# Patient Record
Sex: FEMALE | Race: WHITE | NOT HISPANIC OR LATINO
[De-identification: names, ages, dates, MRNs, and addresses within clinical notes are randomized per-mention and may not be internally consistent; named-entity substitution may affect disease eponyms.]

---

## 2018-01-30 ENCOUNTER — APPOINTMENT (OUTPATIENT)
Dept: SURGERY | Facility: CLINIC | Age: 43
End: 2018-01-30

## 2018-02-20 ENCOUNTER — APPOINTMENT (OUTPATIENT)
Dept: SURGERY | Facility: CLINIC | Age: 43
End: 2018-02-20
Payer: MEDICARE

## 2018-02-20 VITALS
SYSTOLIC BLOOD PRESSURE: 111 MMHG | HEIGHT: 64 IN | TEMPERATURE: 98.4 F | BODY MASS INDEX: 17.75 KG/M2 | WEIGHT: 104 LBS | HEART RATE: 87 BPM | DIASTOLIC BLOOD PRESSURE: 72 MMHG

## 2018-02-20 PROCEDURE — 99205 OFFICE O/P NEW HI 60 MIN: CPT

## 2018-02-27 ENCOUNTER — APPOINTMENT (OUTPATIENT)
Dept: SURGERY | Facility: CLINIC | Age: 43
End: 2018-02-27
Payer: MEDICARE

## 2018-02-27 VITALS
HEIGHT: 64 IN | HEART RATE: 93 BPM | SYSTOLIC BLOOD PRESSURE: 132 MMHG | DIASTOLIC BLOOD PRESSURE: 84 MMHG | WEIGHT: 102 LBS | OXYGEN SATURATION: 97 % | BODY MASS INDEX: 17.42 KG/M2

## 2018-02-27 DIAGNOSIS — K85.90 ACUTE PANCREATITIS WITHOUT NECROSIS OR INFECTION, UNSPECIFIED: ICD-10-CM

## 2018-02-27 PROCEDURE — 99214 OFFICE O/P EST MOD 30 MIN: CPT

## 2018-03-05 PROBLEM — K85.90 RECURRENT ACUTE PANCREATITIS: Status: ACTIVE | Noted: 2018-03-05

## 2018-03-21 ENCOUNTER — APPOINTMENT (OUTPATIENT)
Dept: SURGERY | Facility: HOSPITAL | Age: 43
End: 2018-03-21

## 2018-03-23 ENCOUNTER — APPOINTMENT (OUTPATIENT)
Dept: TRANSPLANT | Facility: CLINIC | Age: 43
End: 2018-03-23

## 2018-03-23 ENCOUNTER — OUTPATIENT (OUTPATIENT)
Dept: OUTPATIENT SERVICES | Facility: HOSPITAL | Age: 43
LOS: 1 days | End: 2018-03-23
Payer: MEDICARE

## 2018-03-23 VITALS
TEMPERATURE: 98 F | DIASTOLIC BLOOD PRESSURE: 81 MMHG | RESPIRATION RATE: 20 BRPM | WEIGHT: 104.06 LBS | HEART RATE: 92 BPM | SYSTOLIC BLOOD PRESSURE: 115 MMHG | HEIGHT: 64 IN | OXYGEN SATURATION: 100 %

## 2018-03-23 DIAGNOSIS — Z90.49 ACQUIRED ABSENCE OF OTHER SPECIFIED PARTS OF DIGESTIVE TRACT: Chronic | ICD-10-CM

## 2018-03-23 DIAGNOSIS — K86.1 OTHER CHRONIC PANCREATITIS: ICD-10-CM

## 2018-03-23 DIAGNOSIS — K85.90 ACUTE PANCREATITIS WITHOUT NECROSIS OR INFECTION, UNSPECIFIED: Chronic | ICD-10-CM

## 2018-03-23 DIAGNOSIS — Z01.818 ENCOUNTER FOR OTHER PREPROCEDURAL EXAMINATION: ICD-10-CM

## 2018-03-23 LAB
ALBUMIN SERPL ELPH-MCNC: 4.5 G/DL — SIGNIFICANT CHANGE UP (ref 3.3–5)
ALP SERPL-CCNC: 84 U/L — SIGNIFICANT CHANGE UP (ref 40–120)
ALT FLD-CCNC: 16 U/L RC — SIGNIFICANT CHANGE UP (ref 10–45)
ANION GAP SERPL CALC-SCNC: 11 MMOL/L — SIGNIFICANT CHANGE UP (ref 5–17)
AST SERPL-CCNC: 22 U/L — SIGNIFICANT CHANGE UP (ref 10–40)
BILIRUB SERPL-MCNC: 0.4 MG/DL — SIGNIFICANT CHANGE UP (ref 0.2–1.2)
BLD GP AB SCN SERPL QL: NEGATIVE — SIGNIFICANT CHANGE UP
BUN SERPL-MCNC: 12 MG/DL — SIGNIFICANT CHANGE UP (ref 7–23)
CALCIUM SERPL-MCNC: 10.1 MG/DL — SIGNIFICANT CHANGE UP (ref 8.4–10.5)
CHLORIDE SERPL-SCNC: 101 MMOL/L — SIGNIFICANT CHANGE UP (ref 96–108)
CO2 SERPL-SCNC: 31 MMOL/L — SIGNIFICANT CHANGE UP (ref 22–31)
CREAT SERPL-MCNC: 0.68 MG/DL — SIGNIFICANT CHANGE UP (ref 0.5–1.3)
GLUCOSE SERPL-MCNC: 131 MG/DL — HIGH (ref 70–99)
HCT VFR BLD CALC: 39.8 % — SIGNIFICANT CHANGE UP (ref 34.5–45)
HGB BLD-MCNC: 12.9 G/DL — SIGNIFICANT CHANGE UP (ref 11.5–15.5)
MCHC RBC-ENTMCNC: 32.4 GM/DL — SIGNIFICANT CHANGE UP (ref 32–36)
MCHC RBC-ENTMCNC: 33.2 PG — SIGNIFICANT CHANGE UP (ref 27–34)
MCV RBC AUTO: 102.6 FL — HIGH (ref 80–100)
NRBC # BLD: 0 /100 WBCS — SIGNIFICANT CHANGE UP (ref 0–0)
PLATELET # BLD AUTO: 215 K/UL — SIGNIFICANT CHANGE UP (ref 150–400)
POTASSIUM SERPL-MCNC: 4.9 MMOL/L — SIGNIFICANT CHANGE UP (ref 3.5–5.3)
POTASSIUM SERPL-SCNC: 4.9 MMOL/L — SIGNIFICANT CHANGE UP (ref 3.5–5.3)
PROT SERPL-MCNC: 7.3 G/DL — SIGNIFICANT CHANGE UP (ref 6–8.3)
RBC # BLD: 3.88 M/UL — SIGNIFICANT CHANGE UP (ref 3.8–5.2)
RBC # FLD: 12.3 % — SIGNIFICANT CHANGE UP (ref 10.3–14.5)
RH IG SCN BLD-IMP: POSITIVE — SIGNIFICANT CHANGE UP
SODIUM SERPL-SCNC: 143 MMOL/L — SIGNIFICANT CHANGE UP (ref 135–145)
WBC # BLD: 7.35 K/UL — SIGNIFICANT CHANGE UP (ref 3.8–10.5)
WBC # FLD AUTO: 7.35 K/UL — SIGNIFICANT CHANGE UP (ref 3.8–10.5)

## 2018-03-23 PROCEDURE — G0463: CPT

## 2018-03-23 PROCEDURE — 80053 COMPREHEN METABOLIC PANEL: CPT

## 2018-03-23 PROCEDURE — 86850 RBC ANTIBODY SCREEN: CPT

## 2018-03-23 PROCEDURE — 82150 ASSAY OF AMYLASE: CPT

## 2018-03-23 PROCEDURE — 86900 BLOOD TYPING SEROLOGIC ABO: CPT

## 2018-03-23 PROCEDURE — 83690 ASSAY OF LIPASE: CPT

## 2018-03-23 PROCEDURE — 85027 COMPLETE CBC AUTOMATED: CPT

## 2018-03-23 PROCEDURE — 86901 BLOOD TYPING SEROLOGIC RH(D): CPT

## 2018-03-23 RX ORDER — GABAPENTIN 400 MG/1
600 CAPSULE ORAL ONCE
Qty: 0 | Refills: 0 | Status: COMPLETED | OUTPATIENT
Start: 2018-04-05 | End: 2018-04-05

## 2018-03-23 RX ORDER — CEFAZOLIN SODIUM 1 G
2000 VIAL (EA) INJECTION ONCE
Qty: 0 | Refills: 0 | Status: DISCONTINUED | OUTPATIENT
Start: 2018-04-05 | End: 2018-04-05

## 2018-03-23 RX ORDER — SODIUM CHLORIDE 9 MG/ML
3 INJECTION INTRAMUSCULAR; INTRAVENOUS; SUBCUTANEOUS EVERY 8 HOURS
Qty: 0 | Refills: 0 | Status: DISCONTINUED | OUTPATIENT
Start: 2018-04-05 | End: 2018-04-05

## 2018-03-23 RX ORDER — FAMOTIDINE 10 MG/ML
20 INJECTION INTRAVENOUS ONCE
Qty: 0 | Refills: 0 | Status: COMPLETED | OUTPATIENT
Start: 2018-04-05 | End: 2018-04-05

## 2018-03-23 NOTE — H&P PST ADULT - OTHER CARE PROVIDERS
Pain management: Dr. Kieran Aly # [phone]          Endocrinologist: Dr. Nesbitt # [phone]

## 2018-03-23 NOTE — H&P PST ADULT - PROBLEM SELECTOR PLAN 1
Total pancreatectomy with islet autotransplantation   urine pregnancy DOS  patient is scheduled to be evaluated by her endocrinologist and pcp prior to surgery.  fs DOS

## 2018-03-23 NOTE — H&P PST ADULT - ACTIVITY
scrubbing the tub, sweeping, able to walk 1-2 flights, walks from parking lot to preadmission testing

## 2018-03-23 NOTE — H&P PST ADULT - HISTORY OF PRESENT ILLNESS
42 year old female with h/o on and off abdominal pain since childhood worsen in past 6 years, have had numerous admission with a diagnosis of pancreatitis, is being seen in preadmission testing in preparation for total pancreatectomy with islet autotransplantation on April 5, 2018.

## 2018-03-27 ENCOUNTER — APPOINTMENT (OUTPATIENT)
Dept: ENDOCRINOLOGY | Facility: CLINIC | Age: 43
End: 2018-03-27
Payer: MEDICARE

## 2018-03-27 DIAGNOSIS — K86.1 OTHER CHRONIC PANCREATITIS: ICD-10-CM

## 2018-03-27 PROCEDURE — 99211 OFF/OP EST MAY X REQ PHY/QHP: CPT

## 2018-04-02 ENCOUNTER — OTHER (OUTPATIENT)
Age: 43
End: 2018-04-02

## 2018-04-02 RX ORDER — ONDANSETRON HYDROCHLORIDE 8 MG/1
8 TABLET, FILM COATED ORAL
Refills: 0 | Status: ACTIVE | COMMUNITY
Start: 2018-04-02

## 2018-04-02 RX ORDER — LORATADINE 10 MG
17 TABLET,DISINTEGRATING ORAL DAILY
Refills: 0 | Status: ACTIVE | COMMUNITY
Start: 2018-04-02

## 2018-04-02 RX ORDER — SENNOSIDES 25 MG
25 TABLET ORAL
Refills: 0 | Status: ACTIVE | COMMUNITY
Start: 2018-04-02

## 2018-04-02 RX ORDER — OMEPRAZOLE 20 MG/1
20 CAPSULE, DELAYED RELEASE ORAL DAILY
Refills: 0 | Status: ACTIVE | COMMUNITY
Start: 2018-04-02

## 2018-04-02 RX ORDER — PANCRELIPASE 36000; 180000; 114000 [USP'U]/1; [USP'U]/1; [USP'U]/1
36000-114000 CAPSULE, DELAYED RELEASE PELLETS ORAL
Refills: 0 | Status: ACTIVE | COMMUNITY
Start: 2018-04-02

## 2018-04-02 RX ORDER — FLUTICASONE PROPIONATE 50 UG/1
50 SPRAY, METERED NASAL
Refills: 0 | Status: ACTIVE | COMMUNITY
Start: 2018-04-02

## 2018-04-02 RX ORDER — ALPRAZOLAM 0.25 MG/1
0.25 TABLET ORAL
Refills: 0 | Status: ACTIVE | COMMUNITY
Start: 2018-04-02

## 2018-04-04 ENCOUNTER — TRANSCRIPTION ENCOUNTER (OUTPATIENT)
Age: 43
End: 2018-04-04

## 2018-04-05 ENCOUNTER — INPATIENT (INPATIENT)
Facility: HOSPITAL | Age: 43
LOS: 12 days | Discharge: HOME | DRG: 405 | End: 2018-04-18
Attending: SURGERY | Admitting: SURGERY
Payer: MEDICARE

## 2018-04-05 ENCOUNTER — APPOINTMENT (OUTPATIENT)
Dept: SURGERY | Facility: HOSPITAL | Age: 43
End: 2018-04-05

## 2018-04-05 ENCOUNTER — RESULT REVIEW (OUTPATIENT)
Age: 43
End: 2018-04-05

## 2018-04-05 VITALS
HEART RATE: 99 BPM | TEMPERATURE: 98 F | DIASTOLIC BLOOD PRESSURE: 88 MMHG | RESPIRATION RATE: 18 BRPM | SYSTOLIC BLOOD PRESSURE: 127 MMHG | OXYGEN SATURATION: 87 % | WEIGHT: 104.06 LBS | HEIGHT: 63.78 IN

## 2018-04-05 DIAGNOSIS — K86.1 OTHER CHRONIC PANCREATITIS: ICD-10-CM

## 2018-04-05 DIAGNOSIS — K85.90 ACUTE PANCREATITIS WITHOUT NECROSIS OR INFECTION, UNSPECIFIED: Chronic | ICD-10-CM

## 2018-04-05 DIAGNOSIS — Z90.49 ACQUIRED ABSENCE OF OTHER SPECIFIED PARTS OF DIGESTIVE TRACT: Chronic | ICD-10-CM

## 2018-04-05 LAB
APTT BLD: 99.8 SEC — HIGH (ref 27.5–37.4)
GAS PNL BLDA: SIGNIFICANT CHANGE UP
HCG UR QL: NEGATIVE — SIGNIFICANT CHANGE UP
HCT VFR BLD CALC: 28.6 % — LOW (ref 34.5–45)
HGB BLD-MCNC: 10.2 G/DL — LOW (ref 11.5–15.5)
INR BLD: 1.44 RATIO — HIGH (ref 0.88–1.16)
MAGNESIUM SERPL-MCNC: 1.7 MG/DL — SIGNIFICANT CHANGE UP (ref 1.6–2.6)
MCHC RBC-ENTMCNC: 33.6 PG — SIGNIFICANT CHANGE UP (ref 27–34)
MCHC RBC-ENTMCNC: 35.7 GM/DL — SIGNIFICANT CHANGE UP (ref 32–36)
MCV RBC AUTO: 93.9 FL — SIGNIFICANT CHANGE UP (ref 80–100)
PHOSPHATE SERPL-MCNC: 3.8 MG/DL — SIGNIFICANT CHANGE UP (ref 2.5–4.5)
PLATELET # BLD AUTO: 115 K/UL — LOW (ref 150–400)
PROTHROM AB SERPL-ACNC: 15.7 SEC — HIGH (ref 9.8–12.7)
RBC # BLD: 3.05 M/UL — LOW (ref 3.8–5.2)
RBC # FLD: 15.4 % — HIGH (ref 10.3–14.5)
RH IG SCN BLD-IMP: POSITIVE — SIGNIFICANT CHANGE UP
WBC # BLD: 9 K/UL — SIGNIFICANT CHANGE UP (ref 3.8–10.5)
WBC # FLD AUTO: 9 K/UL — SIGNIFICANT CHANGE UP (ref 3.8–10.5)

## 2018-04-05 PROCEDURE — G0343: CPT

## 2018-04-05 PROCEDURE — 71045 X-RAY EXAM CHEST 1 VIEW: CPT | Mod: 26

## 2018-04-05 PROCEDURE — 88304 TISSUE EXAM BY PATHOLOGIST: CPT | Mod: 26

## 2018-04-05 PROCEDURE — 71045 X-RAY EXAM CHEST 1 VIEW: CPT | Mod: 26,77

## 2018-04-05 PROCEDURE — 99291 CRITICAL CARE FIRST HOUR: CPT

## 2018-04-05 PROCEDURE — 48155 REMOVAL OF PANCREAS: CPT

## 2018-04-05 PROCEDURE — 88312 SPECIAL STAINS GROUP 1: CPT | Mod: 26

## 2018-04-05 PROCEDURE — 88307 TISSUE EXAM BY PATHOLOGIST: CPT | Mod: 26

## 2018-04-05 PROCEDURE — 88305 TISSUE EXAM BY PATHOLOGIST: CPT | Mod: 26

## 2018-04-05 RX ORDER — HEPARIN SODIUM 5000 [USP'U]/ML
5000 INJECTION INTRAVENOUS; SUBCUTANEOUS EVERY 8 HOURS
Qty: 0 | Refills: 0 | Status: DISCONTINUED | OUTPATIENT
Start: 2018-04-05 | End: 2018-04-08

## 2018-04-05 RX ORDER — INSULIN HUMAN 100 [IU]/ML
0.5 INJECTION, SOLUTION SUBCUTANEOUS
Qty: 100 | Refills: 0 | Status: DISCONTINUED | OUTPATIENT
Start: 2018-04-05 | End: 2018-04-07

## 2018-04-05 RX ORDER — KETOROLAC TROMETHAMINE 30 MG/ML
30 SYRINGE (ML) INJECTION EVERY 6 HOURS
Qty: 0 | Refills: 0 | Status: DISCONTINUED | OUTPATIENT
Start: 2018-04-05 | End: 2018-04-06

## 2018-04-05 RX ORDER — LIDOCAINE HCL 20 MG/ML
0.2 VIAL (ML) INJECTION ONCE
Qty: 0 | Refills: 0 | Status: COMPLETED | OUTPATIENT
Start: 2018-04-05 | End: 2018-04-05

## 2018-04-05 RX ORDER — FENTANYL CITRATE 50 UG/ML
250 INJECTION INTRAVENOUS
Qty: 0 | Refills: 0 | Status: DISCONTINUED | OUTPATIENT
Start: 2018-04-05 | End: 2018-04-06

## 2018-04-05 RX ORDER — METHOCARBAMOL 500 MG/1
0 TABLET, FILM COATED ORAL
Qty: 0 | Refills: 0 | COMMUNITY

## 2018-04-05 RX ORDER — SODIUM CHLORIDE 9 MG/ML
1000 INJECTION, SOLUTION INTRAVENOUS
Qty: 0 | Refills: 0 | Status: DISCONTINUED | OUTPATIENT
Start: 2018-04-05 | End: 2018-04-07

## 2018-04-05 RX ORDER — FENTANYL CITRATE 50 UG/ML
5 INJECTION INTRAVENOUS
Qty: 0 | Refills: 0 | Status: DISCONTINUED | OUTPATIENT
Start: 2018-04-05 | End: 2018-04-06

## 2018-04-05 RX ORDER — ERYTHROMYCIN ETHYLSUCCINATE 400 MG
250 TABLET ORAL EVERY 6 HOURS
Qty: 0 | Refills: 0 | Status: DISCONTINUED | OUTPATIENT
Start: 2018-04-05 | End: 2018-04-08

## 2018-04-05 RX ORDER — ACETAMINOPHEN 500 MG
750 TABLET ORAL ONCE
Qty: 0 | Refills: 0 | Status: COMPLETED | OUTPATIENT
Start: 2018-04-05 | End: 2018-04-05

## 2018-04-05 RX ORDER — MAGNESIUM SULFATE 500 MG/ML
2 VIAL (ML) INJECTION ONCE
Qty: 0 | Refills: 0 | Status: COMPLETED | OUTPATIENT
Start: 2018-04-05 | End: 2018-04-05

## 2018-04-05 RX ORDER — CELECOXIB 200 MG/1
400 CAPSULE ORAL ONCE
Qty: 0 | Refills: 0 | Status: COMPLETED | OUTPATIENT
Start: 2018-04-05 | End: 2018-04-05

## 2018-04-05 RX ORDER — HEPARIN SODIUM 5000 [USP'U]/ML
5000 INJECTION INTRAVENOUS; SUBCUTANEOUS ONCE
Qty: 0 | Refills: 0 | Status: COMPLETED | OUTPATIENT
Start: 2018-04-05 | End: 2018-04-05

## 2018-04-05 RX ORDER — ACETAMINOPHEN 500 MG
1000 TABLET ORAL ONCE
Qty: 0 | Refills: 0 | Status: DISCONTINUED | OUTPATIENT
Start: 2018-04-05 | End: 2018-04-05

## 2018-04-05 RX ORDER — PIPERACILLIN AND TAZOBACTAM 4; .5 G/20ML; G/20ML
3.38 INJECTION, POWDER, LYOPHILIZED, FOR SOLUTION INTRAVENOUS EVERY 6 HOURS
Qty: 0 | Refills: 0 | Status: COMPLETED | OUTPATIENT
Start: 2018-04-05 | End: 2018-04-07

## 2018-04-05 RX ADMIN — SODIUM CHLORIDE 75 MILLILITER(S): 9 INJECTION, SOLUTION INTRAVENOUS at 23:41

## 2018-04-05 RX ADMIN — Medication 250 MILLIGRAM(S): at 23:18

## 2018-04-05 RX ADMIN — HEPARIN SODIUM 5000 UNIT(S): 5000 INJECTION INTRAVENOUS; SUBCUTANEOUS at 06:48

## 2018-04-05 RX ADMIN — FAMOTIDINE 20 MILLIGRAM(S): 10 INJECTION INTRAVENOUS at 06:49

## 2018-04-05 RX ADMIN — CELECOXIB 400 MILLIGRAM(S): 200 CAPSULE ORAL at 06:49

## 2018-04-05 RX ADMIN — INSULIN HUMAN 0.5 UNIT(S)/HR: 100 INJECTION, SOLUTION SUBCUTANEOUS at 23:00

## 2018-04-05 RX ADMIN — FENTANYL CITRATE 250 MILLILITER(S): 50 INJECTION INTRAVENOUS at 23:10

## 2018-04-05 RX ADMIN — GABAPENTIN 600 MILLIGRAM(S): 400 CAPSULE ORAL at 06:49

## 2018-04-05 RX ADMIN — SODIUM CHLORIDE 100 MILLILITER(S): 9 INJECTION, SOLUTION INTRAVENOUS at 22:44

## 2018-04-05 RX ADMIN — Medication 750 MILLIGRAM(S): at 23:47

## 2018-04-05 RX ADMIN — Medication 50 GRAM(S): at 23:16

## 2018-04-05 RX ADMIN — HEPARIN SODIUM 5000 UNIT(S): 5000 INJECTION INTRAVENOUS; SUBCUTANEOUS at 23:16

## 2018-04-05 RX ADMIN — Medication 300 MILLIGRAM(S): at 23:17

## 2018-04-05 RX ADMIN — Medication 30 MILLIGRAM(S): at 22:58

## 2018-04-05 RX ADMIN — Medication 30 MILLIGRAM(S): at 22:43

## 2018-04-05 NOTE — CONSULT NOTE ADULT - ASSESSMENT
ASSESSMENT:  42y Female s/p total pancreatectomy    PLAN:   Neurologic:   Respiratory:   Cardiovascular:   Gastrointestinal/Nutrition:   Renal/Genitourinary:   Hematologic:   Infectious Disease:   Lines/Tubes:  Endocrine:   Disposition: ASSESSMENT:  42y Female s/p total pancreatectomy and islet cell autotransplantation.     PLAN:   Neurologic: Postoperative pain control in the setting of chronic pain  - PCEA  - PRN toradol and Motrin q6h  - IV Tylenol PRN    Respiratory: No acute issues  - Maintain SpO2 > 92%    Cardiovascular: No acute issues. Lactate cleared to 1.4.   - LR @ 75    Gastrointestinal/Nutrition: s/p gastrojejunostomy with feeding jejunostomy  - Erythromycin for gastric motility  - Start J-tube feeds on POD#1    Renal/Genitourinary: No acute issues  - Maintenance fluids   - Replete electrolytes PRN    Hematologic: No acute signs of bleeding. Receiving 3 units pRBCs intraop.   - SQH for VTE ppx    Infectious Disease: No acute issues  - Zosyn for perioperative antibiotics for 48 hours per protocol    Lines/Tubes: RIJ Cordis, arterial line, Ferrari, NGT     Endocrine: s/p islet cell autotransplant  - insulin gtt and titrate per protocol    Disposition: SICU

## 2018-04-05 NOTE — PROGRESS NOTE ADULT - SUBJECTIVE AND OBJECTIVE BOX
The patient is here for a total pancreatectomy and islet auto-transplantation. She reports no changes i n her medical status since our last visit.  Will proceed to surgery

## 2018-04-05 NOTE — CONSULT NOTE ADULT - ATTENDING COMMENTS
Dr. Rosado (Attending Physician)  Pt. with ho chronic panc s/p Moody (pancreaticojejunostomy) now s/p takedown of pancreaticojejunostomy, creation of gastrojejunostomy, pancreatectomy with autoislet cell transplant.  N - Chronic pain - PCEA in place for pain control. Dilaudid for breakthrough as needed.  P - Extubated with postoperative resp insufficiency, IS, nasal cannula as needed. Mild resp acidosis will observe as she wakes up more.  C - Low grade tachycardia, lactate cleared from intraop, no vasopressors.  Appears well resuscitated.   - Ferrari placed monitor UO, Lactic acidosis improved. LR @75 will give albumin if resuscitation needed.  GI - ppi, start j-tube feeds POD1, erthromycin to promote gastric emptying.  H - DVT ppx with heparin bc of PCEA  ID - Zosyn perioperatively  Endo - on Islet cell tx protocol for glucose management, no IV dextrose.    This patient was critically ill with one or more vital organ systems at a high probability of imminent or life threatening deterioration. Complex decision making was required to assess and treat single or multiple vital organ system failure and/or prevent further life threatening deterioration of the patient’s condition.  All recent labwork and imaging was reviewed.  Total Critical Care Time 40 Dr. Rosado (Attending Physician)  Pt. with ho chronic panc s/p Moody (pancreaticojejunostomy) now s/p takedown of pancreaticojejunostomy, creation of gastrojejunostomy, pancreatectomy with autoislet cell transplant.  N - Chronic pain - PCEA in place for pain control. Dilaudid for breakthrough as needed.  P - Extubated with postoperative resp insufficiency, IS, nasal cannula as needed. Mild resp acidosis will observe as she wakes up more.  C - Low grade tachycardia, lactate cleared from intraop, no vasopressors.  Appears well resuscitated.   - Ferrari placed monitor UO, Lactic acidosis improved. LR @75 will give albumin if resuscitation needed.  GI - ppi, start j-tube feeds POD1, erthromycin to promote gastric emptying.  H - Anemia secondary to 800 mL blood loss intra-op.  Will monitor CBC q4 overnight. DVT ppx with heparin bc of PCEA  ID - Zosyn perioperatively  Endo - on Islet cell tx protocol for glucose management, no IV dextrose.    This patient was critically ill with one or more vital organ systems at a high probability of imminent or life threatening deterioration. Complex decision making was required to assess and treat single or multiple vital organ system failure and/or prevent further life threatening deterioration of the patient’s condition.  All recent labwork and imaging was reviewed.  Total Critical Care Time 40 min

## 2018-04-05 NOTE — CONSULT NOTE ADULT - SUBJECTIVE AND OBJECTIVE BOX
SICU Consultation Note  =====================================================  HPI: 42y female with history of chronic pancreatitis since childhood now s/p total pancreatectomy with islet cell autotransplant.     Patient was received in the SICU extubated, on no pressors.     Surgery Information  OR time: 14.5 hours     EBL: 800      UOP:              IV Fluids:       Blood Products: 3 units pRBCs            PAST MEDICAL & SURGICAL HISTORY:  Premenstrual migraine  Other chronic pancreatitis  S/P cholecystectomy: in early 20&#x27;s  Pancreatitis: h/o [place] procedure 2014    Home Medications:  Creon 36,000 units oral delayed release capsule: 1 cap(s) orally 3 times a day (05 Apr 2018 07:11)  Flonase 50 mcg/inh nasal spray: 1 spray(s) nasal once a day, PRN (05 Apr 2018 07:11)  MiraLax oral powder for reconstitution:  (05 Apr 2018 07:11)  omeprazole 40 mg oral delayed release capsule: 1 cap(s) orally once a day (05 Apr 2018 07:11)  oxyCODONE 10 mg oral tablet: 1 tab(s) orally every 6 hours (05 Apr 2018 07:11)  OxyCONTIN 30 mg oral tablet, extended release: 1 tab(s) orally every 12 hours (05 Apr 2018 07:11)  Robaxin-750 oral tablet: three times a day  (05 Apr 2018 07:11)  Xanax 0.25 mg oral tablet: 1 tab(s) orally 3 times a day (05 Apr 2018 07:11)  Zofran 8 mg oral tablet: PRN (05 Apr 2018 07:11)    Allergies: NKA  Soc: . Former smoker.  Advanced Directives: Presumed Full Code     ROS:    General: Non-Contributory  Skin/Breast: Non-Contributory  Ophthalmologic: Non-Contributory  ENMT: Non-Contributory  Respiratory and Thorax: Non-Contributory  Cardiovascular: Non-Contributory  Gastrointestinal: Non-Contributory  Genitourinary: Non-Contributory  Musculoskeletal: Non-Contributory  Neurological: Non-Contributory  Psychiatric: Non-Contributory  Hematology/Lymphatics: Non-Contributory  Endocrine: Non-Contributory  Allergic/Immunologic: Non-Contributory    CURRENT MEDICATIONS:   --------------------------------------------------------------------------------------  Neurologic Medications  fentaNYL (3 MICROgram(s)/mL) + BUpivacaine 0.01% in 0.9% Sodium Chloride PCEA 250 milliLiter(s) Epidural PCA Continuous  fentaNYL (3 MICROgram(s)/mL) + BUpivacaine 0.01% in 0.9% Sodium Chloride PCEA Rescue Clinician Bolus 5 milliLiter(s) Epidural every 15 minutes PRN moderate/severe pain  ketorolac   Injectable 30 milliGRAM(s) IV Push every 6 hours PRN pain    Respiratory Medications    Cardiovascular Medications    Gastrointestinal Medications  lactated ringers. 1000 milliLiter(s) IV Continuous <Continuous>    Genitourinary Medications    Hematologic/Oncologic Medications  heparin  Injectable 5000 Unit(s) SubCutaneous every 8 hours    Antimicrobial/Immunologic Medications  erythromycin    ethylsuccinate Suspension 40 mG/mL 250 milliGRAM(s) Oral every 6 hours  piperacillin/tazobactam IVPB. 3.375 Gram(s) IV Intermittent every 6 hours    Endocrine/Metabolic Medications  insulin Infusion 0.5 Unit(s)/Hr IV Continuous <Continuous>    Topical/Other Medications    --------------------------------------------------------------------------------------    VITAL SIGNS, INS/OUTS (last 24 hours):  --------------------------------------------------------------------------------------  Vital Signs Last 24 Hrs  T(C): 36.8 (05 Apr 2018 22:00), Max: 36.8 (05 Apr 2018 22:00)  T(F): 98.2 (05 Apr 2018 22:00), Max: 98.2 (05 Apr 2018 22:00)  HR: 108 (05 Apr 2018 22:30) (99 - 108)  BP: 105/54 (05 Apr 2018 22:00) (105/54 - 127/88)  BP(mean): 76 (05 Apr 2018 22:00) (76 - 76)  RR: 19 (05 Apr 2018 22:30) (18 - 20)  SpO2: 98% (05 Apr 2018 22:30) (87% - 100%)  --------------------------------------------------------------------------------------    EXAM:  General/Neuro  Exam: Normal, NAD, alert, oriented x 3, no focal deficits.    Respiratory  Exam: Lungs clear to auscultation, Normal expansion/effort.      Cardiovascular  Exam: S1, S2.  tachycardic  Cardiac Rhythm: Normal Sinus Rhythm    GI  Exam: Abdomen soft, Non-tender, Non-distended.  Jejunostomy tube in place.  Nasogastric tube in place.        Tubes/Lines/Drains    [x] Peripheral IV  [x] Central Venous Line     	[] R	[] L	[] IJ	[] Fem	[] SC        Type:	    Date Placed:   [x] Arterial Line		[] R	[] L	[] Fem	[] Rad	[] Ax	Date Placed:   [] PICC:         	[] Midline		[] Mediport           [x] Urinary Catheter		Date Placed:     Extremities  Exam: Extremities warm, pink, well-perfused.        Derm:  Exam: Good skin turgor, no skin breakdown.      :   Exam: Ferrari catheter in place.     LABS  --------------------------------------------------------------------------------------  CBC Full  -  ( 05 Apr 2018 22:22 )  WBC Count : 9.0 K/uL  Hemoglobin : 10.2 g/dL  Hematocrit : 28.6 %  Platelet Count - Automated : 115 K/uL  Mean Cell Volume : 93.9 fl  Mean Cell Hemoglobin : 33.6 pg  Mean Cell Hemoglobin Concentration : 35.7 gm/dL      Phos  3.8     04-05  Mg     1.7     04-05        PT/INR - ( 05 Apr 2018 22:22 )   PT: 15.7 sec;   INR: 1.44 ratio             --------------------------------------------------------------------------------------    OTHER LABS    IMAGING RESULTS SICU Consultation Note  =====================================================  HPI: 42y female with history of chronic pancreatitis since childhood s/p [place] procedure in 2014, now s/p total pancreatectomy with islet cell autotransplant.     Patient was received in the SICU extubated, on no pressors.     Surgery Information  OR time: 14.5 hours     EBL: 800      UOP:              IV Fluids:       Blood Products: 3 units pRBCs            PAST MEDICAL & SURGICAL HISTORY:  Premenstrual migraine  Other chronic pancreatitis  S/P cholecystectomy: in early 20&#x27;s  Pancreatitis: h/o [place] procedure 2014    Home Medications:  Creon 36,000 units oral delayed release capsule: 1 cap(s) orally 3 times a day (05 Apr 2018 07:11)  Flonase 50 mcg/inh nasal spray: 1 spray(s) nasal once a day, PRN (05 Apr 2018 07:11)  MiraLax oral powder for reconstitution:  (05 Apr 2018 07:11)  omeprazole 40 mg oral delayed release capsule: 1 cap(s) orally once a day (05 Apr 2018 07:11)  oxyCODONE 10 mg oral tablet: 1 tab(s) orally every 6 hours (05 Apr 2018 07:11)  OxyCONTIN 30 mg oral tablet, extended release: 1 tab(s) orally every 12 hours (05 Apr 2018 07:11)  Robaxin-750 oral tablet: three times a day  (05 Apr 2018 07:11)  Xanax 0.25 mg oral tablet: 1 tab(s) orally 3 times a day (05 Apr 2018 07:11)  Zofran 8 mg oral tablet: PRN (05 Apr 2018 07:11)    Allergies: NKA  Soc: . Former smoker.  Advanced Directives: Presumed Full Code     ROS:    General: Non-Contributory  Skin/Breast: Non-Contributory  Ophthalmologic: Non-Contributory  ENMT: Non-Contributory  Respiratory and Thorax: Non-Contributory  Cardiovascular: Non-Contributory  Gastrointestinal: Non-Contributory  Genitourinary: Non-Contributory  Musculoskeletal: Non-Contributory  Neurological: Non-Contributory  Psychiatric: Non-Contributory  Hematology/Lymphatics: Non-Contributory  Endocrine: Non-Contributory  Allergic/Immunologic: Non-Contributory    CURRENT MEDICATIONS:   --------------------------------------------------------------------------------------  Neurologic Medications  fentaNYL (3 MICROgram(s)/mL) + BUpivacaine 0.01% in 0.9% Sodium Chloride PCEA 250 milliLiter(s) Epidural PCA Continuous  fentaNYL (3 MICROgram(s)/mL) + BUpivacaine 0.01% in 0.9% Sodium Chloride PCEA Rescue Clinician Bolus 5 milliLiter(s) Epidural every 15 minutes PRN moderate/severe pain  ketorolac   Injectable 30 milliGRAM(s) IV Push every 6 hours PRN pain    Respiratory Medications    Cardiovascular Medications    Gastrointestinal Medications  lactated ringers. 1000 milliLiter(s) IV Continuous <Continuous>    Genitourinary Medications    Hematologic/Oncologic Medications  heparin  Injectable 5000 Unit(s) SubCutaneous every 8 hours    Antimicrobial/Immunologic Medications  erythromycin    ethylsuccinate Suspension 40 mG/mL 250 milliGRAM(s) Oral every 6 hours  piperacillin/tazobactam IVPB. 3.375 Gram(s) IV Intermittent every 6 hours    Endocrine/Metabolic Medications  insulin Infusion 0.5 Unit(s)/Hr IV Continuous <Continuous>    Topical/Other Medications    --------------------------------------------------------------------------------------    VITAL SIGNS, INS/OUTS (last 24 hours):  --------------------------------------------------------------------------------------  Vital Signs Last 24 Hrs  T(C): 36.8 (05 Apr 2018 22:00), Max: 36.8 (05 Apr 2018 22:00)  T(F): 98.2 (05 Apr 2018 22:00), Max: 98.2 (05 Apr 2018 22:00)  HR: 108 (05 Apr 2018 22:30) (99 - 108)  BP: 105/54 (05 Apr 2018 22:00) (105/54 - 127/88)  BP(mean): 76 (05 Apr 2018 22:00) (76 - 76)  RR: 19 (05 Apr 2018 22:30) (18 - 20)  SpO2: 98% (05 Apr 2018 22:30) (87% - 100%)  --------------------------------------------------------------------------------------    EXAM:  General/Neuro  Exam: Normal, NAD, alert, oriented x 3, no focal deficits.    Respiratory  Exam: Lungs clear to auscultation, Normal expansion/effort.      Cardiovascular  Exam: S1, S2.  tachycardic  Cardiac Rhythm: Normal Sinus Rhythm    GI  Exam: Abdomen soft, Non-tender, Non-distended.  Jejunostomy tube in place.  Nasogastric tube in place.        Tubes/Lines/Drains    [x] Peripheral IV  [x] Central Venous Line     	[] R	[] L	[] IJ	[] Fem	[] SC        Type:	    Date Placed:   [x] Arterial Line		[] R	[] L	[] Fem	[] Rad	[] Ax	Date Placed:   [] PICC:         	[] Midline		[] Mediport           [x] Urinary Catheter		Date Placed:     Extremities  Exam: Extremities warm, pink, well-perfused.        Derm:  Exam: Good skin turgor, no skin breakdown.      :   Exam: Ferrari catheter in place.     LABS  --------------------------------------------------------------------------------------  CBC Full  -  ( 05 Apr 2018 22:22 )  WBC Count : 9.0 K/uL  Hemoglobin : 10.2 g/dL  Hematocrit : 28.6 %  Platelet Count - Automated : 115 K/uL  Mean Cell Volume : 93.9 fl  Mean Cell Hemoglobin : 33.6 pg  Mean Cell Hemoglobin Concentration : 35.7 gm/dL      Phos  3.8     04-05  Mg     1.7     04-05        PT/INR - ( 05 Apr 2018 22:22 )   PT: 15.7 sec;   INR: 1.44 ratio             --------------------------------------------------------------------------------------    OTHER LABS    IMAGING RESULTS SICU Consultation Note  =====================================================  HPI: 42y female with history of chronic pancreatitis since childhood s/p [place] procedure in 2014, now s/p total pancreatectomy with islet cell autotransplant.     Patient was received in the SICU extubated, on no pressors.     Surgery Information  OR time: 14.5 hours     EBL: 800      UOP:              IV Fluids:       Blood Products: 3 units pRBCs            PAST MEDICAL & SURGICAL HISTORY:  Premenstrual migraine  Other chronic pancreatitis  S/P cholecystectomy in early 20's  Pancreatitis: h/o [place] procedure 2014    Home Medications:  Creon 36,000 units oral delayed release capsule: 1 cap(s) orally 3 times a day (05 Apr 2018 07:11)  Flonase 50 mcg/inh nasal spray: 1 spray(s) nasal once a day, PRN (05 Apr 2018 07:11)  MiraLax oral powder for reconstitution:  (05 Apr 2018 07:11)  omeprazole 40 mg oral delayed release capsule: 1 cap(s) orally once a day (05 Apr 2018 07:11)  oxyCODONE 10 mg oral tablet: 1 tab(s) orally every 6 hours (05 Apr 2018 07:11)  OxyCONTIN 30 mg oral tablet, extended release: 1 tab(s) orally every 12 hours (05 Apr 2018 07:11)  Robaxin-750 oral tablet: three times a day  (05 Apr 2018 07:11)  Xanax 0.25 mg oral tablet: 1 tab(s) orally 3 times a day (05 Apr 2018 07:11)  Zofran 8 mg oral tablet: PRN (05 Apr 2018 07:11)    Allergies: NKA  Soc: . Former smoker - 15 pack years  Advanced Directives: Presumed Full Code     ROS:    General: Non-Contributory  Skin/Breast: Non-Contributory  Ophthalmologic: Non-Contributory  ENMT: Non-Contributory  Respiratory and Thorax: Non-Contributory  Cardiovascular: Non-Contributory  Gastrointestinal: Non-Contributory  Genitourinary: Non-Contributory  Musculoskeletal: Non-Contributory  Neurological: Non-Contributory  Psychiatric: Non-Contributory  Hematology/Lymphatics: Non-Contributory  Endocrine: Non-Contributory  Allergic/Immunologic: Non-Contributory    CURRENT MEDICATIONS:   --------------------------------------------------------------------------------------  Neurologic Medications  fentaNYL (3 MICROgram(s)/mL) + BUpivacaine 0.01% in 0.9% Sodium Chloride PCEA 250 milliLiter(s) Epidural PCA Continuous  fentaNYL (3 MICROgram(s)/mL) + BUpivacaine 0.01% in 0.9% Sodium Chloride PCEA Rescue Clinician Bolus 5 milliLiter(s) Epidural every 15 minutes PRN moderate/severe pain  ketorolac   Injectable 30 milliGRAM(s) IV Push every 6 hours PRN pain    Respiratory Medications    Cardiovascular Medications    Gastrointestinal Medications  lactated ringers. 1000 milliLiter(s) IV Continuous <Continuous>    Genitourinary Medications    Hematologic/Oncologic Medications  heparin  Injectable 5000 Unit(s) SubCutaneous every 8 hours    Antimicrobial/Immunologic Medications  erythromycin    ethylsuccinate Suspension 40 mG/mL 250 milliGRAM(s) Oral every 6 hours  piperacillin/tazobactam IVPB. 3.375 Gram(s) IV Intermittent every 6 hours    Endocrine/Metabolic Medications  insulin Infusion 0.5 Unit(s)/Hr IV Continuous <Continuous>    Topical/Other Medications    --------------------------------------------------------------------------------------    VITAL SIGNS, INS/OUTS (last 24 hours):  --------------------------------------------------------------------------------------  Vital Signs Last 24 Hrs  T(C): 36.8 (05 Apr 2018 22:00), Max: 36.8 (05 Apr 2018 22:00)  T(F): 98.2 (05 Apr 2018 22:00), Max: 98.2 (05 Apr 2018 22:00)  HR: 108 (05 Apr 2018 22:30) (99 - 108)  BP: 105/54 (05 Apr 2018 22:00) (105/54 - 127/88)  BP(mean): 76 (05 Apr 2018 22:00) (76 - 76)  RR: 19 (05 Apr 2018 22:30) (18 - 20)  SpO2: 98% (05 Apr 2018 22:30) (87% - 100%)  --------------------------------------------------------------------------------------    EXAM:  General/Neuro  Exam: Normal, NAD, alert, oriented x 3, no focal deficits.    Respiratory  Exam: Lungs clear to auscultation, Normal expansion/effort.      Cardiovascular  Exam: S1, S2.  tachycardic  Cardiac Rhythm: Normal Sinus Rhythm    GI  Exam: Abdomen soft, Non-tender, Non-distended.  Jejunostomy tube in place.  Nasogastric tube in place.        Tubes/Lines/Drains    [x] Peripheral IV  [x] Central Venous Line     	[x] R	[] L	[x] IJ	[] Fem	[] SC        Type:	    Date Placed: 4/5  [x] Arterial Line		[] R	[] L	[] Fem	[] Rad	[] Ax	Date Placed: 4/5  [] PICC:         	[] Midline		[] Mediport           [x] Urinary Catheter		Date Placed: 4/5    Extremities  Exam: Extremities warm, pink, well-perfused.        Derm:  Exam: Good skin turgor, no skin breakdown.      :   Exam: Ferrari catheter in place.     LABS  --------------------------------------------------------------------------------------  CBC Full  -  ( 05 Apr 2018 22:22 )  WBC Count : 9.0 K/uL  Hemoglobin : 10.2 g/dL  Hematocrit : 28.6 %  Platelet Count - Automated : 115 K/uL  Mean Cell Volume : 93.9 fl  Mean Cell Hemoglobin : 33.6 pg  Mean Cell Hemoglobin Concentration : 35.7 gm/dL      Phos  3.8     04-05  Mg     1.7     04-05        PT/INR - ( 05 Apr 2018 22:22 )   PT: 15.7 sec;   INR: 1.44 ratio             --------------------------------------------------------------------------------------    OTHER LABS    IMAGING RESULTS SICU Consultation Note  =====================================================  HPI: 42y female with history of chronic pancreatitis since childhood s/p [place] procedure in 2014, now s/p total pancreatectomy with islet cell autotransplant.   The previous pancreaticojejunostomy from her [place] procedure was taken down, and a gastrojejunostomy was created. A feeding jejunostomy was placed. Two DANIEL drains left in place, one anterior and one posterior to the choledochojejunostomy.   Patient was received in the SICU extubated, on no pressors.     Surgery Information  OR time: 12 hours     EBL: 800      UOP: 650 mL           IV Fluids: 400 mL 25% albumin, 1000 mL 5% albumin      Blood Products: 3 units pRBCs            PAST MEDICAL & SURGICAL HISTORY:  Premenstrual migraine  Other chronic pancreatitis  S/P cholecystectomy in early 20's  Pancreatitis: h/o [place] procedure 2014    Home Medications:  Creon 36,000 units oral delayed release capsule: 1 cap(s) orally 3 times a day (05 Apr 2018 07:11)  Flonase 50 mcg/inh nasal spray: 1 spray(s) nasal once a day, PRN (05 Apr 2018 07:11)  MiraLax oral powder for reconstitution:  (05 Apr 2018 07:11)  omeprazole 40 mg oral delayed release capsule: 1 cap(s) orally once a day (05 Apr 2018 07:11)  oxyCODONE 10 mg oral tablet: 1 tab(s) orally every 6 hours (05 Apr 2018 07:11)  OxyCONTIN 30 mg oral tablet, extended release: 1 tab(s) orally every 12 hours (05 Apr 2018 07:11)  Robaxin-750 oral tablet: three times a day  (05 Apr 2018 07:11)  Xanax 0.25 mg oral tablet: 1 tab(s) orally 3 times a day (05 Apr 2018 07:11)  Zofran 8 mg oral tablet: PRN (05 Apr 2018 07:11)    Allergies: NKA  Soc: . Former smoker - 15 pack years  Advanced Directives: Presumed Full Code     ROS:    General: Non-Contributory  Skin/Breast: Non-Contributory  Ophthalmologic: Non-Contributory  ENMT: Non-Contributory  Respiratory and Thorax: Non-Contributory  Cardiovascular: Non-Contributory  Gastrointestinal: Non-Contributory  Genitourinary: Non-Contributory  Musculoskeletal: Non-Contributory  Neurological: Non-Contributory  Psychiatric: Non-Contributory  Hematology/Lymphatics: Non-Contributory  Endocrine: Non-Contributory  Allergic/Immunologic: Non-Contributory    CURRENT MEDICATIONS:   --------------------------------------------------------------------------------------  Neurologic Medications  fentaNYL (3 MICROgram(s)/mL) + BUpivacaine 0.01% in 0.9% Sodium Chloride PCEA 250 milliLiter(s) Epidural PCA Continuous  fentaNYL (3 MICROgram(s)/mL) + BUpivacaine 0.01% in 0.9% Sodium Chloride PCEA Rescue Clinician Bolus 5 milliLiter(s) Epidural every 15 minutes PRN moderate/severe pain  ketorolac   Injectable 30 milliGRAM(s) IV Push every 6 hours PRN pain    Respiratory Medications    Cardiovascular Medications    Gastrointestinal Medications  lactated ringers. 1000 milliLiter(s) IV Continuous <Continuous>    Genitourinary Medications    Hematologic/Oncologic Medications  heparin  Injectable 5000 Unit(s) SubCutaneous every 8 hours    Antimicrobial/Immunologic Medications  erythromycin    ethylsuccinate Suspension 40 mG/mL 250 milliGRAM(s) Oral every 6 hours  piperacillin/tazobactam IVPB. 3.375 Gram(s) IV Intermittent every 6 hours    Endocrine/Metabolic Medications  insulin Infusion 0.5 Unit(s)/Hr IV Continuous <Continuous>    Topical/Other Medications    --------------------------------------------------------------------------------------    VITAL SIGNS, INS/OUTS (last 24 hours):  --------------------------------------------------------------------------------------  Vital Signs Last 24 Hrs  T(C): 36.8 (05 Apr 2018 22:00), Max: 36.8 (05 Apr 2018 22:00)  T(F): 98.2 (05 Apr 2018 22:00), Max: 98.2 (05 Apr 2018 22:00)  HR: 108 (05 Apr 2018 22:30) (99 - 108)  BP: 105/54 (05 Apr 2018 22:00) (105/54 - 127/88)  BP(mean): 76 (05 Apr 2018 22:00) (76 - 76)  RR: 19 (05 Apr 2018 22:30) (18 - 20)  SpO2: 98% (05 Apr 2018 22:30) (87% - 100%)  --------------------------------------------------------------------------------------    EXAM:  General/Neuro  Exam: Normal, NAD, alert, oriented x 3, no focal deficits.    Respiratory  Exam: Lungs clear to auscultation, Normal expansion/effort.      Cardiovascular  Exam: S1, S2.  tachycardic  Cardiac Rhythm: Normal Sinus Rhythm    GI  Exam: Abdomen soft, appropriately tender, Non-distended.  Jejunostomy tube in place.  Nasogastric tube in place.  DANIEL x 2      Tubes/Lines/Drains    [x] Peripheral IV  [x] Central Venous Line     	[x] R	[] L	[x] IJ	[] Fem	[] SC        Type:	    Date Placed: 4/5  [x] Arterial Line		[] R	[] L	[] Fem	[] Rad	[] Ax	Date Placed: 4/5  [] PICC:         	[] Midline		[] Mediport           [x] Urinary Catheter		Date Placed: 4/5    Extremities  Exam: Extremities warm, pink, well-perfused.        Derm:  Exam: Good skin turgor, no skin breakdown.      :   Exam: Ferrari catheter in place.     LABS  --------------------------------------------------------------------------------------  CBC Full  -  ( 05 Apr 2018 22:22 )  WBC Count : 9.0 K/uL  Hemoglobin : 10.2 g/dL  Hematocrit : 28.6 %  Platelet Count - Automated : 115 K/uL  Mean Cell Volume : 93.9 fl  Mean Cell Hemoglobin : 33.6 pg  Mean Cell Hemoglobin Concentration : 35.7 gm/dL    04-05    139  |  104  |  10  ----------------------------<  226<H>  4.9   |  18<L>  |  0.75    Ca    8.1<L>      05 Apr 2018 22:22  Phos  3.8     04-05  Mg     1.7     04-05    TPro  4.9<L>  /  Alb  4.0  /  TBili  2.0<H>  /  DBili  x   /  AST  295<H>  /  ALT  99<H>  /  AlkPhos  23<L>  04-05    LIVER FUNCTIONS - ( 05 Apr 2018 22:22 )  Alb: 4.0 g/dL / Pro: 4.9 g/dL / ALK PHOS: 23 U/L / ALT: 99 U/L RC / AST: 295 U/L / GGT: x           PT/INR - ( 05 Apr 2018 22:22 )   PT: 15.7 sec;   INR: 1.44 ratio         PTT - ( 05 Apr 2018 22:22 )  PTT:99.8 sec       --------------------------------------------------------------------------------------

## 2018-04-05 NOTE — BRIEF OPERATIVE NOTE - OPERATION/FINDINGS
Exploratory laparotomy. Take down of [place] procedure pancreatic limb.  Total pancreatectomy.  Auto-transplant of pancreatic islet cells. excision of abdominal wall lipoma. Insertion of J tube.

## 2018-04-05 NOTE — BRIEF OPERATIVE NOTE - PROCEDURE
<<-----Click on this checkbox to enter Procedure Total pancreatectomy with autologous transplantation of pancreatic islet cells  04/05/2018    Active  [id number]

## 2018-04-05 NOTE — BRIEF OPERATIVE NOTE - BRIEF OP NOTE DRAINS
2 x 19Fr flat DANIEL drains  Superior - anterior to choledochoduodenostomy   Inferior - posterior to choledochoduodenostomy    16 Fr Red rubber J tube 2 x 19Fr flat DANIEL drains  Superior - anterior to choledochojejunostomy  Inferior - posterior to choledochojejunostomy    16 Fr Red rubber J tube

## 2018-04-06 LAB
ALBUMIN SERPL ELPH-MCNC: 3.5 G/DL — SIGNIFICANT CHANGE UP (ref 3.3–5)
ALP SERPL-CCNC: 23 U/L — LOW (ref 40–120)
ALT FLD-CCNC: 110 U/L RC — HIGH (ref 10–45)
ANION GAP SERPL CALC-SCNC: 13 MMOL/L — SIGNIFICANT CHANGE UP (ref 5–17)
APTT BLD: 71.9 SEC — HIGH (ref 27.5–37.4)
AST SERPL-CCNC: 362 U/L — HIGH (ref 10–40)
BILIRUB DIRECT SERPL-MCNC: 0.3 MG/DL — HIGH (ref 0–0.2)
BILIRUB INDIRECT FLD-MCNC: 0.6 MG/DL — SIGNIFICANT CHANGE UP (ref 0.2–1)
BILIRUB SERPL-MCNC: 0.9 MG/DL — SIGNIFICANT CHANGE UP (ref 0.2–1.2)
BUN SERPL-MCNC: 10 MG/DL — SIGNIFICANT CHANGE UP (ref 7–23)
CA-I BLD-SCNC: 1.12 MMOL/L — SIGNIFICANT CHANGE UP (ref 1.12–1.3)
CALCIUM SERPL-MCNC: 8.1 MG/DL — LOW (ref 8.4–10.5)
CHLORIDE SERPL-SCNC: 106 MMOL/L — SIGNIFICANT CHANGE UP (ref 96–108)
CO2 SERPL-SCNC: 22 MMOL/L — SIGNIFICANT CHANGE UP (ref 22–31)
CREAT SERPL-MCNC: 0.73 MG/DL — SIGNIFICANT CHANGE UP (ref 0.5–1.3)
FIBRINOGEN PPP-MCNC: 281 MG/DL — LOW (ref 310–510)
GGT SERPL-CCNC: 61 U/L — HIGH (ref 8–40)
GLUCOSE SERPL-MCNC: 144 MG/DL — HIGH (ref 70–99)
HCT VFR BLD CALC: 27.3 % — LOW (ref 34.5–45)
HCT VFR BLD CALC: 28.9 % — LOW (ref 34.5–45)
HCT VFR BLD CALC: 29 % — LOW (ref 34.5–45)
HCT VFR BLD CALC: 29.5 % — LOW (ref 34.5–45)
HCT VFR BLD CALC: 29.9 % — LOW (ref 34.5–45)
HCT VFR BLD CALC: 30.3 % — LOW (ref 34.5–45)
HGB BLD-MCNC: 10.1 G/DL — LOW (ref 11.5–15.5)
HGB BLD-MCNC: 10.2 G/DL — LOW (ref 11.5–15.5)
HGB BLD-MCNC: 10.5 G/DL — LOW (ref 11.5–15.5)
HGB BLD-MCNC: 9.4 G/DL — LOW (ref 11.5–15.5)
INR BLD: 1.27 RATIO — HIGH (ref 0.88–1.16)
MAGNESIUM SERPL-MCNC: 2.6 MG/DL — SIGNIFICANT CHANGE UP (ref 1.6–2.6)
MCHC RBC-ENTMCNC: 32.4 PG — SIGNIFICANT CHANGE UP (ref 27–34)
MCHC RBC-ENTMCNC: 32.6 PG — SIGNIFICANT CHANGE UP (ref 27–34)
MCHC RBC-ENTMCNC: 32.8 PG — SIGNIFICANT CHANGE UP (ref 27–34)
MCHC RBC-ENTMCNC: 33 PG — SIGNIFICANT CHANGE UP (ref 27–34)
MCHC RBC-ENTMCNC: 33.1 PG — SIGNIFICANT CHANGE UP (ref 27–34)
MCHC RBC-ENTMCNC: 33.6 PG — SIGNIFICANT CHANGE UP (ref 27–34)
MCHC RBC-ENTMCNC: 34.1 GM/DL — SIGNIFICANT CHANGE UP (ref 32–36)
MCHC RBC-ENTMCNC: 34.3 GM/DL — SIGNIFICANT CHANGE UP (ref 32–36)
MCHC RBC-ENTMCNC: 34.3 GM/DL — SIGNIFICANT CHANGE UP (ref 32–36)
MCHC RBC-ENTMCNC: 34.7 GM/DL — SIGNIFICANT CHANGE UP (ref 32–36)
MCHC RBC-ENTMCNC: 35.1 GM/DL — SIGNIFICANT CHANGE UP (ref 32–36)
MCHC RBC-ENTMCNC: 35.1 GM/DL — SIGNIFICANT CHANGE UP (ref 32–36)
MCV RBC AUTO: 93.9 FL — SIGNIFICANT CHANGE UP (ref 80–100)
MCV RBC AUTO: 94.2 FL — SIGNIFICANT CHANGE UP (ref 80–100)
MCV RBC AUTO: 95.3 FL — SIGNIFICANT CHANGE UP (ref 80–100)
MCV RBC AUTO: 95.8 FL — SIGNIFICANT CHANGE UP (ref 80–100)
MCV RBC AUTO: 95.8 FL — SIGNIFICANT CHANGE UP (ref 80–100)
MCV RBC AUTO: 96.1 FL — SIGNIFICANT CHANGE UP (ref 80–100)
PHOSPHATE SERPL-MCNC: 2.5 MG/DL — SIGNIFICANT CHANGE UP (ref 2.5–4.5)
PLATELET # BLD AUTO: 124 K/UL — LOW (ref 150–400)
PLATELET # BLD AUTO: 134 K/UL — LOW (ref 150–400)
PLATELET # BLD AUTO: 138 K/UL — LOW (ref 150–400)
PLATELET # BLD AUTO: 149 K/UL — LOW (ref 150–400)
PLATELET # BLD AUTO: 153 K/UL — SIGNIFICANT CHANGE UP (ref 150–400)
PLATELET # BLD AUTO: 157 K/UL — SIGNIFICANT CHANGE UP (ref 150–400)
POTASSIUM SERPL-MCNC: 4 MMOL/L — SIGNIFICANT CHANGE UP (ref 3.5–5.3)
POTASSIUM SERPL-SCNC: 4 MMOL/L — SIGNIFICANT CHANGE UP (ref 3.5–5.3)
PROT SERPL-MCNC: 4.6 G/DL — LOW (ref 6–8.3)
PROTHROM AB SERPL-ACNC: 13.8 SEC — HIGH (ref 9.8–12.7)
RBC # BLD: 2.85 M/UL — LOW (ref 3.8–5.2)
RBC # BLD: 3.02 M/UL — LOW (ref 3.8–5.2)
RBC # BLD: 3.07 M/UL — LOW (ref 3.8–5.2)
RBC # BLD: 3.08 M/UL — LOW (ref 3.8–5.2)
RBC # BLD: 3.14 M/UL — LOW (ref 3.8–5.2)
RBC # BLD: 3.23 M/UL — LOW (ref 3.8–5.2)
RBC # FLD: 15.6 % — HIGH (ref 10.3–14.5)
RBC # FLD: 15.9 % — HIGH (ref 10.3–14.5)
RBC # FLD: 16 % — HIGH (ref 10.3–14.5)
SODIUM SERPL-SCNC: 141 MMOL/L — SIGNIFICANT CHANGE UP (ref 135–145)
WBC # BLD: 11.1 K/UL — HIGH (ref 3.8–10.5)
WBC # BLD: 12.5 K/UL — HIGH (ref 3.8–10.5)
WBC # BLD: 13 K/UL — HIGH (ref 3.8–10.5)
WBC # BLD: 14.1 K/UL — HIGH (ref 3.8–10.5)
WBC # BLD: 15.7 K/UL — HIGH (ref 3.8–10.5)
WBC # BLD: 15.9 K/UL — HIGH (ref 3.8–10.5)
WBC # FLD AUTO: 11.1 K/UL — HIGH (ref 3.8–10.5)
WBC # FLD AUTO: 12.5 K/UL — HIGH (ref 3.8–10.5)
WBC # FLD AUTO: 13 K/UL — HIGH (ref 3.8–10.5)
WBC # FLD AUTO: 14.1 K/UL — HIGH (ref 3.8–10.5)
WBC # FLD AUTO: 15.7 K/UL — HIGH (ref 3.8–10.5)
WBC # FLD AUTO: 15.9 K/UL — HIGH (ref 3.8–10.5)

## 2018-04-06 PROCEDURE — 99223 1ST HOSP IP/OBS HIGH 75: CPT | Mod: GC

## 2018-04-06 PROCEDURE — 71045 X-RAY EXAM CHEST 1 VIEW: CPT | Mod: 26

## 2018-04-06 PROCEDURE — 93975 VASCULAR STUDY: CPT | Mod: 26

## 2018-04-06 PROCEDURE — 99291 CRITICAL CARE FIRST HOUR: CPT

## 2018-04-06 RX ORDER — ONDANSETRON 8 MG/1
4 TABLET, FILM COATED ORAL EVERY 6 HOURS
Qty: 0 | Refills: 0 | Status: DISCONTINUED | OUTPATIENT
Start: 2018-04-06 | End: 2018-04-06

## 2018-04-06 RX ORDER — HYDROMORPHONE HYDROCHLORIDE 2 MG/ML
1 INJECTION INTRAMUSCULAR; INTRAVENOUS; SUBCUTANEOUS
Qty: 0 | Refills: 0 | Status: DISCONTINUED | OUTPATIENT
Start: 2018-04-06 | End: 2018-04-06

## 2018-04-06 RX ORDER — NALOXONE HYDROCHLORIDE 4 MG/.1ML
0.1 SPRAY NASAL
Qty: 0 | Refills: 0 | Status: DISCONTINUED | OUTPATIENT
Start: 2018-04-06 | End: 2018-04-06

## 2018-04-06 RX ORDER — HYDROMORPHONE HYDROCHLORIDE 2 MG/ML
1 INJECTION INTRAMUSCULAR; INTRAVENOUS; SUBCUTANEOUS ONCE
Qty: 0 | Refills: 0 | Status: DISCONTINUED | OUTPATIENT
Start: 2018-04-06 | End: 2018-04-06

## 2018-04-06 RX ORDER — ROPIVACAINE HCL/PF 5 MG/ML
200 AMPUL (ML) INJECTION
Qty: 0 | Refills: 0 | Status: DISCONTINUED | OUTPATIENT
Start: 2018-04-06 | End: 2018-04-06

## 2018-04-06 RX ORDER — ROPIVACAINE HCL/PF 5 MG/ML
200 AMPUL (ML) INJECTION
Qty: 0 | Refills: 0 | Status: DISCONTINUED | OUTPATIENT
Start: 2018-04-06 | End: 2018-04-08

## 2018-04-06 RX ORDER — POTASSIUM PHOSPHATE, MONOBASIC POTASSIUM PHOSPHATE, DIBASIC 236; 224 MG/ML; MG/ML
15 INJECTION, SOLUTION INTRAVENOUS ONCE
Qty: 0 | Refills: 0 | Status: COMPLETED | OUTPATIENT
Start: 2018-04-06 | End: 2018-04-06

## 2018-04-06 RX ORDER — DEXMEDETOMIDINE HYDROCHLORIDE IN 0.9% SODIUM CHLORIDE 4 UG/ML
0.2 INJECTION INTRAVENOUS
Qty: 200 | Refills: 0 | Status: DISCONTINUED | OUTPATIENT
Start: 2018-04-06 | End: 2018-04-06

## 2018-04-06 RX ORDER — HYDROMORPHONE HYDROCHLORIDE 2 MG/ML
30 INJECTION INTRAMUSCULAR; INTRAVENOUS; SUBCUTANEOUS
Qty: 0 | Refills: 0 | Status: DISCONTINUED | OUTPATIENT
Start: 2018-04-06 | End: 2018-04-08

## 2018-04-06 RX ORDER — HYDROMORPHONE HYDROCHLORIDE 2 MG/ML
1 INJECTION INTRAMUSCULAR; INTRAVENOUS; SUBCUTANEOUS
Qty: 0 | Refills: 0 | Status: DISCONTINUED | OUTPATIENT
Start: 2018-04-06 | End: 2018-04-12

## 2018-04-06 RX ORDER — DEXMEDETOMIDINE HYDROCHLORIDE IN 0.9% SODIUM CHLORIDE 4 UG/ML
0.2 INJECTION INTRAVENOUS
Qty: 200 | Refills: 0 | Status: DISCONTINUED | OUTPATIENT
Start: 2018-04-06 | End: 2018-04-08

## 2018-04-06 RX ORDER — DEXTROSE 50 % IN WATER 50 %
25 SYRINGE (ML) INTRAVENOUS ONCE
Qty: 0 | Refills: 0 | Status: COMPLETED | OUTPATIENT
Start: 2018-04-06 | End: 2018-04-06

## 2018-04-06 RX ORDER — ROPIVACAINE HCL/PF 5 MG/ML
5 AMPUL (ML) INJECTION
Qty: 0 | Refills: 0 | Status: DISCONTINUED | OUTPATIENT
Start: 2018-04-06 | End: 2018-04-08

## 2018-04-06 RX ORDER — LIPASE/PROTEASE/AMYLASE 16-48-48K
1 CAPSULE,DELAYED RELEASE (ENTERIC COATED) ORAL EVERY 8 HOURS
Qty: 0 | Refills: 0 | Status: DISCONTINUED | OUTPATIENT
Start: 2018-04-06 | End: 2018-04-10

## 2018-04-06 RX ORDER — HYDROMORPHONE HYDROCHLORIDE 2 MG/ML
0.5 INJECTION INTRAMUSCULAR; INTRAVENOUS; SUBCUTANEOUS EVERY 4 HOURS
Qty: 0 | Refills: 0 | Status: DISCONTINUED | OUTPATIENT
Start: 2018-04-06 | End: 2018-04-06

## 2018-04-06 RX ORDER — HYDROMORPHONE HYDROCHLORIDE 2 MG/ML
0.5 INJECTION INTRAMUSCULAR; INTRAVENOUS; SUBCUTANEOUS ONCE
Qty: 0 | Refills: 0 | Status: DISCONTINUED | OUTPATIENT
Start: 2018-04-06 | End: 2018-04-06

## 2018-04-06 RX ORDER — KETOROLAC TROMETHAMINE 30 MG/ML
15 SYRINGE (ML) INJECTION EVERY 6 HOURS
Qty: 0 | Refills: 0 | Status: DISCONTINUED | OUTPATIENT
Start: 2018-04-06 | End: 2018-04-07

## 2018-04-06 RX ORDER — ACETAMINOPHEN 500 MG
750 TABLET ORAL ONCE
Qty: 0 | Refills: 0 | Status: COMPLETED | OUTPATIENT
Start: 2018-04-06 | End: 2018-04-06

## 2018-04-06 RX ORDER — HYDROMORPHONE HYDROCHLORIDE 2 MG/ML
1 INJECTION INTRAMUSCULAR; INTRAVENOUS; SUBCUTANEOUS EVERY 4 HOURS
Qty: 0 | Refills: 0 | Status: DISCONTINUED | OUTPATIENT
Start: 2018-04-06 | End: 2018-04-06

## 2018-04-06 RX ADMIN — HYDROMORPHONE HYDROCHLORIDE 1 MILLIGRAM(S): 2 INJECTION INTRAMUSCULAR; INTRAVENOUS; SUBCUTANEOUS at 09:08

## 2018-04-06 RX ADMIN — HYDROMORPHONE HYDROCHLORIDE 1 MILLIGRAM(S): 2 INJECTION INTRAMUSCULAR; INTRAVENOUS; SUBCUTANEOUS at 21:25

## 2018-04-06 RX ADMIN — HYDROMORPHONE HYDROCHLORIDE 0.5 MILLIGRAM(S): 2 INJECTION INTRAMUSCULAR; INTRAVENOUS; SUBCUTANEOUS at 03:38

## 2018-04-06 RX ADMIN — Medication 250 MILLIGRAM(S): at 23:05

## 2018-04-06 RX ADMIN — Medication 15 MILLIGRAM(S): at 11:49

## 2018-04-06 RX ADMIN — Medication 1 CAPSULE(S): at 21:32

## 2018-04-06 RX ADMIN — Medication 250 MILLIGRAM(S): at 13:48

## 2018-04-06 RX ADMIN — Medication 0.5 MILLIGRAM(S): at 14:31

## 2018-04-06 RX ADMIN — INSULIN HUMAN 0.5 UNIT(S)/HR: 100 INJECTION, SOLUTION SUBCUTANEOUS at 20:00

## 2018-04-06 RX ADMIN — FENTANYL CITRATE 5 MILLILITER(S): 50 INJECTION INTRAVENOUS at 04:41

## 2018-04-06 RX ADMIN — Medication 250 MILLIGRAM(S): at 18:07

## 2018-04-06 RX ADMIN — HYDROMORPHONE HYDROCHLORIDE 1 MILLIGRAM(S): 2 INJECTION INTRAMUSCULAR; INTRAVENOUS; SUBCUTANEOUS at 06:44

## 2018-04-06 RX ADMIN — HYDROMORPHONE HYDROCHLORIDE 1 MILLIGRAM(S): 2 INJECTION INTRAMUSCULAR; INTRAVENOUS; SUBCUTANEOUS at 05:35

## 2018-04-06 RX ADMIN — SODIUM CHLORIDE 75 MILLILITER(S): 9 INJECTION, SOLUTION INTRAVENOUS at 21:23

## 2018-04-06 RX ADMIN — DEXMEDETOMIDINE HYDROCHLORIDE IN 0.9% SODIUM CHLORIDE 2.36 MICROGRAM(S)/KG/HR: 4 INJECTION INTRAVENOUS at 23:13

## 2018-04-06 RX ADMIN — PIPERACILLIN AND TAZOBACTAM 25 GRAM(S): 4; .5 INJECTION, POWDER, LYOPHILIZED, FOR SOLUTION INTRAVENOUS at 05:13

## 2018-04-06 RX ADMIN — HYDROMORPHONE HYDROCHLORIDE 1 MILLIGRAM(S): 2 INJECTION INTRAMUSCULAR; INTRAVENOUS; SUBCUTANEOUS at 09:31

## 2018-04-06 RX ADMIN — FENTANYL CITRATE 5 MILLILITER(S): 50 INJECTION INTRAVENOUS at 02:08

## 2018-04-06 RX ADMIN — HYDROMORPHONE HYDROCHLORIDE 1 MILLIGRAM(S): 2 INJECTION INTRAMUSCULAR; INTRAVENOUS; SUBCUTANEOUS at 05:20

## 2018-04-06 RX ADMIN — HYDROMORPHONE HYDROCHLORIDE 30 MILLILITER(S): 2 INJECTION INTRAMUSCULAR; INTRAVENOUS; SUBCUTANEOUS at 12:26

## 2018-04-06 RX ADMIN — FENTANYL CITRATE 5 MILLILITER(S): 50 INJECTION INTRAVENOUS at 07:45

## 2018-04-06 RX ADMIN — PIPERACILLIN AND TAZOBACTAM 25 GRAM(S): 4; .5 INJECTION, POWDER, LYOPHILIZED, FOR SOLUTION INTRAVENOUS at 00:07

## 2018-04-06 RX ADMIN — HEPARIN SODIUM 5000 UNIT(S): 5000 INJECTION INTRAVENOUS; SUBCUTANEOUS at 05:16

## 2018-04-06 RX ADMIN — Medication 200 MILLILITER(S): at 19:23

## 2018-04-06 RX ADMIN — PIPERACILLIN AND TAZOBACTAM 25 GRAM(S): 4; .5 INJECTION, POWDER, LYOPHILIZED, FOR SOLUTION INTRAVENOUS at 18:07

## 2018-04-06 RX ADMIN — Medication 15 MILLIGRAM(S): at 12:34

## 2018-04-06 RX ADMIN — Medication 15 MILLIGRAM(S): at 23:25

## 2018-04-06 RX ADMIN — Medication 300 MILLIGRAM(S): at 06:23

## 2018-04-06 RX ADMIN — PIPERACILLIN AND TAZOBACTAM 25 GRAM(S): 4; .5 INJECTION, POWDER, LYOPHILIZED, FOR SOLUTION INTRAVENOUS at 23:05

## 2018-04-06 RX ADMIN — HYDROMORPHONE HYDROCHLORIDE 30 MILLILITER(S): 2 INJECTION INTRAMUSCULAR; INTRAVENOUS; SUBCUTANEOUS at 19:21

## 2018-04-06 RX ADMIN — Medication 200 MILLILITER(S): at 12:15

## 2018-04-06 RX ADMIN — Medication 200 MILLILITER(S): at 17:45

## 2018-04-06 RX ADMIN — Medication 250 MILLIGRAM(S): at 05:16

## 2018-04-06 RX ADMIN — Medication 25 MILLILITER(S): at 04:21

## 2018-04-06 RX ADMIN — FENTANYL CITRATE 250 MILLILITER(S): 50 INJECTION INTRAVENOUS at 02:59

## 2018-04-06 RX ADMIN — HYDROMORPHONE HYDROCHLORIDE 0.5 MILLIGRAM(S): 2 INJECTION INTRAMUSCULAR; INTRAVENOUS; SUBCUTANEOUS at 10:34

## 2018-04-06 RX ADMIN — HEPARIN SODIUM 5000 UNIT(S): 5000 INJECTION INTRAVENOUS; SUBCUTANEOUS at 21:33

## 2018-04-06 RX ADMIN — FENTANYL CITRATE 5 MILLILITER(S): 50 INJECTION INTRAVENOUS at 00:33

## 2018-04-06 RX ADMIN — HYDROMORPHONE HYDROCHLORIDE 1 MILLIGRAM(S): 2 INJECTION INTRAMUSCULAR; INTRAVENOUS; SUBCUTANEOUS at 06:59

## 2018-04-06 RX ADMIN — DEXMEDETOMIDINE HYDROCHLORIDE IN 0.9% SODIUM CHLORIDE 2.36 MICROGRAM(S)/KG/HR: 4 INJECTION INTRAVENOUS at 17:47

## 2018-04-06 RX ADMIN — FENTANYL CITRATE 250 MILLILITER(S): 50 INJECTION INTRAVENOUS at 01:34

## 2018-04-06 RX ADMIN — HYDROMORPHONE HYDROCHLORIDE 0.5 MILLIGRAM(S): 2 INJECTION INTRAMUSCULAR; INTRAVENOUS; SUBCUTANEOUS at 03:23

## 2018-04-06 RX ADMIN — FENTANYL CITRATE 250 MILLILITER(S): 50 INJECTION INTRAVENOUS at 07:18

## 2018-04-06 RX ADMIN — HEPARIN SODIUM 5000 UNIT(S): 5000 INJECTION INTRAVENOUS; SUBCUTANEOUS at 13:48

## 2018-04-06 RX ADMIN — POTASSIUM PHOSPHATE, MONOBASIC POTASSIUM PHOSPHATE, DIBASIC 62.5 MILLIMOLE(S): 236; 224 INJECTION, SOLUTION INTRAVENOUS at 03:44

## 2018-04-06 RX ADMIN — SODIUM CHLORIDE 75 MILLILITER(S): 9 INJECTION, SOLUTION INTRAVENOUS at 12:29

## 2018-04-06 RX ADMIN — Medication 15 MILLIGRAM(S): at 23:12

## 2018-04-06 RX ADMIN — HYDROMORPHONE HYDROCHLORIDE 1 MILLIGRAM(S): 2 INJECTION INTRAMUSCULAR; INTRAVENOUS; SUBCUTANEOUS at 23:00

## 2018-04-06 RX ADMIN — HYDROMORPHONE HYDROCHLORIDE 1 MILLIGRAM(S): 2 INJECTION INTRAMUSCULAR; INTRAVENOUS; SUBCUTANEOUS at 05:12

## 2018-04-06 RX ADMIN — FENTANYL CITRATE 5 MILLILITER(S): 50 INJECTION INTRAVENOUS at 06:26

## 2018-04-06 RX ADMIN — DEXMEDETOMIDINE HYDROCHLORIDE IN 0.9% SODIUM CHLORIDE 2.36 MICROGRAM(S)/KG/HR: 4 INJECTION INTRAVENOUS at 21:23

## 2018-04-06 RX ADMIN — PIPERACILLIN AND TAZOBACTAM 25 GRAM(S): 4; .5 INJECTION, POWDER, LYOPHILIZED, FOR SOLUTION INTRAVENOUS at 13:06

## 2018-04-06 RX ADMIN — Medication 750 MILLIGRAM(S): at 06:38

## 2018-04-06 RX ADMIN — FENTANYL CITRATE 250 MILLILITER(S): 50 INJECTION INTRAVENOUS at 05:00

## 2018-04-06 RX ADMIN — HYDROMORPHONE HYDROCHLORIDE 0.5 MILLIGRAM(S): 2 INJECTION INTRAMUSCULAR; INTRAVENOUS; SUBCUTANEOUS at 10:51

## 2018-04-06 NOTE — PROGRESS NOTE ADULT - ASSESSMENT
42 year old female with PMHx of chronic pancreatitis s/p tia in 2014, now POD 1 s/p total pancreatectomy with islet cell autotransplantation.  Transferred to the SICU for post-operative monitoring, and insulin gtt per protocol    - Continue PCEA, IV tylenol and dilaudid PRN  - OOB/Ambulate as tolerated  - Monitor lactate  - IVF.    - To start trickle TF today as well as erythromycin for gastric motility  - DVT ppx  - Insulin gtt and management per protocol  - Zosyn for 48 hours abx prophylaxis    Blue  [phone]

## 2018-04-06 NOTE — DIETITIAN INITIAL EVALUATION ADULT. - NS AS NUTRI INTERV MEALS SNACK
Recommend advance to NPO with ice chips/sips. Pending tolerance, advance to Consistent Carbohydrate clear liquid diet.

## 2018-04-06 NOTE — PROGRESS NOTE ADULT - SUBJECTIVE AND OBJECTIVE BOX
Patient seen earlier and evaluated.  Patient reported pain better controlled this morning with the addition of IVP Dilaudid PRN.    Contacted by team that pain not controlled. Patient reports IVP Dilaudid not lasting long enough.   Will change epidural solution to Bupivacaine 0.0625% & Hydromorphone 10mcg/mL and continue with IVP Dilaudid PRN.

## 2018-04-06 NOTE — DIETITIAN INITIAL EVALUATION ADULT. - NS AS NUTRI INTERV ENTERAL NUTRITION
Recommend initiate trophic feeds via J-tube of Vital1.2 @ 10ml/hr; advance as tolerated to goal rate 45ml/hr x 24 hours to provide 1080ml formula, 1296 rosario/day, 81 Gm protein/day, 876ml free water; meets 27cal/Kg and 1.7Gm protein/Kg per dosing wt 47.2Kg.

## 2018-04-06 NOTE — DIETITIAN INITIAL EVALUATION ADULT. - OTHER INFO
Nutrition Assessment warranted for status post pancreatic islet cell transplant. Per chart, pt is a "42y female with history of chronic pancreatitis since childhood s/p Moody procedure in 2014, now s/p total pancreatectomy with islet cell autotransplant.". Per team, plan to initiate trophic EN today; pt on insulin infusion with improving BG. Erythromycin started for gastric motility. Pt reports pain; pain management consulted.

## 2018-04-06 NOTE — DIETITIAN INITIAL EVALUATION ADULT. - NS AS NUTRI INTERV ED CONTENT
RD will follow up with nutrition education when diet is advanced and pt is receptive./Other (specify)

## 2018-04-06 NOTE — DIETITIAN INITIAL EVALUATION ADULT. - FACTORS AFF FOOD INTAKE
Pt NPO with NGT to low continuous suction, 10ml output thus far. Feeding J-tube placed in OR, plan to initiate trophic EN today. Pt complains of severe thirst, requests ice chips.

## 2018-04-06 NOTE — PROGRESS NOTE ADULT - SUBJECTIVE AND OBJECTIVE BOX
Pain Management Attending Addendum    SUBJECTIVE: Patient doing well with PCEA but with persistent pain. Change from marcaine and fentanyl to marcaine and dilaudid    Therapy:    [X] PCEA    OBJECTIVE:   [] Pain appropriately controlled    [X ] Other: Pain not adequately controlled    Side Effects:  [X] None	             [ ] Nausea              [ ] Pruritis        [ ] Weakness          [ ] Numbness        	[ ] Other:    ASSESSMENT/PLAN:    [X] Continue current therapy    [ ] Therapy changed to:    [ ] PRN Analgesics   [ ] IV PCA    Comments:

## 2018-04-06 NOTE — PROGRESS NOTE ADULT - SUBJECTIVE AND OBJECTIVE BOX
SICU PROGRESS NOTE    24 HOUR EVENTS:  Admitted to SICU overnight. Started insulin gtt per islet cell autotransplant protocol.     HISTORY  42y female with history of chronic pancreatitis since childhood s/p Moody procedure in 2014, now s/p total pancreatectomy with islet cell autotransplant.   The previous pancreaticojejunostomy from her [place] procedure was taken down, and a gastrojejunostomy was created. A feeding jejunostomy was placed. Two DANIEL drains left in place, one anterior and one posterior to the choledochojejunostomy.   Patient was received in the SICU extubated, on no pressors.     Surgery Information  OR time: 12 hours     EBL: 800      UOP: 650 mL           IV Fluids: 400 mL 25% albumin, 1000 mL 5% albumin      Blood Products: 3 units pRBCs            SUBJECTIVE/ROS:  [x] A ten-point review of systems was otherwise negative except as noted.  [ ] Due to altered mental status/intubation, subjective information were not able to be obtained from the patient. History was obtained, to the extent possible, from review of the chart and collateral sources of information.      NEURO  Exam: awake, alert, oriented  Meds: acetaminophen  IVPB. 750 milliGRAM(s) IV Intermittent once PRN pain  fentaNYL (3 MICROgram(s)/mL) + BUpivacaine 0.01% in 0.9% Sodium Chloride PCEA 250 milliLiter(s) Epidural PCA Continuous  fentaNYL (3 MICROgram(s)/mL) + BUpivacaine 0.01% in 0.9% Sodium Chloride PCEA Rescue Clinician Bolus 5 milliLiter(s) Epidural every 15 minutes PRN moderate/severe pain  HYDROmorphone  Injectable 0.5 milliGRAM(s) IV Push every 4 hours PRN Moderate Pain (4 - 6)    [x] Adequacy of sedation and pain control has been assessed and adjusted        RESPIRATORY  RR: 20 (04-06-18 @ 03:00) (17 - 25)  SpO2: 99% (04-06-18 @ 03:00) (87% - 100%)  Exam: unlabored, clear to auscultation bilaterally  Mechanical Ventilation:   ABG - ( 05 Apr 2018 22:19 )  pH: 7.28  /  pCO2: 48    /  pO2: 179   / HCO3: 22    / Base Excess: -4.6  /  SaO2: 99            CARDIOVASCULAR  HR: 114 (04-06-18 @ 03:00) (99 - 114)  BP: 105/54 (04-05-18 @ 22:00) (105/54 - 127/88)  BP(mean): 76 (04-05-18 @ 22:00) (76 - 76)  ABP: 125/57 (04-06-18 @ 03:00) (105/48 - 129/64)  ABP(mean): 79 (04-06-18 @ 03:00) (68 - 88)    Exam: regular rate and rhythm  Cardiac Rhythm: sinus  Perfusion     [x]Adequate   [ ]Inadequate  Mentation   [x]Normal       [ ]Reduced  Extremities  [x]Warm         [ ]Cool  Volume Status [ ]Hypervolemic [x]Euvolemic [ ]Hypovolemic        GI/NUTRITION  Exam: soft, nontender, nondistended, incision C/D/I  Diet: NPO        GENITOURINARY  I&O's Detail    04-05 @ 07:01  -  04-06 @ 03:20  --------------------------------------------------------  IN:    insulin Infusion: 13.5 mL    lactated ringers.: 375 mL    Solution: 50 mL    Solution: 100 mL    Solution: 100 mL  Total IN: 638.5 mL    OUT:    Bulb: 250 mL    Bulb: 260 mL    Indwelling Catheter - Urethral: 250 mL  Total OUT: 760 mL    Total NET: -121.5 mL      Weight (kg): 47.2 (04-05 @ 07:03)  04-06    141  |  106  |  10  ----------------------------<  144<H>  4.0   |  22  |  0.73    Ca    8.1<L>      06 Apr 2018 02:26  Phos  2.5     04-06  Mg     2.6     04-06    TPro  4.6<L>  /  Alb  3.5  /  TBili  0.9  /  DBili  0.3<H>  /  AST  362<H>  /  ALT  110<H>  /  AlkPhos  23<L>  04-06    Meds: lactated ringers. 1000 milliLiter(s) IV Continuous <Continuous>  potassium phosphate IVPB 15 milliMole(s) IV Intermittent once          HEMATOLOGIC  Meds: heparin  Injectable 5000 Unit(s) SubCutaneous every 8 hours    [x] VTE Prophylaxis                        10.5   11.1  )-----------( 124      ( 06 Apr 2018 02:26 )             30.3     PT/INR - ( 06 Apr 2018 02:26 )   PT: 13.8 sec;   INR: 1.27 ratio         PTT - ( 06 Apr 2018 02:26 )  PTT:71.9 sec  Transfusion     [ ] PRBC   [ ] Platelets   [ ] FFP   [ ] Cryoprecipitate        INFECTIOUS DISEASES  WBC Count: 11.1 K/uL (04-06 @ 02:26)  WBC Count: 9.0 K/uL (04-05 @ 22:22)    RECENT CULTURES:  Meds: erythromycin    ethylsuccinate Suspension 40 mG/mL 250 milliGRAM(s) Oral every 6 hours  piperacillin/tazobactam IVPB. 3.375 Gram(s) IV Intermittent every 6 hours        ENDOCRINE  CAPILLARY BLOOD GLUCOSE  POCT Blood Glucose.: 91 mg/dL (06 Apr 2018 03:03)  POCT Blood Glucose.: 132 mg/dL (06 Apr 2018 01:58)  POCT Blood Glucose.: 171 mg/dL (06 Apr 2018 01:01)  POCT Blood Glucose.: 246 mg/dL (05 Apr 2018 23:58)  POCT Blood Glucose.: 213 mg/dL (05 Apr 2018 23:27)  POCT Blood Glucose.: 209 mg/dL (05 Apr 2018 22:11)  POCT Blood Glucose.: 109 mg/dL (05 Apr 2018 06:33)    Meds: insulin Infusion 0.5 Unit(s)/Hr IV Continuous <Continuous>          MUSCULOSKELETAL  Exam: All extremities moving spontaneously          ACCESS DEVICES:  [x] Peripheral IV  [x] Central Venous Line     	[x] R	[] L	[x] IJ	[] Fem	[] SC        Type:	    Date Placed: 4/5  [x] Arterial Line		[] R	[] L	[] Fem	[] Rad	[] Ax	Date Placed: 4/5  [] PICC:         	[] Midline		[] Mediport           [x] Urinary Catheter		Date Placed: 4/5

## 2018-04-06 NOTE — PROVIDER CONTACT NOTE (OTHER) - ACTION/TREATMENT ORDERED:
Insulin gtt held, will recheck blood glucose level in 15 minutes as per protocol and will give dextrose if less than 70 at that time.

## 2018-04-06 NOTE — DIETITIAN INITIAL EVALUATION ADULT. - ENERGY NEEDS
ht: 5 feet 4 inches, wt: 104 pounds, BMI: 18Kg/m2, IBW: 120 pounds (+/- 10%), 87% IBW. Edema: 2+ generalized; Skin: no pressure injuries.

## 2018-04-06 NOTE — PROGRESS NOTE ADULT - SUBJECTIVE AND OBJECTIVE BOX
Called by SICU for increasing incisional pain. Epidural medication orders adjusted earlier.  Patient evaluated at bedside with Dr. Khan.  Patient continues to report incisional pain and cramping in addition to back pain similar to her "pancreatitis pain"   Toradol IV ordered by surgery and given.  Will change epidural to plain Ropivacaine at 10mL/hr and add PCA Dilaudid 0.5mg/6minute/ 0.2mg basal/ 6mg four hour limit.  Discussed and explained plan with patient. Questions answered. Called by SICU for increasing incisional pain. Epidural medication orders adjusted earlier.  Patient evaluated at bedside with Dr. Khan.  Patient continues to report incisional pain and cramping in addition to back pain similar to her "pancreatitis pain"   Toradol IV ordered by surgery and given.  Will change epidural to plain Ropivacaine at 10mL/hr and add PCA Dilaudid 0.5mg/6minute/ 0.2mg basal/ 6mg four hour limit.  Discussed and explained plan with patient. Questions answered.  Recommend Ativan IVP prn.

## 2018-04-06 NOTE — PROGRESS NOTE ADULT - SUBJECTIVE AND OBJECTIVE BOX
patient changed to PCEA Ropivicaine 0.2% at 10 ml/h, and PCA dilaudid. reevaluation shows slight improvement in analgesia, but continues to complain of muscle soreness and cramping.   decision to increase the PCEA rate to 14 ml/h and add 1 dose Ativan ivp for anxiety and muscle relaxation. patient agreeable with the plan, normal motor and sensory both legs, epidural site intact . will reassess.

## 2018-04-06 NOTE — CONSULT NOTE ADULT - SUBJECTIVE AND OBJECTIVE BOX
HPI:  43 yo female with PMH significant for chronic pancreatitis presents for total pancreatectomy with islet autotransplantation on April 5, 2018. Endocrine consulted for glycemic control.    Endocrine- patient has never been told of diabetes. Pt knows how to check fingersticks, was practicing before procedure. Patient is aware that she has to take insulin post-op, she understands that she will be taking a long-acting and short-acting insulin. Last saw optho this past year, no retinopathy, + Neuropathy sx in her hands, no known nephropathy. Diet- AM- doesn't eat, sometimes eggs or toast, lunch-sandwich, salad, dinner-stir juárez with lean meats, pork chops. Low-fat, states her diet is healthy. Sometimes snacks on cookies, cake or ice cream. She has a "sweet tooth". Drinks juice and gingerale.    She has a dietician at [facility], and also follows with Dr. Noe Nesbitt (endocrinology) at [facility]. Pt never required insulin in her previous hospitalizations for pancreatitis. No diabetes in the family.    Post-operatively, patient was started on the insulin drip around 10pm on 4/5. Currently she was started on tube feeds at [facility] at 10cc/hr.    PAST MEDICAL & SURGICAL HISTORY:  Premenstrual migraine  Other chronic pancreatitis  S/P cholecystectomy: in early 20&#x27;s  Pancreatitis: h/o Moody procedure 2014      FAMILY HISTORY:  No diabetes history in the family    Social History: Former smoker 15 pack years    Outpatient Medications:  omeprazole 40 mg oral delayed release capsule: Last Dose Taken:  , 1 cap(s) orally once a day  · 	Creon 36,000 units oral delayed release capsule: Last Dose Taken:  , 1 cap(s) orally 3 times a day  · 	OxyCONTIN 30 mg oral tablet, extended release: Last Dose Taken:  , 1 tab(s) orally every 12 hours  · 	oxyCODONE 10 mg oral tablet: Last Dose Taken:  , 1 tab(s) orally every 6 hours  · 	Xanax 0.25 mg oral tablet: Last Dose Taken:  , 1 tab(s) orally 3 times a day  · 	Robaxin-750 oral tablet: Last Dose Taken:  , three times a day   · 	MiraLax oral powder for reconstitution: Last Dose Taken:    · 	Zofran 8 mg oral tablet: PRN  · 	Flonase 50 mcg/inh nasal spray: Last Dose Taken:  , 1 spray(s) nasal once a day, PRN    MEDICATIONS  (STANDING):  dexmedetomidine Infusion 0.2 MICROgram(s)/kG/Hr (2.36 mL/Hr) IV Continuous <Continuous>  erythromycin    ethylsuccinate Suspension 40 mG/mL 250 milliGRAM(s) Oral every 6 hours  heparin  Injectable 5000 Unit(s) SubCutaneous every 8 hours  HYDROmorphone PCA (1 mG/mL) 30 milliLiter(s) PCA Continuous PCA Continuous  insulin Infusion 0.5 Unit(s)/Hr (0.5 mL/Hr) IV Continuous <Continuous>  lactated ringers. 1000 milliLiter(s) (75 mL/Hr) IV Continuous <Continuous>  piperacillin/tazobactam IVPB. 3.375 Gram(s) IV Intermittent every 6 hours  ropivacaine 0.2% in 0.9% Sodium Chloride PCEA 200 milliLiter(s) Epidural PCA Continuous    MEDICATIONS  (PRN):  HYDROmorphone PCA (1 mG/mL) Rescue Clinician Bolus 1 milliGRAM(s) IV Push every 15 minutes PRN for Pain Scale GREATER THAN 6  ketorolac   Injectable 15 milliGRAM(s) IV Push every 6 hours PRN breakthrough pain  ropivacaine 0.2% in 0.9% Sodium Chloride PCEA Rescue Clinician Bolus 5 milliLiter(s) Epidural every 15 minutes PRN for Pain Score greater than 6      Allergies    No Known Allergies    Review of Systems:  Constitutional: No fever  Eyes: No blurry vision  Neuro: No tremors  HEENT: No pain  Cardiovascular: No chest pain, palpitations  Respiratory: No SOB, no cough  GI: No nausea, vomiting, + abdominal pain  : No dysuria  Skin: no rash      ALL OTHER SYSTEMS REVIEWED AND NEGATIVE    PHYSICAL EXAM:  VITALS: T(C): 37.2 (04-06-18 @ 11:00)  T(F): 99 (04-06-18 @ 11:00), Max: 99 (04-06-18 @ 11:00)  HR: 104 (04-06-18 @ 15:00) (98 - 122)  BP: 101/59 (04-06-18 @ 15:00) (101/59 - 114/63)  RR:  (16 - 41)  SpO2:  (89% - 100%)  Wt(kg): --  GENERAL: NAD  HEENT: + NGT  RESPIRATORY: Clear to auscultation bilaterally; No rales, rhonchi, wheezing, or rubs  CARDIOVASCULAR: Regular rate and rhythm; No murmurs; no peripheral edema  GI: Soft, nontender, non distended, normal bowel sounds  SKIN: Dry, intact, No rashes or lesions  PSYCH: Alert and oriented x 3    POCT Blood Glucose.: 180 mg/dL (04-06-18 @ 15:27)  POCT Blood Glucose.: 207 mg/dL (04-06-18 @ 13:56)  POCT Blood Glucose.: 196 mg/dL (04-06-18 @ 13:09)  POCT Blood Glucose.: 115 mg/dL (04-06-18 @ 12:07)  POCT Blood Glucose.: 156 mg/dL (04-06-18 @ 11:10)  POCT Blood Glucose.: 109 mg/dL (04-06-18 @ 10:14)  POCT Blood Glucose.: 93 mg/dL (04-06-18 @ 09:01)  POCT Blood Glucose.: 115 mg/dL (04-06-18 @ 08:03)  POCT Blood Glucose.: 131 mg/dL (04-06-18 @ 06:59)  POCT Blood Glucose.: 100 mg/dL (04-06-18 @ 06:56)  POCT Blood Glucose.: 133 mg/dL (04-06-18 @ 06:55)  POCT Blood Glucose.: 161 mg/dL (04-06-18 @ 05:58)  POCT Blood Glucose.: 145 mg/dL (04-06-18 @ 04:53)  POCT Blood Glucose.: 91 mg/dL (04-06-18 @ 04:35)  POCT Blood Glucose.: 55 mg/dL (04-06-18 @ 04:13)  POCT Blood Glucose.: 50 mg/dL (04-06-18 @ 03:57)  POCT Blood Glucose.: 91 mg/dL (04-06-18 @ 03:03)  POCT Blood Glucose.: 132 mg/dL (04-06-18 @ 01:58)  POCT Blood Glucose.: 171 mg/dL (04-06-18 @ 01:01)  POCT Blood Glucose.: 246 mg/dL (04-05-18 @ 23:58)  POCT Blood Glucose.: 213 mg/dL (04-05-18 @ 23:27)  POCT Blood Glucose.: 209 mg/dL (04-05-18 @ 22:11)  POCT Blood Glucose.: 109 mg/dL (04-05-18 @ 06:33)                            10.2   15.9  )-----------( 153      ( 06 Apr 2018 14:04 )             28.9       04-06    141  |  106  |  10  ----------------------------<  144<H>  4.0   |  22  |  0.73    EGFR if : 118  EGFR if non : 102    Ca    8.1<L>      04-06  Mg     2.6     04-06  Phos  2.5     04-06    TPro  4.6<L>  /  Alb  3.5  /  TBili  0.9  /  DBili  0.3<H>  /  AST  362<H>  /  ALT  110<H>  /  AlkPhos  23<L>  04-06

## 2018-04-06 NOTE — CONSULT NOTE ADULT - ASSESSMENT
41 yo female with PMH significant for chronic pancreatitis presents for total l pancreatectomy with islet autotransplantation on April 5, 2018. Endocrine consulted for glycemic control. (high risk patient with high level decision making)

## 2018-04-06 NOTE — DIETITIAN INITIAL EVALUATION ADULT. - PHYSICAL APPEARANCE
underweight/BMI 18Kg/m2. Pt appears this, reports this is her baseline. underweight/BMI 18Kg/m2. Pt appears thin, reports this is her baseline.

## 2018-04-06 NOTE — CONSULT NOTE ADULT - ATTENDING COMMENTS
Agree with Dr. Bach assessment and plan above. Continue with the pancreas transplant protocol for now.  The patient requires insulin drip for the first 36 hours post-op and depending on tube feed can transition to NPH and ultimately to basal/bolus insulin. She will follow up with [facility] in the outpatient setting

## 2018-04-06 NOTE — PROGRESS NOTE ADULT - SUBJECTIVE AND OBJECTIVE BOX
Day 1 of Anesthesia Pain Management Service    SUBJECTIVE: I'm better now.  Pain Scale Score:    [X] Refer to charted pain scores    THERAPY: Epidural Bupivacaine 0.01 % and Fentanyl 3 micrograms/mL     Demand Dose: 3 mL  Lockout: 15 minutes   Continuous Rate:  16 mL    MEDICATIONS  (STANDING):  erythromycin    ethylsuccinate Suspension 40 mG/mL 250 milliGRAM(s) Oral every 6 hours  heparin  Injectable 5000 Unit(s) SubCutaneous every 8 hours  HYDROmorphone (10 MICROgram(s)/mL) + BUpivacaine 0.0625% in 0.9% Sodium Chloride PCEA 250 milliLiter(s) Epidural PCA Continuous  insulin Infusion 0.5 Unit(s)/Hr (0.5 mL/Hr) IV Continuous <Continuous>  lactated ringers. 1000 milliLiter(s) (75 mL/Hr) IV Continuous <Continuous>  piperacillin/tazobactam IVPB. 3.375 Gram(s) IV Intermittent every 6 hours    MEDICATIONS  (PRN):  HYDROmorphone  Injectable 1 milliGRAM(s) IV Push every 3 hours PRN Moderate Pain (4 - 6)  HYDROmorphone (10 MICROgram(s)/mL) + BUpivacaine 0.0625% in 0.9% Sodium Chloride PCEA Rescue Clinician  Bolus 5 milliLiter(s) Epidural every 15 minutes PRN for Pain Score greater than 6  naloxone Injectable 0.1 milliGRAM(s) IV Push every 3 minutes PRN For ANY of the following changes in patient status:  A. RR LESS THAN 10 breaths per minute, B. Oxygen saturation LESS THAN 90%, C. Sedation score of 6  ondansetron Injectable 4 milliGRAM(s) IV Push every 6 hours PRN Nausea      OBJECTIVE:    Assessment of Epidural Catheter Site: 	    [X] Dressing intact	[X] Site non-tender	[X] Site without erythema, discharge, edema  [X] Epidural tubing and connection checked	[X] Gross neurological exam within normal limits  [ ] Catheter removed – tip intact		    PT/INR - ( 06 Apr 2018 02:26 )   PT: 13.8 sec;   INR: 1.27 ratio         PTT - ( 06 Apr 2018 02:26 )  PTT:71.9 sec                      10.2   12.5  )-----------( 134      ( 06 Apr 2018 06:06 )             29.0     [facility] Signs Last 24 Hrs  T(C): 37.1 (04-06-18 @ 08:00), Max: 37.1 (04-06-18 @ 08:00)  T(F): 98.8 (04-06-18 @ 08:00), Max: 98.8 (04-06-18 @ 08:00)  HR: 111 (04-06-18 @ 09:00) (98 - 119)  BP: 105/54 (04-05-18 @ 22:00) (105/54 - 105/54)  BP(mean): 76 (04-05-18 @ 22:00) (76 - 76)  RR: 32 (04-06-18 @ 09:00) (16 - 32)  SpO2: 97% (04-06-18 @ 09:00) (97% - 100%)      Sedation Score:	[X] Alert	 [ ] Drowsy	[ ] Arousable  [ ] Asleep  [ ] Unresponsive    Side Effects:	[X] None	[ ] Nausea	[ ] Vomiting  [ ] Pruritus  		[ ] Weakness  [ ] Numbness  [ ] Other:    ASSESSMENT/ PLAN:    Therapy:                         [X] Continue   [ ] Discontinue   [ ] Change to PRN Analgesics    Documentation and Verification of current medications:  [X] Done	[ ] Not done, not eligible, reason:    Comments: Patient reported rough night with pain control but now controlled. Epidural infusion increased throughout the night to 16mL/Hr. As per patient, Dilaudid (0.5mg and 1mg doses) IVP given with effectiveness. Will continue to monitor. Recommend adding antianxiety meds PRN.  Patient opiate tolerant. Preoperative medications include [id number] po BID, Oxycodone 10mg po 6hr, Robaxin and Xanax.  Patient currently NPO with NGT. Will continue with

## 2018-04-06 NOTE — DIETITIAN INITIAL EVALUATION ADULT. - ADHERENCE
Pt manages chronic pancreatitis with Creon 36,000 units tid. Pt was provided with verbal/written Consistent Carbohydrate diet education and Pt manages chronic pancreatitis with Creon 36,000 units tid. Pt was provided with verbal/written Consistent Carbohydrate diet education and label reading education during pre-op visit.

## 2018-04-06 NOTE — CONSULT NOTE ADULT - PROBLEM SELECTOR RECOMMENDATION 9
- chronic pancreatitis s/p total pancreatectomy with islet cell transplant  - continue insulin drip for 36 hours as per protocol, will attempt to transition off insulin drip around 10am tomorrow (4/7)  - patient started on tube feeds - [facility] - at 10 cc/hr, for goal up to 45 cc/hr  - will calculate basal/bolus doses from insulin drip rates once patient is ready for transition  - goal FS is between 120-150!  - once insulin drip is tapered off, will need to monitor FS every 2 hours for the next 6 hours after insulin drip is discontinued, then every 4 hours until transferred to the floors

## 2018-04-06 NOTE — PROGRESS NOTE ADULT - ASSESSMENT
ASSESSMENT:  42y Female s/p total pancreatectomy and islet cell autotransplantation.     PLAN:   Neurologic: Postoperative pain control in the setting of chronic pain  - PCEA  - IV Tylenol and Dilaudid PRN    Respiratory: No acute issues  - Maintain SpO2 > 92%  - Ambulate as tolerated    Cardiovascular: No acute issues. Lactate cleared to 1.4.   - LR @ 75    Gastrointestinal/Nutrition: s/p gastrojejunostomy with feeding jejunostomy  - Erythromycin for gastric motility  - Start J-tube feeds today    Renal/Genitourinary: No acute issues  - Maintenance fluids   - Replete electrolytes PRN    Hematologic: No acute signs of bleeding. Receiving 3 units pRBCs intraop.   - SQH for VTE ppx    Infectious Disease: No acute issues  - Zosyn for perioperative antibiotics for 48 hours per protocol    Lines/Tubes: RIJ Cordis, arterial line, Ferrari, NGT     Endocrine: s/p islet cell autotransplant  - insulin gtt and titrate per protocol    Disposition: SICU

## 2018-04-06 NOTE — PROGRESS NOTE ADULT - SUBJECTIVE AND OBJECTIVE BOX
BLUE TEAM GENERAL SURGERY DAILY PROGRESS NOTE:     Subjective:  Patient seen & examined in AM  Admitted to SICU overnight for post-operative monitoring  Started on insulin gtt per islet cell auto transplant protocol  OR time 12 hours, , made 650 urine, received 3u PRBC, got 400 of 25% alb, and 1L of 5% alb    Objective:    PE:  Gen: NAD, AAOx3  Resp: airway patent, respirations unlabored, no increased WoB  CVS: Sinus tachycardia  Abd: soft, mild distention, appropriately tender, incision c/d/i, DANIEL x 2 SS          J-tube site intact  Ext: no edema, WWP    Vital Signs Last 24 Hrs  T(C): 36.4 (2018 02:00), Max: 36.8 (2018 22:00)  T(F): 97.5 (2018 02:00), Max: 98.2 (2018 22:00)  HR: 116 (2018 05:00) (99 - 116)  BP: 105/54 (2018 22:00) (105/54 - 127/88)  BP(mean): 76 (2018 22:00) (76 - 76)  RR: 28 (2018 05:00) (16 - 28)  SpO2: 98% (2018 05:00) (87% - 100%)    I&O's Detail    2018 07:01  -  2018 05:43  --------------------------------------------------------  IN:    insulin Infusion: 17 mL    lactated ringers.: 525 mL    Solution: 50 mL    Solution: 100 mL    Solution: 100 mL  Total IN: 792 mL    OUT:    Bulb: 290 mL    Bulb: 320 mL    Indwelling Catheter - Urethral: 330 mL  Total OUT: 940 mL    Total NET: -148 mL    Daily Height in cm: 162 (2018 07:03)    Daily Weight in k.3 (2018 22:00)    MEDICATIONS  (STANDING):  erythromycin    ethylsuccinate Suspension 40 mG/mL 250 milliGRAM(s) Oral every 6 hours  fentaNYL (3 MICROgram(s)/mL) + BUpivacaine 0.01% in 0.9% Sodium Chloride PCEA 250 milliLiter(s) Epidural PCA Continuous  heparin  Injectable 5000 Unit(s) SubCutaneous every 8 hours  insulin Infusion 0.5 Unit(s)/Hr (0.5 mL/Hr) IV Continuous <Continuous>  lactated ringers. 1000 milliLiter(s) (75 mL/Hr) IV Continuous <Continuous>  piperacillin/tazobactam IVPB. 3.375 Gram(s) IV Intermittent every 6 hours    MEDICATIONS  (PRN):  acetaminophen  IVPB. 750 milliGRAM(s) IV Intermittent once PRN pain  fentaNYL (3 MICROgram(s)/mL) + BUpivacaine 0.01% in 0.9% Sodium Chloride PCEA Rescue Clinician Bolus 5 milliLiter(s) Epidural every 15 minutes PRN moderate/severe pain  HYDROmorphone  Injectable 1 milliGRAM(s) IV Push every 4 hours PRN Moderate Pain (4 - 6)    LABS:                        10.5   11.1  )-----------( 124      ( 2018 02:26 )             30.3     04-06    141  |  106  |  10  ----------------------------<  144<H>  4.0   |  22  |  0.73    Ca    8.1<L>      2018 02:26  Phos  2.5     04-06  Mg     2.6     -06    TPro  4.6<L>  /  Alb  3.5  /  TBili  0.9  /  DBili  0.3<H>  /  AST  362<H>  /  ALT  110<H>  /  AlkPhos  23<L>  04-06    PT/INR - ( 2018 02:26 )   PT: 13.8 sec;   INR: 1.27 ratio         PTT - ( 2018 02:26 )  PTT:71.9 sec      RADIOLOGY & ADDITIONAL STUDIES: BLUE TEAM GENERAL SURGERY DAILY PROGRESS NOTE:     Subjective:  Patient seen & examined in AM  Admitted to SICU overnight for post-operative monitoring  Started on insulin gtt per islet cell auto transplant protocol  OR time 12 hours, , made 650 urine, received 3u PRBC, got 400 of 25% alb, and 1L of 5% alb  Pain controlled.  No n/v.    Objective:    PE:  Gen: NAD, AAOx3  HEENT NGT with bilious output.  R IJ CVC  Resp: airway patent, respirations unlabored, no increased WoB  CVS: Sinus tachycardia  Abd: soft, mild distention, appropriately tender, incision c/d/i, DANIEL x 2 SS          J-tube site intact  Ext: no edema, WWP    Vital Signs Last 24 Hrs  T(C): 36.4 (2018 02:00), Max: 36.8 (2018 22:00)  T(F): 97.5 (2018 02:00), Max: 98.2 (2018 22:00)  HR: 116 (2018 05:00) (99 - 116)  BP: 105/54 (2018 22:00) (105/54 - 127/88)  BP(mean): 76 (2018 22:00) (76 - 76)  RR: 28 (2018 05:00) (16 - 28)  SpO2: 98% (2018 05:00) (87% - 100%)    I&O's Detail    2018 07:01  -  2018 05:43  --------------------------------------------------------  IN:    insulin Infusion: 17 mL    lactated ringers.: 525 mL    Solution: 50 mL    Solution: 100 mL    Solution: 100 mL  Total IN: 792 mL    OUT:    Bulb: 290 mL    Bulb: 320 mL    Indwelling Catheter - Urethral: 330 mL  Total OUT: 940 mL    Total NET: -148 mL    Daily Height in cm: 162 (2018 07:03)    Daily Weight in k.3 (2018 22:00)    MEDICATIONS  (STANDING):  erythromycin    ethylsuccinate Suspension 40 mG/mL 250 milliGRAM(s) Oral every 6 hours  fentaNYL (3 MICROgram(s)/mL) + BUpivacaine 0.01% in 0.9% Sodium Chloride PCEA 250 milliLiter(s) Epidural PCA Continuous  heparin  Injectable 5000 Unit(s) SubCutaneous every 8 hours  insulin Infusion 0.5 Unit(s)/Hr (0.5 mL/Hr) IV Continuous <Continuous>  lactated ringers. 1000 milliLiter(s) (75 mL/Hr) IV Continuous <Continuous>  piperacillin/tazobactam IVPB. 3.375 Gram(s) IV Intermittent every 6 hours    MEDICATIONS  (PRN):  acetaminophen  IVPB. 750 milliGRAM(s) IV Intermittent once PRN pain  fentaNYL (3 MICROgram(s)/mL) + BUpivacaine 0.01% in 0.9% Sodium Chloride PCEA Rescue Clinician Bolus 5 milliLiter(s) Epidural every 15 minutes PRN moderate/severe pain  HYDROmorphone  Injectable 1 milliGRAM(s) IV Push every 4 hours PRN Moderate Pain (4 - 6)    LABS:                        10.5   11.1  )-----------( 124      ( 2018 02:26 )             30.3     04-06    141  |  106  |  10  ----------------------------<  144<H>  4.0   |  22  |  0.73    Ca    8.1<L>      2018 02:26  Phos  2.5     04-06  Mg     2.6     04-06    TPro  4.6<L>  /  Alb  3.5  /  TBili  0.9  /  DBili  0.3<H>  /  AST  362<H>  /  ALT  110<H>  /  AlkPhos  23<L>  04-06    PT/INR - ( 2018 02:26 )   PT: 13.8 sec;   INR: 1.27 ratio         PTT - ( 2018 02:26 )  PTT:71.9 sec      RADIOLOGY & ADDITIONAL STUDIES:

## 2018-04-07 LAB
ALBUMIN SERPL ELPH-MCNC: 2.6 G/DL — LOW (ref 3.3–5)
ALP SERPL-CCNC: 29 U/L — LOW (ref 40–120)
ALT FLD-CCNC: 92 U/L RC — HIGH (ref 10–45)
ANION GAP SERPL CALC-SCNC: 8 MMOL/L — SIGNIFICANT CHANGE UP (ref 5–17)
APTT BLD: 96.9 SEC — HIGH (ref 27.5–37.4)
AST SERPL-CCNC: 246 U/L — HIGH (ref 10–40)
BILIRUB SERPL-MCNC: 0.5 MG/DL — SIGNIFICANT CHANGE UP (ref 0.2–1.2)
BUN SERPL-MCNC: 14 MG/DL — SIGNIFICANT CHANGE UP (ref 7–23)
CA-I BLD-SCNC: 1.18 MMOL/L — SIGNIFICANT CHANGE UP (ref 1.12–1.3)
CALCIUM SERPL-MCNC: 8 MG/DL — LOW (ref 8.4–10.5)
CHLORIDE SERPL-SCNC: 107 MMOL/L — SIGNIFICANT CHANGE UP (ref 96–108)
CO2 SERPL-SCNC: 23 MMOL/L — SIGNIFICANT CHANGE UP (ref 22–31)
CREAT SERPL-MCNC: 0.74 MG/DL — SIGNIFICANT CHANGE UP (ref 0.5–1.3)
GLUCOSE SERPL-MCNC: 157 MG/DL — HIGH (ref 70–99)
HCT VFR BLD CALC: 26.4 % — LOW (ref 34.5–45)
HGB BLD-MCNC: 9.1 G/DL — LOW (ref 11.5–15.5)
INR BLD: 1.2 RATIO — HIGH (ref 0.88–1.16)
MAGNESIUM SERPL-MCNC: 1.8 MG/DL — SIGNIFICANT CHANGE UP (ref 1.6–2.6)
MCHC RBC-ENTMCNC: 33.3 PG — SIGNIFICANT CHANGE UP (ref 27–34)
MCHC RBC-ENTMCNC: 34.5 GM/DL — SIGNIFICANT CHANGE UP (ref 32–36)
MCV RBC AUTO: 96.5 FL — SIGNIFICANT CHANGE UP (ref 80–100)
PHOSPHATE SERPL-MCNC: 2.4 MG/DL — LOW (ref 2.5–4.5)
PLATELET # BLD AUTO: 153 K/UL — SIGNIFICANT CHANGE UP (ref 150–400)
POTASSIUM SERPL-MCNC: 4.4 MMOL/L — SIGNIFICANT CHANGE UP (ref 3.5–5.3)
POTASSIUM SERPL-SCNC: 4.4 MMOL/L — SIGNIFICANT CHANGE UP (ref 3.5–5.3)
PROT SERPL-MCNC: 4.3 G/DL — LOW (ref 6–8.3)
PROTHROM AB SERPL-ACNC: 13.1 SEC — HIGH (ref 9.8–12.7)
RBC # BLD: 2.73 M/UL — LOW (ref 3.8–5.2)
RBC # FLD: 15.9 % — HIGH (ref 10.3–14.5)
SODIUM SERPL-SCNC: 138 MMOL/L — SIGNIFICANT CHANGE UP (ref 135–145)
WBC # BLD: 14.1 K/UL — HIGH (ref 3.8–10.5)
WBC # FLD AUTO: 14.1 K/UL — HIGH (ref 3.8–10.5)

## 2018-04-07 PROCEDURE — 71045 X-RAY EXAM CHEST 1 VIEW: CPT | Mod: 26

## 2018-04-07 PROCEDURE — 99232 SBSQ HOSP IP/OBS MODERATE 35: CPT

## 2018-04-07 PROCEDURE — 99291 CRITICAL CARE FIRST HOUR: CPT

## 2018-04-07 RX ORDER — INSULIN GLARGINE 100 [IU]/ML
10 INJECTION, SOLUTION SUBCUTANEOUS ONCE
Qty: 0 | Refills: 0 | Status: COMPLETED | OUTPATIENT
Start: 2018-04-07 | End: 2018-04-07

## 2018-04-07 RX ORDER — ACETAMINOPHEN 500 MG
1000 TABLET ORAL ONCE
Qty: 0 | Refills: 0 | Status: COMPLETED | OUTPATIENT
Start: 2018-04-07 | End: 2018-04-07

## 2018-04-07 RX ORDER — MAGNESIUM SULFATE 500 MG/ML
2 VIAL (ML) INJECTION ONCE
Qty: 0 | Refills: 0 | Status: COMPLETED | OUTPATIENT
Start: 2018-04-07 | End: 2018-04-07

## 2018-04-07 RX ORDER — INSULIN LISPRO 100/ML
VIAL (ML) SUBCUTANEOUS EVERY 4 HOURS
Qty: 0 | Refills: 0 | Status: DISCONTINUED | OUTPATIENT
Start: 2018-04-07 | End: 2018-04-08

## 2018-04-07 RX ORDER — SODIUM CHLORIDE 9 MG/ML
1000 INJECTION, SOLUTION INTRAVENOUS
Qty: 0 | Refills: 0 | Status: DISCONTINUED | OUTPATIENT
Start: 2018-04-07 | End: 2018-04-10

## 2018-04-07 RX ORDER — IBUPROFEN 200 MG
400 TABLET ORAL EVERY 6 HOURS
Qty: 0 | Refills: 0 | Status: COMPLETED | OUTPATIENT
Start: 2018-04-07 | End: 2018-04-09

## 2018-04-07 RX ORDER — ACETAMINOPHEN 500 MG
975 TABLET ORAL EVERY 6 HOURS
Qty: 0 | Refills: 0 | Status: COMPLETED | OUTPATIENT
Start: 2018-04-07 | End: 2018-04-09

## 2018-04-07 RX ORDER — ONDANSETRON 8 MG/1
4 TABLET, FILM COATED ORAL EVERY 6 HOURS
Qty: 0 | Refills: 0 | Status: DISCONTINUED | OUTPATIENT
Start: 2018-04-07 | End: 2018-04-08

## 2018-04-07 RX ADMIN — HYDROMORPHONE HYDROCHLORIDE 30 MILLILITER(S): 2 INJECTION INTRAMUSCULAR; INTRAVENOUS; SUBCUTANEOUS at 20:00

## 2018-04-07 RX ADMIN — Medication 62.5 MILLIMOLE(S): at 06:56

## 2018-04-07 RX ADMIN — PIPERACILLIN AND TAZOBACTAM 25 GRAM(S): 4; .5 INJECTION, POWDER, LYOPHILIZED, FOR SOLUTION INTRAVENOUS at 05:30

## 2018-04-07 RX ADMIN — Medication 1000 MILLIGRAM(S): at 11:45

## 2018-04-07 RX ADMIN — HYDROMORPHONE HYDROCHLORIDE 1 MILLIGRAM(S): 2 INJECTION INTRAMUSCULAR; INTRAVENOUS; SUBCUTANEOUS at 05:39

## 2018-04-07 RX ADMIN — INSULIN GLARGINE 10 UNIT(S): 100 INJECTION, SOLUTION SUBCUTANEOUS at 11:41

## 2018-04-07 RX ADMIN — Medication 1 CAPSULE(S): at 14:58

## 2018-04-07 RX ADMIN — HYDROMORPHONE HYDROCHLORIDE 30 MILLILITER(S): 2 INJECTION INTRAMUSCULAR; INTRAVENOUS; SUBCUTANEOUS at 07:34

## 2018-04-07 RX ADMIN — Medication 975 MILLIGRAM(S): at 23:36

## 2018-04-07 RX ADMIN — HYDROMORPHONE HYDROCHLORIDE 30 MILLILITER(S): 2 INJECTION INTRAMUSCULAR; INTRAVENOUS; SUBCUTANEOUS at 01:40

## 2018-04-07 RX ADMIN — DEXMEDETOMIDINE HYDROCHLORIDE IN 0.9% SODIUM CHLORIDE 2.36 MICROGRAM(S)/KG/HR: 4 INJECTION INTRAVENOUS at 23:40

## 2018-04-07 RX ADMIN — Medication 200 MILLILITER(S): at 07:37

## 2018-04-07 RX ADMIN — HYDROMORPHONE HYDROCHLORIDE 1 MILLIGRAM(S): 2 INJECTION INTRAMUSCULAR; INTRAVENOUS; SUBCUTANEOUS at 18:40

## 2018-04-07 RX ADMIN — Medication 975 MILLIGRAM(S): at 18:55

## 2018-04-07 RX ADMIN — Medication 15 MILLIGRAM(S): at 11:30

## 2018-04-07 RX ADMIN — SODIUM CHLORIDE 10 MILLILITER(S): 9 INJECTION, SOLUTION INTRAVENOUS at 19:52

## 2018-04-07 RX ADMIN — HEPARIN SODIUM 5000 UNIT(S): 5000 INJECTION INTRAVENOUS; SUBCUTANEOUS at 14:58

## 2018-04-07 RX ADMIN — Medication 200 MILLILITER(S): at 08:36

## 2018-04-07 RX ADMIN — Medication 400 MILLIGRAM(S): at 15:50

## 2018-04-07 RX ADMIN — Medication 250 MILLIGRAM(S): at 11:30

## 2018-04-07 RX ADMIN — Medication 250 MILLIGRAM(S): at 05:30

## 2018-04-07 RX ADMIN — HYDROMORPHONE HYDROCHLORIDE 1 MILLIGRAM(S): 2 INJECTION INTRAMUSCULAR; INTRAVENOUS; SUBCUTANEOUS at 08:33

## 2018-04-07 RX ADMIN — HYDROMORPHONE HYDROCHLORIDE 30 MILLILITER(S): 2 INJECTION INTRAMUSCULAR; INTRAVENOUS; SUBCUTANEOUS at 19:08

## 2018-04-07 RX ADMIN — ONDANSETRON 4 MILLIGRAM(S): 8 TABLET, FILM COATED ORAL at 14:58

## 2018-04-07 RX ADMIN — Medication 50 GRAM(S): at 05:30

## 2018-04-07 RX ADMIN — Medication 400 MILLIGRAM(S): at 20:40

## 2018-04-07 RX ADMIN — SODIUM CHLORIDE 10 MILLILITER(S): 9 INJECTION, SOLUTION INTRAVENOUS at 11:29

## 2018-04-07 RX ADMIN — HEPARIN SODIUM 5000 UNIT(S): 5000 INJECTION INTRAVENOUS; SUBCUTANEOUS at 21:50

## 2018-04-07 RX ADMIN — Medication 1 CAPSULE(S): at 21:51

## 2018-04-07 RX ADMIN — PIPERACILLIN AND TAZOBACTAM 25 GRAM(S): 4; .5 INJECTION, POWDER, LYOPHILIZED, FOR SOLUTION INTRAVENOUS at 18:39

## 2018-04-07 RX ADMIN — Medication 400 MILLIGRAM(S): at 20:10

## 2018-04-07 RX ADMIN — Medication 400 MILLIGRAM(S): at 11:30

## 2018-04-07 RX ADMIN — HYDROMORPHONE HYDROCHLORIDE 1 MILLIGRAM(S): 2 INJECTION INTRAMUSCULAR; INTRAVENOUS; SUBCUTANEOUS at 11:52

## 2018-04-07 RX ADMIN — HYDROMORPHONE HYDROCHLORIDE 1 MILLIGRAM(S): 2 INJECTION INTRAMUSCULAR; INTRAVENOUS; SUBCUTANEOUS at 07:44

## 2018-04-07 RX ADMIN — ONDANSETRON 4 MILLIGRAM(S): 8 TABLET, FILM COATED ORAL at 19:52

## 2018-04-07 RX ADMIN — Medication 1 CAPSULE(S): at 05:30

## 2018-04-07 RX ADMIN — Medication 975 MILLIGRAM(S): at 18:40

## 2018-04-07 RX ADMIN — Medication 400 MILLIGRAM(S): at 15:20

## 2018-04-07 RX ADMIN — ONDANSETRON 4 MILLIGRAM(S): 8 TABLET, FILM COATED ORAL at 23:36

## 2018-04-07 RX ADMIN — DEXMEDETOMIDINE HYDROCHLORIDE IN 0.9% SODIUM CHLORIDE 2.36 MICROGRAM(S)/KG/HR: 4 INJECTION INTRAVENOUS at 14:59

## 2018-04-07 RX ADMIN — Medication 2: at 21:42

## 2018-04-07 RX ADMIN — HEPARIN SODIUM 5000 UNIT(S): 5000 INJECTION INTRAVENOUS; SUBCUTANEOUS at 05:31

## 2018-04-07 RX ADMIN — HYDROMORPHONE HYDROCHLORIDE 1 MILLIGRAM(S): 2 INJECTION INTRAMUSCULAR; INTRAVENOUS; SUBCUTANEOUS at 00:25

## 2018-04-07 RX ADMIN — Medication 250 MILLIGRAM(S): at 23:46

## 2018-04-07 RX ADMIN — Medication 250 MILLIGRAM(S): at 18:38

## 2018-04-07 RX ADMIN — DEXMEDETOMIDINE HYDROCHLORIDE IN 0.9% SODIUM CHLORIDE 2.36 MICROGRAM(S)/KG/HR: 4 INJECTION INTRAVENOUS at 19:52

## 2018-04-07 RX ADMIN — PIPERACILLIN AND TAZOBACTAM 25 GRAM(S): 4; .5 INJECTION, POWDER, LYOPHILIZED, FOR SOLUTION INTRAVENOUS at 11:30

## 2018-04-07 RX ADMIN — Medication 15 MILLIGRAM(S): at 11:45

## 2018-04-07 NOTE — PROGRESS NOTE ADULT - SUBJECTIVE AND OBJECTIVE BOX
Day _2__ of Anesthesia Pain Management Service    SUBJECTIVE:    Pain Scale Score	At rest: _5__ 	With Activity: ___ 	[ ] Refer to charted pain scores    THERAPY:    [ ] IV PCA Morphine		[ ] 5 mg/mL	[ ] 1 mg/mL  [ x] IV PCA Hydromorphone	[ ] 5 mg/mL	[ ] 1 mg/mL  [ ] IV PCA Fentanyl		[ ] 50 micrograms/mL    Demand dose 0.5   lockout  6  (minutes) Continuous Rate0.2    Total:     Daily      MEDICATIONS  (STANDING):  amylase/lipase/protease  (CREON  6,000 Units) 1 Capsule(s) Oral every 8 hours  dexmedetomidine Infusion 0.2 MICROgram(s)/kG/Hr (2.36 mL/Hr) IV Continuous <Continuous>  erythromycin    ethylsuccinate Suspension 40 mG/mL 250 milliGRAM(s) Oral every 6 hours  heparin  Injectable 5000 Unit(s) SubCutaneous every 8 hours  HYDROmorphone PCA (1 mG/mL) 30 milliLiter(s) PCA Continuous PCA Continuous  insulin Infusion 0.5 Unit(s)/Hr (0.5 mL/Hr) IV Continuous <Continuous>  insulin lispro (HumaLOG) corrective regimen sliding scale   SubCutaneous every 4 hours  lactated ringers. 1000 milliLiter(s) (10 mL/Hr) IV Continuous <Continuous>  ondansetron Injectable 4 milliGRAM(s) IV Push every 6 hours  piperacillin/tazobactam IVPB. 3.375 Gram(s) IV Intermittent every 6 hours  ropivacaine 0.2% in 0.9% Sodium Chloride PCEA 200 milliLiter(s) Epidural PCA Continuous    MEDICATIONS  (PRN):  HYDROmorphone PCA (1 mG/mL) Rescue Clinician Bolus 1 milliGRAM(s) IV Push every 15 minutes PRN for Pain Scale GREATER THAN 6  ketorolac   Injectable 15 milliGRAM(s) IV Push every 6 hours PRN breakthrough pain  ropivacaine 0.2% in 0.9% Sodium Chloride PCEA Rescue Clinician Bolus 5 milliLiter(s) Epidural every 15 minutes PRN for Pain Score greater than 6      OBJECTIVE:    Sedation Score:	[x ] Alert	[ ] Drowsy 	[ ] Arousable	[ ] Asleep	[ ] Unresponsive    Side Effects:	[x ] None	[ ] Nausea	[ ] Vomiting	[ ] Pruritus  		[ ] Other:    Vital Signs Last 24 Hrs  T(C): 37.1 (07 Apr 2018 07:00), Max: 37.2 (06 Apr 2018 23:00)  T(F): 98.8 (07 Apr 2018 07:00), Max: 99 (06 Apr 2018 23:00)  HR: 93 (07 Apr 2018 10:00) (89 - 119)  BP: 86/57 (07 Apr 2018 10:00) (84/52 - 114/63)  BP(mean): 64 (07 Apr 2018 10:00) (64 - 86)  RR: 41 (07 Apr 2018 10:00) (10 - 41)  SpO2: 93% (07 Apr 2018 09:00) (92% - 100%)    ASSESSMENT/ PLAN    Therapy to  be:	[ x] Continue   [ ] Discontinued   [ ] Change to prn Analgesics    Documentation and Verification of current medications:   [X] Done	[ ] Not done, not elligible    Comments:pt also has an epidural with ropivacaine. pt c/o pain. chronic pain consult requested from the service, as the pt was on a regimen of chronic pain meds priot to the surgery.

## 2018-04-07 NOTE — PROGRESS NOTE ADULT - ASSESSMENT
43 yo female with PMH significant for chronic pancreatitis presents for total l pancreatectomy with islet autotransplantation on April 5, 2018. Endocrine consulted for glycemic control. (high risk patient with high level decision making). BG values at goal on low dose insulin gtt. Given low insulin requirements-will weight base Lantus and start correctional scale q4 as tube feeds titrated up to goal in next 24 hours. BG goal (100-150mg/dl).

## 2018-04-07 NOTE — PROGRESS NOTE ADULT - SUBJECTIVE AND OBJECTIVE BOX
BLUE TEAM GENERAL SURGERY DAILY PROGRESS NOTE:     Subjective:  Patient seen & examined in AM  No acute events overnight  Pain control improved yesterday with PCA, PCEA and precedex  Endocrine c/s -> insulin gtt continued, hope to wean off around 10 am tomorrow.    TF with creon started at 10 cc/hr, tolerating with no issues    Objective:    PE:  Gen: NAD  HEENT NGT with bilious output.  R IJ CVC  Resp: airway patent, respirations unlabored, no increased WoB  CVS: RRR  Abd: soft, mild distention, appropriately tender, incision c/d/i, DANIEL x 2 SS          J-tube site intact  Ext: no edema, WWP    Vital Signs Last 24 Hrs  T(C): 37 (2018 03:00), Max: 37.2 (2018 11:00)  T(F): 98.6 (2018 03:00), Max: 99 (2018 11:00)  HR: 89 (2018 04:00) (89 - 122)  BP: 85/52 (2018 04:00) (84/52 - 114/63)  BP(mean): 64 (2018 04:00) (64 - 86)  RR: 17 (2018 04:00) (10 - 41)  SpO2: 97% (2018 04:00) (92% - 100%)    I&O's Detail    2018 07:01  -  2018 07:00  --------------------------------------------------------  IN:    insulin Infusion: 19 mL    lactated ringers.: 675 mL    Solution: 50 mL    Solution: 187.5 mL    Solution: 150 mL    Solution: 150 mL  Total IN: 1231.5 mL    OUT:    Bulb: 305 mL    Bulb: 470 mL    Indwelling Catheter - Urethral: 415 mL    Nasoenteral Tube: 10 mL  Total OUT: 1200 mL    Total NET: 31.5 mL    2018 07:01  -  2018 04:23  --------------------------------------------------------  IN:    dexmedetomidine Infusion: 7.2 mL    dexmedetomidine Infusion: 37.7 mL    insulin Infusion: 11 mL    lactated ringers.: 1575 mL    Solution: 62.5 mL    Solution: 250 mL    Vital HN: 140 mL  Total IN: 2083.4 mL    OUT:    Bulb: 60 mL    Bulb: 550 mL    Indwelling Catheter - Urethral: 595 mL    Nasoenteral Tube: 50 mL  Total OUT: 1255 mL    Total NET: 828.4 mL    Daily     Daily Weight in k.2 (2018 07:55)    MEDICATIONS  (STANDING):  amylase/lipase/protease  (CREON  6,000 Units) 1 Capsule(s) Oral every 8 hours  dexmedetomidine Infusion 0.2 MICROgram(s)/kG/Hr (2.36 mL/Hr) IV Continuous <Continuous>  erythromycin    ethylsuccinate Suspension 40 mG/mL 250 milliGRAM(s) Oral every 6 hours  heparin  Injectable 5000 Unit(s) SubCutaneous every 8 hours  HYDROmorphone PCA (1 mG/mL) 30 milliLiter(s) PCA Continuous PCA Continuous  insulin Infusion 0.5 Unit(s)/Hr (0.5 mL/Hr) IV Continuous <Continuous>  lactated ringers. 1000 milliLiter(s) (75 mL/Hr) IV Continuous <Continuous>  piperacillin/tazobactam IVPB. 3.375 Gram(s) IV Intermittent every 6 hours  ropivacaine 0.2% in 0.9% Sodium Chloride PCEA 200 milliLiter(s) Epidural PCA Continuous    MEDICATIONS  (PRN):  HYDROmorphone PCA (1 mG/mL) Rescue Clinician Bolus 1 milliGRAM(s) IV Push every 15 minutes PRN for Pain Scale GREATER THAN 6  ketorolac   Injectable 15 milliGRAM(s) IV Push every 6 hours PRN breakthrough pain  ropivacaine 0.2% in 0.9% Sodium Chloride PCEA Rescue Clinician Bolus 5 milliLiter(s) Epidural every 15 minutes PRN for Pain Score greater than 6    LABS:             9.1    14.1  )-----------( 153      ( 2018 02:25 )             26.4     04-07    138  |  107  |  14  ----------------------------<  157<H>  4.4   |  23  |  0.74    Ca    8.0<L>      2018 02:25  Phos  2.4     04-  Mg     1.8     04-07    TPro  4.3<L>  /  Alb  2.6<L>  /  TBili  0.5  /  DBili  x   /  AST  246<H>  /  ALT  92<H>  /  AlkPhos  29<L>  -    PT/INR - ( 2018 02:25 )   PT: 13.1 sec;   INR: 1.20 ratio         PTT - ( 2018 02:25 )  PTT:96.9 sec    RADIOLOGY & ADDITIONAL STUDIES:    < from: Xray Chest 1 View- PORTABLE-Routine (18 @ 07:23) >  IMPRESSION:    Clear lungs.    < end of copied text >

## 2018-04-07 NOTE — PROGRESS NOTE ADULT - ASSESSMENT
42 year old female with PMHx of chronic pancreatitis s/p tia in 2014, now POD 2 s/p total pancreatectomy with islet cell autotransplantation.  Transferred to the SICU for post-operative monitoring, and insulin gtt per protocol    - Pain control - Continue PCEA, PCA, IV tylenol and dilaudid PRN  - f/u Endocrine.  Will attempt to wean off insulin gtt by 10 am today  - OOB/Ambulate as tolerated  - Monitor lactate  - IVF  - TF + Creon, increase to goal up to 45 cc/hr  - Erythromycin for promotility  - DVT ppx  - Zosyn for 48 hours abx prophylaxis    Blue  [phone]

## 2018-04-07 NOTE — PROGRESS NOTE ADULT - SUBJECTIVE AND OBJECTIVE BOX
Diabetes Follow up note:  Interval Hx:  42 year old female w/chronic pancreatitis, no hx of DM here s/p total pancreatectomy w/islet cell autotransplantation POD#2. Pt remains on insulin gtt consistently @ 0.5 cc/hr w/Tube feeds just increased to 20 cc/hr. Plan for tube feeds to be titrated up to goal today per team. Pt seen at bedside. Reports severe pain w/little relief from IV PCA/PCEA.       Review of Systems:  General: + incisional pain/discomfort.   GI: Tolerating TFs without any N/V/D.   CV: No CP/SOB  ENDO: No S&Sx of hypoglycemia  MEDS:  insulin glargine Injectable (LANTUS) 10 Unit(s) SubCutaneous once  insulin Infusion 0.5 Unit(s)/Hr IV Continuous <Continuous>    erythromycin    ethylsuccinate Suspension 40 mG/mL 250 milliGRAM(s) Oral every 6 hours  piperacillin/tazobactam IVPB. 3.375 Gram(s) IV Intermittent every 6 hours    Allergies    No Known Allergies        PE:  General: Female sitting in chair. + guarding  Vital Signs Last 24 Hrs  T(C): 37.1 (07 Apr 2018 07:00), Max: 37.2 (06 Apr 2018 11:00)  T(F): 98.8 (07 Apr 2018 07:00), Max: 99 (06 Apr 2018 11:00)  HR: 93 (07 Apr 2018 10:00) (89 - 122)  BP: 86/57 (07 Apr 2018 10:00) (84/52 - 114/63)  BP(mean): 64 (07 Apr 2018 10:00) (64 - 86)  RR: 41 (07 Apr 2018 10:00) (10 - 41)  SpO2: 93% (07 Apr 2018 09:00) (92% - 100%)  HEENT: NGT in place.   CV: S1, S2. NSR on monitor  Abd: Soft, T,ND, J-tube in place  Extremities: Warm  Neuro: A&O X3    LABS:    POCT Blood Glucose.: 131 mg/dL (04-07-18 @ 10:17)  POCT Blood Glucose.: 139 mg/dL (04-07-18 @ 08:15)  POCT Blood Glucose.: 140 mg/dL (04-07-18 @ 06:53)  POCT Blood Glucose.: 141 mg/dL (04-07-18 @ 06:05)  POCT Blood Glucose.: 130 mg/dL (04-07-18 @ 05:25)  POCT Blood Glucose.: 127 mg/dL (04-07-18 @ 04:07)  POCT Blood Glucose.: 134 mg/dL (04-07-18 @ 03:07)  POCT Blood Glucose.: 143 mg/dL (04-07-18 @ 02:00)  POCT Blood Glucose.: 161 mg/dL (04-07-18 @ 01:09)  POCT Blood Glucose.: 154 mg/dL (04-06-18 @ 23:58)  POCT Blood Glucose.: 151 mg/dL (04-06-18 @ 23:04)  POCT Blood Glucose.: 162 mg/dL (04-06-18 @ 21:57)  POCT Blood Glucose.: 161 mg/dL (04-06-18 @ 21:16)  POCT Blood Glucose.: 145 mg/dL (04-06-18 @ 20:06)  POCT Blood Glucose.: 35 mg/dL (04-06-18 @ 20:02)  POCT Blood Glucose.: 81 mg/dL (04-06-18 @ 20:00)  POCT Blood Glucose.: 101 mg/dL (04-06-18 @ 19:03)  POCT Blood Glucose.: 78 mg/dL (04-06-18 @ 18:05)  POCT Blood Glucose.: 82 mg/dL (04-06-18 @ 17:01)  POCT Blood Glucose.: 116 mg/dL (04-06-18 @ 16:14)  POCT Blood Glucose.: 180 mg/dL (04-06-18 @ 15:27)  POCT Blood Glucose.: 207 mg/dL (04-06-18 @ 13:56)  POCT Blood Glucose.: 196 mg/dL (04-06-18 @ 13:09)  POCT Blood Glucose.: 115 mg/dL (04-06-18 @ 12:07)  POCT Blood Glucose.: 156 mg/dL (04-06-18 @ 11:10)  POCT Blood Glucose.: 109 mg/dL (04-06-18 @ 10:14)  POCT Blood Glucose.: 93 mg/dL (04-06-18 @ 09:01)  POCT Blood Glucose.: 115 mg/dL (04-06-18 @ 08:03)  POCT Blood Glucose.: 131 mg/dL (04-06-18 @ 06:59)  POCT Blood Glucose.: 100 mg/dL (04-06-18 @ 06:56)  POCT Blood Glucose.: 133 mg/dL (04-06-18 @ 06:55)  POCT Blood Glucose.: 161 mg/dL (04-06-18 @ 05:58)  POCT Blood Glucose.: 145 mg/dL (04-06-18 @ 04:53)  POCT Blood Glucose.: 91 mg/dL (04-06-18 @ 04:35)  POCT Blood Glucose.: 55 mg/dL (04-06-18 @ 04:13)  POCT Blood Glucose.: 50 mg/dL (04-06-18 @ 03:57)  POCT Blood Glucose.: 91 mg/dL (04-06-18 @ 03:03)  POCT Blood Glucose.: 132 mg/dL (04-06-18 @ 01:58)  POCT Blood Glucose.: 171 mg/dL (04-06-18 @ 01:01)  POCT Blood Glucose.: 246 mg/dL (04-05-18 @ 23:58)  POCT Blood Glucose.: 213 mg/dL (04-05-18 @ 23:27)  POCT Blood Glucose.: 209 mg/dL (04-05-18 @ 22:11)  POCT Blood Glucose.: 109 mg/dL (04-05-18 @ 06:33)                            9.1    14.1  )-----------( 153      ( 07 Apr 2018 02:25 )             26.4       04-07    138  |  107  |  14  ----------------------------<  157<H>  4.4   |  23  |  0.74    Ca    8.0<L>      07 Apr 2018 02:25  Phos  2.4     04-07  Mg     1.8     04-07    TPro  4.3<L>  /  Alb  2.6<L>  /  TBili  0.5  /  DBili  x   /  AST  246<H>  /  ALT  92<H>  /  AlkPhos  29<L>  04-07                  Contact number: [facility]min [phone] or [phone]

## 2018-04-07 NOTE — PROGRESS NOTE ADULT - SUBJECTIVE AND OBJECTIVE BOX
SICU Daily Progress Note  =====================================================  Interval/Overnight Events:    Pain control was an issue during the day. Given PCA, PCEA, and precedex, and seemed comfortable overnight. Endocrine consulted, insulin drip continued, and tube feeds at 10cc with creon started as per protocol.     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   The previous pancreaticojejunostomy from her [place] procedure was taken down, and a gastrojejunostomy was created. A feeding jejunostomy was placed. Two DANIEL drains left in place, one anterior and one posterior to the choledochojejunostomy.   Patient was received in the SICU extubated, on no pressors.     Surgery Information  OR time: 12 hours     EBL: 800      UOP: 650 mL           IV Fluids: 400 mL 25% albumin, 1000 mL 5% albumin      Blood Products: 3 units pRBCs     Allergies: No Known Allergies      MEDICATIONS:   --------------------------------------------------------------------------------------  Neurologic Medications  dexmedetomidine Infusion 0.2 MICROgram(s)/kG/Hr IV Continuous <Continuous>  HYDROmorphone PCA (1 mG/mL) 30 milliLiter(s) PCA Continuous PCA Continuous  HYDROmorphone PCA (1 mG/mL) Rescue Clinician Bolus 1 milliGRAM(s) IV Push every 15 minutes PRN for Pain Scale GREATER THAN 6  ketorolac   Injectable 15 milliGRAM(s) IV Push every 6 hours PRN breakthrough pain    Respiratory Medications    Cardiovascular Medications    Gastrointestinal Medications  amylase/lipase/protease  (CREON  6,000 Units) 1 Capsule(s) Oral every 8 hours  lactated ringers. 1000 milliLiter(s) IV Continuous <Continuous>    Genitourinary Medications    Hematologic/Oncologic Medications  heparin  Injectable 5000 Unit(s) SubCutaneous every 8 hours    Antimicrobial/Immunologic Medications  erythromycin    ethylsuccinate Suspension 40 mG/mL 250 milliGRAM(s) Oral every 6 hours  piperacillin/tazobactam IVPB. 3.375 Gram(s) IV Intermittent every 6 hours    Endocrine/Metabolic Medications  insulin Infusion 0.5 Unit(s)/Hr IV Continuous <Continuous>    Topical/Other Medications  ropivacaine 0.2% in 0.9% Sodium Chloride PCEA 200 milliLiter(s) Epidural PCA Continuous  ropivacaine 0.2% in 0.9% Sodium Chloride PCEA Rescue Clinician Bolus 5 milliLiter(s) Epidural every 15 minutes PRN for Pain Score greater than 6    --------------------------------------------------------------------------------------    VITAL SIGNS, INS/OUTS (last 24 hours):  --------------------------------------------------------------------------------------  ((Insert SICU Vitals/Is+Os here))***  --------------------------------------------------------------------------------------    EXAM  NEUROLOGY  RASS:   	GCS:    Exam: Normal, NAD, alert, oriented x3, no focal deficits.   HEENT  Exam: Normocephalic, atraumatic, EOMI.    RESPIRATORY  Exam: Lungs clear to auscultation, Normal expansion/effort.   CARDIOVASCULAR  Exam: S1, S2.  Regular rate and rhythm.     GI/NUTRITION  Exam: Abdomen soft, Non-tender, Non-distended.  J tube in place, dressing c/d/i, complaining of some pain  VASCULAR  Exam: Extremities warm, pink, well-perfused.   MUSCULOSKELETAL  Exam: All extremities moving spontaneously without limitations.   SKIN  Exam: Good skin turgor, no skin breakdown.     METABOLIC/FLUIDS/ELECTROLYTES  lactated ringers. 1000 milliLiter(s) IV Continuous <Continuous>      HEMATOLOGIC  [x] VTE Prophylaxis: heparin  Injectable 5000 Unit(s) SubCutaneous every 8 hours    Transfusions:	[] PRBC	[] Platelets		[] FFP	[] Cryoprecipitate    INFECTIOUS DISEASE  Antimicrobials/Immunologic Medications:  erythromycin    ethylsuccinate Suspension 40 mG/mL 250 milliGRAM(s) Oral every 6 hours  piperacillin/tazobactam IVPB. 3.375 Gram(s) IV Intermittent every 6 hours    Day #      of     ***    Tubes/Lines/Drains  ***  [x] Peripheral IV  [] Central Venous Line     	[] R	[] L	[] IJ	[] Fem	[] SC	Date Placed:   [] Arterial Line		[] R	[] L	[] Fem	[] Rad	[] Ax	Date Placed:   [] PICC		[] Midline		[] Mediport  [] Urinary Catheter		Date Placed:   [x] Necessity of urinary, arterial, and venous catheters discussed    LABS  --------------------------------------------------------------------------------------  ((Insert SICU Labs here))***  --------------------------------------------------------------------------------------    OTHER LABORATORY:     IMAGING STUDIES:   CXR:     ASSESSMENT:    42y Female s/p total pancreatectomy and islet cell autotransplantation.     PLAN:   Neurologic: Postoperative pain control in the setting of chronic pain  - PCEA, PCA  -precedex   - IV Tylenol and Dilaudid PRN    Respiratory: No acute issues  - Maintain SpO2 > 92%  - Ambulate as tolerated    Cardiovascular: No acute issues. Lactate cleared to 1.4.   - LR @ 75    Gastrointestinal/Nutrition: s/p gastrojejunostomy with feeding jejunostomy  - Erythromycin for gastric motility  - J tube up to goal today     Renal/Genitourinary: No acute issues  - Maintenance fluids   - Replete electrolytes PRN    Hematologic: No acute signs of bleeding. Receiving 3 units pRBCs intraop.   - SQH for VTE ppx    Infectious Disease: No acute issues  - Zosyn for perioperative antibiotics for 48 hours per protocol    Lines/Tubes: RIJ Cordis, arterial line, Ferrari, NGT     Endocrine: s/p islet cell autotransplant  - insulin gtt and titrate per protocol, titrate off with endocrine recommendations once at goal tube feeds     Disposition: SICU  --------------------------------------------------------------------------------------    Critical Care Diagnoses: SICU Daily Progress Note  =====================================================  Interval/Overnight Events:    Pain control was an issue during the day. Given PCA, PCEA, and precedex, and seemed comfortable overnight. Endocrine consulted, insulin drip continued, and tube feeds at 10cc with creon started as per protocol.     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   The previous pancreaticojejunostomy from her [place] procedure was taken down, and a gastrojejunostomy was created. A feeding jejunostomy was placed. Two DANIEL drains left in place, one anterior and one posterior to the choledochojejunostomy.   Patient was received in the SICU extubated, on no pressors.     Surgery Information  OR time: 12 hours     EBL: 800      UOP: 650 mL           IV Fluids: 400 mL 25% albumin, 1000 mL 5% albumin      Blood Products: 3 units pRBCs     Allergies: No Known Allergies      MEDICATIONS:   --------------------------------------------------------------------------------------  Neurologic Medications  dexmedetomidine Infusion 0.2 MICROgram(s)/kG/Hr IV Continuous <Continuous>  HYDROmorphone PCA (1 mG/mL) 30 milliLiter(s) PCA Continuous PCA Continuous  HYDROmorphone PCA (1 mG/mL) Rescue Clinician Bolus 1 milliGRAM(s) IV Push every 15 minutes PRN for Pain Scale GREATER THAN 6  ketorolac   Injectable 15 milliGRAM(s) IV Push every 6 hours PRN breakthrough pain    Respiratory Medications    Cardiovascular Medications    Gastrointestinal Medications  amylase/lipase/protease  (CREON  6,000 Units) 1 Capsule(s) Oral every 8 hours  lactated ringers. 1000 milliLiter(s) IV Continuous <Continuous>    Genitourinary Medications    Hematologic/Oncologic Medications  heparin  Injectable 5000 Unit(s) SubCutaneous every 8 hours    Antimicrobial/Immunologic Medications  erythromycin    ethylsuccinate Suspension 40 mG/mL 250 milliGRAM(s) Oral every 6 hours  piperacillin/tazobactam IVPB. 3.375 Gram(s) IV Intermittent every 6 hours    Endocrine/Metabolic Medications  insulin Infusion 0.5 Unit(s)/Hr IV Continuous <Continuous>    Topical/Other Medications  ropivacaine 0.2% in 0.9% Sodium Chloride PCEA 200 milliLiter(s) Epidural PCA Continuous  ropivacaine 0.2% in 0.9% Sodium Chloride PCEA Rescue Clinician Bolus 5 milliLiter(s) Epidural every 15 minutes PRN for Pain Score greater than 6    --------------------------------------------------------------------------------------    VITAL SIGNS, INS/OUTS (last 24 hours):  --------------------------------------------------------------------------------------  T(C): 37 (04-07-18 @ 03:00), Max: 37.2 (04-06-18 @ 11:00)  HR: 89 (04-07-18 @ 04:00) (89 - 122)  BP: 85/52 (04-07-18 @ 04:00) (84/52 - 114/63)  BP(mean): 64 (04-07-18 @ 04:00) (64 - 86)  ABP: 72/58 (04-06-18 @ 13:00) (72/58 - 115/82)  ABP(mean): 65 (04-06-18 @ 13:00) (65 - 97)  RR: 17 (04-07-18 @ 04:00) (10 - 41)  SpO2: 97% (04-07-18 @ 04:00) (92% - 100%)  Wt(kg): --  CVP(mm Hg): 9 (04-07-18 @ 04:00) (-61 - 13)  CI: --  CAPILLARY BLOOD GLUCOSE      POCT Blood Glucose.: 130 mg/dL (07 Apr 2018 05:25)  POCT Blood Glucose.: 127 mg/dL (07 Apr 2018 04:07)  POCT Blood Glucose.: 134 mg/dL (07 Apr 2018 03:07)  POCT Blood Glucose.: 143 mg/dL (07 Apr 2018 02:00)  POCT Blood Glucose.: 161 mg/dL (07 Apr 2018 01:09)  POCT Blood Glucose.: 154 mg/dL (06 Apr 2018 23:58)  POCT Blood Glucose.: 151 mg/dL (06 Apr 2018 23:04)  POCT Blood Glucose.: 162 mg/dL (06 Apr 2018 21:57)  POCT Blood Glucose.: 161 mg/dL (06 Apr 2018 21:16)  POCT Blood Glucose.: 145 mg/dL (06 Apr 2018 20:06)  POCT Blood Glucose.: 35 mg/dL (06 Apr 2018 20:02)  POCT Blood Glucose.: 81 mg/dL (06 Apr 2018 20:00)  POCT Blood Glucose.: 101 mg/dL (06 Apr 2018 19:03)  POCT Blood Glucose.: 78 mg/dL (06 Apr 2018 18:05)  POCT Blood Glucose.: 82 mg/dL (06 Apr 2018 17:01)  POCT Blood Glucose.: 116 mg/dL (06 Apr 2018 16:14)  POCT Blood Glucose.: 180 mg/dL (06 Apr 2018 15:27)  POCT Blood Glucose.: 207 mg/dL (06 Apr 2018 13:56)  POCT Blood Glucose.: 196 mg/dL (06 Apr 2018 13:09)  POCT Blood Glucose.: 115 mg/dL (06 Apr 2018 12:07)  POCT Blood Glucose.: 156 mg/dL (06 Apr 2018 11:10)  POCT Blood Glucose.: 109 mg/dL (06 Apr 2018 10:14)  POCT Blood Glucose.: 93 mg/dL (06 Apr 2018 09:01)  POCT Blood Glucose.: 115 mg/dL (06 Apr 2018 08:03)  POCT Blood Glucose.: 131 mg/dL (06 Apr 2018 06:59)  POCT Blood Glucose.: 100 mg/dL (06 Apr 2018 06:56)  POCT Blood Glucose.: 133 mg/dL (06 Apr 2018 06:55)  POCT Blood Glucose.: 161 mg/dL (06 Apr 2018 05:58)   N/A      04-05 @ 07:01  -  04-06 @ 07:00  --------------------------------------------------------  IN:    insulin Infusion: 19 mL    lactated ringers.: 675 mL    Solution: 50 mL    Solution: 187.5 mL    Solution: 150 mL    Solution: 150 mL  Total IN: 1231.5 mL    OUT:    Bulb: 305 mL    Bulb: 470 mL    Indwelling Catheter - Urethral: 415 mL    Nasoenteral Tube: 10 mL  Total OUT: 1200 mL    Total NET: 31.5 mL      04-06 @ 07:01 - 04-07 @ 05:41  --------------------------------------------------------  IN:    dexmedetomidine Infusion: 7.2 mL    dexmedetomidine Infusion: 37.7 mL    insulin Infusion: 11 mL    lactated ringers.: 1575 mL    Solution: 62.5 mL    Solution: 250 mL    Vital HN: 140 mL  Total IN: 2083.4 mL    OUT:    Bulb: 60 mL    Bulb: 550 mL    Indwelling Catheter - Urethral: 595 mL    Nasoenteral Tube: 50 mL  Total OUT: 1255 mL    Total NET: 828.4 mL        --------------------------------------------------------------------------------------    EXAM  NEUROLOGY  RASS:   	GCS:    Exam: Normal, NAD, alert, oriented x3, no focal deficits.   HEENT  Exam: Normocephalic, atraumatic, EOMI.    RESPIRATORY  Exam: Lungs clear to auscultation, Normal expansion/effort.   CARDIOVASCULAR  Exam: S1, S2.  Regular rate and rhythm.     GI/NUTRITION  Exam: Abdomen soft, Non-tender, Non-distended.  J tube in place, dressing c/d/i, complaining of some pain  VASCULAR  Exam: Extremities warm, pink, well-perfused.   MUSCULOSKELETAL  Exam: All extremities moving spontaneously without limitations.   SKIN  Exam: Good skin turgor, no skin breakdown.     METABOLIC/FLUIDS/ELECTROLYTES  lactated ringers. 1000 milliLiter(s) IV Continuous <Continuous>      HEMATOLOGIC  [x] VTE Prophylaxis: heparin  Injectable 5000 Unit(s) SubCutaneous every 8 hours    Transfusions:	[] PRBC	[] Platelets		[] FFP	[] Cryoprecipitate    INFECTIOUS DISEASE  Antimicrobials/Immunologic Medications:  erythromycin    ethylsuccinate Suspension 40 mG/mL 250 milliGRAM(s) Oral every 6 hours  piperacillin/tazobactam IVPB. 3.375 Gram(s) IV Intermittent every 6 hours    Day #      of     ***    Tubes/Lines/Drains  ***  [x] Peripheral IV  [] Central Venous Line     	[] R	[] L	[] IJ	[] Fem	[] SC	Date Placed:   [] Arterial Line		[] R	[] L	[] Fem	[] Rad	[] Ax	Date Placed:   [] PICC		[] Midline		[] Mediport  [] Urinary Catheter		Date Placed:   [x] Necessity of urinary, arterial, and venous catheters discussed    LABS  --------------------------------------------------------------------------------------  CBC Full  -  ( 07 Apr 2018 02:25 )  WBC Count : 14.1 K/uL  Hemoglobin : 9.1 g/dL  Hematocrit : 26.4 %  Platelet Count - Automated : 153 K/uL  Mean Cell Volume : 96.5 fl  Mean Cell Hemoglobin : 33.3 pg  Mean Cell Hemoglobin Concentration : 34.5 gm/dL      04-07    138  |  107  |  14  ----------------------------<  157<H>  4.4   |  23  |  0.74    Ca    8.0<L>      07 Apr 2018 02:25  Phos  2.4     04-07  Mg     1.8     04-07    TPro  4.3<L>  /  Alb  2.6<L>  /  TBili  0.5  /  DBili  x   /  AST  246<H>  /  ALT  92<H>  /  AlkPhos  29<L>  04-07    LIVER FUNCTIONS - ( 07 Apr 2018 02:25 )  Alb: 2.6 g/dL / Pro: 4.3 g/dL / ALK PHOS: 29 U/L / ALT: 92 U/L RC / AST: 246 U/L / GGT: x           CAPILLARY BLOOD GLUCOSE      POCT Blood Glucose.: 130 mg/dL (07 Apr 2018 05:25)  POCT Blood Glucose.: 127 mg/dL (07 Apr 2018 04:07)  POCT Blood Glucose.: 134 mg/dL (07 Apr 2018 03:07)  POCT Blood Glucose.: 143 mg/dL (07 Apr 2018 02:00)  POCT Blood Glucose.: 161 mg/dL (07 Apr 2018 01:09)  POCT Blood Glucose.: 154 mg/dL (06 Apr 2018 23:58)  POCT Blood Glucose.: 151 mg/dL (06 Apr 2018 23:04)  POCT Blood Glucose.: 162 mg/dL (06 Apr 2018 21:57)  POCT Blood Glucose.: 161 mg/dL (06 Apr 2018 21:16)  POCT Blood Glucose.: 145 mg/dL (06 Apr 2018 20:06)  POCT Blood Glucose.: 35 mg/dL (06 Apr 2018 20:02)  POCT Blood Glucose.: 81 mg/dL (06 Apr 2018 20:00)  POCT Blood Glucose.: 101 mg/dL (06 Apr 2018 19:03)  POCT Blood Glucose.: 78 mg/dL (06 Apr 2018 18:05)  POCT Blood Glucose.: 82 mg/dL (06 Apr 2018 17:01)  POCT Blood Glucose.: 116 mg/dL (06 Apr 2018 16:14)  POCT Blood Glucose.: 180 mg/dL (06 Apr 2018 15:27)  POCT Blood Glucose.: 207 mg/dL (06 Apr 2018 13:56)  POCT Blood Glucose.: 196 mg/dL (06 Apr 2018 13:09)  POCT Blood Glucose.: 115 mg/dL (06 Apr 2018 12:07)  POCT Blood Glucose.: 156 mg/dL (06 Apr 2018 11:10)  POCT Blood Glucose.: 109 mg/dL (06 Apr 2018 10:14)  POCT Blood Glucose.: 93 mg/dL (06 Apr 2018 09:01)  POCT Blood Glucose.: 115 mg/dL (06 Apr 2018 08:03)  POCT Blood Glucose.: 131 mg/dL (06 Apr 2018 06:59)  POCT Blood Glucose.: 100 mg/dL (06 Apr 2018 06:56)  POCT Blood Glucose.: 133 mg/dL (06 Apr 2018 06:55)  POCT Blood Glucose.: 161 mg/dL (06 Apr 2018 05:58)      PT/INR - ( 07 Apr 2018 02:25 )   PT: 13.1 sec;   INR: 1.20 ratio         PTT - ( 07 Apr 2018 02:25 )  PTT:96.9 sec  --------------------------------------------------------------------------------------    OTHER LABORATORY:     IMAGING STUDIES:   CXR:     ASSESSMENT:    42y Female s/p total pancreatectomy and islet cell autotransplantation.     PLAN:   Neurologic: Postoperative pain control in the setting of chronic pain  - PCEA, PCA  -precedex   - IV Tylenol and Dilaudid PRN    Respiratory: No acute issues  - Maintain SpO2 > 92%  - Ambulate as tolerated    Cardiovascular: No acute issues. Lactate cleared to 1.4.   - LR @ 75    Gastrointestinal/Nutrition: s/p gastrojejunostomy with feeding jejunostomy  - Erythromycin for gastric motility  - J tube up to goal today     Renal/Genitourinary: No acute issues  - Maintenance fluids   - Replete electrolytes PRN    Hematologic: No acute signs of bleeding. Receiving 3 units pRBCs intraop.   - SQH for VTE ppx    Infectious Disease: No acute issues  - Zosyn for perioperative antibiotics for 48 hours per protocol    Lines/Tubes: RIJ Cordis, arterial line, Ferrari, NGT     Endocrine: s/p islet cell autotransplant  - insulin gtt and titrate per protocol, titrate off with endocrine recommendations once at goal tube feeds     Disposition: SICU  --------------------------------------------------------------------------------------    Critical Care Diagnoses:

## 2018-04-07 NOTE — PROGRESS NOTE ADULT - PROBLEM SELECTOR PLAN 1
-test BG hourly while on gtt. Can transition to q4h once off gtt  -Give Lantus 10 units stat now-can stop insulin gtt 2 hours after Lantus given  -Start Humalog correction scale q4h (correction starting >120mg/dl w/ISF of 1:40)  -will monitor if patient will require NPH while on tube feeds (based on correction and BG values in next 24 hours)  -discussed plan w/pt and team  -will monitor. When diet advanced-will require basal/bolus  pager: [phone]

## 2018-04-07 NOTE — PROGRESS NOTE ADULT - SUBJECTIVE AND OBJECTIVE BOX
Day ___ of Anesthesia Pain Management Service    SUBJECTIVE:  Pain Scale Score	At rest: 5___ 	With Activity: ___ 	[  ] Refer to charted pain scores    THERAPY:  [ ] Epidural Bupivacaine 0.0625% and Hydromorphone 10 micrograms/mL  [x ] Epidural Ropivacaine 0.2% plain   [ ] Epidural Bupivacaine 0.01 % and Fentanyl 3 micrograms/mL  (OB)    Demand dose ___ lockout ___ (minutes) Continuous Rate __10_       MEDICATIONS  (STANDING):  amylase/lipase/protease  (CREON  6,000 Units) 1 Capsule(s) Oral every 8 hours  dexmedetomidine Infusion 0.2 MICROgram(s)/kG/Hr (2.36 mL/Hr) IV Continuous <Continuous>  erythromycin    ethylsuccinate Suspension 40 mG/mL 250 milliGRAM(s) Oral every 6 hours  heparin  Injectable 5000 Unit(s) SubCutaneous every 8 hours  HYDROmorphone PCA (1 mG/mL) 30 milliLiter(s) PCA Continuous PCA Continuous  insulin Infusion 0.5 Unit(s)/Hr (0.5 mL/Hr) IV Continuous <Continuous>  insulin lispro (HumaLOG) corrective regimen sliding scale   SubCutaneous every 4 hours  lactated ringers. 1000 milliLiter(s) (10 mL/Hr) IV Continuous <Continuous>  ondansetron Injectable 4 milliGRAM(s) IV Push every 6 hours  piperacillin/tazobactam IVPB. 3.375 Gram(s) IV Intermittent every 6 hours  ropivacaine 0.2% in 0.9% Sodium Chloride PCEA 200 milliLiter(s) Epidural PCA Continuous    MEDICATIONS  (PRN):  HYDROmorphone PCA (1 mG/mL) Rescue Clinician Bolus 1 milliGRAM(s) IV Push every 15 minutes PRN for Pain Scale GREATER THAN 6  ketorolac   Injectable 15 milliGRAM(s) IV Push every 6 hours PRN breakthrough pain  ropivacaine 0.2% in 0.9% Sodium Chloride PCEA Rescue Clinician Bolus 5 milliLiter(s) Epidural every 15 minutes PRN for Pain Score greater than 6      OBJECTIVE:    Assessment of Epidural Catheter Site: 	    [x ] Dressing intact	[x ] Site non-tender	[x ] Site without erythema, discharge, edema  [x ] Epidural tubing and connection checked	[x [ Gross neurological exam within normal limits  [ ] Catheter removed – tip intact		    PT/INR - ( 07 Apr 2018 02:25 )   PT: 13.1 sec;   INR: 1.20 ratio         PTT - ( 07 Apr 2018 02:25 )  PTT:96.9 sec                      9.1    14.1  )-----------( 153      ( 07 Apr 2018 02:25 )             26.4     Vital Signs Last 24 Hrs  T(C): 37.1 (04-07-18 @ 07:00), Max: 37.2 (04-06-18 @ 23:00)  T(F): 98.8 (04-07-18 @ 07:00), Max: 99 (04-06-18 @ 23:00)  HR: 93 (04-07-18 @ 10:00) (89 - 119)  BP: 86/57 (04-07-18 @ 10:00) (84/52 - 114/63)  BP(mean): 64 (04-07-18 @ 10:00) (64 - 86)  RR: 41 (04-07-18 @ 10:00) (10 - 41)  SpO2: 93% (04-07-18 @ 09:00) (92% - 100%)      Sedation Score:	[x ] Alert	[ ] Drowsy	[ ] Arousable  [ ] Asleep  [ ] Unresponsive    Side Effects:	[ ] None	[ ] Nausea	[ ] Vomiting  [ ] Pruritus  		[ ] Weakness  [ x] Numbness  [ ] Other:    ASSESSMENT/ PLAN:    Therapy to  be:	[ x] Continue   [ ] Discontinued   [ ] Change to prn Analgesics    Documentation and Verification of current medications:  [ X ] Done	[ ] Not done, not eligible, reason:    Comments:dose reduced in response to numbness in the leg

## 2018-04-08 LAB
ALBUMIN SERPL ELPH-MCNC: 2.4 G/DL — LOW (ref 3.3–5)
ALBUMIN SERPL ELPH-MCNC: 2.7 G/DL — LOW (ref 3.3–5)
ALP SERPL-CCNC: 38 U/L — LOW (ref 40–120)
ALP SERPL-CCNC: 50 U/L — SIGNIFICANT CHANGE UP (ref 40–120)
ALT FLD-CCNC: 65 U/L RC — HIGH (ref 10–45)
ALT FLD-CCNC: 81 U/L RC — HIGH (ref 10–45)
ANION GAP SERPL CALC-SCNC: 11 MMOL/L — SIGNIFICANT CHANGE UP (ref 5–17)
ANION GAP SERPL CALC-SCNC: 7 MMOL/L — SIGNIFICANT CHANGE UP (ref 5–17)
APTT BLD: 32.2 SEC — SIGNIFICANT CHANGE UP (ref 27.5–37.4)
APTT BLD: 32.3 SEC — SIGNIFICANT CHANGE UP (ref 27.5–37.4)
AST SERPL-CCNC: 32 U/L — SIGNIFICANT CHANGE UP (ref 10–40)
AST SERPL-CCNC: 98 U/L — HIGH (ref 10–40)
BILIRUB DIRECT SERPL-MCNC: 0.1 MG/DL — SIGNIFICANT CHANGE UP (ref 0–0.2)
BILIRUB DIRECT SERPL-MCNC: 0.2 MG/DL — SIGNIFICANT CHANGE UP (ref 0–0.2)
BILIRUB INDIRECT FLD-MCNC: 0.2 MG/DL — SIGNIFICANT CHANGE UP (ref 0.2–1)
BILIRUB INDIRECT FLD-MCNC: 0.3 MG/DL — SIGNIFICANT CHANGE UP (ref 0.2–1)
BILIRUB SERPL-MCNC: 0.4 MG/DL — SIGNIFICANT CHANGE UP (ref 0.2–1.2)
BILIRUB SERPL-MCNC: 0.4 MG/DL — SIGNIFICANT CHANGE UP (ref 0.2–1.2)
BUN SERPL-MCNC: 12 MG/DL — SIGNIFICANT CHANGE UP (ref 7–23)
BUN SERPL-MCNC: 19 MG/DL — SIGNIFICANT CHANGE UP (ref 7–23)
CA-I BLD-SCNC: 1.18 MMOL/L — SIGNIFICANT CHANGE UP (ref 1.12–1.3)
CALCIUM SERPL-MCNC: 8.1 MG/DL — LOW (ref 8.4–10.5)
CALCIUM SERPL-MCNC: 8.5 MG/DL — SIGNIFICANT CHANGE UP (ref 8.4–10.5)
CHLORIDE SERPL-SCNC: 102 MMOL/L — SIGNIFICANT CHANGE UP (ref 96–108)
CHLORIDE SERPL-SCNC: 106 MMOL/L — SIGNIFICANT CHANGE UP (ref 96–108)
CO2 SERPL-SCNC: 22 MMOL/L — SIGNIFICANT CHANGE UP (ref 22–31)
CO2 SERPL-SCNC: 28 MMOL/L — SIGNIFICANT CHANGE UP (ref 22–31)
CREAT SERPL-MCNC: 0.69 MG/DL — SIGNIFICANT CHANGE UP (ref 0.5–1.3)
CREAT SERPL-MCNC: 0.8 MG/DL — SIGNIFICANT CHANGE UP (ref 0.5–1.3)
GLUCOSE SERPL-MCNC: 145 MG/DL — HIGH (ref 70–99)
GLUCOSE SERPL-MCNC: 96 MG/DL — SIGNIFICANT CHANGE UP (ref 70–99)
HCT VFR BLD CALC: 26.4 % — LOW (ref 34.5–45)
HCT VFR BLD CALC: 27.4 % — LOW (ref 34.5–45)
HGB BLD-MCNC: 9 G/DL — LOW (ref 11.5–15.5)
HGB BLD-MCNC: 9.2 G/DL — LOW (ref 11.5–15.5)
INR BLD: 1 RATIO — SIGNIFICANT CHANGE UP (ref 0.88–1.16)
INR BLD: 1.04 RATIO — SIGNIFICANT CHANGE UP (ref 0.88–1.16)
MAGNESIUM SERPL-MCNC: 1.6 MG/DL — SIGNIFICANT CHANGE UP (ref 1.6–2.6)
MAGNESIUM SERPL-MCNC: 1.8 MG/DL — SIGNIFICANT CHANGE UP (ref 1.6–2.6)
MCHC RBC-ENTMCNC: 33.1 PG — SIGNIFICANT CHANGE UP (ref 27–34)
MCHC RBC-ENTMCNC: 33.1 PG — SIGNIFICANT CHANGE UP (ref 27–34)
MCHC RBC-ENTMCNC: 33.4 GM/DL — SIGNIFICANT CHANGE UP (ref 32–36)
MCHC RBC-ENTMCNC: 34 GM/DL — SIGNIFICANT CHANGE UP (ref 32–36)
MCV RBC AUTO: 97.3 FL — SIGNIFICANT CHANGE UP (ref 80–100)
MCV RBC AUTO: 99.1 FL — SIGNIFICANT CHANGE UP (ref 80–100)
PHOSPHATE SERPL-MCNC: 2.3 MG/DL — LOW (ref 2.5–4.5)
PHOSPHATE SERPL-MCNC: 2.5 MG/DL — SIGNIFICANT CHANGE UP (ref 2.5–4.5)
PLATELET # BLD AUTO: 170 K/UL — SIGNIFICANT CHANGE UP (ref 150–400)
PLATELET # BLD AUTO: 198 K/UL — SIGNIFICANT CHANGE UP (ref 150–400)
POTASSIUM SERPL-MCNC: 4.1 MMOL/L — SIGNIFICANT CHANGE UP (ref 3.5–5.3)
POTASSIUM SERPL-MCNC: 4.3 MMOL/L — SIGNIFICANT CHANGE UP (ref 3.5–5.3)
POTASSIUM SERPL-SCNC: 4.1 MMOL/L — SIGNIFICANT CHANGE UP (ref 3.5–5.3)
POTASSIUM SERPL-SCNC: 4.3 MMOL/L — SIGNIFICANT CHANGE UP (ref 3.5–5.3)
PROT SERPL-MCNC: 4.7 G/DL — LOW (ref 6–8.3)
PROT SERPL-MCNC: 5.2 G/DL — LOW (ref 6–8.3)
PROTHROM AB SERPL-ACNC: 10.8 SEC — SIGNIFICANT CHANGE UP (ref 9.8–12.7)
PROTHROM AB SERPL-ACNC: 11.3 SEC — SIGNIFICANT CHANGE UP (ref 9.8–12.7)
RBC # BLD: 2.71 M/UL — LOW (ref 3.8–5.2)
RBC # BLD: 2.77 M/UL — LOW (ref 3.8–5.2)
RBC # FLD: 15.8 % — HIGH (ref 10.3–14.5)
RBC # FLD: 15.8 % — HIGH (ref 10.3–14.5)
SODIUM SERPL-SCNC: 137 MMOL/L — SIGNIFICANT CHANGE UP (ref 135–145)
SODIUM SERPL-SCNC: 139 MMOL/L — SIGNIFICANT CHANGE UP (ref 135–145)
WBC # BLD: 14.1 K/UL — HIGH (ref 3.8–10.5)
WBC # BLD: 14.8 K/UL — HIGH (ref 3.8–10.5)
WBC # FLD AUTO: 14.1 K/UL — HIGH (ref 3.8–10.5)
WBC # FLD AUTO: 14.8 K/UL — HIGH (ref 3.8–10.5)

## 2018-04-08 PROCEDURE — 71045 X-RAY EXAM CHEST 1 VIEW: CPT | Mod: 26

## 2018-04-08 PROCEDURE — 99232 SBSQ HOSP IP/OBS MODERATE 35: CPT

## 2018-04-08 RX ORDER — INSULIN GLARGINE 100 [IU]/ML
10 INJECTION, SOLUTION SUBCUTANEOUS EVERY MORNING
Qty: 0 | Refills: 0 | Status: DISCONTINUED | OUTPATIENT
Start: 2018-04-09 | End: 2018-04-10

## 2018-04-08 RX ORDER — NALOXONE HYDROCHLORIDE 4 MG/.1ML
0.1 SPRAY NASAL
Qty: 0 | Refills: 0 | Status: DISCONTINUED | OUTPATIENT
Start: 2018-04-08 | End: 2018-04-11

## 2018-04-08 RX ORDER — INSULIN GLARGINE 100 [IU]/ML
10 INJECTION, SOLUTION SUBCUTANEOUS ONCE
Qty: 0 | Refills: 0 | Status: COMPLETED | OUTPATIENT
Start: 2018-04-08 | End: 2018-04-08

## 2018-04-08 RX ORDER — INSULIN LISPRO 100/ML
VIAL (ML) SUBCUTANEOUS EVERY 4 HOURS
Qty: 0 | Refills: 0 | Status: DISCONTINUED | OUTPATIENT
Start: 2018-04-08 | End: 2018-04-10

## 2018-04-08 RX ORDER — ONDANSETRON 8 MG/1
8 TABLET, FILM COATED ORAL EVERY 8 HOURS
Qty: 0 | Refills: 0 | Status: DISCONTINUED | OUTPATIENT
Start: 2018-04-08 | End: 2018-04-09

## 2018-04-08 RX ORDER — HEPARIN SODIUM 5000 [USP'U]/ML
5000 INJECTION INTRAVENOUS; SUBCUTANEOUS EVERY 12 HOURS
Qty: 0 | Refills: 0 | Status: DISCONTINUED | OUTPATIENT
Start: 2018-04-08 | End: 2018-04-14

## 2018-04-08 RX ORDER — POTASSIUM PHOSPHATE, MONOBASIC POTASSIUM PHOSPHATE, DIBASIC 236; 224 MG/ML; MG/ML
15 INJECTION, SOLUTION INTRAVENOUS ONCE
Qty: 0 | Refills: 0 | Status: COMPLETED | OUTPATIENT
Start: 2018-04-08 | End: 2018-04-08

## 2018-04-08 RX ORDER — ONDANSETRON 8 MG/1
8 TABLET, FILM COATED ORAL EVERY 6 HOURS
Qty: 0 | Refills: 0 | Status: DISCONTINUED | OUTPATIENT
Start: 2018-04-08 | End: 2018-04-08

## 2018-04-08 RX ORDER — HYDROMORPHONE HYDROCHLORIDE 2 MG/ML
30 INJECTION INTRAMUSCULAR; INTRAVENOUS; SUBCUTANEOUS
Qty: 0 | Refills: 0 | Status: DISCONTINUED | OUTPATIENT
Start: 2018-04-08 | End: 2018-04-11

## 2018-04-08 RX ORDER — ALPRAZOLAM 0.25 MG
0.25 TABLET ORAL EVERY 8 HOURS
Qty: 0 | Refills: 0 | Status: DISCONTINUED | OUTPATIENT
Start: 2018-04-08 | End: 2018-04-08

## 2018-04-08 RX ORDER — INSULIN LISPRO 100/ML
1 VIAL (ML) SUBCUTANEOUS
Qty: 0 | Refills: 0 | Status: DISCONTINUED | OUTPATIENT
Start: 2018-04-08 | End: 2018-04-10

## 2018-04-08 RX ORDER — ERYTHROMYCIN ETHYLSUCCINATE 400 MG
500 TABLET ORAL EVERY 6 HOURS
Qty: 0 | Refills: 0 | Status: DISCONTINUED | OUTPATIENT
Start: 2018-04-08 | End: 2018-04-14

## 2018-04-08 RX ORDER — MAGNESIUM SULFATE 500 MG/ML
2 VIAL (ML) INJECTION ONCE
Qty: 0 | Refills: 0 | Status: COMPLETED | OUTPATIENT
Start: 2018-04-08 | End: 2018-04-08

## 2018-04-08 RX ORDER — OXYCODONE HYDROCHLORIDE 5 MG/1
30 TABLET ORAL EVERY 8 HOURS
Qty: 0 | Refills: 0 | Status: DISCONTINUED | OUTPATIENT
Start: 2018-04-08 | End: 2018-04-15

## 2018-04-08 RX ADMIN — INSULIN GLARGINE 10 UNIT(S): 100 INJECTION, SOLUTION SUBCUTANEOUS at 10:45

## 2018-04-08 RX ADMIN — Medication 1: at 02:32

## 2018-04-08 RX ADMIN — Medication 400 MILLIGRAM(S): at 03:08

## 2018-04-08 RX ADMIN — ONDANSETRON 4 MILLIGRAM(S): 8 TABLET, FILM COATED ORAL at 11:45

## 2018-04-08 RX ADMIN — OXYCODONE HYDROCHLORIDE 30 MILLIGRAM(S): 5 TABLET ORAL at 14:05

## 2018-04-08 RX ADMIN — Medication 975 MILLIGRAM(S): at 05:32

## 2018-04-08 RX ADMIN — Medication 1 UNIT(S): at 19:19

## 2018-04-08 RX ADMIN — Medication 250 MILLIGRAM(S): at 13:38

## 2018-04-08 RX ADMIN — ONDANSETRON 8 MILLIGRAM(S): 8 TABLET, FILM COATED ORAL at 23:10

## 2018-04-08 RX ADMIN — HYDROMORPHONE HYDROCHLORIDE 30 MILLILITER(S): 2 INJECTION INTRAMUSCULAR; INTRAVENOUS; SUBCUTANEOUS at 15:36

## 2018-04-08 RX ADMIN — Medication 975 MILLIGRAM(S): at 17:20

## 2018-04-08 RX ADMIN — SODIUM CHLORIDE 10 MILLILITER(S): 9 INJECTION, SOLUTION INTRAVENOUS at 05:01

## 2018-04-08 RX ADMIN — Medication 400 MILLIGRAM(S): at 08:37

## 2018-04-08 RX ADMIN — HYDROMORPHONE HYDROCHLORIDE 1 MILLIGRAM(S): 2 INJECTION INTRAMUSCULAR; INTRAVENOUS; SUBCUTANEOUS at 05:27

## 2018-04-08 RX ADMIN — Medication 400 MILLIGRAM(S): at 08:07

## 2018-04-08 RX ADMIN — Medication 500 MILLIGRAM(S): at 17:23

## 2018-04-08 RX ADMIN — Medication 500 MILLIGRAM(S): at 23:16

## 2018-04-08 RX ADMIN — OXYCODONE HYDROCHLORIDE 30 MILLIGRAM(S): 5 TABLET ORAL at 13:35

## 2018-04-08 RX ADMIN — Medication 975 MILLIGRAM(S): at 11:45

## 2018-04-08 RX ADMIN — HYDROMORPHONE HYDROCHLORIDE 1 MILLIGRAM(S): 2 INJECTION INTRAMUSCULAR; INTRAVENOUS; SUBCUTANEOUS at 13:35

## 2018-04-08 RX ADMIN — Medication 400 MILLIGRAM(S): at 02:31

## 2018-04-08 RX ADMIN — POTASSIUM PHOSPHATE, MONOBASIC POTASSIUM PHOSPHATE, DIBASIC 62.5 MILLIMOLE(S): 236; 224 INJECTION, SOLUTION INTRAVENOUS at 05:33

## 2018-04-08 RX ADMIN — Medication 975 MILLIGRAM(S): at 00:06

## 2018-04-08 RX ADMIN — Medication 4: at 19:18

## 2018-04-08 RX ADMIN — Medication 50 GRAM(S): at 05:33

## 2018-04-08 RX ADMIN — Medication 400 MILLIGRAM(S): at 21:04

## 2018-04-08 RX ADMIN — HEPARIN SODIUM 5000 UNIT(S): 5000 INJECTION INTRAVENOUS; SUBCUTANEOUS at 17:22

## 2018-04-08 RX ADMIN — HYDROMORPHONE HYDROCHLORIDE 1 MILLIGRAM(S): 2 INJECTION INTRAMUSCULAR; INTRAVENOUS; SUBCUTANEOUS at 08:07

## 2018-04-08 RX ADMIN — HYDROMORPHONE HYDROCHLORIDE 30 MILLILITER(S): 2 INJECTION INTRAMUSCULAR; INTRAVENOUS; SUBCUTANEOUS at 07:14

## 2018-04-08 RX ADMIN — Medication 975 MILLIGRAM(S): at 23:10

## 2018-04-08 RX ADMIN — HEPARIN SODIUM 5000 UNIT(S): 5000 INJECTION INTRAVENOUS; SUBCUTANEOUS at 05:02

## 2018-04-08 RX ADMIN — Medication 975 MILLIGRAM(S): at 23:40

## 2018-04-08 RX ADMIN — Medication 1 CAPSULE(S): at 05:03

## 2018-04-08 RX ADMIN — ONDANSETRON 4 MILLIGRAM(S): 8 TABLET, FILM COATED ORAL at 05:02

## 2018-04-08 RX ADMIN — Medication 975 MILLIGRAM(S): at 12:15

## 2018-04-08 RX ADMIN — Medication 0.25 MILLIGRAM(S): at 09:00

## 2018-04-08 RX ADMIN — Medication 3: at 15:39

## 2018-04-08 RX ADMIN — Medication 400 MILLIGRAM(S): at 21:35

## 2018-04-08 RX ADMIN — OXYCODONE HYDROCHLORIDE 30 MILLIGRAM(S): 5 TABLET ORAL at 21:16

## 2018-04-08 RX ADMIN — Medication 1: at 05:01

## 2018-04-08 RX ADMIN — Medication 1 CAPSULE(S): at 13:37

## 2018-04-08 RX ADMIN — Medication 250 MILLIGRAM(S): at 05:17

## 2018-04-08 RX ADMIN — HYDROMORPHONE HYDROCHLORIDE 1 MILLIGRAM(S): 2 INJECTION INTRAMUSCULAR; INTRAVENOUS; SUBCUTANEOUS at 01:10

## 2018-04-08 RX ADMIN — Medication 1: at 09:03

## 2018-04-08 RX ADMIN — HYDROMORPHONE HYDROCHLORIDE 1 MILLIGRAM(S): 2 INJECTION INTRAMUSCULAR; INTRAVENOUS; SUBCUTANEOUS at 02:53

## 2018-04-08 RX ADMIN — Medication 975 MILLIGRAM(S): at 05:02

## 2018-04-08 RX ADMIN — Medication 1 CAPSULE(S): at 21:04

## 2018-04-08 RX ADMIN — OXYCODONE HYDROCHLORIDE 30 MILLIGRAM(S): 5 TABLET ORAL at 21:35

## 2018-04-08 RX ADMIN — HYDROMORPHONE HYDROCHLORIDE 30 MILLILITER(S): 2 INJECTION INTRAMUSCULAR; INTRAVENOUS; SUBCUTANEOUS at 18:55

## 2018-04-08 NOTE — PHYSICAL THERAPY INITIAL EVALUATION ADULT - DID THE PATIENT HAVE SURGERY?
total pancreatectomy with autologous transplantation of pancreatic islet cells, previous pancreaticojejunostomy from [place] procedure was taken down, a gastrojejunostomy created, feeding jejunostomy was placed/yes

## 2018-04-08 NOTE — PROGRESS NOTE ADULT - ASSESSMENT
43 yo female with PMH significant for chronic pancreatitis presents for total l pancreatectomy with islet autotransplantation on April 5, 2018. Endocrine consulted for glycemic control. (high risk patient with high level decision making). Pt transitioned off insulin gtt yesterday w/Lantus + q4h correctional scale. BG mostly at goal on present insulin regimen w/tube feeds at goal. Plan is to advance diet until at goal w/subsequent tube feed titration off when pt meeting caloric needs. Will adjust correction scale to improve glycemic control. BG goal (100-150mg/dl).

## 2018-04-08 NOTE — PROGRESS NOTE ADULT - SUBJECTIVE AND OBJECTIVE BOX
BLUE TEAM GENERAL SURGERY DAILY PROGRESS NOTE:     Subjective:  Patient seen & examined in AM  No acute events overnight  Given lantus and taken off insulin gtt yesterday  TF advanced to 45cc/hr, advanced to clears by mouth as well  NGT removed.  Passed trial of void after murrell removed.  PCEA continues to be adjusted by anesthesia.  A-line d/c'd.  SQH decreased to q12    Objective:    PE:  Gen: NAD  HEENT R IJ CVC  Resp: airway patent, respirations unlabored, no increased WoB  CVS: RRR  Abd: soft, mild distention, appropriately tender, incision c/d/i, DANIEL x 2 SS          J-tube site intact  Ext: no edema, WWP    Vital Signs Last 24 Hrs  T(C): 36.9 (2018 03:00), Max: 37.2 (2018 19:00)  T(F): 98.5 (2018 03:00), Max: 99 (2018 19:00)  HR: 95 (2018 04:00) (88 - 112)  BP: 96/57 (2018 04:00) (86/51 - 139/67)  BP(mean): 71 (2018 04:00) (64 - 97)  RR: 17 (2018 04:00) (14 - 41)  SpO2: 99% (2018 04:00) (87% - 100%)    I&O's Detail    2018 07:01  -  2018 07:00  --------------------------------------------------------  IN:    dexmedetomidine Infusion: 7.2 mL    dexmedetomidine Infusion: 51.8 mL    insulin Infusion: 12.5 mL    lactated ringers.: 1800 mL    Solution: 300 mL    Solution: 62.5 mL    Solution: 50 mL    Vital HN: 170 mL  Total IN: 2454 mL    OUT:    Bulb: 565 mL    Bulb: 65 mL    Indwelling Catheter - Urethral: 660 mL    Nasoenteral Tube: 100 mL  Total OUT: 1390 mL    Total NET: 1064 mL    2018 07:01  -  2018 04:23  --------------------------------------------------------  IN:    dexmedetomidine Infusion: 98.7 mL    insulin Infusion: 3 mL    lactated ringers.: 225 mL    lactated ringers.: 290 mL    Oral Fluid: 400 mL    Solution: 1000 mL    Solution: 250 mL    Vital HN: 785 mL  Total IN: 3051.7 mL    OUT:    Bulb: 35 mL    Bulb: 100 mL    Indwelling Catheter - Urethral: 305 mL    Nasoenteral Tube: 50 mL    Voided: 110 mL  Total OUT: 600 mL    Total NET: 2451.7 mL    Daily     Daily Weight in k.1 (2018 05:07)    MEDICATIONS  (STANDING):  acetaminophen   Tablet. 975 milliGRAM(s) Oral every 6 hours  amylase/lipase/protease  (CREON  6,000 Units) 1 Capsule(s) Oral every 8 hours  dexmedetomidine Infusion 0.2 MICROgram(s)/kG/Hr (2.36 mL/Hr) IV Continuous <Continuous>  erythromycin    ethylsuccinate Suspension 40 mG/mL 250 milliGRAM(s) Oral every 6 hours  heparin  Injectable 5000 Unit(s) SubCutaneous every 12 hours  HYDROmorphone PCA (1 mG/mL) 30 milliLiter(s) PCA Continuous PCA Continuous  ibuprofen  Tablet 400 milliGRAM(s) Oral every 6 hours  insulin lispro (HumaLOG) corrective regimen sliding scale   SubCutaneous every 4 hours  lactated ringers. 1000 milliLiter(s) (10 mL/Hr) IV Continuous <Continuous>  ondansetron Injectable 4 milliGRAM(s) IV Push every 6 hours  ropivacaine 0.2% in 0.9% Sodium Chloride PCEA 200 milliLiter(s) Epidural PCA Continuous    MEDICATIONS  (PRN):  HYDROmorphone PCA (1 mG/mL) Rescue Clinician Bolus 1 milliGRAM(s) IV Push every 15 minutes PRN for Pain Scale GREATER THAN 6  ropivacaine 0.2% in 0.9% Sodium Chloride PCEA Rescue Clinician Bolus 5 milliLiter(s) Epidural every 15 minutes PRN for Pain Score greater than 6    LABS:                        9.0    14.8  )-----------( 170      ( 2018 02:42 )             26.4     04-08    139  |  106  |  19  ----------------------------<  145<H>  4.1   |  22  |  0.80    Ca    8.1<L>      2018 02:42  Phos  2.5     -  Mg     1.8     -    TPro  4.7<L>  /  Alb  2.4<L>  /  TBili  0.4  /  DBili  0.1  /  AST  98<H>  /  ALT  81<H>  /  AlkPhos  38<L>  -    PT/INR - ( 2018 02:42 )   PT: 10.8 sec;   INR: 1.00 ratio         PTT - ( 2018 02:42 )  PTT:32.2 sec      RADIOLOGY & ADDITIONAL STUDIES:    < from: Xray Chest 1 View- PORTABLE-Routine (18 @ 07:55) >  Findings: The cardiac silhouette is normal in size. There are no focal   consolidations or pleural effusions. There is a right IJ [place]-Padma   catheter sheath with its tip overlying the superior vena cava. There is a   nasogastric tube with its tip in the stomach.    Impression: Clear lungs. Lines and tubes as described above.    < end of copied text > BLUE TEAM GENERAL SURGERY DAILY PROGRESS NOTE:     Subjective:  No acute events overnight  Given lantus and taken off insulin gtt yesterday  TF advanced to 45cc/hr, advanced to clears by mouth as well  NGT removed.  Passed trial of void after murrell removed.  PCEA continues to be adjusted by anesthesia.  A-line d/c'd.  SQH decreased to q12    Objective:    PE:  Gen: NAD  HEENT R IJ CVC  Resp: airway patent, respirations unlabored, no increased WoB  CVS: RRR  Abd: soft, mild distention, appropriately tender, incision c/d/i, DANIEL x 2 SS          J-tube site intact  Ext: no edema, WWP    Vital Signs Last 24 Hrs  T(C): 36.9 (2018 03:00), Max: 37.2 (2018 19:00)  T(F): 98.5 (2018 03:00), Max: 99 (2018 19:00)  HR: 95 (2018 04:00) (88 - 112)  BP: 96/57 (2018 04:00) (86/51 - 139/67)  BP(mean): 71 (2018 04:00) (64 - 97)  RR: 17 (2018 04:00) (14 - 41)  SpO2: 99% (2018 04:00) (87% - 100%)    I&O's Detail    2018 07:01  -  2018 07:00  --------------------------------------------------------  IN:    dexmedetomidine Infusion: 7.2 mL    dexmedetomidine Infusion: 51.8 mL    insulin Infusion: 12.5 mL    lactated ringers.: 1800 mL    Solution: 300 mL    Solution: 62.5 mL    Solution: 50 mL    Vital HN: 170 mL  Total IN: 2454 mL    OUT:    Bulb: 565 mL    Bulb: 65 mL    Indwelling Catheter - Urethral: 660 mL    Nasoenteral Tube: 100 mL  Total OUT: 1390 mL    Total NET: 1064 mL    2018 07:01  -  2018 04:23  --------------------------------------------------------  IN:    dexmedetomidine Infusion: 98.7 mL    insulin Infusion: 3 mL    lactated ringers.: 225 mL    lactated ringers.: 290 mL    Oral Fluid: 400 mL    Solution: 1000 mL    Solution: 250 mL    Vital HN: 785 mL  Total IN: 3051.7 mL    OUT:    Bulb: 35 mL    Bulb: 100 mL    Indwelling Catheter - Urethral: 305 mL    Nasoenteral Tube: 50 mL    Voided: 110 mL  Total OUT: 600 mL    Total NET: 2451.7 mL    Daily     Daily Weight in k.1 (2018 05:07)    MEDICATIONS  (STANDING):  acetaminophen   Tablet. 975 milliGRAM(s) Oral every 6 hours  amylase/lipase/protease  (CREON  6,000 Units) 1 Capsule(s) Oral every 8 hours  dexmedetomidine Infusion 0.2 MICROgram(s)/kG/Hr (2.36 mL/Hr) IV Continuous <Continuous>  erythromycin    ethylsuccinate Suspension 40 mG/mL 250 milliGRAM(s) Oral every 6 hours  heparin  Injectable 5000 Unit(s) SubCutaneous every 12 hours  HYDROmorphone PCA (1 mG/mL) 30 milliLiter(s) PCA Continuous PCA Continuous  ibuprofen  Tablet 400 milliGRAM(s) Oral every 6 hours  insulin lispro (HumaLOG) corrective regimen sliding scale   SubCutaneous every 4 hours  lactated ringers. 1000 milliLiter(s) (10 mL/Hr) IV Continuous <Continuous>  ondansetron Injectable 4 milliGRAM(s) IV Push every 6 hours  ropivacaine 0.2% in 0.9% Sodium Chloride PCEA 200 milliLiter(s) Epidural PCA Continuous    MEDICATIONS  (PRN):  HYDROmorphone PCA (1 mG/mL) Rescue Clinician Bolus 1 milliGRAM(s) IV Push every 15 minutes PRN for Pain Scale GREATER THAN 6  ropivacaine 0.2% in 0.9% Sodium Chloride PCEA Rescue Clinician Bolus 5 milliLiter(s) Epidural every 15 minutes PRN for Pain Score greater than 6    LABS:                        9.0    14.8  )-----------( 170      ( 2018 02:42 )             26.4     04-08    139  |  106  |  19  ----------------------------<  145<H>  4.1   |  22  |  0.80    Ca    8.1<L>      2018 02:42  Phos  2.5     -  Mg     1.8         TPro  4.7<L>  /  Alb  2.4<L>  /  TBili  0.4  /  DBili  0.1  /  AST  98<H>  /  ALT  81<H>  /  AlkPhos  38<L>      PT/INR - ( 2018 02:42 )   PT: 10.8 sec;   INR: 1.00 ratio         PTT - ( 2018 02:42 )  PTT:32.2 sec      RADIOLOGY & ADDITIONAL STUDIES:    < from: Xray Chest 1 View- PORTABLE-Routine (18 @ 07:55) >  Findings: The cardiac silhouette is normal in size. There are no focal   consolidations or pleural effusions. There is a right IJ [place]-Padma   catheter sheath with its tip overlying the superior vena cava. There is a   nasogastric tube with its tip in the stomach.    Impression: Clear lungs. Lines and tubes as described above.    < end of copied text > BLUE TEAM GENERAL SURGERY DAILY PROGRESS NOTE:     Subjective:  No acute events overnight  Given lantus and taken off insulin gtt yesterday  TF advanced to 45cc/hr, advanced to clears by mouth as well  NGT removed.  Passed trial of void after murrell removed.  PCEA d/c'd.  A-line d/c'd.  SQH decreased to q12    Objective:    PE:  Gen: NAD  HEENT R IJ CVC  Resp: airway patent, respirations unlabored, no increased WoB  CVS: RRR  Abd: soft, mild distention, appropriately tender, incision c/d/i, DANIEL x 2 SS          J-tube site intact   murrell removed  Ext: no edema, WWP    Vital Signs Last 24 Hrs  T(C): 36.9 (2018 03:00), Max: 37.2 (2018 19:00)  T(F): 98.5 (2018 03:00), Max: 99 (2018 19:00)  HR: 95 (2018 04:00) (88 - 112)  BP: 96/57 (2018 04:00) (86/51 - 139/67)  BP(mean): 71 (2018 04:00) (64 - 97)  RR: 17 (2018 04:00) (14 - 41)  SpO2: 99% (2018 04:00) (87% - 100%)    I&O's Detail    2018 07:01  -  2018 07:00  --------------------------------------------------------  IN:    dexmedetomidine Infusion: 7.2 mL    dexmedetomidine Infusion: 51.8 mL    insulin Infusion: 12.5 mL    lactated ringers.: 1800 mL    Solution: 300 mL    Solution: 62.5 mL    Solution: 50 mL    Vital HN: 170 mL  Total IN: 2454 mL    OUT:    Bulb: 565 mL    Bulb: 65 mL    Indwelling Catheter - Urethral: 660 mL    Nasoenteral Tube: 100 mL  Total OUT: 1390 mL    Total NET: 1064 mL    2018 07:01  -  2018 04:23  --------------------------------------------------------  IN:    dexmedetomidine Infusion: 98.7 mL    insulin Infusion: 3 mL    lactated ringers.: 225 mL    lactated ringers.: 290 mL    Oral Fluid: 400 mL    Solution: 1000 mL    Solution: 250 mL    Vital HN: 785 mL  Total IN: 3051.7 mL    OUT:    Bulb: 35 mL    Bulb: 100 mL    Indwelling Catheter - Urethral: 305 mL    Nasoenteral Tube: 50 mL    Voided: 110 mL  Total OUT: 600 mL    Total NET: 2451.7 mL    Daily     Daily Weight in k.1 (2018 05:07)    MEDICATIONS  (STANDING):  acetaminophen   Tablet. 975 milliGRAM(s) Oral every 6 hours  amylase/lipase/protease  (CREON  6,000 Units) 1 Capsule(s) Oral every 8 hours  dexmedetomidine Infusion 0.2 MICROgram(s)/kG/Hr (2.36 mL/Hr) IV Continuous <Continuous>  erythromycin    ethylsuccinate Suspension 40 mG/mL 250 milliGRAM(s) Oral every 6 hours  heparin  Injectable 5000 Unit(s) SubCutaneous every 12 hours  HYDROmorphone PCA (1 mG/mL) 30 milliLiter(s) PCA Continuous PCA Continuous  ibuprofen  Tablet 400 milliGRAM(s) Oral every 6 hours  insulin lispro (HumaLOG) corrective regimen sliding scale   SubCutaneous every 4 hours  lactated ringers. 1000 milliLiter(s) (10 mL/Hr) IV Continuous <Continuous>  ondansetron Injectable 4 milliGRAM(s) IV Push every 6 hours  ropivacaine 0.2% in 0.9% Sodium Chloride PCEA 200 milliLiter(s) Epidural PCA Continuous    MEDICATIONS  (PRN):  HYDROmorphone PCA (1 mG/mL) Rescue Clinician Bolus 1 milliGRAM(s) IV Push every 15 minutes PRN for Pain Scale GREATER THAN 6  ropivacaine 0.2% in 0.9% Sodium Chloride PCEA Rescue Clinician Bolus 5 milliLiter(s) Epidural every 15 minutes PRN for Pain Score greater than 6    LABS:                        9.0    14.8  )-----------( 170      ( 2018 02:42 )             26.4     04-08    139  |  106  |  19  ----------------------------<  145<H>  4.1   |  22  |  0.80    Ca    8.1<L>      2018 02:42  Phos  2.5     -  Mg     1.8     -    TPro  4.7<L>  /  Alb  2.4<L>  /  TBili  0.4  /  DBili  0.1  /  AST  98<H>  /  ALT  81<H>  /  AlkPhos  38<L>      PT/INR - ( 2018 02:42 )   PT: 10.8 sec;   INR: 1.00 ratio         PTT - ( 2018 02:42 )  PTT:32.2 sec      RADIOLOGY & ADDITIONAL STUDIES:    < from: Xray Chest 1 View- PORTABLE-Routine (18 @ 07:55) >  Findings: The cardiac silhouette is normal in size. There are no focal   consolidations or pleural effusions. There is a right IJ [place]-Padma   catheter sheath with its tip overlying the superior vena cava. There is a   nasogastric tube with its tip in the stomach.    Impression: Clear lungs. Lines and tubes as described above.    < end of copied text >

## 2018-04-08 NOTE — PROGRESS NOTE ADULT - SUBJECTIVE AND OBJECTIVE BOX
Diabetes Follow up note:  Interval Hx:   42 year old female w/chronic pancreatitis, no hx of DM here s/p total pancreatectomy w/islet cell autotransplantation POD#3. Last 24 hours: tube feeds increased to goal and pt advanced last night to clear liquid diet in addition. BG values mostly at goal ranging from 81 to 171 on present insulin regimen. Per team, plan is to advance diet and decrease tube feeds as PO intake improves. Pt seen at bedside. Reports pain better but has had trouble getting any sleep. Fair appetite but slowly improving-did drink some of nutritional supplement today.     Review of Systems:  General: "I may be going to floor"  GI: + incisional/chronic abdominal pain. Denies n/v  CV: No CP/SOB  ENDO: No S&Sx of hypoglycemia  MEDS:  insulin glargine Injectable (LANTUS) 10 Unit(s) SubCutaneous once  insulin lispro (HumaLOG) corrective regimen sliding scale   SubCutaneous every 4 hours    erythromycin    ethylsuccinate Suspension 40 mG/mL 250 milliGRAM(s) Oral every 6 hours    Allergies    No Known Allergies        PE:  General: Female sitting in chair. NAD.   Vital Signs Last 24 Hrs  T(C): 36.8 (08 Apr 2018 07:00), Max: 37.2 (07 Apr 2018 19:00)  T(F): 98.3 (08 Apr 2018 07:00), Max: 99 (07 Apr 2018 19:00)  HR: 95 (08 Apr 2018 10:00) (88 - 112)  BP: 106/61 (08 Apr 2018 10:00) (84/56 - 139/67)  BP(mean): 78 (08 Apr 2018 10:00) (64 - 97)  RR: 18 (08 Apr 2018 10:00) (14 - 35)  SpO2: 96% (08 Apr 2018 10:00) (87% - 100%)  Abd: Soft, NT,ND,   Extremities: Warm  Neuro: A&O X3    LABS:    POCT Blood Glucose.: 157 mg/dL (04-08-18 @ 09:02)  POCT Blood Glucose.: 129 mg/dL (04-08-18 @ 05:00)  POCT Blood Glucose.: 145 mg/dL (04-08-18 @ 02:26)  POCT Blood Glucose.: 171 mg/dL (04-07-18 @ 21:38)  POCT Blood Glucose.: 81 mg/dL (04-07-18 @ 18:58)  POCT Blood Glucose.: 129 mg/dL (04-07-18 @ 15:15)  POCT Blood Glucose.: 160 mg/dL (04-07-18 @ 11:46)  POCT Blood Glucose.: 131 mg/dL (04-07-18 @ 10:17)  POCT Blood Glucose.: 139 mg/dL (04-07-18 @ 08:15)  POCT Blood Glucose.: 140 mg/dL (04-07-18 @ 06:53)  POCT Blood Glucose.: 141 mg/dL (04-07-18 @ 06:05)  POCT Blood Glucose.: 130 mg/dL (04-07-18 @ 05:25)  POCT Blood Glucose.: 127 mg/dL (04-07-18 @ 04:07)  POCT Blood Glucose.: 134 mg/dL (04-07-18 @ 03:07)  POCT Blood Glucose.: 143 mg/dL (04-07-18 @ 02:00)  POCT Blood Glucose.: 161 mg/dL (04-07-18 @ 01:09)  POCT Blood Glucose.: 154 mg/dL (04-06-18 @ 23:58)  POCT Blood Glucose.: 151 mg/dL (04-06-18 @ 23:04)  POCT Blood Glucose.: 162 mg/dL (04-06-18 @ 21:57)  POCT Blood Glucose.: 161 mg/dL (04-06-18 @ 21:16)  POCT Blood Glucose.: 145 mg/dL (04-06-18 @ 20:06)  POCT Blood Glucose.: 35 mg/dL (04-06-18 @ 20:02)  POCT Blood Glucose.: 81 mg/dL (04-06-18 @ 20:00)  POCT Blood Glucose.: 101 mg/dL (04-06-18 @ 19:03)  POCT Blood Glucose.: 78 mg/dL (04-06-18 @ 18:05)  POCT Blood Glucose.: 82 mg/dL (04-06-18 @ 17:01)  POCT Blood Glucose.: 116 mg/dL (04-06-18 @ 16:14)  POCT Blood Glucose.: 180 mg/dL (04-06-18 @ 15:27)  POCT Blood Glucose.: 207 mg/dL (04-06-18 @ 13:56)  POCT Blood Glucose.: 196 mg/dL (04-06-18 @ 13:09)  POCT Blood Glucose.: 115 mg/dL (04-06-18 @ 12:07)  POCT Blood Glucose.: 156 mg/dL (04-06-18 @ 11:10)  POCT Blood Glucose.: 109 mg/dL (04-06-18 @ 10:14)  POCT Blood Glucose.: 93 mg/dL (04-06-18 @ 09:01)  POCT Blood Glucose.: 115 mg/dL (04-06-18 @ 08:03)  POCT Blood Glucose.: 131 mg/dL (04-06-18 @ 06:59)  POCT Blood Glucose.: 100 mg/dL (04-06-18 @ 06:56)  POCT Blood Glucose.: 133 mg/dL (04-06-18 @ 06:55)  POCT Blood Glucose.: 161 mg/dL (04-06-18 @ 05:58)  POCT Blood Glucose.: 145 mg/dL (04-06-18 @ 04:53)  POCT Blood Glucose.: 91 mg/dL (04-06-18 @ 04:35)  POCT Blood Glucose.: 55 mg/dL (04-06-18 @ 04:13)  POCT Blood Glucose.: 50 mg/dL (04-06-18 @ 03:57)  POCT Blood Glucose.: 91 mg/dL (04-06-18 @ 03:03)  POCT Blood Glucose.: 132 mg/dL (04-06-18 @ 01:58)  POCT Blood Glucose.: 171 mg/dL (04-06-18 @ 01:01)  POCT Blood Glucose.: 246 mg/dL (04-05-18 @ 23:58)  POCT Blood Glucose.: 213 mg/dL (04-05-18 @ 23:27)  POCT Blood Glucose.: 209 mg/dL (04-05-18 @ 22:11)                            9.0    14.8  )-----------( 170      ( 08 Apr 2018 02:42 )             26.4       04-08    139  |  106  |  19  ----------------------------<  145<H>  4.1   |  22  |  0.80    Ca    8.1<L>      08 Apr 2018 02:42  Phos  2.5     04-08  Mg     1.8     04-08    TPro  4.7<L>  /  Alb  2.4<L>  /  TBili  0.4  /  DBili  0.1  /  AST  98<H>  /  ALT  81<H>  /  AlkPhos  38<L>  04-08      Thyroid Function Tests:                Contact number: osmin [phone] or [phone] Diabetes Follow up note:  Interval Hx:   42 year old female w/chronic pancreatitis, no hx of DM here s/p total pancreatectomy w/islet cell autotransplantation POD#3. Last 24 hours: tube feeds increased to goal and pt advanced last night to clear liquid diet in addition. BG values mostly at goal ranging from 81 to 171 on present insulin regimen. Per team, plan is to advance diet and decrease tube feeds as PO intake improves. Pt seen at bedside. Reports pain better but has had trouble getting any sleep. Fair appetite but slowly improving-did drink some of nutritional supplement today.     Review of Systems:  General: "I may be going to floor"  GI: + incisional/chronic abdominal pain. Denies n/v  CV: No CP/SOB  ENDO: No S&Sx of hypoglycemia  MEDS:  insulin glargine Injectable (LANTUS) 10 Unit(s) SubCutaneous once  insulin lispro (HumaLOG) corrective regimen sliding scale   SubCutaneous every 4 hours    erythromycin    ethylsuccinate Suspension 40 mG/mL 250 milliGRAM(s) Oral every 6 hours    Allergies    No Known Allergies        PE:  General: Female sitting in chair. NAD.   Vital Signs Last 24 Hrs  T(C): 36.8 (08 Apr 2018 07:00), Max: 37.2 (07 Apr 2018 19:00)  T(F): 98.3 (08 Apr 2018 07:00), Max: 99 (07 Apr 2018 19:00)  HR: 95 (08 Apr 2018 10:00) (88 - 112)  BP: 106/61 (08 Apr 2018 10:00) (84/56 - 139/67)  BP(mean): 78 (08 Apr 2018 10:00) (64 - 97)  RR: 18 (08 Apr 2018 10:00) (14 - 35)  SpO2: 96% (08 Apr 2018 10:00) (87% - 100%)  CV: S1, S2. NSR on monitor  Abd: Soft, mildly tender ,ND, J-tube in place  Extremities: Warm. no edema  Neuro: A&O X3    LABS:    POCT Blood Glucose.: 157 mg/dL (04-08-18 @ 09:02)  POCT Blood Glucose.: 129 mg/dL (04-08-18 @ 05:00)  POCT Blood Glucose.: 145 mg/dL (04-08-18 @ 02:26)  POCT Blood Glucose.: 171 mg/dL (04-07-18 @ 21:38)  POCT Blood Glucose.: 81 mg/dL (04-07-18 @ 18:58)  POCT Blood Glucose.: 129 mg/dL (04-07-18 @ 15:15)  POCT Blood Glucose.: 160 mg/dL (04-07-18 @ 11:46)  POCT Blood Glucose.: 131 mg/dL (04-07-18 @ 10:17)  POCT Blood Glucose.: 139 mg/dL (04-07-18 @ 08:15)  POCT Blood Glucose.: 140 mg/dL (04-07-18 @ 06:53)  POCT Blood Glucose.: 141 mg/dL (04-07-18 @ 06:05)  POCT Blood Glucose.: 130 mg/dL (04-07-18 @ 05:25)  POCT Blood Glucose.: 127 mg/dL (04-07-18 @ 04:07)  POCT Blood Glucose.: 134 mg/dL (04-07-18 @ 03:07)  POCT Blood Glucose.: 143 mg/dL (04-07-18 @ 02:00)  POCT Blood Glucose.: 161 mg/dL (04-07-18 @ 01:09)  POCT Blood Glucose.: 154 mg/dL (04-06-18 @ 23:58)  POCT Blood Glucose.: 151 mg/dL (04-06-18 @ 23:04)  POCT Blood Glucose.: 162 mg/dL (04-06-18 @ 21:57)  POCT Blood Glucose.: 161 mg/dL (04-06-18 @ 21:16)  POCT Blood Glucose.: 145 mg/dL (04-06-18 @ 20:06)  POCT Blood Glucose.: 35 mg/dL (04-06-18 @ 20:02)  POCT Blood Glucose.: 81 mg/dL (04-06-18 @ 20:00)  POCT Blood Glucose.: 101 mg/dL (04-06-18 @ 19:03)  POCT Blood Glucose.: 78 mg/dL (04-06-18 @ 18:05)  POCT Blood Glucose.: 82 mg/dL (04-06-18 @ 17:01)  POCT Blood Glucose.: 116 mg/dL (04-06-18 @ 16:14)  POCT Blood Glucose.: 180 mg/dL (04-06-18 @ 15:27)  POCT Blood Glucose.: 207 mg/dL (04-06-18 @ 13:56)  POCT Blood Glucose.: 196 mg/dL (04-06-18 @ 13:09)  POCT Blood Glucose.: 115 mg/dL (04-06-18 @ 12:07)  POCT Blood Glucose.: 156 mg/dL (04-06-18 @ 11:10)  POCT Blood Glucose.: 109 mg/dL (04-06-18 @ 10:14)  POCT Blood Glucose.: 93 mg/dL (04-06-18 @ 09:01)  POCT Blood Glucose.: 115 mg/dL (04-06-18 @ 08:03)  POCT Blood Glucose.: 131 mg/dL (04-06-18 @ 06:59)  POCT Blood Glucose.: 100 mg/dL (04-06-18 @ 06:56)  POCT Blood Glucose.: 133 mg/dL (04-06-18 @ 06:55)  POCT Blood Glucose.: 161 mg/dL (04-06-18 @ 05:58)  POCT Blood Glucose.: 145 mg/dL (04-06-18 @ 04:53)  POCT Blood Glucose.: 91 mg/dL (04-06-18 @ 04:35)  POCT Blood Glucose.: 55 mg/dL (04-06-18 @ 04:13)  POCT Blood Glucose.: 50 mg/dL (04-06-18 @ 03:57)  POCT Blood Glucose.: 91 mg/dL (04-06-18 @ 03:03)  POCT Blood Glucose.: 132 mg/dL (04-06-18 @ 01:58)  POCT Blood Glucose.: 171 mg/dL (04-06-18 @ 01:01)  POCT Blood Glucose.: 246 mg/dL (04-05-18 @ 23:58)  POCT Blood Glucose.: 213 mg/dL (04-05-18 @ 23:27)  POCT Blood Glucose.: 209 mg/dL (04-05-18 @ 22:11)                            9.0    14.8  )-----------( 170      ( 08 Apr 2018 02:42 )             26.4       04-08    139  |  106  |  19  ----------------------------<  145<H>  4.1   |  22  |  0.80    Ca    8.1<L>      08 Apr 2018 02:42  Phos  2.5     04-08  Mg     1.8     04-08    TPro  4.7<L>  /  Alb  2.4<L>  /  TBili  0.4  /  DBili  0.1  /  AST  98<H>  /  ALT  81<H>  /  AlkPhos  38<L>  04-08                Contact number: osmin [phone] or [phone]

## 2018-04-08 NOTE — PROGRESS NOTE ADULT - ASSESSMENT
42y Female s/p total pancreatectomy and islet cell autotransplantation.     PLAN:   Neurologic: Postoperative pain control in the setting of chronic pain  - PCEA, PCA  -precedex   - IV Tylenol and Dilaudid PRN    Respiratory: No acute issues  - Maintain SpO2 > 92%  - Ambulate as tolerated    Cardiovascular: No acute issues. Lactate cleared to 1.4.   - LR @ 75    Gastrointestinal/Nutrition: s/p gastrojejunostomy with feeding jejunostomy  - Erythromycin for gastric motility  - J tube up to goal today     Renal/Genitourinary: No acute issues  - Maintenance fluids   - Replete electrolytes PRN    Hematologic: No acute signs of bleeding. Receiving 3 units pRBCs intraop.   - SQH for VTE ppx    Infectious Disease: No acute issues  - Zosyn for perioperative antibiotics for 48 hours per protocol    Lines/Tubes: RIJ Cordis, arterial line, Ferrari, NGT     Endocrine: s/p islet cell autotransplant  - insulin gtt and titrate per protocol, titrate off with endocrine recommendations once at goal tube feeds     Disposition: SICU 42y Female s/p total pancreatectomy and islet cell autotransplantation.     PLAN:   Neurologic: Postoperative pain control in the setting of chronic pain  - PCEA, PCA  -precedex   - tylenol and ibuprofen    Respiratory: No acute issues  - Maintain SpO2 > 92%  - Ambulate as tolerated    Cardiovascular: No acute issues. Lactate cleared   - continue to monitor    Gastrointestinal/Nutrition: s/p gastrojejunostomy with feeding jejunostomy  - Erythromycin for gastric motility  - J tube feeds to goal  - zofran RTC per primary team    Renal/Genitourinary: No acute issues  - Maintenance fluids   - Replete electrolytes PRN    Hematologic: No acute signs of bleeding. Receiving 3 units pRBCs intraop.   - SQH for VTE ppx q 12 h    Infectious Disease: No acute issues  - off perioperative abx    Lines/Tubes: RIJ Cordis    Endocrine: s/p islet cell autotransplant  - f/u endocrine; currently on lantus and sliding scale    Disposition: SICU

## 2018-04-08 NOTE — PROVIDER CONTACT NOTE (OTHER) - ASSESSMENT
was told to call provider if out of range 120-150 no complaints at this time  was told to call provider if out of range 120-180 no complaints at this time

## 2018-04-08 NOTE — PROGRESS NOTE ADULT - ASSESSMENT
42 year old female with PMHx of chronic pancreatitis s/p tia in 2014, now POD 3 s/p total pancreatectomy with islet cell autotransplantation.      Currently recovering in SICU.  Off insulin gtt.  TF @ goal.    - Pain control - Continue PCEA, PCA, tylenol, ibuprofen, wean precedex,   - f/u Endocrine.  Continue lantus and sliding scale  - OOB/Ambulate as tolerated  - Zofran around the clock  - Monitor lactate  - mIVF  - TF + Creon, increase to goal up to 45 cc/hr  - Erythromycin for promotility  - DVT ppx    Blue  [id number] 42 year old female with PMHx of chronic pancreatitis s/p tia in 2014, now POD 3 s/p total pancreatectomy with islet cell autotransplantation.      Currently recovering in SICU.  Off insulin gtt.  TF @ goal.    - Pain control - Continue PCA, tylenol, ibuprofen, wean precedex.  - f/u Endocrine.  Continue lantus and sliding scale  - OOB/Ambulate as tolerated  - Zofran around the clock  - Monitor lactate  - mIVF  - TF + Creon, increase to goal up to 45 cc/hr  - Erythromycin for promotility  - DVT ppx    Blue  [id number]

## 2018-04-08 NOTE — PHYSICAL THERAPY INITIAL EVALUATION ADULT - PERTINENT HX OF CURRENT PROBLEM, REHAB EVAL
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 post-op started on insulin drip. +post-op pain in setting of chronic pain

## 2018-04-08 NOTE — PROGRESS NOTE ADULT - SUBJECTIVE AND OBJECTIVE BOX
Day _3__ of Anesthesia Pain Management Service    SUBJECTIVE: Patient seen and examined at the bedside. Appears to be comfortable but states that the dilaudid helps some but her pain is not completely controlled. She prefers to be back on her pain medicines that she was taking prior to the operation. Epidural has been removed and precedex has been discontinued. She denies any adverse effects.    [X ] Refer to charted pain scores    THERAPY:    [ ] IV PCA Morphine		[ ] 5 mg/mL	[ ] 1 mg/mL  [X ] IV PCA Hydromorphone	[ ] 5 mg/mL	[ ] 1 mg/mL  [ ] IV PCA Fentanyl		[ ] 50 micrograms/mL    Demand dose 0.5 ockout  6 (minutes) Continuous Rate 0.2      MEDICATIONS  (STANDING):  acetaminophen   Tablet. 975 milliGRAM(s) Oral every 6 hours  amylase/lipase/protease  (CREON  6,000 Units) 1 Capsule(s) Oral every 8 hours  erythromycin    ethylsuccinate Suspension 40 mG/mL 250 milliGRAM(s) Oral every 6 hours  heparin  Injectable 5000 Unit(s) SubCutaneous every 12 hours  HYDROmorphone PCA (1 mG/mL) 30 milliLiter(s) PCA Continuous PCA Continuous  ibuprofen  Tablet 400 milliGRAM(s) Oral every 6 hours  insulin glargine Injectable (LANTUS) 10 Unit(s) SubCutaneous once  insulin lispro (HumaLOG) corrective regimen sliding scale   SubCutaneous every 4 hours  lactated ringers. 1000 milliLiter(s) (10 mL/Hr) IV Continuous <Continuous>  ondansetron Injectable 4 milliGRAM(s) IV Push every 6 hours    MEDICATIONS  (PRN):  ALPRAZolam 0.25 milliGRAM(s) Oral every 8 hours PRN Anxiety  HYDROmorphone PCA (1 mG/mL) Rescue Clinician Bolus 1 milliGRAM(s) IV Push every 15 minutes PRN for Pain Scale GREATER THAN 6      OBJECTIVE:    Sedation Score:	[ X Alert	[ ] Drowsy 	[ ] Arousable	[ ] Asleep	[ ] Unresponsive    Side Effects:	[X] None	[ ] Nausea	[ ] Vomiting	[ ] Pruritus  		[ ] Other:    Vital Signs Last 24 Hrs  T(C): 36.8 (08 Apr 2018 07:00), Max: 37.2 (07 Apr 2018 19:00)  T(F): 98.3 (08 Apr 2018 07:00), Max: 99 (07 Apr 2018 19:00)  HR: 95 (08 Apr 2018 08:00) (88 - 112)  BP: 103/54 (08 Apr 2018 08:00) (84/56 - 139/67)  BP(mean): 76 (08 Apr 2018 08:00) (64 - 97)  RR: 33 (08 Apr 2018 08:00) (14 - 35)  SpO2: 90% (08 Apr 2018 08:00) (87% - 100%)    ASSESSMENT/ PLAN    Therapy to  be:	[X] Continue   [ ] Discontinued   [ ] Change to prn Analgesics    Documentation and Verification of current medications:   [X] Done	[ ] Not done, not elligible    Comments:  Patient to be transferred to regular floor with pulse oximetry. D/W patient change from continuous diluadid infusion to her oxycontin 30mg TID. Will evaluate later this evening with the pain service to see how she is doing. Please contact pain service with any questions.   Thank you

## 2018-04-08 NOTE — PROGRESS NOTE ADULT - SUBJECTIVE AND OBJECTIVE BOX
HISTORY  42y female with history of chronic pancreatitis since childhood s/p [place] procedure in 2014, now s/p total pancreatectomy with islet cell autotransplant.   The previous pancreaticojejunostomy from her [place] procedure was taken down, and a gastrojejunostomy was created. A feeding jejunostomy was placed. Two DANIEL drains left in place, one anterior and one posterior to the choledochojejunostomy.   Patient was received in the SICU extubated, on no pressors.       24 HOUR EVENTS: Patient was given 10 units of lantus and came off the insulin drip two hours later. Her tube feeds were advanced to a goal rate of 45 cc/hour. Her NGT was removed and she tolerated ice chips then clears. She was started on Zofran, erythromycin, tylenol and ibuprofen standing. Her Ferrari catheter was removed and she voided.     SUBJECTIVE/ROS:  [ ] A ten-point review of systems was otherwise negative except as noted.  [ ] Due to altered mental status/intubation, subjective information were not able to be obtained from the patient. History was obtained, to the extent possible, from review of the chart and collateral sources of information.      NEURO  RASS:     GCS:     [facility] ICU:  Exam:   Meds: acetaminophen   Tablet. 975 milliGRAM(s) Oral every 6 hours  dexmedetomidine Infusion 0.2 MICROgram(s)/kG/Hr IV Continuous <Continuous>  HYDROmorphone PCA (1 mG/mL) 30 milliLiter(s) PCA Continuous PCA Continuous  HYDROmorphone PCA (1 mG/mL) Rescue Clinician Bolus 1 milliGRAM(s) IV Push every 15 minutes PRN for Pain Scale GREATER THAN 6  ibuprofen  Tablet 400 milliGRAM(s) Oral every 6 hours  ondansetron Injectable 4 milliGRAM(s) IV Push every 6 hours    [x] Adequacy of sedation and pain control has been assessed and adjusted      RESPIRATORY  RR: 17 (04-08-18 @ 01:00) (12 - 41)  SpO2: 97% (04-08-18 @ 01:00) (87% - 100%)  Wt(kg): --  Exam: unlabored, clear to auscultation bilaterally  Mechanical Ventilation:     [ ] Extubation Readiness Assessed  Meds:       CARDIOVASCULAR  HR: 94 (04-08-18 @ 01:00) (88 - 112)  BP: 98/57 (04-08-18 @ 01:00) (84/52 - 139/67)  BP(mean): 72 (04-08-18 @ 01:00) (64 - 97)  ABP: --  ABP(mean): --  Wt(kg): --  CVP(cm H2O): --      Exam:  Cardiac Rhythm:  Perfusion     [ ]Adequate   [ ]Inadequate  Mentation   [ ]Normal       [ ]Reduced  Extremities  [ ]Warm         [ ]Cool  Volume Status [ ]Hypervolemic [ ]Euvolemic [ ]Hypovolemic  Meds:       GI/NUTRITION  Exam:  Diet:  Meds: amylase/lipase/protease  (CREON  6,000 Units) 1 Capsule(s) Oral every 8 hours      GENITOURINARY  I&O's Detail    04-06 @ 07:01  -  04-07 @ 07:00  --------------------------------------------------------  IN:    dexmedetomidine Infusion: 7.2 mL    dexmedetomidine Infusion: 51.8 mL    insulin Infusion: 12.5 mL    lactated ringers.: 1800 mL    Solution: 62.5 mL    Solution: 50 mL    Solution: 300 mL    Vital HN: 170 mL  Total IN: 2454 mL    OUT:    Bulb: 565 mL    Bulb: 65 mL    Indwelling Catheter - Urethral: 660 mL    Nasoenteral Tube: 100 mL  Total OUT: 1390 mL    Total NET: 1064 mL      04-07 @ 07:01  -  04-08 @ 02:25  --------------------------------------------------------  IN:    dexmedetomidine Infusion: 84.6 mL    insulin Infusion: 3 mL    lactated ringers.: 225 mL    lactated ringers.: 230 mL    Oral Fluid: 400 mL    Solution: 1000 mL    Solution: 250 mL    Vital HN: 650 mL  Total IN: 2842.6 mL    OUT:    Bulb: 30 mL    Bulb: 70 mL    Indwelling Catheter - Urethral: 305 mL    Nasoenteral Tube: 50 mL    Voided: 30 mL  Total OUT: 485 mL    Total NET: 2357.6 mL          04-07    138  |  107  |  14  ----------------------------<  157<H>  4.4   |  23  |  0.74    Ca    8.0<L>      07 Apr 2018 02:25  Phos  2.4     04-07  Mg     1.8     04-07    TPro  4.3<L>  /  Alb  2.6<L>  /  TBili  0.5  /  DBili  x   /  AST  246<H>  /  ALT  92<H>  /  AlkPhos  29<L>  04-07    [ ] Ferrari catheter, indication: N/A  Meds: lactated ringers. 1000 milliLiter(s) IV Continuous <Continuous>        HEMATOLOGIC  Meds: heparin  Injectable 5000 Unit(s) SubCutaneous every 12 hours    [x] VTE Prophylaxis                        9.1    14.1  )-----------( 153      ( 07 Apr 2018 02:25 )             26.4     PT/INR - ( 07 Apr 2018 02:25 )   PT: 13.1 sec;   INR: 1.20 ratio         PTT - ( 07 Apr 2018 02:25 )  PTT:96.9 sec  Transfusion     [ ] PRBC   [ ] Platelets   [ ] FFP   [ ] Cryoprecipitate      INFECTIOUS DISEASES  T(C): 37 (04-07-18 @ 23:00), Max: 37.2 (04-07-18 @ 19:00)  Wt(kg): --    Recent Cultures:    Meds: erythromycin    ethylsuccinate Suspension 40 mG/mL 250 milliGRAM(s) Oral every 6 hours        ENDOCRINE  Capillary Blood Glucose    Meds: insulin lispro (HumaLOG) corrective regimen sliding scale   SubCutaneous every 4 hours        ACCESS DEVICES:  [ ] Peripheral IV  [ ] Central Venous Line	[ ] R	[ ] L	[ ] IJ	[ ] Fem	[ ] SC	Placed:   [ ] Arterial Line		[ ] R	[ ] L	[ ] Fem	[ ] Rad	[ ] Ax	Placed:   [ ] PICC:					[ ] Mediport  [ ] Urinary Catheter, Date Placed:   [ ] Necessity of urinary, arterial, and venous catheters discussed    OTHER MEDICATIONS:  ropivacaine 0.2% in 0.9% Sodium Chloride PCEA 200 milliLiter(s) Epidural PCA Continuous  ropivacaine 0.2% in 0.9% Sodium Chloride PCEA Rescue Clinician Bolus 5 milliLiter(s) Epidural every 15 minutes PRN      CODE STATUS:     IMAGING: HISTORY  42y female with history of chronic pancreatitis since childhood s/p [place] procedure in 2014, now s/p total pancreatectomy with islet cell autotransplant.   The previous pancreaticojejunostomy from her [facility] procedure was taken down, and a gastrojejunostomy was created. A feeding jejunostomy was placed. Two DANIEL drains left in place, one anterior and one posterior to the choledochojejunostomy.   Patient was received in the SICU extubated, on no pressors.       24 HOUR EVENTS: Patient was given 10 units of lantus and came off the insulin drip two hours later. Her tube feeds were advanced to a goal rate of 45 cc/hour. Her NGT was removed and she tolerated ice chips then clears. She was started on Zofran, erythromycin, tylenol and ibuprofen standing. Her Ferrari catheter was removed and she voided.  Her PCEA was adjusted by anesthesia. Her Precedex dose was decreased to 0.4. Her arterial line was discontinued. Her SQH was decreased from 5000 q 8h to 5000 q 12 h.    SUBJECTIVE/ROS:  [x] A ten-point review of systems was otherwise negative except as noted.  [ ] Due to altered mental status/intubation, subjective information were not able to be obtained from the patient. History was obtained, to the extent possible, from review of the chart and collateral sources of information.      NEURO  Exam: No acute distress, alert and oriented   Meds: acetaminophen   Tablet. 975 milliGRAM(s) Oral every 6 hours  dexmedetomidine Infusion 0.2 MICROgram(s)/kG/Hr IV Continuous <Continuous>  HYDROmorphone PCA (1 mG/mL) 30 milliLiter(s) PCA Continuous PCA Continuous  HYDROmorphone PCA (1 mG/mL) Rescue Clinician Bolus 1 milliGRAM(s) IV Push every 15 minutes PRN for Pain Scale GREATER THAN 6  ibuprofen  Tablet 400 milliGRAM(s) Oral every 6 hours  ondansetron Injectable 4 milliGRAM(s) IV Push every 6 hours    [x] Adequacy of sedation and pain control has been assessed and adjusted      RESPIRATORY  RR: 17 (04-08-18 @ 01:00) (12 - 41)  SpO2: 97% (04-08-18 @ 01:00) (87% - 100%)  Wt(kg): --  Exam: unlabored, clear to auscultation bilaterally  Mechanical Ventilation:       CARDIOVASCULAR  HR: 94 (04-08-18 @ 01:00) (88 - 112)  BP: 98/57 (04-08-18 @ 01:00) (84/52 - 139/67)  BP(mean): 72 (04-08-18 @ 01:00) (64 - 97)  ABP: --  ABP(mean): --  Wt(kg): --  CVP(cm H2O): --      Exam: regular rate and rhythm  Cardiac Rhythm: sinus  Perfusion     [x]Adequate   [ ]Inadequate  Mentation   [x]Normal       [ ]Reduced  Extremities  [x]Warm         [ ]Cool  Volume Status [ ]Hypervolemic [x]Euvolemic [ ]Hypovolemic    GI/NUTRITION  Exam: soft, appropriately tender, nondistended  Diet: CLD, J tube feeds  Meds: amylase/lipase/protease  (CREON  6,000 Units) 1 Capsule(s) Oral every 8 hours      GENITOURINARY  I&O's Detail    04-06 @ 07:01  -  04-07 @ 07:00  --------------------------------------------------------  IN:    dexmedetomidine Infusion: 7.2 mL    dexmedetomidine Infusion: 51.8 mL    insulin Infusion: 12.5 mL    lactated ringers.: 1800 mL    Solution: 62.5 mL    Solution: 50 mL    Solution: 300 mL    Vital HN: 170 mL  Total IN: 2454 mL    OUT:    Bulb: 565 mL    Bulb: 65 mL    Indwelling Catheter - Urethral: 660 mL    Nasoenteral Tube: 100 mL  Total OUT: 1390 mL    Total NET: 1064 mL      04-07 @ 07:01  -  04-08 @ 02:25  --------------------------------------------------------  IN:    dexmedetomidine Infusion: 84.6 mL    insulin Infusion: 3 mL    lactated ringers.: 225 mL    lactated ringers.: 230 mL    Oral Fluid: 400 mL    Solution: 1000 mL    Solution: 250 mL    Vital HN: 650 mL  Total IN: 2842.6 mL    OUT:    Bulb: 30 mL    Bulb: 70 mL    Indwelling Catheter - Urethral: 305 mL    Nasoenteral Tube: 50 mL    Voided: 30 mL  Total OUT: 485 mL    Total NET: 2357.6 mL          04-07    138  |  107  |  14  ----------------------------<  157<H>  4.4   |  23  |  0.74    Ca    8.0<L>      07 Apr 2018 02:25  Phos  2.4     04-07  Mg     1.8     04-07    TPro  4.3<L>  /  Alb  2.6<L>  /  TBili  0.5  /  DBili  x   /  AST  246<H>  /  ALT  92<H>  /  AlkPhos  29<L>  04-07    [ ] Ferrari catheter, indication: N/A  Meds: lactated ringers. 1000 milliLiter(s) IV Continuous <Continuous>        HEMATOLOGIC  Meds: heparin  Injectable 5000 Unit(s) SubCutaneous every 12 hours    [x] VTE Prophylaxis                        9.1    14.1  )-----------( 153      ( 07 Apr 2018 02:25 )             26.4     PT/INR - ( 07 Apr 2018 02:25 )   PT: 13.1 sec;   INR: 1.20 ratio         PTT - ( 07 Apr 2018 02:25 )  PTT:96.9 sec  Transfusion     [ ] PRBC   [ ] Platelets   [ ] FFP   [ ] Cryoprecipitate- N/A      INFECTIOUS DISEASES  T(C): 37 (04-07-18 @ 23:00), Max: 37.2 (04-07-18 @ 19:00)  Wt(kg): --    Recent Cultures:    Meds: erythromycin    ethylsuccinate Suspension 40 mG/mL 250 milliGRAM(s) Oral every 6 hours        ENDOCRINE  Capillary Blood Glucose    Meds: insulin lispro (HumaLOG) corrective regimen sliding scale   SubCutaneous every 4 hours        ACCESS DEVICES:  [x] Peripheral IV  [x] Central Venous Line	[ ] R	[ ] L	[ ] IJ	[ ] Fem	[ ] SC	Placed:   [ ] Arterial Line		[ ] R	[ ] L	[ ] Fem	[ ] Rad	[ ] Ax	Placed:   [ ] PICC:					[ ] Mediport  [ ] Urinary Catheter, Date Placed:   [ ] Necessity of urinary, arterial, and venous catheters discussed    OTHER MEDICATIONS:  ropivacaine 0.2% in 0.9% Sodium Chloride PCEA 200 milliLiter(s) Epidural PCA Continuous  ropivacaine 0.2% in 0.9% Sodium Chloride PCEA Rescue Clinician Bolus 5 milliLiter(s) Epidural every 15 minutes PRN      CODE STATUS: Full Code

## 2018-04-08 NOTE — PROVIDER CONTACT NOTE (OTHER) - ASSESSMENT
, was informed to call MD when sugars are >150. Humalog sliding scale and before meals given as per protocol.

## 2018-04-08 NOTE — PROGRESS NOTE ADULT - PROBLEM SELECTOR PLAN 1
-test BG q4h  -c/w Lantus 10 units QAM  -Adjust Humalog correction scale q4h as follows  121-150-1 units  151-180-2 units  181-210-3 units  211-240-4 units  241-270-5 units  >271-6 units + call provider  -will ultimately transition to standing premeal insulin once tolerating POs consistently.  -discussed plan w/pt, RN, Surgery PA and [facility] team  Call with any questions  pager: [phone]

## 2018-04-09 LAB
ALBUMIN SERPL ELPH-MCNC: 2.9 G/DL — LOW (ref 3.3–5)
ALP SERPL-CCNC: 67 U/L — SIGNIFICANT CHANGE UP (ref 40–120)
ALT FLD-CCNC: 47 U/L RC — HIGH (ref 10–45)
ANION GAP SERPL CALC-SCNC: 9 MMOL/L — SIGNIFICANT CHANGE UP (ref 5–17)
AST SERPL-CCNC: 18 U/L — SIGNIFICANT CHANGE UP (ref 10–40)
BILIRUB DIRECT SERPL-MCNC: 0.1 MG/DL — SIGNIFICANT CHANGE UP (ref 0–0.2)
BILIRUB INDIRECT FLD-MCNC: 0.3 MG/DL — SIGNIFICANT CHANGE UP (ref 0.2–1)
BILIRUB SERPL-MCNC: 0.4 MG/DL — SIGNIFICANT CHANGE UP (ref 0.2–1.2)
BUN SERPL-MCNC: 9 MG/DL — SIGNIFICANT CHANGE UP (ref 7–23)
CALCIUM SERPL-MCNC: 8.7 MG/DL — SIGNIFICANT CHANGE UP (ref 8.4–10.5)
CHLORIDE SERPL-SCNC: 99 MMOL/L — SIGNIFICANT CHANGE UP (ref 96–108)
CO2 SERPL-SCNC: 29 MMOL/L — SIGNIFICANT CHANGE UP (ref 22–31)
CREAT SERPL-MCNC: 0.67 MG/DL — SIGNIFICANT CHANGE UP (ref 0.5–1.3)
GLUCOSE SERPL-MCNC: 135 MG/DL — HIGH (ref 70–99)
HCT VFR BLD CALC: 26.5 % — LOW (ref 34.5–45)
HGB BLD-MCNC: 9 G/DL — LOW (ref 11.5–15.5)
MAGNESIUM SERPL-MCNC: 1.3 MG/DL — LOW (ref 1.6–2.6)
MCHC RBC-ENTMCNC: 33.5 PG — SIGNIFICANT CHANGE UP (ref 27–34)
MCHC RBC-ENTMCNC: 34.1 GM/DL — SIGNIFICANT CHANGE UP (ref 32–36)
MCV RBC AUTO: 98.2 FL — SIGNIFICANT CHANGE UP (ref 80–100)
PHOSPHATE SERPL-MCNC: 2.4 MG/DL — LOW (ref 2.5–4.5)
PLATELET # BLD AUTO: 278 K/UL — SIGNIFICANT CHANGE UP (ref 150–400)
POTASSIUM SERPL-MCNC: 4.3 MMOL/L — SIGNIFICANT CHANGE UP (ref 3.5–5.3)
POTASSIUM SERPL-SCNC: 4.3 MMOL/L — SIGNIFICANT CHANGE UP (ref 3.5–5.3)
PROT SERPL-MCNC: 5.7 G/DL — LOW (ref 6–8.3)
RBC # BLD: 2.69 M/UL — LOW (ref 3.8–5.2)
RBC # FLD: 15.5 % — HIGH (ref 10.3–14.5)
SODIUM SERPL-SCNC: 137 MMOL/L — SIGNIFICANT CHANGE UP (ref 135–145)
WBC # BLD: 13.6 K/UL — HIGH (ref 3.8–10.5)
WBC # FLD AUTO: 13.6 K/UL — HIGH (ref 3.8–10.5)

## 2018-04-09 PROCEDURE — 99233 SBSQ HOSP IP/OBS HIGH 50: CPT

## 2018-04-09 RX ORDER — ONDANSETRON 8 MG/1
8 TABLET, FILM COATED ORAL EVERY 8 HOURS
Qty: 0 | Refills: 0 | Status: DISCONTINUED | OUTPATIENT
Start: 2018-04-09 | End: 2018-04-18

## 2018-04-09 RX ORDER — DOCUSATE SODIUM 100 MG
100 CAPSULE ORAL
Qty: 0 | Refills: 0 | Status: DISCONTINUED | OUTPATIENT
Start: 2018-04-09 | End: 2018-04-18

## 2018-04-09 RX ORDER — MEGESTROL ACETATE 40 MG/ML
800 SUSPENSION ORAL DAILY
Qty: 0 | Refills: 0 | Status: DISCONTINUED | OUTPATIENT
Start: 2018-04-09 | End: 2018-04-18

## 2018-04-09 RX ADMIN — ONDANSETRON 8 MILLIGRAM(S): 8 TABLET, FILM COATED ORAL at 05:01

## 2018-04-09 RX ADMIN — SODIUM CHLORIDE 20 MILLILITER(S): 9 INJECTION, SOLUTION INTRAVENOUS at 09:09

## 2018-04-09 RX ADMIN — Medication 400 MILLIGRAM(S): at 09:11

## 2018-04-09 RX ADMIN — OXYCODONE HYDROCHLORIDE 30 MILLIGRAM(S): 5 TABLET ORAL at 15:45

## 2018-04-09 RX ADMIN — Medication 1 CAPSULE(S): at 05:01

## 2018-04-09 RX ADMIN — HYDROMORPHONE HYDROCHLORIDE 30 MILLILITER(S): 2 INJECTION INTRAMUSCULAR; INTRAVENOUS; SUBCUTANEOUS at 14:54

## 2018-04-09 RX ADMIN — Medication 1 UNIT(S): at 10:32

## 2018-04-09 RX ADMIN — OXYCODONE HYDROCHLORIDE 30 MILLIGRAM(S): 5 TABLET ORAL at 21:53

## 2018-04-09 RX ADMIN — Medication 975 MILLIGRAM(S): at 05:32

## 2018-04-09 RX ADMIN — Medication 500 MILLIGRAM(S): at 19:10

## 2018-04-09 RX ADMIN — Medication 400 MILLIGRAM(S): at 02:04

## 2018-04-09 RX ADMIN — MEGESTROL ACETATE 800 MILLIGRAM(S): 40 SUSPENSION ORAL at 19:11

## 2018-04-09 RX ADMIN — Medication 975 MILLIGRAM(S): at 05:02

## 2018-04-09 RX ADMIN — Medication 975 MILLIGRAM(S): at 11:39

## 2018-04-09 RX ADMIN — ONDANSETRON 8 MILLIGRAM(S): 8 TABLET, FILM COATED ORAL at 15:11

## 2018-04-09 RX ADMIN — HYDROMORPHONE HYDROCHLORIDE 30 MILLILITER(S): 2 INJECTION INTRAMUSCULAR; INTRAVENOUS; SUBCUTANEOUS at 07:39

## 2018-04-09 RX ADMIN — OXYCODONE HYDROCHLORIDE 30 MILLIGRAM(S): 5 TABLET ORAL at 21:59

## 2018-04-09 RX ADMIN — Medication 975 MILLIGRAM(S): at 11:09

## 2018-04-09 RX ADMIN — ONDANSETRON 8 MILLIGRAM(S): 8 TABLET, FILM COATED ORAL at 21:52

## 2018-04-09 RX ADMIN — Medication 500 MILLIGRAM(S): at 23:50

## 2018-04-09 RX ADMIN — HYDROMORPHONE HYDROCHLORIDE 30 MILLILITER(S): 2 INJECTION INTRAMUSCULAR; INTRAVENOUS; SUBCUTANEOUS at 19:07

## 2018-04-09 RX ADMIN — Medication 1 CAPSULE(S): at 15:00

## 2018-04-09 RX ADMIN — Medication 500 MILLIGRAM(S): at 05:01

## 2018-04-09 RX ADMIN — INSULIN GLARGINE 10 UNIT(S): 100 INJECTION, SOLUTION SUBCUTANEOUS at 09:06

## 2018-04-09 RX ADMIN — Medication 1 CAPSULE(S): at 21:53

## 2018-04-09 RX ADMIN — Medication 500 MILLIGRAM(S): at 15:00

## 2018-04-09 RX ADMIN — Medication 1: at 15:03

## 2018-04-09 RX ADMIN — OXYCODONE HYDROCHLORIDE 30 MILLIGRAM(S): 5 TABLET ORAL at 15:15

## 2018-04-09 RX ADMIN — Medication 1: at 06:00

## 2018-04-09 RX ADMIN — OXYCODONE HYDROCHLORIDE 30 MILLIGRAM(S): 5 TABLET ORAL at 05:32

## 2018-04-09 RX ADMIN — HEPARIN SODIUM 5000 UNIT(S): 5000 INJECTION INTRAVENOUS; SUBCUTANEOUS at 05:01

## 2018-04-09 RX ADMIN — Medication 400 MILLIGRAM(S): at 02:35

## 2018-04-09 RX ADMIN — OXYCODONE HYDROCHLORIDE 30 MILLIGRAM(S): 5 TABLET ORAL at 05:02

## 2018-04-09 RX ADMIN — Medication 400 MILLIGRAM(S): at 09:41

## 2018-04-09 NOTE — CHART NOTE - NSCHARTNOTEFT_GEN_A_CORE
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     Source: Patient [X ]    Family [ ]     other [ X]; medical record    Hospital Course: Pt with h/o chronic pancreatitis, S/P tia in 2014; now POD 4 S/P total pancreatectomy with islet cell autotransplantation.     Pt is off insulin gtt, tolerating EN via J-tube at goal. Diet advanced to Consistent Carbohydrate diet with snack today. Pt is receiving Creon 6,000 via J-tube every 8 hours. Pt reports she would be able to tolerate Creon capsules by mouth.    Diet : Regular, Consistent Carbohydrate diet with snack + EN via J-tube: Vital @ 45ml/hr x 24 hours    Patient reports ongoing nausea, improved with Zofran. Pt reports no BM yet.      PO intake:  Pt tolerated clear liquids yesterday, consuming >75% of meal trays. Pt plans to eat very small, frequent meals today to assess tolerance to solid foods.     Source for PO intake [X ] Patient [ ] family [X ] chart [ ] staff [ ] other     Enteral /Parenteral Nutrition: Vital1.2 via J-tube @ 45ml/hr x 24 hours provides 1080ml formula, 1296 rosario/day, 81 Gm protein/day, 876ml free water; meets 27cal/Kg and 1.7Gm per dosing wt 47.2Kg.    Current Weight: Weight (kg): 61.9Kg (4/9), 47.2Kg (04/05 dosing); question accuracy of weight gain  Edema: none noted    Pertinent Medications: MEDICATIONS  (STANDING):  amylase/lipase/protease  (CREON  6,000 Units) 1 Capsule(s) Oral every 8 hours  docusate sodium Liquid 100 milliGRAM(s) Oral two times a day  erythromycin    ethylsuccinate Suspension 40 mG/mL 500 milliGRAM(s) Oral every 6 hours  heparin  Injectable 5000 Unit(s) SubCutaneous every 12 hours  HYDROmorphone PCA (1 mG/mL) 30 milliLiter(s) PCA Continuous PCA Continuous  insulin glargine Injectable (LANTUS) 10 Unit(s) SubCutaneous every morning  insulin lispro (HumaLOG) corrective regimen sliding scale   SubCutaneous every 4 hours  insulin lispro Injectable (HumaLOG) 1 Unit(s) SubCutaneous three times a day with meals  lactated ringers. 1000 milliLiter(s) (20 mL/Hr) IV Continuous <Continuous>  ondansetron Injectable 8 milliGRAM(s) IV Push every 8 hours  oxyCODONE  ER Tablet 30 milliGRAM(s) Oral every 8 hours    MEDICATIONS  (PRN):  ALPRAZolam 0.25 milliGRAM(s) Oral every 8 hours PRN Anxiety  bisacodyl 10 milliGRAM(s) Oral at bedtime PRN Constipation  HYDROmorphone PCA (1 mG/mL) Rescue Clinician Bolus 1 milliGRAM(s) IV Push every 15 minutes PRN for Pain Scale GREATER THAN 6  naloxone Injectable 0.1 milliGRAM(s) IV Push every 3 minutes PRN For ANY of the following changes in patient status:  A. RR LESS THAN 10 breaths per minute, B. Oxygen saturation LESS THAN 90%, C. Sedation score of 6    Pertinent Labs:  04-08 Na137 mmol/L Glu 96 mg/dL K+ 4.3 mmol/L Cr  0.69 mg/dL BUN 12 mg/dL 04-08 Phos 2.3 mg/dL<L> 04-08 Alb 2.7 g/dL<L>    CAPILLARY BLOOD GLUCOSE (Goal per protocol = 80-120mg/dL)    POCT Blood Glucose.: 160 mg/dL (09 Apr 2018 11:04)  POCT Blood Glucose.: 136 mg/dL (09 Apr 2018 05:59)  POCT Blood Glucose.: 91 mg/dL (09 Apr 2018 02:01)  POCT Blood Glucose.: 107 mg/dL (08 Apr 2018 22:15)  POCT Blood Glucose.: 147 mg/dL (08 Apr 2018 20:52)  POCT Blood Glucose.: 212 mg/dL (08 Apr 2018 19:06)  POCT Blood Glucose.: 205 mg/dL (08 Apr 2018 15:19)  POCT Blood Glucose.: 104 mg/dL (08 Apr 2018 11:49)    Skin: no pressure injuries    Estimated Needs:   [X ] no change since previous assessment  [ ] recalculated:     Previous Nutrition Diagnosis:   [X ] Increased Nutrient Needs     Nutrition Diagnosis is [ X] ongoing; being addressed with po diet advancement and EN via J-tube, infusing at goal    New Nutrition Diagnosis: [X ] not applicable    Interventions:     Recommend:  1) Continue Consistent Carbohydrate diet with snack.  2) Continue EN as above. Monitor ability to reduce/discontinue EN provision as po intake approaches 75% of nutrition needs  3) Monitor weight, lab values, skin, nutrition provision and GI tolerance  4) Reinforce therapeutic diet education as able    Monitoring and Evaluation:   Follow up per protocol  RD to remain available for further nutritional interventions as indicated.   Tammy Tomlinson, MS RD [facility], #[phone].

## 2018-04-09 NOTE — PROGRESS NOTE ADULT - SUBJECTIVE AND OBJECTIVE BOX
Pain Management Attending Addendum    SUBJECTIVE:    Therapy:	  PCA	x   Epidural           s/p Spinal Opoid              Postpartum infusion	  Patient controlled regional anesthesia (PCRA)    prn Analgesics    OBJECTIVE: No new signs  x    Other:    Side Effects:    None	x		 Other:    Assessment of Catheter Site:		 Intact		 Other:    ASSESSMENT/PLAN  Continue current therapyx    Therapy changed to:     IV PCA        Epidural      prn Analgesics     post partum infusion    Comments:

## 2018-04-09 NOTE — PROGRESS NOTE ADULT - SUBJECTIVE AND OBJECTIVE BOX
Diabetes Follow up note: Saw pt earlier today  Interval Hx: 42 year old female w/chronic pancreatitis, no hx of DM here s/p total pancreatectomy w/islet cell autotransplantation POD#4. Pt states taking very little food due to nausea and constipation. Had 1/2 a pudding and some milk with  tea last night. This am she had no breakfast until around 10:30am. Tolerating TFs at goal of 45cc/hr.  Glycemic control variable between 90s to 200s.     Review of Systems:  General: "I still have pain from the surgery"  GI: + nausea/constipation and incisional pain. No vomit reported. No BMs since surgery.  CV: No CP/SOB  ENDO: No S&Sx of hypoglycemia      MEDS:  insulin glargine Injectable (LANTUS) 10 Unit(s) SubCutaneous once  insulin lispro (HumaLOG) corrective regimen sliding scale   SubCutaneous every 4 hours  insulin lispro Injectable (HumaLOG) 1 Unit(s) SubCutaneous three times a day with meals  erythromycin    ethylsuccinate Suspension 40 mG/mL 250 milliGRAM(s) Oral every 6 hours  amylase/lipase/protease  (CREON  6,000 Units) 1 Capsule(s) Oral every 8 hours      Allergies    No Known Allergies      PE:  General: Female sitting in chair. NAD.   Vital Signs Last 24 Hrs  T(C): 36.8 (04-09-18 @ 06:26), Max: 37 (04-08-18 @ 16:10)  T(F): 98.2 (04-09-18 @ 06:26), Max: 98.6 (04-08-18 @ 16:10)  HR: 88 (04-09-18 @ 06:26) (88 - 116)  BP: 111/71 (04-09-18 @ 06:26) (103/67 - 142/87)  BP(mean): 110 (04-08-18 @ 15:00) (110 - 110)  RR: 18 (04-09-18 @ 06:26) (17 - 18)  SpO2: 94% (04-09-18 @ 06:26) (94% - 100%)  Abd: Soft, mildly tender ,ND, J-tube in place with TFs going at 45 cc/hr. JPs x2 with ss out put  Extremities: Warm. no edema  Neuro: A&O X3    LABS:  POCT Blood Glucose.: 160 mg/dL (04-09-18 @ 11:04)  POCT Blood Glucose.: 136 mg/dL (04-09-18 @ 05:59)  POCT Blood Glucose.: 91 mg/dL (04-09-18 @ 02:01)  POCT Blood Glucose.: 107 mg/dL (04-08-18 @ 22:15)  POCT Blood Glucose.: 147 mg/dL (04-08-18 @ 20:52)  POCT Blood Glucose.: 212 mg/dL (04-08-18 @ 19:06)  POCT Blood Glucose.: 205 mg/dL (04-08-18 @ 15:19)  POCT Blood Glucose.: 104 mg/dL (04-08-18 @ 11:49)  POCT Blood Glucose.: 157 mg/dL (04-08-18 @ 09:02)  POCT Blood Glucose.: 129 mg/dL (04-08-18 @ 05:00)  POCT Blood Glucose.: 145 mg/dL (04-08-18 @ 02:26)  POCT Blood Glucose.: 171 mg/dL (04-07-18 @ 21:38)  POCT Blood Glucose.: 81 mg/dL (04-07-18 @ 18:58)  POCT Blood Glucose.: 129 mg/dL (04-07-18 @ 15:15)                          9.2    14.1  )-----------( 198      ( 08 Apr 2018 23:25 )             27.4     04-08    137  |  102  |  12  ----------------------------<  96  4.3   |  28  |  0.69    Ca    8.5      08 Apr 2018 23:25  Phos  2.3     04-08  Mg     1.6     04-08    TPro  5.2<L>  /  Alb  2.7<L>  /  TBili  0.4  /  DBili  0.2  /  AST  32  /  ALT  65<H>  /  AlkPhos  50  04-08  TPro  4.7<L>  /  Alb  2.4<L>  /  TBili  0.4  /  DBili  0.1  /  AST  98<H>  /  ALT  81<H>  /  AlkPhos  38<L>  04-08

## 2018-04-09 NOTE — PROGRESS NOTE ADULT - ASSESSMENT
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 Pt tolerating TFs but very little PO intake with fluctuating BG levels ((90s to 200s). Also getting Humalog q4h until pt eating more consistently. BG goal (100-150mg/dl).     Met with patient and reviewed the following:    -Blood glucose goals (100-150)  -Glucose monitoring frequency (ac and hs)  -Basic carb counting. Pt to estimate carb value of her meals and write it down. Pt given calorie juan alberto book and she was encouraged to download free appt to her I phone.    -Basic insulin to carb ratio definition.  Pt verbalized understanding and able to give feedback.

## 2018-04-09 NOTE — PROGRESS NOTE ADULT - ASSESSMENT
42 year old female with PMHx of chronic pancreatitis s/p tia in 2014, now POD 4 s/p total pancreatectomy with islet cell autotransplantation.    Off insulin gtt.  TF @ goal.    - Pain control - Continue PCA, tylenol, ibuprofen, wean precedex.  - f/u Endocrine.  Continue lantus and sliding scale  - OOB/Ambulate as tolerated  - Zofran around the clock  - mIVF  - TF + Creon, increase to goal up to 45 cc/hr  - Erythromycin for promotility  - DVT ppx    Blue  [id number] 42 year old female with PMHx of chronic pancreatitis s/p tia in 2014, now POD 4 s/p total pancreatectomy with islet cell autotransplantation.    Off insulin gtt.  TF @ goal.      - Diet as tolerated  - Pain control - Continue PCA, tylenol, ibuprofen  - f/u Endocrine.  Continue lantus and sliding scale  - OOB/Ambulate as tolerated  - Zofran around the clock  - TF + Creon, increase to goal up to 45 cc/hr  - Erythromycin for promotility  - DVT ppx    Blue  [id number]

## 2018-04-09 NOTE — PROGRESS NOTE ADULT - SUBJECTIVE AND OBJECTIVE BOX
BLUE SURGERY DAILY PROGRESS NOTE:       SUBJECTIVE/ROS: Patient reports some intermittent nausea- no vomiting, taking in some clears not much regular food       MEDICATIONS  (STANDING):  acetaminophen   Tablet. 975 milliGRAM(s) Oral every 6 hours  amylase/lipase/protease  (CREON  6,000 Units) 1 Capsule(s) Oral every 8 hours  erythromycin    ethylsuccinate Suspension 40 mG/mL 500 milliGRAM(s) Oral every 6 hours  heparin  Injectable 5000 Unit(s) SubCutaneous every 12 hours  HYDROmorphone PCA (1 mG/mL) 30 milliLiter(s) PCA Continuous PCA Continuous  insulin glargine Injectable (LANTUS) 10 Unit(s) SubCutaneous every morning  insulin lispro (HumaLOG) corrective regimen sliding scale   SubCutaneous every 4 hours  insulin lispro Injectable (HumaLOG) 1 Unit(s) SubCutaneous three times a day with meals  lactated ringers. 1000 milliLiter(s) (20 mL/Hr) IV Continuous <Continuous>  ondansetron Injectable 8 milliGRAM(s) IV Push every 8 hours  oxyCODONE  ER Tablet 30 milliGRAM(s) Oral every 8 hours    MEDICATIONS  (PRN):  ALPRAZolam 0.25 milliGRAM(s) Oral every 8 hours PRN Anxiety  HYDROmorphone PCA (1 mG/mL) Rescue Clinician Bolus 1 milliGRAM(s) IV Push every 15 minutes PRN for Pain Scale GREATER THAN 6  naloxone Injectable 0.1 milliGRAM(s) IV Push every 3 minutes PRN For ANY of the following changes in patient status:  A. RR LESS THAN 10 breaths per minute, B. Oxygen saturation LESS THAN 90%, C. Sedation score of 6      OBJECTIVE:    Vital Signs Last 24 Hrs  T(C): 36.8 (2018 06:26), Max: 37 (2018 16:10)  T(F): 98.2 (2018 06:26), Max: 98.6 (2018 16:10)  HR: 88 (2018 06:26) (88 - 116)  BP: 111/71 (2018 06:26) (103/67 - 142/87)  BP(mean): 110 (2018 15:00) (85 - 110)  RR: 18 (2018 06:26) (15 - 22)  SpO2: 94% (2018 06:26) (94% - 100%)        I&O's Detail    2018 07:01  -  2018 07:00  --------------------------------------------------------  IN:    lactated ringers.: 160 mL    Oral Fluid: 490 mL    Solution: 125 mL    Vital HN: 900 mL  Total IN: 1675 mL    OUT:    Bulb: 10 mL    Bulb: 10 mL    Voided: 275 mL  Total OUT: 295 mL    Total NET: 1380 mL          Daily     Daily Weight in k.9 (2018 06:26)    LABS:                        9.2    14.1  )-----------( 198      ( 2018 23:25 )             27.4     04-08    137  |  102  |  12  ----------------------------<  96  4.3   |  28  |  0.69    Ca    8.5      2018 23:25  Phos  2.3     04-08  Mg     1.6     04-08    TPro  5.2<L>  /  Alb  2.7<L>  /  TBili  0.4  /  DBili  0.2  /  AST  32  /  ALT  65<H>  /  AlkPhos  50  04-08    PT/INR - ( 2018 23:25 )   PT: 11.3 sec;   INR: 1.04 ratio         PTT - ( 2018 23:25 )  PTT:32.3 sec        PE:  Gen: NAD  Resp: airway patent, respirations unlabored, no increased WoB  CVS: RRR  Abd: soft, mild distention, appropriately tender, incision c/d/i, DANIEL x 2 SS          J-tube site intact  Ext: no edema, WWP

## 2018-04-09 NOTE — PROGRESS NOTE ADULT - SUBJECTIVE AND OBJECTIVE BOX
Day 4 of Anesthesia Pain Management Service    SUBJECTIVE: I'm doing better    Pain Scale Score:	[X] Refer to charted pain scores    THERAPY:    [ ] IV PCA Morphine		[ ] 5 mg/mL	[ ] 1 mg/mL  [X] IV PCA Hydromorphone	[ ] 5 mg/mL	[X] 1 mg/mL  [ ] IV PCA Fentanyl		[ ] 50 micrograms/mL    Demand dose: 0.5 mg     Lockout: 6 minutes   Continuous Rate: 0 mg/hr  4 Hour Limit: 4 mg    MEDICATIONS  (STANDING):  acetaminophen   Tablet. 975 milliGRAM(s) Oral every 6 hours  amylase/lipase/protease  (CREON  6,000 Units) 1 Capsule(s) Oral every 8 hours  erythromycin    ethylsuccinate Suspension 40 mG/mL 500 milliGRAM(s) Oral every 6 hours  heparin  Injectable 5000 Unit(s) SubCutaneous every 12 hours  HYDROmorphone PCA (1 mG/mL) 30 milliLiter(s) PCA Continuous PCA Continuous  insulin glargine Injectable (LANTUS) 10 Unit(s) SubCutaneous every morning  insulin lispro (HumaLOG) corrective regimen sliding scale   SubCutaneous every 4 hours  insulin lispro Injectable (HumaLOG) 1 Unit(s) SubCutaneous three times a day with meals  lactated ringers. 1000 milliLiter(s) (20 mL/Hr) IV Continuous <Continuous>  ondansetron Injectable 8 milliGRAM(s) IV Push every 8 hours  oxyCODONE  ER Tablet 30 milliGRAM(s) Oral every 8 hours    MEDICATIONS  (PRN):  ALPRAZolam 0.25 milliGRAM(s) Oral every 8 hours PRN Anxiety  HYDROmorphone PCA (1 mG/mL) Rescue Clinician Bolus 1 milliGRAM(s) IV Push every 15 minutes PRN for Pain Scale GREATER THAN 6  naloxone Injectable 0.1 milliGRAM(s) IV Push every 3 minutes PRN For ANY of the following changes in patient status:  A. RR LESS THAN 10 breaths per minute, B. Oxygen saturation LESS THAN 90%, C. Sedation score of 6      OBJECTIVE:    Sedation Score:	[ X] Alert	[ ] Drowsy 	[ ] Arousable	[ ] Asleep	[ ] Unresponsive    Side Effects:	[X ] None	[ ] Nausea	[ ] Vomiting	[ ] Pruritus  		[ ] Other:    Vital Signs Last 24 Hrs  T(C): 36.8 (09 Apr 2018 06:26), Max: 37 (08 Apr 2018 16:10)  T(F): 98.2 (09 Apr 2018 06:26), Max: 98.6 (08 Apr 2018 16:10)  HR: 88 (09 Apr 2018 06:26) (88 - 116)  BP: 111/71 (09 Apr 2018 06:26) (103/67 - 142/87)  BP(mean): 110 (08 Apr 2018 15:00) (78 - 110)  RR: 18 (09 Apr 2018 06:26) (15 - 22)  SpO2: 94% (09 Apr 2018 06:26) (94% - 100%)    ASSESSMENT/ PLAN    Therapy to  be:               [X] Continued   [ ] Discontinued   [ ] Changed to PRN Analgesics    Documentation and Verification of current medications:   [X] Done    [ ] Not done, not eligible    Comments: Using 1-4x/hr. Analgesia improved. Continue PCA until tolerating po then transition to Oxycodone IR breakthrough pain

## 2018-04-09 NOTE — PROGRESS NOTE ADULT - PROBLEM SELECTOR PLAN 1
-test BG q4h for now. Will change to ac and hs once eating better.  -c/w Lantus 10 units QAM  -c/w Humalog correction scale q4h as follows  121-150-1 units  151-180-2 units  181-210-3 units  211-240-4 units  241-270-5 units  >271-6 units + call provider  -Will start premeal insulin once pt eating better.   -Plan discussed with pt/team. Please f/u c/o nausea and constipation. No BMs yet.  Contact info: [phone] (24/7). pager [phone]

## 2018-04-10 LAB
ALBUMIN SERPL ELPH-MCNC: 2.9 G/DL — LOW (ref 3.3–5)
ALP SERPL-CCNC: 79 U/L — SIGNIFICANT CHANGE UP (ref 40–120)
ALT FLD-CCNC: 30 U/L RC — SIGNIFICANT CHANGE UP (ref 10–45)
ANION GAP SERPL CALC-SCNC: 11 MMOL/L — SIGNIFICANT CHANGE UP (ref 5–17)
APPEARANCE UR: CLEAR — SIGNIFICANT CHANGE UP
APTT BLD: 30 SEC — SIGNIFICANT CHANGE UP (ref 27.5–37.4)
AST SERPL-CCNC: 12 U/L — SIGNIFICANT CHANGE UP (ref 10–40)
BILIRUB DIRECT SERPL-MCNC: 0.1 MG/DL — SIGNIFICANT CHANGE UP (ref 0–0.2)
BILIRUB INDIRECT FLD-MCNC: 0.2 MG/DL — SIGNIFICANT CHANGE UP (ref 0.2–1)
BILIRUB SERPL-MCNC: 0.3 MG/DL — SIGNIFICANT CHANGE UP (ref 0.2–1.2)
BILIRUB UR-MCNC: NEGATIVE — SIGNIFICANT CHANGE UP
BUN SERPL-MCNC: 6 MG/DL — LOW (ref 7–23)
CALCIUM SERPL-MCNC: 8.7 MG/DL — SIGNIFICANT CHANGE UP (ref 8.4–10.5)
CHLORIDE SERPL-SCNC: 93 MMOL/L — LOW (ref 96–108)
CO2 SERPL-SCNC: 29 MMOL/L — SIGNIFICANT CHANGE UP (ref 22–31)
COLOR SPEC: YELLOW — SIGNIFICANT CHANGE UP
CREAT SERPL-MCNC: 0.63 MG/DL — SIGNIFICANT CHANGE UP (ref 0.5–1.3)
DIFF PNL FLD: NEGATIVE — SIGNIFICANT CHANGE UP
EPI CELLS # UR: SIGNIFICANT CHANGE UP /HPF
GLUCOSE SERPL-MCNC: 231 MG/DL — HIGH (ref 70–99)
GLUCOSE UR QL: NEGATIVE — SIGNIFICANT CHANGE UP
HCT VFR BLD CALC: 25.7 % — LOW (ref 34.5–45)
HGB BLD-MCNC: 8.6 G/DL — LOW (ref 11.5–15.5)
INR BLD: 1.26 RATIO — HIGH (ref 0.88–1.16)
KETONES UR-MCNC: NEGATIVE — SIGNIFICANT CHANGE UP
LEUKOCYTE ESTERASE UR-ACNC: NEGATIVE — SIGNIFICANT CHANGE UP
MAGNESIUM SERPL-MCNC: 1.4 MG/DL — LOW (ref 1.6–2.6)
MCHC RBC-ENTMCNC: 32.9 PG — SIGNIFICANT CHANGE UP (ref 27–34)
MCHC RBC-ENTMCNC: 33.5 GM/DL — SIGNIFICANT CHANGE UP (ref 32–36)
MCV RBC AUTO: 98.1 FL — SIGNIFICANT CHANGE UP (ref 80–100)
NITRITE UR-MCNC: NEGATIVE — SIGNIFICANT CHANGE UP
PH UR: 7.5 — SIGNIFICANT CHANGE UP (ref 5–8)
PHOSPHATE SERPL-MCNC: 2.8 MG/DL — SIGNIFICANT CHANGE UP (ref 2.5–4.5)
PLATELET # BLD AUTO: 380 K/UL — SIGNIFICANT CHANGE UP (ref 150–400)
POTASSIUM SERPL-MCNC: 4.2 MMOL/L — SIGNIFICANT CHANGE UP (ref 3.5–5.3)
POTASSIUM SERPL-SCNC: 4.2 MMOL/L — SIGNIFICANT CHANGE UP (ref 3.5–5.3)
PROT SERPL-MCNC: 5.8 G/DL — LOW (ref 6–8.3)
PROT UR-MCNC: NEGATIVE — SIGNIFICANT CHANGE UP
PROTHROM AB SERPL-ACNC: 13.8 SEC — HIGH (ref 9.8–12.7)
RBC # BLD: 2.62 M/UL — LOW (ref 3.8–5.2)
RBC # FLD: 14.9 % — HIGH (ref 10.3–14.5)
RBC CASTS # UR COMP ASSIST: SIGNIFICANT CHANGE UP /HPF (ref 0–2)
SODIUM SERPL-SCNC: 133 MMOL/L — LOW (ref 135–145)
SP GR SPEC: 1.01 — SIGNIFICANT CHANGE UP (ref 1.01–1.02)
UROBILINOGEN FLD QL: 2 MG/DL
WBC # BLD: 16.8 K/UL — HIGH (ref 3.8–10.5)
WBC # FLD AUTO: 16.8 K/UL — HIGH (ref 3.8–10.5)
WBC UR QL: SIGNIFICANT CHANGE UP /HPF (ref 0–5)

## 2018-04-10 PROCEDURE — 74177 CT ABD & PELVIS W/CONTRAST: CPT | Mod: 26

## 2018-04-10 PROCEDURE — 99232 SBSQ HOSP IP/OBS MODERATE 35: CPT

## 2018-04-10 PROCEDURE — 71045 X-RAY EXAM CHEST 1 VIEW: CPT | Mod: 26

## 2018-04-10 RX ORDER — LIPASE/PROTEASE/AMYLASE 16-48-48K
1 CAPSULE,DELAYED RELEASE (ENTERIC COATED) ORAL
Qty: 0 | Refills: 0 | Status: DISCONTINUED | OUTPATIENT
Start: 2018-04-10 | End: 2018-04-14

## 2018-04-10 RX ORDER — DEXTROSE 50 % IN WATER 50 %
25 SYRINGE (ML) INTRAVENOUS ONCE
Qty: 0 | Refills: 0 | Status: COMPLETED | OUTPATIENT
Start: 2018-04-10 | End: 2018-04-10

## 2018-04-10 RX ORDER — DEXTROSE MONOHYDRATE, SODIUM CHLORIDE, AND POTASSIUM CHLORIDE 50; .745; 4.5 G/1000ML; G/1000ML; G/1000ML
1000 INJECTION, SOLUTION INTRAVENOUS
Qty: 0 | Refills: 0 | Status: DISCONTINUED | OUTPATIENT
Start: 2018-04-10 | End: 2018-04-11

## 2018-04-10 RX ORDER — SODIUM CHLORIDE 9 MG/ML
1000 INJECTION, SOLUTION INTRAVENOUS
Qty: 0 | Refills: 0 | Status: DISCONTINUED | OUTPATIENT
Start: 2018-04-10 | End: 2018-04-11

## 2018-04-10 RX ORDER — INSULIN GLARGINE 100 [IU]/ML
2 INJECTION, SOLUTION SUBCUTANEOUS EVERY MORNING
Qty: 0 | Refills: 0 | Status: DISCONTINUED | OUTPATIENT
Start: 2018-04-10 | End: 2018-04-10

## 2018-04-10 RX ORDER — INSULIN GLARGINE 100 [IU]/ML
8 INJECTION, SOLUTION SUBCUTANEOUS EVERY MORNING
Qty: 0 | Refills: 0 | Status: DISCONTINUED | OUTPATIENT
Start: 2018-04-10 | End: 2018-04-12

## 2018-04-10 RX ORDER — LIPASE/PROTEASE/AMYLASE 16-48-48K
1 CAPSULE,DELAYED RELEASE (ENTERIC COATED) ORAL AT BEDTIME
Qty: 0 | Refills: 0 | Status: DISCONTINUED | OUTPATIENT
Start: 2018-04-10 | End: 2018-04-18

## 2018-04-10 RX ORDER — DEXTROSE 50 % IN WATER 50 %
12.5 SYRINGE (ML) INTRAVENOUS ONCE
Qty: 0 | Refills: 0 | Status: DISCONTINUED | OUTPATIENT
Start: 2018-04-10 | End: 2018-04-12

## 2018-04-10 RX ORDER — DEXTROSE 50 % IN WATER 50 %
25 SYRINGE (ML) INTRAVENOUS ONCE
Qty: 0 | Refills: 0 | Status: DISCONTINUED | OUTPATIENT
Start: 2018-04-10 | End: 2018-04-12

## 2018-04-10 RX ORDER — ACETAMINOPHEN 500 MG
650 TABLET ORAL ONCE
Qty: 0 | Refills: 0 | Status: COMPLETED | OUTPATIENT
Start: 2018-04-10 | End: 2018-04-10

## 2018-04-10 RX ORDER — INSULIN GLARGINE 100 [IU]/ML
2 INJECTION, SOLUTION SUBCUTANEOUS ONCE
Qty: 0 | Refills: 0 | Status: COMPLETED | OUTPATIENT
Start: 2018-04-10 | End: 2018-04-10

## 2018-04-10 RX ORDER — INSULIN LISPRO 100/ML
VIAL (ML) SUBCUTANEOUS
Qty: 0 | Refills: 0 | Status: DISCONTINUED | OUTPATIENT
Start: 2018-04-10 | End: 2018-04-10

## 2018-04-10 RX ORDER — GLUCAGON INJECTION, SOLUTION 0.5 MG/.1ML
1 INJECTION, SOLUTION SUBCUTANEOUS ONCE
Qty: 0 | Refills: 0 | Status: DISCONTINUED | OUTPATIENT
Start: 2018-04-10 | End: 2018-04-12

## 2018-04-10 RX ORDER — INSULIN LISPRO 100/ML
VIAL (ML) SUBCUTANEOUS
Qty: 0 | Refills: 0 | Status: DISCONTINUED | OUTPATIENT
Start: 2018-04-10 | End: 2018-04-11

## 2018-04-10 RX ORDER — DEXTROSE 50 % IN WATER 50 %
1 SYRINGE (ML) INTRAVENOUS ONCE
Qty: 0 | Refills: 0 | Status: DISCONTINUED | OUTPATIENT
Start: 2018-04-10 | End: 2018-04-12

## 2018-04-10 RX ORDER — SODIUM CHLORIDE 9 MG/ML
1000 INJECTION, SOLUTION INTRAVENOUS
Qty: 0 | Refills: 0 | Status: DISCONTINUED | OUTPATIENT
Start: 2018-04-10 | End: 2018-04-18

## 2018-04-10 RX ADMIN — Medication 2: at 02:28

## 2018-04-10 RX ADMIN — Medication 1 CAPSULE(S): at 13:12

## 2018-04-10 RX ADMIN — HYDROMORPHONE HYDROCHLORIDE 30 MILLILITER(S): 2 INJECTION INTRAMUSCULAR; INTRAVENOUS; SUBCUTANEOUS at 19:25

## 2018-04-10 RX ADMIN — HYDROMORPHONE HYDROCHLORIDE 30 MILLILITER(S): 2 INJECTION INTRAMUSCULAR; INTRAVENOUS; SUBCUTANEOUS at 07:27

## 2018-04-10 RX ADMIN — HYDROMORPHONE HYDROCHLORIDE 1 MILLIGRAM(S): 2 INJECTION INTRAMUSCULAR; INTRAVENOUS; SUBCUTANEOUS at 09:55

## 2018-04-10 RX ADMIN — Medication 1 CAPSULE(S): at 22:15

## 2018-04-10 RX ADMIN — Medication 500 MILLIGRAM(S): at 13:11

## 2018-04-10 RX ADMIN — OXYCODONE HYDROCHLORIDE 30 MILLIGRAM(S): 5 TABLET ORAL at 14:17

## 2018-04-10 RX ADMIN — ONDANSETRON 8 MILLIGRAM(S): 8 TABLET, FILM COATED ORAL at 22:14

## 2018-04-10 RX ADMIN — Medication 2: at 06:10

## 2018-04-10 RX ADMIN — OXYCODONE HYDROCHLORIDE 30 MILLIGRAM(S): 5 TABLET ORAL at 22:14

## 2018-04-10 RX ADMIN — HEPARIN SODIUM 5000 UNIT(S): 5000 INJECTION INTRAVENOUS; SUBCUTANEOUS at 18:07

## 2018-04-10 RX ADMIN — Medication 650 MILLIGRAM(S): at 15:17

## 2018-04-10 RX ADMIN — Medication 1 CAPSULE(S): at 05:48

## 2018-04-10 RX ADMIN — OXYCODONE HYDROCHLORIDE 30 MILLIGRAM(S): 5 TABLET ORAL at 14:16

## 2018-04-10 RX ADMIN — OXYCODONE HYDROCHLORIDE 30 MILLIGRAM(S): 5 TABLET ORAL at 06:18

## 2018-04-10 RX ADMIN — ONDANSETRON 8 MILLIGRAM(S): 8 TABLET, FILM COATED ORAL at 14:16

## 2018-04-10 RX ADMIN — INSULIN GLARGINE 2 UNIT(S): 100 INJECTION, SOLUTION SUBCUTANEOUS at 11:24

## 2018-04-10 RX ADMIN — ONDANSETRON 8 MILLIGRAM(S): 8 TABLET, FILM COATED ORAL at 05:48

## 2018-04-10 RX ADMIN — Medication 650 MILLIGRAM(S): at 14:16

## 2018-04-10 RX ADMIN — Medication 500 MILLIGRAM(S): at 05:48

## 2018-04-10 RX ADMIN — HYDROMORPHONE HYDROCHLORIDE 1 MILLIGRAM(S): 2 INJECTION INTRAMUSCULAR; INTRAVENOUS; SUBCUTANEOUS at 05:50

## 2018-04-10 RX ADMIN — Medication 3: at 10:03

## 2018-04-10 RX ADMIN — Medication 25 GRAM(S): at 19:47

## 2018-04-10 RX ADMIN — OXYCODONE HYDROCHLORIDE 30 MILLIGRAM(S): 5 TABLET ORAL at 22:44

## 2018-04-10 RX ADMIN — Medication 2: at 15:19

## 2018-04-10 RX ADMIN — INSULIN GLARGINE 10 UNIT(S): 100 INJECTION, SOLUTION SUBCUTANEOUS at 08:46

## 2018-04-10 RX ADMIN — Medication 1 UNIT(S): at 10:05

## 2018-04-10 RX ADMIN — OXYCODONE HYDROCHLORIDE 30 MILLIGRAM(S): 5 TABLET ORAL at 05:48

## 2018-04-10 NOTE — PROVIDER CONTACT NOTE (OTHER) - ACTION/TREATMENT ORDERED:
dextrose 50 25 Gm ivp given as per hypoglycemic protocol. Repeat fs 264. Hold insulin for now  May send patient to CT scan. When back on floor, repeat fingerstick and cover as per sliding scale.

## 2018-04-10 NOTE — PROGRESS NOTE ADULT - PROBLEM SELECTOR PLAN 1
-After next testing time at 2pm switch to BG testing ac and hs, 12mn and 6am while on POs and TFs.  -Change humalog correction scale to ac/hs/12 mn and 6am  -Increase Lantus dose to 12 units QAM. 2 units extra given today  -Continue Humalog 1 unit ac meals. HOLD IF NOT EATING. Will adjust as needed  -Plan discussed with pt/team.   Contact info: [phone] (24/7). pager [phone] -After next testing time at 2pm switch to BG testing ac, 12mn and 6am while on POs and TFs.  -Change humalog correction scale to ac, 12 mn and 6am  -Increase Lantus dose to 12 units QAM. 2 units extra given today  -Continue Humalog 1 unit ac meals. HOLD IF NOT EATING. Will adjust as needed  -Plan discussed with pt/team.   Contact info: [phone] (24/7). pager [phone]

## 2018-04-10 NOTE — PROGRESS NOTE ADULT - ASSESSMENT
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 Pt tolerating TFs but remains with poor PO intake. However, BGs levels going up during the night to the morning while on present basal insulin. No hypoglycemia. BG goal (100-150mg/dl).     Pt trying to count carbs but not all the time secondary to pain.

## 2018-04-10 NOTE — PROGRESS NOTE ADULT - SUBJECTIVE AND OBJECTIVE BOX
Day _5/2_ of Anesthesia Pain Management Service    SUBJECTIVE: Patient is doing well with IV PCA    Pain Scale Score:	[X] Refer to charted pain scores    THERAPY:    [ ] IV PCA Morphine		[ ] 5 mg/mL	[ ] 1 mg/mL  [X] IV PCA Hydromorphone	[ ] 5 mg/mL	[X] 1 mg/mL  [ ] IV PCA Fentanyl		[ ] 50 micrograms/mL    Demand dose: 0.5 mg     Lockout: 6 minutes   Continuous Rate: 0 mg/hr  4 Hour Limit: 4 mg    MEDICATIONS  (STANDING):  amylase/lipase/protease  (CREON  6,000 Units) 1 Capsule(s) Oral every 8 hours  docusate sodium Liquid 100 milliGRAM(s) Oral two times a day  erythromycin    ethylsuccinate Suspension 40 mG/mL 500 milliGRAM(s) Oral every 6 hours  heparin  Injectable 5000 Unit(s) SubCutaneous every 12 hours  HYDROmorphone PCA (1 mG/mL) 30 milliLiter(s) PCA Continuous PCA Continuous  insulin glargine Injectable (LANTUS) 2 Unit(s) SubCutaneous once  insulin lispro (HumaLOG) corrective regimen sliding scale   SubCutaneous every 4 hours  insulin lispro Injectable (HumaLOG) 1 Unit(s) SubCutaneous three times a day with meals  lactated ringers. 1000 milliLiter(s) (20 mL/Hr) IV Continuous <Continuous>  megestrol Suspension 800 milliGRAM(s) Oral daily  ondansetron   Disintegrating Tablet 8 milliGRAM(s) Oral every 8 hours  oxyCODONE  ER Tablet 30 milliGRAM(s) Oral every 8 hours    MEDICATIONS  (PRN):  ALPRAZolam 0.25 milliGRAM(s) Oral every 8 hours PRN Anxiety  bisacodyl 10 milliGRAM(s) Oral at bedtime PRN Constipation  HYDROmorphone PCA (1 mG/mL) Rescue Clinician Bolus 1 milliGRAM(s) IV Push every 15 minutes PRN for Pain Scale GREATER THAN 6  naloxone Injectable 0.1 milliGRAM(s) IV Push every 3 minutes PRN For ANY of the following changes in patient status:  A. RR LESS THAN 10 breaths per minute, B. Oxygen saturation LESS THAN 90%, C. Sedation score of 6      OBJECTIVE:    Sedation Score:	[ X] Alert	[ ] Drowsy 	[ ] Arousable	[ ] Asleep	[ ] Unresponsive    Side Effects:	[X ] None	[ ] Nausea	[ ] Vomiting	[ ] Pruritus  		[ ] Other:    Vital Signs Last 24 Hrs  T(C): 36.9 (10 Apr 2018 10:10), Max: 38.3 (10 Apr 2018 01:13)  T(F): 98.4 (10 Apr 2018 10:10), Max: 100.9 (10 Apr 2018 01:13)  HR: 90 (10 Apr 2018 10:10) (89 - 114)  BP: 118/77 (10 Apr 2018 10:10) (101/63 - 151/87)  BP(mean): --  RR: 18 (10 Apr 2018 10:10) (18 - 18)  SpO2: 97% (10 Apr 2018 10:10) (96% - 97%)    ASSESSMENT/ PLAN    Therapy to  be:               [X] Continued   [ ] Discontinued   [ ] Changed to PRN Analgesics    Documentation and Verification of current medications:   [X] Done	[ ] Not done, not eligible    Comments:

## 2018-04-10 NOTE — PROGRESS NOTE ADULT - ASSESSMENT
42 year old female with PMHx of chronic pancreatitis s/p tia in 2014, now POD 5 s/p total pancreatectomy with islet cell autotransplantation.        - Diet as tolerated with TF  - Follow up endo recs  - Pain control  - OOB/Ambulate  - Incentive spirometry  - DVT ppx  - Megace, Zofran, erythromycin    Anahi Jarquin PA-C [id number] 42 year old female with PMHx of chronic pancreatitis s/p tia in 2014, now POD 5 s/p total pancreatectomy with islet cell autotransplantation.        - Diet as tolerated with TF  - Follow up endo recs  - Pain control  - OOB/Ambulate  - Incentive spirometry  - DVT ppx  - Megace, Zofran, erythromycin  - Follow up cultures    Anahi Jarquin PA-C [id number]

## 2018-04-10 NOTE — PROGRESS NOTE ADULT - SUBJECTIVE AND OBJECTIVE BOX
BLUE SURGERY DAILY PROGRESS NOTE:       SUBJECTIVE/ROS: Tmax over night to 38.3- blood culture and urine culture sent  Denies nausea, vomiting, chest pain, shortness of breath         MEDICATIONS  (STANDING):  amylase/lipase/protease  (CREON  6,000 Units) 1 Capsule(s) Oral every 8 hours  docusate sodium Liquid 100 milliGRAM(s) Oral two times a day  erythromycin    ethylsuccinate Suspension 40 mG/mL 500 milliGRAM(s) Oral every 6 hours  heparin  Injectable 5000 Unit(s) SubCutaneous every 12 hours  HYDROmorphone PCA (1 mG/mL) 30 milliLiter(s) PCA Continuous PCA Continuous  insulin glargine Injectable (LANTUS) 10 Unit(s) SubCutaneous every morning  insulin lispro (HumaLOG) corrective regimen sliding scale   SubCutaneous every 4 hours  insulin lispro Injectable (HumaLOG) 1 Unit(s) SubCutaneous three times a day with meals  lactated ringers. 1000 milliLiter(s) (20 mL/Hr) IV Continuous <Continuous>  megestrol Suspension 800 milliGRAM(s) Oral daily  ondansetron   Disintegrating Tablet 8 milliGRAM(s) Oral every 8 hours  oxyCODONE  ER Tablet 30 milliGRAM(s) Oral every 8 hours    MEDICATIONS  (PRN):  ALPRAZolam 0.25 milliGRAM(s) Oral every 8 hours PRN Anxiety  bisacodyl 10 milliGRAM(s) Oral at bedtime PRN Constipation  HYDROmorphone PCA (1 mG/mL) Rescue Clinician Bolus 1 milliGRAM(s) IV Push every 15 minutes PRN for Pain Scale GREATER THAN 6  naloxone Injectable 0.1 milliGRAM(s) IV Push every 3 minutes PRN For ANY of the following changes in patient status:  A. RR LESS THAN 10 breaths per minute, B. Oxygen saturation LESS THAN 90%, C. Sedation score of 6      OBJECTIVE:    Vital Signs Last 24 Hrs  T(C): 37.3 (10 Apr 2018 04:32), Max: 38.3 (10 Apr 2018 01:13)  T(F): 99.1 (10 Apr 2018 04:32), Max: 100.9 (10 Apr 2018 01:13)  HR: 89 (10 Apr 2018 04:32) (89 - 114)  BP: 116/70 (10 Apr 2018 04:32) (101/63 - 151/87)  BP(mean): --  RR: 18 (10 Apr 2018 04:32) (18 - 18)  SpO2: 97% (10 Apr 2018 04:32) (96% - 97%)        I&O's Detail    09 Apr 2018 07:01  -  10 Apr 2018 07:00  --------------------------------------------------------  IN:    lactated ringers.: 480 mL    Oral Fluid: 485 mL    Vital HN: 1080 mL  Total IN: 2045 mL    OUT:    Bulb: 15 mL    Bulb: 30 mL  Total OUT: 45 mL    Total NET: 2000 mL          Daily     Daily     LABS:                        9.0    13.6  )-----------( 278      ( 09 Apr 2018 22:29 )             26.5     04-09    137  |  99  |  9   ----------------------------<  135<H>  4.3   |  29  |  0.67    Ca    8.7      09 Apr 2018 22:29  Phos  2.4     04-09  Mg     1.3     04-09    TPro  5.7<L>  /  Alb  2.9<L>  /  TBili  0.4  /  DBili  0.1  /  AST  18  /  ALT  47<H>  /  AlkPhos  67  04-09    PT/INR - ( 08 Apr 2018 23:25 )   PT: 11.3 sec;   INR: 1.04 ratio         PTT - ( 08 Apr 2018 23:25 )  PTT:32.3 sec      PHYSICAL EXAM:  Constitutional: well developed, well nourished, NAD  Eyes: anicteric  ENMT: normal facies, symmetric  Neck: supple  Respiratory: CTA bilaterally  Cardiovascular: RRR  Gastrointestinal: abdomen soft, DANIEL #1 removed, incision c/d/i. mild distention, appropriately tender  Extremities: FROM, warm  Neurological: intact, non-focal  Skin: no gross lesions  Lymph Nodes: no gross adenopathy  Musculoskeletal: equal strength bilateral upper and lower extremities  Psychiatric: oriented x 3; appropriate BLUE SURGERY DAILY PROGRESS NOTE:       SUBJECTIVE/ROS: Tmax over night to 38.3- blood cultures and urine culture sent  Denies nausea, vomiting, chest pain, shortness of breath         MEDICATIONS  (STANDING):  amylase/lipase/protease  (CREON  6,000 Units) 1 Capsule(s) Oral every 8 hours  docusate sodium Liquid 100 milliGRAM(s) Oral two times a day  erythromycin    ethylsuccinate Suspension 40 mG/mL 500 milliGRAM(s) Oral every 6 hours  heparin  Injectable 5000 Unit(s) SubCutaneous every 12 hours  HYDROmorphone PCA (1 mG/mL) 30 milliLiter(s) PCA Continuous PCA Continuous  insulin glargine Injectable (LANTUS) 10 Unit(s) SubCutaneous every morning  insulin lispro (HumaLOG) corrective regimen sliding scale   SubCutaneous every 4 hours  insulin lispro Injectable (HumaLOG) 1 Unit(s) SubCutaneous three times a day with meals  lactated ringers. 1000 milliLiter(s) (20 mL/Hr) IV Continuous <Continuous>  megestrol Suspension 800 milliGRAM(s) Oral daily  ondansetron   Disintegrating Tablet 8 milliGRAM(s) Oral every 8 hours  oxyCODONE  ER Tablet 30 milliGRAM(s) Oral every 8 hours    MEDICATIONS  (PRN):  ALPRAZolam 0.25 milliGRAM(s) Oral every 8 hours PRN Anxiety  bisacodyl 10 milliGRAM(s) Oral at bedtime PRN Constipation  HYDROmorphone PCA (1 mG/mL) Rescue Clinician Bolus 1 milliGRAM(s) IV Push every 15 minutes PRN for Pain Scale GREATER THAN 6  naloxone Injectable 0.1 milliGRAM(s) IV Push every 3 minutes PRN For ANY of the following changes in patient status:  A. RR LESS THAN 10 breaths per minute, B. Oxygen saturation LESS THAN 90%, C. Sedation score of 6      OBJECTIVE:    Vital Signs Last 24 Hrs  T(C): 37.3 (10 Apr 2018 04:32), Max: 38.3 (10 Apr 2018 01:13)  T(F): 99.1 (10 Apr 2018 04:32), Max: 100.9 (10 Apr 2018 01:13)  HR: 89 (10 Apr 2018 04:32) (89 - 114)  BP: 116/70 (10 Apr 2018 04:32) (101/63 - 151/87)  BP(mean): --  RR: 18 (10 Apr 2018 04:32) (18 - 18)  SpO2: 97% (10 Apr 2018 04:32) (96% - 97%)        I&O's Detail    09 Apr 2018 07:01  -  10 Apr 2018 07:00  --------------------------------------------------------  IN:    lactated ringers.: 480 mL    Oral Fluid: 485 mL    Vital HN: 1080 mL  Total IN: 2045 mL    OUT:    Bulb: 15 mL    Bulb: 30 mL  Total OUT: 45 mL    Total NET: 2000 mL          Daily     Daily     LABS:                        9.0    13.6  )-----------( 278      ( 09 Apr 2018 22:29 )             26.5     04-09    137  |  99  |  9   ----------------------------<  135<H>  4.3   |  29  |  0.67    Ca    8.7      09 Apr 2018 22:29  Phos  2.4     04-09  Mg     1.3     04-09    TPro  5.7<L>  /  Alb  2.9<L>  /  TBili  0.4  /  DBili  0.1  /  AST  18  /  ALT  47<H>  /  AlkPhos  67  04-09    PT/INR - ( 08 Apr 2018 23:25 )   PT: 11.3 sec;   INR: 1.04 ratio         PTT - ( 08 Apr 2018 23:25 )  PTT:32.3 sec      PHYSICAL EXAM:  Constitutional: well developed, well nourished, NAD  Eyes: anicteric  ENMT: normal facies, symmetric  Neck: supple  Respiratory: CTA bilaterally  Cardiovascular: RRR  Gastrointestinal: abdomen soft, DANIEL #1 removed, incision c/d/i. mild distention, appropriately tender  Extremities: FROM, warm  Neurological: intact, non-focal  Skin: no gross lesions  Lymph Nodes: no gross adenopathy  Musculoskeletal: equal strength bilateral upper and lower extremities  Psychiatric: oriented x 3; appropriate

## 2018-04-10 NOTE — PROGRESS NOTE ADULT - SUBJECTIVE AND OBJECTIVE BOX
Diabetes Follow up note: Saw pt earlier today  Interval Hx: 42 year old female w/chronic pancreatitis, no hx of DM here s/p total pancreatectomy w/islet cell autotransplantation POD#5. Pt states having lots of pain overnight. Still eating very little carbs. About 15g with meals due to pain. Moved bowels. Tolerating TFs at goal of 45cc/hr.  Glycemic control going up to high 100s this am and also 160s overnight (goal 100 to 150s). No hypoglycemia    Review of Systems:  General: "I had a bad night"  GI: +  incisional/abdominal pain. No vomit/nausea reported. Positive BMs. On PCA  CV: No CP/SOB  ENDO: No S&Sx of hypoglycemia      MEDS:  insulin glargine Injectable (LANTUS) 10 Unit(s) SubCutaneous once  insulin lispro (HumaLOG) corrective regimen sliding scale   SubCutaneous every 4 hours  insulin lispro Injectable (HumaLOG) 1 Unit(s) SubCutaneous three times a day with meals  erythromycin    ethylsuccinate Suspension 40 mG/mL 250 milliGRAM(s) Oral every 6 hours  amylase/lipase/protease  (CREON  6,000 Units) 1 Capsule(s) Oral three times a day with meals  amylase/lipase/protease  (CREON  6,000 Units) 1 Capsule(s) Oral at bedtime  insulin lispro (HumaLOG) corrective regimen sliding scale   SubCutaneous every 4 hours  insulin lispro Injectable (HumaLOG) 1 Unit(s) SubCutaneous three times a day with meals      Allergies    No Known Allergies      PE:  General: Female laying in bed in NAD.   Vital Signs Last 24 Hrs  T(C): 36.9 (04-10-18 @ 10:10), Max: 38.3 (04-10-18 @ 01:13)  T(F): 98.4 (04-10-18 @ 10:10), Max: 100.9 (04-10-18 @ 01:13)  HR: 90 (04-10-18 @ 10:10) (89 - 114)  BP: 118/77 (04-10-18 @ 10:10) (101/63 - 151/87)  BP(mean): --  RR: 18 (04-10-18 @ 10:10) (18 - 18)  SpO2: 97% (04-10-18 @ 10:10) (96% - 97%)  Abd: Soft, tender ,ND, J-tube in place with TFs going at 45 cc/hr. JPs x2 with ss out put  Extremities: Warm. no edema in all 4 exts  Neuro: A&O X3    LABS:  POCT Blood Glucose.: 197 mg/dL (04-10-18 @ 09:59)  POCT Blood Glucose.: 166 mg/dL (04-10-18 @ 06:03)  POCT Blood Glucose.: 160 mg/dL (04-10-18 @ 02:15)  POCT Blood Glucose.: 103 mg/dL (04-09-18 @ 22:02)  POCT Blood Glucose.: 108 mg/dL (04-09-18 @ 19:41)  POCT Blood Glucose.: 140 mg/dL (04-09-18 @ 14:35)  POCT Blood Glucose.: 160 mg/dL (04-09-18 @ 11:04)  POCT Blood Glucose.: 136 mg/dL (04-09-18 @ 05:59)  POCT Blood Glucose.: 91 mg/dL (04-09-18 @ 02:01)  POCT Blood Glucose.: 107 mg/dL (04-08-18 @ 22:15)  POCT Blood Glucose.: 147 mg/dL (04-08-18 @ 20:52)  POCT Blood Glucose.: 212 mg/dL (04-08-18 @ 19:06)  POCT Blood Glucose.: 205 mg/dL (04-08-18 @ 15:19)                          9.0    13.6  )-----------( 278      ( 09 Apr 2018 22:29 )             26.5                           9.2    14.1  )-----------( 198      ( 08 Apr 2018 23:25 )             27.4     04-09    137  |  99  |  9   ----------------------------<  135<H>  4.3   |  29  |  0.67    Ca    8.7      09 Apr 2018 22:29  Phos  2.4     04-09  Mg     1.3     04-09    TPro  5.7<L>  /  Alb  2.9<L>  /  TBili  0.4  /  DBili  0.1  /  AST  18  /  ALT  47<H>  /  AlkPhos  67  04-09  TPro  5.2<L>  /  Alb  2.7<L>  /  TBili  0.4  /  DBili  0.2  /  AST  32  /  ALT  65<H>  /  AlkPhos  50  04-08  TPro  4.7<L>  /  Alb  2.4<L>  /  TBili  0.4  /  DBili  0.1  /  AST  98<H>  /  ALT  81<H>  /  AlkPhos  38<L>  04-08

## 2018-04-11 LAB
CULTURE RESULTS: NO GROWTH — SIGNIFICANT CHANGE UP
SPECIMEN SOURCE: SIGNIFICANT CHANGE UP

## 2018-04-11 PROCEDURE — 10160 PNXR ASPIR ABSC HMTMA BULLA: CPT

## 2018-04-11 PROCEDURE — 77012 CT SCAN FOR NEEDLE BIOPSY: CPT | Mod: 26

## 2018-04-11 PROCEDURE — 99233 SBSQ HOSP IP/OBS HIGH 50: CPT

## 2018-04-11 RX ORDER — ACETAMINOPHEN 500 MG
650 TABLET ORAL ONCE
Qty: 0 | Refills: 0 | Status: COMPLETED | OUTPATIENT
Start: 2018-04-11 | End: 2018-04-11

## 2018-04-11 RX ORDER — DEXTROSE MONOHYDRATE, SODIUM CHLORIDE, AND POTASSIUM CHLORIDE 50; .745; 4.5 G/1000ML; G/1000ML; G/1000ML
1000 INJECTION, SOLUTION INTRAVENOUS
Qty: 0 | Refills: 0 | Status: DISCONTINUED | OUTPATIENT
Start: 2018-04-11 | End: 2018-04-13

## 2018-04-11 RX ORDER — ACETAMINOPHEN 500 MG
1000 TABLET ORAL ONCE
Qty: 0 | Refills: 0 | Status: DISCONTINUED | OUTPATIENT
Start: 2018-04-11 | End: 2018-04-11

## 2018-04-11 RX ORDER — NALOXONE HYDROCHLORIDE 4 MG/.1ML
0.1 SPRAY NASAL
Qty: 0 | Refills: 0 | Status: DISCONTINUED | OUTPATIENT
Start: 2018-04-11 | End: 2018-04-12

## 2018-04-11 RX ORDER — ACETAMINOPHEN 500 MG
750 TABLET ORAL ONCE
Qty: 0 | Refills: 0 | Status: COMPLETED | OUTPATIENT
Start: 2018-04-11 | End: 2018-04-11

## 2018-04-11 RX ORDER — OXYCODONE HYDROCHLORIDE 5 MG/1
30 TABLET ORAL ONCE
Qty: 0 | Refills: 0 | Status: DISCONTINUED | OUTPATIENT
Start: 2018-04-11 | End: 2018-04-11

## 2018-04-11 RX ORDER — DEXTROSE MONOHYDRATE, SODIUM CHLORIDE, AND POTASSIUM CHLORIDE 50; .745; 4.5 G/1000ML; G/1000ML; G/1000ML
1000 INJECTION, SOLUTION INTRAVENOUS
Qty: 0 | Refills: 0 | Status: DISCONTINUED | OUTPATIENT
Start: 2018-04-11 | End: 2018-04-11

## 2018-04-11 RX ORDER — PIPERACILLIN AND TAZOBACTAM 4; .5 G/20ML; G/20ML
3.38 INJECTION, POWDER, LYOPHILIZED, FOR SOLUTION INTRAVENOUS EVERY 8 HOURS
Qty: 0 | Refills: 0 | Status: DISCONTINUED | OUTPATIENT
Start: 2018-04-11 | End: 2018-04-18

## 2018-04-11 RX ORDER — INSULIN LISPRO 100/ML
VIAL (ML) SUBCUTANEOUS EVERY 4 HOURS
Qty: 0 | Refills: 0 | Status: DISCONTINUED | OUTPATIENT
Start: 2018-04-11 | End: 2018-04-13

## 2018-04-11 RX ORDER — PIPERACILLIN AND TAZOBACTAM 4; .5 G/20ML; G/20ML
3.38 INJECTION, POWDER, LYOPHILIZED, FOR SOLUTION INTRAVENOUS ONCE
Qty: 0 | Refills: 0 | Status: COMPLETED | OUTPATIENT
Start: 2018-04-11 | End: 2018-04-11

## 2018-04-11 RX ORDER — PIPERACILLIN AND TAZOBACTAM 4; .5 G/20ML; G/20ML
3.38 INJECTION, POWDER, LYOPHILIZED, FOR SOLUTION INTRAVENOUS ONCE
Qty: 0 | Refills: 0 | Status: DISCONTINUED | OUTPATIENT
Start: 2018-04-11 | End: 2018-04-11

## 2018-04-11 RX ORDER — HYDROMORPHONE HYDROCHLORIDE 2 MG/ML
30 INJECTION INTRAMUSCULAR; INTRAVENOUS; SUBCUTANEOUS
Qty: 0 | Refills: 0 | Status: DISCONTINUED | OUTPATIENT
Start: 2018-04-11 | End: 2018-04-12

## 2018-04-11 RX ORDER — ONDANSETRON 8 MG/1
4 TABLET, FILM COATED ORAL EVERY 6 HOURS
Qty: 0 | Refills: 0 | Status: DISCONTINUED | OUTPATIENT
Start: 2018-04-11 | End: 2018-04-12

## 2018-04-11 RX ORDER — PIPERACILLIN AND TAZOBACTAM 4; .5 G/20ML; G/20ML
3.38 INJECTION, POWDER, LYOPHILIZED, FOR SOLUTION INTRAVENOUS EVERY 8 HOURS
Qty: 0 | Refills: 0 | Status: DISCONTINUED | OUTPATIENT
Start: 2018-04-11 | End: 2018-04-11

## 2018-04-11 RX ORDER — MAGNESIUM SULFATE 500 MG/ML
2 VIAL (ML) INJECTION ONCE
Qty: 0 | Refills: 0 | Status: COMPLETED | OUTPATIENT
Start: 2018-04-11 | End: 2018-04-11

## 2018-04-11 RX ADMIN — OXYCODONE HYDROCHLORIDE 30 MILLIGRAM(S): 5 TABLET ORAL at 19:09

## 2018-04-11 RX ADMIN — Medication 300 MILLIGRAM(S): at 05:56

## 2018-04-11 RX ADMIN — ONDANSETRON 8 MILLIGRAM(S): 8 TABLET, FILM COATED ORAL at 06:00

## 2018-04-11 RX ADMIN — OXYCODONE HYDROCHLORIDE 30 MILLIGRAM(S): 5 TABLET ORAL at 18:55

## 2018-04-11 RX ADMIN — OXYCODONE HYDROCHLORIDE 30 MILLIGRAM(S): 5 TABLET ORAL at 18:25

## 2018-04-11 RX ADMIN — PIPERACILLIN AND TAZOBACTAM 25 GRAM(S): 4; .5 INJECTION, POWDER, LYOPHILIZED, FOR SOLUTION INTRAVENOUS at 22:14

## 2018-04-11 RX ADMIN — OXYCODONE HYDROCHLORIDE 30 MILLIGRAM(S): 5 TABLET ORAL at 06:17

## 2018-04-11 RX ADMIN — DEXTROSE MONOHYDRATE, SODIUM CHLORIDE, AND POTASSIUM CHLORIDE 100 MILLILITER(S): 50; .745; 4.5 INJECTION, SOLUTION INTRAVENOUS at 18:39

## 2018-04-11 RX ADMIN — HYDROMORPHONE HYDROCHLORIDE 30 MILLILITER(S): 2 INJECTION INTRAMUSCULAR; INTRAVENOUS; SUBCUTANEOUS at 19:23

## 2018-04-11 RX ADMIN — OXYCODONE HYDROCHLORIDE 30 MILLIGRAM(S): 5 TABLET ORAL at 22:15

## 2018-04-11 RX ADMIN — OXYCODONE HYDROCHLORIDE 30 MILLIGRAM(S): 5 TABLET ORAL at 18:39

## 2018-04-11 RX ADMIN — DEXTROSE MONOHYDRATE, SODIUM CHLORIDE, AND POTASSIUM CHLORIDE 100 MILLILITER(S): 50; .745; 4.5 INJECTION, SOLUTION INTRAVENOUS at 08:47

## 2018-04-11 RX ADMIN — HYDROMORPHONE HYDROCHLORIDE 30 MILLILITER(S): 2 INJECTION INTRAMUSCULAR; INTRAVENOUS; SUBCUTANEOUS at 18:26

## 2018-04-11 RX ADMIN — PIPERACILLIN AND TAZOBACTAM 200 GRAM(S): 4; .5 INJECTION, POWDER, LYOPHILIZED, FOR SOLUTION INTRAVENOUS at 17:42

## 2018-04-11 RX ADMIN — ONDANSETRON 8 MILLIGRAM(S): 8 TABLET, FILM COATED ORAL at 22:14

## 2018-04-11 RX ADMIN — Medication 500 MILLIGRAM(S): at 06:00

## 2018-04-11 RX ADMIN — Medication 1 CAPSULE(S): at 22:14

## 2018-04-11 RX ADMIN — Medication 100 MILLIGRAM(S): at 18:37

## 2018-04-11 RX ADMIN — DEXTROSE MONOHYDRATE, SODIUM CHLORIDE, AND POTASSIUM CHLORIDE 50 MILLILITER(S): 50; .745; 4.5 INJECTION, SOLUTION INTRAVENOUS at 00:40

## 2018-04-11 RX ADMIN — HYDROMORPHONE HYDROCHLORIDE 30 MILLILITER(S): 2 INJECTION INTRAMUSCULAR; INTRAVENOUS; SUBCUTANEOUS at 07:20

## 2018-04-11 RX ADMIN — DEXTROSE MONOHYDRATE, SODIUM CHLORIDE, AND POTASSIUM CHLORIDE 100 MILLILITER(S): 50; .745; 4.5 INJECTION, SOLUTION INTRAVENOUS at 06:54

## 2018-04-11 RX ADMIN — INSULIN GLARGINE 8 UNIT(S): 100 INJECTION, SOLUTION SUBCUTANEOUS at 08:46

## 2018-04-11 RX ADMIN — Medication 50 GRAM(S): at 06:14

## 2018-04-11 RX ADMIN — Medication 3: at 06:00

## 2018-04-11 RX ADMIN — Medication 1: at 18:49

## 2018-04-11 RX ADMIN — Medication 650 MILLIGRAM(S): at 11:08

## 2018-04-11 RX ADMIN — Medication 4: at 22:18

## 2018-04-11 RX ADMIN — Medication 5: at 00:38

## 2018-04-11 RX ADMIN — Medication 500 MILLIGRAM(S): at 18:35

## 2018-04-11 RX ADMIN — Medication 1 CAPSULE(S): at 18:36

## 2018-04-11 RX ADMIN — Medication 500 MILLIGRAM(S): at 00:38

## 2018-04-11 NOTE — CHART NOTE - NSCHARTNOTEFT_GEN_A_CORE
BLUE SURGERY PROGRESS NOTE:    Interval events: Patient doing well after procedure. Tolerating regular diet. Denies pain at drain insertion site. Denies fevers/chills. Feels very tired and wishes to be able to sleep comfortably tonight.        Indications for procedure: Pelvic fluid collection    Procedure performed: CT guided aspiration of pelvic fluid    Specimen aspirated: 3 cc red cloudy fluid    Complications: none    Disposition: PACU, returned to [facility]    Condition: Stable    Findings: Pelvic fluid was non-loculated and displaced when patient was placed into prone and left lateral decubitus positions. Via transgluteal approach, access was able to be obtained with a 21 gauge needle. A small amount of clear red fluid was aspirated.          A/P: 42F  POD #6 s/p total pancreatectomy with islet cell autotransplantation with persistent fevers, now s/p IR drainage of pelvic fluid collection.    - Resume regular diet with TF @ 45  - Monitor temps, F/u Blood cultures  - Pain control  - Cont erythromycin, Creon  - Encourage OOB/ambulation  - Appreciate Endocrine recs BLUE SURGERY PROGRESS NOTE:    Interval events: Patient doing well after procedure. Tolerating regular diet. Denies pain at drain insertion site. Denies fevers/chills. Feels very tired and wishes to be able to sleep comfortably tonight.        Indications for procedure: Pelvic fluid collection    Procedure performed: CT guided aspiration of pelvic fluid    Specimen aspirated: 3 cc red cloudy fluid    Complications: none    Disposition: PACU, returned to [facility]    Condition: Stable    Findings: Pelvic fluid was non-loculated and displaced when patient was placed into prone and left lateral decubitus positions. Via transgluteal approach, access was able to be obtained with a 21 gauge needle. A small amount of clear red fluid was aspirated.          A/P: 42F  POD #6 s/p total pancreatectomy with islet cell autotransplantation with persistent fevers, now s/p IR drainage of pelvic fluid collection.    - Resume regular diet with TF @ 45  - Monitor temps, F/u Blood cultures  - PCA for pain control  - Cont Zosyn, Erythromycin, Creon  - Encourage OOB/ambulation  - Appreciate Endocrine recs  - F/u 10:00pm labs

## 2018-04-11 NOTE — PROGRESS NOTE ADULT - PROBLEM SELECTOR PLAN 1
-Test BG q6h and prior to IR procedure to make sure pt is not hypoglycemic again  -Lantus 8 units qam for now. Will adjust if remains NPO. Will likely need less insulin  -Change humalog correction scale back to q4h  -Discontinued Humalog premeal since pt is now NPO  -Contact surgeon regarding IVF and BG >200s. Pt might need lower D5 infusion rate or NS/1.2NS fluids instead.   -Plan discussed with pt/RN and team.  Contact info: [phone] (24/7). pager [phone]  -Will follow later today and make further insulin adjustments as needed. Glycemic control difficult to achieve in setting of possible acute infection and post complications. Call endo team as needed.

## 2018-04-11 NOTE — PROGRESS NOTE ADULT - ASSESSMENT
41 yo female with PMH significant for chronic pancreatitis now s/p total pancreatectomy with islet autotransplantation. Post op now c/b fever/surgical leak and hypo/hyperglycemia. Pt NPO going for IR leak evacuation this pm. She is off TFs as well with BG >200s while on high D5 infusion rate.  BG goal (100-150mg/dl). 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 Post op now c/b fever/surgical leak and hypo/hyperglycemia. Pt NPO going for IR leak evacuation this pm. She is off TFs as well with BG >200s while on high D5 infusion rate.  BG goal (100-150mg/dl). WBC and  LFT up.

## 2018-04-11 NOTE — CHART NOTE - NSCHARTNOTEFT_GEN_A_CORE
Brief Note. See full Nutrition Follow up on 4/10/18.    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     Pt currently NPO, with EN on hold for IR procedure.    Meds/Labs Reviewed: Creon (6,000 units) Rx increased to 1 capsule tid via J-tube, and 1 capsule, oral, at bedtime.    Pt noted with episodes of hypoglycemia yesterday evening, requiring D50 IVP and addition of D5 IVF infusion at 100cc/hr.    CAPILLARY BLOOD GLUCOSE  POCT Blood Glucose.: 165 mg/dL (11 Apr 2018 14:02)  POCT Blood Glucose.: 256 mg/dL (11 Apr 2018 11:18)  POCT Blood Glucose.: 178 mg/dL (11 Apr 2018 08:33)  POCT Blood Glucose.: 202 mg/dL (11 Apr 2018 05:36)  POCT Blood Glucose.: 263 mg/dL (11 Apr 2018 00:29)  POCT Blood Glucose.: 193 mg/dL (10 Apr 2018 22:15)  POCT Blood Glucose.: 264 mg/dL (10 Apr 2018 18:58)  POCT Blood Glucose.: 37 mg/dL (10 Apr 2018 18:25)  POCT Blood Glucose.: 38 mg/dL (10 Apr 2018 18:23)  POCT Blood Glucose.: 58 mg/dL (10 Apr 2018 17:55)  POCT Blood Glucose.: 160 mg/dL (10 Apr 2018 14:44)    Hospital Course: "43 yo female with PMH significant for chronic pancreatitis now s/p total pancreatectomy with islet autotransplantation. Post op now c/b fever/surgical leak and hypo/hyperglycemia. Pt NPO going for IR leak evacuation this pm. She is off TFs as well with BG >200s while on high D5 infusion rate.  BG goal (100-150mg/dl). WBC and  LFT up."    Recommend:  1) Continue Consistent Carbohydrate diet with snack; encourage intake of high protein, nutrient dense foods   2) Resume EN: Vital1.2 via J-tube @ 45ml/hr x 24 hours provides 1080ml formula, 1296 rosario/day, 81 Gm protein/day, 876ml free water; meets 27cal/Kg and 1.7Gm per dosing wt 47.2Kg.  3) Monitor ability to reduce/discontinue EN provision as po intake approaches 75% of nutrition needs  4) Monitor weight, lab values, skin, nutrition provision and GI tolerance  5) Reinforce therapeutic diet education as able    Monitoring and Evaluation:   Follow up per protocol  RD to remain available for further nutritional interventions as indicated.   Tammy Tomlinson, MS RD [facility], #[phone].

## 2018-04-11 NOTE — PROGRESS NOTE ADULT - ASSESSMENT
A/P 42F  POD #6 s/p total pancreatectomy with islet cell autotransplantation  -febrile overnight, CT showed collections, possible IR drainage  -hold TF  -pain control  -DVT ppx  -cont erythromycin, creon, megace  -OOB/amb  -f/u endocrine recs  -f/u labs/blood cx's    STEPHANIA Bowen PA-C , pager # [phone]

## 2018-04-11 NOTE — PROGRESS NOTE ADULT - SUBJECTIVE AND OBJECTIVE BOX
Interventional Radiology  Pre-Procedure Note    42 year old female POD6 s/p total pancreatectomy with islet cell autotransplantation. Fevers. Pelvic fluid with mild peritoneal enhancement.  Referred for aspiration of fluid possible drainage with image guidance.      PAST MEDICAL & SURGICAL HISTORY:  Premenstrual migraine  Other chronic pancreatitis  S/P cholecystectomy: in early 20&#x27;s  Pancreatitis: h/o Lucas procedure 2014      Social History:     FAMILY HISTORY:      Allergies: No Known Allergies      Current Medications: ALPRAZolam 0.25 milliGRAM(s) Oral every 8 hours PRN  amylase/lipase/protease  (CREON  6,000 Units) 1 Capsule(s) Oral three times a day with meals  amylase/lipase/protease  (CREON  6,000 Units) 1 Capsule(s) Oral at bedtime  bisacodyl 10 milliGRAM(s) Oral at bedtime PRN  dextrose 5%. 1000 milliLiter(s) IV Continuous <Continuous>  dextrose 5%. 1000 milliLiter(s) IV Continuous <Continuous>  dextrose 50% Injectable 12.5 Gram(s) IV Push once  dextrose 50% Injectable 25 Gram(s) IV Push once  dextrose 50% Injectable 25 Gram(s) IV Push once  dextrose Gel 1 Dose(s) Oral once PRN  docusate sodium Liquid 100 milliGRAM(s) Oral two times a day  erythromycin    ethylsuccinate Suspension 40 mG/mL 500 milliGRAM(s) Oral every 6 hours  glucagon  Injectable 1 milliGRAM(s) IntraMuscular once PRN  heparin  Injectable 5000 Unit(s) SubCutaneous every 12 hours  HYDROmorphone PCA (1 mG/mL) Rescue Clinician Bolus 1 milliGRAM(s) IV Push every 15 minutes PRN  insulin glargine Injectable (LANTUS) 8 Unit(s) SubCutaneous every morning  insulin lispro (HumaLOG) corrective regimen sliding scale   SubCutaneous every 4 hours  megestrol Suspension 800 milliGRAM(s) Oral daily  ondansetron   Disintegrating Tablet 8 milliGRAM(s) Oral every 8 hours  oxyCODONE  ER Tablet 30 milliGRAM(s) Oral every 8 hours  sodium chloride 0.9% with potassium chloride 20 mEq/L 1000 milliLiter(s) IV Continuous <Continuous>      Labs:                         8.6    16.8  )-----------( 380      ( 10 Apr 2018 22:41 )             25.7       04-10    133<L>  |  93<L>  |  6<L>  ----------------------------<  231<H>  4.2   |  29  |  0.63    Ca    8.7      10 Apr 2018 22:41  Phos  2.8     04-10  Mg     1.4     04-10    TPro  5.8<L>  /  Alb  2.9<L>  /  TBili  0.3  /  DBili  0.1  /  AST  12  /  ALT  30  /  AlkPhos  79  04-10      Assessment/Plan:   42 year old female POD6 s/p total pancreatectomy with islet cell autotransplantation. Fevers. Pelvic fluid with mild peritoneal enhancement.  Referred for aspiration of fluid, possible drainage if frankly infected, with image guidance.    Procedure/ risks/ benefits/ goals/ alternatives were explained. All questions answered. Informed content obtained from patient. Consent placed in chart.

## 2018-04-11 NOTE — PROGRESS NOTE ADULT - SUBJECTIVE AND OBJECTIVE BOX
Diabetes Follow up note: Saw pt earlier today  Interval Hx: 42 year old female w/chronic pancreatitis, no hx of DM here s/p total pancreatectomy w/islet cell autotransplantation. Pt states having fever/pain and feeling sick. She is presently NPO. Had CT scan yesterday showing collection so pt going to IR for drainage today. Also, had BG of  58 yesterday ac dinner prior to CT scan. Spoke to RN to treat to BG >100 but pt remained hypoglycemic coming down to BG in 30s without any symptoms and requiring D50 IVP and addition of D5 IVF infusion at 100cc/hr per primary team. However, pt's BGs while NPO and D5 infusion remained in 200s throughout the night and this am.  Received Lantus 8 units this am instead of 12 units. Goal BG is 100 to 150s.    Review of Systems:  General: "I don't feel well" c/o chills and shakiness when febrile  GI: +  incisional/abdominal pain. No vomit/nausea reported. Positive BMs.   CV: No CP/SOB  ENDO: No S&Sx of hypoglycemia      MEDS:  insulin glargine Injectable (LANTUS) 8 Unit(s) SubCutaneous every morning  insulin lispro (HumaLOG) corrective regimen sliding scale   SubCutaneous every 4 hours  erythromycin    ethylsuccinate Suspension 40 mG/mL 250 milliGRAM(s) Oral every 6 hours  amylase/lipase/protease  (CREON  6,000 Units) 1 Capsule(s) Oral three times a day with meals  amylase/lipase/protease  (CREON  6,000 Units) 1 Capsule(s) Oral at bedtime  Allergies  No Known Allergies    PE:  General: Female sitting in chair in NAD.   Vital Signs Last 24 Hrs  T(C): 38.6 (04-11-18 @ 11:00), Max: 39.5 (04-11-18 @ 05:36)  T(F): 101.5 (04-11-18 @ 11:00), Max: 103.1 (04-11-18 @ 05:36)  HR: 94 (04-11-18 @ 11:00) (93 - 127)  BP: 112/76 (04-11-18 @ 11:00) (101/67 - 132/89)  BP(mean): --  RR: 18 (04-11-18 @ 11:00) (18 - 18)  SpO2: 97% (04-11-18 @ 11:00) (95% - 98%)  Abd: Soft, tender ,ND, J-tube in place with TFs off. JPs out with small dressing D&I covering sites.    Extremities: Warm. no edema in all 4 exts  Neuro: A&O X3    LABS:  POCT Blood Glucose.: 256 mg/dL (04-11-18 @ 11:18)  POCT Blood Glucose.: 178 mg/dL (04-11-18 @ 08:33)  POCT Blood Glucose.: 202 mg/dL (04-11-18 @ 05:36)  POCT Blood Glucose.: 263 mg/dL (04-11-18 @ 00:29)  POCT Blood Glucose.: 193 mg/dL (04-10-18 @ 22:15)  POCT Blood Glucose.: 264 mg/dL (04-10-18 @ 18:58)  POCT Blood Glucose.: 37 mg/dL (04-10-18 @ 18:25)  POCT Blood Glucose.: 38 mg/dL (04-10-18 @ 18:23)  POCT Blood Glucose.: 58 mg/dL (04-10-18 @ 17:55)  POCT Blood Glucose.: 160 mg/dL (04-10-18 @ 14:44)  POCT Blood Glucose.: 197 mg/dL (04-10-18 @ 09:59)  POCT Blood Glucose.: 166 mg/dL (04-10-18 @ 06:03)  POCT Blood Glucose.: 160 mg/dL (04-10-18 @ 02:15)  POCT Blood Glucose.: 103 mg/dL (04-09-18 @ 22:02)  POCT Blood Glucose.: 108 mg/dL (04-09-18 @ 19:41)  POCT Blood Glucose.: 140 mg/dL (04-09-18 @ 14:35)                          8.6    16.8  )-----------( 380      ( 10 Apr 2018 22:41 )             25.7                           9.0    13.6  )-----------( 278      ( 09 Apr 2018 22:29 )             26.5                04-10    133<L>  |  93<L>  |  6<L>  ----------------------------<  231<H>  4.2   |  29  |  0.63    Ca    8.7      10 Apr 2018 22:41  Phos  2.8     04-10  Mg     1.4     04-10      04-09    137  |  99  |  9   ----------------------------<  135<H>  4.3   |  29  |  0.67    Ca    8.7      09 Apr 2018 22:29  Phos  2.4     04-09  Mg     1.3     04-09    TPro  5.8<L>  /  Alb  2.9<L>  /  TBili  0.3  /  DBili  0.1  /  AST  12  /  ALT  30  /  AlkPhos  79  04-10  TPro  5.7<L>  /  Alb  2.9<L>  /  TBili  0.4  /  DBili  0.1  /  AST  18  /  ALT  47<H>  /  AlkPhos  67  04-09  TPro  5.2<L>  /  Alb  2.7<L>  /  TBili  0.4  /  DBili  0.2  /  AST  32  /  ALT  65<H>  /  AlkPhos  50  04-08  TPro  4.7<L>  /  Alb  2.4<L>  /  TBili  0.4  /  DBili  0.1  /  AST  98<H>  /  ALT  81<H>  /  AlkPhos  38<L>  04-08

## 2018-04-11 NOTE — PROGRESS NOTE ADULT - SUBJECTIVE AND OBJECTIVE BOX
GENERAL SURGERY DAILY PROGRESS NOTE:       Subjective:  feels much better, no N/V  pain controlled  TF held, awaiting possible IR today    Objective:  NAD  abd soft NTND  incision C/D/I      MEDICATIONS  (STANDING):  amylase/lipase/protease  (CREON  6,000 Units) 1 Capsule(s) Oral three times a day with meals  amylase/lipase/protease  (CREON  6,000 Units) 1 Capsule(s) Oral at bedtime  dextrose 5% + sodium chloride 0.9% with potassium chloride 20 mEq/L 1000 milliLiter(s) (100 mL/Hr) IV Continuous <Continuous>  dextrose 5%. 1000 milliLiter(s) (50 mL/Hr) IV Continuous <Continuous>  dextrose 5%. 1000 milliLiter(s) (50 mL/Hr) IV Continuous <Continuous>  dextrose 50% Injectable 12.5 Gram(s) IV Push once  dextrose 50% Injectable 25 Gram(s) IV Push once  dextrose 50% Injectable 25 Gram(s) IV Push once  docusate sodium Liquid 100 milliGRAM(s) Oral two times a day  erythromycin    ethylsuccinate Suspension 40 mG/mL 500 milliGRAM(s) Oral every 6 hours  heparin  Injectable 5000 Unit(s) SubCutaneous every 12 hours  HYDROmorphone PCA (1 mG/mL) 30 milliLiter(s) PCA Continuous PCA Continuous  insulin glargine Injectable (LANTUS) 8 Unit(s) SubCutaneous every morning  insulin lispro (HumaLOG) corrective regimen sliding scale   SubCutaneous <User Schedule>  megestrol Suspension 800 milliGRAM(s) Oral daily  ondansetron   Disintegrating Tablet 8 milliGRAM(s) Oral every 8 hours  oxyCODONE  ER Tablet 30 milliGRAM(s) Oral every 8 hours    MEDICATIONS  (PRN):  ALPRAZolam 0.25 milliGRAM(s) Oral every 8 hours PRN Anxiety  bisacodyl 10 milliGRAM(s) Oral at bedtime PRN Constipation  dextrose Gel 1 Dose(s) Oral once PRN Blood Glucose LESS THAN 70 milliGRAM(s)/deciliter  glucagon  Injectable 1 milliGRAM(s) IntraMuscular once PRN Glucose LESS THAN 70 milligrams/deciliter  HYDROmorphone PCA (1 mG/mL) Rescue Clinician Bolus 1 milliGRAM(s) IV Push every 15 minutes PRN for Pain Scale GREATER THAN 6  naloxone Injectable 0.1 milliGRAM(s) IV Push every 3 minutes PRN For ANY of the following changes in patient status:  A. RR LESS THAN 10 breaths per minute, B. Oxygen saturation LESS THAN 90%, C. Sedation score of 6      Vital Signs Last 24 Hrs  T(C): 38.7 (2018 06:44), Max: 39.5 (2018 05:36)  T(F): 101.6 (2018 06:44), Max: 103.1 (2018 05:36)  HR: 127 (2018 05:36) (90 - 127)  BP: 120/70 (2018 05:36) (101/67 - 132/89)  BP(mean): --  RR: 18 (2018 05:36) (18 - 18)  SpO2: 96% (2018 05:36) (95% - 98%)    I&O's Detail    10 Apr 2018 07:01  -  2018 07:00  --------------------------------------------------------  IN:    lactated ringers.: 100 mL    Oral Fluid: 600 mL    Vital HN: 225 mL  Total IN: 925 mL    OUT:  Total OUT: 0 mL    Total NET: 925 mL          Daily     Daily     LABS:                        8.6    16.8  )-----------( 380      ( 10 Apr 2018 22:41 )             25.7     04-10    133<L>  |  93<L>  |  6<L>  ----------------------------<  231<H>  4.2   |  29  |  0.63    Ca    8.7      10 Apr 2018 22:41  Phos  2.8     04-10  Mg     1.4     04-10    TPro  5.8<L>  /  Alb  2.9<L>  /  TBili  0.3  /  DBili  0.1  /  AST  12  /  ALT  30  /  AlkPhos  79  04-10    PT/INR - ( 10 Apr 2018 22:41 )   PT: 13.8 sec;   INR: 1.26 ratio         PTT - ( 10 Apr 2018 22:41 )  PTT:30.0 sec  Urinalysis Basic - ( 10 Apr 2018 14:50 )    Color: Yellow / Appearance: Clear / S.011 / pH: x  Gluc: x / Ketone: Negative  / Bili: Negative / Urobili: 2 mg/dL   Blood: x / Protein: Negative / Nitrite: Negative   Leuk Esterase: Negative / RBC: 0-2 /HPF / WBC 0-2 /HPF   Sq Epi: x / Non Sq Epi: OCC /HPF / Bacteria: x

## 2018-04-11 NOTE — CHART NOTE - NSCHARTNOTEFT_GEN_A_CORE
Spoke with IR resident, Ignacio.  He reviewed images from CT with Dr. Nash and there is no drainable collection.    STEPHANIA Bowen PA-C , pager # [phone] Spoke with IR resident, Ignacio.  He reviewed images from CT with Dr. Nash and there is no drainable collection.      Update 11:15am: Spoke with Dr. Nash and Attending Surgeon Dr. Walker -- Will take patient for IR drainage today. NPO, TF currently held.       STEPHANIA Bowen PA-C , pager # [phone]

## 2018-04-11 NOTE — PROGRESS NOTE ADULT - SUBJECTIVE AND OBJECTIVE BOX
Interventional Radiology Brief- Operative Note    Procedure: S/P CT guided aspiration of pelvic fluid    Operators: Yobani Nash; Jose Maria Steve    Anesthesia (type): MAC    Contrast:  N/A    EBL: N/A    Findings/Follow up Plan of Care:    Follow up cultures.  Patient to recover for 1 hour.    Specimens Removed: 3 cc red cloudy fluid    Implants: N/A    Complications: none    Condition/Disposition: IR Recovery then Floor    Please call Interventional Radiology x [phone] with any questions, concerns, or issues. Interventional Radiology Brief- Operative Note    Procedure: S/P CT guided aspiration of pelvic fluid    Operators: Yobani Nash; Jose Maria Steve    Anesthesia (type): MAC    Contrast:  N/A    EBL: N/A    Findings/Follow up Plan of Care: Pelvic fluid was non-loculated and displaced when patient was placed into prone and left lateral decubitus positions. Via transgluteal approach, access was able to be obtained with a 21 gauge needle. A small amount of clear red fluid was aspirated. Discussed findings with Dr. Walker after procedure    Follow up cultures.  Patient to recover for 1 hour.    Specimens Removed: 3 cc red cloudy fluid    Implants: N/A    Complications: none    Condition/Disposition: IR Recovery then Floor    Please call Interventional Radiology x [phone] with any questions, concerns, or issues.

## 2018-04-12 LAB
ALBUMIN SERPL ELPH-MCNC: 2.6 G/DL — LOW (ref 3.3–5)
ALP SERPL-CCNC: 80 U/L — SIGNIFICANT CHANGE UP (ref 40–120)
ALT FLD-CCNC: 29 U/L RC — SIGNIFICANT CHANGE UP (ref 10–45)
ANION GAP SERPL CALC-SCNC: 13 MMOL/L — SIGNIFICANT CHANGE UP (ref 5–17)
AST SERPL-CCNC: 36 U/L — SIGNIFICANT CHANGE UP (ref 10–40)
BILIRUB DIRECT SERPL-MCNC: 0.3 MG/DL — HIGH (ref 0–0.2)
BILIRUB INDIRECT FLD-MCNC: 0.3 MG/DL — SIGNIFICANT CHANGE UP (ref 0.2–1)
BILIRUB SERPL-MCNC: 0.6 MG/DL — SIGNIFICANT CHANGE UP (ref 0.2–1.2)
BUN SERPL-MCNC: 9 MG/DL — SIGNIFICANT CHANGE UP (ref 7–23)
CALCIUM SERPL-MCNC: 8.3 MG/DL — LOW (ref 8.4–10.5)
CHLORIDE SERPL-SCNC: 95 MMOL/L — LOW (ref 96–108)
CO2 SERPL-SCNC: 27 MMOL/L — SIGNIFICANT CHANGE UP (ref 22–31)
CREAT SERPL-MCNC: 0.71 MG/DL — SIGNIFICANT CHANGE UP (ref 0.5–1.3)
GLUCOSE SERPL-MCNC: 278 MG/DL — HIGH (ref 70–99)
GRAM STN FLD: SIGNIFICANT CHANGE UP
HBA1C BLD-MCNC: 5.4 % — SIGNIFICANT CHANGE UP (ref 4–5.6)
HCT VFR BLD CALC: 24.4 % — LOW (ref 34.5–45)
HGB BLD-MCNC: 7.7 G/DL — LOW (ref 11.5–15.5)
MAGNESIUM SERPL-MCNC: 1.2 MG/DL — LOW (ref 1.6–2.6)
MCHC RBC-ENTMCNC: 31.6 PG — SIGNIFICANT CHANGE UP (ref 27–34)
MCHC RBC-ENTMCNC: 31.7 GM/DL — LOW (ref 32–36)
MCV RBC AUTO: 99.5 FL — SIGNIFICANT CHANGE UP (ref 80–100)
PHOSPHATE SERPL-MCNC: 3.9 MG/DL — SIGNIFICANT CHANGE UP (ref 2.5–4.5)
PLATELET # BLD AUTO: 505 K/UL — HIGH (ref 150–400)
POTASSIUM SERPL-MCNC: 4.6 MMOL/L — SIGNIFICANT CHANGE UP (ref 3.5–5.3)
POTASSIUM SERPL-SCNC: 4.6 MMOL/L — SIGNIFICANT CHANGE UP (ref 3.5–5.3)
PROT SERPL-MCNC: 5.4 G/DL — LOW (ref 6–8.3)
RBC # BLD: 2.45 M/UL — LOW (ref 3.8–5.2)
RBC # FLD: 15.1 % — HIGH (ref 10.3–14.5)
SODIUM SERPL-SCNC: 135 MMOL/L — SIGNIFICANT CHANGE UP (ref 135–145)
SPECIMEN SOURCE: SIGNIFICANT CHANGE UP
WBC # BLD: 27.8 K/UL — HIGH (ref 3.8–10.5)
WBC # FLD AUTO: 27.8 K/UL — HIGH (ref 3.8–10.5)

## 2018-04-12 PROCEDURE — 49465 FLUORO EXAM OF G/COLON TUBE: CPT

## 2018-04-12 PROCEDURE — 99231 SBSQ HOSP IP/OBS SF/LOW 25: CPT

## 2018-04-12 PROCEDURE — 99233 SBSQ HOSP IP/OBS HIGH 50: CPT

## 2018-04-12 RX ORDER — MAGNESIUM SULFATE 500 MG/ML
2 VIAL (ML) INJECTION ONCE
Qty: 0 | Refills: 0 | Status: COMPLETED | OUTPATIENT
Start: 2018-04-12 | End: 2018-04-12

## 2018-04-12 RX ORDER — METHOCARBAMOL 500 MG/1
750 TABLET, FILM COATED ORAL EVERY 8 HOURS
Qty: 0 | Refills: 0 | Status: DISCONTINUED | OUTPATIENT
Start: 2018-04-12 | End: 2018-04-18

## 2018-04-12 RX ORDER — MAGNESIUM SULFATE 500 MG/ML
1 VIAL (ML) INJECTION EVERY 4 HOURS
Qty: 0 | Refills: 0 | Status: DISCONTINUED | OUTPATIENT
Start: 2018-04-12 | End: 2018-04-12

## 2018-04-12 RX ORDER — HUMAN INSULIN 100 [IU]/ML
1 INJECTION, SUSPENSION SUBCUTANEOUS
Qty: 0 | Refills: 0 | Status: DISCONTINUED | OUTPATIENT
Start: 2018-04-12 | End: 2018-04-13

## 2018-04-12 RX ORDER — INSULIN LISPRO 100/ML
1 VIAL (ML) SUBCUTANEOUS
Qty: 0 | Refills: 0 | Status: DISCONTINUED | OUTPATIENT
Start: 2018-04-12 | End: 2018-04-14

## 2018-04-12 RX ORDER — INSULIN GLARGINE 100 [IU]/ML
8 INJECTION, SOLUTION SUBCUTANEOUS EVERY MORNING
Qty: 0 | Refills: 0 | Status: DISCONTINUED | OUTPATIENT
Start: 2018-04-12 | End: 2018-04-13

## 2018-04-12 RX ORDER — INSULIN LISPRO 100/ML
1 VIAL (ML) SUBCUTANEOUS
Qty: 0 | Refills: 0 | Status: DISCONTINUED | OUTPATIENT
Start: 2018-04-12 | End: 2018-04-12

## 2018-04-12 RX ORDER — OXYCODONE HYDROCHLORIDE 5 MG/1
10 TABLET ORAL
Qty: 0 | Refills: 0 | Status: DISCONTINUED | OUTPATIENT
Start: 2018-04-12 | End: 2018-04-18

## 2018-04-12 RX ORDER — INSULIN GLARGINE 100 [IU]/ML
10 INJECTION, SOLUTION SUBCUTANEOUS EVERY MORNING
Qty: 0 | Refills: 0 | Status: DISCONTINUED | OUTPATIENT
Start: 2018-04-12 | End: 2018-04-12

## 2018-04-12 RX ORDER — VANCOMYCIN HCL 1 G
1000 VIAL (EA) INTRAVENOUS EVERY 12 HOURS
Qty: 0 | Refills: 0 | Status: DISCONTINUED | OUTPATIENT
Start: 2018-04-12 | End: 2018-04-13

## 2018-04-12 RX ADMIN — Medication 500 MILLIGRAM(S): at 19:04

## 2018-04-12 RX ADMIN — PIPERACILLIN AND TAZOBACTAM 25 GRAM(S): 4; .5 INJECTION, POWDER, LYOPHILIZED, FOR SOLUTION INTRAVENOUS at 23:18

## 2018-04-12 RX ADMIN — PIPERACILLIN AND TAZOBACTAM 25 GRAM(S): 4; .5 INJECTION, POWDER, LYOPHILIZED, FOR SOLUTION INTRAVENOUS at 06:36

## 2018-04-12 RX ADMIN — Medication 1 UNIT(S): at 19:12

## 2018-04-12 RX ADMIN — Medication 1 CAPSULE(S): at 10:52

## 2018-04-12 RX ADMIN — Medication 500 MILLIGRAM(S): at 00:40

## 2018-04-12 RX ADMIN — OXYCODONE HYDROCHLORIDE 30 MILLIGRAM(S): 5 TABLET ORAL at 22:30

## 2018-04-12 RX ADMIN — OXYCODONE HYDROCHLORIDE 30 MILLIGRAM(S): 5 TABLET ORAL at 23:00

## 2018-04-12 RX ADMIN — DEXTROSE MONOHYDRATE, SODIUM CHLORIDE, AND POTASSIUM CHLORIDE 100 MILLILITER(S): 50; .745; 4.5 INJECTION, SOLUTION INTRAVENOUS at 06:35

## 2018-04-12 RX ADMIN — Medication 1: at 19:13

## 2018-04-12 RX ADMIN — Medication 500 MILLIGRAM(S): at 23:14

## 2018-04-12 RX ADMIN — MEGESTROL ACETATE 800 MILLIGRAM(S): 40 SUSPENSION ORAL at 11:28

## 2018-04-12 RX ADMIN — Medication 500 MILLIGRAM(S): at 06:36

## 2018-04-12 RX ADMIN — HEPARIN SODIUM 5000 UNIT(S): 5000 INJECTION INTRAVENOUS; SUBCUTANEOUS at 06:36

## 2018-04-12 RX ADMIN — Medication 1 UNIT(S): at 10:52

## 2018-04-12 RX ADMIN — Medication 1 CAPSULE(S): at 21:11

## 2018-04-12 RX ADMIN — Medication 50 GRAM(S): at 03:12

## 2018-04-12 RX ADMIN — Medication 2: at 10:53

## 2018-04-12 RX ADMIN — OXYCODONE HYDROCHLORIDE 10 MILLIGRAM(S): 5 TABLET ORAL at 19:07

## 2018-04-12 RX ADMIN — METHOCARBAMOL 750 MILLIGRAM(S): 500 TABLET, FILM COATED ORAL at 21:10

## 2018-04-12 RX ADMIN — Medication 1 CAPSULE(S): at 15:40

## 2018-04-12 RX ADMIN — Medication 100 MILLIGRAM(S): at 06:36

## 2018-04-12 RX ADMIN — DEXTROSE MONOHYDRATE, SODIUM CHLORIDE, AND POTASSIUM CHLORIDE 100 MILLILITER(S): 50; .745; 4.5 INJECTION, SOLUTION INTRAVENOUS at 10:55

## 2018-04-12 RX ADMIN — OXYCODONE HYDROCHLORIDE 30 MILLIGRAM(S): 5 TABLET ORAL at 16:09

## 2018-04-12 RX ADMIN — OXYCODONE HYDROCHLORIDE 10 MILLIGRAM(S): 5 TABLET ORAL at 19:37

## 2018-04-12 RX ADMIN — OXYCODONE HYDROCHLORIDE 30 MILLIGRAM(S): 5 TABLET ORAL at 15:39

## 2018-04-12 RX ADMIN — METHOCARBAMOL 750 MILLIGRAM(S): 500 TABLET, FILM COATED ORAL at 10:54

## 2018-04-12 RX ADMIN — HYDROMORPHONE HYDROCHLORIDE 30 MILLILITER(S): 2 INJECTION INTRAMUSCULAR; INTRAVENOUS; SUBCUTANEOUS at 07:23

## 2018-04-12 RX ADMIN — OXYCODONE HYDROCHLORIDE 30 MILLIGRAM(S): 5 TABLET ORAL at 07:37

## 2018-04-12 RX ADMIN — OXYCODONE HYDROCHLORIDE 10 MILLIGRAM(S): 5 TABLET ORAL at 11:57

## 2018-04-12 RX ADMIN — Medication 100 MILLIGRAM(S): at 19:04

## 2018-04-12 RX ADMIN — Medication 1 CAPSULE(S): at 19:05

## 2018-04-12 RX ADMIN — Medication 1 UNIT(S): at 15:39

## 2018-04-12 RX ADMIN — OXYCODONE HYDROCHLORIDE 10 MILLIGRAM(S): 5 TABLET ORAL at 23:00

## 2018-04-12 RX ADMIN — OXYCODONE HYDROCHLORIDE 10 MILLIGRAM(S): 5 TABLET ORAL at 11:27

## 2018-04-12 RX ADMIN — Medication 1: at 07:39

## 2018-04-12 RX ADMIN — OXYCODONE HYDROCHLORIDE 10 MILLIGRAM(S): 5 TABLET ORAL at 22:30

## 2018-04-12 RX ADMIN — INSULIN GLARGINE 8 UNIT(S): 100 INJECTION, SOLUTION SUBCUTANEOUS at 11:31

## 2018-04-12 RX ADMIN — Medication 250 MILLIGRAM(S): at 21:11

## 2018-04-12 RX ADMIN — ONDANSETRON 8 MILLIGRAM(S): 8 TABLET, FILM COATED ORAL at 15:41

## 2018-04-12 RX ADMIN — Medication 500 MILLIGRAM(S): at 15:38

## 2018-04-12 RX ADMIN — Medication 6: at 03:47

## 2018-04-12 RX ADMIN — ONDANSETRON 8 MILLIGRAM(S): 8 TABLET, FILM COATED ORAL at 21:11

## 2018-04-12 RX ADMIN — ONDANSETRON 8 MILLIGRAM(S): 8 TABLET, FILM COATED ORAL at 06:36

## 2018-04-12 RX ADMIN — Medication 1: at 22:29

## 2018-04-12 RX ADMIN — PIPERACILLIN AND TAZOBACTAM 25 GRAM(S): 4; .5 INJECTION, POWDER, LYOPHILIZED, FOR SOLUTION INTRAVENOUS at 15:39

## 2018-04-12 NOTE — PROGRESS NOTE ADULT - SUBJECTIVE AND OBJECTIVE BOX
Day 7/4_ of Anesthesia Pain Management Service    SUBJECTIVE: Patient is doing well off IV PCA    Pain Scale Score:	[X] Refer to charted pain scores    THERAPY:    [ ] IV PCA Morphine		[ ] 5 mg/mL	[ ] 1 mg/mL  [X] IV PCA Hydromorphone	[ ] 5 mg/mL	[X] 1 mg/mL  [ ] IV PCA Fentanyl		[ ] 50 micrograms/mL    Demand dose: 0.2 mg     Lockout: 6 minutes   Continuous Rate: 0 mg/hr  4 Hour Limit: 4 mg    MEDICATIONS  (STANDING):  amylase/lipase/protease  (CREON  6,000 Units) 1 Capsule(s) Oral three times a day with meals  amylase/lipase/protease  (CREON  6,000 Units) 1 Capsule(s) Oral at bedtime  dextrose 5%. 1000 milliLiter(s) (50 mL/Hr) IV Continuous <Continuous>  docusate sodium Liquid 100 milliGRAM(s) Oral two times a day  erythromycin    ethylsuccinate Suspension 40 mG/mL 500 milliGRAM(s) Oral every 6 hours  heparin  Injectable 5000 Unit(s) SubCutaneous every 12 hours  insulin glargine Injectable (LANTUS) 10 Unit(s) SubCutaneous every morning  insulin lispro (HumaLOG) corrective regimen sliding scale   SubCutaneous every 4 hours  insulin lispro Injectable (HumaLOG) 1 Unit(s) SubCutaneous three times a day before meals  megestrol Suspension 800 milliGRAM(s) Oral daily  ondansetron   Disintegrating Tablet 8 milliGRAM(s) Oral every 8 hours  oxyCODONE  ER Tablet 30 milliGRAM(s) Oral every 8 hours  piperacillin/tazobactam IVPB. 3.375 Gram(s) IV Intermittent every 8 hours  sodium chloride 0.9% with potassium chloride 20 mEq/L 1000 milliLiter(s) (100 mL/Hr) IV Continuous <Continuous>    MEDICATIONS  (PRN):  ALPRAZolam 0.25 milliGRAM(s) Oral every 8 hours PRN Anxiety  bisacodyl 10 milliGRAM(s) Oral at bedtime PRN Constipation  methocarbamol 750 milliGRAM(s) Oral every 8 hours PRN spasm  oxyCODONE    IR 10 milliGRAM(s) Oral every 3 hours PRN Moderate Pain (4 - 6)      OBJECTIVE:    Sedation Score:	[ X] Alert	[ ] Drowsy 	[ ] Arousable	[ ] Asleep	[ ] Unresponsive    Side Effects:	[X ] None	[ ] Nausea	[ ] Vomiting	[ ] Pruritus  		[ ] Other:    Vital Signs Last 24 Hrs  T(C): 38.3 (12 Apr 2018 08:55), Max: 38.6 (11 Apr 2018 11:00)  T(F): 100.9 (12 Apr 2018 08:55), Max: 101.5 (11 Apr 2018 11:00)  HR: 120 (12 Apr 2018 08:55) (76 - 133)  BP: 105/65 (12 Apr 2018 08:55) (103/62 - 122/70)  BP(mean): --  RR: 18 (12 Apr 2018 08:55) (18 - 18)  SpO2: 95% (12 Apr 2018 08:55) (91% - 98%)    ASSESSMENT/ PLAN    Therapy to  be:               [] Continued   [x ] Discontinued   [x ] Changed to PRN Analgesics    Documentation and Verification of current medications:   [X] Done	[ ] Not done, not eligible    Comments:

## 2018-04-12 NOTE — PROGRESS NOTE ADULT - SUBJECTIVE AND OBJECTIVE BOX
Chief Complaint: s/p islet cell transplant     Interval history: Appears to have been complicated last 24 hours.  In discussion with pt, she was allowed to eat regular diet yesterday, and did eat but felt her po intake wasn't as robust as it would have been for 2 reasons, first that she doesn't eat much at baseline but also that she felt that feeds that were on (in previous days) were keeping her full so that she wasn't as hungry when meals would be brought.  She states that feeds were off yesterday in day, but then restarted for a portion of the night overnight and in that setting, her sugar daysi to the high 200s.  Fellow was paged overnight who suggested increasing the lantus from 8 to 10 units given rise in sugars overnight and the fact that feeds were back on, but then since that call, feeds were stopped again.  Per nursing, discussion held with pt this AM about restarting them again but she declined stating she'd like to try eating by mouth instead.  She had just ordered breakfast when I saw her and had yet to receive her AM lantus (was around 9:30 that I saw her).      MEDICATIONS  (STANDING):  amylase/lipase/protease  (CREON  6,000 Units) 1 Capsule(s) Oral three times a day with meals  amylase/lipase/protease  (CREON  6,000 Units) 1 Capsule(s) Oral at bedtime  dextrose 5%. 1000 milliLiter(s) (50 mL/Hr) IV Continuous <Continuous>  docusate sodium Liquid 100 milliGRAM(s) Oral two times a day  erythromycin    ethylsuccinate Suspension 40 mG/mL 500 milliGRAM(s) Oral every 6 hours  heparin  Injectable 5000 Unit(s) SubCutaneous every 12 hours  insulin glargine Injectable (LANTUS) 8 Unit(s) SubCutaneous every morning  insulin lispro (HumaLOG) corrective regimen sliding scale   SubCutaneous every 4 hours  insulin lispro Injectable (HumaLOG) 1 Unit(s) SubCutaneous three times a day before meals  megestrol Suspension 800 milliGRAM(s) Oral daily  ondansetron   Disintegrating Tablet 8 milliGRAM(s) Oral every 8 hours  oxyCODONE  ER Tablet 30 milliGRAM(s) Oral every 8 hours  piperacillin/tazobactam IVPB. 3.375 Gram(s) IV Intermittent every 8 hours  sodium chloride 0.9% with potassium chloride 20 mEq/L 1000 milliLiter(s) (100 mL/Hr) IV Continuous <Continuous>    MEDICATIONS  (PRN):  ALPRAZolam 0.25 milliGRAM(s) Oral every 8 hours PRN Anxiety  bisacodyl 10 milliGRAM(s) Oral at bedtime PRN Constipation  methocarbamol 750 milliGRAM(s) Oral every 8 hours PRN spasm  oxyCODONE    IR 10 milliGRAM(s) Oral every 3 hours PRN Moderate Pain (4 - 6)      Allergies    No Known Allergies    Intolerances      Review of Systems:  Skin: no rash  Endocrine: no polyuria, polydipsia    ALL OTHER SYSTEMS REVIEWED AND NEGATIVE    PHYSICAL EXAM:  VITALS: T(C): 38.3 (04-12-18 @ 08:55)  T(F): 100.9 (04-12-18 @ 08:55), Max: 100.9 (04-12-18 @ 08:55)  HR: 120 (04-12-18 @ 08:55) (76 - 133)  BP: 105/65 (04-12-18 @ 08:55) (103/62 - 122/70)  RR:  (18 - 18)  SpO2:  (91% - 98%)  Wt(kg): --  GENERAL: NAD, well-developed  EYES: No proptosis, sclera anicteric  HEENT:  Atraumatic, Normocephalic, moist mucous membranes  SKIN: Dry, intact, No diffuse rashes   PSYCH: Alert and oriented x 3, normal affect, normal mood    POCT Blood Glucose.: 180 mg/dL (04-12-18 @ 10:48)  POCT Blood Glucose.: 122 mg/dL (04-12-18 @ 07:35)  POCT Blood Glucose.: 145 mg/dL (04-12-18 @ 06:22)  POCT Blood Glucose.: 295 mg/dL (04-12-18 @ 03:46)  POCT Blood Glucose.: 290 mg/dL (04-12-18 @ 02:29)  POCT Blood Glucose.: 229 mg/dL (04-11-18 @ 22:12)  POCT Blood Glucose.: 149 mg/dL (04-11-18 @ 18:43)  POCT Blood Glucose.: 111 mg/dL (04-11-18 @ 16:10)  POCT Blood Glucose.: 165 mg/dL (04-11-18 @ 14:02)  POCT Blood Glucose.: 256 mg/dL (04-11-18 @ 11:18)  POCT Blood Glucose.: 178 mg/dL (04-11-18 @ 08:33)  POCT Blood Glucose.: 202 mg/dL (04-11-18 @ 05:36)  POCT Blood Glucose.: 263 mg/dL (04-11-18 @ 00:29)  POCT Blood Glucose.: 193 mg/dL (04-10-18 @ 22:15)  POCT Blood Glucose.: 264 mg/dL (04-10-18 @ 18:58)  POCT Blood Glucose.: 37 mg/dL (04-10-18 @ 18:25)  POCT Blood Glucose.: 38 mg/dL (04-10-18 @ 18:23)  POCT Blood Glucose.: 58 mg/dL (04-10-18 @ 17:55)  POCT Blood Glucose.: 160 mg/dL (04-10-18 @ 14:44)  POCT Blood Glucose.: 197 mg/dL (04-10-18 @ 09:59)  POCT Blood Glucose.: 166 mg/dL (04-10-18 @ 06:03)  POCT Blood Glucose.: 160 mg/dL (04-10-18 @ 02:15)  POCT Blood Glucose.: 103 mg/dL (04-09-18 @ 22:02)  POCT Blood Glucose.: 108 mg/dL (04-09-18 @ 19:41)  POCT Blood Glucose.: 140 mg/dL (04-09-18 @ 14:35)      04-12    135  |  95<L>  |  9   ----------------------------<  278<H>  4.6   |  27  |  0.71    EGFR if : 122  EGFR if non : 105    Ca    8.3<L>      04-12  Mg     1.2     04-12  Phos  3.9     04-12    TPro  5.4<L>  /  Alb  2.6<L>  /  TBili  0.6  /  DBili  0.3<H>  /  AST  36  /  ALT  29  /  AlkPhos  80  04-12          Thyroid Function Tests: Chief Complaint: s/p islet cell transplant     Interval history: Appears to have been complicated last 24 hours.  In discussion with pt, she was allowed to eat regular diet yesterday, and did eat but felt her po intake wasn't as robust as it would have been for 2 reasons, first that she doesn't eat much at baseline but also that she felt that feeds that were on (in previous days) were keeping her full so that she wasn't as hungry when meals would be brought.  She states that feeds were off yesterday in day, but then restarted for a portion of the night overnight and in that setting, her sugar daysi to the high 200s.  Fellow was paged overnight who suggested increasing the lantus from 8 to 10 units given rise in sugars overnight and the fact that feeds were back on, but then since that call, feeds were stopped again.  Per nursing, discussion held with pt this AM about restarting them again but she declined stating she'd like to try eating by mouth instead.  She had just ordered breakfast when I saw her and had yet to receive her AM lantus (was around 9:30 that I saw her).  In discussion with team, pt's WBC also up and they are watching to see how pt fares over next day or so.    MEDICATIONS  (STANDING):  amylase/lipase/protease  (CREON  6,000 Units) 1 Capsule(s) Oral three times a day with meals  amylase/lipase/protease  (CREON  6,000 Units) 1 Capsule(s) Oral at bedtime  dextrose 5%. 1000 milliLiter(s) (50 mL/Hr) IV Continuous <Continuous>  docusate sodium Liquid 100 milliGRAM(s) Oral two times a day  erythromycin    ethylsuccinate Suspension 40 mG/mL 500 milliGRAM(s) Oral every 6 hours  heparin  Injectable 5000 Unit(s) SubCutaneous every 12 hours  insulin glargine Injectable (LANTUS) 8 Unit(s) SubCutaneous every morning  insulin lispro (HumaLOG) corrective regimen sliding scale   SubCutaneous every 4 hours  insulin lispro Injectable (HumaLOG) 1 Unit(s) SubCutaneous three times a day before meals  megestrol Suspension 800 milliGRAM(s) Oral daily  ondansetron   Disintegrating Tablet 8 milliGRAM(s) Oral every 8 hours  oxyCODONE  ER Tablet 30 milliGRAM(s) Oral every 8 hours  piperacillin/tazobactam IVPB. 3.375 Gram(s) IV Intermittent every 8 hours  sodium chloride 0.9% with potassium chloride 20 mEq/L 1000 milliLiter(s) (100 mL/Hr) IV Continuous <Continuous>    MEDICATIONS  (PRN):  ALPRAZolam 0.25 milliGRAM(s) Oral every 8 hours PRN Anxiety  bisacodyl 10 milliGRAM(s) Oral at bedtime PRN Constipation  methocarbamol 750 milliGRAM(s) Oral every 8 hours PRN spasm  oxyCODONE    IR 10 milliGRAM(s) Oral every 3 hours PRN Moderate Pain (4 - 6)      Allergies    No Known Allergies    Intolerances      Review of Systems:  Skin: no rash  Endocrine: no polyuria, polydipsia    ALL OTHER SYSTEMS REVIEWED AND NEGATIVE    PHYSICAL EXAM:  VITALS: T(C): 38.3 (04-12-18 @ 08:55)  T(F): 100.9 (04-12-18 @ 08:55), Max: 100.9 (04-12-18 @ 08:55)  HR: 120 (04-12-18 @ 08:55) (76 - 133)  BP: 105/65 (04-12-18 @ 08:55) (103/62 - 122/70)  RR:  (18 - 18)  SpO2:  (91% - 98%)  Wt(kg): --  GENERAL: NAD, well-developed  EYES: No proptosis, sclera anicteric  HEENT:  Atraumatic, Normocephalic, moist mucous membranes  SKIN: Dry, intact, No diffuse rashes   PSYCH: Alert and oriented x 3, normal affect, normal mood    POCT Blood Glucose.: 180 mg/dL (04-12-18 @ 10:48)  POCT Blood Glucose.: 122 mg/dL (04-12-18 @ 07:35)  POCT Blood Glucose.: 145 mg/dL (04-12-18 @ 06:22)  POCT Blood Glucose.: 295 mg/dL (04-12-18 @ 03:46)  POCT Blood Glucose.: 290 mg/dL (04-12-18 @ 02:29)  POCT Blood Glucose.: 229 mg/dL (04-11-18 @ 22:12)  POCT Blood Glucose.: 149 mg/dL (04-11-18 @ 18:43)  POCT Blood Glucose.: 111 mg/dL (04-11-18 @ 16:10)  POCT Blood Glucose.: 165 mg/dL (04-11-18 @ 14:02)  POCT Blood Glucose.: 256 mg/dL (04-11-18 @ 11:18)  POCT Blood Glucose.: 178 mg/dL (04-11-18 @ 08:33)  POCT Blood Glucose.: 202 mg/dL (04-11-18 @ 05:36)  POCT Blood Glucose.: 263 mg/dL (04-11-18 @ 00:29)  POCT Blood Glucose.: 193 mg/dL (04-10-18 @ 22:15)  POCT Blood Glucose.: 264 mg/dL (04-10-18 @ 18:58)  POCT Blood Glucose.: 37 mg/dL (04-10-18 @ 18:25)  POCT Blood Glucose.: 38 mg/dL (04-10-18 @ 18:23)  POCT Blood Glucose.: 58 mg/dL (04-10-18 @ 17:55)  POCT Blood Glucose.: 160 mg/dL (04-10-18 @ 14:44)  POCT Blood Glucose.: 197 mg/dL (04-10-18 @ 09:59)  POCT Blood Glucose.: 166 mg/dL (04-10-18 @ 06:03)  POCT Blood Glucose.: 160 mg/dL (04-10-18 @ 02:15)  POCT Blood Glucose.: 103 mg/dL (04-09-18 @ 22:02)  POCT Blood Glucose.: 108 mg/dL (04-09-18 @ 19:41)  POCT Blood Glucose.: 140 mg/dL (04-09-18 @ 14:35)      04-12    135  |  95<L>  |  9   ----------------------------<  278<H>  4.6   |  27  |  0.71    EGFR if : 122  EGFR if non : 105    Ca    8.3<L>      04-12  Mg     1.2     04-12  Phos  3.9     04-12    TPro  5.4<L>  /  Alb  2.6<L>  /  TBili  0.6  /  DBili  0.3<H>  /  AST  36  /  ALT  29  /  AlkPhos  80  04-12          Thyroid Function Tests:

## 2018-04-12 NOTE — PROGRESS NOTE ADULT - ASSESSMENT
A/P 42F  POD #7 s/p total pancreatectomy with islet cell autotransplantation, now with fevers of unknown origin and an increasing leukocytosis s/p IR drainage of pelvic fluid collection 4/11    -monitor fevers, WBC  -Diet: reg + TF  -pain control  -DVT ppx  -cont erythromycin, Zosyn, creon, megace  -OOB/amb  -f/u endocrine recs  -f/u labs/blood cx's    Blue Team pager # [phone]

## 2018-04-12 NOTE — PROGRESS NOTE ADULT - SUBJECTIVE AND OBJECTIVE BOX
BLUE TEAM GENERAL SURGERY DAILY PROGRESS NOTE:       Subjective: Patient seen and examined.  Went to IR yesterday, 3cc fluid drained from pelvis.  Patient continues to be febrile.    Objective:  T(C): 38.1 (18 @ 05:38), Max: 38.7 (18 @ 06:44)  HR: 132 (18 @ 05:38) (76 - 133)  BP: 118/68 (18 @ 05:38) (103/62 - 122/70)  RR: 18 (18 @ 05:38) (18 - 18)  SpO2: 91% (18 @ 05:38) (91% - 98%)  Wt(kg): --    04-10 @ 07:01  -   @ 07:00  --------------------------------------------------------  IN:    dextrose 5% + sodium chloride 0.45% with potassium chloride 20 mEq/L: 350 mL    lactated ringers.: 100 mL    Oral Fluid: 600 mL    Solution: 75 mL    Vital HN: 225 mL  Total IN: 1350 mL    OUT:  Total OUT: 0 mL    Total NET: 1350 mL       @ 07:01  -   @ 05:40  --------------------------------------------------------  IN:    Oral Fluid: 340 mL    sodium chloride 0.9% with potassium chloride 20 mEq/L: 900 mL    Solution: 200 mL    Vital HN: 45 mL  Total IN: 1485 mL    OUT:  Total OUT: 0 mL    Total NET: 1485 mL          PE:  NAD  abd soft NTND  incision C/D/I                            7.7    27.8  )-----------( 505      ( 2018 00:28 )             24.4         135  |  95<L>  |  9   ----------------------------<  278<H>  4.6   |  27  |  0.71    Ca    8.3<L>      2018 00:27  Phos  3.9     04-12  Mg     1.2     04-12    TPro  5.4<L>  /  Alb  2.6<L>  /  TBili  0.6  /  DBili  0.3<H>  /  AST  36  /  ALT  29  /  AlkPhos  80  04-12    LIVER FUNCTIONS - ( 2018 00:27 )  Alb: 2.6 g/dL / Pro: 5.4 g/dL / ALK PHOS: 80 U/L / ALT: 29 U/L RC / AST: 36 U/L / GGT: x           PT/INR - ( 10 Apr 2018 22:41 )   PT: 13.8 sec;   INR: 1.26 ratio         PTT - ( 10 Apr 2018 22:41 )  PTT:30.0 sec  Urinalysis Basic - ( 10 Apr 2018 14:50 )    Color: Yellow / Appearance: Clear / S.011 / pH: x  Gluc: x / Ketone: Negative  / Bili: Negative / Urobili: 2 mg/dL   Blood: x / Protein: Negative / Nitrite: Negative   Leuk Esterase: Negative / RBC: 0-2 /HPF / WBC 0-2 /HPF   Sq Epi: x / Non Sq Epi: OCC /HPF / Bacteria: x

## 2018-04-12 NOTE — PROGRESS NOTE ADULT - SUBJECTIVE AND OBJECTIVE BOX
Follow-up following PCA removal earlier today.  Patient resting comfortably in bed. Received Oxycodone IR 10mg po @ 1130 with good analgesia and Robaxin 750mg @ 1030.  Continue current treatment of Oxycontin 30mg po q8, Oxycodone IR 10mg po q 3hr PRN and Robaxin PRN.

## 2018-04-12 NOTE — PROGRESS NOTE ADULT - PROBLEM SELECTOR PLAN 1
-Pt currently is NOT NPO, is being brought meals (albeit at strange times as just ordering braekfast at 9:30 when I was there)  -Given that there is no plan for parenteral feeds right now, would continue Lantus 8 units qam for now. Was adjusted up in the middle of night to address rise in sugars when pt was on feeds, but feeds are now off   -If feeds are spontaneously restarted again, then what we could do to better titrate BG while on TF is to add q6h NPH at very low dose 1-2 units q6h to prevent high BG while on feeds, but then could stop the NPH once feeds were then held again   -Continue humalog correction scale at q4h  -Will add back humalog 1 unit premeal as pt is now to be eating by mouth again; if her sugars continue to run above goal and she is eating, would recommend to increase further to 2 units tidac (HOLD if not eating).  -D5 is OFF   -Contact surgeon regarding IVF and BG >200s.  -Would be ideal if we could be alerted to planned changes in Tube feeds, po intake changes, or plans to be NPO as otherwise we will continue to have a reactive approach and sugars may trend higher than we would like  -please page with any questions [phone] -Pt currently is NOT NPO, is being brought meals (albeit at strange times as just ordering braekfast at 9:30 when I was there)  -Given that there is no plan for parenteral feeds right now, would continue Lantus 8 units qam for now. Was adjusted up in the middle of night to address rise in sugars when pt was on feeds, but feeds are now off and will be off in the daytimes going forward  -Plan now is to run TF nocturnally from 8 pm through 6 am (for 10 hours) - Therefore, will add NPH 1 unit at 7 pm and at 1 am to cover feeds (HOLD if feeds not to start or not to continue to prevent low BG); can't really increase lantus to address this issue as if it controlled her while feeds were on, then she would be low the 14 hours of day when feeds are off    -Continue humalog correction scale at q4h  -Will add back humalog 1 unit premeal as pt is now to be eating by mouth again; if her sugars continue to run above goal and she is eating, would recommend to increase further to 2 units tidac (HOLD if not eating).  -D5 is OFF   -Contact surgeon regarding IVF and BG >200s.  -Would be ideal if we could be alerted to planned changes in Tube feeds, po intake changes, or plans to be NPO as otherwise we will continue to have a reactive approach and sugars may trend higher than we would like  -please page with any questions [phone]

## 2018-04-12 NOTE — PROGRESS NOTE ADULT - SUBJECTIVE AND OBJECTIVE BOX
Interventional Radiology Follow- Up Note      This is a 42y Female with PMH of chronic biliary pancreatitis s/p moody procedure (2014) who was admitted for total pancreatectomy with autologous transplantation of pancreatic islet cells for which she underwent on 4/5/18 with Dr. Davalos. During hospital course pt found to have small pelvic fluid collection and underwent CT guided aspiration in IR on 4/11/18 with Dr. Nash.    Pt seen and examined at bedside.     PAST MEDICAL & SURGICAL HISTORY:  Premenstrual migraine  Other chronic pancreatitis  S/P cholecystectomy: in early 20&#x27;s  Pancreatitis: h/o Moody procedure 2014      Allergies: No Known Allergies      LABS:                        7.7    27.8  )-----------( 505      ( 12 Apr 2018 00:28 )             24.4     04-12    135  |  95<L>  |  9   ----------------------------<  278<H>  4.6   |  27  |  0.71    Ca    8.3<L>      12 Apr 2018 00:27  Phos  3.9     04-12  Mg     1.2     04-12    TPro  5.4<L>  /  Alb  2.6<L>  /  TBili  0.6  /  DBili  0.3<H>  /  AST  36  /  ALT  29  /  AlkPhos  80  04-12    PT/INR - ( 10 Apr 2018 22:41 )   PT: 13.8 sec;   INR: 1.26 ratio         PTT - ( 10 Apr 2018 22:41 )  PTT:30.0 sec    Pelvic fluid collection Cultures: pending, no organisms seen on gram stain    Vitals: T(F): 100.9 (04-12-18 @ 08:55), Max: 101.5 (04-11-18 @ 11:00)  HR: 120 (04-12-18 @ 08:55) (76 - 133)  BP: 105/65 (04-12-18 @ 08:55) (103/62 - 122/70)  RR: 18 (04-12-18 @ 08:55) (18 - 18)  SpO2: 95% (04-12-18 @ 08:55) (91% - 98%)    PHYSICAL EXAM:  General: Nontoxic, in NAD, A&O x3  Back: puncture site with dressing c/d/i. No evidence of hematoma or active bleeding.     A/P: 42y Female with above PMH s/p Ct guided aspiration of  pelvic fluid collection in IR on 4/11 with Dr. Nash.   -F/U Pelvic collection cx  -Continue global care per primary team  -will discuss with IR attending     Please call IR at extension [phone] with any questions, concerns, or issues regarding above.

## 2018-04-12 NOTE — PROGRESS NOTE ADULT - ASSESSMENT
41 yo female with PMH significant for chronic pancreatitis now s/p total pancreatectomy with islet autotransplantation. Post op now c/b fever/surgical leak and hypo/hyperglycemia. Recent BG control has been made very challenging by the fact that pt initially on feeds only, with feeds intermittently held for procedures, then on feeds + oral PO intake, then feeds stopped, then restarted abruptly in middle of night on 4/11, then stopped again.  BG goal ideally (120-150mg/dl). Pt also notably on megace which can raise BG.

## 2018-04-13 LAB
ALBUMIN SERPL ELPH-MCNC: 2.2 G/DL — LOW (ref 3.3–5)
ALP SERPL-CCNC: 93 U/L — SIGNIFICANT CHANGE UP (ref 40–120)
ALT FLD-CCNC: 71 U/L RC — HIGH (ref 10–45)
ANION GAP SERPL CALC-SCNC: 10 MMOL/L — SIGNIFICANT CHANGE UP (ref 5–17)
AST SERPL-CCNC: 182 U/L — HIGH (ref 10–40)
BILIRUB DIRECT SERPL-MCNC: 0.2 MG/DL — SIGNIFICANT CHANGE UP (ref 0–0.2)
BILIRUB INDIRECT FLD-MCNC: 0.3 MG/DL — SIGNIFICANT CHANGE UP (ref 0.2–1)
BILIRUB SERPL-MCNC: 0.5 MG/DL — SIGNIFICANT CHANGE UP (ref 0.2–1.2)
BUN SERPL-MCNC: 6 MG/DL — LOW (ref 7–23)
CALCIUM SERPL-MCNC: 8.1 MG/DL — LOW (ref 8.4–10.5)
CHLORIDE SERPL-SCNC: 95 MMOL/L — LOW (ref 96–108)
CO2 SERPL-SCNC: 26 MMOL/L — SIGNIFICANT CHANGE UP (ref 22–31)
CREAT SERPL-MCNC: 0.69 MG/DL — SIGNIFICANT CHANGE UP (ref 0.5–1.3)
GLUCOSE SERPL-MCNC: 194 MG/DL — HIGH (ref 70–99)
HCT VFR BLD CALC: 23.4 % — LOW (ref 34.5–45)
HGB BLD-MCNC: 7.7 G/DL — LOW (ref 11.5–15.5)
MAGNESIUM SERPL-MCNC: 1.8 MG/DL — SIGNIFICANT CHANGE UP (ref 1.6–2.6)
MCHC RBC-ENTMCNC: 32.8 PG — SIGNIFICANT CHANGE UP (ref 27–34)
MCHC RBC-ENTMCNC: 33.1 GM/DL — SIGNIFICANT CHANGE UP (ref 32–36)
MCV RBC AUTO: 99.1 FL — SIGNIFICANT CHANGE UP (ref 80–100)
PHOSPHATE SERPL-MCNC: 2.3 MG/DL — LOW (ref 2.5–4.5)
PLATELET # BLD AUTO: 568 K/UL — HIGH (ref 150–400)
POTASSIUM SERPL-MCNC: 4.3 MMOL/L — SIGNIFICANT CHANGE UP (ref 3.5–5.3)
POTASSIUM SERPL-SCNC: 4.3 MMOL/L — SIGNIFICANT CHANGE UP (ref 3.5–5.3)
PROT SERPL-MCNC: 5.4 G/DL — LOW (ref 6–8.3)
RBC # BLD: 2.36 M/UL — LOW (ref 3.8–5.2)
RBC # FLD: 15.1 % — HIGH (ref 10.3–14.5)
SODIUM SERPL-SCNC: 131 MMOL/L — LOW (ref 135–145)
WBC # BLD: 31.2 K/UL — HIGH (ref 3.8–10.5)
WBC # FLD AUTO: 31.2 K/UL — HIGH (ref 3.8–10.5)

## 2018-04-13 PROCEDURE — 49406 IMAGE CATH FLUID PERI/RETRO: CPT | Mod: 78

## 2018-04-13 PROCEDURE — 99222 1ST HOSP IP/OBS MODERATE 55: CPT

## 2018-04-13 PROCEDURE — 74177 CT ABD & PELVIS W/CONTRAST: CPT | Mod: 26

## 2018-04-13 PROCEDURE — 99233 SBSQ HOSP IP/OBS HIGH 50: CPT

## 2018-04-13 RX ORDER — INSULIN LISPRO 100/ML
VIAL (ML) SUBCUTANEOUS EVERY 6 HOURS
Qty: 0 | Refills: 0 | Status: DISCONTINUED | OUTPATIENT
Start: 2018-04-13 | End: 2018-04-13

## 2018-04-13 RX ORDER — INSULIN GLARGINE 100 [IU]/ML
8 INJECTION, SOLUTION SUBCUTANEOUS EVERY MORNING
Qty: 0 | Refills: 0 | Status: DISCONTINUED | OUTPATIENT
Start: 2018-04-13 | End: 2018-04-14

## 2018-04-13 RX ORDER — FLUCONAZOLE 150 MG/1
TABLET ORAL
Qty: 0 | Refills: 0 | Status: DISCONTINUED | OUTPATIENT
Start: 2018-04-13 | End: 2018-04-13

## 2018-04-13 RX ORDER — SODIUM CHLORIDE 9 MG/ML
1000 INJECTION, SOLUTION INTRAVENOUS
Qty: 0 | Refills: 0 | Status: DISCONTINUED | OUTPATIENT
Start: 2018-04-13 | End: 2018-04-13

## 2018-04-13 RX ORDER — ACETAMINOPHEN 500 MG
650 TABLET ORAL EVERY 6 HOURS
Qty: 0 | Refills: 0 | Status: DISCONTINUED | OUTPATIENT
Start: 2018-04-13 | End: 2018-04-18

## 2018-04-13 RX ORDER — INSULIN LISPRO 100/ML
VIAL (ML) SUBCUTANEOUS
Qty: 0 | Refills: 0 | Status: DISCONTINUED | OUTPATIENT
Start: 2018-04-13 | End: 2018-04-13

## 2018-04-13 RX ORDER — HYDROMORPHONE HYDROCHLORIDE 2 MG/ML
1 INJECTION INTRAMUSCULAR; INTRAVENOUS; SUBCUTANEOUS ONCE
Qty: 0 | Refills: 0 | Status: DISCONTINUED | OUTPATIENT
Start: 2018-04-13 | End: 2018-04-18

## 2018-04-13 RX ORDER — FUROSEMIDE 40 MG
20 TABLET ORAL ONCE
Qty: 0 | Refills: 0 | Status: COMPLETED | OUTPATIENT
Start: 2018-04-13 | End: 2018-04-13

## 2018-04-13 RX ORDER — SODIUM CHLORIDE 9 MG/ML
1000 INJECTION, SOLUTION INTRAVENOUS
Qty: 0 | Refills: 0 | Status: DISCONTINUED | OUTPATIENT
Start: 2018-04-13 | End: 2018-04-14

## 2018-04-13 RX ORDER — INSULIN LISPRO 100/ML
VIAL (ML) SUBCUTANEOUS EVERY 4 HOURS
Qty: 0 | Refills: 0 | Status: DISCONTINUED | OUTPATIENT
Start: 2018-04-13 | End: 2018-04-14

## 2018-04-13 RX ORDER — FLUCONAZOLE 150 MG/1
400 TABLET ORAL ONCE
Qty: 0 | Refills: 0 | Status: COMPLETED | OUTPATIENT
Start: 2018-04-13 | End: 2018-04-13

## 2018-04-13 RX ORDER — FLUCONAZOLE 150 MG/1
400 TABLET ORAL EVERY 24 HOURS
Qty: 0 | Refills: 0 | Status: DISCONTINUED | OUTPATIENT
Start: 2018-04-13 | End: 2018-04-15

## 2018-04-13 RX ADMIN — FLUCONAZOLE 100 MILLIGRAM(S): 150 TABLET ORAL at 09:15

## 2018-04-13 RX ADMIN — INSULIN GLARGINE 8 UNIT(S): 100 INJECTION, SOLUTION SUBCUTANEOUS at 08:12

## 2018-04-13 RX ADMIN — Medication 1: at 12:12

## 2018-04-13 RX ADMIN — OXYCODONE HYDROCHLORIDE 30 MILLIGRAM(S): 5 TABLET ORAL at 16:00

## 2018-04-13 RX ADMIN — OXYCODONE HYDROCHLORIDE 10 MILLIGRAM(S): 5 TABLET ORAL at 02:57

## 2018-04-13 RX ADMIN — Medication 1 UNIT(S): at 08:15

## 2018-04-13 RX ADMIN — OXYCODONE HYDROCHLORIDE 30 MILLIGRAM(S): 5 TABLET ORAL at 23:38

## 2018-04-13 RX ADMIN — OXYCODONE HYDROCHLORIDE 10 MILLIGRAM(S): 5 TABLET ORAL at 05:53

## 2018-04-13 RX ADMIN — Medication 1 CAPSULE(S): at 23:08

## 2018-04-13 RX ADMIN — OXYCODONE HYDROCHLORIDE 10 MILLIGRAM(S): 5 TABLET ORAL at 23:38

## 2018-04-13 RX ADMIN — OXYCODONE HYDROCHLORIDE 30 MILLIGRAM(S): 5 TABLET ORAL at 23:08

## 2018-04-13 RX ADMIN — METHOCARBAMOL 750 MILLIGRAM(S): 500 TABLET, FILM COATED ORAL at 05:51

## 2018-04-13 RX ADMIN — OXYCODONE HYDROCHLORIDE 10 MILLIGRAM(S): 5 TABLET ORAL at 08:48

## 2018-04-13 RX ADMIN — OXYCODONE HYDROCHLORIDE 10 MILLIGRAM(S): 5 TABLET ORAL at 12:56

## 2018-04-13 RX ADMIN — OXYCODONE HYDROCHLORIDE 30 MILLIGRAM(S): 5 TABLET ORAL at 15:29

## 2018-04-13 RX ADMIN — OXYCODONE HYDROCHLORIDE 10 MILLIGRAM(S): 5 TABLET ORAL at 02:27

## 2018-04-13 RX ADMIN — PIPERACILLIN AND TAZOBACTAM 25 GRAM(S): 4; .5 INJECTION, POWDER, LYOPHILIZED, FOR SOLUTION INTRAVENOUS at 21:16

## 2018-04-13 RX ADMIN — HEPARIN SODIUM 5000 UNIT(S): 5000 INJECTION INTRAVENOUS; SUBCUTANEOUS at 05:50

## 2018-04-13 RX ADMIN — OXYCODONE HYDROCHLORIDE 30 MILLIGRAM(S): 5 TABLET ORAL at 05:54

## 2018-04-13 RX ADMIN — Medication 1: at 06:01

## 2018-04-13 RX ADMIN — MEGESTROL ACETATE 800 MILLIGRAM(S): 40 SUSPENSION ORAL at 12:14

## 2018-04-13 RX ADMIN — OXYCODONE HYDROCHLORIDE 10 MILLIGRAM(S): 5 TABLET ORAL at 23:09

## 2018-04-13 RX ADMIN — Medication 100 MILLIGRAM(S): at 05:50

## 2018-04-13 RX ADMIN — PIPERACILLIN AND TAZOBACTAM 25 GRAM(S): 4; .5 INJECTION, POWDER, LYOPHILIZED, FOR SOLUTION INTRAVENOUS at 12:11

## 2018-04-13 RX ADMIN — OXYCODONE HYDROCHLORIDE 30 MILLIGRAM(S): 5 TABLET ORAL at 06:24

## 2018-04-13 RX ADMIN — Medication 1 CAPSULE(S): at 08:14

## 2018-04-13 RX ADMIN — SODIUM CHLORIDE 50 MILLILITER(S): 9 INJECTION, SOLUTION INTRAVENOUS at 21:00

## 2018-04-13 RX ADMIN — Medication 62.5 MILLIMOLE(S): at 05:51

## 2018-04-13 RX ADMIN — OXYCODONE HYDROCHLORIDE 10 MILLIGRAM(S): 5 TABLET ORAL at 13:30

## 2018-04-13 RX ADMIN — OXYCODONE HYDROCHLORIDE 10 MILLIGRAM(S): 5 TABLET ORAL at 06:24

## 2018-04-13 RX ADMIN — Medication 250 MILLIGRAM(S): at 08:50

## 2018-04-13 RX ADMIN — OXYCODONE HYDROCHLORIDE 10 MILLIGRAM(S): 5 TABLET ORAL at 09:20

## 2018-04-13 RX ADMIN — Medication 20 MILLIGRAM(S): at 10:11

## 2018-04-13 RX ADMIN — Medication 3: at 02:28

## 2018-04-13 RX ADMIN — METHOCARBAMOL 750 MILLIGRAM(S): 500 TABLET, FILM COATED ORAL at 23:08

## 2018-04-13 RX ADMIN — Medication 500 MILLIGRAM(S): at 12:56

## 2018-04-13 RX ADMIN — Medication 500 MILLIGRAM(S): at 05:50

## 2018-04-13 RX ADMIN — ONDANSETRON 8 MILLIGRAM(S): 8 TABLET, FILM COATED ORAL at 05:50

## 2018-04-13 RX ADMIN — Medication 500 MILLIGRAM(S): at 23:07

## 2018-04-13 NOTE — PROGRESS NOTE ADULT - ASSESSMENT
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 Post op now c/b fever/surgical leak s/p collection drainage but now getting larger. Had hypoglycemia this pm so now on D5 infusion while NPO and until she goes to IR this pm. Off TFs.   WBC and  LFT up. On antibiotics

## 2018-04-13 NOTE — CONSULT NOTE ADULT - CONSULT REASON
Fever and elevated WBC count
monitoring s/p islet cell autotransplant
total pancreatectomy and islet cell transplant
no concerns

## 2018-04-13 NOTE — PROVIDER CONTACT NOTE (OTHER) - ACTION/TREATMENT ORDERED:
recheck in 15 min, continue to monitor. recheck in 15 min as per hypoglycemia protocol - continue to monitor.

## 2018-04-13 NOTE — PROGRESS NOTE ADULT - SUBJECTIVE AND OBJECTIVE BOX
BLUE TEAM GENERAL SURGERY DAILY PROGRESS NOTE:       Subjective:  Patient seen this AM on rounds. No acute overnight events. Patient continues to have elevated BS, Endocrine following and made additional recommendations to medication regimen yesterday. Remained afebrile overnight but HR continues to be up to 120 bpm. Pain is relatively well controlled.         Objective:    PE:  Gen: NAD  Resp: airway patent, respirations unlabored, no increased WoB  Abd: soft, non-distended, non-tender, incision c/d/i, no guarding  Ext: no edema, WWP  Neuro: AAOx3, no focal deficits      Vital Signs Last 24 Hrs  T(C): 37.7 (13 Apr 2018 02:22), Max: 38.3 (12 Apr 2018 08:55)  T(F): 99.8 (13 Apr 2018 02:22), Max: 100.9 (12 Apr 2018 08:55)  HR: 120 (13 Apr 2018 02:22) (119 - 132)  BP: 99/65 (13 Apr 2018 02:22) (98/64 - 118/68)  BP(mean): --  RR: 18 (13 Apr 2018 02:22) (18 - 18)  SpO2: 99% (13 Apr 2018 02:22) (90% - 99%)      I&O's Detail    11 Apr 2018 07:01  -  12 Apr 2018 07:00  --------------------------------------------------------  IN:    Oral Fluid: 580 mL    sodium chloride 0.9% with potassium chloride 20 mEq/L: 2100 mL    Solution: 300 mL    Vital HN: 585 mL  Total IN: 3565 mL    OUT:    Voided: 950 mL  Total OUT: 950 mL    Total NET: 2615 mL      12 Apr 2018 07:01  -  13 Apr 2018 05:27  --------------------------------------------------------  IN:    Oral Fluid: 580 mL    sodium chloride 0.9% with potassium chloride 20 mEq/L: 1200 mL    Solution: 100 mL  Total IN: 1880 mL    OUT:    Voided: 1550 mL  Total OUT: 1550 mL    Total NET: 330 mL          Daily     Daily     MEDICATIONS  (STANDING):  amylase/lipase/protease  (CREON  6,000 Units) 1 Capsule(s) Oral three times a day with meals  amylase/lipase/protease  (CREON  6,000 Units) 1 Capsule(s) Oral at bedtime  dextrose 5%. 1000 milliLiter(s) (50 mL/Hr) IV Continuous <Continuous>  docusate sodium Liquid 100 milliGRAM(s) Oral two times a day  erythromycin    ethylsuccinate Suspension 40 mG/mL 500 milliGRAM(s) Oral every 6 hours  heparin  Injectable 5000 Unit(s) SubCutaneous every 12 hours  insulin glargine Injectable (LANTUS) 8 Unit(s) SubCutaneous every morning  insulin lispro (HumaLOG) corrective regimen sliding scale   SubCutaneous every 4 hours  insulin lispro Injectable (HumaLOG) 1 Unit(s) SubCutaneous three times a day before meals  insulin NPH human recombinant 1 Unit(s) SubCutaneous <User Schedule>  megestrol Suspension 800 milliGRAM(s) Oral daily  ondansetron   Disintegrating Tablet 8 milliGRAM(s) Oral every 8 hours  oxyCODONE  ER Tablet 30 milliGRAM(s) Oral every 8 hours  piperacillin/tazobactam IVPB. 3.375 Gram(s) IV Intermittent every 8 hours  sodium chloride 0.9% with potassium chloride 20 mEq/L 1000 milliLiter(s) (100 mL/Hr) IV Continuous <Continuous>  vancomycin  IVPB 1000 milliGRAM(s) IV Intermittent every 12 hours    MEDICATIONS  (PRN):  ALPRAZolam 0.25 milliGRAM(s) Oral every 8 hours PRN Anxiety  bisacodyl 10 milliGRAM(s) Oral at bedtime PRN Constipation  methocarbamol 750 milliGRAM(s) Oral every 8 hours PRN spasm  oxyCODONE    IR 10 milliGRAM(s) Oral every 3 hours PRN Moderate Pain (4 - 6)      LABS:                        7.7    31.2  )-----------( 568      ( 13 Apr 2018 02:35 )             23.4     04-13    131<L>  |  95<L>  |  6<L>  ----------------------------<  194<H>  4.3   |  26  |  0.69    Ca    8.1<L>      13 Apr 2018 02:35  Phos  2.3     04-13  Mg     1.8     04-13    TPro  5.4<L>  /  Alb  2.2<L>  /  TBili  0.5  /  DBili  0.2  /  AST  182<H>  /  ALT  71<H>  /  AlkPhos  93  04-13

## 2018-04-13 NOTE — PROGRESS NOTE ADULT - SUBJECTIVE AND OBJECTIVE BOX
Interventional Radiology Brief- Operative Note    Procedure: CT guided drainage of abdominal fluid collections    Operators: Gisela Nash    Anesthesia (type): MAC per anesthesiology department    Contrast: None    EBL: Minimal    Findings/Follow up Plan of Care: Two air and fluid containing collections in the anterior abdomen accessed under CT guidance. 12 FR pigtail catheters placed in each collection. 150 cc from upper drain and 45 cc from lower drain aspirated (cloudy brown fluid).     Specimens Removed: Cloudy brown fluid sent for culture    Implants: Two 12 FR pigtail catheters    Complications: None    Condition/Disposition: Stable/PACU    Please call Interventional Radiology x [phone] with any questions, concerns, or issues.

## 2018-04-13 NOTE — PROGRESS NOTE ADULT - ASSESSMENT
42F s/p total pancreatectomy with islet cell autotransplantation, now with fevers of unknown origin and an increasing leukocytosis s/p IR drainage of pelvic fluid collection 4/11 with persistently elevated WBC and tachycardia.     Plan:  - Monitor fevers, WBC  - Diet: Reg + TF  - Pain control  - Cont erythromycin, Zosyn, creon, megace  - Encourage OOB/amb  - Appreciate endocrine recs for elevated BS

## 2018-04-13 NOTE — CHART NOTE - NSCHARTNOTEFT_GEN_A_CORE
CT noted---large collection.  Will continue the zosyn and in view of the above, let's restart the diflucan.  I think we can hold off on vanco.    ?drainage.    I have d/w surgical team.

## 2018-04-13 NOTE — PROGRESS NOTE ADULT - SUBJECTIVE AND OBJECTIVE BOX
Interventional Radiology Pre-Procedure Note    This is a 42y Female s/p pancreatectomy with abdominal fluid collection presenting for drainage.    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." (05 Apr 2018 07:01)      PAST MEDICAL & SURGICAL HISTORY:  Premenstrual migraine  Other chronic pancreatitis  S/P cholecystectomy: in early 20&#x27;s  Pancreatitis: h/o [place] procedure 2014       Female    Allergies: No Known Allergies      LABS:  CBC Full  -  ( 13 Apr 2018 02:35 )  WBC Count : 31.2 K/uL  Hemoglobin : 7.7 g/dL  Hematocrit : 23.4 %  Platelet Count - Automated : 568 K/uL  Mean Cell Volume : 99.1 fl  Mean Cell Hemoglobin : 32.8 pg  Mean Cell Hemoglobin Concentration : 33.1 gm/dL  Auto Neutrophil # : x  Auto Lymphocyte # : x  Auto Monocyte # : x  Auto Eosinophil # : x  Auto Basophil # : x  Auto Neutrophil % : x  Auto Lymphocyte % : x  Auto Monocyte % : x  Auto Eosinophil % : x  Auto Basophil % : x    04-13    131<L>  |  95<L>  |  6<L>  ----------------------------<  194<H>  4.3   |  26  |  0.69    Ca    8.1<L>      13 Apr 2018 02:35  Phos  2.3     04-13  Mg     1.8     04-13    TPro  5.4<L>  /  Alb  2.2<L>  /  TBili  0.5  /  DBili  0.2  /  AST  182<H>  /  ALT  71<H>  /  AlkPhos  93  04-13      A/P Case discussed with Dr. Walker. Pt presents for drainage of abdominal fluid collections. Procedure/ risks/ benefits were explained, informed consent obtained from patient, verbalizes understanding.

## 2018-04-13 NOTE — CONSULT NOTE ADULT - SUBJECTIVE AND OBJECTIVE BOX
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  The patient underwent a total pancreatectomy, splenectomy and islet cell auto-transplantation 8 days ago.  The patient had fevers and elevated WBC count over the past several days.  The patient has been started on empiric abx with vanco, zosyn and diflucan.  ID called for further work up.    CT with collection that was aspirated---cultures negative.    Pt with abd pain, difficulty with deep breathing.     PAST MEDICAL & SURGICAL HISTORY:  Premenstrual migraine  Other chronic pancreatitis  S/P cholecystectomy: in early 20&#x27;s  Pancreatitis: h/o [place] procedure 2014    SOCIAL: former tobacco    FAMILY HISTORY: NC to the current case    REVIEW OF SYSTEMS  General: fevers  Skin: No rash	  Ophthalmologic:Denies any visual complaints,discharge redness or photophobia	  ENMT:No nasal discharge,headache,sinus congestion or throat pain.No dental complaints  Respiratory and Thorax: difficult with deep respiration	  Cardiovascular:	No chest pain,palpitaions or dizziness  Genitourinary:	No dysuria,frequency. No flank pain    Allergies  No Known Allergies    ANTIMICROBIALS:    erythromycin    ethylsuccinate Suspension 40 mG/mL 500 milliGRAM(s) Oral every 6 hours  fluconAZOLE IVPB      piperacillin/tazobactam IVPB. 3.375 Gram(s) IV Intermittent every 8 hours  vancomycin  IVPB 1000 milliGRAM(s) IV Intermittent every 12 hours    Vital Signs Last 24 Hrs  T(C): 37.3 (13 Apr 2018 09:30), Max: 37.7 (13 Apr 2018 02:22)  T(F): 99.2 (13 Apr 2018 09:30), Max: 99.8 (13 Apr 2018 02:22)  HR: 119 (13 Apr 2018 09:30) (116 - 124)  BP: 108/68 (13 Apr 2018 09:30) (98/64 - 115/72)  BP(mean): --  RR: 18 (13 Apr 2018 09:30) (18 - 18)  SpO2: 94% (13 Apr 2018 09:30) (90% - 99%)    PHYSICAL EXAM:Pleasant patient uncomfortable in kristina.  non-toxic, + anasarca  Constitutional: Awake and alert  chronically ill  Eyes:PERRL EOMI.NO discharge or conjunctival injection  ENMT:No sinus tenderness.No thrush.No pharyngeal exudate or erythema.Fair dental hygiene  Neck:Supple,No LN,no JVD  Respiratory:Good air entry bilaterally,CTA  Cardiovascular:S1 S2 wnl, No murmurs,rub or gallops  Gastrointestinal:Soft BS(+) distended----J tube R, midline wound clear and not inflamed  Genitourinary:No CVA tendereness   Extremities: + edema  Neurological:AAO X 3,No grossly focal deficits  Skin:No rash   Lymph Nodes:No palpable LNs  Musculoskeletal:No joint swelling or LOM  Psychiatric:Affect normal.                  7.7    31.2  )-----------( 568      ( 13 Apr 2018 02:35 )             23.4       04-13    131<L>  |  95<L>  |  6<L>  ----------------------------<  194<H>  4.3   |  26  |  0.69    Ca    8.1<L>      13 Apr 2018 02:35  Phos  2.3     04-13  Mg     1.8     04-13    TPro  5.4<L>  /  Alb  2.2<L>  /  TBili  0.5  /  DBili  0.2  /  AST  182<H>  /  ALT  71<H>  /  AlkPhos  93  04-13      RECENT CULTURES:  04-12 @ 00:51  .Body Fluid Ascites Fluid  --No growth  --  04-11 @ 10:33  .Blood Blood  --  No growth to date.  --  04-10 @ 05:29  .Urine Clean Catch (Midstream)  --  No growth  --  04-10 @ 05:22  .Blood Blood-Peripheral  --  No growth to date.  --  RADIOLOGY: < from: CT Abdomen and Pelvis w/ Oral Cont and w/ IV Cont (04.10.18 @ 20:29) >  Status post pancreatectomy and splenectomy.    Small ascites particularly in the pelvis with loculated air and fluid   signal in the left upper quadrant as above. No well-defined collection.    < end of copied text >    IMPRESSION:  Fevers and elevated WBC count after the patient underwent total pancreatectomy and splenectomy.  Non toxic but uncomfortable.  CT with a small collection and aspiration of the fluid did not reveal pathogen growth--?abx effect.  The wound looks good and no iv site phlebitis.  She is not breathing deeply because of pain, and certainly has some atelectasis which may contribute to fever.  The splenectomy will often significantly compound the WBC count elevation (and thrombocytosis) beyond what is typical.  While she will be at risk for encapsulated pathogens post splenectomy, it is not at this point.    DDX for the fevers/elevated WBC likely includes atelectasis, inflammation from the auto-islet cell transplant, occult/emerging abd infection/pna.  The recent blood cultures were negative.    Rx:  She'll need vaccination vs. S. pneumoniae and Meningococcus but at this point, it's best to wait 14 days post-operatively.  Role of antimicrobials at present is equivocal.  In absence of + cultures can keep the zosyn empirically, but let's dc vanco and diflucan.  plans noted for CT repeat.    ID svc to cover this weekend.   [id number]

## 2018-04-13 NOTE — PROGRESS NOTE ADULT - PROBLEM SELECTOR PLAN 1
-Test BG q6h while NPO, now and prior to IR procedure to make sure pt is not hypoglycemic again  -Continue Lantus 8 units qam for now. Will adjust as needed  -Continue humalog correction scale q6h while NPO. Once able to eat change to ac meals.   -Hold Humalog premeal since pt is now NPO  -PLEASE, contact DM team with any chages on PO/TFS.  -Might continue D5 infusion for now but discontinue once pt back taking POs or TFs.   -Plan discussed with pt/RN and team.  Contact info: [phone] (24/7). pager [phone]

## 2018-04-13 NOTE — PROGRESS NOTE ADULT - SUBJECTIVE AND OBJECTIVE BOX
Diabetes Follow up note: Saw pt earlier today  Interval Hx: 42 year old female w/chronic pancreatitis, no hx of DM here s/p total pancreatectomy w/islet cell autotransplantation. Pt states having pain and "bloated"> on prophylactic antibiotics. She is presently NPO for CT scan today. Glycemic control improved with BG in 100s most of the time. TFs were not restarted last night so pt didn't get any NPH insulin. Received  Lantus 8 this am plus meal time and correction Humalog prior to NPO status this am. If CT is ok pt will restart PO intake. Noted BG 68 this pm> Spoke to RN pt remains NPO>s/p ct scan with large collection so pt will remain NPO and going to IR from drainage. Started D5 infusion at 30cc/hr     Review of Systems:  General: "I'm so bloated"  GI: +  incisional/abdominal pain. No vomit/nausea reported. No BMs yesterday and today.   CV: No CP/SOB  ENDO: No S&Sx of hypoglycemia      MEDS:  insulin glargine Injectable (LANTUS) 8 Unit(s) SubCutaneous every morning  insulin lispro (HumaLOG) corrective regimen sliding scale   SubCutaneous every 4 hours  insulin lispro Injectable (HumaLOG) 1 Unit(s) SubCutaneous three times a day before meals  erythromycin    ethylsuccinate Suspension 40 mG/mL 250 milliGRAM(s) Oral every 6 hours  amylase/lipase/protease  (CREON  6,000 Units) 1 Capsule(s) Oral three times a day with meals  amylase/lipase/protease  (CREON  6,000 Units) 1 Capsule(s) Oral at bedtime  fluconAZOLE IVPB 400 milliGRAM(s) IV Intermittent every 24 hours  piperacillin/tazobactam IVPB. 3.375 Gram(s) IV Intermittent every 8 hours    Allergies  No Known Allergies    PE:  General: Female sitting at edge of bed in NAD.   Vital Signs Last 24 Hrs  T(C): 36.6 (04-13-18 @ 13:23), Max: 37.7 (04-13-18 @ 02:22)  T(F): 97.8 (04-13-18 @ 13:23), Max: 99.8 (04-13-18 @ 02:22)  HR: 105 (04-13-18 @ 13:23) (105 - 124)  BP: 107/68 (04-13-18 @ 13:23) (98/64 - 112/73)  BP(mean): --  RR: 18 (04-13-18 @ 13:23) (18 - 18)  SpO2: 94% (04-13-18 @ 13:23) (90% - 99%)  Abd: Soft, tender ,distended, J-tube in place with TFs off. R side of abdomen with small dressing D&I covering former DANIEL sites.    Extremities: Warm. 1+ edema in LEs, Swollen hands  Neuro: A&O X3    LABS:  POCT Blood Glucose.: 68 mg/dL (04-13-18 @ 13:47)  POCT Blood Glucose.: 145 mg/dL (04-13-18 @ 12:07)  POCT Blood Glucose.: 163 mg/dL (04-13-18 @ 11:05)  POCT Blood Glucose.: 134 mg/dL (04-13-18 @ 05:59)  POCT Blood Glucose.: 197 mg/dL (04-13-18 @ 02:19)  POCT Blood Glucose.: 138 mg/dL (04-12-18 @ 22:10)  POCT Blood Glucose.: 136 mg/dL (04-12-18 @ 19:10)  POCT Blood Glucose.: 105 mg/dL (04-12-18 @ 14:15)  POCT Blood Glucose.: 180 mg/dL (04-12-18 @ 10:48)  POCT Blood Glucose.: 122 mg/dL (04-12-18 @ 07:35)  POCT Blood Glucose.: 145 mg/dL (04-12-18 @ 06:22)  POCT Blood Glucose.: 295 mg/dL (04-12-18 @ 03:46)  POCT Blood Glucose.: 290 mg/dL (04-12-18 @ 02:29)  POCT Blood Glucose.: 229 mg/dL (04-11-18 @ 22:12)  POCT Blood Glucose.: 149 mg/dL (04-11-18 @ 18:43)  POCT Blood Glucose.: 111 mg/dL (04-11-18 @ 16:10)                          7.7    31.2  )-----------( 568      ( 13 Apr 2018 02:35 )             23.4                               8.6    16.8  )-----------( 380      ( 10 Apr 2018 22:41 )             25.7                           9.0    13.6  )-----------( 278      ( 09 Apr 2018 22:29 )             26.5     04-13    131<L>  |  95<L>  |  6<L>  ----------------------------<  194<H>  4.3   |  26  |  0.69    Ca    8.1<L>      13 Apr 2018 02:35  Phos  2.3     04-13  Mg     1.8     04-13    TPro  5.4<L>  /  Alb  2.2<L>  /  TBili  0.5  /  DBili  0.2  /  AST  182<H>  /  ALT  71<H>  /  AlkPhos  93  04-13  TPro  5.8<L>  /  Alb  2.9<L>  /  TBili  0.3  /  DBili  0.1  /  AST  12  /  ALT  30  /  AlkPhos  79  04-10  TPro  5.7<L>  /  Alb  2.9<L>  /  TBili  0.4  /  DBili  0.1  /  AST  18  /  ALT  47<H>  /  AlkPhos  67  04-09  TPro  5.2<L>  /  Alb  2.7<L>  /  TBili  0.4  /  DBili  0.2  /  AST  32  /  ALT  65<H>  /  AlkPhos  50  04-08  TPro  4.7<L>  /  Alb  2.4<L>  /  TBili  0.4  /  DBili  0.1  /  AST  98<H>  /  ALT  81<H>  /  AlkPhos  38<L>  04-08

## 2018-04-13 NOTE — CHART NOTE - NSCHARTNOTEFT_GEN_A_CORE
Surgery Post-Op Note      Procedure: drainage of intraabdominal abscess    Surgeon: IR     Subjective:   Pt seen and examined at the bedside. Pt w/ no complaints. Denies fever, CP, SOB and NV. Pain controlled with medication. Tolerating clears. 150 cc of cloudy brown fluid from upper collection and 45 cc cloudy brown fluid from lower collection drained. Patient with suprapubic pressure and no urine output. Bladder scan showed 950cc. Ferrari inserted and collected about 900cc of urine and pressure improved     Vital Signs Last 24 Hrs  T(C): 36.6 (13 Apr 2018 13:23), Max: 37.7 (13 Apr 2018 02:22)  T(F): 97.8 (13 Apr 2018 13:23), Max: 99.8 (13 Apr 2018 02:22)  HR: 105 (13 Apr 2018 13:23) (105 - 124)  BP: 107/68 (13 Apr 2018 13:23) (99/65 - 112/73)  BP(mean): --  RR: 18 (13 Apr 2018 13:23) (18 - 18)  SpO2: 94% (13 Apr 2018 13:23) (94% - 99%)    Physical Exam:  General: NAD, resting comfortably in bed  Neuro: A/O x 3, no focal deficits  Pulmonary: Nonlabored breathing, no respiratory distress  Cardiovascular: NSR  Abdominal: soft, ATTP, ND  Incision: C/D/I   Drains: DANIEL x 2   Extremities: WWP        LABS:                        7.7    31.2  )-----------( 568      ( 13 Apr 2018 02:35 )             23.4     04-13    131<L>  |  95<L>  |  6<L>  ----------------------------<  194<H>  4.3   |  26  |  0.69    Ca    8.1<L>      13 Apr 2018 02:35  Phos  2.3     04-13  Mg     1.8     04-13    TPro  5.4<L>  /  Alb  2.2<L>  /  TBili  0.5  /  DBili  0.2  /  AST  182<H>  /  ALT  71<H>  /  AlkPhos  93  04-13      CAPILLARY BLOOD GLUCOSE      POCT Blood Glucose.: 84 mg/dL (13 Apr 2018 18:47)  POCT Blood Glucose.: 72 mg/dL (13 Apr 2018 16:48)  POCT Blood Glucose.: 62 mg/dL (13 Apr 2018 15:57)  POCT Blood Glucose.: 68 mg/dL (13 Apr 2018 13:47)  POCT Blood Glucose.: 145 mg/dL (13 Apr 2018 12:07)  POCT Blood Glucose.: 163 mg/dL (13 Apr 2018 11:05)  POCT Blood Glucose.: 134 mg/dL (13 Apr 2018 05:59)  POCT Blood Glucose.: 197 mg/dL (13 Apr 2018 02:19)  POCT Blood Glucose.: 138 mg/dL (12 Apr 2018 22:10)      LIVER FUNCTIONS - ( 13 Apr 2018 02:35 )  Alb: 2.2 g/dL / Pro: 5.4 g/dL / ALK PHOS: 93 U/L / ALT: 71 U/L RC / AST: 182 U/L / GGT: x                 Radiology and Additional Studies:    Assessment:42y Female s/p above procedure    Plan:  IVF, Diet: CCD  Pain/nausea control PRN  Incentive spirometer/OOB/Ambulate

## 2018-04-14 LAB
ALBUMIN SERPL ELPH-MCNC: 2.3 G/DL — LOW (ref 3.3–5)
ALBUMIN SERPL ELPH-MCNC: 2.4 G/DL — LOW (ref 3.3–5)
ALP SERPL-CCNC: 112 U/L — SIGNIFICANT CHANGE UP (ref 40–120)
ALP SERPL-CCNC: 114 U/L — SIGNIFICANT CHANGE UP (ref 40–120)
ALT FLD-CCNC: 116 U/L RC — HIGH (ref 10–45)
ALT FLD-CCNC: 84 U/L RC — HIGH (ref 10–45)
ANION GAP SERPL CALC-SCNC: 12 MMOL/L — SIGNIFICANT CHANGE UP (ref 5–17)
ANION GAP SERPL CALC-SCNC: 13 MMOL/L — SIGNIFICANT CHANGE UP (ref 5–17)
AST SERPL-CCNC: 162 U/L — HIGH (ref 10–40)
AST SERPL-CCNC: 52 U/L — HIGH (ref 10–40)
BILIRUB SERPL-MCNC: 0.6 MG/DL — SIGNIFICANT CHANGE UP (ref 0.2–1.2)
BILIRUB SERPL-MCNC: 0.6 MG/DL — SIGNIFICANT CHANGE UP (ref 0.2–1.2)
BUN SERPL-MCNC: 5 MG/DL — LOW (ref 7–23)
BUN SERPL-MCNC: 6 MG/DL — LOW (ref 7–23)
CALCIUM SERPL-MCNC: 8.3 MG/DL — LOW (ref 8.4–10.5)
CALCIUM SERPL-MCNC: 8.3 MG/DL — LOW (ref 8.4–10.5)
CHLORIDE SERPL-SCNC: 94 MMOL/L — LOW (ref 96–108)
CHLORIDE SERPL-SCNC: 95 MMOL/L — LOW (ref 96–108)
CO2 SERPL-SCNC: 29 MMOL/L — SIGNIFICANT CHANGE UP (ref 22–31)
CO2 SERPL-SCNC: 29 MMOL/L — SIGNIFICANT CHANGE UP (ref 22–31)
CREAT SERPL-MCNC: 0.68 MG/DL — SIGNIFICANT CHANGE UP (ref 0.5–1.3)
CREAT SERPL-MCNC: 0.78 MG/DL — SIGNIFICANT CHANGE UP (ref 0.5–1.3)
GLUCOSE SERPL-MCNC: 189 MG/DL — HIGH (ref 70–99)
GLUCOSE SERPL-MCNC: 72 MG/DL — SIGNIFICANT CHANGE UP (ref 70–99)
GRAM STN FLD: SIGNIFICANT CHANGE UP
HCT VFR BLD CALC: 22.9 % — LOW (ref 34.5–45)
HCT VFR BLD CALC: 24.6 % — LOW (ref 34.5–45)
HGB BLD-MCNC: 7.4 G/DL — LOW (ref 11.5–15.5)
HGB BLD-MCNC: 8 G/DL — LOW (ref 11.5–15.5)
MAGNESIUM SERPL-MCNC: 1.8 MG/DL — SIGNIFICANT CHANGE UP (ref 1.6–2.6)
MAGNESIUM SERPL-MCNC: 2 MG/DL — SIGNIFICANT CHANGE UP (ref 1.6–2.6)
MCHC RBC-ENTMCNC: 31.7 PG — SIGNIFICANT CHANGE UP (ref 27–34)
MCHC RBC-ENTMCNC: 32.1 PG — SIGNIFICANT CHANGE UP (ref 27–34)
MCHC RBC-ENTMCNC: 32.4 GM/DL — SIGNIFICANT CHANGE UP (ref 32–36)
MCHC RBC-ENTMCNC: 32.6 GM/DL — SIGNIFICANT CHANGE UP (ref 32–36)
MCV RBC AUTO: 97.8 FL — SIGNIFICANT CHANGE UP (ref 80–100)
MCV RBC AUTO: 98.5 FL — SIGNIFICANT CHANGE UP (ref 80–100)
PHOSPHATE SERPL-MCNC: 4.1 MG/DL — SIGNIFICANT CHANGE UP (ref 2.5–4.5)
PHOSPHATE SERPL-MCNC: 4.1 MG/DL — SIGNIFICANT CHANGE UP (ref 2.5–4.5)
PLATELET # BLD AUTO: 598 K/UL — HIGH (ref 150–400)
PLATELET # BLD AUTO: 679 K/UL — HIGH (ref 150–400)
POTASSIUM SERPL-MCNC: 3.5 MMOL/L — SIGNIFICANT CHANGE UP (ref 3.5–5.3)
POTASSIUM SERPL-MCNC: 4.1 MMOL/L — SIGNIFICANT CHANGE UP (ref 3.5–5.3)
POTASSIUM SERPL-SCNC: 3.5 MMOL/L — SIGNIFICANT CHANGE UP (ref 3.5–5.3)
POTASSIUM SERPL-SCNC: 4.1 MMOL/L — SIGNIFICANT CHANGE UP (ref 3.5–5.3)
PROT SERPL-MCNC: 5.3 G/DL — LOW (ref 6–8.3)
PROT SERPL-MCNC: 5.6 G/DL — LOW (ref 6–8.3)
RBC # BLD: 2.32 M/UL — LOW (ref 3.8–5.2)
RBC # BLD: 2.51 M/UL — LOW (ref 3.8–5.2)
RBC # FLD: 15.3 % — HIGH (ref 10.3–14.5)
RBC # FLD: 15.3 % — HIGH (ref 10.3–14.5)
SODIUM SERPL-SCNC: 136 MMOL/L — SIGNIFICANT CHANGE UP (ref 135–145)
SODIUM SERPL-SCNC: 136 MMOL/L — SIGNIFICANT CHANGE UP (ref 135–145)
SPECIMEN SOURCE: SIGNIFICANT CHANGE UP
WBC # BLD: 21.2 K/UL — HIGH (ref 3.8–10.5)
WBC # BLD: 26.8 K/UL — HIGH (ref 3.8–10.5)
WBC # FLD AUTO: 21.2 K/UL — HIGH (ref 3.8–10.5)
WBC # FLD AUTO: 26.8 K/UL — HIGH (ref 3.8–10.5)

## 2018-04-14 RX ORDER — SODIUM CHLORIDE 9 MG/ML
1000 INJECTION, SOLUTION INTRAVENOUS
Qty: 0 | Refills: 0 | Status: DISCONTINUED | OUTPATIENT
Start: 2018-04-14 | End: 2018-04-14

## 2018-04-14 RX ORDER — POTASSIUM CHLORIDE 20 MEQ
40 PACKET (EA) ORAL EVERY 4 HOURS
Qty: 0 | Refills: 0 | Status: COMPLETED | OUTPATIENT
Start: 2018-04-14 | End: 2018-04-15

## 2018-04-14 RX ORDER — ERYTHROMYCIN ETHYLSUCCINATE 400 MG
500 TABLET ORAL EVERY 6 HOURS
Qty: 0 | Refills: 0 | Status: DISCONTINUED | OUTPATIENT
Start: 2018-04-14 | End: 2018-04-18

## 2018-04-14 RX ORDER — MAGNESIUM SULFATE 500 MG/ML
1 VIAL (ML) INJECTION ONCE
Qty: 0 | Refills: 0 | Status: COMPLETED | OUTPATIENT
Start: 2018-04-14 | End: 2018-04-14

## 2018-04-14 RX ORDER — HEPARIN SODIUM 5000 [USP'U]/ML
5000 INJECTION INTRAVENOUS; SUBCUTANEOUS EVERY 12 HOURS
Qty: 0 | Refills: 0 | Status: DISCONTINUED | OUTPATIENT
Start: 2018-04-14 | End: 2018-04-18

## 2018-04-14 RX ORDER — INSULIN GLARGINE 100 [IU]/ML
7 INJECTION, SOLUTION SUBCUTANEOUS EVERY MORNING
Qty: 0 | Refills: 0 | Status: DISCONTINUED | OUTPATIENT
Start: 2018-04-15 | End: 2018-04-18

## 2018-04-14 RX ORDER — INSULIN LISPRO 100/ML
VIAL (ML) SUBCUTANEOUS
Qty: 0 | Refills: 0 | Status: DISCONTINUED | OUTPATIENT
Start: 2018-04-14 | End: 2018-04-18

## 2018-04-14 RX ORDER — FUROSEMIDE 40 MG
20 TABLET ORAL ONCE
Qty: 0 | Refills: 0 | Status: COMPLETED | OUTPATIENT
Start: 2018-04-14 | End: 2018-04-14

## 2018-04-14 RX ORDER — LIPASE/PROTEASE/AMYLASE 16-48-48K
1 CAPSULE,DELAYED RELEASE (ENTERIC COATED) ORAL
Qty: 0 | Refills: 0 | Status: DISCONTINUED | OUTPATIENT
Start: 2018-04-14 | End: 2018-04-18

## 2018-04-14 RX ADMIN — OXYCODONE HYDROCHLORIDE 10 MILLIGRAM(S): 5 TABLET ORAL at 21:40

## 2018-04-14 RX ADMIN — Medication 1 UNIT(S): at 15:05

## 2018-04-14 RX ADMIN — OXYCODONE HYDROCHLORIDE 10 MILLIGRAM(S): 5 TABLET ORAL at 13:00

## 2018-04-14 RX ADMIN — OXYCODONE HYDROCHLORIDE 10 MILLIGRAM(S): 5 TABLET ORAL at 08:15

## 2018-04-14 RX ADMIN — HEPARIN SODIUM 5000 UNIT(S): 5000 INJECTION INTRAVENOUS; SUBCUTANEOUS at 18:19

## 2018-04-14 RX ADMIN — Medication 500 MILLIGRAM(S): at 14:02

## 2018-04-14 RX ADMIN — OXYCODONE HYDROCHLORIDE 10 MILLIGRAM(S): 5 TABLET ORAL at 17:31

## 2018-04-14 RX ADMIN — MEGESTROL ACETATE 800 MILLIGRAM(S): 40 SUSPENSION ORAL at 13:01

## 2018-04-14 RX ADMIN — OXYCODONE HYDROCHLORIDE 30 MILLIGRAM(S): 5 TABLET ORAL at 22:59

## 2018-04-14 RX ADMIN — Medication 500 MILLIGRAM(S): at 17:35

## 2018-04-14 RX ADMIN — Medication 1 UNIT(S): at 08:11

## 2018-04-14 RX ADMIN — INSULIN GLARGINE 8 UNIT(S): 100 INJECTION, SOLUTION SUBCUTANEOUS at 08:16

## 2018-04-14 RX ADMIN — OXYCODONE HYDROCHLORIDE 30 MILLIGRAM(S): 5 TABLET ORAL at 14:01

## 2018-04-14 RX ADMIN — OXYCODONE HYDROCHLORIDE 10 MILLIGRAM(S): 5 TABLET ORAL at 12:30

## 2018-04-14 RX ADMIN — Medication 20 MILLIGRAM(S): at 12:58

## 2018-04-14 RX ADMIN — Medication 3: at 01:35

## 2018-04-14 RX ADMIN — ONDANSETRON 8 MILLIGRAM(S): 8 TABLET, FILM COATED ORAL at 22:29

## 2018-04-14 RX ADMIN — Medication 1 CAPSULE(S): at 13:02

## 2018-04-14 RX ADMIN — OXYCODONE HYDROCHLORIDE 10 MILLIGRAM(S): 5 TABLET ORAL at 05:16

## 2018-04-14 RX ADMIN — Medication 100 GRAM(S): at 07:58

## 2018-04-14 RX ADMIN — FLUCONAZOLE 100 MILLIGRAM(S): 150 TABLET ORAL at 09:30

## 2018-04-14 RX ADMIN — PIPERACILLIN AND TAZOBACTAM 25 GRAM(S): 4; .5 INJECTION, POWDER, LYOPHILIZED, FOR SOLUTION INTRAVENOUS at 05:16

## 2018-04-14 RX ADMIN — PIPERACILLIN AND TAZOBACTAM 25 GRAM(S): 4; .5 INJECTION, POWDER, LYOPHILIZED, FOR SOLUTION INTRAVENOUS at 14:03

## 2018-04-14 RX ADMIN — OXYCODONE HYDROCHLORIDE 30 MILLIGRAM(S): 5 TABLET ORAL at 05:16

## 2018-04-14 RX ADMIN — Medication 62.5 MILLIMOLE(S): at 00:17

## 2018-04-14 RX ADMIN — OXYCODONE HYDROCHLORIDE 30 MILLIGRAM(S): 5 TABLET ORAL at 14:30

## 2018-04-14 RX ADMIN — Medication 100 MILLIGRAM(S): at 17:33

## 2018-04-14 RX ADMIN — HEPARIN SODIUM 5000 UNIT(S): 5000 INJECTION INTRAVENOUS; SUBCUTANEOUS at 05:15

## 2018-04-14 RX ADMIN — OXYCODONE HYDROCHLORIDE 10 MILLIGRAM(S): 5 TABLET ORAL at 21:10

## 2018-04-14 RX ADMIN — SODIUM CHLORIDE 50 MILLILITER(S): 9 INJECTION, SOLUTION INTRAVENOUS at 00:16

## 2018-04-14 RX ADMIN — PIPERACILLIN AND TAZOBACTAM 25 GRAM(S): 4; .5 INJECTION, POWDER, LYOPHILIZED, FOR SOLUTION INTRAVENOUS at 20:09

## 2018-04-14 RX ADMIN — Medication 1 CAPSULE(S): at 20:09

## 2018-04-14 RX ADMIN — Medication 1 CAPSULE(S): at 08:12

## 2018-04-14 RX ADMIN — OXYCODONE HYDROCHLORIDE 10 MILLIGRAM(S): 5 TABLET ORAL at 08:45

## 2018-04-14 RX ADMIN — Medication 500 MILLIGRAM(S): at 05:16

## 2018-04-14 RX ADMIN — OXYCODONE HYDROCHLORIDE 30 MILLIGRAM(S): 5 TABLET ORAL at 22:29

## 2018-04-14 RX ADMIN — Medication 100 MILLIGRAM(S): at 05:16

## 2018-04-14 NOTE — PROGRESS NOTE ADULT - SUBJECTIVE AND OBJECTIVE BOX
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 No symptoms of hypoglycemia when FS 68 this morning. She continues to be on 30cc/hr IVF with dextrose and received Lantus 8 units at 8am this morning with Humalog 1 unit for breakfast. Overnight, FS was 198 at 2am and received Humalog 3 units, with increased insulin sensitivity and FS 68 this morning, it is unclear if Dextrose was ongoing at the time. Patient has no nausea, vomiting. C/o leg edema and requests Lasix    MEDICATIONS  (STANDING):  amylase/lipase/protease  (CREON  6,000 Units) 1 Capsule(s) Oral three times a day with meals  amylase/lipase/protease  (CREON  6,000 Units) 1 Capsule(s) Oral at bedtime  dextrose 5%. 1000 milliLiter(s) (50 mL/Hr) IV Continuous <Continuous>  dextrose 5%. 1000 milliLiter(s) (30 mL/Hr) IV Continuous <Continuous>  docusate sodium Liquid 100 milliGRAM(s) Oral two times a day  erythromycin    ethylsuccinate Suspension 40 mG/mL 500 milliGRAM(s) Oral every 6 hours  fluconAZOLE IVPB 400 milliGRAM(s) IV Intermittent every 24 hours  heparin  Injectable 5000 Unit(s) SubCutaneous every 12 hours  HYDROmorphone  Injectable 1 milliGRAM(s) IV Push once  insulin glargine Injectable (LANTUS) 8 Unit(s) SubCutaneous every morning  insulin lispro (HumaLOG) corrective regimen sliding scale LOW  SubCutaneous every 4 hours  insulin lispro Injectable (HumaLOG) 1 Unit(s) SubCutaneous three times a day before meals  megestrol Suspension 800 milliGRAM(s) Oral daily  ondansetron   Disintegrating Tablet 8 milliGRAM(s) Oral every 8 hours  oxyCODONE  ER Tablet 30 milliGRAM(s) Oral every 8 hours  piperacillin/tazobactam IVPB. 3.375 Gram(s) IV Intermittent every 8 hours    MEDICATIONS  (PRN):  acetaminophen   Tablet 650 milliGRAM(s) Oral every 6 hours PRN mild pain  ALPRAZolam 0.25 milliGRAM(s) Oral every 8 hours PRN Anxiety  bisacodyl 10 milliGRAM(s) Oral at bedtime PRN Constipation  methocarbamol 750 milliGRAM(s) Oral every 8 hours PRN spasm  oxyCODONE    IR 10 milliGRAM(s) Oral every 3 hours PRN Moderate Pain (4 - 6)      PHYSICAL EXAM:  VITALS: T(C): 37.2 (04-14-18 @ 10:16)  T(F): 98.9 (04-14-18 @ 10:16), Max: 98.9 (04-14-18 @ 06:49)  HR: 108 (04-14-18 @ 10:16) (104 - 114)  BP: 98/62 (04-14-18 @ 10:16) (98/62 - 117/75)  RR:  (18 - 93)  SpO2:  (94% - 100%)    GENERAL: NAD, well-groomed, well-developed, thin female, appears tired  EYES: No proptosis, no injection  HEENT:  Atraumatic, Normocephalic, moist mucous membranes  THYROID: Normal size, no palpable nodules  RESPIRATORY: Clear to auscultation bilaterally; No rales, rhonchi, wheezing, or rubs  CARDIOVASCULAR: Regular rate and rhythm; No murmurs; +1 peripheral edema in b/l LE  GI: Soft, nontender, non distended, normal bowel sounds. Noted to have 2 IR drains in RUQ with serosanginous drainage. Noted to have murrell catheter  CUSHING'S SIGNS: no striae    POCT Blood Glucose.: 72 mg/dL (04-14-18 @ 09:56)  POCT Blood Glucose.: 100 mg/dL (04-14-18 @ 07:05) Lantus 8 units at 8am, Humalog 1 unit with meal  POCT Blood Glucose.: 84 mg/dL (04-14-18 @ 06:36)  POCT Blood Glucose.: 72 mg/dL (04-14-18 @ 06:19)  POCT Blood Glucose.: 68 mg/dL (04-14-18 @ 05:54) no symptoms  POCT Blood Glucose.: 198 mg/dL (04-14-18 @ 01:31) Humalog 3 units as per SSI  POCT Blood Glucose.: 89 mg/dL (04-13-18 @ 23:12) Did not eat dinner, 2 IR drain placed  POCT Blood Glucose.: 64 mg/dL (04-13-18 @ 22:44)  POCT Blood Glucose.: 60 mg/dL (04-13-18 @ 22:25)  POCT Blood Glucose.: 65 mg/dL (04-13-18 @ 22:21)  POCT Blood Glucose.: 84 mg/dL (04-13-18 @ 18:47)  POCT Blood Glucose.: 72 mg/dL (04-13-18 @ 16:48)  POCT Blood Glucose.: 62 mg/dL (04-13-18 @ 15:57)  POCT Blood Glucose.: 68 mg/dL (04-13-18 @ 13:47)  POCT Blood Glucose.: 145 mg/dL (04-13-18 @ 12:07)  POCT Blood Glucose.: 163 mg/dL (04-13-18 @ 11:05)  POCT Blood Glucose.: 134 mg/dL (04-13-18 @ 05:59)  POCT Blood Glucose.: 197 mg/dL (04-13-18 @ 02:19)   POCT Blood Glucose.: 138 mg/dL (04-12-18 @ 22:10)  POCT Blood Glucose.: 136 mg/dL (04-12-18 @ 19:10)  POCT Blood Glucose.: 105 mg/dL (04-12-18 @ 14:15)  POCT Blood Glucose.: 180 mg/dL (04-12-18 @ 10:48)  POCT Blood Glucose.: 122 mg/dL (04-12-18 @ 07:35)  POCT Blood Glucose.: 145 mg/dL (04-12-18 @ 06:22)  POCT Blood Glucose.: 295 mg/dL (04-12-18 @ 03:46)  POCT Blood Glucose.: 290 mg/dL (04-12-18 @ 02:29)  POCT Blood Glucose.: 229 mg/dL (04-11-18 @ 22:12)  POCT Blood Glucose.: 149 mg/dL (04-11-18 @ 18:43)  POCT Blood Glucose.: 111 mg/dL (04-11-18 @ 16:10)  POCT Blood Glucose.: 165 mg/dL (04-11-18 @ 14:02)  POCT Blood Glucose.: 256 mg/dL (04-11-18 @ 11:18)    04-14    136  |  95<L>  |  6<L>  ----------------------------<  189<H>  4.1   |  29  |  0.68    EGFR if : 125  EGFR if non : 108    Ca    8.3<L>      04-14  Mg     1.8     04-14  Phos  4.1     04-14    TPro  5.3<L>  /  Alb  2.3<L>  /  TBili  0.6  /  DBili  x   /  AST  162<H>  /  ALT  116<H>  /  AlkPhos  114  04-14          Thyroid Function Tests:      Hemoglobin A1C, Whole Blood: 5.4 % [4.0 - 5.6] (04-12-18 @ 16:57)

## 2018-04-14 NOTE — PROGRESS NOTE ADULT - ASSESSMENT
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 Post op now c/b fever/surgical leak s/p collection s/p 2 IR drains placed on 04/13/18.

## 2018-04-14 NOTE — PROGRESS NOTE ADULT - ASSESSMENT
42F s/p total pancreatectomy with islet cell autotransplantation with fevers of unknown origin and an increasing leukocytosis s/p IR drainage of pelvic fluid collection 4/11 and 4/12.     Plan:  - Monitor fevers, WBC  - Diet: Regular     - Strict monitoring of FS, follow Endocrine recs  - Pain control  - Cont erythromycin, Zosyn, Creon, Megace  - Encourage OOB/ambulation

## 2018-04-14 NOTE — PROGRESS NOTE ADULT - PROBLEM SELECTOR PLAN 1
-Recommend discontinue Dextrose with IVF at this time and monitor FS TID AC, HS and at 2am daily. If FS >150 through today will monitor more frequently  -Patient eating 100% meal at this time and does not require Dextrose in IVF  -Continue Lantus 8 units qam for now. Will adjust as needed  -Continue humalog 1 units TID premeal and low SSI AC and HS  -PLEASE, contact DM team with any chages on PO  -Plan discussed with pt and team    Natacha Gray DO  Endocrine Fellow   Pager: [phone]

## 2018-04-14 NOTE — PROGRESS NOTE ADULT - SUBJECTIVE AND OBJECTIVE BOX
BLUE TEAM GENERAL SURGERY DAILY PROGRESS NOTE:       Subjective:  Patient seen this AM on rounds. Patient went to IR for drainage of intra-abdominal collections, placement of 2 IR drains. Remains afebrile, HRs improving. Complains of pain and soreness this morning but continues to state that she wants to do everything she's told to have the smoothest hospital course. Ferrari placed in IR recovery after finding of 1L urine on bladder scan/urinary retention.       Objective:    PE:  General: NAD, resting comfortably in bed  Neuro: A/O x 3, no focal deficits  Pulmonary: Nonlabored breathing, no respiratory distress  Abdominal: soft, ND, moderately ttp around drain sites   Incision: C/D/I   Drains: IR drain x 2   Extremities: WWP  : Ferrari      Vital Signs Last 24 Hrs  T(C): 37.2 (14 Apr 2018 06:49), Max: 37.3 (13 Apr 2018 09:30)  T(F): 98.9 (14 Apr 2018 06:49), Max: 99.2 (13 Apr 2018 09:30)  HR: 112 (14 Apr 2018 06:49) (104 - 119)  BP: 104/65 (14 Apr 2018 06:49) (104/65 - 117/75)  BP(mean): --  RR: 18 (14 Apr 2018 06:49) (18 - 93)  SpO2: 96% (14 Apr 2018 06:49) (94% - 100%)    I&O's Detail    13 Apr 2018 07:01  -  14 Apr 2018 07:00  --------------------------------------------------------  IN:    Oral Fluid: 240 mL    Solution: 450 mL  Total IN: 690 mL    OUT:    Bulb: 80 mL    Bulb: 60 mL    Indwelling Catheter - Urethral: 620 mL    Voided: 600 mL  Total OUT: 1360 mL    Total NET: -670 mL          Daily     Daily     MEDICATIONS  (STANDING):  amylase/lipase/protease  (CREON  6,000 Units) 1 Capsule(s) Oral three times a day with meals  amylase/lipase/protease  (CREON  6,000 Units) 1 Capsule(s) Oral at bedtime  dextrose 5%. 1000 milliLiter(s) (50 mL/Hr) IV Continuous <Continuous>  dextrose 5%. 1000 milliLiter(s) (30 mL/Hr) IV Continuous <Continuous>  docusate sodium Liquid 100 milliGRAM(s) Oral two times a day  erythromycin    ethylsuccinate Suspension 40 mG/mL 500 milliGRAM(s) Oral every 6 hours  fluconAZOLE IVPB 400 milliGRAM(s) IV Intermittent every 24 hours  heparin  Injectable 5000 Unit(s) SubCutaneous every 12 hours  HYDROmorphone  Injectable 1 milliGRAM(s) IV Push once  insulin glargine Injectable (LANTUS) 8 Unit(s) SubCutaneous every morning  insulin lispro (HumaLOG) corrective regimen sliding scale   SubCutaneous every 4 hours  insulin lispro Injectable (HumaLOG) 1 Unit(s) SubCutaneous three times a day before meals  magnesium sulfate  IVPB 1 Gram(s) IV Intermittent once  megestrol Suspension 800 milliGRAM(s) Oral daily  ondansetron   Disintegrating Tablet 8 milliGRAM(s) Oral every 8 hours  oxyCODONE  ER Tablet 30 milliGRAM(s) Oral every 8 hours  piperacillin/tazobactam IVPB. 3.375 Gram(s) IV Intermittent every 8 hours    MEDICATIONS  (PRN):  acetaminophen   Tablet 650 milliGRAM(s) Oral every 6 hours PRN mild pain  ALPRAZolam 0.25 milliGRAM(s) Oral every 8 hours PRN Anxiety  bisacodyl 10 milliGRAM(s) Oral at bedtime PRN Constipation  methocarbamol 750 milliGRAM(s) Oral every 8 hours PRN spasm  oxyCODONE    IR 10 milliGRAM(s) Oral every 3 hours PRN Moderate Pain (4 - 6)      LABS:                        7.4    26.8  )-----------( 598      ( 14 Apr 2018 01:57 )             22.9     04-14    136  |  95<L>  |  6<L>  ----------------------------<  189<H>  4.1   |  29  |  0.68    Ca    8.3<L>      14 Apr 2018 01:57  Phos  4.1     04-14  Mg     1.8     04-14    TPro  5.3<L>  /  Alb  2.3<L>  /  TBili  0.6  /  DBili  x   /  AST  162<H>  /  ALT  116<H>  /  AlkPhos  114  04-14

## 2018-04-14 NOTE — PROVIDER CONTACT NOTE (CRITICAL VALUE NOTIFICATION) - TEST AND RESULT REPORTED:
Body Fluid Cx 4/13/18 + numerous poly monophrnocluar lymphocyte per low power field, few yeast like cells per oil field

## 2018-04-15 LAB
CULTURE RESULTS: SIGNIFICANT CHANGE UP
CULTURE RESULTS: SIGNIFICANT CHANGE UP
SPECIMEN SOURCE: SIGNIFICANT CHANGE UP
SPECIMEN SOURCE: SIGNIFICANT CHANGE UP

## 2018-04-15 PROCEDURE — 99232 SBSQ HOSP IP/OBS MODERATE 35: CPT

## 2018-04-15 PROCEDURE — 99232 SBSQ HOSP IP/OBS MODERATE 35: CPT | Mod: GC

## 2018-04-15 RX ORDER — MICAFUNGIN SODIUM 100 MG/1
INJECTION, POWDER, LYOPHILIZED, FOR SOLUTION INTRAVENOUS
Qty: 0 | Refills: 0 | Status: DISCONTINUED | OUTPATIENT
Start: 2018-04-15 | End: 2018-04-18

## 2018-04-15 RX ORDER — MICAFUNGIN SODIUM 100 MG/1
100 INJECTION, POWDER, LYOPHILIZED, FOR SOLUTION INTRAVENOUS ONCE
Qty: 0 | Refills: 0 | Status: COMPLETED | OUTPATIENT
Start: 2018-04-15 | End: 2018-04-15

## 2018-04-15 RX ORDER — MICAFUNGIN SODIUM 100 MG/1
100 INJECTION, POWDER, LYOPHILIZED, FOR SOLUTION INTRAVENOUS EVERY 24 HOURS
Qty: 0 | Refills: 0 | Status: DISCONTINUED | OUTPATIENT
Start: 2018-04-16 | End: 2018-04-18

## 2018-04-15 RX ORDER — FUROSEMIDE 40 MG
20 TABLET ORAL ONCE
Qty: 0 | Refills: 0 | Status: COMPLETED | OUTPATIENT
Start: 2018-04-15 | End: 2018-04-15

## 2018-04-15 RX ADMIN — OXYCODONE HYDROCHLORIDE 10 MILLIGRAM(S): 5 TABLET ORAL at 02:21

## 2018-04-15 RX ADMIN — METHOCARBAMOL 750 MILLIGRAM(S): 500 TABLET, FILM COATED ORAL at 02:25

## 2018-04-15 RX ADMIN — ONDANSETRON 8 MILLIGRAM(S): 8 TABLET, FILM COATED ORAL at 14:53

## 2018-04-15 RX ADMIN — MEGESTROL ACETATE 800 MILLIGRAM(S): 40 SUSPENSION ORAL at 05:55

## 2018-04-15 RX ADMIN — OXYCODONE HYDROCHLORIDE 10 MILLIGRAM(S): 5 TABLET ORAL at 14:54

## 2018-04-15 RX ADMIN — OXYCODONE HYDROCHLORIDE 30 MILLIGRAM(S): 5 TABLET ORAL at 15:30

## 2018-04-15 RX ADMIN — INSULIN GLARGINE 7 UNIT(S): 100 INJECTION, SOLUTION SUBCUTANEOUS at 08:04

## 2018-04-15 RX ADMIN — Medication 1 CAPSULE(S): at 21:06

## 2018-04-15 RX ADMIN — OXYCODONE HYDROCHLORIDE 10 MILLIGRAM(S): 5 TABLET ORAL at 21:22

## 2018-04-15 RX ADMIN — OXYCODONE HYDROCHLORIDE 10 MILLIGRAM(S): 5 TABLET ORAL at 15:30

## 2018-04-15 RX ADMIN — Medication 500 MILLIGRAM(S): at 05:57

## 2018-04-15 RX ADMIN — OXYCODONE HYDROCHLORIDE 10 MILLIGRAM(S): 5 TABLET ORAL at 10:00

## 2018-04-15 RX ADMIN — FLUCONAZOLE 100 MILLIGRAM(S): 150 TABLET ORAL at 09:15

## 2018-04-15 RX ADMIN — Medication 1: at 20:53

## 2018-04-15 RX ADMIN — Medication 100 MILLIGRAM(S): at 05:58

## 2018-04-15 RX ADMIN — MEGESTROL ACETATE 800 MILLIGRAM(S): 40 SUSPENSION ORAL at 14:54

## 2018-04-15 RX ADMIN — OXYCODONE HYDROCHLORIDE 30 MILLIGRAM(S): 5 TABLET ORAL at 06:00

## 2018-04-15 RX ADMIN — OXYCODONE HYDROCHLORIDE 10 MILLIGRAM(S): 5 TABLET ORAL at 20:52

## 2018-04-15 RX ADMIN — PIPERACILLIN AND TAZOBACTAM 25 GRAM(S): 4; .5 INJECTION, POWDER, LYOPHILIZED, FOR SOLUTION INTRAVENOUS at 14:45

## 2018-04-15 RX ADMIN — OXYCODONE HYDROCHLORIDE 10 MILLIGRAM(S): 5 TABLET ORAL at 02:51

## 2018-04-15 RX ADMIN — OXYCODONE HYDROCHLORIDE 10 MILLIGRAM(S): 5 TABLET ORAL at 06:01

## 2018-04-15 RX ADMIN — Medication 100 MILLIGRAM(S): at 17:19

## 2018-04-15 RX ADMIN — Medication 500 MILLIGRAM(S): at 23:46

## 2018-04-15 RX ADMIN — Medication 40 MILLIEQUIVALENT(S): at 02:21

## 2018-04-15 RX ADMIN — PIPERACILLIN AND TAZOBACTAM 25 GRAM(S): 4; .5 INJECTION, POWDER, LYOPHILIZED, FOR SOLUTION INTRAVENOUS at 05:56

## 2018-04-15 RX ADMIN — Medication 1: at 08:03

## 2018-04-15 RX ADMIN — Medication 500 MILLIGRAM(S): at 17:21

## 2018-04-15 RX ADMIN — Medication 500 MILLIGRAM(S): at 14:52

## 2018-04-15 RX ADMIN — Medication 1 CAPSULE(S): at 17:19

## 2018-04-15 RX ADMIN — Medication 40 MILLIEQUIVALENT(S): at 05:57

## 2018-04-15 RX ADMIN — MICAFUNGIN SODIUM 105 MILLIGRAM(S): 100 INJECTION, POWDER, LYOPHILIZED, FOR SOLUTION INTRAVENOUS at 16:24

## 2018-04-15 RX ADMIN — Medication 500 MILLIGRAM(S): at 00:31

## 2018-04-15 RX ADMIN — OXYCODONE HYDROCHLORIDE 30 MILLIGRAM(S): 5 TABLET ORAL at 14:48

## 2018-04-15 RX ADMIN — OXYCODONE HYDROCHLORIDE 10 MILLIGRAM(S): 5 TABLET ORAL at 18:00

## 2018-04-15 RX ADMIN — OXYCODONE HYDROCHLORIDE 10 MILLIGRAM(S): 5 TABLET ORAL at 06:41

## 2018-04-15 RX ADMIN — Medication 10 MILLIGRAM(S): at 10:50

## 2018-04-15 RX ADMIN — OXYCODONE HYDROCHLORIDE 30 MILLIGRAM(S): 5 TABLET ORAL at 23:14

## 2018-04-15 RX ADMIN — OXYCODONE HYDROCHLORIDE 10 MILLIGRAM(S): 5 TABLET ORAL at 09:22

## 2018-04-15 RX ADMIN — PIPERACILLIN AND TAZOBACTAM 25 GRAM(S): 4; .5 INJECTION, POWDER, LYOPHILIZED, FOR SOLUTION INTRAVENOUS at 20:56

## 2018-04-15 RX ADMIN — OXYCODONE HYDROCHLORIDE 30 MILLIGRAM(S): 5 TABLET ORAL at 22:44

## 2018-04-15 RX ADMIN — Medication 1 CAPSULE(S): at 14:52

## 2018-04-15 RX ADMIN — OXYCODONE HYDROCHLORIDE 30 MILLIGRAM(S): 5 TABLET ORAL at 06:30

## 2018-04-15 RX ADMIN — Medication 1 CAPSULE(S): at 08:16

## 2018-04-15 RX ADMIN — Medication 20 MILLIGRAM(S): at 10:49

## 2018-04-15 NOTE — PROGRESS NOTE ADULT - ASSESSMENT
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 Had post op fevers from 4/9-4/12, max 103.1F, found pelvic fluid on CT scan, drained by IR 4/11, then a new large gas-containing fluid collection in the LUQ, drained by IR 4/13, cultures with Candida glabrata (fluid media only). Blood and urine cultures negative. Nontoxic, white count trending down. Has been on Zosyn and Diflucan.   Significance of few yeast? No other localizing symptoms. Inflammation itself could cause fevers.   Will be at risk for encapsulated pathogens post splenectomy, it is not at this point.    Recommend  -Zosyn for now?  -Echinocandin for C. glabrata?   -vaccination for S. pneumoniae and Meningococcus, wait 14 days post-operatively 42F with chronic pancreatitis due to a congenital malformation underwent total pancreatectomy with islet cell transplantation and splenectomy 4/5/18. Had post op fevers from 4/9-4/12, max 103.1F, found pelvic fluid on CT scan, drained by IR 4/11, then a new large gas-containing fluid collection in the LUQ, drained by IR 4/13, cultures with Candida glabrata (fluid media only). Blood and urine cultures negative. Nontoxic, white count trending down. Has been on Zosyn and Diflucan.   Significance of few yeast? No other localizing symptoms. Inflammation itself could cause fevers.   Will be at risk for encapsulated pathogens post splenectomy, but not at this point.    Recommend  -Zosyn for now?  -Echinocandin for C. glabrata?   -vaccination for S. pneumoniae and Meningococcus, wait 14 days post-operatively

## 2018-04-15 NOTE — PROGRESS NOTE ADULT - ASSESSMENT
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 Post op now c/b fever/surgical leak s/p collection s/p 2 IR drains placed on 04/13/18, now removed.

## 2018-04-15 NOTE — CHART NOTE - NSCHARTNOTEFT_GEN_A_CORE
abd fluid with candida glabrata---often resistant to fluconazole.  I will change fluconazole to mycamine 100 mg daily.

## 2018-04-15 NOTE — PROGRESS NOTE ADULT - ASSESSMENT
42F s/p total pancreatectomy with islet cell autotransplantation with fevers of unknown origin and an increasing leukocytosis s/p IR drainage of pelvic fluid collection 4/11 and 4/12.     Plan:  - Monitor fevers, WBC  - Diet: Regular  - Strict monitoring of FS, follow Endocrine recs  - d/c murrell, f/u TOV  - Pain control  - Cont erythromycin, Zosyn, Creon, Megace  - Encourage OOB/ambulation

## 2018-04-15 NOTE — PROGRESS NOTE ADULT - SUBJECTIVE AND OBJECTIVE BOX
Follow Up:  post-op fever    Interval History/ROS: Afebrile since 4/12. Continued pain but feeling much better overall and eating.     Allergies  No Known Allergies    ANTIMICROBIALS:  erythromycin    ethylsuccinate Suspension 40 mG/mL 500 every 6 hours  fluconAZOLE IVPB 400 every 24 hours  piperacillin/tazobactam IVPB. 3.375 every 8 hours      OTHER MEDS:  MEDICATIONS  (STANDING):  acetaminophen   Tablet 650 every 6 hours PRN  ALPRAZolam 0.25 every 8 hours PRN  amylase/lipase/protease  (CREON  6,000 Units) 1 at bedtime  amylase/lipase/protease  (CREON  6,000 Units) 1 three times a day with meals  bisacodyl 10 at bedtime PRN  docusate sodium Liquid 100 two times a day  heparin  Injectable 5000 every 12 hours  HYDROmorphone  Injectable 1 once  insulin glargine Injectable (LANTUS) 7 every morning  insulin lispro (HumaLOG) corrective regimen sliding scale  Before meals and at bedtime  megestrol Suspension 800 daily  methocarbamol 750 every 8 hours PRN  ondansetron   Disintegrating Tablet 8 every 8 hours  oxyCODONE    IR 10 every 3 hours PRN  oxyCODONE  ER Tablet 30 every 8 hours      Vital Signs Last 24 Hrs  T(C): 37.2 (15 Apr 2018 04:50), Max: 37.2 (14 Apr 2018 10:16)  T(F): 98.9 (15 Apr 2018 04:50), Max: 98.9 (14 Apr 2018 10:16)  HR: 100 (15 Apr 2018 04:50) (90 - 108)  BP: 93/51 (15 Apr 2018 04:50) (90/54 - 98/62)  BP(mean): --  RR: 18 (15 Apr 2018 04:50) (18 - 18)  SpO2: 96% (15 Apr 2018 04:50) (94% - 97%)    PHYSICAL EXAM:  General: WN/WD NAD, Non-toxic  Neurology: A&Ox3, nonfocal  Respiratory: Clear to auscultation bilaterally  CV: tachycardic, regular rhythm normal S1S2  Abdominal: midline surgical site intact, clean. two DANIEL drains, one thin yellow fluid, the other more cloudy. feeding tube. bowel sounds present. tender to light touch diffusely.   Extremities: +3 pitting edema  Line Sites: Clear  Skin: No rash                          8.0    21.2  )-----------( 679      ( 14 Apr 2018 22:34 )             24.6       04-14    136  |  94<L>  |  5<L>  ----------------------------<  72  3.5   |  29  |  0.78    Ca    8.3<L>      14 Apr 2018 22:34  Phos  4.1     04-14  Mg     2.0     04-14    TPro  5.6<L>  /  Alb  2.4<L>  /  TBili  0.6  /  DBili  x   /  AST  52<H>  /  ALT  84<H>  /  AlkPhos  112  04-14      MICROBIOLOGY:  Culture - Body Fluid with Gram Stain (04.13.18 @ 21:35)  Specimen Source: .Body Fluid Abdominal Fluid  Gram Stain: Numerous polymorphonuclear leukocytes per low power field    Few Yeast like cells per oil power field  Culture Results: Few Yeast like cells    Culture - Body Fluid with Gram Stain (04.12.18 @ 00:51)  Specimen Source: .Body Fluid Ascites Fluid  Gram Stain: polymorphonuclear leukocytes seen per low power field    No organisms seen per oil power field by cytocentrifuge  Culture Results: Growth in fluid media only Candida glabrata    Culture - Blood (04.11.18 @ 10:33)    No growth to date.  Culture - Blood (04.11.18 @ 10:33)    No growth to date.    Culture - Urine (04.10.18 @ 05:29)  No growth    Culture - Blood (04.10.18 @ 05:22)    No growth at 5 days.  Culture - Blood (04.10.18 @ 05:22)    No growth at 5 days.    RADIOLOGY:  Interventional radiology 4/13/18  Brief- Operative Note  Procedure: CT guided drainage of abdominal fluid collections  Two air and fluid containing collections in the anterior abdomen accessed under CT guidance. 12 FR pigtail catheters placed in each collection. 150 cc from upper drain and 45 cc from lower drain aspirated (cloudy brown fluid).   Specimens Removed: Cloudy brown fluid sent for culture  Implants: Two 12 FR pigtail catheters    CT Abdomen and Pelvis w/ Oral Cont and w/ IV Cont (04.13.18 @ 14:24)   New large gas containing fluid collection in the left upper quadrant   which tracks into the central abdomen.    IR Procedure (04.11.18 @ 15:50)   Imaging in the prone and left lateral decubitus positions demonstrates   the pelvic fluid to be nonloculated and freely mobile. CT guided   percutaneous aspiration of pelvic fluid yielded a small amount of clear   reddish fluid. Sample of fluid sent for microbiological analysis.    CT Abdomen and Pelvis w/ Oral Cont and w/ IV Cont (04.10.18 @ 20:29)   Status post pancreatectomy and splenectomy.  Small ascites particularly in the pelvis with loculated air and fluid   signal in the left upper quadrant as above. No well-defined collection.    Mahendra Hsieh MD; Division of Infectious Disease; Pager: [phone]; nights and weekends: [phone]

## 2018-04-15 NOTE — PROGRESS NOTE ADULT - PROBLEM SELECTOR PLAN 1
-Cont. to  monitor FS TID AC, HS and at 2am daily. If FS >150, would monitor more frequently  -Patient eating 100% meal at this time.  -Continue Lantus 7 units qam for now. Agree w/ dose adjustment.  -Agree w/ stopping standing  humalog 1 units TID given FS 70 this afternoon.  -Cont.  low SSI AC and HS  -PLEASE, contact DM team with any changes on PO

## 2018-04-15 NOTE — PROGRESS NOTE ADULT - SUBJECTIVE AND OBJECTIVE BOX
BLUE TEAM GENERAL SURGERY DAILY PROGRESS NOTE:       Subjective:  Patient seen this AM on rounds. Afebrile overnight, HRs improving. Pain well controlled.  Complaining of discomfort with murrell and wants it removed.    Objective:  T(C): 36.9 (04-15-18 @ 09:42), Max: 37.2 (04-15-18 @ 04:50)  HR: 102 (04-15-18 @ 09:42) (90 - 102)  BP: 99/62 (04-15-18 @ 09:42) (90/54 - 99/62)  RR: 18 (04-15-18 @ 09:42) (18 - 18)  SpO2: 96% (04-15-18 @ 09:42) (96% - 97%)  Wt(kg): --    04-14 @ 07:01  -  04-15 @ 07:00  --------------------------------------------------------  IN:    Oral Fluid: 900 mL    Solution: 200 mL  Total IN: 1100 mL    OUT:    Bulb: 45 mL    Bulb: 105 mL    Indwelling Catheter - Urethral: 2200 mL  Total OUT: 2350 mL    Total NET: -1250 mL      04-15 @ 07:01  -  04-15 @ 10:24  --------------------------------------------------------  IN:    Oral Fluid: 360 mL    Solution: 200 mL  Total IN: 560 mL    OUT:    Bulb: 20 mL    Bulb: 20 mL    Indwelling Catheter - Urethral: 400 mL  Total OUT: 440 mL    Total NET: 120 mL          PE:  General: NAD, resting comfortably in bed  Neuro: A/O x 3, no focal deficits  Pulmonary: Nonlabored breathing, no respiratory distress  Abdominal: soft, ND, moderately ttp around drain sites   Incision: C/D/I   Drains: IR drain x 2   Extremities: WWP  : Murrell                          8.0    21.2  )-----------( 679      ( 14 Apr 2018 22:34 )             24.6     04-14    136  |  94<L>  |  5<L>  ----------------------------<  72  3.5   |  29  |  0.78    Ca    8.3<L>      14 Apr 2018 22:34  Phos  4.1     04-14  Mg     2.0     04-14    TPro  5.6<L>  /  Alb  2.4<L>  /  TBili  0.6  /  DBili  x   /  AST  52<H>  /  ALT  84<H>  /  AlkPhos  112  04-14    LIVER FUNCTIONS - ( 14 Apr 2018 22:34 )  Alb: 2.4 g/dL / Pro: 5.6 g/dL / ALK PHOS: 112 U/L / ALT: 84 U/L RC / AST: 52 U/L / GGT: x

## 2018-04-15 NOTE — PROGRESS NOTE ADULT - SUBJECTIVE AND OBJECTIVE BOX
Chief Complaint/Follow-up on: Total Pancreatectomy s/p islet cell transplant    S: Pt. still w/o c/o abdominal pain, but improving daily. Reports good appetite and she is eating most of her meals.    MEDICATIONS  (STANDING):  amylase/lipase/protease  (CREON  6,000 Units) 1 Capsule(s) Oral at bedtime  amylase/lipase/protease  (CREON  6,000 Units) 1 Capsule(s) Oral three times a day with meals  dextrose 5%. 1000 milliLiter(s) (50 mL/Hr) IV Continuous <Continuous>  docusate sodium Liquid 100 milliGRAM(s) Oral two times a day  erythromycin    ethylsuccinate Suspension 40 mG/mL 500 milliGRAM(s) Oral every 6 hours  heparin  Injectable 5000 Unit(s) SubCutaneous every 12 hours  HYDROmorphone  Injectable 1 milliGRAM(s) IV Push once  insulin glargine Injectable (LANTUS) 7 Unit(s) SubCutaneous every morning  insulin lispro (HumaLOG) corrective regimen sliding scale   SubCutaneous Before meals and at bedtime  megestrol Suspension 800 milliGRAM(s) Oral daily  micafungin IVPB      micafungin IVPB 100 milliGRAM(s) IV Intermittent once  ondansetron   Disintegrating Tablet 8 milliGRAM(s) Oral every 8 hours  oxyCODONE  ER Tablet 30 milliGRAM(s) Oral every 8 hours  piperacillin/tazobactam IVPB. 3.375 Gram(s) IV Intermittent every 8 hours    MEDICATIONS  (PRN):  acetaminophen   Tablet 650 milliGRAM(s) Oral every 6 hours PRN mild pain  ALPRAZolam 0.25 milliGRAM(s) Oral every 8 hours PRN Anxiety  bisacodyl 10 milliGRAM(s) Oral at bedtime PRN Constipation  methocarbamol 750 milliGRAM(s) Oral every 8 hours PRN spasm  oxyCODONE    IR 10 milliGRAM(s) Oral every 3 hours PRN Moderate Pain (4 - 6)      Allergies    No Known Allergies      Review of Systems:  Constitutional: No fever  Eyes: No blurry vision  Neuro: No tremors  HEENT: No pain  Cardiovascular: No chest pain, palpitations  Respiratory: No SOB, no cough  GI: No nausea, vomiting, + abdominal pain  : No dysuria  Skin: no rash  Psych: no depression  Endocrine: no polyuria, polydipsia  Hem/lymph: no swelling  Osteoporosis: no fractures    ALL OTHER SYSTEMS REVIEWED AND NEGATIVE    UNABLE TO OBTAIN    PHYSICAL EXAM:  VITALS: T(C): 37 (04-15-18 @ 14:05)  T(F): 98.6 (04-15-18 @ 14:05), Max: 98.9 (04-15-18 @ 04:50)  HR: 102 (04-15-18 @ 14:05) (90 - 102)  BP: 98/66 (04-15-18 @ 14:05) (90/56 - 99/62)  RR:  (18 - 18)  SpO2:  (96% - 97%)  Wt(kg): --  GENERAL: NAD, well-groomed, well-developed, thin female, appears tired  EYES: No proptosis, no injection  HEENT:  Atraumatic, Normocephalic, moist mucous membranes  THYROID: Normal size, no palpable nodules  RESPIRATORY: Clear to auscultation bilaterally; No rales, rhonchi, wheezing, or rubs  CARDIOVASCULAR: Regular rate and rhythm; No murmurs; +1 peripheral edema in b/l LE  GI: Soft, nontender, non distended, normal bowel sounds.  CUSHING'S SIGNS: no striae    POCT Blood Glucose.: 79 mg/dL (04-15-18 @ 13:59)  POCT Blood Glucose.: 138 mg/dL (04-15-18 @ 07:48)  POCT Blood Glucose.: 102 mg/dL (04-15-18 @ 02:17)  POCT Blood Glucose.: 77 mg/dL (04-14-18 @ 22:24)  POCT Blood Glucose.: 89 mg/dL (04-14-18 @ 20:02)  POCT Blood Glucose.: 70 mg/dL (04-14-18 @ 14:20)  POCT Blood Glucose.: 69 mg/dL (04-14-18 @ 14:19)  POCT Blood Glucose.: 72 mg/dL (04-14-18 @ 09:56)  POCT Blood Glucose.: 100 mg/dL (04-14-18 @ 07:05)  POCT Blood Glucose.: 84 mg/dL (04-14-18 @ 06:36)  POCT Blood Glucose.: 72 mg/dL (04-14-18 @ 06:19)  POCT Blood Glucose.: 68 mg/dL (04-14-18 @ 05:54)  POCT Blood Glucose.: 198 mg/dL (04-14-18 @ 01:31)  POCT Blood Glucose.: 89 mg/dL (04-13-18 @ 23:12)  POCT Blood Glucose.: 64 mg/dL (04-13-18 @ 22:44)  POCT Blood Glucose.: 60 mg/dL (04-13-18 @ 22:25)  POCT Blood Glucose.: 65 mg/dL (04-13-18 @ 22:21)  POCT Blood Glucose.: 84 mg/dL (04-13-18 @ 18:47)  POCT Blood Glucose.: 72 mg/dL (04-13-18 @ 16:48)  POCT Blood Glucose.: 62 mg/dL (04-13-18 @ 15:57)  POCT Blood Glucose.: 68 mg/dL (04-13-18 @ 13:47)  POCT Blood Glucose.: 145 mg/dL (04-13-18 @ 12:07)  POCT Blood Glucose.: 163 mg/dL (04-13-18 @ 11:05)  POCT Blood Glucose.: 134 mg/dL (04-13-18 @ 05:59)  POCT Blood Glucose.: 197 mg/dL (04-13-18 @ 02:19)  POCT Blood Glucose.: 138 mg/dL (04-12-18 @ 22:10)  POCT Blood Glucose.: 136 mg/dL (04-12-18 @ 19:10)      04-14    136  |  94<L>  |  5<L>  ----------------------------<  72  3.5   |  29  |  0.78    EGFR if : 109  EGFR if non : 94    Ca    8.3<L>      04-14  Mg     2.0     04-14  Phos  4.1     04-14    TPro  5.6<L>  /  Alb  2.4<L>  /  TBili  0.6  /  DBili  x   /  AST  52<H>  /  ALT  84<H>  /  AlkPhos  112  04-14          Thyroid Function Tests:      Hemoglobin A1C, Whole Blood: 5.4 % [4.0 - 5.6] (04-12-18 @ 16:57)

## 2018-04-16 ENCOUNTER — TRANSCRIPTION ENCOUNTER (OUTPATIENT)
Age: 43
End: 2018-04-16

## 2018-04-16 LAB
ALBUMIN SERPL ELPH-MCNC: 2.2 G/DL — LOW (ref 3.3–5)
ALP SERPL-CCNC: 98 U/L — SIGNIFICANT CHANGE UP (ref 40–120)
ALT FLD-CCNC: 43 U/L RC — SIGNIFICANT CHANGE UP (ref 10–45)
ANION GAP SERPL CALC-SCNC: 11 MMOL/L — SIGNIFICANT CHANGE UP (ref 5–17)
AST SERPL-CCNC: 15 U/L — SIGNIFICANT CHANGE UP (ref 10–40)
BILIRUB SERPL-MCNC: 0.4 MG/DL — SIGNIFICANT CHANGE UP (ref 0.2–1.2)
BUN SERPL-MCNC: 6 MG/DL — LOW (ref 7–23)
CALCIUM SERPL-MCNC: 8.4 MG/DL — SIGNIFICANT CHANGE UP (ref 8.4–10.5)
CHLORIDE SERPL-SCNC: 97 MMOL/L — SIGNIFICANT CHANGE UP (ref 96–108)
CO2 SERPL-SCNC: 29 MMOL/L — SIGNIFICANT CHANGE UP (ref 22–31)
CREAT SERPL-MCNC: 0.85 MG/DL — SIGNIFICANT CHANGE UP (ref 0.5–1.3)
CULTURE RESULTS: SIGNIFICANT CHANGE UP
CULTURE RESULTS: SIGNIFICANT CHANGE UP
GLUCOSE SERPL-MCNC: 108 MG/DL — HIGH (ref 70–99)
HCT VFR BLD CALC: 21.9 % — LOW (ref 34.5–45)
HGB BLD-MCNC: 7.3 G/DL — LOW (ref 11.5–15.5)
MAGNESIUM SERPL-MCNC: 1.8 MG/DL — SIGNIFICANT CHANGE UP (ref 1.6–2.6)
MCHC RBC-ENTMCNC: 32.5 PG — SIGNIFICANT CHANGE UP (ref 27–34)
MCHC RBC-ENTMCNC: 33.3 GM/DL — SIGNIFICANT CHANGE UP (ref 32–36)
MCV RBC AUTO: 97.6 FL — SIGNIFICANT CHANGE UP (ref 80–100)
PHOSPHATE SERPL-MCNC: 3.5 MG/DL — SIGNIFICANT CHANGE UP (ref 2.5–4.5)
PLATELET # BLD AUTO: 778 K/UL — HIGH (ref 150–400)
POTASSIUM SERPL-MCNC: 4.4 MMOL/L — SIGNIFICANT CHANGE UP (ref 3.5–5.3)
POTASSIUM SERPL-SCNC: 4.4 MMOL/L — SIGNIFICANT CHANGE UP (ref 3.5–5.3)
PROT SERPL-MCNC: 5.1 G/DL — LOW (ref 6–8.3)
RBC # BLD: 2.24 M/UL — LOW (ref 3.8–5.2)
RBC # FLD: 15.5 % — HIGH (ref 10.3–14.5)
SODIUM SERPL-SCNC: 137 MMOL/L — SIGNIFICANT CHANGE UP (ref 135–145)
SPECIMEN SOURCE: SIGNIFICANT CHANGE UP
SPECIMEN SOURCE: SIGNIFICANT CHANGE UP
WBC # BLD: 17.8 K/UL — HIGH (ref 3.8–10.5)
WBC # FLD AUTO: 17.8 K/UL — HIGH (ref 3.8–10.5)

## 2018-04-16 PROCEDURE — 71045 X-RAY EXAM CHEST 1 VIEW: CPT | Mod: 26

## 2018-04-16 PROCEDURE — 99232 SBSQ HOSP IP/OBS MODERATE 35: CPT

## 2018-04-16 RX ORDER — INSULIN LISPRO 100/ML
1 VIAL (ML) SUBCUTANEOUS AT BEDTIME
Qty: 0 | Refills: 0 | Status: DISCONTINUED | OUTPATIENT
Start: 2018-04-16 | End: 2018-04-17

## 2018-04-16 RX ORDER — OXYCODONE HYDROCHLORIDE 5 MG/1
30 TABLET ORAL EVERY 8 HOURS
Qty: 0 | Refills: 0 | Status: DISCONTINUED | OUTPATIENT
Start: 2018-04-16 | End: 2018-04-18

## 2018-04-16 RX ORDER — PANTOPRAZOLE SODIUM 20 MG/1
40 TABLET, DELAYED RELEASE ORAL
Qty: 0 | Refills: 0 | Status: DISCONTINUED | OUTPATIENT
Start: 2018-04-16 | End: 2018-04-18

## 2018-04-16 RX ORDER — INSULIN LISPRO 100/ML
1 VIAL (ML) SUBCUTANEOUS
Qty: 0 | Refills: 0 | Status: DISCONTINUED | OUTPATIENT
Start: 2018-04-16 | End: 2018-04-17

## 2018-04-16 RX ORDER — FUROSEMIDE 40 MG
20 TABLET ORAL EVERY 12 HOURS
Qty: 0 | Refills: 0 | Status: DISCONTINUED | OUTPATIENT
Start: 2018-04-16 | End: 2018-04-18

## 2018-04-16 RX ORDER — FUROSEMIDE 40 MG
20 TABLET ORAL ONCE
Qty: 0 | Refills: 0 | Status: COMPLETED | OUTPATIENT
Start: 2018-04-16 | End: 2018-04-16

## 2018-04-16 RX ORDER — INSULIN LISPRO 100/ML
1 VIAL (ML) SUBCUTANEOUS ONCE
Qty: 0 | Refills: 0 | Status: COMPLETED | OUTPATIENT
Start: 2018-04-16 | End: 2018-04-16

## 2018-04-16 RX ADMIN — MICAFUNGIN SODIUM 105 MILLIGRAM(S): 100 INJECTION, POWDER, LYOPHILIZED, FOR SOLUTION INTRAVENOUS at 13:40

## 2018-04-16 RX ADMIN — ONDANSETRON 8 MILLIGRAM(S): 8 TABLET, FILM COATED ORAL at 13:33

## 2018-04-16 RX ADMIN — Medication 500 MILLIGRAM(S): at 13:31

## 2018-04-16 RX ADMIN — OXYCODONE HYDROCHLORIDE 30 MILLIGRAM(S): 5 TABLET ORAL at 14:45

## 2018-04-16 RX ADMIN — ONDANSETRON 8 MILLIGRAM(S): 8 TABLET, FILM COATED ORAL at 21:44

## 2018-04-16 RX ADMIN — Medication 100 MILLIGRAM(S): at 18:44

## 2018-04-16 RX ADMIN — PIPERACILLIN AND TAZOBACTAM 25 GRAM(S): 4; .5 INJECTION, POWDER, LYOPHILIZED, FOR SOLUTION INTRAVENOUS at 21:43

## 2018-04-16 RX ADMIN — OXYCODONE HYDROCHLORIDE 10 MILLIGRAM(S): 5 TABLET ORAL at 08:50

## 2018-04-16 RX ADMIN — PIPERACILLIN AND TAZOBACTAM 25 GRAM(S): 4; .5 INJECTION, POWDER, LYOPHILIZED, FOR SOLUTION INTRAVENOUS at 13:31

## 2018-04-16 RX ADMIN — Medication 100 MILLIGRAM(S): at 05:33

## 2018-04-16 RX ADMIN — OXYCODONE HYDROCHLORIDE 30 MILLIGRAM(S): 5 TABLET ORAL at 14:13

## 2018-04-16 RX ADMIN — OXYCODONE HYDROCHLORIDE 30 MILLIGRAM(S): 5 TABLET ORAL at 21:44

## 2018-04-16 RX ADMIN — OXYCODONE HYDROCHLORIDE 10 MILLIGRAM(S): 5 TABLET ORAL at 05:31

## 2018-04-16 RX ADMIN — OXYCODONE HYDROCHLORIDE 30 MILLIGRAM(S): 5 TABLET ORAL at 05:52

## 2018-04-16 RX ADMIN — OXYCODONE HYDROCHLORIDE 10 MILLIGRAM(S): 5 TABLET ORAL at 01:16

## 2018-04-16 RX ADMIN — INSULIN GLARGINE 7 UNIT(S): 100 INJECTION, SOLUTION SUBCUTANEOUS at 08:20

## 2018-04-16 RX ADMIN — Medication 1 UNIT(S): at 02:26

## 2018-04-16 RX ADMIN — Medication 1 CAPSULE(S): at 08:22

## 2018-04-16 RX ADMIN — Medication 2: at 08:21

## 2018-04-16 RX ADMIN — OXYCODONE HYDROCHLORIDE 10 MILLIGRAM(S): 5 TABLET ORAL at 18:48

## 2018-04-16 RX ADMIN — Medication 1 CAPSULE(S): at 18:44

## 2018-04-16 RX ADMIN — MEGESTROL ACETATE 800 MILLIGRAM(S): 40 SUSPENSION ORAL at 12:02

## 2018-04-16 RX ADMIN — Medication 20 MILLIGRAM(S): at 10:33

## 2018-04-16 RX ADMIN — PANTOPRAZOLE SODIUM 40 MILLIGRAM(S): 20 TABLET, DELAYED RELEASE ORAL at 09:19

## 2018-04-16 RX ADMIN — OXYCODONE HYDROCHLORIDE 10 MILLIGRAM(S): 5 TABLET ORAL at 08:20

## 2018-04-16 RX ADMIN — Medication 500 MILLIGRAM(S): at 18:44

## 2018-04-16 RX ADMIN — OXYCODONE HYDROCHLORIDE 10 MILLIGRAM(S): 5 TABLET ORAL at 00:46

## 2018-04-16 RX ADMIN — OXYCODONE HYDROCHLORIDE 30 MILLIGRAM(S): 5 TABLET ORAL at 22:14

## 2018-04-16 RX ADMIN — Medication 1 CAPSULE(S): at 21:44

## 2018-04-16 RX ADMIN — Medication 20 MILLIGRAM(S): at 18:50

## 2018-04-16 RX ADMIN — OXYCODONE HYDROCHLORIDE 10 MILLIGRAM(S): 5 TABLET ORAL at 12:32

## 2018-04-16 RX ADMIN — Medication 2: at 15:35

## 2018-04-16 RX ADMIN — OXYCODONE HYDROCHLORIDE 30 MILLIGRAM(S): 5 TABLET ORAL at 06:22

## 2018-04-16 RX ADMIN — Medication 1 CAPSULE(S): at 13:32

## 2018-04-16 RX ADMIN — Medication 500 MILLIGRAM(S): at 05:31

## 2018-04-16 RX ADMIN — PIPERACILLIN AND TAZOBACTAM 25 GRAM(S): 4; .5 INJECTION, POWDER, LYOPHILIZED, FOR SOLUTION INTRAVENOUS at 05:31

## 2018-04-16 RX ADMIN — OXYCODONE HYDROCHLORIDE 10 MILLIGRAM(S): 5 TABLET ORAL at 11:59

## 2018-04-16 RX ADMIN — Medication 1 UNIT(S): at 20:13

## 2018-04-16 RX ADMIN — Medication 4: at 20:17

## 2018-04-16 RX ADMIN — OXYCODONE HYDROCHLORIDE 10 MILLIGRAM(S): 5 TABLET ORAL at 06:01

## 2018-04-16 NOTE — PROGRESS NOTE ADULT - SUBJECTIVE AND OBJECTIVE BOX
INFECTIOUS DISEASES FOLLOW UP--Madan Lane MD  Pager [phone]    This is a follow up note for this  42y Female with  distal pancreatectomy, splenectomy and postoperative fevers, elevated WBC count in the setting of an infected collection.  Collection drained---only c. glabrata and c. albicans--no bacteria have been isolated.    patient feeling much better.  less pain, excellent appetite.    Further ROS:  CONSTITUTIONAL:  No fever, good appetite  CARDIOVASCULAR:  No chest pain or palpitations  RESPIRATORY:  No dyspnea  GASTROINTESTINAL:  No nausea, vomiting, diarrhea, or abdominal pain  GENITOURINARY:  No dysuria  NEUROLOGIC:  No headache,     Allergies  No Known Allergies    ANTIBIOTICS/RELEVANT:  antimicrobials  erythromycin    ethylsuccinate Suspension 40 mG/mL 500 milliGRAM(s) Oral every 6 hours  micafungin IVPB      micafungin IVPB 100 milliGRAM(s) IV Intermittent every 24 hours  piperacillin/tazobactam IVPB. 3.375 Gram(s) IV Intermittent every 8 hours    OTHER:  acetaminophen   Tablet 650 milliGRAM(s) Oral every 6 hours PRN  amylase/lipase/protease  (CREON  6,000 Units) 1 Capsule(s) Oral at bedtime  amylase/lipase/protease  (CREON  6,000 Units) 1 Capsule(s) Oral three times a day with meals  bisacodyl 10 milliGRAM(s) Oral at bedtime PRN  dextrose 5%. 1000 milliLiter(s) IV Continuous <Continuous>  docusate sodium Liquid 100 milliGRAM(s) Oral two times a day  furosemide   Injectable 20 milliGRAM(s) IV Push every 12 hours  heparin  Injectable 5000 Unit(s) SubCutaneous every 12 hours  HYDROmorphone  Injectable 1 milliGRAM(s) IV Push once  insulin glargine Injectable (LANTUS) 7 Unit(s) SubCutaneous every morning  insulin lispro (HumaLOG) corrective regimen sliding scale   SubCutaneous Before meals and at bedtime  megestrol Suspension 800 milliGRAM(s) Oral daily  methocarbamol 750 milliGRAM(s) Oral every 8 hours PRN  ondansetron   Disintegrating Tablet 8 milliGRAM(s) Oral every 8 hours  oxyCODONE    IR 10 milliGRAM(s) Oral every 3 hours PRN  oxyCODONE  ER Tablet 30 milliGRAM(s) Oral every 8 hours  pantoprazole    Tablet 40 milliGRAM(s) Oral before breakfast      Objective:  Vital Signs Last 24 Hrs  T(C): 36.8 (16 Apr 2018 06:47), Max: 37 (15 Apr 2018 14:05)  T(F): 98.2 (16 Apr 2018 06:47), Max: 98.6 (15 Apr 2018 14:05)  HR: 98 (16 Apr 2018 06:47) (96 - 102)  BP: 97/62 (16 Apr 2018 06:47) (94/56 - 102/66)  BP(mean): --  RR: 18 (16 Apr 2018 06:47) (18 - 18)  SpO2: 95% (16 Apr 2018 06:47) (92% - 96%)    PHYSICAL EXAM:  Constitutional:no acute distress  Eyes:MALOU, EOMI  Respiratory: clear BL  Cardiovascular: S1S2  Gastrointestinal:soft, (+) BS, 2 drains, purulent in one  Extremities:no e/e/c  No Lymphadenopathy  IV sites not inflammed.    LABS:                        7.3    17.8  )-----------( 778      ( 16 Apr 2018 04:15 )             21.9     04-16    137  |  97  |  6<L>  ----------------------------<  108<H>  4.4   |  29  |  0.85    Ca    8.4      16 Apr 2018 04:15  Phos  3.5     04-16  Mg     1.8     04-16    TPro  5.1<L>  /  Alb  2.2<L>  /  TBili  0.4  /  DBili  x   /  AST  15  /  ALT  43  /  AlkPhos  98  04-16      MICROBIOLOGY:  abd fluid c. glabrata and c. albicans

## 2018-04-16 NOTE — PROVIDER CONTACT NOTE (OTHER) - ACTION/TREATMENT ORDERED:
MD Merchant notified and ordered 1 unit of humalog to be administered. Notified MD Jones(covering blue team) as ordered. Will continue to monitor.

## 2018-04-16 NOTE — PROGRESS NOTE ADULT - ASSESSMENT
41 yo female with PMH significant for chronic pancreatitis now s/p total pancreatectomy with islet autotransplantation. Post op  c/b fever/surgical leak s/p collection drainage > s/p IR drainage x2. Positive diarrhea/ surgical pain. Poor PO intake. Improved wbc> afebrile and improved LFT. No further hypoglycemia. On antibiotics/antifungal for fluid collection. Will wait for pt to improve PO intake and evaluate BG levels tomorrow to assess for the need of further insulin adjustments.

## 2018-04-16 NOTE — DISCHARGE NOTE ADULT - HOME CARE AGENCY
[facility] infusion services [phone]. Infusion company will provide IV medication, PICC line supplies and Visiting nurse will provide medication teaching visits, will change PICC dressing per protocol. Start of care day after discharge for IV medication dose.   [facility] [place] [phone]. Home care arranged for VN to reinforce DANIEL care and J tube care. VN will call day after discharge to schedule visit. [facility] infusion services [phone]. Infusion company will provide IV medication, PICC line supplies and Visiting nurse will provide medication teaching visits, will change PICC dressing per protocol. Start of care day after discharge for IV medication dose.   [facility] [place] [phone]. Home care arranged for VN to reinforce DANIEL care and J tube care. VN will call day after discharge to schedule visit. Start of care date for both agencies is 4/19/18.

## 2018-04-16 NOTE — PROGRESS NOTE ADULT - SUBJECTIVE AND OBJECTIVE BOX
Interventional Radiology Follow- Up Note    S: Patient complains of lower extremity swelling.    O:  Vitals: T(F): 98.2 (04-16-18 @ 06:47), Max: 98.6 (04-15-18 @ 14:05)  HR: 98 (04-16-18 @ 06:47) (96 - 102)  BP: 97/62 (04-16-18 @ 06:47) (94/56 - 102/66)  RR: 18 (04-16-18 @ 06:47) (18 - 18)  SpO2: 95% (04-16-18 @ 06:47) (92% - 96%)    LABS:                        7.3    17.8  )-----------( 778      ( 16 Apr 2018 04:15 )             21.9     04-16    137  |  97  |  6<L>  ----------------------------<  108<H>  4.4   |  29  |  0.85    Ca    8.4      16 Apr 2018 04:15  Phos  3.5     04-16  Mg     1.8     04-16    TPro  5.1<L>  /  Alb  2.2<L>  /  TBili  0.4  /  DBili  x   /  AST  15  /  ALT  43  /  AlkPhos  98  04-16      Culture - Body Fluid with Gram Stain (collected 13 Apr 2018 21:35)  Source: .Body Fluid Abdominal Fluid  Gram Stain (14 Apr 2018 00:07):    Numerous polymorphonuclear leukocytes per low power field    Few Yeast like cells per oil power field  Preliminary Report (15 Apr 2018 20:56):    Few Candida albicans    OUT:    Bulb: 80 mL    Bulb: 55 mL    PHYSICAL EXAM:    General: Awake, alert, in NAD  Abdomen: Right sided DANIEL x 2 with cloudy drainage. Left J tube. Diffuse TTP.    Impression: 42 year old lady s/p pancreatectomy and islet cell transplantation s/p drainage of 2 abdominal collections, placement of 12 FR drains. Patient improving.    Plan:  -Continue to monitor drain output  -Flush drains 10 cc NS daily  -Antibiotics per ID     Please call IR at extension [phone] with any questions, concerns, or issues regarding above.

## 2018-04-16 NOTE — CHART NOTE - NSCHARTNOTEFT_GEN_A_CORE
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     Source: Patient [X ]    Family [ ]     other [ X]; medical record    Hospital Course: Pt S/P total pancreatectomy with islet cell autotransplantation with fevers of unknown origin and an increasing leukocytosis; S/P IR drainage of pelvic fluid collection 4/11 and 4/12.     Pt reports excellent appetite and po intake, with daily BMs; enhanced bowel regimen noted. Pt is taking Creon tid with meals, reports formed stool.    Pt is able to identify carbohydrate portions on meal tray, reports good grasp of carbohydrate counting. Pt is advised to eat a small snack at bedtime, composed of protein + carbohydrate, while on Lantus Rx.    Diet : Regular, Consistent Carbohydrate diet with snack      Enteral /Parenteral Nutrition: d/c    Current Weight: Weight (kg): 59.4Kg (4/16), 61.9Kg (4/9), 47.2Kg (04/05 dosing); weight gain related to LE edema noted  Edema: LE edema, Lasix Rx noted    Pertinent Medications: MEDICATIONS  (STANDING):  amylase/lipase/protease  (CREON  6,000 Units) 1 Capsule(s) Oral at bedtime  amylase/lipase/protease  (CREON  6,000 Units) 1 Capsule(s) Oral three times a day with meals  dextrose 5%. 1000 milliLiter(s) (50 mL/Hr) IV Continuous <Continuous>  docusate sodium Liquid 100 milliGRAM(s) Oral two times a day  erythromycin    ethylsuccinate Suspension 40 mG/mL 500 milliGRAM(s) Oral every 6 hours  furosemide   Injectable 20 milliGRAM(s) IV Push every 12 hours  heparin  Injectable 5000 Unit(s) SubCutaneous every 12 hours  HYDROmorphone  Injectable 1 milliGRAM(s) IV Push once  insulin glargine Injectable (LANTUS) 7 Unit(s) SubCutaneous every morning  insulin lispro (HumaLOG) corrective regimen sliding scale   SubCutaneous Before meals and at bedtime  megestrol Suspension 800 milliGRAM(s) Oral daily  micafungin IVPB      micafungin IVPB 100 milliGRAM(s) IV Intermittent every 24 hours  ondansetron   Disintegrating Tablet 8 milliGRAM(s) Oral every 8 hours  oxyCODONE  ER Tablet 30 milliGRAM(s) Oral every 8 hours  pantoprazole    Tablet 40 milliGRAM(s) Oral before breakfast  piperacillin/tazobactam IVPB. 3.375 Gram(s) IV Intermittent every 8 hours    MEDICATIONS  (PRN):  acetaminophen   Tablet 650 milliGRAM(s) Oral every 6 hours PRN mild pain  bisacodyl 10 milliGRAM(s) Oral at bedtime PRN Constipation  methocarbamol 750 milliGRAM(s) Oral every 8 hours PRN spasm  oxyCODONE    IR 10 milliGRAM(s) Oral every 3 hours PRN Moderate Pain (4 - 6)    Pertinent Labs:  04-16 Na137 mmol/L Glu 108 mg/dL<H> K+ 4.4 mmol/L Cr  0.85 mg/dL BUN 6 mg/dL<L> 04-16 Phos 3.5 mg/dL 04-16 Alb 2.2 g/dL<L> 04-12 [id number] 5.4 %    CAPILLARY BLOOD GLUCOSE  POCT Blood Glucose.: 167 mg/dL (16 Apr 2018 08:10)  POCT Blood Glucose.: 211 mg/dL (16 Apr 2018 01:53)  POCT Blood Glucose.: 94 mg/dL (15 Apr 2018 22:31)  POCT Blood Glucose.: 140 mg/dL (15 Apr 2018 20:47)  POCT Blood Glucose.: 170 mg/dL (15 Apr 2018 19:30)  POCT Blood Glucose.: 79 mg/dL (15 Apr 2018 13:59)    Skin: no pressure injuries    Estimated Needs:   [X ] no change since previous assessment  [ ] recalculated:     Previous Nutrition Diagnosis:   [X ] Increased Nutrient Needs     Nutrition Diagnosis is [ X] ongoing; being addressed with po diet    New Nutrition Diagnosis: [X ] not applicable    Interventions:     Recommend:  1) Continue Consistent Carbohydrate diet with snack; encourage intake of high protein, nutrient dense foods   2) Monitor weight, lab values, skin, nutrition provision and GI tolerance  3) Reinforce therapeutic diet education as able    Monitoring and Evaluation:   Follow up per protocol  RD to remain available for further nutritional interventions as indicated.   Tammy Tomlinson, MS RD CDN [facility], #[phone].

## 2018-04-16 NOTE — DISCHARGE NOTE ADULT - ADDITIONAL INSTRUCTIONS
Follow-up with Dr. Walker within 1-2 weeks.  Please call office for appointment. Flush J tube with 10 cc NS three times daily  flush drains with 10CC NS once daily, do not aspirate Flush J tube with 10 cc NS three times daily  Flush drains with 10CC NS once daily, do not aspirate Flush J tube with 10 cc NS three times daily  Flush drains with 10CC NS once daily, do not aspirate.  Follow-up with Dr. Walker to arrange CT scan and tube check to assess IR drains.  You will need to follow-up with IR for tube check, which may require a CT scan prior to appointment.  Please call IR for appointment [phone].  You will receive vaccines in Dr. Walker's office at follow-up.  Please be sure to follow-up as scheduled.

## 2018-04-16 NOTE — PROGRESS NOTE ADULT - SUBJECTIVE AND OBJECTIVE BOX
BLUE TEAM GENERAL SURGERY DAILY PROGRESS NOTE:       Subjective:  Patient seen this AM on rounds. No events overnight.  Edema still bad but improving.     Objective:  T(C): 36.8 (04-16-18 @ 06:47), Max: 37 (04-15-18 @ 14:05)  HR: 98 (04-16-18 @ 06:47) (96 - 102)  BP: 97/62 (04-16-18 @ 06:47) (94/56 - 102/66)  RR: 18 (04-16-18 @ 06:47) (18 - 18)  SpO2: 95% (04-16-18 @ 06:47) (92% - 96%)  Wt(kg): --    04-14 @ 07:01  -  04-15 @ 07:00  --------------------------------------------------------  IN:    Oral Fluid: 900 mL    Solution: 200 mL  Total IN: 1100 mL    OUT:    Bulb: 45 mL    Bulb: 105 mL    Indwelling Catheter - Urethral: 2200 mL  Total OUT: 2350 mL    Total NET: -1250 mL      04-15 @ 07:01  -  04-16 @ 06:49  --------------------------------------------------------  IN:    Oral Fluid: 1300 mL    Solution: 200 mL    Solution: 100 mL    Solution: 300 mL  Total IN: 1900 mL    OUT:    Bulb: 80 mL    Bulb: 55 mL    Indwelling Catheter - Urethral: 400 mL    Voided: 1650 mL  Total OUT: 2185 mL    Total NET: -285 mL          PE:  General: NAD, resting comfortably in bed  Neuro: A/O x 3, no focal deficits  Pulmonary: Nonlabored breathing, no respiratory distress  Abdominal: soft, ND, moderately ttp around drain sites   Incision: C/D/I   Drains: IR drain x 2   Extremities: WWP                            7.3    17.8  )-----------( 778      ( 16 Apr 2018 04:15 )             21.9     04-16    137  |  97  |  6<L>  ----------------------------<  108<H>  4.4   |  29  |  0.85    Ca    8.4      16 Apr 2018 04:15  Phos  3.5     04-16  Mg     1.8     04-16    TPro  5.1<L>  /  Alb  2.2<L>  /  TBili  0.4  /  DBili  x   /  AST  15  /  ALT  43  /  AlkPhos  98  04-16    LIVER FUNCTIONS - ( 16 Apr 2018 04:15 )  Alb: 2.2 g/dL / Pro: 5.1 g/dL / ALK PHOS: 98 U/L / ALT: 43 U/L RC / AST: 15 U/L / GGT: x

## 2018-04-16 NOTE — DISCHARGE NOTE ADULT - MEDICATION SUMMARY - MEDICATIONS TO STOP TAKING
I will STOP taking the medications listed below when I get home from the hospital:    Xanax 0.25 mg oral tablet  -- 1 tab(s) by mouth 3 times a day    MiraLax oral powder for reconstitution

## 2018-04-16 NOTE — PROGRESS NOTE ADULT - PROBLEM SELECTOR PLAN 1
-Test BG ac/hs and 2am every day. Change to q6h if NPO  -Continue Lantus 7 units qam. Will adjust as needed  -Continue humalog correction scale ac and hs low dose  -Will adjust dose once pt eating more consistently.  t info: [phone] (24/7). pager [phone] -Test BG ac/hs and 2am every day. Change to q6h if NPO  -Continue Lantus 7 units qam. Will adjust as needed  -Add Humalog 1 units ac meals. HOLD IF NOT EATING  -Add Humalog 1 unit at hs if eating. HOLD IF NOT EATING!  -Continue humalog correction scale ac and hs low dose  -Plan discussed with pt/team.  Contact info: [phone] (24/7). pager [phone]

## 2018-04-16 NOTE — PROGRESS NOTE ADULT - ASSESSMENT
Imp: Distal pancreatectomy and splenectomy  Postoperative infected collection.  c. glabrata and c. albicans.    Rx:  would place picc and plan on mycamine 100 mg daily thru April 28th.  would consider follow up interval imaging at some point.  will need vaccinations at least 14 days post-op for S. pneumoniae and Meningococcus    I have d/w surgical attending this am

## 2018-04-16 NOTE — DISCHARGE NOTE ADULT - CARE PLAN
Principal Discharge DX:	Pancreatitis  Goal:	Recovery from surgery  Assessment and plan of treatment:	WOUND CARE: Keep incisions clean and dry.  Flush J tube with 10cc NS every 8 hours.  Flush drains with 10cc NS once daily, do not aspirate.  BATHING: Please do not submerge wound underwater. You may shower and/or sponge bathe.  ACTIVITY: No heavy lifting or straining. Otherwise, you may return to your usual level of physical activity. If you are taking narcotic pain medication (such as Percocet), do NOT drive a car, operate machinery or make important decisions.  DIET: Diabetic diet  NOTIFY YOUR SURGEON IF: You have any bleeding that does not stop, any pus draining from your wound, any fever (over 100.4 F) or chills, persistent nausea/vomiting, persistent diarrhea, or if your pain is not controlled on your discharge pain medications.  FOLLOW-UP:  1. Follow-up with Dr. Walker and Dr. Nesbitt (endocrinology) within 1-2 weeks of discharge.  Please call office for appointment  2. Please follow up with your primary care physician in one week regarding your hospitalization.

## 2018-04-16 NOTE — DISCHARGE NOTE ADULT - CARE PROVIDER_API CALL
Juan Walker (MD), Surgery  [address] [place] [address]  Phone: [phone]  Fax: [phone]

## 2018-04-16 NOTE — DISCHARGE NOTE ADULT - PATIENT PORTAL LINK FT
You can access the [facility], offered by [facility], by registering with the following website: [email or web address]

## 2018-04-16 NOTE — DISCHARGE NOTE ADULT - MEDICATION SUMMARY - MEDICATIONS TO CHANGE
I will SWITCH the dose or number of times a day I take the medications listed below when I get home from the hospital:    Lantus Solostar Pen  -- 7 unit(s) subcutaneous once a day in AM

## 2018-04-16 NOTE — DISCHARGE NOTE ADULT - PLAN OF CARE
Recovery from surgery WOUND CARE: Keep incisions clean and dry.  Flush J tube with 10cc NS every 8 hours.  Flush drains with 10cc NS once daily, do not aspirate.  BATHING: Please do not submerge wound underwater. You may shower and/or sponge bathe.  ACTIVITY: No heavy lifting or straining. Otherwise, you may return to your usual level of physical activity. If you are taking narcotic pain medication (such as Percocet), do NOT drive a car, operate machinery or make important decisions.  DIET: Diabetic diet  NOTIFY YOUR SURGEON IF: You have any bleeding that does not stop, any pus draining from your wound, any fever (over 100.4 F) or chills, persistent nausea/vomiting, persistent diarrhea, or if your pain is not controlled on your discharge pain medications.  FOLLOW-UP:  1. Follow-up with Dr. Walker and Dr. Nesbitt (endocrinology) within 1-2 weeks of discharge.  Please call office for appointment  2. Please follow up with your primary care physician in one week regarding your hospitalization.

## 2018-04-16 NOTE — PROGRESS NOTE ADULT - ASSESSMENT
42F s/p total pancreatectomy with islet cell autotransplantation with fevers of unknown origin and an increasing leukocytosis s/p IR drainage of pelvic fluid collection 4/11 and 4/12.     Plan:  - Monitor fevers, WBC  - Diet: Regular  - Strict monitoring of FS, follow Endocrine recs  - possible d/c JPs   - Pain control  - Cont erythromycin, Zosyn, Creon, Megace  - Encourage OOB/ambulation  - dispo: planning for midweek to home

## 2018-04-16 NOTE — DISCHARGE NOTE ADULT - HOSPITAL COURSE
42 year old female with h/o on and off abdominal pain since childhood worsen in past 6 years, have had numerous admission with a diagnosis of pancreatitis, is being seen in preadmission testing in preparation for total pancreatectomy with islet autotransplantation on April 5, 2018. 42 year old female with h/o on and off abdominal pain since childhood worsen in past 6 years, have had numerous admission with a diagnosis of pancreatitis, is being seen in preadmission testing in preparation for total pancreatectomy with islet autotransplantation on April 5, 2018.    On 4/5/18 pt underwent total pancreatectomy, splenectomy, islet cell transplant. She tolerated the procedure well, was extubated and sent to PACU in stable condition. She remained hemodynamically stable and was transferred to a surgical floor.  Her pain was controlled by IV pain medications and then by PO pain medications. She was monitored in the SICU postoperatively.  She was placed on islet cell protocol and glucose was closely monitored.  She was given IV pain meds for pain control.  Endocrine was consulted for management with blood glucose control.  She was evaluated by PT, who determined she had no PT needs.  J tube feeds were started and PO diet was advanced from clears to regular diabetic diet as tolerated.  When she was tolerating a diet, J tube feeds were stopped.  On 4/11 she had fevers with elevated WBC, fever workup performed.  CT scan performed and showed Small ascites particularly in the pelvis with loculated air and fluid signal   in the left upper quadrant as above. No well-defined collection. IR was consulted and on 4/11 underwent IR aspiration of collection.  WBC continued to rise, CT scan performed 4/13 showed New large gas containing fluid collection in the left upper quadrant which   tracks into the central abdomen. ID was consulted.  IR was re-consulted and on 4/13 underwent IR drainage of 2 collections, drains were left in place.  IV antibiotics were changed and antifungals were started.  On 4/16 PICC was placed for IV antifungals upon discharge.  WBC trended down and pt remained afebrile. 42 year old female with h/o on and off abdominal pain since childhood worsen in past 6 years, have had numerous admission with a diagnosis of pancreatitis, is being seen in preadmission testing in preparation for total pancreatectomy with islet autotransplantation on April 5, 2018.    On 4/5/18 pt underwent total pancreatectomy, splenectomy, islet cell transplant. She tolerated the procedure well, was extubated and sent to PACU in stable condition. She remained hemodynamically stable and was transferred to a surgical floor.  Her pain was controlled by IV pain medications and then by PO pain medications. She was monitored in the SICU postoperatively.  She was placed on islet cell protocol and glucose was closely monitored.  She was given IV pain meds for pain control.  Endocrine was consulted for management with blood glucose control.  She was evaluated by PT, who determined she had no PT needs.  J tube feeds were started and PO diet was advanced from clears to regular diabetic diet as tolerated.  When she was tolerating a diet, J tube feeds were stopped.  On 4/11 she had fevers with elevated WBC, fever workup performed.  CT scan performed and showed Small ascites particularly in the pelvis with loculated air and fluid signal   in the left upper quadrant as above. No well-defined collection. IR was consulted and on 4/11 underwent IR aspiration of collection.  WBC continued to rise, CT scan performed 4/13 showed New large gas containing fluid collection in the left upper quadrant which   tracks into the central abdomen. ID was consulted.  IR was re-consulted and on 4/13 underwent IR drainage of 2 collections, drains were left in place.  IV antibiotics were changed and antifungals were started.  On 4/16 PICC was placed for IV antifungals upon discharge.  WBC trended down and pt remained afebrile.  She is tolerating a diet, is ambulating, and pain is controlled with oral pain medications.  She is stable for discharge home and will follow-up with Dr. Walker within 1 week and Dr. Nesbitt (endocrinologist) within 1-2 weeks.

## 2018-04-16 NOTE — DISCHARGE NOTE ADULT - MEDICATION SUMMARY - MEDICATIONS TO TAKE
I will START or STAY ON the medications listed below when I get home from the hospital:    Lantus Solostar Pen  -- 7 unit(s) subcutaneous once a day in AM  -- Indication: For blood sugar control    lenard insulin pen needles  -- 1 unit(s) subcutaneous 4 times a day   -- Indication: For blood sugar    glucose gel  -- 5  between cheek and gums prn use as directed for low blood sugar  -- Indication: For low blood sugar    acetaminophen 325 mg oral tablet  -- 2 tab(s) by mouth every 6 hours, As needed, mild pain  -- Indication: For mild pain    OxyCONTIN 30 mg oral tablet, extended release  -- 1 tab(s) by mouth every 8 hours  -- Indication: For Pain    oxyCODONE 10 mg oral tablet  -- 1 tab(s) by mouth every 3 hours, As needed, Moderate Pain (4 - 6)  -- Indication: For breakthrough pain    insulin glargine  -- 8 unit(s) subcutaneous once a day in the AM  -- Indication: For blood sugar control    HumaLOG KwikPen 100 units/mL injectable solution  -- 3 milliliter(s) subcutaneous 3 times a day   Patient can modify dose as instructed  -- Indication: For blood sugar control    ondansetron 8 mg oral tablet, disintegrating  -- 1 tab(s) by mouth every 8 hours  -- Indication: For nausea    micafungin  -- 100 milligram(s) intravenous once a day via PICC through 4/28  -- Indication: For abscess    megestrol 40 mg/mL oral suspension  -- 20 milliliter(s) by mouth once a day  -- Indication: For appetite    pancrelipase 6000 units-19,000 units-30,000 units oral delayed release capsule  -- 1 cap(s) by mouth 3 times a day (with meals)  -- Indication: For Pancreatitis    docusate sodium 10 mg/mL oral liquid  -- 10 milliliter(s) by mouth 2 times a day  -- Indication: For Prevent constipation    bisacodyl 5 mg oral delayed release tablet  -- 2 tab(s) by mouth once a day (at bedtime), As needed, Constipation  -- Indication: For Prevent constipation    erythromycin 500 mg oral tablet  -- 1 tab(s) by mouth every 6 hours  -- Indication: For gastric motility    Robaxin-750 oral tablet  -- three times a day   -- Indication: For muscle spasm I will START or STAY ON the medications listed below when I get home from the hospital:    lenard insulin pen needles  -- 1 unit(s) subcutaneous 4 times a day   -- Indication: For blood sugar    glucose gel  -- 5  between cheek and gums prn use as directed for low blood sugar  -- Indication: For low blood sugar    acetaminophen 325 mg oral tablet  -- 2 tab(s) by mouth every 6 hours, As needed, mild pain  -- Indication: For mild pain    OxyCONTIN 30 mg oral tablet, extended release  -- 1 tab(s) by mouth every 8 hours  -- Indication: For Pain    oxyCODONE 10 mg oral tablet  -- 1 tab(s) by mouth every 3 hours, As needed, Moderate Pain (4 - 6)  -- Indication: For breakthrough pain    insulin glargine  -- 8 unit(s) subcutaneous once a day in the AM  -- Indication: For blood sugar control    HumaLOG KwikPen 100 units/mL injectable solution  -- 3 milliliter(s) subcutaneous 3 times a day   Patient can modify dose as instructed  -- Indication: For blood sugar control    ondansetron 8 mg oral tablet, disintegrating  -- 1 tab(s) by mouth every 8 hours  -- Indication: For nausea    micafungin  -- 100 milligram(s) intravenous once a day via PICC through 4/28  -- Indication: For abscess    megestrol 40 mg/mL oral suspension  -- 20 milliliter(s) by mouth once a day  -- Indication: For appetite    pancrelipase 6000 units-19,000 units-30,000 units oral delayed release capsule  -- 1 cap(s) by mouth 3 times a day (with meals)  -- Indication: For Pancreatitis    docusate sodium 10 mg/mL oral liquid  -- 10 milliliter(s) by mouth 2 times a day  -- Indication: For Prevent constipation    bisacodyl 5 mg oral delayed release tablet  -- 2 tab(s) by mouth once a day (at bedtime), As needed, Constipation  -- Indication: For Prevent constipation    erythromycin 500 mg oral tablet  -- 1 tab(s) by mouth every 6 hours  -- Indication: For gastric motility    Robaxin-750 oral tablet  -- three times a day   -- Indication: For muscle spasm I will START or STAY ON the medications listed below when I get home from the hospital:    lenard insulin pen needles  -- 1 unit(s) subcutaneous 4 times a day   -- Indication: For blood sugar    glucose gel  -- 5  between cheek and gums prn use as directed for low blood sugar  -- Indication: For low blood sugar    acetaminophen 325 mg oral tablet  -- 2 tab(s) by mouth every 6 hours, As needed, mild pain  -- Indication: For mild pain    OxyCONTIN 30 mg oral tablet, extended release  -- 1 tab(s) by mouth every 8 hours  -- Indication: For Pain    oxyCODONE 10 mg oral tablet  -- 1 tab(s) by mouth every 3 hours, As needed, Moderate Pain (4 - 6)  -- Indication: For breakthrough pain    insulin glargine  -- 8 unit(s) subcutaneous once a day in the AM  -- Indication: For blood sugar control    NovoLOG 100 units/mL subcutaneous solution  -- 3 unit(s) subcutaneous 3 times a day before meals, adjust dose as instructed based on carbohydrate counting and what you eat  -- Indication: For blood sugar control    ondansetron 8 mg oral tablet, disintegrating  -- 1 tab(s) by mouth every 8 hours  -- Indication: For nausea    micafungin  -- 100 milligram(s) intravenous once a day via PICC through 4/28  -- Indication: For abscess    megestrol 40 mg/mL oral suspension  -- 20 milliliter(s) by mouth once a day  -- Indication: For appetite    pancrelipase 6000 units-19,000 units-30,000 units oral delayed release capsule  -- 1 cap(s) by mouth 3 times a day (with meals)  -- Indication: For Pancreatitis    docusate sodium 10 mg/mL oral liquid  -- 10 milliliter(s) by mouth 2 times a day  -- Indication: For Prevent constipation    bisacodyl 5 mg oral delayed release tablet  -- 2 tab(s) by mouth once a day (at bedtime), As needed, Constipation  -- Indication: For Prevent constipation    erythromycin 500 mg oral tablet  -- 1 tab(s) by mouth every 6 hours  -- Indication: For gastric motility    Robaxin-750 oral tablet  -- three times a day   -- Indication: For muscle spasm I will START or STAY ON the medications listed below when I get home from the hospital:    glucose gel  -- 5  between cheek and gums prn use as directed for low blood sugar  -- Indication: For low blood sugar    lenard insulin pen needles  -- 1 unit(s) subcutaneous 4 times a day   -- Indication: For blood glucose monitoring    acetaminophen 325 mg oral tablet  -- 2 tab(s) by mouth every 6 hours, As needed, mild pain  -- Indication: For mild pain    OxyCONTIN 30 mg oral tablet, extended release  -- 1 tab(s) by mouth every 8 hours  -- Indication: For Pain    oxyCODONE 10 mg oral tablet  -- 1 tab(s) by mouth every 3 hours, As needed, Moderate Pain (4 - 6)  -- Indication: For breakthrough pain    insulin glargine  -- 8 unit(s) subcutaneous once a day in the AM  -- Indication: For blood sugar control    NovoLOG 100 units/mL subcutaneous solution  -- 3 unit(s) subcutaneous 3 times a day before meals, adjust dose as instructed based on carbohydrate counting and what you eat  -- Indication: For blood sugar control    ondansetron 8 mg oral tablet, disintegrating  -- 1 tab(s) by mouth every 8 hours  -- Indication: For nausea    micafungin  -- 100 milligram(s) intravenous once a day via PICC through 4/28  -- Indication: For abscess    megestrol 40 mg/mL oral suspension  -- 20 milliliter(s) by mouth once a day  -- Indication: For appetite    pancrelipase 6000 units-19,000 units-30,000 units oral delayed release capsule  -- 1 cap(s) by mouth 3 times a day (with meals)  -- Indication: For Pancreatitis    docusate sodium 10 mg/mL oral liquid  -- 10 milliliter(s) by mouth 2 times a day  -- Indication: For Prevent constipation    bisacodyl 5 mg oral delayed release tablet  -- 2 tab(s) by mouth once a day (at bedtime), As needed, Constipation  -- Indication: For Prevent constipation    erythromycin 500 mg oral tablet  -- 1 tab(s) by mouth every 6 hours  -- Indication: For gastric motility    Robaxin-750 oral tablet  -- three times a day   -- Indication: For muscle spasm

## 2018-04-16 NOTE — PROVIDER CONTACT NOTE (OTHER) - ACTION/TREATMENT ORDERED:
1 unit given and 4 units given on top as per sliding scale MD aware will recheck finger stick at bedtime and at 2am as well pt comfortable will cont to monitor

## 2018-04-16 NOTE — PROGRESS NOTE ADULT - SUBJECTIVE AND OBJECTIVE BOX
Diabetes Follow up note: Saw pt earlier today  Interval Hx: 42 year old female w/chronic pancreatitis, no hx of DM here s/p total pancreatectomy w/islet cell autotransplantation c/b fluid collection requiring IR drainage x2. 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 Pt states having loose BMs> denies oily stool. Glycemic control variable depending on PO intake which remains poor and unpredictable. Pt only had a muffin with eggs and howell this am> ate 30 to 40 % of these foods.     Review of Systems:  General:  "I have diarrhea"   GI: +  incisional/abdominal pain. No vomit/nausea reported. Loose BMs yesterday and today.   CV: No CP/SOB  ENDO: No S&Sx of hypoglycemia      MEDS:  insulin glargine Injectable (LANTUS) 7 Unit(s) SubCutaneous every morning  insulin lispro (HumaLOG) corrective regimen sliding scale   SubCutaneous every 4 hours  erythromycin    ethylsuccinate Suspension 40 mG/mL 250 milliGRAM(s) Oral every 6 hours  amylase/lipase/protease  (CREON  6,000 Units) 1 Capsule(s) Oral three times a day with meals  amylase/lipase/protease  (CREON  6,000 Units) 1 Capsule(s) Oral at bedtime  fluconAZOLE IVPB 400 milliGRAM(s) IV Intermittent every 24 hours  piperacillin/tazobactam IVPB. 3.375 Gram(s) IV Intermittent every 8 hours  micafungin IVPB 100 milliGRAM(s) IV Intermittent every 24 hours    Allergies  No Known Allergies    PE:  General: Female sitting at edge of bed in NAD.   Vital Signs Last 24 Hrs  T(C): 36.9 (04-16-18 @ 14:27), Max: 37 (04-16-18 @ 01:53)  T(F): 98.5 (04-16-18 @ 14:27), Max: 98.6 (04-16-18 @ 01:53)  HR: 90 (04-16-18 @ 14:27) (90 - 100)  BP: 95/56 (04-16-18 @ 14:27) (94/56 - 102/67)  BP(mean): --  RR: 18 (04-16-18 @ 14:27) (18 - 18)  SpO2: 94% (04-16-18 @ 14:27) (92% - 96%)  Abd: Soft, tender ,distended, J-tube in place with TFs off. R side of abdomen with JPs x2 draining ss  out put.  Extremities: Warm. 1+ edema in LEs, Swollen hands> Improved  Neuro: A&O X3    LABS:  POCT Blood Glucose.: 167 mg/dL (04-16-18 @ 08:10)  POCT Blood Glucose.: 211 mg/dL (04-16-18 @ 01:53)  POCT Blood Glucose.: 94 mg/dL (04-15-18 @ 22:31)  POCT Blood Glucose.: 140 mg/dL (04-15-18 @ 20:47)  POCT Blood Glucose.: 170 mg/dL (04-15-18 @ 19:30)  POCT Blood Glucose.: 79 mg/dL (04-15-18 @ 13:59)  POCT Blood Glucose.: 138 mg/dL (04-15-18 @ 07:48)  POCT Blood Glucose.: 102 mg/dL (04-15-18 @ 02:17)  POCT Blood Glucose.: 77 mg/dL (04-14-18 @ 22:24)  POCT Blood Glucose.: 89 mg/dL (04-14-18 @ 20:02)                               7.3    17.8  )-----------( 778      ( 16 Apr 2018 04:15 )             21.9                      7.7    31.2  )-----------( 568      ( 13 Apr 2018 02:35 )             23.4         04-16    137  |  97  |  6<L>  ----------------------------<  108<H>  4.4   |  29  |  0.85    Ca    8.4      16 Apr 2018 04:15  Phos  3.5     04-16  Mg     1.8     04-16    TPro  5.1<L>  /  Alb  2.2<L>  /  TBili  0.4  /  DBili  x   /  AST  15  /  ALT  43  /  AlkPhos  98  04-16  TPro  5.4<L>  /  Alb  2.2<L>  /  TBili  0.5  /  DBili  0.2  /  AST  182<H>  /  ALT  71<H>  /  AlkPhos  93  04-13  TPro  5.8<L>  /  Alb  2.9<L>  /  TBili  0.3  /  DBili  0.1  /  AST  12  /  ALT  30  /  AlkPhos  79  04-10  TPro  5.7<L>  /  Alb  2.9<L>  /  TBili  0.4  /  DBili  0.1  /  AST  18  /  ALT  47<H>  /  AlkPhos  67  04-09  TPro  5.2<L>  /  Alb  2.7<L>  /  TBili  0.4  /  DBili  0.2  /  AST  32  /  ALT  65<H>  /  AlkPhos  50  04-08  TPro  4.7<L>  /  Alb  2.4<L>  /  TBili  0.4  /  DBili  0.1  /  AST  98<H>  /  ALT  81<H>  /  AlkPhos  38<L>  04-08 Diabetes Follow up note: Saw pt earlier today  Interval Hx: 42 year old female w/chronic pancreatitis, no hx of DM here s/p total pancreatectomy w/islet cell autotransplantation c/b fluid collection requiring IR drainage x2. 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 Pt states having loose BMs> denies oily stool. Glycemic control variable depending on PO intake which remains unpredictable. Pt only had a muffin with eggs and howell this am> ate 30 to 40 % of these foods but this pm she stated eating better for lunch. BG high 100s to 200s today. Pt also reports eating at night. Has meals at different times of the day.     Review of Systems:  General:  "I have diarrhea"   GI: +  incisional/abdominal pain. No vomit/nausea reported. Loose BMs yesterday and today.   CV: No CP/SOB  ENDO: No S&Sx of hypoglycemia      MEDS:  insulin glargine Injectable (LANTUS) 7 Unit(s) SubCutaneous every morning  insulin lispro (HumaLOG) corrective regimen sliding scale   SubCutaneous every 4 hours  erythromycin    ethylsuccinate Suspension 40 mG/mL 250 milliGRAM(s) Oral every 6 hours  amylase/lipase/protease  (CREON  6,000 Units) 1 Capsule(s) Oral three times a day with meals  amylase/lipase/protease  (CREON  6,000 Units) 1 Capsule(s) Oral at bedtime  fluconAZOLE IVPB 400 milliGRAM(s) IV Intermittent every 24 hours  piperacillin/tazobactam IVPB. 3.375 Gram(s) IV Intermittent every 8 hours  micafungin IVPB 100 milliGRAM(s) IV Intermittent every 24 hours    Allergies  No Known Allergies    PE:  General: Female sitting at edge of bed in NAD.   Vital Signs Last 24 Hrs  T(C): 36.9 (04-16-18 @ 14:27), Max: 37 (04-16-18 @ 01:53)  T(F): 98.5 (04-16-18 @ 14:27), Max: 98.6 (04-16-18 @ 01:53)  HR: 90 (04-16-18 @ 14:27) (90 - 100)  BP: 95/56 (04-16-18 @ 14:27) (94/56 - 102/67)  BP(mean): --  RR: 18 (04-16-18 @ 14:27) (18 - 18)  SpO2: 94% (04-16-18 @ 14:27) (92% - 96%)  Abd: Soft, tender ,distended, J-tube in place with TFs off. R side of abdomen with JPs x2 draining ss  out put.  Extremities: Warm. 1+ edema in LEs,.  Neuro: A&O X3    LABS:  POCT Blood Glucose.: 176 mg/dL (04-16-18 @ 15:26)  POCT Blood Glucose.: 167 mg/dL (04-16-18 @ 08:10)  POCT Blood Glucose.: 211 mg/dL (04-16-18 @ 01:53)  POCT Blood Glucose.: 94 mg/dL (04-15-18 @ 22:31)  POCT Blood Glucose.: 140 mg/dL (04-15-18 @ 20:47)  POCT Blood Glucose.: 170 mg/dL (04-15-18 @ 19:30)  POCT Blood Glucose.: 79 mg/dL (04-15-18 @ 13:59)  POCT Blood Glucose.: 138 mg/dL (04-15-18 @ 07:48)  POCT Blood Glucose.: 102 mg/dL (04-15-18 @ 02:17)  POCT Blood Glucose.: 77 mg/dL (04-14-18 @ 22:24)  POCT Blood Glucose.: 89 mg/dL (04-14-18 @ 20:02)                               7.3    17.8  )-----------( 778      ( 16 Apr 2018 04:15 )             21.9                      7.7    31.2  )-----------( 568      ( 13 Apr 2018 02:35 )             23.4         04-16    137  |  97  |  6<L>  ----------------------------<  108<H>  4.4   |  29  |  0.85    Ca    8.4      16 Apr 2018 04:15  Phos  3.5     04-16  Mg     1.8     04-16    TPro  5.1<L>  /  Alb  2.2<L>  /  TBili  0.4  /  DBili  x   /  AST  15  /  ALT  43  /  AlkPhos  98  04-16  TPro  5.4<L>  /  Alb  2.2<L>  /  TBili  0.5  /  DBili  0.2  /  AST  182<H>  /  ALT  71<H>  /  AlkPhos  93  04-13  TPro  5.8<L>  /  Alb  2.9<L>  /  TBili  0.3  /  DBili  0.1  /  AST  12  /  ALT  30  /  AlkPhos  79  04-10  TPro  5.7<L>  /  Alb  2.9<L>  /  TBili  0.4  /  DBili  0.1  /  AST  18  /  ALT  47<H>  /  AlkPhos  67  04-09  TPro  5.2<L>  /  Alb  2.7<L>  /  TBili  0.4  /  DBili  0.2  /  AST  32  /  ALT  65<H>  /  AlkPhos  50  04-08  TPro  4.7<L>  /  Alb  2.4<L>  /  TBili  0.4  /  DBili  0.1  /  AST  98<H>  /  ALT  81<H>  /  AlkPhos  38<L>  04-08

## 2018-04-16 NOTE — ANESTHESIA FOLLOW-UP NOTE - NSEVALATION_GEN_ALL_CORE
All questions were answered
All questions were answered/No apparent complications or complaints reguarding anesthesia care at this time

## 2018-04-17 LAB
ALBUMIN SERPL ELPH-MCNC: 2.4 G/DL — LOW (ref 3.3–5)
ALBUMIN SERPL ELPH-MCNC: 2.5 G/DL — LOW (ref 3.3–5)
ALP SERPL-CCNC: 85 U/L — SIGNIFICANT CHANGE UP (ref 40–120)
ALP SERPL-CCNC: 90 U/L — SIGNIFICANT CHANGE UP (ref 40–120)
ALT FLD-CCNC: 26 U/L — SIGNIFICANT CHANGE UP (ref 10–45)
ALT FLD-CCNC: 33 U/L RC — SIGNIFICANT CHANGE UP (ref 10–45)
ANION GAP SERPL CALC-SCNC: 11 MMOL/L — SIGNIFICANT CHANGE UP (ref 5–17)
ANION GAP SERPL CALC-SCNC: 11 MMOL/L — SIGNIFICANT CHANGE UP (ref 5–17)
AST SERPL-CCNC: 12 U/L — SIGNIFICANT CHANGE UP (ref 10–40)
AST SERPL-CCNC: 13 U/L — SIGNIFICANT CHANGE UP (ref 10–40)
BILIRUB SERPL-MCNC: 0.4 MG/DL — SIGNIFICANT CHANGE UP (ref 0.2–1.2)
BILIRUB SERPL-MCNC: 0.4 MG/DL — SIGNIFICANT CHANGE UP (ref 0.2–1.2)
BUN SERPL-MCNC: 6 MG/DL — LOW (ref 7–23)
BUN SERPL-MCNC: 7 MG/DL — SIGNIFICANT CHANGE UP (ref 7–23)
CALCIUM SERPL-MCNC: 8.7 MG/DL — SIGNIFICANT CHANGE UP (ref 8.4–10.5)
CALCIUM SERPL-MCNC: 8.7 MG/DL — SIGNIFICANT CHANGE UP (ref 8.4–10.5)
CHLORIDE SERPL-SCNC: 91 MMOL/L — LOW (ref 96–108)
CHLORIDE SERPL-SCNC: 93 MMOL/L — LOW (ref 96–108)
CO2 SERPL-SCNC: 31 MMOL/L — SIGNIFICANT CHANGE UP (ref 22–31)
CO2 SERPL-SCNC: 32 MMOL/L — HIGH (ref 22–31)
CREAT SERPL-MCNC: 0.95 MG/DL — SIGNIFICANT CHANGE UP (ref 0.5–1.3)
CREAT SERPL-MCNC: 1.01 MG/DL — SIGNIFICANT CHANGE UP (ref 0.5–1.3)
GLUCOSE SERPL-MCNC: 116 MG/DL — HIGH (ref 70–99)
GLUCOSE SERPL-MCNC: 192 MG/DL — HIGH (ref 70–99)
HCT VFR BLD CALC: 22.6 % — LOW (ref 34.5–45)
HCT VFR BLD CALC: 23.1 % — LOW (ref 34.5–45)
HGB BLD-MCNC: 7.4 G/DL — LOW (ref 11.5–15.5)
HGB BLD-MCNC: 7.5 G/DL — LOW (ref 11.5–15.5)
MAGNESIUM SERPL-MCNC: 1.6 MG/DL — SIGNIFICANT CHANGE UP (ref 1.6–2.6)
MAGNESIUM SERPL-MCNC: 1.6 MG/DL — SIGNIFICANT CHANGE UP (ref 1.6–2.6)
MCHC RBC-ENTMCNC: 31.8 PG — SIGNIFICANT CHANGE UP (ref 27–34)
MCHC RBC-ENTMCNC: 32.2 PG — SIGNIFICANT CHANGE UP (ref 27–34)
MCHC RBC-ENTMCNC: 32.7 GM/DL — SIGNIFICANT CHANGE UP (ref 32–36)
MCHC RBC-ENTMCNC: 32.9 GM/DL — SIGNIFICANT CHANGE UP (ref 32–36)
MCV RBC AUTO: 97.3 FL — SIGNIFICANT CHANGE UP (ref 80–100)
MCV RBC AUTO: 97.8 FL — SIGNIFICANT CHANGE UP (ref 80–100)
PHOSPHATE SERPL-MCNC: 3.7 MG/DL — SIGNIFICANT CHANGE UP (ref 2.5–4.5)
PHOSPHATE SERPL-MCNC: 4.4 MG/DL — SIGNIFICANT CHANGE UP (ref 2.5–4.5)
PLATELET # BLD AUTO: 813 K/UL — HIGH (ref 150–400)
PLATELET # BLD AUTO: 826 K/UL — HIGH (ref 150–400)
POTASSIUM SERPL-MCNC: 3.6 MMOL/L — SIGNIFICANT CHANGE UP (ref 3.5–5.3)
POTASSIUM SERPL-MCNC: 3.9 MMOL/L — SIGNIFICANT CHANGE UP (ref 3.5–5.3)
POTASSIUM SERPL-SCNC: 3.6 MMOL/L — SIGNIFICANT CHANGE UP (ref 3.5–5.3)
POTASSIUM SERPL-SCNC: 3.9 MMOL/L — SIGNIFICANT CHANGE UP (ref 3.5–5.3)
PROT SERPL-MCNC: 5.5 G/DL — LOW (ref 6–8.3)
PROT SERPL-MCNC: 5.7 G/DL — LOW (ref 6–8.3)
RBC # BLD: 2.31 M/UL — LOW (ref 3.8–5.2)
RBC # BLD: 2.37 M/UL — LOW (ref 3.8–5.2)
RBC # FLD: 16 % — HIGH (ref 10.3–14.5)
RBC # FLD: 16.3 % — HIGH (ref 10.3–14.5)
SODIUM SERPL-SCNC: 134 MMOL/L — LOW (ref 135–145)
SODIUM SERPL-SCNC: 135 MMOL/L — SIGNIFICANT CHANGE UP (ref 135–145)
WBC # BLD: 16.3 K/UL — HIGH (ref 3.8–10.5)
WBC # BLD: 17 K/UL — HIGH (ref 3.8–10.5)
WBC # FLD AUTO: 16.3 K/UL — HIGH (ref 3.8–10.5)
WBC # FLD AUTO: 17 K/UL — HIGH (ref 3.8–10.5)

## 2018-04-17 PROCEDURE — 99232 SBSQ HOSP IP/OBS MODERATE 35: CPT

## 2018-04-17 PROCEDURE — 99233 SBSQ HOSP IP/OBS HIGH 50: CPT

## 2018-04-17 RX ORDER — INSULIN LISPRO 100/ML
3 VIAL (ML) SUBCUTANEOUS
Qty: 1 | Refills: 0 | OUTPATIENT
Start: 2018-04-17 | End: 2018-05-16

## 2018-04-17 RX ORDER — INSULIN GLARGINE 100 [IU]/ML
7 INJECTION, SOLUTION SUBCUTANEOUS
Qty: 1 | Refills: 0 | OUTPATIENT
Start: 2018-04-17

## 2018-04-17 RX ORDER — INSULIN LISPRO 100/ML
2 VIAL (ML) SUBCUTANEOUS
Qty: 0 | Refills: 0 | Status: DISCONTINUED | OUTPATIENT
Start: 2018-04-17 | End: 2018-04-18

## 2018-04-17 RX ORDER — INSULIN LISPRO 100/ML
2 VIAL (ML) SUBCUTANEOUS AT BEDTIME
Qty: 0 | Refills: 0 | Status: DISCONTINUED | OUTPATIENT
Start: 2018-04-17 | End: 2018-04-18

## 2018-04-17 RX ADMIN — Medication 500 MILLIGRAM(S): at 05:47

## 2018-04-17 RX ADMIN — OXYCODONE HYDROCHLORIDE 10 MILLIGRAM(S): 5 TABLET ORAL at 00:07

## 2018-04-17 RX ADMIN — INSULIN GLARGINE 7 UNIT(S): 100 INJECTION, SOLUTION SUBCUTANEOUS at 07:38

## 2018-04-17 RX ADMIN — Medication 1 UNIT(S): at 07:39

## 2018-04-17 RX ADMIN — PIPERACILLIN AND TAZOBACTAM 25 GRAM(S): 4; .5 INJECTION, POWDER, LYOPHILIZED, FOR SOLUTION INTRAVENOUS at 12:17

## 2018-04-17 RX ADMIN — OXYCODONE HYDROCHLORIDE 30 MILLIGRAM(S): 5 TABLET ORAL at 22:02

## 2018-04-17 RX ADMIN — Medication 2 UNIT(S): at 22:03

## 2018-04-17 RX ADMIN — OXYCODONE HYDROCHLORIDE 30 MILLIGRAM(S): 5 TABLET ORAL at 05:44

## 2018-04-17 RX ADMIN — Medication 500 MILLIGRAM(S): at 23:46

## 2018-04-17 RX ADMIN — Medication 4: at 07:46

## 2018-04-17 RX ADMIN — ONDANSETRON 8 MILLIGRAM(S): 8 TABLET, FILM COATED ORAL at 05:45

## 2018-04-17 RX ADMIN — OXYCODONE HYDROCHLORIDE 30 MILLIGRAM(S): 5 TABLET ORAL at 22:32

## 2018-04-17 RX ADMIN — Medication 2: at 20:03

## 2018-04-17 RX ADMIN — Medication 100 MILLIGRAM(S): at 17:58

## 2018-04-17 RX ADMIN — Medication 1 CAPSULE(S): at 07:38

## 2018-04-17 RX ADMIN — Medication 1 CAPSULE(S): at 15:41

## 2018-04-17 RX ADMIN — Medication 500 MILLIGRAM(S): at 15:41

## 2018-04-17 RX ADMIN — MEGESTROL ACETATE 800 MILLIGRAM(S): 40 SUSPENSION ORAL at 20:02

## 2018-04-17 RX ADMIN — OXYCODONE HYDROCHLORIDE 10 MILLIGRAM(S): 5 TABLET ORAL at 00:37

## 2018-04-17 RX ADMIN — PANTOPRAZOLE SODIUM 40 MILLIGRAM(S): 20 TABLET, DELAYED RELEASE ORAL at 07:38

## 2018-04-17 RX ADMIN — PIPERACILLIN AND TAZOBACTAM 25 GRAM(S): 4; .5 INJECTION, POWDER, LYOPHILIZED, FOR SOLUTION INTRAVENOUS at 22:02

## 2018-04-17 RX ADMIN — Medication 1: at 15:47

## 2018-04-17 RX ADMIN — Medication 2: at 22:03

## 2018-04-17 RX ADMIN — Medication 1 UNIT(S): at 00:02

## 2018-04-17 RX ADMIN — OXYCODONE HYDROCHLORIDE 10 MILLIGRAM(S): 5 TABLET ORAL at 03:27

## 2018-04-17 RX ADMIN — OXYCODONE HYDROCHLORIDE 30 MILLIGRAM(S): 5 TABLET ORAL at 15:48

## 2018-04-17 RX ADMIN — Medication 2 UNIT(S): at 15:47

## 2018-04-17 RX ADMIN — Medication 20 MILLIGRAM(S): at 05:48

## 2018-04-17 RX ADMIN — Medication 1 CAPSULE(S): at 22:02

## 2018-04-17 RX ADMIN — Medication 20 MILLIGRAM(S): at 17:58

## 2018-04-17 RX ADMIN — Medication 500 MILLIGRAM(S): at 00:06

## 2018-04-17 RX ADMIN — OXYCODONE HYDROCHLORIDE 10 MILLIGRAM(S): 5 TABLET ORAL at 02:57

## 2018-04-17 RX ADMIN — Medication 1 CAPSULE(S): at 20:03

## 2018-04-17 RX ADMIN — Medication 100 MILLIGRAM(S): at 05:45

## 2018-04-17 RX ADMIN — MICAFUNGIN SODIUM 105 MILLIGRAM(S): 100 INJECTION, POWDER, LYOPHILIZED, FOR SOLUTION INTRAVENOUS at 15:43

## 2018-04-17 RX ADMIN — PIPERACILLIN AND TAZOBACTAM 25 GRAM(S): 4; .5 INJECTION, POWDER, LYOPHILIZED, FOR SOLUTION INTRAVENOUS at 05:42

## 2018-04-17 RX ADMIN — OXYCODONE HYDROCHLORIDE 30 MILLIGRAM(S): 5 TABLET ORAL at 16:20

## 2018-04-17 RX ADMIN — Medication 2 UNIT(S): at 20:03

## 2018-04-17 NOTE — PROGRESS NOTE ADULT - ASSESSMENT
A/P  42F POD #12 s/p pancreatectomy with islet cell transplant, splenectomy, s/p IR drainage of collections  -cont diabetic diet  -glu control  -PICC, micafungin through 4/28 per ID  -re-culture drains prior to d/c  -d/c planning for 4/18    STEPHANIA Bowen PA-C , pager # [phone]

## 2018-04-17 NOTE — PROGRESS NOTE ADULT - SUBJECTIVE AND OBJECTIVE BOX
GENERAL SURGERY DAILY PROGRESS NOTE:       Subjective:  no complaints.  feels well  tolerating diet  PICC placed yesterday for IV antifungals through       Objective:  NAD  abd soft NTND  J tube in place, mild drainage around site  IR drain x 2 with serous drainage      MEDICATIONS  (STANDING):  amylase/lipase/protease  (CREON  6,000 Units) 1 Capsule(s) Oral at bedtime  amylase/lipase/protease  (CREON  6,000 Units) 1 Capsule(s) Oral three times a day with meals  dextrose 5%. 1000 milliLiter(s) (50 mL/Hr) IV Continuous <Continuous>  docusate sodium Liquid 100 milliGRAM(s) Oral two times a day  erythromycin    ethylsuccinate Suspension 40 mG/mL 500 milliGRAM(s) Oral every 6 hours  furosemide   Injectable 20 milliGRAM(s) IV Push every 12 hours  heparin  Injectable 5000 Unit(s) SubCutaneous every 12 hours  HYDROmorphone  Injectable 1 milliGRAM(s) IV Push once  insulin glargine Injectable (LANTUS) 7 Unit(s) SubCutaneous every morning  insulin lispro (HumaLOG) corrective regimen sliding scale   SubCutaneous Before meals and at bedtime  insulin lispro Injectable (HumaLOG) 1 Unit(s) SubCutaneous three times a day with meals  insulin lispro Injectable (HumaLOG) 1 Unit(s) SubCutaneous at bedtime  megestrol Suspension 800 milliGRAM(s) Oral daily  micafungin IVPB      micafungin IVPB 100 milliGRAM(s) IV Intermittent every 24 hours  ondansetron   Disintegrating Tablet 8 milliGRAM(s) Oral every 8 hours  oxyCODONE  ER Tablet 30 milliGRAM(s) Oral every 8 hours  pantoprazole    Tablet 40 milliGRAM(s) Oral before breakfast  piperacillin/tazobactam IVPB. 3.375 Gram(s) IV Intermittent every 8 hours    MEDICATIONS  (PRN):  acetaminophen   Tablet 650 milliGRAM(s) Oral every 6 hours PRN mild pain  bisacodyl 10 milliGRAM(s) Oral at bedtime PRN Constipation  methocarbamol 750 milliGRAM(s) Oral every 8 hours PRN spasm  oxyCODONE    IR 10 milliGRAM(s) Oral every 3 hours PRN Moderate Pain (4 - 6)      Vital Signs Last 24 Hrs  T(C): 36.8 (2018 01:44), Max: 36.9 (2018 09:34)  T(F): 98.2 (2018 01:44), Max: 98.5 (2018 09:34)  HR: 102 (2018 01:44) (90 - 102)  BP: 100/65 (2018 01:44) (95/56 - 102/67)  BP(mean): --  RR: 18 (2018 01:44) (18 - 18)  SpO2: 96% (2018 01:44) (93% - 98%)    I&O's Detail    15 Apr 2018 07:01  -  2018 07:00  --------------------------------------------------------  IN:    Oral Fluid: 1300 mL    Solution: 300 mL    Solution: 200 mL    Solution: 100 mL  Total IN: 1900 mL    OUT:    Bulb: 80 mL    Bulb: 55 mL    Indwelling Catheter - Urethral: 400 mL    Voided: 1650 mL  Total OUT: 2185 mL    Total NET: -285 mL      2018 07:01  -  2018 06:29  --------------------------------------------------------  IN:    Oral Fluid: 460 mL    Solution: 100 mL    Solution: 100 mL  Total IN: 660 mL    OUT:    Bulb: 15 mL    Bulb: 5 mL    Voided: 2050 mL  Total OUT: 2070 mL    Total NET: -1410 mL          Daily     Daily Weight in k.4 (2018 06:47)    LABS:                        7.4    17.0  )-----------( 813      ( 2018 01:04 )             22.6     04-17    135  |  93<L>  |  6<L>  ----------------------------<  116<H>  3.6   |  31  |  0.95    Ca    8.7      2018 01:04  Phos  4.4     04-17  Mg     1.6     04-17    TPro  5.5<L>  /  Alb  2.4<L>  /  TBili  0.4  /  DBili  x   /  AST  13  /  ALT  33  /  AlkPhos  90

## 2018-04-17 NOTE — PROGRESS NOTE ADULT - SUBJECTIVE AND OBJECTIVE BOX
INFECTIOUS DISEASES FOLLOW UP--Madan Lane MD  Pager [phone]    This is a follow up note for this  42y Female with  distal pancreatectomy, splenectomy, abd collection isolating yeast.  on zosyn and mycamine.    Further ROS:  CONSTITUTIONAL:  No fever, good appetite  CARDIOVASCULAR:  No chest pain or palpitations  RESPIRATORY:  No dyspnea  GASTROINTESTINAL:  No nausea, vomiting, diarrhea, or abdominal pain  GENITOURINARY:  No dysuria  NEUROLOGIC:  No headache,     Allergies  No Known Allergies    ANTIBIOTICS/RELEVANT:  antimicrobials  erythromycin    ethylsuccinate Suspension 40 mG/mL 500 milliGRAM(s) Oral every 6 hours  micafungin IVPB      micafungin IVPB 100 milliGRAM(s) IV Intermittent every 24 hours  piperacillin/tazobactam IVPB. 3.375 Gram(s) IV Intermittent every 8 hours    immunologic:    OTHER:  acetaminophen   Tablet 650 milliGRAM(s) Oral every 6 hours PRN  amylase/lipase/protease  (CREON  6,000 Units) 1 Capsule(s) Oral at bedtime  amylase/lipase/protease  (CREON  6,000 Units) 1 Capsule(s) Oral three times a day with meals  bisacodyl 10 milliGRAM(s) Oral at bedtime PRN  dextrose 5%. 1000 milliLiter(s) IV Continuous <Continuous>  docusate sodium Liquid 100 milliGRAM(s) Oral two times a day  furosemide   Injectable 20 milliGRAM(s) IV Push every 12 hours  heparin  Injectable 5000 Unit(s) SubCutaneous every 12 hours  HYDROmorphone  Injectable 1 milliGRAM(s) IV Push once  insulin glargine Injectable (LANTUS) 7 Unit(s) SubCutaneous every morning  insulin lispro (HumaLOG) corrective regimen sliding scale   SubCutaneous Before meals and at bedtime  insulin lispro Injectable (HumaLOG) 1 Unit(s) SubCutaneous three times a day with meals  insulin lispro Injectable (HumaLOG) 1 Unit(s) SubCutaneous at bedtime  megestrol Suspension 800 milliGRAM(s) Oral daily  methocarbamol 750 milliGRAM(s) Oral every 8 hours PRN  ondansetron   Disintegrating Tablet 8 milliGRAM(s) Oral every 8 hours  oxyCODONE    IR 10 milliGRAM(s) Oral every 3 hours PRN  oxyCODONE  ER Tablet 30 milliGRAM(s) Oral every 8 hours  pantoprazole    Tablet 40 milliGRAM(s) Oral before breakfast      Objective:  Vital Signs Last 24 Hrs  T(C): 37.3 (17 Apr 2018 09:15), Max: 37.3 (17 Apr 2018 09:15)  T(F): 99.1 (17 Apr 2018 09:15), Max: 99.1 (17 Apr 2018 09:15)  HR: 108 (17 Apr 2018 09:15) (90 - 108)  BP: 95/59 (17 Apr 2018 09:15) (95/56 - 105/66)  BP(mean): --  RR: 18 (17 Apr 2018 09:15) (18 - 18)  SpO2: 97% (17 Apr 2018 09:15) (93% - 98%)    PHYSICAL EXAM:  Constitutional:no acute distress  Eyes:MALOU, EOMI  Ear/Nose/Throat: no oral lesions, 	  Respiratory: clear BL  Cardiovascular: S1S2  Gastrointestinal:soft, (+) BS, no tenderness  Extremities:no e/e/c  No Lymphadenopathy  IV sites not inflammed.  PICC L Arm    LABS:                        7.4    17.0  )-----------( 813      ( 17 Apr 2018 01:04 )             22.6     04-17    135  |  93<L>  |  6<L>  ----------------------------<  116<H>  3.6   |  31  |  0.95    Ca    8.7      17 Apr 2018 01:04  Phos  4.4     04-17  Mg     1.6     04-17    TPro  5.5<L>  /  Alb  2.4<L>  /  TBili  0.4  /  DBili  x   /  AST  13  /  ALT  33  /  AlkPhos  90  04-17    Imp:  stabilized with drainage and antimicrobials.  still draining a lot.    Rx:  drain/collection management per surgery/IR  upon dc can be on mycamine alone and dc the zosyn.

## 2018-04-17 NOTE — PROGRESS NOTE ADULT - PROBLEM SELECTOR PLAN 1
-Test BG ac/hs and 2am every day. Change to q6h if NPO  -Continue Lantus 7 units qam. Will adjust as needed  -Increase Humalog to 2 units ac meals. HOLD IF NOT EATING  -Increase Humalog to 2 unit at hs if eating (pt states feeling hungry at night. HOLD IF NOT EATING!  -Continue humalog correction scale ac and hs low dose  -Plan discussed with pt/team/RD.  Contact info: [phone] (24/7). pager [phone]

## 2018-04-17 NOTE — PROGRESS NOTE ADULT - SUBJECTIVE AND OBJECTIVE BOX
Diabetes Follow up note: Saw pt earlier today  Interval Hx: 42 year old female w/chronic pancreatitis, no hx of DM here s/p total pancreatectomy w/islet cell autotransplantation c/b fluid collection requiring IR drainage x2. 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 Pt states having loose BMs but less frequent> denies oily stool. Tolerating POs and eating >50% of meals with glycemic control above goal of 100 to 150s. BG levels are 100s to 200s. No hypoglycemia. Per primary team tentative discharge home tomorrow    Review of Systems:  General: "eating better!"  GI: +  incisional/abdominal pain. No vomit/nausea reported. Loose BMs yesterday and improving today.   CV: No CP/SOB  ENDO: No S&Sx of hypoglycemia      MEDS:  insulin glargine Injectable (LANTUS) 7 Unit(s) SubCutaneous every morning  insulin lispro Injectable (HumaLOG) 1 Unit(s) SubCutaneous at bedtime  insulin lispro Injectable (HumaLOG) 2 Unit(s) SubCutaneous three times a day with meals  insulin lispro (HumaLOG) corrective regimen sliding scale   SubCutaneous Before meals and at bedtime  erythromycin    ethylsuccinate Suspension 40 mG/mL 250 milliGRAM(s) Oral every 6 hours  amylase/lipase/protease  (CREON  6,000 Units) 1 Capsule(s) Oral three times a day with meals  amylase/lipase/protease  (CREON  6,000 Units) 1 Capsule(s) Oral at bedtime  piperacillin/tazobactam IVPB. 3.375 Gram(s) IV Intermittent every 8 hours  micafungin IVPB 100 milliGRAM(s) IV Intermittent every 24 hours      Allergies  No Known Allergies    PE:  General: Female sitting in chair in NAD.   Vital Signs Last 24 Hrs  T(C): 36.7 (04-17-18 @ 13:38), Max: 37.3 (04-17-18 @ 09:15)  T(F): 98.1 (04-17-18 @ 13:38), Max: 99.1 (04-17-18 @ 09:15)  HR: 95 (04-17-18 @ 13:38) (93 - 108)  BP: 99/64 (04-17-18 @ 13:38) (95/59 - 105/66)  BP(mean): --  RR: 18 (04-17-18 @ 13:38) (18 - 18)  SpO2: 97% (04-17-18 @ 13:38) (93% - 98%)  Abd: Soft, tender ,distended, J-tube in place. R side of abdomen with JPs x2 draining ss  out put.  Extremities: Warm. 1+ edema in LEs> same as yesterday  Neuro: A&O X3    LABS:  POCT Blood Glucose.: 232 mg/dL (04-17-18 @ 07:37)  POCT Blood Glucose.: 115 mg/dL (04-17-18 @ 01:48)  POCT Blood Glucose.: 109 mg/dL (04-16-18 @ 23:58)  POCT Blood Glucose.: 217 mg/dL (04-16-18 @ 20:12)  POCT Blood Glucose.: 176 mg/dL (04-16-18 @ 15:26)  POCT Blood Glucose.: 167 mg/dL (04-16-18 @ 08:10)  POCT Blood Glucose.: 211 mg/dL (04-16-18 @ 01:53)  POCT Blood Glucose.: 94 mg/dL (04-15-18 @ 22:31)  POCT Blood Glucose.: 140 mg/dL (04-15-18 @ 20:47)  POCT Blood Glucose.: 170 mg/dL (04-15-18 @ 19:30)                                   7.4    17.0  )-----------( 813      ( 17 Apr 2018 01:04 )             22.6                       7.3    17.8  )-----------( 778      ( 16 Apr 2018 04:15 )             21.9      04-17    135  |  93<L>  |  6<L>  ----------------------------<  116<H>  3.6   |  31  |  0.95    Ca    8.7      17 Apr 2018 01:04  Phos  4.4     04-17  Mg     1.6     04-17    TPro  5.5<L>  /  Alb  2.4<L>  /  TBili  0.4  /  DBili  x   /  AST  13  /  ALT  33  /  AlkPhos  90  04-17  TPro  5.1<L>  /  Alb  2.2<L>  /  TBili  0.4  /  DBili  x   /  AST  15  /  ALT  43  /  AlkPhos  98  04-16  TPro  5.4<L>  /  Alb  2.2<L>  /  TBili  0.5  /  DBili  0.2  /  AST  182<H>  /  ALT  71<H>  /  AlkPhos  93  04-13  TPro  5.8<L>  /  Alb  2.9<L>  /  TBili  0.3  /  DBili  0.1  /  AST  12  /  ALT  30  /  AlkPhos  79  04-10  TPro  5.7<L>  /  Alb  2.9<L>  /  TBili  0.4  /  DBili  0.1  /  AST  18  /  ALT  47<H>  /  AlkPhos  67  04-09  TPro  5.2<L>  /  Alb  2.7<L>  /  TBili  0.4  /  DBili  0.2  /  AST  32  /  ALT  65<H>  /  AlkPhos  50  04-08  TPro  4.7<L>  /  Alb  2.4<L>  /  TBili  0.4  /  DBili  0.1  /  AST  98<H>  /  ALT  81<H>  /  AlkPhos  38<L>  04-08

## 2018-04-17 NOTE — PROGRESS NOTE ADULT - ASSESSMENT
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 Post op c/b fever/surgical leak s/p collection drainage > s/p IR drainage x2 on antifungal for infection. Positive diarrhea/ surgical pain improving slowly. Increased PO intake with worsening glycemic control. Improved wbc and LFT. No further hypoglycemia. Possible discharge home tomorrow.  Spoke to pt about carb counting. Pt able to identify carbs but has trouble calculating carbs that don't have nutritional labels. Also spoke to pt about starting to calculate insulin doses by doing insulin to carb ratio of 1:15 and sensitivity of 1:50 for BG goal of 150. Pt verbalized understanding but had some trouble giving teach back. Needs reinforcement. Spoke to RD who is going to see pt and reinforce education.

## 2018-04-18 VITALS
HEART RATE: 97 BPM | OXYGEN SATURATION: 98 % | TEMPERATURE: 98 F | SYSTOLIC BLOOD PRESSURE: 99 MMHG | DIASTOLIC BLOOD PRESSURE: 65 MMHG | RESPIRATION RATE: 18 BRPM

## 2018-04-18 LAB
-  5-FLUCYTOSINE: SIGNIFICANT CHANGE UP
-  AMPHOTERICIN B: SIGNIFICANT CHANGE UP
-  ANIDULAFUNGIN: SIGNIFICANT CHANGE UP
-  CASPOFUNGIN: SIGNIFICANT CHANGE UP
-  FLUCONAZOLE: SIGNIFICANT CHANGE UP
-  FLUCONAZOLE: SIGNIFICANT CHANGE UP
-  ITRACONAZOLE: SIGNIFICANT CHANGE UP
-  MICAFUNGIN: SIGNIFICANT CHANGE UP
-  POSACONAZOLE: SIGNIFICANT CHANGE UP
-  VORICONAZOLE: SIGNIFICANT CHANGE UP
CULTURE RESULTS: SIGNIFICANT CHANGE UP
METHOD TYPE: SIGNIFICANT CHANGE UP
METHOD TYPE: SIGNIFICANT CHANGE UP
SPECIMEN SOURCE: SIGNIFICANT CHANGE UP

## 2018-04-18 PROCEDURE — 99233 SBSQ HOSP IP/OBS HIGH 50: CPT

## 2018-04-18 RX ORDER — OXYCODONE HYDROCHLORIDE 5 MG/1
1 TABLET ORAL
Qty: 0 | Refills: 0 | COMMUNITY

## 2018-04-18 RX ORDER — POLYETHYLENE GLYCOL 3350 17 G/17G
0 POWDER, FOR SOLUTION ORAL
Qty: 0 | Refills: 0 | COMMUNITY

## 2018-04-18 RX ORDER — INSULIN LISPRO 100/ML
4 VIAL (ML) SUBCUTANEOUS
Qty: 0 | Refills: 0 | Status: DISCONTINUED | OUTPATIENT
Start: 2018-04-18 | End: 2018-04-18

## 2018-04-18 RX ORDER — MEGESTROL ACETATE 40 MG/ML
20 SUSPENSION ORAL
Qty: 600 | Refills: 1 | OUTPATIENT
Start: 2018-04-18 | End: 2018-06-16

## 2018-04-18 RX ORDER — MICAFUNGIN SODIUM 100 MG/1
100 INJECTION, POWDER, LYOPHILIZED, FOR SOLUTION INTRAVENOUS
Qty: 0 | Refills: 0 | COMMUNITY
Start: 2018-04-18

## 2018-04-18 RX ORDER — OMEPRAZOLE 10 MG/1
1 CAPSULE, DELAYED RELEASE ORAL
Qty: 0 | Refills: 0 | COMMUNITY

## 2018-04-18 RX ORDER — ALPRAZOLAM 0.25 MG
1 TABLET ORAL
Qty: 0 | Refills: 0 | COMMUNITY

## 2018-04-18 RX ORDER — DOCUSATE SODIUM 100 MG
10 CAPSULE ORAL
Qty: 300 | Refills: 0 | OUTPATIENT
Start: 2018-04-18 | End: 2018-05-17

## 2018-04-18 RX ORDER — FLUTICASONE PROPIONATE 50 MCG
1 SPRAY, SUSPENSION NASAL
Qty: 0 | Refills: 0 | COMMUNITY

## 2018-04-18 RX ORDER — OXYCODONE HYDROCHLORIDE 5 MG/1
1 TABLET ORAL
Qty: 240 | Refills: 0 | OUTPATIENT
Start: 2018-04-18 | End: 2018-05-17

## 2018-04-18 RX ORDER — OXYCODONE HYDROCHLORIDE 5 MG/1
1 TABLET ORAL
Qty: 0 | Refills: 0 | COMMUNITY
Start: 2018-04-18

## 2018-04-18 RX ORDER — INSULIN LISPRO 100/ML
3 VIAL (ML) SUBCUTANEOUS
Qty: 1 | Refills: 0 | OUTPATIENT
Start: 2018-04-18 | End: 2018-05-17

## 2018-04-18 RX ORDER — ONDANSETRON 8 MG/1
1 TABLET, FILM COATED ORAL
Qty: 90 | Refills: 2 | OUTPATIENT
Start: 2018-04-18 | End: 2018-07-16

## 2018-04-18 RX ORDER — LIPASE/PROTEASE/AMYLASE 16-48-48K
1 CAPSULE,DELAYED RELEASE (ENTERIC COATED) ORAL
Qty: 0 | Refills: 0 | COMMUNITY

## 2018-04-18 RX ORDER — LIPASE/PROTEASE/AMYLASE 16-48-48K
1 CAPSULE,DELAYED RELEASE (ENTERIC COATED) ORAL
Qty: 90 | Refills: 2 | OUTPATIENT
Start: 2018-04-18 | End: 2018-07-16

## 2018-04-18 RX ORDER — INSULIN GLARGINE 100 [IU]/ML
8 INJECTION, SOLUTION SUBCUTANEOUS
Qty: 0 | Refills: 0 | COMMUNITY
Start: 2018-04-18

## 2018-04-18 RX ORDER — OXYCODONE HYDROCHLORIDE 5 MG/1
1 TABLET ORAL
Qty: 90 | Refills: 0 | OUTPATIENT
Start: 2018-04-18 | End: 2018-05-17

## 2018-04-18 RX ORDER — ACETAMINOPHEN 500 MG
2 TABLET ORAL
Qty: 0 | Refills: 0 | COMMUNITY
Start: 2018-04-18

## 2018-04-18 RX ORDER — ONDANSETRON 8 MG/1
0 TABLET, FILM COATED ORAL
Qty: 0 | Refills: 0 | COMMUNITY

## 2018-04-18 RX ORDER — ERYTHROMYCIN ETHYLSUCCINATE 400 MG
1 TABLET ORAL
Qty: 120 | Refills: 2 | OUTPATIENT
Start: 2018-04-18 | End: 2018-07-16

## 2018-04-18 RX ADMIN — Medication 6: at 09:02

## 2018-04-18 RX ADMIN — PIPERACILLIN AND TAZOBACTAM 25 GRAM(S): 4; .5 INJECTION, POWDER, LYOPHILIZED, FOR SOLUTION INTRAVENOUS at 06:33

## 2018-04-18 RX ADMIN — Medication 100 MILLIGRAM(S): at 06:33

## 2018-04-18 RX ADMIN — Medication 2 UNIT(S): at 09:02

## 2018-04-18 RX ADMIN — MEGESTROL ACETATE 800 MILLIGRAM(S): 40 SUSPENSION ORAL at 14:05

## 2018-04-18 RX ADMIN — INSULIN GLARGINE 7 UNIT(S): 100 INJECTION, SOLUTION SUBCUTANEOUS at 09:01

## 2018-04-18 RX ADMIN — Medication 1 CAPSULE(S): at 08:53

## 2018-04-18 RX ADMIN — OXYCODONE HYDROCHLORIDE 30 MILLIGRAM(S): 5 TABLET ORAL at 06:53

## 2018-04-18 RX ADMIN — MICAFUNGIN SODIUM 105 MILLIGRAM(S): 100 INJECTION, POWDER, LYOPHILIZED, FOR SOLUTION INTRAVENOUS at 12:56

## 2018-04-18 RX ADMIN — Medication 500 MILLIGRAM(S): at 06:33

## 2018-04-18 RX ADMIN — PANTOPRAZOLE SODIUM 40 MILLIGRAM(S): 20 TABLET, DELAYED RELEASE ORAL at 06:33

## 2018-04-18 RX ADMIN — OXYCODONE HYDROCHLORIDE 30 MILLIGRAM(S): 5 TABLET ORAL at 15:00

## 2018-04-18 RX ADMIN — Medication 500 MILLIGRAM(S): at 12:57

## 2018-04-18 RX ADMIN — OXYCODONE HYDROCHLORIDE 30 MILLIGRAM(S): 5 TABLET ORAL at 06:33

## 2018-04-18 RX ADMIN — OXYCODONE HYDROCHLORIDE 10 MILLIGRAM(S): 5 TABLET ORAL at 05:10

## 2018-04-18 RX ADMIN — Medication 6: at 12:52

## 2018-04-18 RX ADMIN — Medication 1 CAPSULE(S): at 15:00

## 2018-04-18 RX ADMIN — OXYCODONE HYDROCHLORIDE 10 MILLIGRAM(S): 5 TABLET ORAL at 05:40

## 2018-04-18 RX ADMIN — Medication 4 UNIT(S): at 14:04

## 2018-04-18 NOTE — PROGRESS NOTE ADULT - SUBJECTIVE AND OBJECTIVE BOX
GENERAL SURGERY DAILY PROGRESS NOTE:       Subjective:  pain controlled  tolerating diet  denies N/V  +flatus      Objective:  NAD  abd soft, NTND  IR drains purulent  J tube in place  incision C/D/I      MEDICATIONS  (STANDING):  amylase/lipase/protease  (CREON  6,000 Units) 1 Capsule(s) Oral at bedtime  amylase/lipase/protease  (CREON  6,000 Units) 1 Capsule(s) Oral three times a day with meals  dextrose 5%. 1000 milliLiter(s) (50 mL/Hr) IV Continuous <Continuous>  docusate sodium Liquid 100 milliGRAM(s) Oral two times a day  erythromycin    ethylsuccinate Suspension 40 mG/mL 500 milliGRAM(s) Oral every 6 hours  furosemide   Injectable 20 milliGRAM(s) IV Push every 12 hours  heparin  Injectable 5000 Unit(s) SubCutaneous every 12 hours  HYDROmorphone  Injectable 1 milliGRAM(s) IV Push once  insulin glargine Injectable (LANTUS) 7 Unit(s) SubCutaneous every morning  insulin lispro (HumaLOG) corrective regimen sliding scale   SubCutaneous Before meals and at bedtime  insulin lispro Injectable (HumaLOG) 2 Unit(s) SubCutaneous three times a day with meals  insulin lispro Injectable (HumaLOG) 2 Unit(s) SubCutaneous at bedtime  megestrol Suspension 800 milliGRAM(s) Oral daily  micafungin IVPB      micafungin IVPB 100 milliGRAM(s) IV Intermittent every 24 hours  ondansetron   Disintegrating Tablet 8 milliGRAM(s) Oral every 8 hours  oxyCODONE  ER Tablet 30 milliGRAM(s) Oral every 8 hours  pantoprazole    Tablet 40 milliGRAM(s) Oral before breakfast  piperacillin/tazobactam IVPB. 3.375 Gram(s) IV Intermittent every 8 hours    MEDICATIONS  (PRN):  acetaminophen   Tablet 650 milliGRAM(s) Oral every 6 hours PRN mild pain  bisacodyl 10 milliGRAM(s) Oral at bedtime PRN Constipation  methocarbamol 750 milliGRAM(s) Oral every 8 hours PRN spasm  oxyCODONE    IR 10 milliGRAM(s) Oral every 3 hours PRN Moderate Pain (4 - 6)      Vital Signs Last 24 Hrs  T(C): 37.2 (18 Apr 2018 06:09), Max: 37.6 (17 Apr 2018 17:05)  T(F): 99 (18 Apr 2018 06:09), Max: 99.7 (17 Apr 2018 17:05)  HR: 98 (18 Apr 2018 06:09) (70 - 108)  BP: 95/61 (18 Apr 2018 06:09) (95/59 - 105/73)  BP(mean): --  RR: 18 (18 Apr 2018 06:09) (18 - 18)  SpO2: 97% (18 Apr 2018 06:09) (95% - 100%)    I&O's Detail    17 Apr 2018 07:01  -  18 Apr 2018 07:00  --------------------------------------------------------  IN:    Oral Fluid: 1320 mL    Solution: 100 mL    Solution: 300 mL  Total IN: 1720 mL    OUT:    Bulb: 55 mL    Bulb: 20 mL    Voided: 650 mL  Total OUT: 725 mL    Total NET: 995 mL          Daily     Daily     LABS:                        7.5    16.3  )-----------( 826      ( 17 Apr 2018 22:25 )             23.1     04-17    134<L>  |  91<L>  |  7   ----------------------------<  192<H>  3.9   |  32<H>  |  1.01    Ca    8.7      17 Apr 2018 22:25  Phos  3.7     04-17  Mg     1.6     04-17    TPro  5.7<L>  /  Alb  2.5<L>  /  TBili  0.4  /  DBili  x   /  AST  12  /  ALT  26  /  AlkPhos  85  04-17

## 2018-04-18 NOTE — PROGRESS NOTE ADULT - ASSESSMENT
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 Post op c/b fever/surgical leak s/p collection drainage > s/p IR drainage x2 on antifungal for infection. Positive diarrhea/ surgical pain improving slowly. Increased PO intake with nutritional indiscretions mainly at night causing severe hyperglycemia >pt had high carb load last night. BG improving this am but remains above goal. Pt will need to start counting carbs and giving insulin whenever she eats carbs. Spoke to pt in depth about this. Reviewed the effect of carbs on glycemic control and their effect on recent transplant. Pt able to verbalize understanding and calculate insulin she should had given for last night's food.

## 2018-04-18 NOTE — PROGRESS NOTE ADULT - SUBJECTIVE AND OBJECTIVE BOX
Diabetes Follow up note: Saw pt earlier today  Interval Hx: 42 year old female w/chronic pancreatitis, no hx of DM, here s/p total pancreatectomy w/islet cell autotransplantation c/b fluid collection requiring IR drainage x2. 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 Pt states still having loose BMs but "better". Wants to go home. Tolerating POs and eating throughout the night> pt states feeling hungry at night.  Had fasting BG >400s this am because she ate mabel food cake with tapioca pudding at 3 am last night. No hypoglycemia.     Review of Systems:  General: "I'm going home today"  GI: +  incisional/abdominal pain. No vomit/nausea reported. Loose BMs improving.   CV: No CP/SOB  ENDO: No S&Sx of hypoglycemia      MEDS:  insulin glargine Injectable (LANTUS) 7 Unit(s) SubCutaneous every morning  insulin lispro Injectable (HumaLOG) 2 Unit(s) SubCutaneous at bedtime  insulin lispro Injectable (HumaLOG) 2 Unit(s) SubCutaneous three times a day with meals  insulin lispro (HumaLOG) corrective regimen sliding scale   SubCutaneous Before meals and at bedtime  erythromycin    ethylsuccinate Suspension 40 mG/mL 250 milliGRAM(s) Oral every 6 hours  amylase/lipase/protease  (CREON  6,000 Units) 1 Capsule(s) Oral three times a day with meals  amylase/lipase/protease  (CREON  6,000 Units) 1 Capsule(s) Oral at bedtime  piperacillin/tazobactam IVPB. 3.375 Gram(s) IV Intermittent every 8 hours  micafungin IVPB 100 milliGRAM(s) IV Intermittent every 24 hours      Allergies  No Known Allergies    PE:  General: Female sitting in chair in NAD.   Vital Signs Last 24 Hrs  T(C): 36.7 (04-18-18 @ 13:51), Max: 37.6 (04-17-18 @ 17:05)  T(F): 98.1 (04-18-18 @ 13:51), Max: 99.7 (04-17-18 @ 17:05)  HR: 97 (04-18-18 @ 13:51) (70 - 101)  BP: 99/65 (04-18-18 @ 13:51) (95/61 - 105/73)  BP(mean): --  RR: 18 (04-18-18 @ 13:51) (18 - 18)  SpO2: 98% (04-18-18 @ 13:51) (95% - 100%)  Abd: Soft, tender ,distended, J-tube in place. R side of abdomen with JPs x2 draining ss  out put.  Extremities: Warm. 1+ edema in LEs> slightly improved from yesterday  Neuro: A&O X3    LABS:  POCT Blood Glucose.: 248 mg/dL (04-18-18 @ 13:49)  POCT Blood Glucose.: 298 mg/dL (04-18-18 @ 12:11)  POCT Blood Glucose.: 427 mg/dL (04-18-18 @ 08:55)  POCT Blood Glucose.: 174 mg/dL (04-17-18 @ 21:24)  POCT Blood Glucose.: 172 mg/dL (04-17-18 @ 19:56)  POCT Blood Glucose.: 136 mg/dL (04-17-18 @ 15:30)  POCT Blood Glucose.: 232 mg/dL (04-17-18 @ 07:37)  POCT Blood Glucose.: 115 mg/dL (04-17-18 @ 01:48)  POCT Blood Glucose.: 109 mg/dL (04-16-18 @ 23:58)  POCT Blood Glucose.: 217 mg/dL (04-16-18 @ 20:12)                           7.5    16.3  )-----------( 826      ( 17 Apr 2018 22:25 )             23.1                             7.4    17.0  )-----------( 813      ( 17 Apr 2018 01:04 )             22.6      04-17    134<L>  |  91<L>  |  7   ----------------------------<  192<H>  3.9   |  32<H>  |  1.01    Ca    8.7      17 Apr 2018 22:25  Phos  3.7     04-17  Mg     1.6     04-17    TPro  5.7<L>  /  Alb  2.5<L>  /  TBili  0.4  /  DBili  x   /  AST  12  /  ALT  26  /  AlkPhos  85  04-17  TPro  5.5<L>  /  Alb  2.4<L>  /  TBili  0.4  /  DBili  x   /  AST  13  /  ALT  33  /  AlkPhos  90  04-17  TPro  5.1<L>  /  Alb  2.2<L>  /  TBili  0.4  /  DBili  x   /  AST  15  /  ALT  43  /  AlkPhos  98  04-16  TPro  5.4<L>  /  Alb  2.2<L>  /  TBili  0.5  /  DBili  0.2  /  AST  182<H>  /  ALT  71<H>  /  AlkPhos  93  04-13  TPro  5.8<L>  /  Alb  2.9<L>  /  TBili  0.3  /  DBili  0.1  /  AST  12  /  ALT  30  /  AlkPhos  79  04-10  TPro  5.7<L>  /  Alb  2.9<L>  /  TBili  0.4  /  DBili  0.1  /  AST  18  /  ALT  47<H>  /  AlkPhos  67  04-09  TPro  5.2<L>  /  Alb  2.7<L>  /  TBili  0.4  /  DBili  0.2  /  AST  32  /  ALT  65<H>  /  AlkPhos  50  04-08  TPro  4.7<L>  /  Alb  2.4<L>  /  TBili  0.4  /  DBili  0.1  /  AST  98<H>  /  ALT  81<H>  /  AlkPhos  38<L>  04-08

## 2018-04-18 NOTE — PROGRESS NOTE ADULT - PROBLEM SELECTOR PLAN 1
DISCHARGE:  -Test BG ac/hs and ac snacks.   -BG goal 80 to 150 as out pt  -Increase Lantus to 8  units qam.   -Pt to inject Humalog ac meals and snacks based on a carb ratio of 1:15 and sensitivity of 1:50 for BG goal of 150.   -pt to contact this provider on 4/20/18 with BG levels and insulin doses for further adjustments.  -Pt states she already has an endocrinologist for f/u care. Dr Noe Nesbitt at [address] at [facility]. Phone: [phone]. Pt wishes to make own appt.  -Pt has home care visit to reinforce DM education as well.  -Reviewed with team all RXs sent to pts pharmacist. Insurance doesn't cover Humalog so pt will receive Novolog insulin instead>same doses per carb count and sensitivity.  -Plan discussed with pt/team. Spent >45 minutes reviewing carb intake and insulin dosing with pt. Pt encouraged to use calorie juan alberto book and chyna to count carbs.   Contact info: [phone] (24/7). pager [phone]

## 2018-04-18 NOTE — PROGRESS NOTE ADULT - PROVIDER SPECIALTY LIST ADULT
Anesthesia
Anticoag Management
Endocrinology
Infectious Disease
Infectious Disease
Intervent Radiology
SICU
Surgery
Anesthesia
Endocrinology
SICU
Infectious Disease
SICU
Surgery
Surgery
Endocrinology

## 2018-04-18 NOTE — PROGRESS NOTE ADULT - SUBJECTIVE AND OBJECTIVE BOX
Interventional Radiology Follow- Up Note    S: Patient feels well.    O:  Vitals: T(F): 99 (04-18-18 @ 06:09), Max: 99.7 (04-17-18 @ 17:05)  HR: 98 (04-18-18 @ 06:09) (70 - 108)  BP: 95/61 (04-18-18 @ 06:09) (95/59 - 105/73)  RR: 18 (04-18-18 @ 06:09) (18 - 18)  SpO2: 97% (04-18-18 @ 06:09) (95% - 100%)    LABS:                        7.5    16.3  )-----------( 826      ( 17 Apr 2018 22:25 )             23.1     04-17    134<L>  |  91<L>  |  7   ----------------------------<  192<H>  3.9   |  32<H>  |  1.01    Ca    8.7      17 Apr 2018 22:25  Phos  3.7     04-17  Mg     1.6     04-17    TPro  5.7<L>  /  Alb  2.5<L>  /  TBili  0.4  /  DBili  x   /  AST  12  /  ALT  26  /  AlkPhos  85  04-17    OUT:    Bulb: 55 mL    Bulb: 20 mL    PHYSICAL EXAM:    General: Awake, alert, in NAD  Abdomen: Soft. Two drainage catheters to DANIEL bulb suction containing turbid fluid. J tube.    Impression: 42 year old lady s/p pancreatectomy and islet cell transplantation s/p drainage of 2 abdominal collections, placement of 12 FR drains. Patient doing well.    Plan:  -Antifungals per ID  -May follow up with IR as outpatient     Please call IR at extension [phone] with any questions, concerns, or issues regarding above.

## 2018-04-18 NOTE — PROGRESS NOTE ADULT - PROBLEM SELECTOR PROBLEM 1
Other chronic pancreatitis

## 2018-04-18 NOTE — PROGRESS NOTE ADULT - ASSESSMENT
A/P  42F POD #13 s/p pancreatectomy, splenectomy, islet cell transplant  -glucose control  -PICC/micafungin through 4/28  -diabetic diet  -d/c home today   -f/u endocrine for home insulin regimen  -vaccines as outpatient    STEPHANIA Bowen PA-C , pager #

## 2018-04-18 NOTE — PROGRESS NOTE ADULT - ATTENDING COMMENTS
Patient seen and examined on AM rounds with SICU staff  Awake, alert, c/o severe incisional pain  Hemodynamically stable  Oxygenating well  On low-dose insulin infusion, titrating according to protocol    - Care discussed with anesthesia service and primary service attendings at length  - PCA / PCEA adjusted by pain service, still c/o significant pain  - After discussion with anesthesia, will attempt low-dose Precedex infusion to provide anxiolytic and help with pain control.  - 35 minutes direct critical care
Patient seen and examined on AM rounds with SICU staff  Still c/o pain, but seems to be more comfortable than yesterday.  Very difficult pain control issues, given chronic pain syndrome preoperatively  On PCA / Precedex infusion -> pain control discussed at length with pain service.  Also on NSAIDS / Tylenol PRN  hemodynamically stable (off pressors), but SBP intermittently mildly low (90's) - epidural capped  Oxygenation 90%+ on room air  Minimizing fluids at this point  Started on J-tube feeds, will also allow to take clears    - 35 minutes direct critical care time today
Pt seen and examined.  Agree with above.  D/c planning.
feels much better!  significance of C glabrata unclear -    discontinue Fluconazole  Micafungin 100 daily pending susceptibilties - short course may suffice
overall patient is doing well  goal is to closely monitor blood glucose  gets daily lantis and is on sliding scale  clear liquid diet with enteral nutrition via feeding tube

## 2018-04-18 NOTE — CHART NOTE - NSCHARTNOTEFT_GEN_A_CORE
Brief Note.    Pt provided with extensive Consistent Carbohydrate diet education yesterday to patient and parents (unable to write Chart Note due to [place] down). Reviewed carbohydrate content of foods, portion size and carb counting, dosing insulin (1 unit per 15-gmams of carb), balanced meals, small, frequent meals with a bedtime snack on Lantus, and pairing protein with carbohydrate. Reviewed the benefits of keeping a diary of food intake, fingersticks, and insulin doses, to learn daily patterns and predict when BG is likely to go low or high. Reviewed BG goal of 150mg/dL, with instructions to dose 1 unit of insulin for every 50mg/dL over 150mg/dl. Reviewed instructions for managing hypoglycemia.    Revisited patient this morning to answer any questions, and confirm understanding of blood glucose and dietary management. Pt was receptive and reports good understanding. Pt confirms her scheduled follow-up appointments.    Hospital Course: Pt S/P total pancreatectomy with islet cell autotransplantation with fevers of unknown origin and an increasing leukocytosis; S/P IR drainage of pelvic fluid collection 4/11 and 4/12.     Discharge diet: Consistent Carbohydrate diet with snack    Labs/Meds reviewed.     CAPILLARY BLOOD GLUCOSE  POCT Blood Glucose.: 427 mg/dL (18 Apr 2018 08:55)  POCT Blood Glucose.: 174 mg/dL (17 Apr 2018 21:24)  POCT Blood Glucose.: 172 mg/dL (17 Apr 2018 19:56)  POCT Blood Glucose.: 136 mg/dL (17 Apr 2018 15:30)    Recommend:  1) Continue Consistent Carbohydrate diet with snack; encourage intake of high protein, nutrient dense foods   2) Monitor weight, lab values, skin, nutrition provision and GI tolerance  3) Reinforce therapeutic diet education as able    Monitoring and Evaluation:   Follow up per protocol  RD to remain available for further nutritional interventions as indicated.   Tammy Tomlinson, MS RD [facility], #[phone].

## 2018-04-19 LAB
CULTURE RESULTS: SIGNIFICANT CHANGE UP
SPECIMEN SOURCE: SIGNIFICANT CHANGE UP

## 2018-04-24 ENCOUNTER — APPOINTMENT (OUTPATIENT)
Dept: SURGERY | Facility: CLINIC | Age: 43
End: 2018-04-24
Payer: MEDICARE

## 2018-04-24 VITALS
HEIGHT: 64 IN | HEART RATE: 110 BPM | BODY MASS INDEX: 17.07 KG/M2 | WEIGHT: 100 LBS | DIASTOLIC BLOOD PRESSURE: 82 MMHG | RESPIRATION RATE: 15 BRPM | OXYGEN SATURATION: 98 % | SYSTOLIC BLOOD PRESSURE: 144 MMHG

## 2018-04-24 DIAGNOSIS — R60.0 LOCALIZED EDEMA: ICD-10-CM

## 2018-04-24 PROCEDURE — 99024 POSTOP FOLLOW-UP VISIT: CPT

## 2018-05-08 ENCOUNTER — APPOINTMENT (OUTPATIENT)
Dept: SURGERY | Facility: CLINIC | Age: 43
End: 2018-05-08
Payer: MEDICARE

## 2018-05-08 ENCOUNTER — INPATIENT (INPATIENT)
Facility: HOSPITAL | Age: 43
LOS: 7 days | Discharge: HOME | DRG: 949 | End: 2018-05-16
Attending: STUDENT IN AN ORGANIZED HEALTH CARE EDUCATION/TRAINING PROGRAM | Admitting: SURGERY
Payer: MEDICARE

## 2018-05-08 VITALS
HEART RATE: 95 BPM | WEIGHT: 102 LBS | BODY MASS INDEX: 17.42 KG/M2 | SYSTOLIC BLOOD PRESSURE: 108 MMHG | DIASTOLIC BLOOD PRESSURE: 65 MMHG | OXYGEN SATURATION: 98 % | HEIGHT: 64 IN

## 2018-05-08 VITALS
TEMPERATURE: 99 F | DIASTOLIC BLOOD PRESSURE: 84 MMHG | HEART RATE: 94 BPM | SYSTOLIC BLOOD PRESSURE: 127 MMHG | OXYGEN SATURATION: 100 % | RESPIRATION RATE: 18 BRPM | HEIGHT: 64 IN | WEIGHT: 102.07 LBS

## 2018-05-08 DIAGNOSIS — Z90.49 ACQUIRED ABSENCE OF OTHER SPECIFIED PARTS OF DIGESTIVE TRACT: Chronic | ICD-10-CM

## 2018-05-08 DIAGNOSIS — Z90.410 ACQUIRED TOTAL ABSENCE OF PANCREAS: Chronic | ICD-10-CM

## 2018-05-08 DIAGNOSIS — K85.90 ACUTE PANCREATITIS WITHOUT NECROSIS OR INFECTION, UNSPECIFIED: Chronic | ICD-10-CM

## 2018-05-08 DIAGNOSIS — Z90.81 ACQUIRED ABSENCE OF SPLEEN: Chronic | ICD-10-CM

## 2018-05-08 DIAGNOSIS — Z94.83 PANCREAS TRANSPLANT STATUS: Chronic | ICD-10-CM

## 2018-05-08 DIAGNOSIS — T81.4XXD INFECTION FOLLOWING A PROCEDURE, SUBSEQUENT ENCOUNTER: ICD-10-CM

## 2018-05-08 LAB
ALBUMIN SERPL ELPH-MCNC: 3.2 G/DL — LOW (ref 3.3–5)
ALP SERPL-CCNC: 117 U/L — SIGNIFICANT CHANGE UP (ref 40–120)
ALT FLD-CCNC: 12 U/L — SIGNIFICANT CHANGE UP (ref 10–45)
ANION GAP SERPL CALC-SCNC: 12 MMOL/L — SIGNIFICANT CHANGE UP (ref 5–17)
ANION GAP SERPL CALC-SCNC: 15 MMOL/L — SIGNIFICANT CHANGE UP (ref 5–17)
APPEARANCE UR: CLEAR — SIGNIFICANT CHANGE UP
APTT BLD: 31.1 SEC — SIGNIFICANT CHANGE UP (ref 27.5–37.4)
APTT BLD: 33.3 SEC — SIGNIFICANT CHANGE UP (ref 27.5–37.4)
AST SERPL-CCNC: 22 U/L — SIGNIFICANT CHANGE UP (ref 10–40)
BASE EXCESS BLDV CALC-SCNC: -0.9 MMOL/L — SIGNIFICANT CHANGE UP (ref -2–2)
BASOPHILS # BLD AUTO: 0.1 K/UL — SIGNIFICANT CHANGE UP (ref 0–0.2)
BASOPHILS NFR BLD AUTO: 0.4 % — SIGNIFICANT CHANGE UP (ref 0–2)
BILIRUB SERPL-MCNC: 0.2 MG/DL — SIGNIFICANT CHANGE UP (ref 0.2–1.2)
BILIRUB UR-MCNC: NEGATIVE — SIGNIFICANT CHANGE UP
BLD GP AB SCN SERPL QL: NEGATIVE — SIGNIFICANT CHANGE UP
BUN SERPL-MCNC: 14 MG/DL — SIGNIFICANT CHANGE UP (ref 7–23)
BUN SERPL-MCNC: 8 MG/DL — SIGNIFICANT CHANGE UP (ref 7–23)
CA-I SERPL-SCNC: 1.25 MMOL/L — SIGNIFICANT CHANGE UP (ref 1.12–1.3)
CALCIUM SERPL-MCNC: 8.6 MG/DL — SIGNIFICANT CHANGE UP (ref 8.4–10.5)
CALCIUM SERPL-MCNC: 9.5 MG/DL — SIGNIFICANT CHANGE UP (ref 8.4–10.5)
CHLORIDE BLDV-SCNC: 103 MMOL/L — SIGNIFICANT CHANGE UP (ref 96–108)
CHLORIDE SERPL-SCNC: 101 MMOL/L — SIGNIFICANT CHANGE UP (ref 96–108)
CHLORIDE SERPL-SCNC: 99 MMOL/L — SIGNIFICANT CHANGE UP (ref 96–108)
CO2 BLDV-SCNC: 28 MMOL/L — SIGNIFICANT CHANGE UP (ref 22–30)
CO2 SERPL-SCNC: 22 MMOL/L — SIGNIFICANT CHANGE UP (ref 22–31)
CO2 SERPL-SCNC: 23 MMOL/L — SIGNIFICANT CHANGE UP (ref 22–31)
COLOR SPEC: SIGNIFICANT CHANGE UP
CREAT SERPL-MCNC: 0.76 MG/DL — SIGNIFICANT CHANGE UP (ref 0.5–1.3)
CREAT SERPL-MCNC: 0.91 MG/DL — SIGNIFICANT CHANGE UP (ref 0.5–1.3)
DIFF PNL FLD: NEGATIVE — SIGNIFICANT CHANGE UP
EOSINOPHIL # BLD AUTO: 0.1 K/UL — SIGNIFICANT CHANGE UP (ref 0–0.5)
EOSINOPHIL NFR BLD AUTO: 1.1 % — SIGNIFICANT CHANGE UP (ref 0–6)
GAS PNL BLDV: 134 MMOL/L — LOW (ref 136–145)
GAS PNL BLDV: SIGNIFICANT CHANGE UP
GLUCOSE BLDV-MCNC: 193 MG/DL — HIGH (ref 70–99)
GLUCOSE SERPL-MCNC: 204 MG/DL — HIGH (ref 70–99)
GLUCOSE SERPL-MCNC: 211 MG/DL — HIGH (ref 70–99)
GLUCOSE UR QL: NEGATIVE — SIGNIFICANT CHANGE UP
HCO3 BLDV-SCNC: 26 MMOL/L — SIGNIFICANT CHANGE UP (ref 21–29)
HCT VFR BLD CALC: 30.4 % — LOW (ref 34.5–45)
HCT VFR BLDA CALC: 34 % — LOW (ref 39–50)
HGB BLD CALC-MCNC: 10.9 G/DL — LOW (ref 11.5–15.5)
HGB BLD-MCNC: 9.7 G/DL — LOW (ref 11.5–15.5)
INR BLD: 1.26 RATIO — HIGH (ref 0.88–1.16)
INR BLD: 1.27 RATIO — HIGH (ref 0.88–1.16)
KETONES UR-MCNC: NEGATIVE — SIGNIFICANT CHANGE UP
LACTATE BLDV-MCNC: 2.5 MMOL/L — HIGH (ref 0.7–2)
LEUKOCYTE ESTERASE UR-ACNC: NEGATIVE — SIGNIFICANT CHANGE UP
LYMPHOCYTES # BLD AUTO: 1.9 K/UL — SIGNIFICANT CHANGE UP (ref 1–3.3)
LYMPHOCYTES # BLD AUTO: 13.7 % — SIGNIFICANT CHANGE UP (ref 13–44)
MAGNESIUM SERPL-MCNC: 1.4 MG/DL — LOW (ref 1.6–2.6)
MCHC RBC-ENTMCNC: 30.6 PG — SIGNIFICANT CHANGE UP (ref 27–34)
MCHC RBC-ENTMCNC: 32 GM/DL — SIGNIFICANT CHANGE UP (ref 32–36)
MCV RBC AUTO: 95.6 FL — SIGNIFICANT CHANGE UP (ref 80–100)
MONOCYTES # BLD AUTO: 0.9 K/UL — SIGNIFICANT CHANGE UP (ref 0–0.9)
MONOCYTES NFR BLD AUTO: 6.5 % — SIGNIFICANT CHANGE UP (ref 2–14)
NEUTROPHILS # BLD AUTO: 10.8 K/UL — HIGH (ref 1.8–7.4)
NEUTROPHILS NFR BLD AUTO: 78.4 % — HIGH (ref 43–77)
NITRITE UR-MCNC: NEGATIVE — SIGNIFICANT CHANGE UP
PCO2 BLDV: 57 MMHG — HIGH (ref 35–50)
PH BLDV: 7.28 — LOW (ref 7.35–7.45)
PH UR: 6 — SIGNIFICANT CHANGE UP (ref 5–8)
PHOSPHATE SERPL-MCNC: 3 MG/DL — SIGNIFICANT CHANGE UP (ref 2.5–4.5)
PLATELET # BLD AUTO: 955 K/UL — HIGH (ref 150–400)
PO2 BLDV: 21 MMHG — LOW (ref 25–45)
POTASSIUM BLDV-SCNC: 3.6 MMOL/L — SIGNIFICANT CHANGE UP (ref 3.5–5)
POTASSIUM SERPL-MCNC: 4.1 MMOL/L — SIGNIFICANT CHANGE UP (ref 3.5–5.3)
POTASSIUM SERPL-MCNC: 4.4 MMOL/L — SIGNIFICANT CHANGE UP (ref 3.5–5.3)
POTASSIUM SERPL-SCNC: 4.1 MMOL/L — SIGNIFICANT CHANGE UP (ref 3.5–5.3)
POTASSIUM SERPL-SCNC: 4.4 MMOL/L — SIGNIFICANT CHANGE UP (ref 3.5–5.3)
PROT SERPL-MCNC: 7.1 G/DL — SIGNIFICANT CHANGE UP (ref 6–8.3)
PROT UR-MCNC: NEGATIVE — SIGNIFICANT CHANGE UP
PROTHROM AB SERPL-ACNC: 13.8 SEC — HIGH (ref 9.8–12.7)
PROTHROM AB SERPL-ACNC: 13.9 SEC — HIGH (ref 9.8–12.7)
RBC # BLD: 3.18 M/UL — LOW (ref 3.8–5.2)
RBC # FLD: 15.6 % — HIGH (ref 10.3–14.5)
RH IG SCN BLD-IMP: POSITIVE — SIGNIFICANT CHANGE UP
SAO2 % BLDV: 17 % — LOW (ref 67–88)
SODIUM SERPL-SCNC: 136 MMOL/L — SIGNIFICANT CHANGE UP (ref 135–145)
SODIUM SERPL-SCNC: 136 MMOL/L — SIGNIFICANT CHANGE UP (ref 135–145)
SP GR SPEC: 1.01 — SIGNIFICANT CHANGE UP (ref 1.01–1.02)
UROBILINOGEN FLD QL: NEGATIVE — SIGNIFICANT CHANGE UP
WBC # BLD: 13.8 K/UL — HIGH (ref 3.8–10.5)
WBC # FLD AUTO: 13.8 K/UL — HIGH (ref 3.8–10.5)

## 2018-05-08 PROCEDURE — 99284 EMERGENCY DEPT VISIT MOD MDM: CPT

## 2018-05-08 PROCEDURE — 99223 1ST HOSP IP/OBS HIGH 75: CPT | Mod: 25

## 2018-05-08 PROCEDURE — 93970 EXTREMITY STUDY: CPT | Mod: 26

## 2018-05-08 PROCEDURE — 99024 POSTOP FOLLOW-UP VISIT: CPT | Mod: PD

## 2018-05-08 PROCEDURE — 74177 CT ABD & PELVIS W/CONTRAST: CPT | Mod: 26

## 2018-05-08 RX ORDER — SODIUM CHLORIDE 9 MG/ML
3 INJECTION INTRAMUSCULAR; INTRAVENOUS; SUBCUTANEOUS ONCE
Qty: 0 | Refills: 0 | Status: COMPLETED | OUTPATIENT
Start: 2018-05-08 | End: 2018-05-08

## 2018-05-08 RX ORDER — ONDANSETRON 8 MG/1
8 TABLET, FILM COATED ORAL EVERY 8 HOURS
Qty: 0 | Refills: 0 | Status: DISCONTINUED | OUTPATIENT
Start: 2018-05-08 | End: 2018-05-10

## 2018-05-08 RX ORDER — ACETAMINOPHEN 500 MG
650 TABLET ORAL EVERY 6 HOURS
Qty: 0 | Refills: 0 | Status: DISCONTINUED | OUTPATIENT
Start: 2018-05-08 | End: 2018-05-10

## 2018-05-08 RX ORDER — VANCOMYCIN HCL 1 G
1000 VIAL (EA) INTRAVENOUS EVERY 12 HOURS
Qty: 0 | Refills: 0 | Status: DISCONTINUED | OUTPATIENT
Start: 2018-05-09 | End: 2018-05-09

## 2018-05-08 RX ORDER — DOCUSATE SODIUM 100 MG
200 CAPSULE ORAL AT BEDTIME
Qty: 0 | Refills: 0 | Status: DISCONTINUED | OUTPATIENT
Start: 2018-05-08 | End: 2018-05-10

## 2018-05-08 RX ORDER — LIPASE/PROTEASE/AMYLASE 16-48-48K
1 CAPSULE,DELAYED RELEASE (ENTERIC COATED) ORAL
Qty: 0 | Refills: 0 | Status: DISCONTINUED | OUTPATIENT
Start: 2018-05-08 | End: 2018-05-10

## 2018-05-08 RX ORDER — HEPARIN SODIUM 5000 [USP'U]/ML
900 INJECTION INTRAVENOUS; SUBCUTANEOUS
Qty: 25000 | Refills: 0 | Status: DISCONTINUED | OUTPATIENT
Start: 2018-05-08 | End: 2018-05-09

## 2018-05-08 RX ORDER — MORPHINE SULFATE 50 MG/1
4 CAPSULE, EXTENDED RELEASE ORAL ONCE
Qty: 0 | Refills: 0 | Status: DISCONTINUED | OUTPATIENT
Start: 2018-05-08 | End: 2018-05-08

## 2018-05-08 RX ORDER — OXYCODONE HYDROCHLORIDE 5 MG/1
30 TABLET ORAL EVERY 8 HOURS
Qty: 0 | Refills: 0 | Status: DISCONTINUED | OUTPATIENT
Start: 2018-05-08 | End: 2018-05-10

## 2018-05-08 RX ORDER — ERYTHROMYCIN ETHYLSUCCINATE 400 MG
500 TABLET ORAL EVERY 6 HOURS
Qty: 0 | Refills: 0 | Status: DISCONTINUED | OUTPATIENT
Start: 2018-05-08 | End: 2018-05-10

## 2018-05-08 RX ORDER — SODIUM CHLORIDE 9 MG/ML
1000 INJECTION, SOLUTION INTRAVENOUS
Qty: 0 | Refills: 0 | Status: DISCONTINUED | OUTPATIENT
Start: 2018-05-08 | End: 2018-05-09

## 2018-05-08 RX ORDER — INSULIN LISPRO 100/ML
VIAL (ML) SUBCUTANEOUS
Qty: 0 | Refills: 0 | Status: DISCONTINUED | OUTPATIENT
Start: 2018-05-08 | End: 2018-05-09

## 2018-05-08 RX ORDER — MEGESTROL ACETATE 40 MG/ML
800 SUSPENSION ORAL DAILY
Qty: 0 | Refills: 0 | Status: DISCONTINUED | OUTPATIENT
Start: 2018-05-08 | End: 2018-05-10

## 2018-05-08 RX ORDER — VANCOMYCIN HCL 1 G
1000 VIAL (EA) INTRAVENOUS ONCE
Qty: 0 | Refills: 0 | Status: COMPLETED | OUTPATIENT
Start: 2018-05-08 | End: 2018-05-08

## 2018-05-08 RX ORDER — PIPERACILLIN AND TAZOBACTAM 4; .5 G/20ML; G/20ML
3.38 INJECTION, POWDER, LYOPHILIZED, FOR SOLUTION INTRAVENOUS ONCE
Qty: 0 | Refills: 0 | Status: COMPLETED | OUTPATIENT
Start: 2018-05-08 | End: 2018-05-08

## 2018-05-08 RX ORDER — OXYCODONE HYDROCHLORIDE 5 MG/1
10 TABLET ORAL ONCE
Qty: 0 | Refills: 0 | Status: DISCONTINUED | OUTPATIENT
Start: 2018-05-08 | End: 2018-05-08

## 2018-05-08 RX ORDER — PIPERACILLIN AND TAZOBACTAM 4; .5 G/20ML; G/20ML
3.38 INJECTION, POWDER, LYOPHILIZED, FOR SOLUTION INTRAVENOUS EVERY 8 HOURS
Qty: 0 | Refills: 0 | Status: DISCONTINUED | OUTPATIENT
Start: 2018-05-08 | End: 2018-05-10

## 2018-05-08 RX ORDER — OXYCODONE HYDROCHLORIDE 5 MG/1
10 TABLET ORAL
Qty: 0 | Refills: 0 | Status: DISCONTINUED | OUTPATIENT
Start: 2018-05-08 | End: 2018-05-10

## 2018-05-08 RX ADMIN — PIPERACILLIN AND TAZOBACTAM 200 GRAM(S): 4; .5 INJECTION, POWDER, LYOPHILIZED, FOR SOLUTION INTRAVENOUS at 16:29

## 2018-05-08 RX ADMIN — SODIUM CHLORIDE 3 MILLILITER(S): 9 INJECTION INTRAMUSCULAR; INTRAVENOUS; SUBCUTANEOUS at 15:03

## 2018-05-08 RX ADMIN — MORPHINE SULFATE 4 MILLIGRAM(S): 50 CAPSULE, EXTENDED RELEASE ORAL at 18:05

## 2018-05-08 RX ADMIN — OXYCODONE HYDROCHLORIDE 10 MILLIGRAM(S): 5 TABLET ORAL at 22:09

## 2018-05-08 RX ADMIN — OXYCODONE HYDROCHLORIDE 30 MILLIGRAM(S): 5 TABLET ORAL at 20:50

## 2018-05-08 RX ADMIN — Medication 250 MILLIGRAM(S): at 17:51

## 2018-05-08 RX ADMIN — HEPARIN SODIUM 9 UNIT(S)/HR: 5000 INJECTION INTRAVENOUS; SUBCUTANEOUS at 19:04

## 2018-05-08 RX ADMIN — MORPHINE SULFATE 4 MILLIGRAM(S): 50 CAPSULE, EXTENDED RELEASE ORAL at 15:43

## 2018-05-08 RX ADMIN — PIPERACILLIN AND TAZOBACTAM 25 GRAM(S): 4; .5 INJECTION, POWDER, LYOPHILIZED, FOR SOLUTION INTRAVENOUS at 21:40

## 2018-05-08 RX ADMIN — OXYCODONE HYDROCHLORIDE 10 MILLIGRAM(S): 5 TABLET ORAL at 21:39

## 2018-05-08 RX ADMIN — Medication 1 CAPSULE(S): at 23:01

## 2018-05-08 RX ADMIN — OXYCODONE HYDROCHLORIDE 10 MILLIGRAM(S): 5 TABLET ORAL at 18:20

## 2018-05-08 RX ADMIN — Medication 4: at 23:00

## 2018-05-08 RX ADMIN — OXYCODONE HYDROCHLORIDE 10 MILLIGRAM(S): 5 TABLET ORAL at 17:50

## 2018-05-08 NOTE — H&P ADULT - HISTORY OF PRESENT ILLNESS
42-year-old female with history of chronic pancreatitis since childhood s/p [place] procedure in 2014, 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 During her last admission (4/5-4/16/18) after her islet cell transplant, patient developed LUQ abdominal collections found to be tracking to her midline. She underwent IR drainage and was discharged with drains that were removed two weeks ago. Drain cultures positive for Candida and VRE. She was discharged with a PICC and remained on antifungals.   Was seen at Dr. Walker’s on day of admission and found to have copious drainage from her midline wound that has been present and clear in color since her discharge, but has recently turned cloudy and foul-smelling two days ago. Denies fevers/chills, nausea/vomiting. Reports having diffuse abdominal pain.

## 2018-05-08 NOTE — ED PROVIDER NOTE - PSH
H/O splenectomy    History of pancreatectomy    Pancreatitis  h/o [place] procedure 2014  S/P cholecystectomy  in early 20's

## 2018-05-08 NOTE — H&P ADULT - PSH
H/O splenectomy    History of pancreatectomy    S/P cholecystectomy  in early 20's  Status post pancreatic islet cell transplantation

## 2018-05-08 NOTE — ED ADULT NURSE NOTE - PSH
H/O splenectomy    History of pancreatectomy    Pancreatitis  h/o [place] procedure 2014  S/P cholecystectomy  in early 20's H/O splenectomy    History of pancreatectomy    Pancreatitis  h/o [place] procedure 2014  S/P cholecystectomy  in early 20's

## 2018-05-08 NOTE — H&P ADULT - ASSESSMENT
Assessment/Plan: 42y Female s/p auto islet cell transplantation presenting with a draining midline abdominal wound. Previous intraabdominal collections within operative bed appear to be resolving. Has a small fluid/air collection within left anterior abdominal wall. Also has a right atrial filling defect new from previous scan.    - Admit to [facility]  - IR consult for drainage of abdominal wall collection  - ECHO to evaluate right atrial filling defect  - Broad spectrum antibiotics    Discussed with Dr. Walker

## 2018-05-08 NOTE — ED PROVIDER NOTE - MEDICAL DECISION MAKING DETAILS
42 female s/p pancreactomy by Dr. Walker, for chronic pancreatitis on 4/5, complicated with abscess, IR drainage on 4/11. came back today with increased abdominal pain, foul smelling drainage. Surgery at bedside already, ? another abscess, request Ct abd/pel, vanco/zosyn, re-evaluation. ZR

## 2018-05-08 NOTE — ED ADULT NURSE NOTE - OBJECTIVE STATEMENT
Pt is a 41 yo F who came to the ED amb c/o abd wound infection. States she is s/p surgery 0n 4/5/18 for pancreatectomy, splenectomy and islet cell trransfer, drains removed 2 weeks ago, now has foul smelling green drainage x 2 days. Denies fever/chills. A/O x3, NAD, c/o umbilical tenderness.

## 2018-05-08 NOTE — H&P ADULT - NSHPLABSRESULTS_GEN_ALL_CORE
CBC Full  -  ( 08 May 2018 14:45 )  WBC Count : 13.8 K/uL  Hemoglobin : 9.7 g/dL  Hematocrit : 30.4 %  Platelet Count - Automated : 955 K/uL  Mean Cell Volume : 95.6 fl  Mean Cell Hemoglobin : 30.6 pg  Mean Cell Hemoglobin Concentration : 32.0 gm/dL  Auto Neutrophil # : 10.8 K/uL  Auto Lymphocyte # : 1.9 K/uL  Auto Monocyte # : 0.9 K/uL  Auto Eosinophil # : 0.1 K/uL  Auto Basophil # : 0.1 K/uL  Auto Neutrophil % : 78.4 %  Auto Lymphocyte % : 13.7 %  Auto Monocyte % : 6.5 %  Auto Eosinophil % : 1.1 %  Auto Basophil % : 0.4 %        136  |  99  |  14  ----------------------------<  204<H>  4.4   |  22  |  0.91    Ca    9.5      08 May 2018 14:45    TPro  7.1  /  Alb  3.2<L>  /  TBili  0.2  /  DBili  x   /  AST  22  /  ALT  12  /  AlkPhos  117  08    LIVER FUNCTIONS - ( 08 May 2018 14:45 )  Alb: 3.2 g/dL / Pro: 7.1 g/dL / ALK PHOS: 117 U/L / ALT: 12 U/L / AST: 22 U/L / GGT: x           PT/INR - ( 08 May 2018 14:45 )   PT: 13.9 sec;   INR: 1.27 ratio         PTT - ( 08 May 2018 14:45 )  PTT:31.1 sec  Urinalysis Basic - ( 08 May 2018 14:45 )    Color: PL Yellow / Appearance: Clear / S.010 / pH: x  Gluc: x / Ketone: Negative  / Bili: Negative / Urobili: Negative   Blood: x / Protein: Negative / Nitrite: Negative   Leuk Esterase: Negative / RBC: x / WBC x   Sq Epi: x / Non Sq Epi: x / Bacteria: x      RADIOLOGY:  CT Abdomen and Pelvis w/ Oral Cont and w/ IV Cont (18 @ 16:02)     A 3.8 x 2.7 cm collection of fluid and air within the left anterior   abdominal wall. Small adjacent hernia containing nonobstructed small   bowel.    Previously noted collections within the pancreatic operative bed have   near completely resolved with only a small collection of air remaining.    Filling defect within the right atrium concerning for clot.   Echocardiogram is recommended for further evaluation.    Nonspecific peripheral opacities in the right lower lobe.    Small focus of infection in the right kidney versus infarct.

## 2018-05-08 NOTE — ED ADULT NURSE REASSESSMENT NOTE - NS ED NURSE REASSESS COMMENT FT1
Pt report received from STACIE Mendoza. Pt a&o x3 resting on stretcher, vital signs stable, c/o 9/10 abd pain at surgical incision site. Medicated as per order. Pt completed PO CT contrast. Pt to and from CT via transport stretcher. Pt returned in no distress, resting on stretcher. IV ABX admin as per order. Awaiting CT results.

## 2018-05-08 NOTE — ED PROVIDER NOTE - OBJECTIVE STATEMENT
42 female presents with c/o abdominal pain, surgical site infection. pt underwent a pancreactomy with islet cell transfer on 4/5, then IR abscess drainage 4/11. pt states for the past 2 days with increased foul-smelling drainage. pt denies fever, chills, N/V/D, urinary symptoms.

## 2018-05-08 NOTE — H&P ADULT - NSHPREVIEWOFSYSTEMS_GEN_ALL_CORE
Constitutional: No fevers, chills, no recent weight loss  ENMT: No changes in hearing, no changes in vision, no sore throat, no cough  Respiratory: No shortness of breath  Cardiovascular: No chest pain, palpitations  Gastrointestinal: Diffuse abdominal pain  Genitourinary: No dysuria, frequency, or urgency    Extremities: No joint swelling, no limited range of movement  Neurological: No paresthesia  Skin: No rashes

## 2018-05-09 LAB
APTT BLD: 103.4 SEC — HIGH (ref 27.5–37.4)
APTT BLD: 33.6 SEC — SIGNIFICANT CHANGE UP (ref 27.5–37.4)
APTT BLD: 40.5 SEC — HIGH (ref 27.5–37.4)
CULTURE RESULTS: SIGNIFICANT CHANGE UP
GRAM STN FLD: SIGNIFICANT CHANGE UP
HCG UR QL: NEGATIVE — SIGNIFICANT CHANGE UP
HCT VFR BLD CALC: 26.7 % — LOW (ref 34.5–45)
HCT VFR BLD CALC: 27.4 % — LOW (ref 34.5–45)
HGB BLD-MCNC: 8.6 G/DL — LOW (ref 11.5–15.5)
HGB BLD-MCNC: 8.6 G/DL — LOW (ref 11.5–15.5)
MCHC RBC-ENTMCNC: 29.9 PG — SIGNIFICANT CHANGE UP (ref 27–34)
MCHC RBC-ENTMCNC: 30.3 PG — SIGNIFICANT CHANGE UP (ref 27–34)
MCHC RBC-ENTMCNC: 31.5 GM/DL — LOW (ref 32–36)
MCHC RBC-ENTMCNC: 32 GM/DL — SIGNIFICANT CHANGE UP (ref 32–36)
MCV RBC AUTO: 94.8 FL — SIGNIFICANT CHANGE UP (ref 80–100)
MCV RBC AUTO: 94.9 FL — SIGNIFICANT CHANGE UP (ref 80–100)
ORGANISM # SPEC MICROSCOPIC CNT: SIGNIFICANT CHANGE UP
PLATELET # BLD AUTO: 864 K/UL — HIGH (ref 150–400)
PLATELET # BLD AUTO: 871 K/UL — HIGH (ref 150–400)
RBC # BLD: 2.82 M/UL — LOW (ref 3.8–5.2)
RBC # BLD: 2.88 M/UL — LOW (ref 3.8–5.2)
RBC # FLD: 15.3 % — HIGH (ref 10.3–14.5)
RBC # FLD: 15.5 % — HIGH (ref 10.3–14.5)
SPECIMEN SOURCE: SIGNIFICANT CHANGE UP
SPECIMEN SOURCE: SIGNIFICANT CHANGE UP
WBC # BLD: 10.2 K/UL — SIGNIFICANT CHANGE UP (ref 3.8–10.5)
WBC # BLD: 11.5 K/UL — HIGH (ref 3.8–10.5)
WBC # FLD AUTO: 10.2 K/UL — SIGNIFICANT CHANGE UP (ref 3.8–10.5)
WBC # FLD AUTO: 11.5 K/UL — HIGH (ref 3.8–10.5)

## 2018-05-09 PROCEDURE — 93306 TTE W/DOPPLER COMPLETE: CPT | Mod: 26

## 2018-05-09 PROCEDURE — 10030 IMG GID FLU COLL DRG SFT TIS: CPT

## 2018-05-09 RX ORDER — DEXTROSE 50 % IN WATER 50 %
15 SYRINGE (ML) INTRAVENOUS ONCE
Qty: 0 | Refills: 0 | Status: DISCONTINUED | OUTPATIENT
Start: 2018-05-09 | End: 2018-05-10

## 2018-05-09 RX ORDER — INSULIN GLARGINE 100 [IU]/ML
4 INJECTION, SOLUTION SUBCUTANEOUS AT BEDTIME
Qty: 0 | Refills: 0 | Status: DISCONTINUED | OUTPATIENT
Start: 2018-05-09 | End: 2018-05-09

## 2018-05-09 RX ORDER — DEXTROSE 50 % IN WATER 50 %
25 SYRINGE (ML) INTRAVENOUS ONCE
Qty: 0 | Refills: 0 | Status: DISCONTINUED | OUTPATIENT
Start: 2018-05-09 | End: 2018-05-10

## 2018-05-09 RX ORDER — SODIUM CHLORIDE 9 MG/ML
1000 INJECTION, SOLUTION INTRAVENOUS
Qty: 0 | Refills: 0 | Status: DISCONTINUED | OUTPATIENT
Start: 2018-05-09 | End: 2018-05-10

## 2018-05-09 RX ORDER — MAGNESIUM SULFATE 500 MG/ML
2 VIAL (ML) INJECTION ONCE
Qty: 0 | Refills: 0 | Status: COMPLETED | OUTPATIENT
Start: 2018-05-09 | End: 2018-05-09

## 2018-05-09 RX ORDER — INSULIN LISPRO 100/ML
VIAL (ML) SUBCUTANEOUS EVERY 6 HOURS
Qty: 0 | Refills: 0 | Status: DISCONTINUED | OUTPATIENT
Start: 2018-05-09 | End: 2018-05-09

## 2018-05-09 RX ORDER — INSULIN LISPRO 100/ML
VIAL (ML) SUBCUTANEOUS
Qty: 0 | Refills: 0 | Status: DISCONTINUED | OUTPATIENT
Start: 2018-05-09 | End: 2018-05-09

## 2018-05-09 RX ORDER — HEPARIN SODIUM 5000 [USP'U]/ML
1200 INJECTION INTRAVENOUS; SUBCUTANEOUS
Qty: 25000 | Refills: 0 | Status: DISCONTINUED | OUTPATIENT
Start: 2018-05-09 | End: 2018-05-11

## 2018-05-09 RX ORDER — INSULIN LISPRO 100/ML
VIAL (ML) SUBCUTANEOUS
Qty: 0 | Refills: 0 | Status: DISCONTINUED | OUTPATIENT
Start: 2018-05-09 | End: 2018-05-10

## 2018-05-09 RX ORDER — GLUCAGON INJECTION, SOLUTION 0.5 MG/.1ML
1 INJECTION, SOLUTION SUBCUTANEOUS ONCE
Qty: 0 | Refills: 0 | Status: DISCONTINUED | OUTPATIENT
Start: 2018-05-09 | End: 2018-05-10

## 2018-05-09 RX ORDER — DEXTROSE 50 % IN WATER 50 %
15 SYRINGE (ML) INTRAVENOUS ONCE
Qty: 0 | Refills: 0 | Status: DISCONTINUED | OUTPATIENT
Start: 2018-05-09 | End: 2018-05-09

## 2018-05-09 RX ORDER — SODIUM CHLORIDE 9 MG/ML
1000 INJECTION, SOLUTION INTRAVENOUS
Qty: 0 | Refills: 0 | Status: DISCONTINUED | OUTPATIENT
Start: 2018-05-09 | End: 2018-05-16

## 2018-05-09 RX ORDER — VANCOMYCIN HCL 1 G
1000 VIAL (EA) INTRAVENOUS EVERY 12 HOURS
Qty: 0 | Refills: 0 | Status: DISCONTINUED | OUTPATIENT
Start: 2018-05-09 | End: 2018-05-10

## 2018-05-09 RX ORDER — DEXTROSE 50 % IN WATER 50 %
25 SYRINGE (ML) INTRAVENOUS ONCE
Qty: 0 | Refills: 0 | Status: DISCONTINUED | OUTPATIENT
Start: 2018-05-09 | End: 2018-05-09

## 2018-05-09 RX ORDER — DEXTROSE 50 % IN WATER 50 %
12.5 SYRINGE (ML) INTRAVENOUS ONCE
Qty: 0 | Refills: 0 | Status: DISCONTINUED | OUTPATIENT
Start: 2018-05-09 | End: 2018-05-10

## 2018-05-09 RX ORDER — HEPARIN SODIUM 5000 [USP'U]/ML
1300 INJECTION INTRAVENOUS; SUBCUTANEOUS
Qty: 25000 | Refills: 0 | Status: DISCONTINUED | OUTPATIENT
Start: 2018-05-09 | End: 2018-05-09

## 2018-05-09 RX ORDER — INSULIN LISPRO 100/ML
VIAL (ML) SUBCUTANEOUS AT BEDTIME
Qty: 0 | Refills: 0 | Status: DISCONTINUED | OUTPATIENT
Start: 2018-05-09 | End: 2018-05-09

## 2018-05-09 RX ORDER — DEXTROSE 50 % IN WATER 50 %
12.5 SYRINGE (ML) INTRAVENOUS ONCE
Qty: 0 | Refills: 0 | Status: DISCONTINUED | OUTPATIENT
Start: 2018-05-09 | End: 2018-05-09

## 2018-05-09 RX ORDER — INSULIN LISPRO 100/ML
VIAL (ML) SUBCUTANEOUS AT BEDTIME
Qty: 0 | Refills: 0 | Status: DISCONTINUED | OUTPATIENT
Start: 2018-05-09 | End: 2018-05-10

## 2018-05-09 RX ORDER — SODIUM CHLORIDE 9 MG/ML
1000 INJECTION, SOLUTION INTRAVENOUS
Qty: 0 | Refills: 0 | Status: DISCONTINUED | OUTPATIENT
Start: 2018-05-09 | End: 2018-05-09

## 2018-05-09 RX ORDER — GLUCAGON INJECTION, SOLUTION 0.5 MG/.1ML
1 INJECTION, SOLUTION SUBCUTANEOUS ONCE
Qty: 0 | Refills: 0 | Status: DISCONTINUED | OUTPATIENT
Start: 2018-05-09 | End: 2018-05-09

## 2018-05-09 RX ORDER — ACETAMINOPHEN 500 MG
1000 TABLET ORAL ONCE
Qty: 0 | Refills: 0 | Status: COMPLETED | OUTPATIENT
Start: 2018-05-09 | End: 2018-05-09

## 2018-05-09 RX ADMIN — OXYCODONE HYDROCHLORIDE 10 MILLIGRAM(S): 5 TABLET ORAL at 21:02

## 2018-05-09 RX ADMIN — PIPERACILLIN AND TAZOBACTAM 25 GRAM(S): 4; .5 INJECTION, POWDER, LYOPHILIZED, FOR SOLUTION INTRAVENOUS at 19:54

## 2018-05-09 RX ADMIN — Medication 500 MILLIGRAM(S): at 05:59

## 2018-05-09 RX ADMIN — Medication 2: at 06:04

## 2018-05-09 RX ADMIN — Medication 500 MILLIGRAM(S): at 00:07

## 2018-05-09 RX ADMIN — Medication 250 MILLIGRAM(S): at 05:59

## 2018-05-09 RX ADMIN — PIPERACILLIN AND TAZOBACTAM 25 GRAM(S): 4; .5 INJECTION, POWDER, LYOPHILIZED, FOR SOLUTION INTRAVENOUS at 11:19

## 2018-05-09 RX ADMIN — OXYCODONE HYDROCHLORIDE 10 MILLIGRAM(S): 5 TABLET ORAL at 18:30

## 2018-05-09 RX ADMIN — OXYCODONE HYDROCHLORIDE 30 MILLIGRAM(S): 5 TABLET ORAL at 05:59

## 2018-05-09 RX ADMIN — OXYCODONE HYDROCHLORIDE 10 MILLIGRAM(S): 5 TABLET ORAL at 05:27

## 2018-05-09 RX ADMIN — Medication 500 MILLIGRAM(S): at 18:18

## 2018-05-09 RX ADMIN — Medication 1000 MILLIGRAM(S): at 23:45

## 2018-05-09 RX ADMIN — HEPARIN SODIUM 12 UNIT(S)/HR: 5000 INJECTION INTRAVENOUS; SUBCUTANEOUS at 18:14

## 2018-05-09 RX ADMIN — OXYCODONE HYDROCHLORIDE 10 MILLIGRAM(S): 5 TABLET ORAL at 11:20

## 2018-05-09 RX ADMIN — ONDANSETRON 8 MILLIGRAM(S): 8 TABLET, FILM COATED ORAL at 22:30

## 2018-05-09 RX ADMIN — Medication 250 MILLIGRAM(S): at 18:08

## 2018-05-09 RX ADMIN — Medication 50 GRAM(S): at 15:35

## 2018-05-09 RX ADMIN — HEPARIN SODIUM 13 UNIT(S)/HR: 5000 INJECTION INTRAVENOUS; SUBCUTANEOUS at 10:34

## 2018-05-09 RX ADMIN — OXYCODONE HYDROCHLORIDE 10 MILLIGRAM(S): 5 TABLET ORAL at 04:57

## 2018-05-09 RX ADMIN — Medication 400 MILLIGRAM(S): at 23:12

## 2018-05-09 RX ADMIN — OXYCODONE HYDROCHLORIDE 10 MILLIGRAM(S): 5 TABLET ORAL at 01:12

## 2018-05-09 RX ADMIN — OXYCODONE HYDROCHLORIDE 30 MILLIGRAM(S): 5 TABLET ORAL at 15:30

## 2018-05-09 RX ADMIN — Medication 1: at 22:56

## 2018-05-09 RX ADMIN — HEPARIN SODIUM 11 UNIT(S)/HR: 5000 INJECTION INTRAVENOUS; SUBCUTANEOUS at 02:17

## 2018-05-09 RX ADMIN — ONDANSETRON 8 MILLIGRAM(S): 8 TABLET, FILM COATED ORAL at 15:23

## 2018-05-09 RX ADMIN — Medication 100 MILLIGRAM(S): at 22:31

## 2018-05-09 RX ADMIN — Medication 10: at 19:46

## 2018-05-09 RX ADMIN — OXYCODONE HYDROCHLORIDE 30 MILLIGRAM(S): 5 TABLET ORAL at 22:30

## 2018-05-09 RX ADMIN — OXYCODONE HYDROCHLORIDE 10 MILLIGRAM(S): 5 TABLET ORAL at 01:42

## 2018-05-09 RX ADMIN — OXYCODONE HYDROCHLORIDE 30 MILLIGRAM(S): 5 TABLET ORAL at 23:00

## 2018-05-09 RX ADMIN — OXYCODONE HYDROCHLORIDE 10 MILLIGRAM(S): 5 TABLET ORAL at 17:54

## 2018-05-09 RX ADMIN — Medication 2: at 12:54

## 2018-05-09 RX ADMIN — OXYCODONE HYDROCHLORIDE 10 MILLIGRAM(S): 5 TABLET ORAL at 21:32

## 2018-05-09 RX ADMIN — Medication 1 CAPSULE(S): at 19:44

## 2018-05-09 NOTE — PROGRESS NOTE ADULT - ASSESSMENT
Ms. Moreno is a 42-year-old patient who presented with abdominal wall abscess, HD2. She  is hemodynamically stable. Labs are significant for hypomagnesia.     - Scheduled for IR drainage of abdominal wall abscess  - Transthoracic cchocardiogram to r/o right atrium thrombus  - DVT ppx: on heparin gtt @ 13 units/hour  - Diet: on regular, has been NPO since midnight for IT procedure  - ABx regimen: vanc, erythromycin, Zosyn  - Pain control: Oxycodone, Acetaminophen   - Glycemic control  - Monitor GI function  - Replete electrolyte as necessary  - Activity: OOb as tolerated

## 2018-05-09 NOTE — PROGRESS NOTE ADULT - SUBJECTIVE AND OBJECTIVE BOX
42-year-old female with history of chronic pancreatitis s/p [place] procedure in 2014, now s/p total pancreatectomy with islet cell autotransplant on 4/5/18 c/b  LUQ abdominal collections s/p drainage on 4/13 s/p removal admitted with drainage from midline wound with CT abdomen showing a 3.8 x 2.7 cm collection of fluid and air in the left anterior abdomen presented to IR for drainage of fluid collection. Heprin drip on hold since 8:15AM.      Allergies: No Known Allergies      PAST MEDICAL & SURGICAL HISTORY:  Premenstrual migraine  Other chronic pancreatitis  Status post pancreatic islet cell transplantation  H/O splenectomy  History of pancreatectomy  S/P cholecystectomy: in early 20&#x27;s        Pertinent labs:                      8.6    10.2  )-----------( 871      ( 09 May 2018 08:25 )             27.4   05-08    136  |  101  |  8   ----------------------------<  211<H>  4.1   |  23  |  0.76    Ca    8.6      08 May 2018 23:03  Phos  3.0     05-08  Mg     1.4     05-08    TPro  7.1  /  Alb  3.2<L>  /  TBili  0.2  /  DBili  x   /  AST  22  /  ALT  12  /  AlkPhos  117  05-08  PT/INR - ( 08 May 2018 23:03 )   PT: 13.8 sec;   INR: 1.26 ratio         PTT - ( 09 May 2018 01:35 )  PTT:33.6 sec    Consent: Procedure/risks/ Benefits explained. Informed consent obtained. Pt verbalizes understanding.

## 2018-05-09 NOTE — PROGRESS NOTE ADULT - SUBJECTIVE AND OBJECTIVE BOX
Interventional Radiology Brief- Operative Note    Procedure: Drainage of left abdominal wall fluid collection    Operators: Charity    Anesthesia (type): lidocaine local    Contrast: none    EBL: none    Findings/Follow up Plan of Care: Drainage of left abdominal wall collection performed and 8 Fr drain placed. Appx 8cc green viscous cloudy fluid aspirated. Drain to DANIEL. Pt tolerated procedure without difficulty Full report to follow.    Specimens Removed: sample collected for microbiology    Implants: 8 Fr drain    Complications: none    Condition/Disposition: stable / room    Please call Interventional Radiology x [phone] with any questions, concerns, or issues.

## 2018-05-09 NOTE — PROGRESS NOTE ADULT - SUBJECTIVE AND OBJECTIVE BOX
GENERAL SURGERY DAILY PROGRESS NOTE:     Subjective: Patient seen and examined. Patient stable with no overnight events. Patient denies CP/ SOB/ Palpatations. Patient denies N/V. Reports No flatus and No BM.     MEDICATIONS  (STANDING):  amylase/lipase/protease  (CREON  6,000 Units) 1 Capsule(s) Oral three times a day with meals  dextrose 5%. 1000 milliLiter(s) (50 mL/Hr) IV Continuous <Continuous>  dextrose 50% Injectable 12.5 Gram(s) IV Push once  dextrose 50% Injectable 25 Gram(s) IV Push once  dextrose 50% Injectable 25 Gram(s) IV Push once  erythromycin     base Tablet 500 milliGRAM(s) Oral every 6 hours  heparin  Infusion 1300 Unit(s)/Hr (13 mL/Hr) IV Continuous <Continuous>  insulin lispro (HumaLOG) corrective regimen sliding scale   SubCutaneous three times a day before meals  megestrol Suspension 800 milliGRAM(s) Oral daily  ondansetron   Disintegrating Tablet 8 milliGRAM(s) Oral every 8 hours  oxyCODONE  ER Tablet 30 milliGRAM(s) Oral every 8 hours  piperacillin/tazobactam IVPB. 3.375 Gram(s) IV Intermittent every 8 hours  vancomycin  IVPB 1000 milliGRAM(s) IV Intermittent every 12 hours    MEDICATIONS  (PRN):  acetaminophen   Tablet 650 milliGRAM(s) Oral every 6 hours PRN mild pain  dextrose 40% Gel 15 Gram(s) Oral once PRN Blood Glucose LESS THAN 70 milliGRAM(s)/deciliter  docusate sodium Liquid 200 milliGRAM(s) Oral at bedtime PRN Constipation  glucagon  Injectable 1 milliGRAM(s) IntraMuscular once PRN Glucose LESS THAN 70 milligrams/deciliter  oxyCODONE    IR 10 milliGRAM(s) Oral every 3 hours PRN Moderate Pain (4 - 6)      Vital Signs Last 24 Hrs  T(C): 36.8 (09 May 2018 14:53), Max: 37 (09 May 2018 06:02)  T(F): 98.2 (09 May 2018 14:53), Max: 98.6 (09 May 2018 06:02)  HR: 96 (09 May 2018 14:53) (85 - 108)  BP: 117/72 (09 May 2018 14:53) (108/66 - 138/86)  BP(mean): --  RR: 18 (09 May 2018 14:53) (18 - 18)  SpO2: 98% (09 May 2018 14:53) (97% - 100%)    I&O's Detail    08 May 2018 07:01  -  09 May 2018 07:00  --------------------------------------------------------  IN:    heparin Infusion: 100 mL    IV PiggyBack: 350 mL    lactated ringers.: 750 mL    Oral Fluid: 240 mL  Total IN: 1440 mL    OUT:    Voided: 750 mL  Total OUT: 750 mL    Total NET: 690 mL      09 May 2018 07:01  -  09 May 2018 17:11  --------------------------------------------------------  IN:    Oral Fluid: 240 mL  Total IN: 240 mL    OUT:    Bulb: 15 mL    Voided: 800 mL  Total OUT: 815 mL    Total NET: -575 mL          Daily     Daily Weight in k.2 (09 May 2018 12:10)    LABS:                        8.6    10.2  )-----------( 871      ( 09 May 2018 08:25 )             27.4     05-08    136  |  101  |  8   ----------------------------<  211<H>  4.1   |  23  |  0.76    Ca    8.6      08 May 2018 23:03  Phos  3.0     05-08  Mg     1.4     05-08    TPro  7.1  /  Alb  3.2<L>  /  TBili  0.2  /  DBili  x   /  AST  22  /  ALT  12  /  AlkPhos  117  05-08    PT/INR - ( 08 May 2018 23:03 )   PT: 13.8 sec;   INR: 1.26 ratio         PTT - ( 09 May 2018 14:10 )  PTT:40.5 sec  Urinalysis Basic - ( 08 May 2018 14:45 )    Color: PL Yellow / Appearance: Clear / S.010 / pH: x  Gluc: x / Ketone: Negative  / Bili: Negative / Urobili: Negative   Blood: x / Protein: Negative / Nitrite: Negative   Leuk Esterase: Negative / RBC: x / WBC x   Sq Epi: x / Non Sq Epi: x / Bacteria: x    Physical Exam:   gen: NAD, AAOx3  Abd: soft, NT, ND  clean/dry/ intact   PTC drain: bilious     Assessment:    42y Female who presents with Infection following procedure, subsequent encounter    Plan:  -DVT PPX  -Pain control   -OOB as tolerated with assistance   -diet as tolerated  -switch TJosé Luis Martinesx to hep gtt   -Dispo Planning     Cristela Ambriz   #[id number] 18 @ 17:11

## 2018-05-09 NOTE — PROGRESS NOTE ADULT - SUBJECTIVE AND OBJECTIVE BOX
GENERAL SURGERY POST-OP NOTE:     Status post: IR abdominal abscess     Patient seen and examined. Patient stable. Patient reports pain s/p IR procedure. Patient started on hep gtt immediately s/p IR procedure As per Dr. Walker. restarted based on weight and PTT results which according to pull anticoagulation protocol calls for increasing by 2. Prior to IR dose was at 11 /hr. Patient was increased to 13/hr. Will follow up PTT/PT/INR at 530 pm.     MEDICATIONS  (STANDING):  amylase/lipase/protease  (CREON  6,000 Units) 1 Capsule(s) Oral three times a day with meals  dextrose 5%. 1000 milliLiter(s) (50 mL/Hr) IV Continuous <Continuous>  dextrose 50% Injectable 12.5 Gram(s) IV Push once  dextrose 50% Injectable 25 Gram(s) IV Push once  dextrose 50% Injectable 25 Gram(s) IV Push once  erythromycin     base Tablet 500 milliGRAM(s) Oral every 6 hours  heparin  Infusion 1300 Unit(s)/Hr (13 mL/Hr) IV Continuous <Continuous>  insulin lispro (HumaLOG) corrective regimen sliding scale   SubCutaneous three times a day before meals  megestrol Suspension 800 milliGRAM(s) Oral daily  ondansetron   Disintegrating Tablet 8 milliGRAM(s) Oral every 8 hours  oxyCODONE  ER Tablet 30 milliGRAM(s) Oral every 8 hours  piperacillin/tazobactam IVPB. 3.375 Gram(s) IV Intermittent every 8 hours  vancomycin  IVPB 1000 milliGRAM(s) IV Intermittent every 12 hours    MEDICATIONS  (PRN):  acetaminophen   Tablet 650 milliGRAM(s) Oral every 6 hours PRN mild pain  dextrose 40% Gel 15 Gram(s) Oral once PRN Blood Glucose LESS THAN 70 milliGRAM(s)/deciliter  docusate sodium Liquid 200 milliGRAM(s) Oral at bedtime PRN Constipation  glucagon  Injectable 1 milliGRAM(s) IntraMuscular once PRN Glucose LESS THAN 70 milligrams/deciliter  oxyCODONE    IR 10 milliGRAM(s) Oral every 3 hours PRN Moderate Pain (4 - 6)    Vital Signs Last 24 Hrs:   T(C): 36.8 (09 May 2018 14:53), Max: 37 (09 May 2018 06:02)  T(F): 98.2 (09 May 2018 14:53), Max: 98.6 (09 May 2018 06:02)  HR: 96 (09 May 2018 14:53) (85 - 108)  BP: 117/72 (09 May 2018 14:53) (108/66 - 138/86)  BP(mean): --  RR: 18 (09 May 2018 14:53) (18 - 18)  SpO2: 98% (09 May 2018 14:53) (97% - 100%)    I&O's Detail:   08 May 2018 07:01  -  09 May 2018 07:00  --------------------------------------------------------  IN:    heparin Infusion: 100 mL    IV PiggyBack: 350 mL    lactated ringers.: 750 mL    Oral Fluid: 240 mL  Total IN: 1440 mL  OUT:    Voided: 750 mL  Total OUT: 750 mL  Total NET: 690 mL    09 May 2018 07:01  -  09 May 2018 16:54  --------------------------------------------------------  IN:    Oral Fluid: 240 mL  Total IN: 240 mL    OUT:    Bulb: 15 mL    Voided: 800 mL  Total OUT: 815 mL  Total NET: -575 mL    Daily     Daily Weight in k.2 (09 May 2018 12:10)    LABS:                        8.6    10.2  )-----------( 871      ( 09 May 2018 08:25 )             27.4     05-08    136  |  101  |  8   ----------------------------<  211<H>  4.1   |  23  |  0.76    Ca    8.6      08 May 2018 23:03  Phos  3.0     05-08  Mg     1.4     -08    TPro  7.1  /  Alb  3.2<L>  /  TBili  0.2  /  DBili  x   /  AST  22  /  ALT  12  /  AlkPhos  117  05-08  PT/INR - ( 08 May 2018 23:03 )   PT: 13.8 sec;   INR: 1.26 ratio    PTT - ( 09 May 2018 14:10 )  PTT:40.5 sec  Urinalysis Basic - ( 08 May 2018 14:45 )    Color: PL Yellow / Appearance: Clear / S.010 / pH: x  Gluc: x / Ketone: Negative  / Bili: Negative / Urobili: Negative   Blood: x / Protein: Negative / Nitrite: Negative   Leuk Esterase: Negative / RBC: x / WBC x   Sq Epi: x / Non Sq Epi: x / Bacteria: x    PHYSICAL EXAM:  Gen: NAD, AAOx3  Abd: soft, NT, ND  clean dry and intact  DANIEL: serosanguinous

## 2018-05-09 NOTE — PROGRESS NOTE ADULT - SUBJECTIVE AND OBJECTIVE BOX
Surgery Blue Team Progress Note    Presented with abdominal wall abscess, HD2    SUBJECTIVE:   Patient was seen and examined this morning. There was no acute event overnight. She is resting comfortably in bed. Pain is well controlled. She had a CT which showed a 3.8 x 2.7 cm collection of fluid and air within the left anterior abdominal wall. There was a filling defect in right atrium as well. She had LE Duplex which did not show DVT. She does not report pain, fever, n/v, chest pain, or shortness of breath.     OBJECTIVE:   T(C): 37 (05-09-18 @ 10:01), Max: 37.4 (05-08-18 @ 15:30)  HR: 85 (05-09-18 @ 10:01) (85 - 108)  BP: 115/76 (05-09-18 @ 10:01) (108/66 - 138/86)  RR: 18 (05-09-18 @ 10:01) (16 - 18)  SpO2: 100% (05-09-18 @ 10:01) (97% - 100%)  Wt(kg): --  CAPILLARY BLOOD GLUCOSE      POCT Blood Glucose.: 228 mg/dL (09 May 2018 06:03)  POCT Blood Glucose.: 238 mg/dL (08 May 2018 22:51)  POCT Blood Glucose.: 89 mg/dL (08 May 2018 19:27)    I&O's Detail    08 May 2018 07:01  -  09 May 2018 07:00  --------------------------------------------------------  IN:    heparin Infusion: 100 mL    IV PiggyBack: 350 mL    lactated ringers.: 750 mL    Oral Fluid: 240 mL  Total IN: 1440 mL    OUT:    Voided: 750 mL  Total OUT: 750 mL    Total NET: 690 mL      09 May 2018 07:01  -  09 May 2018 10:17  --------------------------------------------------------  IN:  Total IN: 0 mL    OUT:    Voided: 800 mL  Total OUT: 800 mL    Total NET: -800 mL          PHYSICAL EXAM:  Gen: Well-nourished, well-developed, A&O x3, resting in bed in no acute distress  CV: RRR  Resp: Patent airways, unlabored   Abdomen: Soft, nontender, nondistended  Extremities: All 4 extremities warm and well perfused, no edema

## 2018-05-09 NOTE — PROGRESS NOTE ADULT - ASSESSMENT
42 year old female s/p IR drainage   -ECHO  -Heparin gtt started immediately after IR as per Dr. Kunz attending  -check fingersticks  -Check sliding scale   -check PPT/PT/INR     Cristela Ambriz PA-C  #[id number]

## 2018-05-10 DIAGNOSIS — Z94.83 PANCREAS TRANSPLANT STATUS: ICD-10-CM

## 2018-05-10 DIAGNOSIS — I51.3 INTRACARDIAC THROMBOSIS, NOT ELSEWHERE CLASSIFIED: ICD-10-CM

## 2018-05-10 DIAGNOSIS — E08.65 DIABETES MELLITUS DUE TO UNDERLYING CONDITION WITH HYPERGLYCEMIA: ICD-10-CM

## 2018-05-10 LAB
ALBUMIN SERPL ELPH-MCNC: 2.6 G/DL — LOW (ref 3.3–5)
ALP SERPL-CCNC: 88 U/L — SIGNIFICANT CHANGE UP (ref 40–120)
ALT FLD-CCNC: 8 U/L — LOW (ref 10–45)
ANION GAP SERPL CALC-SCNC: 14 MMOL/L — SIGNIFICANT CHANGE UP (ref 5–17)
APTT BLD: 61.5 SEC — HIGH (ref 27.5–37.4)
APTT BLD: 73.7 SEC — HIGH (ref 27.5–37.4)
AST SERPL-CCNC: 11 U/L — SIGNIFICANT CHANGE UP (ref 10–40)
BILIRUB SERPL-MCNC: 0.1 MG/DL — LOW (ref 0.2–1.2)
BUN SERPL-MCNC: 6 MG/DL — LOW (ref 7–23)
CALCIUM SERPL-MCNC: 8.1 MG/DL — LOW (ref 8.4–10.5)
CHLORIDE SERPL-SCNC: 98 MMOL/L — SIGNIFICANT CHANGE UP (ref 96–108)
CO2 SERPL-SCNC: 22 MMOL/L — SIGNIFICANT CHANGE UP (ref 22–31)
CREAT SERPL-MCNC: 0.68 MG/DL — SIGNIFICANT CHANGE UP (ref 0.5–1.3)
GLUCOSE BLDC GLUCOMTR-MCNC: 269 MG/DL — HIGH (ref 70–99)
GLUCOSE BLDC GLUCOMTR-MCNC: 280 MG/DL — HIGH (ref 70–99)
GLUCOSE SERPL-MCNC: 362 MG/DL — HIGH (ref 70–99)
HCT VFR BLD CALC: 28 % — LOW (ref 34.5–45)
HCT VFR BLD CALC: 31.4 % — LOW (ref 34.5–45)
HGB BLD-MCNC: 10 G/DL — LOW (ref 11.5–15.5)
HGB BLD-MCNC: 9.1 G/DL — LOW (ref 11.5–15.5)
MAGNESIUM SERPL-MCNC: 1.4 MG/DL — LOW (ref 1.6–2.6)
MCHC RBC-ENTMCNC: 30.2 PG — SIGNIFICANT CHANGE UP (ref 27–34)
MCHC RBC-ENTMCNC: 30.9 PG — SIGNIFICANT CHANGE UP (ref 27–34)
MCHC RBC-ENTMCNC: 31.7 GM/DL — LOW (ref 32–36)
MCHC RBC-ENTMCNC: 32.6 GM/DL — SIGNIFICANT CHANGE UP (ref 32–36)
MCV RBC AUTO: 94.8 FL — SIGNIFICANT CHANGE UP (ref 80–100)
MCV RBC AUTO: 95.3 FL — SIGNIFICANT CHANGE UP (ref 80–100)
PHOSPHATE SERPL-MCNC: 2.7 MG/DL — SIGNIFICANT CHANGE UP (ref 2.5–4.5)
PLATELET # BLD AUTO: 846 K/UL — HIGH (ref 150–400)
PLATELET # BLD AUTO: 922 K/UL — HIGH (ref 150–400)
POTASSIUM SERPL-MCNC: 4.3 MMOL/L — SIGNIFICANT CHANGE UP (ref 3.5–5.3)
POTASSIUM SERPL-SCNC: 4.3 MMOL/L — SIGNIFICANT CHANGE UP (ref 3.5–5.3)
PROT SERPL-MCNC: 6.1 G/DL — SIGNIFICANT CHANGE UP (ref 6–8.3)
RBC # BLD: 2.96 M/UL — LOW (ref 3.8–5.2)
RBC # BLD: 3.3 M/UL — LOW (ref 3.8–5.2)
RBC # FLD: 15.7 % — HIGH (ref 10.3–14.5)
RBC # FLD: 15.8 % — HIGH (ref 10.3–14.5)
SODIUM SERPL-SCNC: 134 MMOL/L — LOW (ref 135–145)
VANCOMYCIN TROUGH SERPL-MCNC: 7.4 UG/ML — LOW (ref 10–20)
WBC # BLD: 8.3 K/UL — SIGNIFICANT CHANGE UP (ref 3.8–10.5)
WBC # BLD: 8.8 K/UL — SIGNIFICANT CHANGE UP (ref 3.8–10.5)
WBC # FLD AUTO: 8.3 K/UL — SIGNIFICANT CHANGE UP (ref 3.8–10.5)
WBC # FLD AUTO: 8.8 K/UL — SIGNIFICANT CHANGE UP (ref 3.8–10.5)

## 2018-05-10 PROCEDURE — 99223 1ST HOSP IP/OBS HIGH 75: CPT | Mod: GC

## 2018-05-10 PROCEDURE — 99231 SBSQ HOSP IP/OBS SF/LOW 25: CPT

## 2018-05-10 PROCEDURE — 93010 ELECTROCARDIOGRAM REPORT: CPT

## 2018-05-10 RX ORDER — DEXTROSE MONOHYDRATE, SODIUM CHLORIDE, AND POTASSIUM CHLORIDE 50; .745; 4.5 G/1000ML; G/1000ML; G/1000ML
1000 INJECTION, SOLUTION INTRAVENOUS
Qty: 0 | Refills: 0 | Status: DISCONTINUED | OUTPATIENT
Start: 2018-05-10 | End: 2018-05-11

## 2018-05-10 RX ORDER — INSULIN LISPRO 100/ML
VIAL (ML) SUBCUTANEOUS
Qty: 0 | Refills: 0 | Status: DISCONTINUED | OUTPATIENT
Start: 2018-05-10 | End: 2018-05-16

## 2018-05-10 RX ORDER — LINEZOLID 600 MG/300ML
600 INJECTION, SOLUTION INTRAVENOUS EVERY 12 HOURS
Qty: 0 | Refills: 0 | Status: DISCONTINUED | OUTPATIENT
Start: 2018-05-11 | End: 2018-05-14

## 2018-05-10 RX ORDER — MAGNESIUM SULFATE 500 MG/ML
2 VIAL (ML) INJECTION ONCE
Qty: 0 | Refills: 0 | Status: COMPLETED | OUTPATIENT
Start: 2018-05-10 | End: 2018-05-10

## 2018-05-10 RX ORDER — DEXTROSE 50 % IN WATER 50 %
12.5 SYRINGE (ML) INTRAVENOUS ONCE
Qty: 0 | Refills: 0 | Status: DISCONTINUED | OUTPATIENT
Start: 2018-05-10 | End: 2018-05-16

## 2018-05-10 RX ORDER — ERYTHROMYCIN ETHYLSUCCINATE 400 MG
500 TABLET ORAL EVERY 6 HOURS
Qty: 0 | Refills: 0 | Status: DISCONTINUED | OUTPATIENT
Start: 2018-05-10 | End: 2018-05-14

## 2018-05-10 RX ORDER — MEGESTROL ACETATE 40 MG/ML
800 SUSPENSION ORAL DAILY
Qty: 0 | Refills: 0 | Status: DISCONTINUED | OUTPATIENT
Start: 2018-05-10 | End: 2018-05-16

## 2018-05-10 RX ORDER — GLUCAGON INJECTION, SOLUTION 0.5 MG/.1ML
1 INJECTION, SOLUTION SUBCUTANEOUS ONCE
Qty: 0 | Refills: 0 | Status: DISCONTINUED | OUTPATIENT
Start: 2018-05-10 | End: 2018-05-10

## 2018-05-10 RX ORDER — INSULIN GLARGINE 100 [IU]/ML
6 INJECTION, SOLUTION SUBCUTANEOUS EVERY MORNING
Qty: 0 | Refills: 0 | Status: DISCONTINUED | OUTPATIENT
Start: 2018-05-10 | End: 2018-05-10

## 2018-05-10 RX ORDER — DEXTROSE 50 % IN WATER 50 %
12.5 SYRINGE (ML) INTRAVENOUS ONCE
Qty: 0 | Refills: 0 | Status: DISCONTINUED | OUTPATIENT
Start: 2018-05-10 | End: 2018-05-10

## 2018-05-10 RX ORDER — INSULIN LISPRO 100/ML
VIAL (ML) SUBCUTANEOUS AT BEDTIME
Qty: 0 | Refills: 0 | Status: DISCONTINUED | OUTPATIENT
Start: 2018-05-10 | End: 2018-05-16

## 2018-05-10 RX ORDER — DEXTROSE 50 % IN WATER 50 %
25 SYRINGE (ML) INTRAVENOUS ONCE
Qty: 0 | Refills: 0 | Status: DISCONTINUED | OUTPATIENT
Start: 2018-05-10 | End: 2018-05-16

## 2018-05-10 RX ORDER — PANTOPRAZOLE SODIUM 20 MG/1
40 TABLET, DELAYED RELEASE ORAL DAILY
Qty: 0 | Refills: 0 | Status: DISCONTINUED | OUTPATIENT
Start: 2018-05-10 | End: 2018-05-16

## 2018-05-10 RX ORDER — ERTAPENEM SODIUM 1 G/1
1000 INJECTION, POWDER, LYOPHILIZED, FOR SOLUTION INTRAMUSCULAR; INTRAVENOUS ONCE
Qty: 0 | Refills: 0 | Status: COMPLETED | OUTPATIENT
Start: 2018-05-10 | End: 2018-05-10

## 2018-05-10 RX ORDER — ALTEPLASE 100 MG
100 KIT INTRAVENOUS ONCE
Qty: 0 | Refills: 0 | Status: DISCONTINUED | OUTPATIENT
Start: 2018-05-10 | End: 2018-05-16

## 2018-05-10 RX ORDER — INSULIN LISPRO 100/ML
VIAL (ML) SUBCUTANEOUS EVERY 6 HOURS
Qty: 0 | Refills: 0 | Status: DISCONTINUED | OUTPATIENT
Start: 2018-05-10 | End: 2018-05-10

## 2018-05-10 RX ORDER — INSULIN LISPRO 100/ML
2 VIAL (ML) SUBCUTANEOUS
Qty: 0 | Refills: 0 | Status: DISCONTINUED | OUTPATIENT
Start: 2018-05-10 | End: 2018-05-10

## 2018-05-10 RX ORDER — SODIUM CHLORIDE 9 MG/ML
1000 INJECTION, SOLUTION INTRAVENOUS
Qty: 0 | Refills: 0 | Status: DISCONTINUED | OUTPATIENT
Start: 2018-05-10 | End: 2018-05-10

## 2018-05-10 RX ORDER — PIPERACILLIN AND TAZOBACTAM 4; .5 G/20ML; G/20ML
3.38 INJECTION, POWDER, LYOPHILIZED, FOR SOLUTION INTRAVENOUS EVERY 8 HOURS
Qty: 0 | Refills: 0 | Status: DISCONTINUED | OUTPATIENT
Start: 2018-05-10 | End: 2018-05-10

## 2018-05-10 RX ORDER — LINEZOLID 600 MG/300ML
600 INJECTION, SOLUTION INTRAVENOUS ONCE
Qty: 0 | Refills: 0 | Status: COMPLETED | OUTPATIENT
Start: 2018-05-10 | End: 2018-05-10

## 2018-05-10 RX ORDER — ONDANSETRON 8 MG/1
8 TABLET, FILM COATED ORAL EVERY 8 HOURS
Qty: 0 | Refills: 0 | Status: DISCONTINUED | OUTPATIENT
Start: 2018-05-10 | End: 2018-05-11

## 2018-05-10 RX ORDER — OXYCODONE HYDROCHLORIDE 5 MG/1
30 TABLET ORAL EVERY 8 HOURS
Qty: 0 | Refills: 0 | Status: DISCONTINUED | OUTPATIENT
Start: 2018-05-10 | End: 2018-05-16

## 2018-05-10 RX ORDER — DOCUSATE SODIUM 100 MG
200 CAPSULE ORAL AT BEDTIME
Qty: 0 | Refills: 0 | Status: DISCONTINUED | OUTPATIENT
Start: 2018-05-10 | End: 2018-05-16

## 2018-05-10 RX ORDER — INSULIN LISPRO 100/ML
VIAL (ML) SUBCUTANEOUS
Qty: 0 | Refills: 0 | Status: DISCONTINUED | OUTPATIENT
Start: 2018-05-10 | End: 2018-05-10

## 2018-05-10 RX ORDER — ACETAMINOPHEN 500 MG
650 TABLET ORAL EVERY 6 HOURS
Qty: 0 | Refills: 0 | Status: DISCONTINUED | OUTPATIENT
Start: 2018-05-10 | End: 2018-05-16

## 2018-05-10 RX ORDER — ERTAPENEM SODIUM 1 G/1
INJECTION, POWDER, LYOPHILIZED, FOR SOLUTION INTRAMUSCULAR; INTRAVENOUS
Qty: 0 | Refills: 0 | Status: DISCONTINUED | OUTPATIENT
Start: 2018-05-10 | End: 2018-05-14

## 2018-05-10 RX ORDER — DEXTROSE 50 % IN WATER 50 %
25 SYRINGE (ML) INTRAVENOUS ONCE
Qty: 0 | Refills: 0 | Status: DISCONTINUED | OUTPATIENT
Start: 2018-05-10 | End: 2018-05-10

## 2018-05-10 RX ORDER — ERTAPENEM SODIUM 1 G/1
1000 INJECTION, POWDER, LYOPHILIZED, FOR SOLUTION INTRAMUSCULAR; INTRAVENOUS EVERY 24 HOURS
Qty: 0 | Refills: 0 | Status: DISCONTINUED | OUTPATIENT
Start: 2018-05-11 | End: 2018-05-14

## 2018-05-10 RX ORDER — LINEZOLID 600 MG/300ML
INJECTION, SOLUTION INTRAVENOUS
Qty: 0 | Refills: 0 | Status: DISCONTINUED | OUTPATIENT
Start: 2018-05-10 | End: 2018-05-14

## 2018-05-10 RX ORDER — VANCOMYCIN HCL 1 G
1000 VIAL (EA) INTRAVENOUS EVERY 8 HOURS
Qty: 0 | Refills: 0 | Status: DISCONTINUED | OUTPATIENT
Start: 2018-05-10 | End: 2018-05-10

## 2018-05-10 RX ORDER — GLUCAGON INJECTION, SOLUTION 0.5 MG/.1ML
1 INJECTION, SOLUTION SUBCUTANEOUS ONCE
Qty: 0 | Refills: 0 | Status: DISCONTINUED | OUTPATIENT
Start: 2018-05-10 | End: 2018-05-16

## 2018-05-10 RX ORDER — DEXTROSE 50 % IN WATER 50 %
15 SYRINGE (ML) INTRAVENOUS ONCE
Qty: 0 | Refills: 0 | Status: DISCONTINUED | OUTPATIENT
Start: 2018-05-10 | End: 2018-05-10

## 2018-05-10 RX ORDER — INSULIN LISPRO 100/ML
5 VIAL (ML) SUBCUTANEOUS ONCE
Qty: 0 | Refills: 0 | Status: COMPLETED | OUTPATIENT
Start: 2018-05-10 | End: 2018-05-10

## 2018-05-10 RX ORDER — INSULIN LISPRO 100/ML
2 VIAL (ML) SUBCUTANEOUS
Qty: 0 | Refills: 0 | Status: DISCONTINUED | OUTPATIENT
Start: 2018-05-10 | End: 2018-05-11

## 2018-05-10 RX ORDER — INSULIN GLARGINE 100 [IU]/ML
4 INJECTION, SOLUTION SUBCUTANEOUS ONCE
Qty: 0 | Refills: 0 | Status: COMPLETED | OUTPATIENT
Start: 2018-05-10 | End: 2018-05-10

## 2018-05-10 RX ORDER — PANTOPRAZOLE SODIUM 20 MG/1
40 TABLET, DELAYED RELEASE ORAL DAILY
Qty: 0 | Refills: 0 | Status: DISCONTINUED | OUTPATIENT
Start: 2018-05-10 | End: 2018-05-10

## 2018-05-10 RX ORDER — LIPASE/PROTEASE/AMYLASE 16-48-48K
1 CAPSULE,DELAYED RELEASE (ENTERIC COATED) ORAL
Qty: 0 | Refills: 0 | Status: DISCONTINUED | OUTPATIENT
Start: 2018-05-10 | End: 2018-05-16

## 2018-05-10 RX ORDER — INSULIN GLARGINE 100 [IU]/ML
6 INJECTION, SOLUTION SUBCUTANEOUS EVERY MORNING
Qty: 0 | Refills: 0 | Status: DISCONTINUED | OUTPATIENT
Start: 2018-05-10 | End: 2018-05-12

## 2018-05-10 RX ORDER — DEXTROSE 50 % IN WATER 50 %
15 SYRINGE (ML) INTRAVENOUS ONCE
Qty: 0 | Refills: 0 | Status: DISCONTINUED | OUTPATIENT
Start: 2018-05-10 | End: 2018-05-16

## 2018-05-10 RX ORDER — OXYCODONE HYDROCHLORIDE 5 MG/1
10 TABLET ORAL
Qty: 0 | Refills: 0 | Status: DISCONTINUED | OUTPATIENT
Start: 2018-05-10 | End: 2018-05-16

## 2018-05-10 RX ADMIN — LINEZOLID 300 MILLIGRAM(S): 600 INJECTION, SOLUTION INTRAVENOUS at 22:11

## 2018-05-10 RX ADMIN — OXYCODONE HYDROCHLORIDE 30 MILLIGRAM(S): 5 TABLET ORAL at 22:10

## 2018-05-10 RX ADMIN — Medication 2: at 22:30

## 2018-05-10 RX ADMIN — OXYCODONE HYDROCHLORIDE 30 MILLIGRAM(S): 5 TABLET ORAL at 14:24

## 2018-05-10 RX ADMIN — OXYCODONE HYDROCHLORIDE 10 MILLIGRAM(S): 5 TABLET ORAL at 03:43

## 2018-05-10 RX ADMIN — OXYCODONE HYDROCHLORIDE 10 MILLIGRAM(S): 5 TABLET ORAL at 17:04

## 2018-05-10 RX ADMIN — Medication 4: at 14:45

## 2018-05-10 RX ADMIN — Medication 50 GRAM(S): at 10:35

## 2018-05-10 RX ADMIN — OXYCODONE HYDROCHLORIDE 10 MILLIGRAM(S): 5 TABLET ORAL at 09:11

## 2018-05-10 RX ADMIN — PANTOPRAZOLE SODIUM 40 MILLIGRAM(S): 20 TABLET, DELAYED RELEASE ORAL at 12:31

## 2018-05-10 RX ADMIN — OXYCODONE HYDROCHLORIDE 10 MILLIGRAM(S): 5 TABLET ORAL at 07:47

## 2018-05-10 RX ADMIN — Medication 3: at 18:15

## 2018-05-10 RX ADMIN — OXYCODONE HYDROCHLORIDE 10 MILLIGRAM(S): 5 TABLET ORAL at 12:32

## 2018-05-10 RX ADMIN — Medication 2 UNIT(S): at 18:15

## 2018-05-10 RX ADMIN — Medication 50 GRAM(S): at 05:54

## 2018-05-10 RX ADMIN — HEPARIN SODIUM 12 UNIT(S)/HR: 5000 INJECTION INTRAVENOUS; SUBCUTANEOUS at 09:32

## 2018-05-10 RX ADMIN — OXYCODONE HYDROCHLORIDE 30 MILLIGRAM(S): 5 TABLET ORAL at 05:21

## 2018-05-10 RX ADMIN — OXYCODONE HYDROCHLORIDE 10 MILLIGRAM(S): 5 TABLET ORAL at 19:24

## 2018-05-10 RX ADMIN — Medication 62.5 MILLIMOLE(S): at 10:36

## 2018-05-10 RX ADMIN — Medication 1 CAPSULE(S): at 18:16

## 2018-05-10 RX ADMIN — OXYCODONE HYDROCHLORIDE 30 MILLIGRAM(S): 5 TABLET ORAL at 14:47

## 2018-05-10 RX ADMIN — ONDANSETRON 8 MILLIGRAM(S): 8 TABLET, FILM COATED ORAL at 14:24

## 2018-05-10 RX ADMIN — Medication 500 MILLIGRAM(S): at 12:33

## 2018-05-10 RX ADMIN — OXYCODONE HYDROCHLORIDE 10 MILLIGRAM(S): 5 TABLET ORAL at 14:18

## 2018-05-10 RX ADMIN — Medication 250 MILLIGRAM(S): at 15:00

## 2018-05-10 RX ADMIN — MEGESTROL ACETATE 800 MILLIGRAM(S): 40 SUSPENSION ORAL at 14:26

## 2018-05-10 RX ADMIN — Medication 500 MILLIGRAM(S): at 00:00

## 2018-05-10 RX ADMIN — ONDANSETRON 8 MILLIGRAM(S): 8 TABLET, FILM COATED ORAL at 05:21

## 2018-05-10 RX ADMIN — OXYCODONE HYDROCHLORIDE 30 MILLIGRAM(S): 5 TABLET ORAL at 23:00

## 2018-05-10 RX ADMIN — PANTOPRAZOLE SODIUM 40 MILLIGRAM(S): 20 TABLET, DELAYED RELEASE ORAL at 22:24

## 2018-05-10 RX ADMIN — SODIUM CHLORIDE 50 MILLILITER(S): 9 INJECTION, SOLUTION INTRAVENOUS at 00:00

## 2018-05-10 RX ADMIN — HEPARIN SODIUM 12 UNIT(S)/HR: 5000 INJECTION INTRAVENOUS; SUBCUTANEOUS at 01:20

## 2018-05-10 RX ADMIN — OXYCODONE HYDROCHLORIDE 10 MILLIGRAM(S): 5 TABLET ORAL at 02:01

## 2018-05-10 RX ADMIN — INSULIN GLARGINE 4 UNIT(S): 100 INJECTION, SOLUTION SUBCUTANEOUS at 05:54

## 2018-05-10 RX ADMIN — OXYCODONE HYDROCHLORIDE 30 MILLIGRAM(S): 5 TABLET ORAL at 05:50

## 2018-05-10 RX ADMIN — PIPERACILLIN AND TAZOBACTAM 25 GRAM(S): 4; .5 INJECTION, POWDER, LYOPHILIZED, FOR SOLUTION INTRAVENOUS at 10:42

## 2018-05-10 RX ADMIN — OXYCODONE HYDROCHLORIDE 10 MILLIGRAM(S): 5 TABLET ORAL at 04:15

## 2018-05-10 RX ADMIN — Medication 250 MILLIGRAM(S): at 23:47

## 2018-05-10 RX ADMIN — OXYCODONE HYDROCHLORIDE 10 MILLIGRAM(S): 5 TABLET ORAL at 16:34

## 2018-05-10 RX ADMIN — Medication 500 MILLIGRAM(S): at 05:54

## 2018-05-10 RX ADMIN — OXYCODONE HYDROCHLORIDE 10 MILLIGRAM(S): 5 TABLET ORAL at 23:47

## 2018-05-10 RX ADMIN — ERTAPENEM SODIUM 120 MILLIGRAM(S): 1 INJECTION, POWDER, LYOPHILIZED, FOR SOLUTION INTRAMUSCULAR; INTRAVENOUS at 22:10

## 2018-05-10 RX ADMIN — Medication 500 MILLIGRAM(S): at 18:17

## 2018-05-10 RX ADMIN — Medication 10: at 06:02

## 2018-05-10 RX ADMIN — DEXTROSE MONOHYDRATE, SODIUM CHLORIDE, AND POTASSIUM CHLORIDE 50 MILLILITER(S): 50; .745; 4.5 INJECTION, SOLUTION INTRAVENOUS at 10:36

## 2018-05-10 RX ADMIN — PIPERACILLIN AND TAZOBACTAM 25 GRAM(S): 4; .5 INJECTION, POWDER, LYOPHILIZED, FOR SOLUTION INTRAVENOUS at 03:50

## 2018-05-10 RX ADMIN — Medication 5 UNIT(S): at 04:23

## 2018-05-10 RX ADMIN — Medication 500 MILLIGRAM(S): at 14:31

## 2018-05-10 NOTE — PROGRESS NOTE ADULT - ASSESSMENT
Assessment:    42y Female who presents with Infection following procedure, subsequent encounter    Plan:  - Heparin gtt  - Continue vanc/zosyn  - Pain control   - OOB as tolerated with assistance   - NPO for IR drain today

## 2018-05-10 NOTE — CONSULT NOTE ADULT - SUBJECTIVE AND OBJECTIVE BOX
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 During her last admission (4/5-4/16/18) after her islet cell transplant, patient developed LUQ abdominal collections found to be tracking to her midline. She underwent IR drainage and was discharged with drains that were removed two weeks ago. Drain cultures positive for Candida and VRE. She was discharged with a PICC and remained on antifungals.   Was seen at Dr. Walker’s on day of admission and found to have copious drainage from her midline wound that has been present and clear in color since her discharge, but has recently turned cloudy and foul-smelling two days ago. Denies fevers/chills, nausea/vomiting. Reports having diffuse abdominal pain. (08 May 2018 16:38)    Noted on CT A/P with filling defect in RA, suggestive of RA thrombus.  TTE done confirms this.  LE Duplex without DVT.  Pt currently hemodynamically stable.  S/p IR drainage of abdominal collection 5/9, drain in place.       Allergies    No Known Allergies    Intolerances    	    MEDICATIONS:  heparin  Infusion 1200 Unit(s)/Hr IV Continuous <Continuous>    erythromycin     base Tablet 500 milliGRAM(s) Oral every 6 hours  piperacillin/tazobactam IVPB. 3.375 Gram(s) IV Intermittent every 8 hours  vancomycin  IVPB 1000 milliGRAM(s) IV Intermittent every 8 hours      acetaminophen   Tablet 650 milliGRAM(s) Oral every 6 hours PRN  ondansetron   Disintegrating Tablet 8 milliGRAM(s) Oral every 8 hours  oxyCODONE    IR 10 milliGRAM(s) Oral every 3 hours PRN  oxyCODONE  ER Tablet 30 milliGRAM(s) Oral every 8 hours    amylase/lipase/protease  (CREON  6,000 Units) 1 Capsule(s) Oral three times a day with meals  docusate sodium Liquid 200 milliGRAM(s) Oral at bedtime PRN  pantoprazole  Injectable 40 milliGRAM(s) IV Push daily    dextrose 40% Gel 15 Gram(s) Oral once PRN  dextrose 40% Gel 15 Gram(s) Oral once PRN  dextrose 40% Gel 15 Gram(s) Oral once PRN  dextrose 50% Injectable 12.5 Gram(s) IV Push once  dextrose 50% Injectable 25 Gram(s) IV Push once  dextrose 50% Injectable 25 Gram(s) IV Push once  dextrose 50% Injectable 12.5 Gram(s) IV Push once  dextrose 50% Injectable 25 Gram(s) IV Push once  dextrose 50% Injectable 25 Gram(s) IV Push once  dextrose 50% Injectable 12.5 Gram(s) IV Push once  dextrose 50% Injectable 25 Gram(s) IV Push once  dextrose 50% Injectable 25 Gram(s) IV Push once  glucagon  Injectable 1 milliGRAM(s) IntraMuscular once PRN  glucagon  Injectable 1 milliGRAM(s) IntraMuscular once PRN  glucagon  Injectable 1 milliGRAM(s) IntraMuscular once PRN  insulin lispro (HumaLOG) corrective regimen sliding scale   SubCutaneous every 6 hours  megestrol Suspension 800 milliGRAM(s) Oral daily    dextrose 5%. 1000 milliLiter(s) IV Continuous <Continuous>  dextrose 5%. 1000 milliLiter(s) IV Continuous <Continuous>  dextrose 5%. 1000 milliLiter(s) IV Continuous <Continuous>  magnesium sulfate  IVPB 2 Gram(s) IV Intermittent once  sodium chloride 0.9% with potassium chloride 20 mEq/L 1000 milliLiter(s) IV Continuous <Continuous>  sodium phosphate IVPB 15 milliMole(s) IV Intermittent once      PAST MEDICAL & SURGICAL HISTORY:  Premenstrual migraine  Other chronic pancreatitis  Status post pancreatic islet cell transplantation  H/O splenectomy  History of pancreatectomy  S/P cholecystectomy: in early 20&#x27;s      FAMILY HISTORY:  No pertinent family history in first degree relatives      SOCIAL HISTORY:  unchanged    REVIEW OF SYSTEMS:  CONSTITUTIONAL: No fever, weight loss, or fatigue  EYES: No eye pain, visual disturbances, or discharge  ENMT:  No difficulty hearing, tinnitus, vertigo; No sinus or throat pain  NECK: No pain or stiffness  RESPIRATORY: No cough, wheezing, chills or hemoptysis; No Shortness of Breath  CARDIOVASCULAR: No chest pain, palpitations, passing out, dizziness, or leg swelling  GASTROINTESTINAL: No abdominal or epigastric pain. No nausea, vomiting, or hematemesis; No diarrhea or constipation. No melena or hematochezia.  GENITOURINARY: No dysuria, frequency, hematuria, or incontinence  NEUROLOGICAL: No headaches, memory loss, loss of strength, numbness, or tremors  SKIN: No itching, burning, rashes, or lesions   LYMPH Nodes: No enlarged glands  ENDOCRINE: No heat or cold intolerance; No hair loss  MUSCULOSKELETAL: No joint pain or swelling; No muscle, back, or extremity pain  PSYCHIATRIC: No depression, anxiety, mood swings, or difficulty sleeping  HEME/LYMPH: No easy bruising, or bleeding gums  ALLERY AND IMMUNOLOGIC: No hives or eczema	    [ ] All others negative	  [ ] Unable to obtain    PHYSICAL EXAM:  T(C): 36.6 (05-10-18 @ 09:02), Max: 37 (05-10-18 @ 01:08)  HR: 87 (05-10-18 @ 09:02) (87 - 96)  BP: 103/67 (05-10-18 @ 09:02) (99/66 - 119/68)  RR: 18 (05-10-18 @ 09:02) (18 - 18)  SpO2: 99% (05-10-18 @ 09:02) (97% - 99%)  Wt(kg): --  I&O's Summary    09 May 2018 07:01  -  10 May 2018 07:00  --------------------------------------------------------  IN: 774 mL / OUT: 1175 mL / NET: -401 mL        Appearance: Normal	  HEENT:   Normal oral mucosa, PERRL, EOMI	  Lymphatic: No lymphadenopathy  Cardiovascular: Normal S1 S2, No JVD, No murmurs, No edema  Respiratory: Lungs clear to auscultation	  Psychiatry: A & O x 3, Mood & affect appropriate  Gastrointestinal:  Soft, Non-tender, + BS. Drain in place	  Skin: No rashes, No ecchymoses, No cyanosis	  Neurologic: Non-focal  Extremities: Normal range of motion, No clubbing, cyanosis or edema  Vascular: Peripheral pulses palpable 2+ bilaterally      LABS:	 	    CBC Full  -  ( 10 May 2018 08:32 )  WBC Count : 8.3 K/uL  Hemoglobin : 10.0 g/dL  Hematocrit : 31.4 %  Platelet Count - Automated : 922 K/uL  Mean Cell Volume : 95.3 fl  Mean Cell Hemoglobin : 30.2 pg  Mean Cell Hemoglobin Concentration : 31.7 gm/dL  Auto Neutrophil # : x  Auto Lymphocyte # : x  Auto Monocyte # : x  Auto Eosinophil # : x  Auto Basophil # : x  Auto Neutrophil % : x  Auto Lymphocyte % : x  Auto Monocyte % : x  Auto Eosinophil % : x  Auto Basophil % : x    05-10    134<L>  |  98  |  6<L>  ----------------------------<  362<H>  4.3   |  22  |  0.68  05-08    136  |  101  |  8   ----------------------------<  211<H>  4.1   |  23  |  0.76    Ca    8.1<L>      10 May 2018 00:46  Ca    8.6      08 May 2018 23:03  Phos  2.7     05-10  Phos  3.0     05-08  Mg     1.4     05-10  Mg     1.4     05-08    TPro  6.1  /  Alb  2.6<L>  /  TBili  0.1<L>  /  DBili  x   /  AST  11  /  ALT  8<L>  /  AlkPhos  88  05-10  TPro  7.1  /  Alb  3.2<L>  /  TBili  0.2  /  DBili  x   /  AST  22  /  ALT  12  /  AlkPhos  117  05-08

## 2018-05-10 NOTE — PROVIDER CONTACT NOTE (OTHER) - SITUATION
PA ordered via provider to RN: Please check Finger sticks and document with the patients scanner. As per Dr. Walker this is accetpatble
Results of Body culture. Few gram negative rods, gram positive cocci and numerous polymorphonuclear leukocytes present.
Pt on heparin drip at 12 units per hour pt therapeutic aPTT 61.5. Pt on Pt specific heparin drip
Pt's HR elevated to 108, all other VSS.
pt hypoglycemic at 55 at 1306, refusing our glucometer for blood sugar checks. Pt educated on policy &rechecking immediately after result. pt has implanted glucometer and her results 54.

## 2018-05-10 NOTE — CONSULT NOTE ADULT - PROBLEM SELECTOR PROBLEM 1
Thrombus of right atrial appendage without antecedent myocardial infarction
Diabetes mellitus due to underlying condition with hyperglycemia, with long-term current use of insulin

## 2018-05-10 NOTE — CHART NOTE - NSCHARTNOTEFT_GEN_A_CORE
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 During her last admission (4/5-4/16/18) after her islet cell transplant, patient developed LUQ abdominal collections found to be tracking to her midline. She underwent IR drainage and was discharged with drains that were removed two weeks ago. Drain cultures positive for Candida and VRE. She was discharged with a PICC and remained on antifungals.   Was seen at Dr. Walker’s on day of admission and found to have copious drainage from her midline wound that has been present and clear in color since her discharge, but has recently turned cloudy and foul-smelling two days ago. Denies fevers/chills, nausea/vomiting. Reports having diffuse abdominal pain. (08 May 2018 16:38)    ====================        ====================  NEW EVENTS: Pt transferred to PICU for close monitoring of RA thrombus with tpa at bedside. Currently comfortable and sating well on RA, c/o pain at drain sites controlled with pain meds. Pt denies CP, dyspnea, BPs stable.   ID contacted and will change antibiotics as listed below, they will f/u with pt in AM.  ====================        ====================  VITALS (Last 12 hrs):  ====================    T(C): 37.3 (05-10-18 @ 16:15), Max: 37.3 (05-10-18 @ 16:15)  HR: 100 (05-10-18 @ 20:00) (91 - 100)  BP: 127/85 (05-10-18 @ 20:00) (101/62 - 127/85)  BP(mean): 96 (05-10-18 @ 20:00) (73 - 96)  RR: 14 (05-10-18 @ 20:00) (14 - 18)  SpO2: 100% (05-10-18 @ 18:15) (97% - 100%)      TELEMETRY: sinus tach low 100s.          I&O's Summary    09 May 2018 07:01  -  10 May 2018 07:00  --------------------------------------------------------  IN: 774 mL / OUT: 1175 mL / NET: -401 mL    10 May 2018 07:01  -  10 May 2018 21:15  --------------------------------------------------------  IN: 1708 mL / OUT: 995 mL / NET: 713 mL        ====================  PLAN:  -RA thrombus: 3.1 by 2.8cm. Confirmed with TTE. TPA at bedside should patient clinically decompensate. Vascular cards aware of patient. Continue heparin gtt with PTT goal of 58-99. Repeat TTE monday to assess thrombus. Hemodynamically stable.   -L abd wall abscess: s/p IR drainage 5/9, drain still in place. Monitor drain outputs. Fluid cultures + for enterococcus faceium and klebsiella pneumoniae, discussed with ID overnight and will d/c vanc/zosyn and add ertapenem and linezolid. ID will f/u in AM. Pain control as needed. OOB as tolerated.   -Chronic pancreatitis: Continue pancreatic enzyme replacement. Continue Lantus 6U in AM and Humalog 2U before meals plus low dose correctional scale before meals and at bedtime for goal glucose 100-140. Pt will be discharged on basal/bolus insulin per endo.   -questionable hypercoaguable state: heme/onc consulted and less likely. Will r/o antiphospholipid syndrome (anticardiolipin ab, B2 glycoprotein screen, DRVVT, silica clotting time labs).   -wedge shaped abnormality on CT scan: will check renal U/S to evaluate for infarct vs other cause.  ====================    HEALTH ISSUES - PROBLEM Dx:  Thrombus of right atrial appendage without antecedent myocardial infarction: Thrombus of right atrial appendage without antecedent myocardial infarction  Pancreas transplant status: Pancreas transplant status  Diabetes mellitus due to underlying condition with hyperglycemia, with long-term current use of insulin: Diabetes mellitus due to underlying condition with hyperglycemia, with long-term current use of insulin        Argelia Ortiz, CCU PA #[id number]

## 2018-05-10 NOTE — PROGRESS NOTE ADULT - SUBJECTIVE AND OBJECTIVE BOX
Surgery Progress Note    S: Patient seen and examined. No acute events overnight.     O:  Vital Signs Last 24 Hrs  T(C): 37 (10 May 2018 06:02), Max: 37 (09 May 2018 10:01)  T(F): 98.6 (10 May 2018 06:02), Max: 98.6 (09 May 2018 10:01)  HR: 90 (10 May 2018 06:02) (85 - 96)  BP: 99/66 (10 May 2018 06:02) (99/66 - 119/68)  BP(mean): --  RR: 18 (10 May 2018 06:02) (18 - 18)  SpO2: 98% (10 May 2018 06:02) (97% - 100%)    I&O's Detail    08 May 2018 07:01  -  09 May 2018 07:00  --------------------------------------------------------  IN:    heparin Infusion: 100 mL    IV PiggyBack: 350 mL    lactated ringers.: 750 mL    Oral Fluid: 240 mL  Total IN: 1440 mL    OUT:    Voided: 750 mL  Total OUT: 750 mL    Total NET: 690 mL      09 May 2018 07:01  -  10 May 2018 06:50  --------------------------------------------------------  IN:    heparin Infusion: 144 mL    IV PiggyBack: 150 mL    Oral Fluid: 480 mL  Total IN: 774 mL    OUT:    Bulb: 25 mL    Drain: 50 mL    Voided: 1100 mL  Total OUT: 1175 mL    Total NET: -401 mL          MEDICATIONS  (STANDING):  amylase/lipase/protease  (CREON  6,000 Units) 1 Capsule(s) Oral three times a day with meals  dextrose 5%. 1000 milliLiter(s) (50 mL/Hr) IV Continuous <Continuous>  dextrose 5%. 1000 milliLiter(s) (50 mL/Hr) IV Continuous <Continuous>  dextrose 5%. 1000 milliLiter(s) (50 mL/Hr) IV Continuous <Continuous>  dextrose 50% Injectable 12.5 Gram(s) IV Push once  dextrose 50% Injectable 25 Gram(s) IV Push once  dextrose 50% Injectable 25 Gram(s) IV Push once  dextrose 50% Injectable 12.5 Gram(s) IV Push once  dextrose 50% Injectable 25 Gram(s) IV Push once  dextrose 50% Injectable 25 Gram(s) IV Push once  dextrose 50% Injectable 12.5 Gram(s) IV Push once  dextrose 50% Injectable 25 Gram(s) IV Push once  dextrose 50% Injectable 25 Gram(s) IV Push once  erythromycin     base Tablet 500 milliGRAM(s) Oral every 6 hours  heparin  Infusion 1200 Unit(s)/Hr (12 mL/Hr) IV Continuous <Continuous>  insulin lispro (HumaLOG) corrective regimen sliding scale   SubCutaneous every 6 hours  lactated ringers. 1000 milliLiter(s) (50 mL/Hr) IV Continuous <Continuous>  megestrol Suspension 800 milliGRAM(s) Oral daily  ondansetron   Disintegrating Tablet 8 milliGRAM(s) Oral every 8 hours  oxyCODONE  ER Tablet 30 milliGRAM(s) Oral every 8 hours  piperacillin/tazobactam IVPB. 3.375 Gram(s) IV Intermittent every 8 hours  vancomycin  IVPB 1000 milliGRAM(s) IV Intermittent every 12 hours    MEDICATIONS  (PRN):  acetaminophen   Tablet 650 milliGRAM(s) Oral every 6 hours PRN mild pain  dextrose 40% Gel 15 Gram(s) Oral once PRN Blood Glucose LESS THAN 70 milliGRAM(s)/deciliter  dextrose 40% Gel 15 Gram(s) Oral once PRN Blood Glucose LESS THAN 70 milliGRAM(s)/deciliter  dextrose 40% Gel 15 Gram(s) Oral once PRN Blood Glucose LESS THAN 70 milliGRAM(s)/deciliter  docusate sodium Liquid 200 milliGRAM(s) Oral at bedtime PRN Constipation  glucagon  Injectable 1 milliGRAM(s) IntraMuscular once PRN Glucose LESS THAN 70 milligrams/deciliter  glucagon  Injectable 1 milliGRAM(s) IntraMuscular once PRN Glucose LESS THAN 70 milligrams/deciliter  glucagon  Injectable 1 milliGRAM(s) IntraMuscular once PRN Glucose LESS THAN 70 milligrams/deciliter  oxyCODONE    IR 10 milliGRAM(s) Oral every 3 hours PRN Moderate Pain (4 - 6)                            9.1    8.8   )-----------( 846      ( 10 May 2018 00:46 )             28.0       05-10    134<L>  |  98  |  6<L>  ----------------------------<  362<H>  4.3   |  22  |  0.68    Ca    8.1<L>      10 May 2018 00:46  Phos  2.7     05-10  Mg     1.4     05-10    TPro  6.1  /  Alb  2.6<L>  /  TBili  0.1<L>  /  DBili  x   /  AST  11  /  ALT  8<L>  /  AlkPhos  88  05-10      Physical Exam:  Gen: Laying in bed, NAD  Resp: Unlabored breathing  Abd: soft, NTND. Ostomy pink/viable with stool contents in bag.  Ext: WWP  Skin: No rashes

## 2018-05-10 NOTE — CONSULT NOTE ADULT - ASSESSMENT
41 yo female with PMH significant for chronic pancreatitis s/p total pancreatectomy with islet autotransplantation (4/5) with complication of infected intraabdominal collections now readmitted with abdominal wall drainage.

## 2018-05-10 NOTE — CONSULT NOTE ADULT - SUBJECTIVE AND OBJECTIVE BOX
HPI:  42-year-old female with history of chronic pancreatitis since childhood s/p [place] procedure in 2014, 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 During her last admission (4/5-4/16/18) after her islet cell transplant, patient developed LUQ abdominal collections found to be tracking to her midline. She underwent IR drainage and was discharged with drains that were removed two weeks ago. Drain cultures positive for Candida and VRE. She was discharged with a PICC and remained on antifungals.   Was seen at Dr. Walker’s on day of admission and found to have copious drainage from her midline wound that has been present and clear in color since her discharge, but has recently turned cloudy and foul-smelling two days ago. Denies fevers/chills, nausea/vomiting. Reports having diffuse abdominal pain. (08 May 2018 16:38)      PAST MEDICAL & SURGICAL HISTORY:  Premenstrual migraine  Other chronic pancreatitis  Status post pancreatic islet cell transplantation  H/O splenectomy  History of pancreatectomy  S/P cholecystectomy: in early 20&#x27;s      FAMILY HISTORY:  No pertinent family history in first degree relatives      Social History:    Outpatient Medications:    MEDICATIONS  (STANDING):  amylase/lipase/protease  (CREON  6,000 Units) 1 Capsule(s) Oral three times a day with meals  dextrose 5%. 1000 milliLiter(s) (50 mL/Hr) IV Continuous <Continuous>  dextrose 5%. 1000 milliLiter(s) (50 mL/Hr) IV Continuous <Continuous>  dextrose 5%. 1000 milliLiter(s) (50 mL/Hr) IV Continuous <Continuous>  dextrose 50% Injectable 12.5 Gram(s) IV Push once  dextrose 50% Injectable 25 Gram(s) IV Push once  dextrose 50% Injectable 25 Gram(s) IV Push once  dextrose 50% Injectable 12.5 Gram(s) IV Push once  dextrose 50% Injectable 12.5 Gram(s) IV Push once  dextrose 50% Injectable 25 Gram(s) IV Push once  dextrose 50% Injectable 25 Gram(s) IV Push once  dextrose 50% Injectable 12.5 Gram(s) IV Push once  dextrose 50% Injectable 25 Gram(s) IV Push once  dextrose 50% Injectable 25 Gram(s) IV Push once  erythromycin     base Tablet 500 milliGRAM(s) Oral every 6 hours  heparin  Infusion 1200 Unit(s)/Hr (12 mL/Hr) IV Continuous <Continuous>  insulin lispro (HumaLOG) corrective regimen sliding scale   SubCutaneous Before meals and at bedtime  megestrol Suspension 800 milliGRAM(s) Oral daily  ondansetron   Disintegrating Tablet 8 milliGRAM(s) Oral every 8 hours  oxyCODONE  ER Tablet 30 milliGRAM(s) Oral every 8 hours  pantoprazole  Injectable 40 milliGRAM(s) IV Push daily  piperacillin/tazobactam IVPB. 3.375 Gram(s) IV Intermittent every 8 hours  sodium chloride 0.9% with potassium chloride 20 mEq/L 1000 milliLiter(s) (50 mL/Hr) IV Continuous <Continuous>  vancomycin  IVPB 1000 milliGRAM(s) IV Intermittent every 8 hours    MEDICATIONS  (PRN):  acetaminophen   Tablet 650 milliGRAM(s) Oral every 6 hours PRN mild pain  dextrose 40% Gel 15 Gram(s) Oral once PRN Blood Glucose LESS THAN 70 milliGRAM(s)/deciliter  dextrose 40% Gel 15 Gram(s) Oral once PRN Blood Glucose LESS THAN 70 milliGRAM(s)/deciliter  dextrose 40% Gel 15 Gram(s) Oral once PRN Blood Glucose LESS THAN 70 milliGRAM(s)/deciliter  docusate sodium Liquid 200 milliGRAM(s) Oral at bedtime PRN Constipation  glucagon  Injectable 1 milliGRAM(s) IntraMuscular once PRN Glucose LESS THAN 70 milligrams/deciliter  glucagon  Injectable 1 milliGRAM(s) IntraMuscular once PRN Glucose LESS THAN 70 milligrams/deciliter  glucagon  Injectable 1 milliGRAM(s) IntraMuscular once PRN Glucose LESS THAN 70 milligrams/deciliter  oxyCODONE    IR 10 milliGRAM(s) Oral every 3 hours PRN Moderate Pain (4 - 6)      Allergies    No Known Allergies    Intolerances      Review of Systems:  Constitutional: No fever  Eyes: No blurry vision  Neuro: No tremors  HEENT: No pain  Cardiovascular: No chest pain, palpitations  Respiratory: No SOB, no cough  GI: No nausea, vomiting, abdominal pain  : No dysuria  Skin: no rash  Psych: no depression  Endocrine: no polyuria, polydipsia  Hem/lymph: no swelling  Osteoporosis: no fractures    ALL OTHER SYSTEMS REVIEWED AND NEGATIVE    UNABLE TO OBTAIN    PHYSICAL EXAM:  VITALS: T(C): 36.6 (05-10-18 @ 09:02)  T(F): 97.9 (05-10-18 @ 09:02), Max: 98.6 (05-10-18 @ 01:08)  HR: 87 (05-10-18 @ 09:02) (87 - 96)  BP: 103/67 (05-10-18 @ 09:02) (99/66 - 119/68)  RR:  (18 - 18)  SpO2:  (97% - 99%)  Wt(kg): --  GENERAL: NAD, well-groomed, well-developed  EYES: No proptosis, no lid lag, anicteric  HEENT:  Atraumatic, Normocephalic, moist mucous membranes  THYROID: Normal size, no palpable nodules  RESPIRATORY: Clear to auscultation bilaterally; No rales, rhonchi, wheezing, or rubs  CARDIOVASCULAR: Regular rate and rhythm; No murmurs; no peripheral edema  GI: Soft, nontender, non distended, normal bowel sounds  SKIN: Dry, intact, No rashes or lesions  MUSCULOSKELETAL: Full range of motion, normal strength  NEURO: sensation intact, extraocular movements intact, no tremor, normal reflexes  PSYCH: Alert and oriented x 3, normal affect, normal mood  CUSHING'S SIGNS: no striae    POCT Blood Glucose.: 204 mg/dL (05-10-18 @ 14:37)  POCT Blood Glucose.: 109 mg/dL (05-10-18 @ 13:32)  POCT Blood Glucose.: 55 mg/dL (05-10-18 @ 13:06)  POCT Blood Glucose.: 109 mg/dL (05-10-18 @ 08:07)  POCT Blood Glucose.: 355 mg/dL (05-10-18 @ 06:00)  POCT Blood Glucose.: 362 mg/dL (05-10-18 @ 03:59)  POCT Blood Glucose.: 291 mg/dL (05-09-18 @ 22:33)  POCT Blood Glucose.: 358 mg/dL (05-09-18 @ 19:07)  POCT Blood Glucose.: 228 mg/dL (05-09-18 @ 06:03)  POCT Blood Glucose.: 238 mg/dL (05-08-18 @ 22:51)  POCT Blood Glucose.: 89 mg/dL (05-08-18 @ 19:27)                            10.0   8.3   )-----------( 922      ( 10 May 2018 08:32 )             31.4       05-10    134<L>  |  98  |  6<L>  ----------------------------<  362<H>  4.3   |  22  |  0.68    EGFR if : 125  EGFR if non : 108    Ca    8.1<L>      05-10  Mg     1.4     05-10  Phos  2.7     05-10    TPro  6.1  /  Alb  2.6<L>  /  TBili  0.1<L>  /  DBili  x   /  AST  11  /  ALT  8<L>  /  AlkPhos  88  05-10      Thyroid Function Tests:      Hemoglobin A1C, Whole Blood: 5.4 % [4.0 - 5.6] (04-12-18 @ 16:57)          Radiology: HPI:  42-year-old female with history of chronic pancreatitis since childhood s/p [place] procedure in , 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 During her last admission (-18) after her islet cell transplant, patient developed LUQ abdominal collections found to be tracking to her midline. She underwent IR drainage and was discharged with drains that were removed two weeks ago. Drain cultures positive for Candida and VRE. She was discharged with a PICC and remained on antifungals.   Was seen at Dr. Walker’s on day of admission and found to have copious drainage from her midline wound that has been present and clear in color since her discharge, but has recently turned cloudy and foul-smelling two days ago. Denies fevers/chills, nausea/vomiting. Reports having diffuse abdominal pain. (08 May 2018 16:38)    Endocrine history: 42 yr F with hx of chronic pancreatitis s/p total pancreatectomy with islet cell autotranspalnt on  c/b intrabdmonial collections now here with drainage from abdominal walll wound. Patient was discahrged on  adn recommended to follow a regimen fo Lantus 8U in Am adn carb counting with meals usign a ratio 0f 1:15. Her glcusoe levels orior to the infection -150 prelunch 150-250 predinner 100-150. she woudl also have frequent hypoglycemia 50s ezpcially in the eveing and at ngt. Over the past few dayas her gl;cuose has been in 200s-300s range due to the infection.   Prior to last admission, patient had never been told of diabetes. Last saw optho this past year, no retinopathy, + Neuropathy sx in her hands, no known nephropathy. Diet- AM- doesn't eat, sometimes eggs or toast, lunch-sandwich, salad, dinner-stir juárez with lean meats, pork chops. Low-fat, states her diet is healthy. Sometimes snacks on cookies, cake or ice cream. She has a "sweet tooth". Drinks juice and gingerale. patient has a dietician at [place], and also follows with Dr. Noe Nesbitt (endocrinology) at [place].    PAST MEDICAL & SURGICAL HISTORY:  Premenstrual migraine  Other chronic pancreatitis  Status post pancreatic islet cell transplantation  H/O splenectomy  History of pancreatectomy  S/P cholecystectomy: in early 20&#x27;s      FAMILY HISTORY:  No FH of DM      Social History: nonsmoker, no ETOH    Outpatient Medications:  · 	lenard insulin pen needles: 1 unit(s) subcutaneous 4 times a day   · 	docusate sodium 10 mg/mL oral liquid: 10 milliliter(s) orally 2 times a day  · 	bisacodyl 5 mg oral delayed release tablet: 2 tab(s) orally once a day (at bedtime), As needed, Constipation  · 	erythromycin 500 mg oral tablet: 1 tab(s) orally every 6 hours  · 	pancrelipase 6000 units-19,000 units-30,000 units oral delayed release capsule: 1 cap(s) orally 3 times a day (with meals)  · 	micafungin: 100 milligram(s) intravenous once a day via PICC through   · 	oxyCODONE 10 mg oral tablet: 1 tab(s) orally every 3 hours, As needed, Moderate Pain (4 - 6)  · 	OxyCONTIN 30 mg oral tablet, extended release: 1 tab(s) orally every 8 hours  · 	megestrol 40 mg/mL oral suspension: 20 milliliter(s) orally once a day  · 	ondansetron 8 mg oral tablet, disintegratin tab(s) orally every 8 hours  · 	insulin glargine: 8 unit(s) subcutaneous once a day in the AM  · 	acetaminophen 325 mg oral tablet: 2 tab(s) orally every 6 hours, As needed, mild pain  · 	glucose gel: 5  buccal prn use as directed for low blood sugar  · 	NovoLOG 100 units/mL subcutaneous solution: 3 unit(s) subcutaneous 3 times a day before meals, adjust dose as instructed based on carbohydrate counting and what you eat  · 	Robaxin-750 oral tablet: three times a day     MEDICATIONS  (STANDING):  amylase/lipase/protease  (CREON  6,000 Units) 1 Capsule(s) Oral three times a day with meals  dextrose 5%. 1000 milliLiter(s) (50 mL/Hr) IV Continuous <Continuous>  dextrose 5%. 1000 milliLiter(s) (50 mL/Hr) IV Continuous <Continuous>  dextrose 5%. 1000 milliLiter(s) (50 mL/Hr) IV Continuous <Continuous>  dextrose 50% Injectable 12.5 Gram(s) IV Push once  dextrose 50% Injectable 25 Gram(s) IV Push once  dextrose 50% Injectable 25 Gram(s) IV Push once  dextrose 50% Injectable 12.5 Gram(s) IV Push once  dextrose 50% Injectable 12.5 Gram(s) IV Push once  dextrose 50% Injectable 25 Gram(s) IV Push once  dextrose 50% Injectable 25 Gram(s) IV Push once  dextrose 50% Injectable 12.5 Gram(s) IV Push once  dextrose 50% Injectable 25 Gram(s) IV Push once  dextrose 50% Injectable 25 Gram(s) IV Push once  erythromycin     base Tablet 500 milliGRAM(s) Oral every 6 hours  heparin  Infusion 1200 Unit(s)/Hr (12 mL/Hr) IV Continuous <Continuous>  insulin lispro (HumaLOG) corrective regimen sliding scale   SubCutaneous Before meals and at bedtime  megestrol Suspension 800 milliGRAM(s) Oral daily  ondansetron   Disintegrating Tablet 8 milliGRAM(s) Oral every 8 hours  oxyCODONE  ER Tablet 30 milliGRAM(s) Oral every 8 hours  pantoprazole  Injectable 40 milliGRAM(s) IV Push daily  piperacillin/tazobactam IVPB. 3.375 Gram(s) IV Intermittent every 8 hours  sodium chloride 0.9% with potassium chloride 20 mEq/L 1000 milliLiter(s) (50 mL/Hr) IV Continuous <Continuous>  vancomycin  IVPB 1000 milliGRAM(s) IV Intermittent every 8 hours    MEDICATIONS  (PRN):  acetaminophen   Tablet 650 milliGRAM(s) Oral every 6 hours PRN mild pain  dextrose 40% Gel 15 Gram(s) Oral once PRN Blood Glucose LESS THAN 70 milliGRAM(s)/deciliter  dextrose 40% Gel 15 Gram(s) Oral once PRN Blood Glucose LESS THAN 70 milliGRAM(s)/deciliter  dextrose 40% Gel 15 Gram(s) Oral once PRN Blood Glucose LESS THAN 70 milliGRAM(s)/deciliter  docusate sodium Liquid 200 milliGRAM(s) Oral at bedtime PRN Constipation  glucagon  Injectable 1 milliGRAM(s) IntraMuscular once PRN Glucose LESS THAN 70 milligrams/deciliter  glucagon  Injectable 1 milliGRAM(s) IntraMuscular once PRN Glucose LESS THAN 70 milligrams/deciliter  glucagon  Injectable 1 milliGRAM(s) IntraMuscular once PRN Glucose LESS THAN 70 milligrams/deciliter  oxyCODONE    IR 10 milliGRAM(s) Oral every 3 hours PRN Moderate Pain (4 - 6)      Allergies    No Known Allergies    Intolerances      Review of Systems:  Constitutional: No fever  Eyes: No blurry vision  Neuro: No tremors  HEENT: No pain  Cardiovascular: No chest pain, palpitations  Respiratory: No SOB, no cough  GI: No nausea, vomiting, + abdominal pain  : No dysuria  Skin: no rash  Psych: no depression  Endocrine: no polyuria, polydipsia  Hem/lymph: no swelling      ALL OTHER SYSTEMS REVIEWED AND NEGATIVE      PHYSICAL EXAM:  VITALS: T(C): 36.6 (05-10-18 @ 09:02)  T(F): 97.9 (05-10-18 @ 09:02), Max: 98.6 (05-10-18 @ 01:08)  HR: 87 (05-10-18 @ 09:02) (87 - 96)  BP: 103/67 (05-10-18 @ 09:02) (99/66 - 119/68)  RR:  (18 - 18)  SpO2:  (97% - 99%)  Wt(kg): --  GENERAL: NAD, well-groomed, well-developed  EYES: No proptosis, no lid lag, anicteric  HEENT:  Atraumatic, Normocephalic, moist mucous membranes  THYROID: Normal size, no palpable nodules  RESPIRATORY: Clear to auscultation bilaterally; No rales, rhonchi, wheezing, or rubs  CARDIOVASCULAR: Regular rate and rhythm; No murmurs; no peripheral edema  GI: Soft, nontender, non distended, normal bowel sounds  SKIN: Dry, intact, No rashes or lesions  MUSCULOSKELETAL: Full range of motion, normal strength  NEURO: sensation intact, extraocular movements intact, no tremor, normal reflexes  PSYCH: Alert and oriented x 3, normal affect, normal mood      POCT Blood Glucose.: 204 mg/dL (05-10-18 @ 14:37)  POCT Blood Glucose.: 109 mg/dL (05-10-18 @ 13:32)  POCT Blood Glucose.: 55 mg/dL (05-10-18 @ 13:06)  POCT Blood Glucose.: 109 mg/dL (05-10-18 @ 08:07)  POCT Blood Glucose.: 355 mg/dL (05-10-18 @ 06:00)  POCT Blood Glucose.: 362 mg/dL (05-10-18 @ 03:59)  POCT Blood Glucose.: 291 mg/dL (18 @ 22:33)  POCT Blood Glucose.: 358 mg/dL (18 @ 19:07)  POCT Blood Glucose.: 228 mg/dL (18 @ 06:03)  POCT Blood Glucose.: 238 mg/dL (18 @ 22:51)  POCT Blood Glucose.: 89 mg/dL (18 @ 19:27)                            10.0   8.3   )-----------( 922      ( 10 May 2018 08:32 )             31.4       05-10    134<L>  |  98  |  6<L>  ----------------------------<  362<H>  4.3   |  22  |  0.68    EGFR if : 125  EGFR if non : 108    Ca    8.1<L>      05-10  Mg     1.4     05-10  Phos  2.7     05-10    TPro  6.1  /  Alb  2.6<L>  /  TBili  0.1<L>  /  DBili  x   /  AST  11  /  ALT  8<L>  /  AlkPhos  88  05-10      Thyroid Function Tests:      Hemoglobin A1C, Whole Blood: 5.4 % [4.0 - 5.6] (18 @ 16:57)          Radiology: HPI:  42-year-old female with history of chronic pancreatitis since childhood s/p [place] procedure in , 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 During her last admission (-18) after her islet cell transplant, patient developed LUQ abdominal collections found to be tracking to her midline. She underwent IR drainage and was discharged with drains that were removed two weeks ago. Drain cultures positive for Candida and VRE. She was discharged with a PICC and remained on antifungals.   Was seen at Dr. Walker’s on day of admission and found to have copious drainage from her midline wound that has been present and clear in color since her discharge, but has recently turned cloudy and foul-smelling two days ago. Denies fevers/chills, nausea/vomiting. Reports having diffuse abdominal pain. (08 May 2018 16:38)    Endocrine history: 42 yr F with hx of chronic pancreatitis s/p total pancreatectomy with islet cell autotranspalnt on  c/b intrabdmonial collections now here with drainage from abdominal wall wound. Patient was discharged on  adn recommended to follow a regimen fo Lantus 8U in Am adn carb counting with meals usign a ratio 0f 1:15. Her glcusoe levels orior to the infection -150 prelunch 150-250 predinner 100-150. she would also have frequent hypoglycemia 50s especially in the morning. Over the past few days her gl;cuose has been in 200s-300s range due to the infection.   Prior to last admission, patient had never been told of diabetes. Last saw optho this past year, no retinopathy, + Neuropathy sx in her hands, no known nephropathy. Diet- AM- doesn't eat, sometimes eggs or toast, lunch-sandwich, salad, dinner-stir juárez with lean meats, pork chops. Low-fat, states her diet is healthy. Sometimes snacks on cookies, cake or ice cream. She has a "sweet tooth". Drinks juice and gingerale. patient has a dietician at [place], and also follows with Dr. Noe Nesbitt (endocrinology) at [place].    PAST MEDICAL & SURGICAL HISTORY:  Premenstrual migraine  Other chronic pancreatitis  Status post pancreatic islet cell transplantation  H/O splenectomy  History of pancreatectomy  S/P cholecystectomy: in early 20&#x27;s      FAMILY HISTORY:  No FH of DM      Social History: nonsmoker, no ETOH    Outpatient Medications:  · 	lenard insulin pen needles: 1 unit(s) subcutaneous 4 times a day   · 	docusate sodium 10 mg/mL oral liquid: 10 milliliter(s) orally 2 times a day  · 	bisacodyl 5 mg oral delayed release tablet: 2 tab(s) orally once a day (at bedtime), As needed, Constipation  · 	erythromycin 500 mg oral tablet: 1 tab(s) orally every 6 hours  · 	pancrelipase 6000 units-19,000 units-30,000 units oral delayed release capsule: 1 cap(s) orally 3 times a day (with meals)  · 	micafungin: 100 milligram(s) intravenous once a day via PICC through   · 	oxyCODONE 10 mg oral tablet: 1 tab(s) orally every 3 hours, As needed, Moderate Pain (4 - 6)  · 	OxyCONTIN 30 mg oral tablet, extended release: 1 tab(s) orally every 8 hours  · 	megestrol 40 mg/mL oral suspension: 20 milliliter(s) orally once a day  · 	ondansetron 8 mg oral tablet, disintegratin tab(s) orally every 8 hours  · 	insulin glargine: 8 unit(s) subcutaneous once a day in the AM  · 	acetaminophen 325 mg oral tablet: 2 tab(s) orally every 6 hours, As needed, mild pain  · 	glucose gel: 5  buccal prn use as directed for low blood sugar  · 	NovoLOG 100 units/mL subcutaneous solution: 3 unit(s) subcutaneous 3 times a day before meals, adjust dose as instructed based on carbohydrate counting and what you eat  · 	Robaxin-750 oral tablet: three times a day     MEDICATIONS  (STANDING):  amylase/lipase/protease  (CREON  6,000 Units) 1 Capsule(s) Oral three times a day with meals  dextrose 5%. 1000 milliLiter(s) (50 mL/Hr) IV Continuous <Continuous>  dextrose 5%. 1000 milliLiter(s) (50 mL/Hr) IV Continuous <Continuous>  dextrose 5%. 1000 milliLiter(s) (50 mL/Hr) IV Continuous <Continuous>  dextrose 50% Injectable 12.5 Gram(s) IV Push once  dextrose 50% Injectable 25 Gram(s) IV Push once  dextrose 50% Injectable 25 Gram(s) IV Push once  dextrose 50% Injectable 12.5 Gram(s) IV Push once  dextrose 50% Injectable 12.5 Gram(s) IV Push once  dextrose 50% Injectable 25 Gram(s) IV Push once  dextrose 50% Injectable 25 Gram(s) IV Push once  dextrose 50% Injectable 12.5 Gram(s) IV Push once  dextrose 50% Injectable 25 Gram(s) IV Push once  dextrose 50% Injectable 25 Gram(s) IV Push once  erythromycin     base Tablet 500 milliGRAM(s) Oral every 6 hours  heparin  Infusion 1200 Unit(s)/Hr (12 mL/Hr) IV Continuous <Continuous>  insulin lispro (HumaLOG) corrective regimen sliding scale   SubCutaneous Before meals and at bedtime  megestrol Suspension 800 milliGRAM(s) Oral daily  ondansetron   Disintegrating Tablet 8 milliGRAM(s) Oral every 8 hours  oxyCODONE  ER Tablet 30 milliGRAM(s) Oral every 8 hours  pantoprazole  Injectable 40 milliGRAM(s) IV Push daily  piperacillin/tazobactam IVPB. 3.375 Gram(s) IV Intermittent every 8 hours  sodium chloride 0.9% with potassium chloride 20 mEq/L 1000 milliLiter(s) (50 mL/Hr) IV Continuous <Continuous>  vancomycin  IVPB 1000 milliGRAM(s) IV Intermittent every 8 hours    MEDICATIONS  (PRN):  acetaminophen   Tablet 650 milliGRAM(s) Oral every 6 hours PRN mild pain  dextrose 40% Gel 15 Gram(s) Oral once PRN Blood Glucose LESS THAN 70 milliGRAM(s)/deciliter  dextrose 40% Gel 15 Gram(s) Oral once PRN Blood Glucose LESS THAN 70 milliGRAM(s)/deciliter  dextrose 40% Gel 15 Gram(s) Oral once PRN Blood Glucose LESS THAN 70 milliGRAM(s)/deciliter  docusate sodium Liquid 200 milliGRAM(s) Oral at bedtime PRN Constipation  glucagon  Injectable 1 milliGRAM(s) IntraMuscular once PRN Glucose LESS THAN 70 milligrams/deciliter  glucagon  Injectable 1 milliGRAM(s) IntraMuscular once PRN Glucose LESS THAN 70 milligrams/deciliter  glucagon  Injectable 1 milliGRAM(s) IntraMuscular once PRN Glucose LESS THAN 70 milligrams/deciliter  oxyCODONE    IR 10 milliGRAM(s) Oral every 3 hours PRN Moderate Pain (4 - 6)      Allergies    No Known Allergies    Intolerances      Review of Systems:  Constitutional: No fever  Eyes: No blurry vision  Neuro: No tremors  HEENT: No pain  Cardiovascular: No chest pain, palpitations  Respiratory: No SOB, no cough  GI: No nausea, vomiting, + abdominal pain  : No dysuria  Skin: no rash  Psych: no depression  Endocrine: no polyuria, polydipsia  Hem/lymph: no swelling      ALL OTHER SYSTEMS REVIEWED AND NEGATIVE      PHYSICAL EXAM:  VITALS: T(C): 36.6 (05-10-18 @ 09:02)  T(F): 97.9 (05-10-18 @ 09:02), Max: 98.6 (05-10-18 @ 01:08)  HR: 87 (05-10-18 @ 09:02) (87 - 96)  BP: 103/67 (05-10-18 @ 09:02) (99/66 - 119/68)  RR:  (18 - 18)  SpO2:  (97% - 99%)  Wt(kg): --  GENERAL: NAD, well-groomed, well-developed  EYES: No proptosis, no lid lag, anicteric  HEENT:  Atraumatic, Normocephalic, moist mucous membranes  THYROID: Normal size, no palpable nodules  RESPIRATORY: Clear to auscultation bilaterally; No rales, rhonchi, wheezing, or rubs  CARDIOVASCULAR: Regular rate and rhythm; No murmurs; 1+ peripheral edema  GI: Soft, + abd wall drain, DANIEL drain  PSYCH: Alert and oriented x 3, normal affect, normal mood      POCT Blood Glucose.: 204 mg/dL (05-10-18 @ 14:37)  POCT Blood Glucose.: 109 mg/dL (05-10-18 @ 13:32)  POCT Blood Glucose.: 55 mg/dL (05-10-18 @ 13:06)  POCT Blood Glucose.: 109 mg/dL (05-10-18 @ 08:07)  POCT Blood Glucose.: 355 mg/dL (05-10-18 @ 06:00) L4 H10  POCT Blood Glucose.: 362 mg/dL (05-10-18 @ 03:59)  POCT Blood Glucose.: 291 mg/dL (18 @ 22:33)H1  POCT Blood Glucose.: 358 mg/dL (18 @ 19:07)  POCT Blood Glucose.: 228 mg/dL (18 @ 06:03)  POCT Blood Glucose.: 238 mg/dL (18 @ 22:51)  POCT Blood Glucose.: 89 mg/dL (18 @ 19:27)                            10.0   8.3   )-----------( 922      ( 10 May 2018 08:32 )             31.4       05-10    134<L>  |  98  |  6<L>  ----------------------------<  362<H>  4.3   |  22  |  0.68    EGFR if : 125  EGFR if non : 108    Ca    8.1<L>      05-10  Mg     1.4     05-10  Phos  2.7     05-10    TPro  6.1  /  Alb  2.6<L>  /  TBili  0.1<L>  /  DBili  x   /  AST  11  /  ALT  8<L>  /  AlkPhos  88  05-10        Hemoglobin A1C, Whole Blood: 5.4 % [4.0 - 5.6] (18 @ 16:57)

## 2018-05-10 NOTE — CONSULT NOTE ADULT - PROBLEM SELECTOR RECOMMENDATION 9
-While inpatient would start Lantus 6U in AM and Humalog 2U before meals plus low dose correctional scale before meals and at bedtime  -Goal glucose 100-140  -Rodney be discahrged on basal/bolus -While inpatient would start Lantus 6U in AM and Humalog 2U before meals plus low dose correctional scale before meals and at bedtime  -Goal glucose 100-140  -Will be discharged on basal/bolus

## 2018-05-10 NOTE — CONSULT NOTE ADULT - ATTENDING COMMENTS
Patient with chronic pancreatitis s/p pancreatectomy with islet cell transplant on 4/5, now admitted for surgical infections and has intraabdominal collections s/p drain.  An incidental finding on CT c/a/p showed a right atrial thrombus, confirmed by TTE.  Also seen was a wedge shaped area in kidney, infarct vs infection.  She is on a heparin gtt and may get systemic tpa v thrombectomy.  Regarding etiology, may just be post-surgical and less likely an underlying hypercoagulable state (given absence of prior VTE) but can check antiphospholipid antibodies.  Would also consider renal artery duplex to evaluate whether the wedge shaped abnormality is an infarct v. something else.  ANIBAL may also be useful to further elucidate the thrombus.
Agree with assessment and plan as above by Dr. Zuniga. Reviewed all pertinent labs, glucose values, and imaging studies. Modifications made as indicated above.     Nathaniel Ceja D.O  [phone]

## 2018-05-10 NOTE — CHART NOTE - NSCHARTNOTEFT_GEN_A_CORE
CCU Accept Note    Transfer from: (  ) Medicine    (  ) Telemetry     (   ) RCU        (    ) Palliative         (   ) Stroke Unit          (   ) __________________    Physican:    HPI: 42-year-old female, PMHX: chronic pancreatitis since childhood s/p [place] procedure in 2014, 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 During her last admission (4/5-4/16/18) after her islet cell transplant, patient developed LUQ abd. Collections found to be tracking to her midline. She underwent IR drainage and was discharged with drains that were removed two weeks ago. CX. Positive for Candida and VRE. Was discharged with a PICC and remained on antifungals. Seen at Dr. Walker’s found to have copious drainage from her midline wound that has been present and clear in color since her discharge, but has recently turned cloudy and foul-smelling two days ago.Noted on CT A/P with filling defect in RA, suggestive of RA thrombus.  TTE done confirms this.  LE Duplex without DVT.  S/p IR drainage of abdominal collection 5/9, drain in place.      Vital Signs Last 24 Hrs  T(C): 37.3 (10 May 2018 16:15), Max: 37.3 (10 May 2018 16:15)  T(F): 99.1 (10 May 2018 16:15), Max: 99.1 (10 May 2018 16:15)  HR: 98 (10 May 2018 17:00) (87 - 98)  BP: 109/72 (10 May 2018 17:00) (99/66 - 119/68)  BP(mean): 84 (10 May 2018 17:00) (73 - 95)  RR: 18 (10 May 2018 16:15) (18 - 18)  SpO2: 100% (10 May 2018 17:00) (97% - 100%)  I&O's Summary    09 May 2018 07:01  -  10 May 2018 07:00  --------------------------------------------------------  IN: 774 mL / OUT: 1175 mL / NET: -401 mL    10 May 2018 07:01  -  10 May 2018 17:17  --------------------------------------------------------  IN: 913 mL / OUT: 515 mL / NET: 398 mL        Allergies: NKA      MEDICATIONS  (STANDING):  alteplase    IVPB 100 milliGRAM(s) IV Intermittent once  amylase/lipase/protease  (CREON  6,000 Units) 1 Capsule(s) Oral three times a day with meals  dextrose 5%. 1000 milliLiter(s) (50 mL/Hr) IV Continuous <Continuous>  dextrose 50% Injectable 12.5 Gram(s) IV Push once  dextrose 50% Injectable 25 Gram(s) IV Push once  dextrose 50% Injectable 25 Gram(s) IV Push once  erythromycin     base Tablet 500 milliGRAM(s) Oral every 6 hours  heparin  Infusion 1200 Unit(s)/Hr (12 mL/Hr) IV Continuous <Continuous>  insulin glargine Injectable (LANTUS) 6 Unit(s) SubCutaneous every morning  insulin lispro (HumaLOG) corrective regimen sliding scale   SubCutaneous three times a day before meals  insulin lispro Injectable (HumaLOG) 2 Unit(s) SubCutaneous three times a day before meals  megestrol Suspension 800 milliGRAM(s) Oral daily  ondansetron   Disintegrating Tablet 8 milliGRAM(s) Oral every 8 hours  oxyCODONE  ER Tablet 30 milliGRAM(s) Oral every 8 hours  pantoprazole  Injectable 40 milliGRAM(s) IV Push daily  piperacillin/tazobactam IVPB. 3.375 Gram(s) IV Intermittent every 8 hours  sodium chloride 0.9% with potassium chloride 20 mEq/L 1000 milliLiter(s) (75 mL/Hr) IV Continuous <Continuous>  vancomycin  IVPB 1000 milliGRAM(s) IV Intermittent every 8 hours    MEDICATIONS  (PRN):  acetaminophen   Tablet 650 milliGRAM(s) Oral every 6 hours PRN mild pain  dextrose 40% Gel 15 Gram(s) Oral once PRN Blood Glucose LESS THAN 70 milliGRAM(s)/deciliter  docusate sodium Liquid 200 milliGRAM(s) Oral at bedtime PRN Constipation  glucagon  Injectable 1 milliGRAM(s) IntraMuscular once PRN Glucose LESS THAN 70 milligrams/deciliter  oxyCODONE    IR 10 milliGRAM(s) Oral every 3 hours PRN Moderate Pain (4 - 6)      LABS                                            10.0                  Neurophils% (auto):   x      (05-10 @ 08:32):    8.3  )-----------(922          Lymphocytes% (auto):  x                                             31.4                   Eosinphils% (auto):   x        Manual%: Neutrophils x    ; Lymphocytes x    ; Eosinophils x    ; Bands%: x    ; Blasts x                                    134    |  98     |  6                   Calcium: 8.1   / iCa: x      (05-10 @ 00:46)    ----------------------------<  362       Magnesium: 1.4                              4.3     |  22     |  0.68             Phosphorous: 2.7      TPro  6.1    /  Alb  2.6    /  TBili  0.1    /  DBili  x      /  AST  11     /  ALT  8      /  AlkPhos  88     10 May 2018 00:46    ( 05-10 @ 08:32 )   PT: x    ;   INR: x      aPTT: 61.5 sec      ASSESSMENT & PLAN:     Pedro Mchugh, -ACN  [id number] CCU Accept Note    Transfer from: 2 Benjie Oh: Dr. Kunz    HPI: 42-year-old female, PMHX: chronic pancreatitis since childhood s/p [place] procedure in 2014, 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 During her last admission (4/5-4/16/18) after her islet cell transplant, patient developed LUQ abd. Collections found to be tracking to her midline. She underwent IR drainage and was discharged with drains that were removed two weeks ago. CX. Positive for Candida and VRE. Was discharged with a PICC and remained on antifungals. Seen at Dr. Walker’s found to have copious drainage from her midline wound that has been present and clear in color since her discharge, but has recently turned cloudy and foul-smelling two days ago. Noted on CT A/P with filling defect in RA, suggestive of RA thrombus.  TTE confirms this.  LE Duplex without DVT.  S/p IR drainage of abdominal collection 5/9, drain in place.      Vital Signs Last 24 Hrs  T(C): 37.3 (10 May 2018 16:15), Max: 37.3 (10 May 2018 16:15)  T(F): 99.1 (10 May 2018 16:15), Max: 99.1 (10 May 2018 16:15)  HR: 98 (10 May 2018 17:00) (87 - 98)  BP: 109/72 (10 May 2018 17:00) (99/66 - 119/68)  BP(mean): 84 (10 May 2018 17:00) (73 - 95)  RR: 18 (10 May 2018 16:15) (18 - 18)  SpO2: 100% (10 May 2018 17:00) (97% - 100%)  I&O's Summary    09 May 2018 07:01  -  10 May 2018 07:00  --------------------------------------------------------  IN: 774 mL / OUT: 1175 mL / NET: -401 mL    10 May 2018 07:01  -  10 May 2018 17:17  --------------------------------------------------------  IN: 913 mL / OUT: 515 mL / NET: 398 mL      Allergies: NKA      MEDICATIONS  (STANDING):  alteplase    IVPB 100 milliGRAM(s) IV Intermittent once  amylase/lipase/protease  (CREON  6,000 Units) 1 Capsule(s) Oral three times a day with meals  dextrose 5%. 1000 milliLiter(s) (50 mL/Hr) IV Continuous <Continuous>  dextrose 50% Injectable 12.5 Gram(s) IV Push once  dextrose 50% Injectable 25 Gram(s) IV Push once  dextrose 50% Injectable 25 Gram(s) IV Push once  erythromycin     base Tablet 500 milliGRAM(s) Oral every 6 hours  heparin  Infusion 1200 Unit(s)/Hr (12 mL/Hr) IV Continuous <Continuous>  insulin glargine Injectable (LANTUS) 6 Unit(s) SubCutaneous every morning  insulin lispro (HumaLOG) corrective regimen sliding scale   SubCutaneous three times a day before meals  insulin lispro Injectable (HumaLOG) 2 Unit(s) SubCutaneous three times a day before meals  megestrol Suspension 800 milliGRAM(s) Oral daily  ondansetron   Disintegrating Tablet 8 milliGRAM(s) Oral every 8 hours  oxyCODONE  ER Tablet 30 milliGRAM(s) Oral every 8 hours  pantoprazole  Injectable 40 milliGRAM(s) IV Push daily  piperacillin/tazobactam IVPB. 3.375 Gram(s) IV Intermittent every 8 hours  sodium chloride 0.9% with potassium chloride 20 mEq/L 1000 milliLiter(s) (75 mL/Hr) IV Continuous <Continuous>  vancomycin  IVPB 1000 milliGRAM(s) IV Intermittent every 8 hours    MEDICATIONS  (PRN):  acetaminophen   Tablet 650 milliGRAM(s) Oral every 6 hours PRN mild pain  dextrose 40% Gel 15 Gram(s) Oral once PRN Blood Glucose LESS THAN 70 milliGRAM(s)/deciliter  docusate sodium Liquid 200 milliGRAM(s) Oral at bedtime PRN Constipation  glucagon  Injectable 1 milliGRAM(s) IntraMuscular once PRN Glucose LESS THAN 70 milligrams/deciliter  oxyCODONE    IR 10 milliGRAM(s) Oral every 3 hours PRN Moderate Pain (4 - 6)      LABS                                            10.0                  Neurophils% (auto):   x      (05-10 @ 08:32):    8.3  )-----------(922          Lymphocytes% (auto):  x                                             31.4                   Eosinphils% (auto):   x        Manual%: Neutrophils x    ; Lymphocytes x    ; Eosinophils x    ; Bands%: x    ; Blasts x                                    134    |  98     |  6                   Calcium: 8.1   / iCa: x      (05-10 @ 00:46)    ----------------------------<  362       Magnesium: 1.4                              4.3     |  22     |  0.68             Phosphorous: 2.7      TPro  6.1    /  Alb  2.6    /  TBili  0.1    /  DBili  x      /  AST  11     /  ALT  8      /  AlkPhos  88     10 May 2018 00:46    ( 05-10 @ 08:32 )   PT: x    ;   INR: x      aPTT: 61.5 sec      ASSESSMENT & PLAN: 42-year-old female, PMHX: chronic pancreatitis since childhood s/p [place] procedure in 2014, now s/p total pancreatectomy c/b intraabdominal collection requiring IR drainage and was discharged with drains that were removed two weeks ago. CX. Positive for Candida and VRE, discharged with a PICC and abx.  Found to have purulent drainage from abdominal wound and admitted for abdominal abscess.  S/P CT abd found to have RA thrombus and placed on heparin drip.  S/p IR drainage of abdominal collection 5/9, drain in place. Transferred to PICU for further management.    PLAN:  -continue with heparin gtt  -TPA at bedside or readily available  -Continue vanc/zosyn  - Pain control   - OOB as tolerated with assistance   -Continue pancreatic enzymes  -Lantus, pre-meal and ISS    Pedro Mchugh, AG-ACNP  [id number] CCU Accept Note    Transfer from: 2 Benjie Oh: Dr. Kunz    HPI: 42-year-old female, PMHX: chronic pancreatitis since childhood s/p [place] procedure in 2014, 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 During her last admission (4/5-4/16/18) after her islet cell transplant, patient developed LUQ abd. Collections found to be tracking to her midline. She underwent IR drainage and was discharged with drains that were removed two weeks ago. CX. Positive for Candida and VRE. Was discharged with a PICC and remained on antifungals. Seen at Dr. Walker’s found to have copious drainage from her midline wound that has been present and clear in color since her discharge, but has recently turned cloudy and foul-smelling two days ago. Noted on CT A/P with filling defect in RA, suggestive of RA thrombus.  TTE confirms this.  LE Duplex without DVT.  S/p IR drainage of abdominal collection 5/9, drain in place.      Vital Signs Last 24 Hrs  T(C): 37.3 (10 May 2018 16:15), Max: 37.3 (10 May 2018 16:15)  T(F): 99.1 (10 May 2018 16:15), Max: 99.1 (10 May 2018 16:15)  HR: 98 (10 May 2018 17:00) (87 - 98)  BP: 109/72 (10 May 2018 17:00) (99/66 - 119/68)  BP(mean): 84 (10 May 2018 17:00) (73 - 95)  RR: 18 (10 May 2018 16:15) (18 - 18)  SpO2: 100% (10 May 2018 17:00) (97% - 100%)  I&O's Summary    09 May 2018 07:01  -  10 May 2018 07:00  --------------------------------------------------------  IN: 774 mL / OUT: 1175 mL / NET: -401 mL    10 May 2018 07:01  -  10 May 2018 17:17  --------------------------------------------------------  IN: 913 mL / OUT: 515 mL / NET: 398 mL      Allergies: NKA      MEDICATIONS  (STANDING):  alteplase    IVPB 100 milliGRAM(s) IV Intermittent once  amylase/lipase/protease  (CREON  6,000 Units) 1 Capsule(s) Oral three times a day with meals  dextrose 5%. 1000 milliLiter(s) (50 mL/Hr) IV Continuous <Continuous>  dextrose 50% Injectable 12.5 Gram(s) IV Push once  dextrose 50% Injectable 25 Gram(s) IV Push once  dextrose 50% Injectable 25 Gram(s) IV Push once  erythromycin     base Tablet 500 milliGRAM(s) Oral every 6 hours  heparin  Infusion 1200 Unit(s)/Hr (12 mL/Hr) IV Continuous <Continuous>  insulin glargine Injectable (LANTUS) 6 Unit(s) SubCutaneous every morning  insulin lispro (HumaLOG) corrective regimen sliding scale   SubCutaneous three times a day before meals  insulin lispro Injectable (HumaLOG) 2 Unit(s) SubCutaneous three times a day before meals  megestrol Suspension 800 milliGRAM(s) Oral daily  ondansetron   Disintegrating Tablet 8 milliGRAM(s) Oral every 8 hours  oxyCODONE  ER Tablet 30 milliGRAM(s) Oral every 8 hours  pantoprazole  Injectable 40 milliGRAM(s) IV Push daily  piperacillin/tazobactam IVPB. 3.375 Gram(s) IV Intermittent every 8 hours  sodium chloride 0.9% with potassium chloride 20 mEq/L 1000 milliLiter(s) (75 mL/Hr) IV Continuous <Continuous>  vancomycin  IVPB 1000 milliGRAM(s) IV Intermittent every 8 hours    MEDICATIONS  (PRN):  acetaminophen   Tablet 650 milliGRAM(s) Oral every 6 hours PRN mild pain  dextrose 40% Gel 15 Gram(s) Oral once PRN Blood Glucose LESS THAN 70 milliGRAM(s)/deciliter  docusate sodium Liquid 200 milliGRAM(s) Oral at bedtime PRN Constipation  glucagon  Injectable 1 milliGRAM(s) IntraMuscular once PRN Glucose LESS THAN 70 milligrams/deciliter  oxyCODONE    IR 10 milliGRAM(s) Oral every 3 hours PRN Moderate Pain (4 - 6)      LABS                                            10.0                  Neurophils% (auto):   x      (05-10 @ 08:32):    8.3  )-----------(922          Lymphocytes% (auto):  x                                             31.4                   Eosinphils% (auto):   x        Manual%: Neutrophils x    ; Lymphocytes x    ; Eosinophils x    ; Bands%: x    ; Blasts x                                    134    |  98     |  6                   Calcium: 8.1   / iCa: x      (05-10 @ 00:46)    ----------------------------<  362       Magnesium: 1.4                              4.3     |  22     |  0.68             Phosphorous: 2.7      TPro  6.1    /  Alb  2.6    /  TBili  0.1    /  DBili  x      /  AST  11     /  ALT  8      /  AlkPhos  88     10 May 2018 00:46    ( 05-10 @ 08:32 )   PT: x    ;   INR: x      aPTT: 61.5 sec      ASSESSMENT & PLAN: 42-year-old female, PMHX: chronic pancreatitis since childhood s/p [place] procedure in 2014, now s/p total pancreatectomy c/b intraabdominal collection requiring IR drainage and was discharged with drains that were removed two weeks ago. CX. Positive for Candida and VRE, discharged with a PICC and abx.  Found to have purulent drainage from abdominal wound and admitted for abdominal abscess.  S/P CT abd found to have RA thrombus and placed on heparin drip.  S/p IR drainage of abdominal collection 5/9, drain in place. Transferred to PICU for further management.    PLAN:  -Continue heparin gtt  -TPA at bedside  -Continue vanc/zosyn  -Pain control   -OOB as tolerated with assistance   -Continue pancreatic enzymes  -Lantus, pre-meal and ISS    Pedro Mchugh, AG-ACNP  [id number]

## 2018-05-10 NOTE — CONSULT NOTE ADULT - SUBJECTIVE AND OBJECTIVE BOX
HPI:    42F with PMH chronic pancreatitis s/p recent total pancreatectomy with islet cell autotransplant on 4/5, now comlicated by intra-abdominal collections and a draining midline wound. Patient is currently on broad spectrum antibiotics and has multiple drains. Patient went for a CT abdomen/pelvis to assess for extent of infection and was incidentally noted to have a RA thrombus. Patient had a recent right IJ triple lumen catheter when admitted for her prior pregnancy. Patient denies any prior DVT, PE, stroke, other clotting issues. Denies personal or family history of blood problems or clotting issues. Patient denies prior miscarriages. She has never been told her platelet counts were elevated or any of her blood counts were abnormal. She denies any chest pain, SOB, palpitations, leg swelling, lightheadedness dizziness, history of abnormal heart rhythms. She had a recent prolonged hospitalization from 4/5-4/16. Patient had LE dopplers performed withotu presence of blood clot. RA thrombus confirmed on TTE.      Allergies    No Known Allergies    Intolerances        MEDICATIONS  (STANDING):  alteplase    IVPB 100 milliGRAM(s) IV Intermittent once  amylase/lipase/protease  (CREON  6,000 Units) 1 Capsule(s) Oral three times a day with meals  dextrose 5%. 1000 milliLiter(s) (50 mL/Hr) IV Continuous <Continuous>  dextrose 50% Injectable 12.5 Gram(s) IV Push once  dextrose 50% Injectable 25 Gram(s) IV Push once  dextrose 50% Injectable 25 Gram(s) IV Push once  erythromycin     base Tablet 500 milliGRAM(s) Oral every 6 hours  heparin  Infusion 1200 Unit(s)/Hr (12 mL/Hr) IV Continuous <Continuous>  insulin glargine Injectable (LANTUS) 6 Unit(s) SubCutaneous every morning  insulin lispro (HumaLOG) corrective regimen sliding scale   SubCutaneous three times a day before meals  insulin lispro Injectable (HumaLOG) 2 Unit(s) SubCutaneous three times a day before meals  megestrol Suspension 800 milliGRAM(s) Oral daily  ondansetron   Disintegrating Tablet 8 milliGRAM(s) Oral every 8 hours  oxyCODONE  ER Tablet 30 milliGRAM(s) Oral every 8 hours  pantoprazole  Injectable 40 milliGRAM(s) IV Push daily  piperacillin/tazobactam IVPB. 3.375 Gram(s) IV Intermittent every 8 hours  sodium chloride 0.9% with potassium chloride 20 mEq/L 1000 milliLiter(s) (75 mL/Hr) IV Continuous <Continuous>  vancomycin  IVPB 1000 milliGRAM(s) IV Intermittent every 8 hours    MEDICATIONS  (PRN):  acetaminophen   Tablet 650 milliGRAM(s) Oral every 6 hours PRN mild pain  dextrose 40% Gel 15 Gram(s) Oral once PRN Blood Glucose LESS THAN 70 milliGRAM(s)/deciliter  docusate sodium Liquid 200 milliGRAM(s) Oral at bedtime PRN Constipation  glucagon  Injectable 1 milliGRAM(s) IntraMuscular once PRN Glucose LESS THAN 70 milligrams/deciliter  oxyCODONE    IR 10 milliGRAM(s) Oral every 3 hours PRN Moderate Pain (4 - 6)      PAST MEDICAL & SURGICAL HISTORY:  Premenstrual migraine  Other chronic pancreatitis  Status post pancreatic islet cell transplantation  H/O splenectomy  History of pancreatectomy  S/P cholecystectomy: in early 20&#x27;s      FAMILY HISTORY:  No pertinent family history in first degree relatives      SOCIAL HISTORY: No EtOH, no tobacco    REVIEW OF SYSTEMS:    CONSTITUTIONAL: No weakness, fevers or chills  EYES/ENT: No visual changes;  No vertigo or throat pain   NECK: No pain or stiffness  RESPIRATORY: No cough, wheezing, hemoptysis; No shortness of breath  CARDIOVASCULAR: No chest pain or palpitations  GASTROINTESTINAL: No nausea, vomiting, or diarrhea. Has multiple abdominal drains  GENITOURINARY: No dysuria, frequency or hematuria  NEUROLOGICAL: No numbness or weakness  SKIN: No itching, burning, rashes, or lesions   All other review of systems is negative unless indicated above.        T(F): 99.1 (05-10-18 @ 16:15), Max: 99.1 (05-10-18 @ 16:15)  HR: 98 (05-10-18 @ 17:00)  BP: 109/72 (05-10-18 @ 17:00)  RR: 18 (05-10-18 @ 16:15)  SpO2: 100% (05-10-18 @ 17:00)  Wt(kg): --    GENERAL: NAD, well-developed  HEAD:  Atraumatic, Normocephalic  EYES: EOMI, PERRLA, conjunctiva and sclera clear  NECK: Supple, No JVD  CHEST/LUNG: Clear to auscultation bilaterally; No wheeze  HEART: Regular rate and rhythm; No murmurs, rubs, or gallops  ABDOMEN: multiple abdominal drains, +sutures  EXTREMITIES:  2+ Peripheral Pulses, No clubbing, cyanosis, or edema  NEUROLOGY: non-focal  SKIN: No rashes or lesions                          10.0   8.3   )-----------( 922      ( 10 May 2018 08:32 )             31.4       05-10    134<L>  |  98  |  6<L>  ----------------------------<  362<H>  4.3   |  22  |  0.68    Ca    8.1<L>      10 May 2018 00:46  Phos  2.7     05-10  Mg     1.4     05-10    TPro  6.1  /  Alb  2.6<L>  /  TBili  0.1<L>  /  DBili  x   /  AST  11  /  ALT  8<L>  /  AlkPhos  88  05-10      Magnesium, Serum: 1.4 mg/dL (05-10 @ 00:46)  Phosphorus Level, Serum: 2.7 mg/dL (05-10 @ 00:46)

## 2018-05-10 NOTE — CONSULT NOTE ADULT - PROBLEM SELECTOR RECOMMENDATION 9
- overall low suspicion for coagulopathy given negative history but can test for antiphospholipid syndrome (anticardiolipin ab, B2 glycoprotein screen, DRVVT, silica clotting time)  - check renal artery US given finding of wedge shaped abnormality on CT

## 2018-05-10 NOTE — CONSULT NOTE ADULT - ASSESSMENT
42F post op pancreatectomy, with wound infection and abdominal collection s/p IR drainage, with incidentally noted R atrial mass, likely thrombus.  Pt currently feels well and remains hemodynamically stable    -For now, continue with heparin gtt  -Would move to critical care unit setting for closer monitoring  -TPA at bedside or readily available  -Possible options for removal include Aspirex cathter versus open heart, but given clinical stability will monitor on heparin for now 42F post op pancreatectomy, with wound infection and abdominal collection s/p IR drainage, with incidentally noted R atrial mass, likely thrombus.  Pt currently feels well and remains hemodynamically stable    -For now, continue with heparin gtt  -Would move to critical care unit setting for closer monitoring  -TPA at bedside or readily available  -Possible options for removal include Angiovac cathter versus open heart, but given clinical stability will monitor on heparin for now

## 2018-05-10 NOTE — PROGRESS NOTE ADULT - SUBJECTIVE AND OBJECTIVE BOX
Interventional Radiology Follow- Up Note    42-year-old female with history of chronic pancreatitis s/p [place] procedure in 2014, now s/p total pancreatectomy with islet cell autotransplant on 4/5/18 c/b  LUQ abdominal collections s/p drainage on 4/13 s/p removal admitted with drainage from midline wound with CT abdomen showing a 3.8 x 2.7 cm collection of fluid and air in the left anterior abdomen s/p drainage on 5/9 with dr. Nash. Pt with leakage from midline. IR planning to  place a new drain in to midline wound.     Vitals: T(F): 97.9 (05-10-18 @ 09:02), Max: 98.6 (05-10-18 @ 01:08)  HR: 87 (05-10-18 @ 09:02) (87 - 96)  BP: 103/67 (05-10-18 @ 09:02) (99/66 - 119/68)  RR: 18 (05-10-18 @ 09:02) (18 - 18)  SpO2: 99% (05-10-18 @ 09:02) (97% - 99%)  Wt(kg): --    LABS:                        10.0   8.3   )-----------( 922      ( 10 May 2018 08:32 )             31.4     05-10    134<L>  |  98  |  6<L>  ----------------------------<  362<H>  4.3   |  22  |  0.68    Ca    8.1<L>      10 May 2018 00:46  Phos  2.7     05-10  Mg     1.4     05-10    TPro  6.1  /  Alb  2.6<L>  /  TBili  0.1<L>  /  DBili  x   /  AST  11  /  ALT  8<L>  /  AlkPhos  88  05-10    PT/INR - ( 08 May 2018 23:03 )   PT: 13.8 sec;   INR: 1.26 ratio         PTT - ( 10 May 2018 08:32 )  PTT:61.5 sec    Culture:   output :    PHYSICAL EXAM:  General: Nontoxic, in NAD  Neuro:  Alert & oriented x 3  abdomen: drain with serosanguinous out put. drain site c/d/i. Midline wound with ostomy collecting stool.       Impression: 42y Female with abdominal abscess s/p drainage planning for a second drain placement today.     -continue to monitor out put    -Flush drain per doctor orders  -trend vitals, labs  - D/w Dr. Nash   Please call IR at extension [phone] or [phone] with any questions, concerns, or issues regarding above.

## 2018-05-11 DIAGNOSIS — T81.4XXD INFECTION FOLLOWING A PROCEDURE, SUBSEQUENT ENCOUNTER: ICD-10-CM

## 2018-05-11 LAB
-  AMIKACIN: SIGNIFICANT CHANGE UP
-  AMIKACIN: SIGNIFICANT CHANGE UP
-  AMOXICILLIN/CLAVULANIC ACID: SIGNIFICANT CHANGE UP
-  AMOXICILLIN/CLAVULANIC ACID: SIGNIFICANT CHANGE UP
-  AMPICILLIN/SULBACTAM: SIGNIFICANT CHANGE UP
-  AMPICILLIN/SULBACTAM: SIGNIFICANT CHANGE UP
-  AMPICILLIN: SIGNIFICANT CHANGE UP
-  AZTREONAM: SIGNIFICANT CHANGE UP
-  AZTREONAM: SIGNIFICANT CHANGE UP
-  CEFAZOLIN: SIGNIFICANT CHANGE UP
-  CEFAZOLIN: SIGNIFICANT CHANGE UP
-  CEFEPIME: SIGNIFICANT CHANGE UP
-  CEFEPIME: SIGNIFICANT CHANGE UP
-  CEFOXITIN: SIGNIFICANT CHANGE UP
-  CEFOXITIN: SIGNIFICANT CHANGE UP
-  CEFTRIAXONE: SIGNIFICANT CHANGE UP
-  CEFTRIAXONE: SIGNIFICANT CHANGE UP
-  CIPROFLOXACIN: SIGNIFICANT CHANGE UP
-  ERTAPENEM: SIGNIFICANT CHANGE UP
-  ERTAPENEM: SIGNIFICANT CHANGE UP
-  ERYTHROMYCIN: SIGNIFICANT CHANGE UP
-  GENTAMICIN: SIGNIFICANT CHANGE UP
-  GENTAMICIN: SIGNIFICANT CHANGE UP
-  IMIPENEM: SIGNIFICANT CHANGE UP
-  IMIPENEM: SIGNIFICANT CHANGE UP
-  LEVOFLOXACIN: SIGNIFICANT CHANGE UP
-  LEVOFLOXACIN: SIGNIFICANT CHANGE UP
-  MEROPENEM: SIGNIFICANT CHANGE UP
-  MEROPENEM: SIGNIFICANT CHANGE UP
-  NITROFURANTOIN: SIGNIFICANT CHANGE UP
-  PIPERACILLIN/TAZOBACTAM: SIGNIFICANT CHANGE UP
-  PIPERACILLIN/TAZOBACTAM: SIGNIFICANT CHANGE UP
-  TETRACYCLINE: SIGNIFICANT CHANGE UP
-  TETRACYCLINE: SIGNIFICANT CHANGE UP
-  TOBRAMYCIN: SIGNIFICANT CHANGE UP
-  TOBRAMYCIN: SIGNIFICANT CHANGE UP
-  TRIMETHOPRIM/SULFAMETHOXAZOLE: SIGNIFICANT CHANGE UP
-  TRIMETHOPRIM/SULFAMETHOXAZOLE: SIGNIFICANT CHANGE UP
-  VANCOMYCIN: SIGNIFICANT CHANGE UP
-  VANCOMYCIN: SIGNIFICANT CHANGE UP
ALBUMIN SERPL ELPH-MCNC: 2.4 G/DL — LOW (ref 3.3–5)
ALP SERPL-CCNC: 81 U/L — SIGNIFICANT CHANGE UP (ref 40–120)
ALT FLD-CCNC: 8 U/L — LOW (ref 10–45)
ANION GAP SERPL CALC-SCNC: 17 MMOL/L — SIGNIFICANT CHANGE UP (ref 5–17)
APTT BLD: 112.8 SEC — HIGH (ref 27.5–37.4)
APTT BLD: 60 SEC — HIGH (ref 27.5–37.4)
AST SERPL-CCNC: 14 U/L — SIGNIFICANT CHANGE UP (ref 10–40)
BASOPHILS # BLD AUTO: 0.1 K/UL — SIGNIFICANT CHANGE UP (ref 0–0.2)
BASOPHILS NFR BLD AUTO: 0.9 % — SIGNIFICANT CHANGE UP (ref 0–2)
BILIRUB SERPL-MCNC: 0.1 MG/DL — LOW (ref 0.2–1.2)
BUN SERPL-MCNC: <4 MG/DL — LOW (ref 7–23)
CALCIUM SERPL-MCNC: 8.1 MG/DL — LOW (ref 8.4–10.5)
CHLORIDE SERPL-SCNC: 100 MMOL/L — SIGNIFICANT CHANGE UP (ref 96–108)
CO2 SERPL-SCNC: 17 MMOL/L — LOW (ref 22–31)
CREAT SERPL-MCNC: 0.67 MG/DL — SIGNIFICANT CHANGE UP (ref 0.5–1.3)
CULTURE RESULTS: SIGNIFICANT CHANGE UP
CULTURE RESULTS: SIGNIFICANT CHANGE UP
DRVVT SCREEN TO CONFIRM RATIO: SIGNIFICANT CHANGE UP
EOSINOPHIL # BLD AUTO: 0.1 K/UL — SIGNIFICANT CHANGE UP (ref 0–0.5)
EOSINOPHIL NFR BLD AUTO: 2 % — SIGNIFICANT CHANGE UP (ref 0–6)
GLUCOSE BLDC GLUCOMTR-MCNC: 320 MG/DL — HIGH (ref 70–99)
GLUCOSE BLDC GLUCOMTR-MCNC: 354 MG/DL — HIGH (ref 70–99)
GLUCOSE BLDC GLUCOMTR-MCNC: 422 MG/DL — HIGH (ref 70–99)
GLUCOSE SERPL-MCNC: 396 MG/DL — HIGH (ref 70–99)
HCT VFR BLD CALC: 32.2 % — LOW (ref 34.5–45)
HGB BLD-MCNC: 10.1 G/DL — LOW (ref 11.5–15.5)
LA NT DPL PPP QL: 31.2 SEC — SIGNIFICANT CHANGE UP
LYMPHOCYTES # BLD AUTO: 3.3 K/UL — SIGNIFICANT CHANGE UP (ref 1–3.3)
LYMPHOCYTES # BLD AUTO: 45.7 % — HIGH (ref 13–44)
MAGNESIUM SERPL-MCNC: 1.4 MG/DL — LOW (ref 1.6–2.6)
MCHC RBC-ENTMCNC: 29.6 PG — SIGNIFICANT CHANGE UP (ref 27–34)
MCHC RBC-ENTMCNC: 31.3 GM/DL — LOW (ref 32–36)
MCV RBC AUTO: 94.7 FL — SIGNIFICANT CHANGE UP (ref 80–100)
METHOD TYPE: SIGNIFICANT CHANGE UP
MONOCYTES # BLD AUTO: 0.6 K/UL — SIGNIFICANT CHANGE UP (ref 0–0.9)
MONOCYTES NFR BLD AUTO: 8.7 % — SIGNIFICANT CHANGE UP (ref 2–14)
NEUTROPHILS # BLD AUTO: 3.1 K/UL — SIGNIFICANT CHANGE UP (ref 1.8–7.4)
NEUTROPHILS NFR BLD AUTO: 42.7 % — LOW (ref 43–77)
NORMALIZED SCT PPP-RTO: 0.94 RATIO — SIGNIFICANT CHANGE UP (ref 0–1.16)
NORMALIZED SCT PPP-RTO: SIGNIFICANT CHANGE UP
ORGANISM # SPEC MICROSCOPIC CNT: SIGNIFICANT CHANGE UP
PHOSPHATE SERPL-MCNC: 2.6 MG/DL — SIGNIFICANT CHANGE UP (ref 2.5–4.5)
PLATELET # BLD AUTO: 871 K/UL — HIGH (ref 150–400)
POTASSIUM SERPL-MCNC: 4.9 MMOL/L — SIGNIFICANT CHANGE UP (ref 3.5–5.3)
POTASSIUM SERPL-SCNC: 4.9 MMOL/L — SIGNIFICANT CHANGE UP (ref 3.5–5.3)
PROT SERPL-MCNC: 5.9 G/DL — LOW (ref 6–8.3)
RBC # BLD: 3.41 M/UL — LOW (ref 3.8–5.2)
RBC # FLD: 15.6 % — HIGH (ref 10.3–14.5)
SODIUM SERPL-SCNC: 134 MMOL/L — LOW (ref 135–145)
SPECIMEN SOURCE: SIGNIFICANT CHANGE UP
SPECIMEN SOURCE: SIGNIFICANT CHANGE UP
WBC # BLD: 7.2 K/UL — SIGNIFICANT CHANGE UP (ref 3.8–10.5)
WBC # FLD AUTO: 7.2 K/UL — SIGNIFICANT CHANGE UP (ref 3.8–10.5)

## 2018-05-11 PROCEDURE — 99233 SBSQ HOSP IP/OBS HIGH 50: CPT

## 2018-05-11 PROCEDURE — 93975 VASCULAR STUDY: CPT | Mod: 26

## 2018-05-11 PROCEDURE — 99232 SBSQ HOSP IP/OBS MODERATE 35: CPT

## 2018-05-11 PROCEDURE — 99291 CRITICAL CARE FIRST HOUR: CPT

## 2018-05-11 PROCEDURE — 93010 ELECTROCARDIOGRAM REPORT: CPT

## 2018-05-11 RX ORDER — ONDANSETRON 8 MG/1
8 TABLET, FILM COATED ORAL EVERY 8 HOURS
Qty: 0 | Refills: 0 | Status: DISCONTINUED | OUTPATIENT
Start: 2018-05-11 | End: 2018-05-16

## 2018-05-11 RX ORDER — HEPARIN SODIUM 5000 [USP'U]/ML
1100 INJECTION INTRAVENOUS; SUBCUTANEOUS
Qty: 25000 | Refills: 0 | Status: DISCONTINUED | OUTPATIENT
Start: 2018-05-11 | End: 2018-05-11

## 2018-05-11 RX ORDER — SENNA PLUS 8.6 MG/1
2 TABLET ORAL AT BEDTIME
Qty: 0 | Refills: 0 | Status: DISCONTINUED | OUTPATIENT
Start: 2018-05-11 | End: 2018-05-16

## 2018-05-11 RX ORDER — MAGNESIUM SULFATE 500 MG/ML
2 VIAL (ML) INJECTION ONCE
Qty: 0 | Refills: 0 | Status: COMPLETED | OUTPATIENT
Start: 2018-05-11 | End: 2018-05-11

## 2018-05-11 RX ORDER — HEPARIN SODIUM 5000 [USP'U]/ML
2000 INJECTION INTRAVENOUS; SUBCUTANEOUS EVERY 6 HOURS
Qty: 0 | Refills: 0 | Status: DISCONTINUED | OUTPATIENT
Start: 2018-05-11 | End: 2018-05-12

## 2018-05-11 RX ORDER — HEPARIN SODIUM 5000 [USP'U]/ML
4000 INJECTION INTRAVENOUS; SUBCUTANEOUS EVERY 6 HOURS
Qty: 0 | Refills: 0 | Status: DISCONTINUED | OUTPATIENT
Start: 2018-05-11 | End: 2018-05-11

## 2018-05-11 RX ORDER — HEPARIN SODIUM 5000 [USP'U]/ML
2000 INJECTION INTRAVENOUS; SUBCUTANEOUS EVERY 6 HOURS
Qty: 0 | Refills: 0 | Status: DISCONTINUED | OUTPATIENT
Start: 2018-05-11 | End: 2018-05-11

## 2018-05-11 RX ORDER — HEPARIN SODIUM 5000 [USP'U]/ML
1250 INJECTION INTRAVENOUS; SUBCUTANEOUS
Qty: 25000 | Refills: 0 | Status: DISCONTINUED | OUTPATIENT
Start: 2018-05-11 | End: 2018-05-12

## 2018-05-11 RX ORDER — HEPARIN SODIUM 5000 [USP'U]/ML
4000 INJECTION INTRAVENOUS; SUBCUTANEOUS EVERY 6 HOURS
Qty: 0 | Refills: 0 | Status: DISCONTINUED | OUTPATIENT
Start: 2018-05-11 | End: 2018-05-12

## 2018-05-11 RX ORDER — INSULIN LISPRO 100/ML
3 VIAL (ML) SUBCUTANEOUS
Qty: 0 | Refills: 0 | Status: DISCONTINUED | OUTPATIENT
Start: 2018-05-11 | End: 2018-05-13

## 2018-05-11 RX ORDER — SODIUM CHLORIDE 9 MG/ML
1000 INJECTION INTRAMUSCULAR; INTRAVENOUS; SUBCUTANEOUS
Qty: 0 | Refills: 0 | Status: DISCONTINUED | OUTPATIENT
Start: 2018-05-11 | End: 2018-05-12

## 2018-05-11 RX ADMIN — OXYCODONE HYDROCHLORIDE 10 MILLIGRAM(S): 5 TABLET ORAL at 09:18

## 2018-05-11 RX ADMIN — Medication 1: at 19:54

## 2018-05-11 RX ADMIN — HEPARIN SODIUM 1250 UNIT(S)/HR: 5000 INJECTION INTRAVENOUS; SUBCUTANEOUS at 18:25

## 2018-05-11 RX ADMIN — Medication 1 CAPSULE(S): at 11:30

## 2018-05-11 RX ADMIN — OXYCODONE HYDROCHLORIDE 10 MILLIGRAM(S): 5 TABLET ORAL at 19:15

## 2018-05-11 RX ADMIN — OXYCODONE HYDROCHLORIDE 30 MILLIGRAM(S): 5 TABLET ORAL at 22:30

## 2018-05-11 RX ADMIN — PANTOPRAZOLE SODIUM 40 MILLIGRAM(S): 20 TABLET, DELAYED RELEASE ORAL at 11:28

## 2018-05-11 RX ADMIN — LINEZOLID 300 MILLIGRAM(S): 600 INJECTION, SOLUTION INTRAVENOUS at 09:18

## 2018-05-11 RX ADMIN — Medication 500 MILLIGRAM(S): at 11:28

## 2018-05-11 RX ADMIN — HEPARIN SODIUM 12 UNIT(S)/HR: 5000 INJECTION INTRAVENOUS; SUBCUTANEOUS at 08:25

## 2018-05-11 RX ADMIN — Medication 3 UNIT(S): at 19:54

## 2018-05-11 RX ADMIN — SODIUM CHLORIDE 75 MILLILITER(S): 9 INJECTION INTRAMUSCULAR; INTRAVENOUS; SUBCUTANEOUS at 22:00

## 2018-05-11 RX ADMIN — OXYCODONE HYDROCHLORIDE 10 MILLIGRAM(S): 5 TABLET ORAL at 11:28

## 2018-05-11 RX ADMIN — LINEZOLID 300 MILLIGRAM(S): 600 INJECTION, SOLUTION INTRAVENOUS at 22:07

## 2018-05-11 RX ADMIN — OXYCODONE HYDROCHLORIDE 30 MILLIGRAM(S): 5 TABLET ORAL at 06:56

## 2018-05-11 RX ADMIN — Medication 50 GRAM(S): at 11:27

## 2018-05-11 RX ADMIN — OXYCODONE HYDROCHLORIDE 10 MILLIGRAM(S): 5 TABLET ORAL at 12:28

## 2018-05-11 RX ADMIN — Medication 2 UNIT(S): at 07:43

## 2018-05-11 RX ADMIN — INSULIN GLARGINE 6 UNIT(S): 100 INJECTION, SOLUTION SUBCUTANEOUS at 09:16

## 2018-05-11 RX ADMIN — Medication 500 MILLIGRAM(S): at 18:19

## 2018-05-11 RX ADMIN — Medication 200 MILLIGRAM(S): at 22:08

## 2018-05-11 RX ADMIN — OXYCODONE HYDROCHLORIDE 30 MILLIGRAM(S): 5 TABLET ORAL at 15:00

## 2018-05-11 RX ADMIN — SODIUM CHLORIDE 75 MILLILITER(S): 9 INJECTION INTRAMUSCULAR; INTRAVENOUS; SUBCUTANEOUS at 08:30

## 2018-05-11 RX ADMIN — Medication 1 CAPSULE(S): at 19:53

## 2018-05-11 RX ADMIN — Medication 2 UNIT(S): at 11:29

## 2018-05-11 RX ADMIN — Medication 500 MILLIGRAM(S): at 06:19

## 2018-05-11 RX ADMIN — OXYCODONE HYDROCHLORIDE 30 MILLIGRAM(S): 5 TABLET ORAL at 06:19

## 2018-05-11 RX ADMIN — Medication 4: at 11:29

## 2018-05-11 RX ADMIN — OXYCODONE HYDROCHLORIDE 10 MILLIGRAM(S): 5 TABLET ORAL at 07:44

## 2018-05-11 RX ADMIN — OXYCODONE HYDROCHLORIDE 10 MILLIGRAM(S): 5 TABLET ORAL at 18:19

## 2018-05-11 RX ADMIN — OXYCODONE HYDROCHLORIDE 10 MILLIGRAM(S): 5 TABLET ORAL at 00:30

## 2018-05-11 RX ADMIN — OXYCODONE HYDROCHLORIDE 30 MILLIGRAM(S): 5 TABLET ORAL at 14:31

## 2018-05-11 RX ADMIN — Medication 50 GRAM(S): at 08:25

## 2018-05-11 RX ADMIN — Medication 6: at 07:43

## 2018-05-11 RX ADMIN — OXYCODONE HYDROCHLORIDE 30 MILLIGRAM(S): 5 TABLET ORAL at 22:09

## 2018-05-11 RX ADMIN — ERTAPENEM SODIUM 120 MILLIGRAM(S): 1 INJECTION, POWDER, LYOPHILIZED, FOR SOLUTION INTRAMUSCULAR; INTRAVENOUS at 20:00

## 2018-05-11 RX ADMIN — Medication 500 MILLIGRAM(S): at 22:08

## 2018-05-11 NOTE — PROGRESS NOTE ADULT - ASSESSMENT
41 y/o F well known to DM team from recent admission for total pancreatectomy and auto islet cell transplant secondary to chronic pancreatitis since childhood. Transplant was c/b intraabdominal collections. She now presents with large drainage of midline wound> s/p IR drainage 5/9 > also found to have RA thrombus> on heparin gtt.On antibiotics as well. Pt reports eating better today. Hypoglycemic yesterday after getting high insulin coverage and not eating much. Hyperglycemic today and eating better. Lantus 6 units started this am. Increased Humalog ac due to improved PO intake.

## 2018-05-11 NOTE — PROGRESS NOTE ADULT - SUBJECTIVE AND OBJECTIVE BOX
Surgery Blue Team Progress Note    Presented with abdominal wall abscess, HD4    SUBJECTIVE:   Patient was seen and examined this morning. There was no acute event overnight. She is resting comfortably in bed. Pain is well controlled. She was scheduled for a second drain placement by IR yesterday, but ot was canceled She was transferred to PICU for further management of her right atrium thrombus. She does not report pain, fever, n/v, chest pain, or shortness of breath.       OBJECTIVE:   T(C): 36.7 (05-11-18 @ 00:00), Max: 37.3 (05-10-18 @ 16:15)  HR: 95 (05-11-18 @ 05:00) (86 - 109)  BP: 119/68 (05-11-18 @ 05:00) (101/62 - 131/75)  RR: 14 (05-11-18 @ 05:00) (14 - 18)  SpO2: 97% (05-11-18 @ 05:00) (96% - 100%)  Wt(kg): --  CAPILLARY BLOOD GLUCOSE  54 (10 May 2018 13:08)      POCT Blood Glucose.: 422 mg/dL (11 May 2018 07:36)  POCT Blood Glucose.: 280 mg/dL (10 May 2018 22:23)  POCT Blood Glucose.: 269 mg/dL (10 May 2018 18:08)  POCT Blood Glucose.: 204 mg/dL (10 May 2018 14:37)  POCT Blood Glucose.: 109 mg/dL (10 May 2018 13:32)  POCT Blood Glucose.: 55 mg/dL (10 May 2018 13:06)  POCT Blood Glucose.: 109 mg/dL (10 May 2018 08:07)    I&O's Detail    10 May 2018 07:01  -  11 May 2018 07:00  --------------------------------------------------------  IN:    heparin Infusion: 276 mL    IV PiggyBack: 250 mL    Oral Fluid: 840 mL    sodium chloride 0.9% with potassium chloride 20 mEq/L: 1125 mL  Total IN: 2491 mL    OUT:    Bulb: 30 mL    Drain: 85 mL    Voided: 1300 mL  Total OUT: 1415 mL    Total NET: 1076 mL    PHYSICAL EXAM:  Gen: Well-nourished, well-developed, A&O x3, resting in bed in no acute distress  CV: RRR  Resp: Patent airways, unlabored   Abdomen: Soft, nontender, nondistended  Extremities: All 4 extremities warm and well perfused, no edema

## 2018-05-11 NOTE — PROGRESS NOTE ADULT - SUBJECTIVE AND OBJECTIVE BOX
DIABETES FOLLOW UP NOTE: Saw pt earlier today  INTERVAL HX: 41 y/o F well known to DM team from recent admission for total pancreatectomy and auto islet cell transplant secondary to chronic pancreatitis since childhood. Transplant was c/b intraabdominal collections> discharge with DANIEL drains and antifungals. She now presents with large drainage of midline wound> s/p IR drainage 5/9 with ostomy bag for drainage. During this hospitalization pt was also found to have RA thrombus> on heparin gtt. Abdominal fluid cultures are + for enterococcus faceium and klebsiella pneumoniae on antibiotics as well. Pt reports eating better today. Pt was NPO this am and received Lantus dose late with BGs levels in 400s and 300s thereafter. Hypoglycemic yesterday after getting high insulin coverage and not eating much.    Review of Systems: "I'm better today but still feel pain"  Cardiovascular: No chest pain, palpitations  Respiratory: No SOB, no cough  GI: No nausea, vomiting, abdominal pain  Endocrine: no polyuria, polydipsia or S&Sx of hypoglycemia    Allergies    No Known Allergies    Intolerances      MEDICATIONS:  amylase/lipase/protease  (CREON  6,000 Units) 1 Capsule(s) Oral three times a day with meals  ertapenem  IVPB 1000 milliGRAM(s) IV Intermittent every 24 hours     erythromycin     base Tablet 500 milliGRAM(s) Oral every 6 hours  heparin  Infusion 1200 Unit(s)/Hr (12 mL/Hr) IV Continuous <Continuous>  insulin glargine Injectable (LANTUS) 6 Unit(s) SubCutaneous every morning  insulin lispro (HumaLOG) corrective regimen sliding scale   SubCutaneous three times a day before meals  insulin lispro (HumaLOG) corrective regimen sliding scale   SubCutaneous at bedtime  insulin lispro Injectable (HumaLOG) 2 Unit(s) SubCutaneous three times a day before meals    linezolid  IVPB 600 milliGRAM(s) IV Intermittent every 12 hours      PHYSICAL EXAM:  VITALS: T(C): 37.1 (05-11-18 @ 14:00)  T(F): 98.8 (05-11-18 @ 14:00), Max: 98.8 (05-11-18 @ 14:00)  HR: 93 (05-11-18 @ 16:00) (86 - 110)  BP: 113/60 (05-11-18 @ 16:00) (106/73 - 131/75)  RR:  (14 - 18)  SpO2:  (96% - 100%)  Wt(kg): --  GENERAL: NAD  Abdomen: Soft, tender, non distended, Noted midline wound opening with ostomy bag draining thick yellowish out put.   Extremities: Warm, no edema in all 4 exts.  NEURO: A&O X3      LABS:  POCT Blood Glucose.: 320 mg/dL (05-11-18 @ 11:27)  POCT Blood Glucose.: 354 mg/dL (05-11-18 @ 09:16)  POCT Blood Glucose.: 422 mg/dL (05-11-18 @ 07:36)  POCT Blood Glucose.: 280 mg/dL (05-10-18 @ 22:23)  POCT Blood Glucose.: 269 mg/dL (05-10-18 @ 18:08)  POCT Blood Glucose.: 204 mg/dL (05-10-18 @ 14:37)  POCT Blood Glucose.: 109 mg/dL (05-10-18 @ 13:32)  POCT Blood Glucose.: 55 mg/dL (05-10-18 @ 13:06)  POCT Blood Glucose.: 109 mg/dL (05-10-18 @ 08:07)  POCT Blood Glucose.: 355 mg/dL (05-10-18 @ 06:00)  POCT Blood Glucose.: 362 mg/dL (05-10-18 @ 03:59)  POCT Blood Glucose.: 291 mg/dL (05-09-18 @ 22:33)  POCT Blood Glucose.: 358 mg/dL (05-09-18 @ 19:07)  POCT Blood Glucose.: 228 mg/dL (05-09-18 @ 06:03)  POCT Blood Glucose.: 238 mg/dL (05-08-18 @ 22:51)  POCT Blood Glucose.: 89 mg/dL (05-08-18 @ 19:27)                            10.1   7.2   )-----------( 871      ( 11 May 2018 06:31 )             32.2       05-11    134<L>  |  100  |  <4<L>  ----------------------------<  396<H>  4.9   |  17<L>  |  0.67    EGFR if : 126  EGFR if non : 108    Ca    8.1<L>      05-11  Mg     1.4     05-11  Phos  2.6     05-11    TPro  5.9<L>  /  Alb  2.4<L>  /  TBili  0.1<L>  /  DBili  x   /  AST  14  /  ALT  8<L>  /  AlkPhos  81  05-11      Hemoglobin A1C, Whole Blood: 5.4 % [4.0 - 5.6] (04-12-18 @ 16:57)

## 2018-05-11 NOTE — PROGRESS NOTE ADULT - ASSESSMENT
42F s/p pancreatectomy with R atrial thrombus    -Cont with Hep gtt  -Reecho on Monday  -Monitor in PICU  -If mass unchanged in size or larger, will consider percutaneous removal versus systemic TPA

## 2018-05-11 NOTE — PROGRESS NOTE ADULT - ASSESSMENT
Ms. Moreno is a 42-year-old patient who presented with abdominal wall abscess s/p IR drainage, with new incidental finding of RA thrombus on CT. HD4. She  is hemodynamically stable. Labs are significant for hypomagnesia.       - RA thrombus, c/w with anticoagulation on heparin gtt @ 12 units/hour. TPA at bedside  - Diet: on regular, has been NPO since midnight for IT procedure  - ABx regimen: ertapenem, Linezolid  - Pain control: Oxycodone, Acetaminophen   - Glycemic control, Lantus, pre-meal and ISS  - Monitor GI function  - Replete electrolyte as necessary  - pancreatic enzyme replacement   - hem/onc recommendation appreciated: check for antiphospholipid antibodies, consider renal artery duplex  - Activity: OOb as tolerated  - PICU care appreciated

## 2018-05-11 NOTE — PROGRESS NOTE ADULT - ASSESSMENT
41 y/o PMHX: chronic pancreatitis since childhood s/p Moody procedure in 2014, now s/p total pancreatectomy with islet cell autotransplant on 4/5/18 c/b intraabdominal collections + Candida and VRE, s/p home infused abx. Presents again with purulent abdominal drainage from wound site.  Noted on CT A/P 3.8 x 2.7 cm abscess s/p IR drainage of abdominal collection on 5/9, and in addition a Filling defect within the right atrium concerning for clot, TTE confirms this heparin initiated. Transferred to PICU for closer monitoring.

## 2018-05-11 NOTE — PROGRESS NOTE ADULT - SUBJECTIVE AND OBJECTIVE BOX
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 During her last admission (4/5-4/16/18) after her islet cell transplant, patient developed LUQ abdominal collections found to be tracking to her midline. She underwent IR drainage and was discharged with drains that were removed two weeks ago. Drain cultures positive for Candida and VRE. She was discharged with a PICC and remained on antifungals.   Was seen at Dr. Walker’s on day of admission and found to have copious drainage from her midline wound that has been present and clear in color since her discharge, but has recently turned cloudy and foul-smelling two days ago. Denies fevers/chills, nausea/vomiting. Reports having diffuse abdominal pain. (08 May 2018 16:38)    Overnight Event: No issues overnight    REVIEW OF SYSTEMS:  	    MEDICATIONS  (STANDING):  alteplase    IVPB 100 milliGRAM(s) IV Intermittent once  amylase/lipase/protease  (CREON  6,000 Units) 1 Capsule(s) Oral three times a day with meals  dextrose 5%. 1000 milliLiter(s) (50 mL/Hr) IV Continuous <Continuous>  dextrose 50% Injectable 12.5 Gram(s) IV Push once  dextrose 50% Injectable 25 Gram(s) IV Push once  dextrose 50% Injectable 25 Gram(s) IV Push once  ertapenem  IVPB 1000 milliGRAM(s) IV Intermittent every 24 hours  ertapenem  IVPB      erythromycin     base Tablet 500 milliGRAM(s) Oral every 6 hours  heparin  Infusion 1200 Unit(s)/Hr (12 mL/Hr) IV Continuous <Continuous>  insulin glargine Injectable (LANTUS) 6 Unit(s) SubCutaneous every morning  insulin lispro (HumaLOG) corrective regimen sliding scale   SubCutaneous three times a day before meals  insulin lispro (HumaLOG) corrective regimen sliding scale   SubCutaneous at bedtime  insulin lispro Injectable (HumaLOG) 2 Unit(s) SubCutaneous three times a day before meals  linezolid  IVPB      linezolid  IVPB 600 milliGRAM(s) IV Intermittent every 12 hours  megestrol Suspension 800 milliGRAM(s) Oral daily  oxyCODONE  ER Tablet 30 milliGRAM(s) Oral every 8 hours  pantoprazole  Injectable 40 milliGRAM(s) IV Push daily  sodium chloride 0.9% with potassium chloride 20 mEq/L 1000 milliLiter(s) (75 mL/Hr) IV Continuous <Continuous>    MEDICATIONS  (PRN):  acetaminophen   Tablet 650 milliGRAM(s) Oral every 6 hours PRN mild pain  dextrose 40% Gel 15 Gram(s) Oral once PRN Blood Glucose LESS THAN 70 milliGRAM(s)/deciliter  docusate sodium Liquid 200 milliGRAM(s) Oral at bedtime PRN Constipation  glucagon  Injectable 1 milliGRAM(s) IntraMuscular once PRN Glucose LESS THAN 70 milligrams/deciliter  ondansetron   Disintegrating Tablet 8 milliGRAM(s) Oral every 8 hours PRN Nausea and/or Vomiting  oxyCODONE    IR 10 milliGRAM(s) Oral every 3 hours PRN Moderate Pain (4 - 6)        PHYSICAL EXAM:  Vital Signs Last 24 Hrs  T(C): 36.7 (11 May 2018 00:00), Max: 37.3 (10 May 2018 16:15)  T(F): 98.1 (11 May 2018 00:00), Max: 99.1 (10 May 2018 16:15)  HR: 95 (11 May 2018 05:00) (86 - 109)  BP: 119/68 (11 May 2018 05:00) (101/62 - 131/75)  BP(mean): 83 (11 May 2018 05:00) (73 - 96)  RR: 14 (11 May 2018 05:00) (14 - 18)  SpO2: 97% (11 May 2018 05:00) (96% - 100%)  I&O's Summary    10 May 2018 07:01  -  11 May 2018 07:00  --------------------------------------------------------  IN: 2491 mL / OUT: 1415 mL / NET: 1076 mL        Appearance: Normal	  HEENT:   Normal oral mucosa, PERRL, EOMI	  Lymphatic: No lymphadenopathy  Cardiovascular: Normal S1 S2, No JVD, No murmurs, No edema  Respiratory: Lungs clear to auscultation	  Psychiatry: A & O x 3, Mood & affect appropriate  Gastrointestinal:  Soft, Non-tender, + BS	  Skin: No rashes, No ecchymoses, No cyanosis	  Neurologic: Non-focal  Extremities: Normal range of motion, No clubbing, cyanosis or edema  Vascular: Peripheral pulses palpable 2+ bilaterally    LABS:	 	                        10.0   8.3   )-----------( 922      ( 10 May 2018 08:32 )             31.4     Auto Eosinophil # x     / Auto Eosinophil % x     / Auto Neutrophil # x     / Auto Neutrophil % x     / BANDS % x                            9.1    8.8   )-----------( 846      ( 10 May 2018 00:46 )             28.0     Auto Eosinophil # x     / Auto Eosinophil % x     / Auto Neutrophil # x     / Auto Neutrophil % x     / BANDS % x                            8.6    10.2  )-----------( 871      ( 09 May 2018 08:25 )             27.4     Auto Eosinophil # x     / Auto Eosinophil % x     / Auto Neutrophil # x     / Auto Neutrophil % x     / BANDS % x        INR: 1.26 ratio (05-08 @ 23:03)  INR: 1.27 ratio (05-08 @ 14:45)    05-10    134<L>  |  98  |  6<L>  ----------------------------<  362<H>  4.3   |  22  |  0.68    Ca    8.1<L>      10 May 2018 00:46  Mg     1.4     05-10  Phos  2.7     05-10  TPro  6.1  /  Alb  2.6<L>  /  TBili  0.1<L>  /  DBili  x   /  AST  11  /  ALT  8<L>  /  AlkPhos  88  05-10      HgA1c: 5.4 % (04-12 @ 16:57)      TELEMETRY: 	    ECG:  	  RADIOLOGY:  OTHER: 	    PREVIOUS DIAGNOSTIC TESTING:    [ ] Echocardiogram:    Pedro Mchugh -[facility]  Contact #[id number] Patient is a 42y old  Female who presents with a chief complaint of draining midline wound (08 May 2018 16:38)    HPI:  42-year-old female with history of chronic pancreatitis since childhood s/p Moody procedure in 2014, 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 During her last admission (4/5-4/16/18) after her islet cell transplant, patient developed LUQ abdominal collections found to be tracking to her midline. She underwent IR drainage and was discharged with drains that were removed two weeks ago. Drain cultures positive for Candida and VRE. She was discharged with a PICC and remained on antifungals.   Was seen at Dr. Walker’s on day of admission and found to have copious drainage from her midline wound that has been present and clear in color since her discharge, but has recently turned cloudy and foul-smelling two days ago. Denies fevers/chills, nausea/vomiting. Reports having diffuse abdominal pain. (08 May 2018 16:38)    Overnight Event: No issues overnight    REVIEW OF SYSTEMS:  	    MEDICATIONS  (STANDING):  alteplase    IVPB 100 milliGRAM(s) IV Intermittent once  amylase/lipase/protease  (CREON  6,000 Units) 1 Capsule(s) Oral three times a day with meals  dextrose 5%. 1000 milliLiter(s) (50 mL/Hr) IV Continuous <Continuous>  dextrose 50% Injectable 12.5 Gram(s) IV Push once  dextrose 50% Injectable 25 Gram(s) IV Push once  dextrose 50% Injectable 25 Gram(s) IV Push once  ertapenem  IVPB 1000 milliGRAM(s) IV Intermittent every 24 hours  ertapenem  IVPB      erythromycin     base Tablet 500 milliGRAM(s) Oral every 6 hours  heparin  Infusion 1200 Unit(s)/Hr (12 mL/Hr) IV Continuous <Continuous>  insulin glargine Injectable (LANTUS) 6 Unit(s) SubCutaneous every morning  insulin lispro (HumaLOG) corrective regimen sliding scale   SubCutaneous three times a day before meals  insulin lispro (HumaLOG) corrective regimen sliding scale   SubCutaneous at bedtime  insulin lispro Injectable (HumaLOG) 2 Unit(s) SubCutaneous three times a day before meals  linezolid  IVPB      linezolid  IVPB 600 milliGRAM(s) IV Intermittent every 12 hours  megestrol Suspension 800 milliGRAM(s) Oral daily  oxyCODONE  ER Tablet 30 milliGRAM(s) Oral every 8 hours  pantoprazole  Injectable 40 milliGRAM(s) IV Push daily  sodium chloride 0.9% with potassium chloride 20 mEq/L 1000 milliLiter(s) (75 mL/Hr) IV Continuous <Continuous>    MEDICATIONS  (PRN):  acetaminophen   Tablet 650 milliGRAM(s) Oral every 6 hours PRN mild pain  dextrose 40% Gel 15 Gram(s) Oral once PRN Blood Glucose LESS THAN 70 milliGRAM(s)/deciliter  docusate sodium Liquid 200 milliGRAM(s) Oral at bedtime PRN Constipation  glucagon  Injectable 1 milliGRAM(s) IntraMuscular once PRN Glucose LESS THAN 70 milligrams/deciliter  ondansetron   Disintegrating Tablet 8 milliGRAM(s) Oral every 8 hours PRN Nausea and/or Vomiting  oxyCODONE    IR 10 milliGRAM(s) Oral every 3 hours PRN Moderate Pain (4 - 6)        PHYSICAL EXAM:  Vital Signs Last 24 Hrs  T(C): 36.7 (11 May 2018 00:00), Max: 37.3 (10 May 2018 16:15)  T(F): 98.1 (11 May 2018 00:00), Max: 99.1 (10 May 2018 16:15)  HR: 95 (11 May 2018 05:00) (86 - 109)  BP: 119/68 (11 May 2018 05:00) (101/62 - 131/75)  BP(mean): 83 (11 May 2018 05:00) (73 - 96)  RR: 14 (11 May 2018 05:00) (14 - 18)  SpO2: 97% (11 May 2018 05:00) (96% - 100%)  I&O's Summary    10 May 2018 07:01  -  11 May 2018 07:00  --------------------------------------------------------  IN: 2491 mL / OUT: 1415 mL / NET: 1076 mL        Appearance: Normal	  HEENT:   Normal oral mucosa, PERRL, EOMI	  Lymphatic: No lymphadenopathy  Cardiovascular: Normal S1 S2, No JVD, No murmurs, No edema  Respiratory: Lungs clear to auscultation	  Psychiatry: A & O x 3, Mood & affect appropriate  Gastrointestinal:  Soft, Non-tender, + BS	  Skin: No rashes, No ecchymoses, No cyanosis	  Neurologic: Non-focal  Extremities: Normal range of motion, No clubbing, cyanosis or edema  Vascular: Peripheral pulses palpable 2+ bilaterally    LABS:	 	                        10.0   8.3   )-----------( 922      ( 10 May 2018 08:32 )             31.4     Auto Eosinophil # x     / Auto Eosinophil % x     / Auto Neutrophil # x     / Auto Neutrophil % x     / BANDS % x                            9.1    8.8   )-----------( 846      ( 10 May 2018 00:46 )             28.0     Auto Eosinophil # x     / Auto Eosinophil % x     / Auto Neutrophil # x     / Auto Neutrophil % x     / BANDS % x                            8.6    10.2  )-----------( 871      ( 09 May 2018 08:25 )             27.4     Auto Eosinophil # x     / Auto Eosinophil % x     / Auto Neutrophil # x     / Auto Neutrophil % x     / BANDS % x        INR: 1.26 ratio (05-08 @ 23:03)  INR: 1.27 ratio (05-08 @ 14:45)    05-10    134<L>  |  98  |  6<L>  ----------------------------<  362<H>  4.3   |  22  |  0.68    Ca    8.1<L>      10 May 2018 00:46  Mg     1.4     05-10  Phos  2.7     05-10  TPro  6.1  /  Alb  2.6<L>  /  TBili  0.1<L>  /  DBili  x   /  AST  11  /  ALT  8<L>  /  AlkPhos  88  05-10      HgA1c: 5.4 % (04-12 @ 16:57)      TELEMETRY: 	No ectopy    ECG:  	Normal sinus rhythm  RADIOLOGY: < from: CT Abdomen and Pelvis w/ Oral Cont and w/ IV Cont (05.08.18 @ 16:02) >  IMPRESSION:     A 3.8 x 2.7 cm collection of fluid and air within the left anterior   abdominal wall. Small adjacent hernia containing nonobstructed small   bowel.    Previously noted collections within the pancreatic operative bed have   near completely resolved with only a small collection of air remaining.    Filling defect within the right atrium concerning for clot.   Echocardiogram is recommended for further evaluation.    Nonspecific peripheral opacities in the right lower lobe.    Small focus of infection in the right kidney versus infarct.    < end of copied text >    OTHER: 	    PREVIOUS DIAGNOSTIC TESTING:    [ ] Echocardiogram:< from: Transthoracic Echocardiogram (05.09.18 @ 15:29) >  Observations: EF 59%  Mitral Valve: Normal mitral valve.  Aortic Valve/Aorta: Aortic valve not well visualized;  probably normal.  Aortic Root: 2.4 cm.  Left Atrium: Normal left atrium.  LA volume index = 19  cc/m2.  Left Ventricle: Normal left ventricular systolic function.  No segmental wall motion abnormalities. Normal left  ventricular internal dimensions and wall thicknesses.  Right Heart: Normal right atrium. Large mobile echogenic  structure seen in RA measures 3.1 cm X 2.8 cm.  This most  likely represents thrombus. Less likely myxoma. Consider  ANIBAL if clinically indicated.  Normal right ventricular size  and function. Normal tricuspid valve. Mild tricuspid  regurgitation. Normal pulmonic valve.  Pericardium/Pleura: Normal pericardiumwith no pericardial  effusion.  Hemodynamic: Estimated right atrial pressure is 8 mm Hg.  Estimated right ventricular systolic pressure equals 35 mm  Hg, assuming right atrial pressure equals 8 mm Hg,  consistent with borderline pulmonary hypertension.    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 During her last admission (4/5-4/16/18) after her islet cell transplant, patient developed LUQ abdominal collections found to be tracking to her midline. She underwent IR drainage and was discharged with drains that were removed two weeks ago. Drain cultures positive for Candida and VRE. She was discharged with a PICC and remained on antifungals.   Was seen at Dr. Walker’s on day of admission and found to have copious drainage from her midline wound that has been present and clear in color since her discharge, but has recently turned cloudy and foul-smelling two days ago. Denies fevers/chills, nausea/vomiting. Reports having diffuse abdominal pain. (08 May 2018 16:38)    Overnight Event: No issues overnight    REVIEW OF SYSTEMS: Slept well, no palpitations, chest pain or SOB  	    MEDICATIONS  (STANDING):  alteplase    IVPB 100 milliGRAM(s) IV Intermittent once  amylase/lipase/protease  (CREON  6,000 Units) 1 Capsule(s) Oral three times a day with meals  dextrose 5%. 1000 milliLiter(s) (50 mL/Hr) IV Continuous <Continuous>  dextrose 50% Injectable 12.5 Gram(s) IV Push once  dextrose 50% Injectable 25 Gram(s) IV Push once  dextrose 50% Injectable 25 Gram(s) IV Push once  ertapenem  IVPB 1000 milliGRAM(s) IV Intermittent every 24 hours  ertapenem  IVPB      erythromycin     base Tablet 500 milliGRAM(s) Oral every 6 hours  heparin  Infusion 1200 Unit(s)/Hr (12 mL/Hr) IV Continuous <Continuous>  insulin glargine Injectable (LANTUS) 6 Unit(s) SubCutaneous every morning  insulin lispro (HumaLOG) corrective regimen sliding scale   SubCutaneous three times a day before meals  insulin lispro (HumaLOG) corrective regimen sliding scale   SubCutaneous at bedtime  insulin lispro Injectable (HumaLOG) 2 Unit(s) SubCutaneous three times a day before meals  linezolid  IVPB      linezolid  IVPB 600 milliGRAM(s) IV Intermittent every 12 hours  megestrol Suspension 800 milliGRAM(s) Oral daily  oxyCODONE  ER Tablet 30 milliGRAM(s) Oral every 8 hours  pantoprazole  Injectable 40 milliGRAM(s) IV Push daily  sodium chloride 0.9% with potassium chloride 20 mEq/L 1000 milliLiter(s) (75 mL/Hr) IV Continuous <Continuous>    MEDICATIONS  (PRN):  acetaminophen   Tablet 650 milliGRAM(s) Oral every 6 hours PRN mild pain  dextrose 40% Gel 15 Gram(s) Oral once PRN Blood Glucose LESS THAN 70 milliGRAM(s)/deciliter  docusate sodium Liquid 200 milliGRAM(s) Oral at bedtime PRN Constipation  glucagon  Injectable 1 milliGRAM(s) IntraMuscular once PRN Glucose LESS THAN 70 milligrams/deciliter  ondansetron   Disintegrating Tablet 8 milliGRAM(s) Oral every 8 hours PRN Nausea and/or Vomiting  oxyCODONE    IR 10 milliGRAM(s) Oral every 3 hours PRN Moderate Pain (4 - 6)        PHYSICAL EXAM:  Vital Signs Last 24 Hrs  T(C): 36.7 (11 May 2018 00:00), Max: 37.3 (10 May 2018 16:15)  T(F): 98.1 (11 May 2018 00:00), Max: 99.1 (10 May 2018 16:15)  HR: 95 (11 May 2018 05:00) (86 - 109)  BP: 119/68 (11 May 2018 05:00) (101/62 - 131/75)  BP(mean): 83 (11 May 2018 05:00) (73 - 96)  RR: 14 (11 May 2018 05:00) (14 - 18)  SpO2: 97% (11 May 2018 05:00) (96% - 100%)  I&O's Summary    10 May 2018 07:01  -  11 May 2018 07:00  --------------------------------------------------------  IN: 2491 mL / OUT: 1415 mL / NET: 1076 mL        Appearance: Normal	  HEENT:   Normal oral mucosa, PERRL, EOMI	  Cardiovascular: Normal S1 S2, No JVD, No murmurs, No edema  Respiratory: Lungs clear to auscultation	  Psychiatry: A & O x 3, Mood & affect appropriate  Gastrointestinal:  firm above drain site, tender, + BS, draining purulent drainage	  Skin: No rashes, No ecchymoses, No cyanosis	  Neurologic: Non-focal  Extremities: Normal range of motion, No clubbing, cyanosis or edema  Vascular: Peripheral pulses palpable 2+ bilaterally    LABS:	 	                        10.0   8.3   )-----------( 922      ( 10 May 2018 08:32 )             31.4     Auto Eosinophil # x     / Auto Eosinophil % x     / Auto Neutrophil # x     / Auto Neutrophil % x     / BANDS % x                            9.1    8.8   )-----------( 846      ( 10 May 2018 00:46 )             28.0     Auto Eosinophil # x     / Auto Eosinophil % x     / Auto Neutrophil # x     / Auto Neutrophil % x     / BANDS % x                            8.6    10.2  )-----------( 871      ( 09 May 2018 08:25 )             27.4     Auto Eosinophil # x     / Auto Eosinophil % x     / Auto Neutrophil # x     / Auto Neutrophil % x     / BANDS % x        INR: 1.26 ratio (05-08 @ 23:03)  INR: 1.27 ratio (05-08 @ 14:45)    05-10    134<L>  |  98  |  6<L>  ----------------------------<  362<H>  4.3   |  22  |  0.68    Ca    8.1<L>      10 May 2018 00:46  Mg     1.4     05-10  Phos  2.7     05-10  TPro  6.1  /  Alb  2.6<L>  /  TBili  0.1<L>  /  DBili  x   /  AST  11  /  ALT  8<L>  /  AlkPhos  88  05-10  HgA1c: 5.4 % (04-12 @ 16:57)    Culture Results:   Numerous Enterococcus faecium  Few Klebsiella pneumoniae (05.09.18 @ 17:13)      TELEMETRY: 	No ectopy    ECG:  	Normal sinus rhythm  RADIOLOGY: < from: CT Abdomen and Pelvis w/ Oral Cont and w/ IV Cont (05.08.18 @ 16:02) >  IMPRESSION:     A 3.8 x 2.7 cm collection of fluid and air within the left anterior   abdominal wall. Small adjacent hernia containing nonobstructed small   bowel.    Previously noted collections within the pancreatic operative bed have   near completely resolved with only a small collection of air remaining.    Filling defect within the right atrium concerning for clot.   Echocardiogram is recommended for further evaluation.    Nonspecific peripheral opacities in the right lower lobe.    Small focus of infection in the right kidney versus infarct.    < end of copied text >    OTHER: 	    PREVIOUS DIAGNOSTIC TESTING:    [ ] Echocardiogram:< from: Transthoracic Echocardiogram (05.09.18 @ 15:29) >  Observations: EF 59%  Mitral Valve: Normal mitral valve.  Aortic Valve/Aorta: Aortic valve not well visualized;  probably normal.  Aortic Root: 2.4 cm.  Left Atrium: Normal left atrium.  LA volume index = 19  cc/m2.  Left Ventricle: Normal left ventricular systolic function.  No segmental wall motion abnormalities. Normal left  ventricular internal dimensions and wall thicknesses.  Right Heart: Normal right atrium. Large mobile echogenic  structure seen in RA measures 3.1 cm X 2.8 cm.  This most  likely represents thrombus. Less likely myxoma. Consider  ANIBAL if clinically indicated.  Normal right ventricular size  and function. Normal tricuspid valve. Mild tricuspid  regurgitation. Normal pulmonic valve.  Pericardium/Pleura: Normal pericardiumwith no pericardial  effusion.  Hemodynamic: Estimated right atrial pressure is 8 mm Hg.  Estimated right ventricular systolic pressure equals 35 mm  Hg, assuming right atrial pressure equals 8 mm Hg,  consistent with borderline pulmonary hypertension.    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 During her last admission (4/5-4/16/18) after her islet cell transplant, patient developed LUQ abdominal collections found to be tracking to her midline. She underwent IR drainage and was discharged with drains that were removed two weeks ago. Drain cultures positive for Candida and VRE. She was discharged with a PICC and remained on antifungals.   Was seen at Dr. Walker’s on day of admission and found to have copious drainage from her midline wound that has been present and clear in color since her discharge, but has recently turned cloudy and foul-smelling two days ago. Denies fevers/chills, nausea/vomiting. Reports having diffuse abdominal pain. (08 May 2018 16:38)    Overnight Event: No issues overnight    REVIEW OF SYSTEMS: Slept well, no palpitations, chest pain or SOB  	    MEDICATIONS  (STANDING):  alteplase    IVPB 100 milliGRAM(s) IV Intermittent once  amylase/lipase/protease  (CREON  6,000 Units) 1 Capsule(s) Oral three times a day with meals  dextrose 5%. 1000 milliLiter(s) (50 mL/Hr) IV Continuous <Continuous>  dextrose 50% Injectable 12.5 Gram(s) IV Push once  dextrose 50% Injectable 25 Gram(s) IV Push once  dextrose 50% Injectable 25 Gram(s) IV Push once  ertapenem  IVPB 1000 milliGRAM(s) IV Intermittent every 24 hours  ertapenem  IVPB      erythromycin     base Tablet 500 milliGRAM(s) Oral every 6 hours  heparin  Infusion 1200 Unit(s)/Hr (12 mL/Hr) IV Continuous <Continuous>  insulin glargine Injectable (LANTUS) 6 Unit(s) SubCutaneous every morning  insulin lispro (HumaLOG) corrective regimen sliding scale   SubCutaneous three times a day before meals  insulin lispro (HumaLOG) corrective regimen sliding scale   SubCutaneous at bedtime  insulin lispro Injectable (HumaLOG) 2 Unit(s) SubCutaneous three times a day before meals  linezolid  IVPB      linezolid  IVPB 600 milliGRAM(s) IV Intermittent every 12 hours  megestrol Suspension 800 milliGRAM(s) Oral daily  oxyCODONE  ER Tablet 30 milliGRAM(s) Oral every 8 hours  pantoprazole  Injectable 40 milliGRAM(s) IV Push daily  sodium chloride 0.9% with potassium chloride 20 mEq/L 1000 milliLiter(s) (75 mL/Hr) IV Continuous <Continuous>    MEDICATIONS  (PRN):  acetaminophen   Tablet 650 milliGRAM(s) Oral every 6 hours PRN mild pain  dextrose 40% Gel 15 Gram(s) Oral once PRN Blood Glucose LESS THAN 70 milliGRAM(s)/deciliter  docusate sodium Liquid 200 milliGRAM(s) Oral at bedtime PRN Constipation  glucagon  Injectable 1 milliGRAM(s) IntraMuscular once PRN Glucose LESS THAN 70 milligrams/deciliter  ondansetron   Disintegrating Tablet 8 milliGRAM(s) Oral every 8 hours PRN Nausea and/or Vomiting  oxyCODONE    IR 10 milliGRAM(s) Oral every 3 hours PRN Moderate Pain (4 - 6)        PHYSICAL EXAM:  Vital Signs Last 24 Hrs  T(C): 36.7 (11 May 2018 00:00), Max: 37.3 (10 May 2018 16:15)  T(F): 98.1 (11 May 2018 00:00), Max: 99.1 (10 May 2018 16:15)  HR: 95 (11 May 2018 05:00) (86 - 109)  BP: 119/68 (11 May 2018 05:00) (101/62 - 131/75)  BP(mean): 83 (11 May 2018 05:00) (73 - 96)  RR: 14 (11 May 2018 05:00) (14 - 18)  SpO2: 97% (11 May 2018 05:00) (96% - 100%)  I&O's Summary    10 May 2018 07:01  -  11 May 2018 07:00  --------------------------------------------------------  IN: 2491 mL / OUT: 1415 mL / NET: 1076 mL        Appearance: Normal	  HEENT:   Normal oral mucosa, PERRL, EOMI	  Cardiovascular: Normal S1 S2, No JVD, No murmurs, No edema  Respiratory: Lungs clear to auscultation	  Psychiatry: A & O x 3, Mood & affect appropriate  Gastrointestinal:  RUQ firm above drain site, tender, + BS, draining purulent drainage	  Skin: No rashes, No ecchymoses, No cyanosis	  Neurologic: Non-focal  Extremities: Normal range of motion, No clubbing, cyanosis or edema  Vascular: Peripheral pulses palpable 2+ bilaterally    LABS:	 	                        10.0   8.3   )-----------( 922      ( 10 May 2018 08:32 )             31.4     Auto Eosinophil # x     / Auto Eosinophil % x     / Auto Neutrophil # x     / Auto Neutrophil % x     / BANDS % x                            9.1    8.8   )-----------( 846      ( 10 May 2018 00:46 )             28.0     Auto Eosinophil # x     / Auto Eosinophil % x     / Auto Neutrophil # x     / Auto Neutrophil % x     / BANDS % x                            8.6    10.2  )-----------( 871      ( 09 May 2018 08:25 )             27.4     Auto Eosinophil # x     / Auto Eosinophil % x     / Auto Neutrophil # x     / Auto Neutrophil % x     / BANDS % x        INR: 1.26 ratio (05-08 @ 23:03)  INR: 1.27 ratio (05-08 @ 14:45)    05-10    134<L>  |  98  |  6<L>  ----------------------------<  362<H>  4.3   |  22  |  0.68    Ca    8.1<L>      10 May 2018 00:46  Mg     1.4     05-10  Phos  2.7     05-10  TPro  6.1  /  Alb  2.6<L>  /  TBili  0.1<L>  /  DBili  x   /  AST  11  /  ALT  8<L>  /  AlkPhos  88  05-10  HgA1c: 5.4 % (04-12 @ 16:57)    Culture Results:   Numerous Enterococcus faecium  Few Klebsiella pneumoniae (05.09.18 @ 17:13)      TELEMETRY: 	No ectopy    ECG:  	Normal sinus rhythm  RADIOLOGY: < from: CT Abdomen and Pelvis w/ Oral Cont and w/ IV Cont (05.08.18 @ 16:02) >  IMPRESSION:     A 3.8 x 2.7 cm collection of fluid and air within the left anterior   abdominal wall. Small adjacent hernia containing nonobstructed small   bowel.    Previously noted collections within the pancreatic operative bed have   near completely resolved with only a small collection of air remaining.    Filling defect within the right atrium concerning for clot.   Echocardiogram is recommended for further evaluation.    Nonspecific peripheral opacities in the right lower lobe.    Small focus of infection in the right kidney versus infarct.    < end of copied text >    OTHER: 	    PREVIOUS DIAGNOSTIC TESTING:    [ ] Echocardiogram:< from: Transthoracic Echocardiogram (05.09.18 @ 15:29) >  Observations: EF 59%  Mitral Valve: Normal mitral valve.  Aortic Valve/Aorta: Aortic valve not well visualized;  probably normal.  Aortic Root: 2.4 cm.  Left Atrium: Normal left atrium.  LA volume index = 19  cc/m2.  Left Ventricle: Normal left ventricular systolic function.  No segmental wall motion abnormalities. Normal left  ventricular internal dimensions and wall thicknesses.  Right Heart: Normal right atrium. Large mobile echogenic  structure seen in RA measures 3.1 cm X 2.8 cm.  This most  likely represents thrombus. Less likely myxoma. Consider  ANIBAL if clinically indicated.  Normal right ventricular size  and function. Normal tricuspid valve. Mild tricuspid  regurgitation. Normal pulmonic valve.  Pericardium/Pleura: Normal pericardiumwith no pericardial  effusion.  Hemodynamic: Estimated right atrial pressure is 8 mm Hg.  Estimated right ventricular systolic pressure equals 35 mm  Hg, assuming right atrial pressure equals 8 mm Hg,  consistent with borderline pulmonary hypertension.    < end of copied text >      EARLENE De LeónUnited [facility]  Contact #[phone]

## 2018-05-11 NOTE — CONSULT NOTE ADULT - ASSESSMENT
Imp/Rx:  Recent islet cell xplantation.  Splenectomy  Abd collection with VRE and kleb isolated.  Drain in place.    For now continue the zyvox and invanz.  follow id/sens    id covers this weekend.   [id number]

## 2018-05-11 NOTE — CONSULT NOTE ADULT - SUBJECTIVE AND OBJECTIVE BOX
ID CONSULTATION--Madan Lane MD  Pager [phone]    Patient is a 42y old  Female who presents with a chief complaint of draining midline wound (08 May 2018 16:38)    HPI:  42-year-old female with history of chronic pancreatitis since childhood s/p [place] procedure in 2014, 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 During her last admission (4/5-4/16/18) after her islet cell transplant, patient developed LUQ abdominal collections found to be tracking to her midline. She underwent IR drainage and was discharged with drains that were removed two weeks ago. Drain cultures positive for Candida and VRE. She was discharged with a PICC and remained on antifungals. We opted not to treat at home with antibacterials, though she did receive antibacterials in the hospital.    The patient was found to have drainage from her midline wound that has been present and clear in color since her discharge, but has recently turned cloudy and foul-smelling two days ago. Denies fevers/chills, nausea/vomiting. Reports having diffuse abdominal pain. (08 May 2018 16:38)    ID contacted last night and recommended change of abx for now to invanz and zyvox.    PAST MEDICAL & SURGICAL HISTORY:  Premenstrual migraine  Other chronic pancreatitis  Status post pancreatic islet cell transplantation  H/O splenectomy  History of pancreatectomy  S/P cholecystectomy: in early 20&#x27;s    FAMILY HISTORY:  No pertinent family history in first degree relatives    REVIEW OF SYSTEMS  General:	Denies any malaise fatigue or chills. Fevers absent  Skin:No rash  Respiratory and Thorax:No cough,sputum or chest pain.Denies shortness of breath	  Cardiovascular:	No chest pain,palpitaions or dizziness  Gastrointestinal: mild abd tenderness to palpation.  Genitourinary:	No dysuria,frequency. No flank pain    Allergies  No Known Allergies    ANTIMICROBIALS:    ertapenem  IVPB 1000 milliGRAM(s) IV Intermittent every 24 hours  ertapenem  IVPB      erythromycin     base Tablet 500 milliGRAM(s) Oral every 6 hours  linezolid  IVPB      linezolid  IVPB 600 milliGRAM(s) IV Intermittent every 12 hours    Vital Signs Last 24 Hrs  T(C): 36.5 (11 May 2018 07:05), Max: 37.3 (10 May 2018 16:15)  T(F): 97.7 (11 May 2018 07:05), Max: 99.1 (10 May 2018 16:15)  HR: 102 (11 May 2018 11:10) (86 - 109)  BP: 113/73 (11 May 2018 11:10) (101/62 - 131/75)  BP(mean): 87 (11 May 2018 11:10) (73 - 96)  RR: 15 (11 May 2018 11:10) (14 - 18)  SpO2: 100% (11 May 2018 11:10) (96% - 100%)    PHYSICAL EXAM:Pleasant patient in no acute distress.  Constitutional:Comfortable.Awake and alert  Neck:Supple,No LN,no JVD  Respiratory:Good air entry bilaterally,CTA  Cardiovascular:S1 S2 wnl, No murmurs,rub or gallops  Gastrointestinal:Soft BS(+) mild tenderness and drain over mid abd and L side  Genitourinary:No CVA tendereness   Lymph Nodes:No palpable LNs  Musculoskeletal:No joint swelling or LOM               10.1   7.2   )-----------( 871      ( 11 May 2018 06:31 )             32.2     05-11    134<L>  |  100  |  <4<L>  ----------------------------<  396<H>  4.9   |  17<L>  |  0.67    Ca    8.1<L>      11 May 2018 06:31  Phos  2.6     05-11  Mg     1.4     05-11    TPro  5.9<L>  /  Alb  2.4<L>  /  TBili  0.1<L>  /  DBili  x   /  AST  14  /  ALT  8<L>  /  AlkPhos  81  05-11    RECENT CULTURES:  05-09 @ 17:13  .Body Fluid Abdominal Fluid  --Numerous Enterococcus faecium  Few Klebsiella pneumoniae  --  05-09 @ 04:34  Skin wound  Klebsiella pneumoniae  Klebsiella pneumoniae  NORM    Few Klebsiella pneumoniae  Moderate Enterococcus faecium  --  05-09 @ 03:04  .Urine Clean Catch (Midstream)  -->100,000 CFU/ml Enterococcus faecium  --  05-08 @ 19:24  .Blood Blood-Peripheral  --No growth to date.  --  RADIOLOGY:    < from: CT Abdomen and Pelvis w/ Oral Cont and w/ IV Cont (05.08.18 @ 16:02) >    IMPRESSION:     A 3.8 x 2.7 cm collection of fluid and air within the left anterior   abdominal wall. Small adjacent hernia containing nonobstructed small   bowel.    Previously noted collections within the pancreatic operative bed have   near completely resolved with only a small collection of air remaining.    Filling defect within the right atrium concerning for clot.   Echocardiogram is recommended for further evaluation.    Nonspecific peripheral opacities in the right lower lobe.    Small focus of infection in the right kidney versus infarct.    < end of copied text >

## 2018-05-12 LAB
-  AMPICILLIN: SIGNIFICANT CHANGE UP
-  CIPROFLOXACIN: SIGNIFICANT CHANGE UP
-  ERYTHROMYCIN: SIGNIFICANT CHANGE UP
-  TETRACYCLINE: SIGNIFICANT CHANGE UP
-  VANCOMYCIN: SIGNIFICANT CHANGE UP
APTT BLD: 142.1 SEC — CRITICAL HIGH (ref 27.5–37.4)
APTT BLD: 41.1 SEC — HIGH (ref 27.5–37.4)
APTT BLD: 52.1 SEC — HIGH (ref 27.5–37.4)
BASOPHILS # BLD AUTO: 0.1 K/UL — SIGNIFICANT CHANGE UP (ref 0–0.2)
BASOPHILS # BLD AUTO: 0.1 K/UL — SIGNIFICANT CHANGE UP (ref 0–0.2)
BASOPHILS NFR BLD AUTO: 1 % — SIGNIFICANT CHANGE UP (ref 0–2)
BASOPHILS NFR BLD AUTO: 1 % — SIGNIFICANT CHANGE UP (ref 0–2)
BLD GP AB SCN SERPL QL: NEGATIVE — SIGNIFICANT CHANGE UP
EOSINOPHIL # BLD AUTO: 0.2 K/UL — SIGNIFICANT CHANGE UP (ref 0–0.5)
EOSINOPHIL # BLD AUTO: 0.2 K/UL — SIGNIFICANT CHANGE UP (ref 0–0.5)
EOSINOPHIL NFR BLD AUTO: 2.1 % — SIGNIFICANT CHANGE UP (ref 0–6)
EOSINOPHIL NFR BLD AUTO: 2.2 % — SIGNIFICANT CHANGE UP (ref 0–6)
HCT VFR BLD CALC: 29.7 % — LOW (ref 34.5–45)
HCT VFR BLD CALC: 30.1 % — LOW (ref 34.5–45)
HGB BLD-MCNC: 9.3 G/DL — LOW (ref 11.5–15.5)
HGB BLD-MCNC: 9.6 G/DL — LOW (ref 11.5–15.5)
LYMPHOCYTES # BLD AUTO: 3.5 K/UL — HIGH (ref 1–3.3)
LYMPHOCYTES # BLD AUTO: 3.8 K/UL — HIGH (ref 1–3.3)
LYMPHOCYTES # BLD AUTO: 37 % — SIGNIFICANT CHANGE UP (ref 13–44)
LYMPHOCYTES # BLD AUTO: 38.8 % — SIGNIFICANT CHANGE UP (ref 13–44)
MCHC RBC-ENTMCNC: 29.7 PG — SIGNIFICANT CHANGE UP (ref 27–34)
MCHC RBC-ENTMCNC: 30.1 PG — SIGNIFICANT CHANGE UP (ref 27–34)
MCHC RBC-ENTMCNC: 31.4 GM/DL — LOW (ref 32–36)
MCHC RBC-ENTMCNC: 31.7 GM/DL — LOW (ref 32–36)
MCV RBC AUTO: 94.5 FL — SIGNIFICANT CHANGE UP (ref 80–100)
MCV RBC AUTO: 95.1 FL — SIGNIFICANT CHANGE UP (ref 80–100)
METHOD TYPE: SIGNIFICANT CHANGE UP
MONOCYTES # BLD AUTO: 0.6 K/UL — SIGNIFICANT CHANGE UP (ref 0–0.9)
MONOCYTES # BLD AUTO: 0.7 K/UL — SIGNIFICANT CHANGE UP (ref 0–0.9)
MONOCYTES NFR BLD AUTO: 5.9 % — SIGNIFICANT CHANGE UP (ref 2–14)
MONOCYTES NFR BLD AUTO: 7.2 % — SIGNIFICANT CHANGE UP (ref 2–14)
NEUTROPHILS # BLD AUTO: 5 K/UL — SIGNIFICANT CHANGE UP (ref 1.8–7.4)
NEUTROPHILS # BLD AUTO: 5.2 K/UL — SIGNIFICANT CHANGE UP (ref 1.8–7.4)
NEUTROPHILS NFR BLD AUTO: 52.1 % — SIGNIFICANT CHANGE UP (ref 43–77)
NEUTROPHILS NFR BLD AUTO: 52.7 % — SIGNIFICANT CHANGE UP (ref 43–77)
PLATELET # BLD AUTO: 713 K/UL — HIGH (ref 150–400)
PLATELET # BLD AUTO: 831 K/UL — HIGH (ref 150–400)
RBC # BLD: 3.14 M/UL — LOW (ref 3.8–5.2)
RBC # BLD: 3.17 M/UL — LOW (ref 3.8–5.2)
RBC # FLD: 15.8 % — HIGH (ref 10.3–14.5)
RBC # FLD: 16.3 % — HIGH (ref 10.3–14.5)
RH IG SCN BLD-IMP: POSITIVE — SIGNIFICANT CHANGE UP
WBC # BLD: 9.5 K/UL — SIGNIFICANT CHANGE UP (ref 3.8–10.5)
WBC # BLD: 9.9 K/UL — SIGNIFICANT CHANGE UP (ref 3.8–10.5)
WBC # FLD AUTO: 9.5 K/UL — SIGNIFICANT CHANGE UP (ref 3.8–10.5)
WBC # FLD AUTO: 9.9 K/UL — SIGNIFICANT CHANGE UP (ref 3.8–10.5)

## 2018-05-12 PROCEDURE — 99232 SBSQ HOSP IP/OBS MODERATE 35: CPT

## 2018-05-12 PROCEDURE — 99233 SBSQ HOSP IP/OBS HIGH 50: CPT

## 2018-05-12 PROCEDURE — 93010 ELECTROCARDIOGRAM REPORT: CPT

## 2018-05-12 RX ORDER — INSULIN GLARGINE 100 [IU]/ML
3 INJECTION, SOLUTION SUBCUTANEOUS ONCE
Qty: 0 | Refills: 0 | Status: COMPLETED | OUTPATIENT
Start: 2018-05-12 | End: 2018-05-12

## 2018-05-12 RX ORDER — HEPARIN SODIUM 5000 [USP'U]/ML
1150 INJECTION INTRAVENOUS; SUBCUTANEOUS
Qty: 25000 | Refills: 0 | Status: DISCONTINUED | OUTPATIENT
Start: 2018-05-12 | End: 2018-05-12

## 2018-05-12 RX ORDER — INSULIN GLARGINE 100 [IU]/ML
9 INJECTION, SOLUTION SUBCUTANEOUS EVERY MORNING
Qty: 0 | Refills: 0 | Status: DISCONTINUED | OUTPATIENT
Start: 2018-05-12 | End: 2018-05-15

## 2018-05-12 RX ORDER — HEPARIN SODIUM 5000 [USP'U]/ML
13.5 INJECTION INTRAVENOUS; SUBCUTANEOUS
Qty: 25000 | Refills: 0 | Status: DISCONTINUED | OUTPATIENT
Start: 2018-05-12 | End: 2018-05-12

## 2018-05-12 RX ORDER — MAGNESIUM SULFATE 500 MG/ML
2 VIAL (ML) INJECTION
Qty: 0 | Refills: 0 | Status: COMPLETED | OUTPATIENT
Start: 2018-05-12 | End: 2018-05-12

## 2018-05-12 RX ORDER — INSULIN LISPRO 100/ML
2 VIAL (ML) SUBCUTANEOUS AT BEDTIME
Qty: 0 | Refills: 0 | Status: DISCONTINUED | OUTPATIENT
Start: 2018-05-12 | End: 2018-05-16

## 2018-05-12 RX ORDER — HEPARIN SODIUM 5000 [USP'U]/ML
1150 INJECTION INTRAVENOUS; SUBCUTANEOUS
Qty: 25000 | Refills: 0 | Status: DISCONTINUED | OUTPATIENT
Start: 2018-05-12 | End: 2018-05-13

## 2018-05-12 RX ADMIN — Medication 1 CAPSULE(S): at 08:38

## 2018-05-12 RX ADMIN — Medication 500 MILLIGRAM(S): at 15:15

## 2018-05-12 RX ADMIN — PANTOPRAZOLE SODIUM 40 MILLIGRAM(S): 20 TABLET, DELAYED RELEASE ORAL at 03:51

## 2018-05-12 RX ADMIN — HEPARIN SODIUM 12.5 UNIT(S)/HR: 5000 INJECTION INTRAVENOUS; SUBCUTANEOUS at 07:35

## 2018-05-12 RX ADMIN — OXYCODONE HYDROCHLORIDE 30 MILLIGRAM(S): 5 TABLET ORAL at 22:50

## 2018-05-12 RX ADMIN — HEPARIN SODIUM 12.5 UNIT(S)/HR: 5000 INJECTION INTRAVENOUS; SUBCUTANEOUS at 16:24

## 2018-05-12 RX ADMIN — MEGESTROL ACETATE 800 MILLIGRAM(S): 40 SUSPENSION ORAL at 13:18

## 2018-05-12 RX ADMIN — OXYCODONE HYDROCHLORIDE 10 MILLIGRAM(S): 5 TABLET ORAL at 00:50

## 2018-05-12 RX ADMIN — OXYCODONE HYDROCHLORIDE 30 MILLIGRAM(S): 5 TABLET ORAL at 14:19

## 2018-05-12 RX ADMIN — Medication 500 MILLIGRAM(S): at 05:40

## 2018-05-12 RX ADMIN — ERTAPENEM SODIUM 120 MILLIGRAM(S): 1 INJECTION, POWDER, LYOPHILIZED, FOR SOLUTION INTRAMUSCULAR; INTRAVENOUS at 20:30

## 2018-05-12 RX ADMIN — HEPARIN SODIUM 12.5 UNIT(S)/HR: 5000 INJECTION INTRAVENOUS; SUBCUTANEOUS at 15:21

## 2018-05-12 RX ADMIN — Medication 4: at 11:56

## 2018-05-12 RX ADMIN — HEPARIN SODIUM 2000 UNIT(S): 5000 INJECTION INTRAVENOUS; SUBCUTANEOUS at 01:58

## 2018-05-12 RX ADMIN — Medication 1 CAPSULE(S): at 17:23

## 2018-05-12 RX ADMIN — OXYCODONE HYDROCHLORIDE 30 MILLIGRAM(S): 5 TABLET ORAL at 15:00

## 2018-05-12 RX ADMIN — OXYCODONE HYDROCHLORIDE 10 MILLIGRAM(S): 5 TABLET ORAL at 19:45

## 2018-05-12 RX ADMIN — HEPARIN SODIUM 11.5 UNIT(S)/HR: 5000 INJECTION INTRAVENOUS; SUBCUTANEOUS at 22:02

## 2018-05-12 RX ADMIN — INSULIN GLARGINE 6 UNIT(S): 100 INJECTION, SOLUTION SUBCUTANEOUS at 08:48

## 2018-05-12 RX ADMIN — Medication 50 GRAM(S): at 08:37

## 2018-05-12 RX ADMIN — Medication 6: at 08:44

## 2018-05-12 RX ADMIN — OXYCODONE HYDROCHLORIDE 10 MILLIGRAM(S): 5 TABLET ORAL at 04:20

## 2018-05-12 RX ADMIN — HEPARIN SODIUM 11.5 UNIT(S)/HR: 5000 INJECTION INTRAVENOUS; SUBCUTANEOUS at 21:16

## 2018-05-12 RX ADMIN — Medication 1 CAPSULE(S): at 11:57

## 2018-05-12 RX ADMIN — OXYCODONE HYDROCHLORIDE 30 MILLIGRAM(S): 5 TABLET ORAL at 06:03

## 2018-05-12 RX ADMIN — Medication 3 UNIT(S): at 08:45

## 2018-05-12 RX ADMIN — OXYCODONE HYDROCHLORIDE 30 MILLIGRAM(S): 5 TABLET ORAL at 22:24

## 2018-05-12 RX ADMIN — OXYCODONE HYDROCHLORIDE 10 MILLIGRAM(S): 5 TABLET ORAL at 00:25

## 2018-05-12 RX ADMIN — OXYCODONE HYDROCHLORIDE 10 MILLIGRAM(S): 5 TABLET ORAL at 10:00

## 2018-05-12 RX ADMIN — Medication 3 UNIT(S): at 11:57

## 2018-05-12 RX ADMIN — OXYCODONE HYDROCHLORIDE 10 MILLIGRAM(S): 5 TABLET ORAL at 10:45

## 2018-05-12 RX ADMIN — OXYCODONE HYDROCHLORIDE 10 MILLIGRAM(S): 5 TABLET ORAL at 19:22

## 2018-05-12 RX ADMIN — LINEZOLID 300 MILLIGRAM(S): 600 INJECTION, SOLUTION INTRAVENOUS at 22:24

## 2018-05-12 RX ADMIN — Medication 50 GRAM(S): at 06:48

## 2018-05-12 RX ADMIN — LINEZOLID 300 MILLIGRAM(S): 600 INJECTION, SOLUTION INTRAVENOUS at 10:00

## 2018-05-12 RX ADMIN — OXYCODONE HYDROCHLORIDE 30 MILLIGRAM(S): 5 TABLET ORAL at 05:40

## 2018-05-12 RX ADMIN — HEPARIN SODIUM 13.5 UNIT(S)/HR: 5000 INJECTION INTRAVENOUS; SUBCUTANEOUS at 01:59

## 2018-05-12 RX ADMIN — Medication 3 UNIT(S): at 17:20

## 2018-05-12 RX ADMIN — OXYCODONE HYDROCHLORIDE 10 MILLIGRAM(S): 5 TABLET ORAL at 03:51

## 2018-05-12 RX ADMIN — Medication 5: at 17:20

## 2018-05-12 NOTE — CHART NOTE - NSCHARTNOTEFT_GEN_A_CORE
====================  CCU MIDNIGHT ROUNDS  ====================    MARGOT JETT  [id number]    ====================  SUMMARY:   42-year-old female with history of chronic pancreatitis since childhood s/p [place] procedure in 2014, 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 During her last admission (4/5-4/16/18) after her islet cell transplant, patient developed LUQ abdominal collections found to be tracking to her midline. She underwent IR drainage and was discharged with drains that were removed two weeks ago. Drain cultures positive for Candida and VRE. She was discharged with a PICC and remained on antifungals. Was seen at Dr. Walker’s on day of admission and found to have copious drainage from her midline wound that has been present and clear in color since her discharge, but has recently turned cloudy and foul-smelling two days ago. Pt was admitted to the hospital 5/8/2018. On admission pt found to have an RA thrombus and tx to CCU 2 for hemodynamic monitoring. In addition, pt had IR drainage of abdominal wall abscess with DANIEL drain and midline drainage.   ====================  ====================  NEW EVENTS: No acute events overnight.   ====================  ====================  VITALS (Last 12 hrs):  ====================    T(C): 37.1 (05-12-18 @ 19:00), Max: 37.1 (05-12-18 @ 19:00)  HR: 91 (05-12-18 @ 23:00) (86 - 112)  BP: 125/75 (05-12-18 @ 23:00) (105/78 - 130/74)  BP(mean): 90 (05-12-18 @ 23:00) (80 - 90)  RR: 16 (05-12-18 @ 23:00) (14 - 18)  SpO2: 100% (05-12-18 @ 22:00) (99% - 100%)    TELEMETRY: NSR 89    I&O's Summary    11 May 2018 07:01  -  12 May 2018 07:00  --------------------------------------------------------  IN: 3769.3 mL / OUT: 77 mL / NET: 3692.3 mL    12 May 2018 07:01  -  12 May 2018 23:48  --------------------------------------------------------  IN: 1158.5 mL / OUT: 10 mL / NET: 1148.5 mL    ====================  PLAN:  ====================  Pain, well controlled: Continue oxy 30 XR and oxy 10 mg IR prn  RA thrombus: On hep gtt. TPA at bedside. Plan for TTE on Monday to eval RA thrombus size  s/p Total pancreatectomy, islet cell autotransplant in April- Drain management per Surg. Tolerating regular diet w/ Pancreatic enzymes. L DANIEL w/ minimal output.   Abdominal wall abscess s/p IR draining C/w Ertapenem 1g QD, Linezolid 600BID.   R/O Hypercoag w/u sent- APA negative, low suspicion  Uncontrolled  DM likely 2/2 infection: On ISS. Lantus. F/U endo recs    Uma Rudd, CCU NP   #[id number]

## 2018-05-12 NOTE — PROGRESS NOTE ADULT - ASSESSMENT
43 y/o F well known to DM team from recent admission for total pancreatectomy and auto islet cell transplant secondary to chronic pancreatitis since childhood. Transplant was c/b intraabdominal collections. She now presents with large drainage of midline wound> s/p IR drainage 5/9 > also found to have RA thrombus> on heparin gtt.On antibiotics as well. Pt with elevated BG in AM 2/2 sub-optimal basal dose + eating prior to FS testing w/out insulin coverage. Will increase basal dose to improve glycemic control. Infection may also be playing a role in increased insulin requirements. Also, basal insulin may not be lasting for full 24 hours-may benefit from BID Lantus dosing. BG goal (100-150mg/dl).

## 2018-05-12 NOTE — PROGRESS NOTE ADULT - ASSESSMENT
41 y/o PMHX: chronic pancreatitis since childhood s/p [place] procedure in 2014, now s/p total pancreatectomy with islet cell autotransplant on 4/5/18 c/b intraabdominal collections + Candida and VRE, s/p home infused abx. Presents again with purulent abdominal drainage from wound site.  Noted on CT A/P 3.8 x 2.7 cm abscess s/p IR drainage of abdominal collection on 5/9, and in addition a Filling defect within the right atrium concerning for clot, TTE confirms this heparin initiated. Transferred to PICU for closer monitoring.    NUERO  -No acute complaints    PULM  -No acute issues    CARDS  -RA thrombus: On hep gtt. TPA at bedside. Plan for TTE on Monday to eval RA thrombus size    RENAL  - Pt voiding, no acute issues. Replated Mg    GI  - s/p Total pancreatectomy, islet cell autotransplant in April- Drain management per Surg. Tolerating regular diet w/ Pancreatic enzymes    ID  - Abdominal wall abscess s/p IR draining C/w Ertapenem 1g QD, Linezolid 600BID.     HEME/ONC  -Hypercoag w/u sent- Awaiting results    ENDO  - DM. On ISS. Last Glucose 361- patient ate prior to bloodwork

## 2018-05-12 NOTE — PROGRESS NOTE ADULT - SUBJECTIVE AND OBJECTIVE BOX
Diabetes Follow up note:  Interval Hx:  43 y/o F well known to DM team from recent admission for total pancreatectomy and auto islet cell transplant secondary to chronic pancreatitis since childhood. Transplant was c/b intraabdominal collections> discharge with DANIEL drains and antifungals. She now presents with large drainage of midline wound> s/p IR drainage 5/9 with ostomy bag for drainage. During this hospitalization pt was also found to have RA thrombus> on heparin gtt. Abdominal fluid cultures are + for enterococcus faceium and klebsiella pneumoniae on antibiotics as well. BG values improved yesterday-HS . When tested this AM-pt found to have glucose of 402. Pt seen at bedside. Reports eating snack prior to testing this AM. Ordered stat lantus dose of 3 units this AM-not given at this time.     Review of Systems:  General: "I can in with an infection and the found a clot too"  GI: Tolerating POs without any N/V/D + abd pain.   CV: No CP/SOB  ENDO: No S&Sx of hypoglycemia  MEDS:    insulin glargine Injectable (LANTUS) 3 Unit(s) SubCutaneous once  insulin glargine Injectable (LANTUS) 9 Unit(s) SubCutaneous every morning  insulin lispro (HumaLOG) corrective regimen sliding scale   SubCutaneous three times a day before meals  insulin lispro (HumaLOG) corrective regimen sliding scale   SubCutaneous at bedtime  insulin lispro Injectable (HumaLOG) 3 Unit(s) SubCutaneous three times a day before meals  insulin lispro Injectable (HumaLOG) 2 Unit(s) SubCutaneous at bedtime  megestrol Suspension 800 milliGRAM(s) Oral daily    ertapenem  IVPB 1000 milliGRAM(s) IV Intermittent every 24 hours  ertapenem  IVPB      erythromycin     base Tablet 500 milliGRAM(s) Oral every 6 hours  linezolid  IVPB      linezolid  IVPB 600 milliGRAM(s) IV Intermittent every 12 hours    Allergies    No Known Allergies        PE:  General: Female lying in bed. NAD.   Vital Signs Last 24 Hrs  T(C): 37.1 (12 May 2018 05:00), Max: 37.3 (11 May 2018 19:20)  T(F): 98.7 (12 May 2018 05:00), Max: 99.1 (11 May 2018 19:20)  HR: 95 (12 May 2018 10:00) (78 - 110)  BP: 114/72 (12 May 2018 10:00) (99/63 - 131/69)  BP(mean): 74 (12 May 2018 08:00) (73 - 103)  RR: 16 (12 May 2018 10:00) (14 - 18)  SpO2: 100% (12 May 2018 10:00) (90% - 100%)  CV: S1, S2. NSR on monitor  Abd: Soft, NT,ND, Ostomy bag  Extremities: Warm. No edema.   Neuro: A&O X3    LABS:    POCT Blood Glucose.: 402 mg/dL (05-12-18 @ 08:41)  POCT Blood Glucose.: 148 mg/dL (05-11-18 @ 22:11)  POCT Blood Glucose.: 176 mg/dL (05-11-18 @ 19:52)  POCT Blood Glucose.: 241 mg/dL (05-11-18 @ 17:00)  POCT Blood Glucose.: 320 mg/dL (05-11-18 @ 11:27)  POCT Blood Glucose.: 354 mg/dL (05-11-18 @ 09:16)  POCT Blood Glucose.: 422 mg/dL (05-11-18 @ 07:36)  POCT Blood Glucose.: 280 mg/dL (05-10-18 @ 22:23)  POCT Blood Glucose.: 269 mg/dL (05-10-18 @ 18:08)  POCT Blood Glucose.: 204 mg/dL (05-10-18 @ 14:37)  POCT Blood Glucose.: 109 mg/dL (05-10-18 @ 13:32)  POCT Blood Glucose.: 55 mg/dL (05-10-18 @ 13:06)  POCT Blood Glucose.: 109 mg/dL (05-10-18 @ 08:07)  POCT Blood Glucose.: 355 mg/dL (05-10-18 @ 06:00)  POCT Blood Glucose.: 362 mg/dL (05-10-18 @ 03:59)  POCT Blood Glucose.: 291 mg/dL (05-09-18 @ 22:33)  POCT Blood Glucose.: 358 mg/dL (05-09-18 @ 19:07)                            8.6    8.2   )-----------( 819      ( 12 May 2018 05:56 )             26.6       05-12    135  |  102  |  <4<L>  ----------------------------<  361<H>  4.5   |  21<L>  |  0.66    Ca    8.0<L>      12 May 2018 05:57  Phos  3.1     05-12  Mg     1.3     05-12    TPro  5.4<L>  /  Alb  2.6<L>  /  TBili  0.1<L>  /  DBili  x   /  AST  8<L>  /  ALT  7<L>  /  AlkPhos  75  05-12          Hemoglobin A1C, Whole Blood: 5.4 % [4.0 - 5.6] (04-12-18 @ 16:57)            Contact number: osmin [phone] or [phone]

## 2018-05-12 NOTE — PROGRESS NOTE ADULT - SUBJECTIVE AND OBJECTIVE BOX
Surgery Progress Note    S: Patient seen and examined. No acute events overnight.     O:  Vital Signs Last 24 Hrs  T(C): 37.1 (12 May 2018 05:00), Max: 37.3 (11 May 2018 19:20)  T(F): 98.7 (12 May 2018 05:00), Max: 99.1 (11 May 2018 19:20)  HR: 92 (12 May 2018 07:00) (79 - 110)  BP: 130/80 (12 May 2018 07:00) (99/63 - 131/69)  BP(mean): 95 (12 May 2018 07:00) (73 - 103)  RR: 15 (12 May 2018 07:00) (14 - 18)  SpO2: 100% (12 May 2018 04:00) (90% - 100%)    I&O's Detail    11 May 2018 07:01  -  12 May 2018 07:00  --------------------------------------------------------  IN:    heparin  Infusion.: 87.5 mL    heparin Infusion: 86.8 mL    heparin Infusion: 120 mL    IV PiggyBack: 750 mL    Oral Fluid: 1000 mL    sodium chloride 0.9% with potassium chloride 20 mEq/L: 75 mL    sodium chloride 0.9%.: 1650 mL  Total IN: 3769.3 mL    OUT:    Bulb: 34 mL    Drain: 43 mL  Total OUT: 77 mL    Total NET: 3692.3 mL      12 May 2018 07:01  -  12 May 2018 08:00  --------------------------------------------------------  IN:    heparin Infusion: 12.5 mL    sodium chloride 0.9%.: 75 mL  Total IN: 87.5 mL    OUT:  Total OUT: 0 mL    Total NET: 87.5 mL          MEDICATIONS  (STANDING):  alteplase    IVPB 100 milliGRAM(s) IV Intermittent once  amylase/lipase/protease  (CREON  6,000 Units) 1 Capsule(s) Oral three times a day with meals  dextrose 5%. 1000 milliLiter(s) (50 mL/Hr) IV Continuous <Continuous>  dextrose 50% Injectable 12.5 Gram(s) IV Push once  dextrose 50% Injectable 25 Gram(s) IV Push once  dextrose 50% Injectable 25 Gram(s) IV Push once  ertapenem  IVPB 1000 milliGRAM(s) IV Intermittent every 24 hours  ertapenem  IVPB      erythromycin     base Tablet 500 milliGRAM(s) Oral every 6 hours  heparin  Infusion 13.5 Unit(s)/Hr (12.5 mL/Hr) IV Continuous <Continuous>  insulin glargine Injectable (LANTUS) 6 Unit(s) SubCutaneous every morning  insulin lispro (HumaLOG) corrective regimen sliding scale   SubCutaneous three times a day before meals  insulin lispro (HumaLOG) corrective regimen sliding scale   SubCutaneous at bedtime  insulin lispro Injectable (HumaLOG) 3 Unit(s) SubCutaneous three times a day before meals  linezolid  IVPB      linezolid  IVPB 600 milliGRAM(s) IV Intermittent every 12 hours  magnesium sulfate  IVPB 2 Gram(s) IV Intermittent every 2 hours  megestrol Suspension 800 milliGRAM(s) Oral daily  oxyCODONE  ER Tablet 30 milliGRAM(s) Oral every 8 hours  pantoprazole  Injectable 40 milliGRAM(s) IV Push daily  senna 2 Tablet(s) Oral at bedtime  sodium chloride 0.9%. 1000 milliLiter(s) (75 mL/Hr) IV Continuous <Continuous>    MEDICATIONS  (PRN):  acetaminophen   Tablet 650 milliGRAM(s) Oral every 6 hours PRN mild pain  dextrose 40% Gel 15 Gram(s) Oral once PRN Blood Glucose LESS THAN 70 milliGRAM(s)/deciliter  docusate sodium Liquid 200 milliGRAM(s) Oral at bedtime PRN Constipation  glucagon  Injectable 1 milliGRAM(s) IntraMuscular once PRN Glucose LESS THAN 70 milligrams/deciliter  heparin  Injectable 4000 Unit(s) IV Push every 6 hours PRN For aPTT less than 40  heparin  Injectable 2000 Unit(s) IV Push every 6 hours PRN For aPTT between 40 - 57  ondansetron   Disintegrating Tablet 8 milliGRAM(s) Oral every 8 hours PRN Nausea and/or Vomiting  oxyCODONE    IR 10 milliGRAM(s) Oral every 3 hours PRN Moderate Pain (4 - 6)                            8.6    8.2   )-----------( 819      ( 12 May 2018 05:56 )             26.6       05-12    135  |  102  |  <4<L>  ----------------------------<  361<H>  4.5   |  21<L>  |  0.66    Ca    8.0<L>      12 May 2018 05:57  Phos  3.1     05-12  Mg     1.3     05-12    TPro  5.4<L>  /  Alb  2.6<L>  /  TBili  0.1<L>  /  DBili  x   /  AST  8<L>  /  ALT  7<L>  /  AlkPhos  75  05-12      Physical Exam:  Gen: Laying in bed, NAD  Resp: Unlabored breathing  Abd: soft, NTND  Ext: WWP  Skin: No rashes

## 2018-05-12 NOTE — PROGRESS NOTE ADULT - ASSESSMENT
Ms. Moreno is a 42-year-old patient who presented with abdominal wall abscess s/p IR drainage, with new incidental finding of RA thrombus on CT. HD5. She  is hemodynamically stable. Labs are significant for hypomagnesia.       - RA thrombus, c/w with anticoagulation on heparin gtt. TPA at bedside  - Diet: on regular, has been NPO since midnight for IT procedure  - ABx regimen: ertapenem, Linezolid  - Pain control: Oxycodone, Acetaminophen   - Glycemic control, Lantus, pre-meal and ISS  - Monitor GI function  - Replete electrolyte as necessary  - pancreatic enzyme replacement   - hem/onc recommendation appreciated  - Activity: OOb as tolerated  - PICU care appreciated

## 2018-05-12 NOTE — CHART NOTE - NSCHARTNOTEFT_GEN_A_CORE
====================  CCU MIDNIGHT ROUNDS  ====================    MARGOT JETT  [id number]    ====================  SUMMARY: HPI:  42-year-old female with history of chronic pancreatitis since childhood s/p [place] procedure in 2014, 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 During her last admission (4/5-4/16/18) after her islet cell transplant, patient developed LUQ abdominal collections found to be tracking to her midline. She underwent IR drainage and was discharged with drains that were removed two weeks ago. Drain cultures positive for Candida and VRE. She was discharged with a PICC and remained on antifungals.   Was seen at Dr. Walker’s on day of admission and found to have copious drainage from her midline wound that has been present and clear in color since her discharge, but has recently turned cloudy and foul-smelling two days ago. Denies fevers/chills, nausea/vomiting. Reports having diffuse abdominal pain. (08 May 2018 16:38)    ====================        ====================  NEW EVENTS: No acute complaints.  ====================        ====================  VITALS (Last 12 hrs):  ====================    T(C): 37.2 (05-11-18 @ 23:00), Max: 37.3 (05-11-18 @ 19:20)  HR: 90 (05-11-18 @ 23:00) (84 - 102)  BP: 127/81 (05-11-18 @ 23:00) (111/68 - 131/69)  BP(mean): 94 (05-11-18 @ 23:00) (75 - 94)  RR: 16 (05-11-18 @ 23:00) (14 - 18)  SpO2: 100% (05-11-18 @ 23:00) (90% - 100%)      TELEMETRY: sinus        I&O's Summary    10 May 2018 07:01  -  11 May 2018 07:00  --------------------------------------------------------  IN: 2578 mL / OUT: 1415 mL / NET: 1163 mL    11 May 2018 07:01  -  12 May 2018 00:10  --------------------------------------------------------  IN: 3065 mL / OUT: 68 mL / NET: 2997 mL        ====================  PLAN:  -RA thrombus: 3.1 by 2.8cm. Confirmed with TTE. TPA at bedside should patient clinically decompensate. Vascular cards aware of patient and we will reassess TTE monday to determine percutaneous removal vs TPA. Continue patient specific heparin gtt with PTT goal of 58-99. Hemodynamically stable.   -L abd wall abscess: s/p IR drainage 5/9, drain still in place. Monitor drain outputs. Fluid cultures + for enterococcus faceium and klebsiella pneumoniae, c/w abx per ID- ertapenem and linezolid. Pain control as needed. OOB as tolerated.   -Chronic pancreatitis: Continue pancreatic enzyme replacement. Continue Lantus 6U in AM and Humalog 3U before meals plus low dose correctional scale before meals and at bedtime for goal glucose 100-140. Pt will be discharged on basal/bolus insulin per endo.   -questionable hypercoaguable state: heme/onc consulted and less likely. Antiphospholipid syndrome labs were negative. F/u heme/onc.    ====================    HEALTH ISSUES - PROBLEM Dx:  Postoperative wound abscess, subsequent encounter: Postoperative wound abscess, subsequent encounter  Thrombus of right atrial appendage without antecedent myocardial infarction: Thrombus of right atrial appendage without antecedent myocardial infarction  Pancreas transplant status: Pancreas transplant status  Diabetes mellitus due to underlying condition with hyperglycemia, with long-term current use of insulin: Diabetes mellitus due to underlying condition with hyperglycemia, with long-term current use of insulin        JYOTHI Clement PA #[id number]

## 2018-05-12 NOTE — PROGRESS NOTE ADULT - SUBJECTIVE AND OBJECTIVE BOX
CHIEF COMPLAINT::  ====================  INTERVAL HISTORY:  ====================      ====================  REVIEW OF SYSTEMS: all others negative  ====================    ====================  MEDICATIONS:  ====================  MEDICATIONS  (STANDING):  alteplase    IVPB 100 milliGRAM(s) IV Intermittent once  amylase/lipase/protease  (CREON  6,000 Units) 1 Capsule(s) Oral three times a day with meals  dextrose 5%. 1000 milliLiter(s) (50 mL/Hr) IV Continuous <Continuous>  dextrose 50% Injectable 12.5 Gram(s) IV Push once  dextrose 50% Injectable 25 Gram(s) IV Push once  dextrose 50% Injectable 25 Gram(s) IV Push once  ertapenem  IVPB 1000 milliGRAM(s) IV Intermittent every 24 hours  ertapenem  IVPB      erythromycin     base Tablet 500 milliGRAM(s) Oral every 6 hours  heparin  Infusion 13.5 Unit(s)/Hr (12.5 mL/Hr) IV Continuous <Continuous>  insulin glargine Injectable (LANTUS) 6 Unit(s) SubCutaneous every morning  insulin lispro (HumaLOG) corrective regimen sliding scale   SubCutaneous three times a day before meals  insulin lispro (HumaLOG) corrective regimen sliding scale   SubCutaneous at bedtime  insulin lispro Injectable (HumaLOG) 3 Unit(s) SubCutaneous three times a day before meals  linezolid  IVPB      linezolid  IVPB 600 milliGRAM(s) IV Intermittent every 12 hours  magnesium sulfate  IVPB 2 Gram(s) IV Intermittent every 2 hours  megestrol Suspension 800 milliGRAM(s) Oral daily  oxyCODONE  ER Tablet 30 milliGRAM(s) Oral every 8 hours  pantoprazole  Injectable 40 milliGRAM(s) IV Push daily  senna 2 Tablet(s) Oral at bedtime  sodium chloride 0.9%. 1000 milliLiter(s) (75 mL/Hr) IV Continuous <Continuous>    MEDICATIONS  (PRN):  acetaminophen   Tablet 650 milliGRAM(s) Oral every 6 hours PRN mild pain  dextrose 40% Gel 15 Gram(s) Oral once PRN Blood Glucose LESS THAN 70 milliGRAM(s)/deciliter  docusate sodium Liquid 200 milliGRAM(s) Oral at bedtime PRN Constipation  glucagon  Injectable 1 milliGRAM(s) IntraMuscular once PRN Glucose LESS THAN 70 milligrams/deciliter  heparin  Injectable 4000 Unit(s) IV Push every 6 hours PRN For aPTT less than 40  heparin  Injectable 2000 Unit(s) IV Push every 6 hours PRN For aPTT between 40 - 57  ondansetron   Disintegrating Tablet 8 milliGRAM(s) Oral every 8 hours PRN Nausea and/or Vomiting  oxyCODONE    IR 10 milliGRAM(s) Oral every 3 hours PRN Moderate Pain (4 - 6)      ====================  OBJECTIVE:  ====================  Vital Signs Last 24 Hrs  T(C): 37.1 (12 May 2018 05:00), Max: 37.3 (11 May 2018 19:20)  T(F): 98.7 (12 May 2018 05:00), Max: 99.1 (11 May 2018 19:20)  HR: 88 (12 May 2018 06:00) (79 - 110)  BP: 120/84 (12 May 2018 06:00) (99/63 - 131/69)  BP(mean): 94 (12 May 2018 06:00) (73 - 103)  RR: 16 (12 May 2018 06:00) (14 - 18)  SpO2: 100% (12 May 2018 04:00) (90% - 100%)  ICU Vital Signs Last 24 Hrs  T(C): 37.1 (12 May 2018 05:00), Max: 37.3 (11 May 2018 19:20)  T(F): 98.7 (12 May 2018 05:00), Max: 99.1 (11 May 2018 19:20)  HR: 88 (12 May 2018 06:00) (79 - 110)  BP: 120/84 (12 May 2018 06:00) (99/63 - 131/69)  BP(mean): 94 (12 May 2018 06:00) (73 - 103)  ABP: --  ABP(mean): --  RR: 16 (12 May 2018 06:00) (14 - 18)  SpO2: 100% (12 May 2018 04:00) (90% - 100%)      Adult Advanced Hemodynamics Last 24 Hrs  CVP(mm Hg): --  CVP(cm H2O): --  CO: --  CI: --  PA: --  PA(mean): --  PCWP: --  SVR: --  SVRI: --  PVR: --  PVRI: --       @ 07:01  -   @ 07:00  --------------------------------------------------------  IN: 3756.8 mL / OUT: 77 mL / NET: 3679.8 mL      Daily     Daily Weight in k.4 (12 May 2018 05:00)    PHYSICAL EXAM:      Constitutional:    Eyes:    ENMT:    Neck:    Breasts:    Back:    Respiratory:    Cardiovascular:    Gastrointestinal:    Genitourinary:    Rectal:    Extremities:    Vascular:    Neurological:    Skin:    Lymph Nodes:    Musculoskeletal:    Psychiatric:        TELEMETRY:     EKG:     IMAGIN.6    8.2   )-----------( 819      ( 12 May 2018 05:56 )             26.6     12    135  |  102  |  <4<L>  ----------------------------<  361<H>  4.5   |  21<L>  |  0.66    Ca    8.0<L>      12 May 2018 05:57  Phos  3.1     -12  Mg     1.3     12    TPro  5.4<L>  /  Alb  2.6<L>  /  TBili  0.1<L>  /  DBili  x   /  AST  8<L>  /  ALT  7<L>  /  AlkPhos  75  05-12    LIVER FUNCTIONS - ( 12 May 2018 05:57 )  Alb: 2.6 g/dL / Pro: 5.4 g/dL / ALK PHOS: 75 U/L / ALT: 7 U/L / AST: 8 U/L / GGT: x           PTT - ( 12 May 2018 05:57 )  PTT:163.5 sec        HEALTH ISSUES - PROBLEM Dx:  Postoperative wound abscess, subsequent encounter: Postoperative wound abscess, subsequent encounter  Thrombus of right atrial appendage without antecedent myocardial infarction: Thrombus of right atrial appendage without antecedent myocardial infarction  Pancreas transplant status: Pancreas transplant status  Diabetes mellitus due to underlying condition with hyperglycemia, with long-term current use of insulin: Diabetes mellitus due to underlying condition with hyperglycemia, with long-term current use of insulin        HEALTH ISSUES - R/O PROBLEM Dx: CHIEF COMPLAINT::    ====================  HPI:  ====================  42-year-old female with history of chronic pancreatitis since childhood s/p Moody procedure in , 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 During her last admission (-18) after her islet cell transplant, patient developed LUQ abdominal collections found to be tracking to her midline. She underwent IR drainage and was discharged with drains that were removed two weeks ago. Drain cultures positive for Candida and VRE. She was discharged with a PICC and remained on antifungals.   Was seen at Dr. Walker’s on day of admission and found to have copious drainage from her midline wound that has been present and clear in color since her discharge, but has recently turned cloudy and foul-smelling two days ago. Denies fevers/chills, nausea/vomiting. Reports having diffuse abdominal pain. (08 May 2018 16:38)    ====================  INTERVAL HISTORY:  ====================  .5 Held hep gtt for 1 hour, restart at 1250    ====================  REVIEW OF SYSTEMS: all others negative  ====================    ====================  MEDICATIONS:  ====================  MEDICATIONS  (STANDING):  alteplase    IVPB 100 milliGRAM(s) IV Intermittent once  amylase/lipase/protease  (CREON  6,000 Units) 1 Capsule(s) Oral three times a day with meals  dextrose 5%. 1000 milliLiter(s) (50 mL/Hr) IV Continuous <Continuous>  dextrose 50% Injectable 12.5 Gram(s) IV Push once  dextrose 50% Injectable 25 Gram(s) IV Push once  dextrose 50% Injectable 25 Gram(s) IV Push once  ertapenem  IVPB 1000 milliGRAM(s) IV Intermittent every 24 hours  ertapenem  IVPB      erythromycin     base Tablet 500 milliGRAM(s) Oral every 6 hours  heparin  Infusion 13.5 Unit(s)/Hr (12.5 mL/Hr) IV Continuous <Continuous>  insulin glargine Injectable (LANTUS) 6 Unit(s) SubCutaneous every morning  insulin lispro (HumaLOG) corrective regimen sliding scale   SubCutaneous three times a day before meals  insulin lispro (HumaLOG) corrective regimen sliding scale   SubCutaneous at bedtime  insulin lispro Injectable (HumaLOG) 3 Unit(s) SubCutaneous three times a day before meals  linezolid  IVPB      linezolid  IVPB 600 milliGRAM(s) IV Intermittent every 12 hours  magnesium sulfate  IVPB 2 Gram(s) IV Intermittent every 2 hours  megestrol Suspension 800 milliGRAM(s) Oral daily  oxyCODONE  ER Tablet 30 milliGRAM(s) Oral every 8 hours  pantoprazole  Injectable 40 milliGRAM(s) IV Push daily  senna 2 Tablet(s) Oral at bedtime  sodium chloride 0.9%. 1000 milliLiter(s) (75 mL/Hr) IV Continuous <Continuous>    MEDICATIONS  (PRN):  acetaminophen   Tablet 650 milliGRAM(s) Oral every 6 hours PRN mild pain  dextrose 40% Gel 15 Gram(s) Oral once PRN Blood Glucose LESS THAN 70 milliGRAM(s)/deciliter  docusate sodium Liquid 200 milliGRAM(s) Oral at bedtime PRN Constipation  glucagon  Injectable 1 milliGRAM(s) IntraMuscular once PRN Glucose LESS THAN 70 milligrams/deciliter  heparin  Injectable 4000 Unit(s) IV Push every 6 hours PRN For aPTT less than 40  heparin  Injectable 2000 Unit(s) IV Push every 6 hours PRN For aPTT between 40 - 57  ondansetron   Disintegrating Tablet 8 milliGRAM(s) Oral every 8 hours PRN Nausea and/or Vomiting  oxyCODONE    IR 10 milliGRAM(s) Oral every 3 hours PRN Moderate Pain (4 - 6)      ====================  OBJECTIVE:  ====================  Vital Signs Last 24 Hrs  T(C): 37.1 (12 May 2018 05:00), Max: 37.3 (11 May 2018 19:20)  T(F): 98.7 (12 May 2018 05:00), Max: 99.1 (11 May 2018 19:20)  HR: 88 (12 May 2018 06:00) (79 - 110)  BP: 120/84 (12 May 2018 06:00) (99/63 - 131/69)  BP(mean): 94 (12 May 2018 06:00) (73 - 103)  RR: 16 (12 May 2018 06:00) (14 - 18)  SpO2: 100% (12 May 2018 04:00) (90% - 100%)  ICU Vital Signs Last 24 Hrs  T(C): 37.1 (12 May 2018 05:00), Max: 37.3 (11 May 2018 19:20)  T(F): 98.7 (12 May 2018 05:00), Max: 99.1 (11 May 2018 19:20)  HR: 88 (12 May 2018 06:00) (79 - 110)  BP: 120/84 (12 May 2018 06:00) (99/63 - 131/69)  BP(mean): 94 (12 May 2018 06:00) (73 - 103)  ABP: --  ABP(mean): --  RR: 16 (12 May 2018 06:00) (14 - 18)  SpO2: 100% (12 May 2018 04:00) (90% - 100%)      Adult Advanced Hemodynamics Last 24 Hrs  CVP(mm Hg): --  CVP(cm H2O): --  CO: --  CI: --  PA: --  PA(mean): --  PCWP: --  SVR: --  SVRI: --  PVR: --  PVRI: --       @ 07:01  -  -12 @ 07:00  --------------------------------------------------------  IN: 3756.8 mL / OUT: 77 mL / NET: 3679.8 mL      Daily     Daily Weight in k.4 (12 May 2018 05:00)    PHYSICAL EXAM:    Constitutional: No fevers/chills/night sweats  Breasts: Deffered  Respiratory: No SOB, ACEVEDO  Cardiovascular: No CP, Chest pressure, Palpitations   Gastrointestinal: Mild abd pain on palpations.   Genitourinary: No urinary symptoms  Extremities: Leg swelling/ tenderness b/l, improved from admission.         TELEMETRY: NSR  EKG: NSR  IMAGING:  < from: US Duplex Kidneys (18 @ 10:42) >  IMPRESSION:     No evidence of a significant renal artery stenosis.    < end of copied text >                            8.6    8.2   )-----------( 819      ( 12 May 2018 05:56 )             26.6     -12    135  |  102  |  <4<L>  ----------------------------<  361<H>  4.5   |  21<L>  |  0.66    Ca    8.0<L>      12 May 2018 05:57  Phos  3.1       Mg     1.3         TPro  5.4<L>  /  Alb  2.6<L>  /  TBili  0.1<L>  /  DBili  x   /  AST  8<L>  /  ALT  7<L>  /  AlkPhos  75  05-12    LIVER FUNCTIONS - ( 12 May 2018 05:57 )  Alb: 2.6 g/dL / Pro: 5.4 g/dL / ALK PHOS: 75 U/L / ALT: 7 U/L / AST: 8 U/L / GGT: x           PTT - ( 12 May 2018 05:57 )  PTT:163.5 sec        HEALTH ISSUES - PROBLEM Dx:  Postoperative wound abscess, subsequent encounter: Postoperative wound abscess, subsequent encounter  Thrombus of right atrial appendage without antecedent myocardial infarction: Thrombus of right atrial appendage without antecedent myocardial infarction  Pancreas transplant status: Pancreas transplant status  Diabetes mellitus due to underlying condition with hyperglycemia, with long-term current use of insulin: Diabetes mellitus due to underlying condition with hyperglycemia, with long-term current use of insulin        HEALTH ISSUES - R/O PROBLEM Dx: CHIEF COMPLAINT:: abdominal discomfort    ====================  HPI:  ====================  42-year-old female with history of chronic pancreatitis since childhood s/p Moody procedure in , 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 During her last admission (-18) after her islet cell transplant, patient developed LUQ abdominal collections found to be tracking to her midline. She underwent IR drainage and was discharged with drains that were removed two weeks ago. Drain cultures positive for Candida and VRE. She was discharged with a PICC and remained on antifungals.   Was seen at Dr. Walker’s on day of admission and found to have copious drainage from her midline wound that has been present and clear in color since her discharge, but has recently turned cloudy and foul-smelling two days ago. Denies fevers/chills, nausea/vomiting. Reports having diffuse abdominal pain. (08 May 2018 16:38)    ====================  INTERVAL HISTORY:  ====================  .5 Held hep gtt for 1 hour, restart at 1250    ====================  REVIEW OF SYSTEMS: all others negative  ====================    ====================  MEDICATIONS:  ====================  MEDICATIONS  (STANDING):  alteplase    IVPB 100 milliGRAM(s) IV Intermittent once  amylase/lipase/protease  (CREON  6,000 Units) 1 Capsule(s) Oral three times a day with meals  dextrose 5%. 1000 milliLiter(s) (50 mL/Hr) IV Continuous <Continuous>  dextrose 50% Injectable 12.5 Gram(s) IV Push once  dextrose 50% Injectable 25 Gram(s) IV Push once  dextrose 50% Injectable 25 Gram(s) IV Push once  ertapenem  IVPB 1000 milliGRAM(s) IV Intermittent every 24 hours  ertapenem  IVPB      erythromycin     base Tablet 500 milliGRAM(s) Oral every 6 hours  heparin  Infusion 13.5 Unit(s)/Hr (12.5 mL/Hr) IV Continuous <Continuous>  insulin glargine Injectable (LANTUS) 6 Unit(s) SubCutaneous every morning  insulin lispro (HumaLOG) corrective regimen sliding scale   SubCutaneous three times a day before meals  insulin lispro (HumaLOG) corrective regimen sliding scale   SubCutaneous at bedtime  insulin lispro Injectable (HumaLOG) 3 Unit(s) SubCutaneous three times a day before meals  linezolid  IVPB      linezolid  IVPB 600 milliGRAM(s) IV Intermittent every 12 hours  magnesium sulfate  IVPB 2 Gram(s) IV Intermittent every 2 hours  megestrol Suspension 800 milliGRAM(s) Oral daily  oxyCODONE  ER Tablet 30 milliGRAM(s) Oral every 8 hours  pantoprazole  Injectable 40 milliGRAM(s) IV Push daily  senna 2 Tablet(s) Oral at bedtime  sodium chloride 0.9%. 1000 milliLiter(s) (75 mL/Hr) IV Continuous <Continuous>    MEDICATIONS  (PRN):  acetaminophen   Tablet 650 milliGRAM(s) Oral every 6 hours PRN mild pain  dextrose 40% Gel 15 Gram(s) Oral once PRN Blood Glucose LESS THAN 70 milliGRAM(s)/deciliter  docusate sodium Liquid 200 milliGRAM(s) Oral at bedtime PRN Constipation  glucagon  Injectable 1 milliGRAM(s) IntraMuscular once PRN Glucose LESS THAN 70 milligrams/deciliter  heparin  Injectable 4000 Unit(s) IV Push every 6 hours PRN For aPTT less than 40  heparin  Injectable 2000 Unit(s) IV Push every 6 hours PRN For aPTT between 40 - 57  ondansetron   Disintegrating Tablet 8 milliGRAM(s) Oral every 8 hours PRN Nausea and/or Vomiting  oxyCODONE    IR 10 milliGRAM(s) Oral every 3 hours PRN Moderate Pain (4 - 6)      ====================  OBJECTIVE:  ====================  Vital Signs Last 24 Hrs  T(C): 37.1 (12 May 2018 05:00), Max: 37.3 (11 May 2018 19:20)  T(F): 98.7 (12 May 2018 05:00), Max: 99.1 (11 May 2018 19:20)  HR: 88 (12 May 2018 06:00) (79 - 110)  BP: 120/84 (12 May 2018 06:00) (99/63 - 131/69)  BP(mean): 94 (12 May 2018 06:00) (73 - 103)  RR: 16 (12 May 2018 06:00) (14 - 18)  SpO2: 100% (12 May 2018 04:00) (90% - 100%)  ICU Vital Signs Last 24 Hrs  T(C): 37.1 (12 May 2018 05:00), Max: 37.3 (11 May 2018 19:20)  T(F): 98.7 (12 May 2018 05:00), Max: 99.1 (11 May 2018 19:20)  HR: 88 (12 May 2018 06:00) (79 - 110)  BP: 120/84 (12 May 2018 06:00) (99/63 - 131/69)  BP(mean): 94 (12 May 2018 06:00) (73 - 103)  ABP: --  ABP(mean): --  RR: 16 (12 May 2018 06:00) (14 - 18)  SpO2: 100% (12 May 2018 04:00) (90% - 100%)      Adult Advanced Hemodynamics Last 24 Hrs  CVP(mm Hg): --  CVP(cm H2O): --  CO: --  CI: --  PA: --  PA(mean): --  PCWP: --  SVR: --  SVRI: --  PVR: --  PVRI: --       @ 07:01  -  -12 @ 07:00  --------------------------------------------------------  IN: 3756.8 mL / OUT: 77 mL / NET: 3679.8 mL      Daily     Daily Weight in k.4 (12 May 2018 05:00)    PHYSICAL EXAM:    Constitutional: No fevers/chills/night sweats  Breasts: Deffered  Respiratory: No SOB, ACEVEDO  Cardiovascular: No CP, Chest pressure, Palpitations   Gastrointestinal: Mild abd pain on palpations.   Genitourinary: No urinary symptoms  Extremities: Leg swelling/ tenderness b/l, improved from admission.         TELEMETRY: NSR  EKG: NSR  IMAGING:  < from: US Duplex Kidneys (18 @ 10:42) >  IMPRESSION:     No evidence of a significant renal artery stenosis.    < end of copied text >                            8.6    8.2   )-----------( 819      ( 12 May 2018 05:56 )             26.6     -    135  |  102  |  <4<L>  ----------------------------<  361<H>  4.5   |  21<L>  |  0.66    Ca    8.0<L>      12 May 2018 05:57  Phos  3.1       Mg     1.3         TPro  5.4<L>  /  Alb  2.6<L>  /  TBili  0.1<L>  /  DBili  x   /  AST  8<L>  /  ALT  7<L>  /  AlkPhos  75  05-12    LIVER FUNCTIONS - ( 12 May 2018 05:57 )  Alb: 2.6 g/dL / Pro: 5.4 g/dL / ALK PHOS: 75 U/L / ALT: 7 U/L / AST: 8 U/L / GGT: x           PTT - ( 12 May 2018 05:57 )  PTT:163.5 sec        HEALTH ISSUES - PROBLEM Dx:  Postoperative wound abscess, subsequent encounter: Postoperative wound abscess, subsequent encounter  Thrombus of right atrial appendage without antecedent myocardial infarction: Thrombus of right atrial appendage without antecedent myocardial infarction  Pancreas transplant status: Pancreas transplant status  Diabetes mellitus due to underlying condition with hyperglycemia, with long-term current use of insulin: Diabetes mellitus due to underlying condition with hyperglycemia, with long-term current use of insulin        HEALTH ISSUES - R/O PROBLEM Dx:

## 2018-05-13 LAB
ALBUMIN SERPL ELPH-MCNC: 2.4 G/DL — LOW (ref 3.3–5)
ALBUMIN SERPL ELPH-MCNC: 2.9 G/DL — LOW (ref 3.3–5)
ALP SERPL-CCNC: 73 U/L — SIGNIFICANT CHANGE UP (ref 40–120)
ALP SERPL-CCNC: 79 U/L — SIGNIFICANT CHANGE UP (ref 40–120)
ALT FLD-CCNC: 5 U/L — LOW (ref 10–45)
ALT FLD-CCNC: 6 U/L — LOW (ref 10–45)
ANION GAP SERPL CALC-SCNC: 11 MMOL/L — SIGNIFICANT CHANGE UP (ref 5–17)
ANION GAP SERPL CALC-SCNC: 14 MMOL/L — SIGNIFICANT CHANGE UP (ref 5–17)
APTT BLD: 126.1 SEC — CRITICAL HIGH (ref 27.5–37.4)
APTT BLD: 30.3 SEC — SIGNIFICANT CHANGE UP (ref 27.5–37.4)
APTT BLD: 49.2 SEC — HIGH (ref 27.5–37.4)
APTT BLD: 49.2 SEC — HIGH (ref 27.5–37.4)
APTT BLD: 98.4 SEC — HIGH (ref 27.5–37.4)
AST SERPL-CCNC: 12 U/L — SIGNIFICANT CHANGE UP (ref 10–40)
AST SERPL-CCNC: 7 U/L — LOW (ref 10–40)
BASOPHILS # BLD AUTO: 0.1 K/UL — SIGNIFICANT CHANGE UP (ref 0–0.2)
BASOPHILS NFR BLD AUTO: 0.7 % — SIGNIFICANT CHANGE UP (ref 0–2)
BILIRUB SERPL-MCNC: 0.1 MG/DL — LOW (ref 0.2–1.2)
BILIRUB SERPL-MCNC: 0.1 MG/DL — LOW (ref 0.2–1.2)
BUN SERPL-MCNC: <4 MG/DL — LOW (ref 7–23)
BUN SERPL-MCNC: <4 MG/DL — LOW (ref 7–23)
CALCIUM SERPL-MCNC: 8.3 MG/DL — LOW (ref 8.4–10.5)
CALCIUM SERPL-MCNC: 8.5 MG/DL — SIGNIFICANT CHANGE UP (ref 8.4–10.5)
CHLORIDE SERPL-SCNC: 98 MMOL/L — SIGNIFICANT CHANGE UP (ref 96–108)
CHLORIDE SERPL-SCNC: 99 MMOL/L — SIGNIFICANT CHANGE UP (ref 96–108)
CO2 SERPL-SCNC: 24 MMOL/L — SIGNIFICANT CHANGE UP (ref 22–31)
CO2 SERPL-SCNC: 25 MMOL/L — SIGNIFICANT CHANGE UP (ref 22–31)
CREAT SERPL-MCNC: 0.71 MG/DL — SIGNIFICANT CHANGE UP (ref 0.5–1.3)
CREAT SERPL-MCNC: 0.79 MG/DL — SIGNIFICANT CHANGE UP (ref 0.5–1.3)
CULTURE RESULTS: SIGNIFICANT CHANGE UP
CULTURE RESULTS: SIGNIFICANT CHANGE UP
EOSINOPHIL # BLD AUTO: 0.1 K/UL — SIGNIFICANT CHANGE UP (ref 0–0.5)
EOSINOPHIL NFR BLD AUTO: 1.4 % — SIGNIFICANT CHANGE UP (ref 0–6)
GLUCOSE SERPL-MCNC: 110 MG/DL — HIGH (ref 70–99)
GLUCOSE SERPL-MCNC: 318 MG/DL — HIGH (ref 70–99)
HCT VFR BLD CALC: 22.1 % — LOW (ref 34.5–45)
HCT VFR BLD CALC: 26.3 % — LOW (ref 34.5–45)
HCT VFR BLD CALC: 28.7 % — LOW (ref 34.5–45)
HGB BLD-MCNC: 6.1 G/DL — CRITICAL LOW (ref 11.5–15.5)
HGB BLD-MCNC: 8.5 G/DL — LOW (ref 11.5–15.5)
HGB BLD-MCNC: 9.2 G/DL — LOW (ref 11.5–15.5)
LYMPHOCYTES # BLD AUTO: 2.9 K/UL — SIGNIFICANT CHANGE UP (ref 1–3.3)
LYMPHOCYTES # BLD AUTO: 38.5 % — SIGNIFICANT CHANGE UP (ref 13–44)
MAGNESIUM SERPL-MCNC: 1.3 MG/DL — LOW (ref 1.6–2.6)
MAGNESIUM SERPL-MCNC: 1.5 MG/DL — LOW (ref 1.6–2.6)
MCHC RBC-ENTMCNC: 27.5 GM/DL — LOW (ref 32–36)
MCHC RBC-ENTMCNC: 29.9 PG — SIGNIFICANT CHANGE UP (ref 27–34)
MCHC RBC-ENTMCNC: 30.3 PG — SIGNIFICANT CHANGE UP (ref 27–34)
MCHC RBC-ENTMCNC: 30.3 PG — SIGNIFICANT CHANGE UP (ref 27–34)
MCHC RBC-ENTMCNC: 32 GM/DL — SIGNIFICANT CHANGE UP (ref 32–36)
MCHC RBC-ENTMCNC: 32.2 GM/DL — SIGNIFICANT CHANGE UP (ref 32–36)
MCV RBC AUTO: 110 FL — HIGH (ref 80–100)
MCV RBC AUTO: 93.7 FL — SIGNIFICANT CHANGE UP (ref 80–100)
MCV RBC AUTO: 94.1 FL — SIGNIFICANT CHANGE UP (ref 80–100)
MONOCYTES # BLD AUTO: 0.6 K/UL — SIGNIFICANT CHANGE UP (ref 0–0.9)
MONOCYTES NFR BLD AUTO: 8.5 % — SIGNIFICANT CHANGE UP (ref 2–14)
NEUTROPHILS # BLD AUTO: 3.8 K/UL — SIGNIFICANT CHANGE UP (ref 1.8–7.4)
NEUTROPHILS NFR BLD AUTO: 50.8 % — SIGNIFICANT CHANGE UP (ref 43–77)
PHOSPHATE SERPL-MCNC: 3.2 MG/DL — SIGNIFICANT CHANGE UP (ref 2.5–4.5)
PHOSPHATE SERPL-MCNC: 3.6 MG/DL — SIGNIFICANT CHANGE UP (ref 2.5–4.5)
PLATELET # BLD AUTO: 458 K/UL — HIGH (ref 150–400)
PLATELET # BLD AUTO: 742 K/UL — HIGH (ref 150–400)
PLATELET # BLD AUTO: 744 K/UL — HIGH (ref 150–400)
POTASSIUM SERPL-MCNC: 4.2 MMOL/L — SIGNIFICANT CHANGE UP (ref 3.5–5.3)
POTASSIUM SERPL-MCNC: 4.8 MMOL/L — SIGNIFICANT CHANGE UP (ref 3.5–5.3)
POTASSIUM SERPL-SCNC: 4.2 MMOL/L — SIGNIFICANT CHANGE UP (ref 3.5–5.3)
POTASSIUM SERPL-SCNC: 4.8 MMOL/L — SIGNIFICANT CHANGE UP (ref 3.5–5.3)
PROT SERPL-MCNC: 5.5 G/DL — LOW (ref 6–8.3)
PROT SERPL-MCNC: 6.5 G/DL — SIGNIFICANT CHANGE UP (ref 6–8.3)
RBC # BLD: 2 M/UL — LOW (ref 3.8–5.2)
RBC # BLD: 2.8 M/UL — LOW (ref 3.8–5.2)
RBC # BLD: 3.07 M/UL — LOW (ref 3.8–5.2)
RBC # FLD: 16.1 % — HIGH (ref 10.3–14.5)
RBC # FLD: 16.3 % — HIGH (ref 10.3–14.5)
RBC # FLD: 16.4 % — HIGH (ref 10.3–14.5)
SODIUM SERPL-SCNC: 134 MMOL/L — LOW (ref 135–145)
SODIUM SERPL-SCNC: 137 MMOL/L — SIGNIFICANT CHANGE UP (ref 135–145)
SPECIMEN SOURCE: SIGNIFICANT CHANGE UP
SPECIMEN SOURCE: SIGNIFICANT CHANGE UP
WBC # BLD: 4.9 K/UL — SIGNIFICANT CHANGE UP (ref 3.8–10.5)
WBC # BLD: 7.5 K/UL — SIGNIFICANT CHANGE UP (ref 3.8–10.5)
WBC # BLD: 9.4 K/UL — SIGNIFICANT CHANGE UP (ref 3.8–10.5)
WBC # FLD AUTO: 4.9 K/UL — SIGNIFICANT CHANGE UP (ref 3.8–10.5)
WBC # FLD AUTO: 7.5 K/UL — SIGNIFICANT CHANGE UP (ref 3.8–10.5)
WBC # FLD AUTO: 9.4 K/UL — SIGNIFICANT CHANGE UP (ref 3.8–10.5)

## 2018-05-13 PROCEDURE — 99232 SBSQ HOSP IP/OBS MODERATE 35: CPT

## 2018-05-13 PROCEDURE — 93306 TTE W/DOPPLER COMPLETE: CPT | Mod: 26

## 2018-05-13 PROCEDURE — 93010 ELECTROCARDIOGRAM REPORT: CPT

## 2018-05-13 PROCEDURE — 99233 SBSQ HOSP IP/OBS HIGH 50: CPT

## 2018-05-13 PROCEDURE — 74177 CT ABD & PELVIS W/CONTRAST: CPT | Mod: 26

## 2018-05-13 RX ORDER — INSULIN LISPRO 100/ML
5 VIAL (ML) SUBCUTANEOUS
Qty: 0 | Refills: 0 | Status: DISCONTINUED | OUTPATIENT
Start: 2018-05-14 | End: 2018-05-14

## 2018-05-13 RX ORDER — INSULIN LISPRO 100/ML
3 VIAL (ML) SUBCUTANEOUS
Qty: 0 | Refills: 0 | Status: DISCONTINUED | OUTPATIENT
Start: 2018-05-13 | End: 2018-05-16

## 2018-05-13 RX ORDER — HEPARIN SODIUM 5000 [USP'U]/ML
1250 INJECTION INTRAVENOUS; SUBCUTANEOUS
Qty: 25000 | Refills: 0 | Status: DISCONTINUED | OUTPATIENT
Start: 2018-05-13 | End: 2018-05-14

## 2018-05-13 RX ORDER — MAGNESIUM SULFATE 500 MG/ML
2 VIAL (ML) INJECTION
Qty: 0 | Refills: 0 | Status: COMPLETED | OUTPATIENT
Start: 2018-05-13 | End: 2018-05-13

## 2018-05-13 RX ORDER — HYDROMORPHONE HYDROCHLORIDE 2 MG/ML
0.5 INJECTION INTRAMUSCULAR; INTRAVENOUS; SUBCUTANEOUS EVERY 4 HOURS
Qty: 0 | Refills: 0 | Status: DISCONTINUED | OUTPATIENT
Start: 2018-05-13 | End: 2018-05-14

## 2018-05-13 RX ORDER — ACETAMINOPHEN 500 MG
700 TABLET ORAL ONCE
Qty: 0 | Refills: 0 | Status: COMPLETED | OUTPATIENT
Start: 2018-05-13 | End: 2018-05-13

## 2018-05-13 RX ORDER — HEPARIN SODIUM 5000 [USP'U]/ML
1200 INJECTION INTRAVENOUS; SUBCUTANEOUS
Qty: 25000 | Refills: 0 | Status: DISCONTINUED | OUTPATIENT
Start: 2018-05-13 | End: 2018-05-13

## 2018-05-13 RX ORDER — MORPHINE SULFATE 50 MG/1
4 CAPSULE, EXTENDED RELEASE ORAL ONCE
Qty: 0 | Refills: 0 | Status: DISCONTINUED | OUTPATIENT
Start: 2018-05-13 | End: 2018-05-13

## 2018-05-13 RX ORDER — MAGNESIUM SULFATE 500 MG/ML
2 VIAL (ML) INJECTION ONCE
Qty: 0 | Refills: 0 | Status: COMPLETED | OUTPATIENT
Start: 2018-05-13 | End: 2018-05-13

## 2018-05-13 RX ORDER — HYDROMORPHONE HYDROCHLORIDE 2 MG/ML
0.25 INJECTION INTRAMUSCULAR; INTRAVENOUS; SUBCUTANEOUS ONCE
Qty: 0 | Refills: 0 | Status: DISCONTINUED | OUTPATIENT
Start: 2018-05-13 | End: 2018-05-13

## 2018-05-13 RX ORDER — HEPARIN SODIUM 5000 [USP'U]/ML
1150 INJECTION INTRAVENOUS; SUBCUTANEOUS
Qty: 25000 | Refills: 0 | Status: DISCONTINUED | OUTPATIENT
Start: 2018-05-13 | End: 2018-05-13

## 2018-05-13 RX ORDER — INSULIN LISPRO 100/ML
VIAL (ML) SUBCUTANEOUS
Qty: 0 | Refills: 0 | Status: DISCONTINUED | OUTPATIENT
Start: 2018-05-14 | End: 2018-05-16

## 2018-05-13 RX ADMIN — LINEZOLID 300 MILLIGRAM(S): 600 INJECTION, SOLUTION INTRAVENOUS at 22:48

## 2018-05-13 RX ADMIN — OXYCODONE HYDROCHLORIDE 10 MILLIGRAM(S): 5 TABLET ORAL at 17:50

## 2018-05-13 RX ADMIN — Medication 50 GRAM(S): at 10:08

## 2018-05-13 RX ADMIN — MORPHINE SULFATE 4 MILLIGRAM(S): 50 CAPSULE, EXTENDED RELEASE ORAL at 00:31

## 2018-05-13 RX ADMIN — Medication 500 MILLIGRAM(S): at 12:45

## 2018-05-13 RX ADMIN — LINEZOLID 300 MILLIGRAM(S): 600 INJECTION, SOLUTION INTRAVENOUS at 10:09

## 2018-05-13 RX ADMIN — Medication 500 MILLIGRAM(S): at 17:17

## 2018-05-13 RX ADMIN — OXYCODONE HYDROCHLORIDE 10 MILLIGRAM(S): 5 TABLET ORAL at 17:24

## 2018-05-13 RX ADMIN — OXYCODONE HYDROCHLORIDE 30 MILLIGRAM(S): 5 TABLET ORAL at 14:39

## 2018-05-13 RX ADMIN — HEPARIN SODIUM 11.5 UNIT(S)/HR: 5000 INJECTION INTRAVENOUS; SUBCUTANEOUS at 23:00

## 2018-05-13 RX ADMIN — OXYCODONE HYDROCHLORIDE 10 MILLIGRAM(S): 5 TABLET ORAL at 20:25

## 2018-05-13 RX ADMIN — INSULIN GLARGINE 9 UNIT(S): 100 INJECTION, SOLUTION SUBCUTANEOUS at 08:51

## 2018-05-13 RX ADMIN — OXYCODONE HYDROCHLORIDE 30 MILLIGRAM(S): 5 TABLET ORAL at 21:45

## 2018-05-13 RX ADMIN — ERTAPENEM SODIUM 120 MILLIGRAM(S): 1 INJECTION, POWDER, LYOPHILIZED, FOR SOLUTION INTRAMUSCULAR; INTRAVENOUS at 21:45

## 2018-05-13 RX ADMIN — OXYCODONE HYDROCHLORIDE 30 MILLIGRAM(S): 5 TABLET ORAL at 13:45

## 2018-05-13 RX ADMIN — Medication 5: at 12:56

## 2018-05-13 RX ADMIN — Medication 1 CAPSULE(S): at 12:45

## 2018-05-13 RX ADMIN — Medication 30 MILLILITER(S): at 21:45

## 2018-05-13 RX ADMIN — OXYCODONE HYDROCHLORIDE 30 MILLIGRAM(S): 5 TABLET ORAL at 05:37

## 2018-05-13 RX ADMIN — Medication 3 UNIT(S): at 08:51

## 2018-05-13 RX ADMIN — HEPARIN SODIUM 12.5 UNIT(S)/HR: 5000 INJECTION INTRAVENOUS; SUBCUTANEOUS at 15:15

## 2018-05-13 RX ADMIN — PANTOPRAZOLE SODIUM 40 MILLIGRAM(S): 20 TABLET, DELAYED RELEASE ORAL at 02:09

## 2018-05-13 RX ADMIN — Medication 50 GRAM(S): at 23:50

## 2018-05-13 RX ADMIN — OXYCODONE HYDROCHLORIDE 30 MILLIGRAM(S): 5 TABLET ORAL at 06:00

## 2018-05-13 RX ADMIN — OXYCODONE HYDROCHLORIDE 30 MILLIGRAM(S): 5 TABLET ORAL at 22:15

## 2018-05-13 RX ADMIN — OXYCODONE HYDROCHLORIDE 10 MILLIGRAM(S): 5 TABLET ORAL at 10:09

## 2018-05-13 RX ADMIN — Medication 3 UNIT(S): at 12:57

## 2018-05-13 RX ADMIN — Medication 700 MILLIGRAM(S): at 02:20

## 2018-05-13 RX ADMIN — Medication 2 UNIT(S): at 23:49

## 2018-05-13 RX ADMIN — Medication 50 GRAM(S): at 07:53

## 2018-05-13 RX ADMIN — HEPARIN SODIUM 12 UNIT(S)/HR: 5000 INJECTION INTRAVENOUS; SUBCUTANEOUS at 04:45

## 2018-05-13 RX ADMIN — Medication 50 GRAM(S): at 05:34

## 2018-05-13 RX ADMIN — Medication 1 CAPSULE(S): at 08:49

## 2018-05-13 RX ADMIN — Medication 6: at 08:50

## 2018-05-13 RX ADMIN — OXYCODONE HYDROCHLORIDE 10 MILLIGRAM(S): 5 TABLET ORAL at 02:02

## 2018-05-13 RX ADMIN — HYDROMORPHONE HYDROCHLORIDE 0.25 MILLIGRAM(S): 2 INJECTION INTRAMUSCULAR; INTRAVENOUS; SUBCUTANEOUS at 00:53

## 2018-05-13 RX ADMIN — OXYCODONE HYDROCHLORIDE 10 MILLIGRAM(S): 5 TABLET ORAL at 02:20

## 2018-05-13 RX ADMIN — Medication 1 CAPSULE(S): at 17:17

## 2018-05-13 RX ADMIN — HYDROMORPHONE HYDROCHLORIDE 0.5 MILLIGRAM(S): 2 INJECTION INTRAMUSCULAR; INTRAVENOUS; SUBCUTANEOUS at 01:10

## 2018-05-13 RX ADMIN — HYDROMORPHONE HYDROCHLORIDE 0.25 MILLIGRAM(S): 2 INJECTION INTRAMUSCULAR; INTRAVENOUS; SUBCUTANEOUS at 01:06

## 2018-05-13 RX ADMIN — Medication 280 MILLIGRAM(S): at 02:02

## 2018-05-13 RX ADMIN — OXYCODONE HYDROCHLORIDE 10 MILLIGRAM(S): 5 TABLET ORAL at 11:35

## 2018-05-13 RX ADMIN — HYDROMORPHONE HYDROCHLORIDE 0.5 MILLIGRAM(S): 2 INJECTION INTRAMUSCULAR; INTRAVENOUS; SUBCUTANEOUS at 01:20

## 2018-05-13 RX ADMIN — MORPHINE SULFATE 4 MILLIGRAM(S): 50 CAPSULE, EXTENDED RELEASE ORAL at 00:50

## 2018-05-13 RX ADMIN — OXYCODONE HYDROCHLORIDE 10 MILLIGRAM(S): 5 TABLET ORAL at 20:55

## 2018-05-13 NOTE — PROGRESS NOTE ADULT - ASSESSMENT
43 y/o PMHX: chronic pancreatitis since childhood s/p Moody procedure in 2014, now s/p total pancreatectomy with islet cell autotransplant on 4/5/18 c/b intraabdominal collections + Candida and VRE, s/p home infused abx. Presents again with purulent abdominal drainage from wound site.  Noted on CT A/P 3.8 x 2.7 cm abscess s/p IR drainage of abdominal collection on 5/9, and in addition a Filling defect within the right atrium concerning for clot, TTE confirms this heparin initiated. Transferred to PICU for closer monitoring.    NUERO  -No acute complaints    PULM  -No acute issues    CARDS  -RA thrombus: On hep gtt. TPA at bedside. Plan for TTE on Monday to eval RA thrombus size    RENAL  - Pt voiding, no acute issues. Replated Mg    GI  - s/p Total pancreatectomy, islet cell autotransplant in April- Drain management per Surg. Tolerating regular diet w/ Pancreatic enzymes  - CT A/P with contrast     ID  - Abdominal wall abscess s/p IR draining C/w Ertapenem 1g QD, Linezolid 600BID.     HEME/ONC  -Hypercoag w/u sent- Awaiting results    ENDO  - DM. On ISS. Last Glucose 361- patient ate prior to bloodwork

## 2018-05-13 NOTE — PROGRESS NOTE ADULT - SUBJECTIVE AND OBJECTIVE BOX
Surgery Blue Team Progress Note    Presented with abdominal wall abscess, HD6    SUBJECTIVE:   Patient was seen and examined this morning. There was no acute event overnight. She is resting comfortably in bed. last night she had worsenig epigastric pain (which was radiating to her back). She also had an episode of diarrhea. She does not report pain, fever, n/v, chest pain, or shortness of breath.     OBJECTIVE:   T(C): 36.9 (05-13-18 @ 08:00), Max: 37.1 (05-12-18 @ 19:00)  HR: 105 (05-13-18 @ 10:00) (74 - 112)  BP: 120/84 (05-13-18 @ 09:00) (105/78 - 152/94)  RR: 15 (05-13-18 @ 10:00) (14 - 18)  SpO2: 98% (05-13-18 @ 09:00) (97% - 100%)  Wt(kg): --  CAPILLARY BLOOD GLUCOSE      POCT Blood Glucose.: 410 mg/dL (13 May 2018 08:47)  POCT Blood Glucose.: 104 mg/dL (12 May 2018 22:02)  POCT Blood Glucose.: 397 mg/dL (12 May 2018 17:03)  POCT Blood Glucose.: 324 mg/dL (12 May 2018 11:36)    I&O's Detail    12 May 2018 07:01  -  13 May 2018 07:00  --------------------------------------------------------  IN:    heparin Infusion: 122 mL    heparin Infusion: 22 mL    heparin Infusion: 92.5 mL    heparin Infusion: 36 mL    IV PiggyBack: 970 mL    Oral Fluid: 460 mL    sodium chloride 0.9%: 150 mL  Total IN: 1852.5 mL    OUT:    Bulb: 10 mL  Total OUT: 10 mL    Total NET: 1842.5 mL      13 May 2018 07:01  -  13 May 2018 10:31  --------------------------------------------------------  IN:    heparin Infusion: 36 mL    IV PiggyBack: 300 mL    Oral Fluid: 60 mL  Total IN: 396 mL    OUT:  Total OUT: 0 mL    Total NET: 396 mL      PHYSICAL EXAM:  Gen: Well-nourished, well-developed, A&O x3, resting in bed in no acute distress  CV: RRR  Resp: Patent airways, unlabored   Abdomen: Soft, epigastric tenderness, nondistended, pouch with copious purulent output, right DANIEL in place with minimal purulent output   Extremities: All 4 extremities warm and well perfused, no edema Surgery Blue Team Progress Note  Pager: [phone]    Presented with abdominal wall abscess, HD6    SUBJECTIVE:   Patient was seen and examined this morning. There was no acute event overnight. She is resting comfortably in bed. last night she had worsenig epigastric pain (which was radiating to her back). She also had an episode of diarrhea. She does not report pain, fever, n/v, chest pain, or shortness of breath.     OBJECTIVE:   T(C): 36.9 (05-13-18 @ 08:00), Max: 37.1 (05-12-18 @ 19:00)  HR: 105 (05-13-18 @ 10:00) (74 - 112)  BP: 120/84 (05-13-18 @ 09:00) (105/78 - 152/94)  RR: 15 (05-13-18 @ 10:00) (14 - 18)  SpO2: 98% (05-13-18 @ 09:00) (97% - 100%)  Wt(kg): --  CAPILLARY BLOOD GLUCOSE      POCT Blood Glucose.: 410 mg/dL (13 May 2018 08:47)  POCT Blood Glucose.: 104 mg/dL (12 May 2018 22:02)  POCT Blood Glucose.: 397 mg/dL (12 May 2018 17:03)  POCT Blood Glucose.: 324 mg/dL (12 May 2018 11:36)    I&O's Detail    12 May 2018 07:01  -  13 May 2018 07:00  --------------------------------------------------------  IN:    heparin Infusion: 122 mL    heparin Infusion: 22 mL    heparin Infusion: 92.5 mL    heparin Infusion: 36 mL    IV PiggyBack: 970 mL    Oral Fluid: 460 mL    sodium chloride 0.9%: 150 mL  Total IN: 1852.5 mL    OUT:    Bulb: 10 mL  Total OUT: 10 mL    Total NET: 1842.5 mL      13 May 2018 07:01  -  13 May 2018 10:31  --------------------------------------------------------  IN:    heparin Infusion: 36 mL    IV PiggyBack: 300 mL    Oral Fluid: 60 mL  Total IN: 396 mL    OUT:  Total OUT: 0 mL    Total NET: 396 mL      PHYSICAL EXAM:  Gen: Well-nourished, well-developed, A&O x3, resting in bed in no acute distress  CV: RRR  Resp: Patent airways, unlabored   Abdomen: Soft, epigastric tenderness, nondistended, pouch with copious purulent output, right DANIEL in place with minimal purulent output   Extremities: All 4 extremities warm and well perfused, no edema

## 2018-05-13 NOTE — PROVIDER CONTACT NOTE (CRITICAL VALUE NOTIFICATION) - RECOMMENDATIONS
pt on pt specific protocol.
notify provider;
Decreased heparin drip to 11.5 ml/hr
continue antibiotics per ID

## 2018-05-13 NOTE — PROGRESS NOTE ADULT - SUBJECTIVE AND OBJECTIVE BOX
Diabetes Follow up note:  Interval Hx:  43 y/o F well known to DM team from recent admission for total pancreatectomy and auto islet cell transplant secondary to chronic pancreatitis since childhood. Transplant was c/b intraabdominal collections> discharge with DANIEL drains and antifungals. She now presents with large drainage of midline wound> s/p IR drainage 5/9 with ostomy bag for drainage. During this hospitalization pt was also found to have RA thrombus> on heparin gtt. Abdominal fluid cultures are + for enterococcus faceium and klebsiella pneumoniae on antibiotics as well. Pt was ordered for extra Lantus dose in AM stat but for some reason never received it. BG values improved at bedtime. Pt reports eating snack of tapioca last night at bedtime but did not receive standing Humalog dose for snack. Pt w/rebound hyperglycemia this AM of 401. Received first dose of Lantus 9 units this AM. Pt trying to omit carbs to improve BG values w/breakfast today.     Review of Systems:  General: "I had bad abdominal pain all night"  GI: Tolerating POs without any N/V/D/ABD PAIN.  CV: No CP/SOB  ENDO: No S&Sx of hypoglycemia  MEDS:    insulin glargine Injectable (LANTUS) 9 Unit(s) SubCutaneous every morning  insulin lispro (HumaLOG) corrective regimen sliding scale   SubCutaneous three times a day before meals  insulin lispro (HumaLOG) corrective regimen sliding scale   SubCutaneous at bedtime  insulin lispro Injectable (HumaLOG) 3 Unit(s) SubCutaneous three times a day before meals  insulin lispro Injectable (HumaLOG) 2 Unit(s) SubCutaneous at bedtime  megestrol Suspension 800 milliGRAM(s) Oral daily    ertapenem  IVPB 1000 milliGRAM(s) IV Intermittent every 24 hours  ertapenem  IVPB      erythromycin     base Tablet 500 milliGRAM(s) Oral every 6 hours  linezolid  IVPB      linezolid  IVPB 600 milliGRAM(s) IV Intermittent every 12 hours    Allergies    No Known Allergies      PE:  General: Female sitting in chair. NAD.   Vital Signs Last 24 Hrs  T(C): 36.9 (13 May 2018 08:00), Max: 37.1 (12 May 2018 19:00)  T(F): 98.4 (13 May 2018 08:00), Max: 98.8 (12 May 2018 19:00)  HR: 95 (13 May 2018 09:00) (74 - 112)  BP: 120/84 (13 May 2018 09:00) (105/78 - 152/94)  BP(mean): 107 (13 May 2018 08:00) (80 - 112)  RR: 16 (13 May 2018 09:00) (14 - 18)  SpO2: 98% (13 May 2018 09:00) (97% - 100%)  CV: S1, S2. NSR on monitor.   Extremities: Warm  Neuro: A&O X3    LABS:    POCT Blood Glucose.: 410 mg/dL (05-13-18 @ 08:47)  POCT Blood Glucose.: 104 mg/dL (05-12-18 @ 22:02)  POCT Blood Glucose.: 397 mg/dL (05-12-18 @ 17:03)  POCT Blood Glucose.: 324 mg/dL (05-12-18 @ 11:36)  POCT Blood Glucose.: 402 mg/dL (05-12-18 @ 08:41)  POCT Blood Glucose.: 148 mg/dL (05-11-18 @ 22:11)  POCT Blood Glucose.: 176 mg/dL (05-11-18 @ 19:52)  POCT Blood Glucose.: 241 mg/dL (05-11-18 @ 17:00)  POCT Blood Glucose.: 320 mg/dL (05-11-18 @ 11:27)  POCT Blood Glucose.: 354 mg/dL (05-11-18 @ 09:16)  POCT Blood Glucose.: 422 mg/dL (05-11-18 @ 07:36)  POCT Blood Glucose.: 280 mg/dL (05-10-18 @ 22:23)  POCT Blood Glucose.: 269 mg/dL (05-10-18 @ 18:08)  POCT Blood Glucose.: 204 mg/dL (05-10-18 @ 14:37)  POCT Blood Glucose.: 109 mg/dL (05-10-18 @ 13:32)  POCT Blood Glucose.: 55 mg/dL (05-10-18 @ 13:06)                            8.5    7.5   )-----------( 742      ( 13 May 2018 03:52 )             26.3       05-13    134<L>  |  99  |  <4<L>  ----------------------------<  318<H>  4.8   |  24  |  0.71    Ca    8.3<L>      13 May 2018 03:52  Phos  3.2     05-13  Mg     1.3     05-13    TPro  5.5<L>  /  Alb  2.4<L>  /  TBili  0.1<L>  /  DBili  x   /  AST  7<L>  /  ALT  5<L>  /  AlkPhos  73  05-13        Hemoglobin A1C, Whole Blood: 5.4 % [4.0 - 5.6] (04-12-18 @ 16:57)            Contact number: osmin [phone] or [phone]

## 2018-05-13 NOTE — PROGRESS NOTE ADULT - ASSESSMENT
41 y/o F well known to DM team from recent admission for total pancreatectomy and auto islet cell transplant secondary to chronic pancreatitis since childhood. Transplant was c/b intraabdominal collections. She now presents with large drainage of midline wound> s/p IR drainage 5/9 > also found to have RA thrombus> on heparin gtt.On antibiotics as well. Pt remains w/severe AM hyperglycemia 2/2 missed Lantus dose yesterday and incorrect holding of bedtime insulin. Pt needs insulin at all times as she had her Islet cells removed and does not make her own. Glycemic control imperative to restore beta cell function of transplanted islet cells. Discussed w/pt that she needs to communicate with team when she eats so she can get adequate doses of insulin. BG goal (100-180mg/dl).

## 2018-05-13 NOTE — PROGRESS NOTE ADULT - SUBJECTIVE AND OBJECTIVE BOX
infectious diseases progress note:    Patient is a 42y old  Female who presents with a chief complaint of draining midline wound (08 May 2018 16:38)        Infection following procedure, subsequent encounter        ROS:  CONSTITUTIONAL:  Negative fever or chills, feels well, good appetite  EYES:  Negative  blurry vision or double vision  CARDIOVASCULAR:  Negative for chest pain or palpitations  RESPIRATORY:  Negative for cough, wheezing, or SOB   GASTROINTESTINAL:  Negative for nausea, vomiting, diarrhea, constipation, or abdominal pain  GENITOURINARY:  Negative frequency, urgency or dysuria  NEUROLOGIC:  No headache, confusion, dizziness, lightheadedness    Allergies    No Known Allergies    Intolerances        ANTIBIOTICS/RELEVANT:  antimicrobials  ertapenem  IVPB 1000 milliGRAM(s) IV Intermittent every 24 hours  ertapenem  IVPB      erythromycin     base Tablet 500 milliGRAM(s) Oral every 6 hours  linezolid  IVPB      linezolid  IVPB 600 milliGRAM(s) IV Intermittent every 12 hours    immunologic:    OTHER:  acetaminophen   Tablet 650 milliGRAM(s) Oral every 6 hours PRN  alteplase    IVPB 100 milliGRAM(s) IV Intermittent once  amylase/lipase/protease  (CREON  6,000 Units) 1 Capsule(s) Oral three times a day with meals  dextrose 40% Gel 15 Gram(s) Oral once PRN  dextrose 5%. 1000 milliLiter(s) IV Continuous <Continuous>  dextrose 50% Injectable 12.5 Gram(s) IV Push once  dextrose 50% Injectable 25 Gram(s) IV Push once  dextrose 50% Injectable 25 Gram(s) IV Push once  docusate sodium Liquid 200 milliGRAM(s) Oral at bedtime PRN  glucagon  Injectable 1 milliGRAM(s) IntraMuscular once PRN  heparin  Infusion 1200 Unit(s)/Hr IV Continuous <Continuous>  HYDROmorphone  Injectable 0.5 milliGRAM(s) IV Push every 4 hours PRN  insulin glargine Injectable (LANTUS) 9 Unit(s) SubCutaneous every morning  insulin lispro (HumaLOG) corrective regimen sliding scale   SubCutaneous three times a day before meals  insulin lispro (HumaLOG) corrective regimen sliding scale   SubCutaneous at bedtime  insulin lispro Injectable (HumaLOG) 3 Unit(s) SubCutaneous three times a day before meals  insulin lispro Injectable (HumaLOG) 2 Unit(s) SubCutaneous at bedtime  magnesium sulfate  IVPB 2 Gram(s) IV Intermittent every 1 hour  megestrol Suspension 800 milliGRAM(s) Oral daily  ondansetron   Disintegrating Tablet 8 milliGRAM(s) Oral every 8 hours PRN  oxyCODONE    IR 10 milliGRAM(s) Oral every 3 hours PRN  oxyCODONE  ER Tablet 30 milliGRAM(s) Oral every 8 hours  pantoprazole  Injectable 40 milliGRAM(s) IV Push daily  senna 2 Tablet(s) Oral at bedtime      Objective:  Vital Signs Last 24 Hrs  T(C): 36.9 (13 May 2018 08:00), Max: 37.1 (12 May 2018 19:00)  T(F): 98.4 (13 May 2018 08:00), Max: 98.8 (12 May 2018 19:00)  HR: 95 (13 May 2018 09:00) (74 - 112)  BP: 120/84 (13 May 2018 09:00) (105/78 - 152/94)  BP(mean): 107 (13 May 2018 08:00) (80 - 112)  RR: 16 (13 May 2018 09:00) (14 - 18)  SpO2: 98% (13 May 2018 09:00) (97% - 100%)    PHYSICAL EXAM:  Constitutional:Well-developed, well nourished--no acute distress  Eyes:MALOU, EOMI  Ear/Nose/Throat: no oral lesion, no sinus tenderness on percussion	  Neck:no JVD, no lymphadenopathy, supple   copious drainge into bag   Gastrointestinal:soft, (+) BS, no HSM  Extremities:no e/e/c        LABS:                        8.5    7.5   )-----------( 742      ( 13 May 2018 03:52 )             26.3     05-13    134<L>  |  99  |  <4<L>  ----------------------------<  318<H>  4.8   |  24  |  0.71    Ca    8.3<L>      13 May 2018 03:52  Phos  3.2     05-13  Mg     1.3     05-13    TPro  5.5<L>  /  Alb  2.4<L>  /  TBili  0.1<L>  /  DBili  x   /  AST  7<L>  /  ALT  5<L>  /  AlkPhos  73  05-13    PT/INR - ( 12 May 2018 13:54 )   PT: 12.1 sec;   INR: 1.11 ratio         PTT - ( 13 May 2018 03:52 )  PTT:49.2 sec        MICROBIOLOGY:    RECENT CULTURES:  05-09 @ 17:13 .Body Fluid Abdominal Fluid   NORM    Numerous polymorphonuclear leukocytes per low power field  Few Gram positive cocci in pairs per oil power field  Few Gram Negative Rods per oil power field    Enterococcus faecium  Klebsiella pneumoniae  Klebsiella pneumoniae     Numerous Enterococcus faecium  Few Klebsiella pneumoniae  Few Candida albicans    05-09 @ 04:34 Skin wound   NORM      Klebsiella pneumoniae  Enterococcus faecium  Klebsiella pneumoniae     Few Klebsiella pneumoniae  Moderate Enterococcus faecium  Few Coag Negative Staphylococcus    05-09 @ 03:04 .Urine Clean Catch (Midstream)   NORM      Enterococcus faecium (vancomycin resistant)  Enterococcus faecium (vancomycin resistant)     >100,000 CFU/ml Enterococcus faecium (vancomycin resistant)    05-08 @ 19:24 .Blood Blood-Peripheral                No growth to date.          RESPIRATORY CULTURES:              RADIOLOGY & ADDITIONAL STUDIES:        Pager [phone]  After 5 pm/weekends or if no response :[phone]

## 2018-05-13 NOTE — PROVIDER CONTACT NOTE (CRITICAL VALUE NOTIFICATION) - TEST AND RESULT REPORTED:
+urine culture, enterococcus saecium >100,000
.1
positive urine culture from 5/9/2018; Positive enterococcus saecium > 100,000
ptt 163.5

## 2018-05-13 NOTE — PROGRESS NOTE ADULT - SUBJECTIVE AND OBJECTIVE BOX
CHIEF COMPLAINT::  ====================  HPI:  ====================  42-year-old female with history of chronic pancreatitis since childhood s/p [place] procedure in , 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 During her last admission (-18) after her islet cell transplant, patient developed LUQ abdominal collections found to be tracking to her midline. She underwent IR drainage and was discharged with drains that were removed two weeks ago. Drain cultures positive for Candida and VRE. She was discharged with a PICC and remained on antifungals.   Was seen at Dr. Walker’s on day of admission and found to have copious drainage from her midline wound that has been present and clear in color since her discharge, but has recently turned cloudy and foul-smelling two days ago. Denies fevers/chills, nausea/vomiting. Reports having diffuse abdominal pain.     ====================  INTERVAL HISTORY:  ====================  Pt had one episode of diarrhea last night with epigastric pain. Pt stated she had similar pain in the past  aPTT 49.2, hep gtt increased to 12.    ====================  REVIEW OF SYSTEMS:   ====================  Constitutional: [ ] Fever, [ ] Chills, [ ] Fatigue, [ ]Weight change   HEENT: [ ]Blurred vision,  [ ]Headache, [ ] Vision Changes    Respiratory: [ ]Cough, [ ]Wheezing, [ ] Shortness of breath, [ ] Hemoptysis   Cardiovascular: [ ]Chest Pain, [ ]Palpitations, [ ]ACEVEDO, [ ]PND, [ ] Orthopnea  Gastrointestinal: [x]Abdominal Pain, [x]Diarrhea, [ ]Constipation, [ ] Nausea, [ ] Vomiting  Extremities: [ ]Swelling, [ ] Joint Pain  Neurologic: [ ]Focal deficit, [ ]Paresthesias, [ ]Syncope  Skin: [ ]Rash, [ ]Ecchymoses, [ ] Wounds, [ ]Lesions  [x] all other negatives  ====================  MEDICATIONS:  ====================  MEDICATIONS  (STANDING):  alteplase    IVPB 100 milliGRAM(s) IV Intermittent once  amylase/lipase/protease  (CREON  6,000 Units) 1 Capsule(s) Oral three times a day with meals  dextrose 5%. 1000 milliLiter(s) (50 mL/Hr) IV Continuous <Continuous>  dextrose 50% Injectable 12.5 Gram(s) IV Push once  dextrose 50% Injectable 25 Gram(s) IV Push once  dextrose 50% Injectable 25 Gram(s) IV Push once  ertapenem  IVPB 1000 milliGRAM(s) IV Intermittent every 24 hours  ertapenem  IVPB      erythromycin     base Tablet 500 milliGRAM(s) Oral every 6 hours  heparin  Infusion 1200 Unit(s)/Hr (12 mL/Hr) IV Continuous <Continuous>  insulin glargine Injectable (LANTUS) 9 Unit(s) SubCutaneous every morning  insulin lispro (HumaLOG) corrective regimen sliding scale   SubCutaneous three times a day before meals  insulin lispro (HumaLOG) corrective regimen sliding scale   SubCutaneous at bedtime  insulin lispro Injectable (HumaLOG) 3 Unit(s) SubCutaneous three times a day before meals  insulin lispro Injectable (HumaLOG) 2 Unit(s) SubCutaneous at bedtime  linezolid  IVPB      linezolid  IVPB 600 milliGRAM(s) IV Intermittent every 12 hours  magnesium sulfate  IVPB 2 Gram(s) IV Intermittent every 1 hour  megestrol Suspension 800 milliGRAM(s) Oral daily  oxyCODONE  ER Tablet 30 milliGRAM(s) Oral every 8 hours  pantoprazole  Injectable 40 milliGRAM(s) IV Push daily  senna 2 Tablet(s) Oral at bedtime    MEDICATIONS  (PRN):  acetaminophen   Tablet 650 milliGRAM(s) Oral every 6 hours PRN mild pain  dextrose 40% Gel 15 Gram(s) Oral once PRN Blood Glucose LESS THAN 70 milliGRAM(s)/deciliter  docusate sodium Liquid 200 milliGRAM(s) Oral at bedtime PRN Constipation  glucagon  Injectable 1 milliGRAM(s) IntraMuscular once PRN Glucose LESS THAN 70 milligrams/deciliter  HYDROmorphone  Injectable 0.5 milliGRAM(s) IV Push every 4 hours PRN Severe Pain (7 - 10)  ondansetron   Disintegrating Tablet 8 milliGRAM(s) Oral every 8 hours PRN Nausea and/or Vomiting  oxyCODONE    IR 10 milliGRAM(s) Oral every 3 hours PRN Moderate Pain (4 - 6)      ====================  OBJECTIVE:  ====================  Vital Signs Last 24 Hrs  T(C): 36.7 (13 May 2018 06:00), Max: 37.1 (12 May 2018 19:00)  T(F): 98 (13 May 2018 06:00), Max: 98.8 (12 May 2018 19:00)  HR: 78 (13 May 2018 08:00) (74 - 112)  BP: 150/88 (13 May 2018 08:00) (105/78 - 152/94)  BP(mean): 107 (13 May 2018 08:00) (80 - 112)  RR: 15 (13 May 2018 08:00) (14 - 18)  SpO2: 99% (13 May 2018 08:00) (97% - 100%)  ICU Vital Signs Last 24 Hrs  T(C): 36.7 (13 May 2018 06:00), Max: 37.1 (12 May 2018 19:00)  T(F): 98 (13 May 2018 06:00), Max: 98.8 (12 May 2018 19:00)  HR: 78 (13 May 2018 08:00) (74 - 112)  BP: 150/88 (13 May 2018 08:00) (105/78 - 152/94)  BP(mean): 107 (13 May 2018 08:00) (80 - 112)  ABP: --  ABP(mean): --  RR: 15 (13 May 2018 08:00) (14 - 18)  SpO2: 99% (13 May 2018 08:00) (97% - 100%)      Adult Advanced Hemodynamics Last 24 Hrs  CVP(mm Hg): --  CVP(cm H2O): --  CO: --  CI: --  PA: --  PA(mean): --  PCWP: --  SVR: --  SVRI: --  PVR: --  PVRI: --       @ 07:01  -   @ 07:00  --------------------------------------------------------  IN: 1790.5 mL / OUT: 10 mL / NET: 1780.5 mL      Daily     Daily Weight in k.5 (13 May 2018 06:00)    PHYSICAL EXAM:  Appearance: Normal, NAD  Eyes: PERRL, EOMI  HENT: Normal oral mus[place]a, NC/AT  Cardiovascular:  S1/S2, RRR, No edema, JVP, Murmur  Respiratory: Clear to auscultation bilaterally  Gastrointestinal: Mild abdominal pain, Non-tender, Non-distended, BS+  Musculoskeletal:  No clubbing [ ] No joint deformity   Neurologic: Non-focal  Lymphatic: No lymphadenopathy  Psychiatry: AAOx3, Mood & affect appropriate  Skin: No rashes, No ecchymoses, No cyanosis      TELEMETRY: No acute events  EKG: NSR  IMAGIN.5    7.5   )-----------( 742      ( 13 May 2018 03:52 )             26.3     05-    134<L>  |  99  |  <4<L>  ----------------------------<  318<H>  4.8   |  24  |  0.71    Ca    8.3<L>      13 May 2018 03:52  Phos  3.2     -  Mg     1.3         TPro  5.5<L>  /  Alb  2.4<L>  /  TBili  0.1<L>  /  DBili  x   /  AST  7<L>  /  ALT  5<L>  /  AlkPhos  73  05-13    LIVER FUNCTIONS - ( 13 May 2018 03:52 )  Alb: 2.4 g/dL / Pro: 5.5 g/dL / ALK PHOS: 73 U/L / ALT: 5 U/L / AST: 7 U/L / GGT: x           PT/INR - ( 12 May 2018 13:54 )   PT: 12.1 sec;   INR: 1.11 ratio         PTT - ( 13 May 2018 03:52 )  PTT:49.2 sec        HEALTH ISSUES - PROBLEM Dx:  Postoperative wound abscess, subsequent encounter: Postoperative wound abscess, subsequent encounter  Thrombus of right atrial appendage without antecedent myocardial infarction: Thrombus of right atrial appendage without antecedent myocardial infarction  Pancreas transplant status: Pancreas transplant status  Diabetes mellitus due to underlying condition with hyperglycemia, with long-term current use of insulin: Diabetes mellitus due to underlying condition with hyperglycemia, with long-term current use of insulin        HEALTH ISSUES - R/O PROBLEM Dx:

## 2018-05-13 NOTE — PROGRESS NOTE ADULT - ASSESSMENT
Recent islet cell xplantation.  Splenectomy  Abd collection with VRE and kleb isolated.  Drain in place.     pt looks well and is eating  she has copious drainage from wd and I wonder if this could be a pancreatic fistula if there is residual pancreatic tissue    send amylase from drainage

## 2018-05-13 NOTE — PROVIDER CONTACT NOTE (CRITICAL VALUE NOTIFICATION) - ACTION/TREATMENT ORDERED:
Reorder new heparin rate
JENA Cortez notified and aware; will continue to monitor
heparin drip decreased to 11.5 ml/hr
will continue to monitor closely

## 2018-05-13 NOTE — PROGRESS NOTE ADULT - ASSESSMENT
Ms. Moreno is a 42-year-old patient who presented with abdominal wall abscess s/p IR drainage, with new incidental finding of RA thrombus on CT. HD6. She  is hemodynamically stable. Labs are significant for hypomagnesia.     - CTA A/P to look for fluid collection and possible retroperitoneal hematoma today  - CBC q12 hrs  - RA thrombus, c/w with anticoagulation on heparin gtt @ 12 units/hr. TPA at bedside  - Diet: on regular, tolerating well  - ABx regimen: ertapenem, Linezolid  - Pain control: Oxycodone, Acetaminophen   - Glycemic control, on Lantus, pre-meal and ISS, not well controlled, will f/u with endocrinology recommendation  - check amylase in wound drain  - Monitor GI function  - Replete electrolyte as necessary  - pancreatic enzyme replacement   - hem/onc recommendation appreciated  - Activity: OOb as tolerated  - PICU care appreciated

## 2018-05-13 NOTE — PROVIDER CONTACT NOTE (CRITICAL VALUE NOTIFICATION) - ASSESSMENT
no s/s of bleeding
Patient A+O x4; V/S stable; denies chest pain/ SOB. Call bell within reach
No s/s bleeding
no s/s of dysuria

## 2018-05-13 NOTE — PROVIDER CONTACT NOTE (CRITICAL VALUE NOTIFICATION) - SITUATION
ptt 163.5
critical value - positive urine culture from 5/9/2018; Positive enterococcus; saecium > 100,000
Pt on heparin drip s/p PE
s/p pancreatectomy

## 2018-05-14 ENCOUNTER — TRANSCRIPTION ENCOUNTER (OUTPATIENT)
Age: 43
End: 2018-05-14

## 2018-05-14 DIAGNOSIS — T81.4XXD INFECTION FOLLOWING A PROCEDURE, SUBSEQUENT ENCOUNTER: ICD-10-CM

## 2018-05-14 LAB
ALBUMIN SERPL ELPH-MCNC: 2.8 G/DL — LOW (ref 3.3–5)
ALP SERPL-CCNC: 77 U/L — SIGNIFICANT CHANGE UP (ref 40–120)
ALT FLD-CCNC: 5 U/L — LOW (ref 10–45)
ANION GAP SERPL CALC-SCNC: 13 MMOL/L — SIGNIFICANT CHANGE UP (ref 5–17)
APTT BLD: 36.1 SEC — SIGNIFICANT CHANGE UP (ref 27.5–37.4)
APTT BLD: 43.7 SEC — HIGH (ref 27.5–37.4)
AST SERPL-CCNC: 11 U/L — SIGNIFICANT CHANGE UP (ref 10–40)
BASOPHILS # BLD AUTO: 0.1 K/UL — SIGNIFICANT CHANGE UP (ref 0–0.2)
BASOPHILS NFR BLD AUTO: 0.9 % — SIGNIFICANT CHANGE UP (ref 0–2)
BILIRUB SERPL-MCNC: 0.1 MG/DL — LOW (ref 0.2–1.2)
BUN SERPL-MCNC: <4 MG/DL — LOW (ref 7–23)
CALCIUM SERPL-MCNC: 8.4 MG/DL — SIGNIFICANT CHANGE UP (ref 8.4–10.5)
CHLORIDE SERPL-SCNC: 99 MMOL/L — SIGNIFICANT CHANGE UP (ref 96–108)
CO2 SERPL-SCNC: 28 MMOL/L — SIGNIFICANT CHANGE UP (ref 22–31)
CREAT SERPL-MCNC: 0.73 MG/DL — SIGNIFICANT CHANGE UP (ref 0.5–1.3)
CULTURE RESULTS: SIGNIFICANT CHANGE UP
EOSINOPHIL # BLD AUTO: 0.2 K/UL — SIGNIFICANT CHANGE UP (ref 0–0.5)
EOSINOPHIL NFR BLD AUTO: 2 % — SIGNIFICANT CHANGE UP (ref 0–6)
GLUCOSE BLDC GLUCOMTR-MCNC: 103 MG/DL — HIGH (ref 70–99)
GLUCOSE BLDC GLUCOMTR-MCNC: 137 MG/DL — HIGH (ref 70–99)
GLUCOSE BLDC GLUCOMTR-MCNC: 154 MG/DL — HIGH (ref 70–99)
GLUCOSE BLDC GLUCOMTR-MCNC: 163 MG/DL — HIGH (ref 70–99)
GLUCOSE BLDC GLUCOMTR-MCNC: 51 MG/DL — LOW (ref 70–99)
GLUCOSE BLDC GLUCOMTR-MCNC: 56 MG/DL — LOW (ref 70–99)
GLUCOSE BLDC GLUCOMTR-MCNC: 56 MG/DL — LOW (ref 70–99)
GLUCOSE BLDC GLUCOMTR-MCNC: 64 MG/DL — LOW (ref 70–99)
GLUCOSE BLDC GLUCOMTR-MCNC: 80 MG/DL — SIGNIFICANT CHANGE UP (ref 70–99)
GLUCOSE BLDC GLUCOMTR-MCNC: 97 MG/DL — SIGNIFICANT CHANGE UP (ref 70–99)
GLUCOSE SERPL-MCNC: 79 MG/DL — SIGNIFICANT CHANGE UP (ref 70–99)
HCT VFR BLD CALC: 28 % — LOW (ref 34.5–45)
HGB BLD-MCNC: 8.9 G/DL — LOW (ref 11.5–15.5)
LYMPHOCYTES # BLD AUTO: 4.3 K/UL — HIGH (ref 1–3.3)
LYMPHOCYTES # BLD AUTO: 41.6 % — SIGNIFICANT CHANGE UP (ref 13–44)
MAGNESIUM SERPL-MCNC: 1.9 MG/DL — SIGNIFICANT CHANGE UP (ref 1.6–2.6)
MCHC RBC-ENTMCNC: 30.3 PG — SIGNIFICANT CHANGE UP (ref 27–34)
MCHC RBC-ENTMCNC: 31.9 GM/DL — LOW (ref 32–36)
MCV RBC AUTO: 95 FL — SIGNIFICANT CHANGE UP (ref 80–100)
MONOCYTES # BLD AUTO: 0.9 K/UL — SIGNIFICANT CHANGE UP (ref 0–0.9)
MONOCYTES NFR BLD AUTO: 8.5 % — SIGNIFICANT CHANGE UP (ref 2–14)
NEUTROPHILS # BLD AUTO: 4.9 K/UL — SIGNIFICANT CHANGE UP (ref 1.8–7.4)
NEUTROPHILS NFR BLD AUTO: 47 % — SIGNIFICANT CHANGE UP (ref 43–77)
ORGANISM # SPEC MICROSCOPIC CNT: SIGNIFICANT CHANGE UP
PHOSPHATE SERPL-MCNC: 3.9 MG/DL — SIGNIFICANT CHANGE UP (ref 2.5–4.5)
PLATELET # BLD AUTO: 747 K/UL — HIGH (ref 150–400)
POTASSIUM SERPL-MCNC: 3.9 MMOL/L — SIGNIFICANT CHANGE UP (ref 3.5–5.3)
POTASSIUM SERPL-SCNC: 3.9 MMOL/L — SIGNIFICANT CHANGE UP (ref 3.5–5.3)
PROT SERPL-MCNC: 6 G/DL — SIGNIFICANT CHANGE UP (ref 6–8.3)
RBC # BLD: 2.95 M/UL — LOW (ref 3.8–5.2)
RBC # FLD: 16.4 % — HIGH (ref 10.3–14.5)
SODIUM SERPL-SCNC: 140 MMOL/L — SIGNIFICANT CHANGE UP (ref 135–145)
SPECIMEN SOURCE: SIGNIFICANT CHANGE UP
WBC # BLD: 10.4 K/UL — SIGNIFICANT CHANGE UP (ref 3.8–10.5)
WBC # FLD AUTO: 10.4 K/UL — SIGNIFICANT CHANGE UP (ref 3.8–10.5)

## 2018-05-14 PROCEDURE — 99233 SBSQ HOSP IP/OBS HIGH 50: CPT | Mod: GC

## 2018-05-14 PROCEDURE — 76080 X-RAY EXAM OF FISTULA: CPT | Mod: 26

## 2018-05-14 PROCEDURE — 99233 SBSQ HOSP IP/OBS HIGH 50: CPT

## 2018-05-14 PROCEDURE — 99232 SBSQ HOSP IP/OBS MODERATE 35: CPT

## 2018-05-14 PROCEDURE — 93010 ELECTROCARDIOGRAM REPORT: CPT

## 2018-05-14 PROCEDURE — 49424 ASSESS CYST CONTRAST INJECT: CPT

## 2018-05-14 RX ORDER — LINEZOLID 600 MG/300ML
600 INJECTION, SOLUTION INTRAVENOUS EVERY 12 HOURS
Qty: 0 | Refills: 0 | Status: DISCONTINUED | OUTPATIENT
Start: 2018-05-14 | End: 2018-05-16

## 2018-05-14 RX ORDER — POTASSIUM CHLORIDE 20 MEQ
20 PACKET (EA) ORAL ONCE
Qty: 0 | Refills: 0 | Status: COMPLETED | OUTPATIENT
Start: 2018-05-14 | End: 2018-05-14

## 2018-05-14 RX ORDER — FLUCONAZOLE 150 MG/1
200 TABLET ORAL DAILY
Qty: 0 | Refills: 0 | Status: DISCONTINUED | OUTPATIENT
Start: 2018-05-14 | End: 2018-05-16

## 2018-05-14 RX ORDER — ENOXAPARIN SODIUM 100 MG/ML
50 INJECTION SUBCUTANEOUS EVERY 12 HOURS
Qty: 0 | Refills: 0 | Status: DISCONTINUED | OUTPATIENT
Start: 2018-05-15 | End: 2018-05-16

## 2018-05-14 RX ORDER — INSULIN LISPRO 100/ML
3 VIAL (ML) SUBCUTANEOUS
Qty: 0 | Refills: 0 | Status: DISCONTINUED | OUTPATIENT
Start: 2018-05-14 | End: 2018-05-15

## 2018-05-14 RX ORDER — MAGNESIUM SULFATE 500 MG/ML
1 VIAL (ML) INJECTION ONCE
Qty: 0 | Refills: 0 | Status: COMPLETED | OUTPATIENT
Start: 2018-05-14 | End: 2018-05-14

## 2018-05-14 RX ORDER — HEPARIN SODIUM 5000 [USP'U]/ML
1200 INJECTION INTRAVENOUS; SUBCUTANEOUS
Qty: 25000 | Refills: 0 | Status: DISCONTINUED | OUTPATIENT
Start: 2018-05-14 | End: 2018-05-15

## 2018-05-14 RX ADMIN — Medication 1 CAPSULE(S): at 12:57

## 2018-05-14 RX ADMIN — HEPARIN SODIUM 12 UNIT(S)/HR: 5000 INJECTION INTRAVENOUS; SUBCUTANEOUS at 05:56

## 2018-05-14 RX ADMIN — OXYCODONE HYDROCHLORIDE 30 MILLIGRAM(S): 5 TABLET ORAL at 22:30

## 2018-05-14 RX ADMIN — Medication 1 CAPSULE(S): at 18:16

## 2018-05-14 RX ADMIN — OXYCODONE HYDROCHLORIDE 10 MILLIGRAM(S): 5 TABLET ORAL at 08:16

## 2018-05-14 RX ADMIN — OXYCODONE HYDROCHLORIDE 10 MILLIGRAM(S): 5 TABLET ORAL at 23:40

## 2018-05-14 RX ADMIN — Medication 1 CAPSULE(S): at 09:47

## 2018-05-14 RX ADMIN — OXYCODONE HYDROCHLORIDE 10 MILLIGRAM(S): 5 TABLET ORAL at 08:29

## 2018-05-14 RX ADMIN — INSULIN GLARGINE 9 UNIT(S): 100 INJECTION, SOLUTION SUBCUTANEOUS at 08:57

## 2018-05-14 RX ADMIN — Medication 20 MILLIEQUIVALENT(S): at 05:54

## 2018-05-14 RX ADMIN — OXYCODONE HYDROCHLORIDE 10 MILLIGRAM(S): 5 TABLET ORAL at 02:15

## 2018-05-14 RX ADMIN — OXYCODONE HYDROCHLORIDE 30 MILLIGRAM(S): 5 TABLET ORAL at 13:30

## 2018-05-14 RX ADMIN — Medication 5 UNIT(S): at 13:05

## 2018-05-14 RX ADMIN — Medication 2 UNIT(S): at 23:28

## 2018-05-14 RX ADMIN — LINEZOLID 600 MILLIGRAM(S): 600 INJECTION, SOLUTION INTRAVENOUS at 18:16

## 2018-05-14 RX ADMIN — OXYCODONE HYDROCHLORIDE 10 MILLIGRAM(S): 5 TABLET ORAL at 05:15

## 2018-05-14 RX ADMIN — OXYCODONE HYDROCHLORIDE 10 MILLIGRAM(S): 5 TABLET ORAL at 18:16

## 2018-05-14 RX ADMIN — OXYCODONE HYDROCHLORIDE 30 MILLIGRAM(S): 5 TABLET ORAL at 21:57

## 2018-05-14 RX ADMIN — PANTOPRAZOLE SODIUM 40 MILLIGRAM(S): 20 TABLET, DELAYED RELEASE ORAL at 01:44

## 2018-05-14 RX ADMIN — OXYCODONE HYDROCHLORIDE 30 MILLIGRAM(S): 5 TABLET ORAL at 06:15

## 2018-05-14 RX ADMIN — OXYCODONE HYDROCHLORIDE 30 MILLIGRAM(S): 5 TABLET ORAL at 05:45

## 2018-05-14 RX ADMIN — FLUCONAZOLE 200 MILLIGRAM(S): 150 TABLET ORAL at 18:17

## 2018-05-14 RX ADMIN — OXYCODONE HYDROCHLORIDE 10 MILLIGRAM(S): 5 TABLET ORAL at 19:17

## 2018-05-14 RX ADMIN — OXYCODONE HYDROCHLORIDE 10 MILLIGRAM(S): 5 TABLET ORAL at 04:45

## 2018-05-14 RX ADMIN — OXYCODONE HYDROCHLORIDE 10 MILLIGRAM(S): 5 TABLET ORAL at 11:30

## 2018-05-14 RX ADMIN — OXYCODONE HYDROCHLORIDE 30 MILLIGRAM(S): 5 TABLET ORAL at 13:08

## 2018-05-14 RX ADMIN — OXYCODONE HYDROCHLORIDE 10 MILLIGRAM(S): 5 TABLET ORAL at 01:45

## 2018-05-14 RX ADMIN — Medication 5 UNIT(S): at 08:57

## 2018-05-14 RX ADMIN — OXYCODONE HYDROCHLORIDE 10 MILLIGRAM(S): 5 TABLET ORAL at 11:25

## 2018-05-14 RX ADMIN — Medication 100 GRAM(S): at 05:57

## 2018-05-14 RX ADMIN — Medication 500 MILLIGRAM(S): at 05:54

## 2018-05-14 NOTE — PROGRESS NOTE ADULT - SUBJECTIVE AND OBJECTIVE BOX
42-year-old female with history of chronic pancreatitis s/p [place] procedure in 2014, now s/p total pancreatectomy with islet cell autotransplant on 4/5/18 c/b  LUQ abdominal collections s/p drainage on 4/13 s/p removal admitted with abdominal abscess s/p drainage on 5/9 with CT scan on 5/13 showing resolution of abscess. Per nurse Angella, no output from the drain for 24 hrs.     Allergies:No Known Allergies      PAST MEDICAL & SURGICAL HISTORY:  Premenstrual migraine  Other chronic pancreatitis  Status post pancreatic islet cell transplantation  H/O splenectomy  History of pancreatectomy  S/P cholecystectomy: in early 20&#x27;s        Pertinent labs:                      8.9    10.4  )-----------( 747      ( 14 May 2018 05:09 )             28.0   05-14    140  |  99  |  <4<L>  ----------------------------<  79  3.9   |  28  |  0.73    Ca    8.4      14 May 2018 05:09  Phos  3.9     05-14  Mg     1.9     05-14    TPro  6.0  /  Alb  2.8<L>  /  TBili  0.1<L>  /  DBili  x   /  AST  11  /  ALT  5<L>  /  AlkPhos  77  05-14  PTT - ( 14 May 2018 05:09 )  PTT:43.7 sec    Consent: Procedure/risks/ Benefits explained. Informed consent obtained. Pt verbalizes understanding. 42-year-old female with history of chronic pancreatitis s/p [place] procedure in 2014, now s/p total pancreatectomy with islet cell autotransplant on 4/5/18 c/b  LUQ abdominal collections s/p drainage on 4/13 s/p removal admitted with abdominal abscess s/p drainage on 5/9 with CT scan on 5/13 showing resolution of abscess. Per nurse Angella, no output from the drain for 24 hrs. Afebrile. On Heprin for atrial thrombus, on hold since 15:15.     Allergies: No Known Allergies      PAST MEDICAL & SURGICAL HISTORY:  Premenstrual migraine  Other chronic pancreatitis  Status post pancreatic islet cell transplantation  H/O splenectomy  History of pancreatectomy  S/P cholecystectomy: in early 20&#x27;s        Pertinent labs:                      8.9    10.4  )-----------( 747      ( 14 May 2018 05:09 )             28.0   05-14    140  |  99  |  <4<L>  ----------------------------<  79  3.9   |  28  |  0.73    Ca    8.4      14 May 2018 05:09  Phos  3.9     05-14  Mg     1.9     05-14    TPro  6.0  /  Alb  2.8<L>  /  TBili  0.1<L>  /  DBili  x   /  AST  11  /  ALT  5<L>  /  AlkPhos  77  05-14  PTT - ( 14 May 2018 05:09 )  PTT:43.7 sec    Consent: Procedure/risks/ Benefits explained. Informed consent obtained. Pt verbalizes understanding.

## 2018-05-14 NOTE — PROGRESS NOTE ADULT - ASSESSMENT
Imp/Rx:  No fevers  Less drainage by pt report and by imaging.    planning for po abx for short course----    levaquin 500 mg daily for 5 days  diflucan 200 mg daily for 5 days  zyvox 600 mg twice daily    if coumadin used, need to be very prudent with the dosing with abx and nutritional status as factors.  i d/w ccu team.

## 2018-05-14 NOTE — PROGRESS NOTE ADULT - ASSESSMENT
41 y/o F well known to DM team from recent admission for total pancreatectomy and auto islet cell transplant secondary to chronic pancreatitis since childhood. Transplant was c/b intraabdominal collections. 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 Also found to have RA thrombus> on heparin gtt. On antibiotics as well. Pt BG difficult to control due to erratic and unpredictable PO intake. Pt eating small amounts of food on and off and requesting doses to be divided within a short period of time. However, this might be causing stoking of insulin causing drop on BG this pm after lunch. Will decrease premeal insulin doses instead do pt can have full amount at once without fear of hypoglycemia. Pt is aware of the need to have better glycemic control after transplant but avoiding hypoglycemia.  Discussed w/pt/staff and team. BG goal (100-150mg/dl) for post transplant.

## 2018-05-14 NOTE — PROGRESS NOTE ADULT - ASSESSMENT
42F s/p pancreatectomy with R atrial thrombus, unchanged in sign    -monitor in PICU  -Cont hep gtt, will likely not pursue advanced procedure for removal.  Transition to oral agent.  Coumadin an option given availability of rapid reversal if necessary, however nutritional issues, antibiotic interactions and frequent blood draws will be an issue.  Consider lovenox vs DOAC. 42F s/p pancreatectomy with R atrial thrombus, unchanged in sign    -monitor in PICU  -Cont hep gtt, will likely not pursue advanced procedure for removal.  T

## 2018-05-14 NOTE — PROVIDER CONTACT NOTE (MEDICATION) - SITUATION
Pt. requesting specific insulin doses as she states that she eats 5 small meals/per day and covers herself per meal. her breakfast insulin coverage was as charted.
Pt. with glucose of 51. Pt. asymptomatic. pt. refusing d50 amp and glucose tab. pt. ate pinapple cup and iced tea. repeat Glucose was 97.

## 2018-05-14 NOTE — CHART NOTE - NSCHARTNOTEFT_GEN_A_CORE
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 On hep gtt      ====================  NEW EVENTS:  ====================    c/o abd cramping, typical of cramping/pain she gets after eating. OOb and ambulating, no CP/palpitations/SOB. No new complaints    ====================  VITALS (Last 12 hrs):  ====================    T(C): 36.8 (05-13-18 @ 21:00), Max: 36.9 (05-13-18 @ 14:00)  HR: 88 (05-14-18 @ 00:00) (78 - 119)  BP: 122/76 (05-14-18 @ 00:00) (120/60 - 135/90)  BP(mean): 92 (05-14-18 @ 00:00) (87 - 101)  RR: 17 (05-14-18 @ 00:00) (15 - 17)  SpO2: 98% (05-13-18 @ 22:00) (97% - 100%)    TELEMETRY: sinus     I&O's Summary    12 May 2018 07:01  -  13 May 2018 07:00  --------------------------------------------------------  IN: 1852.5 mL / OUT: 10 mL / NET: 1842.5 mL    13 May 2018 07:01  -  14 May 2018 01:16  --------------------------------------------------------  IN: 1374 mL / OUT: 15 mL / NET: 1359 mL    ====================  PLAN:  ====================  - RA thrombus - c/w pt spec hep gtt, f/u with vascular, tPA @ bedside    - abd wound infx - c/w ABX per ID     Yoko JOHNSON/CCU  #[id number]

## 2018-05-14 NOTE — PROGRESS NOTE ADULT - SUBJECTIVE AND OBJECTIVE BOX
INFECTIOUS DISEASES FOLLOW UP--Madan Lane MD  Pager [phone]    This is a follow up note for this  42y Female with  Infection following procedure, subsequent encounter  much less drainage  CT shows near resolution.    Further ROS:  CONSTITUTIONAL:  No fever, good appetite  CARDIOVASCULAR:  No chest pain or palpitations  RESPIRATORY:  No dyspnea  GASTROINTESTINAL:  No nausea, vomiting, diarrhea, or abdominal pain  GENITOURINARY:  No dysuria  NEUROLOGIC:  No headache,     Allergies  No Known Allergies    ANTIBIOTICS/RELEVANT:  antimicrobials  ertapenem  IVPB 1000 milliGRAM(s) IV Intermittent every 24 hours  ertapenem  IVPB      erythromycin     base Tablet 500 milliGRAM(s) Oral every 6 hours  linezolid  IVPB      linezolid  IVPB 600 milliGRAM(s) IV Intermittent every 12 hours    OTHER:  acetaminophen   Tablet 650 milliGRAM(s) Oral every 6 hours PRN  alteplase    IVPB 100 milliGRAM(s) IV Intermittent once  amylase/lipase/protease  (CREON  6,000 Units) 1 Capsule(s) Oral three times a day with meals  dextrose 40% Gel 15 Gram(s) Oral once PRN  dextrose 5%. 1000 milliLiter(s) IV Continuous <Continuous>  dextrose 50% Injectable 12.5 Gram(s) IV Push once  dextrose 50% Injectable 25 Gram(s) IV Push once  dextrose 50% Injectable 25 Gram(s) IV Push once  docusate sodium Liquid 200 milliGRAM(s) Oral at bedtime PRN  glucagon  Injectable 1 milliGRAM(s) IntraMuscular once PRN  heparin  Infusion 1250 Unit(s)/Hr IV Continuous <Continuous>  insulin glargine Injectable (LANTUS) 9 Unit(s) SubCutaneous every morning  insulin lispro (HumaLOG) corrective regimen sliding scale   SubCutaneous three times a day before meals  insulin lispro (HumaLOG) corrective regimen sliding scale   SubCutaneous at bedtime  insulin lispro (HumaLOG) corrective regimen sliding scale   SubCutaneous <User Schedule>  insulin lispro Injectable (HumaLOG) 5 Unit(s) SubCutaneous before breakfast  insulin lispro Injectable (HumaLOG) 5 Unit(s) SubCutaneous before lunch  insulin lispro Injectable (HumaLOG) 3 Unit(s) SubCutaneous before dinner  insulin lispro Injectable (HumaLOG) 2 Unit(s) SubCutaneous at bedtime  megestrol Suspension 800 milliGRAM(s) Oral daily  ondansetron   Disintegrating Tablet 8 milliGRAM(s) Oral every 8 hours PRN  oxyCODONE    IR 10 milliGRAM(s) Oral every 3 hours PRN  oxyCODONE  ER Tablet 30 milliGRAM(s) Oral every 8 hours  pantoprazole  Injectable 40 milliGRAM(s) IV Push daily  senna 2 Tablet(s) Oral at bedtime    Objective:  Vital Signs Last 24 Hrs  T(C): 37.2 (14 May 2018 08:00), Max: 37.2 (14 May 2018 08:00)  T(F): 99 (14 May 2018 08:00), Max: 99 (14 May 2018 08:00)  HR: 86 (14 May 2018 08:00) (78 - 119)  BP: 121/75 (14 May 2018 08:00) (116/77 - 135/90)  BP(mean): 86 (14 May 2018 08:00) (79 - 101)  RR: 20 (14 May 2018 08:00) (14 - 20)  SpO2: 98% (14 May 2018 08:00) (97% - 100%)    PHYSICAL EXAM:  Constitutional:no acute distress  Eyes:MALOU, EOMI  Ear/Nose/Throat: no oral lesions, 	  Respiratory: clear BL  Cardiovascular: S1S2  Gastrointestinal:soft, (+) BS, no tenderness  Extremities:no e/e/c  No Lymphadenopathy  IV sites not inflammed.    LABS:                        8.9    10.4  )-----------( 747      ( 14 May 2018 05:09 )             28.0     05-14    140  |  99  |  <4<L>  ----------------------------<  79  3.9   |  28  |  0.73    Ca    8.4      14 May 2018 05:09  Phos  3.9     05-14  Mg     1.9     05-14    TPro  6.0  /  Alb  2.8<L>  /  TBili  0.1<L>  /  DBili  x   /  AST  11  /  ALT  5<L>  /  AlkPhos  77  05-14    PT/INR - ( 12 May 2018 13:54 )   PT: 12.1 sec;   INR: 1.11 ratio         PTT - ( 14 May 2018 05:09 )  PTT:43.7 sec    MICROBIOLOGY: mix of candida, vre, kleb    RADIOLOGY & ADDITIONAL STUDIES:  < from: CT Abdomen and Pelvis w/ IV Cont (05.13.18 @ 16:37) >    INTERPRETATION:  interval resolution of  abdominal wall abscess   trace fluid and air in the umbilicus which may represent tracking   abdominal wall infection  inflammatory change around the celiac axis, which is patent   Patent portal vein, and SMV   patent SMA    < end of copied text >

## 2018-05-14 NOTE — PROGRESS NOTE ADULT - ASSESSMENT
Ms. Moreno is a 42-year-old patient who presented with abdominal wall abscess s/p IR drainage, with new incidental finding of RA thrombus on CT. HD7. She  is hemodynamically stable. Labs are significant for hypomagnesia.     - CBC q12 hrs  - RA thrombus, c/w with anticoagulation on heparin gtt. TPA at bedside  - Diet: on regular, tolerating well  - ABx regimen: levofloxacin, Linezolid  - Pain control: Oxycodone, Acetaminophen   - Glycemic control, on Lantus, pre-meal and ISS, not well controlled, will f/u with endocrinology recommendation  - Monitor GI function  - Replete electrolyte as necessary  - pancreatic enzyme replacement   - hem/onc recommendation appreciated  - Activity: OOb as tolerated  - PICU care appreciated

## 2018-05-14 NOTE — PROGRESS NOTE ADULT - SUBJECTIVE AND OBJECTIVE BOX
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 During her last admission (4/5-4/16/18) after her islet cell transplant, patient developed LUQ abdominal collections found to be tracking to her midline. She underwent IR drainage and was discharged with drains that were removed two weeks ago. Drain cultures positive for Candida and VRE. She was discharged with a PICC and remained on antifungals. Was seen at Dr. Walker’s on day of admission and found to have copious drainage from her midline wound that has been present and clear in color since her discharge, but has recently turned cloudy and foul-smelling two days ago. Denies fevers/chills, nausea/vomiting. Reports having diffuse abdominal pain. (08 May 2018 16:38)    Overnight Event:    REVIEW OF SYSTEMS:  	    MEDICATIONS  (STANDING):  alteplase    IVPB 100 milliGRAM(s) IV Intermittent once  amylase/lipase/protease  (CREON  6,000 Units) 1 Capsule(s) Oral three times a day with meals  dextrose 5%. 1000 milliLiter(s) (50 mL/Hr) IV Continuous <Continuous>  dextrose 50% Injectable 12.5 Gram(s) IV Push once  dextrose 50% Injectable 25 Gram(s) IV Push once  dextrose 50% Injectable 25 Gram(s) IV Push once  ertapenem  IVPB 1000 milliGRAM(s) IV Intermittent every 24 hours  ertapenem  IVPB      erythromycin     base Tablet 500 milliGRAM(s) Oral every 6 hours  heparin  Infusion 1250 Unit(s)/Hr (12 mL/Hr) IV Continuous <Continuous>  insulin glargine Injectable (LANTUS) 9 Unit(s) SubCutaneous every morning  insulin lispro (HumaLOG) corrective regimen sliding scale   SubCutaneous three times a day before meals  insulin lispro (HumaLOG) corrective regimen sliding scale   SubCutaneous at bedtime  insulin lispro (HumaLOG) corrective regimen sliding scale   SubCutaneous <User Schedule>  insulin lispro Injectable (HumaLOG) 5 Unit(s) SubCutaneous before breakfast  insulin lispro Injectable (HumaLOG) 5 Unit(s) SubCutaneous before lunch  insulin lispro Injectable (HumaLOG) 3 Unit(s) SubCutaneous before dinner  insulin lispro Injectable (HumaLOG) 2 Unit(s) SubCutaneous at bedtime  linezolid  IVPB      linezolid  IVPB 600 milliGRAM(s) IV Intermittent every 12 hours  megestrol Suspension 800 milliGRAM(s) Oral daily  oxyCODONE  ER Tablet 30 milliGRAM(s) Oral every 8 hours  pantoprazole  Injectable 40 milliGRAM(s) IV Push daily  senna 2 Tablet(s) Oral at bedtime    MEDICATIONS  (PRN):  acetaminophen   Tablet 650 milliGRAM(s) Oral every 6 hours PRN mild pain  dextrose 40% Gel 15 Gram(s) Oral once PRN Blood Glucose LESS THAN 70 milliGRAM(s)/deciliter  docusate sodium Liquid 200 milliGRAM(s) Oral at bedtime PRN Constipation  glucagon  Injectable 1 milliGRAM(s) IntraMuscular once PRN Glucose LESS THAN 70 milligrams/deciliter  ondansetron   Disintegrating Tablet 8 milliGRAM(s) Oral every 8 hours PRN Nausea and/or Vomiting  oxyCODONE    IR 10 milliGRAM(s) Oral every 3 hours PRN Moderate Pain (4 - 6)        PHYSICAL EXAM:  Vital Signs Last 24 Hrs  T(C): 36.7 (14 May 2018 05:00), Max: 36.9 (13 May 2018 08:00)  T(F): 98.1 (14 May 2018 05:00), Max: 98.4 (13 May 2018 08:00)  HR: 78 (14 May 2018 06:00) (78 - 119)  BP: 119/83 (14 May 2018 06:00) (116/77 - 150/88)  BP(mean): 92 (14 May 2018 06:00) (79 - 107)  RR: 18 (14 May 2018 06:00) (14 - 18)  SpO2: 98% (14 May 2018 06:00) (97% - 100%)  I&O's Summary    13 May 2018 07:01  -  14 May 2018 07:00  --------------------------------------------------------  IN: 2024 mL / OUT: 19 mL / NET: 2005 mL        Appearance: Normal	  HEENT:   Normal oral mucosa, PERRL, EOMI	  Lymphatic: No lymphadenopathy  Cardiovascular: Normal S1 S2, No JVD, No murmurs, No edema  Respiratory: Lungs clear to auscultation	  Psychiatry: A & O x 3, Mood & affect appropriate  Gastrointestinal:  Soft, Non-tender, + BS	  Skin: No rashes, No ecchymoses, No cyanosis	  Neurologic: Non-focal  Extremities: Normal range of motion, No clubbing, cyanosis or edema  Vascular: Peripheral pulses palpable 2+ bilaterally    LABS:	 	                        8.9    10.4  )-----------( 747      ( 14 May 2018 05:09 )             28.0     Auto Eosinophil # 0.2   / Auto Eosinophil % 2.0   / Auto Neutrophil # 4.9   / Auto Neutrophil % 47.0  / BANDS % x                            9.2    9.4   )-----------( 744      ( 13 May 2018 15:56 )             28.7     Auto Eosinophil # x     / Auto Eosinophil % x     / Auto Neutrophil # x     / Auto Neutrophil % x     / BANDS % x                            6.1    4.9   )-----------( 458      ( 13 May 2018 10:44 )             22.1     Auto Eosinophil # x     / Auto Eosinophil % x     / Auto Neutrophil # x     / Auto Neutrophil % x     / BANDS % x        INR: 1.11 ratio (05-12 @ 13:54)    05-14    140  |  99  |  <4<L>  ----------------------------<  79  3.9   |  28  |  0.73  05-13    137  |  98  |  <4<L>  ----------------------------<  110<H>  4.2   |  25  |  0.79  05-13    134<L>  |  99  |  <4<L>  ----------------------------<  318<H>  4.8   |  24  |  0.71    Ca    8.4      14 May 2018 05:09  Mg     1.9     05-14  Phos  3.9     05-14  TPro  6.0  /  Alb  2.8<L>  /  TBili  0.1<L>  /  DBili  x   /  AST  11  /  ALT  5<L>  /  AlkPhos  77  05-14  TPro  6.5  /  Alb  2.9<L>  /  TBili  0.1<L>  /  DBili  x   /  AST  12  /  ALT  6<L>  /  AlkPhos  79  05-13  TPro  5.5<L>  /  Alb  2.4<L>  /  TBili  0.1<L>  /  DBili  x   /  AST  7<L>  /  ALT  5<L>  /  AlkPhos  73  05-13          TELEMETRY: 	    ECG:  	  RADIOLOGY:  OTHER: 	    PREVIOUS DIAGNOSTIC TESTING:    [ ] Echocardiogram:  [ ]  Catheterization:  [ ] Stress Test:  	  	  VOLODYMYR De León  Contact #[phone] Patient is a 42y old  Female who presents with a chief complaint of draining midline wound (08 May 2018 16:38)    HPI:  42-year-old female with history of chronic pancreatitis since childhood s/p Moody procedure in 2014, 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 During her last admission (4/5-4/16/18) after her islet cell transplant, patient developed LUQ abdominal collections found to be tracking to her midline. She underwent IR drainage and was discharged with drains that were removed two weeks ago. Drain cultures positive for Candida and VRE. She was discharged with a PICC and remained on antifungals. Was seen at Dr. Walker’s on day of admission and found to have copious drainage from her midline wound that has been present and clear in color since her discharge, but has recently turned cloudy and foul-smelling two days ago. Denies fevers/chills, nausea/vomiting. Reports having diffuse abdominal pain. (08 May 2018 16:38)    Overnight Event: No issues overnight    REVIEW OF SYSTEMS: Complaining of some dependant edema that she states has been there since her surgery  	    MEDICATIONS  (STANDING):  alteplase    IVPB 100 milliGRAM(s) IV Intermittent once  amylase/lipase/protease  (CREON  6,000 Units) 1 Capsule(s) Oral three times a day with meals  dextrose 5%. 1000 milliLiter(s) (50 mL/Hr) IV Continuous <Continuous>  dextrose 50% Injectable 12.5 Gram(s) IV Push once  dextrose 50% Injectable 25 Gram(s) IV Push once  dextrose 50% Injectable 25 Gram(s) IV Push once  ertapenem  IVPB 1000 milliGRAM(s) IV Intermittent every 24 hours  ertapenem  IVPB      erythromycin     base Tablet 500 milliGRAM(s) Oral every 6 hours  heparin  Infusion 1250 Unit(s)/Hr (12 mL/Hr) IV Continuous <Continuous>  insulin glargine Injectable (LANTUS) 9 Unit(s) SubCutaneous every morning  insulin lispro (HumaLOG) corrective regimen sliding scale   SubCutaneous three times a day before meals  insulin lispro (HumaLOG) corrective regimen sliding scale   SubCutaneous at bedtime  insulin lispro (HumaLOG) corrective regimen sliding scale   SubCutaneous <User Schedule>  insulin lispro Injectable (HumaLOG) 5 Unit(s) SubCutaneous before breakfast  insulin lispro Injectable (HumaLOG) 5 Unit(s) SubCutaneous before lunch  insulin lispro Injectable (HumaLOG) 3 Unit(s) SubCutaneous before dinner  insulin lispro Injectable (HumaLOG) 2 Unit(s) SubCutaneous at bedtime  linezolid  IVPB      linezolid  IVPB 600 milliGRAM(s) IV Intermittent every 12 hours  megestrol Suspension 800 milliGRAM(s) Oral daily  oxyCODONE  ER Tablet 30 milliGRAM(s) Oral every 8 hours  pantoprazole  Injectable 40 milliGRAM(s) IV Push daily  senna 2 Tablet(s) Oral at bedtime    MEDICATIONS  (PRN):  acetaminophen   Tablet 650 milliGRAM(s) Oral every 6 hours PRN mild pain  dextrose 40% Gel 15 Gram(s) Oral once PRN Blood Glucose LESS THAN 70 milliGRAM(s)/deciliter  docusate sodium Liquid 200 milliGRAM(s) Oral at bedtime PRN Constipation  glucagon  Injectable 1 milliGRAM(s) IntraMuscular once PRN Glucose LESS THAN 70 milligrams/deciliter  ondansetron   Disintegrating Tablet 8 milliGRAM(s) Oral every 8 hours PRN Nausea and/or Vomiting  oxyCODONE    IR 10 milliGRAM(s) Oral every 3 hours PRN Moderate Pain (4 - 6)        PHYSICAL EXAM:  Vital Signs Last 24 Hrs  T(C): 36.7 (14 May 2018 05:00), Max: 36.9 (13 May 2018 08:00)  T(F): 98.1 (14 May 2018 05:00), Max: 98.4 (13 May 2018 08:00)  HR: 78 (14 May 2018 06:00) (78 - 119)  BP: 119/83 (14 May 2018 06:00) (116/77 - 150/88)  BP(mean): 92 (14 May 2018 06:00) (79 - 107)  RR: 18 (14 May 2018 06:00) (14 - 18)  SpO2: 98% (14 May 2018 06:00) (97% - 100%)  I&O's Summary    13 May 2018 07:01  -  14 May 2018 07:00  --------------------------------------------------------  IN: 2024 mL / OUT: 19 mL / NET: 2005 mL        Appearance: Normal	  HEENT:   Normal oral mucosa, PERRL, EOMI	  Lymphatic: No lymphadenopathy  Cardiovascular: Normal S1 S2, No JVD, No murmurs, No edema  Respiratory: Lungs clear to auscultation	  Psychiatry: A & O x 3, Mood & affect appropriate  Gastrointestinal:  Soft, Non-tender, + BS	  Skin: No rashes, No ecchymoses, No cyanosis	  Neurologic: Non-focal  Extremities: Normal range of motion, No clubbing, cyanosis or edema  Vascular: Peripheral pulses palpable 2+ bilaterally    LABS:	 	                        8.9    10.4  )-----------( 747      ( 14 May 2018 05:09 )             28.0     Auto Eosinophil # 0.2   / Auto Eosinophil % 2.0   / Auto Neutrophil # 4.9   / Auto Neutrophil % 47.0  / BANDS % x                            9.2    9.4   )-----------( 744      ( 13 May 2018 15:56 )             28.7     Auto Eosinophil # x     / Auto Eosinophil % x     / Auto Neutrophil # x     / Auto Neutrophil % x     / BANDS % x                            6.1    4.9   )-----------( 458      ( 13 May 2018 10:44 )             22.1     Auto Eosinophil # x     / Auto Eosinophil % x     / Auto Neutrophil # x     / Auto Neutrophil % x     / BANDS % x        INR: 1.11 ratio (05-12 @ 13:54)    05-14    140  |  99  |  <4<L>  ----------------------------<  79  3.9   |  28  |  0.73  05-13    137  |  98  |  <4<L>  ----------------------------<  110<H>  4.2   |  25  |  0.79  05-13    134<L>  |  99  |  <4<L>  ----------------------------<  318<H>  4.8   |  24  |  0.71    Ca    8.4      14 May 2018 05:09  Mg     1.9     05-14  Phos  3.9     05-14  TPro  6.0  /  Alb  2.8<L>  /  TBili  0.1<L>  /  DBili  x   /  AST  11  /  ALT  5<L>  /  AlkPhos  77  05-14  TPro  6.5  /  Alb  2.9<L>  /  TBili  0.1<L>  /  DBili  x   /  AST  12  /  ALT  6<L>  /  AlkPhos  79  05-13  TPro  5.5<L>  /  Alb  2.4<L>  /  TBili  0.1<L>  /  DBili  x   /  AST  7<L>  /  ALT  5<L>  /  AlkPhos  73  05-13          TELEMETRY: 	    ECG:  	  RADIOLOGY:  OTHER: 	    PREVIOUS DIAGNOSTIC TESTING:    [ ] Echocardiogram:  [ ]  Catheterization:  [ ] Stress Test:  	  	  VOLODYMYR De León  Contact #[phone]

## 2018-05-14 NOTE — PROGRESS NOTE ADULT - SUBJECTIVE AND OBJECTIVE BOX
pt had abdominal drainage catheter check today by IR that demonstrated "fistula to bowel and midline incision."  Per IR attending Dr. CLARA Omer discussion with the primary team the abdominal drainage catheter is planned for removal. pt had abdominal drainage catheter check today by IR that demonstrated "fistula to bowel and midline incision."  Per IR attending Dr. CLARA Omer discussion with the surgical team, Dr. Walker, the abdominal drainage catheter is planned for removal.      Catheter was removed at bedside without difficulty and dressing applied to the site is C/D/I.    Case d/w covering PICU NP.

## 2018-05-14 NOTE — PROGRESS NOTE ADULT - SUBJECTIVE AND OBJECTIVE BOX
PAGER:  [phone]               [facility] [id number]              [email or web address]   OFFICE [phone]                              ********VASCULAR MEDICINE & CARDIOLOGY PROGRESS NOTE********                            CC:      INTERVAL HISTORY:        HISTORY OF PRESENT ILLNESS:  HPI:  42-year-old female with history of chronic pancreatitis since childhood s/p [place] procedure in 2014, 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 During her last admission (4/5-4/16/18) after her islet cell transplant, patient developed LUQ abdominal collections found to be tracking to her midline. She underwent IR drainage and was discharged with drains that were removed two weeks ago. Drain cultures positive for Candida and VRE. She was discharged with a PICC and remained on antifungals.   Was seen at Dr. Walker’s on day of admission and found to have copious drainage from her midline wound that has been present and clear in color since her discharge, but has recently turned cloudy and foul-smelling two days ago. Denies fevers/chills, nausea/vomiting. Reports having diffuse abdominal pain. (08 May 2018 16:38)          Allergies    No Known Allergies    Intolerances    	    MEDICATIONS:  alteplase    IVPB 100 milliGRAM(s) IV Intermittent once  heparin  Infusion 1250 Unit(s)/Hr IV Continuous <Continuous>    fluconAZOLE   Tablet 200 milliGRAM(s) Oral daily  levoFLOXacin  Tablet 500 milliGRAM(s) Oral every 24 hours  linezolid    Tablet 600 milliGRAM(s) Oral every 12 hours      acetaminophen   Tablet 650 milliGRAM(s) Oral every 6 hours PRN  ondansetron   Disintegrating Tablet 8 milliGRAM(s) Oral every 8 hours PRN  oxyCODONE    IR 10 milliGRAM(s) Oral every 3 hours PRN  oxyCODONE  ER Tablet 30 milliGRAM(s) Oral every 8 hours    amylase/lipase/protease  (CREON  6,000 Units) 1 Capsule(s) Oral three times a day with meals  docusate sodium Liquid 200 milliGRAM(s) Oral at bedtime PRN  pantoprazole  Injectable 40 milliGRAM(s) IV Push daily  senna 2 Tablet(s) Oral at bedtime    dextrose 40% Gel 15 Gram(s) Oral once PRN  dextrose 50% Injectable 12.5 Gram(s) IV Push once  dextrose 50% Injectable 25 Gram(s) IV Push once  dextrose 50% Injectable 25 Gram(s) IV Push once  glucagon  Injectable 1 milliGRAM(s) IntraMuscular once PRN  insulin glargine Injectable (LANTUS) 9 Unit(s) SubCutaneous every morning  insulin lispro (HumaLOG) corrective regimen sliding scale   SubCutaneous three times a day before meals  insulin lispro (HumaLOG) corrective regimen sliding scale   SubCutaneous at bedtime  insulin lispro (HumaLOG) corrective regimen sliding scale   SubCutaneous <User Schedule>  insulin lispro Injectable (HumaLOG) 5 Unit(s) SubCutaneous before breakfast  insulin lispro Injectable (HumaLOG) 5 Unit(s) SubCutaneous before lunch  insulin lispro Injectable (HumaLOG) 3 Unit(s) SubCutaneous before dinner  insulin lispro Injectable (HumaLOG) 2 Unit(s) SubCutaneous at bedtime  megestrol Suspension 800 milliGRAM(s) Oral daily    dextrose 5%. 1000 milliLiter(s) IV Continuous <Continuous>      PAST MEDICAL & SURGICAL HISTORY:  Premenstrual migraine  Other chronic pancreatitis  Status post pancreatic islet cell transplantation  H/O splenectomy  History of pancreatectomy  S/P cholecystectomy: in early 20&#x27;s      FAMILY HISTORY:  No pertinent family history in first degree relatives      SOCIAL HISTORY:  unchanged    REVIEW OF SYSTEMS:  CONSTITUTIONAL: No fever, weight loss, or fatigue  EYES: No eye pain, visual disturbances, or discharge  ENMT:  No difficulty hearing, tinnitus, vertigo; No sinus or throat pain  NECK: No pain or stiffness  RESPIRATORY: No cough, wheezing, chills or hemoptysis; No Shortness of Breath  CARDIOVASCULAR: No chest pain, palpitations, passing out, dizziness, or leg swelling  GASTROINTESTINAL: No abdominal or epigastric pain. No nausea, vomiting, or hematemesis; No diarrhea or constipation. No melena or hematochezia.  GENITOURINARY: No dysuria, frequency, hematuria, or incontinence  NEUROLOGICAL: No headaches, memory loss, loss of strength, numbness, or tremors  SKIN: No itching, burning, rashes, or lesions   LYMPH Nodes: No enlarged glands  ENDOCRINE: No heat or cold intolerance; No hair loss  MUSCULOSKELETAL: No joint pain or swelling; No muscle, back, or extremity pain  PSYCHIATRIC: No depression, anxiety, mood swings, or difficulty sleeping  HEME/LYMPH: No easy bruising, or bleeding gums  ALLERY AND IMMUNOLOGIC: No hives or eczema	    [ ] All others negative	  [ ] Unable to obtain    PHYSICAL EXAM:  T(C): 37.2 (05-14-18 @ 08:00), Max: 37.2 (05-14-18 @ 08:00)  HR: 89 (05-14-18 @ 09:00) (78 - 119)  BP: 118/75 (05-14-18 @ 09:00) (116/77 - 135/90)  RR: 20 (05-14-18 @ 08:00) (14 - 20)  SpO2: 98% (05-14-18 @ 08:00) (97% - 100%)  Wt(kg): --  I&O's Summary    13 May 2018 07:01  -  14 May 2018 07:00  --------------------------------------------------------  IN: 2024 mL / OUT: 19 mL / NET: 2005 mL        Appearance: Normal	  HEENT:   Normal oral mucosa, PERRL, EOMI	  Lymphatic: No lymphadenopathy  Cardiovascular: Normal S1 S2, No JVD, No murmurs, No edema  Respiratory: Lungs clear to auscultation	  Psychiatry: A & O x 3, Mood & affect appropriate  Gastrointestinal:  Soft, Non-tender, + BS	  Skin: No rashes, No ecchymoses, No cyanosis	  Neurologic: Non-focal  Extremities: Normal range of motion, No clubbing, cyanosis or edema  Vascular: Peripheral pulses palpable 2+ bilaterally      LABS:	 	    CBC Full  -  ( 14 May 2018 05:09 )  WBC Count : 10.4 K/uL  Hemoglobin : 8.9 g/dL  Hematocrit : 28.0 %  Platelet Count - Automated : 747 K/uL  Mean Cell Volume : 95.0 fl  Mean Cell Hemoglobin : 30.3 pg  Mean Cell Hemoglobin Concentration : 31.9 gm/dL  Auto Neutrophil # : 4.9 K/uL  Auto Lymphocyte # : 4.3 K/uL  Auto Monocyte # : 0.9 K/uL  Auto Eosinophil # : 0.2 K/uL  Auto Basophil # : 0.1 K/uL  Auto Neutrophil % : 47.0 %  Auto Lymphocyte % : 41.6 %  Auto Monocyte % : 8.5 %  Auto Eosinophil % : 2.0 %  Auto Basophil % : 0.9 %    05-14    140  |  99  |  <4<L>  ----------------------------<  79  3.9   |  28  |  0.73  05-13    137  |  98  |  <4<L>  ----------------------------<  110<H>  4.2   |  25  |  0.79    Ca    8.4      14 May 2018 05:09  Ca    8.5      13 May 2018 22:17  Phos  3.9     05-14  Phos  3.6     05-13  Mg     1.9     05-14  Mg     1.5     05-13    TPro  6.0  /  Alb  2.8<L>  /  TBili  0.1<L>  /  DBili  x   /  AST  11  /  ALT  5<L>  /  AlkPhos  77  05-14  TPro  6.5  /  Alb  2.9<L>  /  TBili  0.1<L>  /  DBili  x   /  AST  12  /  ALT  6<L>  /  AlkPhos  79  05-13 PAGER:  [phone]               [facility] [id number]              EMAIL [email or web address]   OFFICE [phone]                              ********VASCULAR MEDICINE & CARDIOLOGY PROGRESS NOTE********                            CC:  PE    INTERVAL HISTORY:    No events.  No CP or SOB>  No syncope.  Wants to go home.  Parents at bedside    HISTORY OF PRESENT ILLNESS:  HPI:  42-year-old female with history of chronic pancreatitis since childhood s/p [place] procedure in 2014, 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 During her last admission (4/5-4/16/18) after her islet cell transplant, patient developed LUQ abdominal collections found to be tracking to her midline. She underwent IR drainage and was discharged with drains that were removed two weeks ago. Drain cultures positive for Candida and VRE. She was discharged with a PICC and remained on antifungals.   Was seen at Dr. Walker’s on day of admission and found to have copious drainage from her midline wound that has been present and clear in color since her discharge, but has recently turned cloudy and foul-smelling two days ago. Denies fevers/chills, nausea/vomiting. Reports having diffuse abdominal pain. (08 May 2018 16:38)          Allergies    No Known Allergies    Intolerances    	    MEDICATIONS:  alteplase    IVPB 100 milliGRAM(s) IV Intermittent once  heparin  Infusion 1250 Unit(s)/Hr IV Continuous <Continuous>    fluconAZOLE   Tablet 200 milliGRAM(s) Oral daily  levoFLOXacin  Tablet 500 milliGRAM(s) Oral every 24 hours  linezolid    Tablet 600 milliGRAM(s) Oral every 12 hours      acetaminophen   Tablet 650 milliGRAM(s) Oral every 6 hours PRN  ondansetron   Disintegrating Tablet 8 milliGRAM(s) Oral every 8 hours PRN  oxyCODONE    IR 10 milliGRAM(s) Oral every 3 hours PRN  oxyCODONE  ER Tablet 30 milliGRAM(s) Oral every 8 hours    amylase/lipase/protease  (CREON  6,000 Units) 1 Capsule(s) Oral three times a day with meals  docusate sodium Liquid 200 milliGRAM(s) Oral at bedtime PRN  pantoprazole  Injectable 40 milliGRAM(s) IV Push daily  senna 2 Tablet(s) Oral at bedtime    dextrose 40% Gel 15 Gram(s) Oral once PRN  dextrose 50% Injectable 12.5 Gram(s) IV Push once  dextrose 50% Injectable 25 Gram(s) IV Push once  dextrose 50% Injectable 25 Gram(s) IV Push once  glucagon  Injectable 1 milliGRAM(s) IntraMuscular once PRN  insulin glargine Injectable (LANTUS) 9 Unit(s) SubCutaneous every morning  insulin lispro (HumaLOG) corrective regimen sliding scale   SubCutaneous three times a day before meals  insulin lispro (HumaLOG) corrective regimen sliding scale   SubCutaneous at bedtime  insulin lispro (HumaLOG) corrective regimen sliding scale   SubCutaneous <User Schedule>  insulin lispro Injectable (HumaLOG) 5 Unit(s) SubCutaneous before breakfast  insulin lispro Injectable (HumaLOG) 5 Unit(s) SubCutaneous before lunch  insulin lispro Injectable (HumaLOG) 3 Unit(s) SubCutaneous before dinner  insulin lispro Injectable (HumaLOG) 2 Unit(s) SubCutaneous at bedtime  megestrol Suspension 800 milliGRAM(s) Oral daily    dextrose 5%. 1000 milliLiter(s) IV Continuous <Continuous>      PAST MEDICAL & SURGICAL HISTORY:  Premenstrual migraine  Other chronic pancreatitis  Status post pancreatic islet cell transplantation  H/O splenectomy  History of pancreatectomy  S/P cholecystectomy: in early 20&#x27;s      FAMILY HISTORY:  No pertinent family history in first degree relatives      SOCIAL HISTORY:  unchanged    REVIEW OF SYSTEMS:  CONSTITUTIONAL: No fever, weight loss, or fatigue  EYES: No eye pain, visual disturbances, or discharge  ENMT:  No difficulty hearing, tinnitus, vertigo; No sinus or throat pain  NECK: No pain or stiffness  RESPIRATORY: No cough, wheezing, chills or hemoptysis; No Shortness of Breath  CARDIOVASCULAR: No chest pain, palpitations, passing out, dizziness, or leg swelling  GASTROINTESTINAL: No abdominal or epigastric pain. No nausea, vomiting, or hematemesis; No diarrhea or constipation. No melena or hematochezia.  GENITOURINARY: No dysuria, frequency, hematuria, or incontinence  NEUROLOGICAL: No headaches, memory loss, loss of strength, numbness, or tremors  SKIN: No itching, burning, rashes, or lesions   LYMPH Nodes: No enlarged glands  ENDOCRINE: No heat or cold intolerance; No hair loss  MUSCULOSKELETAL: No joint pain or swelling; No muscle, back, or extremity pain  PSYCHIATRIC: No depression, anxiety, mood swings, or difficulty sleeping  HEME/LYMPH: No easy bruising, or bleeding gums  ALLERY AND IMMUNOLOGIC: No hives or eczema	    [x ] All others negative	  [ ] Unable to obtain    PHYSICAL EXAM:  T(C): 37.2 (05-14-18 @ 08:00), Max: 37.2 (05-14-18 @ 08:00)  HR: 89 (05-14-18 @ 09:00) (78 - 119)  BP: 118/75 (05-14-18 @ 09:00) (116/77 - 135/90)  RR: 20 (05-14-18 @ 08:00) (14 - 20)  SpO2: 98% (05-14-18 @ 08:00) (97% - 100%)  Wt(kg): --  I&O's Summary    13 May 2018 07:01  -  14 May 2018 07:00  --------------------------------------------------------  IN: 2024 mL / OUT: 19 mL / NET: 2005 mL        Appearance: Normal	  HEENT:   Normal oral mucosa, PERRL, EOMI	  Lymphatic: No lymphadenopathy  Cardiovascular: Normal S1 S2, No JVD, No murmurs, No edema  Respiratory: Lungs clear to auscultation	  Psychiatry: A & O x 3, Mood & affect appropriate  Gastrointestinal:  Soft, Non-tender, + BS	  Skin: No rashes, No ecchymoses, No cyanosis	  Neurologic: Non-focal  Extremities: Normal range of motion, No clubbing, cyanosis or edema  Vascular: Peripheral pulses palpable 2+ bilaterally      LABS:	 	    CBC Full  -  ( 14 May 2018 05:09 )  WBC Count : 10.4 K/uL  Hemoglobin : 8.9 g/dL  Hematocrit : 28.0 %  Platelet Count - Automated : 747 K/uL  Mean Cell Volume : 95.0 fl  Mean Cell Hemoglobin : 30.3 pg  Mean Cell Hemoglobin Concentration : 31.9 gm/dL  Auto Neutrophil # : 4.9 K/uL  Auto Lymphocyte # : 4.3 K/uL  Auto Monocyte # : 0.9 K/uL  Auto Eosinophil # : 0.2 K/uL  Auto Basophil # : 0.1 K/uL  Auto Neutrophil % : 47.0 %  Auto Lymphocyte % : 41.6 %  Auto Monocyte % : 8.5 %  Auto Eosinophil % : 2.0 %  Auto Basophil % : 0.9 %    05-14    140  |  99  |  <4<L>  ----------------------------<  79  3.9   |  28  |  0.73  05-13    137  |  98  |  <4<L>  ----------------------------<  110<H>  4.2   |  25  |  0.79    Ca    8.4      14 May 2018 05:09  Ca    8.5      13 May 2018 22:17  Phos  3.9     05-14  Phos  3.6     05-13  Mg     1.9     05-14  Mg     1.5     05-13    TPro  6.0  /  Alb  2.8<L>  /  TBili  0.1<L>  /  DBili  x   /  AST  11  /  ALT  5<L>  /  AlkPhos  77  05-14  TPro  6.5  /  Alb  2.9<L>  /  TBili  0.1<L>  /  DBili  x   /  AST  12  /  ALT  6<L>  /  AlkPhos  79  05-13

## 2018-05-14 NOTE — PROGRESS NOTE ADULT - SUBJECTIVE AND OBJECTIVE BOX
Diabetes Follow up note: Saw pt earlier today  Interval Hx: 41 y/o F well known to DM team from recent admission for total pancreatectomy and auto islet cell transplant secondary to chronic pancreatitis since childhood. Transplant was c/b intraabdominal collections> discharge with DANIEL drains and antifungals. 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 During this hospitalization pt was also found to have RA thrombus> on heparin gtt. Abdominal fluid cultures are + for enterococcus faceium and klebsiella pneumoniae on antibiotics as well. Pt reports tolerating POs but eating small amounts of food at the time. She states she is asking staff to give her less insulin with meals depending on her PO intake However, no documentation or orders to change doses noted in EMR. Per RN pt asking for premeal doses of insulin to be broken in 2 smaller doses within 2 to 3 hours apart.  Glycemic control variable but improved from weekend readings. Also noted BG 65 this pm> per RN pt ate after this reading and had to go to IR for procedure. Additionally, pt states she refused meal time insulin at dinner yesterday because her BG was in 70s> however, BG at hs was >200s because pt ate. Noted pt off Erythromycin.    Review of Systems:  General: "some times I need less insulin with my food if I'm eating less"  GI: Tolerating POs without any N/V/D/ABD PAIN.  CV: No CP/SOB  ENDO: No S&Sx of hypoglycemia      MEDS:  amylase/lipase/protease  (CREON  6,000 Units) 1 Capsule(s) Oral three times a day with meals  fluconAZOLE   Tablet 200 milliGRAM(s) Oral daily  heparin  Infusion 1250 Unit(s)/Hr (12 mL/Hr) IV Continuous <Continuous>  insulin glargine Injectable (LANTUS) 9 Unit(s) SubCutaneous every morning  insulin lispro (HumaLOG) corrective regimen sliding scale   SubCutaneous three times a day before meals  insulin lispro (HumaLOG) corrective regimen sliding scale   SubCutaneous at bedtime  insulin lispro (HumaLOG) corrective regimen sliding scale   SubCutaneous <User Schedule>  insulin lispro Injectable (HumaLOG) 5 Unit(s) SubCutaneous before breakfast  insulin lispro Injectable (HumaLOG) 5 Unit(s) SubCutaneous before lunch  insulin lispro Injectable (HumaLOG) 3 Unit(s) SubCutaneous before dinner  insulin lispro Injectable (HumaLOG) 2 Unit(s) SubCutaneous at bedtime  levoFLOXacin  Tablet 500 milliGRAM(s) Oral every 24 hours  linezolid    Tablet 600 milliGRAM(s) Oral every 12 hours      Allergies    No Known Allergies    PE:  General: Female laying in bed in NAD.  Vital Signs Last 24 Hrs  T(C): 37.2 (05-14-18 @ 08:00), Max: 37.2 (05-14-18 @ 08:00)  T(F): 99 (05-14-18 @ 08:00), Max: 99 (05-14-18 @ 08:00)  HR: 101 (05-14-18 @ 15:00) (78 - 102)  BP: 125/86 (05-14-18 @ 14:00) (111/69 - 133/77)  BP(mean): 95 (05-14-18 @ 14:00) (80 - 100)  RR: 20 (05-14-18 @ 08:00) (15 - 20)  SpO2: 98% (05-14-18 @ 08:00) (97% - 100%)  Abd: Abdominal wound with dressing wet at time of visit.   Extremities: Warm, no edema note din all 4 exts  Neuro: A&O X3    LABS:  POCT Blood Glucose.: 64 mg/dL (05-14-18 @ 15:13)  POCT Blood Glucose.: 137 mg/dL (05-14-18 @ 13:01)  POCT Blood Glucose.: 154 mg/dL (05-14-18 @ 08:08)  POCT Blood Glucose.: 149 mg/dL (05-14-18 @ 01:22)  POCT Blood Glucose.: 215 mg/dL (05-13-18 @ 23:46)  POCT Blood Glucose.: 72 mg/dL (05-13-18 @ 17:15)  POCT Blood Glucose.: 356 mg/dL (05-13-18 @ 12:45)  POCT Blood Glucose.: 410 mg/dL (05-13-18 @ 08:47)  POCT Blood Glucose.: 104 mg/dL (05-12-18 @ 22:02)                             8.9    10.4  )-----------( 747      ( 14 May 2018 05:09 )             28.0     05-14    140  |  99  |  <4<L>  ----------------------------<  79  3.9   |  28  |  0.73    Ca    8.4      14 May 2018 05:09  Phos  3.9     05-14  Mg     1.9     05-14    TPro  6.0  /  Alb  2.8<L>  /  TBili  0.1<L>  /  DBili  x   /  AST  11  /  ALT  5<L>  /  AlkPhos  77  05-14         Hemoglobin A1C, Whole Blood: 5.4 % [4.0 - 5.6] (04-12-18 @ 16:57)

## 2018-05-14 NOTE — PROGRESS NOTE ADULT - SUBJECTIVE AND OBJECTIVE BOX
Surgery Progress Note    S: Patient seen and examined. No acute events overnight.     O:  Vital Signs Last 24 Hrs  T(C): 37.2 (14 May 2018 08:00), Max: 37.2 (14 May 2018 08:00)  T(F): 99 (14 May 2018 08:00), Max: 99 (14 May 2018 08:00)  HR: 101 (14 May 2018 15:00) (78 - 119)  BP: 125/86 (14 May 2018 14:00) (111/69 - 135/90)  BP(mean): 95 (14 May 2018 14:00) (80 - 101)  RR: 20 (14 May 2018 08:00) (15 - 20)  SpO2: 98% (14 May 2018 08:00) (97% - 100%)    I&O's Detail    13 May 2018 07:01  -  14 May 2018 07:00  --------------------------------------------------------  IN:    heparin Infusion: 60 mL    heparin Infusion: 12 mL    heparin Infusion: 192 mL    IV PiggyBack: 800 mL    Oral Fluid: 960 mL  Total IN: 2024 mL    OUT:    Drain: 19 mL  Total OUT: 19 mL    Total NET: 2005 mL          MEDICATIONS  (STANDING):  alteplase    IVPB 100 milliGRAM(s) IV Intermittent once  amylase/lipase/protease  (CREON  6,000 Units) 1 Capsule(s) Oral three times a day with meals  dextrose 5%. 1000 milliLiter(s) (50 mL/Hr) IV Continuous <Continuous>  dextrose 50% Injectable 12.5 Gram(s) IV Push once  dextrose 50% Injectable 25 Gram(s) IV Push once  dextrose 50% Injectable 25 Gram(s) IV Push once  fluconAZOLE   Tablet 200 milliGRAM(s) Oral daily  heparin  Infusion 1250 Unit(s)/Hr (12 mL/Hr) IV Continuous <Continuous>  insulin glargine Injectable (LANTUS) 9 Unit(s) SubCutaneous every morning  insulin lispro (HumaLOG) corrective regimen sliding scale   SubCutaneous three times a day before meals  insulin lispro (HumaLOG) corrective regimen sliding scale   SubCutaneous at bedtime  insulin lispro (HumaLOG) corrective regimen sliding scale   SubCutaneous <User Schedule>  insulin lispro Injectable (HumaLOG) 5 Unit(s) SubCutaneous before breakfast  insulin lispro Injectable (HumaLOG) 5 Unit(s) SubCutaneous before lunch  insulin lispro Injectable (HumaLOG) 3 Unit(s) SubCutaneous before dinner  insulin lispro Injectable (HumaLOG) 2 Unit(s) SubCutaneous at bedtime  levoFLOXacin  Tablet 500 milliGRAM(s) Oral every 24 hours  linezolid    Tablet 600 milliGRAM(s) Oral every 12 hours  megestrol Suspension 800 milliGRAM(s) Oral daily  oxyCODONE  ER Tablet 30 milliGRAM(s) Oral every 8 hours  pantoprazole  Injectable 40 milliGRAM(s) IV Push daily  senna 2 Tablet(s) Oral at bedtime    MEDICATIONS  (PRN):  acetaminophen   Tablet 650 milliGRAM(s) Oral every 6 hours PRN mild pain  dextrose 40% Gel 15 Gram(s) Oral once PRN Blood Glucose LESS THAN 70 milliGRAM(s)/deciliter  docusate sodium Liquid 200 milliGRAM(s) Oral at bedtime PRN Constipation  glucagon  Injectable 1 milliGRAM(s) IntraMuscular once PRN Glucose LESS THAN 70 milligrams/deciliter  ondansetron   Disintegrating Tablet 8 milliGRAM(s) Oral every 8 hours PRN Nausea and/or Vomiting  oxyCODONE    IR 10 milliGRAM(s) Oral every 3 hours PRN Moderate Pain (4 - 6)                            8.9    10.4  )-----------( 747      ( 14 May 2018 05:09 )             28.0       05-14    140  |  99  |  <4<L>  ----------------------------<  79  3.9   |  28  |  0.73    Ca    8.4      14 May 2018 05:09  Phos  3.9     05-14  Mg     1.9     05-14    TPro  6.0  /  Alb  2.8<L>  /  TBili  0.1<L>  /  DBili  x   /  AST  11  /  ALT  5<L>  /  AlkPhos  77  05-14      Physical Exam:  Gen: Laying in bed, NAD  Resp: Unlabored breathing  Abdomen: Soft, epigastric tenderness, nondistended, pouch with copious purulent output, right DANIEL in place with minimal purulent output  Ext: WWP  Skin: No rashes

## 2018-05-14 NOTE — CHART NOTE - NSCHARTNOTEFT_GEN_A_CORE
====================  CCU MIDNIGHT ROUNDS  ====================    MARGOT JETT  [id number]    ====================  SUMMARY:  ====================    43 yo F chronic pancreatitis (s/p Moody 2014, s/p total pancreatectomy w/ islet cell autotransplant 4/5/18), last hospitalization c.b LUQ abd collection s/p drainage by IR, DCed w/ PICC for antifungals (cx + candida and VRE), re-admitted w/ abd abscess s/p IR drainage 5/9, noted to have RA thrombus on CT, confirmed by TTE and transferred to CCU2 for further management. On hep gtt. abd drainage cath removed by IR PA  5/14     ====================  NEW EVENTS:  ====================    BG 50s x 2 this PM, improved w/ eating and drinking x 2. asymptomatic @ the time. No new complaints.     ====================  VITALS (Last 12 hrs):  ====================    T(C): 37.1 (05-14-18 @ 19:00), Max: 37.1 (05-14-18 @ 19:00)  HR: 102 (05-14-18 @ 23:40) (83 - 113)  BP: 116/78 (05-14-18 @ 22:00) (109/80 - 127/92)  BP(mean): 85 (05-14-18 @ 22:00) (74 - 102)  RR: 20 (05-14-18 @ 22:00) (20 - 20)  SpO2: 97% (05-14-18 @ 22:00) (96% - 99%)    TELEMETRY: sinus     I&O's Summary    13 May 2018 07:01  -  14 May 2018 07:00  --------------------------------------------------------  IN: 2024 mL / OUT: 19 mL / NET: 2005 mL    14 May 2018 07:01  -  14 May 2018 23:56  --------------------------------------------------------  IN: 624 mL / OUT: 300 mL / NET: 324 mL    ====================  PLAN:  ====================  - RA thrombus - c/w pt spec hep gtt, tPA @ bedside, HD stable, plan for lovenox in the AM/lovenox teaching - 1 mg/kg BID ordered to t start @ 0800 5/15   - abd wound infx - c/w ABX per ID   - drain removed by IR per sgy attending, f/u with surgery       Yoko JOHNSON/CCU  #[id number]

## 2018-05-15 ENCOUNTER — APPOINTMENT (OUTPATIENT)
Dept: SURGERY | Facility: CLINIC | Age: 43
End: 2018-05-15

## 2018-05-15 LAB
ALBUMIN SERPL ELPH-MCNC: 3 G/DL — LOW (ref 3.3–5)
ALBUMIN SERPL ELPH-MCNC: 3.2 G/DL — LOW (ref 3.3–5)
ALP SERPL-CCNC: 85 U/L — SIGNIFICANT CHANGE UP (ref 40–120)
ALP SERPL-CCNC: 86 U/L — SIGNIFICANT CHANGE UP (ref 40–120)
ALT FLD-CCNC: 6 U/L — LOW (ref 10–45)
ALT FLD-CCNC: 7 U/L — LOW (ref 10–45)
ANION GAP SERPL CALC-SCNC: 11 MMOL/L — SIGNIFICANT CHANGE UP (ref 5–17)
ANION GAP SERPL CALC-SCNC: 12 MMOL/L — SIGNIFICANT CHANGE UP (ref 5–17)
APTT BLD: 76.5 SEC — HIGH (ref 27.5–37.4)
APTT BLD: 84.8 SEC — HIGH (ref 27.5–37.4)
AST SERPL-CCNC: 10 U/L — SIGNIFICANT CHANGE UP (ref 10–40)
AST SERPL-CCNC: 22 U/L — SIGNIFICANT CHANGE UP (ref 10–40)
BASOPHILS # BLD AUTO: 0.1 K/UL — SIGNIFICANT CHANGE UP (ref 0–0.2)
BASOPHILS NFR BLD AUTO: 0.8 % — SIGNIFICANT CHANGE UP (ref 0–2)
BILIRUB SERPL-MCNC: 0.1 MG/DL — LOW (ref 0.2–1.2)
BILIRUB SERPL-MCNC: 0.2 MG/DL — SIGNIFICANT CHANGE UP (ref 0.2–1.2)
BUN SERPL-MCNC: 6 MG/DL — LOW (ref 7–23)
BUN SERPL-MCNC: 6 MG/DL — LOW (ref 7–23)
CALCIUM SERPL-MCNC: 9 MG/DL — SIGNIFICANT CHANGE UP (ref 8.4–10.5)
CALCIUM SERPL-MCNC: 9.4 MG/DL — SIGNIFICANT CHANGE UP (ref 8.4–10.5)
CHLORIDE SERPL-SCNC: 96 MMOL/L — SIGNIFICANT CHANGE UP (ref 96–108)
CHLORIDE SERPL-SCNC: 97 MMOL/L — SIGNIFICANT CHANGE UP (ref 96–108)
CO2 SERPL-SCNC: 26 MMOL/L — SIGNIFICANT CHANGE UP (ref 22–31)
CO2 SERPL-SCNC: 27 MMOL/L — SIGNIFICANT CHANGE UP (ref 22–31)
CREAT SERPL-MCNC: 0.79 MG/DL — SIGNIFICANT CHANGE UP (ref 0.5–1.3)
CREAT SERPL-MCNC: 0.87 MG/DL — SIGNIFICANT CHANGE UP (ref 0.5–1.3)
EOSINOPHIL # BLD AUTO: 0.3 K/UL — SIGNIFICANT CHANGE UP (ref 0–0.5)
EOSINOPHIL NFR BLD AUTO: 2.6 % — SIGNIFICANT CHANGE UP (ref 0–6)
GLUCOSE BLDC GLUCOMTR-MCNC: 169 MG/DL — HIGH (ref 70–99)
GLUCOSE BLDC GLUCOMTR-MCNC: 169 MG/DL — HIGH (ref 70–99)
GLUCOSE BLDC GLUCOMTR-MCNC: 231 MG/DL — HIGH (ref 70–99)
GLUCOSE BLDC GLUCOMTR-MCNC: 335 MG/DL — HIGH (ref 70–99)
GLUCOSE BLDC GLUCOMTR-MCNC: 85 MG/DL — SIGNIFICANT CHANGE UP (ref 70–99)
GLUCOSE SERPL-MCNC: 315 MG/DL — HIGH (ref 70–99)
GLUCOSE SERPL-MCNC: 82 MG/DL — SIGNIFICANT CHANGE UP (ref 70–99)
HCT VFR BLD CALC: 29.1 % — LOW (ref 34.5–45)
HGB BLD-MCNC: 9.3 G/DL — LOW (ref 11.5–15.5)
INR BLD: 1.12 RATIO — SIGNIFICANT CHANGE UP (ref 0.88–1.16)
LYMPHOCYTES # BLD AUTO: 28.5 % — SIGNIFICANT CHANGE UP (ref 13–44)
LYMPHOCYTES # BLD AUTO: 3.5 K/UL — HIGH (ref 1–3.3)
MAGNESIUM SERPL-MCNC: 1.5 MG/DL — LOW (ref 1.6–2.6)
MAGNESIUM SERPL-MCNC: 1.7 MG/DL — SIGNIFICANT CHANGE UP (ref 1.6–2.6)
MCHC RBC-ENTMCNC: 30.1 PG — SIGNIFICANT CHANGE UP (ref 27–34)
MCHC RBC-ENTMCNC: 31.9 GM/DL — LOW (ref 32–36)
MCV RBC AUTO: 94.4 FL — SIGNIFICANT CHANGE UP (ref 80–100)
MONOCYTES # BLD AUTO: 1 K/UL — HIGH (ref 0–0.9)
MONOCYTES NFR BLD AUTO: 7.8 % — SIGNIFICANT CHANGE UP (ref 2–14)
NEUTROPHILS # BLD AUTO: 7.5 K/UL — HIGH (ref 1.8–7.4)
NEUTROPHILS NFR BLD AUTO: 60.3 % — SIGNIFICANT CHANGE UP (ref 43–77)
PHOSPHATE SERPL-MCNC: 4.8 MG/DL — HIGH (ref 2.5–4.5)
PHOSPHATE SERPL-MCNC: 5.1 MG/DL — HIGH (ref 2.5–4.5)
PLATELET # BLD AUTO: 678 K/UL — HIGH (ref 150–400)
POTASSIUM SERPL-MCNC: 5.5 MMOL/L — HIGH (ref 3.5–5.3)
POTASSIUM SERPL-MCNC: 5.5 MMOL/L — HIGH (ref 3.5–5.3)
POTASSIUM SERPL-SCNC: 5.5 MMOL/L — HIGH (ref 3.5–5.3)
POTASSIUM SERPL-SCNC: 5.5 MMOL/L — HIGH (ref 3.5–5.3)
PROT SERPL-MCNC: 6.6 G/DL — SIGNIFICANT CHANGE UP (ref 6–8.3)
PROT SERPL-MCNC: 6.6 G/DL — SIGNIFICANT CHANGE UP (ref 6–8.3)
PROTHROM AB SERPL-ACNC: 12.2 SEC — SIGNIFICANT CHANGE UP (ref 9.8–12.7)
RBC # BLD: 3.08 M/UL — LOW (ref 3.8–5.2)
RBC # FLD: 17.1 % — HIGH (ref 10.3–14.5)
SODIUM SERPL-SCNC: 133 MMOL/L — LOW (ref 135–145)
SODIUM SERPL-SCNC: 136 MMOL/L — SIGNIFICANT CHANGE UP (ref 135–145)
WBC # BLD: 12.4 K/UL — HIGH (ref 3.8–10.5)
WBC # FLD AUTO: 12.4 K/UL — HIGH (ref 3.8–10.5)

## 2018-05-15 PROCEDURE — 93010 ELECTROCARDIOGRAM REPORT: CPT

## 2018-05-15 PROCEDURE — 99232 SBSQ HOSP IP/OBS MODERATE 35: CPT

## 2018-05-15 PROCEDURE — 99233 SBSQ HOSP IP/OBS HIGH 50: CPT | Mod: GC

## 2018-05-15 RX ORDER — INSULIN LISPRO 100/ML
3 VIAL (ML) SUBCUTANEOUS
Qty: 0 | Refills: 0 | Status: DISCONTINUED | OUTPATIENT
Start: 2018-05-15 | End: 2018-05-16

## 2018-05-15 RX ORDER — IBUPROFEN 200 MG
400 TABLET ORAL ONCE
Qty: 0 | Refills: 0 | Status: COMPLETED | OUTPATIENT
Start: 2018-05-15 | End: 2018-05-15

## 2018-05-15 RX ORDER — MAGNESIUM SULFATE 500 MG/ML
2 VIAL (ML) INJECTION ONCE
Qty: 0 | Refills: 0 | Status: COMPLETED | OUTPATIENT
Start: 2018-05-15 | End: 2018-05-15

## 2018-05-15 RX ORDER — INSULIN GLARGINE 100 [IU]/ML
8 INJECTION, SOLUTION SUBCUTANEOUS EVERY MORNING
Qty: 0 | Refills: 0 | Status: DISCONTINUED | OUTPATIENT
Start: 2018-05-16 | End: 2018-05-16

## 2018-05-15 RX ADMIN — Medication 1: at 17:16

## 2018-05-15 RX ADMIN — OXYCODONE HYDROCHLORIDE 10 MILLIGRAM(S): 5 TABLET ORAL at 16:07

## 2018-05-15 RX ADMIN — HEPARIN SODIUM 12 UNIT(S)/HR: 5000 INJECTION INTRAVENOUS; SUBCUTANEOUS at 03:00

## 2018-05-15 RX ADMIN — OXYCODONE HYDROCHLORIDE 30 MILLIGRAM(S): 5 TABLET ORAL at 14:08

## 2018-05-15 RX ADMIN — ENOXAPARIN SODIUM 50 MILLIGRAM(S): 100 INJECTION SUBCUTANEOUS at 08:34

## 2018-05-15 RX ADMIN — LINEZOLID 600 MILLIGRAM(S): 600 INJECTION, SOLUTION INTRAVENOUS at 17:16

## 2018-05-15 RX ADMIN — Medication 3 UNIT(S): at 12:04

## 2018-05-15 RX ADMIN — HEPARIN SODIUM 12 UNIT(S)/HR: 5000 INJECTION INTRAVENOUS; SUBCUTANEOUS at 10:27

## 2018-05-15 RX ADMIN — LINEZOLID 600 MILLIGRAM(S): 600 INJECTION, SOLUTION INTRAVENOUS at 06:12

## 2018-05-15 RX ADMIN — OXYCODONE HYDROCHLORIDE 30 MILLIGRAM(S): 5 TABLET ORAL at 22:08

## 2018-05-15 RX ADMIN — OXYCODONE HYDROCHLORIDE 30 MILLIGRAM(S): 5 TABLET ORAL at 14:38

## 2018-05-15 RX ADMIN — OXYCODONE HYDROCHLORIDE 10 MILLIGRAM(S): 5 TABLET ORAL at 16:37

## 2018-05-15 RX ADMIN — Medication 400 MILLIGRAM(S): at 10:57

## 2018-05-15 RX ADMIN — Medication 3 UNIT(S): at 17:16

## 2018-05-15 RX ADMIN — Medication 400 MILLIGRAM(S): at 10:27

## 2018-05-15 RX ADMIN — Medication 2: at 12:04

## 2018-05-15 RX ADMIN — Medication 4: at 08:35

## 2018-05-15 RX ADMIN — OXYCODONE HYDROCHLORIDE 10 MILLIGRAM(S): 5 TABLET ORAL at 00:10

## 2018-05-15 RX ADMIN — Medication 50 GRAM(S): at 10:28

## 2018-05-15 RX ADMIN — OXYCODONE HYDROCHLORIDE 10 MILLIGRAM(S): 5 TABLET ORAL at 21:00

## 2018-05-15 RX ADMIN — OXYCODONE HYDROCHLORIDE 10 MILLIGRAM(S): 5 TABLET ORAL at 09:23

## 2018-05-15 RX ADMIN — OXYCODONE HYDROCHLORIDE 30 MILLIGRAM(S): 5 TABLET ORAL at 06:30

## 2018-05-15 RX ADMIN — Medication 1 CAPSULE(S): at 12:04

## 2018-05-15 RX ADMIN — Medication 2 UNIT(S): at 22:08

## 2018-05-15 RX ADMIN — ENOXAPARIN SODIUM 50 MILLIGRAM(S): 100 INJECTION SUBCUTANEOUS at 20:22

## 2018-05-15 RX ADMIN — OXYCODONE HYDROCHLORIDE 30 MILLIGRAM(S): 5 TABLET ORAL at 22:25

## 2018-05-15 RX ADMIN — OXYCODONE HYDROCHLORIDE 10 MILLIGRAM(S): 5 TABLET ORAL at 20:30

## 2018-05-15 RX ADMIN — Medication 1 CAPSULE(S): at 17:16

## 2018-05-15 RX ADMIN — PANTOPRAZOLE SODIUM 40 MILLIGRAM(S): 20 TABLET, DELAYED RELEASE ORAL at 06:16

## 2018-05-15 RX ADMIN — Medication 3 UNIT(S): at 08:34

## 2018-05-15 RX ADMIN — INSULIN GLARGINE 9 UNIT(S): 100 INJECTION, SOLUTION SUBCUTANEOUS at 08:34

## 2018-05-15 RX ADMIN — Medication 50 GRAM(S): at 14:08

## 2018-05-15 RX ADMIN — FLUCONAZOLE 200 MILLIGRAM(S): 150 TABLET ORAL at 12:04

## 2018-05-15 RX ADMIN — OXYCODONE HYDROCHLORIDE 30 MILLIGRAM(S): 5 TABLET ORAL at 06:05

## 2018-05-15 RX ADMIN — Medication 1 CAPSULE(S): at 08:33

## 2018-05-15 RX ADMIN — OXYCODONE HYDROCHLORIDE 10 MILLIGRAM(S): 5 TABLET ORAL at 08:53

## 2018-05-15 NOTE — DISCHARGE NOTE ADULT - PLAN OF CARE
anticoagulation take lovenox as directed  f/u w/ vascular cardiology  If you experience any signs of bleeding such as bleeding gums, bloody sputum, bleeding in your stool or black stool inform your doctor  If you develop shortness of breath call your Health Care Provider or go to the Emergency Department. check finger sticks and take insulin as directed   f/u with endocrinologist  It's important not to skip any meals.  Check your blood sugar if you feel signs/symptoms of hypoglycemia. complete your ABX as directed   f/u with infectious disease and surgery Your blood glucoses will be controlled to normal limits Your abdominal wound will have no purelent drainage

## 2018-05-15 NOTE — DIETITIAN INITIAL EVALUATION ADULT. - OTHER INFO
Nutrition Assessment warranted for returning Pancreatic Islet Txp patient. 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 S/P IR/DANIEL drainage course c/b RA thrombus. Pt noted with episodes of hypo/hyperglycemia in-house secondary to Lantus on hold for IR procedure and changes in po intake and Humalog dosing. Endocrine is following. Nutrition Assessment warranted for returning Pancreatic Islet Txp patient. 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 S/P IR/DANIEL drainage course c/b RA thrombus. Pt noted with episodes of hypo/hyperglycemia in-house secondary to Lantus being held for IR procedure and changes in po intake and Humalog dosing. Endocrine is following. Nutrition Assessment warranted for returning Pancreatic Islet Txp patient. 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 S/P IR/DANIEL drainage course c/b RA thrombus. Pt noted with episodes of hypo/hyperglycemia in-house secondary to Lantus being held for IR procedure and changes in po intake and Humalog dosing. Endocrine is following. Electrolyte abnormalities noted.

## 2018-05-15 NOTE — PROGRESS NOTE ADULT - SUBJECTIVE AND OBJECTIVE BOX
PAGER:  [phone]               [facility] [id number]              [email or web address]   OFFICE [phone]                              ********VASCULAR MEDICINE & CARDIOLOGY PROGRESS NOTE********                            CC:  R atrial thrombus	    INTERVAL HISTORY: Drain removed, site clean without bleeding.         HISTORY OF PRESENT ILLNESS:  HPI:  42-year-old female with history of chronic pancreatitis since childhood s/p Moody procedure in 2014, 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 During her last admission (4/5-4/16/18) after her islet cell transplant, patient developed LUQ abdominal collections found to be tracking to her midline. She underwent IR drainage and was discharged with drains that were removed two weeks ago. Drain cultures positive for Candida and VRE. She was discharged with a PICC and remained on antifungals.   Was seen at Dr. Walker’s on day of admission and found to have copious drainage from her midline wound that has been present and clear in color since her discharge, but has recently turned cloudy and foul-smelling two days ago. Denies fevers/chills, nausea/vomiting. Reports having diffuse abdominal pain. (08 May 2018 16:38)          Allergies    No Known Allergies    Intolerances    	    MEDICATIONS:  alteplase    IVPB 100 milliGRAM(s) IV Intermittent once  enoxaparin Injectable 50 milliGRAM(s) SubCutaneous every 12 hours    fluconAZOLE   Tablet 200 milliGRAM(s) Oral daily  levoFLOXacin  Tablet 500 milliGRAM(s) Oral every 24 hours  linezolid    Tablet 600 milliGRAM(s) Oral every 12 hours      acetaminophen   Tablet 650 milliGRAM(s) Oral every 6 hours PRN  ondansetron   Disintegrating Tablet 8 milliGRAM(s) Oral every 8 hours PRN  oxyCODONE    IR 10 milliGRAM(s) Oral every 3 hours PRN  oxyCODONE  ER Tablet 30 milliGRAM(s) Oral every 8 hours    amylase/lipase/protease  (CREON  6,000 Units) 1 Capsule(s) Oral three times a day with meals  docusate sodium Liquid 200 milliGRAM(s) Oral at bedtime PRN  pantoprazole  Injectable 40 milliGRAM(s) IV Push daily  senna 2 Tablet(s) Oral at bedtime    dextrose 40% Gel 15 Gram(s) Oral once PRN  dextrose 50% Injectable 12.5 Gram(s) IV Push once  dextrose 50% Injectable 25 Gram(s) IV Push once  dextrose 50% Injectable 25 Gram(s) IV Push once  glucagon  Injectable 1 milliGRAM(s) IntraMuscular once PRN  insulin lispro (HumaLOG) corrective regimen sliding scale   SubCutaneous three times a day before meals  insulin lispro (HumaLOG) corrective regimen sliding scale   SubCutaneous at bedtime  insulin lispro (HumaLOG) corrective regimen sliding scale   SubCutaneous <User Schedule>  insulin lispro Injectable (HumaLOG) 3 Unit(s) SubCutaneous before dinner  insulin lispro Injectable (HumaLOG) 3 Unit(s) SubCutaneous with breakfast  insulin lispro Injectable (HumaLOG) 3 Unit(s) SubCutaneous with lunch  insulin lispro Injectable (HumaLOG) 2 Unit(s) SubCutaneous at bedtime  megestrol Suspension 800 milliGRAM(s) Oral daily    dextrose 5%. 1000 milliLiter(s) IV Continuous <Continuous>      PAST MEDICAL & SURGICAL HISTORY:  Premenstrual migraine  Other chronic pancreatitis  Status post pancreatic islet cell transplantation  H/O splenectomy  History of pancreatectomy  S/P cholecystectomy: in early 20&#x27;s      FAMILY HISTORY:  No pertinent family history in first degree relatives      SOCIAL HISTORY:  unchanged    REVIEW OF SYSTEMS:  CONSTITUTIONAL: No fever, weight loss, or fatigue  EYES: No eye pain, visual disturbances, or discharge  ENMT:  No difficulty hearing, tinnitus, vertigo; No sinus or throat pain  NECK: No pain or stiffness  RESPIRATORY: No cough, wheezing, chills or hemoptysis; No Shortness of Breath  CARDIOVASCULAR: No chest pain, palpitations, passing out, dizziness, or leg swelling  GASTROINTESTINAL: No abdominal or epigastric pain. No nausea, vomiting, or hematemesis; No diarrhea or constipation. No melena or hematochezia.  GENITOURINARY: No dysuria, frequency, hematuria, or incontinence  NEUROLOGICAL: No headaches, memory loss, loss of strength, numbness, or tremors  SKIN: No itching, burning, rashes, or lesions   LYMPH Nodes: No enlarged glands  ENDOCRINE: No heat or cold intolerance; No hair loss  MUSCULOSKELETAL: No joint pain or swelling; No muscle, back, or extremity pain  PSYCHIATRIC: No depression, anxiety, mood swings, or difficulty sleeping  HEME/LYMPH: No easy bruising, or bleeding gums  ALLERY AND IMMUNOLOGIC: No hives or eczema	    [ ] All others negative	  [ ] Unable to obtain    PHYSICAL EXAM:  T(C): 36.8 (05-15-18 @ 08:00), Max: 37.4 (05-15-18 @ 03:00)  HR: 102 (05-15-18 @ 10:00) (74 - 113)  BP: 128/91 (05-15-18 @ 10:00) (109/80 - 144/86)  RR: 16 (05-15-18 @ 10:00) (15 - 20)  SpO2: 99% (05-15-18 @ 09:00) (96% - 100%)  Wt(kg): --  I&O's Summary    14 May 2018 07:01  -  15 May 2018 07:00  --------------------------------------------------------  IN: 1212 mL / OUT: 1300 mL / NET: -88 mL    15 May 2018 07:01  -  15 May 2018 11:05  --------------------------------------------------------  IN: 12 mL / OUT: 0 mL / NET: 12 mL        Appearance: Normal	  HEENT:   Normal oral mucosa, PERRL, EOMI	  Lymphatic: No lymphadenopathy  Cardiovascular: Normal S1 S2, No JVD, No murmurs, No edema  Respiratory: Lungs clear to auscultation	  Psychiatry: A & O x 3, Mood & affect appropriate  Gastrointestinal:  Soft, Non-tender, +bs.  Abdominal wounds with bandages, no drainage or bleeding.   Skin: No rashes, No ecchymoses, No cyanosis	  Neurologic: Non-focal  Extremities: Normal range of motion, No clubbing, cyanosis or edema  Vascular: Peripheral pulses palpable 2+ bilaterally      LABS:	 	    CBC Full  -  ( 15 May 2018 02:26 )  WBC Count : 12.4 K/uL  Hemoglobin : 9.3 g/dL  Hematocrit : 29.1 %  Platelet Count - Automated : 678 K/uL  Mean Cell Volume : 94.4 fl  Mean Cell Hemoglobin : 30.1 pg  Mean Cell Hemoglobin Concentration : 31.9 gm/dL  Auto Neutrophil # : 7.5 K/uL  Auto Lymphocyte # : 3.5 K/uL  Auto Monocyte # : 1.0 K/uL  Auto Eosinophil # : 0.3 K/uL  Auto Basophil # : 0.1 K/uL  Auto Neutrophil % : 60.3 %  Auto Lymphocyte % : 28.5 %  Auto Monocyte % : 7.8 %  Auto Eosinophil % : 2.6 %  Auto Basophil % : 0.8 %    05-15    133<L>  |  96  |  6<L>  ----------------------------<  315<H>  5.5<H>   |  26  |  0.79  05-15    136  |  97  |  6<L>  ----------------------------<  82  5.5<H>   |  27  |  0.87    Ca    9.4      15 May 2018 09:11  Ca    9.0      15 May 2018 02:26  Phos  4.8     05-15  Phos  5.1     05-15  Mg     1.5     05-15  Mg     1.7     05-15    TPro  6.6  /  Alb  3.2<L>  /  TBili  0.2  /  DBili  x   /  AST  10  /  ALT  6<L>  /  AlkPhos  86  05-15  TPro  6.6  /  Alb  3.0<L>  /  TBili  0.1<L>  /  DBili  x   /  AST  22  /  ALT  7<L>  /  AlkPhos  85  05-15 PAGER:  [phone]               [facility] [id number]              [email or web address]   OFFICE [phone]                              ********VASCULAR MEDICINE & CARDIOLOGY PROGRESS NOTE********                            CC:  R atrial thrombus	    INTERVAL HISTORY: Drain removed, site clean without bleeding.         HISTORY OF PRESENT ILLNESS:  HPI:  42-year-old female with history of chronic pancreatitis since childhood s/p Moody procedure in 2014, 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 During her last admission (4/5-4/16/18) after her islet cell transplant, patient developed LUQ abdominal collections found to be tracking to her midline. She underwent IR drainage and was discharged with drains that were removed two weeks ago. Drain cultures positive for Candida and VRE. She was discharged with a PICC and remained on antifungals.   Was seen at Dr. Walker’s on day of admission and found to have copious drainage from her midline wound that has been present and clear in color since her discharge, but has recently turned cloudy and foul-smelling two days ago. Denies fevers/chills, nausea/vomiting. Reports having diffuse abdominal pain. (08 May 2018 16:38)          Allergies    No Known Allergies    Intolerances    	    MEDICATIONS:  alteplase    IVPB 100 milliGRAM(s) IV Intermittent once  enoxaparin Injectable 50 milliGRAM(s) SubCutaneous every 12 hours    fluconAZOLE   Tablet 200 milliGRAM(s) Oral daily  levoFLOXacin  Tablet 500 milliGRAM(s) Oral every 24 hours  linezolid    Tablet 600 milliGRAM(s) Oral every 12 hours      acetaminophen   Tablet 650 milliGRAM(s) Oral every 6 hours PRN  ondansetron   Disintegrating Tablet 8 milliGRAM(s) Oral every 8 hours PRN  oxyCODONE    IR 10 milliGRAM(s) Oral every 3 hours PRN  oxyCODONE  ER Tablet 30 milliGRAM(s) Oral every 8 hours    amylase/lipase/protease  (CREON  6,000 Units) 1 Capsule(s) Oral three times a day with meals  docusate sodium Liquid 200 milliGRAM(s) Oral at bedtime PRN  pantoprazole  Injectable 40 milliGRAM(s) IV Push daily  senna 2 Tablet(s) Oral at bedtime    dextrose 40% Gel 15 Gram(s) Oral once PRN  dextrose 50% Injectable 12.5 Gram(s) IV Push once  dextrose 50% Injectable 25 Gram(s) IV Push once  dextrose 50% Injectable 25 Gram(s) IV Push once  glucagon  Injectable 1 milliGRAM(s) IntraMuscular once PRN  insulin lispro (HumaLOG) corrective regimen sliding scale   SubCutaneous three times a day before meals  insulin lispro (HumaLOG) corrective regimen sliding scale   SubCutaneous at bedtime  insulin lispro (HumaLOG) corrective regimen sliding scale   SubCutaneous <User Schedule>  insulin lispro Injectable (HumaLOG) 3 Unit(s) SubCutaneous before dinner  insulin lispro Injectable (HumaLOG) 3 Unit(s) SubCutaneous with breakfast  insulin lispro Injectable (HumaLOG) 3 Unit(s) SubCutaneous with lunch  insulin lispro Injectable (HumaLOG) 2 Unit(s) SubCutaneous at bedtime  megestrol Suspension 800 milliGRAM(s) Oral daily    dextrose 5%. 1000 milliLiter(s) IV Continuous <Continuous>      PAST MEDICAL & SURGICAL HISTORY:  Premenstrual migraine  Other chronic pancreatitis  Status post pancreatic islet cell transplantation  H/O splenectomy  History of pancreatectomy  S/P cholecystectomy: in early 20&#x27;s      FAMILY HISTORY:  No pertinent family history in first degree relatives      SOCIAL HISTORY:  unchanged    REVIEW OF SYSTEMS:  CONSTITUTIONAL: No fever, weight loss, or fatigue  EYES: No eye pain, visual disturbances, or discharge  ENMT:  No difficulty hearing, tinnitus, vertigo; No sinus or throat pain  NECK: No pain or stiffness  RESPIRATORY: No shortness of breath  CARDIOVASCULAR: No chest pain, palpitations, passing out, dizziness, or leg swelling  GASTROINTESTINAL: No drainage or bleeding from drain sites  GENITOURINARY: No dysuria, frequency, hematuria, or incontinence  NEUROLOGICAL: No headaches, memory loss, loss of strength, numbness, or tremors  SKIN: No itching, burning, rashes, or lesions   LYMPH Nodes: No enlarged glands  ENDOCRINE: No heat or cold intolerance; No hair loss  MUSCULOSKELETAL: No joint pain or swelling; No muscle, back, or extremity pain  PSYCHIATRIC: No depression, anxiety, mood swings, or difficulty sleeping  HEME/LYMPH: No easy bruising, or bleeding gums  ALLERY AND IMMUNOLOGIC: No hives or eczema	    [ ] All others negative	  [ ] Unable to obtain    PHYSICAL EXAM:  T(C): 36.8 (05-15-18 @ 08:00), Max: 37.4 (05-15-18 @ 03:00)  HR: 102 (05-15-18 @ 10:00) (74 - 113)  BP: 128/91 (05-15-18 @ 10:00) (109/80 - 144/86)  RR: 16 (05-15-18 @ 10:00) (15 - 20)  SpO2: 99% (05-15-18 @ 09:00) (96% - 100%)  Wt(kg): --  I&O's Summary    14 May 2018 07:01  -  15 May 2018 07:00  --------------------------------------------------------  IN: 1212 mL / OUT: 1300 mL / NET: -88 mL    15 May 2018 07:01  -  15 May 2018 11:05  --------------------------------------------------------  IN: 12 mL / OUT: 0 mL / NET: 12 mL        Appearance: Normal	  HEENT:   Normal oral mucosa, PERRL, EOMI	  Lymphatic: No lymphadenopathy  Cardiovascular: Normal S1 S2, No JVD, No murmurs, No edema  Respiratory: Lungs clear to auscultation	  Psychiatry: A & O x 3, Mood & affect appropriate  Gastrointestinal:  Soft, Non-tender, +bs.  Abdominal wounds with bandages, no drainage or bleeding.   Skin: No rashes, No ecchymoses, No cyanosis	  Neurologic: Non-focal  Extremities: Normal range of motion, No clubbing, cyanosis or edema  Vascular: Peripheral pulses palpable 2+ bilaterally      LABS:	 	    CBC Full  -  ( 15 May 2018 02:26 )  WBC Count : 12.4 K/uL  Hemoglobin : 9.3 g/dL  Hematocrit : 29.1 %  Platelet Count - Automated : 678 K/uL  Mean Cell Volume : 94.4 fl  Mean Cell Hemoglobin : 30.1 pg  Mean Cell Hemoglobin Concentration : 31.9 gm/dL  Auto Neutrophil # : 7.5 K/uL  Auto Lymphocyte # : 3.5 K/uL  Auto Monocyte # : 1.0 K/uL  Auto Eosinophil # : 0.3 K/uL  Auto Basophil # : 0.1 K/uL  Auto Neutrophil % : 60.3 %  Auto Lymphocyte % : 28.5 %  Auto Monocyte % : 7.8 %  Auto Eosinophil % : 2.6 %  Auto Basophil % : 0.8 %    05-15    133<L>  |  96  |  6<L>  ----------------------------<  315<H>  5.5<H>   |  26  |  0.79  05-15    136  |  97  |  6<L>  ----------------------------<  82  5.5<H>   |  27  |  0.87    Ca    9.4      15 May 2018 09:11  Ca    9.0      15 May 2018 02:26  Phos  4.8     05-15  Phos  5.1     05-15  Mg     1.5     05-15  Mg     1.7     05-15    TPro  6.6  /  Alb  3.2<L>  /  TBili  0.2  /  DBili  x   /  AST  10  /  ALT  6<L>  /  AlkPhos  86  05-15  TPro  6.6  /  Alb  3.0<L>  /  TBili  0.1<L>  /  DBili  x   /  AST  22  /  ALT  7<L>  /  AlkPhos  85  05-15 PAGER:  [phone]               [facility] [id number]              [email or web address]   OFFICE [phone]                              ********VASCULAR MEDICINE & CARDIOLOGY PROGRESS NOTE********                            CC:  R atrial thrombus	    INTERVAL HISTORY: Drain removed, site clean without bleeding.         HISTORY OF PRESENT ILLNESS:  HPI:  42-year-old female with history of chronic pancreatitis since childhood s/p Moody procedure in 2014, 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 During her last admission (4/5-4/16/18) after her islet cell transplant, patient developed LUQ abdominal collections found to be tracking to her midline. She underwent IR drainage and was discharged with drains that were removed two weeks ago. Drain cultures positive for Candida and VRE. She was discharged with a PICC and remained on antifungals.   Was seen at Dr. Walker’s on day of admission and found to have copious drainage from her midline wound that has been present and clear in color since her discharge, but has recently turned cloudy and foul-smelling two days ago. Denies fevers/chills, nausea/vomiting. Reports having diffuse abdominal pain. (08 May 2018 16:38)          Allergies    No Known Allergies    Intolerances    	    MEDICATIONS:  alteplase    IVPB 100 milliGRAM(s) IV Intermittent once  enoxaparin Injectable 50 milliGRAM(s) SubCutaneous every 12 hours    fluconAZOLE   Tablet 200 milliGRAM(s) Oral daily  levoFLOXacin  Tablet 500 milliGRAM(s) Oral every 24 hours  linezolid    Tablet 600 milliGRAM(s) Oral every 12 hours      acetaminophen   Tablet 650 milliGRAM(s) Oral every 6 hours PRN  ondansetron   Disintegrating Tablet 8 milliGRAM(s) Oral every 8 hours PRN  oxyCODONE    IR 10 milliGRAM(s) Oral every 3 hours PRN  oxyCODONE  ER Tablet 30 milliGRAM(s) Oral every 8 hours    amylase/lipase/protease  (CREON  6,000 Units) 1 Capsule(s) Oral three times a day with meals  docusate sodium Liquid 200 milliGRAM(s) Oral at bedtime PRN  pantoprazole  Injectable 40 milliGRAM(s) IV Push daily  senna 2 Tablet(s) Oral at bedtime    dextrose 40% Gel 15 Gram(s) Oral once PRN  dextrose 50% Injectable 12.5 Gram(s) IV Push once  dextrose 50% Injectable 25 Gram(s) IV Push once  dextrose 50% Injectable 25 Gram(s) IV Push once  glucagon  Injectable 1 milliGRAM(s) IntraMuscular once PRN  insulin lispro (HumaLOG) corrective regimen sliding scale   SubCutaneous three times a day before meals  insulin lispro (HumaLOG) corrective regimen sliding scale   SubCutaneous at bedtime  insulin lispro (HumaLOG) corrective regimen sliding scale   SubCutaneous <User Schedule>  insulin lispro Injectable (HumaLOG) 3 Unit(s) SubCutaneous before dinner  insulin lispro Injectable (HumaLOG) 3 Unit(s) SubCutaneous with breakfast  insulin lispro Injectable (HumaLOG) 3 Unit(s) SubCutaneous with lunch  insulin lispro Injectable (HumaLOG) 2 Unit(s) SubCutaneous at bedtime  megestrol Suspension 800 milliGRAM(s) Oral daily    dextrose 5%. 1000 milliLiter(s) IV Continuous <Continuous>      PAST MEDICAL & SURGICAL HISTORY:  Premenstrual migraine  Other chronic pancreatitis  Status post pancreatic islet cell transplantation  H/O splenectomy  History of pancreatectomy  S/P cholecystectomy: in early 20&#x27;s      FAMILY HISTORY:  No pertinent family history in first degree relatives      SOCIAL HISTORY:  unchanged    REVIEW OF SYSTEMS:  CONSTITUTIONAL: No fever, weight loss, or fatigue  EYES: No eye pain, visual disturbances, or discharge  ENMT:  No difficulty hearing, tinnitus, vertigo; No sinus or throat pain  NECK: No pain or stiffness  RESPIRATORY: No shortness of breath  CARDIOVASCULAR: No chest pain, palpitations, passing out, dizziness, or leg swelling  GASTROINTESTINAL: No drainage or bleeding from drain sites  GENITOURINARY: No dysuria, frequency, hematuria, or incontinence  NEUROLOGICAL: No headaches, memory loss, loss of strength, numbness, or tremors  SKIN: No itching, burning, rashes, or lesions   LYMPH Nodes: No enlarged glands  ENDOCRINE: No heat or cold intolerance; No hair loss  MUSCULOSKELETAL: No joint pain or swelling; No muscle, back, or extremity pain  PSYCHIATRIC: No depression, anxiety, mood swings, or difficulty sleeping  HEME/LYMPH: No easy bruising, or bleeding gums  ALLERY AND IMMUNOLOGIC: No hives or eczema	    [ x] All others negative	  [ ] Unable to obtain    PHYSICAL EXAM:  T(C): 36.8 (05-15-18 @ 08:00), Max: 37.4 (05-15-18 @ 03:00)  HR: 102 (05-15-18 @ 10:00) (74 - 113)  BP: 128/91 (05-15-18 @ 10:00) (109/80 - 144/86)  RR: 16 (05-15-18 @ 10:00) (15 - 20)  SpO2: 99% (05-15-18 @ 09:00) (96% - 100%)  Wt(kg): --  I&O's Summary    14 May 2018 07:01  -  15 May 2018 07:00  --------------------------------------------------------  IN: 1212 mL / OUT: 1300 mL / NET: -88 mL    15 May 2018 07:01  -  15 May 2018 11:05  --------------------------------------------------------  IN: 12 mL / OUT: 0 mL / NET: 12 mL        Appearance: Normal	  HEENT:   Normal oral mucosa, PERRL, EOMI	  Lymphatic: No lymphadenopathy  Cardiovascular: Normal S1 S2, No JVD, No murmurs, No edema  Respiratory: Lungs clear to auscultation	  Psychiatry: A & O x 3, Mood & affect appropriate  Gastrointestinal:  Soft, Non-tender, +bs.  Abdominal wounds with bandages, no drainage or bleeding.   Skin: No rashes, No ecchymoses, No cyanosis	  Neurologic: Non-focal  Extremities: Normal range of motion, No clubbing, cyanosis or edema  Vascular: Peripheral pulses palpable 2+ bilaterally      LABS:	 	    CBC Full  -  ( 15 May 2018 02:26 )  WBC Count : 12.4 K/uL  Hemoglobin : 9.3 g/dL  Hematocrit : 29.1 %  Platelet Count - Automated : 678 K/uL  Mean Cell Volume : 94.4 fl  Mean Cell Hemoglobin : 30.1 pg  Mean Cell Hemoglobin Concentration : 31.9 gm/dL  Auto Neutrophil # : 7.5 K/uL  Auto Lymphocyte # : 3.5 K/uL  Auto Monocyte # : 1.0 K/uL  Auto Eosinophil # : 0.3 K/uL  Auto Basophil # : 0.1 K/uL  Auto Neutrophil % : 60.3 %  Auto Lymphocyte % : 28.5 %  Auto Monocyte % : 7.8 %  Auto Eosinophil % : 2.6 %  Auto Basophil % : 0.8 %    05-15    133<L>  |  96  |  6<L>  ----------------------------<  315<H>  5.5<H>   |  26  |  0.79  05-15    136  |  97  |  6<L>  ----------------------------<  82  5.5<H>   |  27  |  0.87    Ca    9.4      15 May 2018 09:11  Ca    9.0      15 May 2018 02:26  Phos  4.8     05-15  Phos  5.1     05-15  Mg     1.5     05-15  Mg     1.7     05-15    TPro  6.6  /  Alb  3.2<L>  /  TBili  0.2  /  DBili  x   /  AST  10  /  ALT  6<L>  /  AlkPhos  86  05-15  TPro  6.6  /  Alb  3.0<L>  /  TBili  0.1<L>  /  DBili  x   /  AST  22  /  ALT  7<L>  /  AlkPhos  85  05-15

## 2018-05-15 NOTE — DIETITIAN INITIAL EVALUATION ADULT. - PERTINENT LABORATORY DATA
HbA1c 5.4%, Fingersticks(5/14): 51-163mg/dL, [employer]ingersticks(5/15): 85-335mg/dL, sodium 133 <L>, potassium 5.5 <H>, magnesium 1.5 <L>, phosphorus 4.8 <H>

## 2018-05-15 NOTE — PROGRESS NOTE ADULT - ASSESSMENT
42-year-old female with history of chronic pancreatitis since childhood s/p Moody procedure in 2014, 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 s/p IR/DANIEL drainage course c/b RA thrombus

## 2018-05-15 NOTE — DISCHARGE NOTE ADULT - MEDICATION SUMMARY - MEDICATIONS TO TAKE
I will START or STAY ON the medications listed below when I get home from the hospital:    lenard insulin pen needles  -- 1 unit(s) subcutaneous 4 times a day   -- Indication: For Diabetes mellitus due to underlying condition with hyperglycemia, with long-term current use of insulin    Lovenox 60 mg/0.6 mL injectable solution  -- 50 milligram(s) subcutaneously 2 times a day   -- It is very important that you take or use this exactly as directed.  Do not skip doses or discontinue unless directed by your doctor.    -- Indication: For RA Thrombus    NovoLOG FlexPen  -- 7 unit(s) subcutaneous 3 times a day patient will use 1:15 Carb Ratio  -- Indication: For Diabetes mellitus due to underlying condition with hyperglycemia, with long-term current use of insulin    insulin glargine  -- 8 unit(s) subcutaneous once a day in the AM  -- Indication: For Diabetes mellitus due to underlying condition with hyperglycemia, with long-term current use of insulin    ondansetron 8 mg oral tablet, disintegrating  -- 1 tab(s) by mouth every 8 hours  -- Indication: For Pancreas transplant status    fluconazole 200 mg oral tablet  -- 1 tab(s) by mouth once a day  -- Indication: For Infection following procedure, subsequent encounter    megestrol 40 mg/mL oral suspension  -- 20 milliliter(s) by mouth once a day  -- Indication: For Pancreas transplant status    pancrelipase 6000 units-19,000 units-30,000 units oral delayed release capsule  -- 1 cap(s) by mouth 3 times a day (with meals)  -- Indication: For Pancreas transplant status    docusate sodium 10 mg/mL oral liquid  -- 10 milliliter(s) by mouth 2 times a day  -- Indication: For Constipation    bisacodyl 5 mg oral delayed release tablet  -- 2 tab(s) by mouth once a day (at bedtime), As needed, Constipation  -- Indication: For Pancreas transplant status    Robaxin-750 oral tablet  -- three times a day   -- Indication: For Pancreas transplant status    linezolid 600 mg oral tablet  -- 1 tab(s) by mouth every 12 hours  -- Indication: For Infection following procedure, subsequent encounter

## 2018-05-15 NOTE — CHART NOTE - NSCHARTNOTEFT_GEN_A_CORE
====================  CCU MIDNIGHT ROUNDS  ====================    MARGOT JETT  [id number]  Patient is a 42y old  Female who presents with a chief complaint of draining midline wound (15 May 2018 00:03)      ====================  SUMMARY:  ====================      ====================  NEW EVENTS:  ====================    MEDICATIONS  (STANDING):  alteplase    IVPB 100 milliGRAM(s) IV Intermittent once  amylase/lipase/protease  (CREON  6,000 Units) 1 Capsule(s) Oral three times a day with meals  dextrose 5%. 1000 milliLiter(s) (50 mL/Hr) IV Continuous <Continuous>  dextrose 50% Injectable 12.5 Gram(s) IV Push once  dextrose 50% Injectable 25 Gram(s) IV Push once  dextrose 50% Injectable 25 Gram(s) IV Push once  enoxaparin Injectable 50 milliGRAM(s) SubCutaneous every 12 hours  fluconAZOLE   Tablet 200 milliGRAM(s) Oral daily  insulin lispro (HumaLOG) corrective regimen sliding scale   SubCutaneous three times a day before meals  insulin lispro (HumaLOG) corrective regimen sliding scale   SubCutaneous at bedtime  insulin lispro (HumaLOG) corrective regimen sliding scale   SubCutaneous <User Schedule>  insulin lispro Injectable (HumaLOG) 3 Unit(s) SubCutaneous before dinner  insulin lispro Injectable (HumaLOG) 3 Unit(s) SubCutaneous with breakfast  insulin lispro Injectable (HumaLOG) 3 Unit(s) SubCutaneous with lunch  insulin lispro Injectable (HumaLOG) 2 Unit(s) SubCutaneous at bedtime  levoFLOXacin  Tablet 500 milliGRAM(s) Oral every 24 hours  linezolid    Tablet 600 milliGRAM(s) Oral every 12 hours  megestrol Suspension 800 milliGRAM(s) Oral daily  oxyCODONE  ER Tablet 30 milliGRAM(s) Oral every 8 hours  pantoprazole  Injectable 40 milliGRAM(s) IV Push daily  senna 2 Tablet(s) Oral at bedtime    MEDICATIONS  (PRN):  acetaminophen   Tablet 650 milliGRAM(s) Oral every 6 hours PRN mild pain  dextrose 40% Gel 15 Gram(s) Oral once PRN Blood Glucose LESS THAN 70 milliGRAM(s)/deciliter  docusate sodium Liquid 200 milliGRAM(s) Oral at bedtime PRN Constipation  glucagon  Injectable 1 milliGRAM(s) IntraMuscular once PRN Glucose LESS THAN 70 milligrams/deciliter  ondansetron   Disintegrating Tablet 8 milliGRAM(s) Oral every 8 hours PRN Nausea and/or Vomiting  oxyCODONE    IR 10 milliGRAM(s) Oral every 3 hours PRN Moderate Pain (4 - 6)      ====================  VITALS (Last 12 hrs):  ====================    T(C): 37.1 (05-15-18 @ 19:35), Max: 37.1 (05-15-18 @ 19:35)  T(F): 98.7 (05-15-18 @ 19:35), Max: 98.7 (05-15-18 @ 19:35)  HR: 87 (05-15-18 @ 22:00) (86 - 103)  BP: 116/73 (05-15-18 @ 22:00) (104/60 - 132/94)  BP(mean): 85 (05-15-18 @ 22:00) (74 - 104)  ABP: --  ABP(mean): --  RR: 18 (05-15-18 @ 22:00) (15 - 19)  SpO2: 100% (05-15-18 @ 19:35) (99% - 100%)  Wt(kg): --  CVP(mm Hg): --  CVP(cm H2O): --  CO: --  CI: --  PA: --  PA(mean): --  PCWP: --  SVR: --  PVR: --    I&O's Summary    14 May 2018 07:01  -  15 May 2018 07:00  --------------------------------------------------------  IN: 1212 mL / OUT: 1300 mL / NET: -88 mL    15 May 2018 07:01  -  15 May 2018 22:30  --------------------------------------------------------  IN: 982 mL / OUT: 0 mL / NET: 982 mL    ====================  NEW LABS:  ====================    05-15    133<L>  |  96  |  6<L>  ----------------------------<  315<H>  5.5<H>   |  26  |  0.79    Ca    9.4      15 May 2018 09:11  Phos  4.8     05-15  Mg     1.5     05-15    TPro  6.6  /  Alb  3.2<L>  /  TBili  0.2  /  DBili  x   /  AST  10  /  ALT  6<L>  /  AlkPhos  86  05-15    PT/INR - ( 15 May 2018 02:26 )   PT: 12.2 sec;   INR: 1.12 ratio      PTT - ( 15 May 2018 09:11 )  PTT:84.8 sec  ====================  PLAN:  ====================  -       Cecilia Reaves [facility] NP  Beeper #[id number]  Spectra # [phone] ====================  CCU MIDNIGHT ROUNDS  ====================    MARGOT JETT  [id number]  Patient is a 42y old  Female who presents with a chief complaint of draining midline wound (15 May 2018 00:03)  ====================  SUMMARY:  ====================  41 yo F chronic pancreatitis (s/p Harris 2014, s/p total 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        MEDICATIONS  (STANDING):  alteplase    IVPB 100 milliGRAM(s) IV Intermittent once  amylase/lipase/protease  (CREON  6,000 Units) 1 Capsule(s) Oral three times a day with meals  dextrose 5%. 1000 milliLiter(s) (50 mL/Hr) IV Continuous <Continuous>  dextrose 50% Injectable 12.5 Gram(s) IV Push once  dextrose 50% Injectable 25 Gram(s) IV Push once  dextrose 50% Injectable 25 Gram(s) IV Push once  enoxaparin Injectable 50 milliGRAM(s) SubCutaneous every 12 hours  fluconAZOLE   Tablet 200 milliGRAM(s) Oral daily  insulin lispro (HumaLOG) corrective regimen sliding scale   SubCutaneous three times a day before meals  insulin lispro (HumaLOG) corrective regimen sliding scale   SubCutaneous at bedtime  insulin lispro (HumaLOG) corrective regimen sliding scale   SubCutaneous <User Schedule>  insulin lispro Injectable (HumaLOG) 3 Unit(s) SubCutaneous before dinner  insulin lispro Injectable (HumaLOG) 3 Unit(s) SubCutaneous with breakfast  insulin lispro Injectable (HumaLOG) 3 Unit(s) SubCutaneous with lunch  insulin lispro Injectable (HumaLOG) 2 Unit(s) SubCutaneous at bedtime  levoFLOXacin  Tablet 500 milliGRAM(s) Oral every 24 hours  linezolid    Tablet 600 milliGRAM(s) Oral every 12 hours  megestrol Suspension 800 milliGRAM(s) Oral daily  oxyCODONE  ER Tablet 30 milliGRAM(s) Oral every 8 hours  pantoprazole  Injectable 40 milliGRAM(s) IV Push daily  senna 2 Tablet(s) Oral at bedtime    MEDICATIONS  (PRN):  acetaminophen   Tablet 650 milliGRAM(s) Oral every 6 hours PRN mild pain  dextrose 40% Gel 15 Gram(s) Oral once PRN Blood Glucose LESS THAN 70 milliGRAM(s)/deciliter  docusate sodium Liquid 200 milliGRAM(s) Oral at bedtime PRN Constipation  glucagon  Injectable 1 milliGRAM(s) IntraMuscular once PRN Glucose LESS THAN 70 milligrams/deciliter  ondansetron   Disintegrating Tablet 8 milliGRAM(s) Oral every 8 hours PRN Nausea and/or Vomiting  oxyCODONE    IR 10 milliGRAM(s) Oral every 3 hours PRN Moderate Pain (4 - 6)      ====================  VITALS (Last 12 hrs):  ====================    T(C): 37.1 (05-15-18 @ 19:35), Max: 37.1 (05-15-18 @ 19:35)  T(F): 98.7 (05-15-18 @ 19:35), Max: 98.7 (05-15-18 @ 19:35)  HR: 87 (05-15-18 @ 22:00) (86 - 103)  BP: 116/73 (05-15-18 @ 22:00) (104/60 - 132/94)  BP(mean): 85 (05-15-18 @ 22:00) (74 - 104)  ABP: --  ABP(mean): --  RR: 18 (05-15-18 @ 22:00) (15 - 19)  SpO2: 100% (05-15-18 @ 19:35) (99% - 100%)  Wt(kg): --  CVP(mm Hg): --  CVP(cm H2O): --  CO: --  CI: --  PA: --  PA(mean): --  PCWP: --  SVR: --  PVR: --    I&O's Summary    14 May 2018 07:01  -  15 May 2018 07:00  --------------------------------------------------------  IN: 1212 mL / OUT: 1300 mL / NET: -88 mL    15 May 2018 07:01  -  15 May 2018 22:30  --------------------------------------------------------  IN: 982 mL / OUT: 0 mL / NET: 982 mL    ====================  NEW LABS:  ====================    05-15    133<L>  |  96  |  6<L>  ----------------------------<  315<H>  5.5<H>   |  26  |  0.79    Ca    9.4      15 May 2018 09:11  Phos  4.8     05-15  Mg     1.5     05-15    TPro  6.6  /  Alb  3.2<L>  /  TBili  0.2  /  DBili  x   /  AST  10  /  ALT  6<L>  /  AlkPhos  86  05-15    PT/INR - ( 15 May 2018 02:26 )   PT: 12.2 sec;   INR: 1.12 ratio      PTT - ( 15 May 2018 09:11 )  PTT:84.8 sec  ====================  PLAN:  ====================  - Continue therapeutic dose of Lovenox  - Repeat TTE in am per Vascular  - Continue Abx per ID  - Having small loose stools, discontinue Colace  - Continue all other meds  - Strict glycemic control  - Possible discharge in am    Cecilia Reaves [facility] NP  Beeper #[id number]  Spectra # [phone]

## 2018-05-15 NOTE — DISCHARGE NOTE ADULT - FINDINGS/TREATMENT
IR drainage abd abscess 5/9 (drain removed 5/14) < from: Transthoracic Echocardiogram (05.13.18 @ 12:15) >    1. Normal mitral valve. Minimal mitral regurgitation.  2. Normal left ventricular internal dimensions and wall  thicknesses.  3. Normal left ventricular systolic function. No segmental  wall motion abnormalities.  4. The rightventricle is not well visualized; grossly  normal right ventricular systolic function.  5. A large (about  3.4 cm X 1.7 cm), mobile mass is  visualized in the right atrium.  Unable to determine an  attachment point (if any) of the mass.  No obvious mass  seen in the inferior vena cava.  Although the nature of  this echodensity is uncertain, it likely represents tumour  and/or thrombus.    < end of copied text > < from: CT Abdomen and Pelvis w/ IV Cont (05.13.18 @ 16:37) >    IMPRESSION:  Interval placement of left-sided abdominal wall pigtail catheter with   interval resolution of previously seen abdominal wall collection.     Similar-appearing right atrial filling defect, concerning for clot.   Echocardiogram was previously recommended.    Unchanged nonspecific right lower lobe peripheral opacities.    < end of copied text >

## 2018-05-15 NOTE — DISCHARGE NOTE ADULT - HOSPITAL COURSE
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 Pt initially on vanco/zosyn. ID consulted. Cultures w/ VRE and klebsiella PNA. 5/10-5/14, pt on IV linezolid and IV ertapenem. Pt changed to PO ABX per ID recommendation 5/15. Pt on PO levaquin, PO linezolid, PO fluconazole. repeat CTAP showed interval resolution of abd wall collection   Pt w/ CT AP 5/8 showing a clot in the RA, confirmed by TTE 5/9 showing mobile echogenic thrombus in RA. Heme/onc and vascular cardiology consulted. Pt placed on hep gtt. Repeat TTE 5/13 grossly unchanged. 5/15 pt transitioned to ******* TTE 5/16 showed *******  Endocrine consulted for management of blood glucose, pt closely observed and regimen adjusted per endocrine recommendations. 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 Pt initially on vanco/zosyn. ID consulted. Cultures w/ VRE and klebsiella PNA. 5/10-5/14, pt on IV linezolid and IV ertapenem. Pt changed to PO ABX per ID recommendation 5/15. Pt on PO levaquin, PO linezolid, PO fluconazole. repeat CTAP showed interval resolution of abd wall collection   Pt w/ CT AP 5/8 showing a clot in the RA, confirmed by TTE 5/9 showing mobile echogenic thrombus in RA. Heme/onc and vascular cardiology consulted. Pt placed on hep gtt. Repeat TTE 5/13 grossly unchanged. 5/15 pt transitioned to Lovenox. TTE 5/16 showed no significant change.  Follow up with Dr. Monzon in 1 week.  Endocrine consulted for management of blood glucose, pt closely observed and regimen adjusted per endocrine recommendations. Pt to be discharged on Lantus and Novalog

## 2018-05-15 NOTE — DISCHARGE NOTE ADULT - SECONDARY DIAGNOSIS.
Diabetes mellitus due to underlying condition with hyperglycemia, with long-term current use of insulin Postoperative wound abscess, subsequent encounter

## 2018-05-15 NOTE — PROGRESS NOTE ADULT - ASSESSMENT
Ms. Moreno is a 42-year-old patient who presented with abdominal wall abscess s/p IR drainage, with new incidental finding of RA thrombus on CT. HD8. She  is hemodynamically stable.     - CBC q12 hrs  - RA thrombus, c/w with anticoagulation on heparin gtt. TPA at bedside. plan for theraputhic Lovenox / Lovenox teaching this morning - 1 mg/kg BID   - Diet: on regular, tolerating well  - ABx regimen: levofloxacin, Linezolid, Diflucan  - Pain control: Oxycodone, Motrin  - Glycemic control, on Lantus, pre-meal and ISS,  will f/u with endocrinology recommendation  - Monitor GI function  - Replete electrolyte as necessary  - pancreatic enzyme replacement   - hem/onc recommendation appreciated  - Activity: OOb as tolerated  - Dispo: Discharge today on T Lovenox   - PICU care appreciated

## 2018-05-15 NOTE — DIETITIAN INITIAL EVALUATION ADULT. - SOURCE
medical record. Pt is well known to this RD from Pancreatic Islet Cell Txp 4/5/18. Pt recently discharged on 4/16/18./patient

## 2018-05-15 NOTE — PROGRESS NOTE ADULT - SUBJECTIVE AND OBJECTIVE BOX
Diabetes Follow up note: Saw pt earlier today  Interval Hx: 43 y/o F well known to DM team from recent admission for total pancreatectomy and auto islet cell transplant secondary to chronic pancreatitis since childhood. Transplant was c/b intraabdominal collections> discharge with DANIEL drains and antifungals. 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 During this hospitalization pt was also found to have RA thrombus> on heparin gtt. Abdominal fluid cultures are + for enterococcus faceium and klebsiella pneumoniae on antibiotics as well. Pt reports tolerating POs but still eating small amounts of food at a time. Yesterday after IR procedure and at bedtime pt had hypoglycemia in the 50s> hypoglycemia protocol not followed (per staff pt refusing to test [id number] to ) >had  rebound  hyperglycemia this am with BG in 300s. Per staff pt with erratic PO intake and sometimes changing insulin doses or refusing insulin making BGs difficult to control.    Review of Systems:  General: "I'm fine"  GI: Tolerating POs without any N/V/D/ABD PAIN.  CV: No CP/SOB  ENDO: No S&Sx of hypoglycemia      MEDS:  amylase/lipase/protease  (CREON  6,000 Units) 1 Capsule(s) Oral three times a day with meals  fluconAZOLE   Tablet 200 milliGRAM(s) Oral daily  insulin glargine Injectable (LANTUS) 9 Unit(s) SubCutaneous every morning  insulin lispro (HumaLOG) corrective regimen sliding scale   SubCutaneous three times a day before meals  insulin lispro (HumaLOG) corrective regimen sliding scale   SubCutaneous at bedtime  insulin lispro (HumaLOG) corrective regimen sliding scale   SubCutaneous <User Schedule>  insulin lispro Injectable (HumaLOG) 3 Unit(s) SubCutaneous before breakfast  insulin lispro Injectable (HumaLOG) 3 Unit(s) SubCutaneous before lunch  insulin lispro Injectable (HumaLOG) 3 Unit(s) SubCutaneous before dinner  insulin lispro Injectable (HumaLOG) 2 Unit(s) SubCutaneous at bedtime  levoFLOXacin  Tablet 500 milliGRAM(s) Oral every 24 hours  linezolid    Tablet 600 milliGRAM(s) Oral every 12 hours      Allergies    No Known Allergies    PE:  General: Female laying in bed in NAD.  Vital Signs Last 24 Hrs  T(C): 36.8 (05-15-18 @ 16:00), Max: 37.4 (05-15-18 @ 03:00)  T(F): 98.2 (05-15-18 @ 16:00), Max: 99.3 (05-15-18 @ 03:00)  HR: 92 (05-15-18 @ 17:00) (74 - 113)  BP: 117/75 (05-15-18 @ 17:00) (107/69 - 144/86)  BP(mean): 86 (05-15-18 @ 17:00) (74 - 104)  RR: 18 (05-15-18 @ 17:00) (15 - 20)  SpO2: 99% (05-15-18 @ 16:00) (96% - 100%)  Abd: Abdominal wound with dressing D&I   Extremities: Warm, no edema noted in all 4 exts  Neuro: A&O X3    LABS:  POCT Blood Glucose.: 169 mg/dL (05-15-18 @ 17:04)  POCT Blood Glucose.: 231 mg/dL (05-15-18 @ 11:59)  POCT Blood Glucose.: 335 mg/dL (05-15-18 @ 08:16)  POCT Blood Glucose.: 85 mg/dL (05-15-18 @ 02:19)  POCT Blood Glucose.: 163 mg/dL (05-14-18 @ 22:33)  POCT Blood Glucose.: 80 mg/dL (05-14-18 @ 21:37)  POCT Blood Glucose.: 56 mg/dL (05-14-18 @ 21:34)  POCT Blood Glucose.: 56 mg/dL (05-14-18 @ 20:55)  POCT Blood Glucose.: 103 mg/dL (05-14-18 @ 19:36)  POCT Blood Glucose.: 97 mg/dL (05-14-18 @ 18:28)  POCT Blood Glucose.: 51 mg/dL (05-14-18 @ 18:00)  POCT Blood Glucose.: 64 mg/dL (05-14-18 @ 15:13)  POCT Blood Glucose.: 137 mg/dL (05-14-18 @ 13:01)  POCT Blood Glucose.: 154 mg/dL (05-14-18 @ 08:08)  POCT Blood Glucose.: 149 mg/dL (05-14-18 @ 01:22)  POCT Blood Glucose.: 215 mg/dL (05-13-18 @ 23:46)                           9.3    12.4  )-----------( 678      ( 15 May 2018 02:26 )             29.1                           8.9    10.4  )-----------( 747      ( 14 May 2018 05:09 )             28.0     05-15    133<L>  |  96  |  6<L>  ----------------------------<  315<H>  5.5<H>   |  26  |  0.79    Ca    9.4      15 May 2018 09:11  Phos  4.8     05-15  Mg     1.5     05-15    TPro  6.6  /  Alb  3.2<L>  /  TBili  0.2  /  DBili  x   /  AST  10  /  ALT  6<L>  /  AlkPhos  86  05-15      05-14    140  |  99  |  <4<L>  ----------------------------<  79  3.9   |  28  |  0.73    Ca    8.4      14 May 2018 05:09  Phos  3.9     05-14  Mg     1.9     05-14    TPro  6.0  /  Alb  2.8<L>  /  TBili  0.1<L>  /  DBili  x   /  AST  11  /  ALT  5<L>  /  AlkPhos  77  05-14         Hemoglobin A1C, Whole Blood: 5.4 % [4.0 - 5.6] (04-12-18 @ 16:57)

## 2018-05-15 NOTE — DISCHARGE NOTE ADULT - MEDICATION SUMMARY - MEDICATIONS TO STOP TAKING
I will STOP taking the medications listed below when I get home from the hospital:    erythromycin 500 mg oral tablet  -- 1 tab(s) by mouth every 6 hours

## 2018-05-15 NOTE — DIETITIAN INITIAL EVALUATION ADULT. - ENERGY NEEDS
ht: 5 feet 4 inches, wt: 102 pounds, BMI: 18.4 Kg/m2, IBW: 120 pounds (+/- 10%), 85% IBW. Edema: 1+ edema; Skin: no pressure injuries.

## 2018-05-15 NOTE — DIETITIAN INITIAL EVALUATION ADULT. - NS AS NUTRI INTERV ED CONTENT
Reinforced Consistent Carbohydrate diet education, carbohydrate counting, importance of a bedtime snack with Lantus Rx, maintaining a log of food, BG and insulin dosing, and managing episodes of hypoglycemia. Pt was receptive and expressed good understanding./Other (specify) Other (specify)/Reinforced Consistent Carbohydrate diet education: carbohydrate counting; importance of a bedtime snack with Lantus Rx; maintaining a log of food, BG and insulin dosing; and managing episodes of hypoglycemia. Pt was receptive and expressed good understanding.

## 2018-05-15 NOTE — DISCHARGE NOTE ADULT - NS AS ACTIVITY OBS
Showering allowed/Walking-Indoors allowed/Do not drive or operate machinery/No Heavy lifting/straining

## 2018-05-15 NOTE — DISCHARGE NOTE ADULT - CARE PROVIDER_API CALL
Aric Monzon (), Internal Medicine; Nuclear Cardiology  [address] [place] [address]  Phone: [phone]  Fax: [phone]    Juan Walker (MD), Surgery  [address] [place] [address]  Phone: [phone]  Fax: [phone]    Madan Lane), Infectious Disease; Internal Medicine  [address] [place] [address]  Phone: [phone]  Fax: [phone]

## 2018-05-15 NOTE — DIETITIAN INITIAL EVALUATION ADULT. - PHYSICAL APPEARANCE
BMI 18.4Kg/m2. Pt appears thin, however appears to be at her long-term baseline weight./well nourished

## 2018-05-15 NOTE — DIETITIAN INITIAL EVALUATION ADULT. - PERTINENT MEDS FT
Humalog, Humalog sliding scale, Lantus ordered to resume 5/16. zofran, senna, colace, Creon 6,000 tid, megace, oxycodone, protonix

## 2018-05-15 NOTE — DISCHARGE NOTE ADULT - ADDITIONAL INSTRUCTIONS
f/u with vascular cardiology w/ in 1 week  f/u with surgery w/ in 1 week   f/u with ID Follow up with Dr. Monzon, Interventional Vascular in 1 week  Follow up with Dr. Walker in 1 week  Follow up with Dr. Devries, ID with in 1 week.

## 2018-05-15 NOTE — DISCHARGE NOTE ADULT - CARE PLAN
Principal Discharge DX:	Thrombus of right atrial appendage without antecedent myocardial infarction  Goal:	anticoagulation  Assessment and plan of treatment:	take lovenox as directed  f/u w/ vascular cardiology  If you experience any signs of bleeding such as bleeding gums, bloody sputum, bleeding in your stool or black stool inform your doctor  If you develop shortness of breath call your Health Care Provider or go to the Emergency Department.  Secondary Diagnosis:	Diabetes mellitus due to underlying condition with hyperglycemia, with long-term current use of insulin  Assessment and plan of treatment:	check finger sticks and take insulin as directed   f/u with endocrinologist  It's important not to skip any meals.  Check your blood sugar if you feel signs/symptoms of hypoglycemia.  Secondary Diagnosis:	Postoperative wound abscess, subsequent encounter  Assessment and plan of treatment:	complete your ABX as directed   f/u with infectious disease and surgery Principal Discharge DX:	Thrombus of right atrial appendage without antecedent myocardial infarction  Goal:	anticoagulation  Assessment and plan of treatment:	take lovenox as directed  f/u w/ vascular cardiology  If you experience any signs of bleeding such as bleeding gums, bloody sputum, bleeding in your stool or black stool inform your doctor  If you develop shortness of breath call your Health Care Provider or go to the Emergency Department.  Secondary Diagnosis:	Diabetes mellitus due to underlying condition with hyperglycemia, with long-term current use of insulin  Goal:	Your blood glucoses will be controlled to normal limits  Assessment and plan of treatment:	check finger sticks and take insulin as directed   f/u with endocrinologist  It's important not to skip any meals.  Check your blood sugar if you feel signs/symptoms of hypoglycemia.  Secondary Diagnosis:	Postoperative wound abscess, subsequent encounter  Goal:	Your abdominal wound will have no purelent drainage  Assessment and plan of treatment:	complete your ABX as directed   f/u with infectious disease and surgery

## 2018-05-15 NOTE — PROGRESS NOTE ADULT - ASSESSMENT
43 y/o F well known to DM team from recent admission for total pancreatectomy and auto islet cell transplant secondary to chronic pancreatitis since childhood. Transplant was c/b intraabdominal collections. 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 Also found to have RA thrombus> off heparin gtt on AC >lovenox. On antibiotics as well. Pt BG difficult to control due to erratic and unpredictable PO intake. However, BG levels better today than yesterday so won't make any further changes for now.  Spoke to pt about the need to eat if getting Humalog before meals because pt didn't eat prior to IR procedure yesterday causing worsening hypoglycemia after procedure. Pt stated she had salad to eat but didn't have the chance to eat it since she left for procedure. Explained to pt prevention and treatment of hypoglycemia including the need to take 15 g of simple carbs  and retest BG in 15 minutes and repeat treatment until BG >100. However, pt became upset when this provider explained to her that having salad was not a good choice since she needs 15g of carbs and salad usually doesn't have much carbs. Pt stated she knows how to manage her diabetes and doesn't need to be told what to do. Pt reports she just eats and hypoglycemia goes away without testing every 15 minutes.

## 2018-05-15 NOTE — DIETITIAN INITIAL EVALUATION ADULT. - ORAL INTAKE PTA
good/Pt reports she is eating very well at home and eating an evening snack with Lantus Rx. Pt takes Creon tid with meals. Pt takes no nutrition supplements; reports NKFA. Pt reports she is eating very well at home and eating an evening snack with Lantus Rx. Pt takes Creon tid with meals and reports normal stool. Pt takes no nutrition supplements; reports NKFA./good

## 2018-05-15 NOTE — DISCHARGE NOTE ADULT - CARE PROVIDERS DIRECT ADDRESSES
[email or web address]

## 2018-05-15 NOTE — DIETITIAN INITIAL EVALUATION ADULT. - ADHERENCE
good/Pt is checking BG with FreeStyle Clarice Continuous Glucose Monitoring System. Pt reports BG range of  after last discharge, however able to manju her BG range to ~150mg/dL using carb counting, and careful adjustment of insulin and food. Pt reports hyperglycemia PTA secondary to infection. Pt is recording foods and insulin doses in a log, and reports confidence in her BG conrol at home.

## 2018-05-15 NOTE — PROGRESS NOTE ADULT - ASSESSMENT
42F s/p pancreatectomy with R atrial thrombus, unchanged in size    -Cont therapeutic Lovenox 1mg/kg q12 hours  -Repeat echo tomorrow morning  -Monitor for 24 more hours 42F s/p pancreatectomy with R atrial thrombus, unchanged in size    -Cont therapeutic Lovenox 1mg/kg q12 hours  -Repeat echo tomorrow morning  -Cont CCU monitoring

## 2018-05-15 NOTE — DISCHARGE NOTE ADULT - PATIENT PORTAL LINK FT
You can access the [facility] Patient Portal, offered by [facility], by registering with the following website: [email or web address]

## 2018-05-15 NOTE — PROGRESS NOTE ADULT - SUBJECTIVE AND OBJECTIVE BOX
CCU2/PICU NOTE    Admission date: 5/8/18 transferred to ccu2 on 5/10/18  Chief complaint/ Diagnosis: RA thrombus , l abd wall abscess  HPI: 42-year-old female with history of chronic pancreatitis since childhood s/p [place] procedure in 2014, 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 s/p IR/DANIEL drainage course c/b RA thrombus    Interval history: Uneventful overnight  REVIEW OF SYSTEMS  Denies CP, Palpitation, SOB, Dyspnea [ x ] All other systems negative    MEDICATIONS  (STANDING)  alteplase    IVPB 100 milliGRAM(s) IV Intermittent once  amylase/lipase/protease  (CREON  6,000 Units) 1 Capsule(s) Oral three times a day with meals  enoxaparin Injectable 50 milliGRAM(s) SubCutaneous every 12 hours  fluconAZOLE   Tablet 200 milliGRAM(s) Oral daily  heparin  Infusion 1200 Unit(s)/Hr (12 mL/Hr) IV Continuous <Continuous>  insulin glargine Injectable (LANTUS) 9 Unit(s) SubCutaneous every morning  insulin lispro (HumaLOG) corrective regimen sliding scale   SubCutaneous three times a day before meals  insulin lispro (HumaLOG) corrective regimen sliding scale   SubCutaneous at bedtime  insulin lispro (HumaLOG) corrective regimen sliding scale   SubCutaneous <User Schedule>  insulin lispro Injectable (HumaLOG) 3 Unit(s) SubCutaneous before dinner  insulin lispro Injectable (HumaLOG) 3 Unit(s) SubCutaneous before breakfast  insulin lispro Injectable (HumaLOG) 3 Unit(s) SubCutaneous before lunch  insulin lispro Injectable (HumaLOG) 2 Unit(s) SubCutaneous at bedtime  levoFLOXacin  Tablet 500 milliGRAM(s) Oral every 24 hours  linezolid    Tablet 600 milliGRAM(s) Oral every 12 hours  megestrol Suspension 800 milliGRAM(s) Oral daily  oxyCODONE  ER Tablet 30 milliGRAM(s) Oral every 8 hours  pantoprazole  Injectable 40 milliGRAM(s) IV Push daily  senna 2 Tablet(s) Oral at bedtime    MEDICATIONS  (PRN)  acetaminophen   Tablet 650 milliGRAM(s) Oral every 6 hours PRN mild pain  dextrose 40% Gel 15 Gram(s) Oral once PRN Blood Glucose LESS THAN 70 milliGRAM(s)/deciliter  docusate sodium Liquid 200 milliGRAM(s) Oral at bedtime PRN Constipation  glucagon  Injectable 1 milliGRAM(s) IntraMuscular once PRN Glucose LESS THAN 70 milligrams/deciliter  ondansetron   Disintegrating Tablet 8 milliGRAM(s) Oral every 8 hours PRN Nausea and/or Vomiting  oxyCODONE    IR 10 milliGRAM(s) Oral every 3 hours PRN Moderate Pain (4 - 6)    Allergy: No Known Allergies    ICU Vital Signs Last 24 Hrs  T(C): 37.4 (Max: 37.4 )  HR: 78  (78 - 113)  BP: 139/85 (109/80 - 139/85)  RR: 15  (15 - 20)  SpO2: 98% (96% - 100%) in room air     I&O's Summary  IN: 1212 mL / OUT: 1300 mL / NET: -88 mL    PHYSICAL EXAM  Appearance: Normal, NAD  HEAD:  Atraumatic, Normocephalic  NECK: Supple, No JVD  CHEST/LUNG: Clear to auscultation bilaterally; No wheezing  HEART: Normal S1, S2. No murmurs, rubs, or gallops  ABDOMEN: + Bowel sounds, Soft, NT, ND   EXTREMITIES:  2+ Peripheral Pulses, No clubbing, cyanosis, or edema  NEUROLOGY: non-focal, aaox3  SKIN: No rashes or lesions    Interpretation of Telemetry: NSR             9.3    12.4  )-----------( 678                  29.1     136  |  97  |  6<L>  ----------------------------<  82  5.5<H>   |  27  |  0.87    Ca    9.0      Phos  5.1    Mg     1.7 (repleted)    TPro  6.6  /  Alb  3.0

## 2018-05-15 NOTE — PROGRESS NOTE ADULT - SUBJECTIVE AND OBJECTIVE BOX
Surgery Blue Team Progress Note  Pager: [phone]    Presented with abdominal wall abscess, HD8    SUBJECTIVE:   Patient was seen and examined this morning. There was no acute event overnight. She is resting comfortably in bed. Pain is well controlled. BG was better controlled yesterday. IR removed drianage catheter at bedside without difficulty. Her pain is improving in left side. She does not report pain, fever, n/v, chest pain, or shortness of breath.       OBJECTIVE:   T(C): 36.8 (05-15-18 @ 08:00), Max: 37.4 (05-15-18 @ 03:00)  HR: 99 (05-15-18 @ 09:00) (74 - 113)  BP: 134/72 (05-15-18 @ 09:00) (109/80 - 144/86)  RR: 15 (05-15-18 @ 09:00) (15 - 20)  SpO2: 99% (05-15-18 @ 09:00) (96% - 100%)  Wt(kg): --  CAPILLARY BLOOD GLUCOSE      POCT Blood Glucose.: 335 mg/dL (15 May 2018 08:16)  POCT Blood Glucose.: 85 mg/dL (15 May 2018 02:19)  POCT Blood Glucose.: 163 mg/dL (14 May 2018 22:33)  POCT Blood Glucose.: 80 mg/dL (14 May 2018 21:37)  POCT Blood Glucose.: 56 mg/dL (14 May 2018 21:34)  POCT Blood Glucose.: 56 mg/dL (14 May 2018 20:55)  POCT Blood Glucose.: 103 mg/dL (14 May 2018 19:36)  POCT Blood Glucose.: 97 mg/dL (14 May 2018 18:28)  POCT Blood Glucose.: 51 mg/dL (14 May 2018 18:00)  POCT Blood Glucose.: 64 mg/dL (14 May 2018 15:13)  POCT Blood Glucose.: 137 mg/dL (14 May 2018 13:01)    I&O's Detail    14 May 2018 07:01  -  15 May 2018 07:00  --------------------------------------------------------  IN:    heparin Infusion: 156 mL    heparin Infusion: 96 mL    Oral Fluid: 960 mL  Total IN: 1212 mL    OUT:    Voided: 1300 mL  Total OUT: 1300 mL    Total NET: -88 mL      15 May 2018 07:01  -  15 May 2018 10:14  --------------------------------------------------------  IN:    heparin Infusion: 24 mL  Total IN: 24 mL    OUT:  Total OUT: 0 mL    Total NET: 24 mL        Physical Exam:  Gen: Laying in bed, NAD  Resp: Unlabored breathing  Abdomen: Soft, epigastric tenderness, nondistended, pouch with copious purulent output  Ext: WWP  Skin: No rashes

## 2018-05-15 NOTE — DIETITIAN INITIAL EVALUATION ADULT. - NS AS NUTRI INTERV MEALS SNACK
Carbohydrate - modified diet/Continue Consistent Carbohydrate diet with snack; encourage intake of high protein, nutrient dense foods

## 2018-05-16 VITALS — SYSTOLIC BLOOD PRESSURE: 113 MMHG | HEART RATE: 95 BPM | DIASTOLIC BLOOD PRESSURE: 81 MMHG | RESPIRATION RATE: 20 BRPM

## 2018-05-16 LAB
ALBUMIN SERPL ELPH-MCNC: 3 G/DL — LOW (ref 3.3–5)
ALBUMIN SERPL ELPH-MCNC: 3.4 G/DL — SIGNIFICANT CHANGE UP (ref 3.3–5)
ALP SERPL-CCNC: 83 U/L — SIGNIFICANT CHANGE UP (ref 40–120)
ALP SERPL-CCNC: 84 U/L — SIGNIFICANT CHANGE UP (ref 40–120)
ALT FLD-CCNC: 5 U/L — LOW (ref 10–45)
ALT FLD-CCNC: 6 U/L — LOW (ref 10–45)
ANION GAP SERPL CALC-SCNC: 14 MMOL/L — SIGNIFICANT CHANGE UP (ref 5–17)
ANION GAP SERPL CALC-SCNC: 14 MMOL/L — SIGNIFICANT CHANGE UP (ref 5–17)
AST SERPL-CCNC: 11 U/L — SIGNIFICANT CHANGE UP (ref 10–40)
AST SERPL-CCNC: 8 U/L — LOW (ref 10–40)
B2 GLYCOPROT1 AB SER QL: NEGATIVE — SIGNIFICANT CHANGE UP
BASOPHILS # BLD AUTO: 0.1 K/UL — SIGNIFICANT CHANGE UP (ref 0–0.2)
BASOPHILS NFR BLD AUTO: 1 % — SIGNIFICANT CHANGE UP (ref 0–2)
BILIRUB SERPL-MCNC: 0.1 MG/DL — LOW (ref 0.2–1.2)
BILIRUB SERPL-MCNC: 0.2 MG/DL — SIGNIFICANT CHANGE UP (ref 0.2–1.2)
BUN SERPL-MCNC: 7 MG/DL — SIGNIFICANT CHANGE UP (ref 7–23)
BUN SERPL-MCNC: 7 MG/DL — SIGNIFICANT CHANGE UP (ref 7–23)
CALCIUM SERPL-MCNC: 8.9 MG/DL — SIGNIFICANT CHANGE UP (ref 8.4–10.5)
CALCIUM SERPL-MCNC: 9.5 MG/DL — SIGNIFICANT CHANGE UP (ref 8.4–10.5)
CARDIOLIPIN AB SER-ACNC: NEGATIVE — SIGNIFICANT CHANGE UP
CHLORIDE SERPL-SCNC: 95 MMOL/L — LOW (ref 96–108)
CHLORIDE SERPL-SCNC: 96 MMOL/L — SIGNIFICANT CHANGE UP (ref 96–108)
CO2 SERPL-SCNC: 25 MMOL/L — SIGNIFICANT CHANGE UP (ref 22–31)
CO2 SERPL-SCNC: 29 MMOL/L — SIGNIFICANT CHANGE UP (ref 22–31)
CREAT SERPL-MCNC: 0.85 MG/DL — SIGNIFICANT CHANGE UP (ref 0.5–1.3)
CREAT SERPL-MCNC: 0.92 MG/DL — SIGNIFICANT CHANGE UP (ref 0.5–1.3)
EOSINOPHIL # BLD AUTO: 0.5 K/UL — SIGNIFICANT CHANGE UP (ref 0–0.5)
EOSINOPHIL NFR BLD AUTO: 5.8 % — SIGNIFICANT CHANGE UP (ref 0–6)
GLUCOSE BLDC GLUCOMTR-MCNC: 229 MG/DL — HIGH (ref 70–99)
GLUCOSE BLDC GLUCOMTR-MCNC: 232 MG/DL — HIGH (ref 70–99)
GLUCOSE BLDC GLUCOMTR-MCNC: 484 MG/DL — CRITICAL HIGH (ref 70–99)
GLUCOSE BLDC GLUCOMTR-MCNC: 486 MG/DL — CRITICAL HIGH (ref 70–99)
GLUCOSE SERPL-MCNC: 258 MG/DL — HIGH (ref 70–99)
GLUCOSE SERPL-MCNC: 346 MG/DL — HIGH (ref 70–99)
HCT VFR BLD CALC: 29.7 % — LOW (ref 34.5–45)
HGB BLD-MCNC: 9.3 G/DL — LOW (ref 11.5–15.5)
LYMPHOCYTES # BLD AUTO: 2.9 K/UL — SIGNIFICANT CHANGE UP (ref 1–3.3)
LYMPHOCYTES # BLD AUTO: 35.1 % — SIGNIFICANT CHANGE UP (ref 13–44)
MAGNESIUM SERPL-MCNC: 1.6 MG/DL — SIGNIFICANT CHANGE UP (ref 1.6–2.6)
MAGNESIUM SERPL-MCNC: 1.9 MG/DL — SIGNIFICANT CHANGE UP (ref 1.6–2.6)
MCHC RBC-ENTMCNC: 29.8 PG — SIGNIFICANT CHANGE UP (ref 27–34)
MCHC RBC-ENTMCNC: 31.4 GM/DL — LOW (ref 32–36)
MCV RBC AUTO: 94.9 FL — SIGNIFICANT CHANGE UP (ref 80–100)
MONOCYTES # BLD AUTO: 0.5 K/UL — SIGNIFICANT CHANGE UP (ref 0–0.9)
MONOCYTES NFR BLD AUTO: 6.5 % — SIGNIFICANT CHANGE UP (ref 2–14)
NEUTROPHILS # BLD AUTO: 4.2 K/UL — SIGNIFICANT CHANGE UP (ref 1.8–7.4)
NEUTROPHILS NFR BLD AUTO: 51.6 % — SIGNIFICANT CHANGE UP (ref 43–77)
PHOSPHATE SERPL-MCNC: 3 MG/DL — SIGNIFICANT CHANGE UP (ref 2.5–4.5)
PHOSPHATE SERPL-MCNC: 3.8 MG/DL — SIGNIFICANT CHANGE UP (ref 2.5–4.5)
PLATELET # BLD AUTO: 630 K/UL — HIGH (ref 150–400)
POTASSIUM SERPL-MCNC: 5 MMOL/L — SIGNIFICANT CHANGE UP (ref 3.5–5.3)
POTASSIUM SERPL-MCNC: 5.7 MMOL/L — HIGH (ref 3.5–5.3)
POTASSIUM SERPL-SCNC: 5 MMOL/L — SIGNIFICANT CHANGE UP (ref 3.5–5.3)
POTASSIUM SERPL-SCNC: 5.7 MMOL/L — HIGH (ref 3.5–5.3)
PROT SERPL-MCNC: 6.6 G/DL — SIGNIFICANT CHANGE UP (ref 6–8.3)
PROT SERPL-MCNC: 6.9 G/DL — SIGNIFICANT CHANGE UP (ref 6–8.3)
RBC # BLD: 3.13 M/UL — LOW (ref 3.8–5.2)
RBC # FLD: 17 % — HIGH (ref 10.3–14.5)
SODIUM SERPL-SCNC: 134 MMOL/L — LOW (ref 135–145)
SODIUM SERPL-SCNC: 139 MMOL/L — SIGNIFICANT CHANGE UP (ref 135–145)
WBC # BLD: 8.1 K/UL — SIGNIFICANT CHANGE UP (ref 3.8–10.5)
WBC # FLD AUTO: 8.1 K/UL — SIGNIFICANT CHANGE UP (ref 3.8–10.5)

## 2018-05-16 PROCEDURE — 99238 HOSP IP/OBS DSCHRG MGMT 30/<: CPT

## 2018-05-16 PROCEDURE — 93010 ELECTROCARDIOGRAM REPORT: CPT

## 2018-05-16 PROCEDURE — 93306 TTE W/DOPPLER COMPLETE: CPT | Mod: 26

## 2018-05-16 PROCEDURE — 99232 SBSQ HOSP IP/OBS MODERATE 35: CPT

## 2018-05-16 RX ORDER — SODIUM BICARBONATE 1 MEQ/ML
50 SYRINGE (ML) INTRAVENOUS ONCE
Qty: 0 | Refills: 0 | Status: COMPLETED | OUTPATIENT
Start: 2018-05-16 | End: 2018-05-16

## 2018-05-16 RX ORDER — INSULIN ASPART 100 [IU]/ML
3 INJECTION, SOLUTION SUBCUTANEOUS
Qty: 0 | Refills: 0 | COMMUNITY

## 2018-05-16 RX ORDER — INSULIN ASPART 100 [IU]/ML
7 INJECTION, SOLUTION SUBCUTANEOUS
Qty: 300 | Refills: 0 | OUTPATIENT
Start: 2018-05-16 | End: 2018-06-14

## 2018-05-16 RX ORDER — ENOXAPARIN SODIUM 100 MG/ML
50 INJECTION SUBCUTANEOUS
Qty: 3000 | Refills: 0 | OUTPATIENT
Start: 2018-05-16 | End: 2018-06-14

## 2018-05-16 RX ORDER — ALBUTEROL 90 UG/1
2.5 AEROSOL, METERED ORAL ONCE
Qty: 0 | Refills: 0 | Status: COMPLETED | OUTPATIENT
Start: 2018-05-16 | End: 2018-05-16

## 2018-05-16 RX ORDER — LINEZOLID 600 MG/300ML
1 INJECTION, SOLUTION INTRAVENOUS
Qty: 4 | Refills: 0 | OUTPATIENT
Start: 2018-05-16 | End: 2018-05-17

## 2018-05-16 RX ORDER — FLUCONAZOLE 150 MG/1
1 TABLET ORAL
Qty: 2 | Refills: 0 | OUTPATIENT
Start: 2018-05-16 | End: 2018-05-17

## 2018-05-16 RX ORDER — MAGNESIUM SULFATE 500 MG/ML
2 VIAL (ML) INJECTION ONCE
Qty: 0 | Refills: 0 | Status: COMPLETED | OUTPATIENT
Start: 2018-05-16 | End: 2018-05-16

## 2018-05-16 RX ADMIN — Medication 6: at 08:10

## 2018-05-16 RX ADMIN — OXYCODONE HYDROCHLORIDE 30 MILLIGRAM(S): 5 TABLET ORAL at 06:50

## 2018-05-16 RX ADMIN — Medication 1 CAPSULE(S): at 12:13

## 2018-05-16 RX ADMIN — PANTOPRAZOLE SODIUM 40 MILLIGRAM(S): 20 TABLET, DELAYED RELEASE ORAL at 12:14

## 2018-05-16 RX ADMIN — Medication 1 CAPSULE(S): at 08:10

## 2018-05-16 RX ADMIN — OXYCODONE HYDROCHLORIDE 10 MILLIGRAM(S): 5 TABLET ORAL at 12:14

## 2018-05-16 RX ADMIN — ALBUTEROL 2.5 MILLIGRAM(S): 90 AEROSOL, METERED ORAL at 06:22

## 2018-05-16 RX ADMIN — Medication 3 UNIT(S): at 12:14

## 2018-05-16 RX ADMIN — OXYCODONE HYDROCHLORIDE 10 MILLIGRAM(S): 5 TABLET ORAL at 08:48

## 2018-05-16 RX ADMIN — OXYCODONE HYDROCHLORIDE 30 MILLIGRAM(S): 5 TABLET ORAL at 06:22

## 2018-05-16 RX ADMIN — OXYCODONE HYDROCHLORIDE 10 MILLIGRAM(S): 5 TABLET ORAL at 02:22

## 2018-05-16 RX ADMIN — LINEZOLID 600 MILLIGRAM(S): 600 INJECTION, SOLUTION INTRAVENOUS at 06:22

## 2018-05-16 RX ADMIN — FLUCONAZOLE 200 MILLIGRAM(S): 150 TABLET ORAL at 12:13

## 2018-05-16 RX ADMIN — ENOXAPARIN SODIUM 50 MILLIGRAM(S): 100 INJECTION SUBCUTANEOUS at 08:11

## 2018-05-16 RX ADMIN — OXYCODONE HYDROCHLORIDE 10 MILLIGRAM(S): 5 TABLET ORAL at 02:50

## 2018-05-16 RX ADMIN — Medication 2: at 12:14

## 2018-05-16 RX ADMIN — Medication 3 UNIT(S): at 08:10

## 2018-05-16 RX ADMIN — Medication 50 MILLIEQUIVALENT(S): at 06:22

## 2018-05-16 RX ADMIN — INSULIN GLARGINE 8 UNIT(S): 100 INJECTION, SOLUTION SUBCUTANEOUS at 08:10

## 2018-05-16 RX ADMIN — OXYCODONE HYDROCHLORIDE 30 MILLIGRAM(S): 5 TABLET ORAL at 15:10

## 2018-05-16 RX ADMIN — Medication 50 GRAM(S): at 06:23

## 2018-05-16 RX ADMIN — OXYCODONE HYDROCHLORIDE 10 MILLIGRAM(S): 5 TABLET ORAL at 08:18

## 2018-05-16 RX ADMIN — OXYCODONE HYDROCHLORIDE 10 MILLIGRAM(S): 5 TABLET ORAL at 12:44

## 2018-05-16 RX ADMIN — OXYCODONE HYDROCHLORIDE 30 MILLIGRAM(S): 5 TABLET ORAL at 14:40

## 2018-05-16 NOTE — PROGRESS NOTE ADULT - ASSESSMENT
42F s/p pancreatectomy with R atrial thrombus    -Cont therapeutic Lovenox 1mg/kg q12 hours  -F/u official echo report

## 2018-05-16 NOTE — PROGRESS NOTE ADULT - SUBJECTIVE AND OBJECTIVE BOX
Patient's midline draining wound's drainage has essentially stopped. Left sided drain removed.   Patient has been ready for discharge home form our point of view for days. She is scheduled for a repeat echo-cardiogram today. She will be sent home on therapeutic Lovenox and I assume she will require permanent anticoagulation.  She will return to my clinic 2 weeks after discharge.   She will follow up with Cardiology in 1 week.

## 2018-05-16 NOTE — PROGRESS NOTE ADULT - ASSESSMENT
41 y/o F well known to DM team from recent admission for total pancreatectomy and auto islet cell transplant secondary to chronic pancreatitis since childhood. Transplant was c/b intraabdominal collections. 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 Also found to have RA thrombus> off heparin gtt on AC >lovenox. On antibiotics as well. Pt BG difficult to control due to erratic and unpredictable PO intake. BG values improved yesterday but patient woke up severely hyperglycemic 2/2 eating early this AM without insulin coverage. Pt understands that she needs insulin whenever she eats but finds it difficult to manage in hospital. Explained to patient that we can add a PRN order for humalog if she feels like she needs a snack overnight and can request from the staff. Pt hopeful she will be discharged today and can get back to home management. Discussed using correctional scale in addition counting carbs to improve glycemic control at home. BG goal (100-150mg/dl). Given erratic BG values, no indication to change any of the current basal/bolus dose amounts while patient remains inpatient.

## 2018-05-16 NOTE — PROGRESS NOTE ADULT - ASSESSMENT
This is a 42 year old woman with past medical history of chronic pancreatitis since childhood s/p [place] procedure in 2014, 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 During her last admission (4/5-4/16/18) after her islet cell transplant, patient developed LUQ abdominal collections found to be tracking to her midline. She underwent IR drainage and was discharged with drains that were removed two weeks ago. Drain cultures positive for Candida and VRE. She was discharged with a PICC and remained on antifungals.   Patient readmitted with LUQ abdominal collection requiring drain on 5/9. COurse complicated with RA Thrombus now on Lovenox.

## 2018-05-16 NOTE — PROVIDER CONTACT NOTE (OTHER) - ASSESSMENT
Discussion with Diabetes Educator Isabel Alfaro, pt able to wear CGM device- unable to treat patient based on scanned results, need to do a FS with glucometer
Patient denies dizziness or lightheadedness. Patient hemodynamically stable.
Pt w/ c/o pain, medicated as ordered, pt has chronic pain.

## 2018-05-16 NOTE — PROVIDER CONTACT NOTE (OTHER) - RECOMMENDATIONS
JAMILAH rao at bedside to educate patient, pt refusing our glucometer for rechecking hypoglycemic result. Hypoglycemic protocol implemented. Pt drinking apple juice as per protocol.
Give Insulin

## 2018-05-16 NOTE — PROGRESS NOTE ADULT - PROVIDER SPECIALTY LIST ADULT
CCU
Cardiology
Endocrinology
Internal Medicine
Intervent Radiology
Surgery
Cardiology
Infectious Disease
Endocrinology

## 2018-05-16 NOTE — PROVIDER CONTACT NOTE (OTHER) - BACKGROUND
Patient came in with drainage from recent abdominal surgery
5/8: surgical site infection s/p pancreatectomy w/ islet cell transplant; ct showed fluid collection

## 2018-05-16 NOTE — PROGRESS NOTE ADULT - PROBLEM SELECTOR PROBLEM 1
Diabetes mellitus due to underlying condition with hyperglycemia, with long-term current use of insulin
Thrombus of right atrial appendage without antecedent myocardial infarction
Thrombus of right atrial appendage without antecedent myocardial infarction
Postoperative wound infection, subsequent encounter
Thrombus of right atrial appendage without antecedent myocardial infarction

## 2018-05-16 NOTE — PROGRESS NOTE ADULT - PROBLEM SELECTOR PLAN 1
-test BG AC/HS/2AM  -c/w Lantus 9 units QAM. Will need to re-evaluate dose based on fasting glucose 5/14  -c/w Humalog 3 units AC meals and 2 units w/HS snack (DO NOT HOLD IF PATIENT IS EATING SNACK)  -c/w Humalog low correction scale AC and Low HS scale  -Add 2AM correction scale (correction over 250mg/dl)  -please contact endocrine team with any questions  -discussed plan w/pt and CCU doctor  pager: [phone]
-Test BG ac and hs  -Continue Lantus 6 units q am. do not hold lantus since pt has no pancreas!!  -Increased Humalog to 3 units ac meals.  -Continue Humalog correction scales as noted/ow ac meals and moderate at hs   -Will adjust doses as needed  -Plan discussed with pt/team.  Contact info: [phone] (24/7). pager [phone]
-test BG AC/HS/2AM  -c/w Lantus 9 units QAM for now  -change Humalog dose ac meals back to 3 units and 2 units w/HS snack (DO NOT HOLD IF PATIENT IS EATING SNACK)  -c/w Humalog low correction scale AC and Low HS scale  -c/w 2AM correction scale (correction over 250mg/dl)  -please contact endocrine team with any questions  -Plan discussed with pt/team/staff. Please document if pt requesting different dose of insulin or if splitting dose further.   Contact info: [phone] (24/7). pager [phone]
-test BG AC/HS. Pt would benefit from 2AM testing but is refusing at this time.  -Give Lantus 3 units stat to add to 6 units received this AM  -Change to Lantus 9 units QAM starting 5/13  -c/w Humalog 3 units AC meals for now. Add 2 units for HS snack nightly  -c/w Humalog correction scales AC and HS  -discussed w/pt and team  pager: [phone]
-test BG AC/HS/2AM  -Decrease Lantus dose to 8 units since pt had BG in 50s at hs and also a BG level of 80 at 2 am last night even though she ate over night without any meal time insulin.  -Spoke to team about the need to test BG at 2am every day.   -c/w Humalog ac meals 3 units and 2 units w/HS snack (DO NOT HOLD IF PATIENT IS EATING SNACK)  -c/w Humalog low correction scale AC and Low HS scale  -c/w 2AM correction scale (correction over 250mg/dl)  -please contact endocrine team with any questions  -Plan discussed with pt/team/staff.   -Please document if pt requesting different dose of insulin or if splitting dose further. Needs one time order from CCU provider to change insulin doses.  Document if pt has further hypoglycemia and refuses treatment of BG testing.  Contact info: [phone] (24/7). pager [phone]
Hep gtt d/c at 8am and start Lovenox 50 bid  Lovenox teaching  repeat echo 5/16/18
Inpatient plan:  -test BG AC/HS/2AM  -c/w Lantus 8 units QAM  -c/w Humalog 3 units AC meals and 2 units w/HS snack (DO NOT HOLD IF PATIENT EATING SNACK)  -Please Add Humalog 2 units PRN for snack (discussed w/CCU NP)  -c/w Humalog low correction scale AC and Low HS scale  -c/w 2AM correction scale (correction over 250mg/dl)  Discharge plan:  -Can be discharged on Lantus 8 units Daily and Humalog 1:15 Insulin to Carb ratio w/lose dose correctional scales (Insulin sensitivity factor 1:50 starting w/correction above 150mg/dl)  -Pt requesting RX for Novolog Flex Pen be sent to pharmacy  -F/u Outpt w/Dr Noe Nesbitt and diabetes educator May 24th 3pm  -discussed plan w/pt, RN and CCU NP  -Call Endocrine with any questions  pager: [phone]
-Continue heparin gtt  -TPA at bedside
Antibiotics change to PO  Continue Diflucan, Linezolid and levaquin  Continue pain management  DANIEL drain to be removed today
Follow up TTE 5/16  Lovenox 50mg BID  Interventional Vascular following

## 2018-05-16 NOTE — PROGRESS NOTE ADULT - PROBLEM SELECTOR PLAN 2
Remains afebrile  Monitor WBC   Cont. Fluconazole (5/14-5/18)  Levaquin 500 qd x 5 days  Linezolide 600 BID
-abx changed to Ertapenem 1g qd IV, Linezolid 600 BID   -Pain control   -OOB as tolerated with assistance
Continue heparin drip for now  Vascular CARDS recommends Lovenox 1mg/kg 2xd to start tomorrow after drain removal
s/p DANIEL removal no active drain  Continue Linezolid 600mg BID until 5/18  Continue Fluconazole 200mg daily until 5/18

## 2018-05-16 NOTE — PROVIDER CONTACT NOTE (OTHER) - ACTION/TREATMENT ORDERED:
PA aware. No treatment recommended
PA aware, will advise MD Walker. Will discontinue order. Continue to monitor.
PA aware. Keep heparin drip at 12 units per hour
Pt allowed for staff to recheck 15minutes after consumption of carbohydrates, blood sugar resulted WNL. will continue to closely monitor patient. Pt educated that we need to use hospital glucometer
Give Humalog and Lantus Insulin for AM before breakfast
MD notified, will look at patient chart, no orders given - most likely pain related, will continue to monitor patient.

## 2018-05-16 NOTE — PROGRESS NOTE ADULT - SUBJECTIVE AND OBJECTIVE BOX
Diabetes Follow up note:  Interval Hx:  41 y/o F well known to DM team from recent admission for total pancreatectomy and auto islet cell transplant secondary to chronic pancreatitis since childhood. Transplant was c/b intraabdominal collections> discharge with DANIEL drains and antifungals. 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 During this hospitalization pt was also found to have RA thrombus> on heparin gtt. Abdominal fluid cultures are + for enterococcus faceium and klebsiella pneumoniae on antibiotics as well. BG values improved yesterday but spiked overnight to this AM with BG values >450 this AM. Pt seen at bedside. Endorses was eating snacks/apple sauce at 5am without receiving insulin coverage. Pt awaiting echo with possible discharge home today. Feels that she will be able to manage BG values at home as she can self-monitor with her meter and give insulin for her small frequent meals.     Review of Systems:  General: "I'm having pain right now"  GI: Tolerating POs. + abdominal cramping.   CV: No CP/SOB  ENDO: No S&Sx of hypoglycemia. Denies s/s of hyperglycemia.   MEDS:    insulin glargine Injectable (LANTUS) 8 Unit(s) SubCutaneous every morning  insulin lispro (HumaLOG) corrective regimen sliding scale   SubCutaneous three times a day before meals  insulin lispro (HumaLOG) corrective regimen sliding scale   SubCutaneous at bedtime  insulin lispro (HumaLOG) corrective regimen sliding scale   SubCutaneous <User Schedule>  insulin lispro Injectable (HumaLOG) 3 Unit(s) SubCutaneous before dinner  insulin lispro Injectable (HumaLOG) 3 Unit(s) SubCutaneous with breakfast  insulin lispro Injectable (HumaLOG) 3 Unit(s) SubCutaneous with lunch  insulin lispro Injectable (HumaLOG) 2 Unit(s) SubCutaneous at bedtime  megestrol Suspension 800 milliGRAM(s) Oral daily    fluconAZOLE   Tablet 200 milliGRAM(s) Oral daily  levoFLOXacin  Tablet 500 milliGRAM(s) Oral every 24 hours  linezolid    Tablet 600 milliGRAM(s) Oral every 12 hours    Allergies    No Known Allergies        PE:  General: Female lying in bed. NAD.  Vital Signs Last 24 Hrs  T(C): 36.7 (16 May 2018 08:00), Max: 37.2 (16 May 2018 06:00)  T(F): 98.1 (16 May 2018 08:00), Max: 99 (16 May 2018 06:00)  HR: 100 (16 May 2018 10:00) (72 - 103)  BP: 137/108 (16 May 2018 10:00) (104/60 - 137/108)  BP(mean): 116 (16 May 2018 10:00) (74 - 116)  RR: 16 (16 May 2018 10:00) (15 - 19)  SpO2: 99% (16 May 2018 08:00) (99% - 100%)  CV: S1, S2. Sinus tach on monitor.   Extremities: Warm. No edema  Neuro: A&O X3    LABS:    POCT Blood Glucose.: 484 mg/dL (05-16-18 @ 07:57)  POCT Blood Glucose.: 486 mg/dL (05-16-18 @ 07:55)  POCT Blood Glucose.: 229 mg/dL (05-16-18 @ 02:19)  POCT Blood Glucose.: 169 mg/dL (05-15-18 @ 22:07)  POCT Blood Glucose.: 169 mg/dL (05-15-18 @ 17:04)  POCT Blood Glucose.: 231 mg/dL (05-15-18 @ 11:59)  POCT Blood Glucose.: 335 mg/dL (05-15-18 @ 08:16)  POCT Blood Glucose.: 85 mg/dL (05-15-18 @ 02:19)  POCT Blood Glucose.: 163 mg/dL (05-14-18 @ 22:33)  POCT Blood Glucose.: 80 mg/dL (05-14-18 @ 21:37)  POCT Blood Glucose.: 56 mg/dL (05-14-18 @ 21:34)  POCT Blood Glucose.: 56 mg/dL (05-14-18 @ 20:55)  POCT Blood Glucose.: 103 mg/dL (05-14-18 @ 19:36)  POCT Blood Glucose.: 97 mg/dL (05-14-18 @ 18:28)  POCT Blood Glucose.: 51 mg/dL (05-14-18 @ 18:00)  POCT Blood Glucose.: 64 mg/dL (05-14-18 @ 15:13)  POCT Blood Glucose.: 137 mg/dL (05-14-18 @ 13:01)  POCT Blood Glucose.: 154 mg/dL (05-14-18 @ 08:08)  POCT Blood Glucose.: 149 mg/dL (05-14-18 @ 01:22)  POCT Blood Glucose.: 215 mg/dL (05-13-18 @ 23:46)  POCT Blood Glucose.: 72 mg/dL (05-13-18 @ 17:15)  POCT Blood Glucose.: 356 mg/dL (05-13-18 @ 12:45)                            9.3    8.1   )-----------( 630      ( 16 May 2018 05:24 )             29.7       05-16    134<L>  |  95<L>  |  7   ----------------------------<  346<H>  5.7<H>   |  25  |  0.85    Ca    8.9      16 May 2018 05:24  Phos  3.8     05-16  Mg     1.6     05-16    TPro  6.6  /  Alb  3.0<L>  /  TBili  0.2  /  DBili  x   /  AST  11  /  ALT  6<L>  /  AlkPhos  83  05-16        Hemoglobin A1C, Whole Blood: 5.4 % [4.0 - 5.6] (04-12-18 @ 16:57)            Contact number: osmin [phone] or [phone]

## 2018-05-16 NOTE — PROGRESS NOTE ADULT - PROBLEM SELECTOR PROBLEM 2
Postoperative wound infection, subsequent encounter
Postoperative wound abscess, subsequent encounter
Thrombus of right atrial appendage without antecedent myocardial infarction
Postoperative wound abscess, subsequent encounter

## 2018-05-16 NOTE — PROGRESS NOTE ADULT - PROBLEM SELECTOR PROBLEM 3
Diabetes mellitus due to underlying condition with hyperglycemia, with long-term current use of insulin

## 2018-05-16 NOTE — PROGRESS NOTE ADULT - PROBLEM SELECTOR PLAN 3
-Continue pancreatic enzymes  -Lantus, pre-meal and Humalog AC, ISS
Continue LANTUS, pre-meal and ISS coverage as per endocrine
2/2 to Pancreatectomy  Endocrine following  Will add Humalog 2 units PRN with snacks

## 2018-05-16 NOTE — PROGRESS NOTE ADULT - ATTENDING COMMENTS
Patient is seen and examined with fellow, NP and the CCU house-staff. I agree with the history, physical and the assessment and plan.  RA thrombus post prolonged recent surgery  on Abx treatment for an abdominal wound infection  will f/u with vscular surgery regarding need for AC
Patient seen and examined at bedside.  Patient remains hemodynamically stable.  Continue anticoagulation per vascular medicine team.    Follow-up repeat CT per surgery request and repeat echocardiogram to reassess RA mass/thrombus.
She has displayed hemodynamic stability with stable RA findings  She will be discharged on Lovenox with plans for echo and office visit in 1 week.    [place] [id number]
Patient is seen and examined with fellow, NP and the CCU house-staff. I agree with the history, physical and the assessment and plan.  presently on LOvenox  plan for TTE today  asymptomatic with hemodynamic stability  f/u endocrine
Doing well without hemodynamic instability  If drain is removed today, then would continue heparin gtt overnight.  If site is stable tomorrow - then would transition to Lovenox 1mg/kg BID.   After transition would observe for another 24 hours at least.  Plan for echocardiogram Weds Morning    Thanks    Aric Monzon [id number]
Patient seen and examined at bedside.  Patient remains hemodynamically stable.  Continue anticoagulation per vascular medicine team.    Repeat echocardiogram Monday to reassess RA mass/thrombus.
Patient is seen and examined with fellow, NP and the CCU house-staff. I agree with the history, physical and the assessment and plan.  RA mass - presently hemodynamically stable and asymptomatic  s/p drain removal  will start Lovenox today and repeat TTE tomorrow
Patient seen and examined at bedside.  Patient remains hemodynamically stable.  Continue anticoagulation per vascular medicine team.

## 2018-05-16 NOTE — PROGRESS NOTE ADULT - SUBJECTIVE AND OBJECTIVE BOX
CHIEF COMPLAINT::    INTERVAL HISTORY:    REVIEW OF SYSTEMS: all others negative    MEDICATIONS  (STANDING):  alteplase    IVPB 100 milliGRAM(s) IV Intermittent once  amylase/lipase/protease  (CREON  6,000 Units) 1 Capsule(s) Oral three times a day with meals  dextrose 5%. 1000 milliLiter(s) (50 mL/Hr) IV Continuous <Continuous>  dextrose 50% Injectable 12.5 Gram(s) IV Push once  dextrose 50% Injectable 25 Gram(s) IV Push once  dextrose 50% Injectable 25 Gram(s) IV Push once  enoxaparin Injectable 50 milliGRAM(s) SubCutaneous every 12 hours  fluconAZOLE   Tablet 200 milliGRAM(s) Oral daily  insulin glargine Injectable (LANTUS) 8 Unit(s) SubCutaneous every morning  insulin lispro (HumaLOG) corrective regimen sliding scale   SubCutaneous three times a day before meals  insulin lispro (HumaLOG) corrective regimen sliding scale   SubCutaneous at bedtime  insulin lispro (HumaLOG) corrective regimen sliding scale   SubCutaneous <User Schedule>  insulin lispro Injectable (HumaLOG) 3 Unit(s) SubCutaneous before dinner  insulin lispro Injectable (HumaLOG) 3 Unit(s) SubCutaneous with breakfast  insulin lispro Injectable (HumaLOG) 3 Unit(s) SubCutaneous with lunch  insulin lispro Injectable (HumaLOG) 2 Unit(s) SubCutaneous at bedtime  levoFLOXacin  Tablet 500 milliGRAM(s) Oral every 24 hours  linezolid    Tablet 600 milliGRAM(s) Oral every 12 hours  megestrol Suspension 800 milliGRAM(s) Oral daily  oxyCODONE  ER Tablet 30 milliGRAM(s) Oral every 8 hours  pantoprazole  Injectable 40 milliGRAM(s) IV Push daily    MEDICATIONS  (PRN):  acetaminophen   Tablet 650 milliGRAM(s) Oral every 6 hours PRN mild pain  dextrose 40% Gel 15 Gram(s) Oral once PRN Blood Glucose LESS THAN 70 milliGRAM(s)/deciliter  docusate sodium Liquid 200 milliGRAM(s) Oral at bedtime PRN Constipation  glucagon  Injectable 1 milliGRAM(s) IntraMuscular once PRN Glucose LESS THAN 70 milligrams/deciliter  ondansetron   Disintegrating Tablet 8 milliGRAM(s) Oral every 8 hours PRN Nausea and/or Vomiting  oxyCODONE    IR 10 milliGRAM(s) Oral every 3 hours PRN Moderate Pain (4 - 6)      Objective:  Vital Signs Last 24 Hrs  T(C): 37.2 (16 May 2018 06:00), Max: 37.2 (16 May 2018 06:00)  T(F): 99 (16 May 2018 06:00), Max: 99 (16 May 2018 06:00)  HR: 87 (16 May 2018 06:19) (72 - 103)  BP: 134/73 (16 May 2018 06:00) (104/60 - 144/86)  BP(mean): 92 (16 May 2018 06:00) (74 - 104)  RR: 16 (16 May 2018 06:00) (15 - 19)  SpO2: 99% (16 May 2018 06:19) (98% - 100%)  ICU Vital Signs Last 24 Hrs  T(C): 37.2 (16 May 2018 06:00), Max: 37.2 (16 May 2018 06:00)  T(F): 99 (16 May 2018 06:00), Max: 99 (16 May 2018 06:00)  HR: 87 (16 May 2018 06:19) (72 - 103)  BP: 134/73 (16 May 2018 06:00) (104/60 - 144/86)  BP(mean): 92 (16 May 2018 06:00) (74 - 104)  ABP: --  ABP(mean): --  RR: 16 (16 May 2018 06:00) (15 - 19)  SpO2: 99% (16 May 2018 06:19) (98% - 100%)      Adult Advanced Hemodynamics Last 24 Hrs  CVP(mm Hg): --  CVP(cm H2O): --  CO: --  CI: --  PA: --  PA(mean): --  PCWP: --  SVR: --  SVRI: --  PVR: --  PVRI: --      14 @ 07:01  -  05-15 @ 07:00  --------------------------------------------------------  IN: 1212 mL / OUT: 1300 mL / NET: -88 mL    05-15 @ 07:01  -  05-16 @ 06:30  --------------------------------------------------------  IN: 1512 mL / OUT: 0 mL / NET: 1512 mL      Daily     Daily Weight in k.2 (16 May 2018 06:00)    PHYSICAL EXAM:      Constitutional:    Eyes:    ENMT:    Neck:    Breasts:    Back:    Respiratory:    Cardiovascular:    Gastrointestinal:    Genitourinary:    Rectal:    Extremities:    Vascular:    Neurological:    Skin:    Lymph Nodes:    Musculoskeletal:    Psychiatric:        TELEMETRY:     EKG:     CARDIAC CATH:     ECHO:    IMAGIN.3    8.1   )-----------( 630      ( 16 May 2018 05:24 )             29.7     05-16    134<L>  |  95<L>  |  7   ----------------------------<  346<H>  5.7<H>   |  25  |  0.85    Ca    8.9      16 May 2018 05:24  Phos  3.8     -  Mg     1.6     16    TPro  6.6  /  Alb  3.0<L>  /  TBili  0.2  /  DBili  x   /  AST  11  /  ALT  6<L>  /  AlkPhos  83  16    LIVER FUNCTIONS - ( 16 May 2018 05:24 )  Alb: 3.0 g/dL / Pro: 6.6 g/dL / ALK PHOS: 83 U/L / ALT: 6 U/L / AST: 11 U/L / GGT: x           PT/INR - ( 15 May 2018 02:26 )   PT: 12.2 sec;   INR: 1.12 ratio         PTT - ( 15 May 2018 09:11 )  PTT:84.8 sec      *BLOOD GAS/ARTERIAL/MIXED/VENOUS  *LACTATE      HEALTH ISSUES - PROBLEM Dx:  Postoperative wound infection, subsequent encounter: Postoperative wound infection, subsequent encounter  Postoperative wound abscess, subsequent encounter: Postoperative wound abscess, subsequent encounter  Thrombus of right atrial appendage without antecedent myocardial infarction: Thrombus of right atrial appendage without antecedent myocardial infarction  Pancreas transplant status: Pancreas transplant status  Diabetes mellitus due to underlying condition with hyperglycemia, with long-term current use of insulin: Diabetes mellitus due to underlying condition with hyperglycemia, with long-term current use of insulin        HEALTH ISSUES - R/O PROBLEM Dx:      Heron Lloyd, CCU NP   # CHIEF COMPLAINT::    INTERVAL HISTORY:  This is a 42 year old woman with past medical history of chronic pancreatitis since childhood s/p [place] procedure in 2014, 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 During her last admission (-18) after her islet cell transplant, patient developed LUQ abdominal collections found to be tracking to her midline. She underwent IR drainage and was discharged with drains that were removed two weeks ago. Drain cultures positive for Candida and VRE. She was discharged with a PICC and remained on antifungals.   Patient readmitted with LUQ abdominal collection requiring drain on . COurse complicated with RA Thrombus now on Lovenox.    REVIEW OF SYSTEMS: all others negative    MEDICATIONS  (STANDING):  alteplase    IVPB 100 milliGRAM(s) IV Intermittent once  amylase/lipase/protease  (CREON  6,000 Units) 1 Capsule(s) Oral three times a day with meals  dextrose 5%. 1000 milliLiter(s) (50 mL/Hr) IV Continuous <Continuous>  dextrose 50% Injectable 12.5 Gram(s) IV Push once  dextrose 50% Injectable 25 Gram(s) IV Push once  dextrose 50% Injectable 25 Gram(s) IV Push once  enoxaparin Injectable 50 milliGRAM(s) SubCutaneous every 12 hours  fluconAZOLE   Tablet 200 milliGRAM(s) Oral daily  insulin glargine Injectable (LANTUS) 8 Unit(s) SubCutaneous every morning  insulin lispro (HumaLOG) corrective regimen sliding scale   SubCutaneous three times a day before meals  insulin lispro (HumaLOG) corrective regimen sliding scale   SubCutaneous at bedtime  insulin lispro (HumaLOG) corrective regimen sliding scale   SubCutaneous <User Schedule>  insulin lispro Injectable (HumaLOG) 3 Unit(s) SubCutaneous before dinner  insulin lispro Injectable (HumaLOG) 3 Unit(s) SubCutaneous with breakfast  insulin lispro Injectable (HumaLOG) 3 Unit(s) SubCutaneous with lunch  insulin lispro Injectable (HumaLOG) 2 Unit(s) SubCutaneous at bedtime  levoFLOXacin  Tablet 500 milliGRAM(s) Oral every 24 hours  linezolid    Tablet 600 milliGRAM(s) Oral every 12 hours  megestrol Suspension 800 milliGRAM(s) Oral daily  oxyCODONE  ER Tablet 30 milliGRAM(s) Oral every 8 hours  pantoprazole  Injectable 40 milliGRAM(s) IV Push daily    MEDICATIONS  (PRN):  acetaminophen   Tablet 650 milliGRAM(s) Oral every 6 hours PRN mild pain  dextrose 40% Gel 15 Gram(s) Oral once PRN Blood Glucose LESS THAN 70 milliGRAM(s)/deciliter  docusate sodium Liquid 200 milliGRAM(s) Oral at bedtime PRN Constipation  glucagon  Injectable 1 milliGRAM(s) IntraMuscular once PRN Glucose LESS THAN 70 milligrams/deciliter  ondansetron   Disintegrating Tablet 8 milliGRAM(s) Oral every 8 hours PRN Nausea and/or Vomiting  oxyCODONE    IR 10 milliGRAM(s) Oral every 3 hours PRN Moderate Pain (4 - 6)      Objective:  Vital Signs Last 24 Hrs  T(C): 37.2 (16 May 2018 06:00), Max: 37.2 (16 May 2018 06:00)  T(F): 99 (16 May 2018 06:00), Max: 99 (16 May 2018 06:00)  HR: 87 (16 May 2018 06:19) (72 - 103)  BP: 134/73 (16 May 2018 06:00) (104/60 - 144/86)  BP(mean): 92 (16 May 2018 06:00) (74 - 104)  RR: 16 (16 May 2018 06:00) (15 - 19)  SpO2: 99% (16 May 2018 06:19) (98% - 100%)  ICU Vital Signs Last 24 Hrs  T(C): 37.2 (16 May 2018 06:00), Max: 37.2 (16 May 2018 06:00)  T(F): 99 (16 May 2018 06:00), Max: 99 (16 May 2018 06:00)  HR: 87 (16 May 2018 06:19) (72 - 103)  BP: 134/73 (16 May 2018 06:00) (104/60 - 144/86)  BP(mean): 92 (16 May 2018 06:00) (74 - 104)  ABP: --  ABP(mean): --  RR: 16 (16 May 2018 06:00) (15 - 19)  SpO2: 99% (16 May 2018 06:19) (98% - 100%)      Adult Advanced Hemodynamics Last 24 Hrs  CVP(mm Hg): --  CVP(cm H2O): --  CO: --  CI: --  PA: --  PA(mean): --  PCWP: --  SVR: --  SVRI: --  PVR: --  PVRI: --       @ 07:01  -  05-15 @ 07:00  --------------------------------------------------------  IN: 1212 mL / OUT: 1300 mL / NET: -88 mL    05-15 @ 07: @ 06:30  --------------------------------------------------------  IN: 1512 mL / OUT: 0 mL / NET: 1512 mL      Daily     Daily Weight in k.2 (16 May 2018 06:00)    PHYSICAL EXAM:      Constitutional:    Eyes:    ENMT:    Neck:    Breasts:    Back:    Respiratory:    Cardiovascular:    Gastrointestinal:    Genitourinary:    Rectal:    Extremities:    Vascular:    Neurological:    Skin:    Lymph Nodes:    Musculoskeletal:    Psychiatric:        TELEMETRY:     EKG:     CARDIAC CATH:     ECHO:    IMAGIN.3    8.1   )-----------( 630      ( 16 May 2018 05:24 )             29.7     05-16    134<L>  |  95<L>  |  7   ----------------------------<  346<H>  5.7<H>   |  25  |  0.85    Ca    8.9      16 May 2018 05:24  Phos  3.8     05-16  Mg     1.6     05-16    TPro  6.6  /  Alb  3.0<L>  /  TBili  0.2  /  DBili  x   /  AST  11  /  ALT  6<L>  /  AlkPhos  83  05-16    LIVER FUNCTIONS - ( 16 May 2018 05:24 )  Alb: 3.0 g/dL / Pro: 6.6 g/dL / ALK PHOS: 83 U/L / ALT: 6 U/L / AST: 11 U/L / GGT: x           PT/INR - ( 15 May 2018 02:26 )   PT: 12.2 sec;   INR: 1.12 ratio         PTT - ( 15 May 2018 09:11 )  PTT:84.8 sec      *BLOOD GAS/ARTERIAL/MIXED/VENOUS  *LACTATE      HEALTH ISSUES - PROBLEM Dx:  Postoperative wound infection, subsequent encounter: Postoperative wound infection, subsequent encounter  Postoperative wound abscess, subsequent encounter: Postoperative wound abscess, subsequent encounter  Thrombus of right atrial appendage without antecedent myocardial infarction: Thrombus of right atrial appendage without antecedent myocardial infarction  Pancreas transplant status: Pancreas transplant status  Diabetes mellitus due to underlying condition with hyperglycemia, with long-term current use of insulin: Diabetes mellitus due to underlying condition with hyperglycemia, with long-term current use of insulin        HEALTH ISSUES - R/O PROBLEM Dx:      JYOTHI Singh NP   # CHIEF COMPLAINT: Abdominal Abscess c/b RA Thrombus    INTERVAL HISTORY:  This is a 42 year old woman with past medical history of chronic pancreatitis since childhood s/p [place] procedure in , 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 During her last admission (-18) after her islet cell transplant, patient developed LUQ abdominal collections found to be tracking to her midline. She underwent IR drainage and was discharged with drains that were removed two weeks ago. Drain cultures positive for Candida and VRE. She was discharged with a PICC and remained on antifungals.   Patient readmitted with LUQ abdominal collection requiring drain on . COurse complicated with RA Thrombus now on Lovenox.    REVIEW OF SYSTEMS: Denies CP, SOB,  all others negative    MEDICATIONS  (STANDING):  alteplase    IVPB 100 milliGRAM(s) IV Intermittent once  amylase/lipase/protease  (CREON  6,000 Units) 1 Capsule(s) Oral three times a day with meals  dextrose 5%. 1000 milliLiter(s) (50 mL/Hr) IV Continuous <Continuous>  dextrose 50% Injectable 12.5 Gram(s) IV Push once  dextrose 50% Injectable 25 Gram(s) IV Push once  dextrose 50% Injectable 25 Gram(s) IV Push once  enoxaparin Injectable 50 milliGRAM(s) SubCutaneous every 12 hours  fluconAZOLE   Tablet 200 milliGRAM(s) Oral daily  insulin glargine Injectable (LANTUS) 8 Unit(s) SubCutaneous every morning  insulin lispro (HumaLOG) corrective regimen sliding scale   SubCutaneous three times a day before meals  insulin lispro (HumaLOG) corrective regimen sliding scale   SubCutaneous at bedtime  insulin lispro (HumaLOG) corrective regimen sliding scale   SubCutaneous <User Schedule>  insulin lispro Injectable (HumaLOG) 3 Unit(s) SubCutaneous before dinner  insulin lispro Injectable (HumaLOG) 3 Unit(s) SubCutaneous with breakfast  insulin lispro Injectable (HumaLOG) 3 Unit(s) SubCutaneous with lunch  insulin lispro Injectable (HumaLOG) 2 Unit(s) SubCutaneous at bedtime  levoFLOXacin  Tablet 500 milliGRAM(s) Oral every 24 hours  linezolid    Tablet 600 milliGRAM(s) Oral every 12 hours  megestrol Suspension 800 milliGRAM(s) Oral daily  oxyCODONE  ER Tablet 30 milliGRAM(s) Oral every 8 hours  pantoprazole  Injectable 40 milliGRAM(s) IV Push daily    MEDICATIONS  (PRN):  acetaminophen   Tablet 650 milliGRAM(s) Oral every 6 hours PRN mild pain  dextrose 40% Gel 15 Gram(s) Oral once PRN Blood Glucose LESS THAN 70 milliGRAM(s)/deciliter  docusate sodium Liquid 200 milliGRAM(s) Oral at bedtime PRN Constipation  glucagon  Injectable 1 milliGRAM(s) IntraMuscular once PRN Glucose LESS THAN 70 milligrams/deciliter  ondansetron   Disintegrating Tablet 8 milliGRAM(s) Oral every 8 hours PRN Nausea and/or Vomiting  oxyCODONE    IR 10 milliGRAM(s) Oral every 3 hours PRN Moderate Pain (4 - 6)      Objective:  Vital Signs Last 24 Hrs  T(C): 37.2 (16 May 2018 06:00), Max: 37.2 (16 May 2018 06:00)  T(F): 99 (16 May 2018 06:00), Max: 99 (16 May 2018 06:00)  HR: 87 (16 May 2018 06:19) (72 - 103)  BP: 134/73 (16 May 2018 06:00) (104/60 - 144/86)  BP(mean): 92 (16 May 2018 06:00) (74 - 104)  RR: 16 (16 May 2018 06:00) (15 - 19)  SpO2: 99% (16 May 2018 06:19) (98% - 100%)  ICU Vital Signs Last 24 Hrs  T(C): 37.2 (16 May 2018 06:00), Max: 37.2 (16 May 2018 06:00)  T(F): 99 (16 May 2018 06:00), Max: 99 (16 May 2018 06:00)  HR: 87 (16 May 2018 06:19) (72 - 103)  BP: 134/73 (16 May 2018 06:00) (104/60 - 144/86)  BP(mean): 92 (16 May 2018 06:00) (74 - 104)  ABP: --  ABP(mean): --  RR: 16 (16 May 2018 06:00) (15 - 19)  SpO2: 99% (16 May 2018 06:19) (98% - 100%)      Adult Advanced Hemodynamics Last 24 Hrs  CVP(mm Hg): --  CVP(cm H2O): --  CO: --  CI: --  PA: --  PA(mean): --  PCWP: --  SVR: --  SVRI: --  PVR: --  PVRI: --       @ 07:01  -  05-15 @ 07:00  --------------------------------------------------------  IN: 1212 mL / OUT: 1300 mL / NET: -88 mL    05-15 @ 07: @ 06:30  --------------------------------------------------------  IN: 1512 mL / OUT: 0 mL / NET: 1512 mL      Daily     Daily Weight in k.2 (16 May 2018 06:00)    PHYSICAL EXAM:      Constitutional: No acute distress    Neuro: A+OX3    Respiratory: CTA Bilaterally    Cardiovascular: S1S2 Tachycardia    Gastrointestinal: +BS    Extremities: No pedal edema    Vascular: +2 Pedal Pulses      TELEMETRY: Sr-St    EKG:     CARDIAC CATH:     ECHO:  < from: Transthoracic Echocardiogram (18 @ 12:15) >    Patient name: MARGOT JETT  YOB: 1975   Age: 42 (F)   MR#: [id number]  Study Date: 2018  [id number]: Yobani Chaney RDCS  Study quality: Technically good  Referring Physician: Juan Walker MD  Blood Pressure: 133/79 mmHg  Height: 163 cm  Weight: 46 kg  BSA: 1.5 m2  ------------------------------------------------------------------------  PROCEDURE: Transthoracic echocardiogram with 2-D, M-Mode  and complete spectral and color flow Doppler.  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 Minimal mitral  regurgitation.  Aortic Valve/Aorta: Aortic valve leaflet morphology not  well visualized  Normal aortic root.  Left Atrium: Normal left atrium.  Left Ventricle: Normal left ventricular systolic function.  No segmental wall motion abnormalities. Normal left  ventricular internal dimensions and wall thicknesses.  Right Heart: See summary. The right ventricle is not well  visualized; grossly normal right ventricular systolic  function. Normal tricuspid valve. Mild tricuspid  regurgitation. Normal pulmonic valve.  Pericardium/Pleura: Normal pericardium with no pericardial  effusion.  Hemodynamic: Estimated right atrial pressure is 8 mm Hg.  ------------------------------------------------------------------------  Conclusions:  1. Normal mitral valve. Minimal mitral regurgitation.  2. Normal left ventricular internal dimensions and wall  thicknesses.  3. Normal left ventricular systolic function. No segmental  wall motion abnormalities.  4. The rightventricle is not well visualized; grossly  normal right ventricular systolic function.  5. A large (about  3.4 cm X 1.7 cm), mobile mass is  visualized in the right atrium.  Unable to determine an  attachment point (if any) of the mass.  No obvious mass  seen in the inferior vena cava.  Although the nature of  this echodensity is uncertain, it likely represents tumour  and/or thrombus.  *** Compared with echocardiogram of 2018, no  significant changes noted.  ------------------------------------------------------------------------  Confirmed on  2018 - 13:42:22 by Madan Noriega M.D.    < end of copied text >      IMAGIN.3    8.1   )-----------( 630      ( 16 May 2018 05:24 )             29.7     05-16    134<L>  |  95<L>  |  7   ----------------------------<  346<H>  5.7<H>   |  25  |  0.85    Ca    8.9      16 May 2018 05:24  Phos  3.8     05-16  Mg     1.6     05-16    TPro  6.6  /  Alb  3.0<L>  /  TBili  0.2  /  DBili  x   /  AST  11  /  ALT  6<L>  /  AlkPhos  83  05-16    LIVER FUNCTIONS - ( 16 May 2018 05:24 )  Alb: 3.0 g/dL / Pro: 6.6 g/dL / ALK PHOS: 83 U/L / ALT: 6 U/L / AST: 11 U/L / GGT: x           PT/INR - ( 15 May 2018 02:26 )   PT: 12.2 sec;   INR: 1.12 ratio         PTT - ( 15 May 2018 09:11 )  PTT:84.8 sec      *BLOOD GAS/ARTERIAL/MIXED/VENOUS  *LACTATE      HEALTH ISSUES - PROBLEM Dx:  Postoperative wound infection, subsequent encounter: Postoperative wound infection, subsequent encounter  Postoperative wound abscess, subsequent encounter: Postoperative wound abscess, subsequent encounter  Thrombus of right atrial appendage without antecedent myocardial infarction: Thrombus of right atrial appendage without antecedent myocardial infarction  Pancreas transplant status: Pancreas transplant status  Diabetes mellitus due to underlying condition with hyperglycemia, with long-term current use of insulin: Diabetes mellitus due to underlying condition with hyperglycemia, with long-term current use of insulin        HEALTH ISSUES - R/O PROBLEM Dx:      JYOTHI Singh NP   #

## 2018-05-16 NOTE — PROGRESS NOTE ADULT - SUBJECTIVE AND OBJECTIVE BOX
PAGER:  [phone]               [facility] [id number]              EMAIL [email or web address]   OFFICE [phone]                              ********VASCULAR MEDICINE & CARDIOLOGY PROGRESS NOTE********                            CC:  R atrial thrombus    INTERVAL HISTORY:  Pt in the process of getting echocardiogram upon evaluation.  Otherwise tolerating lovenox.         HISTORY OF PRESENT ILLNESS:  HPI:  42-year-old female with history of chronic pancreatitis since childhood s/p Moody procedure in 2014, 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 During her last admission (4/5-4/16/18) after her islet cell transplant, patient developed LUQ abdominal collections found to be tracking to her midline. She underwent IR drainage and was discharged with drains that were removed two weeks ago. Drain cultures positive for Candida and VRE. She was discharged with a PICC and remained on antifungals.   Was seen at Dr. Walker’s on day of admission and found to have copious drainage from her midline wound that has been present and clear in color since her discharge, but has recently turned cloudy and foul-smelling two days ago. Denies fevers/chills, nausea/vomiting. Reports having diffuse abdominal pain. (08 May 2018 16:38)          Allergies    No Known Allergies    Intolerances    	    MEDICATIONS:  alteplase    IVPB 100 milliGRAM(s) IV Intermittent once  enoxaparin Injectable 50 milliGRAM(s) SubCutaneous every 12 hours    fluconAZOLE   Tablet 200 milliGRAM(s) Oral daily  levoFLOXacin  Tablet 500 milliGRAM(s) Oral every 24 hours  linezolid    Tablet 600 milliGRAM(s) Oral every 12 hours      acetaminophen   Tablet 650 milliGRAM(s) Oral every 6 hours PRN  ondansetron   Disintegrating Tablet 8 milliGRAM(s) Oral every 8 hours PRN  oxyCODONE    IR 10 milliGRAM(s) Oral every 3 hours PRN  oxyCODONE  ER Tablet 30 milliGRAM(s) Oral every 8 hours    amylase/lipase/protease  (CREON  6,000 Units) 1 Capsule(s) Oral three times a day with meals  docusate sodium Liquid 200 milliGRAM(s) Oral at bedtime PRN  pantoprazole  Injectable 40 milliGRAM(s) IV Push daily    dextrose 40% Gel 15 Gram(s) Oral once PRN  dextrose 50% Injectable 12.5 Gram(s) IV Push once  dextrose 50% Injectable 25 Gram(s) IV Push once  dextrose 50% Injectable 25 Gram(s) IV Push once  glucagon  Injectable 1 milliGRAM(s) IntraMuscular once PRN  insulin glargine Injectable (LANTUS) 8 Unit(s) SubCutaneous every morning  insulin lispro (HumaLOG) corrective regimen sliding scale   SubCutaneous three times a day before meals  insulin lispro (HumaLOG) corrective regimen sliding scale   SubCutaneous at bedtime  insulin lispro (HumaLOG) corrective regimen sliding scale   SubCutaneous <User Schedule>  insulin lispro Injectable (HumaLOG) 3 Unit(s) SubCutaneous before dinner  insulin lispro Injectable (HumaLOG) 3 Unit(s) SubCutaneous with breakfast  insulin lispro Injectable (HumaLOG) 3 Unit(s) SubCutaneous with lunch  insulin lispro Injectable (HumaLOG) 2 Unit(s) SubCutaneous at bedtime  megestrol Suspension 800 milliGRAM(s) Oral daily    dextrose 5%. 1000 milliLiter(s) IV Continuous <Continuous>      PAST MEDICAL & SURGICAL HISTORY:  Premenstrual migraine  Other chronic pancreatitis  Status post pancreatic islet cell transplantation  H/O splenectomy  History of pancreatectomy  S/P cholecystectomy: in early 20&#x27;s      FAMILY HISTORY:  No pertinent family history in first degree relatives      SOCIAL HISTORY:  unchanged    REVIEW OF SYSTEMS:  CONSTITUTIONAL: No fever, weight loss, or fatigue  EYES: No eye pain, visual disturbances, or discharge  ENMT:  No difficulty hearing, tinnitus, vertigo; No sinus or throat pain  NECK: No pain or stiffness  RESPIRATORY: No cough, wheezing, chills or hemoptysis; No Shortness of Breath  CARDIOVASCULAR: No chest pain, palpitations, passing out, dizziness, or leg swelling  GASTROINTESTINAL: No abdominal pain or drainage, bleeding from drain sites.  GENITOURINARY: No dysuria, frequency, hematuria, or incontinence  NEUROLOGICAL: No headaches, memory loss, loss of strength, numbness, or tremors  SKIN: No itching, burning, rashes, or lesions   LYMPH Nodes: No enlarged glands  ENDOCRINE: No heat or cold intolerance; No hair loss  MUSCULOSKELETAL: No joint pain or swelling; No muscle, back, or extremity pain  PSYCHIATRIC: No depression, anxiety, mood swings, or difficulty sleeping  HEME/LYMPH: No easy bruising, or bleeding gums  ALLERY AND IMMUNOLOGIC: No hives or eczema	    PHYSICAL EXAM:  T(C): 36.7 (05-16-18 @ 08:00), Max: 37.2 (05-16-18 @ 06:00)  HR: 102 (05-16-18 @ 12:00) (72 - 149)  BP: 110/65 (05-16-18 @ 12:00) (104/60 - 137/108)  RR: 18 (05-16-18 @ 12:00) (15 - 19)  SpO2: 99% (05-16-18 @ 08:00) (99% - 100%)  Wt(kg): --  I&O's Summary    15 May 2018 07:01  -  16 May 2018 07:00  --------------------------------------------------------  IN: 1512 mL / OUT: 0 mL / NET: 1512 mL    16 May 2018 07:01  -  16 May 2018 13:07  --------------------------------------------------------  IN: 450 mL / OUT: 0 mL / NET: 450 mL        Appearance: Normal	  HEENT:   Normal oral mucosa, PERRL, EOMI	  Lymphatic: No lymphadenopathy  Cardiovascular: Normal S1 S2, No JVD, No murmurs, No edema  Respiratory: Lungs clear to auscultation	  Psychiatry: A & O x 3, Mood & affect appropriate  Gastrointestinal:  Soft, Non-tender, + BS, no bleeding or drainage  Skin: No rashes, No ecchymoses, No cyanosis	  Neurologic: Non-focal  Extremities: Normal range of motion, No clubbing, cyanosis or edema  Vascular: Peripheral pulses palpable 2+ bilaterally      LABS:	 	    CBC Full  -  ( 16 May 2018 05:24 )  WBC Count : 8.1 K/uL  Hemoglobin : 9.3 g/dL  Hematocrit : 29.7 %  Platelet Count - Automated : 630 K/uL  Mean Cell Volume : 94.9 fl  Mean Cell Hemoglobin : 29.8 pg  Mean Cell Hemoglobin Concentration : 31.4 gm/dL  Auto Neutrophil # : 4.2 K/uL  Auto Lymphocyte # : 2.9 K/uL  Auto Monocyte # : 0.5 K/uL  Auto Eosinophil # : 0.5 K/uL  Auto Basophil # : 0.1 K/uL  Auto Neutrophil % : 51.6 %  Auto Lymphocyte % : 35.1 %  Auto Monocyte % : 6.5 %  Auto Eosinophil % : 5.8 %  Auto Basophil % : 1.0 %    05-16    139  |  96  |  7   ----------------------------<  258<H>  5.0   |  29  |  0.92  05-16    134<L>  |  95<L>  |  7   ----------------------------<  346<H>  5.7<H>   |  25  |  0.85    Ca    9.5      16 May 2018 11:19  Ca    8.9      16 May 2018 05:24  Phos  3.0     05-16  Phos  3.8     05-16  Mg     1.9     05-16  Mg     1.6     05-16    TPro  6.9  /  Alb  3.4  /  TBili  0.1<L>  /  DBili  x   /  AST  8<L>  /  ALT  5<L>  /  AlkPhos  84  05-16  TPro  6.6  /  Alb  3.0<L>  /  TBili  0.2  /  DBili  x   /  AST  11  /  ALT  6<L>  /  AlkPhos  83  05-16

## 2018-05-21 ENCOUNTER — INPATIENT (INPATIENT)
Facility: HOSPITAL | Age: 43
LOS: 3 days | Discharge: HOME | DRG: 862 | End: 2018-05-25
Attending: SURGERY | Admitting: SURGERY
Payer: MEDICARE

## 2018-05-21 VITALS
HEART RATE: 88 BPM | TEMPERATURE: 98 F | SYSTOLIC BLOOD PRESSURE: 114 MMHG | DIASTOLIC BLOOD PRESSURE: 74 MMHG | RESPIRATION RATE: 17 BRPM | OXYGEN SATURATION: 99 %

## 2018-05-21 DIAGNOSIS — Z90.410 ACQUIRED TOTAL ABSENCE OF PANCREAS: Chronic | ICD-10-CM

## 2018-05-21 DIAGNOSIS — Z90.81 ACQUIRED ABSENCE OF SPLEEN: Chronic | ICD-10-CM

## 2018-05-21 DIAGNOSIS — Z94.83 PANCREAS TRANSPLANT STATUS: Chronic | ICD-10-CM

## 2018-05-21 DIAGNOSIS — L02.91 CUTANEOUS ABSCESS, UNSPECIFIED: ICD-10-CM

## 2018-05-21 DIAGNOSIS — Z90.49 ACQUIRED ABSENCE OF OTHER SPECIFIED PARTS OF DIGESTIVE TRACT: Chronic | ICD-10-CM

## 2018-05-21 LAB
ANION GAP SERPL CALC-SCNC: 13 MMOL/L — SIGNIFICANT CHANGE UP (ref 5–17)
BUN SERPL-MCNC: <4 MG/DL — LOW (ref 7–23)
CALCIUM SERPL-MCNC: 9.4 MG/DL — SIGNIFICANT CHANGE UP (ref 8.4–10.5)
CHLORIDE SERPL-SCNC: 99 MMOL/L — SIGNIFICANT CHANGE UP (ref 96–108)
CO2 SERPL-SCNC: 25 MMOL/L — SIGNIFICANT CHANGE UP (ref 22–31)
CREAT SERPL-MCNC: 0.73 MG/DL — SIGNIFICANT CHANGE UP (ref 0.5–1.3)
GLUCOSE SERPL-MCNC: 165 MG/DL — HIGH (ref 70–99)
HCT VFR BLD CALC: 25.3 % — LOW (ref 34.5–45)
HGB BLD-MCNC: 8.2 G/DL — LOW (ref 11.5–15.5)
MCHC RBC-ENTMCNC: 30.1 PG — SIGNIFICANT CHANGE UP (ref 27–34)
MCHC RBC-ENTMCNC: 32.4 GM/DL — SIGNIFICANT CHANGE UP (ref 32–36)
MCV RBC AUTO: 92.7 FL — SIGNIFICANT CHANGE UP (ref 80–100)
PLATELET # BLD AUTO: 479 K/UL — HIGH (ref 150–400)
POTASSIUM SERPL-MCNC: 4 MMOL/L — SIGNIFICANT CHANGE UP (ref 3.5–5.3)
POTASSIUM SERPL-SCNC: 4 MMOL/L — SIGNIFICANT CHANGE UP (ref 3.5–5.3)
RBC # BLD: 2.73 M/UL — LOW (ref 3.8–5.2)
RBC # FLD: 17.1 % — HIGH (ref 10.3–14.5)
SODIUM SERPL-SCNC: 137 MMOL/L — SIGNIFICANT CHANGE UP (ref 135–145)
WBC # BLD: 8 K/UL — SIGNIFICANT CHANGE UP (ref 3.8–10.5)
WBC # FLD AUTO: 8 K/UL — SIGNIFICANT CHANGE UP (ref 3.8–10.5)

## 2018-05-21 RX ORDER — ACETAMINOPHEN 500 MG
1000 TABLET ORAL ONCE
Qty: 0 | Refills: 0 | Status: DISCONTINUED | OUTPATIENT
Start: 2018-05-21 | End: 2018-05-25

## 2018-05-21 RX ORDER — DEXTROSE 50 % IN WATER 50 %
25 SYRINGE (ML) INTRAVENOUS ONCE
Qty: 0 | Refills: 0 | Status: DISCONTINUED | OUTPATIENT
Start: 2018-05-21 | End: 2018-05-23

## 2018-05-21 RX ORDER — METRONIDAZOLE 500 MG
500 TABLET ORAL EVERY 8 HOURS
Qty: 0 | Refills: 0 | Status: DISCONTINUED | OUTPATIENT
Start: 2018-05-21 | End: 2018-05-22

## 2018-05-21 RX ORDER — DEXTROSE 50 % IN WATER 50 %
12.5 SYRINGE (ML) INTRAVENOUS ONCE
Qty: 0 | Refills: 0 | Status: DISCONTINUED | OUTPATIENT
Start: 2018-05-21 | End: 2018-05-23

## 2018-05-21 RX ORDER — PANTOPRAZOLE SODIUM 20 MG/1
40 TABLET, DELAYED RELEASE ORAL DAILY
Qty: 0 | Refills: 0 | Status: DISCONTINUED | OUTPATIENT
Start: 2018-05-21 | End: 2018-05-25

## 2018-05-21 RX ORDER — GLUCAGON INJECTION, SOLUTION 0.5 MG/.1ML
1 INJECTION, SOLUTION SUBCUTANEOUS ONCE
Qty: 0 | Refills: 0 | Status: DISCONTINUED | OUTPATIENT
Start: 2018-05-21 | End: 2018-05-23

## 2018-05-21 RX ORDER — ENOXAPARIN SODIUM 100 MG/ML
45 INJECTION SUBCUTANEOUS
Qty: 0 | Refills: 0 | Status: DISCONTINUED | OUTPATIENT
Start: 2018-05-21 | End: 2018-05-21

## 2018-05-21 RX ORDER — MICAFUNGIN SODIUM 100 MG/1
INJECTION, POWDER, LYOPHILIZED, FOR SOLUTION INTRAVENOUS
Qty: 0 | Refills: 0 | Status: DISCONTINUED | OUTPATIENT
Start: 2018-05-21 | End: 2018-05-25

## 2018-05-21 RX ORDER — MORPHINE SULFATE 50 MG/1
4 CAPSULE, EXTENDED RELEASE ORAL EVERY 4 HOURS
Qty: 0 | Refills: 0 | Status: DISCONTINUED | OUTPATIENT
Start: 2018-05-21 | End: 2018-05-24

## 2018-05-21 RX ORDER — MORPHINE SULFATE 50 MG/1
2 CAPSULE, EXTENDED RELEASE ORAL EVERY 4 HOURS
Qty: 0 | Refills: 0 | Status: DISCONTINUED | OUTPATIENT
Start: 2018-05-21 | End: 2018-05-24

## 2018-05-21 RX ORDER — DEXTROSE 50 % IN WATER 50 %
15 SYRINGE (ML) INTRAVENOUS ONCE
Qty: 0 | Refills: 0 | Status: DISCONTINUED | OUTPATIENT
Start: 2018-05-21 | End: 2018-05-23

## 2018-05-21 RX ORDER — SODIUM CHLORIDE 9 MG/ML
1000 INJECTION, SOLUTION INTRAVENOUS
Qty: 0 | Refills: 0 | Status: DISCONTINUED | OUTPATIENT
Start: 2018-05-21 | End: 2018-05-23

## 2018-05-21 RX ORDER — ENOXAPARIN SODIUM 100 MG/ML
40 INJECTION SUBCUTANEOUS
Qty: 0 | Refills: 0 | Status: DISCONTINUED | OUTPATIENT
Start: 2018-05-21 | End: 2018-05-22

## 2018-05-21 RX ORDER — PIPERACILLIN AND TAZOBACTAM 4; .5 G/20ML; G/20ML
4.5 INJECTION, POWDER, LYOPHILIZED, FOR SOLUTION INTRAVENOUS EVERY 8 HOURS
Qty: 0 | Refills: 0 | Status: DISCONTINUED | OUTPATIENT
Start: 2018-05-21 | End: 2018-05-23

## 2018-05-21 RX ORDER — INSULIN LISPRO 100/ML
VIAL (ML) SUBCUTANEOUS EVERY 6 HOURS
Qty: 0 | Refills: 0 | Status: DISCONTINUED | OUTPATIENT
Start: 2018-05-21 | End: 2018-05-23

## 2018-05-21 RX ORDER — MICAFUNGIN SODIUM 100 MG/1
100 INJECTION, POWDER, LYOPHILIZED, FOR SOLUTION INTRAVENOUS ONCE
Qty: 0 | Refills: 0 | Status: COMPLETED | OUTPATIENT
Start: 2018-05-21 | End: 2018-05-21

## 2018-05-21 RX ORDER — MICAFUNGIN SODIUM 100 MG/1
100 INJECTION, POWDER, LYOPHILIZED, FOR SOLUTION INTRAVENOUS EVERY 24 HOURS
Qty: 0 | Refills: 0 | Status: DISCONTINUED | OUTPATIENT
Start: 2018-05-22 | End: 2018-05-25

## 2018-05-21 RX ORDER — DEXTROSE MONOHYDRATE, SODIUM CHLORIDE, AND POTASSIUM CHLORIDE 50; .745; 4.5 G/1000ML; G/1000ML; G/1000ML
1000 INJECTION, SOLUTION INTRAVENOUS
Qty: 0 | Refills: 0 | Status: DISCONTINUED | OUTPATIENT
Start: 2018-05-22 | End: 2018-05-25

## 2018-05-21 RX ADMIN — MORPHINE SULFATE 4 MILLIGRAM(S): 50 CAPSULE, EXTENDED RELEASE ORAL at 22:46

## 2018-05-21 RX ADMIN — MORPHINE SULFATE 4 MILLIGRAM(S): 50 CAPSULE, EXTENDED RELEASE ORAL at 22:16

## 2018-05-21 RX ADMIN — Medication 100 MILLIGRAM(S): at 23:30

## 2018-05-21 RX ADMIN — MICAFUNGIN SODIUM 105 MILLIGRAM(S): 100 INJECTION, POWDER, LYOPHILIZED, FOR SOLUTION INTRAVENOUS at 22:24

## 2018-05-21 NOTE — H&P ADULT - ASSESSMENT
42y Female s/p S/P total pancreatectomy  with islet cell autotransplant with recurrent abdominal collections transferred from [place] for abdominal abscess with two fistula tracking to the midline incision site as well as a separate subcutaneous abscess on the left upper abdominal. Patient is hemodynamically stable, afebrile and no leukocytosis on the present lab.     Plan:   Will continue with IV abx with zosyn, flagyl and micafungin   Will continue T. lovenox for the right atrial thrombus   Will consult IR in the morning for possible drainage, CT [facility] [place] [facility] in anticipation of IR procedure tomorrow  Follow morning labs and monitor vitals

## 2018-05-21 NOTE — H&P ADULT - NSHPPHYSICALEXAM_GEN_ALL_CORE
Vital Signs Last 24 Hrs  T(C): 36.9 (21 May 2018 22:11), Max: 36.9 (21 May 2018 22:11)  T(F): 98.4 (21 May 2018 22:11), Max: 98.4 (21 May 2018 22:11)  HR: 85 (21 May 2018 22:11) (85 - 88)  BP: 118/71 (21 May 2018 22:11) (114/74 - 118/71)  BP(mean): --  RR: 17 (21 May 2018 22:11) (17 - 17)  SpO2: 100% (21 May 2018 22:11) (99% - 100%)    PHYSICAL EXAM:  Constitutional: resting in bed with no acute distress  Respiratory:  unlabored breathing on room air   Gastrointestinal: Abdomen soft, non-distended, tender on the left upper abdominal where an erythematous nodule about 3cm is appreciated. There is a fistula opening appreciated on the inferior aspect of the midline incision with thick yellow discharge, dressing over the incision is intact and non-saturated.   Extremities:  No edema, no calf tenderness   Neurological: AxAxOx3

## 2018-05-21 NOTE — H&P ADULT - HISTORY OF PRESENT ILLNESS
Patient is a 43 yo female with past medical history of chronic pancreatitis s/p distal pancreatectomy, cholecystectomy, splenectomy and russel-en-y pancreaticojejuonostomy (02/2014) at [facility] with Dr. Arita), and now S/P total pancreatectomy  with islet cell autotransplant at [facility] by Dr. Walker in 04/2018. Patient's recovery was complicated by abdominal collections first on POD 6 which was managed with IR drainaged and IV antibiotic. Patient Patient is a 43 yo female with past medical history of chronic pancreatitis s/p distal pancreatectomy, cholecystectomy, splenectomy and russel-en-y pancreaticojejuonostomy (02/2014) at [facility] with Dr. Arita), and now S/P total pancreatectomy  with islet cell autotransplant at [facility] by Dr. Walker in 04/2018. Patient's recovery was complicated by abdominal collections first on POD 6 which was managed with IR drainaged and IV antibiotic. Patient was discharged with PICC line for long term antifungal. Patient was readmitted on 05/8 again for abscess which was drained and patient Patient is a 43 yo female with past medical history of chronic pancreatitis s/p distal pancreatectomy, cholecystectomy, splenectomy and russel-en-y pancreaticojejuonostomy (02/2014) at [facility] with Dr. Arita), and now S/P total pancreatectomy  with islet cell autotransplant at [facility] by Dr. Walker in 04/2018. Patient's recovery was complicated by abdominal collections first on POD 6 which was managed with IR drain and IV antibiotic. Patient was discharged with PICC line for long term antifungal. Patient was readmitted on 05/8 again for abscess which was drained by IR. Patient was also started on therapeutic Lovenox for right atrial thrombus. Patient again developed abdominal pain with fever of 100.7 on 05/20 associated with thick yellow discharge from the ventral incisional and went to [place] which was close by for care. CT scan at [place] showed complex multiloculated postsurgical abscess in the omental fat of the superior epigastrium measuring 6.7 cm and enterocutaneous fistulous tract extending anteriorly from the abscess to the midline incision site and another tract going into the soft tissues of the left upper abdominal quadrant communicating with a subcutaneous 3.3cm abscess  Patient was started on IV zosyn, flagyl and micafungin while there. Patient transferred today to [facility] for continuity of care under Dr. Walker. Patient is a 43 yo female with past medical history of chronic pancreatitis s/p distal pancreatectomy, cholecystectomy, splenectomy and russel-en-y pancreaticojejuonostomy (02/2014) at [facility] with Dr. Arita), and now S/P total pancreatectomy  with islet cell autotransplant at [facility] by Dr. Walker in 04/2018. Patient's recovery was complicated by abdominal collections first on POD 6 which was managed with IR drain and IV antibiotic. Patient was discharged with PICC line for long term antifungal. Patient was readmitted on 05/8 again for abscess which was drained by IR. Patient was also started on therapeutic Lovenox for right atrial thrombus. Patient again developed abdominal pain with fever of 100.7 on 05/20 associated with thick yellow discharge from the ventral incisional and went to [place] which was nearby for care. CT scan at [place] showed complex multiloculated postsurgical abscess in the omental fat of the superior epigastrium measuring 6.7 cm and enterocutaneous fistulous tract extending anteriorly from the abscess to the midline incision site and another tract going into the soft tissues of the left upper abdominal quadrant communicating with a subcutaneous 3.3cm abscess  Patient was started on IV zosyn, flagyl and micafungin while there. Patient transferred today to [facility] for continuity of care under Dr. Walker.

## 2018-05-21 NOTE — H&P ADULT - NSHPLABSRESULTS_GEN_ALL_CORE
8.2    8.0   )-----------( 479      ( 21 May 2018 21:35 )             25.3     05-21    137  |  99  |  <4<L>  ----------------------------<  165<H>  4.0   |  25  |  0.73    Ca    9.4      21 May 2018 21:35

## 2018-05-22 ENCOUNTER — APPOINTMENT (OUTPATIENT)
Dept: CV DIAGNOSITCS | Facility: HOSPITAL | Age: 43
End: 2018-05-22

## 2018-05-22 LAB
ANION GAP SERPL CALC-SCNC: 11 MMOL/L — SIGNIFICANT CHANGE UP (ref 5–17)
APTT BLD: 34.8 SEC — SIGNIFICANT CHANGE UP (ref 27.5–37.4)
BUN SERPL-MCNC: <4 MG/DL — LOW (ref 7–23)
CALCIUM SERPL-MCNC: 9 MG/DL — SIGNIFICANT CHANGE UP (ref 8.4–10.5)
CHLORIDE SERPL-SCNC: 103 MMOL/L — SIGNIFICANT CHANGE UP (ref 96–108)
CO2 SERPL-SCNC: 25 MMOL/L — SIGNIFICANT CHANGE UP (ref 22–31)
CREAT SERPL-MCNC: 0.69 MG/DL — SIGNIFICANT CHANGE UP (ref 0.5–1.3)
GLUCOSE SERPL-MCNC: 195 MG/DL — HIGH (ref 70–99)
HCT VFR BLD CALC: 27.9 % — LOW (ref 34.5–45)
HGB BLD-MCNC: 8.8 G/DL — LOW (ref 11.5–15.5)
INR BLD: 1.02 RATIO — SIGNIFICANT CHANGE UP (ref 0.88–1.16)
MCHC RBC-ENTMCNC: 28.8 PG — SIGNIFICANT CHANGE UP (ref 27–34)
MCHC RBC-ENTMCNC: 31.5 GM/DL — LOW (ref 32–36)
MCV RBC AUTO: 91.2 FL — SIGNIFICANT CHANGE UP (ref 80–100)
PLATELET # BLD AUTO: 488 K/UL — HIGH (ref 150–400)
POTASSIUM SERPL-MCNC: 4.3 MMOL/L — SIGNIFICANT CHANGE UP (ref 3.5–5.3)
POTASSIUM SERPL-SCNC: 4.3 MMOL/L — SIGNIFICANT CHANGE UP (ref 3.5–5.3)
PROTHROM AB SERPL-ACNC: 11.5 SEC — SIGNIFICANT CHANGE UP (ref 10–13.1)
RBC # BLD: 3.06 M/UL — LOW (ref 3.8–5.2)
RBC # FLD: 18.2 % — HIGH (ref 10.3–14.5)
SODIUM SERPL-SCNC: 139 MMOL/L — SIGNIFICANT CHANGE UP (ref 135–145)
WBC # BLD: 8.5 K/UL — SIGNIFICANT CHANGE UP (ref 3.8–10.5)
WBC # FLD AUTO: 8.5 K/UL — SIGNIFICANT CHANGE UP (ref 3.8–10.5)

## 2018-05-22 PROCEDURE — 99222 1ST HOSP IP/OBS MODERATE 55: CPT

## 2018-05-22 RX ORDER — LIPASE/PROTEASE/AMYLASE 16-48-48K
1 CAPSULE,DELAYED RELEASE (ENTERIC COATED) ORAL
Qty: 0 | Refills: 0 | Status: DISCONTINUED | OUTPATIENT
Start: 2018-05-22 | End: 2018-05-25

## 2018-05-22 RX ORDER — ENOXAPARIN SODIUM 100 MG/ML
50 INJECTION SUBCUTANEOUS EVERY 12 HOURS
Qty: 0 | Refills: 0 | Status: DISCONTINUED | OUTPATIENT
Start: 2018-05-22 | End: 2018-05-22

## 2018-05-22 RX ORDER — MEGESTROL ACETATE 40 MG/ML
800 SUSPENSION ORAL DAILY
Qty: 0 | Refills: 0 | Status: DISCONTINUED | OUTPATIENT
Start: 2018-05-22 | End: 2018-05-25

## 2018-05-22 RX ORDER — ENOXAPARIN SODIUM 100 MG/ML
40 INJECTION SUBCUTANEOUS EVERY 12 HOURS
Qty: 0 | Refills: 0 | Status: DISCONTINUED | OUTPATIENT
Start: 2018-05-22 | End: 2018-05-22

## 2018-05-22 RX ORDER — ENOXAPARIN SODIUM 100 MG/ML
40 INJECTION SUBCUTANEOUS EVERY 12 HOURS
Qty: 0 | Refills: 0 | Status: DISCONTINUED | OUTPATIENT
Start: 2018-05-22 | End: 2018-05-23

## 2018-05-22 RX ORDER — HEPARIN SODIUM 5000 [USP'U]/ML
800 INJECTION INTRAVENOUS; SUBCUTANEOUS
Qty: 25000 | Refills: 0 | Status: DISCONTINUED | OUTPATIENT
Start: 2018-05-22 | End: 2018-05-22

## 2018-05-22 RX ORDER — INSULIN GLARGINE 100 [IU]/ML
4 INJECTION, SOLUTION SUBCUTANEOUS EVERY MORNING
Qty: 0 | Refills: 0 | Status: DISCONTINUED | OUTPATIENT
Start: 2018-05-22 | End: 2018-05-24

## 2018-05-22 RX ADMIN — MORPHINE SULFATE 4 MILLIGRAM(S): 50 CAPSULE, EXTENDED RELEASE ORAL at 13:19

## 2018-05-22 RX ADMIN — PIPERACILLIN AND TAZOBACTAM 25 GRAM(S): 4; .5 INJECTION, POWDER, LYOPHILIZED, FOR SOLUTION INTRAVENOUS at 16:26

## 2018-05-22 RX ADMIN — MORPHINE SULFATE 4 MILLIGRAM(S): 50 CAPSULE, EXTENDED RELEASE ORAL at 08:44

## 2018-05-22 RX ADMIN — PIPERACILLIN AND TAZOBACTAM 25 GRAM(S): 4; .5 INJECTION, POWDER, LYOPHILIZED, FOR SOLUTION INTRAVENOUS at 00:27

## 2018-05-22 RX ADMIN — MORPHINE SULFATE 4 MILLIGRAM(S): 50 CAPSULE, EXTENDED RELEASE ORAL at 18:46

## 2018-05-22 RX ADMIN — MORPHINE SULFATE 4 MILLIGRAM(S): 50 CAPSULE, EXTENDED RELEASE ORAL at 04:18

## 2018-05-22 RX ADMIN — Medication 2: at 20:07

## 2018-05-22 RX ADMIN — MICAFUNGIN SODIUM 105 MILLIGRAM(S): 100 INJECTION, POWDER, LYOPHILIZED, FOR SOLUTION INTRAVENOUS at 21:57

## 2018-05-22 RX ADMIN — PIPERACILLIN AND TAZOBACTAM 25 GRAM(S): 4; .5 INJECTION, POWDER, LYOPHILIZED, FOR SOLUTION INTRAVENOUS at 08:43

## 2018-05-22 RX ADMIN — MORPHINE SULFATE 4 MILLIGRAM(S): 50 CAPSULE, EXTENDED RELEASE ORAL at 09:14

## 2018-05-22 RX ADMIN — ENOXAPARIN SODIUM 40 MILLIGRAM(S): 100 INJECTION SUBCUTANEOUS at 21:17

## 2018-05-22 RX ADMIN — MORPHINE SULFATE 4 MILLIGRAM(S): 50 CAPSULE, EXTENDED RELEASE ORAL at 22:22

## 2018-05-22 RX ADMIN — PANTOPRAZOLE SODIUM 40 MILLIGRAM(S): 20 TABLET, DELAYED RELEASE ORAL at 11:45

## 2018-05-22 RX ADMIN — Medication 1 CAPSULE(S): at 20:07

## 2018-05-22 RX ADMIN — MORPHINE SULFATE 4 MILLIGRAM(S): 50 CAPSULE, EXTENDED RELEASE ORAL at 21:52

## 2018-05-22 RX ADMIN — Medication 2: at 00:27

## 2018-05-22 RX ADMIN — Medication 1 CAPSULE(S): at 13:48

## 2018-05-22 RX ADMIN — MORPHINE SULFATE 2 MILLIGRAM(S): 50 CAPSULE, EXTENDED RELEASE ORAL at 12:15

## 2018-05-22 RX ADMIN — MORPHINE SULFATE 4 MILLIGRAM(S): 50 CAPSULE, EXTENDED RELEASE ORAL at 04:48

## 2018-05-22 RX ADMIN — INSULIN GLARGINE 4 UNIT(S): 100 INJECTION, SOLUTION SUBCUTANEOUS at 11:44

## 2018-05-22 RX ADMIN — Medication 1: at 05:44

## 2018-05-22 RX ADMIN — DEXTROSE MONOHYDRATE, SODIUM CHLORIDE, AND POTASSIUM CHLORIDE 100 MILLILITER(S): 50; .745; 4.5 INJECTION, SOLUTION INTRAVENOUS at 00:58

## 2018-05-22 RX ADMIN — MORPHINE SULFATE 2 MILLIGRAM(S): 50 CAPSULE, EXTENDED RELEASE ORAL at 11:45

## 2018-05-22 RX ADMIN — MORPHINE SULFATE 4 MILLIGRAM(S): 50 CAPSULE, EXTENDED RELEASE ORAL at 17:54

## 2018-05-22 RX ADMIN — PIPERACILLIN AND TAZOBACTAM 25 GRAM(S): 4; .5 INJECTION, POWDER, LYOPHILIZED, FOR SOLUTION INTRAVENOUS at 23:03

## 2018-05-22 RX ADMIN — Medication 100 MILLIGRAM(S): at 13:34

## 2018-05-22 RX ADMIN — Medication 100 MILLIGRAM(S): at 05:44

## 2018-05-22 RX ADMIN — Medication 1: at 12:40

## 2018-05-22 RX ADMIN — MORPHINE SULFATE 4 MILLIGRAM(S): 50 CAPSULE, EXTENDED RELEASE ORAL at 12:49

## 2018-05-22 NOTE — CONSULT NOTE ADULT - SUBJECTIVE AND OBJECTIVE BOX
PAGER:  [phone]               [facility] [id number]              EMAIL [email or web address]   OFFICE [phone]                              ********VASCULAR MEDICINE & CARDIOLOGY CONSULT NOTE********                            CC:  R atrial thrombus    INTERVAL HISTORY: Readmitted over the weekend to [place] for increasing abdominal pain and swelling.  CT scan noted abscess.  Transferred to [facility] for further care.         HISTORY OF PRESENT ILLNESS:  HPI:  Patient is a 41 yo female with past medical history of chronic pancreatitis s/p distal pancreatectomy, cholecystectomy, splenectomy and russel-en-y pancreaticojejuonostomy (02/2014) at [facility] with Dr. Arita), and now S/P total pancreatectomy  with islet cell autotransplant at [facility] by Dr. Walker in 04/2018. Patient's recovery was complicated by abdominal collections first on POD 6 which was managed with IR drain and IV antibiotic. Patient was discharged with PICC line for long term antifungal. Patient was readmitted on 05/8 again for abscess which was drained by IR. Patient was also started on therapeutic Lovenox for right atrial thrombus. Patient again developed abdominal pain with fever of 100.7 on 05/20 associated with thick yellow discharge from the ventral incisional and went to [place] which was nearby for care. CT scan at [place] showed complex multiloculated postsurgical abscess in the omental fat of the superior epigastrium measuring 6.7 cm and enterocutaneous fistulous tract extending anteriorly from the abscess to the midline incision site and another tract going into the soft tissues of the left upper abdominal quadrant communicating with a subcutaneous 3.3cm abscess  Patient was started on IV zosyn, flagyl and micafungin while there. Patient transferred today to [facility] for continuity of care under Dr. Walker. (21 May 2018 23:58)    Pt tolerating Lovenox at home, no bleeding, no SOB.         Allergies    No Known Allergies    Intolerances    	    MEDICATIONS:  enoxaparin Injectable 50 milliGRAM(s) SubCutaneous every 12 hours    metroNIDAZOLE  IVPB 500 milliGRAM(s) IV Intermittent every 8 hours  micafungin IVPB      micafungin IVPB 100 milliGRAM(s) IV Intermittent every 24 hours  piperacillin/tazobactam IVPB. 4.5 Gram(s) IV Intermittent every 8 hours      acetaminophen  IVPB. 1000 milliGRAM(s) IV Intermittent once  morphine  - Injectable 2 milliGRAM(s) IV Push every 4 hours PRN  morphine  - Injectable 4 milliGRAM(s) IV Push every 4 hours PRN    amylase/lipase/protease  (CREON  6,000 Units) 1 Capsule(s) Oral three times a day with meals  pantoprazole  Injectable 40 milliGRAM(s) IV Push daily    dextrose 40% Gel 15 Gram(s) Oral once PRN  dextrose 50% Injectable 12.5 Gram(s) IV Push once  dextrose 50% Injectable 25 Gram(s) IV Push once  dextrose 50% Injectable 25 Gram(s) IV Push once  glucagon  Injectable 1 milliGRAM(s) IntraMuscular once PRN  insulin glargine Injectable (LANTUS) 4 Unit(s) SubCutaneous every morning  insulin lispro (HumaLOG) corrective regimen sliding scale   SubCutaneous every 6 hours  megestrol Suspension 800 milliGRAM(s) Oral daily    dextrose 5% + sodium chloride 0.45% with potassium chloride 20 mEq/L 1000 milliLiter(s) IV Continuous <Continuous>  dextrose 5%. 1000 milliLiter(s) IV Continuous <Continuous>      PAST MEDICAL & SURGICAL HISTORY:  Premenstrual migraine  Other chronic pancreatitis  Status post pancreatic islet cell transplantation  H/O splenectomy  History of pancreatectomy  S/P cholecystectomy: in early 20&#x27;s      FAMILY HISTORY:  No pertinent family history in first degree relatives      SOCIAL HISTORY:  unchanged    REVIEW OF SYSTEMS:  CONSTITUTIONAL: No fever, weight loss, or fatigue  EYES: No eye pain, visual disturbances, or discharge  ENMT:  No difficulty hearing, tinnitus, vertigo; No sinus or throat pain  NECK: No pain or stiffness  RESPIRATORY: No cough, wheezing, chills or hemoptysis; No Shortness of Breath  CARDIOVASCULAR: No chest pain, palpitations, passing out, dizziness, or leg swelling  GASTROINTESTINAL: Abdominal pain, swelling.   GENITOURINARY: No dysuria, frequency, hematuria, or incontinence  NEUROLOGICAL: No headaches, memory loss, loss of strength, numbness, or tremors  SKIN: No itching, burning, rashes, or lesions   LYMPH Nodes: No enlarged glands  ENDOCRINE: No heat or cold intolerance; No hair loss  MUSCULOSKELETAL: No joint pain or swelling; No muscle, back, or extremity pain  PSYCHIATRIC: No depression, anxiety, mood swings, or difficulty sleeping  HEME/LYMPH: No easy bruising, or bleeding gums  ALLERY AND IMMUNOLOGIC: No hives or eczema	      PHYSICAL EXAM:  T(C): 37 (05-22-18 @ 09:18), Max: 37 (05-22-18 @ 09:18)  HR: 80 (05-22-18 @ 09:18) (73 - 88)  BP: 113/79 (05-22-18 @ 09:18) (109/71 - 127/80)  RR: 18 (05-22-18 @ 09:18) (16 - 18)  SpO2: 99% (05-22-18 @ 09:18) (97% - 100%)  Wt(kg): --  I&O's Summary    22 May 2018 07:01  -  22 May 2018 12:20  --------------------------------------------------------  IN: 0 mL / OUT: 0 mL / NET: 0 mL        Appearance: Normal	  HEENT:   Normal oral mucosa, PERRL, EOMI	  Lymphatic: No lymphadenopathy  Cardiovascular: Normal S1 S2, No JVD, No murmurs, No edema  Respiratory: Lungs clear to auscultation	  Psychiatry: A & O x 3, Mood & affect appropriate  Gastrointestinal:  Tense, erythematous area of swelling L side of abdomen  Skin: No rashes, No ecchymoses, No cyanosis	  Neurologic: Non-focal  Extremities: Normal range of motion, No clubbing, cyanosis or edema  Vascular: Peripheral pulses palpable 2+ bilaterally      LABS:	 	    CBC Full  -  ( 22 May 2018 08:01 )  WBC Count : 8.50 K/uL  Hemoglobin : 8.8 g/dL  Hematocrit : 27.9 %  Platelet Count - Automated : 488 K/uL  Mean Cell Volume : 91.2 fl  Mean Cell Hemoglobin : 28.8 pg  Mean Cell Hemoglobin Concentration : 31.5 gm/dL      05-22    139  |  103  |  <4<L>  ----------------------------<  195<H>  4.3   |  25  |  0.69  05-21    137  |  99  |  <4<L>  ----------------------------<  165<H>  4.0   |  25  |  0.73    Ca    9.0      22 May 2018 05:46  Ca    9.4      21 May 2018 21:35

## 2018-05-22 NOTE — CONSULT NOTE ADULT - ASSESSMENT
42F post op pancreatectomy with R atrial mass, likely thrombus with post op abscess    -Repeat TTE to assess RA thrombus today  -Cont lovenox 1mg/kg BID  -Follow up surgical plans, echo before holding AC  -Care as per surgery, ID  -Communicated with family, patient, and surgical team

## 2018-05-22 NOTE — CONSULT NOTE ADULT - ATTENDING COMMENTS
Will arrange for repeat echo today   Continue with full dose a/c for now with lovenox.  If needs to be held for procedure, can do so, however if planned for prolonged time off lovenox, then heparin gtt with another option    Thanks    Aric Monzon  [phone]

## 2018-05-22 NOTE — PROGRESS NOTE ADULT - SUBJECTIVE AND OBJECTIVE BOX
GENERAL SURGERY DAILY PROGRESS NOTE:     Subjective: Patient seen and examined. Patient stable with no overnight events. Patient denies CP/ SOB/ Palpatations. Patient denies N/V. Reports flatus and BM. Patient reports extreme pain in the abdominal region, and reports the abscess is worse everyday and is extremely tender to touch.     MEDICATIONS  (STANDING):  acetaminophen  IVPB. 1000 milliGRAM(s) IV Intermittent once  amylase/lipase/protease  (CREON  6,000 Units) 1 Capsule(s) Oral three times a day with meals  dextrose 5% + sodium chloride 0.45% with potassium chloride 20 mEq/L 1000 milliLiter(s) (100 mL/Hr) IV Continuous <Continuous>  dextrose 5%. 1000 milliLiter(s) (50 mL/Hr) IV Continuous <Continuous>  dextrose 50% Injectable 12.5 Gram(s) IV Push once  dextrose 50% Injectable 25 Gram(s) IV Push once  dextrose 50% Injectable 25 Gram(s) IV Push once  enoxaparin Injectable 40 milliGRAM(s) SubCutaneous two times a day  insulin glargine Injectable (LANTUS) 4 Unit(s) SubCutaneous every morning  insulin lispro (HumaLOG) corrective regimen sliding scale   SubCutaneous every 6 hours  megestrol Suspension 800 milliGRAM(s) Oral daily  metroNIDAZOLE  IVPB 500 milliGRAM(s) IV Intermittent every 8 hours  micafungin IVPB      micafungin IVPB 100 milliGRAM(s) IV Intermittent every 24 hours  pantoprazole  Injectable 40 milliGRAM(s) IV Push daily  piperacillin/tazobactam IVPB. 4.5 Gram(s) IV Intermittent every 8 hours    MEDICATIONS  (PRN):  dextrose 40% Gel 15 Gram(s) Oral once PRN Blood Glucose LESS THAN 70 milliGRAM(s)/deciliter  glucagon  Injectable 1 milliGRAM(s) IntraMuscular once PRN Glucose LESS THAN 70 milligrams/deciliter  morphine  - Injectable 2 milliGRAM(s) IV Push every 4 hours PRN Moderate Pain (4 - 6)  morphine  - Injectable 4 milliGRAM(s) IV Push every 4 hours PRN Severe Pain (7 - 10)    Vital Signs Last 24 Hrs:   T(C): 36.9 (22 May 2018 05:30), Max: 36.9 (21 May 2018 22:11)  T(F): 98.4 (22 May 2018 05:30), Max: 98.4 (21 May 2018 22:11)  HR: 73 (22 May 2018 05:30) (73 - 88)  BP: 109/71 (22 May 2018 05:30) (109/71 - 127/80)  RR: 18 (22 May 2018 05:30) (16 - 18)  SpO2: 97% (22 May 2018 05:30) (97% - 100%)    Daily Height in cm: 162.56 (21 May 2018 21:24)    Daily     LABS:                      8.2    8.0   )-----------( 479      ( 21 May 2018 21:35 )             25.3   05-22  139  |  103  |  <4<L>  ----------------------------<  195<H>  4.3   |  25  |  0.69  Ca    9.0      22 May 2018 05:46    Physical Exam:   Gen: NAD, AAOx3  Abd: soft, NT, ND   Erythema and flatulence on midline incisional area  Midline draining thick material, saturating dressing     Assessment:    42y Female who presents with Cutaneous abscess    Plan:  -DVT PPX and Full anticoagulation for atrial thrombus- T. Lovenox   -Pain control   -OOB as tolerated with assistance   -Diet as tolerated  -possible IR drainage for abscess   -IV antibiotics     Cristela Ambriz   #[id number] 05-22-18 @ 07:25

## 2018-05-23 PROCEDURE — 81003 URINALYSIS AUTO W/O SCOPE: CPT

## 2018-05-23 PROCEDURE — 88307 TISSUE EXAM BY PATHOLOGIST: CPT

## 2018-05-23 PROCEDURE — 87186 SC STD MICRODIL/AGAR DIL: CPT

## 2018-05-23 PROCEDURE — 49424 ASSESS CYST CONTRAST INJECT: CPT

## 2018-05-23 PROCEDURE — 83735 ASSAY OF MAGNESIUM: CPT

## 2018-05-23 PROCEDURE — 96374 THER/PROPH/DIAG INJ IV PUSH: CPT | Mod: XU

## 2018-05-23 PROCEDURE — 36569 INSJ PICC 5 YR+ W/O IMAGING: CPT

## 2018-05-23 PROCEDURE — 82962 GLUCOSE BLOOD TEST: CPT

## 2018-05-23 PROCEDURE — 87102 FUNGUS ISOLATION CULTURE: CPT

## 2018-05-23 PROCEDURE — 97161 PT EVAL LOW COMPLEX 20 MIN: CPT

## 2018-05-23 PROCEDURE — 86147 CARDIOLIPIN ANTIBODY EA IG: CPT

## 2018-05-23 PROCEDURE — 85610 PROTHROMBIN TIME: CPT

## 2018-05-23 PROCEDURE — 94640 AIRWAY INHALATION TREATMENT: CPT

## 2018-05-23 PROCEDURE — 86850 RBC ANTIBODY SCREEN: CPT

## 2018-05-23 PROCEDURE — 84295 ASSAY OF SERUM SODIUM: CPT

## 2018-05-23 PROCEDURE — 93306 TTE W/DOPPLER COMPLETE: CPT | Mod: 26

## 2018-05-23 PROCEDURE — 81025 URINE PREGNANCY TEST: CPT

## 2018-05-23 PROCEDURE — 87181 SC STD AGAR DILUTION PER AGT: CPT

## 2018-05-23 PROCEDURE — 87205 SMEAR GRAM STAIN: CPT

## 2018-05-23 PROCEDURE — 93975 VASCULAR STUDY: CPT

## 2018-05-23 PROCEDURE — 86900 BLOOD TYPING SEROLOGIC ABO: CPT

## 2018-05-23 PROCEDURE — 85384 FIBRINOGEN ACTIVITY: CPT

## 2018-05-23 PROCEDURE — 80053 COMPREHEN METABOLIC PANEL: CPT

## 2018-05-23 PROCEDURE — 77012 CT SCAN FOR NEEDLE BIOPSY: CPT

## 2018-05-23 PROCEDURE — 77001 FLUOROGUIDE FOR VEIN DEVICE: CPT | Mod: 26

## 2018-05-23 PROCEDURE — 82435 ASSAY OF BLOOD CHLORIDE: CPT

## 2018-05-23 PROCEDURE — 85027 COMPLETE CBC AUTOMATED: CPT

## 2018-05-23 PROCEDURE — 82977 ASSAY OF GGT: CPT

## 2018-05-23 PROCEDURE — 85014 HEMATOCRIT: CPT

## 2018-05-23 PROCEDURE — 87086 URINE CULTURE/COLONY COUNT: CPT

## 2018-05-23 PROCEDURE — 76080 X-RAY EXAM OF FISTULA: CPT

## 2018-05-23 PROCEDURE — 82947 ASSAY GLUCOSE BLOOD QUANT: CPT

## 2018-05-23 PROCEDURE — 93970 EXTREMITY STUDY: CPT

## 2018-05-23 PROCEDURE — C1729: CPT

## 2018-05-23 PROCEDURE — 84132 ASSAY OF SERUM POTASSIUM: CPT

## 2018-05-23 PROCEDURE — 84100 ASSAY OF PHOSPHORUS: CPT

## 2018-05-23 PROCEDURE — 74177 CT ABD & PELVIS W/CONTRAST: CPT

## 2018-05-23 PROCEDURE — 87075 CULTR BACTERIA EXCEPT BLOOD: CPT

## 2018-05-23 PROCEDURE — 10160 PNXR ASPIR ABSC HMTMA BULLA: CPT

## 2018-05-23 PROCEDURE — 80048 BASIC METABOLIC PNL TOTAL CA: CPT

## 2018-05-23 PROCEDURE — 88312 SPECIAL STAINS GROUP 1: CPT

## 2018-05-23 PROCEDURE — 86901 BLOOD TYPING SEROLOGIC RH(D): CPT

## 2018-05-23 PROCEDURE — 82565 ASSAY OF CREATININE: CPT

## 2018-05-23 PROCEDURE — P9047: CPT

## 2018-05-23 PROCEDURE — 76937 US GUIDE VASCULAR ACCESS: CPT | Mod: 26

## 2018-05-23 PROCEDURE — 10030 IMG GID FLU COLL DRG SFT TIS: CPT

## 2018-05-23 PROCEDURE — 85730 THROMBOPLASTIN TIME PARTIAL: CPT

## 2018-05-23 PROCEDURE — C1769: CPT

## 2018-05-23 PROCEDURE — 82803 BLOOD GASES ANY COMBINATION: CPT

## 2018-05-23 PROCEDURE — C1889: CPT

## 2018-05-23 PROCEDURE — 71045 X-RAY EXAM CHEST 1 VIEW: CPT

## 2018-05-23 PROCEDURE — 80076 HEPATIC FUNCTION PANEL: CPT

## 2018-05-23 PROCEDURE — C1894: CPT

## 2018-05-23 PROCEDURE — 88304 TISSUE EXAM BY PATHOLOGIST: CPT

## 2018-05-23 PROCEDURE — 99232 SBSQ HOSP IP/OBS MODERATE 35: CPT

## 2018-05-23 PROCEDURE — P9041: CPT

## 2018-05-23 PROCEDURE — 87070 CULTURE OTHR SPECIMN AEROBIC: CPT

## 2018-05-23 PROCEDURE — 49406 IMAGE CATH FLUID PERI/RETRO: CPT

## 2018-05-23 PROCEDURE — 81001 URINALYSIS AUTO W/SCOPE: CPT

## 2018-05-23 PROCEDURE — 86146 BETA-2 GLYCOPROTEIN ANTIBODY: CPT

## 2018-05-23 PROCEDURE — 82248 BILIRUBIN DIRECT: CPT

## 2018-05-23 PROCEDURE — 93005 ELECTROCARDIOGRAM TRACING: CPT

## 2018-05-23 PROCEDURE — 49465 FLUORO EXAM OF G/COLON TUBE: CPT

## 2018-05-23 PROCEDURE — 83605 ASSAY OF LACTIC ACID: CPT

## 2018-05-23 PROCEDURE — 36430 TRANSFUSION BLD/BLD COMPNT: CPT

## 2018-05-23 PROCEDURE — 99285 EMERGENCY DEPT VISIT HI MDM: CPT | Mod: 25

## 2018-05-23 PROCEDURE — 82330 ASSAY OF CALCIUM: CPT

## 2018-05-23 PROCEDURE — 88305 TISSUE EXAM BY PATHOLOGIST: CPT

## 2018-05-23 PROCEDURE — 87040 BLOOD CULTURE FOR BACTERIA: CPT

## 2018-05-23 PROCEDURE — 96375 TX/PRO/DX INJ NEW DRUG ADDON: CPT

## 2018-05-23 PROCEDURE — P9016: CPT

## 2018-05-23 PROCEDURE — 85613 RUSSELL VIPER VENOM DILUTED: CPT

## 2018-05-23 PROCEDURE — 86923 COMPATIBILITY TEST ELECTRIC: CPT

## 2018-05-23 PROCEDURE — 83036 HEMOGLOBIN GLYCOSYLATED A1C: CPT

## 2018-05-23 PROCEDURE — 80202 ASSAY OF VANCOMYCIN: CPT

## 2018-05-23 PROCEDURE — 10030 IMG GID FLU COLL DRG SFT TIS: CPT | Mod: 59

## 2018-05-23 PROCEDURE — C1751: CPT

## 2018-05-23 PROCEDURE — 93306 TTE W/DOPPLER COMPLETE: CPT

## 2018-05-23 RX ORDER — ERTAPENEM SODIUM 1 G/1
1000 INJECTION, POWDER, LYOPHILIZED, FOR SOLUTION INTRAMUSCULAR; INTRAVENOUS EVERY 24 HOURS
Qty: 0 | Refills: 0 | Status: DISCONTINUED | OUTPATIENT
Start: 2018-05-24 | End: 2018-05-25

## 2018-05-23 RX ORDER — ENOXAPARIN SODIUM 100 MG/ML
40 INJECTION SUBCUTANEOUS EVERY 12 HOURS
Qty: 0 | Refills: 0 | Status: DISCONTINUED | OUTPATIENT
Start: 2018-05-23 | End: 2018-05-25

## 2018-05-23 RX ORDER — LINEZOLID 600 MG/300ML
INJECTION, SOLUTION INTRAVENOUS
Qty: 0 | Refills: 0 | Status: DISCONTINUED | OUTPATIENT
Start: 2018-05-23 | End: 2018-05-24

## 2018-05-23 RX ORDER — ENOXAPARIN SODIUM 100 MG/ML
50 INJECTION SUBCUTANEOUS EVERY 12 HOURS
Qty: 0 | Refills: 0 | Status: DISCONTINUED | OUTPATIENT
Start: 2018-05-23 | End: 2018-05-23

## 2018-05-23 RX ORDER — ERTAPENEM SODIUM 1 G/1
INJECTION, POWDER, LYOPHILIZED, FOR SOLUTION INTRAMUSCULAR; INTRAVENOUS
Qty: 0 | Refills: 0 | Status: DISCONTINUED | OUTPATIENT
Start: 2018-05-23 | End: 2018-05-25

## 2018-05-23 RX ORDER — LINEZOLID 600 MG/300ML
600 INJECTION, SOLUTION INTRAVENOUS EVERY 12 HOURS
Qty: 0 | Refills: 0 | Status: DISCONTINUED | OUTPATIENT
Start: 2018-05-23 | End: 2018-05-24

## 2018-05-23 RX ORDER — INSULIN LISPRO 100/ML
VIAL (ML) SUBCUTANEOUS
Qty: 0 | Refills: 0 | Status: DISCONTINUED | OUTPATIENT
Start: 2018-05-23 | End: 2018-05-23

## 2018-05-23 RX ORDER — INSULIN LISPRO 100/ML
VIAL (ML) SUBCUTANEOUS
Qty: 0 | Refills: 0 | Status: DISCONTINUED | OUTPATIENT
Start: 2018-05-23 | End: 2018-05-25

## 2018-05-23 RX ORDER — SODIUM CHLORIDE 9 MG/ML
1000 INJECTION, SOLUTION INTRAVENOUS
Qty: 0 | Refills: 0 | Status: DISCONTINUED | OUTPATIENT
Start: 2018-05-23 | End: 2018-05-25

## 2018-05-23 RX ORDER — DEXTROSE 50 % IN WATER 50 %
15 SYRINGE (ML) INTRAVENOUS ONCE
Qty: 0 | Refills: 0 | Status: DISCONTINUED | OUTPATIENT
Start: 2018-05-23 | End: 2018-05-25

## 2018-05-23 RX ORDER — ERTAPENEM SODIUM 1 G/1
1000 INJECTION, POWDER, LYOPHILIZED, FOR SOLUTION INTRAMUSCULAR; INTRAVENOUS ONCE
Qty: 0 | Refills: 0 | Status: COMPLETED | OUTPATIENT
Start: 2018-05-23 | End: 2018-05-23

## 2018-05-23 RX ORDER — SODIUM CHLORIDE 9 MG/ML
1000 INJECTION, SOLUTION INTRAVENOUS
Qty: 0 | Refills: 0 | Status: DISCONTINUED | OUTPATIENT
Start: 2018-05-23 | End: 2018-05-23

## 2018-05-23 RX ORDER — LINEZOLID 600 MG/300ML
600 INJECTION, SOLUTION INTRAVENOUS ONCE
Qty: 0 | Refills: 0 | Status: COMPLETED | OUTPATIENT
Start: 2018-05-23 | End: 2018-05-23

## 2018-05-23 RX ORDER — GLUCAGON INJECTION, SOLUTION 0.5 MG/.1ML
1 INJECTION, SOLUTION SUBCUTANEOUS ONCE
Qty: 0 | Refills: 0 | Status: DISCONTINUED | OUTPATIENT
Start: 2018-05-23 | End: 2018-05-25

## 2018-05-23 RX ORDER — DEXTROSE 50 % IN WATER 50 %
12.5 SYRINGE (ML) INTRAVENOUS ONCE
Qty: 0 | Refills: 0 | Status: DISCONTINUED | OUTPATIENT
Start: 2018-05-23 | End: 2018-05-25

## 2018-05-23 RX ADMIN — MORPHINE SULFATE 2 MILLIGRAM(S): 50 CAPSULE, EXTENDED RELEASE ORAL at 07:36

## 2018-05-23 RX ADMIN — MORPHINE SULFATE 2 MILLIGRAM(S): 50 CAPSULE, EXTENDED RELEASE ORAL at 13:33

## 2018-05-23 RX ADMIN — MICAFUNGIN SODIUM 105 MILLIGRAM(S): 100 INJECTION, POWDER, LYOPHILIZED, FOR SOLUTION INTRAVENOUS at 21:23

## 2018-05-23 RX ADMIN — PANTOPRAZOLE SODIUM 40 MILLIGRAM(S): 20 TABLET, DELAYED RELEASE ORAL at 12:38

## 2018-05-23 RX ADMIN — Medication 5: at 07:29

## 2018-05-23 RX ADMIN — MORPHINE SULFATE 2 MILLIGRAM(S): 50 CAPSULE, EXTENDED RELEASE ORAL at 19:54

## 2018-05-23 RX ADMIN — LINEZOLID 300 MILLIGRAM(S): 600 INJECTION, SOLUTION INTRAVENOUS at 09:50

## 2018-05-23 RX ADMIN — ENOXAPARIN SODIUM 40 MILLIGRAM(S): 100 INJECTION SUBCUTANEOUS at 09:16

## 2018-05-23 RX ADMIN — MORPHINE SULFATE 2 MILLIGRAM(S): 50 CAPSULE, EXTENDED RELEASE ORAL at 07:50

## 2018-05-23 RX ADMIN — MORPHINE SULFATE 4 MILLIGRAM(S): 50 CAPSULE, EXTENDED RELEASE ORAL at 06:22

## 2018-05-23 RX ADMIN — ERTAPENEM SODIUM 120 MILLIGRAM(S): 1 INJECTION, POWDER, LYOPHILIZED, FOR SOLUTION INTRAMUSCULAR; INTRAVENOUS at 09:47

## 2018-05-23 RX ADMIN — MORPHINE SULFATE 4 MILLIGRAM(S): 50 CAPSULE, EXTENDED RELEASE ORAL at 19:30

## 2018-05-23 RX ADMIN — MORPHINE SULFATE 4 MILLIGRAM(S): 50 CAPSULE, EXTENDED RELEASE ORAL at 23:35

## 2018-05-23 RX ADMIN — MORPHINE SULFATE 2 MILLIGRAM(S): 50 CAPSULE, EXTENDED RELEASE ORAL at 20:24

## 2018-05-23 RX ADMIN — MORPHINE SULFATE 4 MILLIGRAM(S): 50 CAPSULE, EXTENDED RELEASE ORAL at 23:05

## 2018-05-23 RX ADMIN — Medication 8: at 15:16

## 2018-05-23 RX ADMIN — MORPHINE SULFATE 4 MILLIGRAM(S): 50 CAPSULE, EXTENDED RELEASE ORAL at 14:35

## 2018-05-23 RX ADMIN — MORPHINE SULFATE 4 MILLIGRAM(S): 50 CAPSULE, EXTENDED RELEASE ORAL at 14:10

## 2018-05-23 RX ADMIN — Medication 1 CAPSULE(S): at 19:48

## 2018-05-23 RX ADMIN — ENOXAPARIN SODIUM 40 MILLIGRAM(S): 100 INJECTION SUBCUTANEOUS at 21:22

## 2018-05-23 RX ADMIN — Medication 4: at 19:48

## 2018-05-23 RX ADMIN — MORPHINE SULFATE 4 MILLIGRAM(S): 50 CAPSULE, EXTENDED RELEASE ORAL at 10:15

## 2018-05-23 RX ADMIN — MORPHINE SULFATE 4 MILLIGRAM(S): 50 CAPSULE, EXTENDED RELEASE ORAL at 01:52

## 2018-05-23 RX ADMIN — MORPHINE SULFATE 4 MILLIGRAM(S): 50 CAPSULE, EXTENDED RELEASE ORAL at 09:56

## 2018-05-23 RX ADMIN — MORPHINE SULFATE 4 MILLIGRAM(S): 50 CAPSULE, EXTENDED RELEASE ORAL at 05:52

## 2018-05-23 RX ADMIN — MORPHINE SULFATE 4 MILLIGRAM(S): 50 CAPSULE, EXTENDED RELEASE ORAL at 02:22

## 2018-05-23 RX ADMIN — Medication 6: at 12:38

## 2018-05-23 RX ADMIN — LINEZOLID 300 MILLIGRAM(S): 600 INJECTION, SOLUTION INTRAVENOUS at 19:09

## 2018-05-23 RX ADMIN — MORPHINE SULFATE 4 MILLIGRAM(S): 50 CAPSULE, EXTENDED RELEASE ORAL at 19:00

## 2018-05-23 RX ADMIN — PIPERACILLIN AND TAZOBACTAM 25 GRAM(S): 4; .5 INJECTION, POWDER, LYOPHILIZED, FOR SOLUTION INTRAVENOUS at 07:40

## 2018-05-23 RX ADMIN — MORPHINE SULFATE 2 MILLIGRAM(S): 50 CAPSULE, EXTENDED RELEASE ORAL at 12:25

## 2018-05-23 NOTE — PROGRESS NOTE ADULT - SUBJECTIVE AND OBJECTIVE BOX
Interventional Radiology Brief- Operative Note    Procedure: Left arm midline catheter placement. Drainage of left abdominal wall abscess. Insertion of drainage catheter into mid-anterior abdominal wound site.    Operators: [facility]ter[facility]    Anesthesia (type): MAC. 2% lidocaine local.    Contrast: none    EBL: none    Findings/Follow up Plan of Care:  Successful insertion of a left brachial vein 4 Fr single lumen 15 cm midline catheter. Tip in left axillary vein. Ok to use.  Drainage of a left abdominal wall collection performed. 10 Fr drain placed. Appx 15cc purulent appearing fluid drained. Drain placed to DANIEL.  10 Fr Williamson Mendes drain placed into superficial cavity at anterior abdominal surgical wound site. Catheter may be used for irrigation.  Pt tolerated procedure without difficulty. Findings discussed with Dr. Walker following the procedure. Full report to follow.    Specimens Removed: sample of fluid collected for microbiology    Implants: as above    Complications: none    Condition/Disposition: stable / pacu    Please call Interventional Radiology x [phone] with any questions, concerns, or issues.

## 2018-05-23 NOTE — PROGRESS NOTE ADULT - SUBJECTIVE AND OBJECTIVE BOX
PAGER:  [phone]               [facility] [id number]              [email or web address]   OFFICE [phone]                              ********VASCULAR MEDICINE & CARDIOLOGY PROGRESS NOTE********                            CC:  R atrial thrombus    INTERVAL HISTORY: Plan for IR drainage today        HISTORY OF PRESENT ILLNESS:  HPI:  Patient is a 41 yo female with past medical history of chronic pancreatitis s/p distal pancreatectomy, cholecystectomy, splenectomy and russel-en-y pancreaticojejuonostomy (02/2014) at [facility] with Dr. Arita), and now S/P total pancreatectomy  with islet cell autotransplant at [facility] by Dr. Walker in 04/2018. Patient's recovery was complicated by abdominal collections first on POD 6 which was managed with IR drain and IV antibiotic. Patient was discharged with PICC line for long term antifungal. Patient was readmitted on 05/8 again for abscess which was drained by IR. Patient was also started on therapeutic Lovenox for right atrial thrombus. Patient again developed abdominal pain with fever of 100.7 on 05/20 associated with thick yellow discharge from the ventral incisional and went to [place] which was nearby for care. CT scan at [place] showed complex multiloculated postsurgical abscess in the omental fat of the superior epigastrium measuring 6.7 cm and enterocutaneous fistulous tract extending anteriorly from the abscess to the midline incision site and another tract going into the soft tissues of the left upper abdominal quadrant communicating with a subcutaneous 3.3cm abscess  Patient was started on IV zosyn, flagyl and micafungin while there. Patient transferred today to [facility] for continuity of care under Dr. Walker. (21 May 2018 23:58)          Allergies    No Known Allergies    Intolerances    	    MEDICATIONS:  enoxaparin Injectable 40 milliGRAM(s) SubCutaneous every 12 hours    ertapenem  IVPB      linezolid  IVPB 600 milliGRAM(s) IV Intermittent every 12 hours  linezolid  IVPB      micafungin IVPB      micafungin IVPB 100 milliGRAM(s) IV Intermittent every 24 hours      acetaminophen  IVPB. 1000 milliGRAM(s) IV Intermittent once  morphine  - Injectable 2 milliGRAM(s) IV Push every 4 hours PRN  morphine  - Injectable 4 milliGRAM(s) IV Push every 4 hours PRN    amylase/lipase/protease  (CREON  6,000 Units) 1 Capsule(s) Oral three times a day with meals  pantoprazole  Injectable 40 milliGRAM(s) IV Push daily    dextrose 40% Gel 15 Gram(s) Oral once PRN  dextrose 50% Injectable 12.5 Gram(s) IV Push once  dextrose 50% Injectable 25 Gram(s) IV Push once  dextrose 50% Injectable 25 Gram(s) IV Push once  glucagon  Injectable 1 milliGRAM(s) IntraMuscular once PRN  insulin glargine Injectable (LANTUS) 4 Unit(s) SubCutaneous every morning  insulin lispro (HumaLOG) corrective regimen sliding scale   SubCutaneous every 6 hours  megestrol Suspension 800 milliGRAM(s) Oral daily    dextrose 5% + sodium chloride 0.45% with potassium chloride 20 mEq/L 1000 milliLiter(s) IV Continuous <Continuous>  dextrose 5%. 1000 milliLiter(s) IV Continuous <Continuous>      PAST MEDICAL & SURGICAL HISTORY:  Premenstrual migraine  Other chronic pancreatitis  Status post pancreatic islet cell transplantation  H/O splenectomy  History of pancreatectomy  S/P cholecystectomy: in early 20&#x27;s      FAMILY HISTORY:  No pertinent family history in first degree relatives      SOCIAL HISTORY:  unchanged    REVIEW OF SYSTEMS:  CONSTITUTIONAL: No fever, weight loss, or fatigue  EYES: No eye pain, visual disturbances, or discharge  ENMT:  No difficulty hearing, tinnitus, vertigo; No sinus or throat pain  NECK: No pain or stiffness  RESPIRATORY: No cough, wheezing, chills or hemoptysis; No Shortness of Breath  CARDIOVASCULAR: No chest pain, palpitations, passing out, dizziness, or leg swelling  GASTROINTESTINAL: +abdominal pain and increased swelling at abscess site  GENITOURINARY: No dysuria, frequency, hematuria, or incontinence  NEUROLOGICAL: No headaches, memory loss, loss of strength, numbness, or tremors  SKIN: No itching, burning, rashes, or lesions   LYMPH Nodes: No enlarged glands  ENDOCRINE: No heat or cold intolerance; No hair loss  MUSCULOSKELETAL: No joint pain or swelling; No muscle, back, or extremity pain  PSYCHIATRIC: No depression, anxiety, mood swings, or difficulty sleeping  HEME/LYMPH: No easy bruising, or bleeding gums  ALLERY AND IMMUNOLOGIC: No hives or eczema	    PHYSICAL EXAM:  T(C): 37.2 (05-23-18 @ 10:07), Max: 37.3 (05-22-18 @ 17:29)  HR: 87 (05-23-18 @ 10:07) (82 - 89)  BP: 127/68 (05-23-18 @ 10:07) (108/62 - 140/81)  RR: 18 (05-23-18 @ 10:07) (18 - 18)  SpO2: 99% (05-23-18 @ 10:07) (97% - 99%)  Wt(kg): --  I&O's Summary    22 May 2018 07:01  -  23 May 2018 07:00  --------------------------------------------------------  IN: 2700 mL / OUT: 1100 mL / NET: 1600 mL    23 May 2018 07:01  -  23 May 2018 12:37  --------------------------------------------------------  IN: 0 mL / OUT: 0 mL / NET: 0 mL        Appearance: Normal	  HEENT:   Normal oral mucosa, PERRL, EOMI	  Lymphatic: No lymphadenopathy  Cardiovascular: Normal S1 S2, No JVD, No murmurs, No edema  Respiratory: Lungs clear to auscultation	  Psychiatry: A & O x 3, Mood & affect appropriate  Gastrointestinal:  Tender area, raised and erythematous, about 4-5cm L side of abdomen  Skin: No rashes, No ecchymoses, No cyanosis	  Neurologic: Non-focal  Extremities: Normal range of motion, No clubbing, cyanosis or edema  Vascular: Peripheral pulses palpable 2+ bilaterally      LABS:	 	    CBC Full  -  ( 22 May 2018 08:01 )  WBC Count : 8.50 K/uL  Hemoglobin : 8.8 g/dL  Hematocrit : 27.9 %  Platelet Count - Automated : 488 K/uL  Mean Cell Volume : 91.2 fl  Mean Cell Hemoglobin : 28.8 pg  Mean Cell Hemoglobin Concentration : 31.5 gm/dL      05-22    139  |  103  |  <4<L>  ----------------------------<  195<H>  4.3   |  25  |  0.69  05-21    137  |  99  |  <4<L>  ----------------------------<  165<H>  4.0   |  25  |  0.73    Ca    9.0      22 May 2018 05:46  Ca    9.4      21 May 2018 21:35

## 2018-05-23 NOTE — PROGRESS NOTE ADULT - ATTENDING COMMENTS
I was unable to see the patient today - she was not in her room.  Please resume anticoagulation for RA thrombus when able to    Thanks      Na [phone]

## 2018-05-23 NOTE — PROGRESS NOTE ADULT - SUBJECTIVE AND OBJECTIVE BOX
43 yo female with past medical history of chronic pancreatitis s/p distal pancreatectomy, cholecystectomy, splenectomy and russel-en-y pancreaticojejunostomy (02/2014) at [facility] with Dr. Arita,  and now S/P total pancreatectomy  with islet cell autotransplant at [facility] by Dr. Walker in 04/2018 complicated by abdominal fluid collections s/p drainage on 4/13 then on 5/9. 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 Per pt, the drain was removed in Dr. Walker's office. Pt now transferred from [facility] with abdominal fluid collection found on CT scan. Pt presented to IR for drainage of abdominal fluid collection and midline catheter placement for venous access. Pt also with Atrial thrombus last dose of  lovenox 40mg  at 9:00Am.       NPO past midnight.     Allergies: No Known Allergies      PAST MEDICAL & SURGICAL HISTORY:  Premenstrual migraine  Other chronic pancreatitis  Status post pancreatic islet cell transplantation  H/O splenectomy  History of pancreatectomy  S/P cholecystectomy: in early 20&#x27;s        Pertinent labs:                      8.8    8.50  )-----------( 488      ( 22 May 2018 08:01 )             27.9   05-22    139  |  103  |  <4<L>  ----------------------------<  195<H>  4.3   |  25  |  0.69    Ca    9.0      22 May 2018 05:46    PT/INR - ( 22 May 2018 07:48 )   PT: 11.5 sec;   INR: 1.02 ratio         PTT - ( 22 May 2018 07:48 )  PTT:34.8 sec    Consent: Procedure/risks/ Benefits explained. Informed consent obtained. Pt verbalizes understanding.

## 2018-05-23 NOTE — PROGRESS NOTE ADULT - SUBJECTIVE AND OBJECTIVE BOX
GENERAL SURGERY DAILY PROGRESS NOTE:     Subjective: Patient seen and examined. Patient stable with no overnight events. Patient denies CP/ SOB/ Palpatations. Patient denies N/V.     MEDICATIONS  (STANDING):  acetaminophen  IVPB. 1000 milliGRAM(s) IV Intermittent once  amylase/lipase/protease  (CREON  6,000 Units) 1 Capsule(s) Oral three times a day with meals  dextrose 5% + sodium chloride 0.45% with potassium chloride 20 mEq/L 1000 milliLiter(s) (100 mL/Hr) IV Continuous <Continuous>  dextrose 5%. 1000 milliLiter(s) (50 mL/Hr) IV Continuous <Continuous>  dextrose 50% Injectable 12.5 Gram(s) IV Push once  dextrose 50% Injectable 25 Gram(s) IV Push once  dextrose 50% Injectable 25 Gram(s) IV Push once  enoxaparin Injectable 40 milliGRAM(s) SubCutaneous every 12 hours  insulin glargine Injectable (LANTUS) 4 Unit(s) SubCutaneous every morning  insulin lispro (HumaLOG) corrective regimen sliding scale   SubCutaneous every 6 hours  megestrol Suspension 800 milliGRAM(s) Oral daily  micafungin IVPB      micafungin IVPB 100 milliGRAM(s) IV Intermittent every 24 hours  pantoprazole  Injectable 40 milliGRAM(s) IV Push daily  piperacillin/tazobactam IVPB. 4.5 Gram(s) IV Intermittent every 8 hours    MEDICATIONS  (PRN):  dextrose 40% Gel 15 Gram(s) Oral once PRN Blood Glucose LESS THAN 70 milliGRAM(s)/deciliter  glucagon  Injectable 1 milliGRAM(s) IntraMuscular once PRN Glucose LESS THAN 70 milligrams/deciliter  morphine  - Injectable 2 milliGRAM(s) IV Push every 4 hours PRN Moderate Pain (4 - 6)  morphine  - Injectable 4 milliGRAM(s) IV Push every 4 hours PRN Severe Pain (7 - 10)    Vital Signs Last 24 Hrs:   T(C): 36.8 (23 May 2018 06:45), Max: 37.3 (22 May 2018 17:29)  T(F): 98.3 (23 May 2018 06:45), Max: 99.1 (22 May 2018 17:29)  HR: 82 (23 May 2018 06:45) (80 - 89)  BP: 131/85 (23 May 2018 06:45) (108/62 - 140/81)  RR: 18 (23 May 2018 06:45) (18 - 18)  SpO2: 98% (23 May 2018 06:45) (97% - 99%)    I&O's Detail:   22 May 2018 07:01  -  23 May 2018 07:00  --------------------------------------------------------  IN:    dextrose 5% + sodium chloride 0.45% with potassium chloride 20 mEq/L: 2300 mL    IV PiggyBack: 400 mL  Total IN: 2700 mL    OUT:    Voided: 1100 mL  Total OUT: 1100 mL  Total NET: 1600 mL    LABS:                        8.8    8.50  )-----------( 488      ( 22 May 2018 08:01 )             27.9     05-22    139  |  103  |  <4<L>  ----------------------------<  195<H>  4.3   |  25  |  0.69    Ca    9.0      22 May 2018 05:46    PT/INR - ( 22 May 2018 07:48 )   PT: 11.5 sec;   INR: 1.02 ratio    PTT - ( 22 May 2018 07:48 )  PTT:34.8 sec    Physical Exam:  Gen: NAD, AAOx3  Abd: soft, NT, ND     Assessment:    42y Female who presents with Cutaneous abscess    Plan:  -DVT PPX  -Pain control   -OOB as tolerated with assistance   -Diet as tolerated  -IR for drainage today   -T.Lovenox for anticoagulation       Cristela Ambriz   #[id number] 05-23-18 @ 07:57

## 2018-05-23 NOTE — PROGRESS NOTE ADULT - SUBJECTIVE AND OBJECTIVE BOX
INFECTIOUS DISEASES FOLLOW UP--Madan Lane MD  Pager [phone]    This is a follow up note for this  42y Female with  postoperative abscesses.  Events noted.  Result of CT from [place] noted.  No fevers but sig abd pain and a new area of abd wall induration present.  + sig drainage.    Further ROS:  CONSTITUTIONAL:  No fever  CARDIOVASCULAR:  No chest pain or palpitations  RESPIRATORY:  No dyspnea  GASTROINTESTINAL:  abd pain  GENITOURINARY:  No dysuria  NEUROLOGIC:  No headache,     Allergies  No Known Allergies    ANTIBIOTICS/RELEVANT:  antimicrobials  micafungin IVPB      micafungin IVPB 100 milliGRAM(s) IV Intermittent every 24 hours  piperacillin/tazobactam IVPB. 4.5 Gram(s) IV Intermittent every 8 hours    OTHER:  acetaminophen  IVPB. 1000 milliGRAM(s) IV Intermittent once  amylase/lipase/protease  (CREON  6,000 Units) 1 Capsule(s) Oral three times a day with meals  dextrose 40% Gel 15 Gram(s) Oral once PRN  dextrose 5% + sodium chloride 0.45% with potassium chloride 20 mEq/L 1000 milliLiter(s) IV Continuous <Continuous>  dextrose 5%. 1000 milliLiter(s) IV Continuous <Continuous>  dextrose 50% Injectable 12.5 Gram(s) IV Push once  dextrose 50% Injectable 25 Gram(s) IV Push once  dextrose 50% Injectable 25 Gram(s) IV Push once  enoxaparin Injectable 40 milliGRAM(s) SubCutaneous every 12 hours  glucagon  Injectable 1 milliGRAM(s) IntraMuscular once PRN  insulin glargine Injectable (LANTUS) 4 Unit(s) SubCutaneous every morning  insulin lispro (HumaLOG) corrective regimen sliding scale   SubCutaneous every 6 hours  megestrol Suspension 800 milliGRAM(s) Oral daily  morphine  - Injectable 2 milliGRAM(s) IV Push every 4 hours PRN  morphine  - Injectable 4 milliGRAM(s) IV Push every 4 hours PRN  pantoprazole  Injectable 40 milliGRAM(s) IV Push daily    Objective:  Vital Signs Last 24 Hrs  T(C): 36.8 (23 May 2018 06:45), Max: 37.3 (22 May 2018 17:29)  T(F): 98.3 (23 May 2018 06:45), Max: 99.1 (22 May 2018 17:29)  HR: 82 (23 May 2018 06:45) (80 - 89)  BP: 131/85 (23 May 2018 06:45) (108/62 - 140/81)  BP(mean): --  RR: 18 (23 May 2018 06:45) (18 - 18)  SpO2: 98% (23 May 2018 06:45) (97% - 99%)    PHYSICAL EXAM:  frail  Ear/Nose/Throat: no oral lesions, 	  Respiratory: clear BL  Cardiovascular: S1S2  Gastrointestinal:soft, tender to touch  Extremities:no e/e/c  No Lymphadenopathy  IV sites not inflammed.    LABS:                        8.8    8.50  )-----------( 488      ( 22 May 2018 08:01 )             27.9     05-22    139  |  103  |  <4<L>  ----------------------------<  195<H>  4.3   |  25  |  0.69    Ca    9.0      22 May 2018 05:46      PT/INR - ( 22 May 2018 07:48 )   PT: 11.5 sec;   INR: 1.02 ratio         PTT - ( 22 May 2018 07:48 )  PTT:34.8 sec    Imp: abd abscess  question if any deeper nidus or if any fistulous connection.    Rx:  to d/w surg team  zyvox and invanz for now  antifungal role equivocal but can continue the mycamine for now.

## 2018-05-24 ENCOUNTER — TRANSCRIPTION ENCOUNTER (OUTPATIENT)
Age: 43
End: 2018-05-24

## 2018-05-24 LAB
GRAM STN FLD: SIGNIFICANT CHANGE UP
HCT VFR BLD CALC: 29.3 % — LOW (ref 34.5–45)
HGB BLD-MCNC: 9.2 G/DL — LOW (ref 11.5–15.5)
MAGNESIUM SERPL-MCNC: 1.4 MG/DL — LOW (ref 1.6–2.6)
MCHC RBC-ENTMCNC: 28.7 PG — SIGNIFICANT CHANGE UP (ref 27–34)
MCHC RBC-ENTMCNC: 31.4 GM/DL — LOW (ref 32–36)
MCV RBC AUTO: 91.3 FL — SIGNIFICANT CHANGE UP (ref 80–100)
PHOSPHATE SERPL-MCNC: 3.6 MG/DL — SIGNIFICANT CHANGE UP (ref 2.5–4.5)
PLATELET # BLD AUTO: 664 K/UL — HIGH (ref 150–400)
RBC # BLD: 3.21 M/UL — LOW (ref 3.8–5.2)
RBC # FLD: 18.2 % — HIGH (ref 10.3–14.5)
SPECIMEN SOURCE: SIGNIFICANT CHANGE UP
WBC # BLD: 12.26 K/UL — HIGH (ref 3.8–10.5)
WBC # FLD AUTO: 12.26 K/UL — HIGH (ref 3.8–10.5)

## 2018-05-24 PROCEDURE — 99232 SBSQ HOSP IP/OBS MODERATE 35: CPT

## 2018-05-24 RX ORDER — INSULIN LISPRO 100/ML
VIAL (ML) SUBCUTANEOUS AT BEDTIME
Qty: 0 | Refills: 0 | Status: DISCONTINUED | OUTPATIENT
Start: 2018-05-24 | End: 2018-05-24

## 2018-05-24 RX ORDER — OXYCODONE HYDROCHLORIDE 5 MG/1
1 TABLET ORAL
Qty: 90 | Refills: 0 | OUTPATIENT
Start: 2018-05-24 | End: 2018-06-22

## 2018-05-24 RX ORDER — MICAFUNGIN SODIUM 100 MG/1
1 INJECTION, POWDER, LYOPHILIZED, FOR SOLUTION INTRAVENOUS
Qty: 0 | Refills: 0 | COMMUNITY
Start: 2018-05-24 | End: 2018-06-22

## 2018-05-24 RX ORDER — OXYCODONE HYDROCHLORIDE 5 MG/1
10 TABLET ORAL
Qty: 0 | Refills: 0 | Status: DISCONTINUED | OUTPATIENT
Start: 2018-05-24 | End: 2018-05-25

## 2018-05-24 RX ORDER — INSULIN LISPRO 100/ML
6 VIAL (ML) SUBCUTANEOUS ONCE
Qty: 0 | Refills: 0 | Status: COMPLETED | OUTPATIENT
Start: 2018-05-24 | End: 2018-05-24

## 2018-05-24 RX ORDER — OXYCODONE HYDROCHLORIDE 5 MG/1
1 TABLET ORAL
Qty: 180 | Refills: 0 | OUTPATIENT
Start: 2018-05-24 | End: 2018-06-22

## 2018-05-24 RX ORDER — INSULIN GLARGINE 100 [IU]/ML
8 INJECTION, SOLUTION SUBCUTANEOUS EVERY MORNING
Qty: 0 | Refills: 0 | Status: DISCONTINUED | OUTPATIENT
Start: 2018-05-24 | End: 2018-05-25

## 2018-05-24 RX ORDER — INSULIN LISPRO 100/ML
4 VIAL (ML) SUBCUTANEOUS ONCE
Qty: 0 | Refills: 0 | Status: COMPLETED | OUTPATIENT
Start: 2018-05-24 | End: 2018-05-24

## 2018-05-24 RX ORDER — INSULIN LISPRO 100/ML
VIAL (ML) SUBCUTANEOUS AT BEDTIME
Qty: 0 | Refills: 0 | Status: DISCONTINUED | OUTPATIENT
Start: 2018-05-24 | End: 2018-05-25

## 2018-05-24 RX ORDER — OXYCODONE HYDROCHLORIDE 5 MG/1
1 TABLET ORAL
Qty: 0 | Refills: 0 | COMMUNITY
Start: 2018-05-24

## 2018-05-24 RX ORDER — OXYCODONE HYDROCHLORIDE 5 MG/1
30 TABLET ORAL THREE TIMES A DAY
Qty: 0 | Refills: 0 | Status: DISCONTINUED | OUTPATIENT
Start: 2018-05-24 | End: 2018-05-25

## 2018-05-24 RX ORDER — ERTAPENEM SODIUM 1 G/1
1 INJECTION, POWDER, LYOPHILIZED, FOR SOLUTION INTRAMUSCULAR; INTRAVENOUS
Qty: 0 | Refills: 0 | COMMUNITY
Start: 2018-05-24 | End: 2018-06-22

## 2018-05-24 RX ORDER — MICAFUNGIN SODIUM 100 MG/1
100 INJECTION, POWDER, LYOPHILIZED, FOR SOLUTION INTRAVENOUS
Qty: 0 | Refills: 0 | COMMUNITY
Start: 2018-05-24 | End: 2018-06-22

## 2018-05-24 RX ORDER — MAGNESIUM SULFATE 500 MG/ML
2 VIAL (ML) INJECTION
Qty: 0 | Refills: 0 | Status: COMPLETED | OUTPATIENT
Start: 2018-05-24 | End: 2018-05-24

## 2018-05-24 RX ADMIN — Medication 6: at 18:05

## 2018-05-24 RX ADMIN — Medication 10: at 14:13

## 2018-05-24 RX ADMIN — PANTOPRAZOLE SODIUM 40 MILLIGRAM(S): 20 TABLET, DELAYED RELEASE ORAL at 13:04

## 2018-05-24 RX ADMIN — OXYCODONE HYDROCHLORIDE 10 MILLIGRAM(S): 5 TABLET ORAL at 18:51

## 2018-05-24 RX ADMIN — LINEZOLID 300 MILLIGRAM(S): 600 INJECTION, SOLUTION INTRAVENOUS at 08:06

## 2018-05-24 RX ADMIN — MICAFUNGIN SODIUM 105 MILLIGRAM(S): 100 INJECTION, POWDER, LYOPHILIZED, FOR SOLUTION INTRAVENOUS at 22:03

## 2018-05-24 RX ADMIN — OXYCODONE HYDROCHLORIDE 30 MILLIGRAM(S): 5 TABLET ORAL at 22:30

## 2018-05-24 RX ADMIN — OXYCODONE HYDROCHLORIDE 30 MILLIGRAM(S): 5 TABLET ORAL at 14:29

## 2018-05-24 RX ADMIN — Medication 6 UNIT(S): at 03:42

## 2018-05-24 RX ADMIN — OXYCODONE HYDROCHLORIDE 10 MILLIGRAM(S): 5 TABLET ORAL at 20:20

## 2018-05-24 RX ADMIN — Medication: at 00:17

## 2018-05-24 RX ADMIN — MORPHINE SULFATE 4 MILLIGRAM(S): 50 CAPSULE, EXTENDED RELEASE ORAL at 04:20

## 2018-05-24 RX ADMIN — Medication 3: at 23:06

## 2018-05-24 RX ADMIN — ENOXAPARIN SODIUM 40 MILLIGRAM(S): 100 INJECTION SUBCUTANEOUS at 22:03

## 2018-05-24 RX ADMIN — ENOXAPARIN SODIUM 40 MILLIGRAM(S): 100 INJECTION SUBCUTANEOUS at 08:06

## 2018-05-24 RX ADMIN — MORPHINE SULFATE 2 MILLIGRAM(S): 50 CAPSULE, EXTENDED RELEASE ORAL at 01:33

## 2018-05-24 RX ADMIN — OXYCODONE HYDROCHLORIDE 30 MILLIGRAM(S): 5 TABLET ORAL at 22:04

## 2018-05-24 RX ADMIN — MORPHINE SULFATE 2 MILLIGRAM(S): 50 CAPSULE, EXTENDED RELEASE ORAL at 02:03

## 2018-05-24 RX ADMIN — Medication 1 CAPSULE(S): at 18:05

## 2018-05-24 RX ADMIN — OXYCODONE HYDROCHLORIDE 10 MILLIGRAM(S): 5 TABLET ORAL at 20:23

## 2018-05-24 RX ADMIN — MORPHINE SULFATE 2 MILLIGRAM(S): 50 CAPSULE, EXTENDED RELEASE ORAL at 10:08

## 2018-05-24 RX ADMIN — Medication 50 GRAM(S): at 10:26

## 2018-05-24 RX ADMIN — MORPHINE SULFATE 4 MILLIGRAM(S): 50 CAPSULE, EXTENDED RELEASE ORAL at 08:03

## 2018-05-24 RX ADMIN — INSULIN GLARGINE 4 UNIT(S): 100 INJECTION, SOLUTION SUBCUTANEOUS at 08:23

## 2018-05-24 RX ADMIN — MORPHINE SULFATE 4 MILLIGRAM(S): 50 CAPSULE, EXTENDED RELEASE ORAL at 03:50

## 2018-05-24 RX ADMIN — OXYCODONE HYDROCHLORIDE 10 MILLIGRAM(S): 5 TABLET ORAL at 13:33

## 2018-05-24 RX ADMIN — MORPHINE SULFATE 4 MILLIGRAM(S): 50 CAPSULE, EXTENDED RELEASE ORAL at 08:18

## 2018-05-24 RX ADMIN — Medication 50 GRAM(S): at 09:17

## 2018-05-24 RX ADMIN — OXYCODONE HYDROCHLORIDE 10 MILLIGRAM(S): 5 TABLET ORAL at 14:29

## 2018-05-24 RX ADMIN — MORPHINE SULFATE 2 MILLIGRAM(S): 50 CAPSULE, EXTENDED RELEASE ORAL at 10:25

## 2018-05-24 RX ADMIN — OXYCODONE HYDROCHLORIDE 10 MILLIGRAM(S): 5 TABLET ORAL at 17:17

## 2018-05-24 RX ADMIN — OXYCODONE HYDROCHLORIDE 30 MILLIGRAM(S): 5 TABLET ORAL at 13:33

## 2018-05-24 RX ADMIN — Medication 1 CAPSULE(S): at 14:13

## 2018-05-24 RX ADMIN — Medication 1 CAPSULE(S): at 08:24

## 2018-05-24 RX ADMIN — Medication 4 UNIT(S): at 01:53

## 2018-05-24 RX ADMIN — ERTAPENEM SODIUM 120 MILLIGRAM(S): 1 INJECTION, POWDER, LYOPHILIZED, FOR SOLUTION INTRAMUSCULAR; INTRAVENOUS at 09:17

## 2018-05-24 NOTE — DISCHARGE NOTE ADULT - INSTRUCTIONS
Low carb diet Patient advised to call MD for follow up appointments. Self administer IVSS ABX at home via midline catheter

## 2018-05-24 NOTE — DISCHARGE NOTE ADULT - SECONDARY DIAGNOSIS.
History of pancreatectomy Status post pancreatic islet cell transplantation Atrial thrombus without antecedent myocardial infarction

## 2018-05-24 NOTE — DISCHARGE NOTE ADULT - PROVIDER TOKENS
ASHLEE:'[id number]' TOKEN:'[id number]',TOKEN:'[id number]',TOKEN:'[id number]' TOKEN:'[id number]',TOKEN:'[id number]',TOKEN:'[id number]',TOKEN:'[id number]'

## 2018-05-24 NOTE — DISCHARGE NOTE ADULT - CARE PROVIDER_API CALL
Juan Walker), Surgery  [address] [place] [address]  Phone: [phone]  Fax: [phone] Juan Walker), Surgery  [address] [place] [address]  Phone: [phone]  Fax: [phone]    Madan Lane), Infectious Disease; Internal Medicine  [address] [place] [address]  Phone: [phone]  Fax: [phone]    Yobani Nash), Diagnostic Radiology; [facility]  [address] [place] [address]  Phone: [phone]  Fax: [phone] Juan Walker), Surgery  [address] [place] [address]  Phone: [phone]  Fax: [phone]    Madan Lane), Infectious Disease; Internal Medicine  [address] [place] [address]  Phone: [phone]  Fax: [phone]    Yobani Nash), Diagnostic Radiology; [facility]  [address] [place] [address]  Phone: [phone]  Fax: [phone]    Aric Monzon (DO), Internal Medicine; Nuclear Cardiology  [address] [place] [address]  Phone: [phone]  Fax: [phone]

## 2018-05-24 NOTE — CHART NOTE - NSCHARTNOTEFT_GEN_A_CORE
Post Operative Check    Patient is s/p IR drain and midline placement.  Denies chest pain, dyspnea, nausea, vomiting. Complaining of abdominal pain by drain site    Vitals    T(C): 37.2 (05-23-18 @ 23:53), Max: 37.2 (05-23-18 @ 10:07)  HR: 82 (05-23-18 @ 23:53) (72 - 88)  BP: 125/65 (05-23-18 @ 23:53) (114/78 - 143/75)  RR: 18 (05-23-18 @ 23:53) (18 - 18)  SpO2: 95% (05-23-18 @ 23:53) (95% - 100%)      05-22 @ 07:01  -  05-23 @ 07:00  --------------------------------------------------------  IN:    dextrose 5% + sodium chloride 0.45% with potassium chloride 20 mEq/L: 2300 mL    IV PiggyBack: 400 mL  Total IN: 2700 mL    OUT:    Voided: 1100 mL  Total OUT: 1100 mL    Total NET: 1600 mL      05-23 @ 07:01  -  05-24 @ 00:38  --------------------------------------------------------  IN:    IV PiggyBack: 400 mL  Total IN: 400 mL    OUT:    Voided: 300 mL  Total OUT: 300 mL    Total NET: 100 mL          Labs                        8.8    8.50  )-----------( 488      ( 22 May 2018 08:01 )             27.9       CBC Full  -  ( 22 May 2018 08:01 )  WBC Count : 8.50 K/uL  Hemoglobin : 8.8 g/dL  Hematocrit : 27.9 %  Platelet Count - Automated : 488 K/uL  Mean Cell Volume : 91.2 fl  Mean Cell Hemoglobin : 28.8 pg  Mean Cell Hemoglobin Concentration : 31.5 gm/dL  Auto Neutrophil # : x  Auto Lymphocyte # : x  Auto Monocyte # : x  Auto Eosinophil # : x  Auto Basophil # : x  Auto Neutrophil % : x  Auto Lymphocyte % : x  Auto Monocyte % : x  Auto Eosinophil % : x  Auto Basophil % : x      Physical Exam  General: Sitting up in chair, NAD  CV: RRR  Pulm: clear, no increased work of breathing  Abdomen: soft, mildly distended, TTP, 2 drains with minimal output  Extremities: warm and well perfused. Grossly intact.      Patient is a 42y old Female s/p above procedure    - Pain control  - Monitor drain output  - F/u AM labs

## 2018-05-24 NOTE — DISCHARGE NOTE ADULT - ADDITIONAL INSTRUCTIONS
Flush Left drain ONLY with 5 cc of normal saline once daily.   Change dressing around drain site daily. Keep dressing clean and dry.   Keep drains to suction at all times. Empty and record outputs daily.

## 2018-05-24 NOTE — PROGRESS NOTE ADULT - SUBJECTIVE AND OBJECTIVE BOX
INFECTIOUS DISEASES FOLLOW UP--Madan Lane MD  Pager [phone]    This is a follow up note for this  42y Female with  splenectomy, distal pancreatectomy, autotransplant of islet cells.  abd collections.  IR drainage again yesterday.  I d/w Dr. Walker--not concerned with fistula to bowel being present.  discussed a more prolonged course of abx.    Further ROS:  CONSTITUTIONAL:  No fever,   CARDIOVASCULAR:  No chest pain or palpitations  RESPIRATORY:  No dyspnea  GENITOURINARY:  No dysuria  NEUROLOGIC:  No headache,     Allergies  No Known Allergies    ANTIBIOTICS/RELEVANT:  antimicrobials  ertapenem  IVPB 1000 milliGRAM(s) IV Intermittent every 24 hours  ertapenem  IVPB      linezolid  IVPB 600 milliGRAM(s) IV Intermittent every 12 hours  linezolid  IVPB      micafungin IVPB      micafungin IVPB 100 milliGRAM(s) IV Intermittent every 24 hours    OTHER:  acetaminophen  IVPB. 1000 milliGRAM(s) IV Intermittent once  amylase/lipase/protease  (CREON  6,000 Units) 1 Capsule(s) Oral three times a day with meals  dextrose 40% Gel 15 Gram(s) Oral once PRN  dextrose 5% + sodium chloride 0.45% with potassium chloride 20 mEq/L 1000 milliLiter(s) IV Continuous <Continuous>  dextrose 5%. 1000 milliLiter(s) IV Continuous <Continuous>  dextrose 50% Injectable 12.5 Gram(s) IV Push once  enoxaparin Injectable 40 milliGRAM(s) SubCutaneous every 12 hours  glucagon  Injectable 1 milliGRAM(s) IntraMuscular once PRN  insulin glargine Injectable (LANTUS) 4 Unit(s) SubCutaneous every morning  insulin lispro (HumaLOG) corrective regimen sliding scale   SubCutaneous three times a day before meals  insulin lispro (HumaLOG) corrective regimen sliding scale   SubCutaneous at bedtime  megestrol Suspension 800 milliGRAM(s) Oral daily  morphine  - Injectable 2 milliGRAM(s) IV Push every 4 hours PRN  morphine  - Injectable 4 milliGRAM(s) IV Push every 4 hours PRN  pantoprazole  Injectable 40 milliGRAM(s) IV Push daily    Objective:  Vital Signs Last 24 Hrs  T(C): 37.2 (24 May 2018 10:09), Max: 37.2 (23 May 2018 14:23)  T(F): 98.9 (24 May 2018 10:09), Max: 99 (23 May 2018 14:23)  HR: 80 (24 May 2018 10:09) (72 - 88)  BP: 126/75 (24 May 2018 10:09) (114/78 - 143/75)  BP(mean): --  RR: 18 (24 May 2018 10:09) (18 - 18)  SpO2: 99% (24 May 2018 10:09) (95% - 100%)    PHYSICAL EXAM:  Constitutional:no acute distress---but uncomfortable  Eyes:MALOU, EOMI  Ear/Nose/Throat: no oral lesions, 	  Respiratory: clear BL  Cardiovascular: S1S2  Gastrointestinal:soft, (+) BS, no tenderness  Extremities:no e/e/c  No Lymphadenopathy  IV sites not inflammed.    LABS:                     9.2    12.26 )-----------( 664      ( 24 May 2018 09:47 )             29.3     Phos  3.6     05-24  Mg     1.4     05-24    MICROBIOLOGY:  fluid--WBCs and yeast.    Imp: source control still is most important.  ? if any mesh in place in abd?    Rx:  in terms of antimicrobials moving forward let's plan for invanz and mycamine thru June 22nd.  weekly CMP, CBC  Let's dc the zyvox.

## 2018-05-24 NOTE — DISCHARGE NOTE ADULT - PLAN OF CARE
recover from abscess 1. Continue IV Invanz and IV Micafungin until June 22, 2018.  2. A script is provided for weekly blood draws (CBC, CMP) x 4 weeks. 1. Continue IV Invanz and IV Micafungin until June 22, 2018.  2. A script is provided for weekly blood draws (CBC, CMP) x 4 weeks.  3. You will be discharged with 2 drains. You will need to empty them and record outputs accurately. This will be taught to you by the nursing staff. Please do not remove the drains. They will be removed in the office. Please bring to the office accurate records of output.   4. Follow up with Dr. Walker in 2 weeks (on a Tuesday). Please call to make an appointment. recover from abscesses 1. Continue IV Invanz and IV Micafungin until June 22, 2018.  2. A script is provided for weekly blood draws (CBC, CMP) x 4 weeks.  3. You will be discharged with 2 drains. You will need to empty them and record outputs accurately. This will be taught to you by the nursing staff. Please do not remove the drains. They will be removed in the office. Please bring to the office accurate records of output.   4. Wound care: 4 x 4 with tegaderms over incisions.  5. Follow up with Dr. Walker in 2 weeks (on a Tuesday). Please call to make an appointment.  6. Follow up with Dr. Nash within 1-2 weeks. Please call to make an appointment. Right atrial thrombus 1. Follow  up with Cardiology within 1-2 weeks regarding outpatient echo.  2. Continue anticoagulation Lovenox. 1. Continue IV Invanz and IV Micafungin until June 22, 2018.  2. A script is provided for weekly blood draws (CBC, CMP) x 4 weeks.  3. You will be discharged with 2 drains. You will need to empty them and record outputs accurately. This will be taught to you by the nursing staff. Please do not remove the drains. They will be removed in the office. Please bring to the office accurate records of output.   4. Wound care: 4 x 4 with tegaderms over incisions.  5. Follow up with Dr. Walker in 2 weeks (on a Tuesday). Please call to make an appointment.  6. Follow up with Dr. Nash within 1-2 weeks. Please call to make an appointment.  7. Follow up with Dr. Lane. Please call to make an appointment.

## 2018-05-24 NOTE — PROGRESS NOTE ADULT - SUBJECTIVE AND OBJECTIVE BOX
Surgery Blue Team Progress Note    STATUS POST:      POST OPERATIVE DAY #    SUBJECTIVE:       OBJECTIVE:   T(C): 36.7 (05-24-18 @ 05:18), Max: 37.2 (05-23-18 @ 10:07)  HR: 82 (05-24-18 @ 05:18) (72 - 88)  BP: 130/81 (05-24-18 @ 05:18) (114/78 - 143/75)  RR: 18 (05-24-18 @ 05:18) (18 - 18)  SpO2: 98% (05-24-18 @ 05:18) (95% - 100%)  Wt(kg): --  CAPILLARY BLOOD GLUCOSE      POCT Blood Glucose.: 123 mg/dL (24 May 2018 05:16)  POCT Blood Glucose.: 308 mg/dL (24 May 2018 03:10)  POCT Blood Glucose.: 435 mg/dL (24 May 2018 01:21)  POCT Blood Glucose.: 421 mg/dL (23 May 2018 23:46)  POCT Blood Glucose.: 224 mg/dL (23 May 2018 19:39)  POCT Blood Glucose.: 208 mg/dL (23 May 2018 17:51)  POCT Blood Glucose.: 334 mg/dL (23 May 2018 15:15)  POCT Blood Glucose.: 432 mg/dL (23 May 2018 12:34)  POCT Blood Glucose.: 356 mg/dL (23 May 2018 07:23)    I&O's Detail    23 May 2018 07:01  -  24 May 2018 07:00  --------------------------------------------------------  IN:    dextrose 5% + sodium chloride 0.45% with potassium chloride 20 mEq/L: 1200 mL    IV PiggyBack: 400 mL  Total IN: 1600 mL    OUT:    Voided: 300 mL  Total OUT: 300 mL    Total NET: 1300 mL          Physical exam:  General: Surgery Blue Team Progress Note    Presented with intraabdominal abscess with fistula tracking s/p IR drainage, HD4    SUBJECTIVE:   Patient was seen and examined this morning. There was no acute event overnight. She is resting comfortably in bed. Pain is well controlled. She had IR drainage of abscess yesterday. BG was elevated. She received a total of 14 untits of Humalog and it is better control nowShe does not report pain, fever, n/v, chest pain, or shortness of breath.     OBJECTIVE:   T(C): 36.7 (05-24-18 @ 05:18), Max: 37.2 (05-23-18 @ 10:07)  HR: 82 (05-24-18 @ 05:18) (72 - 88)  BP: 130/81 (05-24-18 @ 05:18) (114/78 - 143/75)  RR: 18 (05-24-18 @ 05:18) (18 - 18)  SpO2: 98% (05-24-18 @ 05:18) (95% - 100%)  Wt(kg): --  CAPILLARY BLOOD GLUCOSE      POCT Blood Glucose.: 123 mg/dL (24 May 2018 05:16)  POCT Blood Glucose.: 308 mg/dL (24 May 2018 03:10)  POCT Blood Glucose.: 435 mg/dL (24 May 2018 01:21)  POCT Blood Glucose.: 421 mg/dL (23 May 2018 23:46)  POCT Blood Glucose.: 224 mg/dL (23 May 2018 19:39)  POCT Blood Glucose.: 208 mg/dL (23 May 2018 17:51)  POCT Blood Glucose.: 334 mg/dL (23 May 2018 15:15)  POCT Blood Glucose.: 432 mg/dL (23 May 2018 12:34)  POCT Blood Glucose.: 356 mg/dL (23 May 2018 07:23)    I&O's Detail    23 May 2018 07:01  -  24 May 2018 07:00  --------------------------------------------------------  IN:    dextrose 5% + sodium chloride 0.45% with potassium chloride 20 mEq/L: 1200 mL    IV PiggyBack: 400 mL  Total IN: 1600 mL    OUT:    Voided: 300 mL  Total OUT: 300 mL    Total NET: 1300 mL        Physical Exam  General: Sitting up in chair, NAD  CV: RRR  Pulm: clear, no increased work of breathing  Abdomen: soft, mildly distended, TTP, 2 drains with minimal output  Extremities: warm and well perfused. Grossly intact.

## 2018-05-24 NOTE — DISCHARGE NOTE ADULT - OTHER SIGNIFICANT FINDINGS
< from: Transthoracic Echocardiogram (05.23.18 @ 00:45) >  Conclusions:  Technically limited study.  1. Left ventricular ejection fraction, by visual  estimation, is 55-60%.  No wall motion abnormality.  2. Normal right atrium. Large, globular, mobile, smooth  surface, homogenous, echo density measuring 2.35cm x 1.12cm  attached to right atrial wall. Possibly arising from SVC.  Most likely right atrial thrombus. Other differential is a  right atrial myxoma. May repeat study with definity or do  cardiac CT scan with contrast to confirm finding.  3. Normal right ventricular size and function.    < end of copied text >    < from: IR Procedure (05.23.18 @ 17:11) >  EXAM:  IR PROCEDURE NON PICC                                PROCEDURE DATE:  05/23/2018          INTERPRETATION:  Examination: Midline insertion.    Clinical Information:  42-year-old female requiring long-term IV access   for multiple medications and right atrial thrombus.    : Dr. Nash.    Access: Left brachial vein.    Complications: None.    Technique: The procedure including risks and benefits was discussed with   the patient. Informed written consent was obtained and placed in the   patients chart.    After the left arm was prepped and draped in the usual sterile fashion,   local anesthesia was administered with 1% lidocaine solution. Ultrasound   demonstrated patency of the left brachial vein. Ultrasound images are   archived. Using sonographic guidance, the left brachial vein was accessed   with 21 gauge micropuncture needle and a guidewire advanced into the   vein. A coaxial peel-away sheath was exchanged for the puncture needle.    A 4 Peruvian 50 cm single -lumen catheter was placed with the tip in the   axillary vein confirmed with a fluoroscopic image. The catheter was   secured to the skin and a sterile dressing placed. The patient tolerated   the procedure well .    Impression: Successful placement of a left arm 4 Peruvian 15 cm single   lumen mid line catheter with tip in the axillary vein.        < end of copied text >

## 2018-05-24 NOTE — DISCHARGE NOTE ADULT - CARE PLAN
Principal Discharge DX:	Intraabdominal fluid collection  Goal:	recover from abscess  Assessment and plan of treatment:	1. Continue IV Invanz and IV Micafungin until June 22, 2018.  2. A script is provided for weekly blood draws (CBC, CMP) x 4 weeks.  Secondary Diagnosis:	History of pancreatectomy  Secondary Diagnosis:	Status post pancreatic islet cell transplantation Principal Discharge DX:	Intraabdominal fluid collection  Goal:	recover from abscess  Assessment and plan of treatment:	1. Continue IV Invanz and IV Micafungin until June 22, 2018.  2. A script is provided for weekly blood draws (CBC, CMP) x 4 weeks.  3. You will be discharged with 2 drains. You will need to empty them and record outputs accurately. This will be taught to you by the nursing staff. Please do not remove the drains. They will be removed in the office. Please bring to the office accurate records of output.   4. Follow up with Dr. Walker in 2 weeks (on a Tuesday). Please call to make an appointment.  Secondary Diagnosis:	History of pancreatectomy  Secondary Diagnosis:	Status post pancreatic islet cell transplantation Principal Discharge DX:	Intraabdominal fluid collection  Goal:	recover from abscesses  Assessment and plan of treatment:	1. Continue IV Invanz and IV Micafungin until June 22, 2018.  2. A script is provided for weekly blood draws (CBC, CMP) x 4 weeks.  3. You will be discharged with 2 drains. You will need to empty them and record outputs accurately. This will be taught to you by the nursing staff. Please do not remove the drains. They will be removed in the office. Please bring to the office accurate records of output.   4. Wound care: 4 x 4 with tegaderms over incisions.  5. Follow up with Dr. Walker in 2 weeks (on a Tuesday). Please call to make an appointment.  6. Follow up with Dr. Nash within 1-2 weeks. Please call to make an appointment.  Secondary Diagnosis:	History of pancreatectomy  Secondary Diagnosis:	Status post pancreatic islet cell transplantation  Secondary Diagnosis:	Atrial thrombus without antecedent myocardial infarction  Goal:	Right atrial thrombus  Assessment and plan of treatment:	1. Follow  up with Cardiology within 1-2 weeks regarding outpatient echo.  2. Continue anticoagulation Lovenox. Principal Discharge DX:	Intraabdominal fluid collection  Goal:	recover from abscesses  Assessment and plan of treatment:	1. Continue IV Invanz and IV Micafungin until June 22, 2018.  2. A script is provided for weekly blood draws (CBC, CMP) x 4 weeks.  3. You will be discharged with 2 drains. You will need to empty them and record outputs accurately. This will be taught to you by the nursing staff. Please do not remove the drains. They will be removed in the office. Please bring to the office accurate records of output.   4. Wound care: 4 x 4 with tegaderms over incisions.  5. Follow up with Dr. Walker in 2 weeks (on a Tuesday). Please call to make an appointment.  6. Follow up with Dr. Nash within 1-2 weeks. Please call to make an appointment.  7. Follow up with Dr. Lane. Please call to make an appointment.  Secondary Diagnosis:	History of pancreatectomy  Secondary Diagnosis:	Status post pancreatic islet cell transplantation  Secondary Diagnosis:	Atrial thrombus without antecedent myocardial infarction  Goal:	Right atrial thrombus  Assessment and plan of treatment:	1. Follow  up with Cardiology within 1-2 weeks regarding outpatient echo.  2. Continue anticoagulation Lovenox.

## 2018-05-24 NOTE — DISCHARGE NOTE ADULT - MEDICATION SUMMARY - MEDICATIONS TO STOP TAKING
I will STOP taking the medications listed below when I get home from the hospital:    linezolid 600 mg oral tablet  -- 1 tab(s) by mouth every 12 hours    fluconazole 200 mg oral tablet  -- 1 tab(s) by mouth once a day

## 2018-05-24 NOTE — DISCHARGE NOTE ADULT - PATIENT PORTAL LINK FT
You can access the [facility], offered by [facility], by registering with the following website: [email or web address]

## 2018-05-24 NOTE — PROVIDER CONTACT NOTE (OTHER) - ACTION/TREATMENT ORDERED:
Patient's fingerstick 432. Sliding scale given as ordered. Pt has no s/s of acute distress.
MD aware, bedtime sliding scale ordered, 4 units humalog insulin administered and recheck blood sugar in 1 hour

## 2018-05-24 NOTE — DISCHARGE NOTE ADULT - NS AS ACTIVITY OBS
Do not make important decisions/Do not drive or operate machinery/No Heavy lifting/straining/while taking pain medications/Showering allowed

## 2018-05-24 NOTE — DISCHARGE NOTE ADULT - HOME CARE AGENCY
Your case has been referred to [facility] for IV infusion and [facility] for management of drains. A nurse will call you to schedule a visit the day after discharge. If you have any questions you may call [facility] at  or [facility] at .

## 2018-05-24 NOTE — DISCHARGE NOTE ADULT - HOSPITAL COURSE
Patient is a 41 yo female with past medical history of chronic pancreatitis s/p distal pancreatectomy, cholecystectomy, splenectomy and russel-en-y pancreaticojejuonostomy (02/2014) at [facility] with Dr. Arita), and now S/P total pancreatectomy  with islet cell autotransplant at [facility] by Dr. Walker in 04/2018. Patient's recovery was complicated by abdominal collections first on POD 6 which was managed with IR drain and IV antibiotic. Patient was discharged with PICC line for long term antifungal. Patient was readmitted on 05/8 again for abscess which was drained by IR. Patient was also started on therapeutic Lovenox for right atrial thrombus. Patient again developed abdominal pain with fever of 100.7 on 05/20 associated with thick yellow discharge from the ventral incisional and went to [place] which was nearby for care. CT scan at [place] showed complex multiloculated postsurgical abscess in the omental fat of the superior epigastrium measuring 6.7 cm and enterocutaneous fistulous tract extending anteriorly from the abscess to the midline incision site and another tract going into the soft tissues of the left upper abdominal quadrant communicating with a subcutaneous 3.3cm abscess  Patient was started on IV zosyn, flagyl and micafungin while there. Patient transferred today to [facility] for continuity of care under Dr. Walker. Pt continued on iv abx zosyn, flagyl, micafungin, T. lovenox for R atrial thrombus, NPOaMN for IR consult for drainage.  Cards consult for R atrial thrombus, repeat echo..  ID for abx rec zyvox and invanz for now, and micafungin.  On HD3, Pt had uncontrolled blood sugars into 400 range, SS ordered, later that night, pt again in 400s, SS, 4 , 4, 6 units humalog until controlled.  Pt underwent Left arm midline catheter placement. Drainage of left abdominal wall abscess. Insertion of drainage catheter into mid-anterior abdominal wound site.  Pt tolerated procedure well and sent to pacu.  HD4, midline IR drain flushed.  As per ID, pt to go home with iv invanz and po zyvox for ______________days.      At the time of discharge, the patient was hemodynamically stable, was tolerating PO diet, was voiding urine and passing stool, was ambulating, and was comfortable with adequate pain control. The patient was instructed to follow up with Dr. Walker within 1-2 weeks after discharge from the hospital. The patient felt comfortable with discharge. The patient was discharged to home. The patient had no other issues. Patient is a 43 yo female with past medical history of chronic pancreatitis s/p distal pancreatectomy, cholecystectomy, splenectomy and russel-en-y pancreaticojejuonostomy (02/2014) at [facility] with Dr. Arita), and now S/P total pancreatectomy  with islet cell autotransplant at [facility] by Dr. Walker in 04/2018. Patient's recovery was complicated by abdominal collections first on POD 6 which was managed with IR drain and IV antibiotic. Patient was discharged with PICC line for long term antifungal. Patient was readmitted on 05/8 again for abscess which was drained by IR. Patient was also started on therapeutic Lovenox for right atrial thrombus. Patient again developed abdominal pain with fever of 100.7 on 05/20 associated with thick yellow discharge from the ventral incisional and went to [place] which was nearby for care. CT scan at [place] showed complex multiloculated postsurgical abscess in the omental fat of the superior epigastrium measuring 6.7 cm and enterocutaneous fistulous tract extending anteriorly from the abscess to the midline incision site and another tract going into the soft tissues of the left upper abdominal quadrant communicating with a subcutaneous 3.3cm abscess  Patient was started on IV zosyn, flagyl and micafungin while there. Patient transferred today to [facility] for continuity of care under Dr. Walker. Pt continued on iv abx zosyn, flagyl, micafungin, T. lovenox for R atrial thrombus, NPOaMN for IR consult for drainage.  Cards consult for R atrial thrombus, repeat echo..  ID for abx rec zyvox and invanz for now, and micafungin.  On HD3, Pt had uncontrolled blood sugars into 400 range, SS ordered, later that night, pt again in 400s, SS, 4 , 4, 6 units humalog until controlled.  Pt underwent Left arm midline catheter placement. Drainage of left abdominal wall abscess. Insertion of drainage catheter into mid-anterior abdominal wound site.  Pt tolerated procedure well and sent to pacu.  HD4, midline IR drain flushed.  As per ID, pt to go home with iv invanz and iv micafungin for 4 weeks with weekly CBC, CMP labs.     At the time of discharge, the patient was hemodynamically stable, was tolerating PO diet, was voiding urine and passing stool, was ambulating, and was comfortable with adequate pain control. The patient was instructed to follow up with Dr. Walker within 1-2 weeks after discharge from the hospital. The patient felt comfortable with discharge. The patient was discharged to home. The patient had no other issues.

## 2018-05-24 NOTE — DISCHARGE NOTE ADULT - CARE PROVIDERS DIRECT ADDRESSES
,[email or web address]

## 2018-05-24 NOTE — PROGRESS NOTE ADULT - SUBJECTIVE AND OBJECTIVE BOX
Interventional Radiology Follow- Up Note       This is a 41 y/o female with PMH of chronic pancreatitis s/p distal pancreatectomy, cholecystectomy, splenectomy and russel-en-y pancreaticojejunostomy (02/2014) and now S/P total pancreatectomy with islet cell autotransplant on 04/2018 complicated by abdominal fluid collections s/p drainage on 4/13 then on 5/9. On 5/14 drain evaluation demonstrated collapsed cavity with fistula to midline incision and bowel, drain was removed at bedside as per progress note from 5/14. Pt found to have left intraabdominal abscess for which she under went placement of drainage catheters (midline and left lateral) in IR on 5/23 with Dr. Nash.     Pt seen and examined at bedside. Pain is well controlled.     PAST MEDICAL & SURGICAL HISTORY:  Premenstrual migraine  Other chronic pancreatitis  Status post pancreatic islet cell transplantation  H/O splenectomy  History of pancreatectomy  S/P cholecystectomy: in early 20&#x27;s      Allergies: No Known Allergies      LABS:                        9.2    12.26 )-----------( 664      ( 24 May 2018 09:47 )             29.3       Phos  3.6     05-24  Mg     1.4     05-24      Abdominal abscess cultures: few yeast like cells on oil field, polymorphonuclear leukocytes    Vitals: T(F): 98.9 (05-24-18 @ 10:09), Max: 99 (05-23-18 @ 14:23)  HR: 80 (05-24-18 @ 10:09) (72 - 88)  BP: 126/75 (05-24-18 @ 10:09) (114/78 - 143/75)  RR: 18 (05-24-18 @ 10:09) (18 - 18)  SpO2: 99% (05-24-18 @ 10:09) (95% - 100%)    PHYSICAL EXAM:  Gen: NAD, A&Ox3  Abd: ND, soft, minimally TTP, midline drain intact to DANIEL with minimal output ~15cc of purulent output. Midline dressing saturated, removed cleaned with site chloroprep and drain flushed with 2cc NS leakage at site noted. Left lateral drain intact to DANIEL with dressing c/d/i. ~24cc of serosanguinous output noted in DANIEL. No documented output in sunrise.       Impression: 41 y/o female with h/o abdominal abscess most recently s/p drainage catheter placements left lateral and midline in IR on 5/23.  -F/U cultures  -continue to monitor output    -flush drain per doctor orders  -trend vitals, labs  -will discuss with IR attending     Please call IR at extension [phone] with any questions, concerns, or issues regarding above. Interventional Radiology Follow- Up Note       This is a 43 y/o female with PMH of chronic pancreatitis s/p distal pancreatectomy, cholecystectomy, splenectomy and russel-en-y pancreaticojejunostomy (02/2014) and now S/P total pancreatectomy with islet cell autotransplant on 04/2018 complicated by abdominal fluid collections s/p drainage on 4/13 then on 5/9. On 5/14 drain evaluation demonstrated collapsed cavity with fistula to midline incision and bowel, drain was removed at bedside as per progress note from 5/14. Pt found to have left intraabdominal abscess for which she under went placement of drainage catheters (midline and left lateral) in IR on 5/23 with Dr. Nash.     Pt seen and examined at bedside. Pain is well controlled.     PAST MEDICAL & SURGICAL HISTORY:  Premenstrual migraine  Other chronic pancreatitis  Status post pancreatic islet cell transplantation  H/O splenectomy  History of pancreatectomy  S/P cholecystectomy: in early 20&#x27;s      Allergies: No Known Allergies      LABS:                        9.2    12.26 )-----------( 664      ( 24 May 2018 09:47 )             29.3       Phos  3.6     05-24  Mg     1.4     05-24      Abdominal abscess cultures: few yeast like cells on oil field, polymorphonuclear leukocytes    Vitals: T(F): 98.9 (05-24-18 @ 10:09), Max: 99 (05-23-18 @ 14:23)  HR: 80 (05-24-18 @ 10:09) (72 - 88)  BP: 126/75 (05-24-18 @ 10:09) (114/78 - 143/75)  RR: 18 (05-24-18 @ 10:09) (18 - 18)  SpO2: 99% (05-24-18 @ 10:09) (95% - 100%)    PHYSICAL EXAM:  Gen: NAD, A&Ox3  Abd: ND, soft, minimally TTP, midline drain intact to DANIEL with minimal output ~15cc of purulent output. Midline dressing saturated, removed cleaned with site chloroprep and drain flushed with 2cc NS leakage at site noted. Left lateral drain intact to DANIEL with dressing c/d/i. ~24cc of serosanguinous output noted in DANIEL. No documented output in sunrise.       Impression: 43 y/o female with h/o abdominal abscess most recently s/p drainage catheter placements left lateral and midline in IR on 5/23.  -F/U cultures  -Above d/w Dr. Nash, recommend not flushing midline drain. Continue flushing left lateral drain as per doctor orders.   -Change midline dressing as needed  -continue to monitor output    -trend vitals, labs     Please call IR at extension [phone] with any questions, concerns, or issues regarding above.

## 2018-05-24 NOTE — DISCHARGE NOTE ADULT - MEDICATION SUMMARY - MEDICATIONS TO TAKE
I will START or STAY ON the medications listed below when I get home from the hospital:    lenard insulin pen needles  -- 1 unit(s) subcutaneous 4 times a day   -- Indication: For Status post pancreatic islet cell transplantation    oxyCODONE 10 mg oral tablet  -- 1 tab(s) by mouth every 3 hours, As needed, Moderate Pain (4 - 6)  -- Indication: For pain medication    oxyCODONE 30 mg oral tablet, extended release  -- 1 tab(s) by mouth 3 times a day  -- Indication: For pain medication    Lovenox 60 mg/0.6 mL injectable solution  -- 50 milligram(s) subcutaneously 2 times a day   -- It is very important that you take or use this exactly as directed.  Do not skip doses or discontinue unless directed by your doctor.    -- Indication: For Status post pancreatic islet cell transplantation    NovoLOG FlexPen  -- 7 unit(s) subcutaneous 3 times a day patient will use 1:15 Carb Ratio  -- Indication: For Status post pancreatic islet cell transplantation    insulin glargine  -- 8 unit(s) subcutaneous once a day in the AM  -- Indication: For Status post pancreatic islet cell transplantation    ondansetron 8 mg oral tablet, disintegrating  -- 1 tab(s) by mouth every 8 hours  -- Indication: For nausea    micafungin  -- 100 milligram(s) intravenously every 24 hours  -- Indication: For Intraabdominal fluid collection    megestrol 40 mg/mL oral suspension  -- 20 milliliter(s) by mouth once a day  -- Indication: For Status post pancreatic islet cell transplantation    ertapenem 1 g injection  -- 1 gram(s) intravenously every 24 hours  -- Indication: For Intraabdominal fluid collection    pancrelipase 6000 units-19,000 units-30,000 units oral delayed release capsule  -- 1 cap(s) by mouth 3 times a day (with meals)  -- Indication: For Status post pancreatic islet cell transplantation    docusate sodium 10 mg/mL oral liquid  -- 10 milliliter(s) by mouth 2 times a day  -- Indication: For Constipation    bisacodyl 5 mg oral delayed release tablet  -- 2 tab(s) by mouth once a day (at bedtime), As needed, Constipation  -- Indication: For Constipation    Robaxin-750 oral tablet  -- three times a day   -- Indication: For muscle relaxant I will START or STAY ON the medications listed below when I get home from the hospital:    lenard insulin pen needles  -- 1 unit(s) subcutaneous 4 times a day   -- Indication: For Status post pancreatic islet cell transplantation    oxyCODONE 30 mg oral tablet, extended release  -- 1 tab(s) by mouth 3 times a day MDD:3  -- Indication: For pain medication    oxyCODONE 10 mg oral tablet  -- 1 tab(s) by mouth every 3 hours x 30 days, As Needed -Moderate Pain (4 - 6) MDD:6  -- Indication: For pain medication    Lovenox 60 mg/0.6 mL injectable solution  -- 50 milligram(s) subcutaneously 2 times a day   -- It is very important that you take or use this exactly as directed.  Do not skip doses or discontinue unless directed by your doctor.    -- Indication: For anticoagulation for right atrial thrombus     NovoLOG FlexPen  -- 7 unit(s) subcutaneous 3 times a day patient will use 1:15 Carb Ratio  -- Indication: For Status post pancreatic islet cell transplantation    insulin glargine  -- 8 unit(s) subcutaneous once a day in the AM  -- Indication: For Status post pancreatic islet cell transplantation    ondansetron 8 mg oral tablet, disintegrating  -- 1 tab(s) by mouth every 8 hours  -- Indication: For nausea    micafungin  -- 100 milligram(s) intravenously every 24 hours  -- Indication: For Intraabdominal fluid collection    megestrol 40 mg/mL oral suspension  -- 20 milliliter(s) by mouth once a day  -- Indication: For Status post pancreatic islet cell transplantation    ertapenem 1 g injection  -- 1 gram(s) intravenously every 24 hours  -- Indication: For Intraabdominal fluid collection    pancrelipase 6000 units-19,000 units-30,000 units oral delayed release capsule  -- 1 cap(s) by mouth 3 times a day (with meals)  -- Indication: For Status post pancreatic islet cell transplantation    docusate sodium 10 mg/mL oral liquid  -- 10 milliliter(s) by mouth 2 times a day  -- Indication: For Constipation    bisacodyl 5 mg oral delayed release tablet  -- 2 tab(s) by mouth once a day (at bedtime), As needed, Constipation  -- Indication: For Constipation    Robaxin-750 oral tablet  -- three times a day   -- Indication: For muscle relaxant I will START or STAY ON the medications listed below when I get home from the hospital:    lenard insulin pen needles  -- 1 unit(s) subcutaneous 4 times a day   -- Indication: For Status post pancreatic islet cell transplantation    oxyCODONE 30 mg oral tablet, extended release  -- 1 tab(s) by mouth 3 times a day MDD:3  -- Indication: For pain medication    oxyCODONE 10 mg oral tablet  -- 1 tab(s) by mouth every 3 hours x 30 days, As Needed -Moderate Pain (4 - 6) MDD:6  -- Indication: For pain medication    Lovenox 60 mg/0.6 mL injectable solution  -- 50 milligram(s) subcutaneously 2 times a day   -- It is very important that you take or use this exactly as directed.  Do not skip doses or discontinue unless directed by your doctor.    -- Indication: For anticoagulation for right atrial thrombus     NovoLOG FlexPen  -- 7 unit(s) subcutaneous 3 times a day patient will use 1:15 Carb Ratio  -- Indication: For Status post pancreatic islet cell transplantation    insulin glargine  -- 8 unit(s) subcutaneous once a day in the AM  -- Indication: For Status post pancreatic islet cell transplantation    ondansetron 8 mg oral tablet, disintegrating  -- 1 tab(s) by mouth every 8 hours  -- Indication: For nausea    micafungin  -- 100 milligram(s) intravenously every 24 hours  -- Indication: For Intraabdominal fluid collection    megestrol 40 mg/mL oral suspension  -- 20 milliliter(s) by mouth once a day  -- Indication: For Status post pancreatic islet cell transplantation    ertapenem 1 g injection  -- 1 gram(s) intravenously every 24 hours  -- Indication: For Intraabdominal fluid collection    pancrelipase 6000 units-19,000 units-30,000 units oral delayed release capsule  -- 1 cap(s) by mouth 3 times a day (with meals)  -- Indication: For Status post pancreatic islet cell transplantation    docusate sodium 10 mg/mL oral liquid  -- 10 milliliter(s) by mouth 2 times a day  -- Indication: For Constipation    bisacodyl 5 mg oral delayed release tablet  -- 2 tab(s) by mouth once a day (at bedtime), As needed, Constipation  -- Indication: For Constipation    Robaxin-750 oral tablet  -- three times a day   -- Indication: For muscle relaxant    lactobacillus acidophilus oral capsule  -- 1 cap(s) by mouth once a day  -- Indication: For probiotic

## 2018-05-25 VITALS
DIASTOLIC BLOOD PRESSURE: 68 MMHG | RESPIRATION RATE: 18 BRPM | SYSTOLIC BLOOD PRESSURE: 111 MMHG | TEMPERATURE: 98 F | HEART RATE: 91 BPM | OXYGEN SATURATION: 99 %

## 2018-05-25 LAB
ALBUMIN SERPL ELPH-MCNC: 3.3 G/DL — SIGNIFICANT CHANGE UP (ref 3.3–5)
ALP SERPL-CCNC: 75 U/L — SIGNIFICANT CHANGE UP (ref 40–120)
ALT FLD-CCNC: 5 U/L — LOW (ref 10–45)
ANION GAP SERPL CALC-SCNC: 14 MMOL/L — SIGNIFICANT CHANGE UP (ref 5–17)
AST SERPL-CCNC: 7 U/L — LOW (ref 10–40)
BASOPHILS # BLD AUTO: 0.08 K/UL — SIGNIFICANT CHANGE UP (ref 0–0.2)
BASOPHILS NFR BLD AUTO: 1 % — SIGNIFICANT CHANGE UP (ref 0–2)
BILIRUB SERPL-MCNC: 0.2 MG/DL — SIGNIFICANT CHANGE UP (ref 0.2–1.2)
BUN SERPL-MCNC: <4 MG/DL — LOW (ref 7–23)
C DIFF GDH STL QL: NEGATIVE — SIGNIFICANT CHANGE UP
C DIFF GDH STL QL: SIGNIFICANT CHANGE UP
CALCIUM SERPL-MCNC: 9.2 MG/DL — SIGNIFICANT CHANGE UP (ref 8.4–10.5)
CHLORIDE SERPL-SCNC: 102 MMOL/L — SIGNIFICANT CHANGE UP (ref 96–108)
CO2 SERPL-SCNC: 23 MMOL/L — SIGNIFICANT CHANGE UP (ref 22–31)
CREAT SERPL-MCNC: 0.64 MG/DL — SIGNIFICANT CHANGE UP (ref 0.5–1.3)
EOSINOPHIL # BLD AUTO: 0.29 K/UL — SIGNIFICANT CHANGE UP (ref 0–0.5)
EOSINOPHIL NFR BLD AUTO: 3.6 % — SIGNIFICANT CHANGE UP (ref 0–6)
GLUCOSE SERPL-MCNC: 168 MG/DL — HIGH (ref 70–99)
GRAM STN FLD: SIGNIFICANT CHANGE UP
HCT VFR BLD CALC: 30.8 % — LOW (ref 34.5–45)
HGB BLD-MCNC: 9.6 G/DL — LOW (ref 11.5–15.5)
IMM GRANULOCYTES NFR BLD AUTO: 0.4 % — SIGNIFICANT CHANGE UP (ref 0–1.5)
LYMPHOCYTES # BLD AUTO: 3.08 K/UL — SIGNIFICANT CHANGE UP (ref 1–3.3)
LYMPHOCYTES # BLD AUTO: 38.5 % — SIGNIFICANT CHANGE UP (ref 13–44)
MAGNESIUM SERPL-MCNC: 1.6 MG/DL — SIGNIFICANT CHANGE UP (ref 1.6–2.6)
MCHC RBC-ENTMCNC: 27.7 PG — SIGNIFICANT CHANGE UP (ref 27–34)
MCHC RBC-ENTMCNC: 31.2 GM/DL — LOW (ref 32–36)
MCV RBC AUTO: 89 FL — SIGNIFICANT CHANGE UP (ref 80–100)
MONOCYTES # BLD AUTO: 0.8 K/UL — SIGNIFICANT CHANGE UP (ref 0–0.9)
MONOCYTES NFR BLD AUTO: 10 % — SIGNIFICANT CHANGE UP (ref 2–14)
NEUTROPHILS # BLD AUTO: 3.71 K/UL — SIGNIFICANT CHANGE UP (ref 1.8–7.4)
NEUTROPHILS NFR BLD AUTO: 46.5 % — SIGNIFICANT CHANGE UP (ref 43–77)
PHOSPHATE SERPL-MCNC: 3.5 MG/DL — SIGNIFICANT CHANGE UP (ref 2.5–4.5)
PLATELET # BLD AUTO: 723 K/UL — HIGH (ref 150–400)
POTASSIUM SERPL-MCNC: 4.2 MMOL/L — SIGNIFICANT CHANGE UP (ref 3.5–5.3)
POTASSIUM SERPL-SCNC: 4.2 MMOL/L — SIGNIFICANT CHANGE UP (ref 3.5–5.3)
PROT SERPL-MCNC: 6.8 G/DL — SIGNIFICANT CHANGE UP (ref 6–8.3)
RBC # BLD: 3.46 M/UL — LOW (ref 3.8–5.2)
RBC # FLD: 18.6 % — HIGH (ref 10.3–14.5)
SODIUM SERPL-SCNC: 139 MMOL/L — SIGNIFICANT CHANGE UP (ref 135–145)
SPECIMEN SOURCE: SIGNIFICANT CHANGE UP
WBC # BLD: 7.99 K/UL — SIGNIFICANT CHANGE UP (ref 3.8–10.5)
WBC # FLD AUTO: 7.99 K/UL — SIGNIFICANT CHANGE UP (ref 3.8–10.5)

## 2018-05-25 PROCEDURE — C1769: CPT

## 2018-05-25 PROCEDURE — 99232 SBSQ HOSP IP/OBS MODERATE 35: CPT

## 2018-05-25 PROCEDURE — 93306 TTE W/DOPPLER COMPLETE: CPT

## 2018-05-25 PROCEDURE — 87205 SMEAR GRAM STAIN: CPT

## 2018-05-25 PROCEDURE — C1729: CPT

## 2018-05-25 PROCEDURE — 85027 COMPLETE CBC AUTOMATED: CPT

## 2018-05-25 PROCEDURE — 87324 CLOSTRIDIUM AG IA: CPT

## 2018-05-25 PROCEDURE — 10030 IMG GID FLU COLL DRG SFT TIS: CPT | Mod: 59

## 2018-05-25 PROCEDURE — C1751: CPT

## 2018-05-25 PROCEDURE — 87070 CULTURE OTHR SPECIMN AEROBIC: CPT

## 2018-05-25 PROCEDURE — 85610 PROTHROMBIN TIME: CPT

## 2018-05-25 PROCEDURE — 80048 BASIC METABOLIC PNL TOTAL CA: CPT

## 2018-05-25 PROCEDURE — 84100 ASSAY OF PHOSPHORUS: CPT

## 2018-05-25 PROCEDURE — 85730 THROMBOPLASTIN TIME PARTIAL: CPT

## 2018-05-25 PROCEDURE — 77001 FLUOROGUIDE FOR VEIN DEVICE: CPT

## 2018-05-25 PROCEDURE — 87186 SC STD MICRODIL/AGAR DIL: CPT

## 2018-05-25 PROCEDURE — 87449 NOS EACH ORGANISM AG IA: CPT

## 2018-05-25 PROCEDURE — 36569 INSJ PICC 5 YR+ W/O IMAGING: CPT

## 2018-05-25 PROCEDURE — 83735 ASSAY OF MAGNESIUM: CPT

## 2018-05-25 PROCEDURE — 80053 COMPREHEN METABOLIC PANEL: CPT

## 2018-05-25 PROCEDURE — 76937 US GUIDE VASCULAR ACCESS: CPT

## 2018-05-25 PROCEDURE — 82962 GLUCOSE BLOOD TEST: CPT

## 2018-05-25 PROCEDURE — C1894: CPT

## 2018-05-25 PROCEDURE — 87075 CULTR BACTERIA EXCEPT BLOOD: CPT

## 2018-05-25 PROCEDURE — C1887: CPT

## 2018-05-25 RX ORDER — LACTOBACILLUS ACIDOPHILUS 100MM CELL
1 CAPSULE ORAL
Qty: 0 | Refills: 0 | Status: DISCONTINUED | OUTPATIENT
Start: 2018-05-25 | End: 2018-05-25

## 2018-05-25 RX ORDER — INSULIN LISPRO 100/ML
3 VIAL (ML) SUBCUTANEOUS ONCE
Qty: 0 | Refills: 0 | Status: COMPLETED | OUTPATIENT
Start: 2018-05-25 | End: 2018-05-25

## 2018-05-25 RX ORDER — LACTOBACILLUS ACIDOPHILUS 100MM CELL
1 CAPSULE ORAL
Qty: 0 | Refills: 0 | COMMUNITY
Start: 2018-05-25

## 2018-05-25 RX ORDER — PANTOPRAZOLE SODIUM 20 MG/1
40 TABLET, DELAYED RELEASE ORAL
Qty: 0 | Refills: 0 | Status: DISCONTINUED | OUTPATIENT
Start: 2018-05-25 | End: 2018-05-25

## 2018-05-25 RX ADMIN — OXYCODONE HYDROCHLORIDE 10 MILLIGRAM(S): 5 TABLET ORAL at 08:00

## 2018-05-25 RX ADMIN — OXYCODONE HYDROCHLORIDE 30 MILLIGRAM(S): 5 TABLET ORAL at 14:35

## 2018-05-25 RX ADMIN — OXYCODONE HYDROCHLORIDE 10 MILLIGRAM(S): 5 TABLET ORAL at 14:35

## 2018-05-25 RX ADMIN — Medication 1 TABLET(S): at 12:06

## 2018-05-25 RX ADMIN — OXYCODONE HYDROCHLORIDE 30 MILLIGRAM(S): 5 TABLET ORAL at 14:03

## 2018-05-25 RX ADMIN — INSULIN GLARGINE 8 UNIT(S): 100 INJECTION, SOLUTION SUBCUTANEOUS at 07:46

## 2018-05-25 RX ADMIN — Medication 2: at 12:58

## 2018-05-25 RX ADMIN — PANTOPRAZOLE SODIUM 40 MILLIGRAM(S): 20 TABLET, DELAYED RELEASE ORAL at 09:03

## 2018-05-25 RX ADMIN — OXYCODONE HYDROCHLORIDE 10 MILLIGRAM(S): 5 TABLET ORAL at 14:03

## 2018-05-25 RX ADMIN — Medication 1 TABLET(S): at 07:59

## 2018-05-25 RX ADMIN — ENOXAPARIN SODIUM 40 MILLIGRAM(S): 100 INJECTION SUBCUTANEOUS at 09:03

## 2018-05-25 RX ADMIN — Medication 6: at 07:56

## 2018-05-25 RX ADMIN — OXYCODONE HYDROCHLORIDE 30 MILLIGRAM(S): 5 TABLET ORAL at 05:47

## 2018-05-25 RX ADMIN — OXYCODONE HYDROCHLORIDE 10 MILLIGRAM(S): 5 TABLET ORAL at 08:30

## 2018-05-25 RX ADMIN — ERTAPENEM SODIUM 120 MILLIGRAM(S): 1 INJECTION, POWDER, LYOPHILIZED, FOR SOLUTION INTRAMUSCULAR; INTRAVENOUS at 09:03

## 2018-05-25 RX ADMIN — OXYCODONE HYDROCHLORIDE 10 MILLIGRAM(S): 5 TABLET ORAL at 11:11

## 2018-05-25 RX ADMIN — OXYCODONE HYDROCHLORIDE 10 MILLIGRAM(S): 5 TABLET ORAL at 02:29

## 2018-05-25 RX ADMIN — OXYCODONE HYDROCHLORIDE 10 MILLIGRAM(S): 5 TABLET ORAL at 02:24

## 2018-05-25 RX ADMIN — Medication 1 CAPSULE(S): at 12:06

## 2018-05-25 RX ADMIN — Medication 3 UNIT(S): at 03:00

## 2018-05-25 RX ADMIN — OXYCODONE HYDROCHLORIDE 10 MILLIGRAM(S): 5 TABLET ORAL at 11:45

## 2018-05-25 RX ADMIN — Medication 1 CAPSULE(S): at 08:00

## 2018-05-25 NOTE — PROGRESS NOTE ADULT - PROVIDER SPECIALTY LIST ADULT
Infectious Disease
Intervent Radiology
Surgery
Cardiology

## 2018-05-25 NOTE — PROGRESS NOTE ADULT - ASSESSMENT
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  Slight interval decrease in size of R atrial mass on echo    -Cont with lovenox 1mg/kg BID  -Cont care as per surgery  -Consider Cardiac MRI to better evaluate R atrial mass
Ms. Moreno is a 42 year old patient presented with intraabdominal abscess s/p IR drainage and pigtail placement, HD5. She  is hemodynamically stable.    - DVT ppx: T Lovenox for RA thrombus   - Diet: Regular  - Glycemic cont Invanz and Mycamine thru June 22nd. ID recommendations are appreciated  weekly CMP, CBC  - will f/u with culture of midline incision drain  - will f/u with C. diff panel  - May need to increase Creon if C. diff negative and diarrhea does not resolve  - Pain control  - will flush the midline drain  - Replete electrolyte as necessary  - Activity: OOb as tolerated
Ms. Trujillo is a 42 year old patient presented with intraabdominal abscess s/p IR drainage and pigtail placement, HD4. She  is hemodynamically stable. Labs are pending.     - DVT ppx: T Lovenox for RA thrombus   - Diet: Regular  - Glycemic control  - ABx regimen: Ertapenem, Linezolid, Micafungin (possibly D/C if there is no yeast growth)  - will f/u with culture  - Pain control  - will flush the midline drain  - Replete electrolyte as necessary  - Activity: OOb as tolerated
Yes

## 2018-05-25 NOTE — PROGRESS NOTE ADULT - SUBJECTIVE AND OBJECTIVE BOX
INFECTIOUS DISEASES FOLLOW UP--Madan Lane MD  Pager [phone]    This is a follow up note for this  42y Female with  abd abscesses  drains in place.  Not felt to have enteric-fistula by surgery.  planning for dc with invanz and mycamine for another several weeks.    Further ROS:  CONSTITUTIONAL:  No fever, good appetite  CARDIOVASCULAR:  No chest pain or palpitations  RESPIRATORY:  No dyspnea  GASTROINTESTINAL: diarrhea present  GENITOURINARY:  No dysuria  NEUROLOGIC:  No headache,     Allergies  No Known Allergies    ANTIBIOTICS/RELEVANT:  antimicrobials  ertapenem  IVPB 1000 milliGRAM(s) IV Intermittent every 24 hours  ertapenem  IVPB      micafungin IVPB      micafungin IVPB 100 milliGRAM(s) IV Intermittent every 24 hours    OTHER:  acetaminophen  IVPB. 1000 milliGRAM(s) IV Intermittent once  amylase/lipase/protease  (CREON  6,000 Units) 1 Capsule(s) Oral three times a day with meals  dextrose 40% Gel 15 Gram(s) Oral once PRN  dextrose 5%. 1000 milliLiter(s) IV Continuous <Continuous>  dextrose 50% Injectable 12.5 Gram(s) IV Push once  enoxaparin Injectable 40 milliGRAM(s) SubCutaneous every 12 hours  glucagon  Injectable 1 milliGRAM(s) IntraMuscular once PRN  insulin glargine Injectable (LANTUS) 8 Unit(s) SubCutaneous every morning  insulin lispro (HumaLOG) corrective regimen sliding scale   SubCutaneous at bedtime  insulin lispro (HumaLOG) corrective regimen sliding scale   SubCutaneous three times a day before meals  lactobacillus acidophilus 1 Tablet(s) Oral three times a day with meals  megestrol Suspension 800 milliGRAM(s) Oral daily  oxyCODONE    IR 10 milliGRAM(s) Oral every 3 hours PRN  oxyCODONE  ER Tablet 30 milliGRAM(s) Oral three times a day  pantoprazole    Tablet 40 milliGRAM(s) Oral before breakfast    Objective:  Vital Signs Last 24 Hrs  T(C): 36.8 (25 May 2018 09:12), Max: 37.1 (24 May 2018 18:57)  T(F): 98.3 (25 May 2018 09:12), Max: 98.7 (24 May 2018 18:57)  HR: 91 (25 May 2018 09:12) (70 - 91)  BP: 111/68 (25 May 2018 09:12) (111/68 - 131/82)  BP(mean): --  RR: 18 (25 May 2018 09:12) (18 - 18)  SpO2: 99% (25 May 2018 09:12) (99% - 100%)    PHYSICAL EXAM:  Constitutional:no acute distress  Eyes:MALOU, EOMI  Ear/Nose/Throat: no oral lesions, 	  Respiratory: clear BL  Cardiovascular: S1S2  Gastrointestinal:soft, (+) BS, no tenderness  Extremities:no e/e/c  No Lymphadenopathy  IV sites not inflammed.    LABS:                        9.6    7.99  )-----------( 723      ( 25 May 2018 07:44 )             30.8     05-25    139  |  102  |  <4<L>  ----------------------------<  168<H>  4.2   |  23  |  0.64    Ca    9.2      25 May 2018 06:35  Phos  3.5     05-25  Mg     1.6     05-25    TPro  6.8  /  Alb  3.3  /  TBili  0.2  /  DBili  x   /  AST  7<L>  /  ALT  5<L>  /  AlkPhos  75  05-25    Imp/Rx:  no new cultures.  source control attempting to be achieved by IR drains.  Plan for mycamine and invanz another several weeks.  Suggest repeat CT imaging in 2 weeks perhaps?  To f/u with surgery.  ID to see in office in 3-4 weeks.

## 2018-05-25 NOTE — PROGRESS NOTE ADULT - SUBJECTIVE AND OBJECTIVE BOX
Surgery Blue Team Progress Note    Presented with intraabdominal abscess with fistula tracking s/p IR drainage, HD5    SUBJECTIVE:   Patient was seen and examined this morning. There was no acute event overnight. She is resting comfortably in bed. Pain is well controlled. Her BG was elevated last night ans she received 3 units of Humalog in addition to her home dose. BG is better control this morning. patient stated that she has multiple episodes of diarrhea in the past few days. C. diff panel was sent. She does not report pain, fever, n/v, chest pain, or shortness of breath.       OBJECTIVE:   T(C): 36.8 (05-25-18 @ 09:12), Max: 37.1 (05-24-18 @ 18:57)  HR: 91 (05-25-18 @ 09:12) (70 - 91)  BP: 111/68 (05-25-18 @ 09:12) (111/68 - 131/82)  RR: 18 (05-25-18 @ 09:12) (18 - 18)  SpO2: 99% (05-25-18 @ 09:12) (99% - 100%)  Wt(kg): --  CAPILLARY BLOOD GLUCOSE      POCT Blood Glucose.: 268 mg/dL (25 May 2018 07:47)  POCT Blood Glucose.: 154 mg/dL (25 May 2018 06:15)  POCT Blood Glucose.: 278 mg/dL (25 May 2018 02:26)  POCT Blood Glucose.: 358 mg/dL (24 May 2018 22:44)  POCT Blood Glucose.: 256 mg/dL (24 May 2018 18:01)  POCT Blood Glucose.: 369 mg/dL (24 May 2018 14:11)    I&O's Detail    24 May 2018 07:01  -  25 May 2018 07:00  --------------------------------------------------------  IN:    dextrose 5% + sodium chloride 0.45% with potassium chloride 20 mEq/L: 1200 mL    IV PiggyBack: 100 mL    Oral Fluid: 600 mL  Total IN: 1900 mL    OUT:    Bulb: 20 mL    Bulb: 25 mL    Voided: 1300 mL  Total OUT: 1345 mL    Total NET: 555 mL      25 May 2018 07:01  -  25 May 2018 10:30  --------------------------------------------------------  IN:    IV PiggyBack: 50 mL  Total IN: 50 mL    OUT:    Voided: 200 mL  Total OUT: 200 mL    Total NET: -150 mL        Physical Exam  General: Sitting up in chair, NAD  CV: RRR  Pulm: clear, no increased work of breathing  Abdomen: soft, mildly distended, TTP, 2 drains with minimal output  Extremities: warm and well perfused. Grossly intact.

## 2018-05-26 LAB
-  AMPICILLIN: SIGNIFICANT CHANGE UP
-  CIPROFLOXACIN: SIGNIFICANT CHANGE UP
-  ERYTHROMYCIN: SIGNIFICANT CHANGE UP
-  TETRACYCLINE: SIGNIFICANT CHANGE UP
-  VANCOMYCIN: SIGNIFICANT CHANGE UP
METHOD TYPE: SIGNIFICANT CHANGE UP

## 2018-05-28 LAB
CULTURE RESULTS: SIGNIFICANT CHANGE UP
CULTURE RESULTS: SIGNIFICANT CHANGE UP
ORGANISM # SPEC MICROSCOPIC CNT: SIGNIFICANT CHANGE UP
ORGANISM # SPEC MICROSCOPIC CNT: SIGNIFICANT CHANGE UP
SPECIMEN SOURCE: SIGNIFICANT CHANGE UP
SPECIMEN SOURCE: SIGNIFICANT CHANGE UP

## 2018-06-05 ENCOUNTER — FORM ENCOUNTER (OUTPATIENT)
Age: 43
End: 2018-06-05

## 2018-06-05 ENCOUNTER — APPOINTMENT (OUTPATIENT)
Dept: SURGERY | Facility: CLINIC | Age: 43
End: 2018-06-05
Payer: MEDICARE

## 2018-06-05 DIAGNOSIS — T14.8XXA OTHER INJURY OF UNSPECIFIED BODY REGION, INITIAL ENCOUNTER: ICD-10-CM

## 2018-06-05 PROCEDURE — 99024 POSTOP FOLLOW-UP VISIT: CPT

## 2018-06-06 ENCOUNTER — OUTPATIENT (OUTPATIENT)
Dept: OUTPATIENT SERVICES | Facility: HOSPITAL | Age: 43
LOS: 1 days | End: 2018-06-06
Payer: MEDICARE

## 2018-06-06 DIAGNOSIS — Z94.83 PANCREAS TRANSPLANT STATUS: Chronic | ICD-10-CM

## 2018-06-06 DIAGNOSIS — Z90.410 ACQUIRED TOTAL ABSENCE OF PANCREAS: Chronic | ICD-10-CM

## 2018-06-06 DIAGNOSIS — Z90.81 ACQUIRED ABSENCE OF SPLEEN: Chronic | ICD-10-CM

## 2018-06-06 DIAGNOSIS — K86.1 OTHER CHRONIC PANCREATITIS: ICD-10-CM

## 2018-06-06 DIAGNOSIS — Z90.49 ACQUIRED ABSENCE OF OTHER SPECIFIED PARTS OF DIGESTIVE TRACT: Chronic | ICD-10-CM

## 2018-06-06 DIAGNOSIS — Z00.8 ENCOUNTER FOR OTHER GENERAL EXAMINATION: ICD-10-CM

## 2018-06-06 PROCEDURE — 77001 FLUOROGUIDE FOR VEIN DEVICE: CPT

## 2018-06-06 PROCEDURE — C1751: CPT

## 2018-06-06 PROCEDURE — 77001 FLUOROGUIDE FOR VEIN DEVICE: CPT | Mod: 26

## 2018-06-06 PROCEDURE — 76937 US GUIDE VASCULAR ACCESS: CPT

## 2018-06-06 PROCEDURE — 76937 US GUIDE VASCULAR ACCESS: CPT | Mod: 26

## 2018-06-06 PROCEDURE — 36569 INSJ PICC 5 YR+ W/O IMAGING: CPT

## 2018-06-08 PROBLEM — T14.8XXA DRAINAGE FROM WOUND: Status: ACTIVE | Noted: 2018-05-10

## 2018-06-09 DIAGNOSIS — Z45.2 ENCOUNTER FOR ADJUSTMENT AND MANAGEMENT OF VASCULAR ACCESS DEVICE: ICD-10-CM

## 2018-06-09 DIAGNOSIS — K65.1 PERITONEAL ABSCESS: ICD-10-CM

## 2018-06-14 ENCOUNTER — APPOINTMENT (OUTPATIENT)
Dept: INFECTIOUS DISEASE | Facility: CLINIC | Age: 43
End: 2018-06-14
Payer: MEDICARE

## 2018-06-14 VITALS
SYSTOLIC BLOOD PRESSURE: 122 MMHG | WEIGHT: 96 LBS | BODY MASS INDEX: 16.39 KG/M2 | OXYGEN SATURATION: 99 % | HEIGHT: 64 IN | HEART RATE: 97 BPM | TEMPERATURE: 98.7 F | DIASTOLIC BLOOD PRESSURE: 72 MMHG

## 2018-06-14 PROCEDURE — 99214 OFFICE O/P EST MOD 30 MIN: CPT

## 2018-06-14 RX ORDER — FUROSEMIDE 20 MG/1
20 TABLET ORAL DAILY
Qty: 30 | Refills: 0 | Status: DISCONTINUED | COMMUNITY
Start: 2018-04-24 | End: 2018-06-14

## 2018-06-16 ENCOUNTER — MOBILE ON CALL (OUTPATIENT)
Age: 43
End: 2018-06-16

## 2018-06-18 LAB — BACTERIA SPEC CULT: NORMAL

## 2018-06-28 ENCOUNTER — FORM ENCOUNTER (OUTPATIENT)
Age: 43
End: 2018-06-28

## 2018-06-28 ENCOUNTER — APPOINTMENT (OUTPATIENT)
Dept: INFECTIOUS DISEASE | Facility: CLINIC | Age: 43
End: 2018-06-28
Payer: MEDICARE

## 2018-06-28 VITALS
SYSTOLIC BLOOD PRESSURE: 120 MMHG | HEIGHT: 64 IN | WEIGHT: 98 LBS | BODY MASS INDEX: 16.73 KG/M2 | TEMPERATURE: 97.9 F | OXYGEN SATURATION: 100 % | HEART RATE: 85 BPM | DIASTOLIC BLOOD PRESSURE: 77 MMHG

## 2018-06-28 DIAGNOSIS — B99.9 UNSPECIFIED INFECTIOUS DISEASE: ICD-10-CM

## 2018-06-28 PROCEDURE — 99214 OFFICE O/P EST MOD 30 MIN: CPT

## 2018-06-29 ENCOUNTER — OUTPATIENT (OUTPATIENT)
Dept: OUTPATIENT SERVICES | Facility: HOSPITAL | Age: 43
LOS: 1 days | End: 2018-06-29
Payer: MEDICARE

## 2018-06-29 ENCOUNTER — APPOINTMENT (OUTPATIENT)
Dept: CT IMAGING | Facility: CLINIC | Age: 43
End: 2018-06-29
Payer: MEDICARE

## 2018-06-29 DIAGNOSIS — Z90.49 ACQUIRED ABSENCE OF OTHER SPECIFIED PARTS OF DIGESTIVE TRACT: Chronic | ICD-10-CM

## 2018-06-29 DIAGNOSIS — Z90.81 ACQUIRED ABSENCE OF SPLEEN: Chronic | ICD-10-CM

## 2018-06-29 DIAGNOSIS — B99.9 UNSPECIFIED INFECTIOUS DISEASE: ICD-10-CM

## 2018-06-29 DIAGNOSIS — Z94.83 PANCREAS TRANSPLANT STATUS: Chronic | ICD-10-CM

## 2018-06-29 DIAGNOSIS — Z90.410 ACQUIRED TOTAL ABSENCE OF PANCREAS: Chronic | ICD-10-CM

## 2018-06-29 PROCEDURE — 74177 CT ABD & PELVIS W/CONTRAST: CPT | Mod: 26

## 2018-06-29 PROCEDURE — 74177 CT ABD & PELVIS W/CONTRAST: CPT

## 2018-06-29 PROCEDURE — 82565 ASSAY OF CREATININE: CPT

## 2018-07-04 ENCOUNTER — TRANSCRIPTION ENCOUNTER (OUTPATIENT)
Age: 43
End: 2018-07-04

## 2018-07-19 ENCOUNTER — INPATIENT (INPATIENT)
Facility: HOSPITAL | Age: 43
LOS: 0 days | Discharge: HOME | DRG: 372 | End: 2018-07-20
Attending: SURGERY | Admitting: RADIOLOGY
Payer: COMMERCIAL

## 2018-07-19 ENCOUNTER — APPOINTMENT (OUTPATIENT)
Dept: CT IMAGING | Facility: HOSPITAL | Age: 43
End: 2018-07-19

## 2018-07-19 VITALS
TEMPERATURE: 98 F | HEART RATE: 70 BPM | SYSTOLIC BLOOD PRESSURE: 139 MMHG | RESPIRATION RATE: 18 BRPM | DIASTOLIC BLOOD PRESSURE: 61 MMHG | OXYGEN SATURATION: 97 %

## 2018-07-19 DIAGNOSIS — Z90.410 ACQUIRED TOTAL ABSENCE OF PANCREAS: Chronic | ICD-10-CM

## 2018-07-19 DIAGNOSIS — Z90.49 ACQUIRED ABSENCE OF OTHER SPECIFIED PARTS OF DIGESTIVE TRACT: Chronic | ICD-10-CM

## 2018-07-19 DIAGNOSIS — Z90.81 ACQUIRED ABSENCE OF SPLEEN: Chronic | ICD-10-CM

## 2018-07-19 DIAGNOSIS — K65.1 PERITONEAL ABSCESS: ICD-10-CM

## 2018-07-19 DIAGNOSIS — Z94.83 PANCREAS TRANSPLANT STATUS: Chronic | ICD-10-CM

## 2018-07-19 LAB
ALBUMIN SERPL ELPH-MCNC: 4.6 G/DL — SIGNIFICANT CHANGE UP (ref 3.3–5)
ALP SERPL-CCNC: 114 U/L — SIGNIFICANT CHANGE UP (ref 40–120)
ALT FLD-CCNC: 8 U/L — LOW (ref 10–45)
ANION GAP SERPL CALC-SCNC: 12 MMOL/L — SIGNIFICANT CHANGE UP (ref 5–17)
APTT BLD: 36.2 SEC — SIGNIFICANT CHANGE UP (ref 27.5–37.4)
AST SERPL-CCNC: 18 U/L — SIGNIFICANT CHANGE UP (ref 10–40)
BILIRUB SERPL-MCNC: 0.2 MG/DL — SIGNIFICANT CHANGE UP (ref 0.2–1.2)
BUN SERPL-MCNC: 14 MG/DL — SIGNIFICANT CHANGE UP (ref 7–23)
CALCIUM SERPL-MCNC: 9.4 MG/DL — SIGNIFICANT CHANGE UP (ref 8.4–10.5)
CHLORIDE SERPL-SCNC: 100 MMOL/L — SIGNIFICANT CHANGE UP (ref 96–108)
CO2 SERPL-SCNC: 26 MMOL/L — SIGNIFICANT CHANGE UP (ref 22–31)
CREAT SERPL-MCNC: 0.84 MG/DL — SIGNIFICANT CHANGE UP (ref 0.5–1.3)
GLUCOSE BLDC GLUCOMTR-MCNC: 141 MG/DL — HIGH (ref 70–99)
GLUCOSE BLDC GLUCOMTR-MCNC: 162 MG/DL — HIGH (ref 70–99)
GLUCOSE BLDC GLUCOMTR-MCNC: 80 MG/DL — SIGNIFICANT CHANGE UP (ref 70–99)
GLUCOSE SERPL-MCNC: 155 MG/DL — HIGH (ref 70–99)
GRAM STN FLD: SIGNIFICANT CHANGE UP
HCT VFR BLD CALC: 34.1 % — LOW (ref 34.5–45)
HGB BLD-MCNC: 10.3 G/DL — LOW (ref 11.5–15.5)
INR BLD: 0.91 RATIO — SIGNIFICANT CHANGE UP (ref 0.88–1.16)
MAGNESIUM SERPL-MCNC: 1.5 MG/DL — LOW (ref 1.6–2.6)
MCHC RBC-ENTMCNC: 26.9 PG — LOW (ref 27–34)
MCHC RBC-ENTMCNC: 30.3 GM/DL — LOW (ref 32–36)
MCV RBC AUTO: 88.7 FL — SIGNIFICANT CHANGE UP (ref 80–100)
PHOSPHATE SERPL-MCNC: 5.4 MG/DL — HIGH (ref 2.5–4.5)
PLATELET # BLD AUTO: 645 K/UL — HIGH (ref 150–400)
POTASSIUM SERPL-MCNC: 5.1 MMOL/L — SIGNIFICANT CHANGE UP (ref 3.5–5.3)
POTASSIUM SERPL-SCNC: 5.1 MMOL/L — SIGNIFICANT CHANGE UP (ref 3.5–5.3)
PROT SERPL-MCNC: 7.4 G/DL — SIGNIFICANT CHANGE UP (ref 6–8.3)
PROTHROM AB SERPL-ACNC: 9.9 SEC — SIGNIFICANT CHANGE UP (ref 9.8–12.7)
RBC # BLD: 3.84 M/UL — SIGNIFICANT CHANGE UP (ref 3.8–5.2)
RBC # FLD: 19.4 % — HIGH (ref 10.3–14.5)
SODIUM SERPL-SCNC: 138 MMOL/L — SIGNIFICANT CHANGE UP (ref 135–145)
SPECIMEN SOURCE: SIGNIFICANT CHANGE UP
WBC # BLD: 10.6 K/UL — HIGH (ref 3.8–10.5)
WBC # FLD AUTO: 10.6 K/UL — HIGH (ref 3.8–10.5)

## 2018-07-19 PROCEDURE — 49406 IMAGE CATH FLUID PERI/RETRO: CPT

## 2018-07-19 RX ORDER — LIPASE/PROTEASE/AMYLASE 16-48-48K
1 CAPSULE,DELAYED RELEASE (ENTERIC COATED) ORAL
Qty: 0 | Refills: 0 | Status: DISCONTINUED | OUTPATIENT
Start: 2018-07-19 | End: 2018-07-19

## 2018-07-19 RX ORDER — DEXTROSE 50 % IN WATER 50 %
25 SYRINGE (ML) INTRAVENOUS ONCE
Qty: 0 | Refills: 0 | Status: DISCONTINUED | OUTPATIENT
Start: 2018-07-19 | End: 2018-07-20

## 2018-07-19 RX ORDER — DEXTROSE 50 % IN WATER 50 %
15 SYRINGE (ML) INTRAVENOUS ONCE
Qty: 0 | Refills: 0 | Status: DISCONTINUED | OUTPATIENT
Start: 2018-07-19 | End: 2018-07-20

## 2018-07-19 RX ORDER — LIPASE/PROTEASE/AMYLASE 16-48-48K
3 CAPSULE,DELAYED RELEASE (ENTERIC COATED) ORAL
Qty: 0 | Refills: 0 | Status: DISCONTINUED | OUTPATIENT
Start: 2018-07-19 | End: 2018-07-20

## 2018-07-19 RX ORDER — ACETAMINOPHEN 500 MG
650 TABLET ORAL EVERY 6 HOURS
Qty: 0 | Refills: 0 | Status: DISCONTINUED | OUTPATIENT
Start: 2018-07-19 | End: 2018-07-20

## 2018-07-19 RX ORDER — OXYCODONE HYDROCHLORIDE 5 MG/1
10 TABLET ORAL EVERY 6 HOURS
Qty: 0 | Refills: 0 | Status: DISCONTINUED | OUTPATIENT
Start: 2018-07-19 | End: 2018-07-20

## 2018-07-19 RX ORDER — SODIUM CHLORIDE 9 MG/ML
1000 INJECTION, SOLUTION INTRAVENOUS
Qty: 0 | Refills: 0 | Status: DISCONTINUED | OUTPATIENT
Start: 2018-07-19 | End: 2018-07-20

## 2018-07-19 RX ORDER — INSULIN LISPRO 100/ML
VIAL (ML) SUBCUTANEOUS
Qty: 0 | Refills: 0 | Status: DISCONTINUED | OUTPATIENT
Start: 2018-07-19 | End: 2018-07-20

## 2018-07-19 RX ORDER — INSULIN GLARGINE 100 [IU]/ML
4 INJECTION, SOLUTION SUBCUTANEOUS EVERY MORNING
Qty: 0 | Refills: 0 | Status: DISCONTINUED | OUTPATIENT
Start: 2018-07-19 | End: 2018-07-20

## 2018-07-19 RX ORDER — DEXTROSE 50 % IN WATER 50 %
12.5 SYRINGE (ML) INTRAVENOUS ONCE
Qty: 0 | Refills: 0 | Status: DISCONTINUED | OUTPATIENT
Start: 2018-07-19 | End: 2018-07-20

## 2018-07-19 RX ORDER — OXYCODONE AND ACETAMINOPHEN 5; 325 MG/1; MG/1
1 TABLET ORAL EVERY 6 HOURS
Qty: 0 | Refills: 0 | Status: DISCONTINUED | OUTPATIENT
Start: 2018-07-19 | End: 2018-07-19

## 2018-07-19 RX ORDER — OXYCODONE AND ACETAMINOPHEN 5; 325 MG/1; MG/1
2 TABLET ORAL EVERY 4 HOURS
Qty: 0 | Refills: 0 | Status: DISCONTINUED | OUTPATIENT
Start: 2018-07-19 | End: 2018-07-19

## 2018-07-19 RX ORDER — INSULIN LISPRO 100/ML
VIAL (ML) SUBCUTANEOUS AT BEDTIME
Qty: 0 | Refills: 0 | Status: DISCONTINUED | OUTPATIENT
Start: 2018-07-19 | End: 2018-07-20

## 2018-07-19 RX ORDER — OXYCODONE AND ACETAMINOPHEN 5; 325 MG/1; MG/1
2 TABLET ORAL EVERY 6 HOURS
Qty: 0 | Refills: 0 | Status: DISCONTINUED | OUTPATIENT
Start: 2018-07-19 | End: 2018-07-19

## 2018-07-19 RX ORDER — ONDANSETRON 8 MG/1
8 TABLET, FILM COATED ORAL EVERY 8 HOURS
Qty: 0 | Refills: 0 | Status: DISCONTINUED | OUTPATIENT
Start: 2018-07-19 | End: 2018-07-20

## 2018-07-19 RX ORDER — HYDROMORPHONE HYDROCHLORIDE 2 MG/ML
0.5 INJECTION INTRAMUSCULAR; INTRAVENOUS; SUBCUTANEOUS ONCE
Qty: 0 | Refills: 0 | Status: DISCONTINUED | OUTPATIENT
Start: 2018-07-19 | End: 2018-07-19

## 2018-07-19 RX ORDER — GLUCAGON INJECTION, SOLUTION 0.5 MG/.1ML
1 INJECTION, SOLUTION SUBCUTANEOUS ONCE
Qty: 0 | Refills: 0 | Status: DISCONTINUED | OUTPATIENT
Start: 2018-07-19 | End: 2018-07-20

## 2018-07-19 RX ORDER — MEGESTROL ACETATE 40 MG/ML
800 SUSPENSION ORAL DAILY
Qty: 0 | Refills: 0 | Status: DISCONTINUED | OUTPATIENT
Start: 2018-07-19 | End: 2018-07-20

## 2018-07-19 RX ORDER — HEPARIN SODIUM 5000 [USP'U]/ML
5000 INJECTION INTRAVENOUS; SUBCUTANEOUS EVERY 8 HOURS
Qty: 0 | Refills: 0 | Status: DISCONTINUED | OUTPATIENT
Start: 2018-07-19 | End: 2018-07-20

## 2018-07-19 RX ORDER — OXYCODONE HYDROCHLORIDE 5 MG/1
30 TABLET ORAL
Qty: 0 | Refills: 0 | Status: DISCONTINUED | OUTPATIENT
Start: 2018-07-19 | End: 2018-07-20

## 2018-07-19 RX ADMIN — HEPARIN SODIUM 5000 UNIT(S): 5000 INJECTION INTRAVENOUS; SUBCUTANEOUS at 21:23

## 2018-07-19 RX ADMIN — HYDROMORPHONE HYDROCHLORIDE 0.5 MILLIGRAM(S): 2 INJECTION INTRAMUSCULAR; INTRAVENOUS; SUBCUTANEOUS at 23:00

## 2018-07-19 RX ADMIN — OXYCODONE AND ACETAMINOPHEN 2 TABLET(S): 5; 325 TABLET ORAL at 14:38

## 2018-07-19 RX ADMIN — OXYCODONE HYDROCHLORIDE 30 MILLIGRAM(S): 5 TABLET ORAL at 16:00

## 2018-07-19 RX ADMIN — OXYCODONE HYDROCHLORIDE 30 MILLIGRAM(S): 5 TABLET ORAL at 15:04

## 2018-07-19 RX ADMIN — HYDROMORPHONE HYDROCHLORIDE 0.5 MILLIGRAM(S): 2 INJECTION INTRAMUSCULAR; INTRAVENOUS; SUBCUTANEOUS at 22:46

## 2018-07-19 RX ADMIN — OXYCODONE HYDROCHLORIDE 10 MILLIGRAM(S): 5 TABLET ORAL at 18:57

## 2018-07-19 RX ADMIN — OXYCODONE AND ACETAMINOPHEN 2 TABLET(S): 5; 325 TABLET ORAL at 14:01

## 2018-07-19 RX ADMIN — Medication 3 CAPSULE(S): at 22:12

## 2018-07-19 RX ADMIN — OXYCODONE HYDROCHLORIDE 10 MILLIGRAM(S): 5 TABLET ORAL at 20:00

## 2018-07-19 NOTE — H&P ADULT - NSHPLABSRESULTS_GEN_ALL_CORE
PT/INR - ( 19 Jul 2018 08:31 )   PT: 9.9 sec;   INR: 0.91 ratio         PTT - ( 19 Jul 2018 08:31 )  PTT:36.2 sec    Lactate Trend            CAPILLARY BLOOD GLUCOSE      POCT Blood Glucose.: 80 mg/dL (19 Jul 2018 14:12)

## 2018-07-19 NOTE — PROGRESS NOTE ADULT - SUBJECTIVE AND OBJECTIVE BOX
Interventional Radiology Brief- Operative Note    Procedure: CT guided drainage of abdominal wall fluid collection    Operators: Quang Nash    Anesthesia (type): MAC    Contrast: none    EBL: none    Findings/Follow up Plan of Care: CT guided drainage of right abdominal fluid collection performed. 10 FR drain placed. Appx 4cc of reddish cloudy viscous fluid aspirated. Drain placed to DANIEL. Pt tolerated procedure without difficulty Full report to follow    Specimens Removed: sample sent for microbiology    Implants: 10 Fr Williamson Mendes drainage catheter    Complications: none    Condition/Disposition: stable / pacu    Please call Interventional Radiology x [phone] with any questions, concerns, or issues.

## 2018-07-19 NOTE — H&P ADULT - HISTORY OF PRESENT ILLNESS
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 Walker in 04/2018. Patient's recovery was complicated by repetitive abdominal collections/ abscess and IR drainage. Patient was also started on therapeutic Lovenox for right atrial thrombus. Presents today for IR drainage and will be admitted for overnight observation. Patient reports light-yellow drainage form the fistula, which gets worse at night. Otherwise, have been doing well. Tolerates diet well, BG well controlled. Endorses postprandial frequent stools associated with abdominal cramps.  Denies refers, chills, N/V.

## 2018-07-19 NOTE — CHART NOTE - NSCHARTNOTEFT_GEN_A_CORE
POST-OP NOTE:    Procedure: IR drain placement     Subjective: Doing well. Pain is well controlled, denies fevers/chills. Denies SOB. AVSS.     Vital Signs Last 24 Hrs  T(C): 36.9 (19 Jul 2018 14:15), Max: 36.9 (19 Jul 2018 14:15)  T(F): 98.4 (19 Jul 2018 14:15), Max: 98.4 (19 Jul 2018 14:15)  HR: 75 (19 Jul 2018 15:00) (70 - 75)  BP: 140/65 (19 Jul 2018 15:00) (139/61 - 140/65)  BP(mean): --  RR: 17 (19 Jul 2018 15:00) (17 - 18)  SpO2: 98% (19 Jul 2018 15:00) (97% - 98%)  I&O's Summary    19 Jul 2018 07:01  -  19 Jul 2018 16:22  --------------------------------------------------------  IN: 360 mL / OUT: 0 mL / NET: 360 mL     PT/INR - ( 19 Jul 2018 08:31 )   PT: 9.9 sec;   INR: 0.91 ratio         PTT - ( 19 Jul 2018 08:31 )  PTT:36.2 sec    PHYSICAL EXAM:  GEN: NAD  Pulm: unlabored breathing, CTAB  Abd: soft, ND, mildly tender in RUQ. Surgical dressing CDI. No evidence of hematoma.     A&P:  42F with past medical history of chronic pancreatitis s/p distal pancreatectomy, cholecystectomy, splenectomy and russel-en-y pancreaticojejuonostomy (02/2014) at [facility] with Dr. 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 Walker in 04/2018; s/p IR placed drain and admitted for observation.     - Carb consistent diet  - Low ISS  - F/u pm labs  - Continue home meds   - DVT PPX    Petra Rapp PGY-1

## 2018-07-19 NOTE — H&P ADULT - ASSESSMENT
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 Arita), and now S/P total pancreatectomy  with islet cell autotransplant at [facility] by Dr. Walker in 04/2018; s/p IR placed drain and admitted for observation.     - Carb consistent regular diet  - Low ISS  - Continue home meds   - DVT PPX    Petra Rapp PGY-1 42F with past medical history of chronic pancreatitis s/p distal pancreatectomy, cholecystectomy, splenectomy and russel-en-y pancreaticojejuonostomy (02/2014) at [facility] with Dr. Arita), and now S/P total pancreatectomy  with islet cell autotransplant at [facility] by Dr. Walker in 04/2018; s/p IR placed drain and admitted for observation.     - Carb consistent regular diet  - Low ISS  - F/u pm labs  - Continue home meds   - DVT PPX    Petra Rapp PGY-1

## 2018-07-19 NOTE — H&P ADULT - NSHPPHYSICALEXAM_GEN_ALL_CORE
GEN: NAD  Pulm: unlabored breathing, CTAB  CV: RRR  Abd: soft, ND, mildly tender in RUQ, surgical dressing CDI

## 2018-07-20 ENCOUNTER — TRANSCRIPTION ENCOUNTER (OUTPATIENT)
Age: 43
End: 2018-07-20

## 2018-07-20 VITALS
OXYGEN SATURATION: 97 % | SYSTOLIC BLOOD PRESSURE: 140 MMHG | TEMPERATURE: 98 F | RESPIRATION RATE: 18 BRPM | HEART RATE: 76 BPM | DIASTOLIC BLOOD PRESSURE: 76 MMHG

## 2018-07-20 LAB
ANION GAP SERPL CALC-SCNC: 11 MMOL/L — SIGNIFICANT CHANGE UP (ref 5–17)
BUN SERPL-MCNC: 10 MG/DL — SIGNIFICANT CHANGE UP (ref 7–23)
CALCIUM SERPL-MCNC: 9.6 MG/DL — SIGNIFICANT CHANGE UP (ref 8.4–10.5)
CHLORIDE SERPL-SCNC: 98 MMOL/L — SIGNIFICANT CHANGE UP (ref 96–108)
CO2 SERPL-SCNC: 28 MMOL/L — SIGNIFICANT CHANGE UP (ref 22–31)
CREAT SERPL-MCNC: 0.56 MG/DL — SIGNIFICANT CHANGE UP (ref 0.5–1.3)
GLUCOSE BLDC GLUCOMTR-MCNC: 180 MG/DL — HIGH (ref 70–99)
GLUCOSE SERPL-MCNC: 183 MG/DL — HIGH (ref 70–99)
HBA1C BLD-MCNC: 8.1 % — HIGH (ref 4–5.6)
MAGNESIUM SERPL-MCNC: 1.7 MG/DL — SIGNIFICANT CHANGE UP (ref 1.6–2.6)
POTASSIUM SERPL-MCNC: 4.5 MMOL/L — SIGNIFICANT CHANGE UP (ref 3.5–5.3)
POTASSIUM SERPL-SCNC: 4.5 MMOL/L — SIGNIFICANT CHANGE UP (ref 3.5–5.3)
SODIUM SERPL-SCNC: 137 MMOL/L — SIGNIFICANT CHANGE UP (ref 135–145)

## 2018-07-20 PROCEDURE — 87075 CULTR BACTERIA EXCEPT BLOOD: CPT

## 2018-07-20 PROCEDURE — 83036 HEMOGLOBIN GLYCOSYLATED A1C: CPT

## 2018-07-20 PROCEDURE — C1729: CPT

## 2018-07-20 PROCEDURE — 87102 FUNGUS ISOLATION CULTURE: CPT

## 2018-07-20 PROCEDURE — 85027 COMPLETE CBC AUTOMATED: CPT

## 2018-07-20 PROCEDURE — 85730 THROMBOPLASTIN TIME PARTIAL: CPT

## 2018-07-20 PROCEDURE — 87070 CULTURE OTHR SPECIMN AEROBIC: CPT

## 2018-07-20 PROCEDURE — 83735 ASSAY OF MAGNESIUM: CPT

## 2018-07-20 PROCEDURE — 99238 HOSP IP/OBS DSCHRG MGMT 30/<: CPT

## 2018-07-20 PROCEDURE — 82962 GLUCOSE BLOOD TEST: CPT

## 2018-07-20 PROCEDURE — 80053 COMPREHEN METABOLIC PANEL: CPT

## 2018-07-20 PROCEDURE — 87186 SC STD MICRODIL/AGAR DIL: CPT

## 2018-07-20 PROCEDURE — 87205 SMEAR GRAM STAIN: CPT

## 2018-07-20 PROCEDURE — 49406 IMAGE CATH FLUID PERI/RETRO: CPT

## 2018-07-20 PROCEDURE — 84100 ASSAY OF PHOSPHORUS: CPT

## 2018-07-20 PROCEDURE — 85610 PROTHROMBIN TIME: CPT

## 2018-07-20 PROCEDURE — 80048 BASIC METABOLIC PNL TOTAL CA: CPT

## 2018-07-20 RX ORDER — OXYCODONE HYDROCHLORIDE 5 MG/1
30 TABLET ORAL EVERY 8 HOURS
Qty: 0 | Refills: 0 | Status: DISCONTINUED | OUTPATIENT
Start: 2018-07-20 | End: 2018-07-20

## 2018-07-20 RX ORDER — PANTOPRAZOLE SODIUM 20 MG/1
40 TABLET, DELAYED RELEASE ORAL
Qty: 0 | Refills: 0 | Status: DISCONTINUED | OUTPATIENT
Start: 2018-07-20 | End: 2018-07-20

## 2018-07-20 RX ORDER — APIXABAN 2.5 MG/1
1 TABLET, FILM COATED ORAL
Qty: 0 | Refills: 0 | COMMUNITY

## 2018-07-20 RX ORDER — MAGNESIUM SULFATE 500 MG/ML
2 VIAL (ML) INJECTION ONCE
Qty: 0 | Refills: 0 | Status: DISCONTINUED | OUTPATIENT
Start: 2018-07-20 | End: 2018-07-20

## 2018-07-20 RX ORDER — MAGNESIUM SULFATE 500 MG/ML
1 VIAL (ML) INJECTION ONCE
Qty: 0 | Refills: 0 | Status: COMPLETED | OUTPATIENT
Start: 2018-07-20 | End: 2018-07-20

## 2018-07-20 RX ORDER — ACETAMINOPHEN 500 MG
2 TABLET ORAL
Qty: 0 | Refills: 0 | COMMUNITY
Start: 2018-07-20

## 2018-07-20 RX ORDER — OXYCODONE HYDROCHLORIDE 5 MG/1
1 TABLET ORAL
Qty: 30 | Refills: 0 | OUTPATIENT
Start: 2018-07-20 | End: 2018-07-29

## 2018-07-20 RX ADMIN — INSULIN GLARGINE 4 UNIT(S): 100 INJECTION, SOLUTION SUBCUTANEOUS at 09:14

## 2018-07-20 RX ADMIN — OXYCODONE HYDROCHLORIDE 30 MILLIGRAM(S): 5 TABLET ORAL at 08:56

## 2018-07-20 RX ADMIN — OXYCODONE HYDROCHLORIDE 10 MILLIGRAM(S): 5 TABLET ORAL at 06:00

## 2018-07-20 RX ADMIN — OXYCODONE HYDROCHLORIDE 30 MILLIGRAM(S): 5 TABLET ORAL at 01:30

## 2018-07-20 RX ADMIN — OXYCODONE HYDROCHLORIDE 30 MILLIGRAM(S): 5 TABLET ORAL at 00:30

## 2018-07-20 RX ADMIN — Medication 3 CAPSULE(S): at 08:58

## 2018-07-20 RX ADMIN — PANTOPRAZOLE SODIUM 40 MILLIGRAM(S): 20 TABLET, DELAYED RELEASE ORAL at 11:38

## 2018-07-20 RX ADMIN — OXYCODONE HYDROCHLORIDE 10 MILLIGRAM(S): 5 TABLET ORAL at 07:00

## 2018-07-20 RX ADMIN — Medication 100 GRAM(S): at 04:00

## 2018-07-20 RX ADMIN — OXYCODONE HYDROCHLORIDE 10 MILLIGRAM(S): 5 TABLET ORAL at 00:00

## 2018-07-20 RX ADMIN — OXYCODONE HYDROCHLORIDE 10 MILLIGRAM(S): 5 TABLET ORAL at 01:00

## 2018-07-20 RX ADMIN — Medication 1: at 09:14

## 2018-07-20 NOTE — PROGRESS NOTE ADULT - ASSESSMENT
42F with past medical history of chronic pancreatitis s/p distal pancreatectomy, cholecystectomy, splenectomy and russel-en-y pancreaticojejuonostomy (02/2014) at [facility] with Dr. Arita, spP total pancreatectomy with islet cell autotransplant at [facility] by Dr. Walker in 04/2018, and now s/p IR drain placement on 7/19. Admission on 7/19 for observation s/p IR drain placement.    - Carb consistent regular diet.  - D5 @ 50.  - Low dose SSI.  - Continue home meds, including oxy ER 30mg q8h scheduled.  - C/w Creon.  - DVT PPX.  - Doing well, d/c home today.    Evangelina Breaux MD  General Surgery PGY-1  Blue team pager: [phone]

## 2018-07-20 NOTE — DISCHARGE NOTE ADULT - HOSPITAL COURSE
42F with past medical history of chronic pancreatitis s/p distal pancreatectomy, cholecystectomy, splenectomy and russel-en-y pancreaticojejuonostomy (02/2014) at [facility] with Dr. Arita), and now S/P total pancreatectomy  with islet cell autotransplant at [facility] by Dr. Walker in 04/2018. Patient's recovery was complicated by repetitive abdominal collections/ abscess and IR drainage. Patient was also started on therapeutic Lovenox for right atrial thrombus. Presented on 7/19/18 for IR drainage and was admitted for overnight observation.   Patient reports light-yellow drainage form the fistula, which gets worse at night. Otherwise, have been doing well. Tolerates diet well, BG well controlled.  Endorses postprandial frequent stools associated with abdominal cramps.  Denies refers, chills, N/V.   On 7/20/18 patient was clear for discharge home with follow up with Dr Nash in 2 weeks. 42F with past medical history of chronic pancreatitis s/p distal pancreatectomy, cholecystectomy, splenectomy and russel-en-y pancreaticojejuonostomy (02/2014) at [facility] with Dr. Arita), and now S/P total pancreatectomy  with islet cell autotransplant at [facility] by Dr. Walker in 04/2018. Patient's recovery was complicated by repetitive abdominal collections/ abscess and IR drainage. Patient was also started on therapeutic Lovenox for right atrial thrombus. Presented on 7/19/18 for IR drainage and was admitted for overnight observation.   Patient reports light-yellow drainage form the fistula, which gets worse at night. Otherwise, have been doing well. Tolerates diet well, BG well controlled.  Endorses postprandial frequent stools associated with abdominal cramps.  Denies refers, chills, N/V.   On 7/20/18 patient was clear for discharge home with follow up with Dr Nash in 2 weeks.     Note:  Patient will irrigate the catheter and permit the fluid to exit via the ostium of her drainage site.

## 2018-07-20 NOTE — DISCHARGE NOTE ADULT - CARE PLAN
Principal Discharge DX:	Status post pancreatic islet cell transplantation  Goal:	s/p IR drainage  Assessment and plan of treatment:	Please Flush with 5 cc of NS once a day. Please follow up with Dr Nash in 2 weeks.

## 2018-07-20 NOTE — DISCHARGE NOTE ADULT - CARE PROVIDER_API CALL
Yobani Nash), Diagnostic Radiology; [facility]  [address] [place] [address]  Phone: [phone]  Fax: [phone]    Juan Walker), Surgery  [address] [place] [address]  Phone: [phone]  Fax: [phone]

## 2018-07-20 NOTE — DISCHARGE NOTE ADULT - CARE PROVIDERS DIRECT ADDRESSES
[email or web address]

## 2018-07-20 NOTE — DISCHARGE NOTE ADULT - PATIENT PORTAL LINK FT
You can access the [facility] Patient Portal, offered by [facility], by registering with the following website: [email or web address]

## 2018-07-20 NOTE — DISCHARGE NOTE ADULT - MEDICATION SUMMARY - MEDICATIONS TO TAKE
I will START or STAY ON the medications listed below when I get home from the hospital:    oxyCODONE 10 mg oral tablet  -- 1 tab(s) by mouth every 3 hours x 30 days, As Needed -Moderate Pain (4 - 6) MDD:6  -- Indication: For mod pain    oxyCODONE 30 mg oral tablet, extended release  -- 1 tab(s) by mouth 3 times a day MDD:3  -- Indication: For Severe pain    acetaminophen 325 mg oral tablet  -- 2 tab(s) by mouth every 6 hours, As needed, Mild Pain (1 - 3)  -- Indication: For mild pain    Eliquis 5 mg oral tablet  -- 1 tab(s) by mouth 2 times a day  -- Indication: For Atrial Thrombus    insulin glargine  -- 8 unit(s) subcutaneous once a day in the AM  -- Indication: For DM    NovoLOG FlexPen  -- 7 unit(s) subcutaneous 3 times a day patient will use 1:15 Carb Ratio  -- Indication: For DM    ondansetron 8 mg oral tablet, disintegrating  -- 1 tab(s) by mouth every 8 hours  -- Indication: For Nausea    pancrelipase 6000 units-19,000 units-30,000 units oral delayed release capsule  -- 1 cap(s) by mouth 3 times a day (with meals)  -- Indication: For Pancreatitis    Robaxin-750 oral tablet  -- three times a day   -- Indication: For muscle relaxer    lactobacillus acidophilus oral capsule  -- 1 cap(s) by mouth once a day  -- Indication: For probiotic

## 2018-07-20 NOTE — DISCHARGE NOTE ADULT - MEDICATION SUMMARY - MEDICATIONS TO STOP TAKING
I will STOP taking the medications listed below when I get home from the hospital:    megestrol 40 mg/mL oral suspension  -- 20 milliliter(s) by mouth once a day    docusate sodium 10 mg/mL oral liquid  -- 10 milliliter(s) by mouth 2 times a day    bisacodyl 5 mg oral delayed release tablet  -- 2 tab(s) by mouth once a day (at bedtime), As needed, Constipation    Lovenox 60 mg/0.6 mL injectable solution  -- 50 milligram(s) subcutaneously 2 times a day   -- It is very important that you take or use this exactly as directed.  Do not skip doses or discontinue unless directed by your doctor.    ertapenem 1 g injection  -- 1 gram(s) intravenously every 24 hours    micafungin  -- 100 milligram(s) intravenously every 24 hours

## 2018-07-20 NOTE — PROGRESS NOTE ADULT - SUBJECTIVE AND OBJECTIVE BOX
Surgery Progress Note    o/n: adjusted pain medications via home dosing and pt pain.    SUBJECTIVE: Pt seen and examined at bedside. Patient comfortable and in no-apparent distress. No nausea, vomiting, diarrhea. Pain is controlled.     Vital Signs Last 24 Hrs  T(C): 36.9 (20 Jul 2018 09:00), Max: 37.1 (19 Jul 2018 23:00)  T(F): 98.4 (20 Jul 2018 09:00), Max: 98.8 (19 Jul 2018 23:00)  HR: 76 (20 Jul 2018 09:00) (70 - 115)  BP: 140/76 (20 Jul 2018 09:00) (128/70 - 163/84)  BP(mean): 96 (20 Jul 2018 02:45) (96 - 96)  RR: 18 (20 Jul 2018 09:00) (16 - 18)  SpO2: 97% (20 Jul 2018 09:00) (95% - 100%)    Physical Exam:  General Appearance: Appears well, NAD  Respiratory: No labored breathing  CV: Pulse regularly present  Abdomen: Soft, nontender, nondistended, drain site c/d/i, drain with tan-yellow output.    LABS:                        10.3   10.6  )-----------( 645      ( 19 Jul 2018 22:01 )             34.1     07-20    137  |  98  |  10  ----------------------------<  183<H>  4.5   |  28  |  0.56    Ca    9.6      20 Jul 2018 09:20  Phos  5.4     07-19  Mg     1.7     07-20    TPro  7.4  /  Alb  4.6  /  TBili  0.2  /  DBili  x   /  AST  18  /  ALT  8<L>  /  AlkPhos  114  07-19    PT/INR - ( 19 Jul 2018 08:31 )   PT: 9.9 sec;   INR: 0.91 ratio         PTT - ( 19 Jul 2018 08:31 )  PTT:36.2 sec      INs and OUTs:    07-19-18 @ 07:01  -  07-20-18 @ 07:00  --------------------------------------------------------  IN: 1375 mL / OUT: 410 mL / NET: 965 mL

## 2018-07-21 LAB
-  AMIKACIN: SIGNIFICANT CHANGE UP
-  AMOXICILLIN/CLAVULANIC ACID: SIGNIFICANT CHANGE UP
-  AMPICILLIN/SULBACTAM: SIGNIFICANT CHANGE UP
-  AMPICILLIN: SIGNIFICANT CHANGE UP
-  AZTREONAM: SIGNIFICANT CHANGE UP
-  CEFAZOLIN: SIGNIFICANT CHANGE UP
-  CEFEPIME: SIGNIFICANT CHANGE UP
-  CEFOXITIN: SIGNIFICANT CHANGE UP
-  CEFTRIAXONE: SIGNIFICANT CHANGE UP
-  CIPROFLOXACIN: SIGNIFICANT CHANGE UP
-  ERTAPENEM: SIGNIFICANT CHANGE UP
-  GENTAMICIN: SIGNIFICANT CHANGE UP
-  IMIPENEM: SIGNIFICANT CHANGE UP
-  LEVOFLOXACIN: SIGNIFICANT CHANGE UP
-  MEROPENEM: SIGNIFICANT CHANGE UP
-  PIPERACILLIN/TAZOBACTAM: SIGNIFICANT CHANGE UP
-  TOBRAMYCIN: SIGNIFICANT CHANGE UP
-  TRIMETHOPRIM/SULFAMETHOXAZOLE: SIGNIFICANT CHANGE UP
METHOD TYPE: SIGNIFICANT CHANGE UP

## 2018-07-23 PROBLEM — K86.1 OTHER CHRONIC PANCREATITIS: Chronic | Status: ACTIVE | Noted: 2018-03-23

## 2018-07-23 PROBLEM — G43.829 MENSTRUAL MIGRAINE, NOT INTRACTABLE, WITHOUT STATUS MIGRAINOSUS: Chronic | Status: ACTIVE | Noted: 2018-03-23

## 2018-07-24 LAB
CULTURE RESULTS: SIGNIFICANT CHANGE UP
ORGANISM # SPEC MICROSCOPIC CNT: SIGNIFICANT CHANGE UP
ORGANISM # SPEC MICROSCOPIC CNT: SIGNIFICANT CHANGE UP
SPECIMEN SOURCE: SIGNIFICANT CHANGE UP

## 2018-08-01 ENCOUNTER — FORM ENCOUNTER (OUTPATIENT)
Age: 43
End: 2018-08-01

## 2018-08-02 ENCOUNTER — OUTPATIENT (OUTPATIENT)
Dept: OUTPATIENT SERVICES | Facility: HOSPITAL | Age: 43
LOS: 1 days | End: 2018-08-02
Payer: MEDICARE

## 2018-08-02 ENCOUNTER — APPOINTMENT (OUTPATIENT)
Dept: CT IMAGING | Facility: HOSPITAL | Age: 43
End: 2018-08-02

## 2018-08-02 DIAGNOSIS — Z90.81 ACQUIRED ABSENCE OF SPLEEN: Chronic | ICD-10-CM

## 2018-08-02 DIAGNOSIS — K65.1 PERITONEAL ABSCESS: ICD-10-CM

## 2018-08-02 DIAGNOSIS — Z90.410 ACQUIRED TOTAL ABSENCE OF PANCREAS: Chronic | ICD-10-CM

## 2018-08-02 DIAGNOSIS — Z94.83 PANCREAS TRANSPLANT STATUS: Chronic | ICD-10-CM

## 2018-08-02 DIAGNOSIS — Z90.49 ACQUIRED ABSENCE OF OTHER SPECIFIED PARTS OF DIGESTIVE TRACT: Chronic | ICD-10-CM

## 2018-08-02 PROCEDURE — 74150 CT ABDOMEN W/O CONTRAST: CPT

## 2018-08-02 PROCEDURE — 74150 CT ABDOMEN W/O CONTRAST: CPT | Mod: 26

## 2018-08-02 PROCEDURE — 76080 X-RAY EXAM OF FISTULA: CPT

## 2018-08-02 PROCEDURE — 49424 ASSESS CYST CONTRAST INJECT: CPT

## 2018-08-02 PROCEDURE — 76080 X-RAY EXAM OF FISTULA: CPT | Mod: 26

## 2018-08-06 DIAGNOSIS — Z43.4 ENCOUNTER FOR ATTENTION TO OTHER ARTIFICIAL OPENINGS OF DIGESTIVE TRACT: ICD-10-CM

## 2018-08-06 DIAGNOSIS — T81.89XD OTHER COMPLICATIONS OF PROCEDURES, NOT ELSEWHERE CLASSIFIED, SUBSEQUENT ENCOUNTER: ICD-10-CM

## 2018-08-14 ENCOUNTER — FORM ENCOUNTER (OUTPATIENT)
Age: 43
End: 2018-08-14

## 2018-08-15 ENCOUNTER — APPOINTMENT (OUTPATIENT)
Dept: CT IMAGING | Facility: CLINIC | Age: 43
End: 2018-08-15

## 2018-08-15 ENCOUNTER — OUTPATIENT (OUTPATIENT)
Dept: OUTPATIENT SERVICES | Facility: HOSPITAL | Age: 43
LOS: 1 days | End: 2018-08-15
Payer: MEDICARE

## 2018-08-15 DIAGNOSIS — Z94.83 PANCREAS TRANSPLANT STATUS: Chronic | ICD-10-CM

## 2018-08-15 DIAGNOSIS — Z90.410 ACQUIRED TOTAL ABSENCE OF PANCREAS: Chronic | ICD-10-CM

## 2018-08-15 DIAGNOSIS — K65.1 PERITONEAL ABSCESS: ICD-10-CM

## 2018-08-15 DIAGNOSIS — Z90.81 ACQUIRED ABSENCE OF SPLEEN: Chronic | ICD-10-CM

## 2018-08-15 DIAGNOSIS — Z00.00 ENCOUNTER FOR GENERAL ADULT MEDICAL EXAMINATION WITHOUT ABNORMAL FINDINGS: ICD-10-CM

## 2018-08-15 DIAGNOSIS — Z90.49 ACQUIRED ABSENCE OF OTHER SPECIFIED PARTS OF DIGESTIVE TRACT: Chronic | ICD-10-CM

## 2018-08-15 PROCEDURE — 74150 CT ABDOMEN W/O CONTRAST: CPT | Mod: 26

## 2018-08-15 PROCEDURE — 74150 CT ABDOMEN W/O CONTRAST: CPT

## 2018-08-18 LAB
CULTURE RESULTS: SIGNIFICANT CHANGE UP
SPECIMEN SOURCE: SIGNIFICANT CHANGE UP

## 2018-11-21 ENCOUNTER — APPOINTMENT (OUTPATIENT)
Dept: GASTROENTEROLOGY | Facility: CLINIC | Age: 43
End: 2018-11-21

## 2018-12-01 NOTE — DISCHARGE NOTE ADULT - NSFTFHOMEHTHYNRD_GEN_ALL_CORE
Verified Results  MA US BREAST SCREEN LOS [id number] 11:11AM AQUILINO HILL   Ordering Provider: AQUILINO HILL.    Reason For Study: dense breast /screening for neoplasm,dense breast /screening for neoplasm.     Test Name Result Flag Reference   MA US BREAST SCREEN LOS (Report)     Accession #    [id number]    #[id number] - MA US BREAST SCREEN LOS    COMPLETE SCREENING ULTRASOUND OF BOTH BREASTS AND AXILLA: 8/22/2018    CLINICAL HISTORY:Dense breasts (asymptomatic screen).    COMPARISON:   Comparison is made to exams dated: 8/15/2018 mammogram, 6/14/2017 mammogram, 1/26/2016   mammogram, 1/6/2015 mammogram, 12/23/2013 mammogram, and 12/19/2012 mammogram - [facility].    FINDINGS:  Color flow and real-time ultrasound of both breasts four quadrants, retroareolar, and axilla   regions were performed. All representative images including gray scale of the real time   examination were reviewed.    No abnormalities were seen sonographically in either breast or either axilla. Real-time complete   bilateral breast ultrasound was performed that included imaging of all four quadrants of each   breast and retroareolar region of each breast as well as the bilateral imaging of the axilla.    IMPRESSION: BENIGN  Real-time complete bilateral breast ultrasound was performed that included imaging of all four   quadrants of each breast and retroareolar region of each breast as well as the bilateral imaging   of the axilla.    There is no sonographic evidence of malignancy.    A 1 year screening mammogram is recommended.    ULTRASOUND BI-RADS: 2 BENIGN    Gloria Vences M.D.  [facility]/:8/22/2018 11:18:55  copy to: MD JOY GOSS, ph: [phone]    Imaging Technologist: Cande Butler RDMS, [facility]  letter sent: Normal Single Exam  [id number]     **** FINAL ****    Dictated By:   GLORIA GLASS MD    Electronically Reviewed and Approved By:  GLORIA GLASS MD       [facility]     
Yes

## 2018-12-27 ENCOUNTER — INBOUND DOCUMENT (OUTPATIENT)
Age: 43
End: 2018-12-27

## 2019-04-09 NOTE — PROGRESS NOTE ADULT - ASSESSMENT
Sports Medicine New Consult Note    Referring Provider: Natalie Eden CNP    Chief Complaint   Patient presents with   • Right Shoulder - Pain       HPI: Tracee is a 45 year old female presenting today C/O right shoulder. Patient tates that the right shoulder pain has been occurring for the last 3 years; non specific MAURI. [place] objects on a daily basis though she does not remember injuring her shoulder during those times. [facility] was told that she had calcium deposits on RC, frozen shoulder. Patient had cortisone injection and attended physical therapy about 2 years ago. Patient is unable to carry a cup across a room without pain. Patient is now experiencing back spasm, neck pain, and numbness in her phalanges periodically.  Patient is not able to be intimate with her  in two years because of the pain. Pain is at a 6/10 at rest, 8/10 at it's worst. Pain is made worse by carrying items, sleeping at night, using computer more than 10 minutes at a time. Patient also experiences random shooting pain down arm. Patient feels like frozen shoulder has resolved because she is able to ambulate her arm, but there is great pain. Patient states that the pain deep inside her shoulder has never resolved.     Patient has had [facility] by [facility]. Gave release form to  last week.       Review of Systems   Constitutional: Negative for chills, diaphoresis and fever.   HENT: Negative for trouble swallowing.    Eyes: Negative for discharge and redness.   Respiratory: Negative for chest tightness, shortness of breath and stridor.    Cardiovascular: Negative for chest pain.   Gastrointestinal: Negative for abdominal pain, diarrhea, nausea and vomiting.   Skin: Negative for color change and rash.   Neurological: Negative for dizziness, facial asymmetry and speech difficulty.   Psychiatric/Behavioral: Negative for agitation, behavioral problems,  confusion and dysphoric mood.       Past Medical History:   Diagnosis Date   • Closed left arm fracture    • Skull fracture (CMS/HCC)        Past Surgical History:   Procedure Laterality Date   • Appendectomy     • Tubal ligation         Family History   Problem Relation Age of Onset   • Cancer Mother         skin cancer   • Heart disease Father    • Cancer Father         skin cancer   • Stroke Maternal Grandmother    • Cancer Maternal Grandmother         breast cancer   • Rheumatoid Arthritis Maternal Grandmother    • Cancer Maternal Grandfather    • Diabetes Neg Hx    • Systemic Lupus Erythematosus Neg Hx    • Other Neg Hx         gout   ,   No immediate family members with RA, SLE, gout    ALLERGIES:  No Known Allergies    Current Outpatient Medications   Medication Sig Dispense Refill   • ibuprofen (MOTRIN) 800 MG tablet Take 800 mg by mouth 2 times daily.       No current facility-administered medications for this visit.        Social History     Socioeconomic History   • Marital status: /Civil Union     Spouse name: Not on file   • Number of children: Not on file   • Years of education: Not on file   • Highest education level: Not on file   Occupational History   • Not on file   Social Needs   • Financial resource strain: Not on file   • Food insecurity:     Worry: Not on file     Inability: Not on file   • Transportation needs:     Medical: Not on file     Non-medical: Not on file   Tobacco Use   • Smoking status: Current Every Day Smoker     Packs/day: 0.25     Years: 32.00     Pack years: 8.00   • Smokeless tobacco: Current User   Substance and Sexual Activity   • Alcohol use: No     Frequency: Never   • Drug use: Never   • Sexual activity: Yes     Partners: Male   Lifestyle   • Physical activity:     Days per week: Not on file     Minutes per session: Not on file   • Stress: Not on file   Relationships   • Social connections:     Talks on phone: Not on file     Gets together: Not on file     Attends  Hindu service: Not on file     Active member of club or organization: Not on file     Attends meetings of clubs or organizations: Not on file     Relationship status: Not on file   • Intimate partner violence:     Fear of current or ex partner: Not on file     Emotionally abused: Not on file     Physically abused: Not on file     Forced sexual activity: Not on file   Other Topics Concern   • Not on file   Social History Narrative   • Not on file       Visit Vitals  /77 (BP Location: RUE)   Pulse 102   LMP 03/14/2019       Physical Exam   Constitutional: She appears well-developed and well-nourished.   HENT:   Head: Normocephalic and atraumatic.   Neck: Neck supple.   Pulmonary/Chest: Effort normal. No respiratory distress.   Neurological: She is alert.   Skin: Skin is warm and dry. No rash noted.   Psychiatric: She has a normal mood and affect. Her behavior is normal.   Vitals reviewed.    Ortho Exam  Normal shoulder range of motion with pain at extremes of flexion and abduction. Neer's positive. Hawkin's positive. Pain with resisted internal rotation, external rotation and Empty can test. Lyman's positive. Speed's positive.    Assessment & Plan:   (M75.31) Calcific tendonitis of right shoulder  (primary encounter diagnosis)  Comment: persistent, severe  Plan: MRI SHOULDER RIGHT WO CONTRAST    Prescribed prednisone burst, followed by naproxen 500mg bid for 2 wks.  Also prescribed tramadol for breakthrough pain.   The risks and benefits of the medication discussed with the patient, including potential side effects.    Will consider a referral for image guided aspiration of calcifications if not improving.     Follow up after MRI.    Jazlyn Escudero, DO  Sports Medicine      Carolina Vicente, ATC    This note was created using the Dragon voice recognition system. Errors in content may be related to improper recognition of the system. Effort to review and correct the note has been made but irregularities may still  be present.       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 admitted with purulent abdominal drainage from wound site.  CT A/P 3.8 x 2.7 cm abscess s/p IR drainage of abdominal collection on 5/9, and RA thrombus Transferred to PICU for closer monitoring.

## 2019-07-23 NOTE — PROVIDER CONTACT NOTE (OTHER) - NAME OF MD/NP/PA/DO NOTIFIED:
Called pt gave informed and contact number to pt accounts. Pt verbalized understanding    Tamanna Maguire MD

## 2020-06-12 NOTE — PATIENT PROFILE ADULT. - ABILITY TO HEAR (WITH HEARING AID OR HEARING APPLIANCE IF NORMALLY USED):
Acute respiratory failure with hypoxia and hypercarbia
Adequate: hears normal conversation without difficulty

## 2020-09-02 NOTE — H&P ADULT - NSHPPHYSICALEXAM_GEN_ALL_CORE
Vital Signs Last 24 Hrs  T(C): 37.4 (08 May 2018 15:30), Max: 37.4 (08 May 2018 15:30)  T(F): 99.3 (08 May 2018 15:30), Max: 99.3 (08 May 2018 15:30)  HR: 96 (08 May 2018 15:30) (85 - 96)  BP: 122/78 (08 May 2018 15:30) (122/78 - 132/84)  BP(mean): --  RR: 18 (08 May 2018 15:30) (16 - 18)  SpO2: 100% (08 May 2018 15:30) (98% - 100%)    General: A&Ox3, NAD.  HEENT: Anicteric sclerae.  Respiratory: Clear to auscultation bilaterally, w/ unlabored breathing.   CVS: Regular rate and rhythm.  Abdomen: Soft, non-distended. Erythema left of the umbilicus. Midline wound draining pus.  MSK: Intact ROM.
Cardiac (Pediatric)

## 2020-12-01 NOTE — ED PROVIDER NOTE - DISPOSITION TYPE
----- Message from Melissa Alvarez MA sent at 11/30/2020  4:44 PM CST -----  Contact: Patient [phone]    ----- Message -----  From: Kimberly Castellanos  Sent: 11/30/2020  10:55 AM CST  To: [facility] Staff    Caller is requesting an earlier appt than we can schedule.  Caller declined first available appointment listed below. Caller will not accept being placed on the wait list and is requesting a message be sent to the provider.   When is the next available appointment: none  Reason for the appointment: Gall Bladder pain/patient is 3 months pregnant  Patient preference of timeframe to be scheduled:   Comments:      Thank you           ADMIT

## 2021-04-29 ENCOUNTER — TRANSCRIPTION ENCOUNTER (OUTPATIENT)
Age: 46
End: 2021-04-29

## 2021-05-19 NOTE — PATIENT PROFILE ADULT. - NS PRO ABUSE SCREEN AFRAID ANYONE YN
Implemented All Universal Safety Interventions:  [place] to call system. Call bell, personal items and telephone within reach. Instruct patient to call for assistance. Room bathroom lighting operational. Non-slip footwear when patient is off stretcher. Physically safe environment: no spills, clutter or unnecessary equipment. Stretcher in lowest position, wheels locked, appropriate side rails in place. no

## 2021-09-15 ENCOUNTER — TRANSCRIPTION ENCOUNTER (OUTPATIENT)
Age: 46
End: 2021-09-15

## 2021-11-23 NOTE — CHART NOTE - NSCHARTNOTEFT_GEN_A_CORE
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     Source: Patient [X ]    Family [ ]     other [ X]; medical record    Hospital Course: Pt with h/o chronic pancreatitis, S/P tia in 2014; now POD #5 S/P total pancreatectomy with islet cell autotransplantation.     Pt tolerating EN via J-tube at goal. Pt is tolerating Consistent Carbohydrate diet, consuming soup, tea, and fruit without distress. Megace Rx added. Pt is receiving Creon 6,000 via J-tube every 8 hours. Pt reports she would prefer to take Creon capsules by mouth.    Patient reports multiple BMs overnight, however requests to continue colace due to h/o chronic constipation.    Reviewed carbohydrate counting, meal planning, pairing protein with carbohydrate, and label reading. Pt expressed understanding and accepted written diet education: Carbohydrate Counting for People with Diabetes, and Using Nutrition Labels-Carbohydrates.    Diet : Regular, Consistent Carbohydrate diet with snack + EN via J-tube     Enteral /Parenteral Nutrition: Vital1.2 via J-tube @ 45ml/hr x 24 hours provides 1080ml formula, 1296 rosario/day, 81 Gm protein/day, 876ml free water; meets 27cal/Kg and 1.7Gm per dosing wt 47.2Kg.    Current Weight: Weight (kg): 61.9Kg (4/9), 47.2Kg (04/05 dosing); question accuracy of weight gain  Edema: none noted    Pertinent Medications: MEDICATIONS  (STANDING):  amylase/lipase/protease  (CREON  6,000 Units) 1 Capsule(s) Oral every 8 hours  docusate sodium Liquid 100 milliGRAM(s) Oral two times a day  erythromycin    ethylsuccinate Suspension 40 mG/mL 500 milliGRAM(s) Oral every 6 hours  heparin  Injectable 5000 Unit(s) SubCutaneous every 12 hours  HYDROmorphone PCA (1 mG/mL) 30 milliLiter(s) PCA Continuous PCA Continuous  insulin glargine Injectable (LANTUS) 2 Unit(s) SubCutaneous once  insulin lispro (HumaLOG) corrective regimen sliding scale   SubCutaneous every 4 hours  insulin lispro Injectable (HumaLOG) 1 Unit(s) SubCutaneous three times a day with meals  lactated ringers. 1000 milliLiter(s) (20 mL/Hr) IV Continuous <Continuous>  megestrol Suspension 800 milliGRAM(s) Oral daily  ondansetron   Disintegrating Tablet 8 milliGRAM(s) Oral every 8 hours  oxyCODONE  ER Tablet 30 milliGRAM(s) Oral every 8 hours    MEDICATIONS  (PRN):  ALPRAZolam 0.25 milliGRAM(s) Oral every 8 hours PRN Anxiety  bisacodyl 10 milliGRAM(s) Oral at bedtime PRN Constipation  HYDROmorphone PCA (1 mG/mL) Rescue Clinician Bolus 1 milliGRAM(s) IV Push every 15 minutes PRN for Pain Scale GREATER THAN 6  naloxone Injectable 0.1 milliGRAM(s) IV Push every 3 minutes PRN For ANY of the following changes in patient status:  A. RR LESS THAN 10 breaths per minute, B. Oxygen saturation LESS THAN 90%, C. Sedation score of 6    Pertinent Labs:  04-09 Na137 mmol/L Glu 135 mg/dL<H> K+ 4.3 mmol/L Cr  0.67 mg/dL BUN 9 mg/dL 04-09 Phos 2.4 mg/dL<L> 04-09 Alb 2.9 g/dL<L>    Skin: no pressure injuries    Estimated Needs:   [X ] no change since previous assessment  [ ] recalculated:     Previous Nutrition Diagnosis:   [X ] Increased Nutrient Needs     Nutrition Diagnosis is [ X] ongoing; being addressed with po diet and EN via J-tube, infusing at goal    New Nutrition Diagnosis: [X ] not applicable    Interventions:     Recommend:  1) Continue Consistent Carbohydrate diet with snack; encourage intake of high protein, nutrient dense foods   2) Continue EN as above. Monitor ability to reduce/discontinue EN provision as po intake approaches 75% of nutrition needs  3) Monitor weight, lab values, skin, nutrition provision and GI tolerance  4) Reinforce therapeutic diet education as able    Monitoring and Evaluation:   Follow up per protocol  RD to remain available for further nutritional interventions as indicated.   Tammy Tomlinson, MS RD N [facility], #[phone].          CAPILLARY BLOOD GLUCOSE (Goal per protocol = 80-120mg/dL)    POCT Blood Glucose.: 197 mg/dL (10 Apr 2018 09:59)  POCT Blood Glucose.: 166 mg/dL (10 Apr 2018 06:03)  POCT Blood Glucose.: 160 mg/dL (10 Apr 2018 02:15)  POCT Blood Glucose.: 103 mg/dL (09 Apr 2018 22:02)  POCT Blood Glucose.: 108 mg/dL (09 Apr 2018 19:41)  POCT Blood Glucose.: 140 mg/dL (09 Apr 2018 14:35)  POCT Blood Glucose.: 160 mg/dL (09 Apr 2018 11:04) Pt seen for Pancreatic Islet Cell Txp nutrition follow up, as per department protocol.     Source: Patient [X ]    Family [ ]     other [ X]; medical record    Hospital Course: Pt with h/o chronic pancreatitis, S/P tia in 2014; now POD #5 S/P total pancreatectomy with islet cell autotransplantation.     Pt tolerating EN via J-tube at goal. Pt is tolerating Consistent Carbohydrate diet, consuming soup, tea, and fruit without distress. Megace Rx added. Pt is receiving Creon 6,000 via J-tube every 8 hours. Pt reports she would prefer to take Creon capsules by mouth.    Patient reports multiple BMs overnight, however requests to continue colace due to h/o chronic constipation.    Reviewed carbohydrate counting, meal planning, pairing protein with carbohydrate, and label reading. Pt expressed understanding and accepted written diet education: Carbohydrate Counting for People with Diabetes, and Using Nutrition Labels-Carbohydrates.    Diet : Regular, Consistent Carbohydrate diet with snack + EN via J-tube     Enteral /Parenteral Nutrition: Vital1.2 via J-tube @ 45ml/hr x 24 hours provides 1080ml formula, 1296 rosario/day, 81 Gm protein/day, 876ml free water; meets 27cal/Kg and 1.7Gm per dosing wt 47.2Kg.    Current Weight: Weight (kg): 61.9Kg (4/9), 47.2Kg (04/05 dosing); question accuracy of weight gain  Edema: none noted    Pertinent Medications: MEDICATIONS  (STANDING):  amylase/lipase/protease  (CREON  6,000 Units) 1 Capsule(s) Oral every 8 hours  docusate sodium Liquid 100 milliGRAM(s) Oral two times a day  erythromycin    ethylsuccinate Suspension 40 mG/mL 500 milliGRAM(s) Oral every 6 hours  heparin  Injectable 5000 Unit(s) SubCutaneous every 12 hours  HYDROmorphone PCA (1 mG/mL) 30 milliLiter(s) PCA Continuous PCA Continuous  insulin glargine Injectable (LANTUS) 2 Unit(s) SubCutaneous once  insulin lispro (HumaLOG) corrective regimen sliding scale   SubCutaneous every 4 hours  insulin lispro Injectable (HumaLOG) 1 Unit(s) SubCutaneous three times a day with meals  lactated ringers. 1000 milliLiter(s) (20 mL/Hr) IV Continuous <Continuous>  megestrol Suspension 800 milliGRAM(s) Oral daily  ondansetron   Disintegrating Tablet 8 milliGRAM(s) Oral every 8 hours  oxyCODONE  ER Tablet 30 milliGRAM(s) Oral every 8 hours    MEDICATIONS  (PRN):  ALPRAZolam 0.25 milliGRAM(s) Oral every 8 hours PRN Anxiety  bisacodyl 10 milliGRAM(s) Oral at bedtime PRN Constipation  HYDROmorphone PCA (1 mG/mL) Rescue Clinician Bolus 1 milliGRAM(s) IV Push every 15 minutes PRN for Pain Scale GREATER THAN 6  naloxone Injectable 0.1 milliGRAM(s) IV Push every 3 minutes PRN For ANY of the following changes in patient status:  A. RR LESS THAN 10 breaths per minute, B. Oxygen saturation LESS THAN 90%, C. Sedation score of 6    Pertinent Labs:  04-09 Na137 mmol/L Glu 135 mg/dL<H> K+ 4.3 mmol/L Cr  0.67 mg/dL BUN 9 mg/dL 04-09 Phos 2.4 mg/dL<L> 04-09 Alb 2.9 g/dL<L>    CAPILLARY BLOOD GLUCOSE    POCT Blood Glucose.: 197 mg/dL (10 Apr 2018 09:59)  POCT Blood Glucose.: 166 mg/dL (10 Apr 2018 06:03)  POCT Blood Glucose.: 160 mg/dL (10 Apr 2018 02:15)  POCT Blood Glucose.: 103 mg/dL (09 Apr 2018 22:02)  POCT Blood Glucose.: 108 mg/dL (09 Apr 2018 19:41)  POCT Blood Glucose.: 140 mg/dL (09 Apr 2018 14:35)  POCT Blood Glucose.: 160 mg/dL (09 Apr 2018 11:04)    Skin: no pressure injuries    Estimated Needs:   [X ] no change since previous assessment  [ ] recalculated:     Previous Nutrition Diagnosis:   [X ] Increased Nutrient Needs     Nutrition Diagnosis is [ X] ongoing; being addressed with po diet and EN via J-tube, infusing at goal    New Nutrition Diagnosis: [X ] not applicable    Interventions:     Recommend:  1) Continue Consistent Carbohydrate diet with snack; encourage intake of high protein, nutrient dense foods   2) Continue EN as above. Monitor ability to reduce/discontinue EN provision as po intake approaches 75% of nutrition needs  3) Monitor weight, lab values, skin, nutrition provision and GI tolerance  4) Reinforce therapeutic diet education as able    Monitoring and Evaluation:   Follow up per protocol  RD to remain available for further nutritional interventions as indicated.   Tammy Tomlinson, MS RD N [facility], #[phone].          CAPILLARY BLOOD GLUCOSE (Goal per protocol = 80-120mg/dL)    POCT Blood Glucose.: 197 mg/dL (10 Apr 2018 09:59)  POCT Blood Glucose.: 166 mg/dL (10 Apr 2018 06:03)  POCT Blood Glucose.: 160 mg/dL (10 Apr 2018 02:15)  POCT Blood Glucose.: 103 mg/dL (09 Apr 2018 22:02)  POCT Blood Glucose.: 108 mg/dL (09 Apr 2018 19:41)  POCT Blood Glucose.: 140 mg/dL (09 Apr 2018 14:35)  POCT Blood Glucose.: 160 mg/dL (09 Apr 2018 11:04) Pt seen for Pancreatic Islet Cell Txp nutrition follow up, as per department protocol.     Source: Patient [X ]    Family [ ]     other [ X]; medical record    Hospital Course: Pt with h/o chronic pancreatitis, S/P tia in 2014; now POD #5 S/P total pancreatectomy with islet cell autotransplantation.     Pt tolerating EN via J-tube at goal. Pt is tolerating Consistent Carbohydrate diet, consuming soup, tea, and fruit without distress. Megace Rx added. Pt is receiving Creon 6,000 via J-tube every 8 hours. Pt reports she would prefer to take Creon capsules by mouth.    Patient reports multiple BMs overnight, however requests to continue colace due to h/o chronic constipation.    Reviewed carbohydrate counting, meal planning, pairing protein with carbohydrate, and label reading. Pt expressed understanding and accepted written diet education: Carbohydrate Counting for People with Diabetes, and Using Nutrition Labels-Carbohydrates.    Diet : Regular, Consistent Carbohydrate diet with snack + EN via J-tube     Enteral /Parenteral Nutrition: Vital1.2 via J-tube @ 45ml/hr x 24 hours provides 1080ml formula, 1296 rosario/day, 81 Gm protein/day, 876ml free water; meets 27cal/Kg and 1.7Gm per dosing wt 47.2Kg.    Current Weight: Weight (kg): 61.9Kg (4/9), 47.2Kg (04/05 dosing); question accuracy of weight gain  Edema: none noted    Pertinent Medications: MEDICATIONS  (STANDING):  amylase/lipase/protease  (CREON  6,000 Units) 1 Capsule(s) Oral every 8 hours  docusate sodium Liquid 100 milliGRAM(s) Oral two times a day  erythromycin    ethylsuccinate Suspension 40 mG/mL 500 milliGRAM(s) Oral every 6 hours  heparin  Injectable 5000 Unit(s) SubCutaneous every 12 hours  HYDROmorphone PCA (1 mG/mL) 30 milliLiter(s) PCA Continuous PCA Continuous  insulin glargine Injectable (LANTUS) 2 Unit(s) SubCutaneous once  insulin lispro (HumaLOG) corrective regimen sliding scale   SubCutaneous every 4 hours  insulin lispro Injectable (HumaLOG) 1 Unit(s) SubCutaneous three times a day with meals  lactated ringers. 1000 milliLiter(s) (20 mL/Hr) IV Continuous <Continuous>  megestrol Suspension 800 milliGRAM(s) Oral daily  ondansetron   Disintegrating Tablet 8 milliGRAM(s) Oral every 8 hours  oxyCODONE  ER Tablet 30 milliGRAM(s) Oral every 8 hours    MEDICATIONS  (PRN):  ALPRAZolam 0.25 milliGRAM(s) Oral every 8 hours PRN Anxiety  bisacodyl 10 milliGRAM(s) Oral at bedtime PRN Constipation  HYDROmorphone PCA (1 mG/mL) Rescue Clinician Bolus 1 milliGRAM(s) IV Push every 15 minutes PRN for Pain Scale GREATER THAN 6  naloxone Injectable 0.1 milliGRAM(s) IV Push every 3 minutes PRN For ANY of the following changes in patient status:  A. RR LESS THAN 10 breaths per minute, B. Oxygen saturation LESS THAN 90%, C. Sedation score of 6    Pertinent Labs:  04-09 Na137 mmol/L Glu 135 mg/dL<H> K+ 4.3 mmol/L Cr  0.67 mg/dL BUN 9 mg/dL 04-09 Phos 2.4 mg/dL<L> 04-09 Alb 2.9 g/dL<L>    CAPILLARY BLOOD GLUCOSE    POCT Blood Glucose.: 197 mg/dL (10 Apr 2018 09:59)  POCT Blood Glucose.: 166 mg/dL (10 Apr 2018 06:03)  POCT Blood Glucose.: 160 mg/dL (10 Apr 2018 02:15)  POCT Blood Glucose.: 103 mg/dL (09 Apr 2018 22:02)  POCT Blood Glucose.: 108 mg/dL (09 Apr 2018 19:41)  POCT Blood Glucose.: 140 mg/dL (09 Apr 2018 14:35)  POCT Blood Glucose.: 160 mg/dL (09 Apr 2018 11:04)    Skin: no pressure injuries    Estimated Needs:   [X ] no change since previous assessment  [ ] recalculated:     Previous Nutrition Diagnosis:   [X ] Increased Nutrient Needs     Nutrition Diagnosis is [ X] ongoing; being addressed with po diet and EN via J-tube, infusing at goal    New Nutrition Diagnosis: [X ] not applicable    Interventions:     Recommend:  1) Continue Consistent Carbohydrate diet with snack; encourage intake of high protein, nutrient dense foods   2) Continue EN as above. Monitor ability to reduce/discontinue EN provision as po intake approaches 75% of nutrition needs  3) Monitor weight, lab values, skin, nutrition provision and GI tolerance  4) Reinforce therapeutic diet education as able    Monitoring and Evaluation:   Follow up per protocol  RD to remain available for further nutritional interventions as indicated.   Tammy Tomlinson, MS RD N [facility], #[phone]. Saucerization Depth: dermis and superficial adipose tissue

## 2022-11-11 NOTE — H&P PST ADULT - MAMMOGRAM, LAST, PROFILE
Patient is here today for her two week postoperative visit.  She had a D and C on 11/1/22.  Currently she reports reports she has random bleeding. She says it can occur every day and then skip a day. Pt reports last day she bled was yesterday.      Latex:  Patient denies allergy to latex.  Medications reviewed with patient.  Tobacco use verified.  Allergies verified.    Patient would like communication of their results via:   [facility]    Patient's current [facility] status: Active.     Chaperone needed:           never had a mammogram

## 2023-01-17 ENCOUNTER — RESULT CHARGE (OUTPATIENT)
Age: 48
End: 2023-01-17

## 2023-01-17 ENCOUNTER — APPOINTMENT (OUTPATIENT)
Dept: ENDOCRINOLOGY | Facility: CLINIC | Age: 48
End: 2023-01-17
Payer: MEDICARE

## 2023-01-17 VITALS
OXYGEN SATURATION: 96 % | HEART RATE: 116 BPM | HEIGHT: 64 IN | DIASTOLIC BLOOD PRESSURE: 80 MMHG | SYSTOLIC BLOOD PRESSURE: 120 MMHG | WEIGHT: 110 LBS | BODY MASS INDEX: 18.78 KG/M2

## 2023-01-17 DIAGNOSIS — D64.9 ANEMIA, UNSPECIFIED: ICD-10-CM

## 2023-01-17 LAB — GLUCOSE BLDC GLUCOMTR-MCNC: 139

## 2023-01-17 PROCEDURE — 99204 OFFICE O/P NEW MOD 45 MIN: CPT | Mod: 25

## 2023-01-17 PROCEDURE — 95251 CONT GLUC MNTR ANALYSIS I&R: CPT

## 2023-01-17 PROCEDURE — 82962 GLUCOSE BLOOD TEST: CPT

## 2023-01-17 RX ORDER — COLCHICINE 0.6 MG/1
0.6 TABLET ORAL DAILY
Refills: 0 | Status: ACTIVE | COMMUNITY

## 2023-01-17 RX ORDER — OXYCODONE HYDROCHLORIDE 30 MG/1
30 TABLET, FILM COATED, EXTENDED RELEASE ORAL TWICE DAILY
Refills: 0 | Status: DISCONTINUED | COMMUNITY
Start: 2018-04-02 | End: 2023-01-17

## 2023-01-17 RX ORDER — ALLOPURINOL 200 MG/1
200 TABLET ORAL
Refills: 0 | Status: ACTIVE | COMMUNITY

## 2023-01-17 RX ORDER — OXYCODONE 10 MG/1
10 TABLET ORAL
Refills: 0 | Status: DISCONTINUED | COMMUNITY
Start: 2018-04-02 | End: 2023-01-17

## 2023-01-17 RX ORDER — PREDNISONE 5 MG/1
5 TABLET ORAL
Refills: 0 | Status: ACTIVE | COMMUNITY

## 2023-01-17 RX ORDER — LINACLOTIDE 290 UG/1
290 CAPSULE, GELATIN COATED ORAL
Refills: 0 | Status: ACTIVE | COMMUNITY

## 2023-01-17 NOTE — HISTORY OF PRESENT ILLNESS
[FreeTextEntry1] : New patient transferring from [facility]. 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 In 2018, had TP/AIT, which has not led to 17 abdomen surgies, in re-routing her digestive tract. Recently on steroid from 1/2/23-1/16/23, for GOUT  attack. Had different settings on pump for when she is on steroids.no recent HgbA1C, as per patient last one was 5.7% a few months back. \par \par Quality:T1DM\par Severity: controlled \par Duration: 2018\par Onset: s/p TP/AIT\par Modifying Factors: insulin pump \par \par SMBG- 139, ate candies in car \par \par HgbA1C: no recent \par \par Current Regimen:\par Omni Pod -Novolog \par Settings:\par 12 AM 0.05\par 6 AM 0.1\par 9:30 AM 0.15\par \par Average Glucose: 173\par Target Range:51 %\par Very High:2 %\par High: 44 %\par Low: 2%\par Very Low: <1% \par --Elevated due to being steroids for the past 3 weeks \par \par Eye Exam: 2022, no DR \par Foot Exam: denies b/l neuropathy, has gout\par Kidney Disease: denies \par Heart Disease:denies \par \par Weight: stable \par Diet: "Excellent"\par B- eggs, toast, fruit\par L- sandwich, dinner \par D- home cooked meals\par Snacks- not too much, turkey, protein shakes, \par Exercise:sometimes, been tough with gout, always on feet\par Smoking: none

## 2023-01-17 NOTE — ASSESSMENT
[FreeTextEntry1] : T1DM\par -Reviewed risk/complication of uncontrolled DM \par -Increase exercise as tolerated 5 days a week x 30 mins/day\par -Continue dietary efforts, low carb/low sugar\par -Continue current pump settings and use of CGM \par -Repeat HgbA1C \par \par Glucose Sensor Necessity: This patient with diabetes performs 4 glucose checks per day utilizing a home blood glucose monitor. The patient is treated with insulin via insulin pump . This patient requires frequent adjustments to their insulin treatment on the basis of therapeutic continuous glucose monitoring results by adjusting fixed insulin doses. In addition, the patient has been to our office for an evaluation of their diabetes control within the past 6 months.\par \par \par Vitamin D Deficiency\par -Continue Vit D 15017 units once a week \par \par RTO in 3 month MD\par

## 2023-01-17 NOTE — PHYSICAL EXAM
[Alert] : alert [Normal Sclera/Conjunctiva] : normal sclera/conjunctiva [No Neck Mass] : no neck mass was observed [Thyroid Not Enlarged] : the thyroid was not enlarged [No Respiratory Distress] : no respiratory distress [No Accessory Muscle Use] : no accessory muscle use [Clear to Auscultation] : lungs were clear to auscultation bilaterally [Normal S1, S2] : normal S1 and S2 [No Stigmata of Cushings Syndrome] : no stigmata of Cushings Syndrome [Normal Gait] : normal gait [Right foot was examined, including] : right foot ~C was examined, including visual inspection with sensory and pulse exams [Left foot was examined, including] : left foot ~C was examined, including visual inspection with sensory and pulse exams [Swelling] : swollen [Erythema] : not erythematous [Normal] : normal [Diminished Throughout Both Feet] : normal tactile sensation with monofilament testing throughout both feet [Oriented x3] : oriented to person, place, and time

## 2023-01-17 NOTE — REVIEW OF SYSTEMS
[Fatigue] : fatigue [Recent Weight Gain (___ Lbs)] : no recent weight gain [Recent Weight Loss (___ Lbs)] : no recent weight loss [Visual Field Defect] : no visual field defect [Dysphagia] : no dysphagia [Neck Pain] : no neck pain [Chest Pain] : no chest pain [Palpitations] : no palpitations [Shortness Of Breath] : no shortness of breath [Nausea] : no nausea [Constipation] : no constipation [Vomiting] : no vomiting [Diarrhea] : no diarrhea [Polyuria] : no polyuria [Polydipsia] : no polydipsia

## 2023-01-27 ENCOUNTER — NON-APPOINTMENT (OUTPATIENT)
Age: 48
End: 2023-01-27

## 2023-01-30 NOTE — CHART NOTE - NSCHARTNOTEFT_GEN_A_CORE
Patient currently in dayroom on phone with his mother. Patient has been calm and pleasant. Will continue to monitor.      Andrew Adams  01/30/23 [id number] Pt seen for Pancreatic Islet Cell Txp nutrition follow up, as per department protocol.     Source: Patient [X ]    Family [ ]     other [ X]; medical record    Hospital Course: Pt S/P total pancreatectomy with islet cell autotransplantation, now with fevers of unknown origin and an increasing leukocytosis; S/P IR drainage of pelvic fluid collection 4/11 with persistently elevated WBC and tachycardia.     Tube feeds discontinued yesterday, due to early satiety. Pt is tolating po diet, eating small, frequent meals and focusing on protein intake. Pt will be NPO this morning for CT scan. Hyperglycemia noted; pt now ordered for Lantus, Humulin-N, and pre-meal Humulog.    Patient reports she is having bowel movements every other day, last BM 4/11. Pt requests increased bowel regime; recommend adding senna.    Pt is taking all Creon capsules by mouth, no longer via J-Tube.     Diet : Regular, Consistent Carbohydrate diet with snack      Enteral /Parenteral Nutrition: d/c    Current Weight: Weight (kg): 61.9Kg (4/9), 47.2Kg (04/05 dosing); question accuracy of weight gain  Edema: 1+ dependent, Lasix Rx noted    Pertinent Medications: MEDICATIONS  (STANDING):  amylase/lipase/protease  (CREON  6,000 Units) 1 Capsule(s) Oral three times a day with meals  amylase/lipase/protease  (CREON  6,000 Units) 1 Capsule(s) Oral at bedtime  dextrose 5%. 1000 milliLiter(s) (50 mL/Hr) IV Continuous <Continuous>  docusate sodium Liquid 100 milliGRAM(s) Oral two times a day  erythromycin    ethylsuccinate Suspension 40 mG/mL 500 milliGRAM(s) Oral every 6 hours  fluconAZOLE IVPB      furosemide   Injectable 20 milliGRAM(s) IV Push once  heparin  Injectable 5000 Unit(s) SubCutaneous every 12 hours  insulin glargine Injectable (LANTUS) 8 Unit(s) SubCutaneous every morning  insulin lispro (HumaLOG) corrective regimen sliding scale   SubCutaneous every 4 hours  insulin lispro Injectable (HumaLOG) 1 Unit(s) SubCutaneous three times a day before meals  insulin NPH human recombinant 1 Unit(s) SubCutaneous <User Schedule>  megestrol Suspension 800 milliGRAM(s) Oral daily  ondansetron   Disintegrating Tablet 8 milliGRAM(s) Oral every 8 hours  oxyCODONE  ER Tablet 30 milliGRAM(s) Oral every 8 hours  piperacillin/tazobactam IVPB. 3.375 Gram(s) IV Intermittent every 8 hours  sodium phosphate IVPB 15 milliMole(s) IV Intermittent every 4 hours  vancomycin  IVPB 1000 milliGRAM(s) IV Intermittent every 12 hours    MEDICATIONS  (PRN):  acetaminophen   Tablet 650 milliGRAM(s) Oral every 6 hours PRN mild pain  ALPRAZolam 0.25 milliGRAM(s) Oral every 8 hours PRN Anxiety  bisacodyl 10 milliGRAM(s) Oral at bedtime PRN Constipation  methocarbamol 750 milliGRAM(s) Oral every 8 hours PRN spasm  oxyCODONE    IR 10 milliGRAM(s) Oral every 3 hours PRN Moderate Pain (4 - 6)    Pertinent Labs:  04-13 Na131 mmol/L<L> Glu 194 mg/dL<H> K+ 4.3 mmol/L Cr  0.69 mg/dL BUN 6 mg/dL<L> 04-13 Phos 2.3 mg/dL<L> 04-13 Alb 2.2 g/dL<L> 04-12 [id number] 5.4 %    CAPILLARY BLOOD GLUCOSE  POCT Blood Glucose.: 134 mg/dL (13 Apr 2018 05:59)  POCT Blood Glucose.: 197 mg/dL (13 Apr 2018 02:19)  POCT Blood Glucose.: 138 mg/dL (12 Apr 2018 22:10)  POCT Blood Glucose.: 136 mg/dL (12 Apr 2018 19:10)  POCT Blood Glucose.: 105 mg/dL (12 Apr 2018 14:15)  POCT Blood Glucose.: 180 mg/dL (12 Apr 2018 10:48)    Skin: no pressure injuries    Estimated Needs:   [X ] no change since previous assessment  [ ] recalculated:     Previous Nutrition Diagnosis:   [X ] Increased Nutrient Needs     Nutrition Diagnosis is [ X] ongoing; being addressed with po diet    New Nutrition Diagnosis: [X ] not applicable    Interventions:     Recommend:  1) Continue Consistent Carbohydrate diet with snack; encourage intake of high protein, nutrient dense foods   2) Monitor weight, lab values, skin, nutrition provision and GI tolerance  3) Reinforce therapeutic diet education as able    Monitoring and Evaluation:   Follow up per protocol  RD to remain available for further nutritional interventions as indicated.   Tammy Tomlinson, MS RD CDN [facility], #[phone].

## 2023-02-13 NOTE — DISCHARGE NOTE ADULT - FUNCTIONAL SCREEN CURRENT LEVEL: DRESSING, MLM
Stressed importance of dietary modifications. Follow a low cholesterol, low saturated fat diet with less that 200mg of cholesterol a day.  Avoid fried foods and high saturated fats (high saturated fats less than 7% of calories).  Add Flax Seed/Fish Oil supplements to diet. Increase dietary fiber.  Regular exercise can reduce LDL and raise HDL. Stressed importance of physical activity 5 times per week for 30 minutes per day.       Chronic, FLP today. Continue lovastatin 10mg nightly    (0) independent

## 2023-02-15 NOTE — H&P PST ADULT - EXTREMITIES
detailed exam Cimetidine Counseling:  I discussed with the patient the risks of Cimetidine including but not limited to gynecomastia, headache, diarrhea, nausea, drowsiness, arrhythmias, pancreatitis, skin rashes, psychosis, bone marrow suppression and kidney toxicity.

## 2023-03-28 ENCOUNTER — LABORATORY RESULT (OUTPATIENT)
Age: 48
End: 2023-03-28

## 2023-03-29 ENCOUNTER — NON-APPOINTMENT (OUTPATIENT)
Age: 48
End: 2023-03-29

## 2023-03-29 ENCOUNTER — TRANSCRIPTION ENCOUNTER (OUTPATIENT)
Age: 48
End: 2023-03-29

## 2023-04-26 ENCOUNTER — APPOINTMENT (OUTPATIENT)
Dept: ENDOCRINOLOGY | Facility: CLINIC | Age: 48
End: 2023-04-26
Payer: MEDICARE

## 2023-04-26 VITALS
WEIGHT: 112 LBS | HEART RATE: 96 BPM | DIASTOLIC BLOOD PRESSURE: 80 MMHG | BODY MASS INDEX: 19.12 KG/M2 | OXYGEN SATURATION: 98 % | SYSTOLIC BLOOD PRESSURE: 135 MMHG | HEIGHT: 64 IN

## 2023-04-26 LAB — GLUCOSE BLDC GLUCOMTR-MCNC: 124

## 2023-04-26 PROCEDURE — 95251 CONT GLUC MNTR ANALYSIS I&R: CPT

## 2023-04-26 PROCEDURE — 82962 GLUCOSE BLOOD TEST: CPT

## 2023-04-26 PROCEDURE — 99215 OFFICE O/P EST HI 40 MIN: CPT | Mod: 25

## 2023-04-27 NOTE — ASSESSMENT
[FreeTextEntry1] : 47 y.o. 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 Dx with DM after pancreatectomy presents today for follow up.  She switched from provider at [place]. Seen initially by Suni Zhou NP. On insulin pump (Omnipod) and CGM Dexcom for the last 3 years. Reportedly diabetes has been well controlled. Patient reports confidence in carb counting. Recently had trigger finger repair and received steroid shots. Rest of PMHx consist of Gout, Factor V Leiden deficiency, on Eliquis and Vit D deficiency.\par \par Insulin pump settings:\par Basal rate:\par MN 0.05U\par 6 AM 0.1U\par 9.30 AM 0.15U\par 3PM 0.2U\par \par Total insulin  ~3.7U/24h\par IC 1:15\par CF 1:95\par \par Dexcom download - reviewed\par Active time 935, In range 75%, High 19%, Very High <1%, Low 4%, Very low 1%\par \par # Hx of Pancreatogenic DM, S/p Pancreatectomy \par  A1C 5.1%\par Patient keeps strict diet and reports accurate carb counting\par Will continue current regimen\par Continue carb consistent diet and regular exercising\par Will check C-peptide to assess insulin reserve. \par \par # Abnormal TFTs\par Slightly low FT4 0.8 with normal TSH\par Clinically euthyroid \par Check TFTs next visit\par \par # Vit D deficiency\par On Vit D2 50K U weekly\par F/u Vit D25(OH)\par \par F/u with NP in ~ 2 Months\par F/u with me in ~ 4 months\par \par

## 2023-04-27 NOTE — PHYSICAL EXAM
[No Acute Distress] : no acute distress [Well Developed] : well developed [Normal Voice/Communication] : normal voice communication [Normal Sclera/Conjunctiva] : normal sclera/conjunctiva [EOMI] : extra ocular movement intact [PERRL] : pupils equal, round and reactive to light [Normal Outer Ear/Nose] : the ears and nose were normal in appearance [Normal Hearing] : hearing was normal [Thyroid Not Enlarged] : the thyroid was not enlarged [No Thyroid Nodules] : no palpable thyroid nodules [No Respiratory Distress] : no respiratory distress [Clear to Auscultation] : lungs were clear to auscultation bilaterally [No Edema] : no peripheral edema [Normal Bowel Sounds] : normal bowel sounds [Soft] : abdomen soft [No Clubbing, Cyanosis] : no clubbing  or cyanosis of the fingernails [No Tremors] : no tremors [Oriented x3] : oriented to person, place, and time [Normal Affect] : the affect was normal [Normal Insight/Judgement] : insight and judgment were intact [Normal Mood] : the mood was normal [de-identified] : gastrocutaneous fistula noted and unclosed PEG tube opening.

## 2023-04-27 NOTE — HISTORY OF PRESENT ILLNESS
[FreeTextEntry1] : 47 y.o. 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 Dx with DM after pancreatectomy presents today for follow up.  She switched from provider at [place]. Seen initially by Suni Zhou NP. On insulin pump (Omnipod) and CGM Dexcom for the last 3 years. Reportedly diabetes has been well controlled. Patient reports confidence in carb counting. Recently had trigger finger repair and received steroid shots. Rest of PMHx consist of Gout, Factor V Leiden deficiency, on Eliquis.

## 2023-06-20 ENCOUNTER — APPOINTMENT (OUTPATIENT)
Dept: ENDOCRINOLOGY | Facility: CLINIC | Age: 48
End: 2023-06-20
Payer: MEDICARE

## 2023-06-20 VITALS
SYSTOLIC BLOOD PRESSURE: 135 MMHG | BODY MASS INDEX: 18.78 KG/M2 | HEART RATE: 93 BPM | HEIGHT: 64 IN | WEIGHT: 110 LBS | OXYGEN SATURATION: 97 % | DIASTOLIC BLOOD PRESSURE: 80 MMHG

## 2023-06-20 LAB — GLUCOSE BLDC GLUCOMTR-MCNC: 131

## 2023-06-20 PROCEDURE — 82962 GLUCOSE BLOOD TEST: CPT

## 2023-06-20 PROCEDURE — 99214 OFFICE O/P EST MOD 30 MIN: CPT | Mod: 25

## 2023-06-20 PROCEDURE — 95251 CONT GLUC MNTR ANALYSIS I&R: CPT

## 2023-06-20 RX ORDER — APIXABAN 5 MG/1
5 TABLET, FILM COATED ORAL
Qty: 60 | Refills: 6 | Status: DISCONTINUED | COMMUNITY
Start: 2018-05-29 | End: 2023-06-20

## 2023-06-20 RX ORDER — POTASSIUM CHLORIDE 600 MG/1
8 TABLET, FILM COATED, EXTENDED RELEASE ORAL DAILY
Qty: 30 | Refills: 0 | Status: DISCONTINUED | COMMUNITY
Start: 2018-04-24 | End: 2023-06-20

## 2023-06-20 RX ORDER — ZOLPIDEM TARTRATE 5 MG/1
5 TABLET, FILM COATED ORAL
Refills: 0 | Status: DISCONTINUED | COMMUNITY
Start: 2018-04-02 | End: 2023-06-20

## 2023-06-20 RX ORDER — METHOCARBAMOL 750 MG/1
750 TABLET, FILM COATED ORAL 3 TIMES DAILY
Refills: 0 | Status: DISCONTINUED | COMMUNITY
Start: 2018-04-02 | End: 2023-06-20

## 2023-06-20 RX ORDER — ERGOCALCIFEROL (VITAMIN D2) 1250 MCG
50000 CAPSULE ORAL
Refills: 0 | Status: DISCONTINUED | COMMUNITY
End: 2023-06-20

## 2023-06-20 RX ORDER — LINEZOLID 600 MG/1
600 TABLET, FILM COATED ORAL
Qty: 42 | Refills: 0 | Status: DISCONTINUED | COMMUNITY
Start: 2018-06-14 | End: 2023-06-20

## 2023-06-20 NOTE — ASSESSMENT
[Diabetes Foot Care] : diabetes foot care [Long Term Vascular Complications] : long term vascular complications of diabetes [Carbohydrate Consistent Diet] : carbohydrate consistent diet [Importance of Diet and Exercise] : importance of diet and exercise to improve glycemic control, achieve weight loss and improve cardiovascular health [Exercise/Effect on Glucose] : exercise/effect on glucose [Retinopathy Screening] : Patient was referred to ophthalmology for retinopathy screening [FreeTextEntry1] : T1DM Hx of Pancreatogenic DM, S/p Pancreatectomy \par -Reviewed risk/complication of uncontrolled DM \par -Increase exercise as tolerated 5 days a week x 30 mins/day\par -Continue dietary efforts, low carb/low sugar\par -Continue current pump settings and use of CGM \par -Repeat HgbA1C prior to next visit \par -Pt does not want any changes to pump settings\par Low blood sugars overnight, patient has apple juice to increase, does not want to lower basal because thinks sugar will then go too high \par \par Glucose Sensor Necessity: This patient with diabetes performs 4 glucose checks per day utilizing a home blood glucose monitor. The patient is treated with insulin via insulin pump . This patient requires frequent adjustments to their insulin treatment on the basis of therapeutic continuous glucose monitoring results by adjusting fixed insulin doses. In addition, the patient has been to our office for an evaluation of their diabetes control within the past 6 months.\par \par \par Vitamin D Deficiency\par -on Multi Vitamin \par \par Abnormal TFTs\par Clinically euthyroid \par Check TFTs next visit\par \par Needs updated labs prior to next appt for surgery clearance. \par

## 2023-06-20 NOTE — PHYSICAL EXAM
[Alert] : alert [Normal Sclera/Conjunctiva] : normal sclera/conjunctiva [No Neck Mass] : no neck mass was observed [Thyroid Not Enlarged] : the thyroid was not enlarged [No Respiratory Distress] : no respiratory distress [No Accessory Muscle Use] : no accessory muscle use [Clear to Auscultation] : lungs were clear to auscultation bilaterally [Normal S1, S2] : normal S1 and S2 [No Stigmata of Cushings Syndrome] : no stigmata of Cushings Syndrome [Normal Gait] : normal gait [Right foot was examined, including] : right foot ~C was examined, including visual inspection with sensory and pulse exams [Left foot was examined, including] : left foot ~C was examined, including visual inspection with sensory and pulse exams [Swelling] : swollen [Normal] : normal [Oriented x3] : oriented to person, place, and time [Erythema] : not erythematous [Diminished Throughout Both Feet] : normal tactile sensation with monofilament testing throughout both feet

## 2023-06-20 NOTE — HISTORY OF PRESENT ILLNESS
[FreeTextEntry1] : 47 y.o. 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 Dx with DM after pancreatectomy presents today for follow up. She switched from provider at [place]. S. On insulin pump (Omnipod) and CGM Dexcom for the last 3 years. Reportedly diabetes has been well controlled. Patient reports confidence in carb counting. Recently had trigger finger repair and received steroid shots. Rest of PMHx consist of Gout, Factor V Leiden deficiency. Now off Eliquis x 1 months.  \par \par \par Patient is have gestrectomy at [facility] on 7/26/2023\par \par Quality:T1DM\par Severity: controlled \par Duration: 2018\par Onset: s/p TP/AIT\par Modifying Factors: insulin pump \par \par SMBG- 139, ate candies in car \par \par HgbA1C: no recent \par \par Current Regimen:\par Omni Pod -Novolog \par Settings:\par 12 AM 0.05\par 6 AM 0.15\par 9:30 AM 0.2\par \par \par Total insulin ~3.725U/24h\par IC 1:15\par CF 1:95\par \par Average Glucose: 137\par Target Range: 74%\par Very High: <1%\par High: 22%\par Low: 3%\par Very Low: <1% \par \par \par Eye Exam: 2022, no DR \par Foot Exam: denies b/l neuropathy, has gout\par Kidney Disease: denies \par Heart Disease:denies \par \par Weight: stable \par Diet: "Excellent"\par B- eggs, toast, fruit\par L- sandwich, dinner \par D- home cooked meals\par Snacks- not too much, turkey, protein shakes, \par Exercise:sometimes, been tough with gout, always on feet\par Smoking: none

## 2023-06-21 ENCOUNTER — TRANSCRIPTION ENCOUNTER (OUTPATIENT)
Age: 48
End: 2023-06-21

## 2023-07-19 ENCOUNTER — LABORATORY RESULT (OUTPATIENT)
Age: 48
End: 2023-07-19

## 2023-07-20 ENCOUNTER — APPOINTMENT (OUTPATIENT)
Dept: ENDOCRINOLOGY | Facility: CLINIC | Age: 48
End: 2023-07-20
Payer: MEDICARE

## 2023-07-20 DIAGNOSIS — R94.6 ABNORMAL RESULTS OF THYROID FUNCTION STUDIES: ICD-10-CM

## 2023-07-20 PROCEDURE — 99214 OFFICE O/P EST MOD 30 MIN: CPT | Mod: 95

## 2023-07-20 NOTE — REVIEW OF SYSTEMS
[Fatigue] : no fatigue [Decreased Appetite] : appetite not decreased [Recent Weight Gain (___ Lbs)] : no recent weight gain [Recent Weight Loss (___ Lbs)] : no recent weight loss [Visual Field Defect] : no visual field defect [Blurred Vision] : no blurred vision [Dysphagia] : no dysphagia [Neck Pain] : no neck pain [Dysphonia] : no dysphonia [Chest Pain] : no chest pain [Palpitations] : no palpitations [Constipation] : no constipation [Diarrhea] : diarrhea [Polyuria] : no polyuria [Dysuria] : no dysuria [Headaches] : no headaches [Tremors] : no tremors [Depression] : no depression [Anxiety] : anxiety [Polydipsia] : no polydipsia [FreeTextEntry2] : weight stable [FreeTextEntry3] : followed by eye doctor [FreeTextEntry7] : today due to stomach virus

## 2023-07-20 NOTE — ASSESSMENT
[FreeTextEntry1] : Patient is cleared per endocrinology's standpoint for surgery on 7/26/23.  \par \par T1DM Hx of Pancreatogenic DM, S/p Pancreatectomy \par -Reviewed risk/complication of uncontrolled DM \par -Increase exercise as tolerated 5 days a week x 30 mins/day\par -Continue dietary efforts, low carb/low sugar\par -Continue current pump settings and use of CGM \par -Repeat HgbA1C prior to next visit \par -Patient going to keep pump on during surgery and already has preset settings for the surgery\par -Pt does not want any changes to pump settings at this time\par Low blood sugars overnight, patient has apple juice to increase, does not want to lower basal because thinks sugar will then go too high \par \par Glucose Sensor Necessity: This patient with diabetes performs 4 glucose checks per day utilizing a home blood glucose monitor. The patient is treated with insulin via insulin pump . This patient requires frequent adjustments to their insulin treatment on the basis of therapeutic continuous glucose monitoring results by adjusting fixed insulin doses. In addition, the patient has been to our office for an evaluation of their diabetes control within the past 6 months.\par \par Vitamin D Deficiency\par -on Multi Vitamin \par \par Abnormal TFTs\par Clinically euthyroid \par Check TFTs next visit\par \par Elevated LFTs due to gastric surgery since 2018, Gastro is aware of levels\par \par RTO in 3 months with Dr. Tam \par \par Overall spent 30 minutes with patient and documenting.

## 2023-07-20 NOTE — PHYSICAL EXAM
[Alert] : alert [No Acute Distress] : no acute distress [de-identified] : Physical exam deferred due to telehealth visit

## 2023-07-20 NOTE — HISTORY OF PRESENT ILLNESS
[Home] : at home, [unfilled] , at the time of the visit. [Medical Office: ([facility])___] : at the medical office located in  [Verbal consent obtained from patient] : the patient, [unfilled] [FreeTextEntry1] : 48 y.o. 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 Dx with DM after pancreatectomy presents today for follow up. She switched from provider at [place]. S. On insulin pump (Omnipod) and CGM Dexcom for the last 3 years. Reportedly diabetes has been well controlled. Patient reports confidence in carb counting. Recently had trigger finger repair and received steroid shots. Rest of PMHx consist of Gout, Factor V Leiden deficiency. Now off Eliquis x 1 months.  \par \par \par Patient is have gastrectomy at [facility] on 7/26/2023\par \par Quality:T1DM\par Severity: controlled \par Duration: 2018\par Onset: s/p TP/AIT\par Modifying Factors: insulin pump \par \par SMBG- Dexcom G6\par \par HgbA1C: 5.6% \par \par Current Regimen:\par Omni Pod -Novolog \par Settings:\par 12 AM 0.05\par 6 AM 0.15\par 9:30 AM 0.2\par \par \par Total insulin ~3.725U/24h\par IC 1:15\par CF 1:95\par \par Average Glucose: 137\par Target Range: 74%\par Very High: 2%\par High: 19%\par Low: 3%\par Very Low: 2% \par \par \par Eye Exam: 2022, no DR \par Foot Exam: denies b/l neuropathy, has gout\par Kidney Disease: denies \par Heart Disease:denies \par \par Weight: stable \par Diet: "Excellent"\par B- eggs, toast, fruit\par L- sandwich, dinner \par D- home cooked meals\par Snacks- not too much, turkey, protein shakes, \par Exercise:sometimes, been tough with gout, always on feet\par Smoking: none

## 2023-08-30 NOTE — PROGRESS NOTE ADULT - SUBJECTIVE AND OBJECTIVE BOX
Day 6\3 of Anesthesia Pain Management Service    SUBJECTIVE: Patient is feeling discouraged this am.  Emotional support given.     Pain Scale Score:	[X] Refer to charted pain scores    THERAPY:    [ ] IV PCA Morphine		[ ] 5 mg/mL	[ ] 1 mg/mL  [X] IV PCA Hydromorphone	[ ] 5 mg/mL	[X] 1 mg/mL  [ ] IV PCA Fentanyl		[ ] 50 micrograms/mL    Demand dose: 0.5 mg     Lockout: 6 minutes   Continuous Rate: 0 mg/hr  4 Hour Limit: 4 mg    MEDICATIONS  (STANDING):  amylase/lipase/protease  (CREON  6,000 Units) 1 Capsule(s) Oral three times a day with meals  amylase/lipase/protease  (CREON  6,000 Units) 1 Capsule(s) Oral at bedtime  dextrose 5% + sodium chloride 0.9% with potassium chloride 20 mEq/L 1000 milliLiter(s) (100 mL/Hr) IV Continuous <Continuous>  dextrose 5%. 1000 milliLiter(s) (50 mL/Hr) IV Continuous <Continuous>  dextrose 5%. 1000 milliLiter(s) (50 mL/Hr) IV Continuous <Continuous>  dextrose 50% Injectable 12.5 Gram(s) IV Push once  dextrose 50% Injectable 25 Gram(s) IV Push once  dextrose 50% Injectable 25 Gram(s) IV Push once  docusate sodium Liquid 100 milliGRAM(s) Oral two times a day  erythromycin    ethylsuccinate Suspension 40 mG/mL 500 milliGRAM(s) Oral every 6 hours  heparin  Injectable 5000 Unit(s) SubCutaneous every 12 hours  HYDROmorphone PCA (1 mG/mL) 30 milliLiter(s) PCA Continuous PCA Continuous  insulin glargine Injectable (LANTUS) 8 Unit(s) SubCutaneous every morning  insulin lispro (HumaLOG) corrective regimen sliding scale   SubCutaneous every 4 hours  megestrol Suspension 800 milliGRAM(s) Oral daily  ondansetron   Disintegrating Tablet 8 milliGRAM(s) Oral every 8 hours  oxyCODONE  ER Tablet 30 milliGRAM(s) Oral every 8 hours    MEDICATIONS  (PRN):  ALPRAZolam 0.25 milliGRAM(s) Oral every 8 hours PRN Anxiety  bisacodyl 10 milliGRAM(s) Oral at bedtime PRN Constipation  dextrose Gel 1 Dose(s) Oral once PRN Blood Glucose LESS THAN 70 milliGRAM(s)/deciliter  glucagon  Injectable 1 milliGRAM(s) IntraMuscular once PRN Glucose LESS THAN 70 milligrams/deciliter  HYDROmorphone PCA (1 mG/mL) Rescue Clinician Bolus 1 milliGRAM(s) IV Push every 15 minutes PRN for Pain Scale GREATER THAN 6  naloxone Injectable 0.1 milliGRAM(s) IV Push every 3 minutes PRN For ANY of the following changes in patient status:  A. RR LESS THAN 10 breaths per minute, B. Oxygen saturation LESS THAN 90%, C. Sedation score of 6      OBJECTIVE:    Sedation Score:	[ X] Alert	[ ] Drowsy 	[ ] Arousable	[ ] Asleep	[ ] Unresponsive    Side Effects:	[X ] None	[ ] Nausea	[ ] Vomiting	[ ] Pruritus  		[ ] Other:    Vital Signs Last 24 Hrs  T(C): 38.7 (11 Apr 2018 06:44), Max: 39.5 (11 Apr 2018 05:36)  T(F): 101.6 (11 Apr 2018 06:44), Max: 103.1 (11 Apr 2018 05:36)  HR: 127 (11 Apr 2018 05:36) (93 - 127)  BP: 120/70 (11 Apr 2018 05:36) (101/67 - 132/89)  BP(mean): --  RR: 18 (11 Apr 2018 05:36) (18 - 18)  SpO2: 96% (11 Apr 2018 05:36) (95% - 98%)    ASSESSMENT/ PLAN    Therapy to  be:               [X] Continued   [ ] Discontinued   [ ] Changed to PRN Analgesics    Documentation and Verification of current medications:   [X] Done	[ ] Not done, not eligible    Comments: Using 1-2x/hr. Reeducated to use. Cimzia Pregnancy And Lactation Text: This medication crosses the placenta but can be considered safe in certain situations. Cimzia may be excreted in breast milk.

## 2023-10-05 ENCOUNTER — APPOINTMENT (OUTPATIENT)
Dept: ENDOCRINOLOGY | Facility: CLINIC | Age: 48
End: 2023-10-05

## 2023-10-06 ENCOUNTER — RX RENEWAL (OUTPATIENT)
Age: 48
End: 2023-10-06

## 2023-10-06 RX ORDER — GLUCAGON INJECTION, SOLUTION 1 MG/.2ML
1 INJECTION, SOLUTION SUBCUTANEOUS
Qty: 1 | Refills: 3 | Status: ACTIVE | COMMUNITY
Start: 2023-09-02 | End: 1900-01-01

## 2023-10-20 ENCOUNTER — APPOINTMENT (OUTPATIENT)
Dept: ENDOCRINOLOGY | Facility: CLINIC | Age: 48
End: 2023-10-20
Payer: MEDICARE

## 2023-10-20 VITALS
SYSTOLIC BLOOD PRESSURE: 125 MMHG | OXYGEN SATURATION: 97 % | HEIGHT: 64 IN | DIASTOLIC BLOOD PRESSURE: 80 MMHG | HEART RATE: 90 BPM | WEIGHT: 106 LBS | BODY MASS INDEX: 18.1 KG/M2

## 2023-10-20 DIAGNOSIS — Z13.220 ENCOUNTER FOR SCREENING FOR LIPOID DISORDERS: ICD-10-CM

## 2023-10-20 DIAGNOSIS — Z90.410 POSTPROCEDURAL HYPOINSULINEMIA: ICD-10-CM

## 2023-10-20 DIAGNOSIS — E53.8 DEFICIENCY OF OTHER SPECIFIED B GROUP VITAMINS: ICD-10-CM

## 2023-10-20 DIAGNOSIS — E13.9 POSTPROCEDURAL HYPOINSULINEMIA: ICD-10-CM

## 2023-10-20 DIAGNOSIS — E89.1 POSTPROCEDURAL HYPOINSULINEMIA: ICD-10-CM

## 2023-10-20 DIAGNOSIS — Z90.410 ACQUIRED TOTAL ABSENCE OF PANCREAS: ICD-10-CM

## 2023-10-20 LAB — GLUCOSE BLDC GLUCOMTR-MCNC: 85

## 2023-10-20 PROCEDURE — 82962 GLUCOSE BLOOD TEST: CPT

## 2023-10-20 PROCEDURE — 95251 CONT GLUC MNTR ANALYSIS I&R: CPT

## 2023-10-20 PROCEDURE — 99214 OFFICE O/P EST MOD 30 MIN: CPT | Mod: 25

## 2023-10-24 RX ORDER — INSULIN PUMP CONTROLLER
EACH MISCELLANEOUS
Qty: 6 | Refills: 3 | Status: ACTIVE | COMMUNITY
Start: 2023-05-30 | End: 1900-01-01

## 2023-10-25 ENCOUNTER — NON-APPOINTMENT (OUTPATIENT)
Age: 48
End: 2023-10-25

## 2023-11-29 ENCOUNTER — APPOINTMENT (OUTPATIENT)
Dept: ENDOCRINOLOGY | Facility: CLINIC | Age: 48
End: 2023-11-29

## 2023-12-20 NOTE — PATIENT PROFILE ADULT. - ATTEMPT TO OOB
"Patient called and states that last night when she was doing the dishes, her \"esophagus suddenly seized, she couldn't breathe or move because the pain was that intense\" Patient wants to know if you have any recommendation that she could do? Please advise     She also wanted me to let you know that she has a history of GERD and that the recommendation of Peppermint oil (I do not see in your notes that you recommended peppermint oil and upon asking the patient she said another doctor recommended it you told her continue antinausea measures) is not good for someone with GERD.  "
no

## 2024-01-01 ENCOUNTER — INPATIENT (INPATIENT)
Facility: HOSPITAL | Age: 49
LOS: 21 days | DRG: 870 | End: 2024-05-05
Attending: INTERNAL MEDICINE | Admitting: SPECIALIST
Payer: MEDICARE

## 2024-01-01 VITALS
TEMPERATURE: 98 F | HEIGHT: 65 IN | RESPIRATION RATE: 16 BRPM | SYSTOLIC BLOOD PRESSURE: 120 MMHG | WEIGHT: 137.57 LBS | OXYGEN SATURATION: 97 % | DIASTOLIC BLOOD PRESSURE: 78 MMHG | HEART RATE: 118 BPM

## 2024-01-01 DIAGNOSIS — E87.20 ACIDOSIS, UNSPECIFIED: ICD-10-CM

## 2024-01-01 DIAGNOSIS — D68.51 ACTIVATED PROTEIN C RESISTANCE: ICD-10-CM

## 2024-01-01 DIAGNOSIS — A41.9 SEPSIS, UNSPECIFIED ORGANISM: ICD-10-CM

## 2024-01-01 DIAGNOSIS — F41.9 ANXIETY DISORDER, UNSPECIFIED: ICD-10-CM

## 2024-01-01 DIAGNOSIS — K70.10 ALCOHOLIC HEPATITIS WITHOUT ASCITES: ICD-10-CM

## 2024-01-01 DIAGNOSIS — R09.02 HYPOXEMIA: ICD-10-CM

## 2024-01-01 DIAGNOSIS — D72.829 ELEVATED WHITE BLOOD CELL COUNT, UNSPECIFIED: ICD-10-CM

## 2024-01-01 DIAGNOSIS — E16.2 HYPOGLYCEMIA, UNSPECIFIED: ICD-10-CM

## 2024-01-01 DIAGNOSIS — K72.90 HEPATIC FAILURE, UNSPECIFIED WITHOUT COMA: ICD-10-CM

## 2024-01-01 DIAGNOSIS — K63.89 OTHER SPECIFIED DISEASES OF INTESTINE: ICD-10-CM

## 2024-01-01 DIAGNOSIS — N17.9 ACUTE KIDNEY FAILURE, UNSPECIFIED: ICD-10-CM

## 2024-01-01 DIAGNOSIS — K55.9 VASCULAR DISORDER OF INTESTINE, UNSPECIFIED: ICD-10-CM

## 2024-01-01 DIAGNOSIS — E03.9 HYPOTHYROIDISM, UNSPECIFIED: ICD-10-CM

## 2024-01-01 DIAGNOSIS — E13.9 OTHER SPECIFIED DIABETES MELLITUS WITHOUT COMPLICATIONS: ICD-10-CM

## 2024-01-01 DIAGNOSIS — D53.9 NUTRITIONAL ANEMIA, UNSPECIFIED: ICD-10-CM

## 2024-01-01 DIAGNOSIS — R79.89 OTHER SPECIFIED ABNORMAL FINDINGS OF BLOOD CHEMISTRY: ICD-10-CM

## 2024-01-01 DIAGNOSIS — E87.70 FLUID OVERLOAD, UNSPECIFIED: ICD-10-CM

## 2024-01-01 DIAGNOSIS — D69.6 THROMBOCYTOPENIA, UNSPECIFIED: ICD-10-CM

## 2024-01-01 DIAGNOSIS — Z29.9 ENCOUNTER FOR PROPHYLACTIC MEASURES, UNSPECIFIED: ICD-10-CM

## 2024-01-01 DIAGNOSIS — E72.20 DISORDER OF UREA CYCLE METABOLISM, UNSPECIFIED: ICD-10-CM

## 2024-01-01 LAB
(HCYS)2 SERPL-SCNC: <0.3 UMOL/L — SIGNIFICANT CHANGE UP (ref 0–0.2)
-  AMIKACIN: SIGNIFICANT CHANGE UP
-  AMPICILLIN/SULBACTAM: SIGNIFICANT CHANGE UP
-  CEFEPIME: SIGNIFICANT CHANGE UP
-  CEFIDEROCOL: SIGNIFICANT CHANGE UP
-  CEFTAZIDIME: SIGNIFICANT CHANGE UP
-  CIPROFLOXACIN: SIGNIFICANT CHANGE UP
-  FLUCONAZOLE: SIGNIFICANT CHANGE UP
-  GENTAMICIN: SIGNIFICANT CHANGE UP
-  IMIPENEM: SIGNIFICANT CHANGE UP
-  LEVOFLOXACIN: SIGNIFICANT CHANGE UP
-  MEROPENEM: SIGNIFICANT CHANGE UP
-  MINOCYCLINE: SIGNIFICANT CHANGE UP
-  PIPERACILLIN/TAZOBACTAM: SIGNIFICANT CHANGE UP
-  POLYMYXIN B: SIGNIFICANT CHANGE UP
-  TIGECYCLINE: SIGNIFICANT CHANGE UP
-  TOBRAMYCIN: SIGNIFICANT CHANGE UP
-  TRIMETHOPRIM/SULFAMETHOXAZOLE: SIGNIFICANT CHANGE UP
24R-OH-CALCIDIOL SERPL-MCNC: 17.7 NG/ML — LOW (ref 30–80)
A PHAGOCYTOPH DNA BLD QL NAA+PROBE: NEGATIVE — SIGNIFICANT CHANGE UP
A PHAGOCYTOPH DNA BLD QL NAA+PROBE: NEGATIVE — SIGNIFICANT CHANGE UP
A PHAGOCYTOPH IGG TITR SER IF: NEGATIVE — SIGNIFICANT CHANGE UP
A PHAGOCYTOPH IGG TITR SER IF: NEGATIVE — SIGNIFICANT CHANGE UP
A PHAGOCYTOPH IGM TITR SER IF: NEGATIVE — SIGNIFICANT CHANGE UP
A PHAGOCYTOPH IGM TITR SER IF: NEGATIVE — SIGNIFICANT CHANGE UP
A-AMINOBUTYR SERPL-SCNC: 14.5 UMOL/L — SIGNIFICANT CHANGE UP (ref 5.4–34.5)
A-TOCOPHEROL VIT E SERPL-MCNC: 4.9 MG/L — LOW (ref 7–25.1)
A1AT SERPL-MCNC: 102 MG/DL — SIGNIFICANT CHANGE UP (ref 90–200)
A1C WITH ESTIMATED AVERAGE GLUCOSE RESULT: 5.2 % — SIGNIFICANT CHANGE UP (ref 4–5.6)
AAA SERPL-SCNC: 1.7 UMOL/L — SIGNIFICANT CHANGE UP (ref 0–1.9)
ACTH SER-ACNC: 46.3 PG/ML — SIGNIFICANT CHANGE UP (ref 7.2–63.3)
ADD ON TEST-SPECIMEN IN LAB: SIGNIFICANT CHANGE UP
ADD ON TEST-SPECIMEN IN LAB: SIGNIFICANT CHANGE UP
ALANINE SERPL-SCNC: 443.9 UMOL/L — SIGNIFICANT CHANGE UP (ref 209.2–515.5)
ALBUMIN SERPL ELPH-MCNC: 2.1 G/DL — LOW (ref 3.3–5)
ALBUMIN SERPL ELPH-MCNC: 2.2 G/DL — LOW (ref 3.3–5)
ALBUMIN SERPL ELPH-MCNC: 2.3 G/DL — LOW (ref 3.3–5)
ALBUMIN SERPL ELPH-MCNC: 2.4 G/DL — LOW (ref 3.3–5)
ALBUMIN SERPL ELPH-MCNC: 2.5 G/DL — LOW (ref 3.3–5)
ALBUMIN SERPL ELPH-MCNC: 2.6 G/DL — LOW (ref 3.3–5)
ALBUMIN SERPL ELPH-MCNC: 2.7 G/DL — LOW (ref 3.3–5)
ALBUMIN SERPL ELPH-MCNC: 2.7 G/DL — LOW (ref 3.3–5)
ALBUMIN SERPL ELPH-MCNC: 2.8 G/DL — LOW (ref 3.3–5)
ALBUMIN SERPL ELPH-MCNC: 2.9 G/DL — LOW (ref 3.3–5)
ALBUMIN SERPL ELPH-MCNC: 3 G/DL — LOW (ref 3.3–5)
ALBUMIN SERPL ELPH-MCNC: 3 G/DL — LOW (ref 3.3–5)
ALBUMIN SERPL ELPH-MCNC: 3.1 G/DL — LOW (ref 3.3–5)
ALBUMIN SERPL ELPH-MCNC: 3.2 G/DL — LOW (ref 3.3–5)
ALBUMIN SERPL ELPH-MCNC: 3.3 G/DL — SIGNIFICANT CHANGE UP (ref 3.3–5)
ALBUMIN SERPL ELPH-MCNC: 3.4 G/DL — SIGNIFICANT CHANGE UP (ref 3.3–5)
ALBUMIN SERPL ELPH-MCNC: 3.5 G/DL — SIGNIFICANT CHANGE UP (ref 3.3–5)
ALBUMIN SERPL ELPH-MCNC: 3.6 G/DL — SIGNIFICANT CHANGE UP (ref 3.3–5)
ALLOISOLEUCINE SERPL-SCNC: 0.6 UMOL/L — SIGNIFICANT CHANGE UP (ref 0–3.2)
ALP SERPL-CCNC: 105 U/L — SIGNIFICANT CHANGE UP (ref 40–120)
ALP SERPL-CCNC: 112 U/L — SIGNIFICANT CHANGE UP (ref 40–120)
ALP SERPL-CCNC: 115 U/L — SIGNIFICANT CHANGE UP (ref 40–120)
ALP SERPL-CCNC: 120 U/L — SIGNIFICANT CHANGE UP (ref 40–120)
ALP SERPL-CCNC: 121 U/L — HIGH (ref 40–120)
ALP SERPL-CCNC: 121 U/L — HIGH (ref 40–120)
ALP SERPL-CCNC: 123 U/L — HIGH (ref 40–120)
ALP SERPL-CCNC: 126 U/L — HIGH (ref 40–120)
ALP SERPL-CCNC: 129 U/L — HIGH (ref 40–120)
ALP SERPL-CCNC: 133 U/L — HIGH (ref 40–120)
ALP SERPL-CCNC: 135 U/L — HIGH (ref 40–120)
ALP SERPL-CCNC: 138 U/L — HIGH (ref 40–120)
ALP SERPL-CCNC: 138 U/L — HIGH (ref 40–120)
ALP SERPL-CCNC: 140 U/L — HIGH (ref 40–120)
ALP SERPL-CCNC: 140 U/L — HIGH (ref 40–120)
ALP SERPL-CCNC: 143 U/L — HIGH (ref 40–120)
ALP SERPL-CCNC: 143 U/L — HIGH (ref 40–120)
ALP SERPL-CCNC: 147 U/L — HIGH (ref 40–120)
ALP SERPL-CCNC: 149 U/L — HIGH (ref 40–120)
ALP SERPL-CCNC: 151 U/L — HIGH (ref 40–120)
ALP SERPL-CCNC: 151 U/L — HIGH (ref 40–120)
ALP SERPL-CCNC: 153 U/L — HIGH (ref 40–120)
ALP SERPL-CCNC: 154 U/L — HIGH (ref 40–120)
ALP SERPL-CCNC: 156 U/L — HIGH (ref 40–120)
ALP SERPL-CCNC: 157 U/L — HIGH (ref 40–120)
ALP SERPL-CCNC: 162 U/L — HIGH (ref 40–120)
ALP SERPL-CCNC: 166 U/L — HIGH (ref 40–120)
ALP SERPL-CCNC: 168 U/L — HIGH (ref 40–120)
ALP SERPL-CCNC: 172 U/L — HIGH (ref 40–120)
ALP SERPL-CCNC: 172 U/L — HIGH (ref 40–120)
ALP SERPL-CCNC: 173 U/L — HIGH (ref 40–120)
ALP SERPL-CCNC: 182 U/L — HIGH (ref 40–120)
ALP SERPL-CCNC: 192 U/L — HIGH (ref 40–120)
ALP SERPL-CCNC: 218 U/L — HIGH (ref 40–120)
ALP SERPL-CCNC: 80 U/L — SIGNIFICANT CHANGE UP (ref 40–120)
ALP SERPL-CCNC: 81 U/L — SIGNIFICANT CHANGE UP (ref 40–120)
ALP SERPL-CCNC: 85 U/L — SIGNIFICANT CHANGE UP (ref 40–120)
ALP SERPL-CCNC: 87 U/L — SIGNIFICANT CHANGE UP (ref 40–120)
ALP SERPL-CCNC: 91 U/L — SIGNIFICANT CHANGE UP (ref 40–120)
ALP SERPL-CCNC: 94 U/L — SIGNIFICANT CHANGE UP (ref 40–120)
ALP SERPL-CCNC: 95 U/L — SIGNIFICANT CHANGE UP (ref 40–120)
ALP SERPL-CCNC: 99 U/L — SIGNIFICANT CHANGE UP (ref 40–120)
ALT FLD-CCNC: 12 U/L — SIGNIFICANT CHANGE UP (ref 10–45)
ALT FLD-CCNC: 13 U/L — SIGNIFICANT CHANGE UP (ref 10–45)
ALT FLD-CCNC: 15 U/L — SIGNIFICANT CHANGE UP (ref 10–45)
ALT FLD-CCNC: 16 U/L — SIGNIFICANT CHANGE UP (ref 10–45)
ALT FLD-CCNC: 16 U/L — SIGNIFICANT CHANGE UP (ref 10–45)
ALT FLD-CCNC: 17 U/L — SIGNIFICANT CHANGE UP (ref 10–45)
ALT FLD-CCNC: 18 U/L — SIGNIFICANT CHANGE UP (ref 10–45)
ALT FLD-CCNC: 19 U/L — SIGNIFICANT CHANGE UP (ref 10–45)
ALT FLD-CCNC: 19 U/L — SIGNIFICANT CHANGE UP (ref 10–45)
ALT FLD-CCNC: 20 U/L — SIGNIFICANT CHANGE UP (ref 10–45)
ALT FLD-CCNC: 21 U/L — SIGNIFICANT CHANGE UP (ref 10–45)
ALT FLD-CCNC: 22 U/L — SIGNIFICANT CHANGE UP (ref 10–45)
ALT FLD-CCNC: 24 U/L — SIGNIFICANT CHANGE UP (ref 10–45)
ALT FLD-CCNC: 25 U/L — SIGNIFICANT CHANGE UP (ref 10–45)
ALT FLD-CCNC: 27 U/L — SIGNIFICANT CHANGE UP (ref 10–45)
ALT FLD-CCNC: 28 U/L — SIGNIFICANT CHANGE UP (ref 10–45)
ALT FLD-CCNC: 30 U/L — SIGNIFICANT CHANGE UP (ref 10–45)
ALT FLD-CCNC: 30 U/L — SIGNIFICANT CHANGE UP (ref 10–45)
ALT FLD-CCNC: 31 U/L — SIGNIFICANT CHANGE UP (ref 10–45)
ALT FLD-CCNC: 31 U/L — SIGNIFICANT CHANGE UP (ref 10–45)
ALT FLD-CCNC: 32 U/L — SIGNIFICANT CHANGE UP (ref 10–45)
ALT FLD-CCNC: 33 U/L — SIGNIFICANT CHANGE UP (ref 10–45)
ALT FLD-CCNC: 6 U/L — LOW (ref 10–45)
ALT FLD-CCNC: 9 U/L — LOW (ref 10–45)
ALT FLD-CCNC: 9 U/L — LOW (ref 10–45)
ALT FLD-CCNC: <5 U/L — LOW (ref 10–45)
AMINO ACID PAT SERPL-IMP: SIGNIFICANT CHANGE UP
AMMONIA BLD-MCNC: 100 UMOL/L — HIGH (ref 11–55)
AMMONIA BLD-MCNC: 100 UMOL/L — HIGH (ref 11–55)
AMMONIA BLD-MCNC: 103 UMOL/L — HIGH (ref 11–55)
AMMONIA BLD-MCNC: 103 UMOL/L — HIGH (ref 11–55)
AMMONIA BLD-MCNC: 107 UMOL/L — HIGH (ref 11–55)
AMMONIA BLD-MCNC: 111 UMOL/L — HIGH (ref 11–55)
AMMONIA BLD-MCNC: 114 UMOL/L — HIGH (ref 11–55)
AMMONIA BLD-MCNC: 128 UMOL/L — HIGH (ref 11–55)
AMMONIA BLD-MCNC: 134 UMOL/L — HIGH (ref 11–55)
AMMONIA BLD-MCNC: 137 UMOL/L — HIGH (ref 11–55)
AMMONIA BLD-MCNC: 138 UMOL/L — HIGH (ref 11–55)
AMMONIA BLD-MCNC: 140 UMOL/L — HIGH (ref 11–55)
AMMONIA BLD-MCNC: 142 UMOL/L — HIGH (ref 11–55)
AMMONIA BLD-MCNC: 145 UMOL/L — HIGH (ref 11–55)
AMMONIA BLD-MCNC: 152 UMOL/L — HIGH (ref 11–55)
AMMONIA BLD-MCNC: 51 UMOL/L — SIGNIFICANT CHANGE UP (ref 11–55)
AMMONIA BLD-MCNC: 53 UMOL/L — SIGNIFICANT CHANGE UP (ref 11–55)
AMMONIA BLD-MCNC: 60 UMOL/L — HIGH (ref 11–55)
AMMONIA BLD-MCNC: 60 UMOL/L — HIGH (ref 11–55)
AMMONIA BLD-MCNC: 61 UMOL/L — HIGH (ref 11–55)
AMMONIA BLD-MCNC: 65 UMOL/L — HIGH (ref 11–55)
AMMONIA BLD-MCNC: 65 UMOL/L — HIGH (ref 11–55)
AMMONIA BLD-MCNC: 69 UMOL/L — HIGH (ref 11–55)
AMMONIA BLD-MCNC: 72 UMOL/L — HIGH (ref 11–55)
AMMONIA BLD-MCNC: 73 UMOL/L — HIGH (ref 11–55)
AMMONIA BLD-MCNC: 74 UMOL/L — HIGH (ref 11–55)
AMMONIA BLD-MCNC: 75 UMOL/L — HIGH (ref 11–55)
AMMONIA BLD-MCNC: 76 UMOL/L — HIGH (ref 11–55)
AMMONIA BLD-MCNC: 78 UMOL/L — HIGH (ref 11–55)
AMMONIA BLD-MCNC: 79 UMOL/L — HIGH (ref 11–55)
AMMONIA BLD-MCNC: 79 UMOL/L — HIGH (ref 11–55)
AMMONIA BLD-MCNC: 83 UMOL/L — HIGH (ref 11–55)
AMMONIA BLD-MCNC: 88 UMOL/L — HIGH (ref 11–55)
AMMONIA BLD-MCNC: 90 UMOL/L — HIGH (ref 11–55)
AMPHET UR-MCNC: NEGATIVE — SIGNIFICANT CHANGE UP
ANA TITR SER: NEGATIVE — SIGNIFICANT CHANGE UP
ANION GAP SERPL CALC-SCNC: 10 MMOL/L — SIGNIFICANT CHANGE UP (ref 5–17)
ANION GAP SERPL CALC-SCNC: 11 MMOL/L — SIGNIFICANT CHANGE UP (ref 5–17)
ANION GAP SERPL CALC-SCNC: 12 MMOL/L — SIGNIFICANT CHANGE UP (ref 5–17)
ANION GAP SERPL CALC-SCNC: 13 MMOL/L — SIGNIFICANT CHANGE UP (ref 5–17)
ANION GAP SERPL CALC-SCNC: 15 MMOL/L — SIGNIFICANT CHANGE UP (ref 5–17)
ANION GAP SERPL CALC-SCNC: 16 MMOL/L — SIGNIFICANT CHANGE UP (ref 5–17)
ANION GAP SERPL CALC-SCNC: 18 MMOL/L — HIGH (ref 5–17)
ANION GAP SERPL CALC-SCNC: 18 MMOL/L — HIGH (ref 5–17)
ANION GAP SERPL CALC-SCNC: 19 MMOL/L — HIGH (ref 5–17)
ANION GAP SERPL CALC-SCNC: 20 MMOL/L — HIGH (ref 5–17)
ANION GAP SERPL CALC-SCNC: 21 MMOL/L — HIGH (ref 5–17)
ANION GAP SERPL CALC-SCNC: 24 MMOL/L — HIGH (ref 5–17)
ANION GAP SERPL CALC-SCNC: 25 MMOL/L — HIGH (ref 5–17)
ANION GAP SERPL CALC-SCNC: 8 MMOL/L — SIGNIFICANT CHANGE UP (ref 5–17)
ANION GAP SERPL CALC-SCNC: 9 MMOL/L — SIGNIFICANT CHANGE UP (ref 5–17)
ANISOCYTOSIS BLD QL: SIGNIFICANT CHANGE UP
ANISOCYTOSIS BLD QL: SLIGHT — SIGNIFICANT CHANGE UP
ANISOCYTOSIS BLD QL: SLIGHT — SIGNIFICANT CHANGE UP
APAP SERPL-MCNC: <15 UG/ML — SIGNIFICANT CHANGE UP (ref 10–30)
APPEARANCE UR: ABNORMAL
APPEARANCE UR: CLEAR — SIGNIFICANT CHANGE UP
APPEARANCE UR: CLEAR — SIGNIFICANT CHANGE UP
APTT BLD: 27.4 SEC — SIGNIFICANT CHANGE UP (ref 24.5–35.6)
APTT BLD: 34.3 SEC — SIGNIFICANT CHANGE UP (ref 24.5–35.6)
APTT BLD: 35 SEC — SIGNIFICANT CHANGE UP (ref 24.5–35.6)
APTT BLD: 36.5 SEC — HIGH (ref 24.5–35.6)
APTT BLD: 37.2 SEC — HIGH (ref 24.5–35.6)
APTT BLD: 38.9 SEC — HIGH (ref 24.5–35.6)
APTT BLD: 40 SEC — HIGH (ref 24.5–35.6)
APTT BLD: 41.8 SEC — HIGH (ref 24.5–35.6)
APTT BLD: 42.2 SEC — HIGH (ref 24.5–35.6)
APTT BLD: 43.3 SEC — HIGH (ref 24.5–35.6)
APTT BLD: 44.4 SEC — HIGH (ref 24.5–35.6)
APTT BLD: 45.3 SEC — HIGH (ref 24.5–35.6)
APTT BLD: 46.3 SEC — HIGH (ref 24.5–35.6)
APTT BLD: 46.6 SEC — HIGH (ref 24.5–35.6)
APTT BLD: 51.5 SEC — HIGH (ref 24.5–35.6)
APTT BLD: 53.7 SEC — HIGH (ref 24.5–35.6)
APTT BLD: 57 SEC — HIGH (ref 24.5–35.6)
APTT BLD: 58.8 SEC — HIGH (ref 24.5–35.6)
APTT BLD: 61.2 SEC — HIGH (ref 24.5–35.6)
APTT BLD: 82.9 SEC — HIGH (ref 24.5–35.6)
APTT BLD: 90.5 SEC — HIGH (ref 24.5–35.6)
ARGININE SERPL-SCNC: 38.4 UMOL/L — SIGNIFICANT CHANGE UP (ref 36.3–119.2)
ARGININOSUCCINATE SERPL-SCNC: <0.1 UMOL/L — SIGNIFICANT CHANGE UP (ref 0–3)
ASPARAGINE SERPL-SCNC: 194.3 UMOL/L — HIGH (ref 29.5–84.5)
ASPARTATE SERPL-SCNC: 10.6 UMOL/L — HIGH (ref 0–7.4)
AST SERPL-CCNC: 100 U/L — HIGH (ref 10–40)
AST SERPL-CCNC: 100 U/L — HIGH (ref 10–40)
AST SERPL-CCNC: 113 U/L — HIGH (ref 10–40)
AST SERPL-CCNC: 116 U/L — HIGH (ref 10–40)
AST SERPL-CCNC: 121 U/L — HIGH (ref 10–40)
AST SERPL-CCNC: 122 U/L — HIGH (ref 10–40)
AST SERPL-CCNC: 127 U/L — HIGH (ref 10–40)
AST SERPL-CCNC: 152 U/L — HIGH (ref 10–40)
AST SERPL-CCNC: 154 U/L — HIGH (ref 10–40)
AST SERPL-CCNC: 175 U/L — HIGH (ref 10–40)
AST SERPL-CCNC: 178 U/L — HIGH (ref 10–40)
AST SERPL-CCNC: 20 U/L — SIGNIFICANT CHANGE UP (ref 10–40)
AST SERPL-CCNC: 30 U/L — SIGNIFICANT CHANGE UP (ref 10–40)
AST SERPL-CCNC: 33 U/L — SIGNIFICANT CHANGE UP (ref 10–40)
AST SERPL-CCNC: 37 U/L — SIGNIFICANT CHANGE UP (ref 10–40)
AST SERPL-CCNC: 41 U/L — HIGH (ref 10–40)
AST SERPL-CCNC: 49 U/L — HIGH (ref 10–40)
AST SERPL-CCNC: 52 U/L — HIGH (ref 10–40)
AST SERPL-CCNC: 53 U/L — HIGH (ref 10–40)
AST SERPL-CCNC: 53 U/L — HIGH (ref 10–40)
AST SERPL-CCNC: 54 U/L — HIGH (ref 10–40)
AST SERPL-CCNC: 55 U/L — HIGH (ref 10–40)
AST SERPL-CCNC: 55 U/L — HIGH (ref 10–40)
AST SERPL-CCNC: 56 U/L — HIGH (ref 10–40)
AST SERPL-CCNC: 58 U/L — HIGH (ref 10–40)
AST SERPL-CCNC: 60 U/L — HIGH (ref 10–40)
AST SERPL-CCNC: 63 U/L — HIGH (ref 10–40)
AST SERPL-CCNC: 63 U/L — HIGH (ref 10–40)
AST SERPL-CCNC: 65 U/L — HIGH (ref 10–40)
AST SERPL-CCNC: 65 U/L — HIGH (ref 10–40)
AST SERPL-CCNC: 66 U/L — HIGH (ref 10–40)
AST SERPL-CCNC: 67 U/L — HIGH (ref 10–40)
AST SERPL-CCNC: 68 U/L — HIGH (ref 10–40)
AST SERPL-CCNC: 70 U/L — HIGH (ref 10–40)
AST SERPL-CCNC: 74 U/L — HIGH (ref 10–40)
AST SERPL-CCNC: 75 U/L — HIGH (ref 10–40)
AST SERPL-CCNC: 76 U/L — HIGH (ref 10–40)
AST SERPL-CCNC: 76 U/L — HIGH (ref 10–40)
AST SERPL-CCNC: 79 U/L — HIGH (ref 10–40)
AST SERPL-CCNC: 87 U/L — HIGH (ref 10–40)
AST SERPL-CCNC: 91 U/L — HIGH (ref 10–40)
AST SERPL-CCNC: 97 U/L — HIGH (ref 10–40)
AUER BODIES BLD QL SMEAR: PRESENT — SIGNIFICANT CHANGE UP
B BURGDOR C6 AB SER-ACNC: NEGATIVE — SIGNIFICANT CHANGE UP
B BURGDOR IGG+IGM SER-ACNC: 0.08 INDEX — SIGNIFICANT CHANGE UP (ref 0.01–0.89)
B MICROTI DNA BLD QL NAA+PROBE: NEGATIVE — SIGNIFICANT CHANGE UP
B MICROTI DNA BLD QL NAA+PROBE: NEGATIVE — SIGNIFICANT CHANGE UP
B MIYAMOTOI GLPQ BLD QL NAA+NON-PROBE: NEGATIVE — SIGNIFICANT CHANGE UP
B MIYAMOTOI GLPQ BLD QL NAA+NON-PROBE: NEGATIVE — SIGNIFICANT CHANGE UP
B PERT IGG+IGM PNL SER: ABNORMAL
B PERT IGG+IGM PNL SER: ABNORMAL
B QUINTANA DNA SPEC QL NAA+PROBE: NEGATIVE — SIGNIFICANT CHANGE UP
B-AIB SERPL-SCNC: 0.5 UMOL/L — SIGNIFICANT CHANGE UP (ref 0–4.3)
B-ALANINE SERPL-SCNC: 2.9 UMOL/L — SIGNIFICANT CHANGE UP (ref 1.1–9)
B19V DNA FLD QL NAA+PROBE: SIGNIFICANT CHANGE UP IU/ML
BABESIA DNA SPEC QL NAA+PROBE: NEGATIVE — SIGNIFICANT CHANGE UP
BABESIA MICROTI PCR, BLD RESULT: SIGNIFICANT CHANGE UP
BACTERIA # UR AUTO: NEGATIVE /HPF — SIGNIFICANT CHANGE UP
BACTERIA # UR AUTO: NEGATIVE /HPF — SIGNIFICANT CHANGE UP
BARBITURATES UR SCN-MCNC: NEGATIVE — SIGNIFICANT CHANGE UP
BASE EXCESS BLDV CALC-SCNC: -0.2 MMOL/L — SIGNIFICANT CHANGE UP (ref -2–3)
BASE EXCESS BLDV CALC-SCNC: -0.7 MMOL/L — SIGNIFICANT CHANGE UP (ref -2–3)
BASE EXCESS BLDV CALC-SCNC: -1.2 MMOL/L — SIGNIFICANT CHANGE UP (ref -2–3)
BASE EXCESS BLDV CALC-SCNC: -1.2 MMOL/L — SIGNIFICANT CHANGE UP (ref -2–3)
BASE EXCESS BLDV CALC-SCNC: -1.4 MMOL/L — SIGNIFICANT CHANGE UP (ref -2–3)
BASE EXCESS BLDV CALC-SCNC: -17.5 MMOL/L — LOW (ref -2–3)
BASE EXCESS BLDV CALC-SCNC: -2 MMOL/L — SIGNIFICANT CHANGE UP (ref -2–3)
BASE EXCESS BLDV CALC-SCNC: -2.9 MMOL/L — LOW (ref -2–3)
BASE EXCESS BLDV CALC-SCNC: -3.2 MMOL/L — LOW (ref -2–3)
BASE EXCESS BLDV CALC-SCNC: -4.1 MMOL/L — LOW (ref -2–3)
BASE EXCESS BLDV CALC-SCNC: -5.2 MMOL/L — LOW (ref -2–3)
BASE EXCESS BLDV CALC-SCNC: 0 MMOL/L — SIGNIFICANT CHANGE UP (ref -2–3)
BASE EXCESS BLDV CALC-SCNC: 0.1 MMOL/L — SIGNIFICANT CHANGE UP (ref -2–3)
BASE EXCESS BLDV CALC-SCNC: 0.9 MMOL/L — SIGNIFICANT CHANGE UP (ref -2–3)
BASE EXCESS BLDV CALC-SCNC: 1 MMOL/L — SIGNIFICANT CHANGE UP (ref -2–3)
BASE EXCESS BLDV CALC-SCNC: 1.2 MMOL/L — SIGNIFICANT CHANGE UP (ref -2–3)
BASE EXCESS BLDV CALC-SCNC: 2.4 MMOL/L — SIGNIFICANT CHANGE UP (ref -2–3)
BASE EXCESS BLDV CALC-SCNC: 4.4 MMOL/L — HIGH (ref -2–3)
BASE EXCESS BLDV CALC-SCNC: 8 MMOL/L — HIGH (ref -2–3)
BASO STIPL BLD QL SMEAR: PRESENT — SIGNIFICANT CHANGE UP
BASOPHILS # BLD AUTO: 0 K/UL — SIGNIFICANT CHANGE UP (ref 0–0.2)
BASOPHILS # BLD AUTO: 0.02 K/UL — SIGNIFICANT CHANGE UP (ref 0–0.2)
BASOPHILS # BLD AUTO: 0.02 K/UL — SIGNIFICANT CHANGE UP (ref 0–0.2)
BASOPHILS # BLD AUTO: 0.04 K/UL — SIGNIFICANT CHANGE UP (ref 0–0.2)
BASOPHILS # BLD AUTO: 0.05 K/UL — SIGNIFICANT CHANGE UP (ref 0–0.2)
BASOPHILS # BLD AUTO: 0.06 K/UL — SIGNIFICANT CHANGE UP (ref 0–0.2)
BASOPHILS # BLD AUTO: 0.07 K/UL — SIGNIFICANT CHANGE UP (ref 0–0.2)
BASOPHILS # BLD AUTO: 0.08 K/UL — SIGNIFICANT CHANGE UP (ref 0–0.2)
BASOPHILS # BLD AUTO: 0.08 K/UL — SIGNIFICANT CHANGE UP (ref 0–0.2)
BASOPHILS # BLD AUTO: 0.09 K/UL — SIGNIFICANT CHANGE UP (ref 0–0.2)
BASOPHILS # BLD AUTO: 0.1 K/UL — SIGNIFICANT CHANGE UP (ref 0–0.2)
BASOPHILS # BLD AUTO: 0.1 K/UL — SIGNIFICANT CHANGE UP (ref 0–0.2)
BASOPHILS # BLD AUTO: 0.14 K/UL — SIGNIFICANT CHANGE UP (ref 0–0.2)
BASOPHILS # BLD AUTO: 0.15 K/UL — SIGNIFICANT CHANGE UP (ref 0–0.2)
BASOPHILS # BLD AUTO: 0.18 K/UL — SIGNIFICANT CHANGE UP (ref 0–0.2)
BASOPHILS # BLD AUTO: 0.21 K/UL — HIGH (ref 0–0.2)
BASOPHILS # BLD AUTO: 0.24 K/UL — HIGH (ref 0–0.2)
BASOPHILS # BLD AUTO: 0.25 K/UL — HIGH (ref 0–0.2)
BASOPHILS NFR BLD AUTO: 0 % — SIGNIFICANT CHANGE UP (ref 0–2)
BASOPHILS NFR BLD AUTO: 0.1 % — SIGNIFICANT CHANGE UP (ref 0–2)
BASOPHILS NFR BLD AUTO: 0.2 % — SIGNIFICANT CHANGE UP (ref 0–2)
BASOPHILS NFR BLD AUTO: 0.3 % — SIGNIFICANT CHANGE UP (ref 0–2)
BASOPHILS NFR BLD AUTO: 0.4 % — SIGNIFICANT CHANGE UP (ref 0–2)
BASOPHILS NFR BLD AUTO: 0.5 % — SIGNIFICANT CHANGE UP (ref 0–2)
BASOPHILS NFR BLD AUTO: 0.6 % — SIGNIFICANT CHANGE UP (ref 0–2)
BASOPHILS NFR BLD AUTO: 0.9 % — SIGNIFICANT CHANGE UP (ref 0–2)
BASOPHILS NFR BLD AUTO: 0.9 % — SIGNIFICANT CHANGE UP (ref 0–2)
BASOPHILS NFR BLD AUTO: 1.1 % — SIGNIFICANT CHANGE UP (ref 0–2)
BASOPHILS NFR BLD AUTO: 1.3 % — SIGNIFICANT CHANGE UP (ref 0–2)
BASOPHILS NFR BLD AUTO: 1.4 % — SIGNIFICANT CHANGE UP (ref 0–2)
BASOPHILS NFR BLD AUTO: 1.8 % — SIGNIFICANT CHANGE UP (ref 0–2)
BASOPHILS NFR BLD AUTO: 1.8 % — SIGNIFICANT CHANGE UP (ref 0–2)
BASOPHILS NFR BLD AUTO: 2.6 % — HIGH (ref 0–2)
BENZODIAZ UR-MCNC: NEGATIVE — SIGNIFICANT CHANGE UP
BETA+GAMMA TOCOPHEROL SERPL-MCNC: 1.2 MG/L — SIGNIFICANT CHANGE UP (ref 0.5–5.5)
BILIRUB DIRECT SERPL-MCNC: 7.3 MG/DL — HIGH (ref 0–0.3)
BILIRUB DIRECT SERPL-MCNC: 7.4 MG/DL — HIGH (ref 0–0.3)
BILIRUB DIRECT SERPL-MCNC: 7.5 MG/DL — HIGH (ref 0–0.3)
BILIRUB INDIRECT FLD-MCNC: 2.8 MG/DL — HIGH (ref 0.2–1)
BILIRUB INDIRECT FLD-MCNC: 2.8 MG/DL — HIGH (ref 0.2–1)
BILIRUB INDIRECT FLD-MCNC: 3.4 MG/DL — HIGH (ref 0.2–1)
BILIRUB SERPL-MCNC: 10.2 MG/DL — HIGH (ref 0.2–1.2)
BILIRUB SERPL-MCNC: 10.3 MG/DL — HIGH (ref 0.2–1.2)
BILIRUB SERPL-MCNC: 10.4 MG/DL — HIGH (ref 0.2–1.2)
BILIRUB SERPL-MCNC: 10.7 MG/DL — HIGH (ref 0.2–1.2)
BILIRUB SERPL-MCNC: 10.7 MG/DL — HIGH (ref 0.2–1.2)
BILIRUB SERPL-MCNC: 10.8 MG/DL — HIGH (ref 0.2–1.2)
BILIRUB SERPL-MCNC: 10.8 MG/DL — HIGH (ref 0.2–1.2)
BILIRUB SERPL-MCNC: 11.1 MG/DL — HIGH (ref 0.2–1.2)
BILIRUB SERPL-MCNC: 11.3 MG/DL — HIGH (ref 0.2–1.2)
BILIRUB SERPL-MCNC: 2.9 MG/DL — HIGH (ref 0.2–1.2)
BILIRUB SERPL-MCNC: 3 MG/DL — HIGH (ref 0.2–1.2)
BILIRUB SERPL-MCNC: 3 MG/DL — HIGH (ref 0.2–1.2)
BILIRUB SERPL-MCNC: 3.1 MG/DL — HIGH (ref 0.2–1.2)
BILIRUB SERPL-MCNC: 3.2 MG/DL — HIGH (ref 0.2–1.2)
BILIRUB SERPL-MCNC: 3.3 MG/DL — HIGH (ref 0.2–1.2)
BILIRUB SERPL-MCNC: 3.3 MG/DL — HIGH (ref 0.2–1.2)
BILIRUB SERPL-MCNC: 3.4 MG/DL — HIGH (ref 0.2–1.2)
BILIRUB SERPL-MCNC: 3.4 MG/DL — HIGH (ref 0.2–1.2)
BILIRUB SERPL-MCNC: 3.5 MG/DL — HIGH (ref 0.2–1.2)
BILIRUB SERPL-MCNC: 3.5 MG/DL — HIGH (ref 0.2–1.2)
BILIRUB SERPL-MCNC: 3.6 MG/DL — HIGH (ref 0.2–1.2)
BILIRUB SERPL-MCNC: 3.7 MG/DL — HIGH (ref 0.2–1.2)
BILIRUB SERPL-MCNC: 3.8 MG/DL — HIGH (ref 0.2–1.2)
BILIRUB SERPL-MCNC: 4.1 MG/DL — HIGH (ref 0.2–1.2)
BILIRUB SERPL-MCNC: 4.1 MG/DL — HIGH (ref 0.2–1.2)
BILIRUB SERPL-MCNC: 4.4 MG/DL — HIGH (ref 0.2–1.2)
BILIRUB SERPL-MCNC: 6.2 MG/DL — HIGH (ref 0.2–1.2)
BILIRUB SERPL-MCNC: 7.2 MG/DL — HIGH (ref 0.2–1.2)
BILIRUB SERPL-MCNC: 7.5 MG/DL — HIGH (ref 0.2–1.2)
BILIRUB SERPL-MCNC: 8.2 MG/DL — HIGH (ref 0.2–1.2)
BILIRUB SERPL-MCNC: 8.6 MG/DL — HIGH (ref 0.2–1.2)
BILIRUB SERPL-MCNC: 8.9 MG/DL — HIGH (ref 0.2–1.2)
BILIRUB SERPL-MCNC: 9.4 MG/DL — HIGH (ref 0.2–1.2)
BILIRUB SERPL-MCNC: 9.7 MG/DL — HIGH (ref 0.2–1.2)
BILIRUB UR-MCNC: ABNORMAL
BLD GP AB SCN SERPL QL: NEGATIVE — SIGNIFICANT CHANGE UP
BUN SERPL-MCNC: 10 MG/DL — SIGNIFICANT CHANGE UP (ref 7–23)
BUN SERPL-MCNC: 4 MG/DL — LOW (ref 7–23)
BUN SERPL-MCNC: 5 MG/DL — LOW (ref 7–23)
BUN SERPL-MCNC: 6 MG/DL — LOW (ref 7–23)
BUN SERPL-MCNC: 7 MG/DL — SIGNIFICANT CHANGE UP (ref 7–23)
BUN SERPL-MCNC: 7 MG/DL — SIGNIFICANT CHANGE UP (ref 7–23)
BUN SERPL-MCNC: 8 MG/DL — SIGNIFICANT CHANGE UP (ref 7–23)
BUN SERPL-MCNC: <4 MG/DL — LOW (ref 7–23)
BURR CELLS BLD QL SMEAR: PRESENT — SIGNIFICANT CHANGE UP
C DIFF GDH STL QL: NEGATIVE — SIGNIFICANT CHANGE UP
C DIFF GDH STL QL: SIGNIFICANT CHANGE UP
C PEPTIDE SERPL-MCNC: 0.1 NG/ML — LOW (ref 1.1–4.4)
C PEPTIDE SERPL-MCNC: <0.1 NG/ML — LOW (ref 1.1–4.4)
CA-I SERPL-SCNC: 0.74 MMOL/L — LOW (ref 1.15–1.33)
CA-I SERPL-SCNC: 1.07 MMOL/L — LOW (ref 1.15–1.33)
CA-I SERPL-SCNC: 1.11 MMOL/L — LOW (ref 1.15–1.33)
CA-I SERPL-SCNC: 1.13 MMOL/L — LOW (ref 1.15–1.33)
CA-I SERPL-SCNC: 1.14 MMOL/L — LOW (ref 1.15–1.33)
CA-I SERPL-SCNC: 1.17 MMOL/L — SIGNIFICANT CHANGE UP (ref 1.15–1.33)
CA-I SERPL-SCNC: 1.21 MMOL/L — SIGNIFICANT CHANGE UP (ref 1.15–1.33)
CA-I SERPL-SCNC: 1.23 MMOL/L — SIGNIFICANT CHANGE UP (ref 1.15–1.33)
CA-I SERPL-SCNC: 1.23 MMOL/L — SIGNIFICANT CHANGE UP (ref 1.15–1.33)
CA-I SERPL-SCNC: 1.25 MMOL/L — SIGNIFICANT CHANGE UP (ref 1.15–1.33)
CA-I SERPL-SCNC: 1.26 MMOL/L — SIGNIFICANT CHANGE UP (ref 1.15–1.33)
CA-I SERPL-SCNC: 1.28 MMOL/L — SIGNIFICANT CHANGE UP (ref 1.15–1.33)
CA-I SERPL-SCNC: 1.29 MMOL/L — SIGNIFICANT CHANGE UP (ref 1.15–1.33)
CA-I SERPL-SCNC: 1.31 MMOL/L — SIGNIFICANT CHANGE UP (ref 1.15–1.33)
CA-I SERPL-SCNC: 1.37 MMOL/L — HIGH (ref 1.15–1.33)
CA-I SERPL-SCNC: 1.39 MMOL/L — HIGH (ref 1.15–1.33)
CA-I SERPL-SCNC: 1.43 MMOL/L — HIGH (ref 1.15–1.33)
CALCIUM SERPL-MCNC: 7.7 MG/DL — LOW (ref 8.4–10.5)
CALCIUM SERPL-MCNC: 7.8 MG/DL — LOW (ref 8.4–10.5)
CALCIUM SERPL-MCNC: 7.9 MG/DL — LOW (ref 8.4–10.5)
CALCIUM SERPL-MCNC: 7.9 MG/DL — LOW (ref 8.4–10.5)
CALCIUM SERPL-MCNC: 8 MG/DL — LOW (ref 8.4–10.5)
CALCIUM SERPL-MCNC: 8 MG/DL — LOW (ref 8.4–10.5)
CALCIUM SERPL-MCNC: 8.1 MG/DL — LOW (ref 8.4–10.5)
CALCIUM SERPL-MCNC: 8.3 MG/DL — LOW (ref 8.4–10.5)
CALCIUM SERPL-MCNC: 8.4 MG/DL — SIGNIFICANT CHANGE UP (ref 8.4–10.5)
CALCIUM SERPL-MCNC: 8.5 MG/DL — SIGNIFICANT CHANGE UP (ref 8.4–10.5)
CALCIUM SERPL-MCNC: 8.6 MG/DL — SIGNIFICANT CHANGE UP (ref 8.4–10.5)
CALCIUM SERPL-MCNC: 8.6 MG/DL — SIGNIFICANT CHANGE UP (ref 8.4–10.5)
CALCIUM SERPL-MCNC: 8.7 MG/DL — SIGNIFICANT CHANGE UP (ref 8.4–10.5)
CALCIUM SERPL-MCNC: 8.8 MG/DL — SIGNIFICANT CHANGE UP (ref 8.4–10.5)
CALCIUM SERPL-MCNC: 8.9 MG/DL — SIGNIFICANT CHANGE UP (ref 8.4–10.5)
CALCIUM SERPL-MCNC: 8.9 MG/DL — SIGNIFICANT CHANGE UP (ref 8.4–10.5)
CALCIUM SERPL-MCNC: 9 MG/DL — SIGNIFICANT CHANGE UP (ref 8.4–10.5)
CALCIUM SERPL-MCNC: 9.2 MG/DL — SIGNIFICANT CHANGE UP (ref 8.4–10.5)
CALCIUM SERPL-MCNC: 9.3 MG/DL — SIGNIFICANT CHANGE UP (ref 8.4–10.5)
CALCIUM SERPL-MCNC: 9.3 MG/DL — SIGNIFICANT CHANGE UP (ref 8.4–10.5)
CALCIUM SERPL-MCNC: 9.4 MG/DL — SIGNIFICANT CHANGE UP (ref 8.4–10.5)
CALCIUM SERPL-MCNC: 9.6 MG/DL — SIGNIFICANT CHANGE UP (ref 8.4–10.5)
CALCIUM SERPL-MCNC: 9.7 MG/DL — SIGNIFICANT CHANGE UP (ref 8.4–10.5)
CALCIUM SERPL-MCNC: 9.8 MG/DL — SIGNIFICANT CHANGE UP (ref 8.4–10.5)
CALCIUM SERPL-MCNC: 9.9 MG/DL — SIGNIFICANT CHANGE UP (ref 8.4–10.5)
CAST: 1 /LPF — SIGNIFICANT CHANGE UP (ref 0–4)
CAST: 6 /LPF — HIGH (ref 0–4)
CERULOPLASMIN SERPL-MCNC: 9 MG/DL — LOW (ref 16–45)
CHLORIDE BLDV-SCNC: 102 MMOL/L — SIGNIFICANT CHANGE UP (ref 96–108)
CHLORIDE BLDV-SCNC: 104 MMOL/L — SIGNIFICANT CHANGE UP (ref 96–108)
CHLORIDE BLDV-SCNC: 105 MMOL/L — SIGNIFICANT CHANGE UP (ref 96–108)
CHLORIDE BLDV-SCNC: 106 MMOL/L — SIGNIFICANT CHANGE UP (ref 96–108)
CHLORIDE BLDV-SCNC: 107 MMOL/L — SIGNIFICANT CHANGE UP (ref 96–108)
CHLORIDE BLDV-SCNC: 108 MMOL/L — SIGNIFICANT CHANGE UP (ref 96–108)
CHLORIDE BLDV-SCNC: 108 MMOL/L — SIGNIFICANT CHANGE UP (ref 96–108)
CHLORIDE BLDV-SCNC: 109 MMOL/L — HIGH (ref 96–108)
CHLORIDE BLDV-SCNC: 110 MMOL/L — HIGH (ref 96–108)
CHLORIDE BLDV-SCNC: 99 MMOL/L — SIGNIFICANT CHANGE UP (ref 96–108)
CHLORIDE SERPL-SCNC: 101 MMOL/L — SIGNIFICANT CHANGE UP (ref 96–108)
CHLORIDE SERPL-SCNC: 101 MMOL/L — SIGNIFICANT CHANGE UP (ref 96–108)
CHLORIDE SERPL-SCNC: 102 MMOL/L — SIGNIFICANT CHANGE UP (ref 96–108)
CHLORIDE SERPL-SCNC: 103 MMOL/L — SIGNIFICANT CHANGE UP (ref 96–108)
CHLORIDE SERPL-SCNC: 104 MMOL/L — SIGNIFICANT CHANGE UP (ref 96–108)
CHLORIDE SERPL-SCNC: 105 MMOL/L — SIGNIFICANT CHANGE UP (ref 96–108)
CHLORIDE SERPL-SCNC: 106 MMOL/L — SIGNIFICANT CHANGE UP (ref 96–108)
CHLORIDE SERPL-SCNC: 107 MMOL/L — SIGNIFICANT CHANGE UP (ref 96–108)
CHLORIDE SERPL-SCNC: 108 MMOL/L — SIGNIFICANT CHANGE UP (ref 96–108)
CHLORIDE SERPL-SCNC: 109 MMOL/L — HIGH (ref 96–108)
CHLORIDE SERPL-SCNC: 110 MMOL/L — HIGH (ref 96–108)
CHLORIDE SERPL-SCNC: 110 MMOL/L — HIGH (ref 96–108)
CHLORIDE SERPL-SCNC: 111 MMOL/L — HIGH (ref 96–108)
CHLORIDE SERPL-SCNC: 112 MMOL/L — HIGH (ref 96–108)
CHLORIDE SERPL-SCNC: 98 MMOL/L — SIGNIFICANT CHANGE UP (ref 96–108)
CHOLEST SERPL-MCNC: 105 MG/DL — SIGNIFICANT CHANGE UP
CHOLEST SERPL-MCNC: 131 MG/DL — SIGNIFICANT CHANGE UP
CHOLEST SERPL-MCNC: 82 MG/DL — SIGNIFICANT CHANGE UP
CITRULLINE SERPL-SCNC: 7.3 UMOL/L — LOW (ref 15.6–46.9)
CK MB CFR SERPL CALC: <1 NG/ML — SIGNIFICANT CHANGE UP (ref 0–3.8)
CK SERPL-CCNC: 20 U/L — LOW (ref 25–170)
CK SERPL-CCNC: 20 U/L — LOW (ref 25–170)
CK SERPL-CCNC: 21 U/L — LOW (ref 25–170)
CK SERPL-CCNC: 47 U/L — SIGNIFICANT CHANGE UP (ref 25–170)
CK SERPL-CCNC: 72 U/L — SIGNIFICANT CHANGE UP (ref 25–170)
CLOSURE TME COLL+EPINEP BLD: 22 K/UL — LOW (ref 150–400)
CMV DNA CSF QL NAA+PROBE: SIGNIFICANT CHANGE UP IU/ML
CMV DNA SPEC NAA+PROBE-LOG#: SIGNIFICANT CHANGE UP LOG10IU/ML
CO2 BLDV-SCNC: 19 MMOL/L — LOW (ref 22–26)
CO2 BLDV-SCNC: 21 MMOL/L — LOW (ref 22–26)
CO2 BLDV-SCNC: 22 MMOL/L — SIGNIFICANT CHANGE UP (ref 22–26)
CO2 BLDV-SCNC: 23 MMOL/L — SIGNIFICANT CHANGE UP (ref 22–26)
CO2 BLDV-SCNC: 24 MMOL/L — SIGNIFICANT CHANGE UP (ref 22–26)
CO2 BLDV-SCNC: 24 MMOL/L — SIGNIFICANT CHANGE UP (ref 22–26)
CO2 BLDV-SCNC: 25 MMOL/L — SIGNIFICANT CHANGE UP (ref 22–26)
CO2 BLDV-SCNC: 26 MMOL/L — SIGNIFICANT CHANGE UP (ref 22–26)
CO2 BLDV-SCNC: 27 MMOL/L — HIGH (ref 22–26)
CO2 BLDV-SCNC: 27 MMOL/L — HIGH (ref 22–26)
CO2 BLDV-SCNC: 29 MMOL/L — HIGH (ref 22–26)
CO2 BLDV-SCNC: 30 MMOL/L — HIGH (ref 22–26)
CO2 BLDV-SCNC: 35 MMOL/L — HIGH (ref 22–26)
CO2 SERPL-SCNC: 15 MMOL/L — LOW (ref 22–31)
CO2 SERPL-SCNC: 16 MMOL/L — LOW (ref 22–31)
CO2 SERPL-SCNC: 17 MMOL/L — LOW (ref 22–31)
CO2 SERPL-SCNC: 18 MMOL/L — LOW (ref 22–31)
CO2 SERPL-SCNC: 18 MMOL/L — LOW (ref 22–31)
CO2 SERPL-SCNC: 19 MMOL/L — LOW (ref 22–31)
CO2 SERPL-SCNC: 19 MMOL/L — LOW (ref 22–31)
CO2 SERPL-SCNC: 20 MMOL/L — LOW (ref 22–31)
CO2 SERPL-SCNC: 21 MMOL/L — LOW (ref 22–31)
CO2 SERPL-SCNC: 22 MMOL/L — SIGNIFICANT CHANGE UP (ref 22–31)
CO2 SERPL-SCNC: 23 MMOL/L — SIGNIFICANT CHANGE UP (ref 22–31)
CO2 SERPL-SCNC: 24 MMOL/L — SIGNIFICANT CHANGE UP (ref 22–31)
CO2 SERPL-SCNC: 25 MMOL/L — SIGNIFICANT CHANGE UP (ref 22–31)
CO2 SERPL-SCNC: 27 MMOL/L — SIGNIFICANT CHANGE UP (ref 22–31)
CO2 SERPL-SCNC: 30 MMOL/L — SIGNIFICANT CHANGE UP (ref 22–31)
COCAINE METAB.OTHER UR-MCNC: NEGATIVE — SIGNIFICANT CHANGE UP
COLOR FLD: ABNORMAL
COLOR FLD: ABNORMAL
COLOR SPEC: SIGNIFICANT CHANGE UP
COPPER ?TM UR-MCNC: SIGNIFICANT CHANGE UP
COPPER SERPL-MCNC: 39 UG/DL — LOW (ref 80–158)
COPPER UR-MCNC: 35 UG/L — SIGNIFICANT CHANGE UP
COPPER/CREATININE RATIO: 36 UG/G CREAT — SIGNIFICANT CHANGE UP (ref 0–49)
CORTIS AM PEAK SERPL-MCNC: 15 UG/DL — SIGNIFICANT CHANGE UP (ref 6–18.4)
CORTIS AM PEAK SERPL-MCNC: 15.8 UG/DL — SIGNIFICANT CHANGE UP (ref 6–18.4)
CORTIS AM PEAK SERPL-MCNC: 22 UG/DL — HIGH (ref 6–18.4)
CORTIS AM PEAK SERPL-MCNC: 6.3 UG/DL — SIGNIFICANT CHANGE UP (ref 6–18.4)
CREAT ?TM UR-MCNC: 95 MG/DL — SIGNIFICANT CHANGE UP
CREAT SERPL-MCNC: 0.3 MG/DL — LOW (ref 0.5–1.3)
CREAT SERPL-MCNC: 0.31 MG/DL — LOW (ref 0.5–1.3)
CREAT SERPL-MCNC: 0.36 MG/DL — LOW (ref 0.5–1.3)
CREAT SERPL-MCNC: 0.36 MG/DL — LOW (ref 0.5–1.3)
CREAT SERPL-MCNC: 0.38 MG/DL — LOW (ref 0.5–1.3)
CREAT SERPL-MCNC: 0.39 MG/DL — LOW (ref 0.5–1.3)
CREAT SERPL-MCNC: 0.41 MG/DL — LOW (ref 0.5–1.3)
CREAT SERPL-MCNC: 0.42 MG/DL — LOW (ref 0.5–1.3)
CREAT SERPL-MCNC: 0.42 MG/DL — LOW (ref 0.5–1.3)
CREAT SERPL-MCNC: 0.43 MG/DL — LOW (ref 0.5–1.3)
CREAT SERPL-MCNC: 0.43 MG/DL — LOW (ref 0.5–1.3)
CREAT SERPL-MCNC: 0.45 MG/DL — LOW (ref 0.5–1.3)
CREAT SERPL-MCNC: 0.46 MG/DL — LOW (ref 0.5–1.3)
CREAT SERPL-MCNC: 0.47 MG/DL — LOW (ref 0.5–1.3)
CREAT SERPL-MCNC: 0.49 MG/DL — LOW (ref 0.5–1.3)
CREAT SERPL-MCNC: 0.49 MG/DL — LOW (ref 0.5–1.3)
CREAT SERPL-MCNC: 0.5 MG/DL — SIGNIFICANT CHANGE UP (ref 0.5–1.3)
CREAT SERPL-MCNC: 0.51 MG/DL — SIGNIFICANT CHANGE UP (ref 0.5–1.3)
CREAT SERPL-MCNC: 0.52 MG/DL — SIGNIFICANT CHANGE UP (ref 0.5–1.3)
CREAT SERPL-MCNC: 0.53 MG/DL — SIGNIFICANT CHANGE UP (ref 0.5–1.3)
CREAT SERPL-MCNC: 0.57 MG/DL — SIGNIFICANT CHANGE UP (ref 0.5–1.3)
CREAT SERPL-MCNC: 0.6 MG/DL — SIGNIFICANT CHANGE UP (ref 0.5–1.3)
CREAT SERPL-MCNC: 0.62 MG/DL — SIGNIFICANT CHANGE UP (ref 0.5–1.3)
CREAT SERPL-MCNC: 0.67 MG/DL — SIGNIFICANT CHANGE UP (ref 0.5–1.3)
CREAT SERPL-MCNC: 0.75 MG/DL — SIGNIFICANT CHANGE UP (ref 0.5–1.3)
CREAT SERPL-MCNC: 0.75 MG/DL — SIGNIFICANT CHANGE UP (ref 0.5–1.3)
CREAT SERPL-MCNC: 0.76 MG/DL — SIGNIFICANT CHANGE UP (ref 0.5–1.3)
CREAT SERPL-MCNC: 0.8 MG/DL — SIGNIFICANT CHANGE UP (ref 0.5–1.3)
CREAT SERPL-MCNC: 0.81 MG/DL — SIGNIFICANT CHANGE UP (ref 0.5–1.3)
CREAT SERPL-MCNC: 0.82 MG/DL — SIGNIFICANT CHANGE UP (ref 0.5–1.3)
CREAT SERPL-MCNC: 0.83 MG/DL — SIGNIFICANT CHANGE UP (ref 0.5–1.3)
CREAT SERPL-MCNC: 0.91 MG/DL — SIGNIFICANT CHANGE UP (ref 0.5–1.3)
CREAT SERPL-MCNC: 0.93 MG/DL — SIGNIFICANT CHANGE UP (ref 0.5–1.3)
CREAT SERPL-MCNC: 0.96 MG/DL — SIGNIFICANT CHANGE UP (ref 0.5–1.3)
CREAT SERPL-MCNC: 0.96 MG/DL — SIGNIFICANT CHANGE UP (ref 0.5–1.3)
CREAT SERPL-MCNC: 0.97 MG/DL — SIGNIFICANT CHANGE UP (ref 0.5–1.3)
CREAT SERPL-MCNC: 0.99 MG/DL — SIGNIFICANT CHANGE UP (ref 0.5–1.3)
CREAT SERPL-MCNC: 1.01 MG/DL — SIGNIFICANT CHANGE UP (ref 0.5–1.3)
CREAT SERPL-MCNC: 1.05 MG/DL — SIGNIFICANT CHANGE UP (ref 0.5–1.3)
CREAT SERPL-MCNC: 1.07 MG/DL — SIGNIFICANT CHANGE UP (ref 0.5–1.3)
CREAT SERPL-MCNC: 1.08 MG/DL — SIGNIFICANT CHANGE UP (ref 0.5–1.3)
CREAT SERPL-MCNC: 1.21 MG/DL — SIGNIFICANT CHANGE UP (ref 0.5–1.3)
CRYPTOC AG FLD QL: NEGATIVE — SIGNIFICANT CHANGE UP
CULTURE RESULTS: ABNORMAL
CULTURE RESULTS: NO GROWTH — SIGNIFICANT CHANGE UP
CULTURE RESULTS: SIGNIFICANT CHANGE UP
CYSTATHIONIN SERPL-SCNC: 0.5 UMOL/L — SIGNIFICANT CHANGE UP (ref 0–0.7)
CYSTINE SERPL-SCNC: 23.7 UMOL/L — SIGNIFICANT CHANGE UP (ref 15.8–47.3)
D DIMER BLD IA.RAPID-MCNC: 1162 NG/ML DDU — HIGH
DIFF PNL FLD: ABNORMAL
DIFF PNL FLD: NEGATIVE — SIGNIFICANT CHANGE UP
DIFF PNL FLD: NEGATIVE — SIGNIFICANT CHANGE UP
E CHAFFEENSIS DNA BLD QL NAA+PROBE: NEGATIVE — SIGNIFICANT CHANGE UP
E CHAFFEENSIS DNA BLD QL NAA+PROBE: NEGATIVE — SIGNIFICANT CHANGE UP
E CHAFFEENSIS IGG TITR SER: NEGATIVE — SIGNIFICANT CHANGE UP
E CHAFFEENSIS IGG TITR SER: NEGATIVE — SIGNIFICANT CHANGE UP
E CHAFFEENSIS IGM TITR SER IF: NEGATIVE — SIGNIFICANT CHANGE UP
E CHAFFEENSIS IGM TITR SER IF: NEGATIVE — SIGNIFICANT CHANGE UP
E EWINGII DNA SPEC QL NAA+PROBE: NEGATIVE — SIGNIFICANT CHANGE UP
E EWINGII DNA SPEC QL NAA+PROBE: NEGATIVE — SIGNIFICANT CHANGE UP
EBV DNA SERPL NAA+PROBE-ACNC: 61 IU/ML — HIGH
EBVPCR LOG: 1.79 LOG10IU/ML — HIGH
EGFR: 108 ML/MIN/1.73M2 — SIGNIFICANT CHANGE UP
EGFR: 110 ML/MIN/1.73M2 — SIGNIFICANT CHANGE UP
EGFR: 111 ML/MIN/1.73M2 — SIGNIFICANT CHANGE UP
EGFR: 112 ML/MIN/1.73M2 — SIGNIFICANT CHANGE UP
EGFR: 114 ML/MIN/1.73M2 — SIGNIFICANT CHANGE UP
EGFR: 115 ML/MIN/1.73M2 — SIGNIFICANT CHANGE UP
EGFR: 115 ML/MIN/1.73M2 — SIGNIFICANT CHANGE UP
EGFR: 116 ML/MIN/1.73M2 — SIGNIFICANT CHANGE UP
EGFR: 117 ML/MIN/1.73M2 — SIGNIFICANT CHANGE UP
EGFR: 118 ML/MIN/1.73M2 — SIGNIFICANT CHANGE UP
EGFR: 119 ML/MIN/1.73M2 — SIGNIFICANT CHANGE UP
EGFR: 120 ML/MIN/1.73M2 — SIGNIFICANT CHANGE UP
EGFR: 120 ML/MIN/1.73M2 — SIGNIFICANT CHANGE UP
EGFR: 121 ML/MIN/1.73M2 — SIGNIFICANT CHANGE UP
EGFR: 123 ML/MIN/1.73M2 — SIGNIFICANT CHANGE UP
EGFR: 124 ML/MIN/1.73M2 — SIGNIFICANT CHANGE UP
EGFR: 125 ML/MIN/1.73M2 — SIGNIFICANT CHANGE UP
EGFR: 125 ML/MIN/1.73M2 — SIGNIFICANT CHANGE UP
EGFR: 130 ML/MIN/1.73M2 — SIGNIFICANT CHANGE UP
EGFR: 131 ML/MIN/1.73M2 — SIGNIFICANT CHANGE UP
EGFR: 55 ML/MIN/1.73M2 — LOW
EGFR: 63 ML/MIN/1.73M2 — SIGNIFICANT CHANGE UP
EGFR: 64 ML/MIN/1.73M2 — SIGNIFICANT CHANGE UP
EGFR: 66 ML/MIN/1.73M2 — SIGNIFICANT CHANGE UP
EGFR: 69 ML/MIN/1.73M2 — SIGNIFICANT CHANGE UP
EGFR: 70 ML/MIN/1.73M2 — SIGNIFICANT CHANGE UP
EGFR: 72 ML/MIN/1.73M2 — SIGNIFICANT CHANGE UP
EGFR: 73 ML/MIN/1.73M2 — SIGNIFICANT CHANGE UP
EGFR: 73 ML/MIN/1.73M2 — SIGNIFICANT CHANGE UP
EGFR: 76 ML/MIN/1.73M2 — SIGNIFICANT CHANGE UP
EGFR: 78 ML/MIN/1.73M2 — SIGNIFICANT CHANGE UP
EGFR: 87 ML/MIN/1.73M2 — SIGNIFICANT CHANGE UP
EGFR: 88 ML/MIN/1.73M2 — SIGNIFICANT CHANGE UP
EGFR: 89 ML/MIN/1.73M2 — SIGNIFICANT CHANGE UP
EGFR: 91 ML/MIN/1.73M2 — SIGNIFICANT CHANGE UP
EGFR: 97 ML/MIN/1.73M2 — SIGNIFICANT CHANGE UP
EGFR: 98 ML/MIN/1.73M2 — SIGNIFICANT CHANGE UP
EGFR: 98 ML/MIN/1.73M2 — SIGNIFICANT CHANGE UP
EHRLICHIA DNA SPEC QL NAA+PROBE: NEGATIVE — SIGNIFICANT CHANGE UP
EHRLICHIA DNA SPEC QL NAA+PROBE: NEGATIVE — SIGNIFICANT CHANGE UP
ELLIPTOCYTES BLD QL SMEAR: SLIGHT — SIGNIFICANT CHANGE UP
EOSINOPHIL # BLD AUTO: 0 K/UL — SIGNIFICANT CHANGE UP (ref 0–0.5)
EOSINOPHIL # BLD AUTO: 0.01 K/UL — SIGNIFICANT CHANGE UP (ref 0–0.5)
EOSINOPHIL # BLD AUTO: 0.02 K/UL — SIGNIFICANT CHANGE UP (ref 0–0.5)
EOSINOPHIL # BLD AUTO: 0.03 K/UL — SIGNIFICANT CHANGE UP (ref 0–0.5)
EOSINOPHIL # BLD AUTO: 0.04 K/UL — SIGNIFICANT CHANGE UP (ref 0–0.5)
EOSINOPHIL # BLD AUTO: 0.04 K/UL — SIGNIFICANT CHANGE UP (ref 0–0.5)
EOSINOPHIL # BLD AUTO: 0.05 K/UL — SIGNIFICANT CHANGE UP (ref 0–0.5)
EOSINOPHIL # BLD AUTO: 0.06 K/UL — SIGNIFICANT CHANGE UP (ref 0–0.5)
EOSINOPHIL # BLD AUTO: 0.08 K/UL — SIGNIFICANT CHANGE UP (ref 0–0.5)
EOSINOPHIL # BLD AUTO: 0.12 K/UL — SIGNIFICANT CHANGE UP (ref 0–0.5)
EOSINOPHIL # BLD AUTO: 0.13 K/UL — SIGNIFICANT CHANGE UP (ref 0–0.5)
EOSINOPHIL # BLD AUTO: 0.15 K/UL — SIGNIFICANT CHANGE UP (ref 0–0.5)
EOSINOPHIL # BLD AUTO: 0.17 K/UL — SIGNIFICANT CHANGE UP (ref 0–0.5)
EOSINOPHIL # BLD AUTO: 0.17 K/UL — SIGNIFICANT CHANGE UP (ref 0–0.5)
EOSINOPHIL # BLD AUTO: 0.18 K/UL — SIGNIFICANT CHANGE UP (ref 0–0.5)
EOSINOPHIL # BLD AUTO: 0.36 K/UL — SIGNIFICANT CHANGE UP (ref 0–0.5)
EOSINOPHIL # BLD AUTO: 0.73 K/UL — HIGH (ref 0–0.5)
EOSINOPHIL NFR BLD AUTO: 0 % — SIGNIFICANT CHANGE UP (ref 0–6)
EOSINOPHIL NFR BLD AUTO: 0.1 % — SIGNIFICANT CHANGE UP (ref 0–6)
EOSINOPHIL NFR BLD AUTO: 0.2 % — SIGNIFICANT CHANGE UP (ref 0–6)
EOSINOPHIL NFR BLD AUTO: 0.2 % — SIGNIFICANT CHANGE UP (ref 0–6)
EOSINOPHIL NFR BLD AUTO: 0.3 % — SIGNIFICANT CHANGE UP (ref 0–6)
EOSINOPHIL NFR BLD AUTO: 0.3 % — SIGNIFICANT CHANGE UP (ref 0–6)
EOSINOPHIL NFR BLD AUTO: 0.4 % — SIGNIFICANT CHANGE UP (ref 0–6)
EOSINOPHIL NFR BLD AUTO: 0.5 % — SIGNIFICANT CHANGE UP (ref 0–6)
EOSINOPHIL NFR BLD AUTO: 0.7 % — SIGNIFICANT CHANGE UP (ref 0–6)
EOSINOPHIL NFR BLD AUTO: 0.7 % — SIGNIFICANT CHANGE UP (ref 0–6)
EOSINOPHIL NFR BLD AUTO: 2.7 % — SIGNIFICANT CHANGE UP (ref 0–6)
EOSINOPHIL NFR BLD AUTO: 3 % — SIGNIFICANT CHANGE UP (ref 0–6)
ERHLICIA RESULT COMMENT: SIGNIFICANT CHANGE UP
ERHLICIA RESULT COMMENT: SIGNIFICANT CHANGE UP
ESTIMATED AVERAGE GLUCOSE: 103 MG/DL — SIGNIFICANT CHANGE UP (ref 68–114)
ETHANOL SERPL-MCNC: <10 MG/DL — SIGNIFICANT CHANGE UP (ref 0–10)
FACT VIII ACT/NOR PPP: 525 % — HIGH (ref 60–125)
FERRITIN SERPL-MCNC: 1087 NG/ML — HIGH (ref 15–150)
FERRITIN SERPL-MCNC: 653 NG/ML — HIGH (ref 15–150)
FIBRINOGEN PPP-MCNC: 103 MG/DL — LOW (ref 200–445)
FIBRINOGEN PPP-MCNC: 139 MG/DL — LOW (ref 200–445)
FIBRINOGEN PPP-MCNC: 141 MG/DL — LOW (ref 200–445)
FIBRINOGEN PPP-MCNC: 156 MG/DL — LOW (ref 200–445)
FIBRINOGEN PPP-MCNC: 158 MG/DL — LOW (ref 200–445)
FIBRINOGEN PPP-MCNC: 160 MG/DL — LOW (ref 200–445)
FIBRINOGEN PPP-MCNC: 178 MG/DL — LOW (ref 200–445)
FLUAV AG NPH QL: SIGNIFICANT CHANGE UP
FLUBV AG NPH QL: SIGNIFICANT CHANGE UP
FLUID INTAKE SUBSTANCE CLASS: SIGNIFICANT CHANGE UP
FLUID INTAKE SUBSTANCE CLASS: SIGNIFICANT CHANGE UP
FOLATE SERPL-MCNC: 8.6 NG/ML — SIGNIFICANT CHANGE UP
FRUCTOSAMINE SERPL-MCNC: 372 UMOL/L — HIGH (ref 205–285)
FUNGITELL B-D-GLUCAN,  BRONCHIAL LAVAGE: SIGNIFICANT CHANGE UP
FUNGITELL B-D-GLUCAN,  BRONCHIAL LAVAGE: SIGNIFICANT CHANGE UP
FUNGITELL: 51 PG/ML — SIGNIFICANT CHANGE UP
FUNGITELL: <31 PG/ML — SIGNIFICANT CHANGE UP
GABA SERPL-SCNC: <0.5 UMOL/L — SIGNIFICANT CHANGE UP (ref 0–0.6)
GALACTOMANNAN AG SERPL-ACNC: 0.05 INDEX — SIGNIFICANT CHANGE UP (ref 0–0.49)
GALACTOMANNAN AG SERPL-ACNC: 0.05 INDEX — SIGNIFICANT CHANGE UP (ref 0–0.49)
GALACTOMANNAN AG SERPL-ACNC: 0.06 INDEX — SIGNIFICANT CHANGE UP (ref 0–0.49)
GALACTOMANNAN AG SERPL-ACNC: 0.21 INDEX — SIGNIFICANT CHANGE UP (ref 0–0.49)
GAS PNL BLDA: SIGNIFICANT CHANGE UP
GAS PNL BLDV: 134 MMOL/L — LOW (ref 136–145)
GAS PNL BLDV: 134 MMOL/L — LOW (ref 136–145)
GAS PNL BLDV: 135 MMOL/L — LOW (ref 136–145)
GAS PNL BLDV: 135 MMOL/L — LOW (ref 136–145)
GAS PNL BLDV: 136 MMOL/L — SIGNIFICANT CHANGE UP (ref 136–145)
GAS PNL BLDV: 137 MMOL/L — SIGNIFICANT CHANGE UP (ref 136–145)
GAS PNL BLDV: 138 MMOL/L — SIGNIFICANT CHANGE UP (ref 136–145)
GAS PNL BLDV: 139 MMOL/L — SIGNIFICANT CHANGE UP (ref 136–145)
GAS PNL BLDV: 140 MMOL/L — SIGNIFICANT CHANGE UP (ref 136–145)
GAS PNL BLDV: 141 MMOL/L — SIGNIFICANT CHANGE UP (ref 136–145)
GAS PNL BLDV: 142 MMOL/L — SIGNIFICANT CHANGE UP (ref 136–145)
GAS PNL BLDV: 143 MMOL/L — SIGNIFICANT CHANGE UP (ref 136–145)
GAS PNL BLDV: SIGNIFICANT CHANGE UP
GI PCR PANEL: ABNORMAL
GI PCR PANEL: SIGNIFICANT CHANGE UP
GIANT PLATELETS BLD QL SMEAR: PRESENT — SIGNIFICANT CHANGE UP
GLUCOSE BLDC GLUCOMTR-MCNC: 104 MG/DL — HIGH (ref 70–99)
GLUCOSE BLDC GLUCOMTR-MCNC: 104 MG/DL — HIGH (ref 70–99)
GLUCOSE BLDC GLUCOMTR-MCNC: 107 MG/DL — HIGH (ref 70–99)
GLUCOSE BLDC GLUCOMTR-MCNC: 109 MG/DL — HIGH (ref 70–99)
GLUCOSE BLDC GLUCOMTR-MCNC: 110 MG/DL — HIGH (ref 70–99)
GLUCOSE BLDC GLUCOMTR-MCNC: 113 MG/DL — HIGH (ref 70–99)
GLUCOSE BLDC GLUCOMTR-MCNC: 116 MG/DL — HIGH (ref 70–99)
GLUCOSE BLDC GLUCOMTR-MCNC: 118 MG/DL — HIGH (ref 70–99)
GLUCOSE BLDC GLUCOMTR-MCNC: 119 MG/DL — HIGH (ref 70–99)
GLUCOSE BLDC GLUCOMTR-MCNC: 120 MG/DL — HIGH (ref 70–99)
GLUCOSE BLDC GLUCOMTR-MCNC: 122 MG/DL — HIGH (ref 70–99)
GLUCOSE BLDC GLUCOMTR-MCNC: 124 MG/DL — HIGH (ref 70–99)
GLUCOSE BLDC GLUCOMTR-MCNC: 130 MG/DL — HIGH (ref 70–99)
GLUCOSE BLDC GLUCOMTR-MCNC: 131 MG/DL — HIGH (ref 70–99)
GLUCOSE BLDC GLUCOMTR-MCNC: 134 MG/DL — HIGH (ref 70–99)
GLUCOSE BLDC GLUCOMTR-MCNC: 135 MG/DL — HIGH (ref 70–99)
GLUCOSE BLDC GLUCOMTR-MCNC: 136 MG/DL — HIGH (ref 70–99)
GLUCOSE BLDC GLUCOMTR-MCNC: 138 MG/DL — HIGH (ref 70–99)
GLUCOSE BLDC GLUCOMTR-MCNC: 138 MG/DL — HIGH (ref 70–99)
GLUCOSE BLDC GLUCOMTR-MCNC: 139 MG/DL — HIGH (ref 70–99)
GLUCOSE BLDC GLUCOMTR-MCNC: 140 MG/DL — HIGH (ref 70–99)
GLUCOSE BLDC GLUCOMTR-MCNC: 141 MG/DL — HIGH (ref 70–99)
GLUCOSE BLDC GLUCOMTR-MCNC: 145 MG/DL — HIGH (ref 70–99)
GLUCOSE BLDC GLUCOMTR-MCNC: 145 MG/DL — HIGH (ref 70–99)
GLUCOSE BLDC GLUCOMTR-MCNC: 146 MG/DL — HIGH (ref 70–99)
GLUCOSE BLDC GLUCOMTR-MCNC: 148 MG/DL — HIGH (ref 70–99)
GLUCOSE BLDC GLUCOMTR-MCNC: 148 MG/DL — HIGH (ref 70–99)
GLUCOSE BLDC GLUCOMTR-MCNC: 151 MG/DL — HIGH (ref 70–99)
GLUCOSE BLDC GLUCOMTR-MCNC: 155 MG/DL — HIGH (ref 70–99)
GLUCOSE BLDC GLUCOMTR-MCNC: 157 MG/DL — HIGH (ref 70–99)
GLUCOSE BLDC GLUCOMTR-MCNC: 158 MG/DL — HIGH (ref 70–99)
GLUCOSE BLDC GLUCOMTR-MCNC: 159 MG/DL — HIGH (ref 70–99)
GLUCOSE BLDC GLUCOMTR-MCNC: 159 MG/DL — HIGH (ref 70–99)
GLUCOSE BLDC GLUCOMTR-MCNC: 161 MG/DL — HIGH (ref 70–99)
GLUCOSE BLDC GLUCOMTR-MCNC: 161 MG/DL — HIGH (ref 70–99)
GLUCOSE BLDC GLUCOMTR-MCNC: 164 MG/DL — HIGH (ref 70–99)
GLUCOSE BLDC GLUCOMTR-MCNC: 165 MG/DL — HIGH (ref 70–99)
GLUCOSE BLDC GLUCOMTR-MCNC: 165 MG/DL — HIGH (ref 70–99)
GLUCOSE BLDC GLUCOMTR-MCNC: 166 MG/DL — HIGH (ref 70–99)
GLUCOSE BLDC GLUCOMTR-MCNC: 167 MG/DL — HIGH (ref 70–99)
GLUCOSE BLDC GLUCOMTR-MCNC: 172 MG/DL — HIGH (ref 70–99)
GLUCOSE BLDC GLUCOMTR-MCNC: 175 MG/DL — HIGH (ref 70–99)
GLUCOSE BLDC GLUCOMTR-MCNC: 176 MG/DL — HIGH (ref 70–99)
GLUCOSE BLDC GLUCOMTR-MCNC: 176 MG/DL — HIGH (ref 70–99)
GLUCOSE BLDC GLUCOMTR-MCNC: 177 MG/DL — HIGH (ref 70–99)
GLUCOSE BLDC GLUCOMTR-MCNC: 179 MG/DL — HIGH (ref 70–99)
GLUCOSE BLDC GLUCOMTR-MCNC: 181 MG/DL — HIGH (ref 70–99)
GLUCOSE BLDC GLUCOMTR-MCNC: 181 MG/DL — HIGH (ref 70–99)
GLUCOSE BLDC GLUCOMTR-MCNC: 182 MG/DL — HIGH (ref 70–99)
GLUCOSE BLDC GLUCOMTR-MCNC: 185 MG/DL — HIGH (ref 70–99)
GLUCOSE BLDC GLUCOMTR-MCNC: 188 MG/DL — HIGH (ref 70–99)
GLUCOSE BLDC GLUCOMTR-MCNC: 189 MG/DL — HIGH (ref 70–99)
GLUCOSE BLDC GLUCOMTR-MCNC: 192 MG/DL — HIGH (ref 70–99)
GLUCOSE BLDC GLUCOMTR-MCNC: 195 MG/DL — HIGH (ref 70–99)
GLUCOSE BLDC GLUCOMTR-MCNC: 200 MG/DL — HIGH (ref 70–99)
GLUCOSE BLDC GLUCOMTR-MCNC: 204 MG/DL — HIGH (ref 70–99)
GLUCOSE BLDC GLUCOMTR-MCNC: 204 MG/DL — HIGH (ref 70–99)
GLUCOSE BLDC GLUCOMTR-MCNC: 205 MG/DL — HIGH (ref 70–99)
GLUCOSE BLDC GLUCOMTR-MCNC: 205 MG/DL — HIGH (ref 70–99)
GLUCOSE BLDC GLUCOMTR-MCNC: 207 MG/DL — HIGH (ref 70–99)
GLUCOSE BLDC GLUCOMTR-MCNC: 209 MG/DL — HIGH (ref 70–99)
GLUCOSE BLDC GLUCOMTR-MCNC: 214 MG/DL — HIGH (ref 70–99)
GLUCOSE BLDC GLUCOMTR-MCNC: 215 MG/DL — HIGH (ref 70–99)
GLUCOSE BLDC GLUCOMTR-MCNC: 220 MG/DL — HIGH (ref 70–99)
GLUCOSE BLDC GLUCOMTR-MCNC: 220 MG/DL — HIGH (ref 70–99)
GLUCOSE BLDC GLUCOMTR-MCNC: 223 MG/DL — HIGH (ref 70–99)
GLUCOSE BLDC GLUCOMTR-MCNC: 225 MG/DL — HIGH (ref 70–99)
GLUCOSE BLDC GLUCOMTR-MCNC: 226 MG/DL — HIGH (ref 70–99)
GLUCOSE BLDC GLUCOMTR-MCNC: 227 MG/DL — HIGH (ref 70–99)
GLUCOSE BLDC GLUCOMTR-MCNC: 231 MG/DL — HIGH (ref 70–99)
GLUCOSE BLDC GLUCOMTR-MCNC: 231 MG/DL — HIGH (ref 70–99)
GLUCOSE BLDC GLUCOMTR-MCNC: 234 MG/DL — HIGH (ref 70–99)
GLUCOSE BLDC GLUCOMTR-MCNC: 234 MG/DL — HIGH (ref 70–99)
GLUCOSE BLDC GLUCOMTR-MCNC: 235 MG/DL — HIGH (ref 70–99)
GLUCOSE BLDC GLUCOMTR-MCNC: 236 MG/DL — HIGH (ref 70–99)
GLUCOSE BLDC GLUCOMTR-MCNC: 239 MG/DL — HIGH (ref 70–99)
GLUCOSE BLDC GLUCOMTR-MCNC: 242 MG/DL — HIGH (ref 70–99)
GLUCOSE BLDC GLUCOMTR-MCNC: 247 MG/DL — HIGH (ref 70–99)
GLUCOSE BLDC GLUCOMTR-MCNC: 248 MG/DL — HIGH (ref 70–99)
GLUCOSE BLDC GLUCOMTR-MCNC: 249 MG/DL — HIGH (ref 70–99)
GLUCOSE BLDC GLUCOMTR-MCNC: 256 MG/DL — HIGH (ref 70–99)
GLUCOSE BLDC GLUCOMTR-MCNC: 261 MG/DL — HIGH (ref 70–99)
GLUCOSE BLDC GLUCOMTR-MCNC: 279 MG/DL — HIGH (ref 70–99)
GLUCOSE BLDC GLUCOMTR-MCNC: 323 MG/DL — HIGH (ref 70–99)
GLUCOSE BLDC GLUCOMTR-MCNC: 51 MG/DL — CRITICAL LOW (ref 70–99)
GLUCOSE BLDC GLUCOMTR-MCNC: 51 MG/DL — CRITICAL LOW (ref 70–99)
GLUCOSE BLDC GLUCOMTR-MCNC: 52 MG/DL — CRITICAL LOW (ref 70–99)
GLUCOSE BLDC GLUCOMTR-MCNC: 53 MG/DL — CRITICAL LOW (ref 70–99)
GLUCOSE BLDC GLUCOMTR-MCNC: 57 MG/DL — LOW (ref 70–99)
GLUCOSE BLDC GLUCOMTR-MCNC: 58 MG/DL — LOW (ref 70–99)
GLUCOSE BLDC GLUCOMTR-MCNC: 67 MG/DL — LOW (ref 70–99)
GLUCOSE BLDC GLUCOMTR-MCNC: 68 MG/DL — LOW (ref 70–99)
GLUCOSE BLDC GLUCOMTR-MCNC: 69 MG/DL — LOW (ref 70–99)
GLUCOSE BLDC GLUCOMTR-MCNC: 70 MG/DL — SIGNIFICANT CHANGE UP (ref 70–99)
GLUCOSE BLDC GLUCOMTR-MCNC: 76 MG/DL — SIGNIFICANT CHANGE UP (ref 70–99)
GLUCOSE BLDC GLUCOMTR-MCNC: 80 MG/DL — SIGNIFICANT CHANGE UP (ref 70–99)
GLUCOSE BLDC GLUCOMTR-MCNC: 82 MG/DL — SIGNIFICANT CHANGE UP (ref 70–99)
GLUCOSE BLDC GLUCOMTR-MCNC: 83 MG/DL — SIGNIFICANT CHANGE UP (ref 70–99)
GLUCOSE BLDC GLUCOMTR-MCNC: 89 MG/DL — SIGNIFICANT CHANGE UP (ref 70–99)
GLUCOSE BLDC GLUCOMTR-MCNC: 90 MG/DL — SIGNIFICANT CHANGE UP (ref 70–99)
GLUCOSE BLDC GLUCOMTR-MCNC: 96 MG/DL — SIGNIFICANT CHANGE UP (ref 70–99)
GLUCOSE BLDC GLUCOMTR-MCNC: 98 MG/DL — SIGNIFICANT CHANGE UP (ref 70–99)
GLUCOSE BLDC GLUCOMTR-MCNC: 98 MG/DL — SIGNIFICANT CHANGE UP (ref 70–99)
GLUCOSE BLDV-MCNC: 109 MG/DL — HIGH (ref 70–99)
GLUCOSE BLDV-MCNC: 113 MG/DL — HIGH (ref 70–99)
GLUCOSE BLDV-MCNC: 121 MG/DL — HIGH (ref 70–99)
GLUCOSE BLDV-MCNC: 129 MG/DL — HIGH (ref 70–99)
GLUCOSE BLDV-MCNC: 134 MG/DL — HIGH (ref 70–99)
GLUCOSE BLDV-MCNC: 138 MG/DL — HIGH (ref 70–99)
GLUCOSE BLDV-MCNC: 140 MG/DL — HIGH (ref 70–99)
GLUCOSE BLDV-MCNC: 157 MG/DL — HIGH (ref 70–99)
GLUCOSE BLDV-MCNC: 159 MG/DL — HIGH (ref 70–99)
GLUCOSE BLDV-MCNC: 188 MG/DL — HIGH (ref 70–99)
GLUCOSE BLDV-MCNC: 203 MG/DL — HIGH (ref 70–99)
GLUCOSE BLDV-MCNC: 216 MG/DL — HIGH (ref 70–99)
GLUCOSE BLDV-MCNC: 226 MG/DL — HIGH (ref 70–99)
GLUCOSE BLDV-MCNC: 233 MG/DL — HIGH (ref 70–99)
GLUCOSE BLDV-MCNC: 267 MG/DL — HIGH (ref 70–99)
GLUCOSE BLDV-MCNC: 479 MG/DL — CRITICAL HIGH (ref 70–99)
GLUCOSE BLDV-MCNC: 64 MG/DL — LOW (ref 70–99)
GLUCOSE BLDV-MCNC: 90 MG/DL — SIGNIFICANT CHANGE UP (ref 70–99)
GLUCOSE BLDV-MCNC: 99 MG/DL — SIGNIFICANT CHANGE UP (ref 70–99)
GLUCOSE SERPL-MCNC: 104 MG/DL — HIGH (ref 70–99)
GLUCOSE SERPL-MCNC: 107 MG/DL — HIGH (ref 70–99)
GLUCOSE SERPL-MCNC: 118 MG/DL — HIGH (ref 70–99)
GLUCOSE SERPL-MCNC: 119 MG/DL — HIGH (ref 70–99)
GLUCOSE SERPL-MCNC: 120 MG/DL — HIGH (ref 70–99)
GLUCOSE SERPL-MCNC: 133 MG/DL — HIGH (ref 70–99)
GLUCOSE SERPL-MCNC: 133 MG/DL — HIGH (ref 70–99)
GLUCOSE SERPL-MCNC: 139 MG/DL — HIGH (ref 70–99)
GLUCOSE SERPL-MCNC: 139 MG/DL — HIGH (ref 70–99)
GLUCOSE SERPL-MCNC: 140 MG/DL — HIGH (ref 70–99)
GLUCOSE SERPL-MCNC: 141 MG/DL — HIGH (ref 70–99)
GLUCOSE SERPL-MCNC: 143 MG/DL — HIGH (ref 70–99)
GLUCOSE SERPL-MCNC: 144 MG/DL — HIGH (ref 70–99)
GLUCOSE SERPL-MCNC: 149 MG/DL — HIGH (ref 70–99)
GLUCOSE SERPL-MCNC: 153 MG/DL — HIGH (ref 70–99)
GLUCOSE SERPL-MCNC: 168 MG/DL — HIGH (ref 70–99)
GLUCOSE SERPL-MCNC: 172 MG/DL — HIGH (ref 70–99)
GLUCOSE SERPL-MCNC: 174 MG/DL — HIGH (ref 70–99)
GLUCOSE SERPL-MCNC: 177 MG/DL — HIGH (ref 70–99)
GLUCOSE SERPL-MCNC: 185 MG/DL — HIGH (ref 70–99)
GLUCOSE SERPL-MCNC: 187 MG/DL — HIGH (ref 70–99)
GLUCOSE SERPL-MCNC: 188 MG/DL — HIGH (ref 70–99)
GLUCOSE SERPL-MCNC: 193 MG/DL — HIGH (ref 70–99)
GLUCOSE SERPL-MCNC: 215 MG/DL — HIGH (ref 70–99)
GLUCOSE SERPL-MCNC: 224 MG/DL — HIGH (ref 70–99)
GLUCOSE SERPL-MCNC: 224 MG/DL — HIGH (ref 70–99)
GLUCOSE SERPL-MCNC: 227 MG/DL — HIGH (ref 70–99)
GLUCOSE SERPL-MCNC: 229 MG/DL — HIGH (ref 70–99)
GLUCOSE SERPL-MCNC: 243 MG/DL — HIGH (ref 70–99)
GLUCOSE SERPL-MCNC: 250 MG/DL — HIGH (ref 70–99)
GLUCOSE SERPL-MCNC: 257 MG/DL — HIGH (ref 70–99)
GLUCOSE SERPL-MCNC: 276 MG/DL — HIGH (ref 70–99)
GLUCOSE SERPL-MCNC: 280 MG/DL — HIGH (ref 70–99)
GLUCOSE SERPL-MCNC: 292 MG/DL — HIGH (ref 70–99)
GLUCOSE SERPL-MCNC: 49 MG/DL — CRITICAL LOW (ref 70–99)
GLUCOSE SERPL-MCNC: 65 MG/DL — LOW (ref 70–99)
GLUCOSE SERPL-MCNC: 69 MG/DL — LOW (ref 70–99)
GLUCOSE SERPL-MCNC: 70 MG/DL — SIGNIFICANT CHANGE UP (ref 70–99)
GLUCOSE SERPL-MCNC: 71 MG/DL — SIGNIFICANT CHANGE UP (ref 70–99)
GLUCOSE SERPL-MCNC: 78 MG/DL — SIGNIFICANT CHANGE UP (ref 70–99)
GLUCOSE SERPL-MCNC: 81 MG/DL — SIGNIFICANT CHANGE UP (ref 70–99)
GLUCOSE SERPL-MCNC: 85 MG/DL — SIGNIFICANT CHANGE UP (ref 70–99)
GLUCOSE SERPL-MCNC: 90 MG/DL — SIGNIFICANT CHANGE UP (ref 70–99)
GLUCOSE SERPL-MCNC: 95 MG/DL — SIGNIFICANT CHANGE UP (ref 70–99)
GLUCOSE UR QL: NEGATIVE MG/DL — SIGNIFICANT CHANGE UP
GLUTAMATE SERPL-SCNC: 460.6 UMOL/L — HIGH (ref 18.1–155.9)
GLUTAMINE SERPL-SCNC: 1264.4 UMOL/L — HIGH (ref 372.8–701.4)
GLYCINE SERPL-SCNC: 522.3 UMOL/L — HIGH (ref 144–411)
GRAM STN FLD: ABNORMAL
GRAM STN FLD: ABNORMAL
GRAM STN FLD: SIGNIFICANT CHANGE UP
GRAM STN FLD: SIGNIFICANT CHANGE UP
H CAPSUL AG SER IA-MCNC: SIGNIFICANT CHANGE UP
H CAPSUL AG SPEC-ACNC: SIGNIFICANT CHANGE UP
H CAPSUL AG UR QL IA: SIGNIFICANT CHANGE UP
HADV DNA FLD NAA+PROBE-LOG#: SIGNIFICANT CHANGE UP COPIES/ML
HAPTOGLOB SERPL-MCNC: <20 MG/DL — LOW (ref 34–200)
HAV IGM SER-ACNC: SIGNIFICANT CHANGE UP
HBV CORE AB SER-ACNC: SIGNIFICANT CHANGE UP
HBV CORE IGM SER-ACNC: SIGNIFICANT CHANGE UP
HBV SURFACE AB SER-ACNC: SIGNIFICANT CHANGE UP
HBV SURFACE AG SER-ACNC: SIGNIFICANT CHANGE UP
HCG SERPL-ACNC: <2 MIU/ML — SIGNIFICANT CHANGE UP
HCO3 BLDV-SCNC: 16 MMOL/L — LOW (ref 22–29)
HCO3 BLDV-SCNC: 20 MMOL/L — LOW (ref 22–29)
HCO3 BLDV-SCNC: 21 MMOL/L — LOW (ref 22–29)
HCO3 BLDV-SCNC: 22 MMOL/L — SIGNIFICANT CHANGE UP (ref 22–29)
HCO3 BLDV-SCNC: 23 MMOL/L — SIGNIFICANT CHANGE UP (ref 22–29)
HCO3 BLDV-SCNC: 23 MMOL/L — SIGNIFICANT CHANGE UP (ref 22–29)
HCO3 BLDV-SCNC: 24 MMOL/L — SIGNIFICANT CHANGE UP (ref 22–29)
HCO3 BLDV-SCNC: 25 MMOL/L — SIGNIFICANT CHANGE UP (ref 22–29)
HCO3 BLDV-SCNC: 26 MMOL/L — SIGNIFICANT CHANGE UP (ref 22–29)
HCO3 BLDV-SCNC: 27 MMOL/L — SIGNIFICANT CHANGE UP (ref 22–29)
HCO3 BLDV-SCNC: 28 MMOL/L — SIGNIFICANT CHANGE UP (ref 22–29)
HCO3 BLDV-SCNC: 34 MMOL/L — HIGH (ref 22–29)
HCT VFR BLD CALC: 20.1 % — CRITICAL LOW (ref 34.5–45)
HCT VFR BLD CALC: 20.6 % — CRITICAL LOW (ref 34.5–45)
HCT VFR BLD CALC: 20.8 % — CRITICAL LOW (ref 34.5–45)
HCT VFR BLD CALC: 21.2 % — LOW (ref 34.5–45)
HCT VFR BLD CALC: 21.3 % — LOW (ref 34.5–45)
HCT VFR BLD CALC: 22 % — LOW (ref 34.5–45)
HCT VFR BLD CALC: 22.1 % — LOW (ref 34.5–45)
HCT VFR BLD CALC: 22.2 % — LOW (ref 34.5–45)
HCT VFR BLD CALC: 22.3 % — LOW (ref 34.5–45)
HCT VFR BLD CALC: 22.4 % — LOW (ref 34.5–45)
HCT VFR BLD CALC: 22.5 % — LOW (ref 34.5–45)
HCT VFR BLD CALC: 22.5 % — LOW (ref 34.5–45)
HCT VFR BLD CALC: 22.6 % — LOW (ref 34.5–45)
HCT VFR BLD CALC: 22.7 % — LOW (ref 34.5–45)
HCT VFR BLD CALC: 22.7 % — LOW (ref 34.5–45)
HCT VFR BLD CALC: 22.8 % — LOW (ref 34.5–45)
HCT VFR BLD CALC: 23 % — LOW (ref 34.5–45)
HCT VFR BLD CALC: 23 % — LOW (ref 34.5–45)
HCT VFR BLD CALC: 23.1 % — LOW (ref 34.5–45)
HCT VFR BLD CALC: 23.5 % — LOW (ref 34.5–45)
HCT VFR BLD CALC: 23.8 % — LOW (ref 34.5–45)
HCT VFR BLD CALC: 23.9 % — LOW (ref 34.5–45)
HCT VFR BLD CALC: 24.6 % — LOW (ref 34.5–45)
HCT VFR BLD CALC: 24.7 % — LOW (ref 34.5–45)
HCT VFR BLD CALC: 24.8 % — LOW (ref 34.5–45)
HCT VFR BLD CALC: 25.3 % — LOW (ref 34.5–45)
HCT VFR BLD CALC: 25.8 % — LOW (ref 34.5–45)
HCT VFR BLD CALC: 26 % — LOW (ref 34.5–45)
HCT VFR BLD CALC: 26.1 % — LOW (ref 34.5–45)
HCT VFR BLD CALC: 26.2 % — LOW (ref 34.5–45)
HCT VFR BLD CALC: 26.4 % — LOW (ref 34.5–45)
HCT VFR BLD CALC: 26.6 % — LOW (ref 34.5–45)
HCT VFR BLD CALC: 27.1 % — LOW (ref 34.5–45)
HCT VFR BLD CALC: 27.3 % — LOW (ref 34.5–45)
HCT VFR BLD CALC: 29 % — LOW (ref 34.5–45)
HCT VFR BLD CALC: 29.9 % — LOW (ref 34.5–45)
HCT VFR BLD CALC: 30.4 % — LOW (ref 34.5–45)
HCT VFR BLD CALC: 31 % — LOW (ref 34.5–45)
HCT VFR BLD CALC: 31.5 % — LOW (ref 34.5–45)
HCT VFR BLD CALC: 31.8 % — LOW (ref 34.5–45)
HCT VFR BLDA CALC: 19 % — CRITICAL LOW (ref 34.5–46.5)
HCT VFR BLDA CALC: 19 % — CRITICAL LOW (ref 34.5–46.5)
HCT VFR BLDA CALC: 20 % — CRITICAL LOW (ref 34.5–46.5)
HCT VFR BLDA CALC: 21 % — CRITICAL LOW (ref 34.5–46.5)
HCT VFR BLDA CALC: 22 % — LOW (ref 34.5–46.5)
HCT VFR BLDA CALC: 23 % — LOW (ref 34.5–46.5)
HCT VFR BLDA CALC: 24 % — LOW (ref 34.5–46.5)
HCT VFR BLDA CALC: 25 % — LOW (ref 34.5–46.5)
HCT VFR BLDA CALC: 26 % — LOW (ref 34.5–46.5)
HCT VFR BLDA CALC: 26 % — LOW (ref 34.5–46.5)
HCT VFR BLDA CALC: 30 % — LOW (ref 34.5–46.5)
HCT VFR BLDA CALC: 32 % — LOW (ref 34.5–46.5)
HCT VFR BLDA CALC: 37 % — SIGNIFICANT CHANGE UP (ref 34.5–46.5)
HCV AB S/CO SERPL IA: 0.08 S/CO — SIGNIFICANT CHANGE UP (ref 0–0.99)
HCV AB SERPL-IMP: SIGNIFICANT CHANGE UP
HDLC SERPL-MCNC: 10 MG/DL — LOW
HDLC SERPL-MCNC: 7 MG/DL — LOW
HDLC SERPL-MCNC: 8 MG/DL — LOW
HEAVY METALS, URINE CREATININE: 0.98 G/L — SIGNIFICANT CHANGE UP (ref 0.3–3)
HEPARINASE TEG R TIME: 14.2 MIN — HIGH (ref 4.3–8.3)
HGB BLD CALC-MCNC: 10 G/DL — LOW (ref 11.7–16.1)
HGB BLD CALC-MCNC: 10.6 G/DL — LOW (ref 11.7–16.1)
HGB BLD CALC-MCNC: 12.4 G/DL — SIGNIFICANT CHANGE UP (ref 11.7–16.1)
HGB BLD CALC-MCNC: 6.2 G/DL — CRITICAL LOW (ref 11.7–16.1)
HGB BLD CALC-MCNC: 6.4 G/DL — CRITICAL LOW (ref 11.7–16.1)
HGB BLD CALC-MCNC: 6.6 G/DL — CRITICAL LOW (ref 11.7–16.1)
HGB BLD CALC-MCNC: 6.9 G/DL — CRITICAL LOW (ref 11.7–16.1)
HGB BLD CALC-MCNC: 7.4 G/DL — LOW (ref 11.7–16.1)
HGB BLD CALC-MCNC: 7.7 G/DL — LOW (ref 11.7–16.1)
HGB BLD CALC-MCNC: 7.8 G/DL — LOW (ref 11.7–16.1)
HGB BLD CALC-MCNC: 7.9 G/DL — LOW (ref 11.7–16.1)
HGB BLD CALC-MCNC: 8 G/DL — LOW (ref 11.7–16.1)
HGB BLD CALC-MCNC: 8.1 G/DL — LOW (ref 11.7–16.1)
HGB BLD CALC-MCNC: 8.2 G/DL — LOW (ref 11.7–16.1)
HGB BLD CALC-MCNC: 8.5 G/DL — LOW (ref 11.7–16.1)
HGB BLD CALC-MCNC: 8.6 G/DL — LOW (ref 11.7–16.1)
HGB BLD CALC-MCNC: 9.9 G/DL — LOW (ref 11.7–16.1)
HGB BLD-MCNC: 10 G/DL — LOW (ref 11.5–15.5)
HGB BLD-MCNC: 10.3 G/DL — LOW (ref 11.5–15.5)
HGB BLD-MCNC: 10.5 G/DL — LOW (ref 11.5–15.5)
HGB BLD-MCNC: 6.6 G/DL — CRITICAL LOW (ref 11.5–15.5)
HGB BLD-MCNC: 6.9 G/DL — CRITICAL LOW (ref 11.5–15.5)
HGB BLD-MCNC: 7 G/DL — CRITICAL LOW (ref 11.5–15.5)
HGB BLD-MCNC: 7.1 G/DL — LOW (ref 11.5–15.5)
HGB BLD-MCNC: 7.1 G/DL — LOW (ref 11.5–15.5)
HGB BLD-MCNC: 7.3 G/DL — LOW (ref 11.5–15.5)
HGB BLD-MCNC: 7.4 G/DL — LOW (ref 11.5–15.5)
HGB BLD-MCNC: 7.5 G/DL — LOW (ref 11.5–15.5)
HGB BLD-MCNC: 7.6 G/DL — LOW (ref 11.5–15.5)
HGB BLD-MCNC: 7.6 G/DL — LOW (ref 11.5–15.5)
HGB BLD-MCNC: 7.8 G/DL — LOW (ref 11.5–15.5)
HGB BLD-MCNC: 7.8 G/DL — LOW (ref 11.5–15.5)
HGB BLD-MCNC: 7.9 G/DL — LOW (ref 11.5–15.5)
HGB BLD-MCNC: 8 G/DL — LOW (ref 11.5–15.5)
HGB BLD-MCNC: 8.1 G/DL — LOW (ref 11.5–15.5)
HGB BLD-MCNC: 8.1 G/DL — LOW (ref 11.5–15.5)
HGB BLD-MCNC: 8.2 G/DL — LOW (ref 11.5–15.5)
HGB BLD-MCNC: 8.3 G/DL — LOW (ref 11.5–15.5)
HGB BLD-MCNC: 8.4 G/DL — LOW (ref 11.5–15.5)
HGB BLD-MCNC: 8.5 G/DL — LOW (ref 11.5–15.5)
HGB BLD-MCNC: 8.5 G/DL — LOW (ref 11.5–15.5)
HGB BLD-MCNC: 8.6 G/DL — LOW (ref 11.5–15.5)
HGB BLD-MCNC: 8.8 G/DL — LOW (ref 11.5–15.5)
HGB BLD-MCNC: 8.8 G/DL — LOW (ref 11.5–15.5)
HGB BLD-MCNC: 9.2 G/DL — LOW (ref 11.5–15.5)
HGB BLD-MCNC: 9.2 G/DL — LOW (ref 11.5–15.5)
HGB BLD-MCNC: 9.4 G/DL — LOW (ref 11.5–15.5)
HGB BLD-MCNC: 9.7 G/DL — LOW (ref 11.5–15.5)
HGB BLD-MCNC: 9.7 G/DL — LOW (ref 11.5–15.5)
HISTIDINE SERPL-SCNC: 121.1 UMOL/L — HIGH (ref 47.2–98.5)
HIV 1+2 AB+HIV1 P24 AG SERPL QL IA: SIGNIFICANT CHANGE UP
HOMOCITRULLINE SERPL-SCNC: 0.6 UMOL/L — SIGNIFICANT CHANGE UP (ref 0–1.7)
HOROWITZ INDEX BLDV+IHG-RTO: 30 — SIGNIFICANT CHANGE UP
HOROWITZ INDEX BLDV+IHG-RTO: 30 — SIGNIFICANT CHANGE UP
HOROWITZ INDEX BLDV+IHG-RTO: 35 — SIGNIFICANT CHANGE UP
HOROWITZ INDEX BLDV+IHG-RTO: 40 — SIGNIFICANT CHANGE UP
HOWELL-JOLLY BOD BLD QL SMEAR: PRESENT — SIGNIFICANT CHANGE UP
HOWELL-JOLLY BOD BLD QL SMEAR: PRESENT — SIGNIFICANT CHANGE UP
HYPOCHROMIA BLD QL: SLIGHT — SIGNIFICANT CHANGE UP
HYPOSEGMENTATION: PRESENT — SIGNIFICANT CHANGE UP
IGA FLD-MCNC: 184 MG/DL — SIGNIFICANT CHANGE UP (ref 84–499)
IGG FLD-MCNC: 437 MG/DL — LOW (ref 610–1660)
IGG1 SER-MCNC: 170 MG/DL — LOW (ref 240–1118)
IGG2 SER-MCNC: 208 MG/DL — SIGNIFICANT CHANGE UP (ref 124–549)
IGG3 SER-MCNC: 41.5 MG/DL — SIGNIFICANT CHANGE UP (ref 15–102)
IGG4 SER-MCNC: 15.1 MG/DL — SIGNIFICANT CHANGE UP (ref 1–123)
IGM SERPL-MCNC: 105 MG/DL — SIGNIFICANT CHANGE UP (ref 35–242)
IMM GRANULOCYTES NFR BLD AUTO: 10.7 % — HIGH (ref 0–0.9)
IMM GRANULOCYTES NFR BLD AUTO: 10.8 % — HIGH (ref 0–0.9)
IMM GRANULOCYTES NFR BLD AUTO: 10.8 % — HIGH (ref 0–0.9)
IMM GRANULOCYTES NFR BLD AUTO: 3 % — HIGH (ref 0–0.9)
IMM GRANULOCYTES NFR BLD AUTO: 3.1 % — HIGH (ref 0–0.9)
IMM GRANULOCYTES NFR BLD AUTO: 3.4 % — HIGH (ref 0–0.9)
IMM GRANULOCYTES NFR BLD AUTO: 3.8 % — HIGH (ref 0–0.9)
IMM GRANULOCYTES NFR BLD AUTO: 3.9 % — HIGH (ref 0–0.9)
IMM GRANULOCYTES NFR BLD AUTO: 4 % — HIGH (ref 0–0.9)
IMM GRANULOCYTES NFR BLD AUTO: 4.2 % — HIGH (ref 0–0.9)
IMM GRANULOCYTES NFR BLD AUTO: 4.3 % — HIGH (ref 0–0.9)
IMM GRANULOCYTES NFR BLD AUTO: 4.6 % — HIGH (ref 0–0.9)
IMM GRANULOCYTES NFR BLD AUTO: 4.9 % — HIGH (ref 0–0.9)
IMM GRANULOCYTES NFR BLD AUTO: 5.5 % — HIGH (ref 0–0.9)
IMM GRANULOCYTES NFR BLD AUTO: 6 % — HIGH (ref 0–0.9)
IMM GRANULOCYTES NFR BLD AUTO: 6.5 % — HIGH (ref 0–0.9)
IMM GRANULOCYTES NFR BLD AUTO: 6.7 % — HIGH (ref 0–0.9)
IMM GRANULOCYTES NFR BLD AUTO: 7.8 % — HIGH (ref 0–0.9)
IMM GRANULOCYTES NFR BLD AUTO: 8.7 % — HIGH (ref 0–0.9)
IMM GRANULOCYTES NFR BLD AUTO: 9.1 % — HIGH (ref 0–0.9)
IMM GRANULOCYTES NFR BLD AUTO: 9.2 % — HIGH (ref 0–0.9)
IMM GRANULOCYTES NFR BLD AUTO: 9.2 % — HIGH (ref 0–0.9)
IMM GRANULOCYTES NFR BLD AUTO: 9.4 % — HIGH (ref 0–0.9)
INR BLD: 1.3 RATIO — HIGH (ref 0.85–1.18)
INR BLD: 1.34 RATIO — HIGH (ref 0.85–1.18)
INR BLD: 1.42 RATIO — HIGH (ref 0.85–1.18)
INR BLD: 1.43 RATIO — HIGH (ref 0.85–1.18)
INR BLD: 1.44 RATIO — HIGH (ref 0.85–1.18)
INR BLD: 1.46 RATIO — HIGH (ref 0.85–1.18)
INR BLD: 1.52 RATIO — HIGH (ref 0.85–1.18)
INR BLD: 1.67 RATIO — HIGH (ref 0.85–1.18)
INR BLD: 1.68 RATIO — HIGH (ref 0.85–1.18)
INR BLD: 1.84 RATIO — HIGH (ref 0.85–1.18)
INR BLD: 2 RATIO — HIGH (ref 0.85–1.18)
INR BLD: 2.03 RATIO — HIGH (ref 0.85–1.18)
INR BLD: 2.16 RATIO — HIGH (ref 0.85–1.18)
INR BLD: 2.21 RATIO — HIGH (ref 0.85–1.18)
INR BLD: 2.22 RATIO — HIGH (ref 0.85–1.18)
INR BLD: 2.23 RATIO — HIGH (ref 0.85–1.18)
INR BLD: 2.26 RATIO — HIGH (ref 0.85–1.18)
INR BLD: 2.56 RATIO — HIGH (ref 0.85–1.18)
INR BLD: 2.57 RATIO — HIGH (ref 0.85–1.18)
INR BLD: 2.66 RATIO — HIGH (ref 0.85–1.18)
INR BLD: 2.72 RATIO — HIGH (ref 0.85–1.18)
INR BLD: 2.78 RATIO — HIGH (ref 0.85–1.18)
IRON SATN MFR SERPL: 43 UG/DL — SIGNIFICANT CHANGE UP (ref 30–160)
IRON SATN MFR SERPL: SIGNIFICANT CHANGE UP % (ref 14–50)
ISOLEUCINE SERPL-SCNC: 36.6 UMOL/L — SIGNIFICANT CHANGE UP (ref 32.8–88.3)
KETONES UR-MCNC: ABNORMAL MG/DL
KETONES UR-MCNC: NEGATIVE MG/DL — SIGNIFICANT CHANGE UP
KETONES UR-MCNC: NEGATIVE MG/DL — SIGNIFICANT CHANGE UP
L PNEUMO DNA LOWER RESP QL NAA+PROBE: SIGNIFICANT CHANGE UP
L PNEUMO DNA SPEC QL NAA+PROBE: SIGNIFICANT CHANGE UP
LAB DIRECTOR NAME PROVIDER: SIGNIFICANT CHANGE UP
LACTATE BLDV-MCNC: 1.7 MMOL/L — SIGNIFICANT CHANGE UP (ref 0.5–2)
LACTATE BLDV-MCNC: 10.5 MMOL/L — CRITICAL HIGH (ref 0.5–2)
LACTATE BLDV-MCNC: 11.4 MMOL/L — CRITICAL HIGH (ref 0.5–2)
LACTATE BLDV-MCNC: 2.1 MMOL/L — HIGH (ref 0.5–2)
LACTATE BLDV-MCNC: 2.3 MMOL/L — HIGH (ref 0.5–2)
LACTATE BLDV-MCNC: 2.5 MMOL/L — HIGH (ref 0.5–2)
LACTATE BLDV-MCNC: 2.5 MMOL/L — HIGH (ref 0.5–2)
LACTATE BLDV-MCNC: 2.6 MMOL/L — HIGH (ref 0.5–2)
LACTATE BLDV-MCNC: 2.6 MMOL/L — HIGH (ref 0.5–2)
LACTATE BLDV-MCNC: 2.9 MMOL/L — HIGH (ref 0.5–2)
LACTATE BLDV-MCNC: 3.1 MMOL/L — HIGH (ref 0.5–2)
LACTATE BLDV-MCNC: 3.6 MMOL/L — HIGH (ref 0.5–2)
LACTATE BLDV-MCNC: 5.4 MMOL/L — CRITICAL HIGH (ref 0.5–2)
LACTATE BLDV-MCNC: 5.4 MMOL/L — CRITICAL HIGH (ref 0.5–2)
LACTATE BLDV-MCNC: 5.7 MMOL/L — CRITICAL HIGH (ref 0.5–2)
LACTATE BLDV-MCNC: 5.8 MMOL/L — CRITICAL HIGH (ref 0.5–2)
LACTATE BLDV-MCNC: 5.9 MMOL/L — CRITICAL HIGH (ref 0.5–2)
LACTATE BLDV-MCNC: 6 MMOL/L — CRITICAL HIGH (ref 0.5–2)
LACTATE BLDV-MCNC: 7.6 MMOL/L — CRITICAL HIGH (ref 0.5–2)
LACTATE SERPL-SCNC: 7.8 MMOL/L — CRITICAL HIGH (ref 0.5–2)
LDH SERPL L TO P-CCNC: 212 U/L — SIGNIFICANT CHANGE UP (ref 50–242)
LDH SERPL L TO P-CCNC: 235 U/L — SIGNIFICANT CHANGE UP (ref 50–242)
LEGIONELLA AG UR QL: NEGATIVE — SIGNIFICANT CHANGE UP
LEGIONELLA DNA LOWER RESP NAA+PROBE: SIGNIFICANT CHANGE UP
LEGIONELLA DNA SPEC NAA+PROBE: SIGNIFICANT CHANGE UP
LEPTOSPIRA AB TITR SER: NEGATIVE — SIGNIFICANT CHANGE UP
LEUCINE SERPL-SCNC: 104.5 UMOL/L — SIGNIFICANT CHANGE UP (ref 66.7–165.7)
LEUKOCYTE ESTERASE UR-ACNC: ABNORMAL
LEUKOCYTE ESTERASE UR-ACNC: ABNORMAL
LEUKOCYTE ESTERASE UR-ACNC: NEGATIVE — SIGNIFICANT CHANGE UP
LIDOCAIN IGE QN: 3 U/L — LOW (ref 7–60)
LIPID PNL WITH DIRECT LDL SERPL: 103 MG/DL — HIGH
LIPID PNL WITH DIRECT LDL SERPL: 48 MG/DL — SIGNIFICANT CHANGE UP
LIPID PNL WITH DIRECT LDL SERPL: 57 MG/DL — SIGNIFICANT CHANGE UP
LKM AB SER-ACNC: <20.1 UNITS — SIGNIFICANT CHANGE UP (ref 0–20)
LYMPHOCYTES # BLD AUTO: 0.29 K/UL — LOW (ref 1–3.3)
LYMPHOCYTES # BLD AUTO: 0.29 K/UL — LOW (ref 1–3.3)
LYMPHOCYTES # BLD AUTO: 0.44 K/UL — LOW (ref 1–3.3)
LYMPHOCYTES # BLD AUTO: 0.59 K/UL — LOW (ref 1–3.3)
LYMPHOCYTES # BLD AUTO: 0.74 K/UL — LOW (ref 1–3.3)
LYMPHOCYTES # BLD AUTO: 0.84 K/UL — LOW (ref 1–3.3)
LYMPHOCYTES # BLD AUTO: 0.9 % — LOW (ref 13–44)
LYMPHOCYTES # BLD AUTO: 0.9 % — LOW (ref 13–44)
LYMPHOCYTES # BLD AUTO: 0.91 K/UL — LOW (ref 1–3.3)
LYMPHOCYTES # BLD AUTO: 0.97 K/UL — LOW (ref 1–3.3)
LYMPHOCYTES # BLD AUTO: 1 K/UL — SIGNIFICANT CHANGE UP (ref 1–3.3)
LYMPHOCYTES # BLD AUTO: 1 K/UL — SIGNIFICANT CHANGE UP (ref 1–3.3)
LYMPHOCYTES # BLD AUTO: 1.09 K/UL — SIGNIFICANT CHANGE UP (ref 1–3.3)
LYMPHOCYTES # BLD AUTO: 1.21 K/UL — SIGNIFICANT CHANGE UP (ref 1–3.3)
LYMPHOCYTES # BLD AUTO: 1.28 K/UL — SIGNIFICANT CHANGE UP (ref 1–3.3)
LYMPHOCYTES # BLD AUTO: 1.3 K/UL — SIGNIFICANT CHANGE UP (ref 1–3.3)
LYMPHOCYTES # BLD AUTO: 1.32 K/UL — SIGNIFICANT CHANGE UP (ref 1–3.3)
LYMPHOCYTES # BLD AUTO: 1.32 K/UL — SIGNIFICANT CHANGE UP (ref 1–3.3)
LYMPHOCYTES # BLD AUTO: 1.33 K/UL — SIGNIFICANT CHANGE UP (ref 1–3.3)
LYMPHOCYTES # BLD AUTO: 1.34 K/UL — SIGNIFICANT CHANGE UP (ref 1–3.3)
LYMPHOCYTES # BLD AUTO: 1.38 K/UL — SIGNIFICANT CHANGE UP (ref 1–3.3)
LYMPHOCYTES # BLD AUTO: 1.42 K/UL — SIGNIFICANT CHANGE UP (ref 1–3.3)
LYMPHOCYTES # BLD AUTO: 1.46 K/UL — SIGNIFICANT CHANGE UP (ref 1–3.3)
LYMPHOCYTES # BLD AUTO: 1.47 K/UL — SIGNIFICANT CHANGE UP (ref 1–3.3)
LYMPHOCYTES # BLD AUTO: 1.47 K/UL — SIGNIFICANT CHANGE UP (ref 1–3.3)
LYMPHOCYTES # BLD AUTO: 1.5 K/UL — SIGNIFICANT CHANGE UP (ref 1–3.3)
LYMPHOCYTES # BLD AUTO: 1.51 K/UL — SIGNIFICANT CHANGE UP (ref 1–3.3)
LYMPHOCYTES # BLD AUTO: 1.53 K/UL — SIGNIFICANT CHANGE UP (ref 1–3.3)
LYMPHOCYTES # BLD AUTO: 1.55 K/UL — SIGNIFICANT CHANGE UP (ref 1–3.3)
LYMPHOCYTES # BLD AUTO: 1.58 K/UL — SIGNIFICANT CHANGE UP (ref 1–3.3)
LYMPHOCYTES # BLD AUTO: 1.6 K/UL — SIGNIFICANT CHANGE UP (ref 1–3.3)
LYMPHOCYTES # BLD AUTO: 1.61 K/UL — SIGNIFICANT CHANGE UP (ref 1–3.3)
LYMPHOCYTES # BLD AUTO: 1.64 K/UL — SIGNIFICANT CHANGE UP (ref 1–3.3)
LYMPHOCYTES # BLD AUTO: 1.7 % — LOW (ref 13–44)
LYMPHOCYTES # BLD AUTO: 1.79 K/UL — SIGNIFICANT CHANGE UP (ref 1–3.3)
LYMPHOCYTES # BLD AUTO: 1.83 K/UL — SIGNIFICANT CHANGE UP (ref 1–3.3)
LYMPHOCYTES # BLD AUTO: 1.91 K/UL — SIGNIFICANT CHANGE UP (ref 1–3.3)
LYMPHOCYTES # BLD AUTO: 10 % — LOW (ref 13–44)
LYMPHOCYTES # BLD AUTO: 11.5 % — LOW (ref 13–44)
LYMPHOCYTES # BLD AUTO: 13.1 % — SIGNIFICANT CHANGE UP (ref 13–44)
LYMPHOCYTES # BLD AUTO: 13.3 % — SIGNIFICANT CHANGE UP (ref 13–44)
LYMPHOCYTES # BLD AUTO: 13.4 % — SIGNIFICANT CHANGE UP (ref 13–44)
LYMPHOCYTES # BLD AUTO: 13.9 % — SIGNIFICANT CHANGE UP (ref 13–44)
LYMPHOCYTES # BLD AUTO: 14 % — SIGNIFICANT CHANGE UP (ref 13–44)
LYMPHOCYTES # BLD AUTO: 14.3 % — SIGNIFICANT CHANGE UP (ref 13–44)
LYMPHOCYTES # BLD AUTO: 15.1 % — SIGNIFICANT CHANGE UP (ref 13–44)
LYMPHOCYTES # BLD AUTO: 16.2 % — SIGNIFICANT CHANGE UP (ref 13–44)
LYMPHOCYTES # BLD AUTO: 16.3 % — SIGNIFICANT CHANGE UP (ref 13–44)
LYMPHOCYTES # BLD AUTO: 16.6 % — SIGNIFICANT CHANGE UP (ref 13–44)
LYMPHOCYTES # BLD AUTO: 16.6 % — SIGNIFICANT CHANGE UP (ref 13–44)
LYMPHOCYTES # BLD AUTO: 17.5 % — SIGNIFICANT CHANGE UP (ref 13–44)
LYMPHOCYTES # BLD AUTO: 2.47 K/UL — SIGNIFICANT CHANGE UP (ref 1–3.3)
LYMPHOCYTES # BLD AUTO: 2.49 K/UL — SIGNIFICANT CHANGE UP (ref 1–3.3)
LYMPHOCYTES # BLD AUTO: 2.83 K/UL — SIGNIFICANT CHANGE UP (ref 1–3.3)
LYMPHOCYTES # BLD AUTO: 3.1 K/UL — SIGNIFICANT CHANGE UP (ref 1–3.3)
LYMPHOCYTES # BLD AUTO: 3.24 K/UL — SIGNIFICANT CHANGE UP (ref 1–3.3)
LYMPHOCYTES # BLD AUTO: 3.4 % — LOW (ref 13–44)
LYMPHOCYTES # BLD AUTO: 3.5 % — LOW (ref 13–44)
LYMPHOCYTES # BLD AUTO: 3.7 % — LOW (ref 13–44)
LYMPHOCYTES # BLD AUTO: 4.7 % — LOW (ref 13–44)
LYMPHOCYTES # BLD AUTO: 5 K/UL — HIGH (ref 1–3.3)
LYMPHOCYTES # BLD AUTO: 5.1 % — LOW (ref 13–44)
LYMPHOCYTES # BLD AUTO: 5.2 % — LOW (ref 13–44)
LYMPHOCYTES # BLD AUTO: 5.4 % — LOW (ref 13–44)
LYMPHOCYTES # BLD AUTO: 5.5 % — LOW (ref 13–44)
LYMPHOCYTES # BLD AUTO: 6 % — LOW (ref 13–44)
LYMPHOCYTES # BLD AUTO: 6.1 % — LOW (ref 13–44)
LYMPHOCYTES # BLD AUTO: 7.3 % — LOW (ref 13–44)
LYMPHOCYTES # BLD AUTO: 7.8 % — LOW (ref 13–44)
LYMPHOCYTES # BLD AUTO: 8 % — LOW (ref 13–44)
LYMPHOCYTES # BLD AUTO: 8.9 % — LOW (ref 13–44)
LYMPHOCYTES # BLD AUTO: 9 % — LOW (ref 13–44)
LYMPHOCYTES # BLD AUTO: 9.1 % — LOW (ref 13–44)
LYMPHOCYTES # BLD AUTO: 9.2 % — LOW (ref 13–44)
LYMPHOCYTES # BLD AUTO: 9.2 % — LOW (ref 13–44)
LYMPHOCYTES # BLD AUTO: 9.6 % — LOW (ref 13–44)
LYMPHOCYTES # BLD AUTO: 9.9 % — LOW (ref 13–44)
LYMPHOCYTES # BLD AUTO: 9.9 % — LOW (ref 13–44)
LYMPHOCYTES # FLD: 12 % — SIGNIFICANT CHANGE UP
LYMPHOCYTES # FLD: 14 % — SIGNIFICANT CHANGE UP
LYSINE SERPL-SCNC: 405.8 UMOL/L — HIGH (ref 94–278)
M PNEUMO DNA SPEC QL NAA+PROBE: NEGATIVE — SIGNIFICANT CHANGE UP
MACROCYTES BLD QL: SIGNIFICANT CHANGE UP
MAGNESIUM SERPL-MCNC: 1.4 MG/DL — LOW (ref 1.6–2.6)
MAGNESIUM SERPL-MCNC: 1.5 MG/DL — LOW (ref 1.6–2.6)
MAGNESIUM SERPL-MCNC: 1.5 MG/DL — LOW (ref 1.6–2.6)
MAGNESIUM SERPL-MCNC: 1.6 MG/DL — SIGNIFICANT CHANGE UP (ref 1.6–2.6)
MAGNESIUM SERPL-MCNC: 1.6 MG/DL — SIGNIFICANT CHANGE UP (ref 1.6–2.6)
MAGNESIUM SERPL-MCNC: 1.7 MG/DL — SIGNIFICANT CHANGE UP (ref 1.6–2.6)
MAGNESIUM SERPL-MCNC: 1.8 MG/DL — SIGNIFICANT CHANGE UP (ref 1.6–2.6)
MAGNESIUM SERPL-MCNC: 1.9 MG/DL — SIGNIFICANT CHANGE UP (ref 1.6–2.6)
MAGNESIUM SERPL-MCNC: 2 MG/DL — SIGNIFICANT CHANGE UP (ref 1.6–2.6)
MAGNESIUM SERPL-MCNC: 2 MG/DL — SIGNIFICANT CHANGE UP (ref 1.6–2.6)
MAGNESIUM SERPL-MCNC: 2.1 MG/DL — SIGNIFICANT CHANGE UP (ref 1.6–2.6)
MAGNESIUM SERPL-MCNC: 2.2 MG/DL — SIGNIFICANT CHANGE UP (ref 1.6–2.6)
MAGNESIUM SERPL-MCNC: 2.3 MG/DL — SIGNIFICANT CHANGE UP (ref 1.6–2.6)
MAGNESIUM SERPL-MCNC: 2.3 MG/DL — SIGNIFICANT CHANGE UP (ref 1.6–2.6)
MAGNESIUM SERPL-MCNC: 2.4 MG/DL — SIGNIFICANT CHANGE UP (ref 1.6–2.6)
MANUAL SMEAR VERIFICATION: SIGNIFICANT CHANGE UP
MCHC RBC-ENTMCNC: 29.7 PG — SIGNIFICANT CHANGE UP (ref 27–34)
MCHC RBC-ENTMCNC: 29.9 PG — SIGNIFICANT CHANGE UP (ref 27–34)
MCHC RBC-ENTMCNC: 30.3 PG — SIGNIFICANT CHANGE UP (ref 27–34)
MCHC RBC-ENTMCNC: 30.6 PG — SIGNIFICANT CHANGE UP (ref 27–34)
MCHC RBC-ENTMCNC: 30.6 PG — SIGNIFICANT CHANGE UP (ref 27–34)
MCHC RBC-ENTMCNC: 30.7 PG — SIGNIFICANT CHANGE UP (ref 27–34)
MCHC RBC-ENTMCNC: 30.8 PG — SIGNIFICANT CHANGE UP (ref 27–34)
MCHC RBC-ENTMCNC: 31 GM/DL — LOW (ref 32–36)
MCHC RBC-ENTMCNC: 31.2 GM/DL — LOW (ref 32–36)
MCHC RBC-ENTMCNC: 31.3 PG — SIGNIFICANT CHANGE UP (ref 27–34)
MCHC RBC-ENTMCNC: 31.4 GM/DL — LOW (ref 32–36)
MCHC RBC-ENTMCNC: 31.7 GM/DL — LOW (ref 32–36)
MCHC RBC-ENTMCNC: 31.7 GM/DL — LOW (ref 32–36)
MCHC RBC-ENTMCNC: 31.9 GM/DL — LOW (ref 32–36)
MCHC RBC-ENTMCNC: 32.2 GM/DL — SIGNIFICANT CHANGE UP (ref 32–36)
MCHC RBC-ENTMCNC: 32.3 GM/DL — SIGNIFICANT CHANGE UP (ref 32–36)
MCHC RBC-ENTMCNC: 32.3 PG — SIGNIFICANT CHANGE UP (ref 27–34)
MCHC RBC-ENTMCNC: 32.4 GM/DL — SIGNIFICANT CHANGE UP (ref 32–36)
MCHC RBC-ENTMCNC: 32.4 GM/DL — SIGNIFICANT CHANGE UP (ref 32–36)
MCHC RBC-ENTMCNC: 32.5 PG — SIGNIFICANT CHANGE UP (ref 27–34)
MCHC RBC-ENTMCNC: 32.5 PG — SIGNIFICANT CHANGE UP (ref 27–34)
MCHC RBC-ENTMCNC: 32.6 GM/DL — SIGNIFICANT CHANGE UP (ref 32–36)
MCHC RBC-ENTMCNC: 32.6 GM/DL — SIGNIFICANT CHANGE UP (ref 32–36)
MCHC RBC-ENTMCNC: 32.6 PG — SIGNIFICANT CHANGE UP (ref 27–34)
MCHC RBC-ENTMCNC: 32.7 GM/DL — SIGNIFICANT CHANGE UP (ref 32–36)
MCHC RBC-ENTMCNC: 32.8 GM/DL — SIGNIFICANT CHANGE UP (ref 32–36)
MCHC RBC-ENTMCNC: 32.9 GM/DL — SIGNIFICANT CHANGE UP (ref 32–36)
MCHC RBC-ENTMCNC: 33 GM/DL — SIGNIFICANT CHANGE UP (ref 32–36)
MCHC RBC-ENTMCNC: 33 PG — SIGNIFICANT CHANGE UP (ref 27–34)
MCHC RBC-ENTMCNC: 33 PG — SIGNIFICANT CHANGE UP (ref 27–34)
MCHC RBC-ENTMCNC: 33.1 GM/DL — SIGNIFICANT CHANGE UP (ref 32–36)
MCHC RBC-ENTMCNC: 33.2 GM/DL — SIGNIFICANT CHANGE UP (ref 32–36)
MCHC RBC-ENTMCNC: 33.3 GM/DL — SIGNIFICANT CHANGE UP (ref 32–36)
MCHC RBC-ENTMCNC: 33.3 GM/DL — SIGNIFICANT CHANGE UP (ref 32–36)
MCHC RBC-ENTMCNC: 33.3 PG — SIGNIFICANT CHANGE UP (ref 27–34)
MCHC RBC-ENTMCNC: 33.4 GM/DL — SIGNIFICANT CHANGE UP (ref 32–36)
MCHC RBC-ENTMCNC: 33.4 PG — SIGNIFICANT CHANGE UP (ref 27–34)
MCHC RBC-ENTMCNC: 33.5 GM/DL — SIGNIFICANT CHANGE UP (ref 32–36)
MCHC RBC-ENTMCNC: 33.5 PG — SIGNIFICANT CHANGE UP (ref 27–34)
MCHC RBC-ENTMCNC: 33.6 GM/DL — SIGNIFICANT CHANGE UP (ref 32–36)
MCHC RBC-ENTMCNC: 33.6 PG — SIGNIFICANT CHANGE UP (ref 27–34)
MCHC RBC-ENTMCNC: 33.7 PG — SIGNIFICANT CHANGE UP (ref 27–34)
MCHC RBC-ENTMCNC: 33.8 GM/DL — SIGNIFICANT CHANGE UP (ref 32–36)
MCHC RBC-ENTMCNC: 33.8 GM/DL — SIGNIFICANT CHANGE UP (ref 32–36)
MCHC RBC-ENTMCNC: 33.8 PG — SIGNIFICANT CHANGE UP (ref 27–34)
MCHC RBC-ENTMCNC: 33.9 GM/DL — SIGNIFICANT CHANGE UP (ref 32–36)
MCHC RBC-ENTMCNC: 33.9 PG — SIGNIFICANT CHANGE UP (ref 27–34)
MCHC RBC-ENTMCNC: 33.9 PG — SIGNIFICANT CHANGE UP (ref 27–34)
MCHC RBC-ENTMCNC: 34 GM/DL — SIGNIFICANT CHANGE UP (ref 32–36)
MCHC RBC-ENTMCNC: 34 PG — SIGNIFICANT CHANGE UP (ref 27–34)
MCHC RBC-ENTMCNC: 34.1 GM/DL — SIGNIFICANT CHANGE UP (ref 32–36)
MCHC RBC-ENTMCNC: 34.1 GM/DL — SIGNIFICANT CHANGE UP (ref 32–36)
MCHC RBC-ENTMCNC: 34.1 PG — HIGH (ref 27–34)
MCHC RBC-ENTMCNC: 34.2 GM/DL — SIGNIFICANT CHANGE UP (ref 32–36)
MCHC RBC-ENTMCNC: 34.3 GM/DL — SIGNIFICANT CHANGE UP (ref 32–36)
MCHC RBC-ENTMCNC: 34.3 GM/DL — SIGNIFICANT CHANGE UP (ref 32–36)
MCHC RBC-ENTMCNC: 34.3 PG — HIGH (ref 27–34)
MCHC RBC-ENTMCNC: 34.4 GM/DL — SIGNIFICANT CHANGE UP (ref 32–36)
MCHC RBC-ENTMCNC: 34.4 PG — HIGH (ref 27–34)
MCHC RBC-ENTMCNC: 34.5 GM/DL — SIGNIFICANT CHANGE UP (ref 32–36)
MCHC RBC-ENTMCNC: 34.6 GM/DL — SIGNIFICANT CHANGE UP (ref 32–36)
MCHC RBC-ENTMCNC: 34.6 PG — HIGH (ref 27–34)
MCHC RBC-ENTMCNC: 34.8 PG — HIGH (ref 27–34)
MCHC RBC-ENTMCNC: 35.2 GM/DL — SIGNIFICANT CHANGE UP (ref 32–36)
MCHC RBC-ENTMCNC: 36.7 PG — HIGH (ref 27–34)
MCHC RBC-ENTMCNC: 37.1 PG — HIGH (ref 27–34)
MCV RBC AUTO: 100.4 FL — HIGH (ref 80–100)
MCV RBC AUTO: 100.9 FL — HIGH (ref 80–100)
MCV RBC AUTO: 101 FL — HIGH (ref 80–100)
MCV RBC AUTO: 101.4 FL — HIGH (ref 80–100)
MCV RBC AUTO: 101.5 FL — HIGH (ref 80–100)
MCV RBC AUTO: 101.9 FL — HIGH (ref 80–100)
MCV RBC AUTO: 102 FL — HIGH (ref 80–100)
MCV RBC AUTO: 102.3 FL — HIGH (ref 80–100)
MCV RBC AUTO: 102.8 FL — HIGH (ref 80–100)
MCV RBC AUTO: 103.2 FL — HIGH (ref 80–100)
MCV RBC AUTO: 103.2 FL — HIGH (ref 80–100)
MCV RBC AUTO: 103.6 FL — HIGH (ref 80–100)
MCV RBC AUTO: 103.6 FL — HIGH (ref 80–100)
MCV RBC AUTO: 103.7 FL — HIGH (ref 80–100)
MCV RBC AUTO: 104.2 FL — HIGH (ref 80–100)
MCV RBC AUTO: 104.3 FL — HIGH (ref 80–100)
MCV RBC AUTO: 104.4 FL — HIGH (ref 80–100)
MCV RBC AUTO: 104.4 FL — HIGH (ref 80–100)
MCV RBC AUTO: 104.8 FL — HIGH (ref 80–100)
MCV RBC AUTO: 106.3 FL — HIGH (ref 80–100)
MCV RBC AUTO: 107.4 FL — HIGH (ref 80–100)
MCV RBC AUTO: 108.3 FL — HIGH (ref 80–100)
MCV RBC AUTO: 109.1 FL — HIGH (ref 80–100)
MCV RBC AUTO: 111.1 FL — HIGH (ref 80–100)
MCV RBC AUTO: 111.8 FL — HIGH (ref 80–100)
MCV RBC AUTO: 88.8 FL — SIGNIFICANT CHANGE UP (ref 80–100)
MCV RBC AUTO: 89 FL — SIGNIFICANT CHANGE UP (ref 80–100)
MCV RBC AUTO: 89.1 FL — SIGNIFICANT CHANGE UP (ref 80–100)
MCV RBC AUTO: 89.9 FL — SIGNIFICANT CHANGE UP (ref 80–100)
MCV RBC AUTO: 90.7 FL — SIGNIFICANT CHANGE UP (ref 80–100)
MCV RBC AUTO: 91 FL — SIGNIFICANT CHANGE UP (ref 80–100)
MCV RBC AUTO: 92.9 FL — SIGNIFICANT CHANGE UP (ref 80–100)
MCV RBC AUTO: 94.8 FL — SIGNIFICANT CHANGE UP (ref 80–100)
MCV RBC AUTO: 95 FL — SIGNIFICANT CHANGE UP (ref 80–100)
MCV RBC AUTO: 96.1 FL — SIGNIFICANT CHANGE UP (ref 80–100)
MCV RBC AUTO: 96.6 FL — SIGNIFICANT CHANGE UP (ref 80–100)
MCV RBC AUTO: 96.7 FL — SIGNIFICANT CHANGE UP (ref 80–100)
MCV RBC AUTO: 96.7 FL — SIGNIFICANT CHANGE UP (ref 80–100)
MCV RBC AUTO: 97.5 FL — SIGNIFICANT CHANGE UP (ref 80–100)
MCV RBC AUTO: 97.6 FL — SIGNIFICANT CHANGE UP (ref 80–100)
MCV RBC AUTO: 97.6 FL — SIGNIFICANT CHANGE UP (ref 80–100)
MCV RBC AUTO: 98.3 FL — SIGNIFICANT CHANGE UP (ref 80–100)
MCV RBC AUTO: 98.7 FL — SIGNIFICANT CHANGE UP (ref 80–100)
MELD SCORE WITH DIALYSIS: 29 POINTS — SIGNIFICANT CHANGE UP
MELD SCORE WITH DIALYSIS: 34 POINTS — SIGNIFICANT CHANGE UP
MELD SCORE WITHOUT DIALYSIS: 15 POINTS — SIGNIFICANT CHANGE UP
MELD SCORE WITHOUT DIALYSIS: 21 POINTS — SIGNIFICANT CHANGE UP
MENADIONE SERPL-MCNC: >40 NG/ML — HIGH (ref 0.1–2.2)
MESOTHL CELL # FLD: 1 % — SIGNIFICANT CHANGE UP
MESOTHL CELL # FLD: 2 % — SIGNIFICANT CHANGE UP
METAMYELOCYTES # FLD: 0.9 % — HIGH (ref 0–0)
METAMYELOCYTES # FLD: 1 % — HIGH (ref 0–0)
METAMYELOCYTES # FLD: 1.8 % — HIGH (ref 0–0)
METAMYELOCYTES # FLD: 1.8 % — HIGH (ref 0–0)
METHADONE UR-MCNC: NEGATIVE — SIGNIFICANT CHANGE UP
METHIONINE SERPL-SCNC: 32.1 UMOL/L — SIGNIFICANT CHANGE UP (ref 14.7–35.2)
METHOD TYPE: SIGNIFICANT CHANGE UP
MISCELLANEOUS TEST NAME: SIGNIFICANT CHANGE UP
MISCELLANEOUS, NORMALS: SIGNIFICANT CHANGE UP
MISCELLANEOUS, RESULT: SIGNIFICANT CHANGE UP
MITOCHONDRIA AB SER-ACNC: SIGNIFICANT CHANGE UP
MONOCYTES # BLD AUTO: 0 K/UL — SIGNIFICANT CHANGE UP (ref 0–0.9)
MONOCYTES # BLD AUTO: 0.23 K/UL — SIGNIFICANT CHANGE UP (ref 0–0.9)
MONOCYTES # BLD AUTO: 0.24 K/UL — SIGNIFICANT CHANGE UP (ref 0–0.9)
MONOCYTES # BLD AUTO: 0.27 K/UL — SIGNIFICANT CHANGE UP (ref 0–0.9)
MONOCYTES # BLD AUTO: 0.49 K/UL — SIGNIFICANT CHANGE UP (ref 0–0.9)
MONOCYTES # BLD AUTO: 0.55 K/UL — SIGNIFICANT CHANGE UP (ref 0–0.9)
MONOCYTES # BLD AUTO: 0.62 K/UL — SIGNIFICANT CHANGE UP (ref 0–0.9)
MONOCYTES # BLD AUTO: 0.73 K/UL — SIGNIFICANT CHANGE UP (ref 0–0.9)
MONOCYTES # BLD AUTO: 0.74 K/UL — SIGNIFICANT CHANGE UP (ref 0–0.9)
MONOCYTES # BLD AUTO: 0.78 K/UL — SIGNIFICANT CHANGE UP (ref 0–0.9)
MONOCYTES # BLD AUTO: 0.79 K/UL — SIGNIFICANT CHANGE UP (ref 0–0.9)
MONOCYTES # BLD AUTO: 0.81 K/UL — SIGNIFICANT CHANGE UP (ref 0–0.9)
MONOCYTES # BLD AUTO: 0.87 K/UL — SIGNIFICANT CHANGE UP (ref 0–0.9)
MONOCYTES # BLD AUTO: 0.89 K/UL — SIGNIFICANT CHANGE UP (ref 0–0.9)
MONOCYTES # BLD AUTO: 0.9 K/UL — SIGNIFICANT CHANGE UP (ref 0–0.9)
MONOCYTES # BLD AUTO: 0.9 K/UL — SIGNIFICANT CHANGE UP (ref 0–0.9)
MONOCYTES # BLD AUTO: 0.95 K/UL — HIGH (ref 0–0.9)
MONOCYTES # BLD AUTO: 1.04 K/UL — HIGH (ref 0–0.9)
MONOCYTES # BLD AUTO: 1.04 K/UL — HIGH (ref 0–0.9)
MONOCYTES # BLD AUTO: 1.09 K/UL — HIGH (ref 0–0.9)
MONOCYTES # BLD AUTO: 1.12 K/UL — HIGH (ref 0–0.9)
MONOCYTES # BLD AUTO: 1.14 K/UL — HIGH (ref 0–0.9)
MONOCYTES # BLD AUTO: 1.14 K/UL — HIGH (ref 0–0.9)
MONOCYTES # BLD AUTO: 1.23 K/UL — HIGH (ref 0–0.9)
MONOCYTES # BLD AUTO: 1.24 K/UL — HIGH (ref 0–0.9)
MONOCYTES # BLD AUTO: 1.24 K/UL — HIGH (ref 0–0.9)
MONOCYTES # BLD AUTO: 1.25 K/UL — HIGH (ref 0–0.9)
MONOCYTES # BLD AUTO: 1.28 K/UL — HIGH (ref 0–0.9)
MONOCYTES # BLD AUTO: 1.32 K/UL — HIGH (ref 0–0.9)
MONOCYTES # BLD AUTO: 1.34 K/UL — HIGH (ref 0–0.9)
MONOCYTES # BLD AUTO: 1.38 K/UL — HIGH (ref 0–0.9)
MONOCYTES # BLD AUTO: 1.44 K/UL — HIGH (ref 0–0.9)
MONOCYTES # BLD AUTO: 1.53 K/UL — HIGH (ref 0–0.9)
MONOCYTES # BLD AUTO: 1.82 K/UL — HIGH (ref 0–0.9)
MONOCYTES # BLD AUTO: 2.05 K/UL — HIGH (ref 0–0.9)
MONOCYTES # BLD AUTO: 2.22 K/UL — HIGH (ref 0–0.9)
MONOCYTES # BLD AUTO: 2.45 K/UL — HIGH (ref 0–0.9)
MONOCYTES # BLD AUTO: 2.64 K/UL — HIGH (ref 0–0.9)
MONOCYTES # BLD AUTO: 2.65 K/UL — HIGH (ref 0–0.9)
MONOCYTES # BLD AUTO: 3.51 K/UL — HIGH (ref 0–0.9)
MONOCYTES NFR BLD AUTO: 0 % — LOW (ref 2–14)
MONOCYTES NFR BLD AUTO: 0.9 % — LOW (ref 2–14)
MONOCYTES NFR BLD AUTO: 1 % — LOW (ref 2–14)
MONOCYTES NFR BLD AUTO: 1.7 % — LOW (ref 2–14)
MONOCYTES NFR BLD AUTO: 10 % — SIGNIFICANT CHANGE UP (ref 2–14)
MONOCYTES NFR BLD AUTO: 10.4 % — SIGNIFICANT CHANGE UP (ref 2–14)
MONOCYTES NFR BLD AUTO: 11.2 % — SIGNIFICANT CHANGE UP (ref 2–14)
MONOCYTES NFR BLD AUTO: 11.3 % — SIGNIFICANT CHANGE UP (ref 2–14)
MONOCYTES NFR BLD AUTO: 11.6 % — SIGNIFICANT CHANGE UP (ref 2–14)
MONOCYTES NFR BLD AUTO: 13.4 % — SIGNIFICANT CHANGE UP (ref 2–14)
MONOCYTES NFR BLD AUTO: 13.9 % — SIGNIFICANT CHANGE UP (ref 2–14)
MONOCYTES NFR BLD AUTO: 15.5 % — HIGH (ref 2–14)
MONOCYTES NFR BLD AUTO: 18.9 % — HIGH (ref 2–14)
MONOCYTES NFR BLD AUTO: 2.6 % — SIGNIFICANT CHANGE UP (ref 2–14)
MONOCYTES NFR BLD AUTO: 2.7 % — SIGNIFICANT CHANGE UP (ref 2–14)
MONOCYTES NFR BLD AUTO: 29.9 % — HIGH (ref 2–14)
MONOCYTES NFR BLD AUTO: 3.2 % — SIGNIFICANT CHANGE UP (ref 2–14)
MONOCYTES NFR BLD AUTO: 3.3 % — SIGNIFICANT CHANGE UP (ref 2–14)
MONOCYTES NFR BLD AUTO: 3.5 % — SIGNIFICANT CHANGE UP (ref 2–14)
MONOCYTES NFR BLD AUTO: 3.5 % — SIGNIFICANT CHANGE UP (ref 2–14)
MONOCYTES NFR BLD AUTO: 3.6 % — SIGNIFICANT CHANGE UP (ref 2–14)
MONOCYTES NFR BLD AUTO: 3.9 % — SIGNIFICANT CHANGE UP (ref 2–14)
MONOCYTES NFR BLD AUTO: 3.9 % — SIGNIFICANT CHANGE UP (ref 2–14)
MONOCYTES NFR BLD AUTO: 4.1 % — SIGNIFICANT CHANGE UP (ref 2–14)
MONOCYTES NFR BLD AUTO: 4.2 % — SIGNIFICANT CHANGE UP (ref 2–14)
MONOCYTES NFR BLD AUTO: 4.5 % — SIGNIFICANT CHANGE UP (ref 2–14)
MONOCYTES NFR BLD AUTO: 4.9 % — SIGNIFICANT CHANGE UP (ref 2–14)
MONOCYTES NFR BLD AUTO: 5.3 % — SIGNIFICANT CHANGE UP (ref 2–14)
MONOCYTES NFR BLD AUTO: 5.4 % — SIGNIFICANT CHANGE UP (ref 2–14)
MONOCYTES NFR BLD AUTO: 6.9 % — SIGNIFICANT CHANGE UP (ref 2–14)
MONOCYTES NFR BLD AUTO: 7.2 % — SIGNIFICANT CHANGE UP (ref 2–14)
MONOCYTES NFR BLD AUTO: 7.4 % — SIGNIFICANT CHANGE UP (ref 2–14)
MONOCYTES NFR BLD AUTO: 7.5 % — SIGNIFICANT CHANGE UP (ref 2–14)
MONOCYTES NFR BLD AUTO: 7.7 % — SIGNIFICANT CHANGE UP (ref 2–14)
MONOCYTES NFR BLD AUTO: 8 % — SIGNIFICANT CHANGE UP (ref 2–14)
MONOCYTES NFR BLD AUTO: 8.1 % — SIGNIFICANT CHANGE UP (ref 2–14)
MONOCYTES NFR BLD AUTO: 8.2 % — SIGNIFICANT CHANGE UP (ref 2–14)
MONOCYTES NFR BLD AUTO: 8.5 % — SIGNIFICANT CHANGE UP (ref 2–14)
MONOCYTES NFR BLD AUTO: 9.2 % — SIGNIFICANT CHANGE UP (ref 2–14)
MONOCYTES NFR BLD AUTO: 9.8 % — SIGNIFICANT CHANGE UP (ref 2–14)
MONOS+MACROS # FLD: 11 % — SIGNIFICANT CHANGE UP
MONOS+MACROS # FLD: 9 % — SIGNIFICANT CHANGE UP
MRSA PCR RESULT.: SIGNIFICANT CHANGE UP
MRSA PCR RESULT.: SIGNIFICANT CHANGE UP
MYELOCYTES NFR BLD: 0.9 % — HIGH (ref 0–0)
MYELOCYTES NFR BLD: 0.9 % — HIGH (ref 0–0)
MYELOCYTES NFR BLD: 1.7 % — HIGH (ref 0–0)
MYELOCYTES NFR BLD: 1.8 % — HIGH (ref 0–0)
MYELOCYTES NFR BLD: 1.8 % — HIGH (ref 0–0)
MYELOCYTES NFR BLD: 2.7 % — HIGH (ref 0–0)
MYELOCYTES NFR BLD: 2.7 % — HIGH (ref 0–0)
MYELOCYTES NFR BLD: 3.5 % — HIGH (ref 0–0)
NEUTROPHILS # BLD AUTO: 10.89 K/UL — HIGH (ref 1.8–7.4)
NEUTROPHILS # BLD AUTO: 11.46 K/UL — HIGH (ref 1.8–7.4)
NEUTROPHILS # BLD AUTO: 11.64 K/UL — HIGH (ref 1.8–7.4)
NEUTROPHILS # BLD AUTO: 11.65 K/UL — HIGH (ref 1.8–7.4)
NEUTROPHILS # BLD AUTO: 12.18 K/UL — HIGH (ref 1.8–7.4)
NEUTROPHILS # BLD AUTO: 12.74 K/UL — HIGH (ref 1.8–7.4)
NEUTROPHILS # BLD AUTO: 13.02 K/UL — HIGH (ref 1.8–7.4)
NEUTROPHILS # BLD AUTO: 13.08 K/UL — HIGH (ref 1.8–7.4)
NEUTROPHILS # BLD AUTO: 13.16 K/UL — HIGH (ref 1.8–7.4)
NEUTROPHILS # BLD AUTO: 14.62 K/UL — HIGH (ref 1.8–7.4)
NEUTROPHILS # BLD AUTO: 15.21 K/UL — HIGH (ref 1.8–7.4)
NEUTROPHILS # BLD AUTO: 17.52 K/UL — HIGH (ref 1.8–7.4)
NEUTROPHILS # BLD AUTO: 18.71 K/UL — HIGH (ref 1.8–7.4)
NEUTROPHILS # BLD AUTO: 20.87 K/UL — HIGH (ref 1.8–7.4)
NEUTROPHILS # BLD AUTO: 21.14 K/UL — HIGH (ref 1.8–7.4)
NEUTROPHILS # BLD AUTO: 21.94 K/UL — HIGH (ref 1.8–7.4)
NEUTROPHILS # BLD AUTO: 22.4 K/UL — HIGH (ref 1.8–7.4)
NEUTROPHILS # BLD AUTO: 22.99 K/UL — HIGH (ref 1.8–7.4)
NEUTROPHILS # BLD AUTO: 23.53 K/UL — HIGH (ref 1.8–7.4)
NEUTROPHILS # BLD AUTO: 23.57 K/UL — HIGH (ref 1.8–7.4)
NEUTROPHILS # BLD AUTO: 25.26 K/UL — HIGH (ref 1.8–7.4)
NEUTROPHILS # BLD AUTO: 25.58 K/UL — HIGH (ref 1.8–7.4)
NEUTROPHILS # BLD AUTO: 25.9 K/UL — HIGH (ref 1.8–7.4)
NEUTROPHILS # BLD AUTO: 28.34 K/UL — HIGH (ref 1.8–7.4)
NEUTROPHILS # BLD AUTO: 29.45 K/UL — HIGH (ref 1.8–7.4)
NEUTROPHILS # BLD AUTO: 31.3 K/UL — HIGH (ref 1.8–7.4)
NEUTROPHILS # BLD AUTO: 31.38 K/UL — HIGH (ref 1.8–7.4)
NEUTROPHILS # BLD AUTO: 31.57 K/UL — HIGH (ref 1.8–7.4)
NEUTROPHILS # BLD AUTO: 4.07 K/UL — SIGNIFICANT CHANGE UP (ref 1.8–7.4)
NEUTROPHILS # BLD AUTO: 4.62 K/UL — SIGNIFICANT CHANGE UP (ref 1.8–7.4)
NEUTROPHILS # BLD AUTO: 5.92 K/UL — SIGNIFICANT CHANGE UP (ref 1.8–7.4)
NEUTROPHILS # BLD AUTO: 6.05 K/UL — SIGNIFICANT CHANGE UP (ref 1.8–7.4)
NEUTROPHILS # BLD AUTO: 6.2 K/UL — SIGNIFICANT CHANGE UP (ref 1.8–7.4)
NEUTROPHILS # BLD AUTO: 6.24 K/UL — SIGNIFICANT CHANGE UP (ref 1.8–7.4)
NEUTROPHILS # BLD AUTO: 6.68 K/UL — SIGNIFICANT CHANGE UP (ref 1.8–7.4)
NEUTROPHILS # BLD AUTO: 7.6 K/UL — HIGH (ref 1.8–7.4)
NEUTROPHILS # BLD AUTO: 7.96 K/UL — HIGH (ref 1.8–7.4)
NEUTROPHILS # BLD AUTO: 8.04 K/UL — HIGH (ref 1.8–7.4)
NEUTROPHILS # BLD AUTO: 8.7 K/UL — HIGH (ref 1.8–7.4)
NEUTROPHILS # BLD AUTO: 9.03 K/UL — HIGH (ref 1.8–7.4)
NEUTROPHILS NFR BLD AUTO: 39.3 % — LOW (ref 43–77)
NEUTROPHILS NFR BLD AUTO: 46.4 % — SIGNIFICANT CHANGE UP (ref 43–77)
NEUTROPHILS NFR BLD AUTO: 56.8 % — SIGNIFICANT CHANGE UP (ref 43–77)
NEUTROPHILS NFR BLD AUTO: 59.5 % — SIGNIFICANT CHANGE UP (ref 43–77)
NEUTROPHILS NFR BLD AUTO: 62 % — SIGNIFICANT CHANGE UP (ref 43–77)
NEUTROPHILS NFR BLD AUTO: 63.6 % — SIGNIFICANT CHANGE UP (ref 43–77)
NEUTROPHILS NFR BLD AUTO: 64.5 % — SIGNIFICANT CHANGE UP (ref 43–77)
NEUTROPHILS NFR BLD AUTO: 64.5 % — SIGNIFICANT CHANGE UP (ref 43–77)
NEUTROPHILS NFR BLD AUTO: 64.7 % — SIGNIFICANT CHANGE UP (ref 43–77)
NEUTROPHILS NFR BLD AUTO: 65.8 % — SIGNIFICANT CHANGE UP (ref 43–77)
NEUTROPHILS NFR BLD AUTO: 67.1 % — SIGNIFICANT CHANGE UP (ref 43–77)
NEUTROPHILS NFR BLD AUTO: 67.4 % — SIGNIFICANT CHANGE UP (ref 43–77)
NEUTROPHILS NFR BLD AUTO: 68.5 % — SIGNIFICANT CHANGE UP (ref 43–77)
NEUTROPHILS NFR BLD AUTO: 72.2 % — SIGNIFICANT CHANGE UP (ref 43–77)
NEUTROPHILS NFR BLD AUTO: 73.6 % — SIGNIFICANT CHANGE UP (ref 43–77)
NEUTROPHILS NFR BLD AUTO: 73.6 % — SIGNIFICANT CHANGE UP (ref 43–77)
NEUTROPHILS NFR BLD AUTO: 74 % — SIGNIFICANT CHANGE UP (ref 43–77)
NEUTROPHILS NFR BLD AUTO: 74.1 % — SIGNIFICANT CHANGE UP (ref 43–77)
NEUTROPHILS NFR BLD AUTO: 74.3 % — SIGNIFICANT CHANGE UP (ref 43–77)
NEUTROPHILS NFR BLD AUTO: 75 % — SIGNIFICANT CHANGE UP (ref 43–77)
NEUTROPHILS NFR BLD AUTO: 77.2 % — HIGH (ref 43–77)
NEUTROPHILS NFR BLD AUTO: 77.4 % — HIGH (ref 43–77)
NEUTROPHILS NFR BLD AUTO: 78.8 % — HIGH (ref 43–77)
NEUTROPHILS NFR BLD AUTO: 79.3 % — HIGH (ref 43–77)
NEUTROPHILS NFR BLD AUTO: 79.7 % — HIGH (ref 43–77)
NEUTROPHILS NFR BLD AUTO: 80.7 % — HIGH (ref 43–77)
NEUTROPHILS NFR BLD AUTO: 84.3 % — HIGH (ref 43–77)
NEUTROPHILS NFR BLD AUTO: 84.6 % — HIGH (ref 43–77)
NEUTROPHILS NFR BLD AUTO: 85.2 % — HIGH (ref 43–77)
NEUTROPHILS NFR BLD AUTO: 86.3 % — HIGH (ref 43–77)
NEUTROPHILS NFR BLD AUTO: 86.3 % — HIGH (ref 43–77)
NEUTROPHILS NFR BLD AUTO: 86.4 % — HIGH (ref 43–77)
NEUTROPHILS NFR BLD AUTO: 86.6 % — HIGH (ref 43–77)
NEUTROPHILS NFR BLD AUTO: 87.8 % — HIGH (ref 43–77)
NEUTROPHILS NFR BLD AUTO: 87.9 % — HIGH (ref 43–77)
NEUTROPHILS NFR BLD AUTO: 89 % — HIGH (ref 43–77)
NEUTROPHILS NFR BLD AUTO: 90 % — HIGH (ref 43–77)
NEUTROPHILS NFR BLD AUTO: 92.1 % — HIGH (ref 43–77)
NEUTROPHILS NFR BLD AUTO: 95.7 % — HIGH (ref 43–77)
NEUTROPHILS NFR BLD AUTO: 98.1 % — HIGH (ref 43–77)
NEUTROPHILS-BODY FLUID: 73 % — SIGNIFICANT CHANGE UP
NEUTROPHILS-BODY FLUID: 78 % — SIGNIFICANT CHANGE UP
NEUTS BAND # BLD: 1.7 % — SIGNIFICANT CHANGE UP (ref 0–8)
NEUTS BAND # BLD: 10.3 % — HIGH (ref 0–8)
NEUTS BAND # BLD: 10.8 % — HIGH (ref 0–8)
NEUTS BAND # BLD: 2.6 % — SIGNIFICANT CHANGE UP (ref 0–8)
NEUTS BAND # BLD: 21.8 % — HIGH (ref 0–8)
NEUTS BAND # BLD: 22.5 % — HIGH (ref 0–8)
NEUTS BAND # BLD: 4.4 % — SIGNIFICANT CHANGE UP (ref 0–8)
NEUTS BAND # BLD: 5.3 % — SIGNIFICANT CHANGE UP (ref 0–8)
NEUTS BAND # BLD: 5.4 % — SIGNIFICANT CHANGE UP (ref 0–8)
NEUTS BAND # BLD: 5.5 % — SIGNIFICANT CHANGE UP (ref 0–8)
NEUTS BAND # BLD: 9.6 % — HIGH (ref 0–8)
NIGHT BLUE STAIN TISS: SIGNIFICANT CHANGE UP
NITRITE UR-MCNC: NEGATIVE — SIGNIFICANT CHANGE UP
NITRITE UR-MCNC: NEGATIVE — SIGNIFICANT CHANGE UP
NITRITE UR-MCNC: POSITIVE
NON HDL CHOLESTEROL: 123 MG/DL — SIGNIFICANT CHANGE UP
NON HDL CHOLESTEROL: 75 MG/DL — SIGNIFICANT CHANGE UP
NON HDL CHOLESTEROL: 95 MG/DL — SIGNIFICANT CHANGE UP
NOROVIRUS GI+II RNA STL QL NAA+NON-PROBE: ABNORMAL
NRBC # BLD: 0 /100 WBCS — SIGNIFICANT CHANGE UP (ref 0–0)
NRBC # BLD: 1 /100 WBCS — HIGH (ref 0–0)
NRBC # BLD: 12 /100 WBCS — HIGH (ref 0–0)
NRBC # BLD: 16 /100 WBCS — HIGH (ref 0–0)
NRBC # BLD: 2 /100 WBCS — HIGH (ref 0–0)
NRBC # BLD: 3 /100 WBCS — HIGH (ref 0–0)
NRBC # BLD: 4 /100 WBCS — HIGH (ref 0–0)
NRBC # BLD: 5 /100 WBCS — HIGH (ref 0–0)
NRBC # BLD: 6 /100 WBCS — HIGH (ref 0–0)
NRBC # BLD: 6 /100 WBCS — HIGH (ref 0–0)
NRBC # BLD: 7 /100 WBCS — HIGH (ref 0–0)
NRBC # BLD: 8 /100 WBCS — HIGH (ref 0–0)
NRBC # BLD: 8 /100 WBCS — HIGH (ref 0–0)
NRBC # BLD: 9 /100 WBCS — HIGH (ref 0–0)
NT-PROBNP SERPL-SCNC: 2383 PG/ML — HIGH (ref 0–300)
OB PNL STL: POSITIVE
OH-LYSINE SERPL-SCNC: 1.5 UMOL/L — HIGH (ref 0.1–0.8)
OH-PROLINE SERPL-SCNC: 32.7 UMOL/L — SIGNIFICANT CHANGE UP (ref 4.7–35.2)
OPIATES UR-MCNC: NEGATIVE — SIGNIFICANT CHANGE UP
ORGANISM # SPEC MICROSCOPIC CNT: ABNORMAL
ORNITHINE SERPL-SCNC: 90.5 UMOL/L — SIGNIFICANT CHANGE UP (ref 30.1–101.3)
OSMOLALITY UR: 386 MOS/KG — SIGNIFICANT CHANGE UP (ref 300–900)
OTHER CELLS CSF MANUAL: 10.7 ML/DL — LOW (ref 18–22)
OTHER CELLS CSF MANUAL: 9 ML/DL — LOW (ref 18–22)
OTHER CELLS CSF MANUAL: 9.1 ML/DL — LOW (ref 18–22)
OTHER CELLS CSF MANUAL: 9.2 ML/DL — LOW (ref 18–22)
OVALOCYTES BLD QL SMEAR: SLIGHT — SIGNIFICANT CHANGE UP
OXYCODONE UR-MCNC: NEGATIVE — SIGNIFICANT CHANGE UP
P JIROVECII DNA L RESP QL NAA+NON-PROBE: NEGATIVE — SIGNIFICANT CHANGE UP
P JIROVECII DNA L RESP QL NAA+NON-PROBE: NEGATIVE — SIGNIFICANT CHANGE UP
PAPPENHEIMER BOD BLD QL SMEAR: PRESENT — SIGNIFICANT CHANGE UP
PCO2 BLDV: 34 MMHG — LOW (ref 39–42)
PCO2 BLDV: 34 MMHG — LOW (ref 39–42)
PCO2 BLDV: 36 MMHG — LOW (ref 39–42)
PCO2 BLDV: 37 MMHG — LOW (ref 39–42)
PCO2 BLDV: 38 MMHG — LOW (ref 39–42)
PCO2 BLDV: 39 MMHG — SIGNIFICANT CHANGE UP (ref 39–42)
PCO2 BLDV: 40 MMHG — SIGNIFICANT CHANGE UP (ref 39–42)
PCO2 BLDV: 41 MMHG — SIGNIFICANT CHANGE UP (ref 39–42)
PCO2 BLDV: 43 MMHG — HIGH (ref 39–42)
PCO2 BLDV: 52 MMHG — HIGH (ref 39–42)
PCO2 BLDV: 62 MMHG — HIGH (ref 39–42)
PCO2 BLDV: 83 MMHG — HIGH (ref 39–42)
PCP SPEC-MCNC: SIGNIFICANT CHANGE UP
PCP UR-MCNC: NEGATIVE — SIGNIFICANT CHANGE UP
PETH 16:0/18:1: >400 NG/ML — HIGH
PETH 16:0/18:2: 384 NG/ML — HIGH
PETH COMMENTS: SIGNIFICANT CHANGE UP
PH BLDV: 6.9 — CRITICAL LOW (ref 7.32–7.43)
PH BLDV: 7.22 — LOW (ref 7.32–7.43)
PH BLDV: 7.35 — SIGNIFICANT CHANGE UP (ref 7.32–7.43)
PH BLDV: 7.36 — SIGNIFICANT CHANGE UP (ref 7.32–7.43)
PH BLDV: 7.36 — SIGNIFICANT CHANGE UP (ref 7.32–7.43)
PH BLDV: 7.37 — SIGNIFICANT CHANGE UP (ref 7.32–7.43)
PH BLDV: 7.39 — SIGNIFICANT CHANGE UP (ref 7.32–7.43)
PH BLDV: 7.4 — SIGNIFICANT CHANGE UP (ref 7.32–7.43)
PH BLDV: 7.41 — SIGNIFICANT CHANGE UP (ref 7.32–7.43)
PH BLDV: 7.41 — SIGNIFICANT CHANGE UP (ref 7.32–7.43)
PH BLDV: 7.42 — SIGNIFICANT CHANGE UP (ref 7.32–7.43)
PH BLDV: 7.42 — SIGNIFICANT CHANGE UP (ref 7.32–7.43)
PH BLDV: 7.43 — SIGNIFICANT CHANGE UP (ref 7.32–7.43)
PH BLDV: 7.44 — HIGH (ref 7.32–7.43)
PH BLDV: 7.44 — HIGH (ref 7.32–7.43)
PH BLDV: 7.45 — HIGH (ref 7.32–7.43)
PH BLDV: 7.47 — HIGH (ref 7.32–7.43)
PH UR: 5 — SIGNIFICANT CHANGE UP (ref 5–8)
PH UR: 6 — SIGNIFICANT CHANGE UP (ref 5–8)
PH UR: 6 — SIGNIFICANT CHANGE UP (ref 5–8)
PHE SERPL-SCNC: 74.7 UMOL/L — SIGNIFICANT CHANGE UP (ref 35.8–76.9)
PHOSPHATE SERPL-MCNC: 2.3 MG/DL — LOW (ref 2.5–4.5)
PHOSPHATE SERPL-MCNC: 2.4 MG/DL — LOW (ref 2.5–4.5)
PHOSPHATE SERPL-MCNC: 2.5 MG/DL — SIGNIFICANT CHANGE UP (ref 2.5–4.5)
PHOSPHATE SERPL-MCNC: 2.7 MG/DL — SIGNIFICANT CHANGE UP (ref 2.5–4.5)
PHOSPHATE SERPL-MCNC: 2.7 MG/DL — SIGNIFICANT CHANGE UP (ref 2.5–4.5)
PHOSPHATE SERPL-MCNC: 2.8 MG/DL — SIGNIFICANT CHANGE UP (ref 2.5–4.5)
PHOSPHATE SERPL-MCNC: 2.9 MG/DL — SIGNIFICANT CHANGE UP (ref 2.5–4.5)
PHOSPHATE SERPL-MCNC: 2.9 MG/DL — SIGNIFICANT CHANGE UP (ref 2.5–4.5)
PHOSPHATE SERPL-MCNC: 3 MG/DL — SIGNIFICANT CHANGE UP (ref 2.5–4.5)
PHOSPHATE SERPL-MCNC: 3.1 MG/DL — SIGNIFICANT CHANGE UP (ref 2.5–4.5)
PHOSPHATE SERPL-MCNC: 3.1 MG/DL — SIGNIFICANT CHANGE UP (ref 2.5–4.5)
PHOSPHATE SERPL-MCNC: 3.2 MG/DL — SIGNIFICANT CHANGE UP (ref 2.5–4.5)
PHOSPHATE SERPL-MCNC: 3.2 MG/DL — SIGNIFICANT CHANGE UP (ref 2.5–4.5)
PHOSPHATE SERPL-MCNC: 3.3 MG/DL — SIGNIFICANT CHANGE UP (ref 2.5–4.5)
PHOSPHATE SERPL-MCNC: 3.5 MG/DL — SIGNIFICANT CHANGE UP (ref 2.5–4.5)
PHOSPHATE SERPL-MCNC: 3.5 MG/DL — SIGNIFICANT CHANGE UP (ref 2.5–4.5)
PHOSPHATE SERPL-MCNC: 3.6 MG/DL — SIGNIFICANT CHANGE UP (ref 2.5–4.5)
PHOSPHATE SERPL-MCNC: 3.7 MG/DL — SIGNIFICANT CHANGE UP (ref 2.5–4.5)
PHOSPHATE SERPL-MCNC: 3.7 MG/DL — SIGNIFICANT CHANGE UP (ref 2.5–4.5)
PHOSPHATE SERPL-MCNC: 3.8 MG/DL — SIGNIFICANT CHANGE UP (ref 2.5–4.5)
PHOSPHATE SERPL-MCNC: 3.9 MG/DL — SIGNIFICANT CHANGE UP (ref 2.5–4.5)
PHOSPHATE SERPL-MCNC: 4 MG/DL — SIGNIFICANT CHANGE UP (ref 2.5–4.5)
PHOSPHATE SERPL-MCNC: 4.1 MG/DL — SIGNIFICANT CHANGE UP (ref 2.5–4.5)
PHOSPHATE SERPL-MCNC: 4.1 MG/DL — SIGNIFICANT CHANGE UP (ref 2.5–4.5)
PHOSPHATE SERPL-MCNC: 4.3 MG/DL — SIGNIFICANT CHANGE UP (ref 2.5–4.5)
PHOSPHATE SERPL-MCNC: 4.6 MG/DL — HIGH (ref 2.5–4.5)
PHOSPHATE SERPL-MCNC: 4.7 MG/DL — HIGH (ref 2.5–4.5)
PHOSPHATE SERPL-MCNC: 5 MG/DL — HIGH (ref 2.5–4.5)
PLAT MORPH BLD: ABNORMAL
PLAT MORPH BLD: NORMAL — SIGNIFICANT CHANGE UP
PLATELET # BLD AUTO: 101 K/UL — LOW (ref 150–400)
PLATELET # BLD AUTO: 101 K/UL — LOW (ref 150–400)
PLATELET # BLD AUTO: 104 K/UL — LOW (ref 150–400)
PLATELET # BLD AUTO: 106 K/UL — LOW (ref 150–400)
PLATELET # BLD AUTO: 123 K/UL — LOW (ref 150–400)
PLATELET # BLD AUTO: 124 K/UL — LOW (ref 150–400)
PLATELET # BLD AUTO: 13 K/UL — CRITICAL LOW (ref 150–400)
PLATELET # BLD AUTO: 130 K/UL — LOW (ref 150–400)
PLATELET # BLD AUTO: 135 K/UL — LOW (ref 150–400)
PLATELET # BLD AUTO: 15 K/UL — CRITICAL LOW (ref 150–400)
PLATELET # BLD AUTO: 174 K/UL — SIGNIFICANT CHANGE UP (ref 150–400)
PLATELET # BLD AUTO: 186 K/UL — SIGNIFICANT CHANGE UP (ref 150–400)
PLATELET # BLD AUTO: 20 K/UL — CRITICAL LOW (ref 150–400)
PLATELET # BLD AUTO: 227 K/UL — SIGNIFICANT CHANGE UP (ref 150–400)
PLATELET # BLD AUTO: 24 K/UL — LOW (ref 150–400)
PLATELET # BLD AUTO: 26 K/UL — LOW (ref 150–400)
PLATELET # BLD AUTO: 273 K/UL — SIGNIFICANT CHANGE UP (ref 150–400)
PLATELET # BLD AUTO: 319 K/UL — SIGNIFICANT CHANGE UP (ref 150–400)
PLATELET # BLD AUTO: 320 K/UL — SIGNIFICANT CHANGE UP (ref 150–400)
PLATELET # BLD AUTO: 347 K/UL — SIGNIFICANT CHANGE UP (ref 150–400)
PLATELET # BLD AUTO: 35 K/UL — LOW (ref 150–400)
PLATELET # BLD AUTO: 353 K/UL — SIGNIFICANT CHANGE UP (ref 150–400)
PLATELET # BLD AUTO: 36 K/UL — LOW (ref 150–400)
PLATELET # BLD AUTO: 38 K/UL — LOW (ref 150–400)
PLATELET # BLD AUTO: 401 K/UL — HIGH (ref 150–400)
PLATELET # BLD AUTO: 41 K/UL — LOW (ref 150–400)
PLATELET # BLD AUTO: 41 K/UL — LOW (ref 150–400)
PLATELET # BLD AUTO: 43 K/UL — LOW (ref 150–400)
PLATELET # BLD AUTO: 46 K/UL — LOW (ref 150–400)
PLATELET # BLD AUTO: 47 K/UL — LOW (ref 150–400)
PLATELET # BLD AUTO: 52 K/UL — LOW (ref 150–400)
PLATELET # BLD AUTO: 52 K/UL — LOW (ref 150–400)
PLATELET # BLD AUTO: 53 K/UL — LOW (ref 150–400)
PLATELET # BLD AUTO: 55 K/UL — LOW (ref 150–400)
PLATELET # BLD AUTO: 59 K/UL — LOW (ref 150–400)
PLATELET # BLD AUTO: 61 K/UL — LOW (ref 150–400)
PLATELET # BLD AUTO: 69 K/UL — LOW (ref 150–400)
PLATELET # BLD AUTO: 74 K/UL — LOW (ref 150–400)
PLATELET # BLD AUTO: 77 K/UL — LOW (ref 150–400)
PLATELET # BLD AUTO: 82 K/UL — LOW (ref 150–400)
PLATELET # BLD AUTO: 83 K/UL — LOW (ref 150–400)
PLATELET # BLD AUTO: 86 K/UL — LOW (ref 150–400)
PLATELET # BLD AUTO: 86 K/UL — LOW (ref 150–400)
PLATELET COUNT - ESTIMATE: ABNORMAL
PO2 BLDV: 39 MMHG — SIGNIFICANT CHANGE UP (ref 25–45)
PO2 BLDV: 41 MMHG — SIGNIFICANT CHANGE UP (ref 25–45)
PO2 BLDV: 46 MMHG — HIGH (ref 25–45)
PO2 BLDV: 48 MMHG — HIGH (ref 25–45)
PO2 BLDV: 48 MMHG — HIGH (ref 25–45)
PO2 BLDV: 49 MMHG — HIGH (ref 25–45)
PO2 BLDV: 51 MMHG — HIGH (ref 25–45)
PO2 BLDV: 53 MMHG — HIGH (ref 25–45)
PO2 BLDV: 53 MMHG — HIGH (ref 25–45)
PO2 BLDV: 54 MMHG — HIGH (ref 25–45)
PO2 BLDV: 54 MMHG — HIGH (ref 25–45)
PO2 BLDV: 55 MMHG — HIGH (ref 25–45)
PO2 BLDV: 59 MMHG — HIGH (ref 25–45)
PO2 BLDV: 60 MMHG — HIGH (ref 25–45)
PO2 BLDV: 63 MMHG — HIGH (ref 25–45)
PO2 BLDV: 63 MMHG — HIGH (ref 25–45)
PO2 BLDV: 65 MMHG — HIGH (ref 25–45)
PO2 BLDV: 70 MMHG — HIGH (ref 25–45)
PO2 BLDV: 87 MMHG — HIGH (ref 25–45)
POIKILOCYTOSIS BLD QL AUTO: SIGNIFICANT CHANGE UP
POIKILOCYTOSIS BLD QL AUTO: SLIGHT — SIGNIFICANT CHANGE UP
POIKILOCYTOSIS BLD QL AUTO: SLIGHT — SIGNIFICANT CHANGE UP
POLYCHROMASIA BLD QL SMEAR: SLIGHT — SIGNIFICANT CHANGE UP
POTASSIUM BLDV-SCNC: 2.5 MMOL/L — CRITICAL LOW (ref 3.5–5.1)
POTASSIUM BLDV-SCNC: 2.8 MMOL/L — CRITICAL LOW (ref 3.5–5.1)
POTASSIUM BLDV-SCNC: 2.8 MMOL/L — CRITICAL LOW (ref 3.5–5.1)
POTASSIUM BLDV-SCNC: 2.9 MMOL/L — CRITICAL LOW (ref 3.5–5.1)
POTASSIUM BLDV-SCNC: 2.9 MMOL/L — CRITICAL LOW (ref 3.5–5.1)
POTASSIUM BLDV-SCNC: 3.1 MMOL/L — LOW (ref 3.5–5.1)
POTASSIUM BLDV-SCNC: 3.2 MMOL/L — LOW (ref 3.5–5.1)
POTASSIUM BLDV-SCNC: 3.2 MMOL/L — LOW (ref 3.5–5.1)
POTASSIUM BLDV-SCNC: 3.5 MMOL/L — SIGNIFICANT CHANGE UP (ref 3.5–5.1)
POTASSIUM BLDV-SCNC: 3.5 MMOL/L — SIGNIFICANT CHANGE UP (ref 3.5–5.1)
POTASSIUM BLDV-SCNC: 3.7 MMOL/L — SIGNIFICANT CHANGE UP (ref 3.5–5.1)
POTASSIUM BLDV-SCNC: 3.8 MMOL/L — SIGNIFICANT CHANGE UP (ref 3.5–5.1)
POTASSIUM BLDV-SCNC: 3.9 MMOL/L — SIGNIFICANT CHANGE UP (ref 3.5–5.1)
POTASSIUM BLDV-SCNC: 4.1 MMOL/L — SIGNIFICANT CHANGE UP (ref 3.5–5.1)
POTASSIUM BLDV-SCNC: 4.4 MMOL/L — SIGNIFICANT CHANGE UP (ref 3.5–5.1)
POTASSIUM BLDV-SCNC: >10 MMOL/L — CRITICAL HIGH (ref 3.5–5.1)
POTASSIUM SERPL-MCNC: 2.8 MMOL/L — CRITICAL LOW (ref 3.5–5.3)
POTASSIUM SERPL-MCNC: 3 MMOL/L — LOW (ref 3.5–5.3)
POTASSIUM SERPL-MCNC: 3.1 MMOL/L — LOW (ref 3.5–5.3)
POTASSIUM SERPL-MCNC: 3.1 MMOL/L — LOW (ref 3.5–5.3)
POTASSIUM SERPL-MCNC: 3.2 MMOL/L — LOW (ref 3.5–5.3)
POTASSIUM SERPL-MCNC: 3.3 MMOL/L — LOW (ref 3.5–5.3)
POTASSIUM SERPL-MCNC: 3.4 MMOL/L — LOW (ref 3.5–5.3)
POTASSIUM SERPL-MCNC: 3.5 MMOL/L — SIGNIFICANT CHANGE UP (ref 3.5–5.3)
POTASSIUM SERPL-MCNC: 3.7 MMOL/L — SIGNIFICANT CHANGE UP (ref 3.5–5.3)
POTASSIUM SERPL-MCNC: 3.7 MMOL/L — SIGNIFICANT CHANGE UP (ref 3.5–5.3)
POTASSIUM SERPL-MCNC: 3.8 MMOL/L — SIGNIFICANT CHANGE UP (ref 3.5–5.3)
POTASSIUM SERPL-MCNC: 3.9 MMOL/L — SIGNIFICANT CHANGE UP (ref 3.5–5.3)
POTASSIUM SERPL-MCNC: 4 MMOL/L — SIGNIFICANT CHANGE UP (ref 3.5–5.3)
POTASSIUM SERPL-MCNC: 4.1 MMOL/L — SIGNIFICANT CHANGE UP (ref 3.5–5.3)
POTASSIUM SERPL-MCNC: 4.2 MMOL/L — SIGNIFICANT CHANGE UP (ref 3.5–5.3)
POTASSIUM SERPL-MCNC: 4.3 MMOL/L — SIGNIFICANT CHANGE UP (ref 3.5–5.3)
POTASSIUM SERPL-MCNC: 4.4 MMOL/L — SIGNIFICANT CHANGE UP (ref 3.5–5.3)
POTASSIUM SERPL-MCNC: 4.9 MMOL/L — SIGNIFICANT CHANGE UP (ref 3.5–5.3)
POTASSIUM SERPL-SCNC: 2.8 MMOL/L — CRITICAL LOW (ref 3.5–5.3)
POTASSIUM SERPL-SCNC: 3 MMOL/L — LOW (ref 3.5–5.3)
POTASSIUM SERPL-SCNC: 3.1 MMOL/L — LOW (ref 3.5–5.3)
POTASSIUM SERPL-SCNC: 3.1 MMOL/L — LOW (ref 3.5–5.3)
POTASSIUM SERPL-SCNC: 3.2 MMOL/L — LOW (ref 3.5–5.3)
POTASSIUM SERPL-SCNC: 3.3 MMOL/L — LOW (ref 3.5–5.3)
POTASSIUM SERPL-SCNC: 3.4 MMOL/L — LOW (ref 3.5–5.3)
POTASSIUM SERPL-SCNC: 3.5 MMOL/L — SIGNIFICANT CHANGE UP (ref 3.5–5.3)
POTASSIUM SERPL-SCNC: 3.7 MMOL/L — SIGNIFICANT CHANGE UP (ref 3.5–5.3)
POTASSIUM SERPL-SCNC: 3.7 MMOL/L — SIGNIFICANT CHANGE UP (ref 3.5–5.3)
POTASSIUM SERPL-SCNC: 3.8 MMOL/L — SIGNIFICANT CHANGE UP (ref 3.5–5.3)
POTASSIUM SERPL-SCNC: 3.9 MMOL/L — SIGNIFICANT CHANGE UP (ref 3.5–5.3)
POTASSIUM SERPL-SCNC: 4 MMOL/L — SIGNIFICANT CHANGE UP (ref 3.5–5.3)
POTASSIUM SERPL-SCNC: 4.1 MMOL/L — SIGNIFICANT CHANGE UP (ref 3.5–5.3)
POTASSIUM SERPL-SCNC: 4.2 MMOL/L — SIGNIFICANT CHANGE UP (ref 3.5–5.3)
POTASSIUM SERPL-SCNC: 4.3 MMOL/L — SIGNIFICANT CHANGE UP (ref 3.5–5.3)
POTASSIUM SERPL-SCNC: 4.4 MMOL/L — SIGNIFICANT CHANGE UP (ref 3.5–5.3)
POTASSIUM SERPL-SCNC: 4.9 MMOL/L — SIGNIFICANT CHANGE UP (ref 3.5–5.3)
POTASSIUM UR-SCNC: 12 MMOL/L — SIGNIFICANT CHANGE UP
PREALB SERPL-MCNC: 3 MG/DL — LOW (ref 20–40)
PROCALCITONIN SERPL-MCNC: 0.94 NG/ML — HIGH (ref 0.02–0.1)
PROLINE SERPL-SCNC: 256.9 UMOL/L — SIGNIFICANT CHANGE UP (ref 84.8–352.5)
PROMYELOCYTES # FLD: 0.9 % — HIGH (ref 0–0)
PROMYELOCYTES # FLD: 0.9 % — HIGH (ref 0–0)
PROT ?TM UR-MCNC: 30 MG/DL — HIGH (ref 0–12)
PROT SERPL-MCNC: 4 G/DL — LOW (ref 6–8.3)
PROT SERPL-MCNC: 4.4 G/DL — LOW (ref 6–8.3)
PROT SERPL-MCNC: 4.5 G/DL — LOW (ref 6–8.3)
PROT SERPL-MCNC: 4.6 G/DL — LOW (ref 6–8.3)
PROT SERPL-MCNC: 4.7 G/DL — LOW (ref 6–8.3)
PROT SERPL-MCNC: 4.8 G/DL — LOW (ref 6–8.3)
PROT SERPL-MCNC: 4.9 G/DL — LOW (ref 6–8.3)
PROT SERPL-MCNC: 5 G/DL — LOW (ref 6–8.3)
PROT SERPL-MCNC: 5.1 G/DL — LOW (ref 6–8.3)
PROT SERPL-MCNC: 5.2 G/DL — LOW (ref 6–8.3)
PROT SERPL-MCNC: 5.2 G/DL — LOW (ref 6–8.3)
PROT SERPL-MCNC: 5.3 G/DL — LOW (ref 6–8.3)
PROT SERPL-MCNC: 5.4 G/DL — LOW (ref 6–8.3)
PROT SERPL-MCNC: 5.4 G/DL — LOW (ref 6–8.3)
PROT SERPL-MCNC: 5.5 G/DL — LOW (ref 6–8.3)
PROT SERPL-MCNC: 5.6 G/DL — LOW (ref 6–8.3)
PROT SERPL-MCNC: 5.6 G/DL — LOW (ref 6–8.3)
PROT SERPL-MCNC: 5.7 G/DL — LOW (ref 6–8.3)
PROT SERPL-MCNC: 6.2 G/DL — SIGNIFICANT CHANGE UP (ref 6–8.3)
PROT UR-MCNC: SIGNIFICANT CHANGE UP MG/DL
PROT/CREAT UR-RTO: 0.3 RATIO — HIGH (ref 0–0.2)
PROTHROM AB SERPL-ACNC: 14 SEC — HIGH (ref 9.5–13)
PROTHROM AB SERPL-ACNC: 14.2 SEC — HIGH (ref 9.5–13)
PROTHROM AB SERPL-ACNC: 14.8 SEC — HIGH (ref 9.5–13)
PROTHROM AB SERPL-ACNC: 15.2 SEC — HIGH (ref 9.5–13)
PROTHROM AB SERPL-ACNC: 15.6 SEC — HIGH (ref 9.5–13)
PROTHROM AB SERPL-ACNC: 15.7 SEC — HIGH (ref 9.5–13)
PROTHROM AB SERPL-ACNC: 15.8 SEC — HIGH (ref 9.5–13)
PROTHROM AB SERPL-ACNC: 17.3 SEC — HIGH (ref 9.5–13)
PROTHROM AB SERPL-ACNC: 17.4 SEC — HIGH (ref 9.5–13)
PROTHROM AB SERPL-ACNC: 19 SEC — HIGH (ref 9.5–13)
PROTHROM AB SERPL-ACNC: 20.6 SEC — HIGH (ref 9.5–13)
PROTHROM AB SERPL-ACNC: 21.9 SEC — HIGH (ref 9.5–13)
PROTHROM AB SERPL-ACNC: 22.7 SEC — HIGH (ref 9.5–13)
PROTHROM AB SERPL-ACNC: 22.8 SEC — HIGH (ref 9.5–13)
PROTHROM AB SERPL-ACNC: 23.2 SEC — HIGH (ref 9.5–13)
PROTHROM AB SERPL-ACNC: 23.2 SEC — HIGH (ref 9.5–13)
PROTHROM AB SERPL-ACNC: 24 SEC — HIGH (ref 9.5–13)
PROTHROM AB SERPL-ACNC: 26.2 SEC — HIGH (ref 9.5–13)
PROTHROM AB SERPL-ACNC: 27.5 SEC — HIGH (ref 9.5–13)
PROTHROM AB SERPL-ACNC: 27.7 SEC — HIGH (ref 9.5–13)
PROTHROM AB SERPL-ACNC: 28.4 SEC — HIGH (ref 9.5–13)
PROTHROM AB SERPL-ACNC: 29.7 SEC — HIGH (ref 9.5–13)
RAPID RVP RESULT: SIGNIFICANT CHANGE UP
RAPIDTEG MAXIMUM AMPLITUDE: 49.3 MM — LOW (ref 52–70)
RAPIDTEG MAXIMUM AMPLITUDE: 55.7 MM — SIGNIFICANT CHANGE UP (ref 52–70)
RAPIDTEG MAXIMUM AMPLITUDE: 58.9 MM — SIGNIFICANT CHANGE UP (ref 52–70)
RAPIDTEG MAXIMUM AMPLITUDE: 62.2 MM — SIGNIFICANT CHANGE UP (ref 52–70)
RAPIDTEG MAXIMUM AMPLITUDE: SIGNIFICANT CHANGE UP MM (ref 52–70)
RBC # BLD: 1.86 M/UL — LOW (ref 3.8–5.2)
RBC # BLD: 1.98 M/UL — LOW (ref 3.8–5.2)
RBC # BLD: 1.99 M/UL — LOW (ref 3.8–5.2)
RBC # BLD: 2.04 M/UL — LOW (ref 3.8–5.2)
RBC # BLD: 2.04 M/UL — LOW (ref 3.8–5.2)
RBC # BLD: 2.08 M/UL — LOW (ref 3.8–5.2)
RBC # BLD: 2.15 M/UL — LOW (ref 3.8–5.2)
RBC # BLD: 2.16 M/UL — LOW (ref 3.8–5.2)
RBC # BLD: 2.16 M/UL — LOW (ref 3.8–5.2)
RBC # BLD: 2.17 M/UL — LOW (ref 3.8–5.2)
RBC # BLD: 2.18 M/UL — LOW (ref 3.8–5.2)
RBC # BLD: 2.2 M/UL — LOW (ref 3.8–5.2)
RBC # BLD: 2.22 M/UL — LOW (ref 3.8–5.2)
RBC # BLD: 2.26 M/UL — LOW (ref 3.8–5.2)
RBC # BLD: 2.27 M/UL — LOW (ref 3.8–5.2)
RBC # BLD: 2.29 M/UL — LOW (ref 3.8–5.2)
RBC # BLD: 2.31 M/UL — LOW (ref 3.8–5.2)
RBC # BLD: 2.39 M/UL — LOW (ref 3.8–5.2)
RBC # BLD: 2.42 M/UL — LOW (ref 3.8–5.2)
RBC # BLD: 2.44 M/UL — LOW (ref 3.8–5.2)
RBC # BLD: 2.48 M/UL — LOW (ref 3.8–5.2)
RBC # BLD: 2.48 M/UL — LOW (ref 3.8–5.2)
RBC # BLD: 2.49 M/UL — LOW (ref 3.8–5.2)
RBC # BLD: 2.49 M/UL — LOW (ref 3.8–5.2)
RBC # BLD: 2.51 M/UL — LOW (ref 3.8–5.2)
RBC # BLD: 2.52 M/UL — LOW (ref 3.8–5.2)
RBC # BLD: 2.53 M/UL — LOW (ref 3.8–5.2)
RBC # BLD: 2.54 M/UL — LOW (ref 3.8–5.2)
RBC # BLD: 2.54 M/UL — LOW (ref 3.8–5.2)
RBC # BLD: 2.57 M/UL — LOW (ref 3.8–5.2)
RBC # BLD: 2.58 M/UL — LOW (ref 3.8–5.2)
RBC # BLD: 2.62 M/UL — LOW (ref 3.8–5.2)
RBC # BLD: 2.67 M/UL — LOW (ref 3.8–5.2)
RBC # BLD: 2.74 M/UL — LOW (ref 3.8–5.2)
RBC # BLD: 2.75 M/UL — LOW (ref 3.8–5.2)
RBC # BLD: 2.82 M/UL — LOW (ref 3.8–5.2)
RBC # BLD: 2.86 M/UL — LOW (ref 3.8–5.2)
RBC # BLD: 2.97 M/UL — LOW (ref 3.8–5.2)
RBC # BLD: 3 M/UL — LOW (ref 3.8–5.2)
RBC # BLD: 3.08 M/UL — LOW (ref 3.8–5.2)
RBC # BLD: 3.46 M/UL — LOW (ref 3.8–5.2)
RBC # FLD: 16.7 % — HIGH (ref 10.3–14.5)
RBC # FLD: 16.9 % — HIGH (ref 10.3–14.5)
RBC # FLD: 19 % — HIGH (ref 10.3–14.5)
RBC # FLD: 19.9 % — HIGH (ref 10.3–14.5)
RBC # FLD: 19.9 % — HIGH (ref 10.3–14.5)
RBC # FLD: 20.2 % — HIGH (ref 10.3–14.5)
RBC # FLD: 20.4 % — HIGH (ref 10.3–14.5)
RBC # FLD: 21.1 % — HIGH (ref 10.3–14.5)
RBC # FLD: 21.3 % — HIGH (ref 10.3–14.5)
RBC # FLD: 21.3 % — HIGH (ref 10.3–14.5)
RBC # FLD: 21.7 % — HIGH (ref 10.3–14.5)
RBC # FLD: 21.9 % — HIGH (ref 10.3–14.5)
RBC # FLD: 22.1 % — HIGH (ref 10.3–14.5)
RBC # FLD: 22.1 % — HIGH (ref 10.3–14.5)
RBC # FLD: 22.2 % — HIGH (ref 10.3–14.5)
RBC # FLD: 22.2 % — HIGH (ref 10.3–14.5)
RBC # FLD: 22.4 % — HIGH (ref 10.3–14.5)
RBC # FLD: 22.5 % — HIGH (ref 10.3–14.5)
RBC # FLD: 22.6 % — HIGH (ref 10.3–14.5)
RBC # FLD: 22.7 % — HIGH (ref 10.3–14.5)
RBC # FLD: 22.9 % — HIGH (ref 10.3–14.5)
RBC # FLD: 23 % — HIGH (ref 10.3–14.5)
RBC # FLD: 23.3 % — HIGH (ref 10.3–14.5)
RBC # FLD: 23.3 % — HIGH (ref 10.3–14.5)
RBC # FLD: 23.5 % — HIGH (ref 10.3–14.5)
RBC # FLD: 23.6 % — HIGH (ref 10.3–14.5)
RBC # FLD: 23.6 % — HIGH (ref 10.3–14.5)
RBC # FLD: 23.7 % — HIGH (ref 10.3–14.5)
RBC # FLD: 23.8 % — HIGH (ref 10.3–14.5)
RBC # FLD: 23.8 % — HIGH (ref 10.3–14.5)
RBC # FLD: 23.9 % — HIGH (ref 10.3–14.5)
RBC # FLD: 23.9 % — HIGH (ref 10.3–14.5)
RBC # FLD: 24 % — HIGH (ref 10.3–14.5)
RBC # FLD: 24.1 % — HIGH (ref 10.3–14.5)
RBC # FLD: 24.4 % — HIGH (ref 10.3–14.5)
RBC # FLD: 24.5 % — HIGH (ref 10.3–14.5)
RBC # FLD: 24.5 % — HIGH (ref 10.3–14.5)
RBC # FLD: 24.6 % — HIGH (ref 10.3–14.5)
RBC # FLD: 25 % — HIGH (ref 10.3–14.5)
RBC # FLD: 25.2 % — HIGH (ref 10.3–14.5)
RBC BLD AUTO: ABNORMAL
RBC BLD AUTO: SIGNIFICANT CHANGE UP
RBC CASTS # UR COMP ASSIST: 28 /HPF — HIGH (ref 0–4)
RBC CASTS # UR COMP ASSIST: 3 /HPF — SIGNIFICANT CHANGE UP (ref 0–4)
RCV VOL RI: 5000 CELLS/UL — HIGH (ref 0–5)
RCV VOL RI: <2000 CELLS/UL — HIGH (ref 0–5)
REF LAB TEST METHOD: SIGNIFICANT CHANGE UP
REVIEW: SIGNIFICANT CHANGE UP
REVIEW: SIGNIFICANT CHANGE UP
RH IG SCN BLD-IMP: POSITIVE — SIGNIFICANT CHANGE UP
RSV RNA NPH QL NAA+NON-PROBE: SIGNIFICANT CHANGE UP
S AUREUS DNA NOSE QL NAA+PROBE: SIGNIFICANT CHANGE UP
S AUREUS DNA NOSE QL NAA+PROBE: SIGNIFICANT CHANGE UP
S PNEUM AG UR QL: NEGATIVE — SIGNIFICANT CHANGE UP
SALICYLATES SERPL-MCNC: <2 MG/DL — LOW (ref 15–30)
SAO2 % BLDV: 48.7 % — LOW (ref 67–88)
SAO2 % BLDV: 60.1 % — LOW (ref 67–88)
SAO2 % BLDV: 67.2 % — SIGNIFICANT CHANGE UP (ref 67–88)
SAO2 % BLDV: 67.8 % — SIGNIFICANT CHANGE UP (ref 67–88)
SAO2 % BLDV: 71.6 % — SIGNIFICANT CHANGE UP (ref 67–88)
SAO2 % BLDV: 73.3 % — SIGNIFICANT CHANGE UP (ref 67–88)
SAO2 % BLDV: 80 % — SIGNIFICANT CHANGE UP (ref 67–88)
SAO2 % BLDV: 81.5 % — SIGNIFICANT CHANGE UP (ref 67–88)
SAO2 % BLDV: 81.7 % — SIGNIFICANT CHANGE UP (ref 67–88)
SAO2 % BLDV: 83.2 % — SIGNIFICANT CHANGE UP (ref 67–88)
SAO2 % BLDV: 84 % — SIGNIFICANT CHANGE UP (ref 67–88)
SAO2 % BLDV: 87.2 % — SIGNIFICANT CHANGE UP (ref 67–88)
SAO2 % BLDV: 89 % — HIGH (ref 67–88)
SAO2 % BLDV: 89.2 % — HIGH (ref 67–88)
SAO2 % BLDV: 90.1 % — HIGH (ref 67–88)
SAO2 % BLDV: 93.2 % — HIGH (ref 67–88)
SAO2 % BLDV: 95.3 % — HIGH (ref 67–88)
SAO2 % BLDV: 97.8 % — HIGH (ref 67–88)
SAO2 % BLDV: 98.1 % — HIGH (ref 67–88)
SARCOSINE SERPL-SCNC: 1.2 UMOL/L — SIGNIFICANT CHANGE UP (ref 0–4)
SARS-COV-2 RNA SPEC QL NAA+PROBE: SIGNIFICANT CHANGE UP
SCHISTOCYTES BLD QL AUTO: SLIGHT — SIGNIFICANT CHANGE UP
SCHISTOCYTES BLD QL AUTO: SLIGHT — SIGNIFICANT CHANGE UP
SERINE SERPL-SCNC: 148.7 UMOL/L — HIGH (ref 48.7–145.2)
SMOOTH MUSCLE AB SER-ACNC: SIGNIFICANT CHANGE UP
SMUDGE CELLS # BLD: PRESENT — SIGNIFICANT CHANGE UP
SODIUM SERPL-SCNC: 136 MMOL/L — SIGNIFICANT CHANGE UP (ref 135–145)
SODIUM SERPL-SCNC: 137 MMOL/L — SIGNIFICANT CHANGE UP (ref 135–145)
SODIUM SERPL-SCNC: 138 MMOL/L — SIGNIFICANT CHANGE UP (ref 135–145)
SODIUM SERPL-SCNC: 138 MMOL/L — SIGNIFICANT CHANGE UP (ref 135–145)
SODIUM SERPL-SCNC: 139 MMOL/L — SIGNIFICANT CHANGE UP (ref 135–145)
SODIUM SERPL-SCNC: 140 MMOL/L — SIGNIFICANT CHANGE UP (ref 135–145)
SODIUM SERPL-SCNC: 141 MMOL/L — SIGNIFICANT CHANGE UP (ref 135–145)
SODIUM SERPL-SCNC: 142 MMOL/L — SIGNIFICANT CHANGE UP (ref 135–145)
SODIUM SERPL-SCNC: 143 MMOL/L — SIGNIFICANT CHANGE UP (ref 135–145)
SODIUM SERPL-SCNC: 144 MMOL/L — SIGNIFICANT CHANGE UP (ref 135–145)
SODIUM SERPL-SCNC: 145 MMOL/L — SIGNIFICANT CHANGE UP (ref 135–145)
SODIUM SERPL-SCNC: 146 MMOL/L — HIGH (ref 135–145)
SODIUM SERPL-SCNC: 147 MMOL/L — HIGH (ref 135–145)
SODIUM UR-SCNC: 8 MMOL/L — SIGNIFICANT CHANGE UP
SP GR SPEC: 1.03 — SIGNIFICANT CHANGE UP (ref 1–1.03)
SP GR SPEC: 1.03 — SIGNIFICANT CHANGE UP (ref 1–1.03)
SP GR SPEC: >1.03 — HIGH (ref 1–1.03)
SPECIMEN SOURCE: SIGNIFICANT CHANGE UP
SQUAMOUS # UR AUTO: 2 /HPF — SIGNIFICANT CHANGE UP (ref 0–5)
SQUAMOUS # UR AUTO: 6 /HPF — HIGH (ref 0–5)
STRONGYLOIDES AB SER-ACNC: NEGATIVE — SIGNIFICANT CHANGE UP
T3 SERPL-MCNC: 47 NG/DL — LOW (ref 80–200)
T4 AB SER-ACNC: 1.5 UG/DL — LOW (ref 4.6–12)
T4 FREE SERPL-MCNC: 0.6 NG/DL — LOW (ref 0.9–1.8)
T4 FREE SERPL-MCNC: 1.1 NG/DL — SIGNIFICANT CHANGE UP (ref 0.9–1.8)
T4 FREE SERPL-MCNC: 1.6 NG/DL — SIGNIFICANT CHANGE UP (ref 0.9–1.8)
TARGETS BLD QL SMEAR: SIGNIFICANT CHANGE UP
TARGETS BLD QL SMEAR: SLIGHT — SIGNIFICANT CHANGE UP
TARGETS BLD QL SMEAR: SLIGHT — SIGNIFICANT CHANGE UP
TAURINE SERPL-SCNC: 60.5 UMOL/L — SIGNIFICANT CHANGE UP (ref 29.2–132.3)
TEG FUNCTIONAL FIBRINOGEN: 16.7 MM — SIGNIFICANT CHANGE UP (ref 15–32)
TEG FUNCTIONAL FIBRINOGEN: 17.9 MM — SIGNIFICANT CHANGE UP (ref 15–32)
TEG FUNCTIONAL FIBRINOGEN: 19.9 MM — SIGNIFICANT CHANGE UP (ref 15–32)
TEG FUNCTIONAL FIBRINOGEN: 22.2 MM — SIGNIFICANT CHANGE UP (ref 15–32)
TEG FUNCTIONAL FIBRINOGEN: 31.1 MM — SIGNIFICANT CHANGE UP (ref 15–32)
TEG LY30 (LYSIS): 0 % — SIGNIFICANT CHANGE UP (ref 0–2.6)
TEG MAXIMUM AMPLITUDE: 64.1 MM — SIGNIFICANT CHANGE UP (ref 52–69)
TEG REACTION TIME: 13.1 MIN — HIGH (ref 4.6–9.1)
TEG REACTION TIME: 5.2 MIN — SIGNIFICANT CHANGE UP (ref 4.6–9.1)
TEG REACTION TIME: 5.7 MIN — SIGNIFICANT CHANGE UP (ref 4.6–9.1)
TEG REACTION TIME: 6.4 MIN — SIGNIFICANT CHANGE UP (ref 4.6–9.1)
TEG REACTION TIME: 7.3 MIN — SIGNIFICANT CHANGE UP (ref 4.6–9.1)
THC UR QL: NEGATIVE — SIGNIFICANT CHANGE UP
THREONINE SERPL-SCNC: 288.6 UMOL/L — HIGH (ref 67.8–211.6)
THYROPEROXIDASE AB SERPL-ACNC: 60.7 IU/ML — HIGH
THYROPEROXIDASE AB SERPL-ACNC: <10 IU/ML — SIGNIFICANT CHANGE UP
TIBC SERPL-MCNC: SIGNIFICANT CHANGE UP UG/DL (ref 220–430)
TOTAL NUCLEATED CELL COUNT, BODY FLUID: 790 CELLS/UL — HIGH (ref 0–5)
TOTAL NUCLEATED CELL COUNT, BODY FLUID: 888 CELLS/UL — HIGH (ref 0–5)
TOXIC GRANULES BLD QL SMEAR: PRESENT — SIGNIFICANT CHANGE UP
TRIGL SERPL-MCNC: 109 MG/DL — SIGNIFICANT CHANGE UP
TRIGL SERPL-MCNC: 307 MG/DL — HIGH
TRIGL SERPL-MCNC: 82 MG/DL — SIGNIFICANT CHANGE UP
TROPONIN T, HIGH SENSITIVITY RESULT: 38 NG/L — SIGNIFICANT CHANGE UP (ref 0–51)
TRYPTOPHAN RESULT: 12.1 UMOL/L — LOW (ref 23.5–93)
TSH SERPL-MCNC: 2.9 UIU/ML — SIGNIFICANT CHANGE UP (ref 0.27–4.2)
TSH SERPL-MCNC: 3.67 UIU/ML — SIGNIFICANT CHANGE UP (ref 0.27–4.2)
TSH SERPL-MCNC: 4.43 UIU/ML — HIGH (ref 0.27–4.2)
TUBE TYPE: SIGNIFICANT CHANGE UP
TUBE TYPE: SIGNIFICANT CHANGE UP
TYROSINE SERPL-SCNC: 62.9 UMOL/L — SIGNIFICANT CHANGE UP (ref 27.8–83.3)
UIBC SERPL-MCNC: <20 UG/DL — LOW (ref 110–370)
UROBILINOGEN FLD QL: 0.2 MG/DL — SIGNIFICANT CHANGE UP (ref 0.2–1)
UROBILINOGEN FLD QL: 1 MG/DL — SIGNIFICANT CHANGE UP (ref 0.2–1)
UROBILINOGEN FLD QL: 1 MG/DL — SIGNIFICANT CHANGE UP (ref 0.2–1)
VALINE SERPL-SCNC: 153.9 UMOL/L — SIGNIFICANT CHANGE UP (ref 133–317.1)
VANCOMYCIN TROUGH SERPL-MCNC: 13.8 UG/ML — SIGNIFICANT CHANGE UP (ref 10–20)
VANCOMYCIN TROUGH SERPL-MCNC: 22.9 UG/ML — HIGH (ref 10–20)
VARIANT LYMPHS # BLD: 0.9 % — SIGNIFICANT CHANGE UP (ref 0–6)
VARIANT LYMPHS # BLD: 0.9 % — SIGNIFICANT CHANGE UP (ref 0–6)
VARIANT LYMPHS # BLD: 1.8 % — SIGNIFICANT CHANGE UP (ref 0–6)
VIRUS SPEC CULT: SIGNIFICANT CHANGE UP
VIT A SERPL-MCNC: 18.2 UG/DL — LOW (ref 20.1–62)
VIT B12 SERPL-MCNC: >2000 PG/ML — HIGH (ref 232–1245)
WBC # BLD: 10.11 K/UL — SIGNIFICANT CHANGE UP (ref 3.8–10.5)
WBC # BLD: 10.14 K/UL — SIGNIFICANT CHANGE UP (ref 3.8–10.5)
WBC # BLD: 10.15 K/UL — SIGNIFICANT CHANGE UP (ref 3.8–10.5)
WBC # BLD: 10.17 K/UL — SIGNIFICANT CHANGE UP (ref 3.8–10.5)
WBC # BLD: 11 K/UL — HIGH (ref 3.8–10.5)
WBC # BLD: 11.08 K/UL — HIGH (ref 3.8–10.5)
WBC # BLD: 13.24 K/UL — HIGH (ref 3.8–10.5)
WBC # BLD: 13.75 K/UL — HIGH (ref 3.8–10.5)
WBC # BLD: 14.11 K/UL — HIGH (ref 3.8–10.5)
WBC # BLD: 15.3 K/UL — HIGH (ref 3.8–10.5)
WBC # BLD: 15.54 K/UL — HIGH (ref 3.8–10.5)
WBC # BLD: 16.53 K/UL — HIGH (ref 3.8–10.5)
WBC # BLD: 16.56 K/UL — HIGH (ref 3.8–10.5)
WBC # BLD: 16.61 K/UL — HIGH (ref 3.8–10.5)
WBC # BLD: 17 K/UL — HIGH (ref 3.8–10.5)
WBC # BLD: 17.58 K/UL — HIGH (ref 3.8–10.5)
WBC # BLD: 19.73 K/UL — HIGH (ref 3.8–10.5)
WBC # BLD: 23.6 K/UL — HIGH (ref 3.8–10.5)
WBC # BLD: 23.86 K/UL — HIGH (ref 3.8–10.5)
WBC # BLD: 24.26 K/UL — HIGH (ref 3.8–10.5)
WBC # BLD: 24.38 K/UL — HIGH (ref 3.8–10.5)
WBC # BLD: 24.48 K/UL — HIGH (ref 3.8–10.5)
WBC # BLD: 25.93 K/UL — HIGH (ref 3.8–10.5)
WBC # BLD: 26.95 K/UL — HIGH (ref 3.8–10.5)
WBC # BLD: 26.96 K/UL — HIGH (ref 3.8–10.5)
WBC # BLD: 27.31 K/UL — HIGH (ref 3.8–10.5)
WBC # BLD: 27.78 K/UL — HIGH (ref 3.8–10.5)
WBC # BLD: 28.72 K/UL — HIGH (ref 3.8–10.5)
WBC # BLD: 29.5 K/UL — HIGH (ref 3.8–10.5)
WBC # BLD: 30.2 K/UL — HIGH (ref 3.8–10.5)
WBC # BLD: 32.14 K/UL — HIGH (ref 3.8–10.5)
WBC # BLD: 32.18 K/UL — HIGH (ref 3.8–10.5)
WBC # BLD: 32.85 K/UL — HIGH (ref 3.8–10.5)
WBC # BLD: 34.03 K/UL — HIGH (ref 3.8–10.5)
WBC # BLD: 35.73 K/UL — HIGH (ref 3.8–10.5)
WBC # BLD: 36.23 K/UL — HIGH (ref 3.8–10.5)
WBC # BLD: 6.57 K/UL — SIGNIFICANT CHANGE UP (ref 3.8–10.5)
WBC # BLD: 6.79 K/UL — SIGNIFICANT CHANGE UP (ref 3.8–10.5)
WBC # BLD: 8.21 K/UL — SIGNIFICANT CHANGE UP (ref 3.8–10.5)
WBC # BLD: 9.19 K/UL — SIGNIFICANT CHANGE UP (ref 3.8–10.5)
WBC # BLD: 9.25 K/UL — SIGNIFICANT CHANGE UP (ref 3.8–10.5)
WBC # BLD: 9.51 K/UL — SIGNIFICANT CHANGE UP (ref 3.8–10.5)
WBC # BLD: 9.65 K/UL — SIGNIFICANT CHANGE UP (ref 3.8–10.5)
WBC # FLD AUTO: 10.11 K/UL — SIGNIFICANT CHANGE UP (ref 3.8–10.5)
WBC # FLD AUTO: 10.14 K/UL — SIGNIFICANT CHANGE UP (ref 3.8–10.5)
WBC # FLD AUTO: 10.15 K/UL — SIGNIFICANT CHANGE UP (ref 3.8–10.5)
WBC # FLD AUTO: 10.17 K/UL — SIGNIFICANT CHANGE UP (ref 3.8–10.5)
WBC # FLD AUTO: 11 K/UL — HIGH (ref 3.8–10.5)
WBC # FLD AUTO: 11.08 K/UL — HIGH (ref 3.8–10.5)
WBC # FLD AUTO: 13.24 K/UL — HIGH (ref 3.8–10.5)
WBC # FLD AUTO: 13.75 K/UL — HIGH (ref 3.8–10.5)
WBC # FLD AUTO: 14.11 K/UL — HIGH (ref 3.8–10.5)
WBC # FLD AUTO: 15.3 K/UL — HIGH (ref 3.8–10.5)
WBC # FLD AUTO: 15.54 K/UL — HIGH (ref 3.8–10.5)
WBC # FLD AUTO: 16.53 K/UL — HIGH (ref 3.8–10.5)
WBC # FLD AUTO: 16.56 K/UL — HIGH (ref 3.8–10.5)
WBC # FLD AUTO: 16.61 K/UL — HIGH (ref 3.8–10.5)
WBC # FLD AUTO: 17 K/UL — HIGH (ref 3.8–10.5)
WBC # FLD AUTO: 17.58 K/UL — HIGH (ref 3.8–10.5)
WBC # FLD AUTO: 19.73 K/UL — HIGH (ref 3.8–10.5)
WBC # FLD AUTO: 23.6 K/UL — HIGH (ref 3.8–10.5)
WBC # FLD AUTO: 23.86 K/UL — HIGH (ref 3.8–10.5)
WBC # FLD AUTO: 24.26 K/UL — HIGH (ref 3.8–10.5)
WBC # FLD AUTO: 24.38 K/UL — HIGH (ref 3.8–10.5)
WBC # FLD AUTO: 24.48 K/UL — HIGH (ref 3.8–10.5)
WBC # FLD AUTO: 25.93 K/UL — HIGH (ref 3.8–10.5)
WBC # FLD AUTO: 26.95 K/UL — HIGH (ref 3.8–10.5)
WBC # FLD AUTO: 26.96 K/UL — HIGH (ref 3.8–10.5)
WBC # FLD AUTO: 27.31 K/UL — HIGH (ref 3.8–10.5)
WBC # FLD AUTO: 27.78 K/UL — HIGH (ref 3.8–10.5)
WBC # FLD AUTO: 28.72 K/UL — HIGH (ref 3.8–10.5)
WBC # FLD AUTO: 29.5 K/UL — HIGH (ref 3.8–10.5)
WBC # FLD AUTO: 30.2 K/UL — HIGH (ref 3.8–10.5)
WBC # FLD AUTO: 32.14 K/UL — HIGH (ref 3.8–10.5)
WBC # FLD AUTO: 32.18 K/UL — HIGH (ref 3.8–10.5)
WBC # FLD AUTO: 32.85 K/UL — HIGH (ref 3.8–10.5)
WBC # FLD AUTO: 34.03 K/UL — HIGH (ref 3.8–10.5)
WBC # FLD AUTO: 35.73 K/UL — HIGH (ref 3.8–10.5)
WBC # FLD AUTO: 36.23 K/UL — HIGH (ref 3.8–10.5)
WBC # FLD AUTO: 6.57 K/UL — SIGNIFICANT CHANGE UP (ref 3.8–10.5)
WBC # FLD AUTO: 6.79 K/UL — SIGNIFICANT CHANGE UP (ref 3.8–10.5)
WBC # FLD AUTO: 8.21 K/UL — SIGNIFICANT CHANGE UP (ref 3.8–10.5)
WBC # FLD AUTO: 9.19 K/UL — SIGNIFICANT CHANGE UP (ref 3.8–10.5)
WBC # FLD AUTO: 9.25 K/UL — SIGNIFICANT CHANGE UP (ref 3.8–10.5)
WBC # FLD AUTO: 9.51 K/UL — SIGNIFICANT CHANGE UP (ref 3.8–10.5)
WBC # FLD AUTO: 9.65 K/UL — SIGNIFICANT CHANGE UP (ref 3.8–10.5)
WBC UR QL: 1 /HPF — SIGNIFICANT CHANGE UP (ref 0–5)
WBC UR QL: 11 /HPF — HIGH (ref 0–5)
ZINC SERPL-MCNC: 37 UG/DL — LOW (ref 44–115)

## 2024-01-01 RX ORDER — CALCIUM GLUCONATE 100 MG/ML
2 VIAL (ML) INTRAVENOUS ONCE
Refills: 0 | Status: COMPLETED | OUTPATIENT
Start: 2024-01-01 | End: 2024-01-01

## 2024-01-01 RX ORDER — LEVOCARNITINE 330 MG/1
330 TABLET ORAL EVERY 8 HOURS
Refills: 0 | Status: DISCONTINUED | OUTPATIENT
Start: 2024-01-01 | End: 2024-01-01

## 2024-01-01 RX ORDER — FENTANYL CITRATE 50 UG/ML
50 INJECTION INTRAVENOUS ONCE
Refills: 0 | Status: DISCONTINUED | OUTPATIENT
Start: 2024-01-01 | End: 2024-01-01

## 2024-01-01 RX ORDER — POTASSIUM CHLORIDE 20 MEQ
10 PACKET (EA) ORAL
Refills: 0 | Status: COMPLETED | OUTPATIENT
Start: 2024-01-01 | End: 2024-01-01

## 2024-01-01 RX ORDER — FUROSEMIDE 40 MG
20 TABLET ORAL ONCE
Refills: 0 | Status: COMPLETED | OUTPATIENT
Start: 2024-01-01 | End: 2024-01-01

## 2024-01-01 RX ORDER — POTASSIUM CHLORIDE 20 MEQ
10 PACKET (EA) ORAL
Refills: 0 | Status: DISCONTINUED | OUTPATIENT
Start: 2024-01-01 | End: 2024-01-01

## 2024-01-01 RX ORDER — SODIUM CHLORIDE 9 MG/ML
1000 INJECTION, SOLUTION INTRAVENOUS
Refills: 0 | Status: DISCONTINUED | OUTPATIENT
Start: 2024-01-01 | End: 2024-01-01

## 2024-01-01 RX ORDER — LIDOCAINE 4 G/100G
1 CREAM TOPICAL DAILY
Refills: 0 | Status: DISCONTINUED | OUTPATIENT
Start: 2024-01-01 | End: 2024-01-01

## 2024-01-01 RX ORDER — THIAMINE MONONITRATE (VIT B1) 100 MG
500 TABLET ORAL EVERY 8 HOURS
Refills: 0 | Status: DISCONTINUED | OUTPATIENT
Start: 2024-01-01 | End: 2024-01-01

## 2024-01-01 RX ORDER — LACTULOSE 10 G/15ML
30 SOLUTION ORAL EVERY 8 HOURS
Refills: 0 | Status: DISCONTINUED | OUTPATIENT
Start: 2024-01-01 | End: 2024-01-01

## 2024-01-01 RX ORDER — INSULIN LISPRO 100/ML
2 VIAL (ML) SUBCUTANEOUS
Refills: 0 | Status: DISCONTINUED | OUTPATIENT
Start: 2024-01-01 | End: 2024-01-01

## 2024-01-01 RX ORDER — POTASSIUM PHOSPHATE, MONOBASIC POTASSIUM PHOSPHATE, DIBASIC 236; 224 MG/ML; MG/ML
15 INJECTION, SOLUTION INTRAVENOUS ONCE
Refills: 0 | Status: COMPLETED | OUTPATIENT
Start: 2024-01-01 | End: 2024-01-01

## 2024-01-01 RX ORDER — INSULIN GLARGINE 100 [IU]/ML
3 INJECTION, SOLUTION SUBCUTANEOUS AT BEDTIME
Refills: 0 | Status: DISCONTINUED | OUTPATIENT
Start: 2024-01-01 | End: 2024-01-01

## 2024-01-01 RX ORDER — URSODIOL 250 MG/1
500 TABLET, FILM COATED ORAL
Refills: 0 | Status: DISCONTINUED | OUTPATIENT
Start: 2024-01-01 | End: 2024-01-01

## 2024-01-01 RX ORDER — CHLORHEXIDINE GLUCONATE 213 G/1000ML
15 SOLUTION TOPICAL EVERY 12 HOURS
Refills: 0 | Status: DISCONTINUED | OUTPATIENT
Start: 2024-01-01 | End: 2024-01-01

## 2024-01-01 RX ORDER — VANCOMYCIN HCL 1 G
1000 VIAL (EA) INTRAVENOUS EVERY 12 HOURS
Refills: 0 | Status: DISCONTINUED | OUTPATIENT
Start: 2024-01-01 | End: 2024-01-01

## 2024-01-01 RX ORDER — DEXTROSE 10 % IN WATER 10 %
1000 INTRAVENOUS SOLUTION INTRAVENOUS
Refills: 0 | Status: DISCONTINUED | OUTPATIENT
Start: 2024-01-01 | End: 2024-01-01

## 2024-01-01 RX ORDER — LIPASE/PROTEASE/AMYLASE 16-48-48K
1 CAPSULE,DELAYED RELEASE (ENTERIC COATED) ORAL
Refills: 0 | Status: DISCONTINUED | OUTPATIENT
Start: 2024-01-01 | End: 2024-01-01

## 2024-01-01 RX ORDER — MORPHINE SULFATE 50 MG/1
8 CAPSULE, EXTENDED RELEASE ORAL ONCE
Refills: 0 | Status: DISCONTINUED | OUTPATIENT
Start: 2024-01-01 | End: 2024-01-01

## 2024-01-01 RX ORDER — BUMETANIDE 0.25 MG/ML
4 INJECTION INTRAMUSCULAR; INTRAVENOUS
Qty: 20 | Refills: 0 | Status: DISCONTINUED | OUTPATIENT
Start: 2024-01-01 | End: 2024-01-01

## 2024-01-01 RX ORDER — LACTULOSE 10 G/15ML
200 SOLUTION ORAL EVERY 6 HOURS
Refills: 0 | Status: DISCONTINUED | OUTPATIENT
Start: 2024-01-01 | End: 2024-01-01

## 2024-01-01 RX ORDER — ZINC SULFATE TAB 220 MG (50 MG ZINC EQUIVALENT) 220 (50 ZN) MG
220 TAB ORAL DAILY
Refills: 0 | Status: DISCONTINUED | OUTPATIENT
Start: 2024-01-01 | End: 2024-01-01

## 2024-01-01 RX ORDER — DEXTROSE 50 % IN WATER 50 %
25 SYRINGE (ML) INTRAVENOUS ONCE
Refills: 0 | Status: DISCONTINUED | OUTPATIENT
Start: 2024-01-01 | End: 2024-01-01

## 2024-01-01 RX ORDER — DEXTROSE 50 % IN WATER 50 %
15 SYRINGE (ML) INTRAVENOUS ONCE
Refills: 0 | Status: DISCONTINUED | OUTPATIENT
Start: 2024-01-01 | End: 2024-01-01

## 2024-01-01 RX ORDER — CEFTRIAXONE 500 MG/1
1000 INJECTION, POWDER, FOR SOLUTION INTRAMUSCULAR; INTRAVENOUS ONCE
Refills: 0 | Status: DISCONTINUED | OUTPATIENT
Start: 2024-01-01 | End: 2024-01-01

## 2024-01-01 RX ORDER — MAGNESIUM SULFATE 500 MG/ML
2 VIAL (ML) INJECTION ONCE
Refills: 0 | Status: COMPLETED | OUTPATIENT
Start: 2024-01-01 | End: 2024-01-01

## 2024-01-01 RX ORDER — FUROSEMIDE 40 MG
1 TABLET ORAL
Refills: 0 | DISCHARGE

## 2024-01-01 RX ORDER — MEROPENEM 1 G/30ML
1000 INJECTION INTRAVENOUS EVERY 12 HOURS
Refills: 0 | Status: DISCONTINUED | OUTPATIENT
Start: 2024-01-01 | End: 2024-01-01

## 2024-01-01 RX ORDER — DEXTROSE 50 % IN WATER 50 %
50 SYRINGE (ML) INTRAVENOUS ONCE
Refills: 0 | Status: COMPLETED | OUTPATIENT
Start: 2024-01-01 | End: 2024-01-01

## 2024-01-01 RX ORDER — THIAMINE MONONITRATE (VIT B1) 100 MG
500 TABLET ORAL DAILY
Refills: 0 | Status: DISCONTINUED | OUTPATIENT
Start: 2024-01-01 | End: 2024-01-01

## 2024-01-01 RX ORDER — METHADONE HYDROCHLORIDE 40 MG/1
5 TABLET ORAL THREE TIMES A DAY
Refills: 0 | Status: DISCONTINUED | OUTPATIENT
Start: 2024-01-01 | End: 2024-01-01

## 2024-01-01 RX ORDER — INSULIN GLARGINE 100 [IU]/ML
1 INJECTION, SOLUTION SUBCUTANEOUS EVERY MORNING
Refills: 0 | Status: DISCONTINUED | OUTPATIENT
Start: 2024-01-01 | End: 2024-01-01

## 2024-01-01 RX ORDER — IPRATROPIUM/ALBUTEROL SULFATE 18-103MCG
3 AEROSOL WITH ADAPTER (GRAM) INHALATION EVERY 6 HOURS
Refills: 0 | Status: DISCONTINUED | OUTPATIENT
Start: 2024-01-01 | End: 2024-01-01

## 2024-01-01 RX ORDER — POTASSIUM CHLORIDE 20 MEQ
40 PACKET (EA) ORAL ONCE
Refills: 0 | Status: COMPLETED | OUTPATIENT
Start: 2024-01-01 | End: 2024-01-01

## 2024-01-01 RX ORDER — LACTULOSE 10 G/15ML
30 SOLUTION ORAL EVERY 6 HOURS
Refills: 0 | Status: DISCONTINUED | OUTPATIENT
Start: 2024-01-01 | End: 2024-01-01

## 2024-01-01 RX ORDER — DEXMEDETOMIDINE HYDROCHLORIDE IN 0.9% SODIUM CHLORIDE 4 UG/ML
0.5 INJECTION INTRAVENOUS
Qty: 200 | Refills: 0 | Status: DISCONTINUED | OUTPATIENT
Start: 2024-01-01 | End: 2024-01-01

## 2024-01-01 RX ORDER — FENTANYL CITRATE 50 UG/ML
25 INJECTION INTRAVENOUS
Refills: 0 | Status: DISCONTINUED | OUTPATIENT
Start: 2024-01-01 | End: 2024-01-01

## 2024-01-01 RX ORDER — AZITHROMYCIN 500 MG/1
500 TABLET, FILM COATED ORAL ONCE
Refills: 0 | Status: DISCONTINUED | OUTPATIENT
Start: 2024-01-01 | End: 2024-01-01

## 2024-01-01 RX ORDER — AMPICILLIN SODIUM AND SULBACTAM SODIUM 250; 125 MG/ML; MG/ML
9 INJECTION, POWDER, FOR SUSPENSION INTRAMUSCULAR; INTRAVENOUS EVERY 8 HOURS
Refills: 0 | Status: DISCONTINUED | OUTPATIENT
Start: 2024-01-01 | End: 2024-01-01

## 2024-01-01 RX ORDER — THIAMINE MONONITRATE (VIT B1) 100 MG
100 TABLET ORAL DAILY
Refills: 0 | Status: DISCONTINUED | OUTPATIENT
Start: 2024-01-01 | End: 2024-01-01

## 2024-01-01 RX ORDER — INSULIN LISPRO 100/ML
VIAL (ML) SUBCUTANEOUS AT BEDTIME
Refills: 0 | Status: DISCONTINUED | OUTPATIENT
Start: 2024-01-01 | End: 2024-01-01

## 2024-01-01 RX ORDER — FENTANYL CITRATE 50 UG/ML
50 INJECTION INTRAVENOUS
Refills: 0 | Status: DISCONTINUED | OUTPATIENT
Start: 2024-01-01 | End: 2024-01-01

## 2024-01-01 RX ORDER — ARGININE 700 MG
5 CAPSULE ORAL EVERY 4 HOURS
Refills: 0 | Status: DISCONTINUED | OUTPATIENT
Start: 2024-01-01 | End: 2024-01-01

## 2024-01-01 RX ORDER — CALCIUM CARBONATE 500(1250)
1 TABLET ORAL ONCE
Refills: 0 | Status: COMPLETED | OUTPATIENT
Start: 2024-01-01 | End: 2024-01-01

## 2024-01-01 RX ORDER — ALLOPURINOL 300 MG
2 TABLET ORAL
Refills: 0 | DISCHARGE

## 2024-01-01 RX ORDER — SODIUM CHLORIDE 9 MG/ML
10 INJECTION INTRAMUSCULAR; INTRAVENOUS; SUBCUTANEOUS
Refills: 0 | Status: DISCONTINUED | OUTPATIENT
Start: 2024-01-01 | End: 2024-01-01

## 2024-01-01 RX ORDER — MINOCYCLINE HYDROCHLORIDE 45 MG/1
200 TABLET, EXTENDED RELEASE ORAL ONCE
Refills: 0 | Status: COMPLETED | OUTPATIENT
Start: 2024-01-01 | End: 2024-01-01

## 2024-01-01 RX ORDER — LACTULOSE 10 G/15ML
10 SOLUTION ORAL EVERY 4 HOURS
Refills: 0 | Status: DISCONTINUED | OUTPATIENT
Start: 2024-01-01 | End: 2024-01-01

## 2024-01-01 RX ORDER — BUMETANIDE 0.25 MG/ML
4 INJECTION INTRAMUSCULAR; INTRAVENOUS ONCE
Refills: 0 | Status: COMPLETED | OUTPATIENT
Start: 2024-01-01 | End: 2024-01-01

## 2024-01-01 RX ORDER — FUROSEMIDE 40 MG
40 TABLET ORAL ONCE
Refills: 0 | Status: COMPLETED | OUTPATIENT
Start: 2024-01-01 | End: 2024-01-01

## 2024-01-01 RX ORDER — KETOROLAC TROMETHAMINE 30 MG/ML
15 SYRINGE (ML) INJECTION ONCE
Refills: 0 | Status: DISCONTINUED | OUTPATIENT
Start: 2024-01-01 | End: 2024-01-01

## 2024-01-01 RX ORDER — CASPOFUNGIN ACETATE 7 MG/ML
50 INJECTION, POWDER, LYOPHILIZED, FOR SOLUTION INTRAVENOUS EVERY 24 HOURS
Refills: 0 | Status: DISCONTINUED | OUTPATIENT
Start: 2024-01-01 | End: 2024-01-01

## 2024-01-01 RX ORDER — VASOPRESSIN 20 [USP'U]/ML
0.04 INJECTION INTRAVENOUS
Qty: 40 | Refills: 0 | Status: DISCONTINUED | OUTPATIENT
Start: 2024-01-01 | End: 2024-01-01

## 2024-01-01 RX ORDER — PIPERACILLIN AND TAZOBACTAM 4; .5 G/20ML; G/20ML
3.38 INJECTION, POWDER, LYOPHILIZED, FOR SOLUTION INTRAVENOUS EVERY 8 HOURS
Refills: 0 | Status: DISCONTINUED | OUTPATIENT
Start: 2024-01-01 | End: 2024-01-01

## 2024-01-01 RX ORDER — METRONIDAZOLE 500 MG
500 TABLET ORAL EVERY 12 HOURS
Refills: 0 | Status: DISCONTINUED | OUTPATIENT
Start: 2024-01-01 | End: 2024-01-01

## 2024-01-01 RX ORDER — INSULIN LISPRO 100/ML
VIAL (ML) SUBCUTANEOUS
Refills: 0 | Status: DISCONTINUED | OUTPATIENT
Start: 2024-01-01 | End: 2024-01-01

## 2024-01-01 RX ORDER — FOLIC ACID 0.8 MG
1 TABLET ORAL DAILY
Refills: 0 | Status: DISCONTINUED | OUTPATIENT
Start: 2024-01-01 | End: 2024-01-01

## 2024-01-01 RX ORDER — FENTANYL CITRATE 50 UG/ML
0.5 INJECTION INTRAVENOUS
Qty: 5000 | Refills: 0 | Status: DISCONTINUED | OUTPATIENT
Start: 2024-01-01 | End: 2024-01-01

## 2024-01-01 RX ORDER — CEFTRIAXONE 500 MG/1
2000 INJECTION, POWDER, FOR SOLUTION INTRAMUSCULAR; INTRAVENOUS EVERY 24 HOURS
Refills: 0 | Status: DISCONTINUED | OUTPATIENT
Start: 2024-01-01 | End: 2024-01-01

## 2024-01-01 RX ORDER — VANCOMYCIN HCL 1 G
VIAL (EA) INTRAVENOUS
Refills: 0 | Status: DISCONTINUED | OUTPATIENT
Start: 2024-01-01 | End: 2024-01-01

## 2024-01-01 RX ORDER — DEXTROSE 50 % IN WATER 50 %
15 SYRINGE (ML) INTRAVENOUS ONCE
Refills: 0 | Status: COMPLETED | OUTPATIENT
Start: 2024-01-01 | End: 2024-01-01

## 2024-01-01 RX ORDER — THIAMINE MONONITRATE (VIT B1) 100 MG
250 TABLET ORAL EVERY 8 HOURS
Refills: 0 | Status: COMPLETED | OUTPATIENT
Start: 2024-01-01 | End: 2024-01-01

## 2024-01-01 RX ORDER — CHLORHEXIDINE GLUCONATE 213 G/1000ML
1 SOLUTION TOPICAL
Refills: 0 | Status: DISCONTINUED | OUTPATIENT
Start: 2024-01-01 | End: 2024-01-01

## 2024-01-01 RX ORDER — SODIUM CHLORIDE 9 MG/ML
4 INJECTION INTRAMUSCULAR; INTRAVENOUS; SUBCUTANEOUS EVERY 12 HOURS
Refills: 0 | Status: DISCONTINUED | OUTPATIENT
Start: 2024-01-01 | End: 2024-01-01

## 2024-01-01 RX ORDER — VANCOMYCIN HCL 1 G
750 VIAL (EA) INTRAVENOUS EVERY 12 HOURS
Refills: 0 | Status: DISCONTINUED | OUTPATIENT
Start: 2024-01-01 | End: 2024-01-01

## 2024-01-01 RX ORDER — CASPOFUNGIN ACETATE 7 MG/ML
INJECTION, POWDER, LYOPHILIZED, FOR SOLUTION INTRAVENOUS
Refills: 0 | Status: DISCONTINUED | OUTPATIENT
Start: 2024-01-01 | End: 2024-01-01

## 2024-01-01 RX ORDER — MIDAZOLAM HYDROCHLORIDE 1 MG/ML
4 INJECTION, SOLUTION INTRAMUSCULAR; INTRAVENOUS ONCE
Refills: 0 | Status: DISCONTINUED | OUTPATIENT
Start: 2024-01-01 | End: 2024-01-01

## 2024-01-01 RX ORDER — CEFIDEROCOL SULFATE TOSYLATE 1 G/10ML
2000 INJECTION, POWDER, FOR SOLUTION INTRAVENOUS EVERY 8 HOURS
Refills: 0 | Status: DISCONTINUED | OUTPATIENT
Start: 2024-01-01 | End: 2024-01-01

## 2024-01-01 RX ORDER — DEXTROSE 10 % IN WATER 10 %
125 INTRAVENOUS SOLUTION INTRAVENOUS ONCE
Refills: 0 | Status: DISCONTINUED | OUTPATIENT
Start: 2024-01-01 | End: 2024-01-01

## 2024-01-01 RX ORDER — INSULIN GLARGINE 100 [IU]/ML
2 INJECTION, SOLUTION SUBCUTANEOUS AT BEDTIME
Refills: 0 | Status: DISCONTINUED | OUTPATIENT
Start: 2024-01-01 | End: 2024-01-01

## 2024-01-01 RX ORDER — LEVOTHYROXINE SODIUM 125 MCG
100 TABLET ORAL DAILY
Refills: 0 | Status: DISCONTINUED | OUTPATIENT
Start: 2024-01-01 | End: 2024-01-01

## 2024-01-01 RX ORDER — METHADONE HYDROCHLORIDE 40 MG/1
1 TABLET ORAL
Refills: 0 | DISCHARGE

## 2024-01-01 RX ORDER — HEPARIN SODIUM 5000 [USP'U]/ML
5000 INJECTION INTRAVENOUS; SUBCUTANEOUS EVERY 12 HOURS
Refills: 0 | Status: DISCONTINUED | OUTPATIENT
Start: 2024-01-01 | End: 2024-01-01

## 2024-01-01 RX ORDER — CEFTRIAXONE 500 MG/1
INJECTION, POWDER, FOR SOLUTION INTRAMUSCULAR; INTRAVENOUS
Refills: 0 | Status: DISCONTINUED | OUTPATIENT
Start: 2024-01-01 | End: 2024-01-01

## 2024-01-01 RX ORDER — INSULIN GLARGINE 100 [IU]/ML
5 INJECTION, SOLUTION SUBCUTANEOUS AT BEDTIME
Refills: 0 | Status: DISCONTINUED | OUTPATIENT
Start: 2024-01-01 | End: 2024-01-01

## 2024-01-01 RX ORDER — AZITHROMYCIN 500 MG/1
TABLET, FILM COATED ORAL
Refills: 0 | Status: DISCONTINUED | OUTPATIENT
Start: 2024-01-01 | End: 2024-01-01

## 2024-01-01 RX ORDER — HUMAN INSULIN 100 [IU]/ML
4 INJECTION, SUSPENSION SUBCUTANEOUS EVERY 6 HOURS
Refills: 0 | Status: DISCONTINUED | OUTPATIENT
Start: 2024-01-01 | End: 2024-01-01

## 2024-01-01 RX ORDER — MINOCYCLINE HYDROCHLORIDE 45 MG/1
200 TABLET, EXTENDED RELEASE ORAL EVERY 12 HOURS
Refills: 0 | Status: DISCONTINUED | OUTPATIENT
Start: 2024-01-01 | End: 2024-01-01

## 2024-01-01 RX ORDER — LACTULOSE 10 G/15ML
10 SOLUTION ORAL EVERY 6 HOURS
Refills: 0 | Status: DISCONTINUED | OUTPATIENT
Start: 2024-01-01 | End: 2024-01-01

## 2024-01-01 RX ORDER — MULTIVIT-MIN/FERROUS GLUCONATE 9 MG/15 ML
15 LIQUID (ML) ORAL DAILY
Refills: 0 | Status: DISCONTINUED | OUTPATIENT
Start: 2024-01-01 | End: 2024-01-01

## 2024-01-01 RX ORDER — LINEZOLID 600 MG/300ML
600 INJECTION, SOLUTION INTRAVENOUS EVERY 12 HOURS
Refills: 0 | Status: DISCONTINUED | OUTPATIENT
Start: 2024-01-01 | End: 2024-01-01

## 2024-01-01 RX ORDER — INSULIN LISPRO 100/ML
VIAL (ML) SUBCUTANEOUS EVERY 6 HOURS
Refills: 0 | Status: DISCONTINUED | OUTPATIENT
Start: 2024-01-01 | End: 2024-01-01

## 2024-01-01 RX ORDER — INSULIN GLARGINE 100 [IU]/ML
1 INJECTION, SOLUTION SUBCUTANEOUS AT BEDTIME
Refills: 0 | Status: COMPLETED | OUTPATIENT
Start: 2024-01-01 | End: 2024-01-01

## 2024-01-01 RX ORDER — BUMETANIDE 0.25 MG/ML
2 INJECTION INTRAMUSCULAR; INTRAVENOUS ONCE
Refills: 0 | Status: DISCONTINUED | OUTPATIENT
Start: 2024-01-01 | End: 2024-01-01

## 2024-01-01 RX ORDER — HYDROMORPHONE HYDROCHLORIDE 2 MG/ML
0.2 INJECTION INTRAMUSCULAR; INTRAVENOUS; SUBCUTANEOUS
Refills: 0 | Status: DISCONTINUED | OUTPATIENT
Start: 2024-01-01 | End: 2024-01-01

## 2024-01-01 RX ORDER — LINEZOLID 600 MG/300ML
600 INJECTION, SOLUTION INTRAVENOUS ONCE
Refills: 0 | Status: COMPLETED | OUTPATIENT
Start: 2024-01-01 | End: 2024-01-01

## 2024-01-01 RX ORDER — VANCOMYCIN HCL 1 G
1250 VIAL (EA) INTRAVENOUS ONCE
Refills: 0 | Status: COMPLETED | OUTPATIENT
Start: 2024-01-01 | End: 2024-01-01

## 2024-01-01 RX ORDER — LACTULOSE 10 G/15ML
10 SOLUTION ORAL EVERY 8 HOURS
Refills: 0 | Status: DISCONTINUED | OUTPATIENT
Start: 2024-01-01 | End: 2024-01-01

## 2024-01-01 RX ORDER — DAPTOMYCIN 500 MG/10ML
500 INJECTION, POWDER, LYOPHILIZED, FOR SOLUTION INTRAVENOUS EVERY 24 HOURS
Refills: 0 | Status: DISCONTINUED | OUTPATIENT
Start: 2024-01-01 | End: 2024-01-01

## 2024-01-01 RX ORDER — LIPASE/PROTEASE/AMYLASE 16-48-48K
1 CAPSULE,DELAYED RELEASE (ENTERIC COATED) ORAL
Refills: 0 | DISCHARGE

## 2024-01-01 RX ORDER — INSULIN GLARGINE 100 [IU]/ML
2 INJECTION, SOLUTION SUBCUTANEOUS
Refills: 0 | Status: DISCONTINUED | OUTPATIENT
Start: 2024-01-01 | End: 2024-01-01

## 2024-01-01 RX ORDER — ENOXAPARIN SODIUM 100 MG/ML
40 INJECTION SUBCUTANEOUS EVERY 24 HOURS
Refills: 0 | Status: DISCONTINUED | OUTPATIENT
Start: 2024-01-01 | End: 2024-01-01

## 2024-01-01 RX ORDER — HYDROMORPHONE HYDROCHLORIDE 2 MG/ML
1 INJECTION INTRAMUSCULAR; INTRAVENOUS; SUBCUTANEOUS
Refills: 0 | DISCHARGE

## 2024-01-01 RX ORDER — DEXTROSE 50 % IN WATER 50 %
25 SYRINGE (ML) INTRAVENOUS ONCE
Refills: 0 | Status: COMPLETED | OUTPATIENT
Start: 2024-01-01 | End: 2024-01-01

## 2024-01-01 RX ORDER — ALBUMIN HUMAN 25 %
100 VIAL (ML) INTRAVENOUS
Refills: 0 | Status: DISCONTINUED | OUTPATIENT
Start: 2024-01-01 | End: 2024-01-01

## 2024-01-01 RX ORDER — KETAMINE HYDROCHLORIDE 100 MG/ML
100 INJECTION INTRAMUSCULAR; INTRAVENOUS
Refills: 0 | Status: DISCONTINUED | OUTPATIENT
Start: 2024-01-01 | End: 2024-01-01

## 2024-01-01 RX ORDER — VANCOMYCIN HCL 1 G
1250 VIAL (EA) INTRAVENOUS EVERY 12 HOURS
Refills: 0 | Status: DISCONTINUED | OUTPATIENT
Start: 2024-01-01 | End: 2024-01-01

## 2024-01-01 RX ORDER — ROBINUL 0.2 MG/ML
0.4 INJECTION INTRAMUSCULAR; INTRAVENOUS
Refills: 0 | Status: DISCONTINUED | OUTPATIENT
Start: 2024-01-01 | End: 2024-01-01

## 2024-01-01 RX ORDER — PROPOFOL 10 MG/ML
30 INJECTION, EMULSION INTRAVENOUS
Qty: 1000 | Refills: 0 | Status: DISCONTINUED | OUTPATIENT
Start: 2024-01-01 | End: 2024-01-01

## 2024-01-01 RX ORDER — POTASSIUM CHLORIDE 20 MEQ
20 PACKET (EA) ORAL ONCE
Refills: 0 | Status: COMPLETED | OUTPATIENT
Start: 2024-01-01 | End: 2024-01-01

## 2024-01-01 RX ORDER — LACTULOSE 10 G/15ML
20 SOLUTION ORAL THREE TIMES A DAY
Refills: 0 | Status: DISCONTINUED | OUTPATIENT
Start: 2024-01-01 | End: 2024-01-01

## 2024-01-01 RX ORDER — THIAMINE MONONITRATE (VIT B1) 100 MG
250 TABLET ORAL DAILY
Refills: 0 | Status: DISCONTINUED | OUTPATIENT
Start: 2024-01-01 | End: 2024-01-01

## 2024-01-01 RX ORDER — POTASSIUM CHLORIDE 20 MEQ
20 PACKET (EA) ORAL ONCE
Refills: 0 | Status: DISCONTINUED | OUTPATIENT
Start: 2024-01-01 | End: 2024-01-01

## 2024-01-01 RX ORDER — ALBUMIN HUMAN 25 %
100 VIAL (ML) INTRAVENOUS ONCE
Refills: 0 | Status: COMPLETED | OUTPATIENT
Start: 2024-01-01 | End: 2024-01-01

## 2024-01-01 RX ORDER — CASPOFUNGIN ACETATE 7 MG/ML
70 INJECTION, POWDER, LYOPHILIZED, FOR SOLUTION INTRAVENOUS ONCE
Refills: 0 | Status: COMPLETED | OUTPATIENT
Start: 2024-01-01 | End: 2024-01-01

## 2024-01-01 RX ORDER — INSULIN GLARGINE 100 [IU]/ML
10 INJECTION, SOLUTION SUBCUTANEOUS AT BEDTIME
Refills: 0 | Status: DISCONTINUED | OUTPATIENT
Start: 2024-01-01 | End: 2024-01-01

## 2024-01-01 RX ORDER — CASPOFUNGIN ACETATE 7 MG/ML
INJECTION, POWDER, LYOPHILIZED, FOR SOLUTION INTRAVENOUS
Refills: 0 | Status: COMPLETED | OUTPATIENT
Start: 2024-01-01 | End: 2024-01-01

## 2024-01-01 RX ORDER — PIPERACILLIN AND TAZOBACTAM 4; .5 G/20ML; G/20ML
3.38 INJECTION, POWDER, LYOPHILIZED, FOR SOLUTION INTRAVENOUS ONCE
Refills: 0 | Status: COMPLETED | OUTPATIENT
Start: 2024-01-01 | End: 2024-01-01

## 2024-01-01 RX ORDER — DEXTROSE 50 % IN WATER 50 %
50 SYRINGE (ML) INTRAVENOUS ONCE
Refills: 0 | Status: DISCONTINUED | OUTPATIENT
Start: 2024-01-01 | End: 2024-01-01

## 2024-01-01 RX ORDER — INSULIN GLARGINE 100 [IU]/ML
1 INJECTION, SOLUTION SUBCUTANEOUS AT BEDTIME
Refills: 0 | Status: DISCONTINUED | OUTPATIENT
Start: 2024-01-01 | End: 2024-01-01

## 2024-01-01 RX ORDER — MEROPENEM 1 G/30ML
1000 INJECTION INTRAVENOUS EVERY 8 HOURS
Refills: 0 | Status: DISCONTINUED | OUTPATIENT
Start: 2024-01-01 | End: 2024-01-01

## 2024-01-01 RX ORDER — POTASSIUM CHLORIDE 20 MEQ
40 PACKET (EA) ORAL EVERY 4 HOURS
Refills: 0 | Status: COMPLETED | OUTPATIENT
Start: 2024-01-01 | End: 2024-01-01

## 2024-01-01 RX ORDER — GLUCAGON INJECTION, SOLUTION 0.5 MG/.1ML
1 INJECTION, SOLUTION SUBCUTANEOUS ONCE
Refills: 0 | Status: DISCONTINUED | OUTPATIENT
Start: 2024-01-01 | End: 2024-01-01

## 2024-01-01 RX ORDER — IPRATROPIUM/ALBUTEROL SULFATE 18-103MCG
3 AEROSOL WITH ADAPTER (GRAM) INHALATION EVERY 4 HOURS
Refills: 0 | Status: COMPLETED | OUTPATIENT
Start: 2024-01-01 | End: 2024-01-01

## 2024-01-01 RX ORDER — MINOCYCLINE HYDROCHLORIDE 45 MG/1
TABLET, EXTENDED RELEASE ORAL
Refills: 0 | Status: DISCONTINUED | OUTPATIENT
Start: 2024-01-01 | End: 2024-01-01

## 2024-01-01 RX ORDER — INSULIN GLARGINE 100 [IU]/ML
6 INJECTION, SOLUTION SUBCUTANEOUS AT BEDTIME
Refills: 0 | Status: DISCONTINUED | OUTPATIENT
Start: 2024-01-01 | End: 2024-01-01

## 2024-01-01 RX ORDER — ACETAMINOPHEN 500 MG
1000 TABLET ORAL ONCE
Refills: 0 | Status: COMPLETED | OUTPATIENT
Start: 2024-01-01 | End: 2024-01-01

## 2024-01-01 RX ORDER — CASPOFUNGIN ACETATE 7 MG/ML
50 INJECTION, POWDER, LYOPHILIZED, FOR SOLUTION INTRAVENOUS EVERY 24 HOURS
Refills: 0 | Status: COMPLETED | OUTPATIENT
Start: 2024-01-01 | End: 2024-01-01

## 2024-01-01 RX ORDER — VANCOMYCIN HCL 1 G
1000 VIAL (EA) INTRAVENOUS ONCE
Refills: 0 | Status: COMPLETED | OUTPATIENT
Start: 2024-01-01 | End: 2024-01-01

## 2024-01-01 RX ORDER — INSULIN HUMAN 100 [IU]/ML
5 INJECTION, SOLUTION SUBCUTANEOUS ONCE
Refills: 0 | Status: COMPLETED | OUTPATIENT
Start: 2024-01-01 | End: 2024-01-01

## 2024-01-01 RX ORDER — HUMAN INSULIN 100 [IU]/ML
3 INJECTION, SUSPENSION SUBCUTANEOUS EVERY 6 HOURS
Refills: 0 | Status: DISCONTINUED | OUTPATIENT
Start: 2024-01-01 | End: 2024-01-01

## 2024-01-01 RX ORDER — CHOLECALCIFEROL (VITAMIN D3) 125 MCG
2000 CAPSULE ORAL DAILY
Refills: 0 | Status: DISCONTINUED | OUTPATIENT
Start: 2024-01-01 | End: 2024-01-01

## 2024-01-01 RX ORDER — OMEPRAZOLE 10 MG/1
1 CAPSULE, DELAYED RELEASE ORAL
Refills: 0 | DISCHARGE

## 2024-01-01 RX ORDER — BUMETANIDE 0.25 MG/ML
2 INJECTION INTRAMUSCULAR; INTRAVENOUS ONCE
Refills: 0 | Status: COMPLETED | OUTPATIENT
Start: 2024-01-01 | End: 2024-01-01

## 2024-01-01 RX ORDER — FENTANYL CITRATE 50 UG/ML
200 INJECTION INTRAVENOUS ONCE
Refills: 0 | Status: DISCONTINUED | OUTPATIENT
Start: 2024-01-01 | End: 2024-01-01

## 2024-01-01 RX ORDER — LACTULOSE 10 G/15ML
30 SOLUTION ORAL EVERY 4 HOURS
Refills: 0 | Status: DISCONTINUED | OUTPATIENT
Start: 2024-01-01 | End: 2024-01-01

## 2024-01-01 RX ORDER — NOREPINEPHRINE BITARTRATE/D5W 8 MG/250ML
0.06 PLASTIC BAG, INJECTION (ML) INTRAVENOUS
Qty: 16 | Refills: 0 | Status: DISCONTINUED | OUTPATIENT
Start: 2024-01-01 | End: 2024-01-01

## 2024-01-01 RX ORDER — NOREPINEPHRINE BITARTRATE/D5W 8 MG/250ML
0.05 PLASTIC BAG, INJECTION (ML) INTRAVENOUS
Qty: 8 | Refills: 0 | Status: DISCONTINUED | OUTPATIENT
Start: 2024-01-01 | End: 2024-01-01

## 2024-01-01 RX ORDER — ARGININE 700 MG
2 CAPSULE ORAL EVERY 4 HOURS
Refills: 0 | Status: DISCONTINUED | OUTPATIENT
Start: 2024-01-01 | End: 2024-01-01

## 2024-01-01 RX ORDER — DIATRIZOATE MEGLUMINE 180 MG/ML
120 INJECTION, SOLUTION INTRAVESICAL ONCE
Refills: 0 | Status: COMPLETED | OUTPATIENT
Start: 2024-01-01 | End: 2024-01-01

## 2024-01-01 RX ORDER — DIATRIZOATE MEGLUMINE 180 MG/ML
240 INJECTION, SOLUTION INTRAVESICAL ONCE
Refills: 0 | Status: DISCONTINUED | OUTPATIENT
Start: 2024-01-01 | End: 2024-01-01

## 2024-01-01 RX ORDER — DEXTROSE 10 % IN WATER 10 %
500 INTRAVENOUS SOLUTION INTRAVENOUS
Refills: 0 | Status: DISCONTINUED | OUTPATIENT
Start: 2024-01-01 | End: 2024-01-01

## 2024-01-01 RX ORDER — DEXTROSE 50 % IN WATER 50 %
12.5 SYRINGE (ML) INTRAVENOUS ONCE
Refills: 0 | Status: DISCONTINUED | OUTPATIENT
Start: 2024-01-01 | End: 2024-01-01

## 2024-01-01 RX ORDER — SODIUM CHLORIDE 9 MG/ML
4 INJECTION INTRAMUSCULAR; INTRAVENOUS; SUBCUTANEOUS EVERY 6 HOURS
Refills: 0 | Status: DISCONTINUED | OUTPATIENT
Start: 2024-01-01 | End: 2024-01-01

## 2024-01-01 RX ORDER — INSULIN ASPART 100 [IU]/ML
0 INJECTION, SOLUTION SUBCUTANEOUS
Refills: 0 | DISCHARGE

## 2024-01-01 RX ORDER — LINEZOLID 600 MG/300ML
INJECTION, SOLUTION INTRAVENOUS
Refills: 0 | Status: DISCONTINUED | OUTPATIENT
Start: 2024-01-01 | End: 2024-01-01

## 2024-01-01 RX ORDER — ALPRAZOLAM 0.25 MG
1 TABLET ORAL
Refills: 0 | DISCHARGE

## 2024-01-01 RX ORDER — PANTOPRAZOLE SODIUM 20 MG/1
40 TABLET, DELAYED RELEASE ORAL DAILY
Refills: 0 | Status: DISCONTINUED | OUTPATIENT
Start: 2024-01-01 | End: 2024-01-01

## 2024-01-01 RX ORDER — MAGNESIUM SULFATE 500 MG/ML
1 VIAL (ML) INJECTION ONCE
Refills: 0 | Status: COMPLETED | OUTPATIENT
Start: 2024-01-01 | End: 2024-01-01

## 2024-01-01 RX ORDER — ERGOCALCIFEROL 1.25 MG/1
1 CAPSULE ORAL
Refills: 0 | DISCHARGE

## 2024-01-01 RX ORDER — NOREPINEPHRINE BITARTRATE/D5W 8 MG/250ML
0.03 PLASTIC BAG, INJECTION (ML) INTRAVENOUS
Qty: 8 | Refills: 0 | Status: DISCONTINUED | OUTPATIENT
Start: 2024-01-01 | End: 2024-01-01

## 2024-01-01 RX ORDER — INSULIN GLARGINE 100 [IU]/ML
8 INJECTION, SOLUTION SUBCUTANEOUS AT BEDTIME
Refills: 0 | Status: DISCONTINUED | OUTPATIENT
Start: 2024-01-01 | End: 2024-01-01

## 2024-01-01 RX ORDER — PANTOPRAZOLE SODIUM 20 MG/1
40 TABLET, DELAYED RELEASE ORAL EVERY 12 HOURS
Refills: 0 | Status: DISCONTINUED | OUTPATIENT
Start: 2024-01-01 | End: 2024-01-01

## 2024-01-01 RX ORDER — FENTANYL CITRATE 50 UG/ML
25 INJECTION INTRAVENOUS EVERY 4 HOURS
Refills: 0 | Status: DISCONTINUED | OUTPATIENT
Start: 2024-01-01 | End: 2024-01-01

## 2024-01-01 RX ORDER — ALBUMIN HUMAN 25 %
100 VIAL (ML) INTRAVENOUS EVERY 8 HOURS
Refills: 0 | Status: DISCONTINUED | OUTPATIENT
Start: 2024-01-01 | End: 2024-01-01

## 2024-01-01 RX ORDER — INSULIN LISPRO 100/ML
1 VIAL (ML) SUBCUTANEOUS
Refills: 0 | Status: DISCONTINUED | OUTPATIENT
Start: 2024-01-01 | End: 2024-01-01

## 2024-01-01 RX ORDER — COLCHICINE 0.6 MG
1 TABLET ORAL
Refills: 0 | DISCHARGE

## 2024-01-01 RX ORDER — LACTULOSE 10 G/15ML
20 SOLUTION ORAL EVERY 8 HOURS
Refills: 0 | Status: DISCONTINUED | OUTPATIENT
Start: 2024-01-01 | End: 2024-01-01

## 2024-01-01 RX ORDER — LEVOTHYROXINE SODIUM 125 MCG
50 TABLET ORAL AT BEDTIME
Refills: 0 | Status: DISCONTINUED | OUTPATIENT
Start: 2024-01-01 | End: 2024-01-01

## 2024-01-01 RX ADMIN — LACTULOSE 30 GRAM(S): 10 SOLUTION ORAL at 13:22

## 2024-01-01 RX ADMIN — Medication 1000 MILLIGRAM(S): at 21:00

## 2024-01-01 RX ADMIN — FENTANYL CITRATE 50 MICROGRAM(S): 50 INJECTION INTRAVENOUS at 17:30

## 2024-01-01 RX ADMIN — CHLORHEXIDINE GLUCONATE 15 MILLILITER(S): 213 SOLUTION TOPICAL at 05:24

## 2024-01-01 RX ADMIN — Medication 102.5 MILLIGRAM(S): at 16:53

## 2024-01-01 RX ADMIN — Medication 15 MILLILITER(S): at 14:53

## 2024-01-01 RX ADMIN — BUMETANIDE 2 MILLIGRAM(S): 0.25 INJECTION INTRAMUSCULAR; INTRAVENOUS at 20:53

## 2024-01-01 RX ADMIN — METHADONE HYDROCHLORIDE 5 MILLIGRAM(S): 40 TABLET ORAL at 05:24

## 2024-01-01 RX ADMIN — MINOCYCLINE HYDROCHLORIDE 100 MILLIGRAM(S): 45 TABLET, EXTENDED RELEASE ORAL at 06:25

## 2024-01-01 RX ADMIN — INSULIN GLARGINE 2 UNIT(S): 100 INJECTION, SOLUTION SUBCUTANEOUS at 21:46

## 2024-01-01 RX ADMIN — Medication 105 MILLIGRAM(S): at 21:35

## 2024-01-01 RX ADMIN — Medication 105 MILLIGRAM(S): at 13:48

## 2024-01-01 RX ADMIN — CHLORHEXIDINE GLUCONATE 15 MILLILITER(S): 213 SOLUTION TOPICAL at 17:37

## 2024-01-01 RX ADMIN — INSULIN GLARGINE 2 UNIT(S): 100 INJECTION, SOLUTION SUBCUTANEOUS at 09:20

## 2024-01-01 RX ADMIN — Medication 1 MILLIGRAM(S): at 11:25

## 2024-01-01 RX ADMIN — Medication 25 MILLILITER(S): at 20:00

## 2024-01-01 RX ADMIN — Medication 3 MILLILITER(S): at 11:46

## 2024-01-01 RX ADMIN — Medication 3.51 MICROGRAM(S)/KG/MIN: at 23:44

## 2024-01-01 RX ADMIN — CHLORHEXIDINE GLUCONATE 1 APPLICATION(S): 213 SOLUTION TOPICAL at 05:30

## 2024-01-01 RX ADMIN — Medication 50 MILLILITER(S): at 08:00

## 2024-01-01 RX ADMIN — INSULIN GLARGINE 2 UNIT(S): 100 INJECTION, SOLUTION SUBCUTANEOUS at 10:29

## 2024-01-01 RX ADMIN — Medication 50 MILLILITER(S): at 21:00

## 2024-01-01 RX ADMIN — Medication 2: at 11:49

## 2024-01-01 RX ADMIN — CHLORHEXIDINE GLUCONATE 15 MILLILITER(S): 213 SOLUTION TOPICAL at 05:59

## 2024-01-01 RX ADMIN — Medication 3 MILLILITER(S): at 17:08

## 2024-01-01 RX ADMIN — CASPOFUNGIN ACETATE 260 MILLIGRAM(S): 7 INJECTION, POWDER, LYOPHILIZED, FOR SOLUTION INTRAVENOUS at 06:41

## 2024-01-01 RX ADMIN — Medication 5 GRAM(S): at 10:43

## 2024-01-01 RX ADMIN — Medication 3 MILLILITER(S): at 00:14

## 2024-01-01 RX ADMIN — MEROPENEM 100 MILLIGRAM(S): 1 INJECTION INTRAVENOUS at 13:25

## 2024-01-01 RX ADMIN — Medication 25 MILLILITER(S): at 06:00

## 2024-01-01 RX ADMIN — MEROPENEM 100 MILLIGRAM(S): 1 INJECTION INTRAVENOUS at 00:25

## 2024-01-01 RX ADMIN — CEFIDEROCOL SULFATE TOSYLATE 33.33 MILLIGRAM(S): 1 INJECTION, POWDER, FOR SOLUTION INTRAVENOUS at 01:36

## 2024-01-01 RX ADMIN — METHADONE HYDROCHLORIDE 5 MILLIGRAM(S): 40 TABLET ORAL at 13:01

## 2024-01-01 RX ADMIN — LACTULOSE 10 GRAM(S): 10 SOLUTION ORAL at 13:06

## 2024-01-01 RX ADMIN — MEROPENEM 100 MILLIGRAM(S): 1 INJECTION INTRAVENOUS at 12:14

## 2024-01-01 RX ADMIN — Medication 2000 UNIT(S): at 11:49

## 2024-01-01 RX ADMIN — METHADONE HYDROCHLORIDE 5 MILLIGRAM(S): 40 TABLET ORAL at 13:51

## 2024-01-01 RX ADMIN — METHADONE HYDROCHLORIDE 5 MILLIGRAM(S): 40 TABLET ORAL at 21:46

## 2024-01-01 RX ADMIN — Medication 1 CAPSULE(S): at 20:29

## 2024-01-01 RX ADMIN — ZINC SULFATE TAB 220 MG (50 MG ZINC EQUIVALENT) 220 MILLIGRAM(S): 220 (50 ZN) TAB at 11:53

## 2024-01-01 RX ADMIN — LACTULOSE 20 GRAM(S): 10 SOLUTION ORAL at 05:32

## 2024-01-01 RX ADMIN — Medication 3 MILLILITER(S): at 02:50

## 2024-01-01 RX ADMIN — Medication 15 MILLILITER(S): at 11:16

## 2024-01-01 RX ADMIN — Medication 40 MILLIGRAM(S): at 10:14

## 2024-01-01 RX ADMIN — Medication 100 MILLIEQUIVALENT(S): at 13:11

## 2024-01-01 RX ADMIN — CEFIDEROCOL SULFATE TOSYLATE 33.33 MILLIGRAM(S): 1 INJECTION, POWDER, FOR SOLUTION INTRAVENOUS at 09:34

## 2024-01-01 RX ADMIN — ZINC SULFATE TAB 220 MG (50 MG ZINC EQUIVALENT) 220 MILLIGRAM(S): 220 (50 ZN) TAB at 11:16

## 2024-01-01 RX ADMIN — Medication 25 MILLILITER(S): at 15:00

## 2024-01-01 RX ADMIN — Medication 100 MILLIEQUIVALENT(S): at 14:54

## 2024-01-01 RX ADMIN — LACTULOSE 10 GRAM(S): 10 SOLUTION ORAL at 22:13

## 2024-01-01 RX ADMIN — METHADONE HYDROCHLORIDE 5 MILLIGRAM(S): 40 TABLET ORAL at 13:04

## 2024-01-01 RX ADMIN — Medication 40 MILLILITER(S): at 03:40

## 2024-01-01 RX ADMIN — DEXMEDETOMIDINE HYDROCHLORIDE IN 0.9% SODIUM CHLORIDE 7.8 MICROGRAM(S)/KG/HR: 4 INJECTION INTRAVENOUS at 16:06

## 2024-01-01 RX ADMIN — Medication 100 MILLIEQUIVALENT(S): at 13:53

## 2024-01-01 RX ADMIN — URSODIOL 500 MILLIGRAM(S): 250 TABLET, FILM COATED ORAL at 05:53

## 2024-01-01 RX ADMIN — Medication 166.67 MILLIGRAM(S): at 06:56

## 2024-01-01 RX ADMIN — FENTANYL CITRATE 25 MICROGRAM(S): 50 INJECTION INTRAVENOUS at 01:14

## 2024-01-01 RX ADMIN — METHADONE HYDROCHLORIDE 5 MILLIGRAM(S): 40 TABLET ORAL at 05:17

## 2024-01-01 RX ADMIN — Medication 100 MILLIGRAM(S): at 17:29

## 2024-01-01 RX ADMIN — CHLORHEXIDINE GLUCONATE 1 APPLICATION(S): 213 SOLUTION TOPICAL at 05:37

## 2024-01-01 RX ADMIN — LEVOCARNITINE 330 MILLIGRAM(S): 330 TABLET ORAL at 05:36

## 2024-01-01 RX ADMIN — MEROPENEM 100 MILLIGRAM(S): 1 INJECTION INTRAVENOUS at 05:15

## 2024-01-01 RX ADMIN — BUMETANIDE 20 MG/HR: 0.25 INJECTION INTRAMUSCULAR; INTRAVENOUS at 22:35

## 2024-01-01 RX ADMIN — FENTANYL CITRATE 1.56 MICROGRAM(S)/KG/HR: 50 INJECTION INTRAVENOUS at 20:37

## 2024-01-01 RX ADMIN — PANTOPRAZOLE SODIUM 40 MILLIGRAM(S): 20 TABLET, DELAYED RELEASE ORAL at 17:10

## 2024-01-01 RX ADMIN — CHLORHEXIDINE GLUCONATE 1 APPLICATION(S): 213 SOLUTION TOPICAL at 05:25

## 2024-01-01 RX ADMIN — Medication 2000 UNIT(S): at 13:05

## 2024-01-01 RX ADMIN — ZINC SULFATE TAB 220 MG (50 MG ZINC EQUIVALENT) 220 MILLIGRAM(S): 220 (50 ZN) TAB at 11:49

## 2024-01-01 RX ADMIN — METHADONE HYDROCHLORIDE 5 MILLIGRAM(S): 40 TABLET ORAL at 05:11

## 2024-01-01 RX ADMIN — BUMETANIDE 20 MG/HR: 0.25 INJECTION INTRAMUSCULAR; INTRAVENOUS at 13:15

## 2024-01-01 RX ADMIN — Medication 3 MILLILITER(S): at 05:28

## 2024-01-01 RX ADMIN — Medication 100 MICROGRAM(S): at 05:11

## 2024-01-01 RX ADMIN — INSULIN GLARGINE 2 UNIT(S): 100 INJECTION, SOLUTION SUBCUTANEOUS at 21:37

## 2024-01-01 RX ADMIN — METHADONE HYDROCHLORIDE 5 MILLIGRAM(S): 40 TABLET ORAL at 05:44

## 2024-01-01 RX ADMIN — HEPARIN SODIUM 5000 UNIT(S): 5000 INJECTION INTRAVENOUS; SUBCUTANEOUS at 05:10

## 2024-01-01 RX ADMIN — Medication 1 MILLIGRAM(S): at 11:49

## 2024-01-01 RX ADMIN — CHLORHEXIDINE GLUCONATE 1 APPLICATION(S): 213 SOLUTION TOPICAL at 05:13

## 2024-01-01 RX ADMIN — CHLORHEXIDINE GLUCONATE 15 MILLILITER(S): 213 SOLUTION TOPICAL at 05:07

## 2024-01-01 RX ADMIN — Medication 50 MILLILITER(S): at 10:00

## 2024-01-01 RX ADMIN — INSULIN GLARGINE 5 UNIT(S): 100 INJECTION, SOLUTION SUBCUTANEOUS at 22:57

## 2024-01-01 RX ADMIN — Medication 25 GRAM(S): at 08:15

## 2024-01-01 RX ADMIN — Medication 100 MILLIEQUIVALENT(S): at 12:13

## 2024-01-01 RX ADMIN — Medication 100 MILLIGRAM(S): at 17:37

## 2024-01-01 RX ADMIN — PROPOFOL 11.2 MICROGRAM(S)/KG/MIN: 10 INJECTION, EMULSION INTRAVENOUS at 22:35

## 2024-01-01 RX ADMIN — CHLORHEXIDINE GLUCONATE 1 APPLICATION(S): 213 SOLUTION TOPICAL at 05:43

## 2024-01-01 RX ADMIN — PANTOPRAZOLE SODIUM 40 MILLIGRAM(S): 20 TABLET, DELAYED RELEASE ORAL at 06:00

## 2024-01-01 RX ADMIN — Medication 25 MILLILITER(S): at 13:00

## 2024-01-01 RX ADMIN — Medication 200 GRAM(S): at 13:16

## 2024-01-01 RX ADMIN — Medication 15 MILLILITER(S): at 11:54

## 2024-01-01 RX ADMIN — CHLORHEXIDINE GLUCONATE 15 MILLILITER(S): 213 SOLUTION TOPICAL at 17:56

## 2024-01-01 RX ADMIN — MEROPENEM 100 MILLIGRAM(S): 1 INJECTION INTRAVENOUS at 12:03

## 2024-01-01 RX ADMIN — LACTULOSE 20 GRAM(S): 10 SOLUTION ORAL at 21:31

## 2024-01-01 RX ADMIN — METHADONE HYDROCHLORIDE 5 MILLIGRAM(S): 40 TABLET ORAL at 13:15

## 2024-01-01 RX ADMIN — LIDOCAINE 1 PATCH: 4 CREAM TOPICAL at 11:30

## 2024-01-01 RX ADMIN — Medication 50 MILLILITER(S): at 03:00

## 2024-01-01 RX ADMIN — Medication 100 MILLILITER(S): at 00:45

## 2024-01-01 RX ADMIN — Medication 25 GRAM(S): at 18:35

## 2024-01-01 RX ADMIN — Medication 5.85 MICROGRAM(S)/KG/MIN: at 22:35

## 2024-01-01 RX ADMIN — Medication 5 GRAM(S): at 22:40

## 2024-01-01 RX ADMIN — Medication 1000 MILLIGRAM(S): at 17:35

## 2024-01-01 RX ADMIN — INSULIN HUMAN 5 UNIT(S): 100 INJECTION, SOLUTION SUBCUTANEOUS at 08:28

## 2024-01-01 RX ADMIN — Medication 3 MILLILITER(S): at 17:16

## 2024-01-01 RX ADMIN — ZINC SULFATE TAB 220 MG (50 MG ZINC EQUIVALENT) 220 MILLIGRAM(S): 220 (50 ZN) TAB at 11:30

## 2024-01-01 RX ADMIN — Medication 40 MILLILITER(S): at 05:26

## 2024-01-01 RX ADMIN — Medication 3: at 11:14

## 2024-01-01 RX ADMIN — Medication 3.51 MICROGRAM(S)/KG/MIN: at 12:55

## 2024-01-01 RX ADMIN — MINOCYCLINE HYDROCHLORIDE 200 MILLIGRAM(S): 45 TABLET, EXTENDED RELEASE ORAL at 17:28

## 2024-01-01 RX ADMIN — Medication 50 MILLIEQUIVALENT(S): at 03:34

## 2024-01-01 RX ADMIN — PANTOPRAZOLE SODIUM 40 MILLIGRAM(S): 20 TABLET, DELAYED RELEASE ORAL at 11:16

## 2024-01-01 RX ADMIN — Medication 1 TABLET(S): at 02:21

## 2024-01-01 RX ADMIN — LIDOCAINE 1 PATCH: 4 CREAM TOPICAL at 22:37

## 2024-01-01 RX ADMIN — HEPARIN SODIUM 5000 UNIT(S): 5000 INJECTION INTRAVENOUS; SUBCUTANEOUS at 17:38

## 2024-01-01 RX ADMIN — Medication 40 MILLIGRAM(S): at 13:58

## 2024-01-01 RX ADMIN — Medication 2 UNIT(S): at 08:18

## 2024-01-01 RX ADMIN — CHLORHEXIDINE GLUCONATE 15 MILLILITER(S): 213 SOLUTION TOPICAL at 05:16

## 2024-01-01 RX ADMIN — Medication 250 MILLIGRAM(S): at 18:58

## 2024-01-01 RX ADMIN — Medication 102.5 MILLIGRAM(S): at 05:16

## 2024-01-01 RX ADMIN — CEFIDEROCOL SULFATE TOSYLATE 33.33 MILLIGRAM(S): 1 INJECTION, POWDER, FOR SOLUTION INTRAVENOUS at 10:23

## 2024-01-01 RX ADMIN — INSULIN GLARGINE 3 UNIT(S): 100 INJECTION, SOLUTION SUBCUTANEOUS at 21:31

## 2024-01-01 RX ADMIN — Medication 5 GRAM(S): at 22:33

## 2024-01-01 RX ADMIN — Medication 1 MILLIGRAM(S): at 11:45

## 2024-01-01 RX ADMIN — LACTULOSE 20 GRAM(S): 10 SOLUTION ORAL at 21:46

## 2024-01-01 RX ADMIN — Medication 50 MILLILITER(S): at 20:00

## 2024-01-01 RX ADMIN — Medication 15 MILLILITER(S): at 11:04

## 2024-01-01 RX ADMIN — FENTANYL CITRATE 50 MICROGRAM(S): 50 INJECTION INTRAVENOUS at 05:14

## 2024-01-01 RX ADMIN — DEXMEDETOMIDINE HYDROCHLORIDE IN 0.9% SODIUM CHLORIDE 7.8 MICROGRAM(S)/KG/HR: 4 INJECTION INTRAVENOUS at 05:17

## 2024-01-01 RX ADMIN — Medication 105 MILLIGRAM(S): at 05:10

## 2024-01-01 RX ADMIN — Medication 1 MILLIGRAM(S): at 11:20

## 2024-01-01 RX ADMIN — CHLORHEXIDINE GLUCONATE 15 MILLILITER(S): 213 SOLUTION TOPICAL at 17:11

## 2024-01-01 RX ADMIN — Medication 100 MILLIGRAM(S): at 17:11

## 2024-01-01 RX ADMIN — LACTULOSE 30 GRAM(S): 10 SOLUTION ORAL at 14:52

## 2024-01-01 RX ADMIN — METHADONE HYDROCHLORIDE 5 MILLIGRAM(S): 40 TABLET ORAL at 21:43

## 2024-01-01 RX ADMIN — Medication 25 MILLILITER(S): at 23:00

## 2024-01-01 RX ADMIN — Medication 1 MILLIGRAM(S): at 11:15

## 2024-01-01 RX ADMIN — DIATRIZOATE MEGLUMINE 120 MILLILITER(S): 180 INJECTION, SOLUTION INTRAVESICAL at 22:51

## 2024-01-01 RX ADMIN — HEPARIN SODIUM 5000 UNIT(S): 5000 INJECTION INTRAVENOUS; SUBCUTANEOUS at 05:04

## 2024-01-01 RX ADMIN — Medication 100 MICROGRAM(S): at 05:35

## 2024-01-01 RX ADMIN — MINOCYCLINE HYDROCHLORIDE 100 MILLIGRAM(S): 45 TABLET, EXTENDED RELEASE ORAL at 05:00

## 2024-01-01 RX ADMIN — HEPARIN SODIUM 5000 UNIT(S): 5000 INJECTION INTRAVENOUS; SUBCUTANEOUS at 06:23

## 2024-01-01 RX ADMIN — Medication 40 MILLILITER(S): at 12:04

## 2024-01-01 RX ADMIN — LACTULOSE 30 GRAM(S): 10 SOLUTION ORAL at 21:43

## 2024-01-01 RX ADMIN — AMPICILLIN SODIUM AND SULBACTAM SODIUM 50 GRAM(S): 250; 125 INJECTION, POWDER, FOR SUSPENSION INTRAMUSCULAR; INTRAVENOUS at 07:35

## 2024-01-01 RX ADMIN — Medication 25 GRAM(S): at 14:37

## 2024-01-01 RX ADMIN — Medication 2 UNIT(S): at 17:26

## 2024-01-01 RX ADMIN — Medication 50 MILLILITER(S): at 22:14

## 2024-01-01 RX ADMIN — Medication 5.85 MICROGRAM(S)/KG/MIN: at 07:34

## 2024-01-01 RX ADMIN — HEPARIN SODIUM 5000 UNIT(S): 5000 INJECTION INTRAVENOUS; SUBCUTANEOUS at 05:01

## 2024-01-01 RX ADMIN — Medication 105 MILLIGRAM(S): at 13:18

## 2024-01-01 RX ADMIN — FENTANYL CITRATE 50 MICROGRAM(S): 50 INJECTION INTRAVENOUS at 15:30

## 2024-01-01 RX ADMIN — PANTOPRAZOLE SODIUM 40 MILLIGRAM(S): 20 TABLET, DELAYED RELEASE ORAL at 11:45

## 2024-01-01 RX ADMIN — Medication 100 MILLIGRAM(S): at 06:03

## 2024-01-01 RX ADMIN — LACTULOSE 20 GRAM(S): 10 SOLUTION ORAL at 21:42

## 2024-01-01 RX ADMIN — Medication 100 MILLIGRAM(S): at 17:58

## 2024-01-01 RX ADMIN — Medication 5 GRAM(S): at 02:00

## 2024-01-01 RX ADMIN — Medication 25 MILLILITER(S): at 00:00

## 2024-01-01 RX ADMIN — FENTANYL CITRATE 50 MICROGRAM(S): 50 INJECTION INTRAVENOUS at 04:15

## 2024-01-01 RX ADMIN — INSULIN GLARGINE 6 UNIT(S): 100 INJECTION, SOLUTION SUBCUTANEOUS at 21:44

## 2024-01-01 RX ADMIN — METHADONE HYDROCHLORIDE 5 MILLIGRAM(S): 40 TABLET ORAL at 21:23

## 2024-01-01 RX ADMIN — MINOCYCLINE HYDROCHLORIDE 100 MILLIGRAM(S): 45 TABLET, EXTENDED RELEASE ORAL at 14:30

## 2024-01-01 RX ADMIN — Medication 102.5 MILLIGRAM(S): at 15:35

## 2024-01-01 RX ADMIN — Medication 2000 UNIT(S): at 11:04

## 2024-01-01 RX ADMIN — Medication 1 MILLIGRAM(S): at 12:14

## 2024-01-01 RX ADMIN — Medication 15 GRAM(S): at 02:40

## 2024-01-01 RX ADMIN — MEROPENEM 100 MILLIGRAM(S): 1 INJECTION INTRAVENOUS at 21:43

## 2024-01-01 RX ADMIN — Medication 5.85 MICROGRAM(S)/KG/MIN: at 07:31

## 2024-01-01 RX ADMIN — Medication 25 GRAM(S): at 15:00

## 2024-01-01 RX ADMIN — Medication 2000 UNIT(S): at 11:16

## 2024-01-01 RX ADMIN — Medication 2 UNIT(S): at 08:25

## 2024-01-01 RX ADMIN — PANTOPRAZOLE SODIUM 40 MILLIGRAM(S): 20 TABLET, DELAYED RELEASE ORAL at 12:54

## 2024-01-01 RX ADMIN — Medication 40 MILLIEQUIVALENT(S): at 08:22

## 2024-01-01 RX ADMIN — LINEZOLID 300 MILLIGRAM(S): 600 INJECTION, SOLUTION INTRAVENOUS at 08:54

## 2024-01-01 RX ADMIN — Medication 105 MILLIGRAM(S): at 05:11

## 2024-01-01 RX ADMIN — CHLORHEXIDINE GLUCONATE 1 APPLICATION(S): 213 SOLUTION TOPICAL at 08:16

## 2024-01-01 RX ADMIN — Medication 102.5 MILLIGRAM(S): at 21:34

## 2024-01-01 RX ADMIN — URSODIOL 500 MILLIGRAM(S): 250 TABLET, FILM COATED ORAL at 05:25

## 2024-01-01 RX ADMIN — PANTOPRAZOLE SODIUM 40 MILLIGRAM(S): 20 TABLET, DELAYED RELEASE ORAL at 18:11

## 2024-01-01 RX ADMIN — HEPARIN SODIUM 5000 UNIT(S): 5000 INJECTION INTRAVENOUS; SUBCUTANEOUS at 05:38

## 2024-01-01 RX ADMIN — MINOCYCLINE HYDROCHLORIDE 100 MILLIGRAM(S): 45 TABLET, EXTENDED RELEASE ORAL at 17:28

## 2024-01-01 RX ADMIN — LEVOCARNITINE 330 MILLIGRAM(S): 330 TABLET ORAL at 21:32

## 2024-01-01 RX ADMIN — Medication 1 MILLIGRAM(S): at 11:16

## 2024-01-01 RX ADMIN — Medication 400 MILLIGRAM(S): at 17:05

## 2024-01-01 RX ADMIN — CASPOFUNGIN ACETATE 260 MILLIGRAM(S): 7 INJECTION, POWDER, LYOPHILIZED, FOR SOLUTION INTRAVENOUS at 03:07

## 2024-01-01 RX ADMIN — Medication 15 MILLILITER(S): at 11:15

## 2024-01-01 RX ADMIN — LACTULOSE 30 GRAM(S): 10 SOLUTION ORAL at 05:36

## 2024-01-01 RX ADMIN — Medication 1: at 12:42

## 2024-01-01 RX ADMIN — LEVOCARNITINE 330 MILLIGRAM(S): 330 TABLET ORAL at 06:23

## 2024-01-01 RX ADMIN — CHLORHEXIDINE GLUCONATE 1 APPLICATION(S): 213 SOLUTION TOPICAL at 05:24

## 2024-01-01 RX ADMIN — LACTULOSE 30 GRAM(S): 10 SOLUTION ORAL at 05:35

## 2024-01-01 RX ADMIN — ZINC SULFATE TAB 220 MG (50 MG ZINC EQUIVALENT) 220 MILLIGRAM(S): 220 (50 ZN) TAB at 11:21

## 2024-01-01 RX ADMIN — Medication 250 MILLIGRAM(S): at 17:38

## 2024-01-01 RX ADMIN — METHADONE HYDROCHLORIDE 5 MILLIGRAM(S): 40 TABLET ORAL at 05:32

## 2024-01-01 RX ADMIN — Medication 2: at 13:08

## 2024-01-01 RX ADMIN — PANTOPRAZOLE SODIUM 40 MILLIGRAM(S): 20 TABLET, DELAYED RELEASE ORAL at 12:14

## 2024-01-01 RX ADMIN — Medication 600 GRAM(S): at 08:27

## 2024-01-01 RX ADMIN — Medication 15 MILLILITER(S): at 11:30

## 2024-01-01 RX ADMIN — Medication 1: at 01:29

## 2024-01-01 RX ADMIN — Medication 25 MILLILITER(S): at 22:00

## 2024-01-01 RX ADMIN — Medication 1 MILLIGRAM(S): at 11:53

## 2024-01-01 RX ADMIN — CEFIDEROCOL SULFATE TOSYLATE 33.33 MILLIGRAM(S): 1 INJECTION, POWDER, FOR SOLUTION INTRAVENOUS at 01:57

## 2024-01-01 RX ADMIN — MEROPENEM 100 MILLIGRAM(S): 1 INJECTION INTRAVENOUS at 12:33

## 2024-01-01 RX ADMIN — AMPICILLIN SODIUM AND SULBACTAM SODIUM 50 GRAM(S): 250; 125 INJECTION, POWDER, FOR SUSPENSION INTRAMUSCULAR; INTRAVENOUS at 05:46

## 2024-01-01 RX ADMIN — CHLORHEXIDINE GLUCONATE 1 APPLICATION(S): 213 SOLUTION TOPICAL at 05:17

## 2024-01-01 RX ADMIN — FENTANYL CITRATE 25 MICROGRAM(S): 50 INJECTION INTRAVENOUS at 03:10

## 2024-01-01 RX ADMIN — SODIUM CHLORIDE 10 MILLILITER(S): 9 INJECTION INTRAMUSCULAR; INTRAVENOUS; SUBCUTANEOUS at 14:15

## 2024-01-01 RX ADMIN — Medication 100 MILLIEQUIVALENT(S): at 03:37

## 2024-01-01 RX ADMIN — CASPOFUNGIN ACETATE 260 MILLIGRAM(S): 7 INJECTION, POWDER, LYOPHILIZED, FOR SOLUTION INTRAVENOUS at 03:02

## 2024-01-01 RX ADMIN — LACTULOSE 10 GRAM(S): 10 SOLUTION ORAL at 17:15

## 2024-01-01 RX ADMIN — PROPOFOL 11.2 MICROGRAM(S)/KG/MIN: 10 INJECTION, EMULSION INTRAVENOUS at 20:19

## 2024-01-01 RX ADMIN — Medication 25 GRAM(S): at 10:14

## 2024-01-01 RX ADMIN — LACTULOSE 20 GRAM(S): 10 SOLUTION ORAL at 13:06

## 2024-01-01 RX ADMIN — FENTANYL CITRATE 50 MICROGRAM(S): 50 INJECTION INTRAVENOUS at 04:00

## 2024-01-01 RX ADMIN — Medication 50 MILLIEQUIVALENT(S): at 18:35

## 2024-01-01 RX ADMIN — Medication 100 MICROGRAM(S): at 05:33

## 2024-01-01 RX ADMIN — METHADONE HYDROCHLORIDE 5 MILLIGRAM(S): 40 TABLET ORAL at 21:37

## 2024-01-01 RX ADMIN — METHADONE HYDROCHLORIDE 5 MILLIGRAM(S): 40 TABLET ORAL at 13:25

## 2024-01-01 RX ADMIN — AMPICILLIN SODIUM AND SULBACTAM SODIUM 50 GRAM(S): 250; 125 INJECTION, POWDER, FOR SUSPENSION INTRAMUSCULAR; INTRAVENOUS at 21:51

## 2024-01-01 RX ADMIN — LINEZOLID 300 MILLIGRAM(S): 600 INJECTION, SOLUTION INTRAVENOUS at 17:11

## 2024-01-01 RX ADMIN — CEFIDEROCOL SULFATE TOSYLATE 33.33 MILLIGRAM(S): 1 INJECTION, POWDER, FOR SOLUTION INTRAVENOUS at 00:12

## 2024-01-01 RX ADMIN — INSULIN GLARGINE 2 UNIT(S): 100 INJECTION, SOLUTION SUBCUTANEOUS at 08:50

## 2024-01-01 RX ADMIN — CEFIDEROCOL SULFATE TOSYLATE 33.33 MILLIGRAM(S): 1 INJECTION, POWDER, FOR SOLUTION INTRAVENOUS at 20:04

## 2024-01-01 RX ADMIN — Medication 25 MILLILITER(S): at 17:39

## 2024-01-01 RX ADMIN — Medication 50 MILLILITER(S): at 04:00

## 2024-01-01 RX ADMIN — Medication 2: at 17:37

## 2024-01-01 RX ADMIN — PANTOPRAZOLE SODIUM 40 MILLIGRAM(S): 20 TABLET, DELAYED RELEASE ORAL at 12:42

## 2024-01-01 RX ADMIN — HEPARIN SODIUM 5000 UNIT(S): 5000 INJECTION INTRAVENOUS; SUBCUTANEOUS at 17:08

## 2024-01-01 RX ADMIN — HEPARIN SODIUM 5000 UNIT(S): 5000 INJECTION INTRAVENOUS; SUBCUTANEOUS at 17:14

## 2024-01-01 RX ADMIN — Medication 1: at 11:59

## 2024-01-01 RX ADMIN — FENTANYL CITRATE 50 MICROGRAM(S): 50 INJECTION INTRAVENOUS at 00:45

## 2024-01-01 RX ADMIN — Medication 50 MILLILITER(S): at 05:00

## 2024-01-01 RX ADMIN — INSULIN GLARGINE 1 UNIT(S): 100 INJECTION, SOLUTION SUBCUTANEOUS at 09:20

## 2024-01-01 RX ADMIN — CHLORHEXIDINE GLUCONATE 1 APPLICATION(S): 213 SOLUTION TOPICAL at 05:19

## 2024-01-01 RX ADMIN — URSODIOL 500 MILLIGRAM(S): 250 TABLET, FILM COATED ORAL at 05:26

## 2024-01-01 RX ADMIN — BUMETANIDE 2 MILLIGRAM(S): 0.25 INJECTION INTRAMUSCULAR; INTRAVENOUS at 13:15

## 2024-01-01 RX ADMIN — URSODIOL 500 MILLIGRAM(S): 250 TABLET, FILM COATED ORAL at 06:22

## 2024-01-01 RX ADMIN — Medication 3 MILLILITER(S): at 05:54

## 2024-01-01 RX ADMIN — Medication 102.5 MILLIGRAM(S): at 21:46

## 2024-01-01 RX ADMIN — Medication 40 MILLIEQUIVALENT(S): at 11:23

## 2024-01-01 RX ADMIN — Medication 25 MILLILITER(S): at 09:00

## 2024-01-01 RX ADMIN — Medication 1: at 16:44

## 2024-01-01 RX ADMIN — Medication 1: at 16:51

## 2024-01-01 RX ADMIN — Medication 100 MICROGRAM(S): at 05:31

## 2024-01-01 RX ADMIN — PANTOPRAZOLE SODIUM 40 MILLIGRAM(S): 20 TABLET, DELAYED RELEASE ORAL at 11:04

## 2024-01-01 RX ADMIN — LACTULOSE 10 GRAM(S): 10 SOLUTION ORAL at 17:29

## 2024-01-01 RX ADMIN — Medication 5 GRAM(S): at 18:28

## 2024-01-01 RX ADMIN — METHADONE HYDROCHLORIDE 5 MILLIGRAM(S): 40 TABLET ORAL at 05:31

## 2024-01-01 RX ADMIN — PANTOPRAZOLE SODIUM 40 MILLIGRAM(S): 20 TABLET, DELAYED RELEASE ORAL at 11:49

## 2024-01-01 RX ADMIN — Medication 5 GRAM(S): at 06:00

## 2024-01-01 RX ADMIN — CHLORHEXIDINE GLUCONATE 15 MILLILITER(S): 213 SOLUTION TOPICAL at 18:11

## 2024-01-01 RX ADMIN — Medication 50 MILLILITER(S): at 07:00

## 2024-01-01 RX ADMIN — INSULIN GLARGINE 5 UNIT(S): 100 INJECTION, SOLUTION SUBCUTANEOUS at 22:56

## 2024-01-01 RX ADMIN — Medication 250 MILLIGRAM(S): at 05:15

## 2024-01-01 RX ADMIN — METHADONE HYDROCHLORIDE 5 MILLIGRAM(S): 40 TABLET ORAL at 13:18

## 2024-01-01 RX ADMIN — Medication 2000 UNIT(S): at 12:12

## 2024-01-01 RX ADMIN — METHADONE HYDROCHLORIDE 5 MILLIGRAM(S): 40 TABLET ORAL at 06:22

## 2024-01-01 RX ADMIN — HEPARIN SODIUM 5000 UNIT(S): 5000 INJECTION INTRAVENOUS; SUBCUTANEOUS at 17:28

## 2024-01-01 RX ADMIN — ROBINUL 0.4 MILLIGRAM(S): 0.2 INJECTION INTRAMUSCULAR; INTRAVENOUS at 16:45

## 2024-01-01 RX ADMIN — MEROPENEM 100 MILLIGRAM(S): 1 INJECTION INTRAVENOUS at 11:29

## 2024-01-01 RX ADMIN — LACTULOSE 20 GRAM(S): 10 SOLUTION ORAL at 05:05

## 2024-01-01 RX ADMIN — Medication 2: at 16:35

## 2024-01-01 RX ADMIN — Medication 2: at 08:46

## 2024-01-01 RX ADMIN — Medication 1: at 00:50

## 2024-01-01 RX ADMIN — CHLORHEXIDINE GLUCONATE 15 MILLILITER(S): 213 SOLUTION TOPICAL at 17:12

## 2024-01-01 RX ADMIN — Medication 1: at 12:13

## 2024-01-01 RX ADMIN — Medication 3.51 MICROGRAM(S)/KG/MIN: at 12:39

## 2024-01-01 RX ADMIN — CHLORHEXIDINE GLUCONATE 15 MILLILITER(S): 213 SOLUTION TOPICAL at 05:35

## 2024-01-01 RX ADMIN — PROPOFOL 11.2 MICROGRAM(S)/KG/MIN: 10 INJECTION, EMULSION INTRAVENOUS at 09:21

## 2024-01-01 RX ADMIN — PROPOFOL 11.2 MICROGRAM(S)/KG/MIN: 10 INJECTION, EMULSION INTRAVENOUS at 07:33

## 2024-01-01 RX ADMIN — HEPARIN SODIUM 5000 UNIT(S): 5000 INJECTION INTRAVENOUS; SUBCUTANEOUS at 21:32

## 2024-01-01 RX ADMIN — MEROPENEM 100 MILLIGRAM(S): 1 INJECTION INTRAVENOUS at 00:38

## 2024-01-01 RX ADMIN — Medication 100 MILLIGRAM(S): at 17:25

## 2024-01-01 RX ADMIN — Medication 1: at 05:47

## 2024-01-01 RX ADMIN — METHADONE HYDROCHLORIDE 5 MILLIGRAM(S): 40 TABLET ORAL at 21:28

## 2024-01-01 RX ADMIN — AMPICILLIN SODIUM AND SULBACTAM SODIUM 50 GRAM(S): 250; 125 INJECTION, POWDER, FOR SUSPENSION INTRAMUSCULAR; INTRAVENOUS at 06:37

## 2024-01-01 RX ADMIN — Medication 25 GRAM(S): at 01:16

## 2024-01-01 RX ADMIN — HEPARIN SODIUM 5000 UNIT(S): 5000 INJECTION INTRAVENOUS; SUBCUTANEOUS at 06:22

## 2024-01-01 RX ADMIN — LACTULOSE 20 GRAM(S): 10 SOLUTION ORAL at 14:27

## 2024-01-01 RX ADMIN — FENTANYL CITRATE 50 MICROGRAM(S): 50 INJECTION INTRAVENOUS at 18:00

## 2024-01-01 RX ADMIN — Medication 50 MILLILITER(S): at 02:00

## 2024-01-01 RX ADMIN — FENTANYL CITRATE 25 MICROGRAM(S): 50 INJECTION INTRAVENOUS at 05:35

## 2024-01-01 RX ADMIN — Medication 50 MILLILITER(S): at 14:00

## 2024-01-01 RX ADMIN — PANTOPRAZOLE SODIUM 40 MILLIGRAM(S): 20 TABLET, DELAYED RELEASE ORAL at 14:50

## 2024-01-01 RX ADMIN — CHLORHEXIDINE GLUCONATE 1 APPLICATION(S): 213 SOLUTION TOPICAL at 05:09

## 2024-01-01 RX ADMIN — LACTULOSE 10 GRAM(S): 10 SOLUTION ORAL at 05:29

## 2024-01-01 RX ADMIN — CHLORHEXIDINE GLUCONATE 1 APPLICATION(S): 213 SOLUTION TOPICAL at 06:17

## 2024-01-01 RX ADMIN — Medication 15 GRAM(S): at 08:08

## 2024-01-01 RX ADMIN — Medication 5 GRAM(S): at 05:17

## 2024-01-01 RX ADMIN — Medication 3 MILLILITER(S): at 11:13

## 2024-01-01 RX ADMIN — ZINC SULFATE TAB 220 MG (50 MG ZINC EQUIVALENT) 220 MILLIGRAM(S): 220 (50 ZN) TAB at 14:42

## 2024-01-01 RX ADMIN — CEFIDEROCOL SULFATE TOSYLATE 33.33 MILLIGRAM(S): 1 INJECTION, POWDER, FOR SOLUTION INTRAVENOUS at 17:59

## 2024-01-01 RX ADMIN — Medication 100 MICROGRAM(S): at 05:07

## 2024-01-01 RX ADMIN — FENTANYL CITRATE 25 MICROGRAM(S): 50 INJECTION INTRAVENOUS at 15:37

## 2024-01-01 RX ADMIN — Medication 2: at 12:00

## 2024-01-01 RX ADMIN — METHADONE HYDROCHLORIDE 5 MILLIGRAM(S): 40 TABLET ORAL at 05:01

## 2024-01-01 RX ADMIN — Medication 15 MILLILITER(S): at 11:50

## 2024-01-01 RX ADMIN — Medication 50 MILLIEQUIVALENT(S): at 19:05

## 2024-01-01 RX ADMIN — MEROPENEM 100 MILLIGRAM(S): 1 INJECTION INTRAVENOUS at 05:11

## 2024-01-01 RX ADMIN — Medication 102.5 MILLIGRAM(S): at 05:38

## 2024-01-01 RX ADMIN — LACTULOSE 30 GRAM(S): 10 SOLUTION ORAL at 20:29

## 2024-01-01 RX ADMIN — Medication 1: at 17:13

## 2024-01-01 RX ADMIN — Medication 2: at 12:23

## 2024-01-01 RX ADMIN — Medication 100 MILLIGRAM(S): at 05:33

## 2024-01-01 RX ADMIN — Medication 2000 UNIT(S): at 11:53

## 2024-01-01 RX ADMIN — MEROPENEM 100 MILLIGRAM(S): 1 INJECTION INTRAVENOUS at 21:35

## 2024-01-01 RX ADMIN — Medication 50 MILLILITER(S): at 16:00

## 2024-01-01 RX ADMIN — Medication 100 MILLIGRAM(S): at 17:28

## 2024-01-01 RX ADMIN — HEPARIN SODIUM 5000 UNIT(S): 5000 INJECTION INTRAVENOUS; SUBCUTANEOUS at 17:12

## 2024-01-01 RX ADMIN — Medication 25 MILLILITER(S): at 08:00

## 2024-01-01 RX ADMIN — Medication 100 MICROGRAM(S): at 05:10

## 2024-01-01 RX ADMIN — Medication 102.5 MILLIGRAM(S): at 12:02

## 2024-01-01 RX ADMIN — Medication 3: at 00:27

## 2024-01-01 RX ADMIN — LACTULOSE 30 GRAM(S): 10 SOLUTION ORAL at 13:26

## 2024-01-01 RX ADMIN — Medication 102.5 MILLIGRAM(S): at 21:33

## 2024-01-01 RX ADMIN — LACTULOSE 10 GRAM(S): 10 SOLUTION ORAL at 14:54

## 2024-01-01 RX ADMIN — PANTOPRAZOLE SODIUM 40 MILLIGRAM(S): 20 TABLET, DELAYED RELEASE ORAL at 13:06

## 2024-01-01 RX ADMIN — DEXMEDETOMIDINE HYDROCHLORIDE IN 0.9% SODIUM CHLORIDE 7.8 MICROGRAM(S)/KG/HR: 4 INJECTION INTRAVENOUS at 05:33

## 2024-01-01 RX ADMIN — Medication 5.85 MICROGRAM(S)/KG/MIN: at 08:29

## 2024-01-01 RX ADMIN — MEROPENEM 100 MILLIGRAM(S): 1 INJECTION INTRAVENOUS at 12:55

## 2024-01-01 RX ADMIN — Medication 50 MILLIEQUIVALENT(S): at 01:40

## 2024-01-01 RX ADMIN — Medication 50 MILLILITER(S): at 01:54

## 2024-01-01 RX ADMIN — FENTANYL CITRATE 50 MICROGRAM(S): 50 INJECTION INTRAVENOUS at 20:53

## 2024-01-01 RX ADMIN — INSULIN GLARGINE 1 UNIT(S): 100 INJECTION, SOLUTION SUBCUTANEOUS at 21:42

## 2024-01-01 RX ADMIN — LEVOCARNITINE 330 MILLIGRAM(S): 330 TABLET ORAL at 05:25

## 2024-01-01 RX ADMIN — HYDROMORPHONE HYDROCHLORIDE 0.2 MILLIGRAM(S): 2 INJECTION INTRAMUSCULAR; INTRAVENOUS; SUBCUTANEOUS at 04:39

## 2024-01-01 RX ADMIN — LINEZOLID 300 MILLIGRAM(S): 600 INJECTION, SOLUTION INTRAVENOUS at 06:04

## 2024-01-01 RX ADMIN — Medication 5 GRAM(S): at 01:38

## 2024-01-01 RX ADMIN — Medication 50 MILLILITER(S): at 00:45

## 2024-01-01 RX ADMIN — METHADONE HYDROCHLORIDE 5 MILLIGRAM(S): 40 TABLET ORAL at 13:08

## 2024-01-01 RX ADMIN — CHLORHEXIDINE GLUCONATE 1 APPLICATION(S): 213 SOLUTION TOPICAL at 05:00

## 2024-01-01 RX ADMIN — INSULIN GLARGINE 2 UNIT(S): 100 INJECTION, SOLUTION SUBCUTANEOUS at 22:05

## 2024-01-01 RX ADMIN — Medication 2 UNIT(S): at 11:55

## 2024-01-01 RX ADMIN — HEPARIN SODIUM 5000 UNIT(S): 5000 INJECTION INTRAVENOUS; SUBCUTANEOUS at 17:55

## 2024-01-01 RX ADMIN — MINOCYCLINE HYDROCHLORIDE 100 MILLIGRAM(S): 45 TABLET, EXTENDED RELEASE ORAL at 17:27

## 2024-01-01 RX ADMIN — Medication 1 UNIT(S): at 08:16

## 2024-01-01 RX ADMIN — HEPARIN SODIUM 5000 UNIT(S): 5000 INJECTION INTRAVENOUS; SUBCUTANEOUS at 05:32

## 2024-01-01 RX ADMIN — FENTANYL CITRATE 50 MICROGRAM(S): 50 INJECTION INTRAVENOUS at 06:45

## 2024-01-01 RX ADMIN — Medication 3: at 08:15

## 2024-01-01 RX ADMIN — Medication 2000 UNIT(S): at 12:42

## 2024-01-01 RX ADMIN — Medication 100 MILLIEQUIVALENT(S): at 04:57

## 2024-01-01 RX ADMIN — Medication 1: at 08:25

## 2024-01-01 RX ADMIN — METHADONE HYDROCHLORIDE 5 MILLIGRAM(S): 40 TABLET ORAL at 06:23

## 2024-01-01 RX ADMIN — Medication 3 MILLILITER(S): at 17:14

## 2024-01-01 RX ADMIN — PROPOFOL 11.2 MICROGRAM(S)/KG/MIN: 10 INJECTION, EMULSION INTRAVENOUS at 07:31

## 2024-01-01 RX ADMIN — FENTANYL CITRATE 1.56 MICROGRAM(S)/KG/HR: 50 INJECTION INTRAVENOUS at 08:29

## 2024-01-01 RX ADMIN — PANTOPRAZOLE SODIUM 40 MILLIGRAM(S): 20 TABLET, DELAYED RELEASE ORAL at 12:33

## 2024-01-01 RX ADMIN — CHLORHEXIDINE GLUCONATE 15 MILLILITER(S): 213 SOLUTION TOPICAL at 05:31

## 2024-01-01 RX ADMIN — Medication 250 MILLIGRAM(S): at 17:12

## 2024-01-01 RX ADMIN — BUMETANIDE 132 MILLIGRAM(S): 0.25 INJECTION INTRAMUSCULAR; INTRAVENOUS at 08:27

## 2024-01-01 RX ADMIN — LACTULOSE 20 GRAM(S): 10 SOLUTION ORAL at 13:01

## 2024-01-01 RX ADMIN — LACTULOSE 10 GRAM(S): 10 SOLUTION ORAL at 05:26

## 2024-01-01 RX ADMIN — LINEZOLID 300 MILLIGRAM(S): 600 INJECTION, SOLUTION INTRAVENOUS at 05:14

## 2024-01-01 RX ADMIN — LACTULOSE 20 GRAM(S): 10 SOLUTION ORAL at 05:01

## 2024-01-01 RX ADMIN — LACTULOSE 30 GRAM(S): 10 SOLUTION ORAL at 09:33

## 2024-01-01 RX ADMIN — HEPARIN SODIUM 5000 UNIT(S): 5000 INJECTION INTRAVENOUS; SUBCUTANEOUS at 05:41

## 2024-01-01 RX ADMIN — Medication 100 MILLIGRAM(S): at 12:03

## 2024-01-01 RX ADMIN — LACTULOSE 30 GRAM(S): 10 SOLUTION ORAL at 13:47

## 2024-01-01 RX ADMIN — Medication 100 MILLIEQUIVALENT(S): at 02:11

## 2024-01-01 RX ADMIN — METHADONE HYDROCHLORIDE 5 MILLIGRAM(S): 40 TABLET ORAL at 22:13

## 2024-01-01 RX ADMIN — URSODIOL 500 MILLIGRAM(S): 250 TABLET, FILM COATED ORAL at 05:08

## 2024-01-01 RX ADMIN — Medication 15 MILLILITER(S): at 12:14

## 2024-01-01 RX ADMIN — HEPARIN SODIUM 5000 UNIT(S): 5000 INJECTION INTRAVENOUS; SUBCUTANEOUS at 05:24

## 2024-01-01 RX ADMIN — AMPICILLIN SODIUM AND SULBACTAM SODIUM 50 GRAM(S): 250; 125 INJECTION, POWDER, FOR SUSPENSION INTRAMUSCULAR; INTRAVENOUS at 22:51

## 2024-01-01 RX ADMIN — Medication 40 MILLIEQUIVALENT(S): at 13:05

## 2024-01-01 RX ADMIN — DEXMEDETOMIDINE HYDROCHLORIDE IN 0.9% SODIUM CHLORIDE 7.8 MICROGRAM(S)/KG/HR: 4 INJECTION INTRAVENOUS at 18:13

## 2024-01-01 RX ADMIN — Medication 250 MILLIGRAM(S): at 17:20

## 2024-01-01 RX ADMIN — METHADONE HYDROCHLORIDE 5 MILLIGRAM(S): 40 TABLET ORAL at 14:51

## 2024-01-01 RX ADMIN — Medication 250 MILLIGRAM(S): at 06:18

## 2024-01-01 RX ADMIN — FENTANYL CITRATE 50 MICROGRAM(S): 50 INJECTION INTRAVENOUS at 00:35

## 2024-01-01 RX ADMIN — HYDROMORPHONE HYDROCHLORIDE 0.2 MILLIGRAM(S): 2 INJECTION INTRAMUSCULAR; INTRAVENOUS; SUBCUTANEOUS at 05:14

## 2024-01-01 RX ADMIN — LEVOCARNITINE 330 MILLIGRAM(S): 330 TABLET ORAL at 13:56

## 2024-01-01 RX ADMIN — MEROPENEM 100 MILLIGRAM(S): 1 INJECTION INTRAVENOUS at 13:48

## 2024-01-01 RX ADMIN — PANTOPRAZOLE SODIUM 40 MILLIGRAM(S): 20 TABLET, DELAYED RELEASE ORAL at 11:20

## 2024-01-01 RX ADMIN — FENTANYL CITRATE 50 MICROGRAM(S): 50 INJECTION INTRAVENOUS at 15:12

## 2024-01-01 RX ADMIN — LINEZOLID 300 MILLIGRAM(S): 600 INJECTION, SOLUTION INTRAVENOUS at 17:28

## 2024-01-01 RX ADMIN — LACTULOSE 30 GRAM(S): 10 SOLUTION ORAL at 05:16

## 2024-01-01 RX ADMIN — Medication 50 MILLILITER(S): at 00:00

## 2024-01-01 RX ADMIN — Medication 25 MILLILITER(S): at 18:00

## 2024-01-01 RX ADMIN — Medication 15 MILLILITER(S): at 11:45

## 2024-01-01 RX ADMIN — Medication 1 MILLIGRAM(S): at 13:04

## 2024-01-01 RX ADMIN — CEFIDEROCOL SULFATE TOSYLATE 33.33 MILLIGRAM(S): 1 INJECTION, POWDER, FOR SOLUTION INTRAVENOUS at 10:44

## 2024-01-01 RX ADMIN — INSULIN GLARGINE 3 UNIT(S): 100 INJECTION, SOLUTION SUBCUTANEOUS at 21:24

## 2024-01-01 RX ADMIN — Medication 1 MILLIGRAM(S): at 14:53

## 2024-01-01 RX ADMIN — Medication 50 MILLILITER(S): at 18:00

## 2024-01-01 RX ADMIN — HEPARIN SODIUM 5000 UNIT(S): 5000 INJECTION INTRAVENOUS; SUBCUTANEOUS at 17:27

## 2024-01-01 RX ADMIN — Medication 1: at 11:33

## 2024-01-01 RX ADMIN — Medication 3 MILLILITER(S): at 12:30

## 2024-01-01 RX ADMIN — Medication 100 MILLIGRAM(S): at 12:14

## 2024-01-01 RX ADMIN — INSULIN GLARGINE 8 UNIT(S): 100 INJECTION, SOLUTION SUBCUTANEOUS at 21:39

## 2024-01-01 RX ADMIN — HEPARIN SODIUM 5000 UNIT(S): 5000 INJECTION INTRAVENOUS; SUBCUTANEOUS at 05:14

## 2024-01-01 RX ADMIN — Medication 102.5 MILLIGRAM(S): at 13:12

## 2024-01-01 RX ADMIN — Medication 25 MILLILITER(S): at 05:38

## 2024-01-01 RX ADMIN — LACTULOSE 10 GRAM(S): 10 SOLUTION ORAL at 06:21

## 2024-01-01 RX ADMIN — MEROPENEM 100 MILLIGRAM(S): 1 INJECTION INTRAVENOUS at 23:44

## 2024-01-01 RX ADMIN — LACTULOSE 30 GRAM(S): 10 SOLUTION ORAL at 06:25

## 2024-01-01 RX ADMIN — HEPARIN SODIUM 5000 UNIT(S): 5000 INJECTION INTRAVENOUS; SUBCUTANEOUS at 05:35

## 2024-01-01 RX ADMIN — ROBINUL 0.4 MILLIGRAM(S): 0.2 INJECTION INTRAMUSCULAR; INTRAVENOUS at 16:05

## 2024-01-01 RX ADMIN — LIDOCAINE 1 PATCH: 4 CREAM TOPICAL at 19:26

## 2024-01-01 RX ADMIN — ZINC SULFATE TAB 220 MG (50 MG ZINC EQUIVALENT) 220 MILLIGRAM(S): 220 (50 ZN) TAB at 11:04

## 2024-01-01 RX ADMIN — LACTULOSE 30 GRAM(S): 10 SOLUTION ORAL at 00:59

## 2024-01-01 RX ADMIN — CHLORHEXIDINE GLUCONATE 1 APPLICATION(S): 213 SOLUTION TOPICAL at 06:00

## 2024-01-01 RX ADMIN — HEPARIN SODIUM 5000 UNIT(S): 5000 INJECTION INTRAVENOUS; SUBCUTANEOUS at 17:02

## 2024-01-01 RX ADMIN — Medication 3 MILLILITER(S): at 21:31

## 2024-01-01 RX ADMIN — Medication 1 MILLIGRAM(S): at 11:04

## 2024-01-01 RX ADMIN — SODIUM CHLORIDE 4 MILLILITER(S): 9 INJECTION INTRAMUSCULAR; INTRAVENOUS; SUBCUTANEOUS at 05:46

## 2024-01-01 RX ADMIN — LACTULOSE 20 GRAM(S): 10 SOLUTION ORAL at 14:56

## 2024-01-01 RX ADMIN — BUMETANIDE 132 MILLIGRAM(S): 0.25 INJECTION INTRAMUSCULAR; INTRAVENOUS at 00:42

## 2024-01-01 RX ADMIN — Medication 50 MILLILITER(S): at 11:00

## 2024-01-01 RX ADMIN — DAPTOMYCIN 120 MILLIGRAM(S): 500 INJECTION, POWDER, LYOPHILIZED, FOR SOLUTION INTRAVENOUS at 16:05

## 2024-01-01 RX ADMIN — Medication 15 MILLILITER(S): at 11:20

## 2024-01-01 RX ADMIN — CEFIDEROCOL SULFATE TOSYLATE 33.33 MILLIGRAM(S): 1 INJECTION, POWDER, FOR SOLUTION INTRAVENOUS at 18:12

## 2024-01-01 RX ADMIN — Medication 100 MILLIGRAM(S): at 05:30

## 2024-01-01 RX ADMIN — FENTANYL CITRATE 50 MICROGRAM(S): 50 INJECTION INTRAVENOUS at 16:00

## 2024-01-01 RX ADMIN — Medication 5 GRAM(S): at 14:57

## 2024-01-01 RX ADMIN — Medication 3 MILLILITER(S): at 06:36

## 2024-01-01 RX ADMIN — Medication 100 MILLIGRAM(S): at 07:55

## 2024-01-01 RX ADMIN — Medication 100 MILLIGRAM(S): at 23:10

## 2024-01-01 RX ADMIN — METHADONE HYDROCHLORIDE 5 MILLIGRAM(S): 40 TABLET ORAL at 22:43

## 2024-01-01 RX ADMIN — FENTANYL CITRATE 50 MICROGRAM(S): 50 INJECTION INTRAVENOUS at 20:17

## 2024-01-01 RX ADMIN — FENTANYL CITRATE 50 MICROGRAM(S): 50 INJECTION INTRAVENOUS at 06:20

## 2024-01-01 RX ADMIN — HEPARIN SODIUM 5000 UNIT(S): 5000 INJECTION INTRAVENOUS; SUBCUTANEOUS at 05:15

## 2024-01-01 RX ADMIN — HEPARIN SODIUM 5000 UNIT(S): 5000 INJECTION INTRAVENOUS; SUBCUTANEOUS at 05:17

## 2024-01-01 RX ADMIN — VASOPRESSIN 6 UNIT(S)/MIN: 20 INJECTION INTRAVENOUS at 05:17

## 2024-01-01 RX ADMIN — LACTULOSE 30 GRAM(S): 10 SOLUTION ORAL at 06:41

## 2024-01-01 RX ADMIN — Medication 100 MILLIGRAM(S): at 05:16

## 2024-01-01 RX ADMIN — FENTANYL CITRATE 25 MICROGRAM(S): 50 INJECTION INTRAVENOUS at 02:54

## 2024-01-01 RX ADMIN — Medication 100 MICROGRAM(S): at 05:56

## 2024-01-01 RX ADMIN — SODIUM CHLORIDE 4 MILLILITER(S): 9 INJECTION INTRAMUSCULAR; INTRAVENOUS; SUBCUTANEOUS at 17:14

## 2024-01-01 RX ADMIN — Medication 100 MILLIGRAM(S): at 17:56

## 2024-01-01 RX ADMIN — Medication 25 MILLILITER(S): at 20:13

## 2024-01-01 RX ADMIN — Medication 50 MILLILITER(S): at 18:14

## 2024-01-01 RX ADMIN — LACTULOSE 30 GRAM(S): 10 SOLUTION ORAL at 21:29

## 2024-01-01 RX ADMIN — Medication 50 MILLILITER(S): at 01:00

## 2024-01-01 RX ADMIN — Medication 100 MILLIGRAM(S): at 05:23

## 2024-01-01 RX ADMIN — CEFIDEROCOL SULFATE TOSYLATE 33.33 MILLIGRAM(S): 1 INJECTION, POWDER, FOR SOLUTION INTRAVENOUS at 10:43

## 2024-01-01 RX ADMIN — SODIUM CHLORIDE 4 MILLILITER(S): 9 INJECTION INTRAMUSCULAR; INTRAVENOUS; SUBCUTANEOUS at 06:36

## 2024-01-01 RX ADMIN — Medication 2 UNIT(S): at 12:40

## 2024-01-01 RX ADMIN — ZINC SULFATE TAB 220 MG (50 MG ZINC EQUIVALENT) 220 MILLIGRAM(S): 220 (50 ZN) TAB at 11:45

## 2024-01-01 RX ADMIN — METHADONE HYDROCHLORIDE 5 MILLIGRAM(S): 40 TABLET ORAL at 14:35

## 2024-01-01 RX ADMIN — DAPTOMYCIN 120 MILLIGRAM(S): 500 INJECTION, POWDER, LYOPHILIZED, FOR SOLUTION INTRAVENOUS at 13:55

## 2024-01-01 RX ADMIN — Medication 25 GRAM(S): at 17:37

## 2024-01-01 RX ADMIN — CHLORHEXIDINE GLUCONATE 1 APPLICATION(S): 213 SOLUTION TOPICAL at 05:44

## 2024-01-01 RX ADMIN — MINOCYCLINE HYDROCHLORIDE 100 MILLIGRAM(S): 45 TABLET, EXTENDED RELEASE ORAL at 05:27

## 2024-01-01 RX ADMIN — INSULIN GLARGINE 2 UNIT(S): 100 INJECTION, SOLUTION SUBCUTANEOUS at 09:34

## 2024-01-01 RX ADMIN — INSULIN GLARGINE 2 UNIT(S): 100 INJECTION, SOLUTION SUBCUTANEOUS at 08:46

## 2024-01-01 RX ADMIN — INSULIN GLARGINE 6 UNIT(S): 100 INJECTION, SOLUTION SUBCUTANEOUS at 21:33

## 2024-01-01 RX ADMIN — ZINC SULFATE TAB 220 MG (50 MG ZINC EQUIVALENT) 220 MILLIGRAM(S): 220 (50 ZN) TAB at 12:11

## 2024-01-01 RX ADMIN — Medication 50 MILLIEQUIVALENT(S): at 18:00

## 2024-01-01 RX ADMIN — Medication 5 GRAM(S): at 18:14

## 2024-01-01 RX ADMIN — HEPARIN SODIUM 5000 UNIT(S): 5000 INJECTION INTRAVENOUS; SUBCUTANEOUS at 05:29

## 2024-01-01 RX ADMIN — POTASSIUM PHOSPHATE, MONOBASIC POTASSIUM PHOSPHATE, DIBASIC 62.5 MILLIMOLE(S): 236; 224 INJECTION, SOLUTION INTRAVENOUS at 01:16

## 2024-01-01 RX ADMIN — LACTULOSE 30 GRAM(S): 10 SOLUTION ORAL at 21:33

## 2024-01-01 RX ADMIN — LINEZOLID 300 MILLIGRAM(S): 600 INJECTION, SOLUTION INTRAVENOUS at 05:29

## 2024-01-01 RX ADMIN — INSULIN GLARGINE 3 UNIT(S): 100 INJECTION, SOLUTION SUBCUTANEOUS at 22:51

## 2024-01-01 RX ADMIN — Medication 15 MILLIGRAM(S): at 20:29

## 2024-01-01 RX ADMIN — Medication 10 MILLILITER(S): at 21:34

## 2024-01-01 RX ADMIN — Medication 3 MILLILITER(S): at 13:39

## 2024-01-01 RX ADMIN — PIPERACILLIN AND TAZOBACTAM 200 GRAM(S): 4; .5 INJECTION, POWDER, LYOPHILIZED, FOR SOLUTION INTRAVENOUS at 13:13

## 2024-01-01 RX ADMIN — BUMETANIDE 10 MG/HR: 0.25 INJECTION INTRAMUSCULAR; INTRAVENOUS at 10:44

## 2024-01-01 RX ADMIN — Medication 50 MILLILITER(S): at 08:28

## 2024-01-01 RX ADMIN — Medication 1 MILLIGRAM(S): at 12:15

## 2024-01-01 RX ADMIN — CHLORHEXIDINE GLUCONATE 15 MILLILITER(S): 213 SOLUTION TOPICAL at 17:25

## 2024-01-01 RX ADMIN — METHADONE HYDROCHLORIDE 5 MILLIGRAM(S): 40 TABLET ORAL at 05:37

## 2024-01-01 RX ADMIN — FENTANYL CITRATE 25 MICROGRAM(S): 50 INJECTION INTRAVENOUS at 12:53

## 2024-01-01 RX ADMIN — Medication 50 MILLILITER(S): at 06:00

## 2024-01-01 RX ADMIN — Medication 100 MILLIGRAM(S): at 06:18

## 2024-01-01 RX ADMIN — Medication 100 MILLIEQUIVALENT(S): at 01:57

## 2024-01-01 RX ADMIN — LACTULOSE 30 GRAM(S): 10 SOLUTION ORAL at 05:12

## 2024-01-01 RX ADMIN — Medication 3 MILLILITER(S): at 17:44

## 2024-01-01 RX ADMIN — LACTULOSE 10 GRAM(S): 10 SOLUTION ORAL at 21:13

## 2024-01-01 RX ADMIN — Medication 3 MILLILITER(S): at 11:05

## 2024-01-01 RX ADMIN — Medication 2: at 17:27

## 2024-01-01 RX ADMIN — MINOCYCLINE HYDROCHLORIDE 100 MILLIGRAM(S): 45 TABLET, EXTENDED RELEASE ORAL at 06:04

## 2024-01-01 RX ADMIN — FENTANYL CITRATE 200 MICROGRAM(S): 50 INJECTION INTRAVENOUS at 00:00

## 2024-01-01 RX ADMIN — PIPERACILLIN AND TAZOBACTAM 25 GRAM(S): 4; .5 INJECTION, POWDER, LYOPHILIZED, FOR SOLUTION INTRAVENOUS at 17:16

## 2024-01-01 RX ADMIN — Medication 50 MILLIEQUIVALENT(S): at 06:29

## 2024-01-01 RX ADMIN — AMPICILLIN SODIUM AND SULBACTAM SODIUM 50 GRAM(S): 250; 125 INJECTION, POWDER, FOR SUSPENSION INTRAMUSCULAR; INTRAVENOUS at 22:23

## 2024-01-01 RX ADMIN — Medication 3 MILLILITER(S): at 11:27

## 2024-01-01 RX ADMIN — ZINC SULFATE TAB 220 MG (50 MG ZINC EQUIVALENT) 220 MILLIGRAM(S): 220 (50 ZN) TAB at 14:51

## 2024-01-01 RX ADMIN — Medication 100 MILLIGRAM(S): at 18:12

## 2024-01-01 RX ADMIN — Medication 50 MILLILITER(S): at 17:00

## 2024-01-01 RX ADMIN — METHADONE HYDROCHLORIDE 5 MILLIGRAM(S): 40 TABLET ORAL at 22:56

## 2024-01-01 RX ADMIN — Medication 102.5 MILLIGRAM(S): at 05:13

## 2024-01-01 RX ADMIN — FENTANYL CITRATE 50 MICROGRAM(S): 50 INJECTION INTRAVENOUS at 18:12

## 2024-01-01 RX ADMIN — Medication 15 MILLIGRAM(S): at 21:00

## 2024-01-01 RX ADMIN — Medication 2000 UNIT(S): at 12:15

## 2024-01-01 RX ADMIN — Medication 25 GRAM(S): at 02:11

## 2024-01-01 RX ADMIN — LINEZOLID 300 MILLIGRAM(S): 600 INJECTION, SOLUTION INTRAVENOUS at 18:12

## 2024-01-01 RX ADMIN — METHADONE HYDROCHLORIDE 5 MILLIGRAM(S): 40 TABLET ORAL at 05:04

## 2024-01-01 RX ADMIN — Medication 2000 UNIT(S): at 11:19

## 2024-01-01 RX ADMIN — Medication 50 MILLIEQUIVALENT(S): at 20:05

## 2024-01-01 RX ADMIN — Medication 25 MILLILITER(S): at 16:00

## 2024-01-01 RX ADMIN — CASPOFUNGIN ACETATE 260 MILLIGRAM(S): 7 INJECTION, POWDER, LYOPHILIZED, FOR SOLUTION INTRAVENOUS at 03:47

## 2024-01-01 RX ADMIN — CHLORHEXIDINE GLUCONATE 15 MILLILITER(S): 213 SOLUTION TOPICAL at 05:33

## 2024-01-01 RX ADMIN — LACTULOSE 10 GRAM(S): 10 SOLUTION ORAL at 03:18

## 2024-01-01 RX ADMIN — Medication 50 MILLILITER(S): at 09:53

## 2024-01-01 RX ADMIN — MINOCYCLINE HYDROCHLORIDE 200 MILLIGRAM(S): 45 TABLET, EXTENDED RELEASE ORAL at 05:29

## 2024-01-01 RX ADMIN — Medication 2: at 20:37

## 2024-01-01 RX ADMIN — HUMAN INSULIN 3 UNIT(S): 100 INJECTION, SUSPENSION SUBCUTANEOUS at 05:45

## 2024-01-01 RX ADMIN — HUMAN INSULIN 4 UNIT(S): 100 INJECTION, SUSPENSION SUBCUTANEOUS at 11:15

## 2024-01-01 RX ADMIN — Medication 2000 UNIT(S): at 11:44

## 2024-01-01 RX ADMIN — METHADONE HYDROCHLORIDE 5 MILLIGRAM(S): 40 TABLET ORAL at 14:56

## 2024-01-01 RX ADMIN — DEXMEDETOMIDINE HYDROCHLORIDE IN 0.9% SODIUM CHLORIDE 7.8 MICROGRAM(S)/KG/HR: 4 INJECTION INTRAVENOUS at 22:57

## 2024-01-01 RX ADMIN — INSULIN GLARGINE 10 UNIT(S): 100 INJECTION, SOLUTION SUBCUTANEOUS at 22:13

## 2024-01-01 RX ADMIN — URSODIOL 500 MILLIGRAM(S): 250 TABLET, FILM COATED ORAL at 05:43

## 2024-01-01 RX ADMIN — Medication 2: at 05:36

## 2024-01-01 RX ADMIN — METHADONE HYDROCHLORIDE 5 MILLIGRAM(S): 40 TABLET ORAL at 21:39

## 2024-01-01 RX ADMIN — URSODIOL 500 MILLIGRAM(S): 250 TABLET, FILM COATED ORAL at 05:34

## 2024-01-01 RX ADMIN — LACTULOSE 30 GRAM(S): 10 SOLUTION ORAL at 00:40

## 2024-01-01 RX ADMIN — Medication 105 MILLIGRAM(S): at 22:44

## 2024-01-01 RX ADMIN — LACTULOSE 30 GRAM(S): 10 SOLUTION ORAL at 05:44

## 2024-01-01 RX ADMIN — HUMAN INSULIN 3 UNIT(S): 100 INJECTION, SUSPENSION SUBCUTANEOUS at 01:01

## 2024-01-01 RX ADMIN — Medication 25 MILLILITER(S): at 01:00

## 2024-01-01 RX ADMIN — Medication 100 MILLIEQUIVALENT(S): at 13:13

## 2024-01-01 RX ADMIN — Medication 25 MILLILITER(S): at 18:15

## 2024-01-01 RX ADMIN — Medication 5.85 MICROGRAM(S)/KG/MIN: at 09:20

## 2024-01-01 RX ADMIN — FENTANYL CITRATE 25 MICROGRAM(S): 50 INJECTION INTRAVENOUS at 01:08

## 2024-01-01 RX ADMIN — LACTULOSE 30 GRAM(S): 10 SOLUTION ORAL at 13:18

## 2024-01-01 RX ADMIN — INSULIN GLARGINE 2 UNIT(S): 100 INJECTION, SOLUTION SUBCUTANEOUS at 09:41

## 2024-01-01 RX ADMIN — CASPOFUNGIN ACETATE 260 MILLIGRAM(S): 7 INJECTION, POWDER, LYOPHILIZED, FOR SOLUTION INTRAVENOUS at 07:35

## 2024-01-01 RX ADMIN — Medication 3.51 MICROGRAM(S)/KG/MIN: at 22:57

## 2024-01-01 RX ADMIN — PANTOPRAZOLE SODIUM 40 MILLIGRAM(S): 20 TABLET, DELAYED RELEASE ORAL at 05:16

## 2024-01-01 RX ADMIN — Medication 2 UNIT(S): at 17:13

## 2024-01-01 RX ADMIN — FENTANYL CITRATE 50 MICROGRAM(S): 50 INJECTION INTRAVENOUS at 04:39

## 2024-01-01 RX ADMIN — Medication 105 MILLIGRAM(S): at 05:15

## 2024-01-01 RX ADMIN — CHLORHEXIDINE GLUCONATE 15 MILLILITER(S): 213 SOLUTION TOPICAL at 17:30

## 2024-01-01 RX ADMIN — Medication 100 MICROGRAM(S): at 05:15

## 2024-01-01 RX ADMIN — Medication 50 MILLIEQUIVALENT(S): at 05:14

## 2024-01-01 RX ADMIN — Medication 40 MILLIEQUIVALENT(S): at 01:29

## 2024-01-01 RX ADMIN — DEXMEDETOMIDINE HYDROCHLORIDE IN 0.9% SODIUM CHLORIDE 7.8 MICROGRAM(S)/KG/HR: 4 INJECTION INTRAVENOUS at 20:13

## 2024-01-01 RX ADMIN — Medication 2: at 00:59

## 2024-01-01 RX ADMIN — CHLORHEXIDINE GLUCONATE 1 APPLICATION(S): 213 SOLUTION TOPICAL at 05:15

## 2024-01-01 RX ADMIN — Medication 15 GRAM(S): at 17:05

## 2024-01-01 RX ADMIN — Medication 50 MILLILITER(S): at 22:00

## 2024-01-01 RX ADMIN — Medication 20 MILLIGRAM(S): at 17:14

## 2024-01-01 RX ADMIN — MINOCYCLINE HYDROCHLORIDE 100 MILLIGRAM(S): 45 TABLET, EXTENDED RELEASE ORAL at 20:19

## 2024-01-01 RX ADMIN — MEROPENEM 100 MILLIGRAM(S): 1 INJECTION INTRAVENOUS at 10:10

## 2024-01-01 RX ADMIN — CEFIDEROCOL SULFATE TOSYLATE 33.33 MILLIGRAM(S): 1 INJECTION, POWDER, FOR SOLUTION INTRAVENOUS at 02:10

## 2024-01-01 RX ADMIN — LACTULOSE 30 GRAM(S): 10 SOLUTION ORAL at 21:39

## 2024-01-01 RX ADMIN — HEPARIN SODIUM 5000 UNIT(S): 5000 INJECTION INTRAVENOUS; SUBCUTANEOUS at 17:15

## 2024-01-01 RX ADMIN — FENTANYL CITRATE 25 MICROGRAM(S): 50 INJECTION INTRAVENOUS at 12:38

## 2024-01-01 RX ADMIN — PANTOPRAZOLE SODIUM 40 MILLIGRAM(S): 20 TABLET, DELAYED RELEASE ORAL at 11:53

## 2024-01-01 RX ADMIN — HEPARIN SODIUM 5000 UNIT(S): 5000 INJECTION INTRAVENOUS; SUBCUTANEOUS at 05:46

## 2024-01-01 RX ADMIN — PANTOPRAZOLE SODIUM 40 MILLIGRAM(S): 20 TABLET, DELAYED RELEASE ORAL at 05:24

## 2024-01-01 RX ADMIN — PANTOPRAZOLE SODIUM 40 MILLIGRAM(S): 20 TABLET, DELAYED RELEASE ORAL at 11:15

## 2024-01-01 RX ADMIN — LACTULOSE 10 GRAM(S): 10 SOLUTION ORAL at 01:29

## 2024-01-01 RX ADMIN — Medication 2: at 17:31

## 2024-01-01 RX ADMIN — DAPTOMYCIN 120 MILLIGRAM(S): 500 INJECTION, POWDER, LYOPHILIZED, FOR SOLUTION INTRAVENOUS at 14:26

## 2024-01-01 RX ADMIN — PANTOPRAZOLE SODIUM 40 MILLIGRAM(S): 20 TABLET, DELAYED RELEASE ORAL at 12:10

## 2024-01-01 RX ADMIN — Medication 105 MILLIGRAM(S): at 13:25

## 2024-01-01 RX ADMIN — Medication 2: at 17:20

## 2024-01-01 RX ADMIN — MEROPENEM 100 MILLIGRAM(S): 1 INJECTION INTRAVENOUS at 00:28

## 2024-01-01 RX ADMIN — Medication 1 MILLIGRAM(S): at 12:43

## 2024-01-01 RX ADMIN — Medication 400 MILLIGRAM(S): at 20:29

## 2024-01-01 RX ADMIN — Medication 3 MILLILITER(S): at 05:46

## 2024-01-01 RX ADMIN — Medication 105 MILLIGRAM(S): at 21:43

## 2024-01-01 RX ADMIN — Medication 3: at 00:40

## 2024-01-01 RX ADMIN — CASPOFUNGIN ACETATE 70 MILLIGRAM(S): 7 INJECTION, POWDER, LYOPHILIZED, FOR SOLUTION INTRAVENOUS at 05:38

## 2024-01-01 RX ADMIN — Medication 15 MILLILITER(S): at 12:12

## 2024-01-01 RX ADMIN — FENTANYL CITRATE 200 MICROGRAM(S): 50 INJECTION INTRAVENOUS at 23:45

## 2024-01-01 RX ADMIN — Medication 25 GRAM(S): at 02:14

## 2024-01-01 RX ADMIN — Medication 3 MILLILITER(S): at 23:44

## 2024-01-01 RX ADMIN — Medication 25 MILLILITER(S): at 21:00

## 2024-01-01 RX ADMIN — Medication 50 MILLILITER(S): at 13:12

## 2024-01-01 RX ADMIN — PROPOFOL 11.2 MICROGRAM(S)/KG/MIN: 10 INJECTION, EMULSION INTRAVENOUS at 08:29

## 2024-01-01 RX ADMIN — HEPARIN SODIUM 5000 UNIT(S): 5000 INJECTION INTRAVENOUS; SUBCUTANEOUS at 17:20

## 2024-01-01 RX ADMIN — Medication 1: at 11:32

## 2024-01-01 RX ADMIN — Medication 15 MILLILITER(S): at 12:02

## 2024-01-01 RX ADMIN — Medication 2: at 08:26

## 2024-01-01 RX ADMIN — Medication 100 MILLIEQUIVALENT(S): at 14:33

## 2024-01-01 RX ADMIN — CHLORHEXIDINE GLUCONATE 1 APPLICATION(S): 213 SOLUTION TOPICAL at 05:34

## 2024-01-01 RX ADMIN — LINEZOLID 300 MILLIGRAM(S): 600 INJECTION, SOLUTION INTRAVENOUS at 06:18

## 2024-01-01 RX ADMIN — Medication 25 MILLILITER(S): at 12:38

## 2024-01-01 RX ADMIN — Medication 50 MILLILITER(S): at 09:00

## 2024-01-01 RX ADMIN — Medication 3.51 MICROGRAM(S)/KG/MIN: at 05:34

## 2024-01-01 RX ADMIN — Medication 1: at 06:25

## 2024-01-01 RX ADMIN — Medication 50 MILLILITER(S): at 13:00

## 2024-01-01 RX ADMIN — BUMETANIDE 20 MG/HR: 0.25 INJECTION INTRAMUSCULAR; INTRAVENOUS at 07:34

## 2024-01-01 RX ADMIN — ZINC SULFATE TAB 220 MG (50 MG ZINC EQUIVALENT) 220 MILLIGRAM(S): 220 (50 ZN) TAB at 11:15

## 2024-01-01 RX ADMIN — MEROPENEM 100 MILLIGRAM(S): 1 INJECTION INTRAVENOUS at 11:24

## 2024-01-01 RX ADMIN — Medication 2000 UNIT(S): at 14:51

## 2024-01-01 RX ADMIN — Medication 50 MILLILITER(S): at 12:24

## 2024-01-01 RX ADMIN — Medication 40 MILLIEQUIVALENT(S): at 01:16

## 2024-01-01 RX ADMIN — Medication 100 MICROGRAM(S): at 12:18

## 2024-01-01 RX ADMIN — FENTANYL CITRATE 25 MICROGRAM(S): 50 INJECTION INTRAVENOUS at 06:17

## 2024-01-01 RX ADMIN — Medication 25 MILLILITER(S): at 22:25

## 2024-01-01 RX ADMIN — CHLORHEXIDINE GLUCONATE 1 APPLICATION(S): 213 SOLUTION TOPICAL at 06:22

## 2024-01-01 RX ADMIN — AMPICILLIN SODIUM AND SULBACTAM SODIUM 50 GRAM(S): 250; 125 INJECTION, POWDER, FOR SUSPENSION INTRAMUSCULAR; INTRAVENOUS at 13:15

## 2024-01-01 RX ADMIN — Medication 3 MILLILITER(S): at 23:12

## 2024-01-01 RX ADMIN — Medication 25 MILLILITER(S): at 14:00

## 2024-01-01 RX ADMIN — PANTOPRAZOLE SODIUM 40 MILLIGRAM(S): 20 TABLET, DELAYED RELEASE ORAL at 12:03

## 2024-01-01 RX ADMIN — SODIUM CHLORIDE 4 MILLILITER(S): 9 INJECTION INTRAMUSCULAR; INTRAVENOUS; SUBCUTANEOUS at 17:08

## 2024-01-01 RX ADMIN — FENTANYL CITRATE 25 MICROGRAM(S): 50 INJECTION INTRAVENOUS at 15:22

## 2024-01-01 RX ADMIN — Medication 1 MILLIGRAM(S): at 12:02

## 2024-01-01 RX ADMIN — MORPHINE SULFATE 8 MILLIGRAM(S): 50 CAPSULE, EXTENDED RELEASE ORAL at 16:09

## 2024-01-01 RX ADMIN — Medication 40 MILLIGRAM(S): at 12:35

## 2024-01-01 RX ADMIN — Medication 102.5 MILLIGRAM(S): at 21:23

## 2024-01-01 RX ADMIN — Medication 1 MILLIGRAM(S): at 11:30

## 2024-01-01 RX ADMIN — VASOPRESSIN 6 UNIT(S)/MIN: 20 INJECTION INTRAVENOUS at 07:31

## 2024-01-01 RX ADMIN — PANTOPRAZOLE SODIUM 40 MILLIGRAM(S): 20 TABLET, DELAYED RELEASE ORAL at 11:29

## 2024-01-01 RX ADMIN — Medication 100 GRAM(S): at 11:48

## 2024-01-01 RX ADMIN — LACTULOSE 10 GRAM(S): 10 SOLUTION ORAL at 13:15

## 2024-01-01 RX ADMIN — MEROPENEM 100 MILLIGRAM(S): 1 INJECTION INTRAVENOUS at 00:58

## 2024-01-01 RX ADMIN — HEPARIN SODIUM 5000 UNIT(S): 5000 INJECTION INTRAVENOUS; SUBCUTANEOUS at 17:37

## 2024-01-01 RX ADMIN — Medication 100 GRAM(S): at 02:18

## 2024-01-01 RX ADMIN — Medication 20 MILLIGRAM(S): at 18:02

## 2024-01-01 RX ADMIN — LACTULOSE 20 GRAM(S): 10 SOLUTION ORAL at 05:38

## 2024-01-01 RX ADMIN — LACTULOSE 30 GRAM(S): 10 SOLUTION ORAL at 22:44

## 2024-01-01 RX ADMIN — AMPICILLIN SODIUM AND SULBACTAM SODIUM 50 GRAM(S): 250; 125 INJECTION, POWDER, FOR SUSPENSION INTRAMUSCULAR; INTRAVENOUS at 13:08

## 2024-01-01 RX ADMIN — FENTANYL CITRATE 50 MICROGRAM(S): 50 INJECTION INTRAVENOUS at 16:45

## 2024-01-01 RX ADMIN — LEVOCARNITINE 330 MILLIGRAM(S): 330 TABLET ORAL at 22:44

## 2024-01-01 RX ADMIN — METHADONE HYDROCHLORIDE 5 MILLIGRAM(S): 40 TABLET ORAL at 13:06

## 2024-01-01 RX ADMIN — Medication 100 MILLIGRAM(S): at 05:24

## 2024-01-01 RX ADMIN — Medication 25 MILLILITER(S): at 05:44

## 2024-01-01 RX ADMIN — Medication 15 MILLILITER(S): at 11:24

## 2024-01-01 RX ADMIN — INSULIN GLARGINE 2 UNIT(S): 100 INJECTION, SOLUTION SUBCUTANEOUS at 08:26

## 2024-01-01 RX ADMIN — METHADONE HYDROCHLORIDE 5 MILLIGRAM(S): 40 TABLET ORAL at 05:46

## 2024-01-01 RX ADMIN — AMPICILLIN SODIUM AND SULBACTAM SODIUM 50 GRAM(S): 250; 125 INJECTION, POWDER, FOR SUSPENSION INTRAMUSCULAR; INTRAVENOUS at 16:11

## 2024-01-01 RX ADMIN — Medication 50 MILLILITER(S): at 05:08

## 2024-01-01 RX ADMIN — Medication 25 MILLILITER(S): at 19:00

## 2024-01-01 RX ADMIN — PANTOPRAZOLE SODIUM 40 MILLIGRAM(S): 20 TABLET, DELAYED RELEASE ORAL at 11:24

## 2024-01-01 RX ADMIN — FENTANYL CITRATE 1.56 MICROGRAM(S)/KG/HR: 50 INJECTION INTRAVENOUS at 15:36

## 2024-01-01 RX ADMIN — Medication 250 MILLIGRAM(S): at 05:11

## 2024-01-01 RX ADMIN — Medication 15 MILLILITER(S): at 14:34

## 2024-01-01 RX ADMIN — METHADONE HYDROCHLORIDE 5 MILLIGRAM(S): 40 TABLET ORAL at 13:05

## 2024-01-01 RX ADMIN — Medication 100 MILLIEQUIVALENT(S): at 16:05

## 2024-01-16 LAB
LDLC SERPL DIRECT ASSAY-MCNC: 112
TSH SERPL-ACNC: 2.07

## 2024-01-17 ENCOUNTER — APPOINTMENT (OUTPATIENT)
Dept: ENDOCRINOLOGY | Facility: CLINIC | Age: 49
End: 2024-01-17

## 2024-02-15 RX ORDER — INSULIN ASPART 100 [IU]/ML
100 INJECTION, SOLUTION INTRAVENOUS; SUBCUTANEOUS
Qty: 8 | Refills: 0 | Status: ACTIVE | COMMUNITY
Start: 2023-03-08 | End: 1900-01-01

## 2024-02-15 NOTE — PROGRESS NOTE ADULT - SUBJECTIVE AND OBJECTIVE BOX
[facility] [id number]    OFFICE [phone]                              ********VASCULAR MEDICINE & CARDIOLOGY PROGRESS NOTE********                            CC: Right atrial thrombus      INTERVAL HISTORY: Patient maintained on heparin gtt    HISTORY OF PRESENT ILLNESS:  HPI:  42-year-old female with history of chronic pancreatitis since childhood s/p [place] procedure in 2014, 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 During her last admission (4/5-4/16/18) after her islet cell transplant, patient developed LUQ abdominal collections found to be tracking to her midline. She underwent IR drainage and was discharged with drains that were removed two weeks ago. Drain cultures positive for Candida and VRE. She was discharged with a PICC and remained on antifungals.   Was seen at Dr. Walker’s on day of admission and found to have copious drainage from her midline wound that has been present and clear in color since her discharge, but has recently turned cloudy and foul-smelling two days ago. Denies fevers/chills, nausea/vomiting. Reports having diffuse abdominal pain. (08 May 2018 16:38)          Allergies    No Known Allergies    Intolerances    	    MEDICATIONS:  alteplase    IVPB 100 milliGRAM(s) IV Intermittent once  heparin  Infusion 1200 Unit(s)/Hr IV Continuous <Continuous>    ertapenem  IVPB 1000 milliGRAM(s) IV Intermittent every 24 hours  ertapenem  IVPB      erythromycin     base Tablet 500 milliGRAM(s) Oral every 6 hours  linezolid  IVPB      linezolid  IVPB 600 milliGRAM(s) IV Intermittent every 12 hours      acetaminophen   Tablet 650 milliGRAM(s) Oral every 6 hours PRN  ondansetron   Disintegrating Tablet 8 milliGRAM(s) Oral every 8 hours PRN  oxyCODONE    IR 10 milliGRAM(s) Oral every 3 hours PRN  oxyCODONE  ER Tablet 30 milliGRAM(s) Oral every 8 hours    amylase/lipase/protease  (CREON  6,000 Units) 1 Capsule(s) Oral three times a day with meals  docusate sodium Liquid 200 milliGRAM(s) Oral at bedtime PRN  pantoprazole  Injectable 40 milliGRAM(s) IV Push daily    dextrose 40% Gel 15 Gram(s) Oral once PRN  dextrose 50% Injectable 12.5 Gram(s) IV Push once  dextrose 50% Injectable 25 Gram(s) IV Push once  dextrose 50% Injectable 25 Gram(s) IV Push once  glucagon  Injectable 1 milliGRAM(s) IntraMuscular once PRN  insulin glargine Injectable (LANTUS) 6 Unit(s) SubCutaneous every morning  insulin lispro (HumaLOG) corrective regimen sliding scale   SubCutaneous three times a day before meals  insulin lispro (HumaLOG) corrective regimen sliding scale   SubCutaneous at bedtime  insulin lispro Injectable (HumaLOG) 2 Unit(s) SubCutaneous three times a day before meals  megestrol Suspension 800 milliGRAM(s) Oral daily    dextrose 5%. 1000 milliLiter(s) IV Continuous <Continuous>  magnesium sulfate  IVPB 2 Gram(s) IV Intermittent once  sodium chloride 0.9%. 1000 milliLiter(s) IV Continuous <Continuous>      PAST MEDICAL & SURGICAL HISTORY:  Premenstrual migraine  Other chronic pancreatitis  Status post pancreatic islet cell transplantation  H/O splenectomy  History of pancreatectomy  S/P cholecystectomy: in early 20&#x27;s      FAMILY HISTORY:  No pertinent family history in first degree relatives      SOCIAL HISTORY:  unchanged    REVIEW OF SYSTEMS:  CONSTITUTIONAL: No fever, weight loss, or fatigue  EYES: No eye pain, visual disturbances, or discharge  ENMT:  No difficulty hearing, tinnitus, vertigo; No sinus or throat pain  NECK: No pain or stiffness  RESPIRATORY: No cough, wheezing, chills or hemoptysis; No Shortness of Breath  CARDIOVASCULAR: No chest pain, palpitations, passing out, dizziness, or leg swelling  GASTROINTESTINAL: No abdominal or epigastric pain. No nausea, vomiting, or hematemesis; No diarrhea or constipation. No melena or hematochezia.  GENITOURINARY: No dysuria, frequency, hematuria, or incontinence  NEUROLOGICAL: No headaches, memory loss, loss of strength, numbness, or tremors  SKIN: No itching, burning, rashes, or lesions   LYMPH Nodes: No enlarged glands  ENDOCRINE: No heat or cold intolerance; No hair loss  MUSCULOSKELETAL: No joint pain or swelling; No muscle, back, or extremity pain  PSYCHIATRIC: No depression, anxiety, mood swings, or difficulty sleeping  HEME/LYMPH: No easy bruising, or bleeding gums  ALLERY AND IMMUNOLOGIC: No hives or eczema	    [ ] All others negative	  [ ] Unable to obtain    PHYSICAL EXAM:  T(C): 36.5 (05-11-18 @ 07:05), Max: 37.3 (05-10-18 @ 16:15)  HR: 88 (05-11-18 @ 09:00) (86 - 109)  BP: 121/71 (05-11-18 @ 09:00) (101/62 - 131/75)  RR: 16 (05-11-18 @ 07:05) (14 - 18)  SpO2: 98% (05-11-18 @ 09:00) (96% - 100%)  Wt(kg): --  I&O's Summary    10 May 2018 07:01  -  11 May 2018 07:00  --------------------------------------------------------  IN: 2578 mL / OUT: 1415 mL / NET: 1163 mL    11 May 2018 07:01  -  11 May 2018 10:11  --------------------------------------------------------  IN: 524 mL / OUT: 0 mL / NET: 524 mL        Appearance: Normal	  HEENT:   Normal oral mucosa, PERRL, EOMI	  Lymphatic: No lymphadenopathy  Cardiovascular: Normal S1 S2, No JVD, No murmurs, No edema  Respiratory: Lungs clear to auscultation	  Psychiatry: A & O x 3, Mood & affect appropriate  Gastrointestinal:  Soft, Non-tender, + B, abdominal drain in place  Skin: No rashes, No ecchymoses, No cyanosis	  Neurologic: Non-focal  Extremities: Normal range of motion, No clubbing, cyanosis or edema  Vascular: Peripheral pulses palpable 2+ bilaterally      LABS:	 	    CBC Full  -  ( 11 May 2018 06:31 )  WBC Count : 7.2 K/uL  Hemoglobin : 10.1 g/dL  Hematocrit : 32.2 %  Platelet Count - Automated : 871 K/uL  Mean Cell Volume : 94.7 fl  Mean Cell Hemoglobin : 29.6 pg  Mean Cell Hemoglobin Concentration : 31.3 gm/dL  Auto Neutrophil # : 3.1 K/uL  Auto Lymphocyte # : 3.3 K/uL  Auto Monocyte # : 0.6 K/uL  Auto Eosinophil # : 0.1 K/uL  Auto Basophil # : 0.1 K/uL  Auto Neutrophil % : 42.7 %  Auto Lymphocyte % : 45.7 %  Auto Monocyte % : 8.7 %  Auto Eosinophil % : 2.0 %  Auto Basophil % : 0.9 %    05-11    134<L>  |  100  |  <4<L>  ----------------------------<  396<H>  4.9   |  17<L>  |  0.67  05-10    134<L>  |  98  |  6<L>  ----------------------------<  362<H>  4.3   |  22  |  0.68    Ca    8.1<L>      11 May 2018 06:31  Ca    8.1<L>      10 May 2018 00:46  Phos  2.6     05-11  Phos  2.7     05-10  Mg     1.4     05-11  Mg     1.4     05-10    TPro  5.9<L>  /  Alb  2.4<L>  /  TBili  0.1<L>  /  DBili  x   /  AST  14  /  ALT  8<L>  /  AlkPhos  81  05-11  TPro  6.1  /  Alb  2.6<L>  /  TBili  0.1<L>  /  DBili  x   /  AST  11  /  ALT  8<L>  /  AlkPhos  88  05-10 Yes

## 2024-03-25 ENCOUNTER — NON-APPOINTMENT (OUTPATIENT)
Age: 49
End: 2024-03-25

## 2024-03-26 ENCOUNTER — APPOINTMENT (OUTPATIENT)
Dept: ENDOCRINOLOGY | Facility: CLINIC | Age: 49
End: 2024-03-26
Payer: MEDICARE

## 2024-03-26 VITALS
OXYGEN SATURATION: 97 % | HEART RATE: 72 BPM | DIASTOLIC BLOOD PRESSURE: 70 MMHG | WEIGHT: 100 LBS | BODY MASS INDEX: 17.07 KG/M2 | HEIGHT: 64 IN | SYSTOLIC BLOOD PRESSURE: 105 MMHG

## 2024-03-26 DIAGNOSIS — E10.9 TYPE 1 DIABETES MELLITUS W/OUT COMPLICATIONS: ICD-10-CM

## 2024-03-26 DIAGNOSIS — E55.9 VITAMIN D DEFICIENCY, UNSPECIFIED: ICD-10-CM

## 2024-03-26 LAB — GLUCOSE BLDC GLUCOMTR-MCNC: 153

## 2024-03-26 PROCEDURE — G2211 COMPLEX E/M VISIT ADD ON: CPT

## 2024-03-26 PROCEDURE — 95251 CONT GLUC MNTR ANALYSIS I&R: CPT

## 2024-03-26 PROCEDURE — 82962 GLUCOSE BLOOD TEST: CPT

## 2024-03-26 PROCEDURE — 99215 OFFICE O/P EST HI 40 MIN: CPT

## 2024-03-26 NOTE — PHYSICAL EXAM
[No Acute Distress] : no acute distress [Well Developed] : well developed [Normal Voice/Communication] : normal voice communication [PERRL] : pupils equal, round and reactive to light [Normal Outer Ear/Nose] : the ears and nose were normal in appearance [Normal Hearing] : hearing was normal [Thyroid Not Enlarged] : the thyroid was not enlarged [No Thyroid Nodules] : no palpable thyroid nodules [No Respiratory Distress] : no respiratory distress [Clear to Auscultation] : lungs were clear to auscultation bilaterally [Normal Bowel Sounds] : normal bowel sounds [Soft] : abdomen soft [No Clubbing, Cyanosis] : no clubbing  or cyanosis of the fingernails [No Tremors] : no tremors [Oriented x3] : oriented to person, place, and time [Normal Affect] : the affect was normal [Normal Insight/Judgement] : insight and judgment were intact [Normal Mood] : the mood was normal

## 2024-04-09 NOTE — DISCHARGE NOTE ADULT - NS AS DC DISCHARGE ACCOMPANY
Antibacterial body wash   Topical mupirocin antibiotic ointment as prescribed  Triamcinolone to area on hand  Follow up with dermatology if not improving over the next few days.    [facility]   [address] [place] [id number] [phone]   Significant Other

## 2024-04-13 ENCOUNTER — RESULT REVIEW (OUTPATIENT)
Age: 49
End: 2024-04-13

## 2024-04-13 NOTE — CONSULT NOTE ADULT - PROBLEM SELECTOR RECOMMENDATION 2
Pt. with fluid overload. Plan for initiation of CRRT, as outlined above. Net negative fluid balance. Monitor daily body weights.     If you have any questions, please feel free to contact me  Catia Girard  Nephrology Fellow  [phone] / [employer] Teams(Preferred)  (After 5pm or on weekends please page the on-call fellow)

## 2024-04-13 NOTE — CONSULT NOTE ADULT - SUBJECTIVE AND OBJECTIVE BOX
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 Hargrove), and now S/P total pancreatectomy  with islet cell autotransplant at [facility] by Dr. Gil in 04/2018).    Patient's recovery was complicated by abdominal collections first on POD 6 which was managed with IR drain and IV antibiotic. Patient was discharged with PICC line for long term antifungal. Patient was readmitted on 05/8 again for abscess which was drained by IR. Patient was also started on therapeutic Lovenox for right atrial thrombus. Patient again developed abdominal pain with fever of 100.7 on 05/20 associated with thick yellow discharge from the ventral incisional and went to [facility] which was nearby for care. CT scan at [facility] showed complex multiloculated postsurgical abscess in the omental fat of the superior epigastrium measuring 6.7 cm and enterocutaneous fistulous tract extending anteriorly from the abscess to the midline incision site and another tract going into the soft tissues of the left upper abdominal quadrant communicating with a subcutaneous 3.3cm abscess  Patient was started on IV zosyn, flagyl and micafungin while there. Patient transferred today to [facility] for continuity of care under Dr. Ansari    Patient presented to [facility] ED on 4/8 by her spouse for AMS. Per , patient had been having nbnb vomiting and diarrhea for the last 4 weeks. She was unable to tolerate PO. She was also sleeping 18-20 hrs per day. On 4/8, he found her on the floor "completely out of it" and took her to [facility] for further evalution.   Initial workup in ED demenstrated a Tbili 5.4, direct bili 4.4 and ammonia 196. Patient was somnelent and had a left eyeward gaze deviation. Subquesntly started developing agonal snoring respirations and had to be intubated for airway protection. (13 April, 2024; 03:31)    Per , patient had a subacute onset of malaise and fatigue with acute change in mental status on the day of admission that resulted in intubation. She had decreased PO intake x several weeks. Had hx of GIB but negative colonoscopy recently.       PMHX/PSHX:      Allergies:  No Known Allergies      Home Medications: reviewed  Hospital Medications:  caspofungin IVPB      chlorhexidine 0.12% Liquid 15 milliLiter(s) Oral Mucosa every 12 hours  chlorhexidine 4% Liquid 1 Application(s) Topical <User Schedule>  DAPTOmycin IVPB 500 milliGRAM(s) IV Intermittent every 24 hours  dexMEDEtomidine Infusion 0.5 MICROgram(s)/kG/Hr IV Continuous <Continuous>  dextrose 10%. 1000 milliLiter(s) IV Continuous <Continuous>  doxycycline IVPB 100 milliGRAM(s) IV Intermittent every 12 hours  fentaNYL    Injectable 25 MICROGram(s) IV Push every 5 minutes  folic acid 1 milliGRAM(s) Oral daily  insulin glargine Injectable (LANTUS) 10 Unit(s) SubCutaneous at bedtime  insulin lispro (ADMELOG) corrective regimen sliding scale   SubCutaneous every 6 hours  levothyroxine 100 MICROGram(s) Oral daily  meropenem  IVPB 1000 milliGRAM(s) IV Intermittent every 12 hours  multivitamin/minerals/iron Oral Solution (CENTRUM) 15 milliLiter(s) Oral daily  pantoprazole  Injectable 40 milliGRAM(s) IV Push daily  rifAXIMin 550 milliGRAM(s) Oral two times a day  sodium chloride 0.9% lock flush 10 milliLiter(s) IV Push every 1 hour PRN  thiamine 100 milliGRAM(s) Oral daily  ursodiol Tablet 500 milliGRAM(s) Oral <User Schedule>      Social History:   Tobacco: denies  Alcohol: denies  Recreational drugs: denies    Family history:    No known liver diseases in family.     PHYSICAL EXAM:   Vital Signs:  Vital Signs Last 24 Hrs  T(C): 36.1 (13 Apr 2024 16:00), Max: 36.8 (13 Apr 2024 02:45)  T(F): 97 (13 Apr 2024 16:00), Max: 98.2 (13 Apr 2024 02:45)  HR: 87 (13 Apr 2024 17:00) (86 - 118)  BP: 97/67 (13 Apr 2024 17:00) (79/52 - 133/81)  BP(mean): 78 (13 Apr 2024 17:00) (61 - 104)  RR: 19 (13 Apr 2024 17:00) (13 - 23)  SpO2: 95% (13 Apr 2024 17:00) (94% - 97%)    Parameters below as of 13 Apr 2024 17:00  Patient On (Oxygen Delivery Method): ventilator    O2 Concentration (%): 35  Daily Height in cm: 165.1 (13 Apr 2024 02:45)    Daily     GENERAL: critically ill  NEURO: not alert  HEENT: anicteric sclera, no conjunctival pallor appreciated  CHEST: intubated, mechanical breath sounds  CARDIAC: regular rate, +S1/S2  ABDOMEN: soft, distended, tympanic  EXTREMITIES: warm, well perfused; anasarca  SKIN: Jaundice    LABS: reviewed                        8.4    9.19  )-----------( 104      ( 13 Apr 2024 16:09 )             25.3     04-13    140  |  106  |  10  ----------------------------<  70  3.8   |  22  |  0.75    Ca    9.3      13 Apr 2024 16:09  Phos  2.5     04-13  Mg     1.7     04-13    TPro  4.4<L>  /  Alb  2.9<L>  /  TBili  10.2<H>  /  DBili  x   /  AST  63<H>  /  ALT  17  /  AlkPhos  94  04-13    LIVER FUNCTIONS - ( 13 Apr 2024 16:09 )  Alb: 2.9 g/dL / Pro: 4.4 g/dL / ALK PHOS: 94 U/L / ALT: 17 U/L / AST: 63 U/L / GGT: x             Culture - Sputum (collected 13 Apr 2024 04:32)  Source: ET Tube ET Tube  Gram Stain (13 Apr 2024 14:29):    Moderate polymorphonuclear leukocytes seen per low power field    No organisms seen    < from: CT Abdomen and Pelvis w/ IV Cont (04.13.24 @ 11:56) >  FINDINGS:  CHEST:  LUNGS AND LARGE AIRWAYS: ET tube in the trachea with tip just above the   surendra. Occlusion of the distal left lower lobar bronchus with complete   left lower lobe atelectasis. Right middle lobe consolidation. Passive   subsegmental atelectasis of the right lower lobe.  PLEURA: Small bilateral pleural effusions.  VESSELS: Right IJ central venous catheter with tip in the right atrium.   Coronary artery calcifications.  HEART: Heart size is normal. No pericardial effusion.  MEDIASTINUM AND ABUNDIO: No lymphadenopathy.  CHEST WALL AND LOWER NECK: Diffuse chest wall edema.    ABDOMEN AND PELVIS:  LIVER: Hepatomegaly and hepatic steatosis. No focal lesion.  BILE DUCTS: No intrahepatic biliary ductal dilatation.  GALLBLADDER: Cholecystectomy.  SPLEEN: Splenectomy.  PANCREAS: Status post total pancreatectomy.  ADRENALS: Within normal limits.  KIDNEYS/URETERS: Within normal limits.    BLADDER: James catheter with intraluminal air, post instrumentation.  REPRODUCTIVE ORGANS: IUD inthe uterus. Left adnexal 3.5 cm cystic lesion   (3:246), likely ovarian cyst.    BOWEL: Enteric tube with tip in the stomach. Gastrojejunostomy with edema   of the gastric wall. Status post pancreaticojejunostomy and subsequent   pancreatectomy with wall thickening of the afferent limb. Patent   jejunojejunal anastomosis in the midline upper abdomen. Segmental areas   of wall thickening throughout the colon and rectum with nondependent gas   in the proximal transverse colon suspicious for pneumatosis and adjacent   hyperdensities (3:184) suspicious for bowel ischemia with intraluminal   hemorrhage. Additional intraluminal hyperdensities within the distal   ascending colon (3:187) suspicious for intraluminal hemorrhage.   Questionable hypoenhancement of large bowel loops within the right   hemiabdomen. Focal areas of narrowing in the distal descending colon   (5:30) and sigmoid colon (3:242-251). Rectal tube. Appendix is normal.    PERITONEUM: Mesenteric edema in the right hemiabdomen and small ascites.  VESSELS: Atherosclerotic changes. Celiac artery, SMA, SUDHAKAR, bilateral   renal arteries, SMV, and portal vein are patent.  RETROPERITONEUM/LYMPH NODES: No lymphadenopathy.  ABDOMINAL WALL: Diffuse subcutaneous edema.  BONES: Within normal limits.    IMPRESSION:    CHEST  Occlusion of the distal left lower lobar bronchus with complete left   lower lobe atelectasis.    Right middle lobe consolidation, possibly secondary to pneumonia or   additional atelectasis.    Small bilateral pleural effusions.    ABDOMEN/PELVIS    Segmental areas of wall thickening throughout the colon and rectum with   pneumatosis, mesenteric edema, and adjacent hyperdensities in the   proximal transverse colon suspicious for bowel ischemia with intraluminal   hemorrhage. Additional intraluminal hyperdensities within the distal   ascending colon suspicious for hemorrhage. Correlate with lactate.    Nonspecific focal areas of narrowing in the distal descending colon and   sigmoid colon, possibly colonic masses. Correlate with colonoscopy to   exclude malignancy.    Status post pancreaticojejunostomy and subsequent pancreatectomy,   splenectomy, and gastrojejunostomy. Nonspecific wall thickening of the   afferent limb.    Findings were discussed by Dr. Bass with Dr. Matamoros on 4/13/2024 at 12:54   PM with read back confirmation.      --- End of Report ---    < end of copied text >

## 2024-04-13 NOTE — H&P ADULT - HISTORY OF PRESENT ILLNESS
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 Hargrove), and now S/P total pancreatectomy  with islet cell autotransplant at [facility] by Dr. Gil in 04/2018).    Patient's recovery was complicated by abdominal collections first on POD 6 which was managed with IR drain and IV antibiotic. Patient was discharged with PICC line for long term antifungal. Patient was readmitted on 05/8 again for abscess which was drained by IR. Patient was also started on therapeutic Lovenox for right atrial thrombus. Patient again developed abdominal pain with fever of 100.7 on 05/20 associated with thick yellow discharge from the ventral incisional and went to [facility] which was nearby for care. CT scan at [facility] showed complex multiloculated postsurgical abscess in the omental fat of the superior epigastrium measuring 6.7 cm and enterocutaneous fistulous tract extending anteriorly from the abscess to the midline incision site and another tract going into the soft tissues of the left upper abdominal quadrant communicating with a subcutaneous 3.3cm abscess  Patient was started on IV zosyn, flagyl and micafungin while there. Patient transferred today to [facility] for continuity of care under Dr. Ansari    On admission at OSH, elevated T bili 5.4, D bili 4.4, ammonia 196, lactic acidosis and leukocytosis to 14. Intubated on admission for agnoal respirations.  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 Hargrove), and now S/P total pancreatectomy  with islet cell autotransplant at [facility] by Dr. Gil in 04/2018).    Patient's recovery was complicated by abdominal collections first on POD 6 which was managed with IR drain and IV antibiotic. Patient was discharged with PICC line for long term antifungal. Patient was readmitted on 05/8 again for abscess which was drained by IR. Patient was also started on therapeutic Lovenox for right atrial thrombus. Patient again developed abdominal pain with fever of 100.7 on 05/20 associated with thick yellow discharge from the ventral incisional and went to [facility] which was nearby for care. CT scan at [facility] showed complex multiloculated postsurgical abscess in the omental fat of the superior epigastrium measuring 6.7 cm and enterocutaneous fistulous tract extending anteriorly from the abscess to the midline incision site and another tract going into the soft tissues of the left upper abdominal quadrant communicating with a subcutaneous 3.3cm abscess  Patient was started on IV zosyn, flagyl and micafungin while there. Patient transferred today to [facility] for continuity of care under Dr. Ansari    Patient presented to [facility] ED on 4/8 by her spouse for AMS. Per , patient had been having nbnb vomiting and diarrhea for the last 4 weeks. She was unable to tolerate PO. She was also sleeping 18-20 hrs per day. On 4/8, he found her on the floor "completely out of it" and took her to [facility] for further evalution.   Initial workup in ED demenstrated a Tbili 5.4, direct bili 4.4 and ammonia 196. Patient was somnelent and had a left eyeward gaze deviation. Subquesntly started developing agonal snoring respirations and had to be intubated for airway protection.

## 2024-04-13 NOTE — H&P ADULT - NSHPPHYSICALEXAM_GEN_ALL_CORE
Gen: no acute distress  HEENT: atraumatic, normocephalic, pupils equally round and reactive to light, extraocular muscles intact, no conjunctival injection  CV: regular rate and rhythm, normal S1/S2, no murmurs, rubs, or gallops  Resp: lungs clear to auscultation bilaterally, no rales, rhonchi, or wheezes  GI: soft, nontender, nondistended, BSx4  MSK: extremities atraumatic, no cyanosis or clubbing  Skin: warm, dry, no rashes or lesions  Neuro: no focal deficits, sensation grossly intact  Psych: alert and oriented x3, appropriate mood and affect

## 2024-04-13 NOTE — PATIENT PROFILE ADULT - ARE SIGNIFICANT INDICATORS COMPLETE.
Patient is a 80y old  Male who presents with a chief complaint of Cardiogenic shock (03 Feb 2022 10:52)      BRIEF HOSPITAL COURSE: 79 y/o M w/ a PMHx of EtOH abuse, HTN, and macular degeneration who presented to [facility] ER on 2/1 c/o weakness, dyspnea on exertion, intermittent chest pain, and decreased PO intake. Pt was instructed to go to the ER after outpatient EKG revealed a flutter w/ RVR. Underwent cardiac catheterization which was revealing of EF 10-15% and 70% mid LAD occlusion. IABP placed. Victoria presley catheter inserted. Cardiac index 1.8. Milrinone infusion initiated at 0.125mcg. Pt was transferred to [facility] for continuation of care.    Events last 24 hours: Pt continues to be on IABP (1-1) and milrinone @ .125 /levo with improving transaminitis Renal function, UOP. Librium taper in SITU    PAST MEDICAL & SURGICAL HISTORY:  ETOH abuse    HTN (hypertension)    Macular degeneration        Review of Systems:  CONSTITUTIONAL: No fever, chills, or fatigue.  EYES: No eye pain, visual disturbances, or discharge.  ENMT:  No difficulty hearing, tinnitus, or vertigo. No sinus or throat pain.  NECK: No pain or stiffness.  RESPIRATORY: No shortness of breath, cough, or wheezing.  CARDIOVASCULAR: No chest pain, palpitations, dizziness, or leg swelling.  GASTROINTESTINAL: No abdominal or epigastric pain. No nausea, vomiting, diarrhea, or constipation. No hematemesis, melena, or hematochezia.  GENITOURINARY: No dysuria, increased frequency, hematuria, or incontinence.  NEUROLOGICAL: No headaches, memory loss, loss of strength, numbness, or tremors.  SKIN: No itching, burning, rashes, or lesions.  MUSCULOSKELETAL: No joint pain or swelling. No muscle, back, or extremity pain.  PSYCHIATRIC: No depression, anxiety, mood swings, or difficulty sleeping.    Medications:    furosemide   Injectable 40 milliGRAM(s) IV Push every 12 hours  milrinone Infusion 0.125 MICROgram(s)/kG/Min IV Continuous <Continuous>  norepinephrine Infusion 0.05 MICROgram(s)/kG/Min IV Continuous <Continuous>          heparin  Infusion 1000 Unit(s)/Hr IV Continuous <Continuous>          folic acid 1 milliGRAM(s) Oral daily  magnesium sulfate  IVPB 1 Gram(s) IV Intermittent once  multivitamin 1 Tablet(s) Oral daily  potassium chloride   Powder 40 milliEquivalent(s) Oral once  potassium phosphate / sodium phosphate Tablet (K-PHOS No. 2) 1 Tablet(s) Oral once  thiamine 100 milliGRAM(s) Oral daily      chlorhexidine 2% Cloths 1 Application(s) Topical daily            ICU Vital Signs Last 24 Hrs  T(C): 36.8 (03 Feb 2022 11:27), Max: 37.1 (02 Feb 2022 23:44)  T(F): 98.2 (03 Feb 2022 11:27), Max: 98.7 (02 Feb 2022 23:44)  HR: 107 (03 Feb 2022 12:00) (59 - 109)  BP: 103/71 (02 Feb 2022 19:05) (103/71 - 103/71)  BP(mean): --  ABP: 102/60 (03 Feb 2022 12:00) (87/50 - 111/69)  ABP(mean): 78 (03 Feb 2022 12:00) (66 - 85)  RR: 19 (03 Feb 2022 12:00) (16 - 26)  SpO2: 97% (03 Feb 2022 12:00) (89% - 97%)          I&O's Detail    02 Feb 2022 07:01  -  03 Feb 2022 07:00  --------------------------------------------------------  IN:    Heparin: 120 mL    IV PiggyBack: 300 mL    Milrinone: 34.8 mL    Norepinephrine: 36.2 mL    Oral Fluid: 115 mL  Total IN: 606 mL    OUT:    Indwelling Catheter - Urethral (mL): 6200 mL  Total OUT: 6200 mL    Total NET: -5594 mL      03 Feb 2022 07:01  -  03 Feb 2022 12:34  --------------------------------------------------------  IN:    Heparin: 50 mL    Milrinone: 14.5 mL    Norepinephrine: 24.3 mL  Total IN: 88.8 mL    OUT:    Indwelling Catheter - Urethral (mL): 1925 mL  Total OUT: 1925 mL    Total NET: -1836.2 mL          LABS:                        15.2   9.79  )-----------( 90       ( 03 Feb 2022 05:08 )             43.0     02-03    133<L>  |  93<L>  |  49.8<H>  ----------------------------<  118<H>  3.5   |  24.0  |  1.27    Ca    8.1<L>      03 Feb 2022 10:03  Phos  2.7     02-03  Mg     1.7     02-03    TPro  5.6<L>  /  Alb  3.3  /  TBili  5.3<H>  /  DBili  x   /  AST  4265<H>  /  ALT  2038<H>  /  AlkPhos  128<H>  02-03      CARDIAC MARKERS ( 03 Feb 2022 10:03 )  x     / 0.27 ng/mL / x     / x     / x          CAPILLARY BLOOD GLUCOSE        PT/INR - ( 02 Feb 2022 20:50 )   PT: 34.8 sec;   INR: 3.17 ratio         PTT - ( 02 Feb 2022 20:50 )  PTT:83.6 sec    CULTURES:      Physical Examination:    General: Well appearing, lying in bed in NAD.      HEENT: Pupils equal, reactive to light. Symmetric. No scleral icterus or injection.    PULM: Clear to auscultation B/L. No wheezes, rales, or rhonchi apprecaited. No significant sputum production or increased respiratory effort.    NECK: Supple, no lymphadenopathy, trachea midline.    CVS: Irregulary irregular, no murmurs appreciated, +s1/s2.    ABD: Soft, nondistended, nontender, normoactive bowel sounds.    EXT: No edema, nontender. R groin IABP w/o evidence of bleeding    SKIN: Warm and well perfused, no rashes noted.    NEURO: Alert, oriented, interactive, nonfocal.      RADIOLOGY: < from: Xray Chest 1 View- PORTABLE-Urgent (Xray Chest 1 View- PORTABLE-Urgent .) (02.02.22 @ 21:44) >    ACC: [id number] EXAM:  XR CHEST PORTABLE URGENT 1V                          PROCEDURE DATE:  02/02/2022          INTERPRETATION:  Clinical history: 80-year-old male, line placement.    Portable view of the chest is correlated with the chest CT/radiographs of   2/1/2020.    FINDINGS: Right IJ Victoria-Presley catheter with the tip projected over the   proximal right pulmonary artery, new. Mild cardiomegaly and normal   pulmonary vasculature with no lobar consolidation.    Patchy right perihilar alveolar infiltrates, new. Pleural effusions   evident on CT.    IABP marker projected over the AP window.    IMPRESSION:  Right IJ line Victoria-Presley catheter with the tip projected over the proximal   pulmonary artery, new.    IABP marker at the AP window.    Right perihilar alveolar infiltrates, new.    Pleural effusions evident on CT    --- End of Report ---            PHILLIP JOVEL DO; Attending Radiologist    < end of copied text >     Yes

## 2024-04-13 NOTE — CONSULT NOTE ADULT - SUBJECTIVE AND OBJECTIVE BOX
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 Gil in 2018).    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 Per records, a Ct later in 2018 showed still fistulization but no abscess formation     Patient presented to [facility] ED on  by her spouse for AMS. Per , patient had been having nbnb vomiting and diarrhea for the last 4 weeks, extreme fatigue . She was unable to tolerate PO. She was also sleeping 18-20 hrs per day. On , he found her on the floor "completely out of it" and took her to [facility] for further evaluation.   Initial workup in ED demonstrated a Tbili 5.4, direct bili 4.4 and ammonia 196. Patient was somnolent and had a left eyeward gaze deviation. Submentally started developing agonal snoring respirations and had to be intubated for airway protection. Course complicated with septic shock and persistent hyperammonemia despite lactulose and rifaximin, CT show and started on meropenem, linezolid and anidulafungin at [facility]--> upon transfer here, off pressors, on alvaro/linezolid/caspofungin    CT C/A/P done reading pending so far with no abdominal abscess, noted air within bowels , RUL consolidation    Blood and urine cultures at OSH ngtd    Per family history - she had blood in stool 6  months ago but then a colonoscopy that was clean and no further episodes. about 4 weeks ago began to have nausea and vomiting and extreme fatigue, then noted skin pealing of legs and arms, no cough , SOB, congestion, no diarrhea or blood in stool . No fevers.  Then a week ago she had abnormal behaviour staring and shaking and dilated pupils. Suddently a few days prior to admission became icteric. They deny any changes in IS  Born in [place], not travelled lately , once in [place]. They have a backyard but she has not been out much, no recent tick bites or mosquito bites. they have 2 cats, no rats problems home, does not have any outdoor hobbies such as camping, hiking or water sports. Cats doing well, no recent bites or scratches.           ____________________________________________________  ROS  unable to obtain    Allergies  No Known Allergies    __________________________________________________  MEDS:  MEDICATIONS  (STANDING):  dexMEDEtomidine Infusion 0.5 <Continuous>  insulin glargine Injectable (LANTUS) 10 at bedtime  insulin lispro (ADMELOG) corrective regimen sliding scale  every 6 hours  lactulose Syrup 30 every 4 hours  levothyroxine 100 daily  pantoprazole  Injectable 40 daily  ursodiol Tablet 500 <User Schedule>    _________________________________________________  ANTIMICOBIALS  caspofungin IVPB    linezolid  IVPB 600 every 12 hours  meropenem  IVPB 1000 every 12 hours  rifAXIMin 550 two times a day      GENERAL LABS              6.9                  140  | 24   | 10           10.17 >-----------< 20      ------------------------< 65                    20.8                 3.4  | 107  | 0.80                                         Ca 9.4   Mg x     Ph x          Urinalysis Basic - ( 2024 11:35 )    Color: Dark Yellow / Appearance: Clear / S.029 / pH: x  Gluc: x / Ketone: Trace mg/dL  / Bili: Moderate / Urobili: 1.0 mg/dL   Blood: x / Protein: Trace mg/dL / Nitrite: Positive   Leuk Esterase: Small / RBC: 3 /HPF / WBC 1 /HPF   Sq Epi: x / Non Sq Epi: 2 /HPF / Bacteria: Negative /HPF        T(C): 36.1 (24 @ 12:00), Max: 36.8 (24 @ 02:45)  HR: 96 (24 @ 13:00) (90 - 118)  BP: 116/71 (24 @ 13:00) (79/52 - 133/81)  RR: 14 (24 @ 13:00) (13 - 20)  SpO2: 95% (24 @ 13:00) (94% - 97%)    CONSTITUTIONAL: intubated, se  EYES: PERRLA and symmetric, EOMI, No conjunctival or scleral injection, non-icteric  ENMT: Oral mucosa with moist membranes. Normal dentition; no pharyngeal injection or exudates             NECK: Supple, symmetric and without tracheal deviation   RESP: No respiratory distress, no use of accessory muscles; CTA b/l, no WRR  CV: RRR, +S1S2, no MRG; no JVD; no peripheral edema  GI: Soft, NT, ND, no rebound, no guarding; no palpable masses; no hepatosplenomegaly; no hernia palpated  LYMPH: No cervical LAD or tenderness; no axillary LAD or tenderness; no inguinal LAD or tenderness  MSK: Normal gait; No digital clubbing or cyanosis; examination of the (head/neck/spine/ribs/pelvis, RUE, LUE, RLE, LLE) without misalignment,            Normal ROM without pain, no spinal tenderness, normal muscle strength/tone  SKIN: no purpura, no rash, noted peeling skin in hands and feet   NEURO: intubated, ? neck stiffness or pain as she grimaces when moving neck

## 2024-04-13 NOTE — H&P ADULT - ASSESSMENT
Neuro   #hepatic encephalopathy   Baseline AOx3. Currently intubated and sedated. CT H at OSH without acute findings   - treatment for hepatic encephalopathy as below     #anxiety/depression   Was on alprazolam 0.75mg daily   - hold for now     #chronic pain   On dilaudid and methadone 5mg daily   - hold for now       Cardio  #hx of RA thrombus   Previously on Lovenox. Was on Eliquis but was stopped as per heme/onc note at OSH. Likely due to Factor 5 Leiden hx.   - DVT ppx as below     Pulmonary   #mechanical ventilation   - minimal settings   - airway clearance     GI  #hepatic encephalopathy   Baseline mentation appears to be AOx3 as per outpatient records.   - c/w lactulose via OG tube and rectal tube, titrate to 2-3 BMs   - c/w Rifaxamin     #liver failure   Elevated bilirubin, elevated INR, and c/b hepatic encephalopathy. Etiology likely from hx of pancreas abnormalities s/p multiple surgeries.   - MELD   - will consult hepatology in AM   - c/w thiamine, folic acid, MV   - c/w ursodiol 500mg     #congenital abnormality of the pancreas   #abdominal mesh  Multiple surgical hx from recurrent pancreatitis as in HPI. S/p total pancreatectomy with islet cell autotransplant followed by multiple abscess leading to several abdomial surgeries.   - c/w creon   - c/w linzess     #diet  - NPO, tube feeds  - rectal tube in place   - c/w omeprazole 40mg     Renal   #critical illness   Preserved kidney function.   - harrington in place, strict I/O     Heme   #factor 5 Leiden   Not currently on anticoagulation. Patient follows with [place] [facility].   - consult hematology in AM     #asplenia  Removed as per multiple abdominal surgeries. Prone to infections due to this.     #anemia  Multifactorial in nature. No hx of bleeding but with coagulopathy as below.     #thrombocytopenia   Likely due to liver dysfunction. No hx of bleeding but with coagulopathy as below.     Endo   #DM   - lantus 10units at night     ID   #septic shock   Previously on levo at OSH. On transfer to us, off vasopressors.   - c/w linezolid 600mg   - c/w meropenem 1000mg   - c/w micafungin 100mg   - ID consult in AM iso complex infection hx     #hx of recurrent infections   From multiple abdominal wounds and surgeries. Hx of VRE and candida.     MSK  #gout  - c/w allopurinol 100mg daily   - c/w colchicine 0.6mg daily  Neuro   #hepatic encephalopathy   Baseline AOx3. Currently intubated and sedated. CT H at OSH without acute findings   - treatment for hepatic encephalopathy as below   - f/u CT head    #anxiety/depression   Was on alprazolam 0.75mg daily   - hold for now     #chronic pain   On dilaudid and methadone 5mg daily   - hold for now     Cardio  #hx of RA thrombus   Previously on Lovenox. Was on Eliquis but was stopped as per heme/onc note at OSH. Likely due to Factor 5 Leiden hx.   - DVT ppx as below     Pulmonary   #mechanical ventilation   - minimal settings   - airway clearance     GI  #hepatic encephalopathy   Baseline mentation appears to be AOx3 as per outpatient records.   - c/w lactulose via OG tube and rectal tube, titrate to 2-3 BMs   - c/w Rifaximin     #liver failure   Elevated bilirubin, elevated INR, and c/b hepatic encephalopathy. Etiology likely from hx of pancreas abnormalities s/p multiple surgeries.   - MELD 23  - s/p NAC on admission to outside hospital  - POCUS with mild ascites  - will consult hepatology  - c/w thiamine, folic acid, MV   - c/w ursodiol 500mg   - f/u Abd duplex/US, r/o hepatic vein thrombosis    #congenital abnormality of the pancreas   #abdominal mesh  Multiple surgical hx from recurrent pancreatitis as in HPI. S/p total pancreatectomy with islet cell autotransplant followed by multiple abscess leading to several abdomial surgeries.   - c/w creon   - c/w linzess     #diet  - NPO, tube feeds  - rectal tube in place   - c/w omeprazole 40mg     Renal   #critical illness   Preserved kidney function.   - harrington in place (exchanged here), strict I/O  - f/u urine lytes    Heme   #factor 5 Leiden   Not currently on anticoagulation. Patient follows with [place] [facility].    #asplenia  Removed as per multiple abdominal surgeries. Prone to infections due to this.     # Macrocytic anemia  Multifactorial in nature. No hx of bleeding but with coagulopathy as below.   - f/u b12, folate, iron studies    #thrombocytopenia   Likely due to liver dysfunction though unclear etiology given no cirrhosis.  No hx of bleeding but has FVL (above).  - CTM PLT level, send blue top for discrepancies  - f/u factor viii assay    Endo   #DM   - lantus 10units at night   - f/u TFTs  - f/u A1c/fructosamine    ID   #septic shock   Previously on levo at OSH. On transfer to us, off vasopressors.   - c/w linezolid 600mg   - c/w meropenem 1000mg   - c/w caspofungin 50mg q24hr  - ID consult    #hx of recurrent infections   From multiple abdominal wounds and surgeries. Hx of VRE and candida.   - ID c/s as above    MSK  #gout  - c/w allopurinol 100mg daily   - c/w colchicine 0.6mg daily

## 2024-04-13 NOTE — PROGRESS NOTE ADULT - SUBJECTIVE AND OBJECTIVE BOX
INTERVAL HPI/OVERNIGHT EVENTS:    SUBJECTIVE: Patient seen and examined at bedside. Currently intubated and sedated, no other overnight events since admission to MICU.     ROS: All negative except as listed above.    VITAL SIGNS:  ICU Vital Signs Last 24 Hrs  T(C): 36.1 (2024 12:00), Max: 36.8 (2024 02:45)  T(F): 97 (2024 12:00), Max: 98.2 (2024 02:45)  HR: 96 (2024 13:00) (90 - 118)  BP: 116/71 (2024 13:00) (79/52 - 133/81)  BP(mean): 88 (:00) (61 - 104)  ABP: --  ABP(mean): --  RR: 14 (2024 13:00) (13 - 20)  SpO2: 95% (2024 13:00) (94% - 97%)    O2 Parameters below as of 2024 13:00  Patient On (Oxygen Delivery Method): ventilator    O2 Concentration (%): 35      Mode: AC/ CMV (Assist Control/ Continuous Mandatory Ventilation), RR (machine): 12, TV (machine): 400, FiO2: 35, PEEP: 5, ITime: 1, MAP: 9, PIP: 27  Plateau pressure:   P/F ratio:      @ 07:  -   @ 07:00  --------------------------------------------------------  IN: 274.8 mL / OUT: 765 mL / NET: -490.2 mL     @ 07:  -   @ 13:42  --------------------------------------------------------  IN: 982.2 mL / OUT: 420 mL / NET: 562.2 mL      CAPILLARY BLOOD GLUCOSE      POCT Blood Glucose.: 90 mg/dL (2024 12:16)      ECG: reviewed.    PHYSICAL EXAM:    GENERAL: NAD, intubated and sedated   HEAD:  Atraumatic, normocephalic  EYES: EOMI, PERRLA, conjunctiva and sclera clear  NECK: Supple, trachea midline, no JVD  HEART: Regular rate and rhythm, no murmurs, rubs, or gallops  LUNGS: Unlabored respirations.  Clear to auscultation bilaterally, no crackles, wheezing, or rhonchi  ABDOMEN: Soft, nontender, nondistended, +BS  EXTREMITIES: 2+ peripheral pulses bilaterally, cap refill<2 secs. No clubbing, cyanosis, or edema  NERVOUS SYSTEM:  Intubated and sedated   SKIN: No rashes or lesions    MEDICATIONS:  MEDICATIONS  (STANDING):  caspofungin IVPB      chlorhexidine 0.12% Liquid 15 milliLiter(s) Oral Mucosa every 12 hours  chlorhexidine 4% Liquid 1 Application(s) Topical <User Schedule>  dexMEDEtomidine Infusion 0.5 MICROgram(s)/kG/Hr (7.8 mL/Hr) IV Continuous <Continuous>  folic acid 1 milliGRAM(s) Oral daily  insulin glargine Injectable (LANTUS) 10 Unit(s) SubCutaneous at bedtime  insulin lispro (ADMELOG) corrective regimen sliding scale   SubCutaneous every 6 hours  lactulose Syrup 30 Gram(s) Oral every 4 hours  levothyroxine 100 MICROGram(s) Oral daily  linezolid  IVPB 600 milliGRAM(s) IV Intermittent every 12 hours  meropenem  IVPB 1000 milliGRAM(s) IV Intermittent every 12 hours  multivitamin/minerals/iron Oral Solution (CENTRUM) 15 milliLiter(s) Oral daily  pantoprazole  Injectable 40 milliGRAM(s) IV Push daily  potassium chloride  10 mEq/100 mL IVPB 10 milliEquivalent(s) IV Intermittent every 1 hour  rifAXIMin 550 milliGRAM(s) Oral two times a day  thiamine 100 milliGRAM(s) Oral daily  ursodiol Tablet 500 milliGRAM(s) Oral <User Schedule>    MEDICATIONS  (PRN):  sodium chloride 0.9% lock flush 10 milliLiter(s) IV Push every 1 hour PRN Pre/post blood products, medications, blood draw, and to maintain line patency      ALLERGIES:  Allergies    No Known Allergies    Intolerances        LABS:                        6.9    10.17 )-----------( 20       ( 2024 09:41 )             20.8         140  |  107  |  10  ----------------------------<  65<L>  3.4<L>   |  24  |  0.80    Ca    9.4      2024 09:41  Phos  2.5       Mg     2.0         TPro  4.0<L>  /  Alb  2.6<L>  /  TBili  9.7<H>  /  DBili  x   /  AST  55<H>  /  ALT  15  /  AlkPhos  80  -    PT/INR - ( 2024 03:56 )   PT: 17.3 sec;   INR: 1.67 ratio         PTT - ( 2024 03:56 )  PTT:44.4 sec  Urinalysis Basic - ( 2024 11:35 )    Color: Dark Yellow / Appearance: Clear / S.029 / pH: x  Gluc: x / Ketone: Trace mg/dL  / Bili: Moderate / Urobili: 1.0 mg/dL   Blood: x / Protein: Trace mg/dL / Nitrite: Positive   Leuk Esterase: Small / RBC: 3 /HPF / WBC 1 /HPF   Sq Epi: x / Non Sq Epi: 2 /HPF / Bacteria: Negative /HPF      ABG:  pH, Arterial: 7.49 (24 @ 03:51)  pCO2, Arterial: 30 mmHg (24 @ 03:51)  pO2, Arterial: 118 mmHg (24 @ 03:51)      vBG:  pH, Venous: 7.42 (24 @ 04:29)  pCO2, Venous: 38 mmHg (24 @ 04:29)  pO2, Venous: 48 mmHg (24 @ 04:29)  HCO3, Venous: 25 mmol/L (24 @ 04:29)    Micro:        RADIOLOGY & ADDITIONAL TESTS: Reviewed.

## 2024-04-13 NOTE — CHART NOTE - NSCHARTNOTEFT_GEN_A_CORE
: Laila Rosa     INDICATION: Sepsis     PROCEDURE:  [ x] LIMITED ECHO  [x ] LIMITED CHEST  [ ] LIMITED RETROPERITONEAL  [x ] LIMITED ABDOMINAL  [ ] LIMITED DVT  [ ] NEEDLE GUIDANCE VASCULAR  [ ] NEEDLE GUIDANCE THORACENTESIS  [ ] NEEDLE GUIDANCE PARACENTESIS  [ ] NEEDLE GUIDANCE PERICARDIOCENTESIS  [ ] OTHER    FINDINGS:  Echo RV smaller than LV   LV and RV with normal systolic function   no pericardial effusion is noted   IVC 2cm without any respiratory variation     Chest   A line predominant   Right pleural base with moderate PLEF and associated consolidation   Left Pleural Base also with moderate PLEF and associated consolidation with dynamic air bronchograms     Abd   (-) ascites noted   (+) a line noted to anterior abdomen   bladder is collapsed around James Catheter balloon        INTERPRETATION:  Normal cardiac size and function Right pleural base with moderate PLEF and associated consolidation   Left Pleural Base also with moderate PLEF and associated consolidation with dynamic air bronchograms   (+) a lines to anterior abdomen : Laila NATALI ManMoe     INDICATION: Sepsis     PROCEDURE:  [ x] LIMITED ECHO  [x ] LIMITED CHEST  [ ] LIMITED RETROPERITONEAL  [x ] LIMITED ABDOMINAL  [ ] LIMITED DVT  [ ] NEEDLE GUIDANCE VASCULAR  [ ] NEEDLE GUIDANCE THORACENTESIS  [ ] NEEDLE GUIDANCE PARACENTESIS  [ ] NEEDLE GUIDANCE PERICARDIOCENTESIS  [ ] OTHER    FINDINGS:  Echo RV smaller than LV   LV and RV with normal systolic function   no pericardial effusion is noted   IVC 2cm without any respiratory variation     Chest   A line predominant   Right pleural base with moderate PLEF and associated consolidation   Left Pleural Base also with moderate PLEF and associated consolidation with dynamic air bronchograms     Abd   (-) ascites noted   (+) a line noted to anterior abdomen   bladder is collapsed around James Catheter balloon        INTERPRETATION:  Normal cardiac size and function Right pleural base with moderate PLEF and associated consolidation   Left Pleural Base also with moderate PLEF and associated consolidation with dynamic air bronchograms   (+) a lines to anterior abdomen      Attending Attestation:  I was present during the key portions of the procedure and immediately available during the entire procedure.     Jeb Solano MD   Critical Care Attending

## 2024-04-13 NOTE — CONSULT NOTE ADULT - SUBJECTIVE AND OBJECTIVE BOX
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 Hargrove), and now S/P total pancreatectomy with islet cell autotransplant at [facility] by Dr. Gil in 2018).    Patient transferred to [facility] to hepatology service from OSH c/f liver failure. Currently intubated in the MICU for airway protection in setting of hepatic encephalopathy, no pressors. CT with preliminary read c/f bowel ischemia.   VBG Lactate 3.1 at 4am  WBC 10       (2024 03:31)      PAST MEDICAL & SURGICAL HISTORY:    [  ] No significant past history as reviewed with the patient and family    FAMILY HISTORY:    [  ] Family history not pertinent as reviewed with the patient and family    SOCIAL HISTORY:    MEDICATIONS  (STANDING):  caspofungin IVPB      chlorhexidine 0.12% Liquid 15 milliLiter(s) Oral Mucosa every 12 hours  chlorhexidine 4% Liquid 1 Application(s) Topical <User Schedule>  dexMEDEtomidine Infusion 0.5 MICROgram(s)/kG/Hr (7.8 mL/Hr) IV Continuous <Continuous>  folic acid 1 milliGRAM(s) Oral daily  insulin glargine Injectable (LANTUS) 10 Unit(s) SubCutaneous at bedtime  insulin lispro (ADMELOG) corrective regimen sliding scale   SubCutaneous every 6 hours  lactulose Syrup 30 Gram(s) Oral every 4 hours  levothyroxine 100 MICROGram(s) Oral daily  linezolid  IVPB 600 milliGRAM(s) IV Intermittent every 12 hours  meropenem  IVPB 1000 milliGRAM(s) IV Intermittent every 12 hours  multivitamin/minerals/iron Oral Solution (CENTRUM) 15 milliLiter(s) Oral daily  pantoprazole  Injectable 40 milliGRAM(s) IV Push daily  potassium chloride  10 mEq/100 mL IVPB 10 milliEquivalent(s) IV Intermittent every 1 hour  rifAXIMin 550 milliGRAM(s) Oral two times a day  thiamine 100 milliGRAM(s) Oral daily  ursodiol Tablet 500 milliGRAM(s) Oral <User Schedule>    MEDICATIONS  (PRN):  sodium chloride 0.9% lock flush 10 milliLiter(s) IV Push every 1 hour PRN Pre/post blood products, medications, blood draw, and to maintain line patency    Allergies    No Known Allergies    Intolerances        Vital Signs Last 24 Hrs  T(C): 36.1 (2024 12:00), Max: 36.8 (2024 02:45)  T(F): 97 (2024 12:00), Max: 98.2 (2024 02:45)  HR: 96 (2024 13:00) (90 - 118)  BP: 116/71 (2024 13:00) (79/52 - 133/81)  BP(mean): 88 (2024 13:00) (61 - 104)  RR: 14 (2024 13:00) (13 - 20)  SpO2: 95% (2024 13:00) (94% - 97%)    Parameters below as of 2024 13:00  Patient On (Oxygen Delivery Method): ventilator    O2 Concentration (%): 35  Daily Height in cm: 165.1 (2024 02:45)    Daily                             6.9    10.17 )-----------( 20       ( 2024 09:41 )             20.8     04-13    140  |  107  |  10  ----------------------------<  65<L>  3.4<L>   |  24  |  0.80    Ca    9.4      2024 09:41  Phos  2.5     04-13  Mg     2.0     04-13    TPro  4.0<L>  /  Alb  2.6<L>  /  TBili  9.7<H>  /  DBili  x   /  AST  55<H>  /  ALT  15  /  AlkPhos  80  04-13    PT/INR - ( 2024 03:56 )   PT: 17.3 sec;   INR: 1.67 ratio         PTT - ( 2024 03:56 )  PTT:44.4 sec  Urinalysis Basic - ( 2024 11:35 )    Color: Dark Yellow / Appearance: Clear / S.029 / pH: x  Gluc: x / Ketone: Trace mg/dL  / Bili: Moderate / Urobili: 1.0 mg/dL   Blood: x / Protein: Trace mg/dL / Nitrite: Positive   Leuk Esterase: Small / RBC: 3 /HPF / WBC 1 /HPF   Sq Epi: x / Non Sq Epi: 2 /HPF / Bacteria: Negative /HPF        IMAGING STUDIES:             SURGERY CONSULT NOTE    Patient is a 48y old  Female who presents with a chief complaint of     HPI:  42F PMH DMT1, Factor V leiden deficiency, gout and 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 Hargrove), and now S/P total pancreatectomy with islet cell autotransplant at [facility] by Dr. Gil in 2018).    She presented five days ago at [facility] for AMS after being found on the floor by her  "completely out of it". He reported she had NBNB vomiting, diarrhea for the last 4 weeks, with extreme fatigue sleeping up to 20 hours per day.   Initial workup showed Tbili 5.4, direct bili 4.4 and ammonia 196. She was intubated for airway protection. Hospital course complicated with septic shock and persistent hyperammonemia. Started on lactulose, rifaximin, meropenem, linezolid and capspofungin   and ultimately transferred to [facility] for further hepatology workup. On arrival she was intubated, off pressors. hgb 6.9. lactate     CT C/A/P done reading pending so far with no abdominal abscess, noted air within bowels , RUL consolidation    Blood and urine cultures at OSH ngtd    Per family history - she had blood in stool 6  months ago but then a colonoscopy that was clean and no further episodes. about 4 weeks ago began to have nausea and vomiting and extreme fatigue, then noted skin pealing of legs and arms, no cough , SOB, congestion, no diarrhea or blood in stool . No fevers.  Then a week ago she had abnormal behaviour staring and shaking and dilated pupils. Suddently a few days prior to admission became icteric. They deny any changes in IS  Born in [place], not travelled lately , once in [place]. They have a backyard but she has not been out much, no recent tick bites or mosquito bites. they have 2 cats, no rats problems home, does not have any outdoor hobbies such as camping, hiking or water sports. Cats doing well, no recent bites or scratches.         Patient transferred to [facility] to hepatology service from OSH c/f liver failure. Currently intubated in the MICU for airway protection in setting of hepatic encephalopathy, no pressors. CT with preliminary read c/f bowel ischemia.   VBG Lactate 3.1 at 4am  WBC 10       (2024 03:31)      PAST MEDICAL & SURGICAL HISTORY:    [  ] No significant past history as reviewed with the patient and family    FAMILY HISTORY:    [  ] Family history not pertinent as reviewed with the patient and family    SOCIAL HISTORY:    MEDICATIONS  (STANDING):  caspofungin IVPB      chlorhexidine 0.12% Liquid 15 milliLiter(s) Oral Mucosa every 12 hours  chlorhexidine 4% Liquid 1 Application(s) Topical <User Schedule>  dexMEDEtomidine Infusion 0.5 MICROgram(s)/kG/Hr (7.8 mL/Hr) IV Continuous <Continuous>  folic acid 1 milliGRAM(s) Oral daily  insulin glargine Injectable (LANTUS) 10 Unit(s) SubCutaneous at bedtime  insulin lispro (ADMELOG) corrective regimen sliding scale   SubCutaneous every 6 hours  lactulose Syrup 30 Gram(s) Oral every 4 hours  levothyroxine 100 MICROGram(s) Oral daily  linezolid  IVPB 600 milliGRAM(s) IV Intermittent every 12 hours  meropenem  IVPB 1000 milliGRAM(s) IV Intermittent every 12 hours  multivitamin/minerals/iron Oral Solution (CENTRUM) 15 milliLiter(s) Oral daily  pantoprazole  Injectable 40 milliGRAM(s) IV Push daily  potassium chloride  10 mEq/100 mL IVPB 10 milliEquivalent(s) IV Intermittent every 1 hour  rifAXIMin 550 milliGRAM(s) Oral two times a day  thiamine 100 milliGRAM(s) Oral daily  ursodiol Tablet 500 milliGRAM(s) Oral <User Schedule>    MEDICATIONS  (PRN):  sodium chloride 0.9% lock flush 10 milliLiter(s) IV Push every 1 hour PRN Pre/post blood products, medications, blood draw, and to maintain line patency    Allergies    No Known Allergies    Intolerances        Vital Signs Last 24 Hrs  T(C): 36.1 (2024 12:00), Max: 36.8 (2024 02:45)  T(F): 97 (2024 12:00), Max: 98.2 (2024 02:45)  HR: 96 (2024 13:00) (90 - 118)  BP: 116/71 (2024 13:00) (79/52 - 133/81)  BP(mean): 88 (2024 13:00) (61 - 104)  RR: 14 (2024 13:00) (13 - 20)  SpO2: 95% (2024 13:00) (94% - 97%)    Parameters below as of 2024 13:00  Patient On (Oxygen Delivery Method): ventilator    O2 Concentration (%): 35  Daily Height in cm: 165.1 (2024 02:45)    Daily                             6.9    10.17 )-----------( 20       ( 2024 09:41 )             20.8     04-13    140  |  107  |  10  ----------------------------<  65<L>  3.4<L>   |  24  |  0.80    Ca    9.4      2024 09:41  Phos  2.5     04-13  Mg     2.0     04-13    TPro  4.0<L>  /  Alb  2.6<L>  /  TBili  9.7<H>  /  DBili  x   /  AST  55<H>  /  ALT  15  /  AlkPhos  80  04-13    PT/INR - ( 2024 03:56 )   PT: 17.3 sec;   INR: 1.67 ratio         PTT - ( 2024 03:56 )  PTT:44.4 sec  Urinalysis Basic - ( 2024 11:35 )    Color: Dark Yellow / Appearance: Clear / S.029 / pH: x  Gluc: x / Ketone: Trace mg/dL  / Bili: Moderate / Urobili: 1.0 mg/dL   Blood: x / Protein: Trace mg/dL / Nitrite: Positive   Leuk Esterase: Small / RBC: 3 /HPF / WBC 1 /HPF   Sq Epi: x / Non Sq Epi: 2 /HPF / Bacteria: Negative /HPF        IMAGING STUDIES:             SURGERY CONSULT NOTE    Patient is a 48y old  Female who presents with a chief complaint of     HPI:  42F PMH DMT1, heterozygous Factor V leiden deficiency, hx of Left peroneal DVT (eliquis x1 year, stopped 2023), gout (has had numerous courses of steroids) and pancreas congenital abnormality associated with recurrent pancreatitis and extensive surgical hx (s/p distal pancreatectomy, cholecystectomy, splenectomy and celina-en-y pancreaticojejuonostomy (2014) at [facility] with Dr. 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 Gil in 2018).    Six months ago, she had blood in stool but colonoscopy was reportedly clean with no further episodes.    She presented five days ago at [facility] for AMS after being found on the floor by her  "completely out of it". He reported she had NBNB vomiting, diarrhea for the last 4 weeks, with extreme fatigue sleeping up to 20 hours per day, and a week ago she had abnormal behavior staring, shaking, with dilated pupils.     Initial workup at OSH showed Tbili 5.4, direct bili 4.4 and ammonia 196. She was intubated for airway protection. Hospital course complicated with septic shock requiring pressor support, ANKIT, persistent hyperammonemia. Started on lactulose, rifaximin, meropenem, linezolid and capspofungin and ultimately transferred to [facility] for further hepatology workup.     On arrival at [facility] she was intubated, off pressors. hgb 6.9. WBC 10, Lactate 3.1.  Currently intubated in the MICU for airway protection in setting of hepatic encephalopathy, low dose pressors.   CT with read c/f bowel ischemia.       Additional notes.  Born in [place], not travelled lately , once in [place]. They have a backyard but she has not been out much, no recent tick bites or mosquito bites. they have 2 cats, no rats problems home, does not have any outdoor hobbies such as camping, hiking or water sports. Cats doing well, no recent bites or scratches.         PAST MEDICAL HISTORY  Addiction   Anxiety   Chronic ulcer of skin   Left upper abdominal quadrant   Coagulopathy   Diabetes mellitus   DVT (deep venous thrombosis)   Factor 5 Leiden mutation, heterozygous   GERD (gastroesophageal reflux disease)   Gout   H/O hernia repair 2022   H/O: drug dependency   Hernia, internal   History of coagulopathy   Factor 5   Hypertension   IBS (irritable bowel syndrome)   Pancreatic divisum   Pancreatitis    SURGICAL HISTORY:   ABDOMEN SURGERY 10/12/2022 Fistula clip   GASTROSTOMY TUBE PLACEMENT 01/10/2022   GASTROSTOMY TUBE REVISION 2022   HERNIA REPAIR    HERNIA REPAIR 2019   PANCREATECTOMY 2018   SPLENECTOMY 2018   GASTRIC BYPASS SURGERY 2018   APPENDECTOMY 2018    [  ] No significant past history as reviewed with the patient and family    FAMILY HISTORY:    [  ] Family history not pertinent as reviewed with the patient and family    SOCIAL HISTORY:    MEDICATIONS  (STANDING):  caspofungin IVPB      chlorhexidine 0.12% Liquid 15 milliLiter(s) Oral Mucosa every 12 hours  chlorhexidine 4% Liquid 1 Application(s) Topical <User Schedule>  dexMEDEtomidine Infusion 0.5 MICROgram(s)/kG/Hr (7.8 mL/Hr) IV Continuous <Continuous>  folic acid 1 milliGRAM(s) Oral daily  insulin glargine Injectable (LANTUS) 10 Unit(s) SubCutaneous at bedtime  insulin lispro (ADMELOG) corrective regimen sliding scale   SubCutaneous every 6 hours  lactulose Syrup 30 Gram(s) Oral every 4 hours  levothyroxine 100 MICROGram(s) Oral daily  linezolid  IVPB 600 milliGRAM(s) IV Intermittent every 12 hours  meropenem  IVPB 1000 milliGRAM(s) IV Intermittent every 12 hours  multivitamin/minerals/iron Oral Solution (CENTRUM) 15 milliLiter(s) Oral daily  pantoprazole  Injectable 40 milliGRAM(s) IV Push daily  potassium chloride  10 mEq/100 mL IVPB 10 milliEquivalent(s) IV Intermittent every 1 hour  rifAXIMin 550 milliGRAM(s) Oral two times a day  thiamine 100 milliGRAM(s) Oral daily  ursodiol Tablet 500 milliGRAM(s) Oral <User Schedule>    MEDICATIONS  (PRN):  sodium chloride 0.9% lock flush 10 milliLiter(s) IV Push every 1 hour PRN Pre/post blood products, medications, blood draw, and to maintain line patency    Allergies    No Known Allergies    Intolerances        Vital Signs Last 24 Hrs  T(C): 36.1 (2024 12:00), Max: 36.8 (2024 02:45)  T(F): 97 (2024 12:00), Max: 98.2 (2024 02:45)  HR: 96 (2024 13:00) (90 - 118)  BP: 116/71 (2024 13:00) (79/52 - 133/81)  BP(mean): 88 (2024 13:00) (61 - 104)  RR: 14 (2024 13:00) (13 - 20)  SpO2: 95% (2024 13:00) (94% - 97%)    Parameters below as of 2024 13:00  Patient On (Oxygen Delivery Method): ventilator    O2 Concentration (%): 35  Daily Height in cm: 165.1 (2024 02:45)    Daily     PHYSICAL EXAM  General: sedated   Respiratory: intubated  Cardio: regular rate 97  Abdomen: soft, distended, multiple well healed scars, OGT clamped, Rectal tube with brown output  : James with yellow urine.  Extremities: warm and well perfused, mild peripheral edema                          6.9    10.17 )-----------( 20       ( 2024 09:41 )             20.8     04-13    140  |  107  |  10  ----------------------------<  65<L>  3.4<L>   |  24  |  0.80    Ca    9.4      2024 09:41  Phos  2.5     04-13  Mg     2.0     04-13    TPro  4.0<L>  /  Alb  2.6<L>  /  TBili  9.7<H>  /  DBili  x   /  AST  55<H>  /  ALT  15  /  AlkPhos  80  04-13    PT/INR - ( 2024 03:56 )   PT: 17.3 sec;   INR: 1.67 ratio         PTT - ( 2024 03:56 )  PTT:44.4 sec  Urinalysis Basic - ( 2024 11:35 )    Color: Dark Yellow / Appearance: Clear / S.029 / pH: x  Gluc: x / Ketone: Trace mg/dL  / Bili: Moderate / Urobili: 1.0 mg/dL   Blood: x / Protein: Trace mg/dL / Nitrite: Positive   Leuk Esterase: Small / RBC: 3 /HPF / WBC 1 /HPF   Sq Epi: x / Non Sq Epi: 2 /HPF / Bacteria: Negative /HPF        IMAGING STUDIES:             SURGERY CONSULT NOTE    Patient is a 48y old  Female who presents with a chief complaint of AMS    HPI:  42F PMH DMT1, heterozygous Factor V leiden deficiency, hx of Left peroneal DVT (eliquis x1 year, stopped 2023), gout (has had numerous courses of steroids) and pancreas congenital abnormality associated with recurrent pancreatitis and extensive surgical hx (s/p distal pancreatectomy, cholecystectomy, splenectomy and celina-en-y pancreaticojejuonostomy (2014) at [facility] with Dr. Hargrove), and now S/P total pancreatectomy with islet cell autotransplant at [facility] by Dr. Gil in 2018).    Six months ago, she had blood in stool but colonoscopy was reportedly clean with no further episodes.    2024 - She presented five days ago at [facility] for AMS after being found on the floor by her  "completely out of it". He reported she had NBNB vomiting, diarrhea for the last 4 weeks, with extreme fatigue sleeping up to 20 hours per day, and a week ago she had abnormal behavior staring, shaking, with dilated pupils.     Initial workup at OSH showed Tbili 5.4, direct bili 4.4 and ammonia 196. She was intubated for airway protection. Hospital course complicated with septic shock requiring pressor support, ANKIT, persistent hyperammonemia. Started on lactulose, rifaximin, meropenem, linezolid and capspofungin and ultimately transferred to [facility] for further hepatology workup.     2024 - On arrival at [facility] she was intubated, off pressors.                    hgb 6.9. WBC 10, Lactate 3.1, Tbili 10.2, Dbili 7.4  Currently intubated in the MICU for airway protection in setting of hepatic encephalopathy, low dose pressors.   CT with read c/f bowel ischemia.         Additional notes.  Born in [place], not travelled lately , once in [place]. They have a backyard but she has not been out much, no recent tick bites or mosquito bites. they have 2 cats, no rats problems home, does not have any outdoor hobbies such as camping, hiking or water sports. Cats doing well, no recent bites or scratches.         PAST MEDICAL HISTORY  Addiction   Anxiety   Chronic ulcer of skin   Left upper abdominal quadrant   Coagulopathy   Diabetes mellitus   DVT (deep venous thrombosis)   Factor 5 Leiden mutation, heterozygous   GERD (gastroesophageal reflux disease)   Gout   H/O hernia repair    H/O: drug dependency   Hernia, internal   History of coagulopathy   Factor 5   Hypertension   IBS (irritable bowel syndrome)   Pancreatic divisum   Pancreatitis    SURGICAL HISTORY:   ABDOMEN SURGERY 10/12/2022 Fistula clip   GASTROSTOMY TUBE PLACEMENT 01/10/2022   GASTROSTOMY TUBE REVISION 2022   HERNIA REPAIR    HERNIA REPAIR 2019   PANCREATECTOMY 2018   SPLENECTOMY 2018   GASTRIC BYPASS SURGERY 2018   APPENDECTOMY 2018    [  ] No significant past history as reviewed with the patient and family    FAMILY HISTORY:    [  ] Family history not pertinent as reviewed with the patient and family    SOCIAL HISTORY:    MEDICATIONS  (STANDING):  caspofungin IVPB      chlorhexidine 0.12% Liquid 15 milliLiter(s) Oral Mucosa every 12 hours  chlorhexidine 4% Liquid 1 Application(s) Topical <User Schedule>  dexMEDEtomidine Infusion 0.5 MICROgram(s)/kG/Hr (7.8 mL/Hr) IV Continuous <Continuous>  folic acid 1 milliGRAM(s) Oral daily  insulin glargine Injectable (LANTUS) 10 Unit(s) SubCutaneous at bedtime  insulin lispro (ADMELOG) corrective regimen sliding scale   SubCutaneous every 6 hours  lactulose Syrup 30 Gram(s) Oral every 4 hours  levothyroxine 100 MICROGram(s) Oral daily  linezolid  IVPB 600 milliGRAM(s) IV Intermittent every 12 hours  meropenem  IVPB 1000 milliGRAM(s) IV Intermittent every 12 hours  multivitamin/minerals/iron Oral Solution (CENTRUM) 15 milliLiter(s) Oral daily  pantoprazole  Injectable 40 milliGRAM(s) IV Push daily  potassium chloride  10 mEq/100 mL IVPB 10 milliEquivalent(s) IV Intermittent every 1 hour  rifAXIMin 550 milliGRAM(s) Oral two times a day  thiamine 100 milliGRAM(s) Oral daily  ursodiol Tablet 500 milliGRAM(s) Oral <User Schedule>    MEDICATIONS  (PRN):  sodium chloride 0.9% lock flush 10 milliLiter(s) IV Push every 1 hour PRN Pre/post blood products, medications, blood draw, and to maintain line patency    Allergies    No Known Allergies    Intolerances        Vital Signs Last 24 Hrs  T(C): 36.1 (2024 12:00), Max: 36.8 (2024 02:45)  T(F): 97 (2024 12:00), Max: 98.2 (2024 02:45)  HR: 96 (2024 13:00) (90 - 118)  BP: 116/71 (2024 13:00) (79/52 - 133/81)  BP(mean): 88 (2024 13:00) (61 - 104)  RR: 14 (2024 13:00) (13 - 20)  SpO2: 95% (2024 13:00) (94% - 97%)    Parameters below as of 2024 13:00  Patient On (Oxygen Delivery Method): ventilator    O2 Concentration (%): 35  Daily Height in cm: 165.1 (2024 02:45)    Daily     PHYSICAL EXAM  General: sedated   Respiratory: intubated  Cardio: regular rate 97  Abdomen: soft, distended, multiple well healed scars, OGT clamped, Rectal tube with brown output  : James with yellow urine.  Extremities: warm and well perfused, mild peripheral edema                          6.9    10.17 )-----------( 20       ( 2024 09:41 )             20.8     04-13    140  |  107  |  10  ----------------------------<  65<L>  3.4<L>   |  24  |  0.80    Ca    9.4      2024 09:41  Phos  2.5     04-13  Mg     2.0     04-13    TPro  4.0<L>  /  Alb  2.6<L>  /  TBili  9.7<H>  /  DBili  x   /  AST  55<H>  /  ALT  15  /  AlkPhos  80  04-13    PT/INR - ( 2024 03:56 )   PT: 17.3 sec;   INR: 1.67 ratio         PTT - ( 2024 03:56 )  PTT:44.4 sec  Urinalysis Basic - ( 2024 11:35 )    Color: Dark Yellow / Appearance: Clear / S.029 / pH: x  Gluc: x / Ketone: Trace mg/dL  / Bili: Moderate / Urobili: 1.0 mg/dL   Blood: x / Protein: Trace mg/dL / Nitrite: Positive   Leuk Esterase: Small / RBC: 3 /HPF / WBC 1 /HPF   Sq Epi: x / Non Sq Epi: 2 /HPF / Bacteria: Negative /HPF        IMAGING STUDIES:  < from: CT Chest w/ IV Cont (24 @ 11:56) >    IMPRESSION:    CHEST  Occlusion of the distal left lower lobar bronchus with complete left   lower lobe atelectasis.    Right middle lobe consolidation, possibly secondary to pneumonia or   additional atelectasis.    Small bilateral pleural effusions.    ABDOMEN/PELVIS    Segmental areas of wall thickening throughout the colon and rectum with pneumatosis, mesenteric edema, and adjacent hyperdensities in the   proximal transverse colon suspicious for bowel ischemia with intraluminal   hemorrhage. Additional intraluminal hyperdensities within the distal   ascending colon suspicious for hemorrhage. Correlate with lactate.    Nonspecific focal areas of narrowing in the distal descending colon and   sigmoid colon, possibly colonic masses. Correlate with colonoscopy to   exclude malignancy.    Status post pancreaticojejunostomy and subsequent pancreatectomy,   splenectomy, and gastrojejunostomy. Nonspecific wall thickening of the   afferent limb.    < end of copied text >  < from: CT Head No Cont (24 @ 11:56) >    IMPRESSION:    No acute intracranial findings.    < end of copied text >

## 2024-04-13 NOTE — CONSULT NOTE ADULT - ASSESSMENT
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 Hargrove), and now S/P total pancreatectomy  with islet cell autotransplant at [facility] by Dr. Gil in 04/2018), c/b recurrent abdominal wall collection requiring IR drainage, presented to OSH with acute change in mental status requiring intubation for airway protection, and transferred to [facility] for care escalation.    Hepatology was consulted for concern for KEL.     Assessment  #Hyperbilirubinemia  #Bicytopenia  #Pneumatosis of bowel concerning for ischemic colitis  #Acute encephalopathy  #hyperammonemia  #Coagulopathy  - Concern for sepsis-induced cholestasis. Although patient has features of FDC, Cr and hemodynamics are stable, and not behaving like KEL.   - Will check for urea-splitting organisms given high ammonia;   - Hyperammonemia is also a rare but known complication of bypass surgeries with high mortality  - Bicytopenia with possible bleeding and low fibrinogen, concern for DIC    Recommendations  - Discussed with ID, concern for atypical infection, agree with broad abx coverage  - Please consult heme for review of peripheral smear (per ID and also to r/o DIC)  - F/u TEG  - Sx team inquired about the role of diagnostic colonoscopy given patient is at a high risk for explorative sx. Discussed with advanced endoscopist: given high risk with pneumatosis, thrombocytopenia, and concern for DIC, and low diagnositc/prognostic value of colonoscopy in the setting of ischemic colitis, endoscopic evaluation if not indicated at this time.  - F/u abdominal doppler  - F/u infectious workup  - F/u viral and autoimmune hepatitis workup (sent by ICU)    Case d/w Dr. Janusz Hummel  GI/Hepatology Fellow    MONDAY-FRIDAY 8AM-5PM:  Pager# [phone] ([facility]) or [phone] ([facility])    NON-URGENT CONSULTS:  Please email [email or web address]  AT NIGHT AND ON WEEKENDS:  Contact on-call GI fellow via AMION by paging or hospital  from 5pm-8am and on weekends/holidays

## 2024-04-13 NOTE — CONSULT NOTE ADULT - PROBLEM SELECTOR RECOMMENDATION 9
Pt. with hyperammonemia in the setting of acute liver failure. Serum ammonia is elevated at 142. Unable to administer Lactulose due to ischemic colitis. Plan to initiate CRRT. Can transition to iHD with improvement in hemodynamic status. HD/CRRT consent was obtained from pt's spouse over the phone and placed in chart. Monitor UOP and serum ammonia level.

## 2024-04-13 NOTE — PROGRESS NOTE ADULT - ATTENDING COMMENTS
43yo pmh of HTN, DM, Factor V Leiden deficiency, gout, congenital pancreas divisum,  cholecystectomy,  Splenectomy, Kevin-En-Y Pancreaticojejunostomy 2/2014 [facility], Total Pancreatotomy with Islet Cell Autotransplant at [facility] 4/82018, Post Op Abdominal/Peritoneal Multiloculated Abscesses, Enterocutaneous Fistula, Multiple Abdominal Surgeries > 20x, Infected Abdominal Wound Mesh on Antibacterial and Fungal now admitted to [facility] 4/8 w/ hepatic encephalopathy w/ Ammonia >200. Now transferred here for LIver transplant  Pt w/ concern for KEL given no clear baseline cirrhosis hx and new HE, coagulapathy.  However pt also w/ AHRF now intubated,  PNA, shock and CT concerning for ischemic bowel.     - cont to monitor MS, withdrawing all ext and pupils equal reactive  - repeat CTH today negative  - cont HE management and trend ammonia  - titrate sedation to goal rass  - pt on home benzo and methadone- hold for now  - cont vent support,  maintain o2 sat >90%  - CT w/ significant PNA and endobronchial plugging -poss bronch today  - maintain map>65, intermittently requiring levophed  - check echo  - check ruq US w/ doppler  - poss KEL - Tylenol level negative and no tylenol or other substance use.  - f/u Hepatology reccs- pt s/p NAC at [facility]  - pt w/ PNA and poss bowel infection, f/u cx from blood and bronch  - f/u ID reccs re abx  - severe thrombocytopenia ? from liver disease- no active bleeding  - cont to monitor coags and plt    Prognosis guarded

## 2024-04-13 NOTE — PROCEDURE NOTE - NSBRONCHFINDINGS_GEN_A_CORE_FT
Airways normal appearing; mild-moderate thick secretions noted and therapeutically aspirated. Some erythematous sections are segmental bronchi. No gross lesions or abnormalities or endobronchial masses.

## 2024-04-13 NOTE — PATIENT PROFILE ADULT - FUNCTIONAL ASSESSMENT - DAILY ACTIVITY SCORE.
Per dr Alessandra Doss pt will be ready tomorrow. Left message for harley in admissiions at [facility]. I will start ins auth today. notified patient. 6

## 2024-04-13 NOTE — CONSULT NOTE ADULT - ASSESSMENT
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 Hargrove), and now S/P total pancreatectomy  with islet cell autotransplant at [facility] by Dr. Gil in 04/2018).    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  Patient was readmitted eventually later on at [facility] noted to have an enterocutaneous fistula. Per records, a Ct later in 2018 showed still fistulization but no abscess formation     Patient presented to [facility] ED on 4/8 by her spouse for AMS. Per , patient had been having nbnb vomiting and diarrhea for the last 4 weeks, extreme fatigue . She was unable to tolerate PO. She was also sleeping 18-20 hrs per day. On 4/8, he found her on the floor "completely out of it" and took her to [facility] for further evaluation.   Initial workup in ED demonstrated a Tbili 5.4, direct bili 4.4 and ammonia 196. Patient was somnolent and had a left eyeward gaze deviation. Submentally started developing agonal snoring respirations and had to be intubated for airway protection. Course complicated with septic shock and persistent hyperammonemia despite lactulose and rifaximin, CT show and started on meropenem, linezolid and anidulafungin at [facility]--> upon transfer here, off pressors, on alvaro/linezolid/caspofungin    labs notable for WBC 11, platelets 20, Hb 6.7, Bi 10, creat normal, procal 0.94    CT C/A/P done reading pending so far with no abdominal abscess, noted air within bowels , RUL consolidation  Blood and urine cultures at OSH ngtd  Per family history - she had blood in stool 6  months ago but then a colonoscopy that was clean and no further episodes. about 4 weeks ago began to have nausea and vomiting and extreme fatigue, then noted skin pealing of legs and arms, no cough , SOB, congestion, no diarrhea or blood in stool . No fevers.  Then a week ago she had abnormal behaviour staring and shaking and dilated pupils. Suddently a few days prior to admission became icteric.   Born in [place], not travelled lately , once in [place]. They have a backyard but she has not been out much, no recent tick bites or mosquito bites. they have 2 cats, no rats problems home, does not have any outdoor hobbies such as camping, hiking or water sports. Cats doing well, no recent bites or scratches.   imaging:  Ct C/A/P 4/13 pending  CT A/P 2018 available at [facility]: no residual collection but enterocutaneous fistula-->  Water-soluble contrast is injected via the pigtail catheter and   demonstrates an enterocutaneous fistula with proximal loop of jejunum.  Micro:  4/13: Mrsa nare swab neg  4/13 fluvid neg    1- S/P total pancreatectomy  with islet cell autotransplant at [facility] by Dr. Gil in 04/2018  2. Encephalopathy  3. hyperammonemia not responding to lactulose/rifaximin  hyperammoniea syndrome in c/o infection considered although not currently on immunosuppressants  4. hyperbilirrubinemia  5. R lung consolidation  6. thrombocytopenia, anemia  sepsis vs atypical infection   7. mild leucocytosis  8. s/p splenectomy  9. s/p cholecystectomy    Recommendations  1. Please send repeat BC, UC  2. Agree with bronchoscopy and sending BAL for bacterial, fungal cx, full RVP, Mycoplasma PCR, Aspergillus GM, Legionella PCR  3. Follow CT C/A/P  4. Would check US liver if not done before  5. Send plasma/serum Mycoplasma/ureaplsama PCR (appears on sunrise as Mycoplasma TMA )  6. Send serum TICK born PCR panel , Ehrlichia/anaplsama Ab, BArtonella PCR, Leptospira Antibodies  7. send LEgionella urine antigen  8. ordered urine miscellaneous leptospira PCR  9. Continue meropenem, caspofungin empirically pending results  10 Given thrombocytopenia would switch linezolid to daptomycin 8 mg/kg/day - CPK normal at baseline. note MRSA pCr nares negative, no need to cover lung for Staph  11 ADd doxycicline 100 mg  bid iv  12 Holding LP pending evolution and response to toxicometabolic causes.     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 Hargrove), and now S/P total pancreatectomy  with islet cell autotransplant at [facility] by Dr. Gil in 04/2018).    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  Patient was readmitted eventually later on at [facility] noted to have an enterocutaneous fistula. Per records, a Ct later in 2018 showed still fistulization but no abscess formation     Patient presented to [facility] ED on 4/8 by her spouse for AMS. Per , patient had been having nbnb vomiting and diarrhea for the last 4 weeks, extreme fatigue . She was unable to tolerate PO. She was also sleeping 18-20 hrs per day. On 4/8, he found her on the floor "completely out of it" and took her to [facility] for further evaluation.   Initial workup in ED demonstrated a Tbili 5.4, direct bili 4.4 and ammonia 196. Patient was somnolent and had a left eyeward gaze deviation. Submentally started developing agonal snoring respirations and had to be intubated for airway protection. Course complicated with septic shock and persistent hyperammonemia despite lactulose and rifaximin, CT show and started on meropenem, linezolid and anidulafungin at [facility]--> upon transfer here, off pressors, on alvaro/linezolid/caspofungin    labs notable for WBC 11, platelets 20, Hb 6.7, Bi 10, creat normal, procal 0.94    CT C/A/P done reading pending so far with no abdominal abscess, noted air within bowels , RUL consolidation  Blood and urine cultures at OSH ngtd  Per family history - she had blood in stool 6  months ago but then a colonoscopy that was clean and no further episodes. about 4 weeks ago began to have nausea and vomiting and extreme fatigue, then noted skin pealing of legs and arms, no cough , SOB, congestion, no diarrhea or blood in stool . No fevers.  Then a week ago she had abnormal behaviour staring and shaking and dilated pupils. Suddently a few days prior to admission became icteric.   Born in [place], not travelled lately , once in [place]. They have a backyard but she has not been out much, no recent tick bites or mosquito bites. they have 2 cats, no rats problems home, does not have any outdoor hobbies such as camping, hiking or water sports. Cats doing well, no recent bites or scratches.   imaging:  Ct C/A/P 4/13 pending  CT A/P 2018 available at [facility]: no residual collection but enterocutaneous fistula-->  Water-soluble contrast is injected via the pigtail catheter and   demonstrates an enterocutaneous fistula with proximal loop of jejunum.  Micro:  4/13: Mrsa nare swab neg  4/13 fluvid neg    1- S/P total pancreatectomy  with islet cell autotransplant at [facility] by Dr. Gil in 04/2018  not on immunosuppressants  2. Encephalopathy  3. hyperammonemia not responding to lactulose/rifaximin  hyperammoniea syndrome in c/o infection considered although not currently on immunosuppressants  4. hyperbilirrubinemia  5. R lung consolidation  6. thrombocytopenia, anemia  sepsis vs atypical infection   7. mild leucocytosis  8. s/p splenectomy  9. s/p cholecystectomy    Recommendations  1. Please send repeat BC, UC  2. Agree with bronchoscopy and sending BAL for bacterial, fungal cx, full RVP, Mycoplasma PCR, Aspergillus GM, Legionella PCR  3. Follow CT C/A/P  4. Would check US liver if not done before to investigate for biliary dilation  5. Send plasma/serum Mycoplasma/ureaplsama PCR (appears on sunrise as Mycoplasma TMA )  6. Send serum TICK born PCR panel , Ehrlichia/anaplsama Ab, BArtonella PCR, Leptospira Antibodies  7. send LEgionella urine antigen  8. ordered urine miscellaneous leptospira PCR  9. Continue meropenem, caspofungin empirically pending results  10 Given thrombocytopenia would switch linezolid to daptomycin 8 mg/kg/day - CPK normal at baseline. note MRSA pCr nares negative, no need to cover lung for Staph  11 ADd doxycicline 100 mg  bid iv for now  12 Holding LP pending evolution and response to toxicometabolic causes.       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 Hargrove), and now S/P total pancreatectomy  with islet cell autotransplant at [facility] by Dr. Gil in 04/2018).    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 Per records, a Ct later in 2018 showed still fistulization but no abscess formation     Patient presented to [facility] ED on 4/8 by her spouse for AMS. Per , patient had been having nbnb vomiting and diarrhea for the last 4 weeks, extreme fatigue . She was unable to tolerate PO. She was also sleeping 18-20 hrs per day. On 4/8, he found her on the floor "completely out of it" and took her to [facility] for further evaluation.   Initial workup in ED demonstrated a Tbili 5.4, direct bili 4.4 and ammonia 196. Patient was somnolent and had a left eyeward gaze deviation. Submentally started developing agonal snoring respirations and had to be intubated for airway protection. Course complicated with septic shock and persistent hyperammonemia despite lactulose and rifaximin, CT show and started on meropenem, linezolid and anidulafungin at [facility]--> upon transfer here, off pressors, on alvaro/linezolid/caspofungin    labs notable for WBC 11, platelets 20, Hb 6.7, Bi 10, creat normal, procal 0.94    CT C/A/P done reading pending so far with no abdominal abscess, noted air within bowels , RUL consolidation  Blood and urine cultures at OSH ngtd  Per family history - she had blood in stool 6  months ago but then a colonoscopy that was clean and no further episodes. about 4 weeks ago began to have nausea and vomiting and extreme fatigue, then noted skin pealing of legs and arms, no cough , SOB, congestion, no diarrhea or blood in stool . No fevers.  Then a week ago she had abnormal behaviour staring and shaking and dilated pupils. Suddently a few days prior to admission became icteric.   Born in [place], not travelled lately , once in [place]. They have a backyard but she has not been out much, no recent tick bites or mosquito bites. they have 2 cats, no rats problems home, does not have any outdoor hobbies such as camping, hiking or water sports. Cats doing well, no recent bites or scratches.   imaging:  Ct C/A/P 4/13 pending  CT A/P 2018 available at [facility]: no residual collection but enterocutaneous fistula-->  Water-soluble contrast is injected via the pigtail catheter and   demonstrates an enterocutaneous fistula with proximal loop of jejunum.  Micro:  4/13: Mrsa nare swab neg  4/13 fluvid neg    1- S/P total pancreatectomy  with islet cell autotransplant at [facility] by Dr. Gil in 04/2018  not on immunosuppressants  2. Encephalopathy  3. hyperammonemia not responding to lactulose/rifaximin  hyperammoniea syndrome in c/o infection considered although not currently on immunosuppressants  4. hyperbilirrubinemia  5. R lung consolidation- likely aspiration  6. thrombocytopenia, anemia  sepsis vs atypical infection   7. mild leucocytosis  8. s/p splenectomy  9. s/p cholecystectomy    Recommendations  1. Please send repeat BC, UC  2. Agree with bronchoscopy and sending BAL for bacterial, fungal cx, full RVP, Mycoplasma PCR, Aspergillus GM, Legionella PCR  3. Follow CT C/A/P  4. Would check US liver if not done before to investigate for biliary dilation  5. Send plasma/serum Mycoplasma/ureaplsama PCR (appears on sunrise as Mycoplasma TMA )  6. Send serum TICK born PCR panel , Ehrlichia/anaplsama Ab, BArtonella PCR, Leptospira Antibodies  7. send LEgionella urine antigen  8. ordered urine miscellaneous leptospira PCR  9. Continue meropenem, caspofungin empirically pending results  10 Given thrombocytopenia would switch linezolid to daptomycin 8 mg/kg/day - CPK normal at baseline. 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 Hargrove), and now S/P total pancreatectomy  with islet cell autotransplant at [facility] by Dr. Gil in 04/2018).    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  Patient was readmitted eventually later on at [facility] noted to have an enterocutaneous fistula. Per records, a Ct later in 2018 showed still fistulization but no abscess formation     Patient presented to [facility] ED on 4/8 by her spouse for AMS. Per , patient had been having nbnb vomiting and diarrhea for the last 4 weeks, extreme fatigue . She was unable to tolerate PO. She was also sleeping 18-20 hrs per day. On 4/8, he found her on the floor "completely out of it" and took her to [facility] for further evaluation.   Initial workup in ED demonstrated a Tbili 5.4, direct bili 4.4 and ammonia 196. Patient was somnolent and had a left eyeward gaze deviation. Submentally started developing agonal snoring respirations and had to be intubated for airway protection. Course complicated with septic shock and persistent hyperammonemia despite lactulose and rifaximin, CT show and started on meropenem, linezolid and anidulafungin at [facility]--> upon transfer here, off pressors, on alvaro/linezolid/caspofungin    labs notable for WBC 11, platelets 20, Hb 6.7, Bi 10, creat normal, procal 0.94    CT C/A/P done reading pending so far with no abdominal abscess, noted air within bowels , RUL consolidation  Blood and urine cultures at OSH ngtd  Per family history - she had blood in stool 6  months ago but then a colonoscopy that was clean and no further episodes. about 4 weeks ago began to have nausea and vomiting and extreme fatigue, then noted skin pealing of legs and arms, no cough , SOB, congestion, no diarrhea or blood in stool . No fevers.  Then a week ago she had abnormal behaviour staring and shaking and dilated pupils. Suddently a few days prior to admission became icteric.   Born in [place], not travelled lately , once in [place]. They have a backyard but she has not been out much, no recent tick bites or mosquito bites. they have 2 cats, no rats problems home, does not have any outdoor hobbies such as camping, hiking or water sports. Cats doing well, no recent bites or scratches.   imaging:  Ct C/A/P 4/13 pending  CT A/P 2018 available at [facility]: no residual collection but enterocutaneous fistula-->  Water-soluble contrast is injected via the pigtail catheter and   demonstrates an enterocutaneous fistula with proximal loop of jejunum.  Micro:  4/13: Mrsa nare swab neg  4/13 fluvid neg    1- S/P total pancreatectomy  with islet cell autotransplant at [facility] by Dr. Gil in 04/2018  not on immunosuppressants  2. Encephalopathy  3. hyperammonemia not responding to lactulose/rifaximin  hyperammonemia syndrome  in c/o infection considered   4. hyperbilirrubinemia in c/o sepsis ? vs biliary obstruction vs atypical infectioni  5. R lung consolidation- likely aspiration  6. thrombocytopenia, anemia  sepsis vs atypical infection   7. mild leucocytosis  8. s/p splenectomy  9. s/p cholecystectomy    Recommendations  1. Please send repeat BC, UC  2. Agree with bronchoscopy and sending BAL for bacterial, fungal cx, full RVP, Mycoplasma PCR, Aspergillus GM, Legionella PCR  3. Follow CT C/A/P  4. Would check US liver if not done before to investigate for biliary dilation  5. Send plasma/serum Mycoplasma/ureaplsama PCR (appears on sunrise as Mycoplasma TMA )  6. Send serum TICK born PCR panel , Ehrlichia/anaplsama Ab, BArtonella PCR, Leptospira Antibodies  7. send LEgionella urine antigen  8. ordered urine and serum miscellaneous leptospira PCR  9. Continue meropenem, caspofungin empirically pending results  10 Given thrombocytopenia would switch linezolid to daptomycin 8 mg/kg/day - CPK normal at baseline. note MRSA pCr nares negative, no need to cover lung for Staph  11 ADd doxycicline 100 mg  bid iv for now  12. Send blood smear and babesia PCR  13. not on IS but would end CMV PCR and parvovirus PCR        Thank you for involving us in the care of this patient  Transplant ID will continue to follow  Please call or page with additional questions  Pager; #[phone]  Teams: from 8 am to 5 pm  Devora Jasmine MD

## 2024-04-13 NOTE — CONSULT NOTE ADULT - ASSESSMENT
42F PMH DMT1, heterozygous Factor V leiden deficiency, hx of Left peroneal DVT (eliquis x1 year, stopped 5/2023), gout (has had numerous courses of steroids) and pancreas congenital abnormality associated with recurrent pancreatitis and extensive surgical hx (s/p distal pancreatectomy, cholecystectomy, splenectomy and celina-en-y pancreaticojejuonostomy (2014, [place], [facility]), s/p total pancreatectomy with islet cell autotransplant ([facility] 2018). Now in the MICU with shock, hyperammonia, hyperbilirubinemia, anemia, thrombocytopenia. Surgery team consulted for evaluation of possible bowel ischemia. On Levo .02. Lactate 3.3. Abdomen is distended but soft. No signs of bleeding from rectal tube.    Plan/Recs  - No acute surgical intervention  - Repeat full set of labs now >  Lactate now 2.7    - Follow-up Hepatology plan/recs  - Serial abdominal exams off sedation  - Surgery will follow    Patient discussed with Attending Surgeon Dr. Hargrove  ACS/Trauma Surgery  [id number]

## 2024-04-13 NOTE — CONSULT NOTE ADULT - SUBJECTIVE AND OBJECTIVE BOX
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 Pt. was intubated, and transferred to [facility] for further management. Pt. currently admitted for half-way with hepatic encephalopathy. Found to have evidence of ischemic colitis on CT. Nephrology was consulted for hyperammonemia and fluid overload. On review of [place], SCr is WNL at 0.75. Serum ammonia elevated to 142. Unable to administer lactulose due to ischemic colitis, concern for necrotic bowel.     Pt. was seen and evaluated in the MICU. Pt. is intubated/sedated. Unable to provide history or ROS.    PAST HISTORY  --------------------------------------------------------------------------------  PAST MEDICAL & SURGICAL HISTORY:    FAMILY HISTORY:    PAST SOCIAL HISTORY:    ALLERGIES & MEDICATIONS  --------------------------------------------------------------------------------  Allergies    No Known Allergies    Intolerances      Standing Inpatient Medications  albumin human 25% IVPB 100 milliLiter(s) IV Intermittent every 2 hours  caspofungin IVPB      chlorhexidine 0.12% Liquid 15 milliLiter(s) Oral Mucosa every 12 hours  chlorhexidine 4% Liquid 1 Application(s) Topical <User Schedule>  CRRT Treatment    <Continuous>  DAPTOmycin IVPB 500 milliGRAM(s) IV Intermittent every 24 hours  dexMEDEtomidine Infusion 0.5 MICROgram(s)/kG/Hr IV Continuous <Continuous>  dextrose 10%. 1000 milliLiter(s) IV Continuous <Continuous>  doxycycline IVPB 100 milliGRAM(s) IV Intermittent every 12 hours  fentaNYL    Injectable 25 MICROGram(s) IV Push every 5 minutes  folic acid 1 milliGRAM(s) Oral daily  insulin glargine Injectable (LANTUS) 10 Unit(s) SubCutaneous at bedtime  insulin lispro (ADMELOG) corrective regimen sliding scale   SubCutaneous every 6 hours  levothyroxine 100 MICROGram(s) Oral daily  meropenem  IVPB 1000 milliGRAM(s) IV Intermittent every 12 hours  multivitamin/minerals/iron Oral Solution (CENTRUM) 15 milliLiter(s) Oral daily  pantoprazole  Injectable 40 milliGRAM(s) IV Push daily  Phoxillum Filtration BK 4 / 2.5 5000 milliLiter(s) CRRT <Continuous>  Phoxillum Filtration BK 4 / 2.5 5000 milliLiter(s) CRRT <Continuous>  PrismaSOL Filtration BGK 4 / 2.5 5000 milliLiter(s) CRRT <Continuous>  rifAXIMin 550 milliGRAM(s) Oral two times a day  thiamine 100 milliGRAM(s) Oral daily  ursodiol Tablet 500 milliGRAM(s) Oral <User Schedule>    PRN Inpatient Medications  sodium chloride 0.9% lock flush 10 milliLiter(s) IV Push every 1 hour PRN      REVIEW OF SYSTEMS  --------------------------------------------------------------------------------  Unable to obtain ROS, see HPI    VITALS/PHYSICAL EXAM  --------------------------------------------------------------------------------  T(C): 36.1 (04-13-24 @ 16:00), Max: 36.8 (04-13-24 @ 02:45)  HR: 87 (04-13-24 @ 18:00) (86 - 118)  BP: 101/66 (04-13-24 @ 18:00) (79/52 - 133/81)  RR: 19 (04-13-24 @ 18:00) (13 - 23)  SpO2: 95% (04-13-24 @ 18:00) (94% - 97%)  Wt(kg): --  Height (cm): 165.1 (04-13-24 @ 02:45)  Weight (kg): 62.4 (04-13-24 @ 02:45)  BMI (kg/m2): 22.9 (04-13-24 @ 02:45)  BSA (m2): 1.69 (04-13-24 @ 02:45)    04-12-24 @ 07:01  -  04-13-24 @ 07:00  --------------------------------------------------------  IN: 274.8 mL / OUT: 765 mL / NET: -490.2 mL    04-13-24 @ 07:01  -  04-13-24 @ 19:04  --------------------------------------------------------  IN: 1640.2 mL / OUT: 2845 mL / NET: -1204.8 mL    Physical Exam:  Gen: ill-appearing  HEENT: +ET tube, OG tube  Pulm: Mechanical breath sounds BL  CV: S1S2  Abd: Soft, +BS,  Ext: +BL LE edema  Neuro: Sedated  Skin: Warm and dry  Vascular access: LIJ non-tunneled HD catheter    LABS/STUDIES  --------------------------------------------------------------------------------              8.4    9.19  >-----------<  104      [04-13-24 @ 16:09]              25.3     140  |  106  |  10  ----------------------------<  70      [04-13-24 @ 16:09]  3.8   |  22  |  0.75        Ca     9.3     [04-13-24 @ 16:09]      Mg     1.7     [04-13-24 @ 16:09]      Phos  2.5     [04-13-24 @ 04:01]    TPro  4.4  /  Alb  2.9  /  TBili  10.2  /  DBili  x   /  AST  63  /  ALT  17  /  AlkPhos  94  [04-13-24 @ 16:09]    PT/INR: PT 17.3 , INR 1.67       [04-13-24 @ 03:56]  PTT: 27.4       [04-13-24 @ 16:09]    CK 47      [04-13-24 @ 09:41]        [04-13-24 @ 09:41]    Creatinine Trend:  SCr 0.75 [04-13 @ 16:09]  SCr 0.80 [04-13 @ 09:41]  SCr 0.83 [04-13 @ 04:01]  SCr 0.81 [04-13 @ 03:56]    Urinalysis - [04-13-24 @ 16:09]      Color  / Appearance  / SG  / pH       Gluc 70 / Ketone   / Bili  / Urobili        Blood  / Protein  / Leuk Est  / Nitrite       RBC  / WBC  / Hyaline  / Gran  / Sq Epi  / Non Sq Epi  / Bacteria     Urine Creatinine 95      [04-13-24 @ 11:35]  Urine Protein 30      [04-13-24 @ 11:35]  Urine Sodium 8      [04-13-24 @ 11:35]  Urine Potassium 12      [04-13-24 @ 11:35]  Urine Osmolality 386      [04-13-24 @ 11:35]    Iron 43, TIBC Unable to calculate, %sat Unable to calculate      [04-13-24 @ 09:41]  Ferritin 653      [04-13-24 @ 09:41]  TSH 4.43      [04-13-24 @ 03:56]  Lipid: chol 82, TG 82, HDL 8, LDL --      [04-13-24 @ 03:56]    HIV Nonreact      [04-13-24 @ 03:56]

## 2024-04-13 NOTE — H&P ADULT - ATTENDING COMMENTS
42F Hx HTN, DM, Factor V Leiden Deficiency, Gout, Congenital Pancreas Divisum (PD), Recurrent Pancreatitis s/p Distal Pancreatectomy, Cholecystectomy, Splectomy, Kevin-En-Y Pancreaticojejunostomy 2/2014 [facility], Total Pancreatotomy with Islet Cell Autotransplant at [facility] 4/82018, Post Op Abdominal/Peritoneal Multiloculated Abscesses, Enterocutaneous Fistula, Multiple Abdominal Surgeries > 20s, Infected Abdominal Wound Mesh on Antibacterial and Fungal ABx admitted to [facility] 4/8 NBNB Vomiting and Watery Diarrhea x 4 days, PPOI, Somnolence (Sleep 16-20Hr/Day) found elevated Ammonia >200, Elevated LFTs, worsening Metabolic vs. Hepatic Encephalopathy, intubated for airway protection. She was transferred to [facility]MICU for Liver Transplant Evaluation.   - A&O x 0 grimacing to painful stimuli   - Minimally sedated on Vent Support FiO2 35%  - Sinus tachy 120s without Pressor starting IV Precedex gtt   - Repeat TTE, Bedside POCUS for RA Thrombus Hx on LVX   - Septic and Fever w/u OSH to f/u cultures, CT Abdo/Pelvis etc.   - OGT and Enteral Feeding plan  - Serial CBC, CMPs, PT/INR, LFT, Lactate, Procalcitonin   - Maintenance IV Fluid, close monitor I&O, Renal Function   - Pain control, Lactulose/ Rifaximin, Liver Transplant Team evaluation   - DVT PPx - LVX   - GOC Full Code       Patient seen and examined with ICU Resident/Fellow at bedside after lab data, medical records and radiology reports reviewed. I have read and agreeable in general with resident's Documented Note, Assessment and Management Plan which reflected my opinions from bedside round and discussion.   Total Critical Care Time = 45 Min excluding teaching and procedure activity.

## 2024-04-13 NOTE — PROGRESS NOTE ADULT - ASSESSMENT
Neuro   #hepatic encephalopathy   Baseline AOx3. Currently intubated and sedated. CT H at OSH without acute findings   - treatment for hepatic encephalopathy as below   - f/u CT head    #anxiety/depression   Was on alprazolam 0.75mg daily   - hold for now     #chronic pain   On dilaudid and methadone 5mg daily   - hold for now     Cardio  #hx of RA thrombus   Previously on Lovenox. Was on Eliquis but was stopped as per heme/onc note at OSH. Likely due to Factor 5 Leiden hx.   - DVT ppx as below     Pulmonary   #mechanical ventilation   - minimal settings   - airway clearance     GI  #hepatic encephalopathy   Baseline mentation appears to be AOx3 as per outpatient records.   - c/w lactulose via OG tube and rectal tube, titrate to 2-3 BMs   - c/w Rifaximin     #liver failure   Elevated bilirubin, elevated INR, and c/b hepatic encephalopathy. Etiology likely from hx of pancreas abnormalities s/p multiple surgeries.   - MELD 23  - s/p NAC on admission to outside hospital  - POCUS with mild ascites  - will consult hepatology  - c/w thiamine, folic acid, MV   - c/w ursodiol 500mg   - f/u Abd duplex/US, r/o hepatic vein thrombosis    #congenital abnormality of the pancreas   #abdominal mesh  Multiple surgical hx from recurrent pancreatitis as in HPI. S/p total pancreatectomy with islet cell autotransplant followed by multiple abscess leading to several abdomial surgeries.   - c/w creon   - c/w linzess     #diet  - NPO, tube feeds  - rectal tube in place   - c/w omeprazole 40mg     Renal   #critical illness   Preserved kidney function.   - harrington in place (exchanged here), strict I/O  - f/u urine lytes    Heme   #factor 5 Leiden   Not currently on anticoagulation. Patient follows with [place] [facility].    #asplenia  Removed as per multiple abdominal surgeries. Prone to infections due to this.     # Macrocytic anemia  Multifactorial in nature. No hx of bleeding but with coagulopathy as below.   - f/u b12, folate, iron studies    #thrombocytopenia   Likely due to liver dysfunction though unclear etiology given no cirrhosis.  No hx of bleeding but has FVL (above).  - CTM PLT level, send blue top for discrepancies  - f/u factor viii assay    Endo   #DM   - lantus 10units at night   - f/u TFTs  - f/u A1c/fructosamine    ID   #septic shock   Previously on levo at OSH. On transfer to us, off vasopressors.   - c/w linezolid 600mg   - c/w meropenem 1000mg   - c/w caspofungin 50mg q24hr  - ID consult    #hx of recurrent infections   From multiple abdominal wounds and surgeries. Hx of VRE and candida.   - ID c/s as above    MSK  #gout  - c/w allopurinol 100mg daily   - c/w colchicine 0.6mg daily    41yo pmh of HTN, DM, Factor V Leiden deficiency, gout, congenital pancreas divisum, recurrent pancreatitis s/p distal pancreatectomy, cholecystectomy, Cholecystectomy, Splectomy, Kevin-En-Y Pancreaticojejunostomy 2/2014 [facility], Total Pancreatotomy with Islet Cell Autotransplant at [facility] 4/82018, Post Op Abdominal/Peritoneal Multiloculated Abscesses, Enterocutaneous Fistula, Multiple Abdominal Surgeries > 20s, Infected Abdominal Wound Mesh on Antibacterial and Fungal ABx admitted to [facility] 4/8 NBNB Vomiting and Watery Diarrhea x 4 days, PPOI, Somnolence (Sleep 16-20Hr/Day) found elevated Ammonia >200, Elevated LFTs, worsening Metabolic vs. Hepatic Encephalopathy, intubated for airway protection. She was transferred to [facility]MICU for Liver Transplant Evaluation.       Neuro   #hepatic encephalopathy   Baseline AOx3. Currently intubated and sedated. CT H at OSH without acute findings   - treatment for hepatic encephalopathy as below   - f/u CT head --> no acute findings   - AOx0- grimace to pain   - on precedex     #anxiety/depression   Was on alprazolam 0.75mg daily   - hold for now     #chronic pain   On dilaudid and methadone 5mg daily   - hold for now     Cardio  #hx of RA thrombus   Previously on Lovenox. Was on Eliquis but was stopped as per heme/onc note at OSH. Likely due to Factor 5 Leiden hx.   - DVT ppx as below     Pulmonary   #mechanical ventilation   - minimal settings   - airway clearance     GI  #hepatic encephalopathy   Baseline mentation appears to be AOx3 as per outpatient records.   - c/w lactulose via OG tube and rectal tube, titrate to 2-3 BMs   - c/w Rifaximin     #liver failure   Elevated bilirubin, elevated INR, and c/b hepatic encephalopathy. Etiology likely from hx of pancreas abnormalities s/p multiple surgeries.   - MELD 23  - s/p NAC on admission to outside hospital  - POCUS with mild ascites  - will consult hepatology  - c/w thiamine, folic acid, MV   - c/w ursodiol 500mg   - f/u Abd duplex/US, r/o hepatic vein thrombosis    #Ischemic bowel   - CT abdomen pelvis concerning for possible bowel ischemia  - Segmental areas of wall thickening throughout the colon and rectum with   pneumatosis, mesenteric edema, and adjacent hyperdensities in the   proximal transverse colon suspicious for bowel ischemia with intraluminal   hemorrhage. Additional intraluminal hyperdensities within the distal   ascending colon suspicious for hemorrhage.   - surgery consulted     #congenital abnormality of the pancreas   #abdominal mesh  Multiple surgical hx from recurrent pancreatitis as in HPI. S/p total pancreatectomy with islet cell autotransplant followed by multiple abscess leading to several abdomial surgeries.   - c/w creon   - c/w linzess     #diet  - NPO, hold tube feeds   - rectal tube in place   - c/w omeprazole 40mg     Renal   #critical illness   Preserved kidney function.   - harrington in place (exchanged here), strict I/O  - f/u urine lytes    Heme   #factor 5 Leiden   Not currently on anticoagulation. Patient follows with [place] [facility].    #asplenia  Removed as per multiple abdominal surgeries. Prone to infections due to this.     # Macrocytic anemia  Multifactorial in nature. No hx of bleeding but with coagulopathy as below.   - f/u b12, folate, iron studies    #thrombocytopenia   Likely due to liver dysfunction though unclear etiology given no cirrhosis.  No hx of bleeding but has FVL (above).  - CTM PLT level, send blue top for discrepancies  - f/u factor viii assay  - 1u plts     Endo   #DM   - lantus 10units at night   - f/u TFTs  - f/u A1c/fructosamine    ID   #septic shock   Previously on levo at OSH. On transfer to us, off vasopressors.   - c/w linezolid 600mg   - c/w meropenem 1000mg   - c/w caspofungin 50mg q24hr  - transplant ID consulted     #hx of recurrent infections   From multiple abdominal wounds and surgeries. Hx of VRE and candida.   - ID c/s as above    MSK  #gout  - c/w allopurinol 100mg daily   - c/w colchicine 0.6mg daily     DVT PPx:   lovenox    Ethics:  Full code

## 2024-04-14 NOTE — PROGRESS NOTE ADULT - ASSESSMENT
43yo pmh of HTN, DM, Factor V Leiden deficiency, gout, congenital pancreas divisum, recurrent pancreatitis s/p distal pancreatectomy, cholecystectomy, Cholecystectomy, Splectomy, Kevin-En-Y Pancreaticojejunostomy 2/2014 [facility], Total Pancreatotomy with Islet Cell Autotransplant at [facility] 4/82018, Post Op Abdominal/Peritoneal Multiloculated Abscesses, Enterocutaneous Fistula, Multiple Abdominal Surgeries > 20s, Infected Abdominal Wound Mesh on Antibacterial and Fungal ABx admitted to [facility] 4/8 NBNB Vomiting and Watery Diarrhea x 4 days, PPOI, Somnolence (Sleep 16-20Hr/Day) found elevated Ammonia >200, Elevated LFTs, worsening Metabolic vs. Hepatic Encephalopathy, intubated for airway protection. She was transferred to [facility] for Liver Transplant Evaluation.       =====Neuro=====  #hepatic encephalopathy   >Baseline AOx3. Currently intubated and sedated. CT H at OSH without acute findings   >CTH (4/13): No acute findings  - Currently AOx0, grimace to pain  - treatment for hepatic encephalopathy as below   - on precedex     #Chronic pain  #anxiety/depression   >Was on alprazolam 0.75mg QD, Dilaudid and methadone 5mg QD   - Hold home meds for now    =====Cardio=====  #hx of RA thrombus   Previously on Lovenox. Was on Eliquis but was stopped as per heme/onc note at OSH. Likely due to Factor 5 Leiden hx.   - DVT ppx as below     =====Pulmonary=====  #mechanical ventilation   - minimal settings   - airway clearance   - c/w Precedex for sedation    =====GI=====  #hepatic encephalopathy   Baseline mentation appears to be AOx3 as per outpatient records.   - c/w lactulose via OG tube and rectal tube, titrate to 2-3 BMs   - c/w Rifaximin     #liver failure   Elevated bilirubin, elevated INR, and c/b hepatic encephalopathy. Etiology likely from hx of pancreas abnormalities s/p multiple surgeries.   - MELD 23  - s/p NAC on admission to outside hospital  - POCUS with mild ascites  - will consult hepatology  - c/w thiamine, folic acid, MV   - c/w ursodiol 500mg   - f/u Abd duplex/US, r/o hepatic vein thrombosis    #Ischemic bowel   - CT abdomen pelvis concerning for possible bowel ischemia  - Segmental areas of wall thickening throughout the colon and rectum with   pneumatosis, mesenteric edema, and adjacent hyperdensities in the   proximal transverse colon suspicious for bowel ischemia with intraluminal   hemorrhage. Additional intraluminal hyperdensities within the distal   ascending colon suspicious for hemorrhage.   - surgery consulted     #congenital abnormality of the pancreas   #abdominal mesh  Multiple surgical hx from recurrent pancreatitis as in HPI. S/p total pancreatectomy with islet cell autotransplant followed by multiple abscess leading to several abdomial surgeries.   - c/w creon   - c/w linzess     #diet  - NPO, hold tube feeds   - rectal tube in place   - c/w omeprazole 40mg     Renal   #critical illness   Preserved kidney function.   - harrington in place (exchanged here), strict I/O  - f/u urine lytes    Heme   #factor 5 Leiden   Not currently on anticoagulation. Patient follows with [place] [facility].    #asplenia  Removed as per multiple abdominal surgeries. Prone to infections due to this.     # Macrocytic anemia  Multifactorial in nature. No hx of bleeding but with coagulopathy as below.   - f/u b12, folate, iron studies    #thrombocytopenia   Likely due to liver dysfunction though unclear etiology given no cirrhosis.  No hx of bleeding but has FVL (above).  - CTM PLT level, send blue top for discrepancies  - f/u factor viii assay  - 1u plts     Endo   #DM   - lantus 10units at night   - f/u TFTs  - f/u A1c/fructosamine    ID   #septic shock   Previously on levo at OSH. On transfer to us, off vasopressors.   - c/w linezolid 600mg   - c/w meropenem 1000mg   - c/w caspofungin 50mg q24hr  - transplant ID consulted     #hx of recurrent infections   From multiple abdominal wounds and surgeries. Hx of VRE and candida.   - ID c/s as above    MSK  #gout  - c/w allopurinol 100mg daily   - c/w colchicine 0.6mg daily     DVT PPx:   lovenox    Ethics:  Full code  43yo pmh of HTN, DM, Factor V Leiden deficiency, gout, congenital pancreas divisum, recurrent pancreatitis s/p distal pancreatectomy, cholecystectomy, Cholecystectomy, Splectomy, Kevin-En-Y Pancreaticojejunostomy 2/2014 [facility], Total Pancreatotomy with Islet Cell Autotransplant at [facility] 4/82018, Post Op Abdominal/Peritoneal Multiloculated Abscesses, Enterocutaneous Fistula, Multiple Abdominal Surgeries > 20s, Infected Abdominal Wound Mesh on Antibacterial and Fungal ABx admitted to [facility] 4/8 NBNB Vomiting and Watery Diarrhea x 4 days, PPOI, Somnolence (Sleep 16-20Hr/Day) found elevated Ammonia >200, Elevated LFTs, worsening Metabolic vs. Hepatic Encephalopathy, intubated for airway protection. She was transferred to [facility] for Liver Transplant Evaluation.       =====Neuro=====  #hepatic encephalopathy   >Baseline AOx3. Currently intubated and sedated. CT H at OSH without acute findings   >CTH (4/13): No acute findings  - Currently AOx0, grimace to pain  - treatment for hepatic encephalopathy as below   - on precedex gtt, wean as able    #Chronic pain  #anxiety/depression   >Was on alprazolam 0.75mg QD, Dilaudid and methadone 5mg QD   - Hold home meds for now    =====Cardio=====  #History of RA thrombus  - Previously on Lovenox. Was on Eliquis but was stopped as per heme/onc note at OSH. Likely due to Factor 5 Leiden hx.   - DVT ppx as below     =====Pulmonary=====  #mechanical ventilation   - Minimal setting AC, CPAP as able  - c/w Precedex for sedation, wean as able    =====GI=====  #Liver failure  >Elevated bilirubin/coag, c/b hepatic encephalopathy  >s/p NAC on admission to outside hospital  >POCUS with mild ascites  >CTAP (4/13): Hepatomegaly and hepatic steatosis  >Acute hepatitis panel (4/13): Negative  >Autoimmune hepatitis panel (4/13): Pending  - Etiology not entirely clear, hepatology following  - c/w thiamine, folic acid, MV   - c/w ursodiol 500mg   - f/u Abd duplex/US, r/o hepatic vein thrombosis    #hepatic encephalopathy   >Baseline mentation appears to be AOx3 as per outpatient records.   - c/w Rifaximin   - Unable to start Lactulose given ischemic colitis, on CRRT    #Ischemic bowel   >CTA/P (4/13): segmental areas of wall thickening throughout the colon and rectum with pneumatosis, mesenteric edema, and adjacent hyperdensities in the proximal transverse colon suspicious for bowel ischemia with intraluminal hemorrhage. Additional intraluminal hyperdensities within the distal ascending colon suspicious for hemorrhage.   >TTE (4/13): No thrombus commented  - surgery consulted   - NPO for now    #abdominal mesh  #congenital abnormality of the pancreas   >Multiple surgical hx from recurrent pancreatitis as in HPI. S/p total pancreatectomy with islet cell autotransplant followed by multiple abscess leading to several abdominal surgeries.   - Holding Creon and Linzess while NPO    #Diet and ppx  - NPO, hold tube feeds   - rectal tube in place   - c/w omeprazole 40mg     =====Renal=====  #CRRT  - Started on CRRT for hepatic encephalopathy   - Strict I/O  - James in place    =====Heme=====   #Factor 5 Leiden  >Not on home anticoagulation. Patient follows with [place] [facility]  - Lovenox for DVT ppx    # Macrocytic anemia  >s/p 1u PRBC (4/13)  Multifactorial in nature. No hx of bleeding but with coagulopathy as below.   - f/u b12, folate, iron studies    #thrombocytopenia  >s/p 1u Plt (4/13)   Likely due to liver dysfunction though unclear etiology given no cirrhosis.  No hx of bleeding but has FVL (above).  - CTM PLT level, send blue top for discrepancies  - f/u factor viii assay  - 1u plts     Endo   #DM   - lantus 10units at night   - f/u TFTs  - f/u A1c/fructosamine    ID   #septic shock   Previously on levo at OSH. On transfer to us, off vasopressors.   - c/w linezolid 600mg   - c/w meropenem 1000mg   - c/w caspofungin 50mg q24hr  - transplant ID consulted     #hx of recurrent infections   From multiple abdominal wounds and surgeries. Hx of VRE and candida.   - ID c/s as above 41yo pmh of HTN, DM, Factor V Leiden deficiency, gout, congenital pancreas divisum, recurrent pancreatitis s/p distal pancreatectomy, cholecystectomy, Cholecystectomy, Splectomy, Kevin-En-Y Pancreaticojejunostomy 2/2014 [facility], Total Pancreatotomy with Islet Cell Autotransplant at [facility] 4/82018, Post Op Abdominal/Peritoneal Multiloculated Abscesses, Enterocutaneous Fistula, Multiple Abdominal Surgeries > 20s, Infected Abdominal Wound Mesh on Antibacterial and Fungal ABx admitted to [facility] 4/8 NBNB Vomiting and Watery Diarrhea x 4 days, PPOI, Somnolence (Sleep 16-20Hr/Day) found elevated Ammonia >200, Elevated LFTs, worsening Metabolic vs. Hepatic Encephalopathy, intubated for airway protection. She was transferred to [facility] for Liver Transplant Evaluation.       =====Neuro=====  #hepatic encephalopathy   >Baseline AOx3. Currently intubated and sedated. CT H at OSH without acute findings   >CTH (4/13): No acute findings  - Currently AOx0, grimace to pain  - treatment for hepatic encephalopathy as below   - on precedex gtt, wean as able    #Chronic pain  #anxiety/depression   >Was on alprazolam 0.75mg QD, Dilaudid and methadone 5mg QD   - Hold home meds for now    =====Cardio=====  #History of RA thrombus  - Previously on Lovenox. Was on Eliquis but was stopped as per heme/onc note at OSH. Likely due to Factor 5 Leiden hx.   - DVT ppx as below     =====Pulmonary=====  #mechanical ventilation   - Minimal setting AC, CPAP as able  - c/w Precedex for sedation, wean as able    =====GI=====  #Liver failure  >Elevated bilirubin/coag, c/b hepatic encephalopathy  >s/p NAC on admission to outside hospital  >POCUS with mild ascites  >CTAP (4/13): Hepatomegaly and hepatic steatosis  >Acute hepatitis panel (4/13): Negative  >Autoimmune hepatitis panel (4/13): Pending  - Etiology not entirely clear, hepatology following  - c/w thiamine, folic acid, MV   - c/w ursodiol 500mg   - f/u Abd duplex/US, r/o hepatic vein thrombosis    #hepatic encephalopathy   >Baseline mentation appears to be AOx3 as per outpatient records.   - c/w Rifaximin   - Unable to start Lactulose given ischemic colitis, on CRRT    #Ischemic bowel   >CTA/P (4/13): segmental areas of wall thickening throughout the colon and rectum with pneumatosis, mesenteric edema, and adjacent hyperdensities in the proximal transverse colon suspicious for bowel ischemia with intraluminal hemorrhage. Additional intraluminal hyperdensities within the distal ascending colon suspicious for hemorrhage.   >TTE (4/13): No thrombus commented  - surgery consulted   - NPO for now    #abdominal mesh  #congenital abnormality of the pancreas   >Multiple surgical hx from recurrent pancreatitis as in HPI. S/p total pancreatectomy with islet cell autotransplant followed by multiple abscess leading to several abdominal surgeries.   - Holding Creon and Linzess while NPO    #Diet and ppx  - NPO, hold tube feeds   - rectal tube in place   - c/w omeprazole 40mg     =====Renal=====  #CRRT  - Started on CRRT for hepatic encephalopathy   - Strict I/O  - James in place    =====Heme=====   #Factor 5 Leiden  >Not on home anticoagulation. Patient follows with [place] [facility]  - Lovenox for DVT ppx    # Macrocytic anemia  >s/p 1u PRBC (4/13)  Multifactorial in nature. No hx of bleeding but with coagulopathy as below.   >B12 (4/13): >2000  >Iron studies (4/13): Ferritin 653, Iron 43, Unmeasurable TIBC/Iron%   - Likely iso liver disease  - Trend CBC, coag, transfuse goal > 7    #thrombocytopenia  >s/p 1u Plt (4/13)   Likely due to liver dysfunction though unclear etiology given no cirrhosis.  No hx of bleeding but has FVL (above).  - CTM PLT level, send blue top for discrepancies  - f/u factor viii assay  - 1u plts     Endo   #DM   - lantus 10units at night   - f/u TFTs  - f/u A1c/fructosamine    ID   #septic shock   Previously on levo at OSH. On transfer to us, off vasopressors.   - c/w linezolid 600mg   - c/w meropenem 1000mg   - c/w caspofungin 50mg q24hr  - transplant ID consulted     #hx of recurrent infections   From multiple abdominal wounds and surgeries. Hx of VRE and candida.   - ID c/s as above 41yo pmh of HTN, DM, Factor V Leiden deficiency, gout, congenital pancreas divisum, recurrent pancreatitis s/p distal pancreatectomy, cholecystectomy, Cholecystectomy, Splectomy, Kevin-En-Y Pancreaticojejunostomy 2/2014 [facility], Total Pancreatotomy with Islet Cell Autotransplant at [facility] 4/82018, Post Op Abdominal/Peritoneal Multiloculated Abscesses, Enterocutaneous Fistula, Multiple Abdominal Surgeries > 20s, Infected Abdominal Wound Mesh on Antibacterial and Fungal ABx admitted to [facility] 4/8 NBNB Vomiting and Watery Diarrhea x 4 days, PPOI, Somnolence (Sleep 16-20Hr/Day) found elevated Ammonia >200, Elevated LFTs, worsening Metabolic vs. Hepatic Encephalopathy, intubated for airway protection. She was transferred to [facility] for Liver Transplant Evaluation.       =====Neuro=====  #hepatic encephalopathy   >Baseline AOx3. Currently intubated and sedated. CT H at OSH without acute findings   >CTH (4/13): No acute findings  - treatment for hepatic encephalopathy as below   - Will wean precedex today    #Chronic pain  #anxiety/depression   >Was on alprazolam 0.75mg QD, Dilaudid and methadone 5mg QD   - Hold home meds for now    =====Cardio=====  #History of RA thrombus  - Previously on Lovenox. Was on Eliquis but was stopped as per heme/onc note at OSH. Likely due to Factor 5 Leiden hx.   - Hold AC iso thrombocytopenia     =====Pulmonary=====  #mechanical ventilation   - Tolerating CPAP well  - Wean precedex as able in plan for extubation    =====GI=====  #Liver failure  >Elevated bilirubin/coag, c/b hepatic encephalopathy  >s/p NAC on admission to outside hospital  >POCUS with mild ascites  >CTAP (4/13): Hepatomegaly and hepatic steatosis  >Acute hepatitis panel (4/13): Negative  >Autoimmune hepatitis panel (4/13): Pending  - Etiology not entirely clear, hepatology following  - Trend MELD daily  - c/w thiamine, folic acid, MV  - c/w ursodiol 500mg  - f/u Abd duplex/US, r/o hepatic vein thrombosis    #hepatic encephalopathy   >Baseline mentation appears to be AOx3 as per outpatient records.   - c/w Rifaximin   - Unable to start Lactulose given ischemic colitis, on CRRT    #Ischemic bowel   >CTA/P (4/13): segmental areas of wall thickening throughout the colon and rectum with pneumatosis, mesenteric edema, and adjacent hyperdensities in the proximal transverse colon suspicious for bowel ischemia with intraluminal hemorrhage. Additional intraluminal hyperdensities within the distal ascending colon suspicious for hemorrhage.   >TTE (4/13): No thrombus commented  - Surgery, GI following, no plan for surgery/endoscopic eval for now  - NPO for now    #abdominal mesh  #congenital abnormality of the pancreas   >Multiple surgical hx from recurrent pancreatitis as in HPI. S/p total pancreatectomy with islet cell autotransplant followed by multiple abscess leading to several abdominal surgeries.   - Holding Creon and Linzess while NPO    #Colonic mass  >CTA/P (4/13): Nonspecific focal areas of narrowing in the distal descending colon and sigmoid colon, possibly colonic masses  - ctm, colonoscopy eventually    #Diet and ppx  - NPO, hold tube feeds   - rectal tube in place   - c/w omeprazole 40mg     =====Renal=====  #CRRT  - Started on CRRT for hepatic encephalopathy   - Strict I/O  - James in place  - Goal -500 to -1L given anasarca on exam    =====Heme=====   #Factor 5 Leiden  >Not on home anticoagulation. Patient follows with [place] [facility]  - Hold AC iso thrombocytopenia     # Macrocytic anemia  >s/p 1u PRBC (4/13)  Multifactorial in nature. No hx of bleeding but with coagulopathy as below.   >B12 (4/13): >2000, Folate: pending  >Iron studies (4/13): Ferritin 653, Iron 43, Unmeasurable TIBC/Iron%   - Possibly iso liver disease   - f/u hemolysis labs  - Trend CBC, coag, transfuse goal > 7    #thrombocytopenia  >s/p 1u Plt (4/13)   - Etiology not entirely clear, s/p splenectomy and hepatic steatosis w/o cirrhosis on imaging  - f/u infectious workup as below  - Heme consult  - f/u factor viii assay    =====Endo=====  #DM1  >Per HIE record, patient prone to hypoglycemia  - c/w D20 gtt for now  - Decrease Lantus to 5u qhs    =====ID=====  Infectious work up  >UA (4/13): 11WBC, small LE, negative Nitrite or bacteria  >Babesia (4/13): neg; HIV (4/13): neg; RVP, COVID (4/14): neg; MRSA (4/13): Negative  >CXR (4/13): RML consolidation  >CTC/A/P (4/13): RML consolidation, LLL atelectasis 2/2 likely ?mucus plug, ischemic colitis  >Bcx (4/13): NGTD  >ET tube cx (4/13): Normal resp luisa  >BAL (4/13): Pending  - Rest of infectious w/u per ID    #Septic shock   #hx of recurrent infections   >H/o of recurrent infxn from multiple abdominal wounds and surgeries. Hx of VRE and candida.   >Previously on levo at OSH. Off pressor on transfer; Restarted with initiation of CRRT  - Infectious w/u per ID  - c/w Caspofungin 50mg Q24  - c/w Daptomycin 500mg Q24  - c/w Doxycycline 100mg Q12  - c/w Meropenem 1g q12

## 2024-04-14 NOTE — PROGRESS NOTE ADULT - SUBJECTIVE AND OBJECTIVE BOX
Patient is a 48y old  Female who presents with a chief complaint of AMS (13 Apr 2024 13:42)      24 hour events: ***    REVIEW OF SYSTEMS:  Constitutional: [ ] fevers [ ] chills [ ] weight loss [ ] weight gain  HEENT: [ ] dry eyes [ ] eye irritation [ ] postnasal drip [ ] nasal congestion  CV: [ ] chest pain [ ] orthopnea [ ] palpitations [ ] murmur  Resp: [ ] cough [ ] shortness of breath [ ] dyspnea [ ] wheezing [ ] sputum [ ] hemoptysis  GI: [ ] nausea [ ] vomiting [ ] diarrhea [ ] constipation [ ] abd pain [ ] dysphagia   : [ ] dysuria [ ] nocturia [ ] hematuria [ ] increased urinary frequency  Musculoskeletal: [ ] back pain [ ] myalgias [ ] arthralgias [ ] fracture  Skin: [ ] rash [ ] itch  Neurological: [ ] headache [ ] dizziness [ ] syncope [ ] weakness [ ] numbness  Psychiatric: [ ] anxiety [ ] depression  Endocrine: [ ] diabetes [ ] thyroid problem  Hematologic/Lymphatic: [ ] anemia [ ] bleeding problem  Allergic/Immunologic: [ ] itchy eyes [ ] nasal discharge [ ] hives [ ] angioedema  [ ] All other systems negative  [ ] Unable to assess ROS because ________    OBJECTIVE:  ICU Vital Signs Last 24 Hrs  T(C): 36.6 (14 Apr 2024 04:00), Max: 36.6 (14 Apr 2024 00:15)  T(F): 97.9 (14 Apr 2024 04:00), Max: 97.9 (14 Apr 2024 00:15)  HR: 87 (14 Apr 2024 07:00) (86 - 106)  BP: 101/59 (14 Apr 2024 07:00) (79/52 - 134/83)  BP(mean): 75 (14 Apr 2024 07:00) (61 - 104)  ABP: --  ABP(mean): --  RR: 16 (14 Apr 2024 07:00) (13 - 23)  SpO2: 95% (14 Apr 2024 07:00) (94% - 97%)    O2 Parameters below as of 13 Apr 2024 19:00  Patient On (Oxygen Delivery Method): ventilator    O2 Concentration (%): 35      Mode: AC/ CMV (Assist Control/ Continuous Mandatory Ventilation), RR (machine): 12, TV (machine): 400, FiO2: 35, PEEP: 5, ITime: 1, MAP: 9, PIP: 22    04-13 @ 07:01  -  04-14 @ 07:00  --------------------------------------------------------  IN: 3228.1 mL / OUT: 5376 mL / NET: -2147.9 mL      CAPILLARY BLOOD GLUCOSE      POCT Blood Glucose.: 148 mg/dL (14 Apr 2024 04:49)      PHYSICAL EXAM:  GENERAL: NAD, well-developed  HEAD:  Atraumatic, Normocephalic  EYES: EOMI, PERRLA, conjunctiva and sclera clear  NECK: Supple, No JVD, Thyroid not palpable, non tender, Trachea midline  CHEST/LUNG: ( )ETT in place, ( )Tracheostomy in place, ( )no chest deformity,  ( )  Normal expansion/effort/palpation,  ( )Normal percussion/auscultation,  Clear to auscultation bilaterally; No wheeze  HEART: Regular rate and rhythm; No murmurs, rubs, or gallops, ( ) No JVD, ( )Normal Pulses, ( )Edema   ABDOMEN: Soft, Nontender, Nondistended; Bowel sounds presen, ( ) No Masses, (  ) No organomegaly,  (  ) Non-tender normal BS   : Berry in Place, Voiding freely  Musculoskeletal/EXTREMITIES:(  ) Normal strength, movement, and tone, (  ) No focal atropy, (  ) Normal ROM, (  ) Normal digits and nails,  2+ Peripheral Pulses, No clubbing, cyanosis, or edema  PSYCH: AAOx3, (  ) Normal mood/affect/judgment/insight, (  ) Intact memory,   NEUROLOGY: non-focal, exam  SKIN: No rashes or lesions      LINES:    HOSPITAL MEDICATIONS:  MEDICATIONS  (STANDING):  albumin human 25% IVPB 100 milliLiter(s) IV Intermittent every 8 hours  caspofungin IVPB 50 milliGRAM(s) IV Intermittent every 24 hours  caspofungin IVPB      chlorhexidine 0.12% Liquid 15 milliLiter(s) Oral Mucosa every 12 hours  chlorhexidine 4% Liquid 1 Application(s) Topical <User Schedule>  CRRT Treatment    <Continuous>  DAPTOmycin IVPB 500 milliGRAM(s) IV Intermittent every 24 hours  dexMEDEtomidine Infusion 0.5 MICROgram(s)/kG/Hr (7.8 mL/Hr) IV Continuous <Continuous>  dextrose 20%. 500 milliLiter(s) (50 mL/Hr) IV Continuous <Continuous>  doxycycline IVPB 100 milliGRAM(s) IV Intermittent every 12 hours  fentaNYL    Injectable 25 MICROGram(s) IV Push every 5 minutes  folic acid 1 milliGRAM(s) Oral daily  insulin glargine Injectable (LANTUS) 10 Unit(s) SubCutaneous at bedtime  insulin lispro (ADMELOG) corrective regimen sliding scale   SubCutaneous every 6 hours  levothyroxine 100 MICROGram(s) Oral daily  meropenem  IVPB 1000 milliGRAM(s) IV Intermittent every 12 hours  multivitamin/minerals/iron Oral Solution (CENTRUM) 15 milliLiter(s) Oral daily  norepinephrine Infusion 0.03 MICROgram(s)/kG/Min (3.51 mL/Hr) IV Continuous <Continuous>  pantoprazole  Injectable 40 milliGRAM(s) IV Push daily  Phoxillum Filtration BK 4 / 2.5 5000 milliLiter(s) (900 mL/Hr) CRRT <Continuous>  Phoxillum Filtration BK 4 / 2.5 5000 milliLiter(s) (700 mL/Hr) CRRT <Continuous>  PrismaSOL Filtration BGK 4 / 2.5 5000 milliLiter(s) (200 mL/Hr) CRRT <Continuous>  rifAXIMin 550 milliGRAM(s) Oral two times a day  thiamine 100 milliGRAM(s) Oral daily  ursodiol Tablet 500 milliGRAM(s) Oral <User Schedule>    MEDICATIONS  (PRN):  sodium chloride 0.9% lock flush 10 milliLiter(s) IV Push every 1 hour PRN Pre/post blood products, medications, blood draw, and to maintain line patency      LABS:                        7.5    9.25  )-----------( 53       ( 14 Apr 2024 05:50 )             22.2     Hgb Trend: 7.5<--, 7.5<--, 8.4<--, 6.9<--, 7.3<--  04-14    136  |  105  |  6<L>  ----------------------------<  139<H>  4.0   |  22  |  0.60    Ca    9.2      14 Apr 2024 05:50  Phos  2.5     04-14  Mg     2.4     04-14    TPro  4.7<L>  /  Alb  3.3  /  TBili  11.1<H>  /  DBili  x   /  AST  91<H>  /  ALT  15  /  AlkPhos  85  04-14    Creatinine Trend: 0.60<--, 0.67<--, 0.75<--, 0.80<--, 0.83<--, 0.81<--  PT/INR - ( 14 Apr 2024 00:23 )   PT: 15.7 sec;   INR: 1.44 ratio         PTT - ( 14 Apr 2024 00:23 )  PTT:35.0 sec  Urinalysis Basic - ( 14 Apr 2024 05:50 )    Color: x / Appearance: x / SG: x / pH: x  Gluc: 139 mg/dL / Ketone: x  / Bili: x / Urobili: x   Blood: x / Protein: x / Nitrite: x   Leuk Esterase: x / RBC: x / WBC x   Sq Epi: x / Non Sq Epi: x / Bacteria: x      Arterial Blood Gas:  04-14 @ 05:40  7.45/31/152/22/96.6/-2.1  ABG lactate: --  Arterial Blood Gas:  04-14 @ 00:15  7.44/35/129/24/97.2/-0.2  ABG lactate: --  Arterial Blood Gas:  04-13 @ 15:52  7.45/36/150/25/95.0/1.1  ABG lactate: --  Arterial Blood Gas:  04-13 @ 03:51  7.49/30/118/23/SEE NOTE/-0.1  ABG lactate: --    Venous Blood Gas:  04-13 @ 19:01  7.41/41/48/26/73.3  VBG Lactate: 2.5  Venous Blood Gas:  04-13 @ 04:29  7.42/38/48/25/67.2  VBG Lactate: 3.1      EKG:    MICROBIOLOGY:     RADIOLOGY:  [ ] Reviewed and interpreted by me    EKG:  MICROBIOLOGY:     Radiology: ***    Bedside lung ultrasound: ***    Bedside ECHO: ***    EKG:    CENTRAL LINE: Y/N          DATE INSERTED:              REMOVE: Y/N    BERRY: Y/N                        DATE INSERTED:              REMOVE: Y/N    A-LINE: Y/N                       DATE INSERTED:              REMOVE: Y/N    GLOBAL ISSUE/BEST PRACTICE:  Analgesia:  Sedation:  HOB elevation: yes  Stress ulcer prophylaxis:  VTE prophylaxis:  Glycemic control:  Nutrition:    CODE STATUS: ***  [facility] discussion: Y     Patient is a 48y old  Female who presents with a chief complaint of AMS (13 Apr 2024 13:42)      24 hour events: No acute event overnight, started on Levo for BP support during CRRT, remains intubated    OBJECTIVE:  ICU Vital Signs Last 24 Hrs  T(C): 36.6 (14 Apr 2024 04:00), Max: 36.6 (14 Apr 2024 00:15)  T(F): 97.9 (14 Apr 2024 04:00), Max: 97.9 (14 Apr 2024 00:15)  HR: 87 (14 Apr 2024 07:00) (86 - 106)  BP: 101/59 (14 Apr 2024 07:00) (79/52 - 134/83)  BP(mean): 75 (14 Apr 2024 07:00) (61 - 104)  ABP: --  ABP(mean): --  RR: 16 (14 Apr 2024 07:00) (13 - 23)  SpO2: 95% (14 Apr 2024 07:00) (94% - 97%)    O2 Parameters below as of 13 Apr 2024 19:00  Patient On (Oxygen Delivery Method): ventilator    O2 Concentration (%): 35      Mode: AC/ CMV (Assist Control/ Continuous Mandatory Ventilation), RR (machine): 12, TV (machine): 400, FiO2: 35, PEEP: 5, ITime: 1, MAP: 9, PIP: 22    04-13 @ 07:01  -  04-14 @ 07:00  --------------------------------------------------------  IN: 3228.1 mL / OUT: 5376 mL / NET: -2147.9 mL      CAPILLARY BLOOD GLUCOSE      POCT Blood Glucose.: 148 mg/dL (14 Apr 2024 04:49)      PHYSICAL EXAM:  GENERAL: AAOx0, sedated and intubated  Cardiovascular: RRR, S1 S2, no m/r/g  LUNGS: CTABL  ABDOMEN: Mildly distended, unable to assess tenderness, has rectal tube  EXTREMITIES: 2+ pitting edema in all 4 extremities, pulses palpable, extremities warm  NEURO: Unable to assess on sedation, grimace to pain, withdrawing extremities      LINES:    HOSPITAL MEDICATIONS:  MEDICATIONS  (STANDING):  albumin human 25% IVPB 100 milliLiter(s) IV Intermittent every 8 hours  caspofungin IVPB 50 milliGRAM(s) IV Intermittent every 24 hours  caspofungin IVPB      chlorhexidine 0.12% Liquid 15 milliLiter(s) Oral Mucosa every 12 hours  chlorhexidine 4% Liquid 1 Application(s) Topical <User Schedule>  CRRT Treatment    <Continuous>  DAPTOmycin IVPB 500 milliGRAM(s) IV Intermittent every 24 hours  dexMEDEtomidine Infusion 0.5 MICROgram(s)/kG/Hr (7.8 mL/Hr) IV Continuous <Continuous>  dextrose 20%. 500 milliLiter(s) (50 mL/Hr) IV Continuous <Continuous>  doxycycline IVPB 100 milliGRAM(s) IV Intermittent every 12 hours  fentaNYL    Injectable 25 MICROGram(s) IV Push every 5 minutes  folic acid 1 milliGRAM(s) Oral daily  insulin glargine Injectable (LANTUS) 10 Unit(s) SubCutaneous at bedtime  insulin lispro (ADMELOG) corrective regimen sliding scale   SubCutaneous every 6 hours  levothyroxine 100 MICROGram(s) Oral daily  meropenem  IVPB 1000 milliGRAM(s) IV Intermittent every 12 hours  multivitamin/minerals/iron Oral Solution (CENTRUM) 15 milliLiter(s) Oral daily  norepinephrine Infusion 0.03 MICROgram(s)/kG/Min (3.51 mL/Hr) IV Continuous <Continuous>  pantoprazole  Injectable 40 milliGRAM(s) IV Push daily  Phoxillum Filtration BK 4 / 2.5 5000 milliLiter(s) (900 mL/Hr) CRRT <Continuous>  Phoxillum Filtration BK 4 / 2.5 5000 milliLiter(s) (700 mL/Hr) CRRT <Continuous>  PrismaSOL Filtration BGK 4 / 2.5 5000 milliLiter(s) (200 mL/Hr) CRRT <Continuous>  rifAXIMin 550 milliGRAM(s) Oral two times a day  thiamine 100 milliGRAM(s) Oral daily  ursodiol Tablet 500 milliGRAM(s) Oral <User Schedule>    MEDICATIONS  (PRN):  sodium chloride 0.9% lock flush 10 milliLiter(s) IV Push every 1 hour PRN Pre/post blood products, medications, blood draw, and to maintain line patency      LABS:                        7.5    9.25  )-----------( 53       ( 14 Apr 2024 05:50 )             22.2     Hgb Trend: 7.5<--, 7.5<--, 8.4<--, 6.9<--, 7.3<--  04-14    136  |  105  |  6<L>  ----------------------------<  139<H>  4.0   |  22  |  0.60    Ca    9.2      14 Apr 2024 05:50  Phos  2.5     04-14  Mg     2.4     04-14    TPro  4.7<L>  /  Alb  3.3  /  TBili  11.1<H>  /  DBili  x   /  AST  91<H>  /  ALT  15  /  AlkPhos  85  04-14    Creatinine Trend: 0.60<--, 0.67<--, 0.75<--, 0.80<--, 0.83<--, 0.81<--  PT/INR - ( 14 Apr 2024 00:23 )   PT: 15.7 sec;   INR: 1.44 ratio         PTT - ( 14 Apr 2024 00:23 )  PTT:35.0 sec  Urinalysis Basic - ( 14 Apr 2024 05:50 )    Color: x / Appearance: x / SG: x / pH: x  Gluc: 139 mg/dL / Ketone: x  / Bili: x / Urobili: x   Blood: x / Protein: x / Nitrite: x   Leuk Esterase: x / RBC: x / WBC x   Sq Epi: x / Non Sq Epi: x / Bacteria: x      Arterial Blood Gas:  04-14 @ 05:40  7.45/31/152/22/96.6/-2.1  ABG lactate: --  Arterial Blood Gas:  04-14 @ 00:15  7.44/35/129/24/97.2/-0.2  ABG lactate: --  Arterial Blood Gas:  04-13 @ 15:52  7.45/36/150/25/95.0/1.1  ABG lactate: --  Arterial Blood Gas:  04-13 @ 03:51  7.49/30/118/23/SEE NOTE/-0.1  ABG lactate: --    Venous Blood Gas:  04-13 @ 19:01  7.41/41/48/26/73.3  VBG Lactate: 2.5  Venous Blood Gas:  04-13 @ 04:29  7.42/38/48/25/67.2  VBG Lactate: 3.1      EKG:    MICROBIOLOGY:     RADIOLOGY:  [ ] Reviewed and interpreted by me    EKG:  MICROBIOLOGY:     Radiology: ***    Bedside lung ultrasound: ***    Bedside ECHO: ***    EKG:    CENTRAL LINE: Y/N          DATE INSERTED:              REMOVE: Y/N    BERRY: Y/N                        DATE INSERTED:              REMOVE: Y/N    A-LINE: Y/N                       DATE INSERTED:              REMOVE: Y/N    GLOBAL ISSUE/BEST PRACTICE:  Analgesia:  Sedation:  HOB elevation: yes  Stress ulcer prophylaxis:  VTE prophylaxis:  Glycemic control:  Nutrition:    CODE STATUS: ***  [facility] discussion: Y

## 2024-04-14 NOTE — PROGRESS NOTE ADULT - ASSESSMENT
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 Hargrove), and now S/P total pancreatectomy  with islet cell autotransplant at [facility] by Dr. Gil in 04/2018).    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 Per records, a Ct later in 2018 showed still fistulization but no abscess formation     Patient presented to [facility] ED on 4/8 by her spouse for AMS. Per , patient had been having nbnb vomiting and diarrhea for the last 4 weeks, extreme fatigue . She was unable to tolerate PO. She was also sleeping 18-20 hrs per day. On 4/8, he found her on the floor "completely out of it" and took her to [facility] for further evaluation.   Initial workup in ED demonstrated a Tbili 5.4, direct bili 4.4 and ammonia 196. Patient was somnolent and had a left eyeward gaze deviation. Submentally started developing agonal snoring respirations and had to be intubated for airway protection. Course complicated with septic shock and persistent hyperammonemia despite lactulose and rifaximin, CT show and started on meropenem, linezolid and anidulafungin at [facility]--> upon transfer here, off pressors, on alvaro/linezolid/caspofungin    labs notable for WBC 11, platelets 20, Hb 6.7, Bi 10, creat normal, procal 0.94  Blood and urine cultures at OSH ngtd    Per family history - she had blood in stool 6  months ago but then a colonoscopy that was clean and no further episodes. about 4 weeks ago began to have nausea and vomiting and extreme fatigue, then noted skin pealing of legs and arms, no cough , SOB, congestion, no diarrhea or blood in stool . No fevers.  Then a week ago she had abnormal behaviour staring and shaking and dilated pupils. Suddently a few days prior to admission became icteric.   Born in [place], not travelled lately , once in [place]. They have a backyard but she has not been out much, no recent tick bites or mosquito bites. they have 2 cats, no rats problems home, does not have any outdoor hobbies such as camping, hiking or water sports. Cats doing well, no recent bites or scratches.   imaging:    Ct C/A/P 4/13   CHEST  Occlusion of the distal left lower lobar bronchus with complete left lower lobe atelectasis.  Right middle lobe consolidation, possibly secondary to pneumonia or additional atelectasis.  Small bilateral pleural effusions.  ABDOMEN/PELVIS  Segmental areas of wall thickening throughout the colon and rectum with pneumatosis, mesenteric edema, and adjacent hyperdensities in the proximal transverse colon suspicious for bowel ischemia with intraluminal hemorrhage. Additional intraluminal hyperdensities within the distal ascending colon suspicious for hemorrhage. Correlate with lactate.  Nonspecific focal areas of narrowing in the distal descending colon and sigmoid colon, possibly colonic masses. Correlate with colonoscopy to exclude malignancy.  Status post pancreaticojejunostomy and subsequent pancreatectomy, splenectomy, and gastrojejunostomy. Nonspecific wall thickening of the afferent limb.      Micro:  4/13: Mrsa nare swab neg  4/13 fluvid neg    1- S/P total pancreatectomy  with islet cell autotransplant at [facility] by Dr. Gil in 04/2018  not on immunosuppressants  2. Encephalopathy  3. hyperammonemia not responding to lactulose/rifaximin  hyperammonemia syndrome  in c/o infection considered   4. hyperbilirrubinemia in c/o sepsis ? vs biliary obstruction vs atypical infection  5. RMl consolidation - aspiration pna vs CAP  in addition L bronchial obstruction  s/p Bbronchoscopy 4/13- VbAl bacterial, fungal afb cx Pending  6. thrombocytopenia, anemia  sepsis vs atypical infection   7. pneumatosis with c/f bowel ischemia and ? masses and hemorrhage  8. s/p splenectomy  9. s/p cholecystectomy    Recommendations  unclear precipitating factor of symptoms leading to bowel ? ischemia which in turn could have resulted in sepsis.   Extensively discussed with hepatology and MICU team entertaining atypical infections, severe SIBO leading to ischemia-. sepsis, hyperammonemia and elevated lactic acid---> secondary aspiration pneumonia    1. Follow repeat BC, UC  2. follow BAL for bacterial cx (NGTD, fungal cx,AFB cultrues, full RVP (neg), Mycoplasma PCR, Aspergillus GM, Legionella PCR  4. Would check US liver if not done before to investigate for biliary dilation  5. ordered  plasma/serum Mycoplasma/ureaplsama PCR (appears on sunrise as Mycoplasma TMA )- please ensure sent  6. follow TICK born PCR panel , Ehrlichia/anaplsama Ab, BArtonella PCR, Leptospira Antibodies  7. follow LEgionella urine antigen  8. ordered urine and serum miscellaneous leptospira PCR- please ensure these were sent  9. Continue meropenem, caspofungin empirically pending maturation of cultures  10 Given thrombocytopenia switched linezolid to daptomycin 8 mg/kg/day - CPK normal at baseline. note MRSA pCr nares negative, no need to cover lung for Staph  11 continue doxycicline 100 mg  bid iv for now  12. 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 Hargrove), and now S/P total pancreatectomy  with islet cell autotransplant at [facility] by Dr. Gil in 04/2018).    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 Per records, a Ct later in 2018 showed still fistulization but no abscess formation     Patient presented to [facility] ED on 4/8 by her spouse for AMS. Per , patient had been having nbnb vomiting and diarrhea for the last 4 weeks, extreme fatigue . She was unable to tolerate PO. She was also sleeping 18-20 hrs per day. On 4/8, he found her on the floor "completely out of it" and took her to [facility] for further evaluation.   Initial workup in ED demonstrated a Tbili 5.4, direct bili 4.4 and ammonia 196. Patient was somnolent and had a left eyeward gaze deviation. Submentally started developing agonal snoring respirations and had to be intubated for airway protection. Course complicated with septic shock and persistent hyperammonemia despite lactulose and rifaximin, CT show and started on meropenem, linezolid and anidulafungin at [facility]--> upon transfer here, off pressors, on alvaro/linezolid/caspofungin    labs notable for WBC 11, platelets 20, Hb 6.7, Bi 10, creat normal, procal 0.94  Blood and urine cultures at OSH ngtd    Per family history - she had blood in stool 6  months ago but then a colonoscopy that was clean and no further episodes. about 4 weeks ago began to have nausea and vomiting and extreme fatigue, then noted skin pealing of legs and arms, no cough , SOB, congestion, no diarrhea or blood in stool . No fevers.  Then a week ago she had abnormal behaviour staring and shaking and dilated pupils. Suddently a few days prior to admission became icteric.   Born in [place], not travelled lately , once in [place]. They have a backyard but she has not been out much, no recent tick bites or mosquito bites. they have 2 cats, no rats problems home, does not have any outdoor hobbies such as camping, hiking or water sports. Cats doing well, no recent bites or scratches.   imaging:    Ct C/A/P 4/13   CHEST  Occlusion of the distal left lower lobar bronchus with complete left lower lobe atelectasis.  Right middle lobe consolidation, possibly secondary to pneumonia or additional atelectasis.  Small bilateral pleural effusions.  ABDOMEN/PELVIS  Segmental areas of wall thickening throughout the colon and rectum with pneumatosis, mesenteric edema, and adjacent hyperdensities in the proximal transverse colon suspicious for bowel ischemia with intraluminal hemorrhage. Additional intraluminal hyperdensities within the distal ascending colon suspicious for hemorrhage. Correlate with lactate.  Nonspecific focal areas of narrowing in the distal descending colon and sigmoid colon, possibly colonic masses. Correlate with colonoscopy to exclude malignancy.  Status post pancreaticojejunostomy and subsequent pancreatectomy, splenectomy, and gastrojejunostomy. Nonspecific wall thickening of the afferent limb.      Micro:  4/13: Mrsa nare swab neg  4/13 fluvid neg    1- S/P total pancreatectomy  with islet cell autotransplant at [facility] by Dr. Gil in 04/2018  not on immunosuppressants  2. Encephalopathy  3. hyperammonemia not responding to lactulose/rifaximin  hyperammonemia syndrome  in c/o infection considered   4. hyperbilirrubinemia in c/o sepsis ? vs biliary obstruction vs atypical infection  5. RMl consolidation - aspiration pna vs CAP  in addition L bronchial obstruction  s/p Bbronchoscopy 4/13- VbAl bacterial, fungal afb cx Pending  6. thrombocytopenia, anemia  sepsis vs atypical infection   7. pneumatosis with c/f bowel ischemia and ? masses and hemorrhage  8. s/p splenectomy  9. s/p cholecystectomy    Recommendations  unclear precipitating factor of symptoms leading to bowel ? ischemia which in turn could have resulted in sepsis.   Extensively discussed with hepatology and MICU team entertaining atypical infections, severe SIBO leading to ischemia-. sepsis, hyperammonemia and elevated lactic acid---> secondary aspiration pneumonia  Note she has received no recent immunosuppression except short course of steroids for Gout    1. Follow repeat BC, UC  2. follow BAL for bacterial cx (NGTD, fungal cx,AFB cultrues, full RVP (neg), Mycoplasma PCR, Aspergillus GM, Legionella PCR  4. Would check US liver if not done before to investigate for biliary dilation  5. ordered  plasma/serum Mycoplasma/ureaplsama PCR (appears on sunrise as Mycoplasma TMA )- please ensure sent  6. follow TICK born PCR panel , Ehrlichia/anaplsama Ab, BArtonella PCR, Leptospira Antibodies  7. follow LEgionella urine antigen  8. ordered urine and serum miscellaneous leptospira PCR- please ensure these were sent  9. Continue meropenem, caspofungin empirically pending maturation of cultures  10 Given thrombocytopenia switched linezolid to daptomycin 8 mg/kg/day - CPK normal at baseline. note MRSA pCr nares negative, no need to cover lung for Staph  11 continue doxycicline 100 mg  bid iv for now  12. follow blood parasite smear and babesia PCR  13.follwo plasma CMV PCR  14 follow Gi PCR in stool, send stool ova and parasites and strongyloides IgG  15 ordered histoplasma antigen in urine/serum  16.Once feasible recommend  colonoscopy with biopsies for histopath with special stains- including fungal, Bill, Viral         Thank you for involving us in the care of this patient  Transplant ID will continue to follow  Please call or page with additional questions  Pager; #[phone]  Teams: from 8 am to 5 pm  Devora Jasmine MD       42 year old female with hx of DMT1, Factor V leiden deficiency, gout and 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 Hargrove), and now S/P total pancreatectomy  with islet cell autotransplant at [facility] by Dr. Gil in 04/2018).    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 Per records, a Ct later in 2018 showed still fistulization but no abscess formation     Patient presented to [facility] ED on 4/8 by her spouse for AMS. Per , patient had been having nbnb vomiting and diarrhea for the last 4 weeks, extreme fatigue . She was unable to tolerate PO. She was also sleeping 18-20 hrs per day. On 4/8, he found her on the floor "completely out of it" and took her to [facility] for further evaluation.   Initial workup in ED demonstrated a Tbili 5.4, direct bili 4.4 and ammonia 196. Patient was somnolent and had a left eyeward gaze deviation. Submentally started developing agonal snoring respirations and had to be intubated for airway protection. Course complicated with septic shock and persistent hyperammonemia despite lactulose and rifaximin, CT show and started on meropenem, linezolid and anidulafungin at [facility]--> upon transfer here, off pressors, on alvaro/linezolid/caspofungin    labs notable for WBC 11, platelets 20, Hb 6.7, Bi 10, creat normal, procal 0.94  Blood and urine cultures at OSH ngtd    Per family history - she had blood in stool 6  months ago but then a colonoscopy that was clean and no further episodes. about 4 weeks ago began to have nausea and vomiting and extreme fatigue, then noted skin pealing of legs and arms, no cough , SOB, congestion, no diarrhea or blood in stool . No fevers.  Then a week ago she had abnormal behaviour staring and shaking and dilated pupils. Suddently a few days prior to admission became icteric.   Born in [place], not travelled lately , once in [place]. They have a backyard but she has not been out much, no recent tick bites or mosquito bites. they have 2 cats, no rats problems home, does not have any outdoor hobbies such as camping, hiking or water sports. Cats doing well, no recent bites or scratches.   imaging:    Ct C/A/P 4/13   CHEST  Occlusion of the distal left lower lobar bronchus with complete left lower lobe atelectasis.  Right middle lobe consolidation, possibly secondary to pneumonia or additional atelectasis.  Small bilateral pleural effusions.  ABDOMEN/PELVIS  Segmental areas of wall thickening throughout the colon and rectum with pneumatosis, mesenteric edema, and adjacent hyperdensities in the proximal transverse colon suspicious for bowel ischemia with intraluminal hemorrhage. Additional intraluminal hyperdensities within the distal ascending colon suspicious for hemorrhage. Correlate with lactate.  Nonspecific focal areas of narrowing in the distal descending colon and sigmoid colon, possibly colonic masses. Correlate with colonoscopy to exclude malignancy.  Status post pancreaticojejunostomy and subsequent pancreatectomy, splenectomy, and gastrojejunostomy. Nonspecific wall thickening of the afferent limb.      Micro:  4/13: Mrsa nare swab neg  4/13 fluvid neg    1- S/P total pancreatectomy  with islet cell autotransplant at [facility] by Dr. Gil in 04/2018  not on immunosuppressants  2. Encephalopathy  3. hyperammonemia not responding to lactulose/rifaximin  hyperammonemia syndrome  in c/o infection considered   4. hyperbilirrubinemia in c/o sepsis ? vs biliary obstruction vs atypical infection  5. RMl consolidation - aspiration pna vs CAP  in addition L bronchial obstruction  s/p Bbronchoscopy 4/13- VbAl bacterial, fungal afb cx Pending  6. thrombocytopenia, anemia  sepsis vs atypical infection   7. pneumatosis with c/f bowel ischemia and ? masses and hemorrhage  8. s/p splenectomy  9. s/p cholecystectomy    Recommendations  unclear precipitating factor of symptoms leading to bowel ? ischemia which in turn could have resulted in sepsis.   Extensively discussed with hepatology and MICU team entertaining atypical infections, severe SIBO leading to ischemia-. sepsis, hyperammonemia and elevated lactic acid---> secondary aspiration pneumonia  Note she has received no recent immunosuppression except short course of steroids for Gout  Considering atypical infections that could lead to bowel ischemia +/- masses  including fungal and parasitic infections (anisakis, ascaris, strongy ecc), SIBo as above vs non infectious etiologies     1. Follow repeat BC, UC  2. follow BAL for bacterial cx (NGTD, fungal cx,AFB cultrues, full RVP (neg), Mycoplasma PCR, Aspergillus GM, Legionella PCR  4. Would check US liver if not done before to investigate for biliary dilation  5. ordered  plasma/serum Mycoplasma/ureaplsama PCR (appears on sunrise as Mycoplasma TMA )- please ensure sent  6. follow TICK born PCR panel , Ehrlichia/anaplsama Ab, BArtonella PCR, Leptospira Antibodies  7. follow LEgionella urine antigen  8. ordered urine and serum miscellaneous leptospira PCR- please ensure these were sent  9. Continue meropenem, caspofungin empirically pending maturation of cultures  10 Given thrombocytopenia switched linezolid to daptomycin 8 mg/kg/day - CPK normal at baseline. note MRSA pCr nares negative, no need to cover lung for Staph  11 continue doxycicline 100 mg  bid iv for now  12. follow blood parasite smear and babesia PCR  13.follwo plasma CMV PCR  14 follow Gi PCR in stool, send stool ova and parasites and strongyloides IgG  15 ordered histoplasma antigen in urine/serum  16.Once feasible recommend  colonoscopy with biopsies for histopath with special stains- including fungal, Bill, Viral         Thank you for involving us in the care of this patient  Transplant ID will continue to follow  Please call or page with additional questions  Pager; #[phone]  Teams: from 8 am to 5 pm  Devora Jasmine MD

## 2024-04-14 NOTE — PROGRESS NOTE ADULT - ATTENDING COMMENTS
ATTENDING ATTESTATION:    48F history of Factor V Leiden, gout, chronic pancreatitis s/p distal pancreatectomy, splenectomy (2014, [facility]) and subsequent completion pancreatectomy with islet cell transplant (2018, Dr. Gil), numerous abdominal surgeries transferred from OSH with altered mental status in the setting of hyperammonemia and hyperbilirubinemia requiring intubation. CT A/P at [facility] demonstrated concern for colonic pneumatosis and surgery was consulted.     On norepinephrine 0.03  Off sedation  Spontaneously wiggles toes slowly, no purposeful movement, does not open eyes to voice  Abd softly distended, hard to assess tenderness    WBC = 9  Hb = 7.4  PLT = 47  Tbili = 11.3 (mostly direct)   Ammonia = 111 <-- 114  Lactate = 2.5    CT A/P with IV contrast (4/13/24): Wall thickening of afferent limb, concern for pneumatosis of proximal transverse colon with possible intraluminal hemorrhage, diffuse anasarca    A/P:   #Possible colonic ischemia.   - Presentation not entirely consistent with mesenteric ischemia although exam is unreliable. Given extensive history of surgery, risks outweigh benefits of ex-lap at this time.   - Hold sedation to see if mental status will improve as ammonia is cleared   - NPO, bowel rest    #Elevated bilirubin.  - unclear etiology, could be related to biliary stasis in the setting of sepsis  - consider MRCP given no pneumobilia seen on CT A/P, evaluate for hepaticojejunostomy obstruction  - will discuss case with Dr. Gil on Monday      Total time spent in the care of this patient today (excluding critical care, teaching & procedures): 35 minutes    Over 50% of the total time was spent in discussion and coordination of care with consulting services, dietary and rehab services.    Maria Teresa Pinzon MD  Acute Care Surgery

## 2024-04-14 NOTE — PROGRESS NOTE ADULT - SUBJECTIVE AND OBJECTIVE BOX
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 Hargrove), and now S/P total pancreatectomy  with islet cell autotransplant at [facility] by Dr. Gil in 04/2018).  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 Per records, a Ct later in 2018 showed still fistulization but no abscess formation   Patient presented to [facility] ED on 4/8 by her spouse for AMS. Per , patient had been having nbnb vomiting and diarrhea for the last 4 weeks, extreme fatigue . She was unable to tolerate PO. She was also sleeping 18-20 hrs per day. On 4/8, he found her on the floor "completely out of it" and took her to [facility] for further evaluation.   Initial workup in ED demonstrated a Tbili 5.4, direct bili 4.4 and ammonia 196. Patient was somnolent and had a left eyeward gaze deviation. Submentally started developing agonal snoring respirations and had to be intubated for airway protection. Course complicated with septic shock and persistent hyperammonemia despite lactulose and rifaximin, CT show and started on meropenem, linezolid and anidulafungin at [facility]> upon transfer here, off pressors, on alvaro/linezolid/caspofungin  CT C/A/P done reading pending so far with no abdominal abscess, noted air within bowels , RUL consolidation  Blood and urine cultures at OSH ngtd  Per family history - she had blood in stool 6  months ago but then a colonoscopy that was clean and no further episodes. about 4 weeks ago began to have nausea and vomiting and extreme fatigue, then noted skin pealing of legs and arms, no cough , SOB, congestion, no diarrhea or blood in stool . No fevers.  Then a week ago she had abnormal behaviour staring and shaking and dilated pupils. Suddently a few days prior to admission became icteric. They deny any changes in IS  Born in [place], not travelled lately , once in [place]. They have a backyard but she has not been out much, no recent tick bites or mosquito bites. they have 2 cats, no rats problems home, does not have any outdoor hobbies such as camping, hiking or water sports. Cats doing well, no recent bites or scratches.       Interval events  Febrile, lactate 2.5, Ammonia down from 145 to 114, still intubated, required levophed briefly while sedated   Ct C/A/P with bronchial obstruction and atelectasis?     Vital Signs Last 24 Hrs  T(C): 36.7 (14 Apr 2024 07:00), Max: 36.7 (14 Apr 2024 07:00)  T(F): 98.1 (14 Apr 2024 07:00), Max: 98.1 (14 Apr 2024 07:00)  HR: 98 (14 Apr 2024 09:45) (86 - 106)  BP: 101/59 (14 Apr 2024 09:45) (83/53 - 134/83)  BP(mean): 74 (14 Apr 2024 09:45) (64 - 104)  RR: 25 (14 Apr 2024 09:45) (13 - 26)  SpO2: 95% (14 Apr 2024 09:45) (94% - 97%)  Parameters below as of 13 Apr 2024 19:00  Patient On (Oxygen Delivery Method): ventilator  O2 Concentration (%): 35    CONSTITUTIONAL: intubated, se  EYES: PERRLA and symmetric, EOMI, No conjunctival or scleral injection, non-icteric  ENMT: Oral mucosa with moist membranes. Normal dentition; no pharyngeal injection or exudates             NECK: Supple, symmetric and without tracheal deviation   RESP: No respiratory distress, no use of accessory muscles; CTA b/l, no WRR  CV: RRR, +S1S2, no MRG; no JVD; no peripheral edema  GI: Soft, NT, ND, no rebound, no guarding; no palpable masses; no hepatosplenomegaly; no hernia palpated  LYMPH: No cervical LAD or tenderness; no axillary LAD or tenderness; no inguinal LAD or tenderness  MSK: Normal gait; No digital clubbing or cyanosis; examination of the (head/neck/spine/ribs/pelvis, RUE, LUE, RLE, LLE) without misalignment,            Normal ROM without pain, no spinal tenderness, normal muscle strength/tone  SKIN: no purpura, no rash, noted peeling skin in hands and feet   NEURO: intubated, ? neck stiffness or pain as she grimaces when moving neck

## 2024-04-14 NOTE — PROGRESS NOTE ADULT - ASSESSMENT
42F PMH DMT1, heterozygous Factor V leiden deficiency, hx of Left peroneal DVT (eliquis x1 year, stopped 5/2023), gout (has had numerous courses of steroids) and pancreas congenital abnormality associated with recurrent pancreatitis and extensive surgical hx (s/p distal pancreatectomy, cholecystectomy, splenectomy and celina-en-y pancreaticojejuonostomy (2014, [facility], [facility]), s/p total pancreatectomy with islet cell autotransplant (Louise 2018). Now in the MICU with shock, hyperammonia, hyperbilirubinemia, anemia, thrombocytopenia. Surgery team consulted for evaluation of possible bowel ischemia. On Levo .02. Lactate 3.3. Abdomen is distended but soft. No signs of bleeding from rectal tube.    Plan/Recs  - No acute surgical intervention  - Serial abdominal exams off sedation  - Surgery will follow  - Excellent care per MICU    Trauma/ACS  [id number] 42F PMH DMT1, heterozygous Factor V leiden deficiency, hx of Left peroneal DVT (eliquis x1 year, stopped 5/2023), gout (has had numerous courses of steroids) and pancreas congenital abnormality associated with recurrent pancreatitis and extensive surgical hx (s/p distal pancreatectomy, cholecystectomy, splenectomy and celina-en-y pancreaticojejuonostomy (2014, [facility], [facility]), s/p total pancreatectomy with islet cell autotransplant (Louise 2018). Now in the MICU with shock, hyperammonia, hyperbilirubinemia, anemia, thrombocytopenia. Surgery team consulted for evaluation of possible bowel ischemia. On Levo .02. Lactate 3.3. Abdomen is distended but soft. No signs of bleeding from rectal tube.    Plan/Recs  - No acute surgical intervention  - Recommend MRCP to evaluate for billary obstruction  - Serial abdominal exams off sedation  - Surgery will follow  - Excellent care per MICU    Trauma/ACS  [id number]

## 2024-04-14 NOTE — PROGRESS NOTE ADULT - SUBJECTIVE AND OBJECTIVE BOX
Interval Events:   Patient required levo 2/2 hypotension with sedation. CPAP in progress. Started on CRRT overnight. 800 cc output via FlexSeal, brown and no GIB.      Hospital Medications:  caspofungin IVPB 50 milliGRAM(s) IV Intermittent every 24 hours  caspofungin IVPB      chlorhexidine 0.12% Liquid 15 milliLiter(s) Oral Mucosa every 12 hours  chlorhexidine 4% Liquid 1 Application(s) Topical <User Schedule>  CRRT Treatment    <Continuous>  DAPTOmycin IVPB 500 milliGRAM(s) IV Intermittent every 24 hours  dexMEDEtomidine Infusion 0.5 MICROgram(s)/kG/Hr IV Continuous <Continuous>  dextrose 20%. 500 milliLiter(s) IV Continuous <Continuous>  doxycycline IVPB 100 milliGRAM(s) IV Intermittent every 12 hours  fentaNYL    Injectable 25 MICROGram(s) IV Push every 5 minutes  folic acid 1 milliGRAM(s) Oral daily  insulin glargine Injectable (LANTUS) 5 Unit(s) SubCutaneous at bedtime  insulin lispro (ADMELOG) corrective regimen sliding scale   SubCutaneous every 6 hours  levothyroxine 100 MICROGram(s) Oral daily  meropenem  IVPB 1000 milliGRAM(s) IV Intermittent every 12 hours  multivitamin/minerals/iron Oral Solution (CENTRUM) 15 milliLiter(s) Oral daily  norepinephrine Infusion 0.03 MICROgram(s)/kG/Min IV Continuous <Continuous>  pantoprazole  Injectable 40 milliGRAM(s) IV Push daily  Phoxillum Filtration BK 4 / 2.5 5000 milliLiter(s) CRRT <Continuous>  Phoxillum Filtration BK 4 / 2.5 5000 milliLiter(s) CRRT <Continuous>  PrismaSOL Filtration BGK 4 / 2.5 5000 milliLiter(s) CRRT <Continuous>  rifAXIMin 550 milliGRAM(s) Oral two times a day  sodium chloride 0.9% lock flush 10 milliLiter(s) IV Push every 1 hour PRN  thiamine 100 milliGRAM(s) Oral daily  ursodiol Tablet 500 milliGRAM(s) Oral <User Schedule>      PHYSICAL EXAM:   Vital Signs:  Vital Signs Last 24 Hrs  T(C): 36.7 (14 Apr 2024 07:00), Max: 36.7 (14 Apr 2024 07:00)  T(F): 98.1 (14 Apr 2024 07:00), Max: 98.1 (14 Apr 2024 07:00)  HR: 98 (14 Apr 2024 09:45) (86 - 106)  BP: 101/59 (14 Apr 2024 09:45) (83/53 - 134/83)  BP(mean): 74 (14 Apr 2024 09:45) (64 - 104)  RR: 25 (14 Apr 2024 09:45) (14 - 26)  SpO2: 95% (14 Apr 2024 09:45) (94% - 97%)    Parameters below as of 13 Apr 2024 19:00  Patient On (Oxygen Delivery Method): ventilator    O2 Concentration (%): 35  Daily     Daily     GENERAL: critically ill  NEURO: not alert, sedated  HEENT: (+) icteric sclera, no conjunctival pallor appreciated  CHEST: intubated, mechanical breath sounds  CARDIAC: regular rate, +S1/S2  ABDOMEN: soft, distended, tympanic  EXTREMITIES: warm, well perfused; anasarca  SKIN: Jaundice    LABS: reviewed                        7.5    9.25  )-----------( 53       ( 14 Apr 2024 05:50 )             22.2     04-14    136  |  105  |  6<L>  ----------------------------<  139<H>  4.0   |  22  |  0.60    Ca    9.2      14 Apr 2024 05:50  Phos  2.5     04-14  Mg     2.4     04-14    TPro  4.7<L>  /  Alb  3.3  /  TBili  11.1<H>  /  DBili  x   /  AST  91<H>  /  ALT  15  /  AlkPhos  85  04-14      < from: CT Chest w/ IV Cont (04.13.24 @ 11:56) >  FINDINGS:  CHEST:  LUNGS AND LARGE AIRWAYS: ET tube in the trachea with tip just above the   surendra. Occlusion of the distal left lower lobar bronchus with complete   left lower lobe atelectasis. Right middle lobe consolidation. Passive   subsegmental atelectasis of the right lower lobe.  PLEURA: Small bilateral pleural effusions.  VESSELS: Right IJ central venous catheter with tip in the right atrium.   Coronary artery calcifications.  HEART: Heart size is normal. No pericardial effusion.  MEDIASTINUM AND ABUNDIO: No lymphadenopathy.  CHEST WALL AND LOWER NECK: Diffuse chest wall edema.    ABDOMEN AND PELVIS:  LIVER: Hepatomegaly and hepatic steatosis. No focal lesion.  BILE DUCTS: No intrahepatic biliary ductal dilatation.  GALLBLADDER: Cholecystectomy.  SPLEEN: Splenectomy.  PANCREAS: Status post total pancreatectomy.  ADRENALS: Within normal limits.  KIDNEYS/URETERS: Within normal limits.    BLADDER: James catheter with intraluminal air, post instrumentation.  REPRODUCTIVE ORGANS: IUD inthe uterus. Left adnexal 3.5 cm cystic lesion   (3:246), likely ovarian cyst.    BOWEL: Enteric tube with tip in the stomach. Gastrojejunostomy with edema   of the gastric wall. Status post pancreaticojejunostomy and subsequent   pancreatectomy with wall thickening of the afferent limb. Patent   jejunojejunal anastomosis in the midline upper abdomen. Segmental areas   of wall thickening throughout the colon and rectum with nondependent gas   in the proximal transverse colon suspicious for pneumatosis and adjacent   hyperdensities (3:184) suspicious for bowel ischemia with intraluminal   hemorrhage. Additional intraluminal hyperdensities within the distal   ascending colon (3:187) suspicious for intraluminal hemorrhage.   Questionable hypoenhancement of large bowel loops within the right   hemiabdomen. Focal areas of narrowing in the distal descending colon   (5:30) and sigmoid colon (3:242-251). Rectal tube. Appendix is normal.    PERITONEUM: Mesenteric edema in the right hemiabdomen and small ascites.  VESSELS: Atherosclerotic changes. Celiac artery, SMA, SUDHAKAR, bilateral   renal arteries, SMV, and portal vein are patent.  RETROPERITONEUM/LYMPH NODES: No lymphadenopathy.  ABDOMINAL WALL: Diffuse subcutaneous edema.  BONES: Within normal limits.    IMPRESSION:    CHEST  Occlusion of the distal left lower lobar bronchus with complete left   lower lobe atelectasis.    Right middle lobe consolidation, possibly secondary to pneumonia or   additional atelectasis.    Small bilateral pleural effusions.    ABDOMEN/PELVIS    Segmental areas of wall thickening throughout the colon and rectum with   pneumatosis, mesenteric edema, and adjacent hyperdensities in the   proximal transverse colon suspicious for bowel ischemia with intraluminal   hemorrhage. Additional intraluminal hyperdensities within the distal   ascending colon suspicious for hemorrhage. Correlate with lactate.    Nonspecific focal areas of narrowing in the distal descending colon and   sigmoid colon, possibly colonic masses. Correlate with colonoscopy to   exclude malignancy.    Status post pancreaticojejunostomy and subsequent pancreatectomy,   splenectomy, and gastrojejunostomy. Nonspecific wall thickening of the   afferent limb.    Findings were discussed by Dr. Bass with Dr. Matamoros on 4/13/2024 at 12:54   PM with read back confirmation.      --- End of Report ---    < end of copied text >

## 2024-04-14 NOTE — PROGRESS NOTE ADULT - SUBJECTIVE AND OBJECTIVE BOX
STATUS POST:      POST OPERATIVE DAY #:     Vital Signs Last 24 Hrs  T(C): 36.8 (14 Apr 2024 11:00), Max: 36.8 (14 Apr 2024 11:00)  T(F): 98.2 (14 Apr 2024 11:00), Max: 98.2 (14 Apr 2024 11:00)  HR: 100 (14 Apr 2024 14:34) (86 - 114)  BP: 91/55 (14 Apr 2024 14:00) (83/53 - 134/83)  BP(mean): 68 (14 Apr 2024 14:00) (64 - 104)  RR: 21 (14 Apr 2024 14:00) (14 - 29)  SpO2: 95% (14 Apr 2024 14:34) (94% - 98%)    Parameters below as of 13 Apr 2024 19:00  Patient On (Oxygen Delivery Method): ventilator    O2 Concentration (%): 35      SUBJECTIVE: Pt seen and examined bedside on morning rounds. Patient is sedated, appears comfortable. Nurse denies any overnight events.    General: sedated   Respiratory: intubated  Cardio: regular rate  Abdomen: soft, distended, multiple well healed scars, OGT clamped, Rectal tube with brown output  : James with yellow urine.  Extremities: warm and well perfused, mild peripheral edema      I&O's Summary    13 Apr 2024 07:01  -  14 Apr 2024 07:00  --------------------------------------------------------  IN: 3228.1 mL / OUT: 5688 mL / NET: -2459.9 mL    14 Apr 2024 07:01  -  14 Apr 2024 15:10  --------------------------------------------------------  IN: 622.7 mL / OUT: 1328 mL / NET: -705.3 mL      I&O's Detail    13 Apr 2024 07:01  -  14 Apr 2024 07:00  --------------------------------------------------------  IN:    Albumin 25%  -  50 mL: 200 mL    Dexmedetomidine: 111.6 mL    dextrose 10%: 350 mL    dextrose 20%: 300 mL    Enteral Tube Flush: 100 mL    IV PiggyBack: 1610 mL    Norepinephrine: 31.5 mL    Platelets - Single Donor: 225 mL    PRBCs (Packed Red Blood Cells): 300 mL  Total IN: 3228.1 mL    OUT:    Indwelling Catheter - Urethral (mL): 2190 mL    Other (mL): 1798 mL    Rectal Tube (mL): 1700 mL  Total OUT: 5688 mL    Total NET: -2459.9 mL      14 Apr 2024 07:01  -  14 Apr 2024 15:10  --------------------------------------------------------  IN:    Dexmedetomidine: 10.9 mL    dextrose 20%: 300 mL    IV PiggyBack: 295 mL    Norepinephrine: 15.9 mL    Norepinephrine: 0.9 mL  Total IN: 622.7 mL    OUT:    Indwelling Catheter - Urethral (mL): 70 mL    Other (mL): 1258 mL  Total OUT: 1328 mL    Total NET: -705.3 mL          MEDICATIONS  (STANDING):  caspofungin IVPB 50 milliGRAM(s) IV Intermittent every 24 hours  caspofungin IVPB      chlorhexidine 0.12% Liquid 15 milliLiter(s) Oral Mucosa every 12 hours  chlorhexidine 4% Liquid 1 Application(s) Topical <User Schedule>  CRRT Treatment    <Continuous>  DAPTOmycin IVPB 500 milliGRAM(s) IV Intermittent every 24 hours  dexMEDEtomidine Infusion 0.5 MICROgram(s)/kG/Hr (7.8 mL/Hr) IV Continuous <Continuous>  dextrose 20%. 500 milliLiter(s) (50 mL/Hr) IV Continuous <Continuous>  doxycycline IVPB 100 milliGRAM(s) IV Intermittent every 12 hours  fentaNYL    Injectable 25 MICROGram(s) IV Push every 5 minutes  fentaNYL    Injectable 50 MICROGram(s) IV Push once  folic acid 1 milliGRAM(s) Oral daily  insulin glargine Injectable (LANTUS) 5 Unit(s) SubCutaneous at bedtime  insulin lispro (ADMELOG) corrective regimen sliding scale   SubCutaneous every 6 hours  levothyroxine 100 MICROGram(s) Oral daily  meropenem  IVPB 1000 milliGRAM(s) IV Intermittent every 12 hours  multivitamin/minerals/iron Oral Solution (CENTRUM) 15 milliLiter(s) Oral daily  norepinephrine Infusion 0.06 MICROgram(s)/kG/Min (3.51 mL/Hr) IV Continuous <Continuous>  pantoprazole  Injectable 40 milliGRAM(s) IV Push daily  Phoxillum Filtration BK 4 / 2.5 5000 milliLiter(s) (700 mL/Hr) CRRT <Continuous>  Phoxillum Filtration BK 4 / 2.5 5000 milliLiter(s) (900 mL/Hr) CRRT <Continuous>  PrismaSOL Filtration BGK 4 / 2.5 5000 milliLiter(s) (200 mL/Hr) CRRT <Continuous>  rifAXIMin 550 milliGRAM(s) Oral two times a day  thiamine 100 milliGRAM(s) Oral daily  ursodiol Tablet 500 milliGRAM(s) Oral <User Schedule>    MEDICATIONS  (PRN):  sodium chloride 0.9% lock flush 10 milliLiter(s) IV Push every 1 hour PRN Pre/post blood products, medications, blood draw, and to maintain line patency      LABS:                        7.4    9.51  )-----------( 47       ( 14 Apr 2024 11:55 )             22.1     04-14    137  |  104  |  5<L>  ----------------------------<  133<H>  4.0   |  23  |  0.51    Ca    9.2      14 Apr 2024 11:55  Phos  2.8     04-14  Mg     2.2     04-14    TPro  4.9<L>  /  Alb  3.5  /  TBili  11.3<H>  /  DBili  x   /  AST  97<H>  /  ALT  17  /  AlkPhos  91  04-14    PT/INR - ( 14 Apr 2024 00:23 )   PT: 15.7 sec;   INR: 1.44 ratio         PTT - ( 14 Apr 2024 00:23 )  PTT:35.0 sec

## 2024-04-14 NOTE — PROGRESS NOTE ADULT - ASSESSMENT
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 Hargrove), and now S/P total pancreatectomy  with islet cell autotransplant at [facility] by Dr. Gil in 04/2018), c/b recurrent abdominal wall collection requiring IR drainage, presented to OSH with acute change in mental status requiring intubation for airway protection, and transferred to [facility] for care escalation.    Hepatology was consulted for concern for KEL.     Assessment  #Hyperbilirubinemia  #Bicytopenia  #Pneumatosis of bowel concerning for ischemic colitis  #Acute encephalopathy  #hyperammonemia  #Coagulopathy  - Concern for sepsis-induced cholestasis. Although patient has features of FPC, Cr stable, INR downtrending, and not behaving like KEL.   - Hyperammonemia is also a rare but known complication of bypass surgeries with high mortality  - Mental status deterioration with hx of bypass in the setting of diarrhea, overt SIBO infection --> malnutrition?  - Bicytopenia 2/2 bone marrow suppression 2/2 sepsis?  - Acute hepatitis panel negative    Recommendations  - Discussed with ID about the possiblity of SIBO leading to acute presentation - currently covered with meropenem  - Check for urea-splitting organisms given high ammonia  - Sx team inquired about the role of diagnostic colonoscopy given patient is at a high risk for explorative sx. Discussed with advanced endoscopist: given high risk with pneumatosis, thrombocytopenia, and concern for DIC, and low diagnositc/prognostic value of colonoscopy in the setting of ischemic colitis, endoscopic evaluation if not indicated at this time. Will continue to reassess as patient progresses  - F/u abdominal doppler  - F/u infectious workup  - F/u autoimmune hepatitis workup (sent by ICU)  - If no surgical planning, may initiate trickle feed with elemental diet  - Comprehensive vitamin repletion given bypass hx  - Check serum copper, zinc, vitamin A, D, E and K level  - CRRT per renal    Case d/w Dr. Janusz Hummel  GI/Hepatology Fellow    MONDAY-FRIDAY 8AM-5PM:  Pager# [phone] ([facility]) or [phone] ([facility])    NON-URGENT CONSULTS:  Please email [email or web address]  AT NIGHT AND ON WEEKENDS:  Contact on-call GI fellow via [employer] by paging or hospital  from 5pm-8am and on weekends/holidays

## 2024-04-14 NOTE — PROGRESS NOTE ADULT - ATTENDING COMMENTS
43yo pmh of HTN, DM, Factor V Leiden deficiency, gout, congenital pancreas divisum,  cholecystectomy,  Splenectomy, Kevin-En-Y Pancreaticojejunostomy 2/2014 [facility], Total Pancreatotomy with Islet Cell Autotransplant at [facility] 4/82018, Post Op Abdominal/Peritoneal Multiloculated Abscesses, Enterocutaneous Fistula, Multiple Abdominal Surgeries > 20x, Infected Abdominal Wound Mesh on Antibacterial and Fungal now admitted to [facility] 4/8 w/ hepatic encephalopathy w/ Ammonia >200.   Pt w/ hyperammonemia, AHRF now intubated,  PNA, shock and CT concerning for ischemic bowel.     - cont to monitor MS, withdrawing all ext and pupils equal reactive  - repeat CTH negative  - cont HE management and trend ammonia (given need for bowel rest lactulose stopped and now on CRRT)  - titrate sedation to goal rass  - cont vent support,  maintain o2 sat >90%  - tolerating PS trials, cont daily as tolerated  - CT w/ significant PNA and endobronchial plugging - s/p bronch w/ neutrophil predominant fluid  - maintain map>65, intermittently requiring levophed  - check ruq US w/ doppler  - not c/w KEL as per Hepatology  - f/u Surgery reccs- plan for mrcp tonight   - pt w/ PNA and poss bowel infection, f/u cx from bronch  - f/u ID reccs re abx  - severe thrombocytopenia ? from liver disease- no active bleeding  - f/u Hematology consult  - cont to monitor coags and plt    Prognosis guarded . 41yo pmh of HTN, DM, Factor V Leiden deficiency, gout, congenital pancreas divisum,  cholecystectomy,  Splenectomy, Kevin-En-Y Pancreaticojejunostomy 2/2014 [facility], Total Pancreatotomy with Islet Cell Autotransplant at [facility] 4/82018, Post Op Abdominal/Peritoneal Multiloculated Abscesses, Enterocutaneous Fistula, Multiple Abdominal Surgeries > 20x, Infected Abdominal Wound Mesh on Antibacterial and Fungal now admitted to [facility] 4/8 w/ hepatic encephalopathy w/ Ammonia >200.   Pt w/ hyperammonemia, AHRF now intubated,  PNA, shock and CT concerning for ischemic bowel.     - cont to monitor MS, withdrawing all ext and pupils equal reactive  - repeat CTH negative  - cont HE management and trend ammonia (given need for bowel rest lactulose stopped and now on CRRT)  - titrate sedation to goal rass  - cont vent support,  maintain o2 sat >90%  - tolerating PS trials, cont daily as tolerated  - CT w/ significant PNA and endobronchial plugging - s/p bronch w/ neutrophil predominant fluid  - maintain map>65, intermittently requiring levophed  - check ruq US w/ doppler  - not c/w KEL as per Hepatology  - f/u Surgery reccs- plan for mrcp tonight   - pt w/ PNA and poss bowel infection, f/u cx from bronch  - f/u ID reccs re abx  - severe thrombocytopenia ? from liver disease- no active bleeding  - f/u Hematology consult  - cont to monitor coags and plt  - cont crrt for high ammonia and also keeping negative for volume overload       Prognosis guarded .

## 2024-04-15 NOTE — DIETITIAN INITIAL EVALUATION ADULT - REASON INDICATOR FOR ASSESSMENT
Consult for MST Score 2 or >  Source: Medical record and Interdisciplinary Rounds (pt intubated & sedated; RD visited x3 times to speak with family at bedside however doctors at bed every time)  Consult for MST Score 2 or >  Source: Medical record, Interdisciplinary Rounds, family at bedside (pt intubated & sedated)

## 2024-04-15 NOTE — DIETITIAN INITIAL EVALUATION ADULT - NSFNSPHYEXAMSKINFT_GEN_A_CORE
Pressure Injury 1: coccyx, sacrum, Suspected deep tissue injury  Pressure Injury 2: Right:, heel, Suspected deep tissue injury  Pressure Injury 3: Left:, heel, Suspected deep tissue injury  Pressure Injury 4: Left:, lateral, malleolus, Suspected deep tissue injury  Pressure Injury 5: Right:, elbow, Suspected deep tissue injury

## 2024-04-15 NOTE — PROGRESS NOTE ADULT - ATTENDING COMMENTS
Hyperammonemia: Possibly in the setting of infection/Liver dysfunction/Surgery  Unable to get lactulose due to colitis    Maintain on CRRT  Trend ammonia levels  Dose meds for a clearance close to 30 cc/kg /hr while on CRRT ( Meropenem can be dosed Q8)  Will follow labs closely

## 2024-04-15 NOTE — PROGRESS NOTE ADULT - PROBLEM SELECTOR PLAN 2
Pt. with fluid overload. Plan to continue CRRT, as outlined above. Net negative fluid balance. Monitor daily body weights.     If you have any questions, please feel free to contact me  Catia Girard  Nephrology Fellow  [phone] / [employer] Teams(Preferred)  (After 5pm or on weekends please page the on-call fellow).

## 2024-04-15 NOTE — PROGRESS NOTE ADULT - SUBJECTIVE AND OBJECTIVE BOX
[facility] Division of Kidney Diseases & Hypertension  FOLLOW UP NOTE  [phone]--------------------------------------------------------------------------------    Chief Complaint: Hyperammonemia, requiring CRRT      24 hour events/subjective: Pt. was seen and evaluated in the MICU this morning. Receiving CRRT, tolerating well. Unable to obtain ROS as pt. is intubated/sedated.    PAST HISTORY  --------------------------------------------------------------------------------  No significant changes to PMH, PSH, FHx, SHx, unless otherwise noted    ALLERGIES & MEDICATIONS  --------------------------------------------------------------------------------  Allergies    No Known Allergies    Intolerances    Standing Inpatient Medications  caspofungin IVPB      caspofungin IVPB 50 milliGRAM(s) IV Intermittent every 24 hours  chlorhexidine 0.12% Liquid 15 milliLiter(s) Oral Mucosa every 12 hours  chlorhexidine 4% Liquid 1 Application(s) Topical <User Schedule>  chlorhexidine 4% Liquid 1 Application(s) Topical <User Schedule>  CRRT Treatment    <Continuous>  DAPTOmycin IVPB 500 milliGRAM(s) IV Intermittent every 24 hours  dexMEDEtomidine Infusion 0.5 MICROgram(s)/kG/Hr IV Continuous <Continuous>  dextrose 20%. 500 milliLiter(s) IV Continuous <Continuous>  doxycycline IVPB 100 milliGRAM(s) IV Intermittent every 12 hours  fentaNYL    Injectable 25 MICROGram(s) IV Push every 5 minutes  folic acid 1 milliGRAM(s) Oral daily  insulin glargine Injectable (LANTUS) 5 Unit(s) SubCutaneous at bedtime  insulin lispro (ADMELOG) corrective regimen sliding scale   SubCutaneous every 6 hours  levothyroxine 100 MICROGram(s) Oral daily  meropenem  IVPB 1000 milliGRAM(s) IV Intermittent every 12 hours  multivitamin/minerals/iron Oral Solution (CENTRUM) 15 milliLiter(s) Oral daily  norepinephrine Infusion 0.06 MICROgram(s)/kG/Min IV Continuous <Continuous>  pantoprazole  Injectable 40 milliGRAM(s) IV Push daily  Phoxillum Filtration BK 4 / 2.5 5000 milliLiter(s) CRRT <Continuous>  PrismaSOL Filtration BGK 4 / 2.5 5000 milliLiter(s) CRRT <Continuous>  PrismaSOL Filtration BGK 4 / 2.5 5000 milliLiter(s) CRRT <Continuous>  rifAXIMin 550 milliGRAM(s) Oral two times a day  thiamine 100 milliGRAM(s) Oral daily  ursodiol Tablet 500 milliGRAM(s) Oral <User Schedule>    PRN Inpatient Medications  fentaNYL    Injectable 50 MICROGram(s) IV Push once PRN  sodium chloride 0.9% lock flush 10 milliLiter(s) IV Push every 1 hour PRN  sodium chloride 0.9% lock flush 10 milliLiter(s) IV Push every 1 hour PRN      REVIEW OF SYSTEMS  --------------------------------------------------------------------------------  Unable to obtain ROS, see HPI    VITALS/PHYSICAL EXAM  --------------------------------------------------------------------------------  T(C): 36.6 (04-15-24 @ 08:00), Max: 37.2 (04-14-24 @ 15:00)  HR: 85 (04-15-24 @ 11:45) (71 - 109)  BP: 103/56 (04-15-24 @ 11:45) (83/52 - 154/88)  RR: 20 (04-15-24 @ 11:45) (13 - 30)  SpO2: 100% (04-15-24 @ 11:45) (89% - 100%)  Wt(kg): --    04-14-24 @ 07:01  -  04-15-24 @ 07:00  --------------------------------------------------------  IN: 2123.5 mL / OUT: 4981 mL / NET: -2857.5 mL    04-15-24 @ 07:01  -  04-15-24 @ 12:44  --------------------------------------------------------  IN: 66.9 mL / OUT: 762 mL / NET: -695.1 mL    Physical Exam:  Gen: ill-appearing  HEENT: +ET tube, OG tube  Pulm: Mechanical breath sounds BL  CV: S1S2  Abd: Soft, +BS,  Ext: +BL LE edema  Neuro: Sedated  Skin: Warm and dry  Vascular access: LIJ non-tunneled HD catheter connected to CRRT    LABS/STUDIES  --------------------------------------------------------------------------------              7.4    11.08 >-----------<  35       [04-15-24 @ 06:31]              22.4     139  |  108  |  <4  ----------------------------<  133      [04-15-24 @ 06:31]  4.2   |  20  |  0.36        Ca     7.9     [04-15-24 @ 06:31]      Mg     2.1     [04-15-24 @ 06:31]      Phos  2.8     [04-15-24 @ 06:31]    TPro  4.4  /  Alb  3.2  /  TBili  10.2  /  DBili  x   /  AST  154  /  ALT  27  /  AlkPhos  105  [04-15-24 @ 06:31]    PT/INR: PT 14.8 , INR 1.42       [04-15-24 @ 00:32]  PTT: 34.3       [04-15-24 @ 00:32]          [04-14-24 @ 13:06]    Creatinine Trend:  SCr 0.36 [04-15 @ 06:31]  SCr 0.45 [04-15 @ 00:32]  SCr 0.46 [04-14 @ 18:01]  SCr 0.51 [04-14 @ 11:55]  SCr 0.60 [04-14 @ 05:50]    Urine Creatinine 95      [04-13-24 @ 11:35]  Urine Protein 30      [04-13-24 @ 11:35]  Urine Sodium 8      [04-13-24 @ 11:35]  Urine Potassium 12      [04-13-24 @ 11:35]  Urine Osmolality 386      [04-13-24 @ 11:35]    Iron 43, TIBC Unable to calculate, %sat Unable to calculate      [04-13-24 @ 09:41]  Ferritin 653      [04-13-24 @ 09:41]  Vitamin D (25OH) 17.7      [04-14-24 @ 13:06]  TSH 4.43      [04-13-24 @ 03:56]  Lipid: chol 82, TG 82, HDL 8, LDL --      [04-13-24 @ 03:56]    HBsAb Nonreact      [04-13-24 @ 09:41]  HBsAg Nonreact      [04-13-24 @ 03:56]  HBcAb Nonreact      [04-13-24 @ 09:41]  HCV 0.08, Nonreact      [04-13-24 @ 03:56]  HIV Nonreact      [04-13-24 @ 03:56]

## 2024-04-15 NOTE — PROGRESS NOTE ADULT - ASSESSMENT
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 Now in the MICU with shock, hyperammonia, hyperbilirubinemia, anemia, thrombocytopenia. Surgery team consulted for evaluation of possible bowel ischemia. On Levo .02. Lactate 3.3. Abdomen is distended but soft. No signs of bleeding from rectal tube.    Tbili and Ammonia remain elevated. AST now uptrending. On CVVHD.    Plan/Recs  - Follow-up MRCP read to evaluate for billary obstruction  - Abdominal exams off sedation  - Care per MICU    ACS/Trauma Surgery  [id number]

## 2024-04-15 NOTE — CHART NOTE - NSCHARTNOTEFT_GEN_A_CORE
:  Michael Gaspar    INDICATION: SHock    PROCEDURE:   [x] LIMITED ECHO  [x] LIMITED CHEST  [ ] LIMITED RETROPERITONEAL  [ ] LIMITED ABDOMINAL  [ ] LIMITED DVT  [ ] LIMITED NECK - TRACHEOSTOMY EVAL  [ ] NEEDLE GUIDANCE VASCULAR  [ ] NEEDLE GUIDANCE THORACENTESIS  [ ] NEEDLE GUIDANCE PARACENTESIS  [ ] NEEDLE GUIDANCE PERICARDIOCENTESIS  [ ] OTHER    FINDINGS:   Normal LV function. Eccentric MR jet. RV is enlarged and there is bowing of the interventricular septum in systole.  PV acc T 63ms. Severe TR. Estimated PASP 35mmHg. TAPSE 1.8 cm. E 101, A 57, E/A 1.77, E' 8. E/E' 12.34. LVOT VTI 13.7. LA and RA enlarged. IVC 1.7-2.0cm. Lungs with A line pattern and trace bilateral pleural effusions.    INTERPRETATION:   LV function preserved. RV enlarged but with normal function. Significant TR, likely underestimating amount of PHTN which is likely moderate. Not significantly volume overloaded.    Supervised by Dr. Gillis  Images stored in qpath  Should not be considered final until signed by attending.

## 2024-04-15 NOTE — DIETITIAN INITIAL EVALUATION ADULT - PERTINENT LABORATORY DATA
04-15    139  |  106  |  <4<L>  ----------------------------<  119<H>  4.4   |  22  |  0.36<L>    Ca    8.9      15 Apr 2024 13:03  Phos  2.7     04-15  Mg     2.3     04-15    TPro  4.7<L>  /  Alb  3.0<L>  /  TBili  10.7<H>  /  DBili  7.3<H>  /  AST  178<H>  /  ALT  31  /  AlkPhos  112  04-15  POCT Blood Glucose.: 136 mg/dL (04-15-24 @ 14:02)  A1C with Estimated Average Glucose Result: 5.2 % (04-13-24 @ 03:56)

## 2024-04-15 NOTE — PROGRESS NOTE ADULT - ASSESSMENT
41yo pmh of HTN, DM, Factor V Leiden deficiency, gout, congenital pancreas divisum, recurrent pancreatitis s/p distal pancreatectomy, cholecystectomy, Cholecystectomy, Splectomy, Kevin-En-Y Pancreaticojejunostomy 2/2014 [facility], Total Pancreatotomy with Islet Cell Autotransplant at [facility] 4/82018, Post Op Abdominal/Peritoneal Multiloculated Abscesses, Enterocutaneous Fistula, Multiple Abdominal Surgeries > 20s, Infected Abdominal Wound Mesh on Antibacterial and Fungal ABx admitted to [facility] 4/8 NBNB Vomiting and Watery Diarrhea x 4 days, PPOI, Somnolence (Sleep 16-20Hr/Day) found elevated Ammonia >200, Elevated LFTs, worsening Metabolic vs. Hepatic Encephalopathy, intubated for airway protection. She was transferred to [facility] for Liver Transplant Evaluation.       =====Neuro=====  #hepatic encephalopathy   >Baseline AOx3. Currently intubated and sedated. CT H at OSH without acute findings   >CTH (4/13): No acute findings  - treatment for hepatic encephalopathy as below   - Will wean precedex today    #Downward nystagmus  >Noted on exam 4/14-4/15 overnight  - c/f seizure iso hepatic encephalopathy  - EEG     #Chronic pain  #anxiety/depression   >Was on alprazolam 0.75mg QD, Dilaudid and methadone 5mg QD   - Hold home meds for now    =====Cardio=====  #History of RA thrombus  >TTE (4/13): No thrombus commented  - Previously on Lovenox. Was on Eliquis but was stopped as per heme/onc note at OSH. Likely due to Factor 5 Leiden hx.   - Hold AC iso thrombocytopenia     =====Pulmonary=====  #mechanical ventilation   - Tolerating CPAP well  - Wean precedex as able in plan for extubation    =====GI=====  #Elevated bilirubin  >Elevated bilirubin/coag, c/b hepatic encephalopathy  >s/p NAC on admission to outside hospital  >POCUS with mild ascites  >CTAP (4/13): Hepatomegaly and hepatic steatosis  >Acute hepatitis panel (4/13): Negative  >Autoimmune hepatitis panel (4/13): Pending  - Etiology not entirely clear, hepatology following - c/f biliary stricture vs. ?shock liver  - Trend MELD daily  - c/w thiamine, folic acid, MV  - c/w ursodiol 500mg  - f/u MRCP  - f/u Abd duplex/US, r/o hepatic vein thrombosis    #hepatic encephalopathy   >Baseline mentation appears to be AOx3 as per outpatient records.   - c/w Rifaximin   - Unable to start Lactulose given ischemic colitis, on CRRT    #Ischemic bowel   >CTA/P (4/13): segmental areas of wall thickening throughout the colon and rectum with pneumatosis, mesenteric edema, and adjacent hyperdensities in the proximal transverse colon suspicious for bowel ischemia with intraluminal hemorrhage. Additional intraluminal hyperdensities within the distal ascending colon suspicious for hemorrhage.   >TTE (4/13): No thrombus commented  - Surgery, GI following, no plan for surgery/endoscopic eval for now  - NPO for now    #abdominal mesh  #congenital abnormality of the pancreas   >Multiple surgical hx from recurrent pancreatitis as in HPI. S/p total pancreatectomy with islet cell autotransplant followed by multiple abscess leading to several abdominal surgeries.   - Holding Creon and Linzess while NPO    #Colonic mass  >CTA/P (4/13): Nonspecific focal areas of narrowing in the distal descending colon and sigmoid colon, possibly colonic masses  - ctm, colonoscopy eventually    #Diet and ppx  - NPO, hold tube feeds   - rectal tube in place   - c/w omeprazole 40mg     =====Renal=====  #CRRT  - Started on CRRT for hepatic encephalopathy   - Strict I/O  - James in place  - Goal -500 to -1L given anasarca on exam    =====Heme=====   #Factor 5 Leiden  >Not on home anticoagulation. Patient follows with [place] [facility]  - Hold AC iso thrombocytopenia     # Macrocytic anemia  >s/p 1u PRBC (4/13)  >Multifactorial in nature. No hx of bleeding but with coagulopathy as below.   >B12 (4/13): >2000, Folate: 8.6  >Iron studies (4/13): Ferritin 653, Iron 43, Unmeasurable TIBC/Iron%   >Hemolysis lab (4/14): Haptoglobin < 20;   - Possibly iso liver disease vs. ?hemolysis  - Trend CBC, coag, transfuse goal > 7  - f/u Heme consult    #thrombocytopenia  >s/p 1u Plt (4/13)   - Etiology not entirely clear, s/p splenectomy and hepatic steatosis w/o cirrhosis on imaging  - f/u infectious workup as below  - Heme consult  - f/u factor viii assay    =====Endo=====  #DM1  >Per HIE record, patient prone to hypoglycemia  - c/w D20 gtt for now  - Decrease Lantus to 5u qhs    =====ID=====  Infectious work up  >UA (4/13): 11WBC, small LE, negative Nitrite or bacteria  >Babesia (4/13): neg; HIV (4/13): neg; RVP, COVID (4/14): neg; MRSA (4/13): Negative  >CXR (4/13): RML consolidation  >CTC/A/P (4/13): RML consolidation, LLL atelectasis 2/2 likely ?mucus plug, ischemic colitis  >Bcx (4/13): NGTD  >ET tube cx (4/13): Normal resp luisa  >BAL (4/13): Pending  >GI PCR (4/13): Indeterminate norovirus  - Rest of infectious w/u per ID    #Septic shock   #hx of recurrent infections   >H/o of recurrent infxn from multiple abdominal wounds and surgeries. Hx of VRE and candida.   >Previously on levo at OSH. Off pressor on transfer; Restarted with initiation of CRRT  - Infectious w/u per ID  - c/w Caspofungin 50mg Q24  - c/w Daptomycin 500mg Q24  - c/w Doxycycline 100mg Q12  - c/w Meropenem 1g q12

## 2024-04-15 NOTE — PROGRESS NOTE ADULT - SUBJECTIVE AND OBJECTIVE BOX
Patient is a 48y old  Female who presents with a chief complaint of AMS (13 Apr 2024 13:42)      24 hour events: Downward nystagmus noted overnight, EEG ordered. MRCP performed.    OBJECTIVE:  ICU Vital Signs Last 24 Hrs  T(C): 36.8 (15 Apr 2024 04:00), Max: 37.2 (14 Apr 2024 15:00)  T(F): 98.2 (15 Apr 2024 04:00), Max: 99 (14 Apr 2024 15:00)  HR: 74 (15 Apr 2024 07:15) (71 - 114)  BP: 85/53 (15 Apr 2024 07:15) (83/53 - 154/88)  BP(mean): 65 (15 Apr 2024 07:15) (59 - 114)  ABP: --  ABP(mean): --  RR: 17 (15 Apr 2024 07:15) (13 - 30)  SpO2: 99% (15 Apr 2024 07:15) (89% - 100%)    O2 Parameters below as of 14 Apr 2024 20:00  Patient On (Oxygen Delivery Method): ventilator    O2 Concentration (%): 30      Mode: AC/ CMV (Assist Control/ Continuous Mandatory Ventilation), RR (machine): 12, TV (machine): 400, FiO2: 35, PEEP: 5, MAP: 9, PIP: 24    04-14 @ 07:01  -  04-15 @ 07:00  --------------------------------------------------------  IN: 2123.5 mL / OUT: 4981 mL / NET: -2857.5 mL      CAPILLARY BLOOD GLUCOSE      POCT Blood Glucose.: 139 mg/dL (15 Apr 2024 06:10)      PHYSICAL EXAM:  GENERAL: AAOx3, NAD  Cardiovascular: RRR, S1 S2, no m/r/g. No extremities edema, no JVD  LUNGS: Unlabored respirations, CTABL no wheeze/rhonchi/crackles  ABDOMEN: Soft, NTND, no rebound or guarding, BS presents. No CVA tenderness.  EXTREMITIES: Warm extremities, no clubbing, cyanosis, or edema, pulses 2+ bilaterally  NEURO: CN 2-12 grossly intact, strength 5/5 throughout, sensation intact      LINES:    HOSPITAL MEDICATIONS:  MEDICATIONS  (STANDING):  caspofungin IVPB      caspofungin IVPB 50 milliGRAM(s) IV Intermittent every 24 hours  chlorhexidine 0.12% Liquid 15 milliLiter(s) Oral Mucosa every 12 hours  chlorhexidine 4% Liquid 1 Application(s) Topical <User Schedule>  chlorhexidine 4% Liquid 1 Application(s) Topical <User Schedule>  DAPTOmycin IVPB 500 milliGRAM(s) IV Intermittent every 24 hours  dexMEDEtomidine Infusion 0.5 MICROgram(s)/kG/Hr (7.8 mL/Hr) IV Continuous <Continuous>  dextrose 20%. 500 milliLiter(s) (25 mL/Hr) IV Continuous <Continuous>  doxycycline IVPB 100 milliGRAM(s) IV Intermittent every 12 hours  fentaNYL    Injectable 25 MICROGram(s) IV Push every 5 minutes  folic acid 1 milliGRAM(s) Oral daily  insulin glargine Injectable (LANTUS) 5 Unit(s) SubCutaneous at bedtime  insulin lispro (ADMELOG) corrective regimen sliding scale   SubCutaneous every 6 hours  levothyroxine 100 MICROGram(s) Oral daily  meropenem  IVPB 1000 milliGRAM(s) IV Intermittent every 12 hours  multivitamin/minerals/iron Oral Solution (CENTRUM) 15 milliLiter(s) Oral daily  norepinephrine Infusion 0.06 MICROgram(s)/kG/Min (3.51 mL/Hr) IV Continuous <Continuous>  pantoprazole  Injectable 40 milliGRAM(s) IV Push daily  rifAXIMin 550 milliGRAM(s) Oral two times a day  thiamine 100 milliGRAM(s) Oral daily  ursodiol Tablet 500 milliGRAM(s) Oral <User Schedule>    MEDICATIONS  (PRN):  fentaNYL    Injectable 50 MICROGram(s) IV Push once PRN Moderate Pain (4 - 6)  sodium chloride 0.9% lock flush 10 milliLiter(s) IV Push every 1 hour PRN Pre/post blood products, medications, blood draw, and to maintain line patency  sodium chloride 0.9% lock flush 10 milliLiter(s) IV Push every 1 hour PRN Pre/post blood products, medications, blood draw, and to maintain line patency      LABS:                        7.4    11.08 )-----------( 35       ( 15 Apr 2024 06:31 )             22.4     Hgb Trend: 7.4<--, 7.3<--, 7.5<--, 7.4<--, 7.5<--  04-15    139  |  108  |  <4<L>  ----------------------------<  133<H>  4.2   |  20<L>  |  0.36<L>    Ca    7.9<L>      15 Apr 2024 06:31  Phos  2.8     04-15  Mg     2.1     04-15    TPro  4.4<L>  /  Alb  3.2<L>  /  TBili  10.2<H>  /  DBili  x   /  AST  154<H>  /  ALT  27  /  AlkPhos  105  04-15    Creatinine Trend: 0.36<--, 0.45<--, 0.46<--, 0.51<--, 0.60<--, 0.67<--  PT/INR - ( 15 Apr 2024 00:32 )   PT: 14.8 sec;   INR: 1.42 ratio         PTT - ( 15 Apr 2024 00:32 )  PTT:34.3 sec  Urinalysis Basic - ( 15 Apr 2024 06:31 )    Color: x / Appearance: x / SG: x / pH: x  Gluc: 133 mg/dL / Ketone: x  / Bili: x / Urobili: x   Blood: x / Protein: x / Nitrite: x   Leuk Esterase: x / RBC: x / WBC x   Sq Epi: x / Non Sq Epi: x / Bacteria: x      Arterial Blood Gas:  04-15 @ 00:24  7.38/37/173/22/97.4/-2.9  ABG lactate: --  Arterial Blood Gas:  04-14 @ 05:40  7.45/31/152/22/96.6/-2.1  ABG lactate: --  Arterial Blood Gas:  04-14 @ 00:15  7.44/35/129/24/97.2/-0.2  ABG lactate: --  Arterial Blood Gas:  04-13 @ 15:52  7.45/36/150/25/95.0/1.1  ABG lactate: --    Venous Blood Gas:  04-15 @ 04:34  7.36/39/54/22/81.5  VBG Lactate: 2.6  Venous Blood Gas:  04-15 @ 02:40  7.37/40/60/23/97.8  VBG Lactate: 2.9  Venous Blood Gas:  04-14 @ 11:44  7.39/40/46/24/71.6  VBG Lactate: 2.5  Venous Blood Gas:  04-13 @ 19:01  7.41/41/48/26/73.3  VBG Lactate: 2.5      EKG:    MICROBIOLOGY:     RADIOLOGY:  [ ] Reviewed and interpreted by me    EKG:  MICROBIOLOGY:     Radiology: ***    Bedside lung ultrasound: ***    Bedside ECHO: ***    EKG:    CENTRAL LINE: Y/N          DATE INSERTED:              REMOVE: Y/N    BERRY: Y/N                        DATE INSERTED:              REMOVE: Y/N    A-LINE: Y/N                       DATE INSERTED:              REMOVE: Y/N    GLOBAL ISSUE/BEST PRACTICE:  Analgesia:  Sedation:  HOB elevation: yes  Stress ulcer prophylaxis:  VTE prophylaxis:  Glycemic control:  Nutrition:    CODE STATUS: ***  [facility] discussion: Y     Patient is a 48y old  Female who presents with a chief complaint of AMS (13 Apr 2024 13:42)      24 hour events: Downward nystagmus noted overnight, EEG ordered. MRCP performed.    OBJECTIVE:  ICU Vital Signs Last 24 Hrs  T(C): 36.8 (15 Apr 2024 04:00), Max: 37.2 (14 Apr 2024 15:00)  T(F): 98.2 (15 Apr 2024 04:00), Max: 99 (14 Apr 2024 15:00)  HR: 74 (15 Apr 2024 07:15) (71 - 114)  BP: 85/53 (15 Apr 2024 07:15) (83/53 - 154/88)  BP(mean): 65 (15 Apr 2024 07:15) (59 - 114)  ABP: --  ABP(mean): --  RR: 17 (15 Apr 2024 07:15) (13 - 30)  SpO2: 99% (15 Apr 2024 07:15) (89% - 100%)    O2 Parameters below as of 14 Apr 2024 20:00  Patient On (Oxygen Delivery Method): ventilator    O2 Concentration (%): 30      Mode: AC/ CMV (Assist Control/ Continuous Mandatory Ventilation), RR (machine): 12, TV (machine): 400, FiO2: 35, PEEP: 5, MAP: 9, PIP: 24    04-14 @ 07:01  -  04-15 @ 07:00  --------------------------------------------------------  IN: 2123.5 mL / OUT: 4981 mL / NET: -2857.5 mL      CAPILLARY BLOOD GLUCOSE      POCT Blood Glucose.: 139 mg/dL (15 Apr 2024 06:10)      PHYSICAL EXAM:  GENERAL: AAOx0, sedated and intubated  Cardiovascular: RRR, normal S1 S2, no m/r/g  LUNGS: CTABL  ABDOMEN: Distended, soft.  EXTREMITIES: Warm extremities, pulses palpable. Anasarca 2-3+ pitting edema in BLE and BUE.  NEURO: Bilateral pupils symmetric and reactive, fixed downward gaze, unable to assess rest of neuro exam      LINES:    HOSPITAL MEDICATIONS:  MEDICATIONS  (STANDING):  caspofungin IVPB      caspofungin IVPB 50 milliGRAM(s) IV Intermittent every 24 hours  chlorhexidine 0.12% Liquid 15 milliLiter(s) Oral Mucosa every 12 hours  chlorhexidine 4% Liquid 1 Application(s) Topical <User Schedule>  chlorhexidine 4% Liquid 1 Application(s) Topical <User Schedule>  DAPTOmycin IVPB 500 milliGRAM(s) IV Intermittent every 24 hours  dexMEDEtomidine Infusion 0.5 MICROgram(s)/kG/Hr (7.8 mL/Hr) IV Continuous <Continuous>  dextrose 20%. 500 milliLiter(s) (25 mL/Hr) IV Continuous <Continuous>  doxycycline IVPB 100 milliGRAM(s) IV Intermittent every 12 hours  fentaNYL    Injectable 25 MICROGram(s) IV Push every 5 minutes  folic acid 1 milliGRAM(s) Oral daily  insulin glargine Injectable (LANTUS) 5 Unit(s) SubCutaneous at bedtime  insulin lispro (ADMELOG) corrective regimen sliding scale   SubCutaneous every 6 hours  levothyroxine 100 MICROGram(s) Oral daily  meropenem  IVPB 1000 milliGRAM(s) IV Intermittent every 12 hours  multivitamin/minerals/iron Oral Solution (CENTRUM) 15 milliLiter(s) Oral daily  norepinephrine Infusion 0.06 MICROgram(s)/kG/Min (3.51 mL/Hr) IV Continuous <Continuous>  pantoprazole  Injectable 40 milliGRAM(s) IV Push daily  rifAXIMin 550 milliGRAM(s) Oral two times a day  thiamine 100 milliGRAM(s) Oral daily  ursodiol Tablet 500 milliGRAM(s) Oral <User Schedule>    MEDICATIONS  (PRN):  fentaNYL    Injectable 50 MICROGram(s) IV Push once PRN Moderate Pain (4 - 6)  sodium chloride 0.9% lock flush 10 milliLiter(s) IV Push every 1 hour PRN Pre/post blood products, medications, blood draw, and to maintain line patency  sodium chloride 0.9% lock flush 10 milliLiter(s) IV Push every 1 hour PRN Pre/post blood products, medications, blood draw, and to maintain line patency      LABS:                        7.4    11.08 )-----------( 35       ( 15 Apr 2024 06:31 )             22.4     Hgb Trend: 7.4<--, 7.3<--, 7.5<--, 7.4<--, 7.5<--  04-15    139  |  108  |  <4<L>  ----------------------------<  133<H>  4.2   |  20<L>  |  0.36<L>    Ca    7.9<L>      15 Apr 2024 06:31  Phos  2.8     04-15  Mg     2.1     04-15    TPro  4.4<L>  /  Alb  3.2<L>  /  TBili  10.2<H>  /  DBili  x   /  AST  154<H>  /  ALT  27  /  AlkPhos  105  04-15    Creatinine Trend: 0.36<--, 0.45<--, 0.46<--, 0.51<--, 0.60<--, 0.67<--  PT/INR - ( 15 Apr 2024 00:32 )   PT: 14.8 sec;   INR: 1.42 ratio         PTT - ( 15 Apr 2024 00:32 )  PTT:34.3 sec  Urinalysis Basic - ( 15 Apr 2024 06:31 )    Color: x / Appearance: x / SG: x / pH: x  Gluc: 133 mg/dL / Ketone: x  / Bili: x / Urobili: x   Blood: x / Protein: x / Nitrite: x   Leuk Esterase: x / RBC: x / WBC x   Sq Epi: x / Non Sq Epi: x / Bacteria: x      Arterial Blood Gas:  04-15 @ 00:24  7.38/37/173/22/97.4/-2.9  ABG lactate: --  Arterial Blood Gas:  04-14 @ 05:40  7.45/31/152/22/96.6/-2.1  ABG lactate: --  Arterial Blood Gas:  04-14 @ 00:15  7.44/35/129/24/97.2/-0.2  ABG lactate: --  Arterial Blood Gas:  04-13 @ 15:52  7.45/36/150/25/95.0/1.1  ABG lactate: --    Venous Blood Gas:  04-15 @ 04:34  7.36/39/54/22/81.5  VBG Lactate: 2.6  Venous Blood Gas:  04-15 @ 02:40  7.37/40/60/23/97.8  VBG Lactate: 2.9  Venous Blood Gas:  04-14 @ 11:44  7.39/40/46/24/71.6  VBG Lactate: 2.5  Venous Blood Gas:  04-13 @ 19:01  7.41/41/48/26/73.3  VBG Lactate: 2.5      EKG:    MICROBIOLOGY:     RADIOLOGY:  [ ] Reviewed and interpreted by me    EKG:  MICROBIOLOGY:     Radiology: ***    Bedside lung ultrasound: ***    Bedside ECHO: ***    EKG:    CENTRAL LINE: Y/N          DATE INSERTED:              REMOVE: Y/N    BERRY: Y/N                        DATE INSERTED:              REMOVE: Y/N    A-LINE: Y/N                       DATE INSERTED:              REMOVE: Y/N    GLOBAL ISSUE/BEST PRACTICE:  Analgesia:  Sedation:  HOB elevation: yes  Stress ulcer prophylaxis:  VTE prophylaxis:  Glycemic control:  Nutrition:    CODE STATUS: ***  [facility] discussion: Y

## 2024-04-15 NOTE — CONSULT NOTE ADULT - ASSESSMENT
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 Transferred to [facility] for Liver Transplant Evaluation. Patient found to be hypoglycemic to 50-60s shortly after transfer, started on D20 gtt.    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 Patient was first diagnosed with Type 3c DM after her initial surgery in 2014 (s/p distal pancreatectomy, cholecystectomy, splenectomy and celina-en-y pancreaticojejuonostomy). Then she later underwent total pancreatectomy with islet cell autotransplant in 2018. After that, her insulin requirements decreased over the years but was never off insulin.    Endocrinologist: Dr. Dalton  Per recent outpatient note 3/26/2024:  Patient uses Omnipod Dash with Dexcom CGM   Basal rate:   MN 0.05U   6 AM 0.1U   9.30 AM 0.15U   Total basal 2.85U/24h   ICR 1:15   ICF 1:75   Reports of frequent hypoglycemia outpatient in March 2024.    Now with hypoglycemia inpatient since transfer. Has been on D20 gtt.  Will presume patient has insulin deficiency given her history.    PLAN:  - Unable to accurately check for insulin deficiency at this time given C-peptide can be influenced by dextrose fluids.   - Recommend Lantus 3 units QHS (close to her insulin requirements from pump settings)  - Wean D20 gtt as able, she is currently on 25cc/hr, consider weaning to D10 gtt. If able to wean off dextrose fluids, will check C-peptide then.  - Check for adrenal insufficiency as a possible etiology for hypoglycemia. Check serum AM cortisol and ACTH tomorrow morning 8am.    #Hypothyroidism vs NTIS  TSH 4.43. FT4 0.6, TT3 47 on admission  Started on LT4 100mcg daily  - recheck FT4 in 1 week  - repeat TSH once clinically improved or in 6-8 weeks    Discussed with primary team    Ed Jett MD  Endocrine Fellow  Can be reached via teams. For follow up questions, discharge recommendations, or new consults, please call answering service at [phone] (weekdays); [phone] (nights/weekends).

## 2024-04-15 NOTE — CONSULT NOTE ADULT - SUBJECTIVE AND OBJECTIVE BOX
Patient is a 48y old  Female who presents with a chief complaint of AMS (13 Apr 2024 13:42)      HPI:  42 year old female with hx of DMT1, Factor V leiden deficiency, gout and congenital abnormality of the pancreas associated with recurrent pancreatitis and extensive surgical hx (s/p distal pancreatectomy, cholecystectomy, splenectomy and celina-en-y pancreaticojejuonostomy (02/2014) at [facility] with Dr. Hargrove), and now S/P total pancreatectomy  with islet cell autotransplant at [facility] by Dr. Gil in 04/2018).    Patient's recovery was complicated by abdominal collections first on POD 6 which was managed with IR drain and IV antibiotic. Patient was discharged with PICC line for long term antifungal. Patient was readmitted on 05/8 again for abscess which was drained by IR. Patient was also started on therapeutic Lovenox for right atrial thrombus. Patient again developed abdominal pain with fever of 100.7 on 05/20 associated with thick yellow discharge from the ventral incisional and went to [place] which was nearby for care. CT scan at [place] showed complex multiloculated postsurgical abscess in the omental fat of the superior epigastrium measuring 6.7 cm and enterocutaneous fistulous tract extending anteriorly from the abscess to the midline incision site and another tract going into the soft tissues of the left upper abdominal quadrant communicating with a subcutaneous 3.3cm abscess  Patient was started on IV zosyn, flagyl and micafungin while there. Patient transferred today to [facility] for continuity of care under Dr. Ansari    Patient presented to [facility] ED on 4/8 by her spouse for AMS. Per , patient had been having nbnb vomiting and diarrhea for the last 4 weeks. She was unable to tolerate PO. She was also sleeping 18-20 hrs per day. On 4/8, he found her on the floor "completely out of it" and took her to [facility] for further evalution.   Initial workup in ED demenstrated a Tbili 5.4, direct bili 4.4 and ammonia 196. Patient was somnelent and had a left eyeward gaze deviation. Subquesntly started developing agonal snoring respirations and had to be intubated for airway protection.      (13 Apr 2024 03:31)      PAST MEDICAL & SURGICAL HISTORY:      MEDICATIONS  (STANDING):  caspofungin IVPB 50 milliGRAM(s) IV Intermittent every 24 hours  caspofungin IVPB      chlorhexidine 0.12% Liquid 15 milliLiter(s) Oral Mucosa every 12 hours  chlorhexidine 4% Liquid 1 Application(s) Topical <User Schedule>  chlorhexidine 4% Liquid 1 Application(s) Topical <User Schedule>  CRRT Treatment    <Continuous>  DAPTOmycin IVPB 500 milliGRAM(s) IV Intermittent every 24 hours  dexMEDEtomidine Infusion 0.5 MICROgram(s)/kG/Hr (7.8 mL/Hr) IV Continuous <Continuous>  dextrose 20%. 500 milliLiter(s) (25 mL/Hr) IV Continuous <Continuous>  doxycycline IVPB 100 milliGRAM(s) IV Intermittent every 12 hours  fentaNYL    Injectable 25 MICROGram(s) IV Push every 5 minutes  folic acid 1 milliGRAM(s) Oral daily  insulin glargine Injectable (LANTUS) 5 Unit(s) SubCutaneous at bedtime  insulin lispro (ADMELOG) corrective regimen sliding scale   SubCutaneous every 6 hours  levothyroxine 100 MICROGram(s) Oral daily  meropenem  IVPB 1000 milliGRAM(s) IV Intermittent every 12 hours  multivitamin/minerals/iron Oral Solution (CENTRUM) 15 milliLiter(s) Oral daily  norepinephrine Infusion 0.06 MICROgram(s)/kG/Min (3.51 mL/Hr) IV Continuous <Continuous>  pantoprazole  Injectable 40 milliGRAM(s) IV Push daily  Phoxillum Filtration BK 4 / 2.5 5000 milliLiter(s) (900 mL/Hr) CRRT <Continuous>  PrismaSOL Filtration BGK 4 / 2.5 5000 milliLiter(s) (700 mL/Hr) CRRT <Continuous>  PrismaSOL Filtration BGK 4 / 2.5 5000 milliLiter(s) (200 mL/Hr) CRRT <Continuous>  rifAXIMin 550 milliGRAM(s) Oral two times a day  thiamine 100 milliGRAM(s) Oral daily  ursodiol Tablet 500 milliGRAM(s) Oral <User Schedule>    MEDICATIONS  (PRN):  fentaNYL    Injectable 50 MICROGram(s) IV Push once PRN Moderate Pain (4 - 6)  sodium chloride 0.9% lock flush 10 milliLiter(s) IV Push every 1 hour PRN Pre/post blood products, medications, blood draw, and to maintain line patency  sodium chloride 0.9% lock flush 10 milliLiter(s) IV Push every 1 hour PRN Pre/post blood products, medications, blood draw, and to maintain line patency      Allergies    No Known Allergies    Intolerances        VITALS:    Vital Signs Last 24 Hrs  T(C): 36.6 (15 Apr 2024 08:00), Max: 37.2 (14 Apr 2024 15:00)  T(F): 97.9 (15 Apr 2024 08:00), Max: 99 (14 Apr 2024 15:00)  HR: 85 (15 Apr 2024 11:45) (71 - 109)  BP: 103/56 (15 Apr 2024 11:45) (83/52 - 154/88)  BP(mean): 77 (15 Apr 2024 11:45) (59 - 114)  RR: 20 (15 Apr 2024 11:45) (13 - 30)  SpO2: 100% (15 Apr 2024 11:45) (89% - 100%)    Parameters below as of 15 Apr 2024 08:00  Patient On (Oxygen Delivery Method): ventilator        LABS:                          7.4    11.08 )-----------( 35       ( 15 Apr 2024 06:31 )             22.4       04-15    139  |  108  |  <4<L>  ----------------------------<  133<H>  4.2   |  20<L>  |  0.36<L>    Ca    7.9<L>      15 Apr 2024 06:31  Phos  2.8     04-15  Mg     2.1     04-15    TPro  4.4<L>  /  Alb  3.2<L>  /  TBili  10.2<H>  /  DBili  x   /  AST  154<H>  /  ALT  27  /  AlkPhos  105  04-15      CAPILLARY BLOOD GLUCOSE      POCT Blood Glucose.: 104 mg/dL (15 Apr 2024 10:36)  POCT Blood Glucose.: 131 mg/dL (15 Apr 2024 08:01)  POCT Blood Glucose.: 139 mg/dL (15 Apr 2024 06:10)  POCT Blood Glucose.: 145 mg/dL (15 Apr 2024 02:30)  POCT Blood Glucose.: 235 mg/dL (14 Apr 2024 22:23)  POCT Blood Glucose.: 175 mg/dL (14 Apr 2024 20:05)  POCT Blood Glucose.: 151 mg/dL (14 Apr 2024 17:48)      PT/INR - ( 15 Apr 2024 00:32 )   PT: 14.8 sec;   INR: 1.42 ratio         PTT - ( 15 Apr 2024 00:32 )  PTT:34.3 sec    LOWER EXTREMITY PHYSICAL EXAM:    Vascular: DP/PT 1/4, B/L, CFT <3 seconds B/L, Temperature gradient wnl, B/L.   Neuro: Epicritic sensation unable to assess to the level of feet, B/L.  Musculoskeletal/Ortho: Lateral left ankle Deep tissue injury with no signs of infection, right heel superficial blister with no signs of infection. No signs of cellulitis bilaterally

## 2024-04-15 NOTE — DIETITIAN INITIAL EVALUATION ADULT - SIGNS/SYMPTOMS
pressure injuries, renal replacement  intake <50% nutrient needs 1 mo, 3+ edema, BMI 16.8 kg/m2 intake <50% nutrient needs > 5 days , 3+ edema, BMI 16.8 kg/m2

## 2024-04-15 NOTE — PROGRESS NOTE ADULT - SUBJECTIVE AND OBJECTIVE BOX
SURGERY PROGRESS NOTE    Interval events  - MRCP done overnight, read pending.  - Remains intubated; Dex 0.7  - Levo 0.02  - CVV at -100/hr        OBJECTIVE:   Vital Signs Last 24 Hrs  T(C): 36.6 (15 Apr 2024 08:00), Max: 37.2 (14 Apr 2024 15:00)  T(F): 97.9 (15 Apr 2024 08:00), Max: 99 (14 Apr 2024 15:00)  HR: 80 (15 Apr 2024 09:00) (71 - 114)  BP: 93/53 (15 Apr 2024 09:00) (83/52 - 154/88)  BP(mean): 67 (15 Apr 2024 09:00) (59 - 114)  RR: 19 (15 Apr 2024 09:00) (13 - 30)  SpO2: 100% (15 Apr 2024 09:00) (89% - 100%)    Parameters below as of 15 Apr 2024 08:00  Patient On (Oxygen Delivery Method): ventilator            I&O's Summary    14 Apr 2024 07:01  -  15 Apr 2024 07:00  --------------------------------------------------------  IN: 2123.5 mL / OUT: 4981 mL / NET: -2857.5 mL    15 Apr 2024 07:01  -  15 Apr 2024 11:36  --------------------------------------------------------  IN: 62.3 mL / OUT: 442 mL / NET: -379.7 mL      I&O's Detail    14 Apr 2024 07:01  -  15 Apr 2024 07:00  --------------------------------------------------------  IN:    Dexmedetomidine: 147.7 mL    Dexmedetomidine: 64 mL    dextrose 20%: 975 mL    IV PiggyBack: 895 mL    Norepinephrine: 15.9 mL    Norepinephrine: 25.9 mL  Total IN: 2123.5 mL    OUT:    Indwelling Catheter - Urethral (mL): 850 mL    Other (mL): 3731 mL    Rectal Tube (mL): 400 mL  Total OUT: 4981 mL    Total NET: -2857.5 mL      15 Apr 2024 07:01  -  15 Apr 2024 11:36  --------------------------------------------------------  IN:    Dexmedetomidine: 9.4 mL    dextrose 20%: 50 mL    Norepinephrine: 2.9 mL  Total IN: 62.3 mL    OUT:    Indwelling Catheter - Urethral (mL): 60 mL    Other (mL): 382 mL  Total OUT: 442 mL    Total NET: -379.7 mL          MEDICATIONS  (STANDING):  caspofungin IVPB 50 milliGRAM(s) IV Intermittent every 24 hours  caspofungin IVPB      chlorhexidine 0.12% Liquid 15 milliLiter(s) Oral Mucosa every 12 hours  chlorhexidine 4% Liquid 1 Application(s) Topical <User Schedule>  chlorhexidine 4% Liquid 1 Application(s) Topical <User Schedule>  CRRT Treatment    <Continuous>  DAPTOmycin IVPB 500 milliGRAM(s) IV Intermittent every 24 hours  dexMEDEtomidine Infusion 0.5 MICROgram(s)/kG/Hr (7.8 mL/Hr) IV Continuous <Continuous>  dextrose 20%. 500 milliLiter(s) (25 mL/Hr) IV Continuous <Continuous>  doxycycline IVPB 100 milliGRAM(s) IV Intermittent every 12 hours  fentaNYL    Injectable 25 MICROGram(s) IV Push every 5 minutes  folic acid 1 milliGRAM(s) Oral daily  insulin glargine Injectable (LANTUS) 5 Unit(s) SubCutaneous at bedtime  insulin lispro (ADMELOG) corrective regimen sliding scale   SubCutaneous every 6 hours  levothyroxine 100 MICROGram(s) Oral daily  meropenem  IVPB 1000 milliGRAM(s) IV Intermittent every 12 hours  multivitamin/minerals/iron Oral Solution (CENTRUM) 15 milliLiter(s) Oral daily  norepinephrine Infusion 0.06 MICROgram(s)/kG/Min (3.51 mL/Hr) IV Continuous <Continuous>  pantoprazole  Injectable 40 milliGRAM(s) IV Push daily  Phoxillum Filtration BK 4 / 2.5 5000 milliLiter(s) (900 mL/Hr) CRRT <Continuous>  PrismaSOL Filtration BGK 4 / 2.5 5000 milliLiter(s) (700 mL/Hr) CRRT <Continuous>  PrismaSOL Filtration BGK 4 / 2.5 5000 milliLiter(s) (200 mL/Hr) CRRT <Continuous>  rifAXIMin 550 milliGRAM(s) Oral two times a day  thiamine 100 milliGRAM(s) Oral daily  ursodiol Tablet 500 milliGRAM(s) Oral <User Schedule>    MEDICATIONS  (PRN):  fentaNYL    Injectable 50 MICROGram(s) IV Push once PRN Moderate Pain (4 - 6)  sodium chloride 0.9% lock flush 10 milliLiter(s) IV Push every 1 hour PRN Pre/post blood products, medications, blood draw, and to maintain line patency  sodium chloride 0.9% lock flush 10 milliLiter(s) IV Push every 1 hour PRN Pre/post blood products, medications, blood draw, and to maintain line patency    PHYSICAL EXAM  General: sedated   Respiratory: intubated  Cardio: regular rate  Abdomen: soft, distended, Rectal tube with brown output.   Extremities: warm and well perfused, peripheral edema UE>LE    LABS:                        7.4    11.08 )-----------( 35       ( 15 Apr 2024 06:31 )             22.4     04-15    139  |  108  |  <4<L>  ----------------------------<  133<H>  4.2   |  20<L>  |  0.36<L>    Ca    7.9<L>      15 Apr 2024 06:31  Phos  2.8     04-15  Mg     2.1     04-15    TPro  4.4<L>  /  Alb  3.2<L>  /  TBili  10.2<H>  /  DBili  x   /  AST  154<H>  /  ALT  27  /  AlkPhos  105  04-15    PT/INR - ( 15 Apr 2024 00:32 )   PT: 14.8 sec;   INR: 1.42 ratio         PTT - ( 15 Apr 2024 00:32 )  PTT:34.3 sec  Urinalysis Basic - ( 15 Apr 2024 06:31 )    Color: x / Appearance: x / SG: x / pH: x  Gluc: 133 mg/dL / Ketone: x  / Bili: x / Urobili: x   Blood: x / Protein: x / Nitrite: x   Leuk Esterase: x / RBC: x / WBC x   Sq Epi: x / Non Sq Epi: x / Bacteria: x        RADIOLOGY & ADDITIONAL STUDIES:

## 2024-04-15 NOTE — DIETITIAN INITIAL EVALUATION ADULT - ORAL INTAKE PTA/DIET HISTORY
Per H&P, patient had been vomiting & diarrhea for the last 4 weeks. She was unable to tolerate PO. Unknown if pt followed therapeutic diet. Unknown if pt with chewing/swallowing issues. Unknown if pt took oral nutrition supplements or micronutrients. NKFA.  Per H&P, patient had been vomiting & diarrhea for the last 4 weeks; confirmed by family. Pt followed low fat diet. Unknown if pt regularly took micronutrients; drank Protein supplement. Denies Hx of chewing or swallowing issues. Confirms no known food allergies.

## 2024-04-15 NOTE — DIETITIAN INITIAL EVALUATION ADULT - PERTINENT MEDS FT
MEDICATIONS  (STANDING):  caspofungin IVPB 50 milliGRAM(s) IV Intermittent every 24 hours  caspofungin IVPB      chlorhexidine 0.12% Liquid 15 milliLiter(s) Oral Mucosa every 12 hours  chlorhexidine 4% Liquid 1 Application(s) Topical <User Schedule>  chlorhexidine 4% Liquid 1 Application(s) Topical <User Schedule>  CRRT Treatment    <Continuous>  DAPTOmycin IVPB 500 milliGRAM(s) IV Intermittent every 24 hours  dexMEDEtomidine Infusion 0.5 MICROgram(s)/kG/Hr (7.8 mL/Hr) IV Continuous <Continuous>  dextrose 20%. 500 milliLiter(s) (25 mL/Hr) IV Continuous <Continuous>  doxycycline IVPB 100 milliGRAM(s) IV Intermittent every 12 hours  fentaNYL    Injectable 25 MICROGram(s) IV Push every 5 minutes  folic acid 1 milliGRAM(s) Oral daily  insulin glargine Injectable (LANTUS) 5 Unit(s) SubCutaneous at bedtime  insulin lispro (ADMELOG) corrective regimen sliding scale   SubCutaneous every 6 hours  levothyroxine 100 MICROGram(s) Oral daily  meropenem  IVPB 1000 milliGRAM(s) IV Intermittent every 12 hours  multivitamin/minerals/iron Oral Solution (CENTRUM) 15 milliLiter(s) Oral daily  norepinephrine Infusion 0.06 MICROgram(s)/kG/Min (3.51 mL/Hr) IV Continuous <Continuous>  pantoprazole  Injectable 40 milliGRAM(s) IV Push daily  Phoxillum Filtration BK 4 / 2.5 5000 milliLiter(s) (900 mL/Hr) CRRT <Continuous>  PrismaSOL Filtration BGK 4 / 2.5 5000 milliLiter(s) (700 mL/Hr) CRRT <Continuous>  PrismaSOL Filtration BGK 4 / 2.5 5000 milliLiter(s) (200 mL/Hr) CRRT <Continuous>  rifAXIMin 550 milliGRAM(s) Oral two times a day  thiamine 100 milliGRAM(s) Oral daily  ursodiol Tablet 500 milliGRAM(s) Oral <User Schedule>    MEDICATIONS  (PRN):  fentaNYL    Injectable 50 MICROGram(s) IV Push once PRN Moderate Pain (4 - 6)  sodium chloride 0.9% lock flush 10 milliLiter(s) IV Push every 1 hour PRN Pre/post blood products, medications, blood draw, and to maintain line patency  sodium chloride 0.9% lock flush 10 milliLiter(s) IV Push every 1 hour PRN Pre/post blood products, medications, blood draw, and to maintain line patency

## 2024-04-15 NOTE — PROGRESS NOTE ADULT - ASSESSMENT
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 Hargrove), and now S/P total pancreatectomy  with islet cell autotransplant at [facility] by Dr. Gil in 04/2018), c/b recurrent abdominal wall collection requiring IR drainage, presented to OSH with acute change in mental status requiring intubation for airway protection, and transferred to [facility] for care escalation.    Hepatology was consulted for concern for KEL.     Assessment  #Hyperbilirubinemia  #Bicytopenia  #Pneumatosis of bowel concerning for ischemic colitis  #Acute encephalopathy  #hyperammonemia  #Coagulopathy  - Concern for sepsis-induced cholestasis. Although patient has features of skilled nursing, Cr stable, INR downtrending, and not behaving like EKL.   - Hyperammonemia is also a rare but known complication of bypass surgeries with high mortality  - Mental status deterioration with hx of bypass in the setting of diarrhea, overt SIBO infection --> malnutrition?  - Bicytopenia 2/2 bone marrow suppression 2/2 sepsis?  - Acute hepatitis panel negative    Recommendations  - F/u infectious workup; Abx per ID  - F/u MRCP  - F/u abdominal doppler  - F/u autoimmune hepatitis workup  - If no surgical planning, may initiate trickle feed with elemental diet  - Comprehensive vitamin repletion given bypass hx  - F/u serum copper, zinc, vitamin A, D, E and K level  - CRRT per renal    Incomplete recommendations until attending attestation.     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 Hargrove), and now S/P total pancreatectomy  with islet cell autotransplant at [facility] by Dr. Gil in 04/2018), c/b recurrent abdominal wall collection requiring IR drainage, presented to OSH with acute change in mental status requiring intubation for airway protection, and transferred to [facility] for care escalation.    Hepatology was consulted for concern for KEL.     Assessment  #Hyperbilirubinemia  #Bicytopenia  #Pneumatosis of bowel concerning for ischemic colitis  #Acute encephalopathy  #hyperammonemia  #Coagulopathy  - Concern for sepsis-induced cholestasis. Although patient has features of FDC, Cr stable, INR downtrending, and not behaving like KEL.   - Hyperammonemia is also a rare but known complication of bypass surgeries with high mortality  - Mental status deterioration with hx of bypass in the setting of diarrhea, overt SIBO infection --> malnutrition?  - Bicytopenia 2/2 bone marrow suppression 2/2 sepsis?  - Acute hepatitis panel negative    Recommendations  - F/u infectious workup; Abx per ID  - F/u MRCP  - F/u abdominal doppler  - F/u autoimmune hepatitis workup  - If no surgical planning, may initiate trickle feed with elemental diet  - Comprehensive vitamin repletion given bypass hx  - F/u serum copper, zinc, vitamin A, D, E and K level  - F/u PETH (in lab)  - CRRT per renal    D/w Dr. Teto Hummel  GI/Hepatology Fellow    MONDAY-FRIDAY 8AM-5PM:  Pager# [phone] ([facility]) or [phone] ([facility])    NON-URGENT CONSULTS:  Please email [email or web address]  AT NIGHT AND ON WEEKENDS:  Contact on-call GI fellow via AMION by paging or hospital  from 5pm-8am and on weekends/holidays

## 2024-04-15 NOTE — PROGRESS NOTE ADULT - SUBJECTIVE AND OBJECTIVE BOX
Interval Events:   No acute events overnight. Intermittent need for pressor. About 300 cc rectal output, and 30-60 cc/hr UOP on CRRT. Afebrile, s/p MRCP. Doing well on vent, and waking up intermittently, but not interactive.   ? spit up with medication overnight. Currently NPO.     Hospital Medications:  caspofungin IVPB      caspofungin IVPB 50 milliGRAM(s) IV Intermittent every 24 hours  chlorhexidine 0.12% Liquid 15 milliLiter(s) Oral Mucosa every 12 hours  chlorhexidine 4% Liquid 1 Application(s) Topical <User Schedule>  chlorhexidine 4% Liquid 1 Application(s) Topical <User Schedule>  CRRT Treatment    <Continuous>  DAPTOmycin IVPB 500 milliGRAM(s) IV Intermittent every 24 hours  dexMEDEtomidine Infusion 0.5 MICROgram(s)/kG/Hr IV Continuous <Continuous>  dextrose 20%. 500 milliLiter(s) IV Continuous <Continuous>  doxycycline IVPB 100 milliGRAM(s) IV Intermittent every 12 hours  fentaNYL    Injectable 25 MICROGram(s) IV Push every 5 minutes  fentaNYL    Injectable 50 MICROGram(s) IV Push once PRN  folic acid 1 milliGRAM(s) Oral daily  insulin glargine Injectable (LANTUS) 5 Unit(s) SubCutaneous at bedtime  insulin lispro (ADMELOG) corrective regimen sliding scale   SubCutaneous every 6 hours  levothyroxine 100 MICROGram(s) Oral daily  meropenem  IVPB 1000 milliGRAM(s) IV Intermittent every 12 hours  multivitamin/minerals/iron Oral Solution (CENTRUM) 15 milliLiter(s) Oral daily  norepinephrine Infusion 0.06 MICROgram(s)/kG/Min IV Continuous <Continuous>  pantoprazole  Injectable 40 milliGRAM(s) IV Push daily  Phoxillum Filtration BK 4 / 2.5 5000 milliLiter(s) CRRT <Continuous>  PrismaSOL Filtration BGK 4 / 2.5 5000 milliLiter(s) CRRT <Continuous>  PrismaSOL Filtration BGK 4 / 2.5 5000 milliLiter(s) CRRT <Continuous>  rifAXIMin 550 milliGRAM(s) Oral two times a day  sodium chloride 0.9% lock flush 10 milliLiter(s) IV Push every 1 hour PRN  sodium chloride 0.9% lock flush 10 milliLiter(s) IV Push every 1 hour PRN  thiamine 100 milliGRAM(s) Oral daily  ursodiol Tablet 500 milliGRAM(s) Oral <User Schedule>      PHYSICAL EXAM:   Vital Signs:  Vital Signs Last 24 Hrs  T(C): 36.8 (15 Apr 2024 04:00), Max: 37.2 (14 Apr 2024 15:00)  T(F): 98.2 (15 Apr 2024 04:00), Max: 99 (14 Apr 2024 15:00)  HR: 74 (15 Apr 2024 07:15) (71 - 114)  BP: 85/53 (15 Apr 2024 07:15) (83/53 - 154/88)  BP(mean): 65 (15 Apr 2024 07:15) (59 - 114)  RR: 17 (15 Apr 2024 07:15) (13 - 30)  SpO2: 99% (15 Apr 2024 07:15) (89% - 100%)    Parameters below as of 14 Apr 2024 20:00  Patient On (Oxygen Delivery Method): ventilator    O2 Concentration (%): 30  Daily     Daily     GENERAL: critically ill  NEURO: Sedated  HEENT: (+) icteric sclera, OGT in place  CHEST: intubated, mechanical breath sounds  CARDIAC: regular rate, +S1/S2  ABDOMEN: soft, mildly distended, nontender  EXTREMITIES: warm, well perfused; (+) anasarca  SKIN: Jaundice    LABS: reviewed                        7.4    11.08 )-----------( 35       ( 15 Apr 2024 06:31 )             22.4     04-15    139  |  108  |  <4<L>  ----------------------------<  133<H>  4.2   |  20<L>  |  0.36<L>    Ca    7.9<L>      15 Apr 2024 06:31  Phos  2.8     04-15  Mg     2.1     04-15    TPro  4.4<L>  /  Alb  3.2<L>  /  TBili  10.2<H>  /  DBili  x   /  AST  154<H>  /  ALT  27  /  AlkPhos  105  04-15

## 2024-04-15 NOTE — DIETITIAN INITIAL EVALUATION ADULT - OTHER INFO
Wt Hx:   Dosing wt 62.4 kG/137.5 lbs. ?accuracy of current wt  UBW unknown.    Ht: 65 inches   IBW: 125 lbs    IBW%:   Wt Hx per HIE (lbs): 112 (5/11/23), 108 (8/12/23), 109 (10/13/23), 102 (2/22/24), 101 (3/12/24), 110 (4/8/24).    Nutrition-Related Concerns:   - Intubated 4/13. On Precedex   - Pneumatosis of bowel concerning for ischemic colitis  - Hepatic encephalopathy   - Ordered for folic acid, multivitamin, and thiamine   - S/p total pancreatectomy with islet cell autotransplant (extensive GI history)   - Started on Continuos renal replacement for hepatic encephalopathy   - Septic Shock. Pressor: norepinephrine at 0.01 micrograms/kG/min   - Type 1 DM; ordered for Lantus and sliding scale of insulin; dextrose 20% at 25 mL/hr  - PO levothyroxine   - Kevin-En-Y Pancreaticojejunostomy 2/2014 Wt Hx:   Dosing wt 62.4 kG/137.5 lbs. ?accuracy of current wt  UBW ~110 lbs and endorses ~10 lb unintentional wt loss PTA.  Ht: 65 inches   IBW: 125 lbs    IBW%: 81% (based on wt from 3/12)  Wt Hx per HIE (lbs): 112 (5/11/23), 108 (8/12/23), 109 (10/13/23), 102 (2/22/24), 101 (3/12/24), 110 (4/8/24).    Nutrition-Related Concerns:   - Intubated 4/13. On Precedex   - Pneumatosis of bowel concerning for ischemic colitis  - Hepatic encephalopathy   - Ordered for folic acid, multivitamin, and thiamine   - S/p total pancreatectomy with islet cell autotransplant (extensive GI history)   - Started on Continuos renal replacement for hepatic encephalopathy   - Septic Shock. Pressor: norepinephrine at 0.01 micrograms/kG/min   - Type 1 DM; ordered for Lantus and sliding scale of insulin; dextrose 20% at 25 mL/hr  - PO levothyroxine   - Kevin-En-Y Pancreaticojejunostomy 2/2014

## 2024-04-15 NOTE — CHART NOTE - NSCHARTNOTEFT_GEN_A_CORE
EEG preliminary read (not final) on the initial recording hour(s) = x 1     No seizures recorded.  Moderate to severe slowing noted, nonspecific.  Final report to follow tomorrow morning after completion of study.    [facility] EEG Reading Room Ph#: [phone]  Epilepsy Answering Service after 5PM and before 8:30AM: Ph#: [phone]

## 2024-04-15 NOTE — CONSULT NOTE ADULT - SUBJECTIVE AND OBJECTIVE BOX
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 Hargrove, and then S/P total pancreatectomy with islet cell autotransplant at [facility] by Dr. Gil in 04/2018. Patient presented to OSH on 4/8 by her spouse for AMS, NBNB Vomiting and Watery Diarrhea x 4 days, PPOI, Somnolence (Sleep 16-20Hr/Day) found elevated Ammonia >200, Elevated LFTs, worsening Metabolic vs. Hepatic Encephalopathy, intubated for airway protection. [facility]. Patient found to be hypoglycemic to 50-60s shortly after transfer, started on D20 gtt.    Consulted for: Type 3c DM, hypoglycemia    Diabetes history:  History obtained from parents at bedside.    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 Patient was first diagnosed with Type 3c DM after her initial surgery in 2014 (s/p distal pancreatectomy, cholecystectomy, splenectomy and celina-en-y pancreaticojejuonostomy). Then she later underwent total pancreatectomy with islet cell autotransplant in 2018. After that, her insulin requirements decreased over the years but was never off insulin.  Parents deny any known hx of positive Type 1 antibodies.    Endocrinologist: Dr. Dalton  Per recent outpatient note 3/26/2024:  Patient uses Omnipod Dash with Dexcom CGM   Basal rate:   MN 0.05U   6 AM 0.1U   9.30 AM 0.15U   Total basal 2.85U/24h   ICR 1:15   ICF 1:75   At this visit, patient reported fluctuating BG due to increased physical activity lately (moving to new place). Had 2 episodes of Hypoglycemia in 40s and below, requiring use of Glucagon pen, juice and sweets. Patient stated she may suspend her pump when physically active to prevent hypoglycemia.    Insulin pump was removed when she presented to the OSH.      PAST MEDICAL & SURGICAL HISTORY:      FAMILY HISTORY:  +thyroid disorder in grandmother    Social History:  No alcohol  no smoking    Outpatient Medications:  Patient uses Omnipod Dash with Dexcom CGM   Basal rate:   MN 0.05U   6 AM 0.1U   9.30 AM 0.15U   Total basal 2.85U/24h   ICR 1:15   ICF 1:75     MEDICATIONS  (STANDING):  caspofungin IVPB 50 milliGRAM(s) IV Intermittent every 24 hours  caspofungin IVPB      chlorhexidine 0.12% Liquid 15 milliLiter(s) Oral Mucosa every 12 hours  chlorhexidine 4% Liquid 1 Application(s) Topical <User Schedule>  chlorhexidine 4% Liquid 1 Application(s) Topical <User Schedule>  CRRT Treatment    <Continuous>  DAPTOmycin IVPB 500 milliGRAM(s) IV Intermittent every 24 hours  dextrose 20%. 500 milliLiter(s) (25 mL/Hr) IV Continuous <Continuous>  diatrizoate meglumine/diatrizoate sodium. 240 milliLiter(s) Oral once  doxycycline IVPB 100 milliGRAM(s) IV Intermittent every 12 hours  fentaNYL    Injectable 25 MICROGram(s) IV Push every 5 minutes  folic acid 1 milliGRAM(s) Oral daily  insulin glargine Injectable (LANTUS) 5 Unit(s) SubCutaneous at bedtime  insulin lispro (ADMELOG) corrective regimen sliding scale   SubCutaneous every 6 hours  levothyroxine 100 MICROGram(s) Oral daily  meropenem  IVPB 1000 milliGRAM(s) IV Intermittent every 12 hours  multivitamin/minerals/iron Oral Solution (CENTRUM) 15 milliLiter(s) Oral daily  norepinephrine Infusion 0.06 MICROgram(s)/kG/Min (3.51 mL/Hr) IV Continuous <Continuous>  pantoprazole  Injectable 40 milliGRAM(s) IV Push daily  Phoxillum Filtration BK 4 / 2.5 5000 milliLiter(s) (900 mL/Hr) CRRT <Continuous>  PrismaSOL Filtration BGK 4 / 2.5 5000 milliLiter(s) (700 mL/Hr) CRRT <Continuous>  PrismaSOL Filtration BGK 4 / 2.5 5000 milliLiter(s) (200 mL/Hr) CRRT <Continuous>  rifAXIMin 550 milliGRAM(s) Oral two times a day  thiamine 100 milliGRAM(s) Oral daily  ursodiol Tablet 500 milliGRAM(s) Oral <User Schedule>    MEDICATIONS  (PRN):  fentaNYL    Injectable 50 MICROGram(s) IV Push once PRN Moderate Pain (4 - 6)  sodium chloride 0.9% lock flush 10 milliLiter(s) IV Push every 1 hour PRN Pre/post blood products, medications, blood draw, and to maintain line patency  sodium chloride 0.9% lock flush 10 milliLiter(s) IV Push every 1 hour PRN Pre/post blood products, medications, blood draw, and to maintain line patency      Allergies    No Known Allergies    Intolerances      Review of Systems:  UNABLE TO OBTAIN DUE TO AMS    PHYSICAL EXAM:  VITALS: T(C): 37.2 (04-15-24 @ 16:00)  T(F): 99 (04-15-24 @ 16:00), Max: 99 (04-14-24 @ 20:00)  HR: 106 (04-15-24 @ 17:15) (71 - 115)  BP: 101/57 (04-15-24 @ 17:15) (83/52 - 154/88)  RR:  (13 - 29)  SpO2:  (89% - 100%)  Wt(kg): --  GENERAL: intubated, unresponsive  HEENT:  Atraumatic, Normocephalic  THYROID: Normal size, no palpable nodules  RESPIRATORY: intubated, mechanical BS  CARDIOVASCULAR: + peripheral edema  GI: Soft, non distended  SKIN: Jaundiced  NEURO: no tremor  PSYCH: unresponsive  CUSHING'S SIGNS: no striae      CAPILLARY BLOOD GLUCOSE      POCT Blood Glucose.: 145 mg/dL (15 Apr 2024 16:01)  POCT Blood Glucose.: 136 mg/dL (15 Apr 2024 14:02)  POCT Blood Glucose.: 124 mg/dL (15 Apr 2024 12:33)  POCT Blood Glucose.: 104 mg/dL (15 Apr 2024 10:36)  POCT Blood Glucose.: 131 mg/dL (15 Apr 2024 08:01)  POCT Blood Glucose.: 139 mg/dL (15 Apr 2024 06:10)  POCT Blood Glucose.: 145 mg/dL (15 Apr 2024 02:30)  POCT Blood Glucose.: 235 mg/dL (14 Apr 2024 22:23)  POCT Blood Glucose.: 175 mg/dL (14 Apr 2024 20:05)  POCT Blood Glucose.: 151 mg/dL (14 Apr 2024 17:48)                            7.4    13.75 )-----------( 41       ( 15 Apr 2024 13:03 )             22.1       04-15    139  |  106  |  <4<L>  ----------------------------<  119<H>  4.4   |  22  |  0.36<L>    eGFR: 125    Ca    8.9      04-15  Mg     2.3     04-15  Phos  2.7     04-15    TPro  4.7<L>  /  Alb  3.0<L>  /  TBili  10.7<H>  /  DBili  7.3<H>  /  AST  178<H>  /  ALT  31  /  AlkPhos  112  04-15      Thyroid Function Tests:  04-13 @ 03:56 TSH 4.43 FreeT4 0.6 T3 47 Anti TPO -- Anti Thyroglobulin Ab -- TSI --      A1C with Estimated Average Glucose Result: 5.2 % (04-13-24 @ 03:56)      04-13 Chol 82 Direct LDL -- LDL calculated 57 HDL 8<L> Trig 82    Radiology:

## 2024-04-15 NOTE — DIETITIAN INITIAL EVALUATION ADULT - OTHER CALCULATIONS
Fluid needs deferred to provider. Based on wt from 3/12: 101 lbs. The [place] [employer] Equation ([employer]) 2003b was used to calculate resting energy expenditure: 1205 kcals

## 2024-04-15 NOTE — ADVANCED PRACTICE NURSE CONSULT - REASON FOR CONSULT
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 Hargrove), and now S/P total pancreatectomy  with islet cell autotransplant at [facility] by Dr. Gil in 04/2018).    Patient's recovery was complicated by abdominal collections first on POD 6 which was managed with IR drain and IV antibiotic. Patient was discharged with PICC line for long term antifungal. Patient was readmitted on 05/8 again for abscess which was drained by IR. Patient was also started on therapeutic Lovenox for right atrial thrombus. Patient again developed abdominal pain with fever of 100.7 on 05/20 associated with thick yellow discharge from the ventral incisional and went to [place] which was nearby for care. CT scan at [place] showed complex multiloculated postsurgical abscess in the omental fat of the superior epigastrium measuring 6.7 cm and enterocutaneous fistulous tract extending anteriorly from the abscess to the midline incision site and another tract going into the soft tissues of the left upper abdominal quadrant communicating with a subcutaneous 3.3cm abscess  Patient was started on IV zosyn, flagyl and micafungin while there. Patient transferred today to [facility] for continuity of care under Dr. Ansari    Patient presented to [facility] ED on 4/8 by her spouse for AMS. Per , patient had been having nbnb vomiting and diarrhea for the last 4 weeks. She was unable to tolerate PO. She was also sleeping 18-20 hrs per day. On 4/8, he found her on the floor "completely out of it" and took her to [facility] for further evalution.   Initial workup in ED demenstrated a Tbili 5.4, direct bili 4.4 and ammonia 196. Patient was somnelent and had a left eyeward gaze deviation. Subquesntly started developing agonal snoring respirations and had to be intubated for airway protection.   R elbow suspected DTI

## 2024-04-15 NOTE — PROGRESS NOTE ADULT - ATTENDING COMMENTS
# septic shock, likely RML pneumonia, RLL bronchial occlusion, and pneumatosis coli - likely ischemic colon, colonic blood suggested by CT, init. Hb drop  - intubated, sedated  - no benefit of colonoscopy  # elevated LFTs, hepatomegaly with non-cirrhotic morphology, MELD 33 (24 with initial creat. before CRRT) - DD of bilirubin and INR elevation   - DD: alcoholic hepatitis  # macrocytic anemia - DD: folate deficiency due to malnutrition and alcohol-related liver disease  # Acute encephalopathy, hyperammonemia - DD: hepatic encephalopathy in setting of alcoholic liver disease and sepsis, vs. SIBO and sepsis  # anasarca      Recommendations: continue ICU care, continue CRRT; send PETH level, DAWIT, ASMA, IgG

## 2024-04-15 NOTE — PROGRESS NOTE ADULT - ATTENDING COMMENTS
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 Hargrove), and now S/P total pancreatectomy  with islet cell autotransplant at [facility] by Dr. Gil in 04/2018), c/b recurrent abdominal wall collection requiring IR drainage, ?hx of RV thrombus presented to OSH with acute change in mental status requiring intubation for airway protection, and transferred to [facility]    #Neuro: metabolic encephalopathy in the setting of elevated ammonia. Intubated for airway protection. EEG with no seizures.   Obtain STAT CT head  #CV: initially in shock on multiple pressors at OSH. Now on low dose Levo. ?hx of RV thrombus in the past. Not on AC  #Resp: intubated for airway protection. Continue with lung protective ventilation  #GI: -- ischemic colitis/GIB/?colon masses - GI and surgery input appreciated. No plans for colonoscopy right now. f/u gastrografin UGIS.   -- elevated ammonia levels - unclear etiology, related to GIB, ?complication of her surgery. Has not improved with CVVH - nephrology will adjust clearance; keep pH <7.4. f/u CTH  -- hyperbilirubinemia - MRCP negative. Dr. Gil (hepatobiliary surgeon) who does not think that her surgical anatomy is responsible for her elevated tbili.   #Renal: on CVVH for elevated ammonia levels as above  #Heme/Onc: Factor V leiden, ?hx of RV thrombus. Currently not on AC 2/2 thrombocytopenia. Monitor counts   #ID: hx fungemia and abd abscesses in the past. s/p bronch with normal respiratory luisa. Currently on Caspo, Dapto, Doxy and More. Would like to de-escalate. Cultures have been negative. ID input appreciated.   #Endocrine: DMI but hx of islet cell transplant. Currently on Lantus + D20 gtt. f/u C Peptide levesl. Endo input appreciated  #PPX: SCDs  #GOC: Full code; prognosis guarded

## 2024-04-15 NOTE — PROGRESS NOTE ADULT - SUBJECTIVE AND OBJECTIVE BOX
Follow Up:      Interval History:    REVIEW OF SYSTEMS  [  ] ROS unobtainable because:    [  ] All other systems negative except as noted below    Constitutional:  [ ] fever [ ] chills  [ ] weight loss  [ ] weakness  Skin:  [ ] rash [ ] phlebitis	  Eyes: [ ] icterus [ ] pain  [ ] discharge	  ENMT: [ ] sore throat  [ ] thrush [ ] ulcers [ ] exudates  Respiratory: [ ] dyspnea [ ] hemoptysis [ ] cough [ ] sputum	  Cardiovascular:  [ ] chest pain [ ] palpitations [ ] edema	  Gastrointestinal:  [ ] nausea [ ] vomiting [ ] diarrhea [ ] constipation [ ] pain	  Genitourinary:  [ ] dysuria [ ] frequency [ ] hematuria [ ] discharge [ ] flank pain  [ ] incontinence  Musculoskeletal:  [ ] myalgias [ ] arthralgias [ ] arthritis  [ ] back pain  Neurological:  [ ] headache [ ] seizures  [ ] confusion/altered mental status    Allergies  No Known Allergies        ANTIMICROBIALS:  caspofungin IVPB 50 every 24 hours  caspofungin IVPB    DAPTOmycin IVPB 500 every 24 hours  doxycycline IVPB 100 every 12 hours  meropenem  IVPB 1000 every 12 hours  rifAXIMin 550 two times a day      OTHER MEDS:  MEDICATIONS  (STANDING):  dexMEDEtomidine Infusion 0.5 <Continuous>  fentaNYL    Injectable 25 every 5 minutes  fentaNYL    Injectable 50 once PRN  insulin glargine Injectable (LANTUS) 5 at bedtime  insulin lispro (ADMELOG) corrective regimen sliding scale  every 6 hours  levothyroxine 100 daily  norepinephrine Infusion 0.06 <Continuous>  pantoprazole  Injectable 40 daily  ursodiol Tablet 500 <User Schedule>      Vital Signs Last 24 Hrs  T(C): 36.9 (15 Apr 2024 12:00), Max: 37.2 (14 Apr 2024 20:00)  T(F): 98.4 (15 Apr 2024 12:00), Max: 99 (14 Apr 2024 20:00)  HR: 103 (15 Apr 2024 15:40) (71 - 110)  BP: 89/54 (15 Apr 2024 15:30) (83/52 - 154/88)  BP(mean): 69 (15 Apr 2024 15:30) (59 - 114)  RR: 22 (15 Apr 2024 15:30) (13 - 28)  SpO2: 99% (15 Apr 2024 15:40) (89% - 100%)    Parameters below as of 15 Apr 2024 08:00  Patient On (Oxygen Delivery Method): ventilator        PHYSICAL EXAMINATION:  General: Alert and Awake, NAD  HEENT: PERRL, EOMI  Neck: Supple  Cardiac: RRR, No M/R/G  Resp: CTAB, No Wh/Rh/Ra  Abdomen: NBS, NT/ND, No HSM, No rigidity or guarding  MSK: No LE edema. No Calf tenderness  : No harrington  Skin: No rashes or lesions. Skin is warm and dry to the touch.   Neuro: Alert and Awake. CN 2-12 Grossly intact. Moves all four extremities spontaneously.  Psych: Calm, Pleasant, Cooperative                          7.4    13.75 )-----------( 41       ( 15 Apr 2024 13:03 )             22.1       04-15    139  |  106  |  <4<L>  ----------------------------<  119<H>  4.4   |  22  |  0.36<L>    Ca    8.9      15 Apr 2024 13:03  Phos  2.7     04-15  Mg     2.3     04-15    TPro  4.7<L>  /  Alb  3.0<L>  /  TBili  10.7<H>  /  DBili  7.3<H>  /  AST  178<H>  /  ALT  31  /  AlkPhos  112  04-15      Urinalysis Basic - ( 15 Apr 2024 13:03 )    Color: x / Appearance: x / SG: x / pH: x  Gluc: 119 mg/dL / Ketone: x  / Bili: x / Urobili: x   Blood: x / Protein: x / Nitrite: x   Leuk Esterase: x / RBC: x / WBC x   Sq Epi: x / Non Sq Epi: x / Bacteria: x        MICROBIOLOGY:  v  .Blood Blood  04-13-24   No Blood Parasites observed by giemsa stain  One negative set of blood smears does not rule out  the possibility of a parasitic infection.  A minimum of 3  specimens should be collected, at least 12-24 hours apart,  over a 36 hour time period.  ************************************************************  NEGATIVE for Plasmodium antigens. Microscopy is performed for  confirmation.  The Malaria Rapid antigen test does not detect the  presence of Babesia species. If Babesiosis is suspected  please order test Babesia PCR: Babesia microti PCR Bld  ************************************************************  --  --      .Bronchial Bronchial Lavage  04-13-24   Normal Respiratory Linh present  --    Rare polymorphonuclear leukocytes seen per low power field  No Squamous epithelial cells seen per low power field  No organisms seen per oil power field      Catheterized Catheterized  04-13-24   No growth  --  --      ET Tube ET Tube  04-13-24   Normal Respiratory Linh present  --    Moderate polymorphonuclear leukocytes seen per low power field  No organisms seen      .Blood Blood-Venous  04-13-24   No growth at 48 Hours  --  --      .Blood Blood-Peripheral  04-13-24   No growth at 48 Hours  --  --          Rapid RVP Result: NotDetec (04-14 @ 00:21)  CMVPCR Log: NotDetec Qlq90KG/mL (04-13 @ 18:14)        RADIOLOGY:    <The imaging below has been reviewed and visualized by me independently. Findings as detailed in report below> Follow Up:  pneumonia    Interval History: afebrile. remains intubated. on CRRT.     REVIEW OF SYSTEMS  [ x ] ROS unobtainable because:  intubated  [  ] All other systems negative except as noted below    Constitutional:  [ ] fever [ ] chills  [ ] weight loss  [ ] weakness  Skin:  [ ] rash [ ] phlebitis	  Eyes: [ ] icterus [ ] pain  [ ] discharge	  ENMT: [ ] sore throat  [ ] thrush [ ] ulcers [ ] exudates  Respiratory: [ ] dyspnea [ ] hemoptysis [ ] cough [ ] sputum	  Cardiovascular:  [ ] chest pain [ ] palpitations [ ] edema	  Gastrointestinal:  [ ] nausea [ ] vomiting [ ] diarrhea [ ] constipation [ ] pain	  Genitourinary:  [ ] dysuria [ ] frequency [ ] hematuria [ ] discharge [ ] flank pain  [ ] incontinence  Musculoskeletal:  [ ] myalgias [ ] arthralgias [ ] arthritis  [ ] back pain  Neurological:  [ ] headache [ ] seizures  [ ] confusion/altered mental status    Allergies  No Known Allergies        ANTIMICROBIALS:  caspofungin IVPB 50 every 24 hours  caspofungin IVPB    DAPTOmycin IVPB 500 every 24 hours  doxycycline IVPB 100 every 12 hours  meropenem  IVPB 1000 every 12 hours  rifAXIMin 550 two times a day      OTHER MEDS:  MEDICATIONS  (STANDING):  dexMEDEtomidine Infusion 0.5 <Continuous>  fentaNYL    Injectable 25 every 5 minutes  fentaNYL    Injectable 50 once PRN  insulin glargine Injectable (LANTUS) 5 at bedtime  insulin lispro (ADMELOG) corrective regimen sliding scale  every 6 hours  levothyroxine 100 daily  norepinephrine Infusion 0.06 <Continuous>  pantoprazole  Injectable 40 daily  ursodiol Tablet 500 <User Schedule>      Vital Signs Last 24 Hrs  T(C): 36.9 (15 Apr 2024 12:00), Max: 37.2 (14 Apr 2024 20:00)  T(F): 98.4 (15 Apr 2024 12:00), Max: 99 (14 Apr 2024 20:00)  HR: 103 (15 Apr 2024 15:40) (71 - 110)  BP: 89/54 (15 Apr 2024 15:30) (83/52 - 154/88)  BP(mean): 69 (15 Apr 2024 15:30) (59 - 114)  RR: 22 (15 Apr 2024 15:30) (13 - 28)  SpO2: 99% (15 Apr 2024 15:40) (89% - 100%)    Parameters below as of 15 Apr 2024 08:00  Patient On (Oxygen Delivery Method): ventilator    PHYSICAL EXAMINATION:  General: Intubated and Sedated  HEENT: +ETT  Neck: Supple  Cardiac: RRR, No M/R/G  Resp: CTAB, No Wh/Rh/Ra  Abdomen: NBS, NT/ND, No HSM, No rigidity or guarding  MSK: No LE edema. No Calf tenderness  Skin: No rashes or lesions. Skin is warm and dry to the touch.   Neuro: Intubated and Sedated  Psych: Unable to assess - intubated and sedated                          7.4    13.75 )-----------( 41       ( 15 Apr 2024 13:03 )             22.1       04-15    139  |  106  |  <4<L>  ----------------------------<  119<H>  4.4   |  22  |  0.36<L>    Ca    8.9      15 Apr 2024 13:03  Phos  2.7     04-15  Mg     2.3     04-15    TPro  4.7<L>  /  Alb  3.0<L>  /  TBili  10.7<H>  /  DBili  7.3<H>  /  AST  178<H>  /  ALT  31  /  AlkPhos  112  04-15      Urinalysis Basic - ( 15 Apr 2024 13:03 )    Color: x / Appearance: x / SG: x / pH: x  Gluc: 119 mg/dL / Ketone: x  / Bili: x / Urobili: x   Blood: x / Protein: x / Nitrite: x   Leuk Esterase: x / RBC: x / WBC x   Sq Epi: x / Non Sq Epi: x / Bacteria: x        MICROBIOLOGY:  v  .Blood Blood  04-13-24   No Blood Parasites observed by giemsa stain  One negative set of blood smears does not rule out  the possibility of a parasitic infection.  A minimum of 3  specimens should be collected, at least 12-24 hours apart,  over a 36 hour time period.  ************************************************************  NEGATIVE for Plasmodium antigens. Microscopy is performed for  confirmation.  The Malaria Rapid antigen test does not detect the  presence of Babesia species. If Babesiosis is suspected  please order test Babesia PCR: Babesia microti PCR Bld  ************************************************************  --  --      .Bronchial Bronchial Lavage  04-13-24   Normal Respiratory Linh present  --    Rare polymorphonuclear leukocytes seen per low power field  No Squamous epithelial cells seen per low power field  No organisms seen per oil power field      Catheterized Catheterized  04-13-24   No growth  --  --      ET Tube ET Tube  04-13-24   Normal Respiratory Linh present  --    Moderate polymorphonuclear leukocytes seen per low power field  No organisms seen      .Blood Blood-Venous  04-13-24   No growth at 48 Hours  --  --      .Blood Blood-Peripheral  04-13-24   No growth at 48 Hours  --  --          Rapid RVP Result: NotDetec (04-14 @ 00:21)  CMVPCR Log: NotDetec Zvh35XA/mL (04-13 @ 18:14)        RADIOLOGY:    <The imaging below has been reviewed and visualized by me independently. Findings as detailed in report below>    < from: CT Head No Cont (04.15.24 @ 19:43) >  IMPRESSION: Unremarkable noncontrast CT of the brain. No change since   4/13/2024.    < end of copied text >

## 2024-04-15 NOTE — CONSULT NOTE ADULT - ASSESSMENT
Patient visited at bedside intubated and non-communicating with family members present    LOWER EXTREMITY PHYSICAL EXAM:    Vascular: DP/PT 1/4, B/L, CFT <3 seconds B/L, Temperature gradient wnl, B/L.   Neuro: Epicritic sensation unable to assess to the level of feet, B/L.  Musculoskeletal/Ortho: Lateral left ankle Deep tissue injury with no signs of infection, right heel superficial blister with no signs of infection. No signs of cellulitis bilaterally  Recommend betadine solution and gauze with ori right heel. Recommend alevyn to left ankle  Continue with z-float prevalon boots  reconsult podiatry as needed

## 2024-04-15 NOTE — DIETITIAN INITIAL EVALUATION ADULT - ADD RECOMMEND
Malnutrition alert placed in chart. As medically feasible, continue to provide multivitamin, thiamine, and folic acid; add iron, calcium, vitamin B12 as able for Hx of GI surgery; may also need Vitamin A, D, E, and K supplementation (check levels as able). Continue to trend labs, weight, skin integrity, and intake.

## 2024-04-15 NOTE — PROGRESS NOTE ADULT - ASSESSMENT
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 Hargrove), and now S/P total pancreatectomy  with islet cell autotransplant at [facility] by Dr. Gil in 04/2018).    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 Per records, a Ct later in 2018 showed still fistulization but no abscess formation     Patient presented to [facility] ED on 4/8 by her spouse for AMS. Per , patient had been having nbnb vomiting and diarrhea for the last 4 weeks, extreme fatigue . She was unable to tolerate PO. She was also sleeping 18-20 hrs per day. On 4/8, he found her on the floor "completely out of it" and took her to [facility] for further evaluation.   Initial workup in ED demonstrated a Tbili 5.4, direct bili 4.4 and ammonia 196. Patient was somnolent and had a left eyeward gaze deviation. Submentally started developing agonal snoring respirations and had to be intubated for airway protection. Course complicated with septic shock and persistent hyperammonemia despite lactulose and rifaximin, CT show and started on meropenem, linezolid and anidulafungin at [facility]--> upon transfer here, off pressors, on alvaro/linezolid/caspofungin    CT Chest (4/13) Occlusion of the distal left lower lobar bronchus with complete left lower lobe atelectasis. Right middle lobe consolidation, possibly secondary to pneumonia or additional atelectasis.     CT A/P (4/13) Segmental areas of wall thickening throughout the colon and rectum with pneumatosis, mesenteric edema, and adjacent hyperdensities in the proximal transverse colon suspicious for bowel ischemia with intraluminal hemorrhage. Additional intraluminal hyperdensities within the distal ascending colon suspicious for hemorrhage. Nonspecific focal areas of narrowing in the distal descending colon and sigmoid colon, possibly colonic masses    UCx (4/13) No Growth  BCx (4/13) NGTD  Bronchoscopy Cx (4/13) No Growth    Serum Cryptococcal Antigen (4/14) Negative  Babesia PCR (4/13) Negative    unclear precipitating factor of symptoms leading to bowel ? ischemia which in turn could have resulted in sepsis.   Extensively discussed with hepatology and MICU team entertaining atypical infections, severe SIBO leading to ischemia-. sepsis, hyperammonemia and elevated lactic acid---> secondary aspiration pneumonia  Note she has received no recent immunosuppression except short course of steroids for Gout  Considering atypical infections that could lead to bowel ischemia +/- masses  including fungal and parasitic infections (anisakis, ascaris, strongy ecc), SIBo as above vs non infectious etiologies     1- S/P total pancreatectomy  with islet cell autotransplant at [facility] by Dr. Gil in 04/2018  not on immunosuppressants  2. Encephalopathy  3. hyperammonemia not responding to lactulose/rifaximin  hyperammonemia syndrome  in c/o infection considered   4. hyperbilirrubinemia in c/o sepsis ? vs biliary obstruction vs atypical infection  5. RMl consolidation - aspiration pna vs CAP  in addition L bronchial obstruction  s/p Bbronchoscopy 4/13- VbAl bacterial, fungal afb cx Pending  6. thrombocytopenia, anemia  sepsis vs atypical infection   7. pneumatosis with c/f bowel ischemia and ? masses and hemorrhage  8. s/p splenectomy  9. s/p cholecystectomy    Recommendations  unclear precipitating factor of symptoms leading to bowel ? ischemia which in turn could have resulted in sepsis.   Extensively discussed with hepatology and MICU team entertaining atypical infections, severe SIBO leading to ischemia-. sepsis, hyperammonemia and elevated lactic acid---> secondary aspiration pneumonia  Note she has received no recent immunosuppression except short course of steroids for Gout  Considering atypical infections that could lead to bowel ischemia +/- masses  including fungal and parasitic infections (anisakis, ascaris, strongy ecc), SIBo as above vs non infectious etiologies     --If cultures remain unrevealing by tomorrow would stop Caspofungin and Daptomycin (Was started on Micafungin and Linezolid on 4/10 at OSH)  --Continue Meropenem (4/13 -->). Would complete 7 day total course.   --Continue Doxycycline (4/13 -->) (Pending Tick Studies, Leptospira, Bartonella)  --GI PCR indeterminate for norovirus; recommend repeat GI PCR  --Follow up on Strongyloides IgG  --Follow up on Stool O&P  --Follow up on Mycoplasma genitalum TMA  --Follow up on Tick-Borne PCR Panel  --Follow up on Ehrlichia Ab  --Follow up on Bartonella PCR Blood  --Follow up on Leptospirosis Ab  --Follow up on Serum CMV PCR  --Follow up on Serum Parvovirus PCR  --Follow up on Serum Fungitell  --Follow up on Serum Aspergillus Galactomannan Antigen  --Follow up on Urine Histoplasma Ag  --Follow up on Serum Histoplasma Ag  --Follow up on Bronch Fungitell  --Follow up on Bronch Aspergillus Galactomannan Antigen  --Follow up on Bronch Legionella PCR  --Follow up on Bronch Mycoplasma PCR  --Once feasible recommend  colonoscopy with biopsies for histopath with special stains- including fungal, Bill, Viral     I will continue to follow. Please feel free to contact me with any further questions.    Bhavesh Pérez M.D.  [facility] Division of Infectious Disease  8AM-5PM Monday - Friday: Available on [employer] Teams  After Hours and Holidays (or if no response on [employer] Teams): Please contact the Infectious Diseases Office at [phone]    The above assessment and plan were discussed with MICU Attending

## 2024-04-15 NOTE — DIETITIAN INITIAL EVALUATION ADULT - ENTERAL
When/If able, Vital AF at 10 mL/hr increasing only as tolerated and electrolytes WNL to goal rate 60 mL/hr x18 hours with ProSource TF Free x1 daily to provide total volume 1080 mL, 1386 kcals, 96 gm protein, and 876 mL free water. Meets 30 kcals/kG and ~2.1 gm protein/kG Based on HIE wt 45.8 kg (3/12). Trend for refeeding: K, phos, and Mg

## 2024-04-15 NOTE — ADVANCED PRACTICE NURSE CONSULT - RECOMMEDATIONS
Impression  incontinence associated dermatitis   bilateral sacrum/coccyx deep tissue injury with evolution, present on admission    right heel deep tissue injury with evolution, present on admission   left heel deep tissue injury, present on admission   lateral malleolus deep tissue injury evolution present on admission   right elbow deep tissue injury, present on admission   right thigh roofed blister.        Recommendations   1. Bilateral sacrum/coccyx   deep tissue injury with evolution     prakash rectal incontinence dermatitis     Topical therapy- sacral/bilateral buttocks- cleanse w/incontinent cleanser, pat dry & apply TRAID   twice daily & prn soiling . Monitor  for changes .  2. Right heel deep tissue injury with evolution     left heel deep tissue injury    lateral malleolus deep tissue injury evolution   ( A).  cleans  with normal saline pat dry and apply no sting barrier film ( Cavilon ) daily and monitor for changes .  (B)Elevate heels; apply Complete Cair air fluidized boots; ensure that the soles of the feet are not resting on the foot board of the bed.  3  Incontinent management - incontinent cleanser, pads,  prakash care  BID  4. Maintain on an alternating air with low air loss surface   5. Turn & reposition every 2 hr; Use positioning pillow to turn and reposition, soft pillow between bony prominences; continue measures to decrease friction/shear/pressure.  6. Nutrition optimization.  7.  Place waffle cushion when out of bed to chair .   8. right elbow deep tissue injury - cleanse skin and pat dry then apply cavilon to skin and cover with allevyn foam dressing daily and monitor for changes.   9. right thigh blister cleanse skin and pat dry then apply cavilon to skin keep open to air and monitor for changes   plan of care reviewed with covering staff Dariel Solano ([facility]) Impression  incontinence associated dermatitis   bilateral sacrum/coccyx deep tissue injury with evolution, present on admission    right heel deep tissue injury with evolution, present on admission   left heel deep tissue injury, present on admission   lateral malleolus deep tissue injury evolution present on admission   right elbow deep tissue injury, present on admission   right thigh roofed blister.        Recommendations   1. Bilateral sacrum/coccyx   deep tissue injury with evolution     prakash rectal incontinence dermatitis     Topical therapy- sacral/bilateral buttocks- cleanse w/incontinent cleanser, pat dry & apply TRAID   twice daily & prn soiling . Monitor  for changes .  2. Right heel deep tissue injury with evolution     left heel deep tissue injury    lateral malleolus deep tissue injury evolution   ( A).  cleans  with normal saline pat dry and apply no sting barrier film ( Cavilon ) daily and monitor for changes .  (B)Elevate heels; apply Complete Cair air fluidized boots; ensure that the soles of the feet are not resting on the foot board of the bed.  (c) consider podiatry  to consult once consulted follow there management   3  Incontinent management - incontinent cleanser, pads,  prakash care  BID  4. Maintain on an alternating air with low air loss surface   5. Turn & reposition every 2 hr; Use positioning pillow to turn and reposition, soft pillow between bony prominences; continue measures to decrease friction/shear/pressure.  6. Nutrition optimization.  7.  Place waffle cushion when out of bed to chair .   8. right elbow deep tissue injury - cleanse skin and pat dry then apply cavilon to skin and cover with allevyn foam dressing daily and monitor for changes.   9. right thigh blister cleanse skin and pat dry then apply cavilon to skin keep open to air and monitor for changes   plan of care reviewed with covering staff Dariel Solano (RN)

## 2024-04-15 NOTE — DIETITIAN INITIAL EVALUATION ADULT - NSFNSGIIOFT_GEN_A_CORE
04-14-24 @ 07:01  -  04-15-24 @ 07:00  --------------------------------------------------------  OUT:    Rectal Tube (mL): 400 mL  Total OUT: 400 mL    Total NET: -400 mL

## 2024-04-15 NOTE — PROGRESS NOTE ADULT - ATTENDING COMMENTS
ATTENDING ATTESTATION  I have seen and examined this patient on rounds thismorning with the surgery team. I have reviewed all new labs, imaging and reports. I have participated in formulating the plan for the day, and have read and agree with the history, ROS, exam, assessment and plan as stated above.     Unclear source of increasing white count.   Exam remains a poor component of evaluating for colitis; mental status is poor.   Abdomen is softly distended.   In the setting of lactate that has not worsened (elevated throughout her admission 2-3 likely related to ALI) and pressors that have been weaned and been off for much of the last 24 hours, I do not suspect that her colitis is worsening.   Recommend gastrograffin UGIS today for further evaluation of colon.   Keep NPO for now.   MRCP completed, does not show any intra-hepatic ductal dilatation or evidence of obstruction as a cause for sepsis.     I have discussed this case with Dr. Gil (hepatobiliary surgeon) who does not think that her surgical anatomy is responsible for her ALI/elevated tbili. Recommends trial of ursodial for possible biliary stasis.     I have discussed this plan with the MICU rounding team.     Total time spent in the care of this patient today (excluding critical care, teaching & procedures): 35 min                 Over 50% of the total time was spent in discussion and coordination of care with consulting services, dietary and rehab services.     Janis Solo M.D., M.S.  Division of Acute Care Surgery

## 2024-04-15 NOTE — PROGRESS NOTE ADULT - PROBLEM SELECTOR PLAN 1
Pt. with hyperammonemia in the setting of acute liver failure. Serum ammonia remains elevated at 152 today. Unable to administer Lactulose due to ischemic colitis. Pt. tolerating CRRT well, vitals stable, and CRRT catheter functioning well. Plan to continue with CRRT today. Monitor UOP and serum ammonia level.

## 2024-04-15 NOTE — CONSULT NOTE ADULT - ASSESSMENT
CARA ZALDIVAR is a 48y Female who presents with a chief complaint of AMS    Anemia  Thrombocytopenia  ·	Patient follows with [facility].  ·	Baseline hemoglobin 10-12, platelets normal.  ·	Acute decrease of cell lines likely secondary to ongoing infection, inflammation, acute illness. Values were at her baseline in February 2024 when she was last in clinic.   ·	No evidence of nutritional deficits. LDH is normal. I suspect low haptoglobin is likely due to ongoing liver disease. Will review smear tomorrow though noted CBC had previously only showed mild schistocytes.   ·	Occult blood positive; follow-up GI/hepatology recommendations.   ·	Monitor and maintain HGB > 7, PLT > 10  ·	Continue treatment of underlying disorders    Patient has heterozygous factor V Leiden mutation. Anticoagulation on hold with thrombocytopenia.    Will continue to follow.    Murray Soriano MD  Hematology/Oncology  O: [phone]

## 2024-04-15 NOTE — CONSULT NOTE ADULT - SUBJECTIVE AND OBJECTIVE BOX
CHIEF COMPLAINT  AMS    HISTORY OF PRESENT ILLNESS  CARA ZALDIVAR is a 48y Female who presents with a chief complaint of AMS    Patient was admitted on April 13th after being transferred from [facility]. She had presented on April 8th with nausea, vomiting, diarrhea, and worsening mental status. In the ER, notable findings included hyperbilirubinemia and elevated ammonia level. She required intubation due to failure to protect her airway.     PAST MEDICAL AND SURGICAL HISTORY  Diabetes  Hypertension  Factor V Leiden    FAMILY HISTORY  Non-contributory    SOCIAL HISTORY  Denies tobacco use    REVIEW OF SYSTEMS  A complete review of systems was performed; negative except per HPI    PHYSICAL EXAM  T(C): 37.5 (04-15-24 @ 20:00), Max: 37.5 (04-15-24 @ 20:00)  HR: 108 (04-15-24 @ 21:15) (71 - 126)  BP: 101/55 (04-15-24 @ 21:15) (83/52 - 154/88)  RR: 19 (04-15-24 @ 21:15) (13 - 29)  SpO2: 97% (04-15-24 @ 21:15) (92% - 100%)  Constitutional: intubated, in no acute distress  HEENT: normocephalic, atraumatic  Neck: supple, non-tender  Cardiovascular: normal perfusion, tachycardic  Respiratory: normal respiratory efforts; no increased use of accessory muscles  Gastrointestinal: soft, non-tender  Skin: warm, dry    LABORATORY DATA                        7.1    16.53 )-----------( 43       ( 15 Apr 2024 18:06 )             21.2     04-15    139  |  106  |  <4<L>  ----------------------------<  168<H>  4.0   |  22  |  0.38<L>    Ca    8.4      15 Apr 2024 18:06  Phos  2.8     04-15  Mg     2.1     04-15    TPro  4.5<L>  /  Alb  3.2<L>  /  TBili  10.3<H>  /  DBili  7.5<H>  /  AST  175<H>  /  ALT  33  /  AlkPhos  115  04-15    RADIOLOGY REVIEW  IMPRESSION:  *  Enlarged, fatty liver.  *  No biliary ductal dilatation. Hepaticojejunostomy anastomosis is   obscured and poorly evaluated.  *  Colitis.

## 2024-04-16 ENCOUNTER — RESULT REVIEW (OUTPATIENT)
Age: 49
End: 2024-04-16

## 2024-04-16 NOTE — PROGRESS NOTE ADULT - SUBJECTIVE AND OBJECTIVE BOX
[facility] Division of Kidney Diseases & Hypertension  FOLLOW UP NOTE  [phone]--------------------------------------------------------------------------------    Chief Complaint: Hyperammonemia, requiring CRRT    24 hour events/subjective: Pt. was seen and evaluated in the MICU this morning. Receiving CRRT, tolerating well. Unable to obtain ROS as pt. is intubated.    PAST HISTORY  --------------------------------------------------------------------------------  No significant changes to PMH, PSH, FHx, SHx, unless otherwise noted    ALLERGIES & MEDICATIONS  --------------------------------------------------------------------------------  Allergies    No Known Allergies    Intolerances      Standing Inpatient Medications  chlorhexidine 0.12% Liquid 15 milliLiter(s) Oral Mucosa every 12 hours  chlorhexidine 4% Liquid 1 Application(s) Topical <User Schedule>  chlorhexidine 4% Liquid 1 Application(s) Topical <User Schedule>  CRRT Treatment    <Continuous>  dextrose 10%. 1000 milliLiter(s) IV Continuous <Continuous>  doxycycline IVPB 100 milliGRAM(s) IV Intermittent every 12 hours  folic acid 1 milliGRAM(s) Oral daily  insulin glargine Injectable (LANTUS) 3 Unit(s) SubCutaneous at bedtime  levOCARNitine 330 milliGRAM(s) Oral every 8 hours  levothyroxine Injectable 50 MICROGram(s) IV Push at bedtime  meropenem  IVPB 1000 milliGRAM(s) IV Intermittent every 12 hours  multivitamin/minerals/iron Oral Solution (CENTRUM) 15 milliLiter(s) Oral daily  pantoprazole  Injectable 40 milliGRAM(s) IV Push daily  Phoxillum Filtration BK 4 / 2.5 5000 milliLiter(s) CRRT <Continuous>  PrismaSOL Filtration BGK 4 / 2.5 5000 milliLiter(s) CRRT <Continuous>  PrismaSOL Filtration BGK 4 / 2.5 5000 milliLiter(s) CRRT <Continuous>  rifAXIMin 550 milliGRAM(s) Oral two times a day  thiamine 100 milliGRAM(s) Oral daily  ursodiol Tablet 500 milliGRAM(s) Oral <User Schedule>    PRN Inpatient Medications  fentaNYL    Injectable 25 MICROGram(s) IV Push every 4 hours PRN  sodium chloride 0.9% lock flush 10 milliLiter(s) IV Push every 1 hour PRN  sodium chloride 0.9% lock flush 10 milliLiter(s) IV Push every 1 hour PRN      REVIEW OF SYSTEMS  --------------------------------------------------------------------------------  Unable to obtain ROS, see HPI    VITALS/PHYSICAL EXAM  --------------------------------------------------------------------------------  T(C): 37.2 (04-16-24 @ 08:00), Max: 37.5 (04-15-24 @ 20:00)  HR: 95 (04-16-24 @ 11:00) (83 - 126)  BP: 121/56 (04-16-24 @ 11:00) (84/45 - 154/73)  RR: 22 (04-16-24 @ 11:00) (17 - 31)  SpO2: 100% (04-16-24 @ 11:00) (92% - 100%)  Wt(kg): --    04-15-24 @ 07:01  -  04-16-24 @ 07:00  --------------------------------------------------------  IN: 1947.9 mL / OUT: 4279 mL / NET: -2331.1 mL    04-16-24 @ 07:01  -  04-16-24 @ 11:58  --------------------------------------------------------  IN: 230 mL / OUT: 563 mL / NET: -333 mL    Physical Exam:  Gen: ill-appearing  HEENT: +ET tube, OG tube, +Scleral icterus  Pulm: Mechanical breath sounds BL  CV: S1S2  Abd: Soft, +BS,  Ext: +BL LE edema  Neuro: Sedated  Skin: Warm and dry, jaundice  Vascular access: LIJ non-tunneled HD catheter connected to CRRT    LABS/STUDIES  --------------------------------------------------------------------------------              6.6    16.56 >-----------<  52       [04-16-24 @ 05:48]              20.1     139  |  107  |  <4  ----------------------------<  139      [04-16-24 @ 05:48]  3.8   |  23  |  0.31        Ca     8.6     [04-16-24 @ 05:48]      Mg     2.1     [04-16-24 @ 05:48]      Phos  2.3     [04-16-24 @ 05:48]    TPro  4.8  /  Alb  3.1  /  TBili  9.4  /  DBili  x   /  AST  127  /  ALT  30  /  AlkPhos  120  [04-16-24 @ 05:48]    PT/INR: PT 14.2 , INR 1.30       [04-16-24 @ 00:24]  PTT: 37.2       [04-16-24 @ 00:24]          [04-14-24 @ 13:06]    Creatinine Trend:  SCr 0.31 [04-16 @ 05:48]  SCr 0.39 [04-16 @ 00:24]  SCr 0.38 [04-15 @ 18:06]  SCr 0.36 [04-15 @ 13:03]  SCr 0.36 [04-15 @ 06:31]    Urine Creatinine 95      [04-13-24 @ 11:35]  Urine Protein 30      [04-13-24 @ 11:35]  Urine Sodium 8      [04-13-24 @ 11:35]  Urine Potassium 12      [04-13-24 @ 11:35]  Urine Osmolality 386      [04-13-24 @ 11:35]    Iron 43, TIBC Unable to calculate, %sat Unable to calculate      [04-13-24 @ 09:41]  Ferritin 653      [04-13-24 @ 09:41]  Vitamin D (25OH) 17.7      [04-14-24 @ 13:06]  TSH 3.67      [04-15-24 @ 13:04]  Lipid: chol 82, TG 82, HDL 8, LDL --      [04-13-24 @ 03:56]    HBsAb Nonreact      [04-13-24 @ 09:41]  HBsAg Nonreact      [04-13-24 @ 03:56]  HBcAb Nonreact      [04-13-24 @ 09:41]  HCV 0.08, Nonreact      [04-13-24 @ 03:56]  HIV Nonreact      [04-13-24 @ 03:56]

## 2024-04-16 NOTE — PROGRESS NOTE ADULT - PROBLEM SELECTOR PLAN 1
Pt. with hyperammonemia in the setting of acute liver failure. Serum ammonia remains elevated but decreased to 103 today. Unable to administer Lactulose due to ischemic colitis. Pt. tolerating CRRT well, vitals stable, and CRRT catheter functioning well. Plan to continue with CRRT today. Monitor UOP and serum ammonia level.

## 2024-04-16 NOTE — PROGRESS NOTE ADULT - ASSESSMENT
41yo pmh of HTN, DM, Factor V Leiden deficiency, gout, congenital pancreas divisum, recurrent pancreatitis s/p distal pancreatectomy, cholecystectomy, Cholecystectomy, Splectomy, Kevin-En-Y Pancreaticojejunostomy 2/2014 [facility], Total Pancreatotomy with Islet Cell Autotransplant at [facility] 4/82018, Post Op Abdominal/Peritoneal Multiloculated Abscesses, Enterocutaneous Fistula, Multiple Abdominal Surgeries > 20s, Infected Abdominal Wound Mesh on Antibacterial and Fungal ABx admitted to [facility] 4/8 NBNB Vomiting and Watery Diarrhea x 4 days, PPOI, Somnolence (Sleep 16-20Hr/Day) found elevated Ammonia >200, Elevated LFTs, worsening Metabolic vs. Hepatic Encephalopathy, intubated for airway protection. She was transferred to [facility] for Liver Transplant Evaluation.       =====Neuro=====  #Altered mental status  >Baseline AOx3. Currently intubated and AAOx0 off sedation  >CTH (4/13): No acute findings  >CTH (4/15): No acute findings   >s/p Precedex, off since 4/15  - iso hyperammonemia vs. infection   - treatment for hyperammonemia as below     #Downward nystagmus  >Noted on exam 4/14-4/15 overnight  >CTH (4/15): No acute findings   - c/f seizure iso hepatic encephalopathy  - EEG prelim no seizure activity recorded    #Chronic pain  #anxiety/depression   >Was on alprazolam 0.75mg QD, Dilaudid and methadone 5mg QD   - Hold home meds for now    =====Cardio=====  #History of RA thrombus  >TTE (4/13): No thrombus commented  - Previously on Lovenox. Was on Eliquis but was stopped as per heme/onc note at OSH. Likely due to Factor 5 Leiden hx.   - Hold AC iso thrombocytopenia     =====Pulmonary=====  #mechanical ventilation   - Tolerating CPAP well    #PNA  >CTC (4/13): RML consolidation noted, possible PNA  - remains afebrile, although persistently leukocytosis  - abx as below    =====GI=====  #Elevated bilirubin  >Elevated bilirubin/coag, c/b hepatic encephalopathy  >s/p NAC on admission to outside hospital  >POCUS with mild ascites  >CTAP (4/13): Hepatomegaly and hepatic steatosis  >US abd (4/13): Hepatic steatosis, no hepatic vein thrombosis  >Acute hepatitis panel (4/13): Negative  >Autoimmune hepatitis panel (4/13): Pending  >MRCP (4/15): Enlarged, fatty liver. No biliary duct dilation, unlikely obstruction  >Gastrograffin study to r/o SBO (4/16): Pending  - Etiology not entirely clear, hepatology following  - Trend MELD daily  - c/w thiamine, folic acid, MV  - c/w ursodiol 500mg    #Hyperammonemia   >Baseline mentation appears to be AOx3 as per outpatient records.   - Etiology of hyperammonemia unclear  - Started Levocarnitine given history of extensive bowel surgery - short gut syndrome may cause hyperammonemia?   - c/w Rifaximin   - c/w CRRT, unable to tolerate lactulose given ischemic colitis    #Ischemic bowel   >CTA/P (4/13): segmental areas of wall thickening throughout the colon and rectum with pneumatosis, mesenteric edema, and adjacent hyperdensities in the proximal transverse colon suspicious for bowel ischemia with intraluminal hemorrhage. Additional intraluminal hyperdensities within the distal ascending colon suspicious for hemorrhage.   >TTE (4/13): No thrombus commented  - Surgery, GI following, no plan for surgery/endoscopic eval for now  - NPO for now    #abdominal mesh  #congenital abnormality of the pancreas   >Multiple surgical hx from recurrent pancreatitis as in HPI. S/p total pancreatectomy with islet cell autotransplant followed by multiple abscess leading to several abdominal surgeries.   - Holding Creon and Linzess while NPO    #Colonic mass  >CTA/P (4/13): Nonspecific focal areas of narrowing in the distal descending colon and sigmoid colon, possibly colonic masses  - ctm, colonoscopy eventually    #Diet and ppx  - NPO, hold tube feeds   - rectal tube in place   - c/w omeprazole 40mg - Per , everything started after omeprazole, will stop    =====Renal=====  #CRRT  - Started on CRRT for hepatic encephalopathy   - Strict I/O  - James in place  - Goal -500 to -1L given anasarca on exam    =====Heme=====   #Factor 5 Leiden  >Not on home anticoagulation. Patient follows with [place] [facility]  - Hold AC iso thrombocytopenia     # Macrocytic anemia  >s/p 1u PRBC (4/13)  >Multifactorial in nature. No hx of bleeding but with coagulopathy as below.   >B12 (4/13): >2000, Folate: 8.6  >Iron studies (4/13): Ferritin 653, Iron 43, Unmeasurable TIBC/Iron%   >Hemolysis lab (4/14): Haptoglobin < 20;   - Possibly iso liver disease vs. acute infection/inflammation vs. ?hemolysis  - Trend CBC, coag, transfuse goal > 7    #thrombocytopenia  >s/p 1u Plt (4/13)   - Etiology not entirely clear, s/p splenectomy and hepatic steatosis w/o cirrhosis on imaging  - f/u infectious workup as below  - Heme consult  - f/u factor viii assay    =====Endo=====  #DM1  >Per HIE record, patient prone to hypoglycemia  >History of DM1, but s/p total pancreatectomy and islet cell autotransplant in 2018 - currently DM3  - c/w D10 gtt, wean as able  - c/w Lantus 3u QHS  - Endocrine on board    =====ID=====  Infectious work up  >UA (4/13): 11WBC, small LE, negative Nitrite or bacteria  >Babesia (4/13): neg; HIV (4/13): neg; RVP, COVID (4/14): neg; MRSA (4/13): Negative  >CXR (4/13): RML consolidation  >CTC/A/P (4/13): RML consolidation, LLL atelectasis 2/2 likely ?mucus plug, ischemic colitis  >Bcx (4/13): NGTD  >ET tube cx (4/13): Normal resp luisa  >BAL (4/13): Pending  >GI PCR (4/13): Indeterminate norovirus  - Rest of infectious w/u per ID    #Septic shock   #hx of recurrent infections   >H/o of recurrent infxn from multiple abdominal wounds and surgeries. Hx of VRE and candida.   >Previously on levo at OSH. Off pressor on transfer; Restarted with initiation of CRRT  - Infectious w/u per ID  - c/w Caspofungin 50mg Q24  - c/w Daptomycin 500mg Q24  - c/w Doxycycline 100mg Q12  - c/w Meropenem 1g q12     41yo pmh of HTN, DM, Factor V Leiden c/b L peroneal DVT (2022) and ?RA thrombus currently not on AC, gout, congenital pancreas divisum, recurrent pancreatitis s/p cholecystectomy, Splenectomy, Kevin-En-Y Pancreaticojejunostomy, total pancreatectomy s/p Islet Cell Autotransplant at [facility] 2018, complicated by multiple intraabdominal infection (VRE, fungal? per [facility]), fistula, abscesses. Presented to [facility] 4/8 for several weeks of decreased PO intake, NBNB emesis, watery diarrhea, Somnolence, requiring intubation for airway protection. Lab revealed ammonia > 200, elevated bilirubin and INR initially c/f liver failure, c/c/b colitis possibly ischemic etiology. Patient transferred to [facility] 4/13 for CRRT initiation for ammonia clearance.    =====Neuro=====  #Altered mental status  >Baseline AOx3. Currently intubated and AAOx0 off sedation  >CTH (4/13): No acute findings  >CTH (4/15): No acute findings   >s/p Precedex, off since 4/15  - Likely iso hyperammonemia vs. infection   - treatment for hyperammonemia as below     #Downward nystagmus  >Noted on exam 4/14-4/15 overnight  >CTH (4/15): No acute findings   - c/f seizure iso hepatic encephalopathy  - c/w EEG for now, first 24 hours without seizure activity    #Chronic pain  #anxiety/depression   >Was on alprazolam 0.75mg QD, Dilaudid and methadone 5mg QD   - Hold home meds for now    =====Cardio=====  #History of L peroneal DVT in 2022  #History of ?RA thrombus  >TTE (4/13): No thrombus commented  - Eliquis stopped 5/2023 by outpatient hematology given consistently negative DVT studies  - Hold AC iso thrombocytopenia   - f/u Heme recs    #RV dysfunction  >Enlarged RV and pulmonary hypertension noted on POCUS (4/16) that is not on TTE 4/13  - f/u CTA to r/o PE given history of factor 5 Leiden  - f/u repeat TTE    =====Pulmonary=====  #mechanical ventilation   - Tolerating CPAP well    #PNA vs. atelectaiss  >CTC (4/13): RML consolidation noted, possible PNA vs. atelectasis  - remains afebrile, although persistently leukocytosis  - abx as below    =====GI=====  Patient presented to [facility] for 4 weeks of decreased PO, NBNB emesis, diarrhea and somnolence, intubated for airway protection. Labs notable for elevated bilirubin, ammonia > 200, INR > 4, initially concern for liver failure and transferred to [facility] for liver transplant eval + CRRT for ammonia clearance. However, does not appear to be in florid liver failure per hepatology, and MRCP does not appear to have obstructive pathology.      #Elevated bilirubin  >Elevated bilirubin/coag, c/b hepatic encephalopathy  >s/p NAC on admission to outside hospital  >POCUS with mild ascites  >CTAP (4/13): Hepatomegaly and hepatic steatosis  >US abd (4/13): Hepatic steatosis, no hepatic vein thrombosis  >Acute hepatitis panel (4/13): Negative  >Autoimmune hepatitis panel (4/13): Pending  >MRCP (4/15): Enlarged, fatty liver. No biliary duct dilation, unlikely obstruction  >Gastrograffin study to r/o SBO (4/16): Pending  - Etiology not entirely clear  - Trend MELD daily  - c/w thiamine, folic acid, MV  - c/w ursodiol 500mg    #Hyperammonemia   >Baseline mentation AAO3  - Etiology of hyperammonemia unclear, potentially ?short bowel syndrome  - Started Levocarnitine for possible short bowel syndrome  - c/w Rifaximin   - c/w CRRT for ammonia clearance while NPO/bowel rest  - f/u metabolic genetic consult to r/o acquired urea cycle disorder    #Ischemic bowel   >CTA/P (4/13): segmental areas of wall thickening throughout the colon and rectum with pneumatosis, mesenteric edema, and adjacent hyperdensities in the proximal transverse colon suspicious for bowel ischemia with intraluminal hemorrhage. Additional intraluminal hyperdensities within the distal ascending colon suspicious for hemorrhage.   >TTE (4/13): No thrombus commented  - Surgery, GI following, no plan for surgery/endoscopic eval for now  - NPO for now    #abdominal mesh  #congenital abnormality of the pancreas   >Multiple surgical hx from recurrent pancreatitis as in HPI. S/p total pancreatectomy with islet cell autotransplant followed by multiple abscess leading to several abdominal surgeries.   - Holding Creon and Linzess while NPO    #Colonic mass  >CTA/P (4/13): Nonspecific focal areas of narrowing in the distal descending colon and sigmoid colon, possibly colonic masses  - ctm, colonoscopy eventually    #Diet and ppx  - NPO, hold tube feeds   - rectal tube in place   - c/w PPI 40mg for GI ppx    =====Renal=====  #CRRT  - Started on CRRT for hepatic encephalopathy   - Strict I/O  - James in place  - Goal -500 to -1L given anasarca on exam    =====Heme=====   #Factor 5 Leiden  #History of L peroneal DVT in 2022  #History of ?RA thrombus  >TTE (4/13): No thrombus commented  - Eliquis stopped 5/2023 by outpatient hematology given consistently negative DVT studies  - Hold AC iso thrombocytopenia   - f/u Heme recs    # Macrocytic anemia  >s/p 2u PRBC (4/13, 4/16)  >Multifactorial in nature. No hx of bleeding but with coagulopathy as below.   >B12 (4/13): >2000, Folate: 8.6  >Iron studies (4/13): Ferritin 653, Iron 43, Unmeasurable TIBC/Iron%   >Hemolysis lab (4/14): Haptoglobin < 20;   - Possibly iso liver disease vs. acute infection/inflammation vs. ?hemolysis  - Trend CBC, coag, transfuse goal > 7    #thrombocytopenia  >s/p 1u Plt (4/13)   - Etiology not entirely clear, s/p splenectomy and hepatic steatosis w/o cirrhosis on imaging  - f/u infectious workup as below  - Heme consult  - f/u factor viii assay    =====Endo=====  #DM1 vs. DM3  >Per HIE record, patient prone to hypoglycemia  >History of DM1, but s/p total pancreatectomy and islet cell autotransplant in 2018 - currently DM3  - c/w D10 gtt, wean as able  - c/w Lantus 3u QHS  - Endocrine on board    =====ID=====  Infectious work up  >UA (4/13): 11WBC, small LE, negative Nitrite or bacteria  >Babesia (4/13): neg; HIV (4/13): neg; RVP, COVID (4/14): neg; MRSA (4/13): Negative  >CXR (4/13): RML consolidation  >CTC/A/P (4/13): RML consolidation, LLL atelectasis 2/2 likely ?mucus plug, ischemic colitis  >Bcx (4/13): NGTD  >ET tube cx (4/13): Normal resp luisa  >BAL (4/13): Pending  >GI PCR (4/15): Negative  - Rest of infectious w/u per ID    #Septic shock   #hx of recurrent infections   >H/o of recurrent infxn from multiple abdominal wounds and surgeries. Hx of VRE and candida.   >Previously on levo at OSH. Off pressor on transfer; Restarted with initiation of CRRT  >s/p Caspofungin 50mg Q24 (stopped 4/16)  >s/p Daptomycin 500mg Q24 (stopped 4/16)  - Infectious w/u per ID  - c/w Doxycycline 100mg Q12  - c/w Meropenem 1g q12     41yo pmh of HTN, DM, Factor V Leiden c/b L peroneal DVT (2022) and RA thrombus (2018) currently not on AC, gout, congenital pancreas divisum, recurrent pancreatitis s/p cholecystectomy, Splenectomy, Kevin-En-Y Pancreaticojejunostomy, total pancreatectomy s/p Is[facility] Cell Autotransplant at [facility] 2018, complicated by multiple intraabdominal infection (VRE, fungal? per [facility]), fistula, abscesses. Presented to [facility] 4/8 for several weeks of decreased PO intake, NBNB emesis, watery diarrhea, Somnolence, requiring intubation for airway protection. Lab revealed ammonia > 200, elevated bilirubin and INR initially c/f liver failure, c/c/b colitis possibly ischemic etiology. Patient transferred to [facility] 4/13 for CRRT initiation for ammonia clearance.    =====Neuro=====  #Altered mental status  >Baseline AOx3. Currently intubated and AAOx0 off sedation  >CTH (4/13): No acute findings  >CTH (4/15): No acute findings   >s/p Precedex, off since 4/15  - Likely iso hyperammonemia vs. infection   - treatment for hyperammonemia as below     #Downward nystagmus  >Noted on exam 4/14-4/15 overnight  >CTH (4/15): No acute findings   - c/f seizure iso hepatic encephalopathy  - c/w EEG for now, first 24 hours without seizure activity    #Chronic pain  #anxiety/depression   >Was on alprazolam 0.75mg QD, Dilaudid and methadone 5mg QD   - Hold home meds for now    =====Cardio=====  #History of L peroneal DVT in 2022  #History of RA thrombus in 2018  >TTE (4/13): No thrombus commented  - Eliquis stopped 5/2023 by outpatient hematology given consistently negative DVT studies  - Hold AC iso thrombocytopenia   - f/u Heme recs    #RV dysfunction?  >Enlarged RV and pulmonary hypertension noted on POCUS (4/16) that is not on TTE 4/13  - f/u CTA to r/o PE given history of factor 5 Leiden  - f/u repeat TTE    =====Pulmonary=====  #mechanical ventilation   - Tolerating CPAP well    #PNA vs. atelectaiss  >CTC (4/13): RML consolidation noted, possible PNA vs. atelectasis  - remains afebrile, although persistently leukocytosis  - abx as below    =====GI=====  Patient presented to [facility] for 4 weeks of decreased PO, NBNB emesis, diarrhea and somnolence, intubated for airway protection. Labs notable for elevated bilirubin, ammonia > 200, INR > 4, initially concern for liver failure and transferred to [facility] for liver transplant eval + CRRT for ammonia clearance. However, does not appear to be in florid liver failure per hepatology, and MRCP does not appear to have obstructive pathology.      #Elevated bilirubin  >Elevated bilirubin/coag, c/b hepatic encephalopathy  >s/p NAC on admission to outside hospital  >POCUS with mild ascites  >CTAP (4/13): Hepatomegaly and hepatic steatosis  >US abd (4/13): Hepatic steatosis, no hepatic vein thrombosis  >Acute hepatitis panel (4/13): Negative  >Autoimmune hepatitis panel (4/13): Pending  >MRCP (4/15): Enlarged, fatty liver. No biliary duct dilation, unlikely obstruction  >Gastrograffin study to r/o SBO (4/16): Pending  - Etiology not entirely clear  - Trend MELD daily  - c/w thiamine, folic acid, MV  - c/w ursodiol 500mg    #Hyperammonemia   >Baseline mentation AAO3  - Etiology of hyperammonemia unclear, potentially ?short bowel syndrome  - Started Levocarnitine for possible short bowel syndrome  - c/w Rifaximin   - c/w CRRT for ammonia clearance while NPO/bowel rest  - f/u metabolic genetic consult to r/o acquired urea cycle disorder    #Ischemic bowel   >CTA/P (4/13): segmental areas of wall thickening throughout the colon and rectum with pneumatosis, mesenteric edema, and adjacent hyperdensities in the proximal transverse colon suspicious for bowel ischemia with intraluminal hemorrhage. Additional intraluminal hyperdensities within the distal ascending colon suspicious for hemorrhage.   >TTE (4/13): No thrombus commented  - Surgery, GI following, no plan for surgery/endoscopic eval for now  - NPO for now    #abdominal mesh  #congenital abnormality of the pancreas   >Multiple surgical hx from recurrent pancreatitis as in HPI. S/p total pancreatectomy with islet cell autotransplant followed by multiple abscess leading to several abdominal surgeries.   - Holding Creon and Linzess while NPO    #Colonic mass  >CTA/P (4/13): Nonspecific focal areas of narrowing in the distal descending colon and sigmoid colon, possibly colonic masses  - ctm, colonoscopy eventually    #Diet and ppx  - NPO, hold tube feeds   - rectal tube in place   - c/w PPI 40mg for GI ppx    =====Renal=====  #CRRT  - Started on CRRT for hepatic encephalopathy   - Strict I/O  - James in place  - Goal -500 to -1L given anasarca on exam    =====Heme=====   #Factor 5 Leiden  #History of L peroneal DVT in 2022  #History of RA thrombus in 2018  >TTE (4/13): No thrombus commented  - Eliquis stopped 5/2023 by outpatient hematology given consistently negative DVT studies  - Hold AC iso thrombocytopenia   - f/u Heme recs    # Macrocytic anemia  >s/p 2u PRBC (4/13, 4/16)  >Multifactorial in nature. No hx of bleeding but with coagulopathy as below.   >B12 (4/13): >2000, Folate: 8.6  >Iron studies (4/13): Ferritin 653, Iron 43, Unmeasurable TIBC/Iron%   >Hemolysis lab (4/14): Haptoglobin < 20;   - Possibly iso liver disease vs. acute infection/inflammation vs. ?hemolysis  - Trend CBC, coag, transfuse goal > 7    #thrombocytopenia  >s/p 1u Plt (4/13)   - Etiology not entirely clear, s/p splenectomy and hepatic steatosis w/o cirrhosis on imaging  - f/u infectious workup as below  - Heme consult  - f/u factor viii assay    =====Endo=====  #DM1 vs. DM3  >Per HIE record, patient prone to hypoglycemia  >History of DM1, but s/p total pancreatectomy and islet cell autotransplant in 2018 - currently DM3  - c/w D10 gtt, wean as able  - c/w Lantus 3u QHS  - Endocrine on board    =====ID=====  Infectious work up  >UA (4/13): 11WBC, small LE, negative Nitrite or bacteria  >Babesia (4/13): neg; HIV (4/13): neg; RVP, COVID (4/14): neg; MRSA (4/13): Negative  >CXR (4/13): RML consolidation  >CTC/A/P (4/13): RML consolidation, LLL atelectasis 2/2 likely ?mucus plug, ischemic colitis  >Bcx (4/13): NGTD  >ET tube cx (4/13): Normal resp luisa  >BAL (4/13): Pending  >GI PCR (4/15): Negative  - Rest of infectious w/u per ID    #Septic shock   #hx of recurrent infections   >H/o of recurrent infxn from multiple abdominal wounds and surgeries. Hx of VRE and candida.   >Previously on levo at OSH. Off pressor on transfer; Restarted with initiation of CRRT  >s/p Caspofungin 50mg Q24 (stopped 4/16)  >s/p Daptomycin 500mg Q24 (stopped 4/16)  - Infectious w/u per ID  - c/w Doxycycline 100mg Q12  - c/w Meropenem 1g q12

## 2024-04-16 NOTE — PROGRESS NOTE ADULT - SUBJECTIVE AND OBJECTIVE BOX
Follow Up:      Interval History/ROS:    ***    Allergies  No Known Allergies    ANTIMICROBIALS:  doxycycline IVPB 100 every 12 hours  meropenem  IVPB 1000 every 12 hours  rifAXIMin 550 two times a day    OTHER MEDS:  MEDICATIONS  (STANDING):  fentaNYL    Injectable 25 every 4 hours PRN  insulin glargine Injectable (LANTUS) 3 at bedtime  levothyroxine Injectable 50 at bedtime  pantoprazole  Injectable 40 daily  ursodiol Tablet 500 <User Schedule>    Vital Signs Last 24 Hrs  T(C): 37.2 (16 Apr 2024 08:00), Max: 37.5 (15 Apr 2024 20:00)  T(F): 99 (16 Apr 2024 08:00), Max: 99.5 (15 Apr 2024 20:00)  HR: 98 (16 Apr 2024 10:00) (83 - 126)  BP: 125/62 (16 Apr 2024 10:00) (84/45 - 154/73)  BP(mean): 89 (16 Apr 2024 10:00) (59 - 106)  RR: 27 (16 Apr 2024 10:00) (17 - 31)  SpO2: 100% (16 Apr 2024 10:00) (92% - 100%)    Parameters below as of 16 Apr 2024 08:00  Patient On (Oxygen Delivery Method): ventilator    O2 Concentration (%): 30    PHYSICAL EXAM:  Constitutional: non-toxic, no distress  HEAD/EYES: anicteric, no conjunctival injection  ENT:  supple, no thrush  Cardiovascular:   normal S1, S2, no murmur, no edema  Respiratory:  clear BS bilaterally, no wheezes, no rales  GI:  soft, non-tender, normal bowel sounds  :  no harrington, no CVA tenderness  Musculoskeletal:  no synovitis, normal ROM  Neurologic: awake and alert, normal strength, no focal findings  Skin:  no rash, no erythema, no phlebitis  Heme/Onc: no lymphadenopathy   Psychiatric:  awake, alert, appropriate mood                 6.6    16.56 )-----------( 52       ( 16 Apr 2024 05:48 )             20.1       04-16    139  |  107  |  <4<L>  ----------------------------<  139<H>  3.8   |  23  |  0.31<L>    Ca    8.6      16 Apr 2024 05:48  Phos  2.3     04-16  Mg     2.1     04-16    TPro  4.8<L>  /  Alb  3.1<L>  /  TBili  9.4<H>  /  DBili  x   /  AST  127<H>  /  ALT  30  /  AlkPhos  120  04-16    Urinalysis Basic - ( 16 Apr 2024 05:48 )    Color: x / Appearance: x / SG: x / pH: x  Gluc: 139 mg/dL / Ketone: x  / Bili: x / Urobili: x   Blood: x / Protein: x / Nitrite: x   Leuk Esterase: x / RBC: x / WBC x   Sq Epi: x / Non Sq Epi: x / Bacteria: x    MICROBIOLOGY:  v    Babesia microti PCR, Bld (collected 13 Apr 2024 18:13)    Culture - Fungal, Bronchial (collected 13 Apr 2024 16:48)  Source: .Bronchial Bronchial Lavage  Preliminary Report (14 Apr 2024 07:17):    Testing in progress    Culture - Acid Fast - Bronchial w/Smear (collected 13 Apr 2024 16:48)  Source: .Bronchial Bronchial Lavage    Culture - Bronchial (collected 13 Apr 2024 16:48)  Source: .Bronchial Bronchial Lavage  Gram Stain (14 Apr 2024 04:16):    Rare polymorphonuclear leukocytes seen per low power field    No Squamous epithelial cells seen per low power field    No organisms seen per oil power field  Final Report (15 Apr 2024 09:09):    Normal Respiratory Linh present    Culture - Urine (collected 13 Apr 2024 16:10)  Source: Catheterized Catheterized  Final Report (14 Apr 2024 23:12):    No growth    Culture - Sputum (collected 13 Apr 2024 04:32)  Source: ET Tube ET Tube  Gram Stain (13 Apr 2024 14:29):    Moderate polymorphonuclear leukocytes seen per low power field    No organisms seen  Final Report (14 Apr 2024 16:06):    Normal Respiratory Linh present    Culture - Blood (collected 13 Apr 2024 04:00)  Source: .Blood Blood-Venous  Preliminary Report (16 Apr 2024 10:01):    No growth at 72 Hours    Culture - Blood (collected 13 Apr 2024 03:25)  Source: .Blood Blood-Peripheral  Preliminary Report (16 Apr 2024 10:01):    No growth at 72 Hours              Rapid RVP Result: NotDetec (04-14 @ 00:21)  CMVPCR Log: NotDetec Htf07OL/mL (04-13 @ 18:14)        RADIOLOGY: Follow Up:      Interval History/ROS:    Seen at bedside, remains intubated, off sedation, following simple commands.     Allergies  No Known Allergies    ANTIMICROBIALS:  doxycycline IVPB 100 every 12 hours  meropenem  IVPB 1000 every 12 hours  rifAXIMin 550 two times a day    OTHER MEDS:  MEDICATIONS  (STANDING):  fentaNYL    Injectable 25 every 4 hours PRN  insulin glargine Injectable (LANTUS) 3 at bedtime  levothyroxine Injectable 50 at bedtime  pantoprazole  Injectable 40 daily  ursodiol Tablet 500 <User Schedule>    Vital Signs Last 24 Hrs  T(C): 37.2 (16 Apr 2024 08:00), Max: 37.5 (15 Apr 2024 20:00)  T(F): 99 (16 Apr 2024 08:00), Max: 99.5 (15 Apr 2024 20:00)  HR: 98 (16 Apr 2024 10:00) (83 - 126)  BP: 125/62 (16 Apr 2024 10:00) (84/45 - 154/73)  BP(mean): 89 (16 Apr 2024 10:00) (59 - 106)  RR: 27 (16 Apr 2024 10:00) (17 - 31)  SpO2: 100% (16 Apr 2024 10:00) (92% - 100%)    Parameters below as of 16 Apr 2024 08:00  Patient On (Oxygen Delivery Method): ventilator    O2 Concentration (%): 30    PHYSICAL EXAM:  Constitutional: intubated   HEAD/EYES: anicteric, no conjunctival injection  ENT:  supple, no thrush  Cardiovascular:   normal S1, S2, no murmur, no edema  Respiratory:  intubated on mechanical vent, no wheezes, no rales  GI:  soft, non-tender, normal bowel sounds  :  + James, rectal tube   Neurologic: awake, off sedation, following simple commands                  6.6    16.56 )-----------( 52       ( 16 Apr 2024 05:48 )             20.1       04-16    139  |  107  |  <4<L>  ----------------------------<  139<H>  3.8   |  23  |  0.31<L>    Ca    8.6      16 Apr 2024 05:48  Phos  2.3     04-16  Mg     2.1     04-16    TPro  4.8<L>  /  Alb  3.1<L>  /  TBili  9.4<H>  /  DBili  x   /  AST  127<H>  /  ALT  30  /  AlkPhos  120  04-16    Urinalysis Basic - ( 16 Apr 2024 05:48 )    Color: x / Appearance: x / SG: x / pH: x  Gluc: 139 mg/dL / Ketone: x  / Bili: x / Urobili: x   Blood: x / Protein: x / Nitrite: x   Leuk Esterase: x / RBC: x / WBC x   Sq Epi: x / Non Sq Epi: x / Bacteria: x    MICROBIOLOGY:  v    Babesia microti PCR, Bld (collected 13 Apr 2024 18:13)    Culture - Fungal, Bronchial (collected 13 Apr 2024 16:48)  Source: .Bronchial Bronchial Lavage  Preliminary Report (14 Apr 2024 07:17):    Testing in progress    Culture - Acid Fast - Bronchial w/Smear (collected 13 Apr 2024 16:48)  Source: .Bronchial Bronchial Lavage    Culture - Bronchial (collected 13 Apr 2024 16:48)  Source: .Bronchial Bronchial Lavage  Gram Stain (14 Apr 2024 04:16):    Rare polymorphonuclear leukocytes seen per low power field    No Squamous epithelial cells seen per low power field    No organisms seen per oil power field  Final Report (15 Apr 2024 09:09):    Normal Respiratory Linh present    Culture - Urine (collected 13 Apr 2024 16:10)  Source: Catheterized Catheterized  Final Report (14 Apr 2024 23:12):    No growth    Culture - Sputum (collected 13 Apr 2024 04:32)  Source: ET Tube ET Tube  Gram Stain (13 Apr 2024 14:29):    Moderate polymorphonuclear leukocytes seen per low power field    No organisms seen  Final Report (14 Apr 2024 16:06):    Normal Respiratory Linh present    Culture - Blood (collected 13 Apr 2024 04:00)  Source: .Blood Blood-Venous  Preliminary Report (16 Apr 2024 10:01):    No growth at 72 Hours    Culture - Blood (collected 13 Apr 2024 03:25)  Source: .Blood Blood-Peripheral  Preliminary Report (16 Apr 2024 10:01):    No growth at 72 Hours              Rapid RVP Result: NotDetec (04-14 @ 00:21)  CMVPCR Log: NotDetec [id number]/mL (04-13 @ 18:14)        RADIOLOGY: Follow Up:      Interval History/ROS:    Seen at bedside, remains intubated, off sedation, following simple commands.     Allergies  No Known Allergies    ANTIMICROBIALS:  doxycycline IVPB 100 every 12 hours  meropenem  IVPB 1000 every 12 hours  rifAXIMin 550 two times a day    OTHER MEDS:  MEDICATIONS  (STANDING):  fentaNYL    Injectable 25 every 4 hours PRN  insulin glargine Injectable (LANTUS) 3 at bedtime  levothyroxine Injectable 50 at bedtime  pantoprazole  Injectable 40 daily  ursodiol Tablet 500 <User Schedule>    Vital Signs Last 24 Hrs  T(C): 37.2 (16 Apr 2024 08:00), Max: 37.5 (15 Apr 2024 20:00)  T(F): 99 (16 Apr 2024 08:00), Max: 99.5 (15 Apr 2024 20:00)  HR: 98 (16 Apr 2024 10:00) (83 - 126)  BP: 125/62 (16 Apr 2024 10:00) (84/45 - 154/73)  BP(mean): 89 (16 Apr 2024 10:00) (59 - 106)  RR: 27 (16 Apr 2024 10:00) (17 - 31)  SpO2: 100% (16 Apr 2024 10:00) (92% - 100%)    Parameters below as of 16 Apr 2024 08:00  Patient On (Oxygen Delivery Method): ventilator    O2 Concentration (%): 30    PHYSICAL EXAM:  Constitutional: intubated   HEAD/EYES: anicteric, no conjunctival injection  ENT:  supple, no thrush  Cardiovascular:   normal S1, S2, no murmur, no edema  Respiratory:  intubated on mechanical vent, no wheezes, no rales  GI:  soft, non-tender, normal bowel sounds,   :  + James, rectal tube   Lines: L IJ, R subclavian lines  Neurologic: awake, off sedation, following simple commands                  6.6    16.56 )-----------( 52       ( 16 Apr 2024 05:48 )             20.1       04-16    139  |  107  |  <4<L>  ----------------------------<  139<H>  3.8   |  23  |  0.31<L>    Ca    8.6      16 Apr 2024 05:48  Phos  2.3     04-16  Mg     2.1     04-16    TPro  4.8<L>  /  Alb  3.1<L>  /  TBili  9.4<H>  /  DBili  x   /  AST  127<H>  /  ALT  30  /  AlkPhos  120  04-16    Urinalysis Basic - ( 16 Apr 2024 05:48 )    Color: x / Appearance: x / SG: x / pH: x  Gluc: 139 mg/dL / Ketone: x  / Bili: x / Urobili: x   Blood: x / Protein: x / Nitrite: x   Leuk Esterase: x / RBC: x / WBC x   Sq Epi: x / Non Sq Epi: x / Bacteria: x    MICROBIOLOGY:  v    Babesia microti PCR, Bld (collected 13 Apr 2024 18:13)    Culture - Fungal, Bronchial (collected 13 Apr 2024 16:48)  Source: .Bronchial Bronchial Lavage  Preliminary Report (14 Apr 2024 07:17):    Testing in progress    Culture - Acid Fast - Bronchial w/Smear (collected 13 Apr 2024 16:48)  Source: .Bronchial Bronchial Lavage    Culture - Bronchial (collected 13 Apr 2024 16:48)  Source: .Bronchial Bronchial Lavage  Gram Stain (14 Apr 2024 04:16):    Rare polymorphonuclear leukocytes seen per low power field    No Squamous epithelial cells seen per low power field    No organisms seen per oil power field  Final Report (15 Apr 2024 09:09):    Normal Respiratory Linh present    Culture - Urine (collected 13 Apr 2024 16:10)  Source: Catheterized Catheterized  Final Report (14 Apr 2024 23:12):    No growth    Culture - Sputum (collected 13 Apr 2024 04:32)  Source: ET Tube ET Tube  Gram Stain (13 Apr 2024 14:29):    Moderate polymorphonuclear leukocytes seen per low power field    No organisms seen  Final Report (14 Apr 2024 16:06):    Normal Respiratory Linh present    Culture - Blood (collected 13 Apr 2024 04:00)  Source: .Blood Blood-Venous  Preliminary Report (16 Apr 2024 10:01):    No growth at 72 Hours    Culture - Blood (collected 13 Apr 2024 03:25)  Source: .Blood Blood-Peripheral  Preliminary Report (16 Apr 2024 10:01):    No growth at 72 Hours              Rapid RVP Result: NotDetec (04-14 @ 00:21)  CMVPCR Log: NotDetec [id number]/mL (04-13 @ 18:14)        RADIOLOGY: Follow Up:      Interval History/ROS:    Seen at bedside, remains intubated, off sedation, following simple commands.     Allergies  No Known Allergies    ANTIMICROBIALS:  doxycycline IVPB 100 every 12 hours  meropenem  IVPB 1000 every 12 hours  rifAXIMin 550 two times a day    OTHER MEDS:  MEDICATIONS  (STANDING):  fentaNYL    Injectable 25 every 4 hours PRN  insulin glargine Injectable (LANTUS) 3 at bedtime  levothyroxine Injectable 50 at bedtime  pantoprazole  Injectable 40 daily  ursodiol Tablet 500 <User Schedule>    Vital Signs Last 24 Hrs  T(C): 37.2 (16 Apr 2024 08:00), Max: 37.5 (15 Apr 2024 20:00)  T(F): 99 (16 Apr 2024 08:00), Max: 99.5 (15 Apr 2024 20:00)  HR: 98 (16 Apr 2024 10:00) (83 - 126)  BP: 125/62 (16 Apr 2024 10:00) (84/45 - 154/73)  BP(mean): 89 (16 Apr 2024 10:00) (59 - 106)  RR: 27 (16 Apr 2024 10:00) (17 - 31)  SpO2: 100% (16 Apr 2024 10:00) (92% - 100%)    Parameters below as of 16 Apr 2024 08:00  Patient On (Oxygen Delivery Method): ventilator    O2 Concentration (%): 30    PHYSICAL EXAM:  Constitutional: intubated   HEAD/EYES: anicteric, no conjunctival injection  ENT:  supple, no thrush  Cardiovascular:   normal S1, S2, no murmur, no edema  Respiratory:  intubated on mechanical vent, no wheezes, no rales  GI:  soft, non-tender, normal bowel sounds,   :  + James, rectal tube   Lines: L IJ, R subclavian lines  Neurologic: awake, off sedation, following simple commands                  6.6    16.56 )-----------( 52       ( 16 Apr 2024 05:48 )             20.1       04-16    139  |  107  |  <4<L>  ----------------------------<  139<H>  3.8   |  23  |  0.31<L>    Ca    8.6      16 Apr 2024 05:48  Phos  2.3     04-16  Mg     2.1     04-16    TPro  4.8<L>  /  Alb  3.1<L>  /  TBili  9.4<H>  /  DBili  x   /  AST  127<H>  /  ALT  30  /  AlkPhos  120  04-16    Urinalysis Basic - ( 16 Apr 2024 05:48 )    Color: x / Appearance: x / SG: x / pH: x  Gluc: 139 mg/dL / Ketone: x  / Bili: x / Urobili: x   Blood: x / Protein: x / Nitrite: x   Leuk Esterase: x / RBC: x / WBC x   Sq Epi: x / Non Sq Epi: x / Bacteria: x    MICROBIOLOGY:  v    Babesia microti PCR, Bld (collected 13 Apr 2024 18:13)    Culture - Fungal, Bronchial (collected 13 Apr 2024 16:48)  Source: .Bronchial Bronchial Lavage  Preliminary Report (14 Apr 2024 07:17):    Testing in progress    Culture - Acid Fast - Bronchial w/Smear (collected 13 Apr 2024 16:48)  Source: .Bronchial Bronchial Lavage    Culture - Bronchial (collected 13 Apr 2024 16:48)  Source: .Bronchial Bronchial Lavage  Gram Stain (14 Apr 2024 04:16):    Rare polymorphonuclear leukocytes seen per low power field    No Squamous epithelial cells seen per low power field    No organisms seen per oil power field  Final Report (15 Apr 2024 09:09):    Normal Respiratory Linh present    Culture - Urine (collected 13 Apr 2024 16:10)  Source: Catheterized Catheterized  Final Report (14 Apr 2024 23:12):    No growth    Culture - Sputum (collected 13 Apr 2024 04:32)  Source: ET Tube ET Tube  Gram Stain (13 Apr 2024 14:29):    Moderate polymorphonuclear leukocytes seen per low power field    No organisms seen  Final Report (14 Apr 2024 16:06):    Normal Respiratory Linh present    Culture - Blood (collected 13 Apr 2024 04:00)  Source: .Blood Blood-Venous  Preliminary Report (16 Apr 2024 10:01):    No growth at 72 Hours    Culture - Blood (collected 13 Apr 2024 03:25)  Source: .Blood Blood-Peripheral  Preliminary Report (16 Apr 2024 10:01):    No growth at 72 Hours              Rapid RVP Result: NotDetec (04-14 @ 00:21)  CMVPCR Log: NotDetec Wzk20CV/mL (04-13 @ 18:14)        RADIOLOGY:    <The imaging below has been reviewed and visualized by me independently. Findings as detailed in report below>    < from: CT Abdomen and Pelvis w/ IV Cont (04.16.24 @ 13:15) >  IMPRESSION:  Mildly improved colitis with right-sided colitis persisting.    Patent mesenteric vasculature. Haziness of the fat planes surrounding the   celiac axis and SMA, etiology unclear and without change.    < end of copied text >

## 2024-04-16 NOTE — EEG REPORT - NS EEG TEXT BOX
REPORT OF CONTINUOUS VIDEO EEG      [facility]: [address], [place] [address], Phone: [phone]  [facility]: [id number], [place] [address], Phone: [phone]  [facility]: [address] [place] [address], Phone: [phone]    Patient Name: Kari Acosta    Age: 48 year, : 1975  Beckford: -5 KLARISSA # [id number]  EEG #: [id number]    Study Date: 4/15/2024   Start Time: 9:06:44 AM      End Date: 2024     End Time: 08:00:01 AM     Study Duration: 18 h 16 m    Study Information:    EEG Recording Technique:  The patient underwent continuous Video-EEG monitoring, using Telemetry System hardware on the XLTek Digital System. EEG and video data were stored on a computer hard drive with important events saved in digital archive files. The material was reviewed by a physician (electroencephalographer / epileptologist) on a daily basis. Shekhar and seizure detection algorithms were utilized and reviewed. An EEG Technician attended to the patient, and was available throughout daytime work hours.  The epilepsy center neurologist was available in person or on call 24-hours per day.    EEG Placement and Labeling of Electrodes:  The EEG was performed utilizing 20 channel referential EEG connections (coronal over temporal over parasagittal montage) using all standard 10-20 electrode placements with EKG, with additional electrodes placed in the inferior temporal region using the modified 10-10 montage electrode placements for elective admissions, or if deemed necessary. Recording was at a sampling rate of 256 samples per second per channel. Time synchronized digital video recording was done simultaneously with EEG recording. A low light infrared camera was used for low light recording.     History: -  48 year old Female with AMS is here to rule out seizures    Medication  Dextrose    Daptomycin    Levophed    Fentanyl    Peridex    Insulin      Interpretation:    [[[Abbreviation Key:  PDR=alpha rhythm/posterior dominant rhythm. A-P=anterior posterior.  Amplitude: ‘very low’:<20; ‘low’:20-49; ‘medium’:; ‘high’:>150uV.  Persistence for periodic/rhythmic patterns (% of epoch) ‘rare’:<1%; ‘occasional’:1-10%; ‘frequent’:10-50%; ‘abundant’:50-90%; ‘continuous’:>90%.  Persistence for sporadic discharges: ‘rare’:<1/hr; ‘occasional’:1/min-1/hr; ‘frequent’:>1/min; ‘abundant’:>1/10 sec.  RPP=rhythmic and periodic patterns; GRDA=generalized rhythmic delta activity; FIRDA=frontal intermittent GRDA; LRDA=lateralized rhythmic delta activity; TIRDA=temporal intermittent rhythmic delta activity;  LPD=PLED=lateralized periodic discharges; GPD=generalized periodic discharges; BIPDs =bilateral independent periodic discharges; Mf=multifocal; SIRPDs=stimulus induced rhythmic, periodic, or ictal appearing discharges; BIRDs=brief potentially ictal rhythmic discharges >4 Hz, lasting .5-10s; PFA (paroxysmal bursts >13 Hz or =8 Hz <10s).  Modifiers: +F=with fast component; +S=with spike component; +R=with rhythmic component.  S-B=burst suppression pattern.  Max=maximal. N1-drowsy; N2-stage II sleep; N3-slow wave sleep. SSS/BETS=small sharp spikes/benign epileptiform transients of sleep. HV=hyperventilation; PS=photic stimulation]]]      Daily EEG Visual Analysis    FINDINGS:      Background:  Symmetry: symmetric  Continuous: continuous  PDR: Absent  Reactivity: present  Voltage: normal, [defined typically between 20-150uV]  Anterior Posterior Gradient: absent  Breach: absent    Background Slowing:  Generalized slowing: present. Background is diffusely slow consisting of delta activity up to 4 Hz with frequent GRDA and occasional triphasic waves    Focal slowing: none was present.    State Changes:   absent    Sporadic Epileptiform Discharges:   None    Rhythmic and Periodic Patterns (RPPs):  Generalized rhythmic delta activity    Electrographic and Electroclinical seizures:  None    Other Clinical Events:  None    Activation Procedures:   -Hyperventilation was not performed.    -Photic stimulation was not performed.      Artifacts:  Intermittent myogenic and movement artifacts were noted.    ECG:  No significant abnormality      EEG Summary / Classification:  Abnormal EEG.  •	Background slowing. Moderate to severe    EEG Impression / Clinical Correlate:  Abnormal prolonged EEG study due to  1- Moderate to severe diffuse cerebral dysfunction that is not specific in etiology  2- GRDA typically seen in diffuse cerebral dysfunction  3- Triphasic waves typically seen in toxic/metabolic encephalopathy    No epileptic discharges recorded.  No seizures recorded.  •	    ________________________________________    NICOLE Dunn  Attending Physician, [facility] [place]    ------------------------------------  EEG Reading Room: [phone]  On Call Service After Hours: [phone]

## 2024-04-16 NOTE — PROGRESS NOTE ADULT - SUBJECTIVE AND OBJECTIVE BOX
Patient is a 48y old  Female who presents with a chief complaint of Liver failure without hepatic coma     (15 Apr 2024 14:30)    Patient is seen and assessed at the bedside.   She remais intubation due to failure to protect her airway and on CVVH.    MEDICATIONS  (STANDING):  chlorhexidine 0.12% Liquid 15 milliLiter(s) Oral Mucosa every 12 hours  chlorhexidine 4% Liquid 1 Application(s) Topical <User Schedule>  chlorhexidine 4% Liquid 1 Application(s) Topical <User Schedule>  CRRT Treatment    <Continuous>  dextrose 10%. 1000 milliLiter(s) (40 mL/Hr) IV Continuous <Continuous>  doxycycline IVPB 100 milliGRAM(s) IV Intermittent every 12 hours  folic acid 1 milliGRAM(s) Oral daily  insulin glargine Injectable (LANTUS) 3 Unit(s) SubCutaneous at bedtime  levOCARNitine 330 milliGRAM(s) Oral every 8 hours  levothyroxine Injectable 50 MICROGram(s) IV Push at bedtime  meropenem  IVPB 1000 milliGRAM(s) IV Intermittent every 12 hours  multivitamin/minerals/iron Oral Solution (CENTRUM) 15 milliLiter(s) Oral daily  pantoprazole  Injectable 40 milliGRAM(s) IV Push daily  Phoxillum Filtration BK 4 / 2.5 5000 milliLiter(s) (1800 mL/Hr) CRRT <Continuous>  PrismaSOL Filtration BGK 4 / 2.5 5000 milliLiter(s) (1000 mL/Hr) CRRT <Continuous>  PrismaSOL Filtration BGK 4 / 2.5 5000 milliLiter(s) (200 mL/Hr) CRRT <Continuous>  rifAXIMin 550 milliGRAM(s) Oral two times a day  thiamine 100 milliGRAM(s) Oral daily  ursodiol Tablet 500 milliGRAM(s) Oral <User Schedule>    MEDICATIONS  (PRN):  fentaNYL    Injectable 25 MICROGram(s) IV Push every 4 hours PRN Moderate Pain (4 - 6)  sodium chloride 0.9% lock flush 10 milliLiter(s) IV Push every 1 hour PRN Pre/post blood products, medications, blood draw, and to maintain line patency  sodium chloride 0.9% lock flush 10 milliLiter(s) IV Push every 1 hour PRN Pre/post blood products, medications, blood draw, and to maintain line patency    Vital Signs Last 24 Hrs  T(C): 37.3 (16 Apr 2024 12:00), Max: 37.5 (15 Apr 2024 20:00)  T(F): 99.1 (16 Apr 2024 12:00), Max: 99.5 (15 Apr 2024 20:00)  HR: 100 (16 Apr 2024 15:00) (93 - 126)  BP: 130/59 (16 Apr 2024 15:00) (84/45 - 154/73)  BP(mean): 85 (16 Apr 2024 15:00) (59 - 106)  RR: 20 (16 Apr 2024 15:00) (17 - 31)  SpO2: 100% (16 Apr 2024 15:00) (92% - 100%)    Parameters below as of 16 Apr 2024 08:00  Patient On (Oxygen Delivery Method): ventilator    O2 Concentration (%): 30                         8.2    15.30 )-----------( 52       ( 16 Apr 2024 11:55 )             23.8       04-16    140  |  106  |  <4<L>  ----------------------------<  141<H>  3.8   |  22  |  0.30<L>    Ca    8.3<L>      16 Apr 2024 11:55  Phos  2.4     04-16  Mg     2.2     04-16    TPro  4.8<L>  /  Alb  3.3  /  TBili  8.9<H>  /  DBili  x   /  AST  113<H>  /  ALT  31  /  AlkPhos  121<H>  04-16

## 2024-04-16 NOTE — PROGRESS NOTE ADULT - ATTENDING COMMENTS
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 Hargrove), and now S/P total pancreatectomy with islet cell autotransplant at [facility] by Dr. Gil in 04/2018). 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 Per records, a Ct later in 2018 showed still fistulization but no abscess formation     Patient presented to [facility] ED on 4/8 by her spouse for AMS. Per , patient had been having nbnb vomiting and diarrhea for the last 4 weeks, extreme fatigue . She was unable to tolerate PO. She was also sleeping 18-20 hrs per day. On 4/8, he found her on the floor "completely out of it" and took her to [facility] for further evaluation.   Initial workup in ED demonstrated a Tbili 5.4, direct bili 4.4 and ammonia 196. Patient was somnolent and had a left eyeward gaze deviation. Submentally started developing agonal snoring respirations and had to be intubated for airway protection. Course complicated with septic shock and persistent hyperammonemia despite lactulose and rifaximin, CT show and started on meropenem, linezolid and anidulafungin at [facility]--> upon transfer here, off pressors, on alvaro/linezolid/caspofungin    CT Chest (4/13) Occlusion of the distal left lower lobar bronchus with complete left lower lobe atelectasis. Right middle lobe consolidation, possibly secondary to pneumonia or additional atelectasis.     CT A/P (4/13) Segmental areas of wall thickening throughout the colon and rectum with pneumatosis, mesenteric edema, and adjacent hyperdensities in the proximal transverse colon suspicious for bowel ischemia with intraluminal hemorrhage. Additional intraluminal hyperdensities within the distal ascending colon suspicious for hemorrhage. Nonspecific focal areas of narrowing in the distal descending colon and sigmoid colon, possibly colonic masses    UCx (4/13) No Growth  BCx (4/13) NGTD  Bronchoscopy Cx (4/13) No Growth  Serum Cryptococcal Antigen (4/14) Negative  Babesia PCR (4/13) Negative    Lower suspicion infectious etiology directly contributing to hyperbilirubinemia; reasonable to complete meropenem course for the pneumatosis and possible pneumonia. Would continue Doxycyline pending Leptospira Ab/PCR, Tick Panel PCR and Bartonella PCR    I will continue to follow. Please feel free to contact me with any further questions.    Bhavesh Pérez M.D.  [facility] Division of Infectious Disease  8AM-5PM Monday - Friday: Available on Microsoft Teams  After Hours and Holidays (or if no response on Microsoft Teams): Please contact the Infectious Diseases Office at [phone]

## 2024-04-16 NOTE — PROGRESS NOTE ADULT - ASSESSMENT
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 Hargrove), and now S/P total pancreatectomy  with islet cell autotransplant at [facility] by Dr. Gil in 04/2018), c/b recurrent abdominal wall collection requiring IR drainage, presented to OSH with acute change in mental status requiring intubation for airway protection, and transferred to [facility] for care escalation with labs notable for eleavted INR, T. Nilo and NH3 of unclear etiology although possibly 2/2 to cholestasis of sepsis     #Hyperbilirubinemia  #Bicytopenia  #Pneumatosis of bowel concerning for ischemic colitis  #Acute encephalopathy with elevated hyperammonemia now on CRRT   #Coagulopathy (improved)  #Hepatomegaly   - Presented with septic shock, likely RML pneumonia, RLL bronchial occlusion, and pneumatosis coli - likely ischemic colon, colonic blood suggested by CT, init. Hb drop with labs notable for elevated T. Nilo, INR and NH3. Labs also notable for macrocytic anemia - DD: folate deficiency due to malnutrition with ?SIBO and alcohol-related liver disease Imaging with U/S and MRCP with note of  hepatomegaly with non-cirrhotic morphology,MELD 33 (24 with initial creat. before CRRT). Etiology likely 2/2 to cholestasis of sepsis and possible component of acute alcoholic hepatitis  - Acute encephalopathy, hyperammonemia - DD: hepatic encephalopathy in setting of alcoholic liver disease and sepsis, vs. SIBO and sepsis. Hyperammonemia is also a rare but known complication of bypass surgeries with high mortality. Continue with Rifaximin 550 mg BID in addition to Lactulose for goal 5 BMs per day. Would also continue with CRRT for NH3 clearance     Recommendations:  -Continue with Rifaximin 550 mg BID in addition to Lactulose for goal 5 BMs per day.  -Continue with CRRT for NH3 clearance, trend daily NH3 levels   - If no surgical planning, may initiate trickle feed with elemental diet  - F/u infectious workup; Abx per ID  - IV Thiamine, Folate and MVI   - Follow up  DAWIT, ASMA, IgG .  - F/u serum copper, zinc, vitamin A, D, E and K level  - F/u PETH (in lab)  - No plans for any colonoscopy at this time     All recommendations are tentative until note is attested by attending.

## 2024-04-16 NOTE — CHART NOTE - NSCHARTNOTEFT_GEN_A_CORE
Please see below for communications with the third party metabolic  with [facility].     From: Diana Chaudhary   Sent: 2024 2:58 PM  To: Judy Bloom   Cc: Rosie Trejo    Subject: [EXTERNAL] RE: inpatient genetic counselor       Name: Kari Acosta  MRN: [id number]  : 1975  Location: [place] [address]      Bryant Kim,     Thank you for your consult on this 49 yo female with past medical history of HTN, DM, factor 5 Leiden deficiency, gout, pancreatic divisum s/p extensive abdominal surgery (>20x). She presented with a history of decreased PO intake, emesis, diarrhea and altered mental status. Initally managed for septic shock, including intubation for CNS protection. Initial labs revealed elevated ammonia >200 that did not respond to lactulose for which she was started on CRRT. Work-up also concerning for bowel ischemia. Patient found to have elevated bilirubin but MRCP did not show obstruction. PT and INR are also prolonged, I believe this could be secondary to her history of Factor 5 Leiden deficiency. She is being treated with multiple antimicrobials for infections that may cause bowel ischemia. Receiving carnitine for short bowel syndrome. Ammonia has normalized while on CRRT.     Relevant lab results and studies:  Ammonia: 51<103<152<196<>200 (11-55 umol/L)   BUN: <4 L  Urine Ketones: Trace    Brain CT: Unremarkable    Abdominal US/Duplex: Hepatomegaly with hepatic steatosis.    EEG: No epileptic activity recorded.    I don't think this is a typical presentation for a person with a UCD. However, females with OTC deficiency (X-linked) may be asymptomatic until they suffer a significant stressor such as an infection, surgery, childbirth, etc. It seems like she was able to tolerate multiple GI surgeries, which if she did have an underlying UCD should have led to metabolic decompensation. Her labs also show low BUN which can be seen in UCDs because urea is not being made by the urea cycle. Some females with OTC may also experience headaches or abdominal pain with menses. For this patient, we can send biochemical work-up to evaluate for UCDs including: plasma amino acids and urine orotic acid. The Ammonia level is normal today, I don't think contiuing CRRT is necesary. I would like to see how the ammonia trends once they stop it, they can monitor ammonia every 6 hrs. If there is any change in mental status they should check an ammonia level sooner. If the ammonia is trending up, they can start an oral ammonia/nitrogen scavenger like sodium benzoate (typical dose is 250 mg/kg/day). In addition to liver dysfunction, non-metabolic causes of hyperammonemia include infections with urease producing bacteria.       This email relies strictly on the information as was presented and will be recorded as the official encounter unless the healthcare providers notify me of any alterations or modifications. [facility] provides consultive opinions only, no direct patient care, and does not direct the care for your patients. Only your local physician can determine the appropriate care for their patient.     It has been an honor to discuss and offer my opinion for your patient.     Sincerely,    Diana Chaudhary M.D.  Physician Consultant  Clinical Biochemical         ________________________________________  From: "Judy Bloom"    Sent: 24 2:18 PM  To: Dr. Tish Chavarria  Cc: "Rosie Trejo"    Subject: inpatient genetic     Hello,      I wanted to reach out with a new consult     Name: Kari Acosta  MRN: [id number]  : 1975  [facility]    Patient is a 49 YO female with HTN, DM, factor 5 Leiden, gout, pancreatic divisum s/p extensive abdominal surgery (>20x), present initially for weeks of decreased PO and diarrhea to outside hospital. Intubated for airway protection. Initial labs notable for ammonia > 200, elevated Bilirubin and INR concern for liver failure. Transfer to us for CRRT to clear out ammonia. GI team here doesn’t think she’s in liver failure, and MRCP does not show obstruction. The care team states they are perplexed by where the ammonia is coming from and were interested in a urea cycle disorder work-up.     I have attached some relevant labs and records.      Thank you,        Judy Bloom MS, [facility]  Genetic Counselor

## 2024-04-16 NOTE — PROGRESS NOTE ADULT - ASSESSMENT
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 [facility]. Patient found to be hypoglycemic to 50-60s shortly after transfer, started on D20 gtt.    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 Patient was first diagnosed with Type 3c DM after her initial surgery in 2014 (s/p distal pancreatectomy, cholecystectomy, splenectomy and celina-en-y pancreaticojejuonostomy). Then she later underwent total pancreatectomy with islet cell autotransplant in 2018. After that, her insulin requirements decreased over the years but was never off insulin.    Endocrinologist: Dr. Dalton  Per recent outpatient note 3/26/2024:  Patient uses Omnipod Dash with Dexcom CGM   Basal rate:   MN 0.05U   6 AM 0.1U   9.30 AM 0.15U   Total basal 2.85U/24h   ICR 1:15   ICF 1:75   Reports of frequent hypoglycemia outpatient in March 2024.    Became hypoglycemic after transfer to [facility] on 4/13, started on D20 gtt then.  Now weaned to D10 gtt.    Will presume patient has insulin deficiency given her history until can accurately check C-peptide.    PLAN:  - Will need to check for insulin deficiency but unable to accurately evaluate at this time given C-peptide can be influenced by dextrose fluids.   - Continue Lantus 3 units QHS (close to her insulin requirements from pump settings)  - Wean D10 gtt as able.  - Check FS q6h while NPO  - Per MICU team, may be starting continuous tube feeds soon. If starting 18hour/day continuous tube feeds, then would check C-peptide with BMP at the end of 6 hour fast.  - Check for adrenal insufficiency as a possible etiology for hypoglycemia. Follow up serum AM cortisol and ACTH tomorrow morning 8am.    #Hypothyroidism vs NTIS  TSH 4.43. FT4 0.6, TT3 47 on admission  Started on LT4 100mcg daily  - recheck FT4 in 1 week  - repeat TSH once clinically improved or in 6-8 weeks    Discussed with primary team    Ed Jett MD  Endocrine Fellow  Can be reached via teams. For follow up questions, discharge recommendations, or new consults, please call answering service at [phone] (weekdays); [phone] (nights/weekends). 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 [facility]. Patient found to be hypoglycemic to 50-60s shortly after transfer, started on D20 gtt.    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 Patient was first diagnosed with Type 3c DM after her initial surgery in 2014 (s/p distal pancreatectomy, cholecystectomy, splenectomy and celina-en-y pancreaticojejuonostomy). Then she later underwent total pancreatectomy with islet cell autotransplant in 2018. After that, her insulin requirements decreased over the years but was never off insulin.    Endocrinologist: Dr. Dalton  Per recent outpatient note 3/26/2024:  Patient uses Omnipod Dash with Dexcom CGM   Basal rate:   MN 0.05U   6 AM 0.1U   9.30 AM 0.15U   Total basal 2.85U/24h   ICR 1:15   ICF 1:75   Reports of frequent hypoglycemia outpatient in March 2024.    Became hypoglycemic after transfer to [facility] on 4/13, started on D20 gtt then.  Now weaned to D10 gtt.    Will presume patient has insulin deficiency given her history until can accurately check C-peptide.    PLAN:  - Will need to check for insulin deficiency but unable to accurately evaluate at this time given C-peptide can be influenced by dextrose fluids.   - Continue Lantus 3 units QHS (close to her insulin requirements from pump settings)  - Wean D10 gtt as able.  - Check FS q6h while NPO  - Per MICU team, may be starting continuous tube feeds soon. If starting 18hour/day continuous tube feeds, would check C-peptide with BMP at the end of 6 hour fast.  - Check for adrenal insufficiency as a possible etiology for hypoglycemia. Follow up serum AM cortisol and ACTH tomorrow morning 8am.    #Hypothyroidism vs NTIS  TSH 4.43. FT4 0.6, TT3 47 on admission  Started on LT4 100mcg daily  - recheck FT4 in 1 week  - repeat TSH once clinically improved or in 6-8 weeks    Discussed with primary team    Ed Jett MD  Endocrine Fellow  Can be reached via teams. For follow up questions, discharge recommendations, or new consults, please call answering service at [phone] (weekdays); [phone] (nights/weekends). 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 [facility]. Patient found to be hypoglycemic to 50-60s shortly after transfer, started on D20 gtt.    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 Patient was first diagnosed with Type 3c DM after her initial surgery in 2014 (s/p distal pancreatectomy, cholecystectomy, splenectomy and celina-en-y pancreaticojejuonostomy). Then she later underwent total pancreatectomy with islet cell autotransplant in 2018. After that, her insulin requirements decreased over the years but was never off insulin.    Endocrinologist: Dr. Dalton  Per recent outpatient note 3/26/2024:  Patient uses Omnipod Dash with Dexcom CGM   Basal rate:   MN 0.05U   6 AM 0.1U   9.30 AM 0.15U   Total basal 2.85U/24h   ICR 1:15   ICF 1:75   Reports of frequent hypoglycemia outpatient in March 2024.    Became hypoglycemic after transfer to [facility] on 4/13, started on D20 gtt then.  Now weaned to D10 gtt.    Will presume patient has insulin deficiency given her history until can accurately check C-peptide.    PLAN:  - Will need to check for insulin deficiency but unable to accurately evaluate at this time given C-peptide can be influenced by dextrose fluids.   - Continue Lantus 3 units QHS (close to her insulin requirements from pump settings)  - Wean D10 gtt as able.  - Check FS q6h while NPO  - Per MICU team, may be starting continuous tube feeds soon. If starting 18hour/day continuous tube feeds, would check C-peptide with BMP at the end of 6 hour fast (must be off dextrose fluids as well). Please also start low dose admelog scale q6h if starting continuous tube feeds.  - Check for adrenal insufficiency as a possible etiology for hypoglycemia. Follow up serum AM cortisol and ACTH.    #Hypothyroidism vs NTIS  TSH 4.43. FT4 0.6, TT3 47 on admission  Started on LT4 100mcg daily  - recheck FT4 in 1 week  - repeat TSH once clinically improved or in 6-8 weeks    Discussed with primary team    Ed Jett MD  Endocrine Fellow  Can be reached via teams. For follow up questions, discharge recommendations, or new consults, please call answering service at [phone] (weekdays); [phone] (nights/weekends).

## 2024-04-16 NOTE — PROGRESS NOTE ADULT - SUBJECTIVE AND OBJECTIVE BOX
Hepatology Progress Note    Interval Events:   -Abx swithced to Zosyn and Doxy   -CTH  Unremarkable noncontrast CT of the brain with no acute changes   -Remains intubated   -NH3 level decreased to 103 on CRRT although only withdraws to pain   -Passing brown stool       Allergies:  No Known Allergies      Hospital Medications:  chlorhexidine 0.12% Liquid 15 milliLiter(s) Oral Mucosa every 12 hours  chlorhexidine 4% Liquid 1 Application(s) Topical <User Schedule>  chlorhexidine 4% Liquid 1 Application(s) Topical <User Schedule>  CRRT Treatment    <Continuous>  dextrose 10%. 1000 milliLiter(s) IV Continuous <Continuous>  doxycycline IVPB 100 milliGRAM(s) IV Intermittent every 12 hours  fentaNYL    Injectable 25 MICROGram(s) IV Push every 4 hours PRN  folic acid 1 milliGRAM(s) Oral daily  insulin glargine Injectable (LANTUS) 3 Unit(s) SubCutaneous at bedtime  levOCARNitine 330 milliGRAM(s) Oral every 8 hours  levothyroxine Injectable 50 MICROGram(s) IV Push at bedtime  meropenem  IVPB 1000 milliGRAM(s) IV Intermittent every 12 hours  multivitamin/minerals/iron Oral Solution (CENTRUM) 15 milliLiter(s) Oral daily  pantoprazole  Injectable 40 milliGRAM(s) IV Push daily  Phoxillum Filtration BK 4 / 2.5 5000 milliLiter(s) CRRT <Continuous>  PrismaSOL Filtration BGK 4 / 2.5 5000 milliLiter(s) CRRT <Continuous>  PrismaSOL Filtration BGK 4 / 2.5 5000 milliLiter(s) CRRT <Continuous>  rifAXIMin 550 milliGRAM(s) Oral two times a day  sodium chloride 0.9% lock flush 10 milliLiter(s) IV Push every 1 hour PRN  sodium chloride 0.9% lock flush 10 milliLiter(s) IV Push every 1 hour PRN  thiamine 100 milliGRAM(s) Oral daily  ursodiol Tablet 500 milliGRAM(s) Oral <User Schedule>      ROS: 14 point ROS negative unless otherwise state in subjective    PHYSICAL EXAM:   Vital Signs:  Vital Signs Last 24 Hrs  T(C): 37.2 (16 Apr 2024 08:00), Max: 37.5 (15 Apr 2024 20:00)  T(F): 99 (16 Apr 2024 08:00), Max: 99.5 (15 Apr 2024 20:00)  HR: 98 (16 Apr 2024 10:00) (83 - 126)  BP: 125/62 (16 Apr 2024 10:00) (84/45 - 154/73)  BP(mean): 89 (16 Apr 2024 10:00) (59 - 106)  RR: 27 (16 Apr 2024 10:00) (17 - 31)  SpO2: 100% (16 Apr 2024 10:00) (92% - 100%)    Parameters below as of 16 Apr 2024 08:00  Patient On (Oxygen Delivery Method): ventilator    O2 Concentration (%): 30  Daily     Daily     GENERAL:  Intubated, withdraws to pain   HEENT:  +scleral icterus  CHEST: no resp distress  HEART:  RRR  ABDOMEN:  Soft, non-tender, non-distended, normoactive bowel sounds. Rectal tube with brown stool.   EXTREMITIES:  No pedal edema  NEURO:  Intubated, withdraws to pain     LABS:                        6.6    16.56 )-----------( 52       ( 16 Apr 2024 05:48 )             20.1     Mean Cell Volume: 101.5 fl (04-16-24 @ 05:48)    04-16    139  |  107  |  <4<L>  ----------------------------<  139<H>  3.8   |  23  |  0.31<L>    Ca    8.6      16 Apr 2024 05:48  Phos  2.3     04-16  Mg     2.1     04-16    TPro  4.8<L>  /  Alb  3.1<L>  /  TBili  9.4<H>  /  DBili  x   /  AST  127<H>  /  ALT  30  /  AlkPhos  120  04-16    LIVER FUNCTIONS - ( 16 Apr 2024 05:48 )  Alb: 3.1 g/dL / Pro: 4.8 g/dL / ALK PHOS: 120 U/L / ALT: 30 U/L / AST: 127 U/L / GGT: x           PT/INR - ( 16 Apr 2024 00:24 )   PT: 14.2 sec;   INR: 1.30 ratio         PTT - ( 16 Apr 2024 00:24 )  PTT:37.2 sec  Urinalysis Basic - ( 16 Apr 2024 05:48 )    Color: x / Appearance: x / SG: x / pH: x  Gluc: 139 mg/dL / Ketone: x  / Bili: x / Urobili: x   Blood: x / Protein: x / Nitrite: x   Leuk Esterase: x / RBC: x / WBC x   Sq Epi: x / Non Sq Epi: x / Bacteria: x    [id number]    Imaging:  < from: CT Head No Cont (04.15.24 @ 19:43) >    IMPRESSION: Unremarkable noncontrast CT of the brain. No change since   4/13/2024.    < end of copied text >  < from: Xray Chest 1 View- PORTABLE-Urgent (Xray Chest 1 View- PORTABLE-Urgent .) (04.15.24 @ 11:46) >  IMPRESSION:  No focal consolidation. .    < end of copied text >  < from: MR MRCP w/wo IV Cont (04.14.24 @ 21:51) >  IMPRESSION:  *  Enlarged, fatty liver.  *  No biliary ductal dilatation. Hepaticojejunostomy anastomosis is   obscured and poorly evaluated.  *  Colitis.    < end of copied text >  < from: US Abdomen Upper Quadrant Right (04.13.24 @ 15:26) >  IMPRESSION:    No portal vein thrombosis.  Hepatomegaly with hepatic steatosis.    < end of copied text >

## 2024-04-16 NOTE — PROGRESS NOTE ADULT - ASSESSMENT
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 Hargrove), and now S/P total pancreatectomy with islet cell autotransplant at [facility] by Dr. Gil in 04/2018). 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 Per records, a Ct later in 2018 showed still fistulization but no abscess formation     Patient presented to [facility] ED on 4/8 by her spouse for AMS. Per , patient had been having nbnb vomiting and diarrhea for the last 4 weeks, extreme fatigue . She was unable to tolerate PO. She was also sleeping 18-20 hrs per day. On 4/8, he found her on the floor "completely out of it" and took her to [facility] for further evaluation.   Initial workup in ED demonstrated a Tbili 5.4, direct bili 4.4 and ammonia 196. Patient was somnolent and had a left eyeward gaze deviation. Submentally started developing agonal snoring respirations and had to be intubated for airway protection. Course complicated with septic shock and persistent hyperammonemia despite lactulose and rifaximin, CT show and started on meropenem, linezolid and anidulafungin at [facility]--> upon transfer here, off pressors, on alvaro/linezolid/caspofungin    CT Chest (4/13) Occlusion of the distal left lower lobar bronchus with complete left lower lobe atelectasis. Right middle lobe consolidation, possibly secondary to pneumonia or additional atelectasis.     CT A/P (4/13) Segmental areas of wall thickening throughout the colon and rectum with pneumatosis, mesenteric edema, and adjacent hyperdensities in the proximal transverse colon suspicious for bowel ischemia with intraluminal hemorrhage. Additional intraluminal hyperdensities within the distal ascending colon suspicious for hemorrhage. Nonspecific focal areas of narrowing in the distal descending colon and sigmoid colon, possibly colonic masses    UCx (4/13) No Growth  BCx (4/13) NGTD  Bronchoscopy Cx (4/13) No Growth  Serum Cryptococcal Antigen (4/14) Negative  Babesia PCR (4/13) Negative    Antimicrobials:   Caspofungin - dced  Daptomycin -dced  (Was started on Micafungin and Linezolid on 4/10 at OSH)  Meropenem 4/13-->  Doxy 4/13-->     #Encephalopathy, hyperammonemia not responding to lactulose/rifaximin  #Colonic and rectal Pneumatosis with c/f bowel ischemia and ? masses and hemorrhage (Considering atypical infections that could lead to bowel ischemia +/- masses including fungal and parasitic infections (anisakis, ascaris, strongy ecc), SIBo as above vs non infectious etiologies)  #Hyperbilirubinemia in c/o sepsis ? vs biliary obstruction vs atypical infection  #RML consolidation - aspiration pna vs CAP s/p Bronchoscopy 4/13- BAL bacterial, fungal, AFB cx NGTD  #S/P total pancreatectomy with islet cell autotransplant at [facility] by Dr. Gil in 04/2018    Recommendations:  -Continue Meropenem (4/13 -->). Would complete 7 day total course.   -Continue Doxycycline (4/13 -->) (Pending Tick Studies, Leptospira, Bartonella)  -GI PCR indeterminate for norovirus; recommend repeat GI PCR, f/up stool Cx   -Follow up on Strongyloides IgG  -Follow up on Stool O&P  -Follow up on Mycoplasma genitalum TMA  -Follow up on Tick-Borne PCR Panel  -Follow up on Ehrlichia Ab  -Follow up on Bartonella PCR Blood  -Follow up on Leptospirosis Ab  -Follow up on Serum CMV PCR  -Follow up on Serum Parvovirus PCR  -Follow up on Serum Fungitell  -Follow up on Serum Aspergillus Galactomannan Antigen  -Follow up on Urine Histoplasma Ag  -Follow up on Serum Histoplasma Ag  -Follow up on Bronch Fungitell  -Follow up on Bronch Aspergillus Galactomannan Antigen  -Follow up on Bronch Legionella PCR  -Follow up on Bronch Mycoplasma PCR  -Once feasible recommend  colonoscopy with biopsies for histopath with special stains- including fungal, Bill, Viral         PT TO BE SEEN. PRELIM NOTE  PENDING RECS. 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 Hargrove), and now S/P total pancreatectomy with islet cell autotransplant at [facility] by Dr. Gil in 04/2018). 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 Per records, a Ct later in 2018 showed still fistulization but no abscess formation     Patient presented to [facility] ED on 4/8 by her spouse for AMS. Per , patient had been having nbnb vomiting and diarrhea for the last 4 weeks, extreme fatigue . She was unable to tolerate PO. She was also sleeping 18-20 hrs per day. On 4/8, he found her on the floor "completely out of it" and took her to [facility] for further evaluation.   Initial workup in ED demonstrated a Tbili 5.4, direct bili 4.4 and ammonia 196. Patient was somnolent and had a left eyeward gaze deviation. Submentally started developing agonal snoring respirations and had to be intubated for airway protection. Course complicated with septic shock and persistent hyperammonemia despite lactulose and rifaximin, CT show and started on meropenem, linezolid and anidulafungin at [facility]--> upon transfer here, off pressors, on alvaro/linezolid/caspofungin    CT Chest (4/13) Occlusion of the distal left lower lobar bronchus with complete left lower lobe atelectasis. Right middle lobe consolidation, possibly secondary to pneumonia or additional atelectasis.     CT A/P (4/13) Segmental areas of wall thickening throughout the colon and rectum with pneumatosis, mesenteric edema, and adjacent hyperdensities in the proximal transverse colon suspicious for bowel ischemia with intraluminal hemorrhage. Additional intraluminal hyperdensities within the distal ascending colon suspicious for hemorrhage. Nonspecific focal areas of narrowing in the distal descending colon and sigmoid colon, possibly colonic masses    UCx (4/13) No Growth  BCx (4/13) NGTD  Bronchoscopy Cx (4/13) No Growth  Serum Cryptococcal Antigen (4/14) Negative  Babesia PCR (4/13) Negative    Antimicrobials:   Caspofungin - dced  Daptomycin -dced  (Was started on Micafungin and Linezolid on 4/10 at OSH)  Meropenem 4/13-->  Doxy 4/13-->     #Encephalopathy, hyperammonemia not responding to lactulose/rifaximin  #Colonic and rectal Pneumatosis with c/f bowel ischemia and ? masses and hemorrhage (Considering atypical infections that could lead to bowel ischemia +/- masses including fungal and parasitic infections (anisakis, ascaris, strongy ecc), SIBo as above vs non infectious etiologies)  #Hyperbilirubinemia in c/o sepsis ? vs biliary obstruction vs atypical infection  #RML consolidation - aspiration pna vs CAP s/p Bronchoscopy 4/13- BAL bacterial, fungal, AFB cx NGTD  #S/P total pancreatectomy with islet cell autotransplant at [facility] by Dr. Gil in 04/2018    Recommendations:  -Continue Meropenem (4/13 -->). Would complete 7 day total course.   -Continue Doxycycline (4/13 -->) (Pending Tick Studies, Leptospira, Bartonella)  -GI PCR indeterminate for norovirus; recommend repeat GI PCR, f/up stool Cx   -Follow up on Strongyloides IgG  -Follow up on Stool O&P  -Follow up on Mycoplasma genitalum TMA  -Follow up on Tick-Borne PCR Panel  -Follow up on Ehrlichia Ab  -Follow up on Bartonella PCR Blood  -Follow up on Leptospirosis Ab  -Follow up on Serum CMV PCR  -Follow up on Serum Parvovirus PCR  -Follow up on Serum Fungitell  -Follow up on Serum Aspergillus Galactomannan Antigen  -Follow up on Urine Histoplasma Ag  -Follow up on Serum Histoplasma Ag  -Follow up on Bronch Fungitell  -Follow up on Bronch Aspergillus Galactomannan Antigen  -Follow up on Bronch Legionella PCR  -Follow up on Bronch Mycoplasma PCR  -Once feasible recommend  colonoscopy with biopsies for histopath with special stains- including fungal, Bill, Viral     Discussed with Dr Beto Monte MD, PGY5  ID fellow  [employer] Teams Preferred  After 5pm/weekends call [phone]

## 2024-04-16 NOTE — PROGRESS NOTE ADULT - SUBJECTIVE AND OBJECTIVE BOX
Admitted for: Liver failure without hepatic coma        Following for: Hypoglycemia    Subjective:   Patient awake today, still intubated      MEDICATIONS  (STANDING):  chlorhexidine 0.12% Liquid 15 milliLiter(s) Oral Mucosa every 12 hours  chlorhexidine 4% Liquid 1 Application(s) Topical <User Schedule>  chlorhexidine 4% Liquid 1 Application(s) Topical <User Schedule>  CRRT Treatment    <Continuous>  dextrose 10%. 1000 milliLiter(s) (40 mL/Hr) IV Continuous <Continuous>  doxycycline IVPB 100 milliGRAM(s) IV Intermittent every 12 hours  folic acid 1 milliGRAM(s) Oral daily  insulin glargine Injectable (LANTUS) 3 Unit(s) SubCutaneous at bedtime  levOCARNitine 330 milliGRAM(s) Oral every 8 hours  levothyroxine Injectable 50 MICROGram(s) IV Push at bedtime  meropenem  IVPB 1000 milliGRAM(s) IV Intermittent every 12 hours  multivitamin/minerals/iron Oral Solution (CENTRUM) 15 milliLiter(s) Oral daily  pantoprazole  Injectable 40 milliGRAM(s) IV Push daily  Phoxillum Filtration BK 4 / 2.5 5000 milliLiter(s) (1800 mL/Hr) CRRT <Continuous>  PrismaSOL Filtration BGK 4 / 2.5 5000 milliLiter(s) (200 mL/Hr) CRRT <Continuous>  PrismaSOL Filtration BGK 4 / 2.5 5000 milliLiter(s) (1000 mL/Hr) CRRT <Continuous>  rifAXIMin 550 milliGRAM(s) Oral two times a day  thiamine 100 milliGRAM(s) Oral daily  ursodiol Tablet 500 milliGRAM(s) Oral <User Schedule>    MEDICATIONS  (PRN):  fentaNYL    Injectable 25 MICROGram(s) IV Push every 4 hours PRN Moderate Pain (4 - 6)  sodium chloride 0.9% lock flush 10 milliLiter(s) IV Push every 1 hour PRN Pre/post blood products, medications, blood draw, and to maintain line patency  sodium chloride 0.9% lock flush 10 milliLiter(s) IV Push every 1 hour PRN Pre/post blood products, medications, blood draw, and to maintain line patency      Allergies    No Known Allergies    Intolerances          PHYSICAL EXAM:  VITALS: T(C): 37.3 (04-16-24 @ 12:00)  T(F): 99.1 (04-16-24 @ 12:00), Max: 99.5 (04-15-24 @ 20:00)  HR: 112 (04-16-24 @ 13:10) (93 - 126)  BP: 134/62 (04-16-24 @ 13:00) (84/45 - 154/73)  RR:  (17 - 31)  SpO2:  (92% - 100%)  Wt(kg): --  GENERAL: alert, intubated  EYES: No proptosis, no lid lag, anicteric  RESPIRATORY: intubated  CARDIOVASCULAR: peripheral edema  GI: non distended  EXT: GONZALEZ  PSYCH: Alert      A1C with Estimated Average Glucose Result: 5.2 % (04-13-24 @ 03:56)      POCT Blood Glucose.: 135 mg/dL (04-16-24 @ 13:55)  POCT Blood Glucose.: 107 mg/dL (04-16-24 @ 10:12)  POCT Blood Glucose.: 139 mg/dL (04-16-24 @ 08:00)  POCT Blood Glucose.: 148 mg/dL (04-16-24 @ 04:54)  POCT Blood Glucose.: 172 mg/dL (04-16-24 @ 02:51)  POCT Blood Glucose.: 177 mg/dL (04-15-24 @ 22:22)  POCT Blood Glucose.: 165 mg/dL (04-15-24 @ 20:26)  POCT Blood Glucose.: 159 mg/dL (04-15-24 @ 18:01)  POCT Blood Glucose.: 181 mg/dL (04-15-24 @ 17:59)  POCT Blood Glucose.: 145 mg/dL (04-15-24 @ 16:01)  POCT Blood Glucose.: 136 mg/dL (04-15-24 @ 14:02)  POCT Blood Glucose.: 124 mg/dL (04-15-24 @ 12:33)  POCT Blood Glucose.: 104 mg/dL (04-15-24 @ 10:36)  POCT Blood Glucose.: 131 mg/dL (04-15-24 @ 08:01)  POCT Blood Glucose.: 139 mg/dL (04-15-24 @ 06:10)  POCT Blood Glucose.: 145 mg/dL (04-15-24 @ 02:30)  POCT Blood Glucose.: 235 mg/dL (04-14-24 @ 22:23)  POCT Blood Glucose.: 175 mg/dL (04-14-24 @ 20:05)  POCT Blood Glucose.: 151 mg/dL (04-14-24 @ 17:48)  POCT Blood Glucose.: 120 mg/dL (04-14-24 @ 10:07)  POCT Blood Glucose.: 134 mg/dL (04-14-24 @ 08:10)  POCT Blood Glucose.: 148 mg/dL (04-14-24 @ 04:49)  POCT Blood Glucose.: 155 mg/dL (04-14-24 @ 03:49)  POCT Blood Glucose.: 161 mg/dL (04-14-24 @ 02:42)  POCT Blood Glucose.: 164 mg/dL (04-14-24 @ 02:40)  POCT Blood Glucose.: 167 mg/dL (04-14-24 @ 01:42)  POCT Blood Glucose.: 323 mg/dL (04-14-24 @ 00:41)  POCT Blood Glucose.: 76 mg/dL (04-13-24 @ 22:03)  POCT Blood Glucose.: 214 mg/dL (04-13-24 @ 17:46)  POCT Blood Glucose.: 53 mg/dL (04-13-24 @ 17:28)      04-16    140  |  106  |  <4<L>  ----------------------------<  141<H>  3.8   |  22  |  0.30<L>    eGFR: 131    Ca    8.3<L>      04-16  Mg     2.2     04-16  Phos  2.4     04-16    TPro  4.8<L>  /  Alb  3.3  /  TBili  8.9<H>  /  DBili  x   /  AST  113<H>  /  ALT  31  /  AlkPhos  121<H>  04-16      Thyroid Function Tests:  04-15 @ 13:04 TSH 3.67 FreeT4 -- T3 -- Anti TPO -- Anti Thyroglobulin Ab -- TSI --  04-13 @ 03:56 TSH 4.43 FreeT4 0.6 T3 47 Anti TPO -- Anti Thyroglobulin Ab -- TSI --

## 2024-04-16 NOTE — PROGRESS NOTE ADULT - ASSESSMENT
42F PMH DMT1, heterozygous Factor V leiden deficiency, hx of Left peroneal DVT (eliquis x1 year, stopped 5/2023), gout (has had numerous courses of steroids) and pancreas congenital abnormality associated with recurrent pancreatitis and extensive surgical hx (s/p distal pancreatectomy, cholecystectomy, splenectomy and celina-en-y pancreaticojejuonostomy (2014, [place], [facility]), s/p total pancreatectomy with islet cell autotransplant ([facility] 2018).   Now in the MICU with shock, hyperammonia, hyperbilirubinemia, anemia, thrombocytopenia, and worsening Leukocytosis on broad spectrum antimicrobials. Surgery team consulted for evaluation of possible bowel ischemia. MRCP completed, does not show any intra-hepatic ductal dilatation or evidence of obstruction as a cause for sepsis. Patient remains sedated/altered and unable to get reliable abdominal exam. Precedex off since yesterday AM and off of pressors overnight. CRRT at -100/hr.    Plan/Recs  - Continue to Hold Tube Feeds until able to get reliable abdominal exam  - Care per MICU    ACS/Trauma Surgery  [id number]

## 2024-04-16 NOTE — PROGRESS NOTE ADULT - ATTENDING COMMENTS
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 Hargrove), and now S/P total pancreatectomy  with islet cell autotransplant at [facility] by Dr. Gil in 04/2018), c/b recurrent abdominal wall collection requiring IR drainage, hx of RV thrombus/DVT/PE presented to OSH with acute change in mental status requiring intubation for airway protection, and transferred to [facility]    #Neuro: metabolic encephalopathy in the setting of elevated ammonia. Intubated for airway protection. EEG with no seizures.   Obtain STAT CT head  #CV: initially in shock on multiple pressors at OSH. Now off pressors POCUS today with large RV (?new). Will obtain STAT CTA  #Resp: intubated for airway protection. Continue with lung protective ventilation  #GI: -- ischemic colitis/GIB/?colon masses - GI and surgery input appreciated. No plans for colonoscopy right now.   -- elevated ammonia levels - now down to 100 with CRRT. May be related to  unclear etiology, related to GIB, infection, complication of bypass surgery. c/w Rifaximin and restart lactulose.   -- hyperbilirubinemia - MRCP negative. Dr. Gil (hepatobiliary surgeon) who does not think that her surgical anatomy is responsible for her elevated tbili.   #Renal: on CVVH for elevated ammonia levels as above  #Heme/Onc: Factor V leiden, hx of RV thrombus/DVT/PE.  Currently not on AC 2/2 thrombocytopenia. Monitor counts   #ID: hx fungemia and abd abscesses in the past. LLL PNA. s/p bronch with normal respiratory luisa. Stopped Caspo and Dapto. C/w Bobby and Doxy for now. ID input appreciated.   #Endocrine: DMI but hx of islet cell transplant. Currently on Lantus + D10 gtt.  Endo input appreciated  #PPX: restart SQH. f/u CTA. Discuss AC with heme  #GOC: Full code; prognosis guarded

## 2024-04-16 NOTE — PROGRESS NOTE ADULT - ATTENDING COMMENTS
CRRT for ammonia removal  Running well  Set for 100 ml net negative fluid- unclear reasons will discus w team    Pt has nl kidney function    - 100 per hour will result in -2,400 ml per day, plus insensible losses, will automatically cause hypovolemia

## 2024-04-16 NOTE — PROGRESS NOTE ADULT - ATTENDING COMMENTS
Slightly improved mentation today - eyes half open, answered weakly by nodding and shaking her head - denied abdom. pain, answered that she was cold and wanted warmer blanket.  LFTs slightly improved - MELD3.0 score would be 21 if not on CRRT which is done b/o high ammonia level; DD: severe alcohol-related hepatitis given macrocytosis, pattern of LFT elevation with high bilirubin, AST >> ALT, very low BUN <4 mg/dL and low creatinine suggestive of severe protein calorie malnutrition. DD includes SIBO and malnutrition due to other cause.  Parents at bedside - pt. lives with , drinks 1-2 glasses of wine now and then on week-ends during social events.     Recommendaitonss: start empiric IV thiamin 500 mL tid since patient is at risk for Wernicke syndrome given severe malnutrition and during re-feeding; f/u PETH and AIH markers Slightly improved mentation today - eyes half open, answered weakly by nodding and shaking her head - denied abdom. pain, answered that she was cold and wanted warmer blanket.  LFTs slightly improved - MELD3.0 score would be 21 if not on CRRT which is done b/o high ammonia level; DD: severe alcohol-related hepatitis given macrocytosis, pattern of LFT elevation with high bilirubin, AST >> ALT, very low BUN <4 mg/dL and low creatinine suggestive of severe protein calorie malnutrition. DD includes SIBO and malnutrition due to other cause.  Parents at bedside - pt. lives with , drinks 1-2 glasses of wine now and then on week-ends during social events.     Recommendaitonss: start empiric IV thiamin 250 mL one to three times daily since patient is at risk for Wernicke syndrome given severe malnutrition and during re-feeding; f/u PETH and AIH markers

## 2024-04-16 NOTE — CHART NOTE - NSCHARTNOTEFT_GEN_A_CORE
Interim Note    Pt seen for verbal consult. Chart reviewed and events noted (see full assessment from yesterday). Team with question regarding Levocarnitine and other micronutrient supplementation for possible short bowel syndrome. At this time no other micronutrient supplementation recommended until clinical picture more clear. Team also requesting enteral nutrition recommendations lower in protein secondary to high ammonia levels. RD reviewed elemental formulas available as pt with suspicion for bowel ischemia.    Diet, NPO:   Except Medications (04-16-24 @ 11:59)    MEDICATIONS  (STANDING):  dextrose 10%. 1000 milliLiter(s) (40 mL/Hr) IV Continuous <Continuous>  doxycycline IVPB 100 milliGRAM(s) IV Intermittent every 12 hours  folic acid 1 milliGRAM(s) Oral daily  insulin glargine Injectable (LANTUS) 3 Unit(s) SubCutaneous at bedtime  levOCARNitine 330 milliGRAM(s) Oral every 8 hours  levothyroxine Injectable 50 MICROGram(s) IV Push at bedtime  meropenem  IVPB 1000 milliGRAM(s) IV Intermittent every 12 hours  multivitamin/minerals/iron Oral Solution (CENTRUM) 15 milliLiter(s) Oral daily  pantoprazole  Injectable 40 milliGRAM(s) IV Push daily  Phoxillum Filtration BK 4 / 2.5 5000 milliLiter(s) (1800 mL/Hr) CRRT <Continuous>  PrismaSOL Filtration BGK 4 / 2.5 5000 milliLiter(s) (1000 mL/Hr) CRRT <Continuous>  PrismaSOL Filtration BGK 4 / 2.5 5000 milliLiter(s) (200 mL/Hr) CRRT <Continuous>  rifAXIMin 550 milliGRAM(s) Oral two times a day  thiamine 100 milliGRAM(s) Oral daily  ursodiol Tablet 500 milliGRAM(s) Oral <User Schedule>    04-16 Na 140 mmol/L Glu 141 mg/dL<H> K+ 3.8 mmol/L Cr  0.30 mg/dL<L> BUN <4 mg/dL<L> Phos 2.4 mg/dL<L> Alb 3.3 g/dL  Hgb 8.2 g/dL<L> Hct 23.8 %<L>    Recommendations:  1) If to feed enteral, trickle of Vital 1.5 only at 10 mL/hr x24 hours.  -> Trend for refeeding risk! K, Phos, and Mg and replete as needed/able.  2) As medically feasible, continue to provide multivitamin, thiamine, and folic acid.   3) Consider TPN consult     Pt made aware RD to remain available.   Shannon Izaguirre, MS, RD, CDN, CNSC, [facility] TEAMS

## 2024-04-16 NOTE — PROGRESS NOTE ADULT - ASSESSMENT
Patient is a 48y old  Female who presents with a chief complaint of Liver failure without hepatic coma    Subjective: Appears slightly more awake, some movement of extremities, but remains unarousable.    Thrombocytopenia  Patient follows with Dr. Lux [facility].  Baseline hemoglobin 10-12, platelets normal.  Acute decrease of cell lines likely secondary to ongoing infection, inflammation, acute illness. Values were at her baseline in February 2024 when she was last in clinic.   No evidence of nutritional deficits. LDH is normal. I suspect low haptoglobin is likely due to ongoing liver disease. Will review smear tomorrow though noted CBC had previously only showed mild schistocytes.   Occult blood positive; follow-up GI/hepatology recommendations.   s/p 1u Plt (4/13)   Monitor and maintain HGB > 7, PLT > 10    Factor 5 Leiden  #History of L peroneal DVT in 2022  #History of RA thrombus in 2018  >TTE (4/13): No thrombus commented  - Eliquis stopped 5/2023 by outpatient hematology given consistently negative DVT studies  - Hold AC iso thrombocytopenia   - f/u Heme recs      Macrocytic anemia  >s/p 2u PRBC (4/13, 4/16)  >Multifactorial in nature. No hx of bleeding but with coagulopathy as below.   >B12 (4/13): >2000, Folate: 8.6  >Iron studies (4/13): Ferritin 653, Iron 43, Unmeasurable TIBC/Iron%   >Hemolysis lab (4/14): Haptoglobin < 20;   - Possibly iso liver disease vs. acute infection/inflammation vs. ?hemolysis  - Trend CBC, coag, transfuse goal > 7        Will continue to follow    Renuka Davis NP  Hematology/Oncology  [facility]  [phone] (office)

## 2024-04-16 NOTE — PROGRESS NOTE ADULT - ATTENDING COMMENTS
Agree with Dr. Jett's assessment and plan as outlined above. Reviewed all pertinent labs, and imaging studies. Modifications made as indicated above. 42 F with Factor V leiden deficiency, gout, congenital abnormality of the pancreas complicated by recurrent pancreatitis now s/p distal pancreatectomy, Type 3c DM now s/p total pancreatectomy with islet cell autotransplant in 2018 here for AMS, vomiting and diarrhea, found to have hepatic encephalopathy, here for liver transplant evaluation. Endocrinology consulted for diabetes management. Patient diagnosed with type 3c DM in 2014 after her initial pancreatectomy and has been on insulin every since. She has noted to have decreasing insulin requirement over the years, but never off insulin. No outpatient c peptide in the system. On omnipod DASH with DEXCOM outpatient with total basal unit of 2.85. Would likely to assess for patient's pancreatic reserve with c peptide, but patient currently on D20, which would skew the result. For now continue with low dose lantus assuming patient is insulin deficient. Check am cortisol and ACTH for adrenal insufficiency. Rest of the plan as above. Complex case, high risk patient, high-level decision-making, requiring ICU level of care.

## 2024-04-16 NOTE — PROGRESS NOTE ADULT - SUBJECTIVE AND OBJECTIVE BOX
Patient is a 48y old  Female who presents with a chief complaint of Liver failure without hepatic coma     (15 Apr 2024 14:30)      24 hour events: ***    REVIEW OF SYSTEMS:  Constitutional: [ ] fevers [ ] chills [ ] weight loss [ ] weight gain  HEENT: [ ] dry eyes [ ] eye irritation [ ] postnasal drip [ ] nasal congestion  CV: [ ] chest pain [ ] orthopnea [ ] palpitations [ ] murmur  Resp: [ ] cough [ ] shortness of breath [ ] dyspnea [ ] wheezing [ ] sputum [ ] hemoptysis  GI: [ ] nausea [ ] vomiting [ ] diarrhea [ ] constipation [ ] abd pain [ ] dysphagia   : [ ] dysuria [ ] nocturia [ ] hematuria [ ] increased urinary frequency  Musculoskeletal: [ ] back pain [ ] myalgias [ ] arthralgias [ ] fracture  Skin: [ ] rash [ ] itch  Neurological: [ ] headache [ ] dizziness [ ] syncope [ ] weakness [ ] numbness  Psychiatric: [ ] anxiety [ ] depression  Endocrine: [ ] diabetes [ ] thyroid problem  Hematologic/Lymphatic: [ ] anemia [ ] bleeding problem  Allergic/Immunologic: [ ] itchy eyes [ ] nasal discharge [ ] hives [ ] angioedema  [ ] All other systems negative  [ ] Unable to assess ROS because ________    OBJECTIVE:  ICU Vital Signs Last 24 Hrs  T(C): 37.2 (16 Apr 2024 04:00), Max: 37.5 (15 Apr 2024 20:00)  T(F): 99 (16 Apr 2024 04:00), Max: 99.5 (15 Apr 2024 20:00)  HR: 98 (16 Apr 2024 07:00) (73 - 126)  BP: 119/56 (16 Apr 2024 07:00) (83/52 - 154/73)  BP(mean): 81 (16 Apr 2024 07:00) (59 - 106)  ABP: --  ABP(mean): --  RR: 20 (16 Apr 2024 07:00) (16 - 31)  SpO2: 100% (16 Apr 2024 07:00) (92% - 100%)    O2 Parameters below as of 15 Apr 2024 20:00  Patient On (Oxygen Delivery Method): ventilator    O2 Concentration (%): 30      Mode: AC/ CMV (Assist Control/ Continuous Mandatory Ventilation), RR (machine): 12, TV (machine): 400, FiO2: 30, PEEP: 5, ITime: 0.1, MAP: 11, PIP: 21    04-15 @ 07:01  -  04-16 @ 07:00  --------------------------------------------------------  IN: 1947.9 mL / OUT: 4015 mL / NET: -2067.1 mL      CAPILLARY BLOOD GLUCOSE      POCT Blood Glucose.: 148 mg/dL (16 Apr 2024 04:54)      PHYSICAL EXAM:  GENERAL: NAD, well-developed  HEAD:  Atraumatic, Normocephalic  EYES: EOMI, PERRLA, conjunctiva and sclera clear  NECK: Supple, No JVD, Thyroid not palpable, non tender, Trachea midline  CHEST/LUNG: ( )ETT in place, ( )Tracheostomy in place, ( )no chest deformity,  ( )  Normal expansion/effort/palpation,  ( )Normal percussion/auscultation,  Clear to auscultation bilaterally; No wheeze  HEART: Regular rate and rhythm; No murmurs, rubs, or gallops, ( ) No JVD, ( )Normal Pulses, ( )Edema   ABDOMEN: Soft, Nontender, Nondistended; Bowel sounds presen, ( ) No Masses, (  ) No organomegaly,  (  ) Non-tender normal BS   : Berry in Place, Voiding freely  Musculoskeletal/EXTREMITIES:(  ) Normal strength, movement, and tone, (  ) No focal atropy, (  ) Normal ROM, (  ) Normal digits and nails,  2+ Peripheral Pulses, No clubbing, cyanosis, or edema  PSYCH: AAOx3, (  ) Normal mood/affect/judgment/insight, (  ) Intact memory,   NEUROLOGY: non-focal, exam  SKIN: No rashes or lesions      LINES:    HOSPITAL MEDICATIONS:  MEDICATIONS  (STANDING):  chlorhexidine 0.12% Liquid 15 milliLiter(s) Oral Mucosa every 12 hours  chlorhexidine 4% Liquid 1 Application(s) Topical <User Schedule>  chlorhexidine 4% Liquid 1 Application(s) Topical <User Schedule>  CRRT Treatment    <Continuous>  DAPTOmycin IVPB 500 milliGRAM(s) IV Intermittent every 24 hours  dextrose 10%. 1000 milliLiter(s) (40 mL/Hr) IV Continuous <Continuous>  doxycycline IVPB 100 milliGRAM(s) IV Intermittent every 12 hours  folic acid 1 milliGRAM(s) Oral daily  insulin glargine Injectable (LANTUS) 3 Unit(s) SubCutaneous at bedtime  levOCARNitine 330 milliGRAM(s) Oral every 8 hours  levothyroxine Injectable 50 MICROGram(s) IV Push at bedtime  meropenem  IVPB 1000 milliGRAM(s) IV Intermittent every 12 hours  multivitamin/minerals/iron Oral Solution (CENTRUM) 15 milliLiter(s) Oral daily  pantoprazole  Injectable 40 milliGRAM(s) IV Push daily  Phoxillum Filtration BK 4 / 2.5 5000 milliLiter(s) (1800 mL/Hr) CRRT <Continuous>  PrismaSOL Filtration BGK 4 / 2.5 5000 milliLiter(s) (1000 mL/Hr) CRRT <Continuous>  PrismaSOL Filtration BGK 4 / 2.5 5000 milliLiter(s) (200 mL/Hr) CRRT <Continuous>  rifAXIMin 550 milliGRAM(s) Oral two times a day  thiamine 100 milliGRAM(s) Oral daily  ursodiol Tablet 500 milliGRAM(s) Oral <User Schedule>    MEDICATIONS  (PRN):  fentaNYL    Injectable 25 MICROGram(s) IV Push every 4 hours PRN Moderate Pain (4 - 6)  sodium chloride 0.9% lock flush 10 milliLiter(s) IV Push every 1 hour PRN Pre/post blood products, medications, blood draw, and to maintain line patency  sodium chloride 0.9% lock flush 10 milliLiter(s) IV Push every 1 hour PRN Pre/post blood products, medications, blood draw, and to maintain line patency      LABS:                        6.6    16.56 )-----------( 52       ( 16 Apr 2024 05:48 )             20.1     Hgb Trend: 6.6<--, 7.0<--, 7.1<--, 7.4<--, 7.4<--  04-16    139  |  107  |  <4<L>  ----------------------------<  139<H>  3.8   |  23  |  0.31<L>    Ca    8.6      16 Apr 2024 05:48  Phos  2.3     04-16  Mg     2.1     04-16    TPro  4.8<L>  /  Alb  3.1<L>  /  TBili  9.4<H>  /  DBili  x   /  AST  127<H>  /  ALT  30  /  AlkPhos  120  04-16    Creatinine Trend: 0.31<--, 0.39<--, 0.38<--, 0.36<--, 0.36<--, 0.45<--  PT/INR - ( 16 Apr 2024 00:24 )   PT: 14.2 sec;   INR: 1.30 ratio         PTT - ( 16 Apr 2024 00:24 )  PTT:37.2 sec  Urinalysis Basic - ( 16 Apr 2024 05:48 )    Color: x / Appearance: x / SG: x / pH: x  Gluc: 139 mg/dL / Ketone: x  / Bili: x / Urobili: x   Blood: x / Protein: x / Nitrite: x   Leuk Esterase: x / RBC: x / WBC x   Sq Epi: x / Non Sq Epi: x / Bacteria: x      Arterial Blood Gas:  04-16 @ 00:48  7.48/29/172/22/97.8/-1.7  ABG lactate: --  Arterial Blood Gas:  04-15 @ 00:24  7.38/37/173/22/97.4/-2.9  ABG lactate: --    Venous Blood Gas:  04-15 @ 04:34  7.36/39/54/22/81.5  VBG Lactate: 2.6  Venous Blood Gas:  04-15 @ 02:40  7.37/40/60/23/97.8  VBG Lactate: 2.9  Venous Blood Gas:  04-14 @ 11:44  7.39/40/46/24/71.6  VBG Lactate: 2.5      EKG:    MICROBIOLOGY:     RADIOLOGY:  [ ] Reviewed and interpreted by me    EKG:  MICROBIOLOGY:     Radiology: ***    Bedside lung ultrasound: ***    Bedside ECHO: ***    EKG:    CENTRAL LINE: Y/N          DATE INSERTED:              REMOVE: Y/N    BERRY: Y/N                        DATE INSERTED:              REMOVE: Y/N    A-LINE: Y/N                       DATE INSERTED:              REMOVE: Y/N    GLOBAL ISSUE/BEST PRACTICE:  Analgesia:  Sedation:  HOB elevation: yes  Stress ulcer prophylaxis:  VTE prophylaxis:  Glycemic control:  Nutrition:    CODE STATUS: ***  [facility] discussion: Y     Patient is a 48y old  Female who presents with a chief complaint of Liver failure without hepatic coma     (15 Apr 2024 14:30)      24 hour events: Lost some blood to CRRT during CT scan, Hgb dropped to 6.6, s/p 1 uPRBC.  Subjective: Appears slightly more awake, some movement of extremities, but remains unarousable.      OBJECTIVE:  ICU Vital Signs Last 24 Hrs  T(C): 37.2 (16 Apr 2024 04:00), Max: 37.5 (15 Apr 2024 20:00)  T(F): 99 (16 Apr 2024 04:00), Max: 99.5 (15 Apr 2024 20:00)  HR: 98 (16 Apr 2024 07:00) (73 - 126)  BP: 119/56 (16 Apr 2024 07:00) (83/52 - 154/73)  BP(mean): 81 (16 Apr 2024 07:00) (59 - 106)  ABP: --  ABP(mean): --  RR: 20 (16 Apr 2024 07:00) (16 - 31)  SpO2: 100% (16 Apr 2024 07:00) (92% - 100%)    O2 Parameters below as of 15 Apr 2024 20:00  Patient On (Oxygen Delivery Method): ventilator    O2 Concentration (%): 30      Mode: AC/ CMV (Assist Control/ Continuous Mandatory Ventilation), RR (machine): 12, TV (machine): 400, FiO2: 30, PEEP: 5, ITime: 0.1, MAP: 11, PIP: 21    04-15 @ 07:01  -  04-16 @ 07:00  --------------------------------------------------------  IN: 1947.9 mL / OUT: 4015 mL / NET: -2067.1 mL      CAPILLARY BLOOD GLUCOSE      POCT Blood Glucose.: 148 mg/dL (16 Apr 2024 04:54)      PHYSICAL EXAM:  GENERAL: AAOx0, intubated not sedated  Cardiovascular: RRR, S1 S2, no m/r/g  LUNGS: CTABL  ABDOMEN: Soft, slightly distended  EXTREMITIES: Extremities warm, 2-3+ edema in all 4 extremities, jaundiced  NEURO: PERRLA, unable to assess rest of neuro exam      LINES:  R subclavian  L [facility] MEDICATIONS:  MEDICATIONS  (STANDING):  chlorhexidine 0.12% Liquid 15 milliLiter(s) Oral Mucosa every 12 hours  chlorhexidine 4% Liquid 1 Application(s) Topical <User Schedule>  chlorhexidine 4% Liquid 1 Application(s) Topical <User Schedule>  CRRT Treatment    <Continuous>  DAPTOmycin IVPB 500 milliGRAM(s) IV Intermittent every 24 hours  dextrose 10%. 1000 milliLiter(s) (40 mL/Hr) IV Continuous <Continuous>  doxycycline IVPB 100 milliGRAM(s) IV Intermittent every 12 hours  folic acid 1 milliGRAM(s) Oral daily  insulin glargine Injectable (LANTUS) 3 Unit(s) SubCutaneous at bedtime  levOCARNitine 330 milliGRAM(s) Oral every 8 hours  levothyroxine Injectable 50 MICROGram(s) IV Push at bedtime  meropenem  IVPB 1000 milliGRAM(s) IV Intermittent every 12 hours  multivitamin/minerals/iron Oral Solution (CENTRUM) 15 milliLiter(s) Oral daily  pantoprazole  Injectable 40 milliGRAM(s) IV Push daily  Phoxillum Filtration BK 4 / 2.5 5000 milliLiter(s) (1800 mL/Hr) CRRT <Continuous>  PrismaSOL Filtration BGK 4 / 2.5 5000 milliLiter(s) (1000 mL/Hr) CRRT <Continuous>  PrismaSOL Filtration BGK 4 / 2.5 5000 milliLiter(s) (200 mL/Hr) CRRT <Continuous>  rifAXIMin 550 milliGRAM(s) Oral two times a day  thiamine 100 milliGRAM(s) Oral daily  ursodiol Tablet 500 milliGRAM(s) Oral <User Schedule>    MEDICATIONS  (PRN):  fentaNYL    Injectable 25 MICROGram(s) IV Push every 4 hours PRN Moderate Pain (4 - 6)  sodium chloride 0.9% lock flush 10 milliLiter(s) IV Push every 1 hour PRN Pre/post blood products, medications, blood draw, and to maintain line patency  sodium chloride 0.9% lock flush 10 milliLiter(s) IV Push every 1 hour PRN Pre/post blood products, medications, blood draw, and to maintain line patency      LABS:                        6.6    16.56 )-----------( 52       ( 16 Apr 2024 05:48 )             20.1     Hgb Trend: 6.6<--, 7.0<--, 7.1<--, 7.4<--, 7.4<--  04-16    139  |  107  |  <4<L>  ----------------------------<  139<H>  3.8   |  23  |  0.31<L>    Ca    8.6      16 Apr 2024 05:48  Phos  2.3     04-16  Mg     2.1     04-16    TPro  4.8<L>  /  Alb  3.1<L>  /  TBili  9.4<H>  /  DBili  x   /  AST  127<H>  /  ALT  30  /  AlkPhos  120  04-16    Creatinine Trend: 0.31<--, 0.39<--, 0.38<--, 0.36<--, 0.36<--, 0.45<--  PT/INR - ( 16 Apr 2024 00:24 )   PT: 14.2 sec;   INR: 1.30 ratio         PTT - ( 16 Apr 2024 00:24 )  PTT:37.2 sec  Urinalysis Basic - ( 16 Apr 2024 05:48 )    Color: x / Appearance: x / SG: x / pH: x  Gluc: 139 mg/dL / Ketone: x  / Bili: x / Urobili: x   Blood: x / Protein: x / Nitrite: x   Leuk Esterase: x / RBC: x / WBC x   Sq Epi: x / Non Sq Epi: x / Bacteria: x      Arterial Blood Gas:  04-16 @ 00:48  7.48/29/172/22/97.8/-1.7  ABG lactate: --  Arterial Blood Gas:  04-15 @ 00:24  7.38/37/173/22/97.4/-2.9  ABG lactate: --    Venous Blood Gas:  04-15 @ 04:34  7.36/39/54/22/81.5  VBG Lactate: 2.6  Venous Blood Gas:  04-15 @ 02:40  7.37/40/60/23/97.8  VBG Lactate: 2.9  Venous Blood Gas:  04-14 @ 11:44  7.39/40/46/24/71.6  VBG Lactate: 2.5      EKG:    MICROBIOLOGY:     RADIOLOGY:  [ ] Reviewed and interpreted by me    EKG:  MICROBIOLOGY:     Radiology: ***    Bedside lung ultrasound: ***    Bedside ECHO: ***    EKG:    CENTRAL LINE: Y/N          DATE INSERTED:              REMOVE: Y/N    BERRY: Y/N                        DATE INSERTED:              REMOVE: Y/N    A-LINE: Y/N                       DATE INSERTED:              REMOVE: Y/N    GLOBAL ISSUE/BEST PRACTICE:  Analgesia:  Sedation:  HOB elevation: yes  Stress ulcer prophylaxis:  VTE prophylaxis:  Glycemic control:  Nutrition:    CODE STATUS: ***  [facility] discussion: Y

## 2024-04-16 NOTE — PROGRESS NOTE ADULT - SUBJECTIVE AND OBJECTIVE BOX
SURGERY PROGRESS NOTE    Interval events  - No acute events.  - No transfusions in last 24 hour. Ordered for RBC x1 this AM for hgb 6.6  - Leukocytosis lateral at 16  - Tbili 9.4 (10.2)          OBJECTIVE:   Vital Signs Last 24 Hrs  T(C): 37.2 (16 Apr 2024 08:00), Max: 37.5 (15 Apr 2024 20:00)  T(F): 99 (16 Apr 2024 08:00), Max: 99.5 (15 Apr 2024 20:00)  HR: 103 (16 Apr 2024 08:00) (75 - 126)  BP: 127/60 (16 Apr 2024 08:00) (83/53 - 154/73)  BP(mean): 87 (16 Apr 2024 08:00) (59 - 106)  RR: 19 (16 Apr 2024 08:00) (17 - 31)  SpO2: 100% (16 Apr 2024 08:00) (92% - 100%)    Parameters below as of 16 Apr 2024 08:00  Patient On (Oxygen Delivery Method): ventilator    O2 Concentration (%): 30        I&O's Summary    15 Apr 2024 07:01  -  16 Apr 2024 07:00  --------------------------------------------------------  IN: 1947.9 mL / OUT: 4279 mL / NET: -2331.1 mL    16 Apr 2024 07:01  -  16 Apr 2024 08:42  --------------------------------------------------------  IN: 70 mL / OUT: 292 mL / NET: -222 mL      I&O's Detail    15 Apr 2024 07:01  -  16 Apr 2024 07:00  --------------------------------------------------------  IN:    Dexmedetomidine: 9.4 mL    dextrose 10%: 200 mL    dextrose 20%: 475 mL    Enteral Tube Flush: 230 mL    IV PiggyBack: 720 mL    Norepinephrine: 2.9 mL    Norepinephrine: 10.6 mL    PRBCs (Packed Red Blood Cells): 300 mL  Total IN: 1947.9 mL    OUT:    Indwelling Catheter - Urethral (mL): 710 mL    Other (mL): 2419 mL    Rectal Tube (mL): 1150 mL  Total OUT: 4279 mL    Total NET: -2331.1 mL      16 Apr 2024 07:01  -  16 Apr 2024 08:42  --------------------------------------------------------  IN:    dextrose 10%: 40 mL    Enteral Tube Flush: 30 mL  Total IN: 70 mL    OUT:    Indwelling Catheter - Urethral (mL): 45 mL    Other (mL): 247 mL  Total OUT: 292 mL    Total NET: -222 mL          MEDICATIONS  (STANDING):  chlorhexidine 0.12% Liquid 15 milliLiter(s) Oral Mucosa every 12 hours  chlorhexidine 4% Liquid 1 Application(s) Topical <User Schedule>  chlorhexidine 4% Liquid 1 Application(s) Topical <User Schedule>  CRRT Treatment    <Continuous>  dextrose 10%. 1000 milliLiter(s) (40 mL/Hr) IV Continuous <Continuous>  doxycycline IVPB 100 milliGRAM(s) IV Intermittent every 12 hours  folic acid 1 milliGRAM(s) Oral daily  insulin glargine Injectable (LANTUS) 3 Unit(s) SubCutaneous at bedtime  levOCARNitine 330 milliGRAM(s) Oral every 8 hours  levothyroxine Injectable 50 MICROGram(s) IV Push at bedtime  meropenem  IVPB 1000 milliGRAM(s) IV Intermittent every 12 hours  multivitamin/minerals/iron Oral Solution (CENTRUM) 15 milliLiter(s) Oral daily  pantoprazole  Injectable 40 milliGRAM(s) IV Push daily  Phoxillum Filtration BK 4 / 2.5 5000 milliLiter(s) (1800 mL/Hr) CRRT <Continuous>  PrismaSOL Filtration BGK 4 / 2.5 5000 milliLiter(s) (200 mL/Hr) CRRT <Continuous>  PrismaSOL Filtration BGK 4 / 2.5 5000 milliLiter(s) (1000 mL/Hr) CRRT <Continuous>  rifAXIMin 550 milliGRAM(s) Oral two times a day  thiamine 100 milliGRAM(s) Oral daily  ursodiol Tablet 500 milliGRAM(s) Oral <User Schedule>    MEDICATIONS  (PRN):  fentaNYL    Injectable 25 MICROGram(s) IV Push every 4 hours PRN Moderate Pain (4 - 6)  sodium chloride 0.9% lock flush 10 milliLiter(s) IV Push every 1 hour PRN Pre/post blood products, medications, blood draw, and to maintain line patency  sodium chloride 0.9% lock flush 10 milliLiter(s) IV Push every 1 hour PRN Pre/post blood products, medications, blood draw, and to maintain line patency      LABS:                        6.6    16.56 )-----------( 52       ( 16 Apr 2024 05:48 )             20.1     04-16    139  |  107  |  <4<L>  ----------------------------<  139<H>  3.8   |  23  |  0.31<L>    Ca    8.6      16 Apr 2024 05:48  Phos  2.3     04-16  Mg     2.1     04-16    TPro  4.8<L>  /  Alb  3.1<L>  /  TBili  9.4<H>  /  DBili  x   /  AST  127<H>  /  ALT  30  /  AlkPhos  120  04-16    PT/INR - ( 16 Apr 2024 00:24 )   PT: 14.2 sec;   INR: 1.30 ratio         PTT - ( 16 Apr 2024 00:24 )  PTT:37.2 sec  Urinalysis Basic - ( 16 Apr 2024 05:48 )    Color: x / Appearance: x / SG: x / pH: x  Gluc: 139 mg/dL / Ketone: x  / Bili: x / Urobili: x   Blood: x / Protein: x / Nitrite: x   Leuk Esterase: x / RBC: x / WBC x   Sq Epi: x / Non Sq Epi: x / Bacteria: x        RADIOLOGY & ADDITIONAL STUDIES:         SURGERY PROGRESS NOTE    Interval events  - No acute events.  - No transfusions in last 24 hour. Ordered for RBC x1 this AM for hgb 6.6  - Leukocytosis lateral at 16  - Tbili 9.4 (10.2)  - Precedex off since yesterday AM  - No pressors overnight, CRRT at -100/hr        OBJECTIVE:   Vital Signs Last 24 Hrs  T(C): 37.2 (16 Apr 2024 08:00), Max: 37.5 (15 Apr 2024 20:00)  T(F): 99 (16 Apr 2024 08:00), Max: 99.5 (15 Apr 2024 20:00)  HR: 103 (16 Apr 2024 08:00) (75 - 126)  BP: 127/60 (16 Apr 2024 08:00) (83/53 - 154/73)  BP(mean): 87 (16 Apr 2024 08:00) (59 - 106)  RR: 19 (16 Apr 2024 08:00) (17 - 31)  SpO2: 100% (16 Apr 2024 08:00) (92% - 100%)    Parameters below as of 16 Apr 2024 08:00  Patient On (Oxygen Delivery Method): ventilator    O2 Concentration (%): 30        I&O's Summary    15 Apr 2024 07:01  -  16 Apr 2024 07:00  --------------------------------------------------------  IN: 1947.9 mL / OUT: 4279 mL / NET: -2331.1 mL    16 Apr 2024 07:01  -  16 Apr 2024 08:42  --------------------------------------------------------  IN: 70 mL / OUT: 292 mL / NET: -222 mL      I&O's Detail    15 Apr 2024 07:01  -  16 Apr 2024 07:00  --------------------------------------------------------  IN:    Dexmedetomidine: 9.4 mL    dextrose 10%: 200 mL    dextrose 20%: 475 mL    Enteral Tube Flush: 230 mL    IV PiggyBack: 720 mL    Norepinephrine: 2.9 mL    Norepinephrine: 10.6 mL    PRBCs (Packed Red Blood Cells): 300 mL  Total IN: 1947.9 mL    OUT:    Indwelling Catheter - Urethral (mL): 710 mL    Other (mL): 2419 mL    Rectal Tube (mL): 1150 mL  Total OUT: 4279 mL    Total NET: -2331.1 mL      16 Apr 2024 07:01  -  16 Apr 2024 08:42  --------------------------------------------------------  IN:    dextrose 10%: 40 mL    Enteral Tube Flush: 30 mL  Total IN: 70 mL    OUT:    Indwelling Catheter - Urethral (mL): 45 mL    Other (mL): 247 mL  Total OUT: 292 mL    Total NET: -222 mL          MEDICATIONS  (STANDING):  chlorhexidine 0.12% Liquid 15 milliLiter(s) Oral Mucosa every 12 hours  chlorhexidine 4% Liquid 1 Application(s) Topical <User Schedule>  chlorhexidine 4% Liquid 1 Application(s) Topical <User Schedule>  CRRT Treatment    <Continuous>  dextrose 10%. 1000 milliLiter(s) (40 mL/Hr) IV Continuous <Continuous>  doxycycline IVPB 100 milliGRAM(s) IV Intermittent every 12 hours  folic acid 1 milliGRAM(s) Oral daily  insulin glargine Injectable (LANTUS) 3 Unit(s) SubCutaneous at bedtime  levOCARNitine 330 milliGRAM(s) Oral every 8 hours  levothyroxine Injectable 50 MICROGram(s) IV Push at bedtime  meropenem  IVPB 1000 milliGRAM(s) IV Intermittent every 12 hours  multivitamin/minerals/iron Oral Solution (CENTRUM) 15 milliLiter(s) Oral daily  pantoprazole  Injectable 40 milliGRAM(s) IV Push daily  Phoxillum Filtration BK 4 / 2.5 5000 milliLiter(s) (1800 mL/Hr) CRRT <Continuous>  PrismaSOL Filtration BGK 4 / 2.5 5000 milliLiter(s) (200 mL/Hr) CRRT <Continuous>  PrismaSOL Filtration BGK 4 / 2.5 5000 milliLiter(s) (1000 mL/Hr) CRRT <Continuous>  rifAXIMin 550 milliGRAM(s) Oral two times a day  thiamine 100 milliGRAM(s) Oral daily  ursodiol Tablet 500 milliGRAM(s) Oral <User Schedule>    MEDICATIONS  (PRN):  fentaNYL    Injectable 25 MICROGram(s) IV Push every 4 hours PRN Moderate Pain (4 - 6)  sodium chloride 0.9% lock flush 10 milliLiter(s) IV Push every 1 hour PRN Pre/post blood products, medications, blood draw, and to maintain line patency  sodium chloride 0.9% lock flush 10 milliLiter(s) IV Push every 1 hour PRN Pre/post blood products, medications, blood draw, and to maintain line patency    PHYSICAL EXAM  General: laying in bed, intubated, eyes open slightly to voice  Respiratory: MV  Abdomen: soft, mildly distended, rectal tube with brown output    LABS:                        6.6    16.56 )-----------( 52       ( 16 Apr 2024 05:48 )             20.1     04-16    139  |  107  |  <4<L>  ----------------------------<  139<H>  3.8   |  23  |  0.31<L>    Ca    8.6      16 Apr 2024 05:48  Phos  2.3     04-16  Mg     2.1     04-16    TPro  4.8<L>  /  Alb  3.1<L>  /  TBili  9.4<H>  /  DBili  x   /  AST  127<H>  /  ALT  30  /  AlkPhos  120  04-16    PT/INR - ( 16 Apr 2024 00:24 )   PT: 14.2 sec;   INR: 1.30 ratio         PTT - ( 16 Apr 2024 00:24 )  PTT:37.2 sec  Urinalysis Basic - ( 16 Apr 2024 05:48 )    Color: x / Appearance: x / SG: x / pH: x  Gluc: 139 mg/dL / Ketone: x  / Bili: x / Urobili: x   Blood: x / Protein: x / Nitrite: x   Leuk Esterase: x / RBC: x / WBC x   Sq Epi: x / Non Sq Epi: x / Bacteria: x        RADIOLOGY & ADDITIONAL STUDIES:

## 2024-04-16 NOTE — PROGRESS NOTE ADULT - PROBLEM SELECTOR PLAN 2
Pt. with fluid overload. Plan to continue CRRT, as outlined above. Net negative fluid balance. Monitor daily body weights.     If you have any questions, please feel free to contact me  Catia Girard  Nephrology Fellow  [phone] / [employer] Teams(Preferred)  (After 5pm or on weekends please page the on-call fellow).

## 2024-04-16 NOTE — PROGRESS NOTE ADULT - ATTENDING COMMENTS
ATTENDING ATTESTATION  I have seen and examined this patient on rounds thismorning with the surgery team. I have reviewed all new labs, imaging and reports. I have participated in formulating the plan for the day, and have read and agree with the history, ROS, exam, assessment and plan as stated above.     Ammonia level improved; more awake today and able to answer questions on exam.   Denies abdominal tenderness today.   I was originally concerned by the increase in WBC without identified source, but now without abdominal tenderness, colitis is less likely the source.   OK to start tube feeds at trickle.   I recommend waiting to advance feeds or to start PO lactulose until tolerating trickle feeds without abd distension or worsening exam.     Total time spent in the care of this patient today (excluding critical care, teaching & procedures): 25 min                 Over 50% of the total time was spent in discussion and coordination of care with consulting services, dietary and rehab services.     Janis Solo M.D., M.S.  Division of Acute Care Surgery

## 2024-04-17 NOTE — PHYSICAL THERAPY INITIAL EVALUATION ADULT - PERTINENT HX OF CURRENT PROBLEM, REHAB EVAL
42F Hx HTN, DM, Factor V Leiden Deficiency, Gout, Congenital Pancreas Divisum (PD), Recurrent Pancreatitis s/p Distal Pancreatectomy, Cholecystectomy, Splectomy, Kevin-En-Y Pancreaticojejunostomy 2/2014 [facility], Total Pancreatotomy with Islet Cell Autotransplant at [facility] [id number], Post Op Abdominal/Peritoneal Multiloculated Abscesses, Enterocutaneous Fistula, Multiple Abdominal Surgeries > 20s, Infected Abdominal Wound Mesh on Antibacterial and Fungal ABx admitted to [facility] 4/8 NBNB Vomiting and Watery Diarrhea x 4 days, PPOI, Somnolence (Sleep 16-20Hr/Day) found elevated Ammonia >200, Elevated LFTs, worsening Metabolic vs. Hepatic Encephalopathy, intubated for airway protection. [facility].

## 2024-04-17 NOTE — PROGRESS NOTE ADULT - ASSESSMENT
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   Now in the MICU with shock, hyperammonia, hyperbilirubinemia, anemia, thrombocytopenia, and worsening Leukocytosis on broad spectrum antimicrobials. Patient currently awake and able to answer yes/no questions, denying abdominal pain. WBC lateral from yesterday's labs, team concerned for colitis as a cause of the white count.     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   Now in the MICU with shock, hyperammonemia hyperbilirubinemia, anemia, thrombocytopenia, and worsening Leukocytosis on broad spectrum antimicrobials. Patient currently awake and able to answer yes/no questions, denying abdominal pain. WBC lateral from yesterday's labs, team concerned for colitis as a cause of the white count.     Plan:  - Recommend continuing trickle feeds  - Recommend not advancing diet until source of white count confirmed as increasing rate of intake may overdistend/irritate colon, especially in conjunction with lactulose use  - Care per primary    Trauma/ACS  [id number]

## 2024-04-17 NOTE — CHART NOTE - NSCHARTNOTEFT_GEN_A_CORE
HPI: 47 yo F with h/o T1DM, factor V Leiden deficiency, gout, and congenital pancreatic defect with recurrent episode sof pancreatitis (underwent cholecystectomy, splenectomy, celina-en-y pancreaticojejunostomy, and total pancreatectomy with Islet cell autotransplant in 2018) initially presented to OSH with AMS and diarrhea. Pt. was intubated, and transferred to [facility] for further management. Pt. currently admitted for care home with hepatic encephalopathy. Found to have evidence of ischemic colitis on CT. Initially unable to give PO lactulose as pt. required bowel rest. Nephrology was consulted for hyperammonemia requiring CRRT. Pt. now tolerating PO lactulose. CRRT discontinued.    Labs reviewed. SCr remains WNL. Serum ammonia decreased to 65.    Will discontinue follow up. Re-consult, as needed.    If you have any questions, please feel free to contact me  Catia Girard  Nephrology Fellow  [phone] / [employer] Teams(Preferred)  (After 5pm or on weekends please page the on-call fellow)

## 2024-04-17 NOTE — PROGRESS NOTE ADULT - SUBJECTIVE AND OBJECTIVE BOX
Hepatology Progress Note    Interval Events:   -NH3 improved responds to some commands->team planning to extubate today   -EEG with moderate to severe diffuse cerebral dysfunction that is not specific in etiology  -Ongoing fevers to 100.3      Allergies:  No Known Allergies      Hospital Medications:  albuterol/ipratropium for Nebulization 3 milliLiter(s) Nebulizer every 6 hours  chlorhexidine 0.12% Liquid 15 milliLiter(s) Oral Mucosa every 12 hours  chlorhexidine 4% Liquid 1 Application(s) Topical <User Schedule>  chlorhexidine 4% Liquid 1 Application(s) Topical <User Schedule>  doxycycline IVPB 100 milliGRAM(s) IV Intermittent every 12 hours  folic acid 1 milliGRAM(s) Oral daily  heparin   Injectable 5000 Unit(s) SubCutaneous every 12 hours  insulin glargine Injectable (LANTUS) 3 Unit(s) SubCutaneous at bedtime  insulin lispro (ADMELOG) corrective regimen sliding scale   SubCutaneous every 6 hours  lactulose Syrup 30 Gram(s) Oral every 8 hours  levOCARNitine 330 milliGRAM(s) Oral every 8 hours  levothyroxine 100 MICROGram(s) Oral daily  meropenem  IVPB 1000 milliGRAM(s) IV Intermittent every 12 hours  methadone    Tablet 5 milliGRAM(s) Oral three times a day  multivitamin/minerals/iron Oral Solution (CENTRUM) 15 milliLiter(s) Oral daily  pancrelipase  (CREON  6,000 Lipase Units) 1 Capsule(s) Oral <User Schedule>  pantoprazole  Injectable 40 milliGRAM(s) IV Push daily  potassium chloride   Solution 40 milliEquivalent(s) Oral every 4 hours  rifAXIMin 550 milliGRAM(s) Oral two times a day  sodium chloride 0.9% lock flush 10 milliLiter(s) IV Push every 1 hour PRN  sodium chloride 0.9% lock flush 10 milliLiter(s) IV Push every 1 hour PRN  thiamine IVPB 250 milliGRAM(s) IV Intermittent daily  ursodiol Tablet 500 milliGRAM(s) Oral <User Schedule>      ROS: 14 point ROS negative unless otherwise state in subjective    PHYSICAL EXAM:   Vital Signs:  Vital Signs Last 24 Hrs  T(C): 37.8 (17 Apr 2024 08:00), Max: 38 (16 Apr 2024 20:00)  T(F): 100 (17 Apr 2024 08:00), Max: 100.4 (16 Apr 2024 20:00)  HR: 99 (17 Apr 2024 10:00) (90 - 128)  BP: 122/58 (17 Apr 2024 10:00) (110/55 - 145/70)  BP(mean): 84 (17 Apr 2024 10:00) (79 - 95)  RR: 20 (17 Apr 2024 10:00) (18 - 40)  SpO2: 100% (17 Apr 2024 10:00) (99% - 100%)    Parameters below as of 17 Apr 2024 10:00  Patient On (Oxygen Delivery Method): ventilator, 10/5    O2 Concentration (%): 30  Daily     Daily     GENERAL:  No acute distress  HEENT: + scleral icterus  CHEST: no resp distress  HEART:  RRR  ABDOMEN:  Soft, non-tender, non-distended, normoactive bowel sounds  EXTREMITIES:  No edema  NEURO:  Intubated, respnds to commands     LABS:                        9.2    17.00 )-----------( 77       ( 17 Apr 2024 06:18 )             27.1     Mean Cell Volume: 96.1 fl (04-17-24 @ 06:18)    04-17    144  |  108  |  <4<L>  ----------------------------<  257<H>  3.2<L>   |  23  |  0.50    Ca    9.6      17 Apr 2024 06:18  Phos  2.9     04-17  Mg     1.9     04-17    TPro  5.2<L>  /  Alb  3.2<L>  /  TBili  7.5<H>  /  DBili  x   /  AST  74<H>  /  ALT  28  /  AlkPhos  138<H>  04-17    LIVER FUNCTIONS - ( 17 Apr 2024 06:18 )  Alb: 3.2 g/dL / Pro: 5.2 g/dL / ALK PHOS: 138 U/L / ALT: 28 U/L / AST: 74 U/L / GGT: x           PT/INR - ( 17 Apr 2024 01:16 )   PT: 14.0 sec;   INR: 1.34 ratio         PTT - ( 17 Apr 2024 01:16 )  PTT:36.5 sec  Urinalysis Basic - ( 17 Apr 2024 06:18 )    Color: x / Appearance: x / SG: x / pH: x  Gluc: 257 mg/dL / Ketone: x  / Bili: x / Urobili: x   Blood: x / Protein: x / Nitrite: x   Leuk Esterase: x / RBC: x / WBC x   Sq Epi: x / Non Sq Epi: x / Bacteria: x    Imaging:  < from: [facility] Duplex Lower Ext Vein Scan, Bilat (04.16.24 @ 15:49) >  IMPRESSION:  No evidence of deep venous thrombosis in either lower extremity.    < end of copied text >

## 2024-04-17 NOTE — PROGRESS NOTE ADULT - ASSESSMENT
41yo pmh of HTN, DM, Factor V Leiden c/b L peroneal DVT (2022) and RA thrombus (2018) currently not on AC, gout, congenital pancreas divisum, recurrent pancreatitis s/p cholecystectomy, Splenectomy, Kevin-En-Y Pancreaticojejunostomy, total pancreatectomy s/p Islet Cell Autotransplant at [facility] 2018, complicated by multiple intraabdominal infection (VRE, fungal? per LIMC), fistula, abscesses. Presented to [facility] 4/8 for several weeks of decreased PO intake, NBNB emesis, watery diarrhea, Somnolence, requiring intubation for airway protection. Lab revealed ammonia > 200, elevated bilirubin and INR initially c/f liver failure, c/c/b colitis possibly ischemic etiology. Patient transferred to [facility] MICU 4/13 for CRRT initiation for ammonia clearance.    =====Neuro=====  #Altered mental status  >Baseline AOx3  >CTH (4/13): No acute findings  >CTH (4/15): No acute findings   >s/p Precedex, off since 4/15  - Currently intubated off sedation, awake, follows command  - Likely iso hyperammonemia vs. infection   - treatment for hyperammonemia as below     #Downward nystagmus  >Noted on exam 4/14-4/15 overnight  >CTH (4/15): No acute findings   >EEG (4/15 - 4/17): No seizure activity, TME  - Will ctm      #Chronic pain  #anxiety/depression   >Was on alprazolam 0.75mg QD, Dilaudid and methadone 5mg QD   - Hold home meds for now    =====Cardio=====  #History of L peroneal DVT in 2022  #History of RA thrombus in 2018  >TTE (4/13): No thrombus commented  - Eliquis stopped 5/2023 by outpatient hematology given consistently negative DVT studies  - Hold AC iso thrombocytopenia   - f/u Heme recs    #RV dysfunction?  >Enlarged RV and pulmonary hypertension noted on POCUS (4/16) that is not on TTE 4/13  >TTE (4/16): Normal LVSF EF 75%, Normal RVSF and size, TAPSE 1.9, PASP 25  >CTAPE (4/16): No PE  >DVT study (4/16): No DVT  - c/w POCUS monitoring    =====Pulmonary=====  #mechanical ventilation   - Tolerating CPAP well  - Plan for extubation today    #Atelectasis vs. PNA  >CTC (4/13): RML consolidation noted, possible atelectasis > PNA  - persistently leukocytosis  - abx as below    =====GI=====  Patient presented to [facility] for 4 weeks of decreased PO, NBNB emesis, diarrhea and somnolence, intubated for airway protection. Labs notable for elevated bilirubin, ammonia > 200, INR > 4, initially concern for liver failure and transferred to [facility] for liver transplant eval + CRRT for ammonia clearance. However, does not appear to be in florid liver failure per hepatology, and MRCP does not appear to have obstructive pathology.      #Elevated bilirubin  >Elevated bilirubin/coag, c/b hepatic encephalopathy  >s/p NAC on admission to outside hospital  >POCUS with mild ascites  >CTAP (4/13): Hepatomegaly and hepatic steatosis  >US abd (4/13): Hepatic steatosis, no hepatic vein thrombosis  >Acute hepatitis panel (4/13): Negative  >Autoimmune hepatitis panel (4/13): Pending  >MRCP (4/15): Enlarged, fatty liver. No biliary duct dilation, unlikely obstruction  >Gastrograffin study to r/o SBO (4/16): Passage of contrast seen on serial XR  - Etiology not entirely clear  - Trend MELD daily  - c/w thiamine, folic acid, MV  - c/w ursodiol 500mg QD    #Hyperammonemia   >Baseline mentation AAO3  >s/p CRRT (off since 4/16)  - Etiology of hyperammonemia unclear  - Started Levocarnitine for possible short bowel syndrome given extensive surg history  - f/u metabolic genetic consult to r/o acquired urea cycle disorder  - c/w Rifaximin   - c/w Lactulose    #Ischemic bowel   >CTA/P (4/13): segmental areas of wall thickening throughout the colon and rectum with pneumatosis, mesenteric edema, and adjacent hyperdensities in the proximal transverse colon suspicious for bowel ischemia with intraluminal hemorrhage. Additional intraluminal hyperdensities within the distal ascending colon suspicious for hemorrhage.   >TTE (4/13): No thrombus commented  - Surgery, GI following, no plan for surgery/endoscopic eval for now  - On TF, trend lactate, monitor GIB    #abdominal mesh  #congenital abnormality of the pancreas   >Multiple surgical hx from recurrent pancreatitis as in HPI. S/p total pancreatectomy with islet cell autotransplant followed by multiple abscess leading to several abdominal surgeries.   - c/w Creon while on TF    #Colonic mass  >CTA/P (4/13): Nonspecific focal areas of narrowing in the distal descending colon and sigmoid colon, possibly colonic masses  - ctm, colonoscopy eventually    #Diet and ppx  - TF  - rectal tube in place   - c/w PPI 40mg for GI ppx    =====Renal=====  #CRRT  >s/p CRRT (last 4/17) for ammonia clearance  - Strict I/O  - James in place  - Goal -500 to -1L given anasarca on exam, diurese PRN    =====Heme=====   #Factor 5 Leiden  #History of L peroneal DVT in 2022  #History of RA thrombus in 2018  >TTE (4/13): No thrombus commented  - Eliquis stopped 5/2023 by outpatient hematology given consistently negative DVT studies  - Hold AC iso thrombocytopenia   - f/u Heme recs    # Macrocytic anemia  >s/p 2u PRBC (4/13, 4/16)  >Multifactorial in nature. No hx of bleeding but with coagulopathy as below.   >B12 (4/13): >2000, Folate: 8.6  >Iron studies (4/13): Ferritin 653, Iron 43, Unmeasurable TIBC/Iron%   >Hemolysis lab (4/14): Haptoglobin < 20;   - Possibly iso liver disease vs. acute infection/inflammation vs. ?hemolysis  - Trend CBC, coag, transfuse goal > 7    #thrombocytopenia  >s/p 1u Plt (4/13)   - Etiology not entirely clear, s/p splenectomy and hepatic steatosis w/o cirrhosis on imaging  - f/u infectious workup as below  - Heme consult  - f/u factor viii assay    =====Endo=====  #DM1 vs. DM3  >Per HIE record, patient prone to hypoglycemia  >History of DM1, but s/p total pancreatectomy and islet cell autotransplant in 2018 - currently DM3  - c/w D10 gtt, wean as able  - c/w Lantus 3u QHS  - Endocrine on board    =====ID=====  Infectious work up  >UA (4/13): 11WBC, small LE, negative Nitrite or bacteria  >Babesia (4/13): neg; HIV (4/13): neg; RVP, COVID (4/14): neg; MRSA (4/13): Negative  >CXR (4/13): RML consolidation  >CTC/A/P (4/13): RML consolidation, LLL atelectasis 2/2 likely ?mucus plug, ischemic colitis  >Bcx (4/13): NGTD  >ET tube cx (4/13): Normal resp luisa  >BAL (4/13): Pending  >GI PCR (4/15): Negative  - Rest of infectious w/u per ID    #Septic shock   #hx of recurrent infections   >H/o of recurrent infxn from multiple abdominal wounds and surgeries. Hx of VRE and candida.   >Previously on levo at OSH. Off pressor on transfer; Restarted with initiation of CRRT  >s/p Caspofungin 50mg Q24 (stopped 4/16)  >s/p Daptomycin 500mg Q24 (stopped 4/16)  - Infectious w/u per ID  - c/w Doxycycline 100mg Q12  - c/w Meropenem 1g q12     41yo pmh of HTN, DM, Factor V Leiden c/b L peroneal DVT (2022) and RA thrombus (2018) currently not on AC, gout, congenital pancreas divisum, recurrent pancreatitis s/p cholecystectomy, Splenectomy, Kevin-En-Y Pancreaticojejunostomy, total pancreatectomy s/p Islet Cell Autotransplant at [facility] 2018, complicated by multiple intraabdominal infection (VRE, fungal? per LIMC), fistula, abscesses. Presented to [facility] 4/8 for several weeks of decreased PO intake, NBNB emesis, watery diarrhea, Somnolence, requiring intubation for airway protection. Lab revealed ammonia > 200, elevated bilirubin and INR initially c/f liver failure, c/c/b colitis possibly ischemic etiology. Patient transferred to [facility] MICU 4/13 for CRRT initiation for ammonia clearance.    =====Neuro=====  #Altered mental status  >Baseline AOx3  >CTH (4/13): No acute findings  >CTH (4/15): No acute findings   >s/p Precedex, off since 4/15  - Currently intubated off sedation, awake, follows command  - Likely iso hyperammonemia vs. infection   - treatment for hyperammonemia as below     #Downward nystagmus  >Noted on exam 4/14-4/15 overnight  >CTH (4/15): No acute findings   >EEG (4/15 - 4/17): No seizure activity, TME  - Will ctm      #Chronic pain  #anxiety/depression   >Was on alprazolam 0.75mg QD, Dilaudid and methadone 5mg TID  - r/s metahdone 5mg TID  =====Cardio=====  #History of L peroneal DVT in 2022  #History of RA thrombus in 2018  >TTE (4/13): No thrombus commented  - Eliquis stopped 5/2023 by outpatient hematology given consistently negative DVT studies  - Hold AC iso thrombocytopenia   - f/u Heme recs    #RV dysfunction?  >Enlarged RV and pulmonary hypertension noted on POCUS (4/16) that is not on TTE 4/13  >CTAPE (4/16): No PE  >DVT study (4/16): No DVT  >TTE (4/16): Normal LVSF EF 75%, Normal RVSF and size, TAPSE 1.9, PASP 25  - c/w POCUS monitoring    =====Pulmonary=====  #mechanical ventilation   - Tolerating CPAP well  - Plan for extubation today    #Atelectasis vs. PNA  >CTC (4/13): RML consolidation noted, possible atelectasis > PNA  - persistently leukocytosis  - abx as below  - f/u ID recs    =====GI=====  Patient presented to [facility] for 4 weeks of decreased PO, NBNB emesis, diarrhea and somnolence, intubated for airway protection. Labs notable for elevated bilirubin, ammonia > 200, INR > 4, initially concern for liver failure and transferred to [facility] for liver transplant eval + CRRT for ammonia clearance. However, does not appear to be in florid liver failure per hepatology, and MRCP does not appear to have obstructive pathology.      #Elevated bilirubin  >Elevated bilirubin/coag, c/b hepatic encephalopathy  >s/p NAC on admission to outside hospital  >POCUS with mild ascites  >CTAP (4/13): Hepatomegaly and hepatic steatosis  >US abd (4/13): Hepatic steatosis, no hepatic vein thrombosis  >Acute hepatitis panel (4/13): Negative  >Autoimmune hepatitis panel (4/13): Pending  >MRCP (4/15): Enlarged, fatty liver. No biliary duct dilation, unlikely obstruction  >Gastrograffin study to r/o SBO (4/16): Passage of contrast seen on serial XR  - Etiology not entirely clear  - c/w thiamine, folic acid, MV  - c/w ursodiol 500mg QD    #Hyperammonemia   >Baseline mentation AAO3  >s/p CRRT (off since 4/16)  - Etiology of hyperammonemia unclear  - Started Levocarnitine for possible short bowel syndrome given extensive surg history  - f/u metabolic genetic consult to r/o acquired urea cycle disorder  - c/w Rifaximin   - c/w Lactulose    #Ischemic bowel   >CTA/P (4/13): segmental areas of wall thickening throughout the colon and rectum with pneumatosis, mesenteric edema, and adjacent hyperdensities in the proximal transverse colon suspicious for bowel ischemia with intraluminal hemorrhage. Additional intraluminal hyperdensities within the distal ascending colon suspicious for hemorrhage.   >TTE (4/13): No thrombus commented  - Surgery, GI following, no plan for surgery/endoscopic eval for now  - On TF, trend lactate, monitor GIB    #abdominal mesh  #congenital abnormality of the pancreas   >Multiple surgical hx from recurrent pancreatitis as in HPI. S/p total pancreatectomy with islet cell autotransplant followed by multiple abscess leading to several abdominal surgeries.   - c/w Creon while on TF    #Colonic mass  >CTA/P (4/13): Nonspecific focal areas of narrowing in the distal descending colon and sigmoid colon, possibly colonic masses  - ctm, colonoscopy eventually    #Diet and ppx  - TF  - rectal tube in place   - c/w PPI 40mg for GI ppx    =====Renal=====  #CRRT  >s/p CRRT (last 4/17) for ammonia clearance  - Strict I/O  - James in place  - Diurese PRN    =====Heme=====   #Factor 5 Leiden  #History of L peroneal DVT in 2022  #History of RA thrombus in 2018  >TTE (4/13): No thrombus commented  - Eliquis stopped 5/2023 by outpatient hematology given consistently negative DVT studies  - Hold AC iso thrombocytopenia   - f/u Heme recs    # Macrocytic anemia  >s/p 2u PRBC (4/13, 4/16)  >Multifactorial in nature. No hx of bleeding but with coagulopathy as below.   >B12 (4/13): >2000, Folate: 8.6  >Iron studies (4/13): Ferritin 653, Iron 43, Unmeasurable TIBC/Iron%   >Hemolysis lab (4/14): Haptoglobin < 20;   - Possibly iso liver disease vs. acute infection/inflammation vs. ?hemolysis  - Trend CBC, coag, transfuse goal > 7    #thrombocytopenia  >s/p 1u Plt (4/13)   - Etiology not entirely clear, s/p splenectomy and hepatic steatosis w/o cirrhosis on imaging  - f/u infectious workup as below  - Heme consult  - f/u factor viii assay    =====Endo=====  #DM1 vs. DM3  >Per HIE record, patient prone to hypoglycemia  >History of DM1, but s/p total pancreatectomy and islet cell autotransplant in 2018 - currently DM3  - c/w Lantus 3u QHS  - Endocrine on board    =====ID=====  Infectious work up  >UA (4/13): 11WBC, small LE, negative Nitrite or bacteria  >Babesia (4/13): neg; HIV (4/13): neg; RVP, COVID (4/14): neg; MRSA (4/13): Negative  >CXR (4/13): RML consolidation  >CTC/A/P (4/13): RML consolidation, LLL atelectasis 2/2 likely ?mucus plug, ischemic colitis  >Bcx (4/13): NGTD  >ET tube cx (4/13): Normal resp luisa  >BAL (4/13): Pending  >GI PCR (4/15): Negative  - Rest of infectious w/u per ID    #Septic shock   #hx of recurrent infections   >H/o of recurrent infxn from multiple abdominal wounds and surgeries. Hx of VRE and candida.   >Previously on levo at OSH. Off pressor on transfer; Restarted with initiation of CRRT  >s/p Caspofungin 50mg Q24 (stopped 4/16)  >s/p Daptomycin 500mg Q24 (stopped 4/16)  - Infectious w/u per ID  - c/w Doxycycline 100mg Q12  - c/w Meropenem 1g q12 (4/8 - )

## 2024-04-17 NOTE — PROGRESS NOTE ADULT - SUBJECTIVE AND OBJECTIVE BOX
Patient is a 48y old  Female who presents with a chief complaint of anemia (16 Apr 2024 15:11)      24 hour events:     OBJECTIVE:  ICU Vital Signs Last 24 Hrs  T(C): 37.7 (17 Apr 2024 04:00), Max: 38 (16 Apr 2024 20:00)  T(F): 99.9 (17 Apr 2024 04:00), Max: 100.4 (16 Apr 2024 20:00)  HR: 116 (17 Apr 2024 06:00) (93 - 128)  BP: 145/70 (17 Apr 2024 06:00) (110/55 - 145/70)  BP(mean): 95 (17 Apr 2024 06:00) (79 - 95)  ABP: --  ABP(mean): --  RR: 40 (17 Apr 2024 06:00) (19 - 40)  SpO2: 99% (17 Apr 2024 06:00) (99% - 100%)    O2 Parameters below as of 16 Apr 2024 20:00  Patient On (Oxygen Delivery Method): ventilator    O2 Concentration (%): 30      Mode: AC/ CMV (Assist Control/ Continuous Mandatory Ventilation), RR (machine): 12, TV (machine): 400, FiO2: 30, PEEP: 5, ITime: 1, MAP: 9, PIP: 21    04-16 @ 07:01  -  04-17 @ 07:00  --------------------------------------------------------  IN: 1310 mL / OUT: 4850 mL / NET: -3540 mL      CAPILLARY BLOOD GLUCOSE      POCT Blood Glucose.: 223 mg/dL (17 Apr 2024 05:32)      PHYSICAL EXAM:  GENERAL: AAOx3, NAD  Cardiovascular: RRR, S1 S2, no m/r/g. No extremities edema, no JVD  LUNGS: Unlabored respirations, CTABL no wheeze/rhonchi/crackles  ABDOMEN: Soft, NTND, no rebound or guarding, BS presents. No CVA tenderness.  EXTREMITIES: Warm extremities, no clubbing, cyanosis, or edema, pulses 2+ bilaterally  NEURO: CN 2-12 grossly intact, strength 5/5 throughout, sensation intact      LINES:    HOSPITAL MEDICATIONS:  MEDICATIONS  (STANDING):  chlorhexidine 0.12% Liquid 15 milliLiter(s) Oral Mucosa every 12 hours  chlorhexidine 4% Liquid 1 Application(s) Topical <User Schedule>  chlorhexidine 4% Liquid 1 Application(s) Topical <User Schedule>  doxycycline IVPB 100 milliGRAM(s) IV Intermittent every 12 hours  folic acid 1 milliGRAM(s) Oral daily  heparin   Injectable 5000 Unit(s) SubCutaneous every 12 hours  insulin glargine Injectable (LANTUS) 3 Unit(s) SubCutaneous at bedtime  insulin lispro (ADMELOG) corrective regimen sliding scale   SubCutaneous every 6 hours  lactulose Syrup 30 Gram(s) Oral every 4 hours  levOCARNitine 330 milliGRAM(s) Oral every 8 hours  levothyroxine 100 MICROGram(s) Oral daily  meropenem  IVPB 1000 milliGRAM(s) IV Intermittent every 12 hours  multivitamin/minerals/iron Oral Solution (CENTRUM) 15 milliLiter(s) Oral daily  pancrelipase  (CREON  6,000 Lipase Units) 1 Capsule(s) Oral <User Schedule>  pantoprazole  Injectable 40 milliGRAM(s) IV Push daily  potassium chloride   Solution 40 milliEquivalent(s) Oral every 4 hours  rifAXIMin 550 milliGRAM(s) Oral two times a day  thiamine IVPB 250 milliGRAM(s) IV Intermittent daily  ursodiol Tablet 500 milliGRAM(s) Oral <User Schedule>    MEDICATIONS  (PRN):  fentaNYL    Injectable 25 MICROGram(s) IV Push every 4 hours PRN Moderate Pain (4 - 6)  sodium chloride 0.9% lock flush 10 milliLiter(s) IV Push every 1 hour PRN Pre/post blood products, medications, blood draw, and to maintain line patency  sodium chloride 0.9% lock flush 10 milliLiter(s) IV Push every 1 hour PRN Pre/post blood products, medications, blood draw, and to maintain line patency      LABS:                        9.2    17.00 )-----------( 77       ( 17 Apr 2024 06:18 )             27.1     Hgb Trend: 9.2<--, 8.5<--, 8.5<--, 8.2<--, 6.6<--  04-17    144  |  108  |  <4<L>  ----------------------------<  257<H>  3.2<L>   |  23  |  0.50    Ca    9.6      17 Apr 2024 06:18  Phos  2.9     04-17  Mg     1.9     04-17    TPro  5.2<L>  /  Alb  3.2<L>  /  TBili  7.5<H>  /  DBili  x   /  AST  74<H>  /  ALT  28  /  AlkPhos  138<H>  04-17    Creatinine Trend: 0.50<--, 0.47<--, 0.43<--, 0.30<--, 0.31<--, 0.39<--  PT/INR - ( 17 Apr 2024 01:16 )   PT: 14.0 sec;   INR: 1.34 ratio         PTT - ( 17 Apr 2024 01:16 )  PTT:36.5 sec  Urinalysis Basic - ( 17 Apr 2024 06:18 )    Color: x / Appearance: x / SG: x / pH: x  Gluc: 257 mg/dL / Ketone: x  / Bili: x / Urobili: x   Blood: x / Protein: x / Nitrite: x   Leuk Esterase: x / RBC: x / WBC x   Sq Epi: x / Non Sq Epi: x / Bacteria: x      Arterial Blood Gas:  04-16 @ 00:48  7.48/29/172/22/97.8/-1.7  ABG lactate: --    Venous Blood Gas:  04-17 @ 00:24  7.44/37/51/25/81.7  VBG Lactate: 2.3  Venous Blood Gas:  04-16 @ 17:52  7.45/34/59/24/89.2  VBG Lactate: 2.1  Venous Blood Gas:  04-16 @ 11:40  7.44/37/53/25/87.2  VBG Lactate: 1.7      EKG:    MICROBIOLOGY:     RADIOLOGY:  [ ] Reviewed and interpreted by me    EKG:  MICROBIOLOGY:     Radiology: ***    Bedside lung ultrasound: ***    Bedside ECHO: ***    EKG:    CENTRAL LINE: Y/N          DATE INSERTED:              REMOVE: Y/N    BERRY: Y/N                        DATE INSERTED:              REMOVE: Y/N    A-LINE: Y/N                       DATE INSERTED:              REMOVE: Y/N    GLOBAL ISSUE/BEST PRACTICE:  Analgesia:  Sedation:  HOB elevation: yes  Stress ulcer prophylaxis:  VTE prophylaxis:  Glycemic control:  Nutrition:    CODE STATUS: ***  [facility] discussion: Y     Patient is a 48y old  Female who presents with a chief complaint of anemia (16 Apr 2024 15:11)      24 hour events: No acute event overnight. Off CRRT and D10 gtt    OBJECTIVE:  ICU Vital Signs Last 24 Hrs  T(C): 37.7 (17 Apr 2024 04:00), Max: 38 (16 Apr 2024 20:00)  T(F): 99.9 (17 Apr 2024 04:00), Max: 100.4 (16 Apr 2024 20:00)  HR: 116 (17 Apr 2024 06:00) (93 - 128)  BP: 145/70 (17 Apr 2024 06:00) (110/55 - 145/70)  BP(mean): 95 (17 Apr 2024 06:00) (79 - 95)  ABP: --  ABP(mean): --  RR: 40 (17 Apr 2024 06:00) (19 - 40)  SpO2: 99% (17 Apr 2024 06:00) (99% - 100%)    O2 Parameters below as of 16 Apr 2024 20:00  Patient On (Oxygen Delivery Method): ventilator    O2 Concentration (%): 30      Mode: AC/ CMV (Assist Control/ Continuous Mandatory Ventilation), RR (machine): 12, TV (machine): 400, FiO2: 30, PEEP: 5, ITime: 1, MAP: 9, PIP: 21    04-16 @ 07:01  -  04-17 @ 07:00  --------------------------------------------------------  IN: 1310 mL / OUT: 4850 mL / NET: -3540 mL      CAPILLARY BLOOD GLUCOSE      POCT Blood Glucose.: 223 mg/dL (17 Apr 2024 05:32)      PHYSICAL EXAM:  GENERAL: AAOx3, NAD  Cardiovascular: RRR, S1 S2, no m/r/g. No extremities edema, no JVD  LUNGS: Unlabored respirations, CTABL no wheeze/rhonchi/crackles  ABDOMEN: Soft, NTND, no rebound or guarding, BS presents. No CVA tenderness.  EXTREMITIES: Warm extremities, no clubbing, cyanosis, or edema, pulses 2+ bilaterally  NEURO: CN 2-12 grossly intact, strength 5/5 throughout, sensation intact      LINES:    HOSPITAL MEDICATIONS:  MEDICATIONS  (STANDING):  chlorhexidine 0.12% Liquid 15 milliLiter(s) Oral Mucosa every 12 hours  chlorhexidine 4% Liquid 1 Application(s) Topical <User Schedule>  chlorhexidine 4% Liquid 1 Application(s) Topical <User Schedule>  doxycycline IVPB 100 milliGRAM(s) IV Intermittent every 12 hours  folic acid 1 milliGRAM(s) Oral daily  heparin   Injectable 5000 Unit(s) SubCutaneous every 12 hours  insulin glargine Injectable (LANTUS) 3 Unit(s) SubCutaneous at bedtime  insulin lispro (ADMELOG) corrective regimen sliding scale   SubCutaneous every 6 hours  lactulose Syrup 30 Gram(s) Oral every 4 hours  levOCARNitine 330 milliGRAM(s) Oral every 8 hours  levothyroxine 100 MICROGram(s) Oral daily  meropenem  IVPB 1000 milliGRAM(s) IV Intermittent every 12 hours  multivitamin/minerals/iron Oral Solution (CENTRUM) 15 milliLiter(s) Oral daily  pancrelipase  (CREON  6,000 Lipase Units) 1 Capsule(s) Oral <User Schedule>  pantoprazole  Injectable 40 milliGRAM(s) IV Push daily  potassium chloride   Solution 40 milliEquivalent(s) Oral every 4 hours  rifAXIMin 550 milliGRAM(s) Oral two times a day  thiamine IVPB 250 milliGRAM(s) IV Intermittent daily  ursodiol Tablet 500 milliGRAM(s) Oral <User Schedule>    MEDICATIONS  (PRN):  fentaNYL    Injectable 25 MICROGram(s) IV Push every 4 hours PRN Moderate Pain (4 - 6)  sodium chloride 0.9% lock flush 10 milliLiter(s) IV Push every 1 hour PRN Pre/post blood products, medications, blood draw, and to maintain line patency  sodium chloride 0.9% lock flush 10 milliLiter(s) IV Push every 1 hour PRN Pre/post blood products, medications, blood draw, and to maintain line patency      LABS:                        9.2    17.00 )-----------( 77       ( 17 Apr 2024 06:18 )             27.1     Hgb Trend: 9.2<--, 8.5<--, 8.5<--, 8.2<--, 6.6<--  04-17    144  |  108  |  <4<L>  ----------------------------<  257<H>  3.2<L>   |  23  |  0.50    Ca    9.6      17 Apr 2024 06:18  Phos  2.9     04-17  Mg     1.9     04-17    TPro  5.2<L>  /  Alb  3.2<L>  /  TBili  7.5<H>  /  DBili  x   /  AST  74<H>  /  ALT  28  /  AlkPhos  138<H>  04-17    Creatinine Trend: 0.50<--, 0.47<--, 0.43<--, 0.30<--, 0.31<--, 0.39<--  PT/INR - ( 17 Apr 2024 01:16 )   PT: 14.0 sec;   INR: 1.34 ratio         PTT - ( 17 Apr 2024 01:16 )  PTT:36.5 sec  Urinalysis Basic - ( 17 Apr 2024 06:18 )    Color: x / Appearance: x / SG: x / pH: x  Gluc: 257 mg/dL / Ketone: x  / Bili: x / Urobili: x   Blood: x / Protein: x / Nitrite: x   Leuk Esterase: x / RBC: x / WBC x   Sq Epi: x / Non Sq Epi: x / Bacteria: x      Arterial Blood Gas:  04-16 @ 00:48  7.48/29/172/22/97.8/-1.7  ABG lactate: --    Venous Blood Gas:  04-17 @ 00:24  7.44/37/51/25/81.7  VBG Lactate: 2.3  Venous Blood Gas:  04-16 @ 17:52  7.45/34/59/24/89.2  VBG Lactate: 2.1  Venous Blood Gas:  04-16 @ 11:40  7.44/37/53/25/87.2  VBG Lactate: 1.7      EKG:    MICROBIOLOGY:     RADIOLOGY:  [ ] Reviewed and interpreted by me    EKG:  MICROBIOLOGY:     Radiology: ***    Bedside lung ultrasound: ***    Bedside ECHO: ***    EKG:    CENTRAL LINE: Y/N          DATE INSERTED:              REMOVE: Y/N    BERRY: Y/N                        DATE INSERTED:              REMOVE: Y/N    A-LINE: Y/N                       DATE INSERTED:              REMOVE: Y/N    GLOBAL ISSUE/BEST PRACTICE:  Analgesia:  Sedation:  HOB elevation: yes  Stress ulcer prophylaxis:  VTE prophylaxis:  Glycemic control:  Nutrition:    CODE STATUS: ***  [facility] discussion: Y

## 2024-04-17 NOTE — CHART NOTE - NSCHARTNOTEFT_GEN_A_CORE
Nutrition Follow Up Note  Patient seen for: initial malnutrition follow up.     Chart reviewed, events noted.    Source: [] Patient       [x] Medical Record        [x] RN        [] Family at bedside       [x] Other: Interdisciplinary Rounds, Provider    -If unable to interview patient: [] Trach/Vent/BiPAP  [] Disoriented/confused/inappropriate to interview  Pt was intubated at time of RD assessment earlier today    Diet Order:   Diet, NPO with Tube Feed:   Tube Feeding Modality: Orogastric  Vital 1.5 [place] (VITAL1.5RTH)  Total Volume for 24 Hours (mL): 180  Continuous  Starting Tube Feed Rate {mL per Hour}: 10  Until Goal Tube Feed Rate (mL per Hour): 10  Tube Feed Duration (in Hours): 18  Tube Feed Start Time: 11:45 (24)    EN Order Provides: total volume 180 mL, 270 kcals, 12 gm protein  Current Pump Rate: Currently on hold per MICU 18 hr policy. Was at 10 mL/hr  Started on trickle feeds yesterday, held and extubated     Is current diet order appropriate/adequate? See recommendations below    PO intake :   [] >75%  Adequate    [] 50-75%  Fair       [] <50%  Poor   [x] N/A    Nutrition-related concerns:  - Hyperammonemia s/p Continuos renal replacement  -> Ordered for lactulose and rifaximin   - Ordered for folic acid, multivitamin, and thiamine   - S/p total pancreatectomy with islet cell autotransplant (extensive GI history)   -> Started on CREON 1 capsule x2 daily  - Type 1 DM  vs. DM3; ordered for Lantus and sliding scale of insulin  - PO levothyroxine   - Kevin-En-Y Pancreaticojejunostomy 2014    GI: Concern for ischemic colitis. Diarrhea. Bowel Regimen? [x] Yes   [] No  -> Ordered for antibiotics and pantoprazole   Rectal Tube Output: 2105 mL ( thus far), 1150 ()    Weights:   Daily wt: None to address  RD will continue to trend as new wts available/able.     Drug Dosing Weight  Height (cm): 165.1 (2024 02:45)  Weight (kg): 62.4 (2024 02:45)  BMI (kg/m2): 22.9 (2024 02:45); Based on HIE wt from 3/12 101 lbs/45.8 k.8    Nutritionally Pertinent:   MEDICATIONS  (STANDING):  doxycycline IVPB 100 milliGRAM(s) IV Intermittent every 12 hours  folic acid 1 milliGRAM(s) Oral daily  heparin   Injectable 5000 Unit(s) SubCutaneous every 12 hours  insulin glargine Injectable (LANTUS) 3 Unit(s) SubCutaneous at bedtime  insulin lispro (ADMELOG) corrective regimen sliding scale   SubCutaneous every 6 hours  lactulose Syrup 30 Gram(s) Oral every 8 hours  levOCARNitine 330 milliGRAM(s) Oral every 8 hours  levothyroxine 100 MICROGram(s) Oral daily  meropenem  IVPB 1000 milliGRAM(s) IV Intermittent every 12 hours  methadone    Tablet 5 milliGRAM(s) Oral three times a day  multivitamin/minerals/iron Oral Solution (CENTRUM) 15 milliLiter(s) Oral daily  pancrelipase  (CREON  6,000 Lipase Units) 1 Capsule(s) Oral <User Schedule>  pantoprazole  Injectable 40 milliGRAM(s) IV Push daily  rifAXIMin 550 milliGRAM(s) Oral two times a day  thiamine IVPB 250 milliGRAM(s) IV Intermittent daily  ursodiol Tablet 500 milliGRAM(s) Oral <User Schedule>  vancomycin  IVPB 1000 milliGRAM(s) IV Intermittent every 12 hours    Pertinent Labs:  @ 12:27: Na 144, BUN <4<L>, Cr 0.49<L>, <H>, K+ 4.1, Phos 3.0, Mg 2.2, Alk Phos 140<H>, ALT/SGPT 28, AST/SGOT 68<H>   @ 06:18: Na 144, BUN <4<L>, Cr 0.50, <H>, K+ 3.2<L>, Phos 2.9, Mg 1.9, Alk Phos 138<H>, ALT/SGPT 28, AST/SGOT 74<H>   @ 00:36: Na 139, BUN <4<L>, Cr 0.47<L>, <H>, K+ 3.5, Phos 3.2, Mg 1.9, Alk Phos 138<H>, ALT/SGPT 28, AST/SGOT 87<H>   @ 18:03: Na 136, BUN <4<L>, Cr 0.43<L>, <H>, K+ 3.7, Phos 3.0, Mg 1.9, Alk Phos 129<H>, ALT/SGPT 28, AST/SGOT 100<H>    A1C with Estimated Average Glucose Result: 5.2 % (24 @ 03:56)    Finger Sticks:  POCT Blood Glucose.: 215 mg/dL ( @ 11:55)  POCT Blood Glucose.: 223 mg/dL ( @ 05:32)  POCT Blood Glucose.: 247 mg/dL ( @ 20:32)  POCT Blood Glucose.: 205 mg/dL ( @ 16:16)    Triglycerides, Serum: 82 mg/dL (24 @ 03:56)    Skin per nursing documentation: Suspected deep tissue injury coccyx, sacrum, elvie. heel, left lateral malleolus, right elbow   Edema per nursing documentation: 3+ left hand; right hand; right arm; left arm    Based on HIE wt from 3/12: 101 lbs/45.8 kg  Estimated Energy Needs: (30-35 kcals/kg) 5323-6231 kcals   Estimated Protein Needs: (1.5-2.0 gm/kg) 68.7-91.6 gm  Fluid needs deferred to provider.     Previous Nutrition Diagnosis:   1) Severe acute on chronic malnutrition + Underweight  2) Increased protein-energy needs   Nutrition Diagnosis is: [x] ongoing  [] resolved [] not applicable     Nutrition Care Plan:  [x] In Progress - however, trickle feeds  [] Achieved  [] Not applicable    New Nutrition Diagnosis: [x] Not applicable    Nutrition Interventions:     Education Provided:       [] Yes:  [x] No: Not applicable    Recommendations:      1) Vital 1.5 at 10 mL/hr x 24 hours (trickle feeds per trauma ). Reconsult dietitian when full feeds appropriate.   -> Trend for refeeding: K, phos, and Mg. Pt ordered for multivitamin and thiamine   -> 24 hour feeds for refeeding risk/extensive GI Hx.  -> Consider changing CREON to 1 capsule q 4 hours.   2) As medically feasible, continue to provide multivitamin, thiamine, and folic acid; add iron, calcium, vitamin B12 as able for Hx of GI surgery; may also need Vitamin A, D, E, and K supplementation (check levels as able).   3) Consider TPN consult     Monitoring and Evaluation:   Continue to monitor nutritional intake, tolerance to diet prescription, weights, labs, skin integrity    RD remains available upon request and will follow up per protocol  Shannon Izaguirre RD, MS, CDN, CNSC, [facility] TEAMS

## 2024-04-17 NOTE — PROGRESS NOTE ADULT - ATTENDING COMMENTS
ATTENDING ATTESTATION  I have seen and examined this patient on rounds thismorning with the surgery team. I have reviewed all new labs, imaging and reports. I have participated in formulating the plan for the day, and have read and agree with the history, ROS, exam, assessment and plan as stated above.     Patient more awake today.   Was started on trickle tube feeds as well as lactulose via OGT yesterday.   The responds nodding yes to abdominal tenderness with palpation though she has no significant distension.   Is having non-bloody stool output via rectal tube.   Given that the cause of her leukocytosis is still unclear, coupled with some abdominal tenderness, I remain concerned that her colitis could still be the cause. I am concerned that overdistension of the colon with both tube feeds and lactulose (Which can be high volume when given for hyperammonemia) could worsen the colitis.   Patient is planned for extubation today, which will hopefully make her clinical exam easier/more reliable.   I have discussed with the MICU rounding team my concerns, and that ideally I would like to limit use of of GI tract until she has a normalized abdominal exam. However, I understand the need for both nutrition (I would suggest continuing tube feeds at trickle for now) as well as lactulose.   I will defer to the primary team the most judicial use of her enteric access, and recognize that withholding feeds or lactulose is also a risk, as well as starting TPN if choosing to prioritize lactulose.   Will continue to follow and offer guidance on advancing enteric use.     Total time spent in the care of this patient today (excluding critical care, teaching & procedures): 25 min                 Over 50% of the total time was spent in discussion and coordination of care with consulting services, dietary and rehab services.     Janis Solo M.D., M.S.  Division of Acute Care Surgery

## 2024-04-17 NOTE — PROGRESS NOTE ADULT - SUBJECTIVE AND OBJECTIVE BOX
[facility] Surgery Daily Progress Note  =====================================================    INTERVAL EVENTS:   - Patient arousable  - Tolerating trickle feeds    SUBJECTIVE: Patient reports that they're feeling well. Denies fever, chills, N/V, chest pain, SOB.    --------------------------------------------------------------------------------------  VITAL SIGNS:  T(C): 37.8 (04-17-24 @ 08:00), Max: 38 (04-16-24 @ 20:00)  HR: 93 (04-17-24 @ 08:00) (93 - 128)  BP: 126/59 (04-17-24 @ 08:00) (110/55 - 145/70)  RR: 21 (04-17-24 @ 08:00) (19 - 40)  SpO2: 100% (04-17-24 @ 08:00) (99% - 100%)  --------------------------------------------------------------------------------------    EXAM    General: Intubated, able to answer yes/no questions, jaundiced  Cardiac: Regular rate, warm and well perfused  Respiratory: Nonlabored respirations, normal cw expansion, on RA  Abdomen: Soft, nondistended, nontender to palpation  Extremities: Warm, well perfused    --------------------------------------------------------------------------------------     [facility] Surgery Daily Progress Note  =====================================================    INTERVAL EVENTS:   - Patient arousable  - Tolerating trickle feeds    SUBJECTIVE: Patient reports no abdominal pain this AM.     --------------------------------------------------------------------------------------  VITAL SIGNS:  T(C): 37.8 (04-17-24 @ 08:00), Max: 38 (04-16-24 @ 20:00)  HR: 93 (04-17-24 @ 08:00) (93 - 128)  BP: 126/59 (04-17-24 @ 08:00) (110/55 - 145/70)  RR: 21 (04-17-24 @ 08:00) (19 - 40)  SpO2: 100% (04-17-24 @ 08:00) (99% - 100%)  --------------------------------------------------------------------------------------    EXAM    General: Intubated, groggy, able to answer yes/no questions, jaundiced  Cardiac: Regular rate, warm and well perfused  Respiratory: Nonlabored respirations, normal cw expansion, on RA  Abdomen: Soft, nondistended, nontender to palpation  Extremities: Warm, well perfused    --------------------------------------------------------------------------------------

## 2024-04-17 NOTE — PROGRESS NOTE ADULT - ATTENDING COMMENTS
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 Hargrove), and now S/P total pancreatectomy with islet cell autotransplant at [facility] by Dr. Gil in 04/2018). 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 Per records, a Ct later in 2018 showed still fistulization but no abscess formation     Patient presented to [facility] ED on 4/8 by her spouse for AMS. Per , patient had been having nbnb vomiting and diarrhea for the last 4 weeks, extreme fatigue . She was unable to tolerate PO. She was also sleeping 18-20 hrs per day. On 4/8, he found her on the floor "completely out of it" and took her to [facility] for further evaluation.   Initial workup in ED demonstrated a Tbili 5.4, direct bili 4.4 and ammonia 196. Patient was somnolent and had a left eyeward gaze deviation. Submentally started developing agonal snoring respirations and had to be intubated for airway protection. Course complicated with septic shock and persistent hyperammonemia despite lactulose and rifaximin, CT show and started on meropenem, linezolid and anidulafungin at [facility]--> upon transfer here, off pressors, on alvaro/linezolid/caspofungin    CT Chest (4/13) Occlusion of the distal left lower lobar bronchus with complete left lower lobe atelectasis. Right middle lobe consolidation, possibly secondary to pneumonia or additional atelectasis.     CT A/P (4/13) Segmental areas of wall thickening throughout the colon and rectum with pneumatosis, mesenteric edema, and adjacent hyperdensities in the proximal transverse colon suspicious for bowel ischemia with intraluminal hemorrhage. Additional intraluminal hyperdensities within the distal ascending colon suspicious for hemorrhage. Nonspecific focal areas of narrowing in the distal descending colon and sigmoid colon, possibly colonic masses    UCx (4/13) No Growth  BCx (4/13) NGTD  Bronchoscopy Cx (4/13) No Growth  Serum Cryptococcal Antigen (4/14) Negative  Babesia PCR (4/13) Negative    Lower suspicion infectious etiology directly contributing to hyperbilirubinemia; reasonable to complete meropenem course for the pneumatosis and possible pneumonia. Would continue Doxycyline pending Leptospira Ab/PCR, Tick Panel PCR and Bartonella PCR    Bronch Fungitell is elevated  Radiographically low concern for PCP; out of over abundance of caution added PCP PCR to sputum sample sent    Low grade fever over last 24 hours  Would send blood cultures and check new serum fungitell  If continued fevers would start IV Vancomycin    I will continue to follow. Please feel free to contact me with any further questions.    Bhavesh Pérez M.D.  [facility] Division of Infectious Disease  8AM-5PM Monday - Friday: Available on [employer] Teams  After Hours and Holidays (or if no response on [employer] Teams): Please contact the Infectious Diseases Office at [phone]    The above assessment and plan were discussed with MICU Team

## 2024-04-17 NOTE — PROGRESS NOTE ADULT - ASSESSMENT
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 Transferred to [facility] for Liver Transplant Evaluation. Patient found to be hypoglycemic shortly after transfer, now resolved.    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 Patient was first diagnosed with Type 3c DM after her initial surgery in 2014 (s/p distal pancreatectomy, cholecystectomy, splenectomy and celina-en-y pancreaticojejuonostomy). Then she later underwent total pancreatectomy with islet cell autotransplant in 2018. After that, her insulin requirements decreased over the years but was never off insulin.    Endocrinologist: Dr. Dalton  Per recent outpatient note 3/26/2024:  Patient uses Omnipod Dash with Dexcom CGM   Basal rate:   MN 0.05U   6 AM 0.1U   9.30 AM 0.15U   Total basal 2.85U/24h   ICR 1:15   ICF 1:75   Reports of frequent hypoglycemia outpatient in March 2024.    Hypoglycemic shortly after transfer, now off dextrose fluids.  Also now extubated. No PO access for now, she is NPO.    C-peptide <0.1 4/15 at 6pm when BG was 168, likely insulin deficient.    PLAN:  - Increase Lantus to 5 units since patient has been hyperglycemic while on trickle feeds  - Low dose admelog scale q6h while NPO. If PO diet is started, then can change to Low dose admelog scale TIDAC, low scale QHS  - Follow up repeat C-peptide  - Cortisol 15.8 at 8am, sufficient, no concern for Adrenal insufficiency    #Hypothyroidism vs NTIS  TSH 4.43. FT4 0.6, TT3 47 on admission  Started on LT4 100mcg daily  - recheck FT4 in 1 week  - repeat TSH once clinically improved or in 6-8 weeks    Discussed with primary team    Ed Jett MD  Endocrine Fellow  Can be reached via teams. For follow up questions, discharge recommendations, or new consults, please call answering service at [phone] (weekdays); [phone] (nights/weekends).

## 2024-04-17 NOTE — PROGRESS NOTE ADULT - ATTENDING COMMENTS
42 year old female with hx of DMT1, Factor V leiden deficiency, gout and congenital abnormality of the pancreas associated with recurrent pancreatitis and extensive surgical hx (s/p distal pancreatectomy, cholecystectomy, splenectomy and celina-en-y pancreaticojejuonostomy (02/2014) at [facility] with Dr. Hargrove), and now S/P total pancreatectomy  with islet cell autotransplant at [facility] by Dr. Gil in 04/2018), c/b recurrent abdominal wall collection requiring IR drainage, hx of RV thrombus/DVT/PE presented to OSH with acute change in mental status requiring intubation for airway protection, and transferred to [facility]    #Neuro: metabolic encephalopathy in the setting of elevated ammonia. Intubated for airway protection. EEG with no seizures.   #CV: initially in shock on multiple pressors at OSH. TTE with mod pulm HTN.  Now HD stable off pressors.   #Resp: intubated for airway protection. Doing well on CPAP 10/5. Extubated to BiPap today  #GI: -- ischemic colitis/GIB/?colon masses - GI and surgery input appreciated. No plans for colonoscopy right now. Ok to start slow feeds  -- elevated ammonia levels -now down to 65. Etiology likely multifactorial 2/2 catabolic state/infection, protein intake, ?short gut syndrome. c/w Rifaximin, and lactulose (decrease dose to minimize volume).   -- hyperbilirubinemia - MRCP negative, now downtrending.  #Renal: s/p CVVH for elevated ammonia levels, now off since 4/16  #Heme/Onc: Factor V leiden, hx of RV thrombus/DVT/PE. CTA and LE dopplers negative. Currently off AC   #ID: hx fungemia and abd abscesses in the past. LLL PNA. s/p bronch with normal respiratory luisa. Stopped Caspo and Dapto. C/w Bobby and Doxy for now. If WBC continues to uptrend, would restart GPC coverage. ID input appreciated.   #Endocrine: DMI but hx of islet cell transplant. C/w Lantus. Endo input appreciated. Check Cpeptide levels.  #PPX: c/w SQH. Discuss AC with heme  #GOC: Full code; prognosis guarded

## 2024-04-17 NOTE — PROGRESS NOTE ADULT - SUBJECTIVE AND OBJECTIVE BOX
Admitted for: Liver failure without hepatic coma        Following for: T3cDM    Subjective:   patient now extubated      MEDICATIONS  (STANDING):  acetaminophen   IVPB .. 1000 milliGRAM(s) IV Intermittent once  albuterol/ipratropium for Nebulization 3 milliLiter(s) Nebulizer every 4 hours  chlorhexidine 4% Liquid 1 Application(s) Topical <User Schedule>  chlorhexidine 4% Liquid 1 Application(s) Topical <User Schedule>  cholecalciferol 2000 Unit(s) Oral daily  doxycycline IVPB 100 milliGRAM(s) IV Intermittent every 12 hours  folic acid 1 milliGRAM(s) Oral daily  heparin   Injectable 5000 Unit(s) SubCutaneous every 12 hours  insulin glargine Injectable (LANTUS) 3 Unit(s) SubCutaneous at bedtime  insulin lispro (ADMELOG) corrective regimen sliding scale   SubCutaneous every 6 hours  lactulose Syrup 30 Gram(s) Oral every 8 hours  levOCARNitine 330 milliGRAM(s) Oral every 8 hours  levothyroxine 100 MICROGram(s) Oral daily  meropenem  IVPB 1000 milliGRAM(s) IV Intermittent every 12 hours  methadone    Tablet 5 milliGRAM(s) Oral three times a day  multivitamin/minerals/iron Oral Solution (CENTRUM) 15 milliLiter(s) Oral daily  pantoprazole  Injectable 40 milliGRAM(s) IV Push daily  rifAXIMin 550 milliGRAM(s) Oral two times a day  thiamine IVPB 250 milliGRAM(s) IV Intermittent daily  ursodiol Tablet 500 milliGRAM(s) Oral <User Schedule>  vancomycin  IVPB      zinc sulfate 220 milliGRAM(s) Oral daily    MEDICATIONS  (PRN):  sodium chloride 0.9% lock flush 10 milliLiter(s) IV Push every 1 hour PRN Pre/post blood products, medications, blood draw, and to maintain line patency  sodium chloride 0.9% lock flush 10 milliLiter(s) IV Push every 1 hour PRN Pre/post blood products, medications, blood draw, and to maintain line patency      Allergies    No Known Allergies    Intolerances          PHYSICAL EXAM:  VITALS: T(C): 38 (04-17-24 @ 16:00)  T(F): 100.4 (04-17-24 @ 16:00), Max: 100.4 (04-16-24 @ 20:00)  HR: 102 (04-17-24 @ 16:25) (83 - 128)  BP: 122/60 (04-17-24 @ 16:00) (110/55 - 145/70)  RR:  (18 - 40)  SpO2:  (96% - 100%)  Wt(kg): --  GENERAL: NAD, alert  EYES: No proptosis, no lid lag, anicteric  RESPIRATORY: no respiratory distress  CARDIOVASCULAR: no peripheral edema  GI: nondistended  EXT: GONZALEZ  PSYCH: Alert,       A1C with Estimated Average Glucose Result: 5.2 % (04-13-24 @ 03:56)      POCT Blood Glucose.: 215 mg/dL (04-17-24 @ 11:55)  POCT Blood Glucose.: 223 mg/dL (04-17-24 @ 05:32)  POCT Blood Glucose.: 247 mg/dL (04-16-24 @ 20:32)  POCT Blood Glucose.: 205 mg/dL (04-16-24 @ 16:16)  POCT Blood Glucose.: 135 mg/dL (04-16-24 @ 13:55)  POCT Blood Glucose.: 107 mg/dL (04-16-24 @ 10:12)  POCT Blood Glucose.: 139 mg/dL (04-16-24 @ 08:00)  POCT Blood Glucose.: 148 mg/dL (04-16-24 @ 04:54)  POCT Blood Glucose.: 172 mg/dL (04-16-24 @ 02:51)  POCT Blood Glucose.: 177 mg/dL (04-15-24 @ 22:22)  POCT Blood Glucose.: 165 mg/dL (04-15-24 @ 20:26)  POCT Blood Glucose.: 159 mg/dL (04-15-24 @ 18:01)  POCT Blood Glucose.: 181 mg/dL (04-15-24 @ 17:59)  POCT Blood Glucose.: 145 mg/dL (04-15-24 @ 16:01)  POCT Blood Glucose.: 136 mg/dL (04-15-24 @ 14:02)  POCT Blood Glucose.: 124 mg/dL (04-15-24 @ 12:33)  POCT Blood Glucose.: 104 mg/dL (04-15-24 @ 10:36)  POCT Blood Glucose.: 131 mg/dL (04-15-24 @ 08:01)  POCT Blood Glucose.: 139 mg/dL (04-15-24 @ 06:10)  POCT Blood Glucose.: 145 mg/dL (04-15-24 @ 02:30)  POCT Blood Glucose.: 235 mg/dL (04-14-24 @ 22:23)  POCT Blood Glucose.: 175 mg/dL (04-14-24 @ 20:05)  POCT Blood Glucose.: 151 mg/dL (04-14-24 @ 17:48)      04-17    144  |  107  |  <4<L>  ----------------------------<  227<H>  4.1   |  24  |  0.49<L>    eGFR: 116    Ca    9.9      04-17  Mg     2.2     04-17  Phos  3.0     04-17    TPro  5.4<L>  /  Alb  3.3  /  TBili  7.2<H>  /  DBili  x   /  AST  68<H>  /  ALT  28  /  AlkPhos  140<H>  04-17      Thyroid Function Tests:  04-15 @ 13:04 TSH 3.67 FreeT4 -- T3 -- Anti TPO -- Anti Thyroglobulin Ab -- TSI --  04-13 @ 03:56 TSH 4.43 FreeT4 0.6 T3 47 Anti TPO -- Anti Thyroglobulin Ab -- TSI --

## 2024-04-17 NOTE — CHART NOTE - NSCHARTNOTEFT_GEN_A_CORE
Please see below for communications with the third party metabolic  with [facility] genetics.     From: Diana Chaudhary   Sent: 2024 12:03 PM  To: Judy Bloom   Cc: Rosie Trejo   Subject: RE: [EXTERNAL] RE: inpatient genetic counselor    Name: Kari Acosta  MRN: [id number]  : 1975  Location: [place] [address]       Hello,     I'm glad to hear that her ammonia is stabilizing.     The fact that the her high ammonia is responding to lactulose, goes against a UCD. Hyperammonemia in UCDs does not come down with lactulose. So this could suggest that her hyperammonemia could be secondary to liver dysfunction.    If she has colitis and is responding to lactulose I would continue that as sodium benzoate can cause GI erosion and she would probably need a high maintenance dose.     I don't recommend starting any amino acid supplements right now since we don't have a confirmed diagnosis. If she is NPO, they can start TPN with mild protein restriction of 0.5 g/kg/day.       This email relies strictly on the information as was presented and will be recorded as the official encounter unless the healthcare providers notify me of any alterations or modifications. [facility] provides consultive opinions only, no direct patient care, and does not direct the care for your patients. Only your local physician can determine the appropriate care for their patient.     It has been an honor to discuss and offer my opinion for your patient.     Sincerely,    Diana Chaudhary M.D.  Physician Consultant  Clinical Biochemical        ________________________________________  From: "Judy Bloom" <[email or web address]  Sent: 24 11:48 AM  To: Dr. Chaudhary  Cc: "Rosie Trejo"    Subject: RE: [EXTERNAL] RE: inpatient genetic counselor    Bryant Chaudhary,     Name: Kari Acosta  MRN: [id number]  : 1975  Location: [place] [address]     I wanted to reach out with some updates for Kari Acosta.   The resident on this case reached out and stated that the recommended labs were sent yesterday. Ammonia initially increased to 70s off crrt, but most recently down to 60 with lactulose. Clinically she is doing much better, finally awake, plan for extubation today. The team has question about sodium benzoate - she does have bad colitis, and surgery wants to minimize volume going to her colon, so we are thinking coming off her lactulose and start sodium benzoate, so wondering is it something that can be used as a maintenance medication for ammonia? Additionally, they wanted to know our thoughts on arginine supplementation, she is very malnourished and we want to start arginine supplement but not very familiar with it.      Records and labs attached.      Thank you!        Judy Bloom MS, [facility]  Genetic Counselor              From: Judy Bloom  Sent: 2024 4:04 PM  To: Dr. Chaudhary  Cc: Rosie Trejo    Subject: RE: [EXTERNAL] RE: inpatient genetic counselor     Thank you so much for your recommendations! Interesting case! I will reiterate this back to the care team.     BestJudy MS, [facility]  Genetic Counselor        From: Diana Chaudhary   Sent: 2024 2:58 PM  To: Judy Bloom   Cc: Rosie Trejo    Subject: [EXTERNAL] RE: inpatient genetic counselor       Name: Kari Acosta  MRN: [id number]  : 1975  Location: [place] [address]      Bryant Kim,     Thank you for your consult on this 47 yo female with past medical history of HTN, DM, factor 5 Leiden deficiency, gout, pancreatic divisum s/p extensive abdominal surgery (>20x). She presented with a history of decreased PO intake, emesis, diarrhea and altered mental status. Initally managed for septic shock, including intubation for CNS protection. Initial labs revealed elevated ammonia >200 that did not respond to lactulose for which she was started on CRRT. Work-up also concerning for bowel ischemia. Patient found to have elevated bilirubin but MRCP did not show obstruction. PT and INR are also prolonged, I believe this could be secondary to her history of Factor 5 Leiden deficiency. She is being treated with multiple antimicrobials for infections that may cause bowel ischemia. Receiving carnitine for short bowel syndrome. Ammonia has normalized while on CRRT.     Relevant lab results and studies:  Ammonia: 51<103<152<196<>200 (11-55 umol/L)   BUN: <4 L  Urine Ketones: Trace    Brain CT: Unremarkable    Abdominal US/Duplex: Hepatomegaly with hepatic steatosis.    EEG: No epileptic activity recorded.    I don't think this is a typical presentation for a person with a UCD. However, females with OTC deficiency (X-linked) may be asymptomatic until they suffer a significant stressor such as an infection, surgery, childbirth, etc. It seems like she was able to tolerate multiple GI surgeries, which if she did have an underlying UCD should have led to metabolic decompensation. Her labs also show low BUN which can be seen in UCDs because urea is not being made by the urea cycle. Some females with OTC may also experience headaches or abdominal pain with menses. For this patient, we can send biochemical work-up to evaluate for UCDs including: plasma amino acids and urine orotic acid. The Ammonia level is normal today, I don't think contiuing CRRT is necesary. I would like to see how the ammonia trends once they stop it, they can monitor ammonia every 6 hrs. If there is any change in mental status they should check an ammonia level sooner. If the ammonia is trending up, they can start an oral ammonia/nitrogen scavenger like sodium benzoate (typical dose is 250 mg/kg/day). In addition to liver dysfunction, non-metabolic causes of hyperammonemia include infections with urease producing bacteria.       This email relies strictly on the information as was presented and will be recorded as the official encounter unless the healthcare providers notify me of any alterations or modifications. [facility] provides consultive opinions only, no direct patient care, and does not direct the care for your patients. Only your local physician can determine the appropriate care for their patient.     It has been an honor to discuss and offer my opinion for your patient.     Sincerely,    Diana Chaudhary M.D.  Physician Consultant  Clinical Biochemical         ________________________________________  From: "Judy Bloom"    Sent: 24 2:18 PM  To: Dr. Tish Chavarria  Cc: "Rosie Trejo"    Subject: inpatient genetic     Hello,      I wanted to reach out with a new consult     Name: Kari Acosta  MRN: [id number]  : 1975  [facility]    Patient is a 49 YO female with HTN, DM, factor 5 Leiden, gout, pancreatic divisum s/p extensive abdominal surgery (>20x), present initially for weeks of decreased PO and diarrhea to outside hospital. Intubated for airway protection. Initial labs notable for ammonia > 200, elevated Bilirubin and INR concern for liver failure. Transfer to us for CRRT to clear out ammonia. GI team here doesn’t think she’s in liver failure, and MRCP does not show obstruction. The care team states they are perplexed by where the ammonia is coming from and were interested in a urea cycle disorder work-up.     I have attached some relevant labs and records.      Thank you,        Judy Bloom MS, [facility]  Genetic Counselor Please see below for communications with the third party metabolic  with [facility].     From: Diana Chaudhary   Sent: 2024 9:23 AM  To: Rosie Trejo ; Judy Bloom   Cc: Kelli Noguera   Subject: Re: [EXTERNAL] RE: inpatient genetic counselor  Name: Kari Acosta  MRN: [id number]  : 1975    Good morning,    I don’t have any additional recommendations at this time other than to follow-up the pending metabolic test results.   Sincerely,    Diana Chaudhary M.D.    From: "Rosie Trejo"   Sent: 24 8:32 AM  To: Diana Chaudhary , "Judy Bloom"  Cc: "Kelli Noguera"   Subject: Re: [EXTERNAL] RE: inpatient genetic counselor    I forgot to send along the update from the team.    "She’s doing great after extubation, ammonia stabilizing with lactulose, still no clear cut answer to where it is from, the best guess we have is alcohol because of her PETH level is very high, but she herself denies any alcohol use"    Let me know if you have any further thoughts or recommendations.    Rosie   -----------------------------------------------------  From: Rosie Trejo    Sent: 2024 8:28 AM   To: Diana Chaudhary ; Judy Bloom    Cc: Kelli Noguera    Subject: Re: [EXTERNAL] RE: inpatient genetic counselor     Good morning-    I am on call at the hospital today. I let the care team know and we will keep an eye out for the metabolic results.     Thank you!     Best,   Rosie Trejo, MS, [facility]  Genetic Counselor (she/her/hers)  -------------------------------------------------    From: Diana Chaudhary    Sent:  5:46 PM   To: Juyd Bloom    Cc: Rosie Trejo ; Kelli Noguera    Subject: Re: [EXTERNAL] RE: inpatient genetic counselor    Name: Kari Acosta  MRN: [id number]  : 1975  Location: [place] [address]    Bryant Kim,  Thank you for the update. I don’t have any additional recommendations at this time. It is still important to follow the pending metabolic test results.   Thank you.  Please let me know if you have any further questions.  It has been an honor to discuss and offer my opinion for your patient.  Diana      On 2024, at 5:34?PM, Judy Bloom <[email or web address]> wrote:    Name: Kari Acosta  MRN: [id number]  : 1975  Location: [place] [address]  ___________________________________________      From: Diana Chaudhary   Sent: 2024 12:03 PM  To: Judy Bloom   Cc: Rosie Trejo   Subject: RE: [EXTERNAL] RE: inpatient genetic counselor    Name: Kari Acosta  MRN: [id number]  : 1975  Location: [place] [address]       Hello,     I'm glad to hear that her ammonia is stabilizing.     The fact that the her high ammonia is responding to lactulose, goes against a UCD. Hyperammonemia in UCDs does not come down with lactulose. So this could suggest that her hyperammonemia could be secondary to liver dysfunction.    If she has colitis and is responding to lactulose I would continue that as sodium benzoate can cause GI erosion and she would probably need a high maintenance dose.     I don't recommend starting any amino acid supplements right now since we don't have a confirmed diagnosis. If she is NPO, they can start TPN with mild protein restriction of 0.5 g/kg/day.       This email relies strictly on the information as was presented and will be recorded as the official encounter unless the healthcare providers notify me of any alterations or modifications. [facility] provides consultive opinions only, no direct patient care, and does not direct the care for your patients. Only your local physician can determine the appropriate care for their patient.     It has been an honor to discuss and offer my opinion for your patient.     Sincerely,    Diana Chaudhary M.D.  Physician Consultant  Clinical Biochemical        ________________________________________  From: "Judy Bloom" <[email or web address]>  Sent: 24 11:48 AM  To: Dr. Chaudhary  Cc: "Rosie Trejo"    Subject: RE: [EXTERNAL] RE: inpatient genetic counselor    Bryant Chaudhary,     Name: Kari Acosta  MRN: [id number]  : 1975  Location: [place] [address]     I wanted to reach out with some updates for Kari Acosta.   The resident on this case reached out and stated that the recommended labs were sent yesterday. Ammonia initially increased to 70s off crrt, but most recently down to 60 with lactulose. Clinically she is doing much better, finally awake, plan for extubation today. The team has question about sodium benzoate - she does have bad colitis, and surgery wants to minimize volume going to her colon, so we are thinking coming off her lactulose and start sodium benzoate, so wondering is it something that can be used as a maintenance medication for ammonia? Additionally, they wanted to know our thoughts on arginine supplementation, she is very malnourished and we want to start arginine supplement but not very familiar with it.      Records and labs attached.      Thank you!        Judy Bloom MS, [facility]  Genetic Counselor              From: Judy Bloom  Sent: 2024 4:04 PM  To: Dr. Chaudhary  Cc: Rosie Trejo    Subject: RE: [EXTERNAL] RE: inpatient genetic counselor     Thank you so much for your recommendations! Interesting case! I will reiterate this back to the care team.     BestJudy MS, [facility]  Genetic Counselor        From: Diana Chaudhary   Sent: 2024 2:58 PM  To: Judy Bloom   Cc: Rosie Trejo    Subject: [EXTERNAL] RE: inpatient genetic counselor       Name: Kari Acosta  MRN: [id number]  : 1975  Location: [place] [address]      Bryant Kim,     Thank you for your consult on this 49 yo female with past medical history of HTN, DM, factor 5 Leiden deficiency, gout, pancreatic divisum s/p extensive abdominal surgery (>20x). She presented with a history of decreased PO intake, emesis, diarrhea and altered mental status. Initally managed for septic shock, including intubation for CNS protection. Initial labs revealed elevated ammonia >200 that did not respond to lactulose for which she was started on CRRT. Work-up also concerning for bowel ischemia. Patient found to have elevated bilirubin but MRCP did not show obstruction. PT and INR are also prolonged, I believe this could be secondary to her history of Factor 5 Leiden deficiency. She is being treated with multiple antimicrobials for infections that may cause bowel ischemia. Receiving carnitine for short bowel syndrome. Ammonia has normalized while on CRRT.     Relevant lab results and studies:  Ammonia: 51<103<152<196<>200 (11-55 umol/L)   BUN: <4 L  Urine Ketones: Trace    Brain CT: Unremarkable    Abdominal US/Duplex: Hepatomegaly with hepatic steatosis.    EEG: No epileptic activity recorded.    I don't think this is a typical presentation for a person with a UCD. However, females with OTC deficiency (X-linked) may be asymptomatic until they suffer a significant stressor such as an infection, surgery, childbirth, etc. It seems like she was able to tolerate multiple GI surgeries, which if she did have an underlying UCD should have led to metabolic decompensation. Her labs also show low BUN which can be seen in UCDs because urea is not being made by the urea cycle. Some females with OTC may also experience headaches or abdominal pain with menses. For this patient, we can send biochemical work-up to evaluate for UCDs including: plasma amino acids and urine orotic acid. The Ammonia level is normal today, I don't think contiuing CRRT is necesary. I would like to see how the ammonia trends once they stop it, they can monitor ammonia every 6 hrs. If there is any change in mental status they should check an ammonia level sooner. If the ammonia is trending up, they can start an oral ammonia/nitrogen scavenger like sodium benzoate (typical dose is 250 mg/kg/day). In addition to liver dysfunction, non-metabolic causes of hyperammonemia include infections with urease producing bacteria.       This email relies strictly on the information as was presented and will be recorded as the official encounter unless the healthcare providers notify me of any alterations or modifications. [facility] provides consultive opinions only, no direct patient care, and does not direct the care for your patients. Only your local physician can determine the appropriate care for their patient.     It has been an honor to discuss and offer my opinion for your patient.     Sincerely,    Diana Chaudhary M.D.  Physician Consultant  Clinical Biochemical         ________________________________________  From: "Judy Bloom"    Sent: 24 2:18 PM  To: Dr. Tish Chavarria  Cc: "Rosie Trejo"    Subject: inpatient genetic     Hello,      I wanted to reach out with a new consult     Name: Kari Acosta  MRN: [id number]  : 1975  [facility]: [facility]    Patient is a 49 YO female with HTN, DM, factor 5 Leiden, gout, pancreatic divisum s/p extensive abdominal surgery (>20x), present initially for weeks of decreased PO and diarrhea to outside hospital. Intubated for airway protection. Initial labs notable for ammonia > 200, elevated Bilirubin and INR concern for liver failure. Transfer to us for CRRT to clear out ammonia. GI team here doesn’t think she’s in liver failure, and MRCP does not show obstruction. The care team states they are perplexed by where the ammonia is coming from and were interested in a urea cycle disorder work-up.     I have attached some relevant labs and records.      Thank you,        Judy Bloom MS, [facility]  Genetic Counselor

## 2024-04-17 NOTE — PROGRESS NOTE ADULT - ASSESSMENT
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 Hargrove), and now S/P total pancreatectomy with islet cell autotransplant at [facility] by Dr. Gil in 04/2018). 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 Per records, a Ct later in 2018 showed still fistulization but no abscess formation     Patient presented to [facility] ED on 4/8 by her spouse for AMS. Per , patient had been having nbnb vomiting and diarrhea for the last 4 weeks, extreme fatigue . She was unable to tolerate PO. She was also sleeping 18-20 hrs per day. On 4/8, he found her on the floor "completely out of it" and took her to [facility] for further evaluation.   Initial workup in ED demonstrated a Tbili 5.4, direct bili 4.4 and ammonia 196. Patient was somnolent and had a left eyeward gaze deviation. Submentally started developing agonal snoring respirations and had to be intubated for airway protection. Course complicated with septic shock and persistent hyperammonemia despite lactulose and rifaximin, CT show and started on meropenem, linezolid and anidulafungin at [facility]--> upon transfer here, off pressors, on alvaro/linezolid/caspofungin    CT Chest (4/13) Occlusion of the distal left lower lobar bronchus with complete left lower lobe atelectasis. Right middle lobe consolidation, possibly secondary to pneumonia or additional atelectasis.     CT A/P (4/13) Segmental areas of wall thickening throughout the colon and rectum with pneumatosis, mesenteric edema, and adjacent hyperdensities in the proximal transverse colon suspicious for bowel ischemia with intraluminal hemorrhage. Additional intraluminal hyperdensities within the distal ascending colon suspicious for hemorrhage. Nonspecific focal areas of narrowing in the distal descending colon and sigmoid colon, possibly colonic masses    UCx (4/13) No Growth  BCx (4/13) NGTD  Bronchoscopy Cx (4/13) No Growth  Serum Cryptococcal Antigen (4/14) Negative  Babesia PCR (4/13) Negative  Stool O&P neg   Ehrlichia Ab neg   Lyme/anaplasma neg   Strongyloides IgG neg   Serum CMV PCR  Serum Fungitel > 31  Serum Parvovirus PCR neg     Antimicrobials:   Caspofungin - dced  Daptomycin -dced  (Was started on Micafungin and Linezolid on 4/10 at OSH)  Meropenem 4/13-->  Doxy 4/13-->     #New fever, new mucous plug occluding LLL, GGO in RLL ? aspiration   #Encephalopathy, hyperammonemia not responding to lactulose/rifaximin  #Colonic and rectal Pneumatosis with c/f bowel ischemia and ? masses and hemorrhage (Considering atypical infections that could lead to bowel ischemia +/- masses including fungal and parasitic infections (anisakis, ascaris, strongy ecc), SIBo as above vs non infectious etiologies)  #Hyperbilirubinemia in c/o sepsis ? vs biliary obstruction vs atypical infection  #RML consolidation - aspiration pna vs CAP s/p Bronchoscopy 4/13- BAL bacterial, fungal, AFB cx NGTD  #S/P total pancreatectomy with islet cell autotransplant at [facility] by Dr. Gil in 04/2018    Recommendations:  -Continue Meropenem (4/13 -->). Would complete 10 day total course.   -Continue Doxycycline (4/13 -->) (Pending Leptospira, Bartonella)  -Send repeat blood Cx x 2  -f/up bacterial sputum Cx   -Will add PCP PCR to sputum (microlab contacted)  -Check Ferritin level   -Follow up on Mycoplasma genitalum TMA  -Follow up on Bartonella PCR Blood  -Follow up on Leptospirosis Ab  -Follow up on Serum Aspergillus Galactomannan Antigen  -Follow up tick born PCR   -Follow up on Urine and serum Histoplasma Ag  -Follow up on Bronch Fungitell, Galactomannan, Legionella PCR, Mycoplasma PCR  -Once feasible recommend  colonoscopy with biopsies for histopath with special stains- including fungal, Bill, Viral     Discussed with Dr Beto Monte MD, PGY5  ID fellow  [employer] Teams Preferred  After 5pm/weekends call [phone]     42 year old female with hx of DMT1, Factor V leiden deficiency, gout and 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 Hargrove), and now S/P total pancreatectomy with islet cell autotransplant at [facility] by Dr. Gil in 04/2018). 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 Per records, a Ct later in 2018 showed still fistulization but no abscess formation     Patient presented to [facility] ED on 4/8 by her spouse for AMS. Per , patient had been having nbnb vomiting and diarrhea for the last 4 weeks, extreme fatigue . She was unable to tolerate PO. She was also sleeping 18-20 hrs per day. On 4/8, he found her on the floor "completely out of it" and took her to [facility] for further evaluation.   Initial workup in ED demonstrated a Tbili 5.4, direct bili 4.4 and ammonia 196. Patient was somnolent and had a left eyeward gaze deviation. Submentally started developing agonal snoring respirations and had to be intubated for airway protection. Course complicated with septic shock and persistent hyperammonemia despite lactulose and rifaximin, CT show and started on meropenem, linezolid and anidulafungin at [facility]--> upon transfer here, off pressors, on alvaro/linezolid/caspofungin    CT Chest (4/13) Occlusion of the distal left lower lobar bronchus with complete left lower lobe atelectasis. Right middle lobe consolidation, possibly secondary to pneumonia or additional atelectasis.     CT A/P (4/13) Segmental areas of wall thickening throughout the colon and rectum with pneumatosis, mesenteric edema, and adjacent hyperdensities in the proximal transverse colon suspicious for bowel ischemia with intraluminal hemorrhage. Additional intraluminal hyperdensities within the distal ascending colon suspicious for hemorrhage. Nonspecific focal areas of narrowing in the distal descending colon and sigmoid colon, possibly colonic masses    UCx (4/13) No Growth  BCx (4/13) NGTD  Bronchoscopy Cx (4/13) No Growth  Serum Cryptococcal Antigen (4/14) Negative  Babesia PCR (4/13) Negative  Stool O&P neg   Ehrlichia Ab neg   Lyme/anaplasma neg   Strongyloides IgG neg   Serum CMV PCR  Serum Fungitel > 31  Serum Parvovirus PCR neg     Antimicrobials:   Caspofungin - dced  Daptomycin -dced  (Was started on Micafungin and Linezolid on 4/10 at OSH)  Meropenem 4/13-->  Doxy 4/13-->     #New fever, new mucous plug occluding LLL, GGO in RLL ? aspiration   #Encephalopathy, hyperammonemia not responding to lactulose/rifaximin  #Colonic and rectal Pneumatosis with c/f bowel ischemia and ? masses and hemorrhage (Considering atypical infections that could lead to bowel ischemia +/- masses including fungal and parasitic infections (anisakis, ascaris, strongy ecc), SIBo as above vs non infectious etiologies)  #Hyperbilirubinemia in c/o sepsis ? vs biliary obstruction vs atypical infection  #RML consolidation - aspiration pna vs CAP s/p Bronchoscopy 4/13- BAL bacterial, fungal, AFB cx NGTD  #S/P total pancreatectomy with islet cell autotransplant at [facility] by Dr. Gil in 04/2018    Recommendations:  -Continue Meropenem (4/13 -->). Would complete 10 day total course.   -Continue Doxycycline (4/13 -->) (Pending Leptospira, Bartonella)  -Send repeat blood Cx x 2  -If continued fevers or clinical status changes would add IV Vancomycin  -f/up bacterial sputum Cx   -Given elevated Fungitell Will add PCP PCR to sputum (microlab contacted)  -Check Ferritin level   -Follow up on Mycoplasma genitalum TMA  -Follow up on Bartonella PCR Blood  -Follow up on Leptospirosis Ab  -Follow up on Serum Aspergillus Galactomannan Antigen  -Follow up tick born PCR   -Follow up on Urine and serum Histoplasma Ag  -Follow up on Galactomannan, Legionella PCR, Mycoplasma PCR  -Once feasible recommend  colonoscopy with biopsies for histopath with special stains- including fungal, Bill, Viral     Discussed with Dr Beto Monte MD, PGY5  ID fellow  [employer] Teams Preferred  After 5pm/weekends call [phone]

## 2024-04-17 NOTE — PROGRESS NOTE ADULT - SUBJECTIVE AND OBJECTIVE BOX
Follow Up:      Interval History/ROS:Patient is a 48y old  Female who presents with a chief complaint of anemia (16 Apr 2024 15:11)  ID following for fevers.  Seen at bedside, was febrile overnight, leucocytosis trending up,  extubated this morning, currently on Bipap.     REVIEW OF SYSTEMS  [  ] ROS unobtainable because:    [ x ] All other systems negative except as noted below    Constitutional:  [ ] fever [ ] chills  [ ] weight loss  [ ]night sweat  [ ]poor appetite/PO intake [X]fatigue   Skin:  [ ] rash [ ] phlebitis	  Eyes: [ ] icterus [ ] pain  [ ] discharge	  ENMT: [ ] sore throat  [ ] thrush [ ] ulcers [ ] exudates [ ]anosmia  Respiratory: [ ] dyspnea [ ] hemoptysis [ ] cough [ ] sputum	  Cardiovascular:  [ ] chest pain [ ] palpitations [ ] edema	  Gastrointestinal:  [ ] nausea [ ] vomiting [ ] diarrhea [ ] constipation [ ] pain	  Genitourinary:  [ ] dysuria [ ] frequency [ ] hematuria [ ] discharge [ ] flank pain  [ ] incontinence  Musculoskeletal:  [ ] myalgias [ ] arthralgias [ ] arthritis  [ ] back pain  Neurological:  [ ] headache [ ] weakness [ ] seizures  [ ] confusion/altered mental status    Allergies  No Known Allergies    ANTIMICROBIALS:    doxycycline IVPB 100 every 12 hours  meropenem  IVPB 1000 every 12 hours  rifAXIMin 550 two times a day  vancomycin  IVPB 1000 every 12 hours    OTHER MEDS: MEDICATIONS  (STANDING):  albuterol/ipratropium for Nebulization 3 every 4 hours  heparin   Injectable 5000 every 12 hours  insulin glargine Injectable (LANTUS) 3 at bedtime  insulin lispro (ADMELOG) corrective regimen sliding scale  every 6 hours  lactulose Syrup 30 every 8 hours  levothyroxine 100 daily  methadone    Tablet 5 three times a day  pancrelipase  (CREON  6,000 Lipase Units) 1 <User Schedule>  pantoprazole  Injectable 40 daily  ursodiol Tablet 500 <User Schedule>      Vital Signs Last 24 Hrs  T(F): 99.9 (04-17-24 @ 12:00), Max: 100.4 (04-16-24 @ 20:00)    Vital Signs Last 24 Hrs  HR: 104 (04-17-24 @ 13:40) (83 - 128)  BP: 118/58 (04-17-24 @ 13:00) (110/55 - 145/70)  RR: 26 (04-17-24 @ 13:00)  SpO2: 100% (04-17-24 @ 13:40) (99% - 100%)  Wt(kg): --    EXAM:    Constitutional: awake  HEAD/EYES: anicteric, no conjunctival injection  ENT:  supple, no thrush  Cardiovascular:   normal S1, S2, no murmur, no edema  Respiratory: on Bipap, no wheezes, no rales  GI:  soft, non-tender, normal bowel sounds,   :  + James, rectal tube   Lines: L IJ, R subclavian lines  Neurologic: awake, extubated, following simple commands.    Labs:                        8.8    19.73 )-----------( 86       ( 17 Apr 2024 12:27 )             25.8     04-17    144  |  107  |  <4<L>  ----------------------------<  227<H>  4.1   |  24  |  0.49<L>    Ca    9.9      17 Apr 2024 12:27  Phos  3.0     04-17  Mg     2.2     04-17    TPro  5.4<L>  /  Alb  3.3  /  TBili  7.2<H>  /  DBili  x   /  AST  68<H>  /  ALT  28  /  AlkPhos  140<H>  04-17    WBC Trend:  WBC Count: 19.73 (04-17-24 @ 12:27)  WBC Count: 17.00 (04-17-24 @ 06:18)  WBC Count: 17.58 (04-17-24 @ 00:43)  WBC Count: 15.54 (04-16-24 @ 18:03)    Creatine Trend:  Creatinine: 0.49 (04-17)  Creatinine: 0.50 (04-17)  Creatinine: 0.47 (04-17)  Creatinine: 0.43 (04-16)    Liver Biochemical Testing Trend:  Alanine Aminotransferase (ALT/SGPT): 28 (04-17)  Alanine Aminotransferase (ALT/SGPT): 28 (04-17)  Alanine Aminotransferase (ALT/SGPT): 28 (04-17)  Alanine Aminotransferase (ALT/SGPT): 28 (04-16)  Alanine Aminotransferase (ALT/SGPT): 31 (04-16)  Aspartate Aminotransferase (AST/SGOT): 68 (04-17-24 @ 12:27)  Aspartate Aminotransferase (AST/SGOT): 74 (04-17-24 @ 06:18)  Aspartate Aminotransferase (AST/SGOT): 87 (04-17-24 @ 00:36)  Aspartate Aminotransferase (AST/SGOT): 100 (04-16-24 @ 18:03)  Aspartate Aminotransferase (AST/SGOT): 113 (04-16-24 @ 11:55)  Bilirubin Total: 7.2 (04-17)  Bilirubin Total: 7.5 (04-17)  Bilirubin Total: 8.2 (04-17)  Bilirubin Total: 8.6 (04-16)  Bilirubin Total: 8.9 (04-16)    Trend LDH  04-14-24 @ 13:06  235  04-13-24 @ 09:41  212    Urinalysis Basic - ( 17 Apr 2024 12:27 )    Color: x / Appearance: x / SG: x / pH: x  Gluc: 227 mg/dL / Ketone: x  / Bili: x / Urobili: x   Blood: x / Protein: x / Nitrite: x   Leuk Esterase: x / RBC: x / WBC x   Sq Epi: x / Non Sq Epi: x / Bacteria: x    MICROBIOLOGY:    MRSA PCR Result.: NotDetec (04-13-24 @ 03:56)      Culture - Fungal, Bronchial (collected 13 Apr 2024 16:48)  Source: .Bronchial Bronchial Lavage  Preliminary Report:    Culture is being performed. Fungal cultures are held for 4 weeks.    Culture - Acid Fast - Bronchial w/Smear (collected 13 Apr 2024 16:48)  Source: .Bronchial Bronchial Lavage    Culture - Bronchial (collected 13 Apr 2024 16:48)  Source: .Bronchial Bronchial Lavage  Final Report:    Normal Respiratory Linh present    Culture - Urine (collected 13 Apr 2024 16:10)  Source: Catheterized Catheterized  Final Report:    No growth    Culture - Sputum (collected 13 Apr 2024 04:32)  Source: ET Tube ET Tube  Final Report:    Normal Respiratory Linh present    Culture - Blood (collected 13 Apr 2024 04:00)  Source: .Blood Blood-Venous  Preliminary Report:    No growth at 4 days    Culture - Blood (collected 13 Apr 2024 03:25)  Source: .Blood Blood-Peripheral  Preliminary Report:    No growth at 4 days    HIV-1/2 Combo Result: Nonreact (04-13-24 @ 03:56)    Cryptococcal Antigen - Serum: Negative (04-14-24 @ 00:25)  Cytomegalovirus By PCR: NotDetec IU/mL (04-13-24 @ 18:14)    Cryptococcal Antigen - Serum: Negative (04-14 @ 00:25)  Rapid RVP Result: NotDetec (04-14 @ 00:21)  Legionella Antigen, Urine: Negative (04-13 @ 09:50)  Fungitell: <31 pg/mL (04-13 @ 03:56)    Procalcitonin, Serum: 0.94 (04-13)    Ferritin: 653 (04-13)    Lactate Dehydrogenase, Serum: 235 (04-14)  Lactate Dehydrogenase, Serum: 212 (04-13)    Blood Gas Arterial, Lactate: 2.0 (04-17 @ 13:46)  Blood Gas Venous - Lactate: 2.6 (04-17 @ 11:47)  Blood Gas Venous - Lactate: 2.3 (04-17 @ 00:24)  Blood Gas Venous - Lactate: 2.1 (04-16 @ 17:52)    RADIOLOGY:  imaging below personally reviewed    Xray Abdomen 1 View PORTABLE -Urgent:  (16 Apr 2024 03:26)  Xray Chest 1 View- PORTABLE-Urgent:  (15 Apr 2024 11:46)  Xray Chest 1 View- PORTABLE-Urgent:  (14 Apr 2024 16:48)    CT Abdomen and Pelvis w/ IV Cont:   ACC: [id number] EXAM:  CT ABDOMEN AND PELVIS IC   ORDERED BY:  ASAF BURTON     PROCEDURE DATE:  04/16/2024      INTERPRETATION:  CLINICAL INFORMATION: Liver failure. Colitis.    COMPARISON: CT chest abdomen and pelvis 4/13/2024 and MR abdomen 4/14/2024    CONTRAST/COMPLICATIONS:  IV Contrast: Omnipaque 350. 90 mL administered. 10 mm discarded.  Oral Contrast: NONE  Complications: None reported at time of study completion    PROCEDURE:  CT of the Abdomen and Pelvis was performed.  Sagittal andcoronal reformats were performed.    FINDINGS:  LOWER CHEST: Small bilateral pleural effusions with bibasilar compressive   atelectasis.    LIVER: Heterogeneous parenchyma attenuation likely related to steatosis.  BILE DUCTS: Normal caliber.  GALLBLADDER: Cholecystectomy.  SPLEEN: Splenectomy.  PANCREAS: Distal pancreatectomy.  ADRENALS: Within normal limits.  KIDNEYS/URETERS: Within normal limits.    BLADDER: Decompressed around James catheter.  REPRODUCTIVE ORGANS: Intrauterine device. Left adnexal cyst measuring 3.6   cm.    BOWEL: Enteric tube with tip in the stomach. Status post   gastrojejunostomy. Improved colitis with mild right-sided colonic   thickening persisting. Rectal tube and temperature probe.  PERITONEUM: Small ascites.  VESSELS: Atherosclerotic changes. Celiac axis, SMA and SUDHAKAR are patent.   Haziness of the fat planes surrounding the celiac axis and SMA, etiology   unclear and without change. SMV and portal veins are patent.  RETROPERITONEUM/LYMPH NODES: No lymphadenopathy.  ABDOMINAL WALL: Diffuse subcutaneous edema.  BONES: Within normal limits.    IMPRESSION:  Mildly improved colitis with right-sided colitis persisting.    Patent mesenteric vasculature. Haziness of the fat planes surrounding the   celiac axis and SMA, etiology unclear and without change.    --- End of Report ---      GAMA REYNOLDS MD; Resident Radiologist  This document has been electronically signed.  NIECY VARNER MD; Attending Radiologist  This document has been electronically signed. Apr 16 2024  3:56PM (04-16-24 @ 13:15)  CT Head No Cont:   ACC: [id number] EXAM:  CT BRAIN   ORDERED BY:  ASAF BURTON     PROCEDURE DATE:  04/15/2024          INTERPRETATION:  Clinical indication: Liver failure with hyperammonemia   with downward gaze, evaluate for increased ICP    5mm axial sections of the brain were obtained from base to vertex,   without the intravenous administration of contrast material. Coronal and   sagittal computer generated reconstructed views are available.    Comparison is made with prior CT of 4/13/2024 and demonstrates no   significant interval change.    The fourth, third and lateral ventricles are normal size and position.   There is no hemorrhage, mass or shift of the midline structures. There is   normal gray white matter differentiation. Bone window examination is   unremarkable.    IMPRESSION: Unremarkable noncontrast CT of the brain. No change since   4/13/2024.    --- End of Report ---      JERZY DE LEON MD; Attending Radiologist  This document has been electronically signed. Apr 15 2024  7:56PM (04-15-24 @ 19:43)

## 2024-04-17 NOTE — PROGRESS NOTE ADULT - NUTRITIONAL ASSESSMENT
This patient has been assessed with a concern for Malnutrition and has been determined to have a diagnosis/diagnoses of Severe protein-calorie malnutrition and Underweight (BMI < 19).    This patient is being managed with:   Diet NPO with Tube Feed-  Tube Feeding Modality: Orogastric  Vital 1.5 [place] (VITAL1.5RTH)  Total Volume for 24 Hours (mL): 180  Continuous  Starting Tube Feed Rate {mL per Hour}: 10  Until Goal Tube Feed Rate (mL per Hour): 10  Tube Feed Duration (in Hours): 18  Tube Feed Start Time: 11:45  Entered: Apr 16 2024  3:10PM

## 2024-04-17 NOTE — PROGRESS NOTE ADULT - ASSESSMENT
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 Hargrove), and now S/P total pancreatectomy  with islet cell autotransplant at [facility] by Dr. Gil in 04/2018), c/b recurrent abdominal wall collection requiring IR drainage, presented to OSH with acute change in mental status requiring intubation for airway protection, and transferred to [facility] for care escalation with labs notable for eleavted INR, T. Nilo and NH3 of unclear etiology although possibly 2/2 to cholestasis of sepsis     #Hyperbilirubinemia  #Bicytopenia  #Pneumatosis of bowel concerning for ischemic colitis  #Acute encephalopathy with elevated hyperammonemia now on CRRT   #Coagulopathy (improved)  #Hepatomegaly   - DAWIT, Smooth and IgG within normal limts  - Presented with septic shock, likely RML pneumonia, RLL bronchial occlusion, and pneumatosis coli - likely ischemic colon, colonic blood suggested by CT, init. Hb drop with labs notable for elevated T. Nilo, INR and NH3. Labs also notable for macrocytic anemia - DD: folate deficiency due to malnutrition with ?SIBO and alcohol-related liver disease Imaging with U/S and MRCP with note of  hepatomegaly with non-cirrhotic morphology,MELD 33 (24 with initial creat. before CRRT). Etiology likely 2/2 to cholestasis of sepsis and possible component of acute alcoholic hepatitis  - Acute encephalopathy, hyperammonemia - DD: hepatic encephalopathy in setting of alcoholic liver disease and sepsis, vs. SIBO and sepsis. Hyperammonemia is also a rare but known complication of bypass surgeries with high mortality. Continue with Rifaximin 550 mg BID in addition to Lactulose for goal 5 BMs per day. Ok to continue with L-Carnitine. Mental status improved today. Ok to monitor off CRRT and trend serial NH3 levels. LFTs otherwise slowly improving     Recommendations:  - Zinc low, would star PO Zinc 50 mg QD  - Vit. D, would start supplication   -Serum Ceruloplasmin <10, please check 24 hour urine copper collection   -Ok to continue with L-Carnitinte. Ok to monitor off CRRT and trend serial NH3 levels  -Continue with Rifaximin 550 mg BID in addition to Lactulose for goal 5 BMs per day.  - If no surgical planning, may initiate trickle feed with elemental diet once cleared by surgery team,   - F/u infectious workup; Abx per ID  - IV Thiamine, Folate and MVI   - F/u PETH (in lab)  - No plans for any colonoscopy at this time     All recommendations are tentative until note is attested by attending.

## 2024-04-17 NOTE — EEG REPORT - NS EEG TEXT BOX
EEG Report [Charted Location: [facility] 5[facility] 37] [Authored: 2024 09:43]- for Visit: [id number], Complete, Entered, Signed in Full, Available to Patient    EEG REPORT:    EEG Report:  · EEG Report	     REPORT OF CONTINUOUS VIDEO EEG      [facility]: [address] , [address], [place] [address], Phone: [phone]  [facility]: [address], [place] [address], Phone: [phone]  [facility]: [address] [place] [address], Phone: [phone]    Patient Name: Kari Acosta    Age: 48 year, : 1975  Beckford: -[facility] # 37  EEG #: [id number]    Study Date: 2024   Start Time: 08:00    End Date: 2024     End Time: 08:00     Study Duration: 24 h     Study Information:    EEG Recording Technique:  The patient underwent continuous Video-EEG monitoring, using Telemetry System hardware on the XLTek Digital System. EEG and video data were stored on a computer hard drive with important events saved in digital archive files. The material was reviewed by a physician (electroencephalographer / epileptologist) on a daily basis. Shekhar and seizure detection algorithms were utilized and reviewed. An EEG Technician attended to the patient, and was available throughout daytime work hours.  The epilepsy center neurologist was available in person or on call 24-hours per day.    EEG Placement and Labeling of Electrodes:  The EEG was performed utilizing 20 channel referential EEG connections (coronal over temporal over parasagittal montage) using all standard 10-20 electrode placements with EKG, with additional electrodes placed in the inferior temporal region using the modified 10-10 montage electrode placements for elective admissions, or if deemed necessary. Recording was at a sampling rate of 256 samples per second per channel. Time synchronized digital video recording was done simultaneously with EEG recording. A low light infrared camera was used for low light recording.     History: -  48 year old Female with AMS is here to rule out seizures    Medication  Dextrose    Daptomycin    Levophed    Fentanyl    Peridex    Insulin      Interpretation:    [[[Abbreviation Key:  PDR=alpha rhythm/posterior dominant rhythm. A-P=anterior posterior.  Amplitude: ‘very low’:<20; ‘low’:20-49; ‘medium’:; ‘high’:>150uV.  Persistence for periodic/rhythmic patterns (% of epoch) ‘rare’:<1%; ‘occasional’:1-10%; ‘frequent’:10-50%; ‘abundant’:50-90%; ‘continuous’:>90%.  Persistence for sporadic discharges: ‘rare’:<1/hr; ‘occasional’:1/min-1/hr; ‘frequent’:>1/min; ‘abundant’:>1/10 sec.  RPP=rhythmic and periodic patterns; GRDA=generalized rhythmic delta activity; FIRDA=frontal intermittent GRDA; LRDA=lateralized rhythmic delta activity; TIRDA=temporal intermittent rhythmic delta activity;  LPD=PLED=lateralized periodic discharges; GPD=generalized periodic discharges; BIPDs =bilateral independent periodic discharges; Mf=multifocal; SIRPDs=stimulus induced rhythmic, periodic, or ictal appearing discharges; BIRDs=brief potentially ictal rhythmic discharges >4 Hz, lasting .5-10s; PFA (paroxysmal bursts >13 Hz or =8 Hz <10s).  Modifiers: +F=with fast component; +S=with spike component; +R=with rhythmic component.  S-B=burst suppression pattern.  Max=maximal. N1-drowsy; N2-stage II sleep; N3-slow wave sleep. SSS/BETS=small sharp spikes/benign epileptiform transients of sleep. HV=hyperventilation; PS=photic stimulation]]]      Daily EEG Visual Analysis    FINDINGS:      Background:  Symmetry: symmetric  Continuous: continuous  PDR: Absent  Reactivity: present  Voltage: normal, [defined typically between 20-150uV]  Anterior Posterior Gradient: absent  Breach: absent    Background Slowing:  Generalized slowing: present. Background is diffusely slow consisting of delta activity up to 4 Hz with frequent GRDA and occasional triphasic waves    Focal slowing: none was present.    State Changes:   absent    Sporadic Epileptiform Discharges:   None    Rhythmic and Periodic Patterns (RPPs):  Generalized rhythmic delta activity    Electrographic and Electroclinical seizures:  None    Other Clinical Events:  None    Activation Procedures:   -Hyperventilation was not performed.    -Photic stimulation was not performed.      Artifacts:  Intermittent myogenic and movement artifacts were noted.    ECG:  No significant abnormality      EEG Summary / Classification:  Abnormal EEG.  •	Background slowing. Moderate to severe    EEG Impression / Clinical Correlate:  Abnormal prolonged EEG study due to  1- Moderate to severe diffuse cerebral dysfunction that is not specific in etiology  2- GRDA typically seen in diffuse cerebral dysfunction  3- Triphasic waves typically seen in toxic/metabolic encephalopathy    No epileptic discharges recorded.  No seizures recorded.  •	  In absence of additional concerns, recommend consideration for discontinuation of current EEG study with reconnection in future if warranted.    ________________________________________    NICOLE Dunn  Attending Physician, [facility]    ------------------------------------  EEG Reading Room: [phone]  On Call Service After Hours: [phone]            Electronic Signatures:  Miguel Brock)  (Signed 2024 09:48)  	Authored: EEG REPORT      Last Updated: 2024 09:48 by Miguel Brock)       	     REPORT OF CONTINUOUS VIDEO EEG      [facility]: [address] [facility] [address], [place] [address], Phone: [phone]  [facility]: [address] [facility], [place] [address], Phone: [phone]  [facility]: [address] [place] [address], Phone: [phone]    Patient Name: Kari Acosta    Age: 48 year, : 1975  Beckford: -5 [facility] # 37  EEG #: [id number]    Study Date: 2024   Start Time: 08:00    End Date: 2024     End Time: 13:58     Study Duration: 29 h 58 m    Study Information:    EEG Recording Technique:  The patient underwent continuous Video-EEG monitoring, using Telemetry System hardware on the XLTek Digital System. EEG and video data were stored on a computer hard drive with important events saved in digital archive files. The material was reviewed by a physician (electroencephalographer / epileptologist) on a daily basis. Shekhar and seizure detection algorithms were utilized and reviewed. An EEG Technician attended to the patient, and was available throughout daytime work hours.  The epilepsy center neurologist was available in person or on call 24-hours per day.    EEG Placement and Labeling of Electrodes:  The EEG was performed utilizing 20 channel referential EEG connections (coronal over temporal over parasagittal montage) using all standard 10-20 electrode placements with EKG, with additional electrodes placed in the inferior temporal region using the modified 10-10 montage electrode placements for elective admissions, or if deemed necessary. Recording was at a sampling rate of 256 samples per second per channel. Time synchronized digital video recording was done simultaneously with EEG recording. A low light infrared camera was used for low light recording.     History: -  48 year old Female with AMS is here to rule out seizures    Medication  Dextrose    Daptomycin    Levophed    Fentanyl    Peridex    Insulin      Interpretation:    [[[Abbreviation Key:  PDR=alpha rhythm/posterior dominant rhythm. A-P=anterior posterior.  Amplitude: ‘very low’:<20; ‘low’:20-49; ‘medium’:; ‘high’:>150uV.  Persistence for periodic/rhythmic patterns (% of epoch) ‘rare’:<1%; ‘occasional’:1-10%; ‘frequent’:10-50%; ‘abundant’:50-90%; ‘continuous’:>90%.  Persistence for sporadic discharges: ‘rare’:<1/hr; ‘occasional’:1/min-1/hr; ‘frequent’:>1/min; ‘abundant’:>1/10 sec.  RPP=rhythmic and periodic patterns; GRDA=generalized rhythmic delta activity; FIRDA=frontal intermittent GRDA; LRDA=lateralized rhythmic delta activity; TIRDA=temporal intermittent rhythmic delta activity;  LPD=PLED=lateralized periodic discharges; GPD=generalized periodic discharges; BIPDs =bilateral independent periodic discharges; Mf=multifocal; SIRPDs=stimulus induced rhythmic, periodic, or ictal appearing discharges; BIRDs=brief potentially ictal rhythmic discharges >4 Hz, lasting .5-10s; PFA (paroxysmal bursts >13 Hz or =8 Hz <10s).  Modifiers: +F=with fast component; +S=with spike component; +R=with rhythmic component.  S-B=burst suppression pattern.  Max=maximal. N1-drowsy; N2-stage II sleep; N3-slow wave sleep. SSS/BETS=small sharp spikes/benign epileptiform transients of sleep. HV=hyperventilation; PS=photic stimulation]]]      Daily EEG Visual Analysis    FINDINGS:      Background:  Symmetry: symmetric  Continuous: continuous  PDR: Absent  Reactivity: present  Voltage: normal, [defined typically between 20-150uV]  Anterior Posterior Gradient: absent  Breach: absent    Background Slowing:  Generalized slowing: present. Background is diffusely slow consisting of delta activity up to 4 Hz with frequent GRDA and occasional triphasic waves    Focal slowing: none was present.    State Changes:   absent    Sporadic Epileptiform Discharges:   None    Rhythmic and Periodic Patterns (RPPs):  Generalized rhythmic delta activity    Electrographic and Electroclinical seizures:  None    Other Clinical Events:  None    Activation Procedures:   -Hyperventilation was not performed.    -Photic stimulation was not performed.      Artifacts:  Intermittent myogenic and movement artifacts were noted.    ECG:  No significant abnormality      EEG Summary / Classification:  Abnormal EEG.  •	Background slowing. Moderate to severe    EEG Impression / Clinical Correlate:  Abnormal prolonged EEG study due to  1- Moderate to severe diffuse cerebral dysfunction that is not specific in etiology  2- GRDA typically seen in diffuse cerebral dysfunction  3- Triphasic waves typically seen in toxic/metabolic encephalopathy    No epileptic discharges recorded.  No seizures recorded.  •	  In absence of additional concerns, recommend consideration for discontinuation of current EEG study with reconnection in future if warranted.    ________________________________________    NICOLE Dunn  Attending Physician, [facility]    ------------------------------------  EEG Reading Room: [phone]  On Call Service After Hours: [phone]            Electronic Signatures:  Miguel Brock)  (Signed 2024 09:48)  	Authored: EEG REPORT      Last Updated: 2024 09:48 by Miguel Brock)

## 2024-04-17 NOTE — PROGRESS NOTE ADULT - ATTENDING COMMENTS
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  Found to have hyperammonemia, encephalopathy, status post intubation for airway protection.  Endocrinology consulted for management of hypoglycemia.  Patient was using OmniPod Gonzales insulin pump with Dexcomh CGM.   tube feeds currently held as patient was on BiPAP.  Given persistent hyperglycemia, recommend increasing Lantus to 5 units.  Continue every 6 hours.  More determined insulin requirement further depending on advancement of diet.  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  Consider stopping LT4 or taper down the dose.

## 2024-04-18 NOTE — PROGRESS NOTE ADULT - NUTRITIONAL ASSESSMENT
This patient has been assessed with a concern for Malnutrition and has been determined to have a diagnosis/diagnoses of Severe protein-calorie malnutrition and Underweight (BMI < 19).    This patient is being managed with:   Diet NPO with Tube Feed-  Tube Feeding Modality: Nasogastric  Vital 1.5 [place] (VITAL1.5RTH)  Total Volume for 24 Hours (mL): 180  Continuous  Starting Tube Feed Rate {mL per Hour}: 10  Until Goal Tube Feed Rate (mL per Hour): 10  Tube Feed Duration (in Hours): 18  Tube Feed Start Time: 07:30  Entered: Apr 18 2024  7:02AM

## 2024-04-18 NOTE — PROGRESS NOTE ADULT - SUBJECTIVE AND OBJECTIVE BOX
Follow Up:      Interval History/ROS: Patient is a 48y old  Female who presents with a chief complaint of anemia.  ID following for fevers.  Seen at bedside, awake, extubated yesterday, Bipap switched to NC today, stated feels better today, asking when she can go home.   Afebrile since this am, last documented fever was yesterday afternoon, leukocytosis up to 23K.      REVIEW OF SYSTEMS  [  ] ROS unobtainable because:    [ x ] All other systems negative except as noted below    Constitutional:  [ ] fever [ ] chills  [ ] weight loss  [ ]night sweat  [ ]poor appetite/PO intake [ ]fatigue   Skin:  [ ] rash [ ] phlebitis	  Eyes: [ ] icterus [ ] pain  [ ] discharge	  ENMT: [ ] sore throat  [ ] thrush [ ] ulcers [ ] exudates [ ]anosmia  Respiratory: [ ] dyspnea [ ] hemoptysis [ ] cough [ ] sputum	  Cardiovascular:  [ ] chest pain [ ] palpitations [ ] edema	  Gastrointestinal:  [ ] nausea [ ] vomiting [ ] diarrhea [ ] constipation [ ] pain	  Genitourinary:  [ ] dysuria [ ] frequency [ ] hematuria [ ] discharge [ ] flank pain  [ ] incontinence  Musculoskeletal:  [ ] myalgias [ ] arthralgias [ ] arthritis  [ ] back pain  Neurological:  [ ] headache [X ] weakness [ ] seizures  [ ] confusion/altered mental status    Allergies  No Known Allergies    ANTIMICROBIALS:    meropenem  IVPB 1000 every 12 hours  rifAXIMin 550 two times a day  vancomycin  IVPB    vancomycin  IVPB 1000 every 12 hours    OTHER MEDS: MEDICATIONS  (STANDING):  albuterol/ipratropium for Nebulization 3 every 6 hours  heparin   Injectable 5000 every 12 hours  insulin glargine Injectable (LANTUS) 5 at bedtime  insulin lispro (ADMELOG) corrective regimen sliding scale  every 6 hours  lactulose Syrup 30 every 8 hours  levothyroxine 100 daily  methadone    Tablet 5 three times a day  pantoprazole  Injectable 40 daily  ursodiol Tablet 500 <User Schedule>    Vital Signs Last 24 Hrs  T(F): 98.5 (04-18-24 @ 12:00), Max: 100.4 (04-16-24 @ 20:00)    Vital Signs Last 24 Hrs  HR: 99 (04-18-24 @ 12:00) (93 - 120)  BP: 147/70 (04-18-24 @ 12:00) (106/56 - 147/70)  RR: 25 (04-18-24 @ 12:00)  SpO2: 100% (04-18-24 @ 10:21) (96% - 100%)  Wt(kg): --    EXAM:    Constitutional: awake  HEAD/EYES: anicteric, no conjunctival injection  ENT:  supple, no thrush  Cardiovascular:   normal S1, S2, no murmur, no edema  Respiratory: on NC, no wheezes, no rales  GI:  soft, non-tender, normal bowel sounds,   :  + James  Lines: lines removed   Neurologic: awake, extubated, following commands    Labs:                        8.0    23.60 )-----------( 83       ( 18 Apr 2024 00:23 )             22.7     04-18    142  |  106  |  <4<L>  ----------------------------<  292<H>  4.1   |  20<L>  |  0.49<L>    Ca    9.7      18 Apr 2024 00:23  Phos  3.6     04-18  Mg     1.8     04-18    TPro  4.9<L>  /  Alb  2.8<L>  /  TBili  6.2<H>  /  DBili  x   /  AST  65<H>  /  ALT  25  /  AlkPhos  133<H>  04-18    WBC Trend:  WBC Count: 23.60 (04-18-24 @ 00:23)  WBC Count: 19.73 (04-17-24 @ 12:27)  WBC Count: 17.00 (04-17-24 @ 06:18)  WBC Count: 17.58 (04-17-24 @ 00:43)    Creatine Trend:  Creatinine: 0.49 (04-18)  Creatinine: 0.49 (04-17)  Creatinine: 0.50 (04-17)  Creatinine: 0.47 (04-17)    Liver Biochemical Testing Trend:  Alanine Aminotransferase (ALT/SGPT): 25 (04-18)  Alanine Aminotransferase (ALT/SGPT): 28 (04-17)  Alanine Aminotransferase (ALT/SGPT): 28 (04-17)  Alanine Aminotransferase (ALT/SGPT): 28 (04-17)  Alanine Aminotransferase (ALT/SGPT): 28 (04-16)  Aspartate Aminotransferase (AST/SGOT): 65 (04-18-24 @ 00:23)  Aspartate Aminotransferase (AST/SGOT): 68 (04-17-24 @ 12:27)  Aspartate Aminotransferase (AST/SGOT): 74 (04-17-24 @ 06:18)  Aspartate Aminotransferase (AST/SGOT): 87 (04-17-24 @ 00:36)  Aspartate Aminotransferase (AST/SGOT): 100 (04-16-24 @ 18:03)  Bilirubin Total: 6.2 (04-18)  Bilirubin Total: 7.2 (04-17)  Bilirubin Total: 7.5 (04-17)  Bilirubin Total: 8.2 (04-17)  Bilirubin Total: 8.6 (04-16)    Trend LDH  04-14-24 @ 13:06  235  04-13-24 @ 09:41  212    Urinalysis Basic - ( 18 Apr 2024 00:23 )    Color: x / Appearance: x / SG: x / pH: x  Gluc: 292 mg/dL / Ketone: x  / Bili: x / Urobili: x   Blood: x / Protein: x / Nitrite: x   Leuk Esterase: x / RBC: x / WBC x   Sq Epi: x / Non Sq Epi: x / Bacteria: x    MICROBIOLOGY:    MRSA PCR Result.: NotDetec (04-13-24 @ 03:56)    Culture - Sputum (collected 17 Apr 2024 12:11)  Source: ET Tube ET Tube    Culture - Acid Fast - Bronchial w/Smear (collected 13 Apr 2024 16:48)  Source: .Bronchial Bronchial Lavage  Preliminary Report:    Culture is being performed.    Culture - Fungal, Bronchial (collected 13 Apr 2024 16:48)  Source: .Bronchial Bronchial Lavage  Preliminary Report:    Rare Yeast    Culture - Bronchial (collected 13 Apr 2024 16:48)  Source: .Bronchial Bronchial Lavage  Final Report:    Normal Respiratory Linh present    Culture - Urine (collected 13 Apr 2024 16:10)  Source: Catheterized Catheterized  Final Report:    No growth    Culture - Sputum (collected 13 Apr 2024 04:32)  Source: ET Tube ET Tube  Final Report:    Normal Respiratory Linh present    Culture - Blood (collected 13 Apr 2024 04:00)  Source: .Blood Blood-Venous  Final Report:    No growth at 5 days    Culture - Blood (collected 13 Apr 2024 03:25)  Source: .Blood Blood-Peripheral  Final Report:    No growth at 5 days    HIV-1/2 Combo Result: Nonreact (04-13-24 @ 03:56)    Cryptococcal Antigen - Serum: Negative (04-14-24 @ 00:25)  Cytomegalovirus By PCR: NotDetec IU/mL (04-13-24 @ 18:14)    Aspergillus Galactomannan Antigen: 0.05 Index (04-14 @ 13:06)  Cryptococcal Antigen - Serum: Negative (04-14 @ 00:25)  Rapid RVP Result: NotDetec (04-14 @ 00:21)  Aspergillus Galactomannan Antigen: 0.21 Index (04-13 @ 16:49)  Legionella Antigen, Urine: Negative (04-13 @ 09:50)  Fungitell: <31 pg/mL (04-13 @ 03:56)    Procalcitonin, Serum: 0.94 (04-13)    Ferritin: 1087 (04-17)  Ferritin: 653 (04-13)    Lactate Dehydrogenase, Serum: 235 (04-14)  Lactate Dehydrogenase, Serum: 212 (04-13)    Blood Gas Arterial, Lactate: 2.2 (04-18 @ 00:17)  Blood Gas Arterial, Lactate: 1.9 (04-17 @ 17:15)  Blood Gas Arterial, Lactate: 2.0 (04-17 @ 13:46)  Blood Gas Venous - Lactate: 2.6 (04-17 @ 11:47)    RADIOLOGY:  imaging below personally reviewed    Xray Abdomen 1 View PORTABLE -Urgent:  (16 Apr 2024 03:26)    CT Abdomen and Pelvis w/ IV Cont:   ACC: [id number] EXAM:  CT ABDOMEN AND PELVIS IC   ORDERED BY:  ASAF BURTON     PROCEDURE DATE:  04/16/2024      INTERPRETATION:  CLINICAL INFORMATION: Liver failure. Colitis.    COMPARISON: CT chest abdomen and pelvis 4/13/2024 and MR abdomen 4/14/2024    CONTRAST/COMPLICATIONS:  IV Contrast: Omnipaque 350. 90 mL administered. 10 mm discarded.  Oral Contrast: NONE  Complications: None reported at time of study completion    PROCEDURE:  CT of the Abdomen and Pelvis was performed.  Sagittal andcoronal reformats were performed.    FINDINGS:  LOWER CHEST: Small bilateral pleural effusions with bibasilar compressive   atelectasis.    LIVER: Heterogeneous parenchyma attenuation likely related to steatosis.  BILE DUCTS: Normal caliber.  GALLBLADDER: Cholecystectomy.  SPLEEN: Splenectomy.  PANCREAS: Distal pancreatectomy.  ADRENALS: Within normal limits.  KIDNEYS/URETERS: Within normal limits.    BLADDER: Decompressed around James catheter.  REPRODUCTIVE ORGANS: Intrauterine device. Left adnexal cyst measuring 3.6   cm.    BOWEL: Enteric tube with tip in the stomach. Status post   gastrojejunostomy. Improved colitis with mild right-sided colonic   thickening persisting. Rectal tube and temperature probe.  PERITONEUM: Small ascites.  VESSELS: Atherosclerotic changes. Celiac axis, SMA and SUDHAKAR are patent.   Haziness of the fat planes surrounding the celiac axis and SMA, etiology   unclear and without change. SMV and portal veins are patent.  RETROPERITONEUM/LYMPH NODES: No lymphadenopathy.  ABDOMINAL WALL: Diffuse subcutaneous edema.  BONES: Within normal limits.    IMPRESSION:  Mildly improved colitis with right-sided colitis persisting.    Patent mesenteric vasculature. Haziness of the fat planes surrounding the   celiac axis and SMA, etiology unclear and without change.    --- End of Report ---    GAMA REYNOLDS MD; Resident Radiologist  This document has been electronically signed.  NIECY VARNER MD; Attending Radiologist  This document has been electronically signed. Apr 16 2024  3:56PM (04-16-24 @ 13:15)  CT Head No Cont:   ACC: [id number] EXAM:  CT BRAIN   ORDERED BY:  ASAF BURTON     PROCEDURE DATE:  04/15/2024      INTERPRETATION:  Clinical indication: Liver failure with hyperammonemia   with downward gaze, evaluate for increased ICP    5mm axial sections of the brain were obtained from base to vertex,   without the intravenous administration of contrast material. Coronal and   sagittal computer generated reconstructed views are available.    Comparison is made with prior CT of 4/13/2024 and demonstrates no   significant interval change.    The fourth, third and lateral ventricles are normal size and position.   There is no hemorrhage, mass or shift of the midline structures. There is   normal gray white matter differentiation. Bone window examination is   unremarkable.    IMPRESSION: Unremarkable noncontrast CT of the brain. No change since   4/13/2024.    --- End of Report ---    JERZY DE LEON MD; Attending Radiologist  This document has been electronically signed. Apr 15 2024  7:56PM (04-15-24 @ 19:43)

## 2024-04-18 NOTE — PROGRESS NOTE ADULT - ASSESSMENT
43yo pmh of HTN, DM, Factor V Leiden c/b L peroneal DVT (2022) and RA thrombus (2018) currently not on AC, gout, congenital pancreas divisum, recurrent pancreatitis s/p cholecystectomy, Splenectomy, Kevin-En-Y Pancreaticojejunostomy, total pancreatectomy s/p Islet Cell Autotransplant at [facility] 2018, complicated by multiple intraabdominal infection (VRE, fungal? per LIMC), fistula, abscesses. Presented to [facility] 4/8 for several weeks of decreased PO intake, NBNB emesis, watery diarrhea, Somnolence, requiring intubation for airway protection. Lab revealed ammonia > 200, elevated bilirubin and INR initially c/f liver failure, c/c/b colitis possibly ischemic etiology. Patient transferred to [facility] MICU 4/13 for CRRT initiation for ammonia clearance.    =====Neuro=====  #Altered mental status  >Baseline AOx3  >CTH (4/13): No acute findings  >CTH (4/15): No acute findings   >s/p Precedex, off since 4/15  - Currently intubated off sedation, awake, follows command  - Likely iso hyperammonemia vs. infection   - treatment for hyperammonemia as below    #Downward nystagmus  >Noted on exam 4/14-4/15 overnight  >CTH (4/15): No acute findings   >EEG (4/15 - 4/17): No seizure activity, TME  - Will ctm    #Chronic pain  #anxiety/depression   >Was on alprazolam 0.75mg QD, Dilaudid and methadone 5mg TID  - r/s metahdone 5mg TID    =====Cardio=====  #History of L peroneal DVT in 2022  #History of RA thrombus in 2018  >TTE (4/13): No thrombus commented  - Eliquis stopped 5/2023 by outpatient hematology given consistently negative DVT studies  - Hold AC iso thrombocytopenia   - f/u Heme recs    #RV dysfunction?  >Enlarged RV and pulmonary hypertension noted on POCUS (4/16) that is not on TTE 4/13  >CTAPE (4/16): No PE  >DVT study (4/16): No DVT  >TTE (4/16): Normal LVSF EF 75%, Normal RVSF and size, TAPSE 1.9, PASP 25  - c/w POCUS monitoring    =====Pulmonary=====  #Mechanical ventilation   - Tolerating CPAP well  - s/p Extubation 4/17    #Atelectasis vs. PNA  >CTC (4/13): RML consolidation noted, possible atelectasis > PNA  - persistently leukocytosis  - abx as below  - f/u ID recs    =====GI=====  Patient presented to [facility] for 4 weeks of decreased PO, NBNB emesis, diarrhea and somnolence, intubated for airway protection. Labs notable for elevated bilirubin, ammonia > 200, INR > 4, initially concern for liver failure and transferred to [facility] for liver transplant eval + CRRT for ammonia clearance. However, does not appear to be in florid liver failure per hepatology, and MRCP does not appear to have obstructive pathology.      #Elevated bilirubin  >Elevated bilirubin/coag, c/b hepatic encephalopathy  >s/p NAC on admission to outside hospital  >POCUS with mild ascites  >CTAP (4/13): Hepatomegaly and hepatic steatosis  >US abd (4/13): Hepatic steatosis, no hepatic vein thrombosis  >Acute hepatitis panel (4/13): Negative  >Autoimmune hepatitis panel (4/13): Pending  >MRCP (4/15): Enlarged, fatty liver. No biliary duct dilation, unlikely obstruction  >Gastrograffin study to r/o SBO (4/16): Passage of contrast seen on serial XR  >PETH (4/13): > 400  - Etiology not entirely clear, possibly alcoholic cirrhosis/hepatitis given elevated PETH  - c/w thiamine, folic acid, MV  - c/w ursodiol 500mg QD    #Hyperammonemia   >Baseline mentation AAO3  >s/p CRRT (off since 4/16)  - Started Levocarnitine for possible short bowel syndrome given extensive surg history  - f/u metabolic genetic consult to r/o acquired urea cycle disorder  - c/w Rifaximin   - c/w Lactulose    #Ischemic bowel   >CTA/P (4/13): segmental areas of wall thickening throughout the colon and rectum with pneumatosis, mesenteric edema, and adjacent hyperdensities in the proximal transverse colon suspicious for bowel ischemia with intraluminal hemorrhage. Additional intraluminal hyperdensities within the distal ascending colon suspicious for hemorrhage.   >TTE (4/13): No thrombus commented  - Surgery, GI following, no plan for surgery/endoscopic eval for now  - On TF, trend lactate, monitor GIB    #Colonic mass?  >CTA/P (4/13): Nonspecific focal areas of narrowing in the distal descending colon and sigmoid colon, possibly colonic masses  - ctm, colonoscopy eventually    #abdominal mesh  #congenital abnormality of the pancreas   >Multiple surgical hx from recurrent pancreatitis as in HPI. S/p total pancreatectomy with islet cell autotransplant followed by multiple abscess leading to several abdominal surgeries.     #Diet and ppx  - TF, c/w Pancreatic enzyme supplement while on TF  - rectal tube in place   - c/w PPI 40mg for GI ppx    =====Renal=====  #CRRT  >s/p CRRT (last 4/17) for ammonia clearance  - Strict I/O  - James in place  - Diurese PRN    =====Heme=====   #Factor 5 Leiden  #History of L peroneal DVT in 2022  #History of RA thrombus in 2018  >TTE (4/13): No thrombus commented  - Eliquis stopped 5/2023 by outpatient hematology given consistently negative DVT studies  - f/u Heme recs    # Macrocytic anemia  >s/p 2u PRBC (4/13, 4/16)  >Multifactorial in nature. No hx of bleeding but with coagulopathy as below.   >B12 (4/13): >2000, Folate: 8.6  >Iron studies (4/13): Ferritin 653, Iron 43, Unmeasurable TIBC/Iron%   >Hemolysis lab (4/14): Haptoglobin < 20;   - Possibly iso liver disease vs. acute infection/inflammation vs. ?hemolysis  - Trend CBC, coag, transfuse goal > 7    #thrombocytopenia  >s/p 1u Plt (4/13)   - Etiology not entirely clear, s/p splenectomy and hepatic steatosis w/o cirrhosis on imaging  - f/u infectious workup as below  - Heme consult  - f/u factor viii assay    =====Endo=====  #DM1 vs. DM3  >Per HIE record, patient prone to hypoglycemia  >History of DM1, but s/p total pancreatectomy and islet cell autotransplant in 2018 - currently DM3  - c/w Lantus 5u QHS + ISS  - f/u C-peptide  - Endocrine on board    =====ID=====  Infectious work up  >UA (4/13): 11WBC, small LE, negative Nitrite or bacteria  >Babesia (4/13): neg; HIV (4/13): neg; RVP, COVID (4/14): neg; MRSA (4/13): Negative  >CXR (4/13): RML consolidation  >CTC/A/P (4/13): RML consolidation, LLL atelectasis 2/2 likely ?mucus plug, ischemic colitis  >Bcx (4/13): NGTD  >ET tube cx (4/13): Normal resp luisa  >BAL (4/13): Pending  >GI PCR (4/15): Negative  - Rest of infectious w/u per ID    #Septic shock   #hx of recurrent infections   >H/o of recurrent infxn from multiple abdominal wounds and surgeries. Hx of VRE and candida.   >Previously on levo at OSH. Off pressor on transfer; Restarted with initiation of CRRT  >s/p Caspofungin 50mg Q24 (stopped 4/16)  >s/p Daptomycin 500mg Q24 (stopped 4/16)  - Infectious w/u per ID  - c/w Doxycycline 100mg Q12  - c/w Meropenem 1g q12 (4/8 - )  - r/s Vancomycin for gram positive coverage given up-trending WBC (4/17 - )     41yo pmh of HTN, DM, Factor V Leiden c/b L peroneal DVT (2022) and RA thrombus (2018) currently not on AC, gout, congenital pancreas divisum, recurrent pancreatitis s/p cholecystectomy, Splenectomy, Kevin-En-Y Pancreaticojejunostomy, total pancreatectomy s/p Islet Cell Autotransplant at [facility] 2018, complicated by multiple intraabdominal infection (VRE, fungal? per [facility]), fistula, abscesses. Presented to [facility] 4/8 for several weeks of decreased PO intake, NBNB emesis, watery diarrhea, Somnolence, requiring intubation for airway protection. Lab revealed ammonia > 200, elevated bilirubin and INR initially c/f liver failure, c/c/b colitis possibly ischemic etiology. Patient transferred to [facility] MICU 4/13 for CRRT initiation for ammonia clearance.    =====Neuro=====  #Altered mental status  >Baseline AOx3  >CTH (4/13): No acute findings  >CTH (4/15): No acute findings   >s/p Precedex, off since 4/15  - Currently intubated off sedation, awake, follows command  - Likely iso hyperammonemia vs. infection   - treatment for hyperammonemia as below    #Downward nystagmus  >Noted on exam 4/14-4/15 overnight  >CTH (4/15): No acute findings   >EEG (4/15 - 4/17): No seizure activity, TME  - Will ctm    #Chronic pain  #anxiety/depression   >Was on alprazolam 0.75mg QD, Dilaudid and methadone 5mg TID  - c/w Methadone 5mg TID    =====Cardio=====  #History of L peroneal DVT in 2022  #History of RA thrombus in 2018  >TTE (4/13): No thrombus commented  - Eliquis stopped 5/2023 by outpatient hematology given consistently negative DVT studies  - Hold AC iso thrombocytopenia   - f/u Heme recs    #RV dysfunction?  >Enlarged RV and pulmonary hypertension noted on POCUS (4/16) that is not on TTE 4/13  >CTAPE (4/16): No PE  >DVT study (4/16): No DVT  >TTE (4/16): Normal LVSF EF 75%, Normal RVSF and size, TAPSE 1.9, PASP 25  - c/w POCUS monitoring    =====Pulmonary=====  #Mechanical ventilation   - Tolerating CPAP well  - s/p Extubation 4/17    #Atelectasis vs. PNA  >CTC (4/13): RML consolidation noted, possible atelectasis > PNA  - persistently leukocytosis  - abx as below  - f/u ID recs    =====GI=====  Patient presented to [facility] for 4 weeks of decreased PO, NBNB emesis, diarrhea and somnolence, intubated for airway protection. Labs notable for elevated bilirubin, ammonia > 200, INR > 4, initially concern for liver failure and transferred to [facility] for liver transplant eval + CRRT for ammonia clearance. However, does not appear to be in florid liver failure per hepatology, and MRCP does not appear to have obstructive pathology.      #Elevated bilirubin  >Elevated bilirubin/coag, c/b hepatic encephalopathy  >s/p NAC on admission to outside hospital  >POCUS with mild ascites  >CTAP (4/13): Hepatomegaly and hepatic steatosis  >US abd (4/13): Hepatic steatosis, no hepatic vein thrombosis  >Acute hepatitis panel (4/13): Negative  >Autoimmune hepatitis panel (4/13): Pending  >MRCP (4/15): Enlarged, fatty liver. No biliary duct dilation, unlikely obstruction  >Gastrograffin study to r/o SBO (4/16): Passage of contrast seen on serial XR  >PETH (4/13): > 400  - Etiology likely alcohol related given PETH level  - c/w thiamine, folic acid, MV  - c/w ursodiol 500mg QD    #Hyperammonemia   >Baseline mentation AAO3  >s/p CRRT (off since 4/16)  - f/u metabolic genetic consult to r/o acquired urea cycle disorder  - c/w Rifaximin   - c/w Lactulose    #Colitis, ?Ischemic  >CTA/P (4/13): segmental areas of wall thickening throughout the colon and rectum with pneumatosis, mesenteric edema, and adjacent hyperdensities in the proximal transverse colon suspicious for bowel ischemia with intraluminal hemorrhage. Additional intraluminal hyperdensities within the distal ascending colon suspicious for hemorrhage.   >TTE (4/13): No thrombus commented  - Surgery, GI following, no plan for surgery/endoscopic eval for now  - On TF, trend lactate, monitor GIB    #Colonic mass?  >CTA/P (4/13): Nonspecific focal areas of narrowing in the distal descending colon and sigmoid colon, possibly colonic masses  - ctm, colonoscopy eventually    #abdominal mesh  #congenital abnormality of the pancreas   >Multiple surgical hx from recurrent pancreatitis as in HPI. S/p total pancreatectomy with islet cell autotransplant followed by multiple abscess leading to several abdominal surgeries.     #Diet and ppx  - TF, c/w Pancreatic enzyme supplement while on TF  - rectal tube in place   - c/w PPI 40mg for GI ppx  - Pending bedside dysphagia / swallow eval    =====Renal=====  #CRRT  >s/p CRRT (last 4/17) for ammonia clearance  - Strict I/O  - James in place  - Diurese PRN    =====Heme=====   #Factor 5 Leiden  #History of L peroneal DVT in 2022  #History of RA thrombus in 2018  >TTE (4/13): No thrombus commented  - Eliquis stopped 5/2023 by outpatient hematology given consistently negative DVT studies  - f/u Heme recs    # Macrocytic anemia  >s/p 2u PRBC (4/13, 4/16)  >Multifactorial in nature. No hx of bleeding but with coagulopathy as below.   >B12 (4/13): >2000, Folate: 8.6  >Iron studies (4/13): Ferritin 653, Iron 43, Unmeasurable TIBC/Iron%   >Hemolysis lab (4/14): Haptoglobin < 20;   - Possibly iso liver disease vs. acute infection/inflammation vs. ?hemolysis  - Trend CBC, coag, transfuse goal > 7    #thrombocytopenia  >s/p 1u Plt (4/13)   - Etiology not entirely clear, s/p splenectomy and hepatic steatosis w/o cirrhosis on imaging  - f/u infectious workup as below  - Heme consult  - f/u factor viii assay    =====Endo=====  #DM1 vs. DM3  >Per HIE record, patient prone to hypoglycemia  >History of DM1, but s/p total pancreatectomy and islet cell autotransplant in 2018 - currently DM3  - c/w Lantus 5u QHS + ISS  - f/u C-peptide  - Endocrine on board    =====ID=====  Infectious work up  >UA (4/13): 11WBC, small LE, negative Nitrite or bacteria  >Babesia (4/13): neg; HIV (4/13): neg; RVP, COVID (4/14): neg; MRSA (4/13): Negative  >CXR (4/13): RML consolidation  >CTC/A/P (4/13): RML consolidation, LLL atelectasis 2/2 likely ?mucus plug, ischemic colitis  >Bcx (4/13): NGTD  >ET tube cx (4/13): Normal resp luisa  >BAL (4/13): Pending  >GI PCR (4/15): Negative  - Rest of infectious w/u per ID    #Septic shock   #hx of recurrent infections   >H/o of recurrent infxn from multiple abdominal wounds and surgeries. Hx of VRE and candida.   >Previously on levo at OSH. Off pressor on transfer; Restarted with initiation of CRRT  >s/p Caspofungin 50mg Q24 (stopped 4/16)  >s/p Daptomycin 500mg Q24 (stopped 4/16)  - Infectious w/u per ID  - c/w Doxycycline 100mg Q12  - c/w Meropenem 1g q12 (4/8 - )  - c/w Vancomycin for gram positive coverage given up-trending WBC (4/17 - )     43yo pmh of HTN, DM, Factor V Leiden c/b L peroneal DVT (2022) and RA thrombus (2018) currently not on AC, gout, congenital pancreas divisum, recurrent pancreatitis s/p cholecystectomy, Splenectomy, Kevin-En-Y Pancreaticojejunostomy, total pancreatectomy s/p Islet Cell Autotransplant at [facility] 2018, complicated by multiple intraabdominal infection (VRE, fungal? per [facility]), fistula, abscesses. Presented to [facility] 4/8 for several weeks of decreased PO intake, NBNB emesis, watery diarrhea, Somnolence, requiring intubation for airway protection. Lab revealed ammonia > 200, elevated bilirubin and INR initially c/f liver failure, c/c/b colitis possibly ischemic etiology. Patient transferred to [facility] MICU 4/13 for CRRT initiation for ammonia clearance.    =====Neuro=====  #Altered mental status  >Baseline AOx3  >CTH (4/13): No acute findings  >CTH (4/15): No acute findings   >s/p Precedex, off since 4/15  - Currently intubated off sedation, awake, follows command  - Likely iso hyperammonemia vs. infection   - treatment for hyperammonemia as below    #Downward nystagmus  >Noted on exam 4/14-4/15 overnight  >CTH (4/15): No acute findings   >EEG (4/15 - 4/17): No seizure activity, TME  - Will ctm    #Chronic pain  #anxiety/depression   >Was on alprazolam 0.75mg QD, Dilaudid and methadone 5mg TID  - c/w Methadone 5mg TID    =====Cardio=====  #History of L peroneal DVT in 2022  #History of RA thrombus in 2018  >TTE (4/13): No thrombus commented  - Eliquis stopped 5/2023 by outpatient hematology given consistently negative DVT studies  - Hold AC iso thrombocytopenia   - f/u Heme recs    #RV dysfunction?  >Enlarged RV and pulmonary hypertension noted on POCUS (4/16) that is not on TTE 4/13  >CTAPE (4/16): No PE  >DVT study (4/16): No DVT  >TTE (4/16): Normal LVSF EF 75%, Normal RVSF and size, TAPSE 1.9, PASP 25  - c/w POCUS monitoring    =====Pulmonary=====  #Mechanical ventilation   - Tolerating CPAP well  - s/p Extubation 4/17    #Atelectasis vs. PNA  >CTC (4/13): RML consolidation noted, possible atelectasis > PNA  - persistently leukocytosis  - abx as below  - f/u ID recs    =====GI=====  Patient presented to [facility] for 4 weeks of decreased PO, NBNB emesis, diarrhea and somnolence, intubated for airway protection. Labs notable for elevated bilirubin, ammonia > 200, INR > 4, initially concern for liver failure and transferred to [facility] for liver transplant eval + CRRT for ammonia clearance. However, does not appear to be in florid liver failure per hepatology, and MRCP does not appear to have obstructive pathology.      #Alcoholic hepatitis/cirrhosis?  #Elevated bilirubin  >Elevated bilirubin/coag, c/b hepatic encephalopathy  >s/p NAC on admission to outside hospital  >POCUS with mild ascites  >CTAP (4/13): Hepatomegaly and hepatic steatosis  >US abd (4/13): Hepatic steatosis, no hepatic vein thrombosis  >Acute hepatitis panel (4/13): Negative  >Autoimmune hepatitis panel (4/13): Pending  >MRCP (4/15): Enlarged, fatty liver. No biliary duct dilation, unlikely obstruction  >Gastrograffin study to r/o SBO (4/16): Passage of contrast seen on serial XR  >PETH (4/13): > 400  - Etiology likely alcohol related given PETH level, although patient denies alcohol use  - c/w thiamine, folic acid, MV  - c/w ursodiol 500mg QD    #Hyperammonemia   >Baseline mentation AAO3  >s/p CRRT (off since 4/16)  - f/u metabolic genetic consult to r/o acquired urea cycle disorder  - c/w Rifaximin   - c/w Lactulose    #Colitis, ?Ischemic  >CTA/P (4/13): segmental areas of wall thickening throughout the colon and rectum with pneumatosis, mesenteric edema, and adjacent hyperdensities in the proximal transverse colon suspicious for bowel ischemia with intraluminal hemorrhage. Additional intraluminal hyperdensities within the distal ascending colon suspicious for hemorrhage.   >TTE (4/13): No thrombus commented  - Surgery, GI following, no plan for surgery/endoscopic eval for now  - On TF, trend lactate, monitor GIB    #Colonic mass?  >CTA/P (4/13): Nonspecific focal areas of narrowing in the distal descending colon and sigmoid colon, possibly colonic masses  - ctm, colonoscopy eventually    #abdominal mesh  #congenital abnormality of the pancreas   >Multiple surgical hx from recurrent pancreatitis as in HPI. S/p total pancreatectomy with islet cell autotransplant followed by multiple abscess leading to several abdominal surgeries.     #Diet and ppx  - TF, c/w Pancreatic enzyme supplement while on TF  - rectal tube in place   - c/w PPI 40mg for GI ppx  - Pending bedside dysphagia / swallow eval    =====Renal=====  #CRRT  >s/p CRRT (last 4/17) for ammonia clearance  - Strict I/O  - James in place  - Diurese PRN    =====Heme=====   #Factor 5 Leiden  #History of L peroneal DVT in 2022  #History of RA thrombus in 2018  >TTE (4/13): No thrombus commented  - Eliquis stopped 5/2023 by outpatient hematology given consistently negative DVT studies  - f/u Heme recs    # Macrocytic anemia  >s/p 2u PRBC (4/13, 4/16)  >Multifactorial in nature. No hx of bleeding but with coagulopathy as below.   >B12 (4/13): >2000, Folate: 8.6  >Iron studies (4/13): Ferritin 653, Iron 43, Unmeasurable TIBC/Iron%   >Hemolysis lab (4/14): Haptoglobin < 20;   - Possibly iso liver disease vs. acute infection/inflammation vs. ?hemolysis  - Trend CBC, coag, transfuse goal > 7    #thrombocytopenia  >s/p 1u Plt (4/13)   - Etiology not entirely clear, s/p splenectomy and hepatic steatosis w/o cirrhosis on imaging  - f/u infectious workup as below  - Heme consult  - f/u factor viii assay    =====Endo=====  #DM1 vs. DM3  >Per HIE record, patient prone to hypoglycemia  >History of DM1, but s/p total pancreatectomy and islet cell autotransplant in 2018 - currently DM3  - c/w Lantus 5u QHS + ISS  - f/u C-peptide  - Endocrine on board    =====ID=====  Infectious work up  >UA (4/13): 11WBC, small LE, negative Nitrite or bacteria  >Babesia (4/13): neg; HIV (4/13): neg; RVP, COVID (4/14): neg; MRSA (4/13): Negative  >CXR (4/13): RML consolidation  >CTC/A/P (4/13): RML consolidation, LLL atelectasis 2/2 likely ?mucus plug, ischemic colitis  >Bcx (4/13): NGTD  >ET tube cx (4/13): Normal resp luisa  >BAL (4/13): Pending  >GI PCR (4/15): Negative  - Rest of infectious w/u per ID    #Septic shock   #hx of recurrent infections   >H/o of recurrent infxn from multiple abdominal wounds and surgeries. Hx of VRE and candida.   >Previously on levo at OSH. Off pressor on transfer; Restarted with initiation of CRRT  >s/p Caspofungin 50mg Q24 (stopped 4/16)  >s/p Daptomycin 500mg Q24 (stopped 4/16)  - Infectious w/u per ID  - c/w Doxycycline 100mg Q12  - c/w Meropenem 1g q12 (4/8 - )  - c/w Vancomycin for gram positive coverage given up-trending WBC (4/17 - )     43yo pmh of HTN, DM, Factor V Leiden c/b L peroneal DVT (2022) and RA thrombus (2018) currently not on AC, gout, congenital pancreas divisum, recurrent pancreatitis s/p cholecystectomy, Splenectomy, Kevin-En-Y Pancreaticojejunostomy, total pancreatectomy s/p Islet Cell Autotransplant at [facility] 2018, complicated by multiple intraabdominal infection (VRE, fungal? per LIMC), fistula, abscesses. Presented to [facility] 4/8 for several weeks of decreased PO intake, NBNB emesis, watery diarrhea, Somnolence, requiring intubation for airway protection. Lab revealed ammonia > 200, elevated bilirubin and INR initially c/f liver failure, c/c/b colitis possibly ischemic etiology. Patient transferred to [facility] MICU 4/13 for CRRT initiation for ammonia clearance.    =====Neuro=====  #Altered mental status  >Baseline AOx3  >CTH (4/13): No acute findings  >CTH (4/15): No acute findings   >s/p Precedex, off since 4/15  - off sedation, awake, follows command, AAO2-3  - Likely iso hyperammonemia vs. infection   - treatment for hyperammonemia as below    #Chronic pain  #anxiety/depression   >Was on alprazolam 0.75mg QD, Dilaudid and methadone 5mg TID  - c/w Methadone 5mg TID    #Downward nystagmus - RESOLVED  >Noted on exam 4/14-4/15 overnight  >CTH (4/15): No acute findings   >EEG (4/15 - 4/17): TME but no seizure activity  - Will ctm    =====Cardio=====  #History of L peroneal DVT in 2022  #History of RA thrombus in 2018  >TTE (4/13): No thrombus commented  - Eliquis stopped 5/2023 by outpatient hematology given consistently negative DVT studies  - On HSQ for DVT ppx    #RV dysfunction? - RESOLVED  >Enlarged RV and pulmonary hypertension noted on POCUS (4/16) that is not on TTE 4/13  >CTAPE (4/16): No PE  >DVT study (4/16): No DVT  >TTE (4/16): Normal LVSF EF 75%, Normal RVSF and size, TAPSE 1.9, PASP 25  - ctm w/ POCUS    =====Pulmonary=====  #Mechanical ventilation   - s/p Extubation 4/17    #Atelectasis vs. PNA  >CTC (4/13): RML consolidation noted, appears atelectasis > PNA on POCUS  >L sided consolidation noted on POCUS - possibly PNA  - persistently leukocytosis  - abx as below  - f/u ID recs    =====GI=====  #Alcoholic hepatitis/cirrhosis?  >Elevated bilirubin/coag, c/b hepatic encephalopathy  >s/p NAC on admission to outside hospital  >CTAP (4/13): Hepatomegaly and hepatic steatosis  >US abd (4/13): Hepatic steatosis, no hepatic vein thrombosis  >Acute hepatitis panel (4/13): Negative  >Autoimmune hepatitis panel (4/13): Negative  >MRCP (4/15): Enlarged, fatty liver. No biliary duct dilation, unlikely obstruction  >Gastrograffin study to r/o SBO (4/16): Passage of contrast seen on serial XR, unlikely SBO  >PETH (4/13): > 400  - Etiology likely alcohol related given PETH level, although patient and family denies alcohol use  - c/w Folid acid, MVI  - Will increase thiamine to 500mg Q8h (4/18 - ) given patient confabulating c/f wernicke vs. ICU delirium   - c/w ursodiol 500mg QD    #Hyperammonemia   >Baseline mentation AAO3  >s/p CRRT (off since 4/16)  - f/u plasma amino acid (4/16): in lab  - f/u urine orotic acid (4/16) - sent out to [facility]  - f/u metabolic genetic consult to r/o acquired urea cycle disorder  - c/w Rifaximin   - c/w Lactulose    #Colitis, ?Ischemic  >CTA/P (4/13): segmental areas of wall thickening throughout the colon and rectum with pneumatosis, mesenteric edema, and adjacent hyperdensities in the proximal transverse colon suspicious for bowel ischemia with intraluminal hemorrhage. Additional intraluminal hyperdensities within the distal ascending colon suspicious for hemorrhage.   >TTE (4/13): No thrombus commented  - Surgery, GI following, no plan for surgery/endoscopic eval for now  - On TF, trend lactate, monitor GIB    #Colonic mass?  >CTA/P (4/13): Nonspecific focal areas of narrowing in the distal descending colon and sigmoid colon, possibly colonic masses  >CTA/P (4/16): Improved colitis, previous ?colonic mass vs. narrowing still seen per discussion w/ radiology  - ctm, colonoscopy eventually    #Diet and ppx  - TF, c/w Pancreatic enzyme supplement while on TF  - rectal tube in place   - c/w PPI 40mg for GI ppx  - Pending bedside dysphagia / swallow eval    =====Renal=====  #CRRT  >s/p CRRT (last 4/17) for ammonia clearance  - Strict I/O  - Diurese PRN    =====Heme=====   #Factor 5 Leiden  #History of L peroneal DVT in 2022  #History of RA thrombus in 2018  >TTE (4/13): No thrombus commented  >Eliquis stopped 5/2023 by outpatient hematology given consistently negative DVT studies  - SQH for DVT ppx    # Macrocytic anemia  >s/p 2u PRBC (4/13, 4/16)  >Multifactorial in nature. No hx of bleeding but with coagulopathy as below.   >B12 (4/13): >2000, Folate: 8.6  >Iron studies (4/13): Ferritin 653, Iron 43, Unmeasurable TIBC/Iron%   >Hemolysis lab (4/14): Haptoglobin < 20;   - Possibly iso liver disease vs. acute infection/inflammation vs. ?hemolysis  - Trend CBC, coag, transfuse goal > 7    #thrombocytopenia  >s/p 1u Plt (4/13)   - Etiology not entirely clear, s/p splenectomy and hepatic steatosis w/o cirrhosis on imaging  - Likely iso acute illness  - Trend CBC, plt goal > 20    =====Endo=====  #DM1 vs. DM3c  >Per HIE record, patient prone to hypoglycemia  >History of DM1, but s/p total pancreatectomy and islet cell autotransplant in 2018 - currently DM3c?  - c/w Lantus 5u QHS + ISS  - f/u C-peptide  - Endocrine on board    #Hypothyroidism  - c/w synthyroid 100    =====ID=====  Infectious work up  >UA (4/13): 11WBC, small LE, negative Nitrite or bacteria  >Babesia (4/13): neg; HIV (4/13): neg; RVP, COVID (4/14): neg; MRSA (4/13): Negative  >CXR (4/13): RML consolidation  >CTC/A/P (4/13): RML consolidation, LLL atelectasis 2/2 likely ?mucus plug, ischemic colitis  >Bcx (4/13): NGTD  >ET tube cx (4/13): Normal resp luisa  >BAL (4/13): Pending  >GI PCR (4/15): Negative  - Rest of infectious w/u per ID    #Septic shock   #hx of recurrent infections   >H/o of recurrent infxn from multiple abdominal wounds and surgeries. Hx of VRE and candida.   >Previously on levo at OSH. Off pressor on transfer; Restarted with initiation of CRRT  >s/p Caspofungin 50mg Q24 (stopped 4/16)  >s/p Daptomycin 500mg Q24 (stopped 4/16)  >s/p Doxycycline 100mg Q12 (stopped 4/18)  - Infectious w/u per ID  - c/w Meropenem 1g q12 (4/10 - ), planned for 10 day course  - c/w Vancomycin for gram positive coverage given up-trending WBC (4/17 - )

## 2024-04-18 NOTE — OCCUPATIONAL THERAPY INITIAL EVALUATION ADULT - TRANSFER SAFETY CONCERNS NOTED: SIT/STAND, REHAB EVAL
[facility] Hospital Medicine  Progress Note    Patient Name: Eric Jackson  MRN: [id number]  Patient Class: IP- Inpatient   Admission Date: 2/28/2019  Length of Stay: 6 days  Attending Physician: Dave Seo MD  Primary Care Provider: Samir Boo MD        Subjective:     Principal Problem:Non-STEMI (non-ST elevated myocardial infarction)    HPI:  Eric Jackson is a 77 yo man with CAD, diabetes, anxiety/depression, GERD, HTN, and ED s/p transrectal ultrasound of prostate with insertion of gold fiducial marker on 2/26/2019 who presented with altered mental status.  Patient had prostate procedure on Tuesday.  On Wednesday he started to feel ill and by this afternoon he developed severe altered mental status and became essentially unresponsive.  The wife reports that he was given Motrin at around 2:00 p.m. and laid down for a nap.  He was unable to be aroused after his nap.  EMS was activated.  When they arrived he had axillary temperature of 106°.  ER physician spoke with the urologist who recommended including gentamicin in the abx regimen.  No general anesthesia was utilized nor did he have any medications that could contribute to possible malignant hypothermia.     In the emergency department routine laboratory studies were obtained as well as urinalysis.  CT abdomen pelvis were also performed.  He also received CT of the head and a lumbar puncture which was significant for markedly elevated opening pressure.  Urinalysis revealed Evidence of urinary tract infection with a clinical picture of suspected bacteremia resulting in severe sepsis with infectious encephalopathy. Sepsis protocol initiated.  Lastly, rapid flu was also positive.  He was started on Tamiflu.    Hospital Course:  Eric Jackson is a 77 yo man with CAD, diabetes, anxiety/depression, GERD, HTN, and ED s/p transrectal ultrasound of prostate with insertion of gold fiducial marker on 2/26/2019 who presented with  altered mental status.  Patient had prostate procedure on Tuesday.  On Wednesday he started to feel ill and by this afternoon he developed severe altered mental status and became essentially unresponsive.  The wife reports that he was given Motrin at around 2:00 p.m. and laid down for a nap.  He was unable to be aroused after his nap.  EMS was activated.  When they arrived he had axillary temperature of 106°.   The patient was found to have E-coli UTI with likely E-coli bacteremia.  Urology and GI were consulted. The patient initially went to the ICU but was transferred out on 3/1. Repeat blood cultures were ordered on 3/2/19. ID recommended Meropenem. Cards evaluated the patient as well for non-stemi and took for LHC on 3/4/19. On 3/6/19 ID recommended patient go home with 6 weeks of Ceftriaxone.  The patient went for a picc line on the dame day and will be discharged to home with H/H. Activity as tolerated. Diet- low NA. Follow up with ID and urology and PCP in 1-2 weeks.       Interval History: No new issues.     Review of Systems   Constitutional: Negative for activity change.   HENT: Negative for congestion.    Respiratory: Negative for chest tightness and shortness of breath.    Cardiovascular: Negative for chest pain.   Gastrointestinal: Negative for abdominal pain.   Genitourinary: Negative for difficulty urinating.     Objective:     Vital Signs (Most Recent):  Temp: 97.7 °F (36.5 °C) (03/06/19 0813)  Pulse: 80 (03/06/19 0813)  Resp: 18 (03/06/19 0813)  BP: (!) 168/80 (03/06/19 0813)  SpO2: 96 % (03/06/19 0813) Vital Signs (24h Range):  Temp:  [97.7 °F (36.5 °C)-98.1 °F (36.7 °C)] 97.7 °F (36.5 °C)  Pulse:  [65-80] 80  Resp:  [17-18] 18  SpO2:  [95 %-97 %] 96 %  BP: (136-202)/(80-96) 168/80     Weight: 85 kg (187 lb 6.3 oz)  Body mass index is 26.14 kg/m².    Intake/Output Summary (Last 24 hours) at 3/6/2019 0952  Last data filed at 3/6/2019 0553  Gross per 24 hour   Intake 1090 ml   Output --   Net 1090 ml       Physical Exam   Constitutional: He is oriented to person, place, and time. He appears well-developed and well-nourished.   HENT:   Head: Normocephalic and atraumatic.   Neurological: He is alert and oriented to person, place, and time.   Skin: Skin is warm and dry.   Psychiatric: He has a normal mood and affect. His behavior is normal.   Nursing note and vitals reviewed.      Significant Labs:   BMP:   Recent Labs   Lab 03/06/19  0509   *      K 3.5      CO2 26   BUN 12   CREATININE 0.9   CALCIUM 9.4     CBC:   Recent Labs   Lab 03/05/19  0535   WBC 9.47   HGB 13.7*   HCT 41.4   *       Significant Imaging:     Assessment/Plan:      * Non-STEMI (non-ST elevated myocardial infarction)    Cards evaluated.  Plan for LHC on Monday if stable.     LHC - patent grafts. Culprit likely small diagonal. Med Rx.  Stable.            Sepsis secondary to UTI    E-coli with resistance.  E-coli Bacteremia as well. Repeated blood cultures today.  Continue Meropenem for now. ID recs.   This was likely due to recent urologic procedure.     Ceftriaxone   2 weeks from 3/2.  Repeat blood cultures negative.     Resolved.  6 weeks of Ceftriaxone per their recs today        NSTEMI (non-ST elevated myocardial infarction)    [facility]        Urinary retention           Coronary artery disease    Cards following.          Acute prostatitis    As above.          Infectious encephalopathy    Resolved.          Fever 106 degrees F or over    From above. Resolved.          Prostate cancer 2/25/19 Transrectal ultrasound of prostate and gold fiducial marker placement    Follow urology recs.          Diabetes mellitus type II, controlled    No other known manifestations        Essential hypertension    No acute issues.            VTE Risk Mitigation (From admission, onward)        Ordered     enoxaparin injection 40 mg  Daily      03/03/19 1039     Place LINA hose  Until discontinued      03/01/19 0531     IP VTE HIGH RISK  PATIENT  Once      02/28/19 2202        Will send for picc. Then home with 6 weeks of Ceftriaxone.       Dave Starr MD  Department of Hospital Medicine   [facility]   decreased weight-shifting ability

## 2024-04-18 NOTE — PROGRESS NOTE ADULT - ASSESSMENT
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 Hargrove), and now S/P total pancreatectomy with islet cell autotransplant at [facility] by Dr. Gil in 04/2018). 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 Per records, a Ct later in 2018 showed still fistulization but no abscess formation     Patient presented to [facility] ED on 4/8 by her spouse for AMS. Per , patient had been having nbnb vomiting and diarrhea for the last 4 weeks, extreme fatigue . She was unable to tolerate PO. She was also sleeping 18-20 hrs per day. On 4/8, he found her on the floor "completely out of it" and took her to [facility] for further evaluation.   Initial workup in ED demonstrated a Tbili 5.4, direct bili 4.4 and ammonia 196. Patient was somnolent and had a left eyeward gaze deviation. Submentally started developing agonal snoring respirations and had to be intubated for airway protection. Course complicated with septic shock and persistent hyperammonemia despite lactulose and rifaximin, CT show and started on meropenem, linezolid and anidulafungin at [facility]--> upon transfer here, off pressors, on alvaro/linezolid/caspofungin    CT Chest (4/13) Occlusion of the distal left lower lobar bronchus with complete left lower lobe atelectasis. Right middle lobe consolidation, possibly secondary to pneumonia or additional atelectasis.     CT A/P (4/13) Segmental areas of wall thickening throughout the colon and rectum with pneumatosis, mesenteric edema, and adjacent hyperdensities in the proximal transverse colon suspicious for bowel ischemia with intraluminal hemorrhage. Additional intraluminal hyperdensities within the distal ascending colon suspicious for hemorrhage. Nonspecific focal areas of narrowing in the distal descending colon and sigmoid colon, possibly colonic masses    UCx (4/13) No Growth  BCx (4/13) NGTD  Bronchoscopy Cx (4/13) No Growth  Serum Cryptococcal Antigen (4/14) Negative  Babesia PCR (4/13) Negative  Stool O&P neg   Ehrlichia Ab neg   Lyme/anaplasma neg   Strongyloides IgG neg   Serum CMV PCR  Serum Fungitel > 31  Serum Parvovirus PCR neg     Antimicrobials:   Caspofungin - dced  Daptomycin -dced  (Was started on Micafungin and Linezolid on 4/10 at OSH)  Meropenem 4/13-->  Doxy 4/13-->     #New fever, new mucous plug occluding LLL, GGO in RLL ? aspiration   #Encephalopathy, hyperammonemia not responding to lactulose/rifaximin  #Colonic and rectal Pneumatosis with c/f bowel ischemia and ? masses and hemorrhage (Considering atypical infections that could lead to bowel ischemia +/- masses including fungal and parasitic infections (anisakis, ascaris, strongy ecc), SIBo as above vs non infectious etiologies)  #Hyperbilirubinemia in c/o sepsis ? vs biliary obstruction vs atypical infection  #RML consolidation - aspiration pna vs CAP s/p Bronchoscopy 4/13- BAL bacterial, fungal, AFB cx NGTD  #S/P total pancreatectomy with islet cell autotransplant at [facility] by Dr. Gil in 04/2018    Recommendations:  -Continue Meropenem (4/13 -->). Would complete 10 day total course.   -Continue Doxycycline (4/13 -->) (Pending Leptospira)  -Vancomycin 1 g IV q 12hrs added yesterday by primary team empirically    -Send repeat blood Cx x 2  -F/up final bacterial sputum Cx   -F/up PCP PCR sputum     -Follow up on Mycoplasma genitalium TMA  -Follow up on Leptospirosis Ab  -Follow up on Urine and serum Histoplasma Ag  -Once feasible recommend  colonoscopy with biopsies for histopath with special stains- including fungal, Bill, Viral     Hind Kelvin Monte MD, PGY5  ID fellow  [employer] Teams Preferred  After 5pm/weekends call [phone]   42 year old female with hx of DMT1, Factor V leiden deficiency, gout and 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 Hargrove), and now S/P total pancreatectomy with islet cell autotransplant at [facility] by Dr. Gil in 04/2018). 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  Patient was readmitted eventually later on at 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 Per records, a Ct later in 2018 showed still fistulization but no abscess formation     Patient presented to [facility] ED on 4/8 by her spouse for AMS. Per , patient had been having nbnb vomiting and diarrhea for the last 4 weeks, extreme fatigue . She was unable to tolerate PO. She was also sleeping 18-20 hrs per day. On 4/8, he found her on the floor "completely out of it" and took her to [facility] for further evaluation.   Initial workup in ED demonstrated a Tbili 5.4, direct bili 4.4 and ammonia 196. Patient was somnolent and had a left eyeward gaze deviation. Submentally started developing agonal snoring respirations and had to be intubated for airway protection. Course complicated with septic shock and persistent hyperammonemia despite lactulose and rifaximin, CT show and started on meropenem, linezolid and anidulafungin at [facility]--> upon transfer here, off pressors, on alvaro/linezolid/caspofungin    CT Chest (4/13) Occlusion of the distal left lower lobar bronchus with complete left lower lobe atelectasis. Right middle lobe consolidation, possibly secondary to pneumonia or additional atelectasis.     CT A/P (4/13) Segmental areas of wall thickening throughout the colon and rectum with pneumatosis, mesenteric edema, and adjacent hyperdensities in the proximal transverse colon suspicious for bowel ischemia with intraluminal hemorrhage. Additional intraluminal hyperdensities within the distal ascending colon suspicious for hemorrhage. Nonspecific focal areas of narrowing in the distal descending colon and sigmoid colon, possibly colonic masses    UCx (4/13) No Growth  BCx (4/13) NGTD  Bronchoscopy Cx (4/13) No Growth  Serum Cryptococcal Antigen (4/14) Negative  Babesia PCR (4/13) Negative  Stool O&P neg   Ehrlichia Ab neg   Lyme/anaplasma neg   Strongyloides IgG neg   Serum CMV PCR  Serum Fungitel > 31  Serum Parvovirus PCR neg     Antimicrobials:   Caspofungin - dced  Daptomycin -dced  (Was started on Micafungin and Linezolid on 4/10 at OSH)  Meropenem 4/13-->  Doxy 4/13-->     #New fever, new mucous plug occluding LLL, GGO in RLL ? aspiration   #Encephalopathy, hyperammonemia not responding to lactulose/rifaximin  #Colonic and rectal Pneumatosis with c/f bowel ischemia and ? masses and hemorrhage (Considering atypical infections that could lead to bowel ischemia +/- masses including fungal and parasitic infections (anisakis, ascaris, strongy ecc), SIBo as above vs non infectious etiologies)  #Hyperbilirubinemia in c/o sepsis ? vs biliary obstruction vs atypical infection  #RML consolidation - aspiration pna vs CAP s/p Bronchoscopy 4/13- BAL bacterial, fungal, AFB cx NGTD  #S/P total pancreatectomy with islet cell autotransplant at [facility] by Dr. Gil in 04/2018    Recommendations:  -Continue Meropenem (4/13 -->). Would complete 10 day total course.   -Vancomycin 1 g IV q 12hrs added yesterday by primary team empirically for fevers  -Send repeat blood Cx x 2  -F/up final bacterial sputum Cx   -F/up PCP PCR sputum   -Follow up on Mycoplasma genitalium TMA  -Follow up on Leptospirosis Ab  -Follow up on Urine and serum Histoplasma Ag  -Once feasible recommend  colonoscopy with biopsies for histopath with special stains- including fungal, Bill, Viral     Hind Kelvin Monte MD, PGY5  ID fellow  [employer] Teams Preferred  After 5pm/weekends call [phone]

## 2024-04-18 NOTE — PROGRESS NOTE ADULT - SUBJECTIVE AND OBJECTIVE BOX
----- Message from Nelia Roldan sent at 3/6/2024  9:39 AM CST -----  Contact: Gabi  Type:  Needs Medical Advice    Who Called: wife Gabi  Symptoms (please be specific): pt needs to speak to nurse regarding to his gout medication.  Would the patient rather a call back or a response via [facility]? call  Best Call Back Number:     Additional Information: please advise and thank you.  .           Patient is a 48y old  Female who presents with a chief complaint of anemia (16 Apr 2024 15:11)      24 hour events: NGT placed, weaned off BIPAP to NC. No other acute overnight event    OBJECTIVE:  ICU Vital Signs Last 24 Hrs  T(C): 37.3 (18 Apr 2024 04:00), Max: 38 (17 Apr 2024 16:00)  T(F): 99.2 (18 Apr 2024 04:00), Max: 100.4 (17 Apr 2024 16:00)  HR: 108 (18 Apr 2024 07:00) (83 - 120)  BP: 125/61 (18 Apr 2024 07:00) (106/56 - 140/63)  BP(mean): 88 (18 Apr 2024 07:00) (78 - 90)  ABP: --  ABP(mean): --  RR: 18 (18 Apr 2024 07:00) (13 - 26)  SpO2: 100% (18 Apr 2024 07:00) (96% - 100%)    O2 Parameters below as of 18 Apr 2024 07:00  Patient On (Oxygen Delivery Method): nasal cannula  O2 Flow (L/min): 2        Mode: CPAP with PS, FiO2: 30, PEEP: 5, PS: 5, MAP: 8, PIP: 16    04-17 @ 07:01  -  04-18 @ 07:00  --------------------------------------------------------  IN: 1620 mL / OUT: 3810 mL / NET: -2190 mL    04-18 @ 07:01  -  04-18 @ 07:28  --------------------------------------------------------  IN: 0 mL / OUT: 20 mL / NET: -20 mL      CAPILLARY BLOOD GLUCOSE      POCT Blood Glucose.: 185 mg/dL (18 Apr 2024 06:17)      PHYSICAL EXAM:      LINES:    HOSPITAL MEDICATIONS:  MEDICATIONS  (STANDING):  albuterol/ipratropium for Nebulization 3 milliLiter(s) Nebulizer every 6 hours  chlorhexidine 4% Liquid 1 Application(s) Topical <User Schedule>  cholecalciferol 2000 Unit(s) Oral daily  doxycycline IVPB 100 milliGRAM(s) IV Intermittent every 12 hours  folic acid 1 milliGRAM(s) Oral daily  heparin   Injectable 5000 Unit(s) SubCutaneous every 12 hours  insulin glargine Injectable (LANTUS) 5 Unit(s) SubCutaneous at bedtime  insulin lispro (ADMELOG) corrective regimen sliding scale   SubCutaneous every 6 hours  lactulose Syrup 30 Gram(s) Oral every 8 hours  levOCARNitine 330 milliGRAM(s) Oral every 8 hours  levothyroxine 100 MICROGram(s) Oral daily  meropenem  IVPB 1000 milliGRAM(s) IV Intermittent every 12 hours  methadone    Tablet 5 milliGRAM(s) Oral three times a day  multivitamin/minerals/iron Oral Solution (CENTRUM) 15 milliLiter(s) Oral daily  pantoprazole  Injectable 40 milliGRAM(s) IV Push daily  rifAXIMin 550 milliGRAM(s) Oral two times a day  thiamine IVPB 250 milliGRAM(s) IV Intermittent daily  ursodiol Tablet 500 milliGRAM(s) Oral <User Schedule>  vancomycin  IVPB 1000 milliGRAM(s) IV Intermittent every 12 hours  vancomycin  IVPB      zinc sulfate 220 milliGRAM(s) Oral daily    MEDICATIONS  (PRN):      LABS:                        8.0    23.60 )-----------( 83       ( 18 Apr 2024 00:23 )             22.7     Hgb Trend: 8.0<--, 8.8<--, 9.2<--, 8.5<--, 8.5<--  04-18    142  |  106  |  <4<L>  ----------------------------<  292<H>  4.1   |  20<L>  |  0.49<L>    Ca    9.7      18 Apr 2024 00:23  Phos  3.6     04-18  Mg     1.8     04-18    TPro  4.9<L>  /  Alb  2.8<L>  /  TBili  6.2<H>  /  DBili  x   /  AST  65<H>  /  ALT  25  /  AlkPhos  133<H>  04-18    Creatinine Trend: 0.49<--, 0.49<--, 0.50<--, 0.47<--, 0.43<--, 0.30<--  PT/INR - ( 17 Apr 2024 01:16 )   PT: 14.0 sec;   INR: 1.34 ratio         PTT - ( 17 Apr 2024 01:16 )  PTT:36.5 sec  Urinalysis Basic - ( 18 Apr 2024 00:23 )    Color: x / Appearance: x / SG: x / pH: x  Gluc: 292 mg/dL / Ketone: x  / Bili: x / Urobili: x   Blood: x / Protein: x / Nitrite: x   Leuk Esterase: x / RBC: x / WBC x   Sq Epi: x / Non Sq Epi: x / Bacteria: x      Arterial Blood Gas:  04-18 @ 00:17  7.47/33/152/24/97.8/0.5  ABG lactate: --  Arterial Blood Gas:  04-17 @ 17:15  7.50/34/100/26/97.1/3.2  ABG lactate: --  Arterial Blood Gas:  04-17 @ 13:46  7.44/39/52/26/82.3/2.2  ABG lactate: --    Venous Blood Gas:  04-17 @ 11:47  7.41/43/39/27/60.1  VBG Lactate: 2.6  Venous Blood Gas:  04-17 @ 00:24  7.44/37/51/25/81.7  VBG Lactate: 2.3  Venous Blood Gas:  04-16 @ 17:52  7.45/34/59/24/89.2  VBG Lactate: 2.1  Venous Blood Gas:  04-16 @ 11:40  7.44/37/53/25/87.2  VBG Lactate: 1.7      EKG:    MICROBIOLOGY:     RADIOLOGY:  [ ] Reviewed and interpreted by me    EKG:  MICROBIOLOGY:     Radiology: ***    Bedside lung ultrasound: ***    Bedside ECHO: ***    EKG:    CENTRAL LINE: Y/N          DATE INSERTED:              REMOVE: Y/N    BERRY: Y/N                        DATE INSERTED:              REMOVE: Y/N    A-LINE: Y/N                       DATE INSERTED:              REMOVE: Y/N    GLOBAL ISSUE/BEST PRACTICE:  Analgesia:  Sedation:  HOB elevation: yes  Stress ulcer prophylaxis:  VTE prophylaxis:  Glycemic control:  Nutrition:    CODE STATUS: ***  GO discussion: Y

## 2024-04-18 NOTE — CHART NOTE - [id number]_GEN_A_CORE
MICU Transfer Note    Transfer from: MICU    Transfer to: ( x ) Medicine    (  ) Telemetry     (   ) RCU        (    ) Palliative         (   ) Stroke Unit          (   ) __________________    Accepting physican:      MICU COURSE:  42 year old female with hx of DMT1, Factor V leiden deficiency, gout and pancreatic divisum c/b recurrent pancreatitis s/p total pancreatectomy s/p islet cell autotransplant, also had extensive abdominal surgical history (cholecystectomy, splenectomy, celina-en-y pancreaticojejuonostomy) for unclear reason.    Patient initially presented to [facility] for few weeks of worsening decreased PO intake, NBNB emesis, watery diarrhea as well as profound somnolence. She was intubated in [facility] ED for airway protection and admitted to MICU there. She was found to have INR > 4, ammonia 196 and elevated bilirubin concerning for liver failure for which she was given     Patient presented to [facility] ED on 4/8 by her spouse for AMS. Per , patient had been having nbnb vomiting and diarrhea for the last 4 weeks. She was unable to tolerate PO. She was also sleeping 18-20 hrs per day. On 4/8, he found her on the floor "completely out of it" and took her to [facility] for further evalution.         ASSESSMENT & PLAN:             For Followup:          Vital Signs Last 24 Hrs  T(C): 37 (18 Apr 2024 08:00), Max: 38 (17 Apr 2024 16:00)  T(F): 98.6 (18 Apr 2024 08:00), Max: 100.4 (17 Apr 2024 16:00)  HR: 101 (18 Apr 2024 10:21) (83 - 120)  BP: 124/58 (18 Apr 2024 08:00) (106/56 - 140/63)  BP(mean): 84 (18 Apr 2024 08:00) (78 - 90)  RR: 17 (18 Apr 2024 08:00) (13 - 26)  SpO2: 100% (18 Apr 2024 10:21) (96% - 100%)    Parameters below as of 18 Apr 2024 07:00  Patient On (Oxygen Delivery Method): nasal cannula  O2 Flow (L/min): 2    I&O's Summary    17 Apr 2024 07:01  -  18 Apr 2024 07:00  --------------------------------------------------------  IN: 1620 mL / OUT: 3810 mL / NET: -2190 mL    18 Apr 2024 07:01  -  18 Apr 2024 11:30  --------------------------------------------------------  IN: 0 mL / OUT: 35 mL / NET: -35 mL        MEDICATIONS  (STANDING):  albuterol/ipratropium for Nebulization 3 milliLiter(s) Nebulizer every 6 hours  chlorhexidine 4% Liquid 1 Application(s) Topical <User Schedule>  cholecalciferol 2000 Unit(s) Oral daily  folic acid 1 milliGRAM(s) Oral daily  heparin   Injectable 5000 Unit(s) SubCutaneous every 12 hours  insulin glargine Injectable (LANTUS) 5 Unit(s) SubCutaneous at bedtime  insulin lispro (ADMELOG) corrective regimen sliding scale   SubCutaneous every 6 hours  lactulose Syrup 30 Gram(s) Oral every 8 hours  levothyroxine 100 MICROGram(s) Oral daily  meropenem  IVPB 1000 milliGRAM(s) IV Intermittent every 12 hours  methadone    Tablet 5 milliGRAM(s) Oral three times a day  multivitamin/minerals/iron Oral Solution (CENTRUM) 15 milliLiter(s) Oral daily  pantoprazole  Injectable 40 milliGRAM(s) IV Push daily  rifAXIMin 550 milliGRAM(s) Oral two times a day  thiamine IVPB 500 milliGRAM(s) IV Intermittent every 8 hours  ursodiol Tablet 500 milliGRAM(s) Oral <User Schedule>  vancomycin  IVPB 1000 milliGRAM(s) IV Intermittent every 12 hours  vancomycin  IVPB      zinc sulfate 220 milliGRAM(s) Oral daily    MEDICATIONS  (PRN):        LABS                                            8.0                   Neurophils% (auto):   64.5   (04-18 @ 00:23):    23.60)-----------(83           Lymphocytes% (auto):  13.1                                          22.7                   Eosinphils% (auto):   0.1      Manual%: Neutrophils x    ; Lymphocytes x    ; Eosinophils x    ; Bands%: x    ; Blasts x                                    142    |  106    |  <4                  Calcium: 9.7   / iCa: x      (04-18 @ 00:23)    ----------------------------<  292       Magnesium: 1.8                              4.1     |  20     |  0.49             Phosphorous: 3.6      TPro  4.9    /  Alb  2.8    /  TBili  6.2    /  DBili  x      /  AST  65     /  ALT  25     /  AlkPhos  133    18 Apr 2024 00:23 MICU Transfer Note    Transfer from: MICU    Transfer to: ( x ) Medicine    (  ) Telemetry     (   ) RCU        (    ) Palliative         (   ) Stroke Unit          (   ) __________________    Accepting physican:      MICU COURSE:  42 year old female with hx of DM1, Factor V leiden deficiency, gout and pancreatic divisum c/b recurrent pancreatitis s/p total pancreatectomy s/p islet cell autotransplant, also had extensive abdominal surgical history (cholecystectomy, splenectomy, celina-en-y pancreaticojejuonostomy) for unclear reason. Patient initially presented to [facility] for few weeks of worsening decreased PO intake, NBNB emesis, watery diarrhea as well as profound somnolence. She was intubated in [facility] ED for airway protection and admitted to MICU there. She was found to have INR > 4, ammonia >200 and elevated LFTs concerning for liver failure/hepatic encephalopathy for which she was given vitamin K and started on lactulose. [facility] possibly ischemic in origin and undifferentiated shock state possibly septic requiring pressor. Patient was unable to tolerate lactulose given colitis and [facility]    [facility] was intubated and sedated on Precedex and hemodynamically off pressor. Patient started CRRT for ammonia clearance and was kept NPO for given concern for colitis. Hepatology, ID, Nephrology, Hematology, Endocrinology, Surgery were consulted to co-manage patient. Extensive work up were sent but mostly negative beside PETH > 400. Ultimately her liver failure was deemed possibly from       ASSESSMENT & PLAN:             For Followup:          Vital Signs Last 24 Hrs  T(C): 37 (18 Apr 2024 08:00), Max: 38 (17 Apr 2024 16:00)  T(F): 98.6 (18 Apr 2024 08:00), Max: 100.4 (17 Apr 2024 16:00)  HR: 101 (18 Apr 2024 10:21) (83 - 120)  BP: 124/58 (18 Apr 2024 08:00) (106/56 - 140/63)  BP(mean): 84 (18 Apr 2024 08:00) (78 - 90)  RR: 17 (18 Apr 2024 08:00) (13 - 26)  SpO2: 100% (18 Apr 2024 10:21) (96% - 100%)    Parameters below as of 18 Apr 2024 07:00  Patient On (Oxygen Delivery Method): nasal cannula  O2 Flow (L/min): 2    I&O's Summary    17 Apr 2024 07:01  -  18 Apr 2024 07:00  --------------------------------------------------------  IN: 1620 mL / OUT: 3810 mL / NET: -2190 mL    18 Apr 2024 07:01  -  18 Apr 2024 11:30  --------------------------------------------------------  IN: 0 mL / OUT: 35 mL / NET: -35 mL        MEDICATIONS  (STANDING):  albuterol/ipratropium for Nebulization 3 milliLiter(s) Nebulizer every 6 hours  chlorhexidine 4% Liquid 1 Application(s) Topical <User Schedule>  cholecalciferol 2000 Unit(s) Oral daily  folic acid 1 milliGRAM(s) Oral daily  heparin   Injectable 5000 Unit(s) SubCutaneous every 12 hours  insulin glargine Injectable (LANTUS) 5 Unit(s) SubCutaneous at bedtime  insulin lispro (ADMELOG) corrective regimen sliding scale   SubCutaneous every 6 hours  lactulose Syrup 30 Gram(s) Oral every 8 hours  levothyroxine 100 MICROGram(s) Oral daily  meropenem  IVPB 1000 milliGRAM(s) IV Intermittent every 12 hours  methadone    Tablet 5 milliGRAM(s) Oral three times a day  multivitamin/minerals/iron Oral Solution (CENTRUM) 15 milliLiter(s) Oral daily  pantoprazole  Injectable 40 milliGRAM(s) IV Push daily  rifAXIMin 550 milliGRAM(s) Oral two times a day  thiamine IVPB 500 milliGRAM(s) IV Intermittent every 8 hours  ursodiol Tablet 500 milliGRAM(s) Oral <User Schedule>  vancomycin  IVPB 1000 milliGRAM(s) IV Intermittent every 12 hours  vancomycin  IVPB      zinc sulfate 220 milliGRAM(s) Oral daily    MEDICATIONS  (PRN):        LABS                                            8.0                   Neurophils% (auto):   64.5   (04-18 @ 00:23):    23.60)-----------(83           Lymphocytes% (auto):  13.1                                          22.7                   Eosinphils% (auto):   0.1      Manual%: Neutrophils x    ; Lymphocytes x    ; Eosinophils x    ; Bands%: x    ; Blasts x                                    142    |  106    |  <4                  Calcium: 9.7   / iCa: x      (04-18 @ 00:23)    ----------------------------<  292       Magnesium: 1.8                              4.1     |  20     |  0.49             Phosphorous: 3.6      TPro  4.9    /  Alb  2.8    /  TBili  6.2    /  DBili  x      /  AST  65     /  ALT  25     /  AlkPhos  133    18 Apr 2024 00:23 MICU Transfer Note    Transfer from: MICU    Transfer to: ( x ) Medicine    (  ) Telemetry     (   ) RCU        (    ) Palliative         (   ) Stroke Unit          (   ) __________________    Accepting physican:      MICU COURSE:  42 year old female with hx of DM1, Factor V leiden deficiency, gout and pancreatic divisum c/b recurrent pancreatitis s/p total pancreatectomy s/p islet cell autotransplant, also had extensive abdominal surgical history (cholecystectomy, splenectomy, celina-en-y pancreaticojejuonostomy) for unclear reason. Patient initially presented to [facility] for few weeks of worsening decreased PO intake, NBNB emesis, watery diarrhea as well as profound somnolence. She was intubated in [facility] ED for airway protection and admitted to MICU there. She was found to have INR > 4, ammonia >200 and elevated LFTs concerning for liver failure/hepatic encephalopathy for which she was given vitamin K and started on lactulose. [facility] possibly ischemic in origin and undifferentiated shock state possibly septic requiring pressor. Patient was unable to tolerate lactulose given colitis and [facility]    [facility] patient was intubated and sedated on Precedex and hemodynamically off pressor. Patient started CRRT for ammonia clearance and was kept NPO for given concern for colitis. Hepatology, ID, Nephrology, Hematology, Endocrinology, Surgery were consulted to co-manage patient. MICU course complicated by persistent and up-trending leukocytosis and intermittent low grade fever likely due to pneumonia vs. colitis. Given her extensive Extensive work up were sent but mostly negative beside PETH > 400. Ultimately her liver failure was deemed most likely from alcoholic hepatitis/cirrhosis.       ASSESSMENT & PLAN:             For Followup:          Vital Signs Last 24 Hrs  T(C): 37 (18 Apr 2024 08:00), Max: 38 (17 Apr 2024 16:00)  T(F): 98.6 (18 Apr 2024 08:00), Max: 100.4 (17 Apr 2024 16:00)  HR: 101 (18 Apr 2024 10:21) (83 - 120)  BP: 124/58 (18 Apr 2024 08:00) (106/56 - 140/63)  BP(mean): 84 (18 Apr 2024 08:00) (78 - 90)  RR: 17 (18 Apr 2024 08:00) (13 - 26)  SpO2: 100% (18 Apr 2024 10:21) (96% - 100%)    Parameters below as of 18 Apr 2024 07:00  Patient On (Oxygen Delivery Method): nasal cannula  O2 Flow (L/min): 2    I&O's Summary    17 Apr 2024 07:01  -  18 Apr 2024 07:00  --------------------------------------------------------  IN: 1620 mL / OUT: 3810 mL / NET: -2190 mL    18 Apr 2024 07:01  -  18 Apr 2024 11:30  --------------------------------------------------------  IN: 0 mL / OUT: 35 mL / NET: -35 mL        MEDICATIONS  (STANDING):  albuterol/ipratropium for Nebulization 3 milliLiter(s) Nebulizer every 6 hours  chlorhexidine 4% Liquid 1 Application(s) Topical <User Schedule>  cholecalciferol 2000 Unit(s) Oral daily  folic acid 1 milliGRAM(s) Oral daily  heparin   Injectable 5000 Unit(s) SubCutaneous every 12 hours  insulin glargine Injectable (LANTUS) 5 Unit(s) SubCutaneous at bedtime  insulin lispro (ADMELOG) corrective regimen sliding scale   SubCutaneous every 6 hours  lactulose Syrup 30 Gram(s) Oral every 8 hours  levothyroxine 100 MICROGram(s) Oral daily  meropenem  IVPB 1000 milliGRAM(s) IV Intermittent every 12 hours  methadone    Tablet 5 milliGRAM(s) Oral three times a day  multivitamin/minerals/iron Oral Solution (CENTRUM) 15 milliLiter(s) Oral daily  pantoprazole  Injectable 40 milliGRAM(s) IV Push daily  rifAXIMin 550 milliGRAM(s) Oral two times a day  thiamine IVPB 500 milliGRAM(s) IV Intermittent every 8 hours  ursodiol Tablet 500 milliGRAM(s) Oral <User Schedule>  vancomycin  IVPB 1000 milliGRAM(s) IV Intermittent every 12 hours  vancomycin  IVPB      zinc sulfate 220 milliGRAM(s) Oral daily    MEDICATIONS  (PRN):        LABS                                            8.0                   Neurophils% (auto):   64.5   (04-18 @ 00:23):    23.60)-----------(83           Lymphocytes% (auto):  13.1                                          22.7                   Eosinphils% (auto):   0.1      Manual%: Neutrophils x    ; Lymphocytes x    ; Eosinophils x    ; Bands%: x    ; Blasts x                                    142    |  106    |  <4                  Calcium: 9.7   / iCa: x      (04-18 @ 00:23)    ----------------------------<  292       Magnesium: 1.8                              4.1     |  20     |  0.49             Phosphorous: 3.6      TPro  4.9    /  Alb  2.8    /  TBili  6.2    /  DBili  x      /  AST  65     /  ALT  25     /  AlkPhos  133    18 Apr 2024 00:23 MICU Transfer Note    Transfer from: MICU    Transfer to: ( x ) Medicine    (  ) Telemetry     (   ) RCU        (    ) Palliative         (   ) Stroke Unit          (   ) __________________    Accepting physican:      MICU COURSE:  42 year old female with hx of DM1, gout, Factor V Leiden c/b provoked DVT and RA thrombus in the past, and pancreatic divisum c/b recurrent pancreatitis s/p total pancreatectomy s/p islet cell autotransplant, also had extensive abdominal surgical history (cholecystectomy, splenectomy, kevin-en-y pancreaticojejuonostomy) for unclear reason c/b multiple intraabdominal infection/abscess/fistula in the past. Patient initially presented to [facility] for few weeks of worsening decreased PO intake, NBNB emesis, watery diarrhea as well as profound somnolence. She was intubated in [facility] ED for airway protection and admitted to MICU there. She was found to have INR > 4, ammonia >200 and elevated LFTs concerning for liver failure/hepatic encephalopathy for which she was given vitamin K and started on lactulose. [facility] possibly ischemic in origin and undifferentiated shock state possibly septic requiring pressor. Patient was unable to tolerate lactulose given colitis and [facility]    [facility] patient was intubated and sedated on Precedex and hemodynamically stable off pressor. Patient started CRRT for ammonia clearance and was kept NPO for given concern for colitis. Hepatology, ID, Nephrology, Hematology, Endocrinology, Surgery were consulted to co-manage patient. MICU course complicated by persistent and up-trending leukocytosis and intermittent low grade fever likely due to pneumonia vs. colitis. Given her extensive past infection history, she was started on Meropenem, Daptomycin, Doxycycline, Caspofungin for coverage. Her antibiotic regimen was gradually narrowed to Vancomycin and Meropenem as microbiology data came back. Her mental status steadily improves w/ down-trending ammonia while on the CRRT. After discussion with surgery team, she was restarted on TF and lactulose has been off CRRT since 4/16. She remains hemodynamically stable and was extubated on 4/17. Extensive radiologic and laboratory work up were sent during her MICU stay but mostly unrevealing beside PETH > 400. Ultimately her liver failure was deemed most likely from alcoholic hepatitis/cirrhosis. On 4/18, patient was AAOx2-3 and confabulating, her thiamine dosage was increased to 500mg q8h. She is otherwise doing well hemodynamically and has no respiratory concern and is deemed optimized to be downgraded to regular medicine floor.    ASSESSMENT & PLAN:   41yo pmh of HTN, DM, Factor V Leiden c/b L peroneal DVT (2022) and RA thrombus (2018) currently not on AC, gout, congenital pancreas divisum, recurrent pancreatitis s/p cholecystectomy, Splenectomy, Kevin-En-Y Pancreaticojejunostomy, total pancreatectomy s/p Islet Cell Autotransplant at [facility] 2018, complicated by multiple intraabdominal infection (VRE, fungal? per [facility]), fistula, abscesses. Presented to [facility] 4/8 for several weeks of decreased PO intake, NBNB emesis, watery diarrhea, Somnolence, requiring intubation for airway protection. Lab revealed ammonia > 200, elevated bilirubin and INR initially c/f liver failure, c/c/b colitis possibly ischemic etiology. Patient transferred to [facility] MICU 4/13 for CRRT initiation for ammonia clearance. Now off CRRT and extubated.     =====Neuro=====  #Altered mental status  >Baseline AOx3  >CTH (4/13): No acute findings  >CTH (4/15): No acute findings   >s/p Precedex, off since 4/15  - off sedation, awake, follows command, AAO2-3  - Likely iso hyperammonemia vs. infection   - treatment for hyperammonemia as below    #Chronic pain  #anxiety/depression   >Was on alprazolam 0.75mg QD, Dilaudid and methadone 5mg TID  - c/w Methadone 5mg TID    #Downward nystagmus - RESOLVED  >Noted on exam 4/14-4/15 overnight  >CTH (4/15): No acute findings   >EEG (4/15 - 4/17): TME but no seizure activity  - Will ctm    =====Cardio=====  #History of L peroneal DVT in 2022  #History of RA thrombus in 2018  >TTE (4/13): No thrombus commented  - Eliquis stopped 5/2023 by outpatient hematology given consistently negative DVT studies  - On HSQ for DVT ppx    #RV dysfunction? - RESOLVED  >Enlarged RV and pulmonary hypertension noted on POCUS (4/16) that is not on TTE 4/13  >CTAPE (4/16): No PE  >DVT study (4/16): No DVT  >TTE (4/16): Normal LVSF EF 75%, Normal RVSF and size, TAPSE 1.9, PASP 25  - ctm w/ POCUS    =====Pulmonary=====  #Mechanical ventilation   - s/p Extubation 4/17    #Atelectasis vs. PNA  >CTC (4/13): RML consolidation noted, appears atelectasis > PNA on POCUS  >L sided consolidation noted on POCUS - possibly PNA  - persistently leukocytosis  - abx as below  - f/u ID recs    =====GI=====  #Alcoholic hepatitis/cirrhosis?  >Elevated bilirubin/coag, c/b hepatic encephalopathy  >s/p NAC on admission to outside hospital  >CTAP (4/13): Hepatomegaly and hepatic steatosis  >US abd (4/13): Hepatic steatosis, no hepatic vein thrombosis  >Acute hepatitis panel (4/13): Negative  >Autoimmune hepatitis panel (4/13): Negative  >MRCP (4/15): Enlarged, fatty liver. No biliary duct dilation, unlikely obstruction  >Gastrograffin study to r/o SBO (4/16): Passage of contrast seen on serial XR, unlikely SBO  >PETH (4/13): > 400  - Etiology likely alcohol related given PETH level, although patient and family denies alcohol use  - c/w Folid acid, MVI  - Will increase thiamine to 500mg Q8h (4/18 - ) given patient confabulating c/f wernicke vs. ICU delirium   - c/w ursodiol 500mg QD    #Hyperammonemia   >Baseline mentation AAO3  >s/p CRRT (off since 4/16)  - f/u plasma amino acid (4/16): in lab  - f/u urine orotic acid (4/16) - sent out to [facility]  - f/u metabolic genetic consult to r/o acquired urea cycle disorder  - c/w Rifaximin   - c/w Lactulose    #Colitis, ?Ischemic  >CTA/P (4/13): segmental areas of wall thickening throughout the colon and rectum with pneumatosis, mesenteric edema, and adjacent hyperdensities in the proximal transverse colon suspicious for bowel ischemia with intraluminal hemorrhage. Additional intraluminal hyperdensities within the distal ascending colon suspicious for hemorrhage.   >TTE (4/13): No thrombus commented  - Surgery, GI following, no plan for surgery/endoscopic eval for now  - On TF, trend lactate, monitor GIB    #Colonic mass?  >CTA/P (4/13): Nonspecific focal areas of narrowing in the distal descending colon and sigmoid colon, possibly colonic masses  >CTA/P (4/16): Improved colitis, previous ?colonic mass vs. narrowing still seen per discussion w/ radiology  - ctm, colonoscopy eventually    #Diet and ppx  - TF, c/w Pancreatic enzyme supplement while on TF  - rectal tube in place   - c/w PPI 40mg for GI ppx  - Pending bedside dysphagia / swallow eval    =====Renal=====  #CRRT  >s/p CRRT (last 4/17) for ammonia clearance  - Strict I/O  - Diurese PRN    =====Heme=====   #Factor 5 Leiden  #History of L peroneal DVT in 2022  #History of RA thrombus in 2018  >TTE (4/13): No thrombus commented  >Eliquis stopped 5/2023 by outpatient hematology given consistently negative DVT studies  - SQH for DVT ppx    # Macrocytic anemia  >s/p 2u PRBC (4/13, 4/16)  >Multifactorial in nature. No hx of bleeding but with coagulopathy as below.   >B12 (4/13): >2000, Folate: 8.6  >Iron studies (4/13): Ferritin 653, Iron 43, Unmeasurable TIBC/Iron%   >Hemolysis lab (4/14): Haptoglobin < 20;   - Possibly iso liver disease vs. acute infection/inflammation vs. ?hemolysis  - Trend CBC, coag, transfuse goal > 7    #thrombocytopenia  >s/p 1u Plt (4/13)   - Etiology not entirely clear, s/p splenectomy and hepatic steatosis w/o cirrhosis on imaging  - Likely iso acute illness  - Trend CBC, plt goal > 20    =====Endo=====  #DM1 vs. DM3c  >Per HIE record, patient prone to hypoglycemia  >History of DM1, but s/p total pancreatectomy and islet cell autotransplant in 2018 - currently DM3c?  - c/w Lantus 5u QHS + ISS  - f/u C-peptide  - Endocrine on board    #Hypothyroidism  - c/w synthyroid 100    =====ID=====  Infectious work up  >UA (4/13): 11WBC, small LE, negative Nitrite or bacteria  >Babesia (4/13): neg; HIV (4/13): neg; RVP, COVID (4/14): neg; MRSA (4/13): Negative  >CXR (4/13): RML consolidation  >CTC/A/P (4/13): RML consolidation, LLL atelectasis 2/2 likely ?mucus plug, ischemic colitis  >Bcx (4/13): NGTD  >ET tube cx (4/13): Normal resp luisa  >BAL (4/13): Pending  >GI PCR (4/15): Negative  - Rest of infectious w/u per ID    #Septic shock   #hx of recurrent infections   >H/o of recurrent infxn from multiple abdominal wounds and surgeries. Hx of VRE and candida.   >Previously on levo at OSH. Off pressor on transfer; Restarted with initiation of CRRT  >s/p Caspofungin 50mg Q24 (stopped 4/16)  >s/p Daptomycin 500mg Q24 (stopped 4/16)  >s/p Doxycycline 100mg Q12 (stopped 4/18)  - Infectious w/u per ID  - c/w Meropenem 1g q12 (4/10 - ), planned for 10 day course  - c/w Vancomycin for gram positive coverage given up-trending WBC (4/17 - )          For Followup:  [ ] ID - regarding abx + infection work up  [ ] Endocrine - regarding whether still DM1 and insulin requirement  [ ] Hepatology - need outpatient followup upon discharge  [ ] Surgery - regarding when to advance diet  [ ] Swallow eval  [ ] Titrate lactulose to 4 BM  [ ] Would trend ammonia q8h at least for now  [ ] Still no entirely clear etiology for hyperammonemia, however given elevated PETH alcoholic is most likely, although patient/family denies use  [ ] Acute rehab per PT/OT                              142    |  106    |  <4                  Calcium: 9.7   / iCa: x      (04-18 @ 00:23)    ----------------------------<  292       Magnesium: 1.8                              4.1     |  20     |  0.49             Phosphorous: 3.6      TPro  4.9    /  Alb  2.8    /  TBili  6.2    /  DBili  x      /  AST  65     /  ALT  25     /  AlkPhos  133    18 Apr 2024 00:23 MICU Transfer Note    Transfer from: MICU    Transfer to: ( x ) Medicine    (  ) Telemetry     (   ) RCU        (    ) Palliative         (   ) Stroke Unit          (   ) __________________    Accepting physican:      MICU COURSE:  42 year old female with hx of DM1, gout, Factor V Leiden c/b provoked DVT and RA thrombus in the past, and pancreatic divisum c/b recurrent pancreatitis s/p total pancreatectomy s/p islet cell autotransplant, also had extensive abdominal surgical history (cholecystectomy, splenectomy, kevin-en-y pancreaticojejuonostomy) for unclear reason c/b multiple intraabdominal infection/abscess/fistula in the past. Patient initially presented to [facility] for few weeks of worsening decreased PO intake, NBNB emesis, watery diarrhea as well as profound somnolence. She was intubated in [facility] ED for airway protection and admitted to MICU there. She was found to have INR > 4, ammonia >200 and elevated LFTs concerning for liver failure/hepatic encephalopathy for which she was given vitamin K and started on lactulose. [facility] possibly ischemic in origin and undifferentiated shock state possibly septic requiring pressor. Patient was unable to tolerate lactulose given colitis and [facility]    [facility], patient was intubated and sedated on Precedex and hemodynamically stable off pressor. Patient started CRRT for ammonia clearance and was kept NPO for given concern for colitis. Hepatology, ID, Nephrology, Hematology, Endocrinology, Surgery were consulted to co-manage patient. MICU course complicated by persistent and up-trending leukocytosis and intermittent low grade fever likely due to pneumonia vs. colitis. Given her extensive past infection history, she was started on Meropenem, Daptomycin, Doxycycline, Caspofungin for coverage. Her antibiotic regimen was gradually narrowed to Vancomycin and Meropenem as microbiology data came back. Her mental status steadily improves w/ down-trending ammonia while on the CRRT. After discussion with surgery team, she was restarted on TF and lactulose has been off CRRT since 4/16. She remains hemodynamically stable and was extubated on 4/17. Extensive radiologic and laboratory work up were sent during her MICU stay but mostly unrevealing beside PETH > 400. Ultimately her liver failure was deemed most likely from alcoholic hepatitis/cirrhosis. On 4/18, patient was AAOx2-3 and confabulating, her thiamine dosage was increased to 500mg q8h. She is otherwise doing well hemodynamically and has no respiratory concern and is deemed optimized to be downgraded to regular medicine floor.    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 per [facility]), fistula, abscesses. Presented to [facility] 4/8 for several weeks of decreased PO intake, NBNB emesis, watery diarrhea, Somnolence, requiring intubation for airway protection. Lab revealed ammonia > 200, elevated bilirubin and INR initially c/f liver failure, c/c/b colitis possibly ischemic etiology. Patient transferred to [facility] MICU 4/13 for CRRT initiation for ammonia clearance. Now off CRRT and extubated.     =====Neuro=====  #Altered mental status  >Baseline AOx3  >CTH (4/13): No acute findings  >CTH (4/15): No acute findings   >s/p Precedex, off since 4/15  - off sedation, awake, follows command, AAO2-3  - Likely iso hyperammonemia vs. infection   - treatment for hyperammonemia as below    #Chronic pain  #anxiety/depression   >Was on alprazolam 0.75mg QD, Dilaudid and methadone 5mg TID  - c/w Methadone 5mg TID    #Downward nystagmus - RESOLVED  >Noted on exam 4/14-4/15 overnight  >CTH (4/15): No acute findings   >EEG (4/15 - 4/17): TME but no seizure activity  - Will ctm    =====Cardio=====  #History of L peroneal DVT in 2022  #History of RA thrombus in 2018  >TTE (4/13): No thrombus commented  - Eliquis stopped 5/2023 by outpatient hematology given consistently negative DVT studies  - On HSQ for DVT ppx    #RV dysfunction? - RESOLVED  >Enlarged RV and pulmonary hypertension noted on POCUS (4/16) that is not on TTE 4/13  >CTAPE (4/16): No PE  >DVT study (4/16): No DVT  >TTE (4/16): Normal LVSF EF 75%, Normal RVSF and size, TAPSE 1.9, PASP 25  - ctm w/ POCUS    =====Pulmonary=====  #Mechanical ventilation   - s/p Extubation 4/17    #Atelectasis vs. PNA  >CTC (4/13): RML consolidation noted, appears atelectasis > PNA on POCUS  >L sided consolidation noted on POCUS - possibly PNA  - persistently leukocytosis  - abx as below  - f/u ID recs    =====GI=====  #Alcoholic hepatitis/cirrhosis?  >Elevated bilirubin/coag, c/b hepatic encephalopathy  >s/p NAC on admission to outside hospital  >CTAP (4/13): Hepatomegaly and hepatic steatosis  >US abd (4/13): Hepatic steatosis, no hepatic vein thrombosis  >Acute hepatitis panel (4/13): Negative  >Autoimmune hepatitis panel (4/13): Negative  >MRCP (4/15): Enlarged, fatty liver. No biliary duct dilation, unlikely obstruction  >Gastrograffin study to r/o SBO (4/16): Passage of contrast seen on serial XR, unlikely SBO  >PETH (4/13): > 400  - Etiology likely alcohol related given PETH level, although patient and family denies alcohol use  - c/w Folid acid, MVI  - Will increase thiamine to 500mg Q8h (4/18 - ) given patient confabulating c/f wernicke vs. ICU delirium   - c/w ursodiol 500mg QD    #Hyperammonemia   >Baseline mentation AAO3  >s/p CRRT (off since 4/16)  - f/u plasma amino acid (4/16): in lab  - f/u urine orotic acid (4/16) - sent out to [facility]  - f/u metabolic genetic consult to r/o acquired urea cycle disorder  - c/w Rifaximin   - c/w Lactulose    #Colitis, ?Ischemic  >CTA/P (4/13): segmental areas of wall thickening throughout the colon and rectum with pneumatosis, mesenteric edema, and adjacent hyperdensities in the proximal transverse colon suspicious for bowel ischemia with intraluminal hemorrhage. Additional intraluminal hyperdensities within the distal ascending colon suspicious for hemorrhage.   >TTE (4/13): No thrombus commented  - Surgery, GI following, no plan for surgery/endoscopic eval for now  - On TF, trend lactate, monitor GIB    #Colonic mass?  >CTA/P (4/13): Nonspecific focal areas of narrowing in the distal descending colon and sigmoid colon, possibly colonic masses  >CTA/P (4/16): Improved colitis, previous ?colonic mass vs. narrowing still seen per discussion w/ radiology  - ctm, colonoscopy eventually    #Diet and ppx  - TF, c/w Pancreatic enzyme supplement while on TF  - rectal tube in place   - c/w PPI 40mg for GI ppx  - Pending bedside dysphagia / swallow eval    =====Renal=====  #CRRT  >s/p CRRT (last 4/17) for ammonia clearance  - Strict I/O  - Diurese PRN    =====Heme=====   #Factor 5 Leiden  #History of L peroneal DVT in 2022  #History of RA thrombus in 2018  >TTE (4/13): No thrombus commented  >Eliquis stopped 5/2023 by outpatient hematology given consistently negative DVT studies  - SQH for DVT ppx    # Macrocytic anemia  >s/p 2u PRBC (4/13, 4/16)  >Multifactorial in nature. No hx of bleeding but with coagulopathy as below.   >B12 (4/13): >2000, Folate: 8.6  >Iron studies (4/13): Ferritin 653, Iron 43, Unmeasurable TIBC/Iron%   >Hemolysis lab (4/14): Haptoglobin < 20;   - Possibly iso liver disease vs. acute infection/inflammation vs. ?hemolysis  - Trend CBC, coag, transfuse goal > 7    #thrombocytopenia  >s/p 1u Plt (4/13)   - Etiology not entirely clear, s/p splenectomy and hepatic steatosis w/o cirrhosis on imaging  - Likely iso acute illness  - Trend CBC, plt goal > 20    =====Endo=====  #DM1 vs. DM3c  >Per HIE record, patient prone to hypoglycemia  >History of DM1, but s/p total pancreatectomy and islet cell autotransplant in 2018 - currently DM3c?  - c/w Lantus 5u QHS + ISS  - f/u C-peptide  - Endocrine on board    #Hypothyroidism  - c/w synthyroid 100    =====ID=====  Infectious work up  >UA (4/13): 11WBC, small LE, negative Nitrite or bacteria  >Babesia (4/13): neg; HIV (4/13): neg; RVP, COVID (4/14): neg; MRSA (4/13): Negative  >CXR (4/13): RML consolidation  >CTC/A/P (4/13): RML consolidation, LLL atelectasis 2/2 likely ?mucus plug, ischemic colitis  >Bcx (4/13): NGTD  >ET tube cx (4/13): Normal resp luisa  >BAL (4/13): Pending  >GI PCR (4/15): Negative  - Rest of infectious w/u per ID    #Septic shock   #hx of recurrent infections   >H/o of recurrent infxn from multiple abdominal wounds and surgeries. Hx of VRE and candida.   >Previously on levo at OSH. Off pressor on transfer; Restarted with initiation of CRRT  >s/p Caspofungin 50mg Q24 (stopped 4/16)  >s/p Daptomycin 500mg Q24 (stopped 4/16)  >s/p Doxycycline 100mg Q12 (stopped 4/18)  - Infectious w/u per ID  - c/w Meropenem 1g q12 (4/10 - ), planned for 10 day course  - c/w Vancomycin for gram positive coverage given up-trending WBC (4/17 - )          For Followup:  [ ] ID - regarding abx + infection work up  [ ] Endocrine - regarding whether still DM1 and insulin requirement  [ ] Hepatology - need outpatient followup upon discharge  [ ] Surgery - regarding when to advance diet, c/w trickle feed for now  [ ] Swallow eval  [ ] Titrate lactulose to 4 BM  [ ] Would trend ammonia q8h at least for now  [ ] Still no entirely clear etiology for hyperammonemia, however given elevated PETH alcoholic is most likely, although patient/family denies use  [ ] Acute rehab per PT/OT                              142    |  106    |  <4                  Calcium: 9.7   / iCa: x      (04-18 @ 00:23)    ----------------------------<  292       Magnesium: 1.8                              4.1     |  20     |  0.49             Phosphorous: 3.6      TPro  4.9    /  Alb  2.8    /  TBili  6.2    /  DBili  x      /  AST  65     /  ALT  25     /  AlkPhos  133    18 Apr 2024 00:23 MICU Transfer Note    Transfer from: MICU    Transfer to: ( x ) Medicine    (  ) Telemetry     (   ) RCU        (    ) Palliative         (   ) Stroke Unit          (   ) __________________    Accepting physican:      MICU COURSE:  42 year old female with hx of DM1, gout, Factor V Leiden c/b provoked DVT and RA thrombus in the past, and pancreatic divisum c/b recurrent pancreatitis s/p total pancreatectomy s/p islet cell autotransplant, also had extensive abdominal surgical history (cholecystectomy, splenectomy, kevin-en-y pancreaticojejuonostomy) for unclear reason c/b multiple intraabdominal infection/abscess/fistula in the past. Patient initially presented to [facility] for few weeks of worsening decreased PO intake, NBNB emesis, watery diarrhea as well as profound somnolence. She was intubated in [facility] ED for airway protection and admitted to MICU there. She was found to have INR > 4, ammonia >200 and elevated LFTs concerning for liver failure/hepatic encephalopathy for which she was given vitamin K and started on lactulose. [facility] possibly ischemic in origin and undifferentiated shock state possibly septic requiring pressor. Patient was unable to tolerate lactulose given colitis and [facility]    [facility] was intubated and sedated on Precedex and hemodynamically stable off pressor. Patient started CRRT for ammonia clearance and was kept NPO for given concern for colitis. Hepatology, ID, Nephrology, Hematology, Endocrinology, Surgery were consulted to co-manage patient. MICU course complicated by persistent and up-trending leukocytosis and intermittent low grade fever likely due to pneumonia vs. colitis. Given her extensive past infection history, she was started on Meropenem, Daptomycin, Doxycycline, Caspofungin for coverage. Her antibiotic regimen was gradually narrowed to Vancomycin and Meropenem as microbiology data came back. Her mental status steadily improves w/ down-trending ammonia while on the CRRT. After discussion with surgery team, she was restarted on TF and lactulose has been off CRRT since 4/16. She remains hemodynamically stable and was extubated on 4/17. Extensive radiologic and laboratory work up were sent during her MICU stay but mostly unrevealing beside PETH > 400. Ultimately her liver failure was deemed most likely from alcoholic hepatitis/cirrhosis. On 4/18, patient was AAOx2-3 and confabulating, her thiamine dosage was increased to 500mg q8h. She is otherwise doing well hemodynamically and has no respiratory concern and is deemed optimized to be downgraded to regular medicine floor.    ASSESSMENT & PLAN:   43yo pmh of HTN, DM, Factor V Leiden c/b L peroneal DVT (2022) and RA thrombus (2018) currently not on AC, gout, congenital pancreas divisum, recurrent pancreatitis s/p cholecystectomy, Splenectomy, Kevin-En-Y Pancreaticojejunostomy, total pancreatectomy s/p Islet Cell Autotransplant at [facility] 2018, complicated by multiple intraabdominal infection (VRE, fungal? per [facility]), fistula, abscesses. Presented to [facility] 4/8 for several weeks of decreased PO intake, NBNB emesis, watery diarrhea, Somnolence, requiring intubation for airway protection. Lab revealed ammonia > 200, elevated bilirubin and INR initially c/f liver failure, c/c/b colitis possibly ischemic etiology. Patient transferred to [facility] MICU 4/13 for CRRT initiation for ammonia clearance. Now off CRRT and extubated.     =====Neuro=====  #Altered mental status  >Baseline AOx3  >CTH (4/13): No acute findings  >CTH (4/15): No acute findings   >s/p Precedex, off since 4/15  - off sedation, awake, follows command, AAO2-3  - Likely iso hyperammonemia vs. infection   - treatment for hyperammonemia as below    #Chronic pain  #anxiety/depression   >Was on alprazolam 0.75mg QD, Dilaudid and methadone 5mg TID  - c/w Methadone 5mg TID    #Downward nystagmus - RESOLVED  >Noted on exam 4/14-4/15 overnight  >CTH (4/15): No acute findings   >EEG (4/15 - 4/17): TME but no seizure activity  - Will ctm    =====Cardio=====  #History of L peroneal DVT in 2022  #History of RA thrombus in 2018  >TTE (4/13): No thrombus commented  - Eliquis stopped 5/2023 by outpatient hematology given consistently negative DVT studies  - On HSQ for DVT ppx    #RV dysfunction? - RESOLVED  >Enlarged RV and pulmonary hypertension noted on POCUS (4/16) that is not on TTE 4/13  >CTAPE (4/16): No PE  >DVT study (4/16): No DVT  >TTE (4/16): Normal LVSF EF 75%, Normal RVSF and size, TAPSE 1.9, PASP 25  - ctm w/ POCUS    =====Pulmonary=====  #Mechanical ventilation   - s/p Extubation 4/17    #Atelectasis vs. PNA  >CTC (4/13): RML consolidation noted, appears atelectasis > PNA on POCUS  >L sided consolidation noted on POCUS - possibly PNA  - persistently leukocytosis  - abx as below  - f/u ID recs    =====GI=====  #Alcoholic hepatitis/cirrhosis?  >Elevated bilirubin/coag, c/b hepatic encephalopathy  >s/p NAC on admission to outside hospital  >CTAP (4/13): Hepatomegaly and hepatic steatosis  >US abd (4/13): Hepatic steatosis, no hepatic vein thrombosis  >Acute hepatitis panel (4/13): Negative  >Autoimmune hepatitis panel (4/13): Negative  >MRCP (4/15): Enlarged, fatty liver. No biliary duct dilation, unlikely obstruction  >Gastrograffin study to r/o SBO (4/16): Passage of contrast seen on serial XR, unlikely SBO  >PETH (4/13): > 400  - Etiology likely alcohol related given PETH level, although patient and family denies alcohol use  - c/w Folid acid, MVI  - Will increase thiamine to 500mg Q8h (4/18 - ) given patient confabulating c/f wernicke vs. ICU delirium   - c/w ursodiol 500mg QD    #Hyperammonemia   >Baseline mentation AAO3  >s/p CRRT (off since 4/16)  - f/u plasma amino acid (4/16): in lab  - f/u urine orotic acid (4/16) - sent out to [facility]  - f/u metabolic genetic consult to r/o acquired urea cycle disorder  - c/w Rifaximin   - c/w Lactulose    #Colitis, ?Ischemic  >CTA/P (4/13): segmental areas of wall thickening throughout the colon and rectum with pneumatosis, mesenteric edema, and adjacent hyperdensities in the proximal transverse colon suspicious for bowel ischemia with intraluminal hemorrhage. Additional intraluminal hyperdensities within the distal ascending colon suspicious for hemorrhage.   >TTE (4/13): No thrombus commented  - Surgery, GI following, no plan for surgery/endoscopic eval for now  - On TF, trend lactate, monitor GIB    #Colonic mass?  >CTA/P (4/13): Nonspecific focal areas of narrowing in the distal descending colon and sigmoid colon, possibly colonic masses  >CTA/P (4/16): Improved colitis, previous ?colonic mass vs. narrowing still seen per discussion w/ radiology  - ctm, colonoscopy eventually    #Diet and ppx  - TF, c/w Pancreatic enzyme supplement while on TF  - rectal tube in place   - c/w PPI 40mg for GI ppx  - Pending bedside dysphagia / swallow eval    =====Renal=====  #CRRT  >s/p CRRT (last 4/17) for ammonia clearance  - Strict I/O  - Diurese PRN    =====Heme=====   #Factor 5 Leiden  #History of L peroneal DVT in 2022  #History of RA thrombus in 2018  >TTE (4/13): No thrombus commented  >Eliquis stopped 5/2023 by outpatient hematology given consistently negative DVT studies  - SQH for DVT ppx    # Macrocytic anemia  >s/p 2u PRBC (4/13, 4/16)  >Multifactorial in nature. No hx of bleeding but with coagulopathy as below.   >B12 (4/13): >2000, Folate: 8.6  >Iron studies (4/13): Ferritin 653, Iron 43, Unmeasurable TIBC/Iron%   >Hemolysis lab (4/14): Haptoglobin < 20;   - Possibly iso liver disease vs. acute infection/inflammation vs. ?hemolysis  - Trend CBC, coag, transfuse goal > 7    #thrombocytopenia  >s/p 1u Plt (4/13)   - Etiology not entirely clear, s/p splenectomy and hepatic steatosis w/o cirrhosis on imaging  - Likely iso acute illness  - Trend CBC, plt goal > 20    =====Endo=====  #DM1 vs. DM3c  >Per HIE record, patient prone to hypoglycemia  >History of DM1, but s/p total pancreatectomy and islet cell autotransplant in 2018 - currently DM3c?  - c/w Lantus 5u QHS + ISS  - f/u C-peptide  - Endocrine on board    #Hypothyroidism  - c/w synthyroid 100    =====ID=====  Infectious work up  >UA (4/13): 11WBC, small LE, negative Nitrite or bacteria  >Babesia (4/13): neg; HIV (4/13): neg; RVP, COVID (4/14): neg; MRSA (4/13): Negative  >CXR (4/13): RML consolidation  >CTC/A/P (4/13): RML consolidation, LLL atelectasis 2/2 likely ?mucus plug, ischemic colitis  >Bcx (4/13): NGTD  >ET tube cx (4/13): Normal resp luisa  >BAL (4/13): Pending  >GI PCR (4/15): Negative  - Rest of infectious w/u per ID    #Septic shock   #hx of recurrent infections   >H/o of recurrent infxn from multiple abdominal wounds and surgeries. Hx of VRE and candida.   >Previously on levo at OSH. Off pressor on transfer; Restarted with initiation of CRRT  >s/p Caspofungin 50mg Q24 (stopped 4/16)  >s/p Daptomycin 500mg Q24 (stopped 4/16)  >s/p Doxycycline 100mg Q12 (stopped 4/18)  - Infectious w/u per ID  - c/w Meropenem 1g q12 (4/10 - ), planned for 10 day course  - c/w Vancomycin for gram positive coverage given up-trending WBC (4/17 - )          For Followup:  [ ] ID - regarding abx + infection work up  [ ] Endocrine - regarding whether still DM1 and insulin requirement  [ ] Hepatology - need outpatient followup upon discharge  [ ] Surgery - regarding when to advance diet, c/w trickle feed for now  [ ] Swallow eval  [ ] Titrate lactulose to 4 BM  [ ] Would trend ammonia q8h at least for now  [ ] Still no entirely clear etiology for hyperammonemia, however given elevated PETH alcoholic is most likely, although patient/family denies use  [ ] Acute rehab per PT/OT MICU Transfer Note    Transfer from: MICU    Transfer to: ( x ) Medicine    (  ) Telemetry     (   ) RCU        (    ) Palliative         (   ) Stroke Unit          (   ) __________________    Accepting physican:      MICU COURSE:  42 year old female with hx of DM1, gout, Factor V Leiden c/b provoked DVT and RA thrombus in the past, and pancreatic divisum c/b recurrent pancreatitis s/p total pancreatectomy s/p islet cell autotransplant, also had extensive abdominal surgical history (cholecystectomy, splenectomy, kevin-en-y pancreaticojejuonostomy) for unclear reason c/b multiple intraabdominal infection/abscess/fistula in the past. Patient initially presented to [facility] for few weeks of worsening decreased PO intake, NBNB emesis, watery diarrhea as well as profound somnolence. She was intubated in [facility] ED for airway protection and admitted to MICU there. She was found to have INR > 4, ammonia >200 and elevated LFTs concerning for liver failure/hepatic encephalopathy for which she was given vitamin K and started on lactulose. [facility] possibly ischemic in origin and undifferentiated shock state possibly septic requiring pressor. Patient was unable to tolerate lactulose given colitis and [facility]    [facility] patient was intubated and sedated on Precedex and hemodynamically stable off pressor. Patient started CRRT for ammonia clearance on arrival and was kept NPO given concern for colitis. Hepatology, ID, Nephrology, Hematology, Endocrinology, Surgery were consulted to co-manage patient. MICU course complicated by persistent and up-trending leukocytosis and intermittent low grade fever likely due to pneumonia vs. colitis. Given her extensive past infection history, she was started on Meropenem, Daptomycin, Doxycycline, Caspofungin for coverage. Her antibiotic regimen was gradually narrowed to Vancomycin and Meropenem as microbiology data came back. Her mental status steadily improves w/ down-trending ammonia while on the CRRT. After discussion with surgery team, she was restarted on TF and lactulose has been off CRRT since 4/16. She remains hemodynamically stable and was extubated on 4/17. Extensive radiologic and laboratory work up were sent during her MICU stay but mostly unrevealing beside PETH > 400. Ultimately her liver failure was deemed most likely from alcoholic hepatitis/cirrhosis. On 4/18, patient was AAOx2-3 and confabulating, her thiamine dosage was increased to 500mg q8h. She is otherwise doing well hemodynamically and has no respiratory concern and is deemed optimized to be downgraded to regular medicine floor.    ASSESSMENT & PLAN:   43yo pmh of HTN, DM, Factor V Leiden c/b L peroneal DVT (2022) and RA thrombus (2018) currently not on AC, gout, congenital pancreas divisum, recurrent pancreatitis s/p cholecystectomy, Splenectomy, Kevin-En-Y Pancreaticojejunostomy, total pancreatectomy s/p Islet Cell Autotransplant at [facility] 2018, complicated by multiple intraabdominal infection (VRE, fungal? per [facility]), fistula, abscesses. Presented to [facility] 4/8 for several weeks of decreased PO intake, NBNB emesis, watery diarrhea, Somnolence, requiring intubation for airway protection. Lab revealed ammonia > 200, elevated bilirubin and INR initially c/f liver failure, c/c/b colitis possibly ischemic etiology. Patient transferred to [facility] MICU 4/13 for CRRT initiation for ammonia clearance. Now off CRRT and extubated.     =====Neuro=====  #Altered mental status  >Baseline AOx3  >CTH (4/13): No acute findings  >CTH (4/15): No acute findings   >s/p Precedex, off since 4/15  - off sedation, awake, follows command, AAO2-3  - Likely iso hyperammonemia vs. infection   - treatment for hyperammonemia as below    #Chronic pain  #anxiety/depression   >Was on alprazolam 0.75mg QD, Dilaudid and methadone 5mg TID  - c/w Methadone 5mg TID    #Downward nystagmus - RESOLVED  >Noted on exam 4/14-4/15 overnight  >CTH (4/15): No acute findings   >EEG (4/15 - 4/17): TME but no seizure activity  - Will ctm    =====Cardio=====  #History of L peroneal DVT in 2022  #History of RA thrombus in 2018  >TTE (4/13): No thrombus commented  - Eliquis stopped 5/2023 by outpatient hematology given consistently negative DVT studies  - On HSQ for DVT ppx    #RV dysfunction? - RESOLVED  >Enlarged RV and pulmonary hypertension noted on POCUS (4/16) that is not on TTE 4/13  >CTAPE (4/16): No PE  >DVT study (4/16): No DVT  >TTE (4/16): Normal LVSF EF 75%, Normal RVSF and size, TAPSE 1.9, PASP 25  - ctm w/ POCUS    =====Pulmonary=====  #Mechanical ventilation   - s/p Extubation 4/17    #Atelectasis vs. PNA  >CTC (4/13): RML consolidation noted, appears atelectasis > PNA on POCUS  >L sided consolidation noted on POCUS - possibly PNA  - persistently leukocytosis  - abx as below  - f/u ID recs    =====GI=====  #Alcoholic hepatitis/cirrhosis?  >Elevated bilirubin/coag, c/b hepatic encephalopathy  >s/p NAC on admission to outside hospital  >CTAP (4/13): Hepatomegaly and hepatic steatosis  >US abd (4/13): Hepatic steatosis, no hepatic vein thrombosis  >Acute hepatitis panel (4/13): Negative  >Autoimmune hepatitis panel (4/13): Negative  >MRCP (4/15): Enlarged, fatty liver. No biliary duct dilation, unlikely obstruction  >Gastrograffin study to r/o SBO (4/16): Passage of contrast seen on serial XR, unlikely SBO  >PETH (4/13): > 400  - Etiology likely alcohol related given PETH level, although patient and family denies alcohol use  - c/w Folid acid, MVI  - Will increase thiamine to 500mg Q8h (4/18 - ) given patient confabulating c/f wernicke vs. ICU delirium   - c/w ursodiol 500mg QD    #Hyperammonemia   >Baseline mentation AAO3  >s/p CRRT (off since 4/16)  - f/u plasma amino acid (4/16): in lab  - f/u urine orotic acid (4/16) - sent out to [facility]  - f/u metabolic genetic consult to r/o acquired urea cycle disorder  - c/w Rifaximin   - c/w Lactulose    #Colitis, ?Ischemic  >CTA/P (4/13): segmental areas of wall thickening throughout the colon and rectum with pneumatosis, mesenteric edema, and adjacent hyperdensities in the proximal transverse colon suspicious for bowel ischemia with intraluminal hemorrhage. Additional intraluminal hyperdensities within the distal ascending colon suspicious for hemorrhage.   >TTE (4/13): No thrombus commented  - Surgery, GI following, no plan for surgery/endoscopic eval for now  - On TF, trend lactate, monitor GIB    #Colonic mass?  >CTA/P (4/13): Nonspecific focal areas of narrowing in the distal descending colon and sigmoid colon, possibly colonic masses  >CTA/P (4/16): Improved colitis, previous ?colonic mass vs. narrowing still seen per discussion w/ radiology  - ctm, colonoscopy eventually    #Diet and ppx  - TF, c/w Pancreatic enzyme supplement while on TF  - rectal tube in place   - c/w PPI 40mg for GI ppx  - Pending bedside dysphagia / swallow eval    =====Renal=====  #CRRT  >s/p CRRT (last 4/17) for ammonia clearance  - Strict I/O  - Diurese PRN    =====Heme=====   #Factor 5 Leiden  #History of L peroneal DVT in 2022  #History of RA thrombus in 2018  >TTE (4/13): No thrombus commented  >Eliquis stopped 5/2023 by outpatient hematology given consistently negative DVT studies  - SQH for DVT ppx    # Macrocytic anemia  >s/p 2u PRBC (4/13, 4/16)  >Multifactorial in nature. No hx of bleeding but with coagulopathy as below.   >B12 (4/13): >2000, Folate: 8.6  >Iron studies (4/13): Ferritin 653, Iron 43, Unmeasurable TIBC/Iron%   >Hemolysis lab (4/14): Haptoglobin < 20;   - Possibly iso liver disease vs. acute infection/inflammation vs. ?hemolysis  - Trend CBC, coag, transfuse goal > 7    #thrombocytopenia  >s/p 1u Plt (4/13)   - Etiology not entirely clear, s/p splenectomy and hepatic steatosis w/o cirrhosis on imaging  - Likely iso acute illness  - Trend CBC, plt goal > 20    =====Endo=====  #DM1 vs. DM3c  >Per HIE record, patient prone to hypoglycemia  >History of DM1, but s/p total pancreatectomy and islet cell autotransplant in 2018 - currently DM3c?  - c/w Lantus 5u QHS + ISS  - f/u C-peptide  - Endocrine on board    #Hypothyroidism  - c/w synthyroid 100    =====ID=====  Infectious work up  >UA (4/13): 11WBC, small LE, negative Nitrite or bacteria  >Babesia (4/13): neg; HIV (4/13): neg; RVP, COVID (4/14): neg; MRSA (4/13): Negative  >CXR (4/13): RML consolidation  >CTC/A/P (4/13): RML consolidation, LLL atelectasis 2/2 likely ?mucus plug, ischemic colitis  >Bcx (4/13): NGTD  >ET tube cx (4/13): Normal resp luisa  >BAL (4/13): Pending  >GI PCR (4/15): Negative  - Rest of infectious w/u per ID    #Septic shock   #hx of recurrent infections   >H/o of recurrent infxn from multiple abdominal wounds and surgeries. Hx of VRE and candida.   >Previously on levo at OSH. Off pressor on transfer; Restarted with initiation of CRRT  >s/p Caspofungin 50mg Q24 (stopped 4/16)  >s/p Daptomycin 500mg Q24 (stopped 4/16)  >s/p Doxycycline 100mg Q12 (stopped 4/18)  - Infectious w/u per ID  - c/w Meropenem 1g q12 (4/10 - ), planned for 10 day course  - c/w Vancomycin for gram positive coverage given up-trending WBC (4/17 - )          For Followup:  [ ] ID - regarding abx + infection work up  [ ] Endocrine - regarding whether still DM1 and insulin requirement  [ ] Hepatology - need outpatient followup upon discharge  [ ] Surgery - regarding when to advance diet, c/w trickle feed for now  [ ] Swallow eval  [ ] Titrate lactulose to 4 BM  [ ] Would trend ammonia q8h at least for now  [ ] Still no entirely clear etiology for hyperammonemia, however given elevated PETH alcoholic is most likely, although patient/family denies use  [ ] Acute rehab per PT/OT MICU Transfer Note    Transfer from: MICU    Transfer to: ( x ) Medicine    (  ) Telemetry     (   ) RCU        (    ) Palliative         (   ) Stroke Unit          (   ) __________________    Accepting physican:      MICU COURSE:  42 year old female with hx of DM1, gout, Factor V Leiden c/b provoked DVT and RA thrombus in the past, and pancreatic divisum c/b recurrent pancreatitis s/p total pancreatectomy s/p islet cell autotransplant, also had extensive abdominal surgical history (cholecystectomy, splenectomy, kevin-en-y pancreaticojejuonostomy) for unclear reason c/b multiple intraabdominal infection/abscess/fistula (including VRE, candida) in the past. Patient initially presented to [facility] for few weeks of worsening decreased PO intake, NBNB emesis, watery diarrhea as well as profound somnolence. She was intubated in [facility] ED for airway protection and admitted to MICU there. She was found to have INR > 4, ammonia >200 and elevated LFTs concerning for liver failure/hepatic encephalopathy for which she was given vitamin K and started on lactulose. [facility] possibly ischemic in origin and undifferentiated shock state possibly septic requiring pressor. Patient was unable to tolerate lactulose given colitis [facility]    [facility] was intubated and sedated on Precedex and hemodynamically stable off pressor. Patient started CRRT for ammonia clearance on arrival and was kept NPO given concern for colitis. Hepatology, ID, Nephrology, Hematology, Endocrinology, Surgery were consulted to co-manage patient. MICU course complicated by persistent and up-trending leukocytosis and intermittent low grade fever likely due to pneumonia vs. colitis. Given her extensive past infection history, she was started on Meropenem, Daptomycin, Doxycycline, Caspofungin for coverage. Her antibiotic regimen was gradually narrowed to Vancomycin and Meropenem as microbiology data came back. Her mental status steadily improves w/ down-trending ammonia while on the CRRT. After discussion with surgery team, she was restarted on TF and lactulose has been off CRRT since 4/16. She remains hemodynamically stable and was extubated on 4/17. Extensive radiologic and laboratory work up were sent during her MICU stay but mostly unrevealing beside PETH > 400. Ultimately her liver failure was deemed most likely from alcoholic hepatitis/cirrhosis. On 4/18, patient was AAOx2-3 and confabulating, her thiamine dosage was increased to 500mg q8h. She is otherwise doing well hemodynamically and has no respiratory concern and is deemed optimized to be downgraded to regular medicine floor.    ASSESSMENT & PLAN:   41yo pmh of HTN, DM, Factor V Leiden c/b L peroneal DVT (2022) and RA thrombus (2018) currently not on AC, gout, congenital pancreas divisum, recurrent pancreatitis s/p cholecystectomy, Splenectomy, Kevin-En-Y Pancreaticojejunostomy, total pancreatectomy s/p Islet Cell Autotransplant at [facility] 2018, complicated by multiple intraabdominal infection (including VRE, candida), fistula, abscesses. Presented to [facility] 4/8 for several weeks of decreased PO intake, NBNB emesis, watery diarrhea, Somnolence, requiring intubation for airway protection. Lab revealed ammonia > 200, elevated bilirubin and INR initially c/f liver failure, c/c/b colitis possibly ischemic etiology. Patient transferred to [facility] MICU 4/13 for CRRT initiation for ammonia clearance. Now off CRRT and extubated.     =====Neuro=====  #Altered mental status  >Baseline AOx3  >CTH (4/13): No acute findings  >CTH (4/15): No acute findings   >s/p Precedex, off since 4/15  - off sedation, awake, follows command, AAO2-3  - Likely iso hyperammonemia vs. infection   - treatment for hyperammonemia as below    #Chronic pain  #anxiety/depression   >Was on alprazolam 0.75mg QD, Dilaudid and methadone 5mg TID  - c/w Methadone 5mg TID    #Downward nystagmus - RESOLVED  >Noted on exam 4/14-4/15 overnight  >CTH (4/15): No acute findings   >EEG (4/15 - 4/17): TME but no seizure activity  - Will ctm    =====Cardio=====  #History of L peroneal DVT in 2022  #History of RA thrombus in 2018  >TTE (4/13): No thrombus commented  - Eliquis stopped 5/2023 by outpatient hematology given consistently negative DVT studies  - On HSQ for DVT ppx    #RV dysfunction? - RESOLVED  >Enlarged RV and pulmonary hypertension noted on POCUS (4/16) that is not on TTE 4/13  >CTAPE (4/16): No PE  >DVT study (4/16): No DVT  >TTE (4/16): Normal LVSF EF 75%, Normal RVSF and size, TAPSE 1.9, PASP 25  - ctm w/ POCUS    =====Pulmonary=====  #Mechanical ventilation   - s/p Extubation 4/17    #Atelectasis vs. PNA  >CTC (4/13): RML consolidation noted, appears atelectasis > PNA on POCUS  >L sided consolidation noted on POCUS - possibly PNA  - persistently leukocytosis  - abx as below  - f/u ID recs    =====GI=====  #Alcoholic hepatitis/cirrhosis?  >Elevated bilirubin/coag, c/b hepatic encephalopathy  >s/p NAC on admission to outside hospital  >CTAP (4/13): Hepatomegaly and hepatic steatosis  >US abd (4/13): Hepatic steatosis, no hepatic vein thrombosis  >Acute hepatitis panel (4/13): Negative  >Autoimmune hepatitis panel (4/13): Negative  >MRCP (4/15): Enlarged, fatty liver. No biliary duct dilation, unlikely obstruction  >Gastrograffin study to r/o SBO (4/16): Passage of contrast seen on serial XR, unlikely SBO  >PETH (4/13): > 400  - Etiology likely alcohol related given PETH level, although patient and family denies alcohol use  - c/w Folid acid, MVI  - Will increase thiamine to 500mg Q8h (4/18 - ) given patient confabulating c/f wernicke vs. ICU delirium   - c/w ursodiol 500mg QD    #Hyperammonemia   >Baseline mentation AAO3  >s/p CRRT (off since 4/16)  - f/u plasma amino acid (4/16): in lab  - f/u urine orotic acid (4/16) - sent out to [facility]  - f/u metabolic genetic consult to r/o acquired urea cycle disorder  - c/w Rifaximin   - c/w Lactulose    #Colitis, ?Ischemic  >CTA/P (4/13): segmental areas of wall thickening throughout the colon and rectum with pneumatosis, mesenteric edema, and adjacent hyperdensities in the proximal transverse colon suspicious for bowel ischemia with intraluminal hemorrhage. Additional intraluminal hyperdensities within the distal ascending colon suspicious for hemorrhage.   >TTE (4/13): No thrombus commented  - Surgery, GI following, no plan for surgery/endoscopic eval for now  - On TF, trend lactate, monitor GIB    #Colonic mass?  >CTA/P (4/13): Nonspecific focal areas of narrowing in the distal descending colon and sigmoid colon, possibly colonic masses  >CTA/P (4/16): Improved colitis, previous ?colonic mass vs. narrowing still seen per discussion w/ radiology  - ctm, colonoscopy eventually    #Diet and ppx  - TF, c/w Pancreatic enzyme supplement while on TF  - rectal tube in place   - c/w PPI 40mg for GI ppx  - Pending bedside dysphagia / swallow eval    =====Renal=====  #CRRT  >s/p CRRT (last 4/17) for ammonia clearance  - Strict I/O  - Diurese PRN    =====Heme=====   #Factor 5 Leiden  #History of L peroneal DVT in 2022  #History of RA thrombus in 2018  >TTE (4/13): No thrombus commented  >Eliquis stopped 5/2023 by outpatient hematology given consistently negative DVT studies  - SQH for DVT ppx    # Macrocytic anemia  >s/p 2u PRBC (4/13, 4/16)  >Multifactorial in nature. No hx of bleeding but with coagulopathy as below.   >B12 (4/13): >2000, Folate: 8.6  >Iron studies (4/13): Ferritin 653, Iron 43, Unmeasurable TIBC/Iron%   >Hemolysis lab (4/14): Haptoglobin < 20;   - Possibly iso liver disease vs. acute infection/inflammation vs. ?hemolysis  - Trend CBC, coag, transfuse goal > 7    #thrombocytopenia  >s/p 1u Plt (4/13)   - Etiology not entirely clear, s/p splenectomy and hepatic steatosis w/o cirrhosis on imaging  - Likely iso acute illness  - Trend CBC, plt goal > 20    =====Endo=====  #DM1 vs. DM3c  >History of DM1, but s/p total pancreatectomy and islet cell autotransplant in 2018 - currently DM3c?  - c/w Lantus 5u QHS + ISS  - f/u C-peptide  - Endocrine on board    #Hypothyroidism  - c/w synthyroid 100    =====ID=====  Infectious work up  >UA (4/13): 11WBC, small LE, negative Nitrite or bacteria  >Babesia (4/13): neg; HIV (4/13): neg; RVP, COVID (4/14): neg; MRSA (4/13): Negative  >CXR (4/13): RML consolidation  >CTC/A/P (4/13): RML consolidation, LLL atelectasis 2/2 likely ?mucus plug, ischemic colitis  >Bcx (4/13): NGTD  >ET tube cx (4/13): Normal resp luisa  >BAL (4/13): Pending  >GI PCR (4/15): Negative  - Rest of infectious w/u per ID    #Septic shock   #hx of recurrent infections   >H/o of recurrent infxn from multiple abdominal wounds and surgeries. Hx of VRE and candida.   >Previously on levo at OSH. Off pressor on transfer; Restarted with initiation of CRRT  >s/p Linezolid (stopped 4/13)  >s/p Caspofungin 50mg Q24 (stopped 4/16)  >s/p Daptomycin 500mg Q24 (stopped 4/16)  >s/p Doxycycline 100mg Q12 (stopped 4/18)  - Infectious w/u per ID  - c/w Meropenem 1g q12 (4/10 - ), planned for 10 day course  - c/w Vancomycin for gram positive coverage given up-trending WBC (4/17 - )      For Followup:  [ ] ID - regarding abx + infection work up  [ ] Endocrine - regarding whether still DM1 and insulin requirement  [ ] Hepatology - need outpatient followup upon discharge  [ ] Surgery - regarding when to advance diet, c/w trickle feed for now  [ ] Swallow eval  [ ] Titrate lactulose to 4 BM  [ ] Would trend ammonia q8h at least for now  [ ] Still no entirely clear etiology for hyperammonemia, however given elevated PETH alcoholic is most likely, although patient/family denies use  [ ] Acute rehab per PT/OT MICU Transfer Note    Transfer from: MICU    Transfer to: ( x ) Medicine    (  ) Telemetry     (   ) RCU        (    ) Palliative         (   ) Stroke Unit          (   ) __________________    Accepting physican: Dr. Kalpesh Dhaliwal      MICU COURSE:  42 year old female with hx of DM1, gout, Factor V Leiden c/b provoked DVT and RA thrombus in the past, and pancreatic divisum c/b recurrent pancreatitis s/p total pancreatectomy s/p islet cell autotransplant, also had extensive abdominal surgical history (cholecystectomy, splenectomy, celina-en-y pancreaticojejuonostomy) for unclear reason c/b multiple intraabdominal infection/abscess/fistula (including VRE, candida) in the past. Patient initially presented to [facility] for few weeks of worsening decreased PO intake, NBNB emesis, watery diarrhea as well as profound somnolence. She was intubated in [facility] ED for airway protection and admitted to MICU there. She was found to have INR > 4, ammonia >200 and elevated LFTs concerning for liver failure/hepatic encephalopathy for which she was given vitamin K and started on lactulose. [facility] possibly ischemic in origin and undifferentiated shock state possibly septic requiring pressor. Patient was unable to tolerate lactulose given colitis [facility]    [facility] was intubated and sedated on Precedex and hemodynamically stable off pressor. Patient started CRRT for ammonia clearance on arrival and was kept NPO given concern for colitis. Hepatology, ID, Nephrology, Hematology, Endocrinology, Surgery were consulted to co-manage patient. MICU course complicated by persistent and up-trending leukocytosis and intermittent low grade fever likely due to pneumonia vs. colitis. Given her extensive past infection history, she was started on Meropenem, Daptomycin, Doxycycline, Caspofungin for coverage. Her antibiotic regimen was gradually narrowed to Vancomycin and Meropenem as microbiology data came back. Her mental status steadily improves w/ down-trending ammonia while on the CRRT. After discussion with surgery team, she was restarted on TF and lactulose has been off CRRT since 4/16. She remains hemodynamically stable and was extubated on 4/17. Extensive radiologic and laboratory work up were sent during her MICU stay but mostly unrevealing beside PETH > 400. Ultimately her liver failure was deemed most likely from alcoholic hepatitis/cirrhosis. On 4/18, patient was AAOx2-3 and confabulating, her thiamine dosage was increased to 500mg q8h. She is otherwise doing well hemodynamically and has no respiratory concern and is deemed optimized to be downgraded to regular medicine floor.    ASSESSMENT & PLAN:   41yo pmh of HTN, DM, Factor V Leiden c/b L peroneal DVT (2022) and RA thrombus (2018) currently not on AC, gout, congenital pancreas divisum, recurrent pancreatitis s/p cholecystectomy, Splenectomy, Celina-En-Y Pancreaticojejunostomy, total pancreatectomy s/p Islet Cell Autotransplant at [facility] 2018, complicated by multiple intraabdominal infection (including VRE, candida), fistula, abscesses. Presented to [facility] 4/8 for several weeks of decreased PO intake, NBNB emesis, watery diarrhea, Somnolence, requiring intubation for airway protection. Lab revealed ammonia > 200, elevated bilirubin and INR initially c/f liver failure, c/c/b colitis possibly ischemic etiology. Patient transferred to [facility] MICU 4/13 for CRRT initiation for ammonia clearance. Now off CRRT and extubated.     =====Neuro=====  #Altered mental status  >Baseline AOx3  >CTH (4/13): No acute findings  >CTH (4/15): No acute findings   >s/p Precedex, off since 4/15  - off sedation, awake, follows command, AAO2-3  - Likely iso hyperammonemia vs. infection   - treatment for hyperammonemia as below    #Chronic pain  #anxiety/depression   >Was on alprazolam 0.75mg QD, Dilaudid and methadone 5mg TID  - c/w Methadone 5mg TID    #Downward nystagmus - RESOLVED  >Noted on exam 4/14-4/15 overnight  >CTH (4/15): No acute findings   >EEG (4/15 - 4/17): TME but no seizure activity  - Will ctm    =====Cardio=====  #History of L peroneal DVT in 2022  #History of RA thrombus in 2018  >TTE (4/13): No thrombus commented  - Eliquis stopped 5/2023 by outpatient hematology given consistently negative DVT studies  - On HSQ for DVT ppx    #RV dysfunction? - RESOLVED  >Enlarged RV and pulmonary hypertension noted on POCUS (4/16) that is not on TTE 4/13  >CTAPE (4/16): No PE  >DVT study (4/16): No DVT  >TTE (4/16): Normal LVSF EF 75%, Normal RVSF and size, TAPSE 1.9, PASP 25  - ctm w/ POCUS    =====Pulmonary=====  #Mechanical ventilation   - s/p Extubation 4/17    #Atelectasis vs. PNA  >CTC (4/13): RML consolidation noted, appears atelectasis > PNA on POCUS  >L sided consolidation noted on POCUS - possibly PNA  - persistently leukocytosis  - abx as below  - f/u ID recs    =====GI=====  #Alcoholic hepatitis/cirrhosis?  >Elevated bilirubin/coag, c/b hepatic encephalopathy  >s/p NAC on admission to outside hospital  >CTAP (4/13): Hepatomegaly and hepatic steatosis  >US abd (4/13): Hepatic steatosis, no hepatic vein thrombosis  >Acute hepatitis panel (4/13): Negative  >Autoimmune hepatitis panel (4/13): Negative  >MRCP (4/15): Enlarged, fatty liver. No biliary duct dilation, unlikely obstruction  >Gastrograffin study to r/o SBO (4/16): Passage of contrast seen on serial XR, unlikely SBO  >PETH (4/13): > 400  - Etiology likely alcohol related given PETH level, although patient and family denies alcohol use  - c/w Folid acid, MVI  - Will increase thiamine to 500mg Q8h (4/18 - ) given patient confabulating c/f wernicke vs. ICU delirium   - c/w ursodiol 500mg QD    #Hyperammonemia   >Baseline mentation AAO3  >s/p CRRT (off since 4/16)  - f/u plasma amino acid (4/16): in lab  - f/u urine orotic acid (4/16) - sent out to [facility]  - f/u metabolic genetic consult to r/o acquired urea cycle disorder  - c/w Rifaximin   - c/w Lactulose    #Colitis, ?Ischemic  >CTA/P (4/13): segmental areas of wall thickening throughout the colon and rectum with pneumatosis, mesenteric edema, and adjacent hyperdensities in the proximal transverse colon suspicious for bowel ischemia with intraluminal hemorrhage. Additional intraluminal hyperdensities within the distal ascending colon suspicious for hemorrhage.   >TTE (4/13): No thrombus commented  - Surgery, GI following, no plan for surgery/endoscopic eval for now  - On TF, trend lactate, monitor GIB    #Colonic mass?  >CTA/P (4/13): Nonspecific focal areas of narrowing in the distal descending colon and sigmoid colon, possibly colonic masses  >CTA/P (4/16): Improved colitis, previous ?colonic mass vs. narrowing still seen per discussion w/ radiology  - ctm, colonoscopy eventually    #Diet and ppx  - TF, c/w Pancreatic enzyme supplement while on TF  - rectal tube in place   - c/w PPI 40mg for GI ppx  - Pending bedside dysphagia / swallow eval    =====Renal=====  #CRRT  >s/p CRRT (last 4/17) for ammonia clearance  - Strict I/O  - Diurese PRN    =====Heme=====   #Factor 5 Leiden  #History of L peroneal DVT in 2022  #History of RA thrombus in 2018  >TTE (4/13): No thrombus commented  >Eliquis stopped 5/2023 by outpatient hematology given consistently negative DVT studies  - SQH for DVT ppx    # Macrocytic anemia  >s/p 2u PRBC (4/13, 4/16)  >Multifactorial in nature. No hx of bleeding but with coagulopathy as below.   >B12 (4/13): >2000, Folate: 8.6  >Iron studies (4/13): Ferritin 653, Iron 43, Unmeasurable TIBC/Iron%   >Hemolysis lab (4/14): Haptoglobin < 20;   - Possibly iso liver disease vs. acute infection/inflammation vs. ?hemolysis  - Trend CBC, coag, transfuse goal > 7    #thrombocytopenia  >s/p 1u Plt (4/13)   - Etiology not entirely clear, s/p splenectomy and hepatic steatosis w/o cirrhosis on imaging  - Likely iso acute illness  - Trend CBC, plt goal > 20    =====Endo=====  #DM1 vs. DM3c  >History of DM1, but s/p total pancreatectomy and islet cell autotransplant in 2018 - currently DM3c?  - c/w Lantus 5u QHS + ISS  - f/u C-peptide  - Endocrine on board    #Hypothyroidism  - c/w synthyroid 100    =====ID=====  Infectious work up  >UA (4/13): 11WBC, small LE, negative Nitrite or bacteria  >Babesia (4/13): neg; HIV (4/13): neg; RVP, COVID (4/14): neg; MRSA (4/13): Negative  >CXR (4/13): RML consolidation  >CTC/A/P (4/13): RML consolidation, LLL atelectasis 2/2 likely ?mucus plug, ischemic colitis  >Bcx (4/13): NGTD  >ET tube cx (4/13): Normal resp luisa  >BAL (4/13): Pending  >GI PCR (4/15): Negative  - Rest of infectious w/u per ID    #Septic shock   #hx of recurrent infections   >H/o of recurrent infxn from multiple abdominal wounds and surgeries. Hx of VRE and candida.   >Previously on levo at OSH. Off pressor on transfer; Restarted with initiation of CRRT  >s/p Linezolid (stopped 4/13)  >s/p Caspofungin 50mg Q24 (stopped 4/16)  >s/p Daptomycin 500mg Q24 (stopped 4/16)  >s/p Doxycycline 100mg Q12 (stopped 4/18)  - Infectious w/u per ID  - c/w Meropenem 1g q12 (4/10 - ), planned for 10 day course  - c/w Vancomycin for gram positive coverage given up-trending WBC (4/17 - )      For Followup:  [ ] ID - regarding abx + infection work up + source of infection = PNA vs. colitis  [ ] Endocrine - regarding whether still DM1 and insulin requirement  [ ] Hepatology - need outpatient followup upon discharge  [ ] Surgery - regarding when to advance diet, c/w trickle feed for now  [ ] Swallow eval  [ ] Titrate lactulose to 4 BM  [ ] Would trend ammonia q8h at least for now  [ ] Still no entirely clear etiology for hyperammonemia, however given elevated PETH alcoholic is most likely, although patient/family denies use  [ ] Acute rehab per PT/OT MICU Transfer Note    Transfer from: MICU    Transfer to: ( x ) Medicine    (  ) Telemetry     (   ) RCU        (    ) Palliative         (   ) Stroke Unit          (   ) __________________    Accepting physican: Dr. Kalpesh Dhaliwal      MICU COURSE:  42 year old female with hx of DM1, gout, Factor V Leiden c/b provoked DVT and RA thrombus in the past, and pancreatic divisum c/b recurrent pancreatitis s/p total pancreatectomy s/p islet cell autotransplant, also had extensive abdominal surgical history (cholecystectomy, splenectomy, celina-en-y pancreaticojejuonostomy) for unclear reason c/b multiple intraabdominal infection/abscess/fistula (including VRE, candida) in the past. Patient initially presented to [facility] for few weeks of worsening decreased PO intake, NBNB emesis, watery diarrhea as well as profound somnolence. She was intubated in [facility] ED for airway protection and admitted to MICU there. She was found to have INR > 4, ammonia >200 and elevated LFTs concerning for liver failure/hepatic encephalopathy for which she was given vitamin K and started on lactulose. [facility] possibly ischemic in origin and undifferentiated shock state possibly septic requiring pressor. Patient was unable to tolerate lactulose given colitis and [facility]    [facility] patient was intubated and sedated on Precedex and hemodynamically stable off pressor. Patient started CRRT for ammonia clearance on arrival and was kept NPO given concern for colitis. Hepatology, ID, Nephrology, Hematology, Endocrinology, Surgery were consulted to co-manage patient. MICU course complicated by persistent and up-trending leukocytosis and intermittent low grade fever likely due to pneumonia vs. colitis. Given her extensive past infection history, she was started on Meropenem, Daptomycin, Doxycycline, Caspofungin for coverage. Her antibiotic regimen was gradually narrowed to Vancomycin and Meropenem as microbiology data came back. Her mental status steadily improves w/ down-trending ammonia while on the CRRT. After discussion with surgery team, she was restarted on TF and lactulose has been off CRRT since 4/16. She remains hemodynamically stable and was extubated on 4/17. Extensive radiologic and laboratory work up were sent during her MICU stay but mostly unrevealing beside PETH > 400. Ultimately her liver failure was deemed most likely from alcoholic hepatitis/cirrhosis. On 4/18, patient was AAOx2-3 and confabulating, her thiamine dosage was increased to 500mg q8h. She is otherwise doing well hemodynamically and has no respiratory concern and is deemed optimized to be downgraded to regular medicine floor.    ASSESSMENT & PLAN:   43yo pmh of HTN, DM, Factor V Leiden c/b L peroneal DVT (2022) and RA thrombus (2018) currently not on AC, gout, congenital pancreas divisum, recurrent pancreatitis s/p cholecystectomy, Splenectomy, Celina-En-Y Pancreaticojejunostomy, total pancreatectomy s/p Islet Cell Autotransplant at [facility] 2018, complicated by multiple intraabdominal infection (including VRE, candida), fistula, abscesses. Presented to [facility] 4/8 for several weeks of decreased PO intake, NBNB emesis, watery diarrhea, Somnolence, requiring intubation for airway protection. Lab revealed ammonia > 200, elevated bilirubin and INR initially c/f liver failure, c/c/b colitis possibly ischemic etiology. Patient transferred to [facility] MICU 4/13 for CRRT initiation for ammonia clearance. Now off CRRT and extubated.     =====Neuro=====  #Altered mental status  >Baseline AOx3  >CTH (4/13): No acute findings  >CTH (4/15): No acute findings   >s/p Precedex, off since 4/15  - off sedation, awake, follows command, AAO2-3  - Likely iso hyperammonemia vs. infection   - treatment for hyperammonemia as below    #Chronic pain  #anxiety/depression   >Was on alprazolam 0.75mg QD, Dilaudid and methadone 5mg TID  - c/w Methadone 5mg TID    #Downward nystagmus - RESOLVED  >Noted on exam 4/14-4/15 overnight  >CTH (4/15): No acute findings   >EEG (4/15 - 4/17): TME but no seizure activity  - Will ctm    =====Cardio=====  #History of L peroneal DVT in 2022  #History of RA thrombus in 2018  >TTE (4/13): No thrombus commented  - Eliquis stopped 5/2023 by outpatient hematology given consistently negative DVT studies  - On HSQ for DVT ppx    #RV dysfunction? - RESOLVED  >Enlarged RV and pulmonary hypertension noted on POCUS (4/16) that is not on TTE 4/13  >CTAPE (4/16): No PE  >DVT study (4/16): No DVT  >TTE (4/16): Normal LVSF EF 75%, Normal RVSF and size, TAPSE 1.9, PASP 25  - ctm w/ POCUS    =====Pulmonary=====  #Mechanical ventilation   - s/p Extubation 4/17    #Atelectasis vs. PNA  >CTC (4/13): RML consolidation noted, appears atelectasis > PNA on POCUS  >L sided consolidation noted on POCUS - possibly PNA  - persistently leukocytosis  - abx as below  - f/u ID recs    =====GI=====  #Alcoholic hepatitis/cirrhosis?  >Elevated bilirubin/coag, c/b hepatic encephalopathy  >s/p NAC on admission to outside hospital  >CTAP (4/13): Hepatomegaly and hepatic steatosis  >US abd (4/13): Hepatic steatosis, no hepatic vein thrombosis  >Acute hepatitis panel (4/13): Negative  >Autoimmune hepatitis panel (4/13): Negative  >MRCP (4/15): Enlarged, fatty liver. No biliary duct dilation, unlikely obstruction  >Gastrograffin study to r/o SBO (4/16): Passage of contrast seen on serial XR, unlikely SBO  >PETH (4/13): > 400  - Etiology likely alcohol related given PETH level, although patient and family denies alcohol use  - c/w Folid acid, MVI  - Will increase thiamine to 500mg Q8h (4/18 - ) given patient confabulating c/f wernicke vs. ICU delirium   - c/w ursodiol 500mg QD    #Hyperammonemia   >Baseline mentation AAO3  >s/p CRRT (off since 4/16)  - f/u plasma amino acid (4/16): in lab  - f/u urine orotic acid (4/16) - sent out to [facility]  - f/u metabolic genetic consult to r/o acquired urea cycle disorder  - c/w Rifaximin   - c/w Lactulose    #Colitis, ?Ischemic  >CTA/P (4/13): segmental areas of wall thickening throughout the colon and rectum with pneumatosis, mesenteric edema, and adjacent hyperdensities in the proximal transverse colon suspicious for bowel ischemia with intraluminal hemorrhage. Additional intraluminal hyperdensities within the distal ascending colon suspicious for hemorrhage.   >TTE (4/13): No thrombus commented  - Surgery, GI following, no plan for surgery/endoscopic eval for now  - On TF, trend lactate, monitor GIB    #Colonic mass?  >CTA/P (4/13): Nonspecific focal areas of narrowing in the distal descending colon and sigmoid colon, possibly colonic masses  >CTA/P (4/16): Improved colitis, previous ?colonic mass vs. narrowing still seen per discussion w/ radiology  - ctm, colonoscopy eventually    #Diet and ppx  - TF, c/w Pancreatic enzyme supplement while on TF  - rectal tube in place   - c/w PPI 40mg for GI ppx  - Pending bedside dysphagia / swallow eval    =====Renal=====  #CRRT  >s/p CRRT (last 4/17) for ammonia clearance  - Strict I/O  - Diurese PRN    =====Heme=====   #Factor 5 Leiden  #History of L peroneal DVT in 2022  #History of RA thrombus in 2018  >TTE (4/13): No thrombus commented  >Eliquis stopped 5/2023 by outpatient hematology given consistently negative DVT studies  - SQH for DVT ppx    #Anemia  >s/p 2u PRBC (4/13, 4/16)  >Multifactorial in nature. No hx of bleeding but with coagulopathy as below.   >B12 (4/13): >2000, Folate: 8.6  >Iron studies (4/13): Ferritin 653, Iron 43, Unmeasurable TIBC/Iron%   >Hemolysis lab (4/14): Haptoglobin < 20;   - Possibly iso liver disease vs. acute infection/inflammation vs. less likely hemolysis  - Trend CBC, coag, transfuse goal > 7    #Thrombocytopenia  >s/p 1u Plt (4/13)   - Etiology not entirely clear, s/p splenectomy and hepatic steatosis w/o cirrhosis on imaging  - Likely iso acute illness  - Trend CBC, plt goal > 20    =====Endo=====  #DM1 vs. DM3c  >History of DM1, but s/p total pancreatectomy and islet cell autotransplant in 2018 - currently DM3c?  - c/w Lantus 5u QHS + ISS  - f/u C-peptide  - Endocrine on board    #Hypothyroidism  - c/w synthroid 100    =====ID=====  Infectious work up  >UA (4/13): 11WBC, small LE, negative Nitrite or bacteria  >Babesia (4/13): neg; HIV (4/13): neg; RVP, COVID (4/14): neg; MRSA (4/13): Negative  >CXR (4/13): RML consolidation  >CTC/A/P (4/13): RML consolidation, LLL atelectasis 2/2 likely ?mucus plug, ischemic colitis  >Bcx (4/13): NGTD  >ET tube cx (4/13): Normal resp luisa  >BAL (4/13): Pending  >GI PCR (4/15): Negative  - Rest of infectious w/u per ID    #Septic shock   #hx of recurrent infections   >H/o of recurrent infxn from multiple abdominal wounds and surgeries. Hx of VRE and candida.   >Previously on levo at OSH. Off pressor on transfer; Restarted with initiation of CRRT  >s/p Linezolid (stopped 4/13)  >s/p Caspofungin 50mg Q24 (stopped 4/16)  >s/p Daptomycin 500mg Q24 (stopped 4/16)  >s/p Doxycycline 100mg Q12 (stopped 4/18)  - Infectious w/u per ID  - c/w Meropenem 1g q12 (4/10 - ), planned for 10 day course  - c/w Vancomycin for gram positive coverage given up-trending WBC (4/17 - )      For Followup:  [ ] ID - regarding abx + infection work up + source of infection = PNA vs. colitis  [ ] Endocrine - regarding whether still DM1 and insulin requirement  [ ] Hepatology - need outpatient followup upon discharge  [ ] Surgery - regarding when to advance diet, c/w trickle feed for now  [ ] Swallow eval  [ ] Titrate lactulose to 4 BM  [ ] Would trend ammonia q8h at least for now  [ ] Still no entirely clear etiology for hyperammonemia, however given elevated PETH alcoholic is most likely, although patient/family denies use  [ ] Acute rehab per PT/OT MICU Transfer Note    Transfer from: MICU    Transfer to: ( x ) Medicine    (  ) Telemetry     (   ) RCU        (    ) Palliative         (   ) Stroke Unit          (   ) __________________    Accepting physican: Dr. Kalpesh Dhaliwal    MICU COURSE:  42 year old female with hx of DM1, gout, Factor V Leiden c/b provoked DVT and RA thrombus in the past, and pancreatic divisum c/b recurrent pancreatitis s/p total pancreatectomy s/p islet cell autotransplant, also had extensive abdominal surgical history (cholecystectomy, splenectomy, celina-en-y pancreaticojejuonostomy) for unclear reason c/b multiple intraabdominal infection/abscess/fistula (including VRE, candida) in the past. Patient initially presented to [facility] for few weeks of worsening decreased PO intake, NBNB emesis, watery diarrhea as well as profound somnolence. She was intubated in [facility] ED for airway protection and admitted to MICU there. She was found to have INR > 4, ammonia >200 and elevated LFTs concerning for liver failure/hepatic encephalopathy for which she was given vitamin K and started on lactulose. [facility] possibly ischemic in origin and undifferentiated shock state possibly septic requiring pressor. Patient was unable to tolerate lactulose given colitis and [facility]    [facility] was intubated and sedated on Precedex and hemodynamically stable off pressor. Patient started CRRT for ammonia clearance on arrival and was kept NPO given concern for colitis. Hepatology, ID, Nephrology, Hematology, Endocrinology, Surgery were consulted to co-manage patient. MICU course complicated by persistent and up-trending leukocytosis and intermittent low grade fever likely due to pneumonia vs. colitis. Given her extensive past infection history, she was started on Meropenem, Daptomycin, Doxycycline, Caspofungin for coverage. Her antibiotic regimen was gradually narrowed to Vancomycin and Meropenem as microbiology data came back. Her mental status steadily improves w/ down-trending ammonia while on the CRRT. After discussion with surgery team, she was restarted on TF and lactulose has been off CRRT since 4/16. She remains hemodynamically stable and was extubated on 4/17. Extensive radiologic and laboratory work up were sent during her MICU stay but mostly unrevealing beside PETH > 400. Ultimately her liver failure was deemed most likely from alcoholic hepatitis/cirrhosis. On 4/18, patient was AAOx2-3 and confabulating, her thiamine dosage was increased to 500mg q8h. She is otherwise doing well hemodynamically and has no respiratory concern and is deemed optimized to be downgraded to regular medicine floor.    FOR FOLLOW UP:  [ ] ID - regarding abx + infection work up + source of infection = PNA vs. colitis  [ ] Endocrine - regarding whether still DM1 and insulin recommendations   [ ] Hepatology - need outpatient followup upon discharge  [ ] Surgery - regarding when to advance diet  [ ] Titrate lactulose to 4 BM  [ ] Acute rehab per PT/OT    ASSESSMENT & PLAN:   43yo pmh of HTN, DM, Factor V Leiden c/b L peroneal DVT (2022) and RA thrombus (2018) currently not on AC, gout, congenital pancreas divisum, recurrent pancreatitis s/p cholecystectomy, Splenectomy, Celina-En-Y Pancreaticojejunostomy, total pancreatectomy s/p Islet Cell Autotransplant at [facility] 2018, complicated by multiple intraabdominal infection (including VRE, candida), fistula, abscesses. Presented to [facility] 4/8 for several weeks of decreased PO intake, NBNB emesis, watery diarrhea, Somnolence, requiring intubation for airway protection. Lab revealed ammonia > 200, elevated bilirubin and INR initially c/f liver failure, c/c/b colitis possibly ischemic etiology. Patient transferred to [facility] MICU 4/13 for CRRT initiation for ammonia clearance. Now off CRRT and extubated.     =====Neuro=====  #Altered mental status  >Baseline AOx3  >CTH (4/13): No acute findings  >CTH (4/15): No acute findings   >s/p Precedex, off since 4/15  - off sedation, awake, follows command, AAO2-3  - Likely iso hyperammonemia vs. infection   - treatment for hyperammonemia as below    #Chronic pain  #anxiety/depression   >Was on alprazolam 0.75mg QD, Dilaudid and methadone 5mg TID  - c/w Methadone 5mg TID    #Downward nystagmus - RESOLVED  >Noted on exam 4/14-4/15 overnight  >CTH (4/15): No acute findings   >EEG (4/15 - 4/17): TME but no seizure activity  - Will ctm    =====Cardio=====  #History of L peroneal DVT in 2022  #History of RA thrombus in 2018  >TTE (4/13): No thrombus commented  - Eliquis stopped 5/2023 by outpatient hematology given consistently negative DVT studies  - On HSQ for DVT ppx    #RV dysfunction? - RESOLVED  >Enlarged RV and pulmonary hypertension noted on POCUS (4/16) that is not on TTE 4/13  >CTAPE (4/16): No PE  >DVT study (4/16): No DVT  >TTE (4/16): Normal LVSF EF 75%, Normal RVSF and size, TAPSE 1.9, PASP 25  - ctm w/ POCUS    =====Pulmonary=====  #Mechanical ventilation   - s/p Extubation 4/17    #Atelectasis vs. PNA  >CTC (4/13): RML consolidation noted, appears atelectasis > PNA on POCUS  >L sided consolidation noted on POCUS - possibly PNA  - persistently leukocytosis  - abx as below  - f/u ID recs    =====GI=====  #Alcoholic hepatitis/cirrhosis?  >Elevated bilirubin/coag, c/b hepatic encephalopathy  >s/p NAC on admission to outside hospital  >CTAP (4/13): Hepatomegaly and hepatic steatosis  >US abd (4/13): Hepatic steatosis, no hepatic vein thrombosis  >Acute hepatitis panel (4/13): Negative  >Autoimmune hepatitis panel (4/13): Negative  >MRCP (4/15): Enlarged, fatty liver. No biliary duct dilation, unlikely obstruction  >Gastrograffin study to r/o SBO (4/16): Passage of contrast seen on serial XR, unlikely SBO  >PETH (4/13): > 400  - Etiology likely alcohol related given PETH level, although patient and family denies alcohol use  - c/w Folid acid, MVI  - Will increase thiamine to 500mg Q8h (4/18 - ) given patient confabulating c/f wernicke vs. ICU delirium   - c/w ursodiol 500mg QD    #Hyperammonemia   >Baseline mentation AAO3  >s/p CRRT (off since 4/16)  - f/u plasma amino acid (4/16): in lab  - f/u urine orotic acid (4/16) - sent out to [facility]  - f/u metabolic genetic consult to r/o acquired urea cycle disorder  - c/w Rifaximin   - c/w Lactulose    #Colitis, ?Ischemic  >CTA/P (4/13): segmental areas of wall thickening throughout the colon and rectum with pneumatosis, mesenteric edema, and adjacent hyperdensities in the proximal transverse colon suspicious for bowel ischemia with intraluminal hemorrhage. Additional intraluminal hyperdensities within the distal ascending colon suspicious for hemorrhage.   >TTE (4/13): No thrombus commented  - Surgery, GI following, no plan for surgery/endoscopic eval for now  - On TF, trend lactate, monitor GIB    #Colonic mass?  >CTA/P (4/13): Nonspecific focal areas of narrowing in the distal descending colon and sigmoid colon, possibly colonic masses  >CTA/P (4/16): Improved colitis, previous ?colonic mass vs. narrowing still seen per discussion w/ radiology  - ctm, colonoscopy eventually    #Diet and ppx  - TF, c/w Pancreatic enzyme supplement while on TF  - rectal tube in place   - c/w PPI 40mg for GI ppx  - Pending bedside dysphagia / swallow eval    =====Renal=====  #CRRT  >s/p CRRT (last 4/17) for ammonia clearance  - Strict I/O  - Diurese PRN    =====Heme=====   #Factor 5 Leiden  #History of L peroneal DVT in 2022  #History of RA thrombus in 2018  >TTE (4/13): No thrombus commented  >Eliquis stopped 5/2023 by outpatient hematology given consistently negative DVT studies  - SQH for DVT ppx    #Anemia  >s/p 2u PRBC (4/13, 4/16)  >Multifactorial in nature. No hx of bleeding but with coagulopathy as below.   >B12 (4/13): >2000, Folate: 8.6  >Iron studies (4/13): Ferritin 653, Iron 43, Unmeasurable TIBC/Iron%   >Hemolysis lab (4/14): Haptoglobin < 20;   - Possibly iso liver disease vs. acute infection/inflammation vs. less likely hemolysis  - Trend CBC, coag, transfuse goal > 7    #Thrombocytopenia  >s/p 1u Plt (4/13)   - Etiology not entirely clear, s/p splenectomy and hepatic steatosis w/o cirrhosis on imaging  - Likely iso acute illness  - Trend CBC, plt goal > 20    =====Endo=====  #DM1 vs. DM3c  >History of DM1, but s/p total pancreatectomy and islet cell autotransplant in 2018 - currently DM3c?  - c/w Lantus 5u QHS + ISS  - f/u C-peptide  - Endocrine on board    #Hypothyroidism  - c/w synthroid 100    =====ID=====  Infectious work up  >UA (4/13): 11WBC, small LE, negative Nitrite or bacteria  >Babesia (4/13): neg; HIV (4/13): neg; RVP, COVID (4/14): neg; MRSA (4/13): Negative  >CXR (4/13): RML consolidation  >CTC/A/P (4/13): RML consolidation, LLL atelectasis 2/2 likely ?mucus plug, ischemic colitis  >Bcx (4/13): NGTD  >ET tube cx (4/13): Normal resp luisa  >BAL (4/13): Pending  >GI PCR (4/15): Negative  - Rest of infectious w/u per ID    #Septic shock   #hx of recurrent infections   >H/o of recurrent infxn from multiple abdominal wounds and surgeries. Hx of VRE and candida.   >Previously on levo at OSH. Off pressor on transfer; Restarted with initiation of CRRT  >s/p Linezolid (stopped 4/13)  >s/p Caspofungin 50mg Q24 (stopped 4/16)  >s/p Daptomycin 500mg Q24 (stopped 4/16)  >s/p Doxycycline 100mg Q12 (stopped 4/18)  - Infectious w/u per ID  - c/w Meropenem 1g q12 (4/10 - ), planned for 10 day course  - c/w Vancomycin for gram positive coverage given up-trending WBC (4/17 - ) MICU Transfer Note    Transfer from: MICU    Transfer to: ( x ) Medicine    (  ) Telemetry     (   ) RCU        (    ) Palliative         (   ) Stroke Unit          (   ) __________________    Accepting physican: Dr. Kalpesh Dhaliwal    MICU COURSE:  42 year old female with hx of DM1, gout, Factor V Leiden c/b provoked DVT and RA thrombus in the past, and pancreatic divisum c/b recurrent pancreatitis s/p total pancreatectomy s/p islet cell autotransplant, also had extensive abdominal surgical history (cholecystectomy, splenectomy, celina-en-y pancreaticojejuonostomy) for unclear reason c/b multiple intraabdominal infection/abscess/fistula (including VRE, candida) in the past. Patient initially presented to [facility] for few weeks of worsening decreased PO intake, NBNB emesis, watery diarrhea as well as profound somnolence. She was intubated in [facility] ED for airway protection and admitted to MICU there. She was found to have INR > 4, ammonia >200 and elevated LFTs concerning for liver failure/hepatic encephalopathy for which she was given vitamin K and started on lactulose. [facility] possibly ischemic in origin and undifferentiated shock state possibly septic requiring pressor. Patient was unable to tolerate lactulose given colitis [facility]    [facility] was [facility]ubated and sedated on Precedex and hemodynamically stable off pressor. Patient started CRRT for ammonia clearance on arrival and was kept NPO given concern for colitis. Hepatology, ID, Nephrology, Hematology, Endocrinology, Surgery were consulted to co-manage patient. MICU course complicated by persistent and up-trending leukocytosis and intermittent low grade fever likely due to pneumonia vs. colitis. Given her extensive past infection history, she was started on Meropenem, Daptomycin, Doxycycline, Caspofungin for coverage. Her antibiotic regimen was gradually narrowed to Vancomycin and Meropenem as microbiology data came back. Her mental status steadily improves w/ down-trending ammonia while on the CRRT. After discussion with surgery team, she was restarted on TF and lactulose has been off CRRT since 4/16. She remains hemodynamically stable and was extubated on 4/17. Extensive radiologic and laboratory work up were sent during her MICU stay but mostly unrevealing beside PETH > 400. Ultimately her liver failure was deemed most likely from alcoholic hepatitis/cirrhosis. On 4/18, patient was AAOx2-3 and confabulating, her thiamine dosage was increased to 500mg q8h. She is otherwise doing well hemodynamically and has no respiratory concern and is deemed optimized to be downgraded to regular medicine floor.    FOR FOLLOW UP:  [ ] ID - regarding abx + infection work up + source of infection = PNA vs. colitis  [ ] Endocrine - regarding whether still DM1 and insulin recommendations   [ ] Hepatology - need outpatient followup upon discharge  [ ] Surgery - regarding when to advance diet  [ ] Titrate lactulose to 4 BM  [ ] Acute rehab per PT/OT    ASSESSMENT & PLAN:   47yo pmh of HTN, DM, Factor V Leiden c/b L peroneal DVT (2022) and RA thrombus (2018) currently not on AC, gout, congenital pancreas divisum, recurrent pancreatitis s/p cholecystectomy, Splenectomy, Celina-En-Y Pancreaticojejunostomy, total pancreatectomy s/p Islet Cell Autotransplant at [facility] 2018, complicated by multiple intraabdominal infection (VRE, fungal? per [facility]), fistula, abscesses. Presented to [facility] 4/8 for several weeks of decreased PO intake, NBNB emesis, watery diarrhea, Somnolence, requiring intubation for airway protection. Lab revealed ammonia > 200, elevated bilirubin and INR initially c/f liver failure, c/c/b colitis possibly ischemic etiology. Patient transferred to [facility]U 4/13 for CRRT initiation for ammonia clearance.    =====Neuro=====  #Altered mental status  >Baseline AOx3  >CTH (4/13): No acute findings  >CTH (4/15): No acute findings   >s/p Precedex, off since 4/15  - off sedation, awake, follows command, AAO2-3  - Likely iso hyperammonemia vs. infection   - treatment for hyperammonemia as below    #Chronic pain  #anxiety/depression   >Was on alprazolam 0.75mg QD, Dilaudid and methadone 5mg TID at home  - c/w Methadone 5mg TID    #Downward nystagmus - RESOLVED  >Noted on exam 4/14-4/15 overnight  >CTH (4/15): No acute findings   >EEG (4/15 - 4/17): TME but no seizure activity  - Will ctm    =====Cardio=====  #History of L peroneal DVT in 2022  #History of RA thrombus in 2018  >TTE (4/13): No thrombus commented  - Eliquis stopped 5/2023 by outpatient hematology given consistently negative DVT studies  - On HSQ for DVT ppx    #RV dysfunction? - RESOLVED  >Enlarged RV and pulmonary hypertension noted on POCUS (4/16) that is not on TTE 4/13  >CTAPE (4/16): No PE  >DVT study (4/16): No DVT  >TTE (4/16): Normal LVSF EF 75%, Normal RVSF and size, TAPSE 1.9, PASP 52  - Elevated PASP likely chronic + acutely exacerbated by liver failure  - ctm w/ POCUS    =====Pulmonary=====  #Atelectasis vs. PNA  >CTC (4/13): RML consolidation noted, appears atelectasis > PNA on POCUS  >L sided consolidation noted on POCUS - possibly PNA  - persistently leukocytosis  - abx as below  - f/u ID recs    #Mechanical ventilation   - s/p Extubation 4/17  - Currently on RA    =====GI=====  #Alcoholic hepatitis/cirrhosis?  >Elevated bilirubin/coag, c/b hepatic encephalopathy  >s/p NAC on admission to outside hospital  >CTAP (4/13): Hepatomegaly and hepatic steatosis  >US abd (4/13): Hepatic steatosis, no hepatic vein thrombosis  >Acute hepatitis panel (4/13): Negative  >Autoimmune hepatitis panel (4/13): Negative  >MRCP (4/15): Enlarged, fatty liver. No biliary duct dilation, unlikely obstruction  >Gastrograffin study to r/o SBO (4/16): Passage of contrast seen on serial XR, unlikely SBO  >PETH (4/13): > 400  - Etiology likely alcohol related given PETH level, although patient and family denies alcohol use  - c/w Folid acid, MVI  - c/w thiamine 500mg Q8h x 3 days (4/18 - 4/20) given patient confabulating c/f wernicke vs. ICU delirium   - Will d/c ursodiol 500mg QD as unlikely obstructive pathology    #Hyperammonemia   >Baseline mentation AAO3  >s/p CRRT (off since 4/16)  - f/u plasma amino acid (4/16): in lab  - f/u urine orotic acid (4/16) - sent out to [facility]  - f/u metabolic genetic consult to r/o acquired urea cycle disorder  - c/w Rifaximin   - c/w Lactulose  - c/w Trend Ammonia qd     #Colitis, ?Ischemic  >CTA/P (4/13): segmental areas of wall thickening throughout the colon and rectum with pneumatosis, mesenteric edema, and adjacent hyperdensities in the proximal transverse colon suspicious for bowel ischemia with intraluminal hemorrhage. Additional intraluminal hyperdensities within the distal ascending colon suspicious for hemorrhage.   >TTE (4/13): No thrombus commented  - Surgery, GI following, no plan for surgery/endoscopic eval for now  - On TF, trend lactate, monitor GIB      #Colonic mass?  >CTA/P (4/13): Nonspecific focal areas of narrowing in the distal descending colon and sigmoid colon, possibly colonic masses  >CTA/P (4/16): Improved colitis, previous ?colonic mass vs. narrowing still seen per discussion w/ radiology  - ctm, colonoscopy eventually    #Diet and ppx  - TF, c/w Pancreatic enzyme supplement while on TF  - rectal tube in place   - c/w PPI 40mg for GI ppx  - Pending bedside dysphagia / swallow eval  - Will reach out to TPN team to start evaluation, in order to minimize enteric volume given colitis    =====Renal=====  #CRRT - NOW OFF  >s/p CRRT (last 4/17) for ammonia clearance  - Strict I/O  - Diurese PRN    =====Heme=====   #Factor 5 Leiden  #History of L peroneal DVT in 2022  #History of RA thrombus in 2018  >TTE (4/13): No thrombus commented  >Eliquis stopped 5/2023 by outpatient hematology given consistently negative DVT studies  - SQH for DVT ppx    #Anemia  >s/p 2u PRBC (4/13, 4/16)  >Multifactorial in nature. No hx of bleeding but with coagulopathy as below.   >B12 (4/13): >2000, Folate: 8.6  >Iron studies (4/13): Ferritin 653, Iron 43, Unmeasurable TIBC/Iron%   >Hemolysis lab (4/14): Haptoglobin < 20;   - Possibly iso liver disease vs. acute infection/inflammation vs. ?hemolysis  - Trend CBC, coag, transfuse goal > 7    #thrombocytopenia  >s/p 1u Plt (4/13)   - Etiology not entirely clear, s/p splenectomy and hepatic steatosis w/o cirrhosis on imaging  - Likely iso acute illness  - Trend CBC, plt goal > 20    =====Endo=====  #DM1 vs. DM3c  >Per HIE record, patient prone to hypoglycemia  >History of DM1, but s/p total pancreatectomy and islet cell autotransplant in 2018 - currently DM3c?  >C-peptide (4/18): < 0.1  - Endocrine on board    #Hypothyroidism  - c/w synthyroid 100    =====ID=====  Infectious work up  >UA (4/13): 11WBC, small LE, negative Nitrite or bacteria  >Babesia (4/13): neg; HIV (4/13): neg; RVP, COVID (4/14): neg; MRSA (4/13): Negative  >CXR (4/13): RML consolidation  >CTC/A/P (4/13): RML consolidation, LLL atelectasis 2/2 likely ?mucus plug, ischemic colitis  >Bcx (4/13): NGTD  >ET tube cx (4/13): Normal resp luisa  >BAL (4/13): Pending  >GI PCR (4/15): Negative  >Sputum culture (4/17): Carbapenem resistance Acineobacter  - Rest of infectious w/u per ID    #Leukocytosis  #Septic shock   #Hx of VRE and Candida infection  >H/o of recurrent infxn from multiple abdominal wounds and surgeries. Hx of VRE and candida.   >Previously on levo at OSH. Off pressor on transfer  >s/p Caspofungin 50mg Q24 (stopped 4/16)  >s/p Daptomycin 500mg Q24 (stopped 4/16)  >s/p Doxycycline 100mg Q12 (stopped 4/18)  - Source of WBC Colitis vs. Pneumonia  - c/w Meropenem 1g q12 (4/10 - )  - c/w Vancomycin q12 (4/17 - )  - Will discuss with ID regarding abx regimen for Carb resistance Acineobacter

## 2024-04-18 NOTE — SWALLOW BEDSIDE ASSESSMENT ADULT - SLP GENERAL OBSERVATIONS
Patient encountered seated upright in bed, on 2L NC, A&Ox1, +ICU monitoring, + NGT. Patient encountered seated upright in bed, on 2L NC, A&Ox1, +ICU monitoring, + NGT. Patient hypophonic, with hoarse vocal quality, and inconsistently followed simple 1-step commands.

## 2024-04-18 NOTE — OCCUPATIONAL THERAPY INITIAL EVALUATION ADULT - PERTINENT HX OF CURRENT PROBLEM, REHAB EVAL
42F Hx HTN, DM, Factor V Leiden Deficiency, Gout, Congenital Pancreas Divisum (PD), Recurrent Pancreatitis s/p Distal Pancreatectomy, Cholecystectomy, Splectomy, Kevin-En-Y Pancreaticojejunostomy 2/2014 [facility], Total Pancreatotomy with Islet Cell Autotransplant at [facility] 4/82018, Post Op Abdominal/Peritoneal Multiloculated Abscesses, Enterocutaneous Fistula, Multiple Abdominal Surgeries > 20s, Infected Abdominal Wound Mesh on Antibacterial and Fungal ABx admitted to [facility] 4/8 NBNB Vomiting and Watery Diarrhea x 4 days, PPOI, Somnolence (Sleep 16-20Hr/Day) found elevated Ammonia >200, Elevated LFTs, worsening Metabolic vs. Hepatic Encephalopathy, intubated for airway protection. [facility].

## 2024-04-18 NOTE — SWALLOW BEDSIDE ASSESSMENT ADULT - COMMENTS
4/13: Initial workup in ED demonstrated a Tbili 5.4, direct bili 4.4 and ammonia 196. Patient was somnolent and had a left eyeward gaze deviation. Subsequently started developing agonal snoring respirations and had to be intubated for airway protection. Pt arrived intubated via EMS portable vent.    42 year old female with hx of DMT1, Factor V leiden deficiency, gout and congenital abnormality of the pancreas associated with recurrent pancreatitis and extensive surgical hx (s/p distal pancreatectomy, cholecystectomy, splenectomy and celina-en-y pancreaticojejunostomy (02/2014) at [facility] with Dr. Hargrove), and now S/P total pancreatectomy  with islet cell autotransplant at [facility] by Dr. Gil in 04/2018). 4/13: pt somnolent and had a left eye schmitt gaze deviation. Subsequently started developing agonal snoring respirations and had to be intubated for airway protection. Pt arrived intubated via EMS portable vent transferred to ICU. Bronchoscopy performed-normal appearing airways, mild-moderate thick secretions noted and therapeutically aspirated. Some erythematous sections are segmental bronchi. No gross lesions or abnormalities or endobronchial masses. Nephrology consulted for hyperammonemia and fluid overload; initiate CRRT. Hepatology following; concern for KEL, endoscopic evaluation not indicated at this time. 4/13: pt somnolent and had a left eye schmitt gaze deviation. Subsequently started developing agonal snoring respirations and had to be intubated for airway protection. Pt arrived intubated via EMS portable vent transferred to ICU. Bronchoscopy performed-normal appearing airways, mild-moderate thick secretions noted and therapeutically aspirated. Some erythematous sections are segmental bronchi. No gross lesions or abnormalities or endobronchial masses.   Nephrology consulted for hyperammonemia and fluid overload; initiate CRRT.   Hepatology following; concern for KEL, endoscopic evaluation not indicated at this time.  Transplant ID- Considering atypical infections that could lead to bowel ischemia +/- masses  including fungal and parasitic infections (anisakis, ascaris, strongy ecc), SIBo as above vs non infectious etiologies   Surgery: MRCP completed 4/15; Pt arrived intubated via EMS portable vent transferred to ICU. Bronchoscopy performed-normal appearing airways, mild-moderate thick secretions noted and therapeutically aspirated. Some erythematous sections are segmental bronchi. No gross lesions or abnormalities or endobronchial masses.   Nephrology-hyperammonemia and fluid overload; initiate CRRT. On 4/17 tolerating PO lactulose. CRRT discontinued.  Hepatology-concern for senior living, endoscopic evaluation not indicated at this time. PETH positive with etiology of the above likely 2/2 to alcohol-related liver disease with acute alc hep in the background or cirrohisis and cholestasis of sepsis.  Transplant ID- Considering atypical infections that could lead to bowel ischemia +/- masses  including fungal and parasitic infections (anisakis, ascaris, strongy ecc), SIBo as above vs non infectious etiologies   Surgery-MRCP completed 4/15-enlarged, fatty liver. No biliary duct dilation, unlikely obstruction  Pulm- tolerating CPAP; CTC (4/13) RML consolidation noted, possible atelectasis > PNA; extubated 4/17 and on BiPAP; weaned to NC on 4/18. L sided consolidation noted on POCUS - possibly PNA w/persistently leukocytosis.  Endocrinology-Type 3c DM, hypoglycemia; hx of hypoglycemia s/p initial surgery in 2014 and total pancreatectomy with islet cell autotransplant in 2018. After that, insulin requirements decreased over the years but was never off insulin.  EEG: Moderate to severe diffuse cerebral dysfunction that is not specific in etiology. GRDA typically seen in diffuse cerebral dysfunction. Triphasic waves typically seen in toxic/metabolic encephalopathy No epileptic discharges recorded. No seizures recorded.  Metabolic  with VMP genetics: high ammonia responded to lactulose; against UCD suggesting that hyperammonemia could be secondary to livery dysfunction.  GI: Concern for ischemic colitis; following bowel regimen; no suspected GIB. NGT placed 4/18

## 2024-04-18 NOTE — PROGRESS NOTE ADULT - NS ATTEND AMEND GEN_ALL_CORE FT
anemia from bleeding/infection  thrombocytopenia 2/2 liver disease/infection/antibiotics/CRRT  counts overall stable last couple of days, continue to monitor
patient with anemia due to Presumable GI bleeding, occult blood positive  thrombocytopenia due to liver disease  would continue monitoring counts for now, plt has improved a little  management per MICU

## 2024-04-18 NOTE — PROGRESS NOTE ADULT - ATTENDING COMMENTS
Agree with assessment and plan as above by Dr. Jett. Reviewed all pertinent labs, glucose values, and imaging studies. Modifications made as indicated above. Pt. with diabetes due to pancreatic insufficiency now on 24 hour tube feeds. Can transition from Lantus to NPH 3 units q6 to help with hyperglycemia from tube feeds. Plan to d/c on insulin with doses dependent on feeding modality. Started on levothyroxine for newly diagnosed overt hypothyroidism. Repeat Free T4 4/22.     Justice Looney D.O  [phone]

## 2024-04-18 NOTE — PROGRESS NOTE ADULT - ASSESSMENT
Patient is a 48y old  Female who presents with a chief complaint of Liver failure without hepatic coma    Anemia  Thrombocytopenia  Patient follows with Dr. Lux Alamo, [facility].  Baseline hemoglobin 10-12, platelets normal.  Acute decrease of cell lines likely secondary to ongoing infection, inflammation, acute illness. Values were at her baseline in February 2024 when she was last in clinic.   No evidence of nutritional deficits. LDH is normal. I suspect low haptoglobin is likely due to ongoing liver disease. Will review smear tomorrow though noted CBC had previously only showed mild schistocytes.   Occult blood positive; follow-up GI/hepatology recommendations.   s/p 1u Plt (4/13)   Monitor and maintain HGB > 7, PLT > 10    Factor 5 Leiden  #History of L peroneal DVT in 2022  #History of RA thrombus in 2018  >TTE (4/13): No thrombus commented  - Eliquis stopped 5/2023 by outpatient hematology given consistently negative DVT studies  - Hold AC iso thrombocytopenia   - f/u Heme recs    Will continue to follow    Lisa Bhatti NP  Hematology/ Oncology  [facility]  [phone] (office)  [phone] (alt office)  Evenings and weekends please call MD on call or office   Patient is a 48y old  Female who presents with a chief complaint of Liver failure without hepatic coma    Anemia  Thrombocytopenia  Patient follows with Dr. Lux Alamo [facility].  Baseline hemoglobin 10-12, platelets normal.  Acute decrease of cell lines likely secondary to ongoing infection, inflammation, acute illness. Values were at her baseline in February 2024 when she was last in clinic.   No evidence of nutritional deficits. LDH is normal. I suspect low haptoglobin is likely due to ongoing liver disease. Will review smear tomorrow though noted CBC had previously only showed mild schistocytes.   Occult blood positive; follow-up GI/hepatology recommendations.   s/p 1u Plt (4/13)   Monitor and maintain HGB > 7, PLT > 10    Factor 5 Leiden  #History of L peroneal DVT in 2022  #History of RA thrombus in 2018  >TTE (4/13): No thrombus commented  - Eliquis stopped 5/2023 by outpatient hematology given consistently negative DVT studies    Will continue to follow    Lisa Bhatti NP  Hematology/ Oncology  [facility]  [phone] (office)  [phone] (alt office)  Evenings and weekends please call MD on call or office

## 2024-04-18 NOTE — PROGRESS NOTE ADULT - ATTENDING COMMENTS
42 year old female with hx of DMT1, Factor V leiden deficiency, gout and congenital abnormality of the pancreas associated with recurrent pancreatitis and extensive surgical hx (s/p distal pancreatectomy, cholecystectomy, splenectomy and celina-en-y pancreaticojejuonostomy (02/2014) at [facility] with Dr. Hargrove), and now S/P total pancreatectomy  with islet cell autotransplant at [facility] by Dr. Gil in 04/2018), c/b recurrent abdominal wall collection requiring IR drainage, hx of RV thrombus/DVT/PE presented to OSH with acute change in mental status requiring intubation for airway protection, and transferred to [facility]    #Neuro: metabolic encephalopathy in the setting of elevated ammonia. EEG with no seizures. Awake and alert. Intermittently confused.   #CV: initially in shock on multiple pressors at OSH. TTE with mod pulm HTN.  Now HD stable off pressors.   #Resp: intubated for airway protection. Extubated 4/17. Doing well.   #GI: Patient denies EtOH use but PETH is elevated  -- ischemic colitis/GIB/?colon mass - GI and surgery input appreciated. No plans for colonoscopy right now. continue feeds. Will need outpateint colonoscopy  -- elevated ammonia and bili levels - likely 2/2 alcoholic hepatitis + sepsis. MRCP with no e/o obstruction. s/p CRRT for ammonia clearance. Both NH3 and bili improving.  c/w Rifaximin, and lactulose (decrease dose to minimize volume).   #Renal: s/p CVVH for elevated ammonia levels, now off since 4/16. Normal Cr and good UO.  #Heme/Onc: Factor V leiden, hx of RV thrombus/DVT/PE. CTA and LE dopplers negative. Currently off AC. Need to discuss with heme re utility of long term AC.  #ID: hx fungemia and abd abscesses in the past (?2018).  LLL PNA. s/p bronch with normal respiratory luisa. Now off Caspo and Dapto. C/w Bobby given colitis and PNA. Vancomycin added on 4/17 and lines removed given persistent fevers. f/u cultures.  ID input appreciated.   #Endocrine: DMI but hx of islet cell transplant. C/w Lantus. Endo input appreciated.  #PPX: c/w SQH.   #GOC: Full code; prognosis guarded

## 2024-04-18 NOTE — PROGRESS NOTE ADULT - ASSESSMENT
48F PMH DMT1, heterozygous Factor V Leiden deficiency, hx of Left peroneal DVT Eliquis x1 year, stopped 5/2023), gout (has had numerous courses of steroids) and pancreas congenital abnormality associated with recurrent pancreatitis and extensive surgical hx (s/p distal pancreatectomy, cholecystectomy, splenectomy and celina-en-y pancreaticojejunostomy (2014, [place], [facility]), s/p total pancreatectomy with islet cell autotransplant ([place] 2018).   Now in the MICU with shock, hyperammonemia hyperbilirubinemia, anemia, thrombocytopenia, and worsening Leukocytosis on broad spectrum antimicrobials. Patient currently with uptrending Leukocytosis and is now awake, complaining of abdominal pain which could be secondary to colitis.     Plan/Recs:  - Recommend continuing trickle feeds.  - Recommend not advancing diet until source of white count confirmed as increasing rate of intake may overdistend/irritate colon, especially in conjunction with lactulose use. Discussed with MICU team.   - Care per primary    Patient discussed with Attending Surgeon  ACS/Trauma Surgery  [id number]

## 2024-04-18 NOTE — SWALLOW BEDSIDE ASSESSMENT ADULT - PHARYNGEAL PHASE
Pharyngeal phase was inconsistent with instance of timely initiation of swallow trigger followed by multiple re-swallows suggesting pharyngeal residue. On subsequent PO trials, pt exhibited delayed initiation of pharyngeal swallow with wet upper airway and change in vocal quality suggesting laryngeal penetration/aspiration. Pharyngeal phase characterized by variable delay in swallow trigger, multiple swallows suggesting pharyngeal residue vs. laryngeal penetration, and changes to vocal quality with overt s/s of laryngeal penetration/aspiration on conservative textures.

## 2024-04-18 NOTE — PROGRESS NOTE ADULT - ATTENDING COMMENTS
- confabulates  - PETH >400 indicative of heavy alohol use within 4 weeks before admission  - LFTs 6.2/133, 65/25 - improving  Assessment:   42F, BMI 23, DM1, Factor V Leiden deficiency, gout and congenital abnormality of the pancreas associated with recurrent pancreatitis and extensive surgical hx (s/p distal pancreatectomy, cholecystectomy, splenectomy and celina-en-y pancreaticojejuonostomy (02/2014), total pancreatectomy  with islet cell autotransplant at [facility] 04/2018), c/b recurrent abdominal wall collection requiring IR drainage, initially admitted to an OSH with acute change in mental status requiring intubation for airway protection, and transferred to [facility] on 4/13/24 for care escalation.  Some diarrhea at home for >1 week, here high ammonia - Norovirus indeterminate.   Course: intubated b/o poor mentation, CRRT started h/o high ammonia, pressors b/o hypotension on sedatives. CT showed pneumatosis coli;  severe alc hepatitis without ascites, improving bilirubin, sepsis improving with pneumatosis coli and aspiration PNA, extubated, delirium; severe protein calorie malnutrition; low ceruloplasmin.  Recommendations continue IV thiamin - after 2d switch to 250 iv qd x 3d; lact/rifax, stop ursodiol, fu urine copper

## 2024-04-18 NOTE — PROGRESS NOTE ADULT - ATTENDING COMMENTS
42 year old female with hx of DMT1, Factor V leiden deficiency, gout and congenital abnormality of the pancreas associated with recurrent pancreatitis and extensive surgical hx (s/p distal pancreatectomy, cholecystectomy, splenectomy and celina-en-y pancreaticojejuonostomy (02/2014) at [facility] with Dr. Hargrove), and now S/P total pancreatectomy with islet cell autotransplant at [facility] by Dr. Gil in 04/2018). 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 Per records, a Ct later in 2018 showed still fistulization but no abscess formation     Patient presented to [facility] ED on 4/8 by her spouse for AMS. Per , patient had been having nbnb vomiting and diarrhea for the last 4 weeks, extreme fatigue . She was unable to tolerate PO. She was also sleeping 18-20 hrs per day. On 4/8, he found her on the floor "completely out of it" and took her to [facility] for further evaluation.   Initial workup in ED demonstrated a Tbili 5.4, direct bili 4.4 and ammonia 196. Patient was somnolent and had a left eyeward gaze deviation. Submentally started developing agonal snoring respirations and had to be intubated for airway protection. Course complicated with septic shock and persistent hyperammonemia despite lactulose and rifaximin, CT show and started on meropenem, linezolid and anidulafungin at [facility]--> upon transfer here, off pressors, on alvaro/linezolid/caspofungin    CT Chest (4/13) Occlusion of the distal left lower lobar bronchus with complete left lower lobe atelectasis. Right middle lobe consolidation, possibly secondary to pneumonia or additional atelectasis.     CT A/P (4/13) Segmental areas of wall thickening throughout the colon and rectum with pneumatosis, mesenteric edema, and adjacent hyperdensities in the proximal transverse colon suspicious for bowel ischemia with intraluminal hemorrhage. Additional intraluminal hyperdensities within the distal ascending colon suspicious for hemorrhage. Nonspecific focal areas of narrowing in the distal descending colon and sigmoid colon, possibly colonic masses    UCx (4/13) No Growth  BCx (4/13) NGTD  Bronchoscopy Cx (4/13) No Growth  Serum Cryptococcal Antigen (4/14) Negative  Babesia PCR (4/13) Negative    Lower suspicion infectious etiology directly contributing to hyperbilirubinemia; reasonable to complete meropenem course for the pneumatosis and possible pneumonia. Would continue Doxycyline pending Leptospira Ab/PCR, Tick Panel PCR and Bartonella PCR    Bronch Fungitell is elevated  Radiographically low concern for PCP; out of over abundance of caution added PCP PCR to sputum sample sent    Low grade fever over last 24 hours  Would send blood cultures (not sent yet)  Can continue Vancomycin  Stop Meropenem at day 10 (4/10 --> 4/19)    Ammonia elevation now thought to be secondary to alcoholic hepatitis given positive PETH  Out of overabundance of caution would send the following testing:    Metamycoplasma hominis, Molecular Detection, PCR, Plasma  Send out to [facility]  TEST ID : [id number]  CPT CODE: [id number]  Preferred: Lavender top (EDTA)  Specimen Volume: 1 mL  Collection Instructions: Centrifuge and aliquot plasma into a sterile vial within 24 hours of collection.    Ureaplasma species, Molecular Detection, PCR, Blood  Send out to [facility]  TEST ID : [id number]  CPT CODE: 87798 x 2  Preferred: Lavender top (EDTA)  Specimen Volume: 1 mL  Collection Instructions: Centrifuge and aliquot plasma into a sterile vial within 24 hours of collection.    I will continue to follow. Please feel free to contact me with any further questions.    Bhavesh Pérez M.D.  [facility] Division of Infectious Disease  8AM-5PM Monday - Friday: Available on [employer] Teams  After Hours and Holidays (or if no response on [employer] Teams): Please contact the Infectious Diseases Office at [phone]    The above assessment and plan were discussed with medicine resident

## 2024-04-18 NOTE — PROGRESS NOTE ADULT - SUBJECTIVE AND OBJECTIVE BOX
Patient is a 48y old  Female who presents with a chief complaint of anemia (16 Apr 2024 15:11)    Patient seen and examined  No acute overnight events reported    MEDICATIONS  (STANDING):  albuterol/ipratropium for Nebulization 3 milliLiter(s) Nebulizer every 6 hours  chlorhexidine 4% Liquid 1 Application(s) Topical <User Schedule>  cholecalciferol 2000 Unit(s) Oral daily  folic acid 1 milliGRAM(s) Oral daily  heparin   Injectable 5000 Unit(s) SubCutaneous every 12 hours  insulin glargine Injectable (LANTUS) 5 Unit(s) SubCutaneous at bedtime  insulin lispro (ADMELOG) corrective regimen sliding scale   SubCutaneous every 6 hours  lactulose Syrup 30 Gram(s) Oral every 8 hours  levothyroxine 100 MICROGram(s) Oral daily  meropenem  IVPB 1000 milliGRAM(s) IV Intermittent every 12 hours  methadone    Tablet 5 milliGRAM(s) Oral three times a day  multivitamin/minerals/iron Oral Solution (CENTRUM) 15 milliLiter(s) Oral daily  pantoprazole  Injectable 40 milliGRAM(s) IV Push daily  rifAXIMin 550 milliGRAM(s) Oral two times a day  thiamine IVPB 500 milliGRAM(s) IV Intermittent every 8 hours  ursodiol Tablet 500 milliGRAM(s) Oral <User Schedule>  vancomycin  IVPB 1000 milliGRAM(s) IV Intermittent every 12 hours  vancomycin  IVPB      zinc sulfate 220 milliGRAM(s) Oral daily    MEDICATIONS  (PRN):        Vital Signs Last 24 Hrs  T(C): 36.9 (18 Apr 2024 16:00), Max: 37.5 (18 Apr 2024 00:00)  T(F): 98.5 (18 Apr 2024 16:00), Max: 99.5 (18 Apr 2024 00:00)  HR: 102 (18 Apr 2024 16:00) (93 - 120)  BP: 126/62 (18 Apr 2024 16:00) (106/56 - 160/77)  BP(mean): 87 (18 Apr 2024 16:00) (78 - 108)  RR: 22 (18 Apr 2024 16:00) (13 - 33)  SpO2: 100% (18 Apr 2024 16:00) (97% - 100%)    Parameters below as of 18 Apr 2024 12:30  Patient On (Oxygen Delivery Method): nasal cannula      PE  Awake,  Anicteric, MMM  RRR  CTAB  Abd soft, NT, ND  No c/c                        8.0    23.60 )-----------( 83       ( 18 Apr 2024 00:23 )             22.7       04-18    142  |  106  |  <4<L>  ----------------------------<  292<H>  4.1   |  20<L>  |  0.49<L>    Ca    9.7      18 Apr 2024 00:23  Phos  3.6     04-18  Mg     1.8     04-18    TPro  4.9<L>  /  Alb  2.8<L>  /  TBili  6.2<H>  /  DBili  x   /  AST  65<H>  /  ALT  25  /  AlkPhos  133<H>  04-18

## 2024-04-18 NOTE — PROGRESS NOTE ADULT - SUBJECTIVE AND OBJECTIVE BOX
Hepatology Progress Note    Interval Events:   -Extubated  -Temp of 100.4 overnight   -PETH posistive, pt denies any ongoing use although confabulating     Allergies:  No Known Allergies      Hospital Medications:  albuterol/ipratropium for Nebulization 3 milliLiter(s) Nebulizer every 6 hours  chlorhexidine 4% Liquid 1 Application(s) Topical <User Schedule>  cholecalciferol 2000 Unit(s) Oral daily  folic acid 1 milliGRAM(s) Oral daily  heparin   Injectable 5000 Unit(s) SubCutaneous every 12 hours  insulin glargine Injectable (LANTUS) 5 Unit(s) SubCutaneous at bedtime  insulin lispro (ADMELOG) corrective regimen sliding scale   SubCutaneous every 6 hours  lactulose Syrup 30 Gram(s) Oral every 8 hours  levothyroxine 100 MICROGram(s) Oral daily  meropenem  IVPB 1000 milliGRAM(s) IV Intermittent every 12 hours  methadone    Tablet 5 milliGRAM(s) Oral three times a day  multivitamin/minerals/iron Oral Solution (CENTRUM) 15 milliLiter(s) Oral daily  pantoprazole  Injectable 40 milliGRAM(s) IV Push daily  rifAXIMin 550 milliGRAM(s) Oral two times a day  thiamine IVPB 250 milliGRAM(s) IV Intermittent daily  ursodiol Tablet 500 milliGRAM(s) Oral <User Schedule>  vancomycin  IVPB 1000 milliGRAM(s) IV Intermittent every 12 hours  vancomycin  IVPB      zinc sulfate 220 milliGRAM(s) Oral daily      ROS: 14 point ROS negative unless otherwise state in subjective    PHYSICAL EXAM:   Vital Signs:  Vital Signs Last 24 Hrs  T(C): 37 (18 Apr 2024 08:00), Max: 38 (17 Apr 2024 16:00)  T(F): 98.6 (18 Apr 2024 08:00), Max: 100.4 (17 Apr 2024 16:00)  HR: 102 (18 Apr 2024 08:00) (83 - 120)  BP: 124/58 (18 Apr 2024 08:00) (106/56 - 140/63)  BP(mean): 84 (18 Apr 2024 08:00) (78 - 90)  RR: 17 (18 Apr 2024 08:00) (13 - 26)  SpO2: 100% (18 Apr 2024 08:00) (96% - 100%)    Parameters below as of 18 Apr 2024 07:00  Patient On (Oxygen Delivery Method): nasal cannula  O2 Flow (L/min): 2    GENERAL:  Confsued  HEENT: +scleral icterus, +nystagmus   CHEST: no resp distress  HEART:  RRR  ABDOMEN:  Soft, non-tender,distended, normoactive bowel sounds  EXTREMITIES:  No pedal edema  NEURO:  Alert and oriented x 1, no asterixis     LABS:                        8.0    23.60 )-----------( 83       ( 18 Apr 2024 00:23 )             22.7     Mean Cell Volume: 95.0 fl (04-18-24 @ 00:23)    04-18    142  |  106  |  <4<L>  ----------------------------<  292<H>  4.1   |  20<L>  |  0.49<L>    Ca    9.7      18 Apr 2024 00:23  Phos  3.6     04-18  Mg     1.8     04-18    TPro  4.9<L>  /  Alb  2.8<L>  /  TBili  6.2<H>  /  DBili  x   /  AST  65<H>  /  ALT  25  /  AlkPhos  133<H>  04-18    LIVER FUNCTIONS - ( 18 Apr 2024 00:23 )  Alb: 2.8 g/dL / Pro: 4.9 g/dL / ALK PHOS: 133 U/L / ALT: 25 U/L / AST: 65 U/L / GGT: x           PT/INR - ( 17 Apr 2024 01:16 )   PT: 14.0 sec;   INR: 1.34 ratio         PTT - ( 17 Apr 2024 01:16 )  PTT:36.5 sec  Urinalysis Basic - ( 18 Apr 2024 00:23 )    Color: x / Appearance: x / SG: x / pH: x  Gluc: 292 mg/dL / Ketone: x  / Bili: x / Urobili: x   Blood: x / Protein: x / Nitrite: x   Leuk Esterase: x / RBC: x / WBC x   Sq Epi: x / Non Sq Epi: x / Bacteria: x        Imaging:  < from: [facility] Duplex Lower Ext Vein Scan, Bilat (04.16.24 @ 15:49) >  IMPRESSION:  No evidence of deep venous thrombosis in either lower extremity.        < end of copied text >  < from: CT Abdomen and Pelvis w/ IV Cont (04.16.24 @ 13:15) >  IMPRESSION:  Mildly improved colitis with right-sided colitis persisting.    Patent mesenteric vasculature. Haziness of the fat planes surrounding the   celiac axis and SMA, etiology unclear and without change.    < end of copied text >

## 2024-04-18 NOTE — PROGRESS NOTE ADULT - SUBJECTIVE AND OBJECTIVE BOX
Admitted for: Liver failure without hepatic coma        Following for: Type 3c DM    Subjective:   Patient failed bedside swallow yesterday, on NGT.  Patient awake and conversational. Spouse at bedside reports she is still confused.        MEDICATIONS  (STANDING):  albuterol/ipratropium for Nebulization 3 milliLiter(s) Nebulizer every 6 hours  chlorhexidine 4% Liquid 1 Application(s) Topical <User Schedule>  cholecalciferol 2000 Unit(s) Oral daily  folic acid 1 milliGRAM(s) Oral daily  heparin   Injectable 5000 Unit(s) SubCutaneous every 12 hours  insulin glargine Injectable (LANTUS) 5 Unit(s) SubCutaneous at bedtime  insulin lispro (ADMELOG) corrective regimen sliding scale   SubCutaneous every 6 hours  lactulose Syrup 30 Gram(s) Oral every 8 hours  levothyroxine 100 MICROGram(s) Oral daily  meropenem  IVPB 1000 milliGRAM(s) IV Intermittent every 12 hours  methadone    Tablet 5 milliGRAM(s) Oral three times a day  multivitamin/minerals/iron Oral Solution (CENTRUM) 15 milliLiter(s) Oral daily  pantoprazole  Injectable 40 milliGRAM(s) IV Push daily  rifAXIMin 550 milliGRAM(s) Oral two times a day  thiamine IVPB 500 milliGRAM(s) IV Intermittent every 8 hours  ursodiol Tablet 500 milliGRAM(s) Oral <User Schedule>  vancomycin  IVPB      vancomycin  IVPB 1000 milliGRAM(s) IV Intermittent every 12 hours  zinc sulfate 220 milliGRAM(s) Oral daily    MEDICATIONS  (PRN):      Allergies    No Known Allergies    Intolerances          PHYSICAL EXAM:  VITALS: T(C): 36.9 (04-18-24 @ 12:00)  T(F): 98.5 (04-18-24 @ 12:00), Max: 100.4 (04-17-24 @ 16:00)  HR: 115 (04-18-24 @ 14:00) (93 - 120)  BP: 137/65 (04-18-24 @ 14:00) (106/56 - 147/70)  RR:  (13 - 31)  SpO2:  (97% - 100%)  Wt(kg): --  GENERAL: NAD, awake and conversational   EYES: +scleral icterus  RESPIRATORY: no nasal cannula. no respiratory distress  CARDIOVASCULAR: no peripheral edema  GI: non distended  EXT:GONZALEZ  PSYCH: Alert, reactive affect      A1C with Estimated Average Glucose Result: 5.2 % (04-13-24 @ 03:56)      POCT Blood Glucose.: 181 mg/dL (04-18-24 @ 11:32)  POCT Blood Glucose.: 185 mg/dL (04-18-24 @ 06:17)  POCT Blood Glucose.: 279 mg/dL (04-18-24 @ 00:26)  POCT Blood Glucose.: 227 mg/dL (04-17-24 @ 22:48)  POCT Blood Glucose.: 249 mg/dL (04-17-24 @ 17:13)  POCT Blood Glucose.: 215 mg/dL (04-17-24 @ 11:55)  POCT Blood Glucose.: 223 mg/dL (04-17-24 @ 05:32)  POCT Blood Glucose.: 247 mg/dL (04-16-24 @ 20:32)  POCT Blood Glucose.: 205 mg/dL (04-16-24 @ 16:16)  POCT Blood Glucose.: 135 mg/dL (04-16-24 @ 13:55)  POCT Blood Glucose.: 107 mg/dL (04-16-24 @ 10:12)  POCT Blood Glucose.: 139 mg/dL (04-16-24 @ 08:00)  POCT Blood Glucose.: 148 mg/dL (04-16-24 @ 04:54)  POCT Blood Glucose.: 172 mg/dL (04-16-24 @ 02:51)  POCT Blood Glucose.: 177 mg/dL (04-15-24 @ 22:22)  POCT Blood Glucose.: 165 mg/dL (04-15-24 @ 20:26)  POCT Blood Glucose.: 159 mg/dL (04-15-24 @ 18:01)  POCT Blood Glucose.: 181 mg/dL (04-15-24 @ 17:59)  POCT Blood Glucose.: 145 mg/dL (04-15-24 @ 16:01)      04-18    142  |  106  |  <4<L>  ----------------------------<  292<H>  4.1   |  20<L>  |  0.49<L>    eGFR: 116    Ca    9.7      04-18  Mg     1.8     04-18  Phos  3.6     04-18    TPro  4.9<L>  /  Alb  2.8<L>  /  TBili  6.2<H>  /  DBili  x   /  AST  65<H>  /  ALT  25  /  AlkPhos  133<H>  04-18      Thyroid Function Tests:  04-15 @ 13:04 TSH 3.67 FreeT4 -- T3 -- Anti TPO -- Anti Thyroglobulin Ab -- TSI --  04-13 @ 03:56 TSH 4.43 FreeT4 0.6 T3 47 Anti TPO -- Anti Thyroglobulin Ab -- TSI --

## 2024-04-18 NOTE — SWALLOW BEDSIDE ASSESSMENT ADULT - CONSISTENCIES ADMINISTERED
1/2 tsp moderately thick 2x; 1/2 tsp crushed ice-chip x2 No further PO trials administered s/p overt s/s of laryngeal penetration/aspiration on conservative textures.

## 2024-04-18 NOTE — SWALLOW BEDSIDE ASSESSMENT ADULT - H & P REVIEW
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 Hargrove), and now S/P total pancreatectomy  with islet cell autotransplant at [facility] by Dr. Gil in 04/2018). Patient's recovery was complicated by abdominal collections first on POD 6 which was managed with IR drain and IV antibiotic. Patient was discharged with PICC line for long term antifungal. Patient was readmitted on 05/8 again for abscess which was drained by IR. Patient was also started on therapeutic Lovenox for right atrial thrombus. Patient again developed abdominal pain with fever of 100.7 on 05/20 associated with thick yellow discharge from the ventral incisional and went to [place] which was nearby for care. CT scan at [place] showed complex multiloculated postsurgical abscess in the omental fat of the superior epigastrium measuring 6.7 cm and enterocutaneous fistulous tract extending anteriorly from the abscess to the midline incision site and another tract going into the soft tissues of the left upper abdominal quadrant communicating with a subcutaneous 3.3cm abscess  Patient was started on IV zosyn, flagyl and micafungin while there. Patient transferred today to [facility] for continuity of care under Dr. Ansari. Patient presented to [facility] ED on 4/8 by her spouse for AMS. Per , patient had been having nbnb vomiting and diarrhea for the last 4 weeks. She was unable to tolerate PO. She was also sleeping 18-20 hrs per day. 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 Hargrove), and now S/P total pancreatectomy  with islet cell autotransplant at [facility] by Dr. Gli in 04/2018). Patient's recovery was complicated by abdominal collections first on POD 6 which was managed with IR drain and IV antibiotic. Patient was discharged with PICC line for long term antifungal. Patient was readmitted on 05/8 again for abscess which was drained by IR. Patient was also started on therapeutic Lovenox for right atrial thrombus. Patient again developed abdominal pain with fever of 100.7 on 05/20 associated with thick yellow discharge from the ventral incisional and went to [place] which was nearby for care. CT scan at [place] showed complex multiloculated postsurgical abscess in the omental fat of the superior epigastrium measuring 6.7 cm and enterocutaneous fistulous tract extending anteriorly from the abscess to the midline incision site and another tract going into the soft tissues of the left upper abdominal quadrant communicating with a subcutaneous 3.3cm abscess  Patient was started on IV zosyn, flagyl and micafungin while there. Patient transferred today to [facility] for continuity of care under Dr. Ansari. Patient presented to [facility] ED on 4/8 by her spouse for AMS. Per , patient had been having nbnb vomiting and diarrhea for the last 4 weeks. She was unable to tolerate PO. She was also sleeping 18-20 hrs per day. On 4/8, he found her on the floor "completely out of it" and took her to [facility] for further evaluation./yes

## 2024-04-18 NOTE — PROGRESS NOTE ADULT - ASSESSMENT
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 Gil in 04/2018), c/b recurrent abdominal wall collection requiring IR drainage, presented to OSH with acute change in mental status requiring intubation for airway protection, and transferred to [facility] for care escalation with labs notable for eleavted INR, T. Nilo and NH3 of unclear etiology although possibly 2/2 to cholestasis of sepsis     #Alc Hep/Cirrohsis   #Bicytopenia with Coagulopathy (improved)  #Pneumatosis of bowel concerning for ischemic colitis (improving)  #Acute encephalopathy with elevated hyperammonemia s/p CRRT for NH3 clearane    #Hepatomegaly   - DAWIT, Smooth and IgG within normal limts  - Presented with septic shock, likely RML pneumonia, RLL bronchial occlusion, and pneumatosis coli - likely ischemic colon, colonic blood suggested by CT, init. Hb drop with labs notable for elevated T. Nilo, INR and NH3. Labs also notable for macrocytic anemia - DD: folate deficiency due to malnutrition with ?SIBO and alcohol-related liver disease Imaging with U/S and MRCP with note of  hepatomegaly with non-cirrhotic morphology,MELD 33 (24 with initial creat. before CRRT). PETH positive with etiology of the above likely 2/2 to alcohol-related liver disease with acute alc hep in the background or cirrohisis and cholestasis of sepsis   - Acute encephalopathy, hyperammonemia - DD: hepatic encephalopathy in setting of alcoholic liver disease that is slowly improving. s/p CRRT for NH3 clearance.  Ok to stop L-Carnitine and continue with Rifaximin 550 mg BID in addition to Lactulose for goal 5 BMs per day. Continue with high dose IV Thiamine and Vit Supplemtation as noted below     Recommendations:  - Zinc low continue with PO Zinc 50 mg QD  - Vit. D, continue with supplemtantion   -Serum Ceruloplasmin <10, follow up 24 hour urine copper collection   -Ok to monitor off CRRT and trend serial NH3 levels. Ok to stop L-Carnitnine  -Continue with Rifaximin 550 mg BID in addition to Lactulose for goal 5 BMs per day.  - If no surgical planning, may initiate trickle feed with elemental diet once cleared by surgery team,   - F/u infectious workup given persistent fevers; Abx per ID  - IV Thiamine, Folate and MVI   - No plans for any colonoscopy at this time     All recommendations are tentative until note is attested by attending.

## 2024-04-18 NOTE — SWALLOW BEDSIDE ASSESSMENT ADULT - SWALLOW EVAL: SECRETION MANAGEMENT
Overtly managing oral secretions. Patient instructed to perform strong cough to clear pharyngeal secretions. Overtly managing oral secretions presenting with baseline throat clearing.

## 2024-04-18 NOTE — PROGRESS NOTE ADULT - SUBJECTIVE AND OBJECTIVE BOX
SURGERY PROGRESS NOTE    Interval events  - Tube feeds held since extubation yesterday. Continues on Lactulose.   - Mental status improving  - WBC 24, PLT 83, Tbili 6.2, Lact 2.2    OBJECTIVE:   Vital Signs Last 24 Hrs  T(C): 37 (18 Apr 2024 08:00), Max: 38 (17 Apr 2024 16:00)  T(F): 98.6 (18 Apr 2024 08:00), Max: 100.4 (17 Apr 2024 16:00)  HR: 101 (18 Apr 2024 10:21) (83 - 120)  BP: 124/58 (18 Apr 2024 08:00) (106/56 - 140/63)  BP(mean): 84 (18 Apr 2024 08:00) (78 - 90)  RR: 17 (18 Apr 2024 08:00) (13 - 26)  SpO2: 100% (18 Apr 2024 10:21) (96% - 100%)    Parameters below as of 18 Apr 2024 07:00  Patient On (Oxygen Delivery Method): nasal cannula  O2 Flow (L/min): 2          I&O's Summary    17 Apr 2024 07:01  -  18 Apr 2024 07:00  --------------------------------------------------------  IN: 1620 mL / OUT: 3810 mL / NET: -2190 mL    18 Apr 2024 07:01  -  18 Apr 2024 11:12  --------------------------------------------------------  IN: 0 mL / OUT: 35 mL / NET: -35 mL      I&O's Detail    17 Apr 2024 07:01  -  18 Apr 2024 07:00  --------------------------------------------------------  IN:    Enteral Tube Flush: 620 mL    IV PiggyBack: 350 mL    IV PiggyBack: 650 mL  Total IN: 1620 mL    OUT:    Indwelling Catheter - Urethral (mL): 2210 mL    Rectal Tube (mL): 1600 mL    Vital1.5: 0 mL  Total OUT: 3810 mL    Total NET: -2190 mL      18 Apr 2024 07:01  -  18 Apr 2024 11:12  --------------------------------------------------------  IN:  Total IN: 0 mL    OUT:    Indwelling Catheter - Urethral (mL): 35 mL  Total OUT: 35 mL    Total NET: -35 mL          MEDICATIONS  (STANDING):  albuterol/ipratropium for Nebulization 3 milliLiter(s) Nebulizer every 6 hours  chlorhexidine 4% Liquid 1 Application(s) Topical <User Schedule>  cholecalciferol 2000 Unit(s) Oral daily  folic acid 1 milliGRAM(s) Oral daily  heparin   Injectable 5000 Unit(s) SubCutaneous every 12 hours  insulin glargine Injectable (LANTUS) 5 Unit(s) SubCutaneous at bedtime  insulin lispro (ADMELOG) corrective regimen sliding scale   SubCutaneous every 6 hours  lactulose Syrup 30 Gram(s) Oral every 8 hours  levothyroxine 100 MICROGram(s) Oral daily  meropenem  IVPB 1000 milliGRAM(s) IV Intermittent every 12 hours  methadone    Tablet 5 milliGRAM(s) Oral three times a day  multivitamin/minerals/iron Oral Solution (CENTRUM) 15 milliLiter(s) Oral daily  pantoprazole  Injectable 40 milliGRAM(s) IV Push daily  rifAXIMin 550 milliGRAM(s) Oral two times a day  thiamine IVPB 500 milliGRAM(s) IV Intermittent every 8 hours  ursodiol Tablet 500 milliGRAM(s) Oral <User Schedule>  vancomycin  IVPB      vancomycin  IVPB 1000 milliGRAM(s) IV Intermittent every 12 hours  zinc sulfate 220 milliGRAM(s) Oral daily    MEDICATIONS  (PRN):    PHYSICAL EXAM  General: No acute distress, resting comfortably in bed  HEENT: Normocephalic atraumatic, scleral icterus  Respiratory: Nonlabored respirations  Abdomen: soft, nondistended, mild generalized tenderness, no rebound,  Rectal tube with brown/green output      LABS:                        8.0    23.60 )-----------( 83       ( 18 Apr 2024 00:23 )             22.7     04-18    142  |  106  |  <4<L>  ----------------------------<  292<H>  4.1   |  20<L>  |  0.49<L>    Ca    9.7      18 Apr 2024 00:23  Phos  3.6     04-18  Mg     1.8     04-18    TPro  4.9<L>  /  Alb  2.8<L>  /  TBili  6.2<H>  /  DBili  x   /  AST  65<H>  /  ALT  25  /  AlkPhos  133<H>  04-18    PT/INR - ( 17 Apr 2024 01:16 )   PT: 14.0 sec;   INR: 1.34 ratio         PTT - ( 17 Apr 2024 01:16 )  PTT:36.5 sec  Urinalysis Basic - ( 18 Apr 2024 00:23 )    Color: x / Appearance: x / SG: x / pH: x  Gluc: 292 mg/dL / Ketone: x  / Bili: x / Urobili: x   Blood: x / Protein: x / Nitrite: x   Leuk Esterase: x / RBC: x / WBC x   Sq Epi: x / Non Sq Epi: x / Bacteria: x        RADIOLOGY & ADDITIONAL STUDIES:  no new

## 2024-04-18 NOTE — SWALLOW BEDSIDE ASSESSMENT ADULT - SWALLOW EVAL: DIAGNOSIS
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 Pt with hoarse vocal quality upon, non-productive cough, presenting with evidence of an oral and suspected pharyngeal dysphagia. Oral phase notable for reduced orientation to spoon, prolonged manipulation and A-P bolus transport secondary to suspected cognitive deficits. Pharyngeal phase was inconsistent with instance of timely initiation of swallow trigger followed by multiple re-swallows suggesting pharyngeal residue. On subsequent PO trials, pt exhibited delayed initiation of pharyngeal swallow with wet upper airway and change in vocal quality suggesting laryngeal penetration/aspiration. Instrumental evaluation recommended due to overt s/s of laryngeal penetration/aspiration on conservative textures and dysphonia. 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 Pt with hoarse vocal quality, hypophonia, and baseline throat clear. Pt presenting with evidence of an oral and suspected pharyngeal dysphagia superimposed upon by reduced orientation to feeding and altered mental status. Oral phase notable for prolonged manipulation and A-P bolus transport. Pharyngeal phase characterized by variable delay in swallow trigger, multiple swallows suggesting pharyngeal residue vs. laryngeal penetration, and changes to vocal quality with overt s/s of laryngeal penetration/aspiration on conservative textures.

## 2024-04-18 NOTE — PROGRESS NOTE ADULT - ASSESSMENT
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 Transferred to [facility] for Liver Transplant Evaluation. Patient found to be hypoglycemic shortly after transfer, now resolved.    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 Patient was first diagnosed with Type 3c DM after her initial surgery in 2014 (s/p distal pancreatectomy, cholecystectomy, splenectomy and celina-en-y pancreaticojejuonostomy). Then she later underwent total pancreatectomy with islet cell autotransplant in 2018. After that, her insulin requirements decreased over the years but was never off insulin.    Endocrinologist: Dr. Dalton  Per recent outpatient note 3/26/2024:  Patient uses Omnipod Dash with Dexcom CGM   Basal rate:   MN 0.05U   6 AM 0.1U   9.30 AM 0.15U   Total basal 2.85U/24h   ICR 1:15   ICF 1:75   Reports of frequent hypoglycemia outpatient in March 2024.    Hypoglycemic shortly after transfer, resolved.    C-peptide <0.1 4/15 at 6pm when BG was 168, likely insulin deficient.  C-peptide repeated while off dextrose and tube feeds: 0.1 with , confirming insulin deficiency.    Patient is now on vital 1.5 continuous tube feeds 10cc/hr via NG tube, 24h/day.    PLAN:  - continue Lantus 5 units QHS  - continue Low dose admelog scale q6h while on 24h continuous tube feeds  - will monitor BG for uptrending BG and adjust as needed    #Hypothyroidism vs NTIS  TSH 4.43. FT4 0.6, TT3 47 on admission  Started on LT4 100mcg daily  - recheck FT4 in 1 week  - repeat TSH once clinically improved or in 6-8 weeks    Discussed with primary team    Ed Jett MD  Endocrine Fellow  Can be reached via teams. For follow up questions, discharge recommendations, or new consults, please call answering service at [phone] (weekdays); [phone] (nights/weekends). 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 Transferred to [facility] for Liver Transplant Evaluation. Patient found to be hypoglycemic shortly after transfer, now resolved.    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 Patient was first diagnosed with Type 3c DM after her initial surgery in 2014 (s/p distal pancreatectomy, cholecystectomy, splenectomy and celina-en-y pancreaticojejuonostomy). Then she later underwent total pancreatectomy with islet cell autotransplant in 2018. After that, her insulin requirements decreased over the years but was never off insulin.    Endocrinologist: Dr. Dalton  Per recent outpatient note 3/26/2024:  Patient uses Omnipod Dash with Dexcom CGM   Basal rate:   MN 0.05U   6 AM 0.1U   9.30 AM 0.15U   Total basal 2.85U/24h   ICR 1:15   ICF 1:75   Reports of frequent hypoglycemia outpatient in March 2024.    Hypoglycemic shortly after transfer, resolved.    C-peptide <0.1 4/15 at 6pm when BG was 168, likely insulin deficient.  C-peptide repeated while off dextrose and tube feeds: 0.1 with , confirming insulin deficiency.    Patient is now on vital 1.5 continuous tube feeds 10cc/hr via NG tube, 24h/day.    PLAN:  - Start NPH 3 units q6  - continue Low dose admelog scale q6h while on 24h continuous tube feeds  - will monitor BG for uptrending BG and adjust as needed    #Hypothyroidism vs NTIS  TSH 4.43. FT4 0.6, TT3 47 on admission  Started on LT4 100mcg daily  - recheck FT4 in 4/22  - repeat TSH once clinically improved or in 6-8 weeks    Discussed with primary team    Ed Jett MD  Endocrine Fellow  Can be reached via teams. For follow up questions, discharge recommendations, or new consults, please call answering service at [phone] (weekdays); [phone] (nights/weekends).

## 2024-04-18 NOTE — SWALLOW BEDSIDE ASSESSMENT ADULT - ASR SWALLOW RECOMMEND DIAG
Instrumental evaluation of swallow mechanism recommended given overt s/s of laryngeal penetration/aspiration of conservative textures and change in vocal quality./FEES Instrumental evaluation of swallow mechanism recommended given overt s/s of laryngeal penetration/aspiration of conservative textures. FEES vs MBS due to patient's mental status, wounds limiting ability to tolerate JEFF chair, and to r/o vocal fold dysfunction./FEES

## 2024-04-18 NOTE — OCCUPATIONAL THERAPY INITIAL EVALUATION ADULT - GENERAL OBSERVATIONS, REHAB EVAL
Pt received semisupine in bed in NAD, ABDI Bey aware. +IVL +ICU monitoring +O2 NC +rectal tube +harrington  +NGT

## 2024-04-19 NOTE — PROGRESS NOTE ADULT - ASSESSMENT
48F PMH DMT1, heterozygous Factor V Leiden deficiency, hx of Left peroneal DVT Eliquis x1 year, stopped 5/2023), gout (has had numerous courses of steroids) and pancreas congenital abnormality associated with recurrent pancreatitis and extensive surgical hx (s/p distal pancreatectomy, cholecystectomy, splenectomy and celina-en-y pancreaticojejunostomy (2014, [facility], [facility]), s/p total pancreatectomy with islet cell autotransplant (Louise 2018).   Now in the MICU with shock, hyperammonemia hyperbilirubinemia, anemia, thrombocytopenia, and worsening Leukocytosis on broad spectrum antimicrobials. Patient currently with uptrending Leukocytosis and is now awake, complaining of abdominal pain which could be secondary to colitis.     Plan/Recs:  - Recommend testing for c. diff  (wbc 30 today)  - Care per primary    Patient discussed with Attending Surgeon  ACS/Trauma Surgery  [id number]

## 2024-04-19 NOTE — PROGRESS NOTE ADULT - ATTENDING COMMENTS
42 year old female with hx of DMT1, Factor V leiden deficiency, gout and congenital abnormality of the pancreas associated with recurrent pancreatitis and extensive surgical hx (s/p distal pancreatectomy, cholecystectomy, splenectomy and celina-en-y pancreaticojejuonostomy (02/2014) at [facility] with Dr. Hargrove), and now S/P total pancreatectomy  with islet cell autotransplant at [facility] by Dr. Gil in 04/2018), c/b recurrent abdominal wall collection requiring IR drainage, hx of RV thrombus/DVT/PE presented to OSH with acute change in mental status requiring intubation for airway protection, and transferred to [facility]    #Neuro: metabolic encephalopathy in the setting of elevated ammonia. EEG with no seizures. Awake and alert, confabulating  #CV: initially in shock on multiple pressors at OSH. TTE with mod pulm HTN.  Now HD stable off pressors.   #Resp: intubated for airway protection. Extubated 4/17. Doing well.   #GI: Patient denies EtOH use but PETH is elevated. Overall picture c/w alcoholic hepatitis.   -- ischemic colitis/GIB/?colon mass - GI and surgery input appreciated. No plans for colonoscopy right now. continue feeds. Will need outpateint colonoscopy  -- elevated ammonia and bili levels - likely 2/2 alcoholic hepatitis + sepsis. MRCP with no e/o obstruction. s/p CRRT for ammonia clearance. Both NH3 and bili improving.  c/w Rifaximin, and lactulose (decrease dose to minimize volume).   -- abdomen is more distended today. Decrease Lactulose dose. Will need NaBenozoate if Ammonia levels continue to uptrend. Check Cdiff  -- TPN consult as not able to tolerate feeds  #Renal: s/p CVVH for elevated ammonia levels, now off since 4/16. Normal Cr and good UO.  #Heme/Onc: Factor V leiden, hx of RV thrombus/DVT/PE. CTA and LE dopplers negative. Currently off AC. Need to discuss with heme re utility of long term AC.  #ID: hx fungemia and abd abscesses in the past (?2018).  LLL PNA. s/p bronch with normal respiratory luisa. Now off Caspo and Dapto. C/w Bobby given colitis and PNA. Vancomycin added on 4/17 and lines removed given persistent fevers. Sputum cultures growing carbopenem resistent Acinetobacter, however suspect this is a colonizer. Suspect uptrending WBC related to GI source/colitis. Check Cdiff. Monitor WBC count and fever curve. ID input appreciated.   #Endocrine: DM with hx of islet cell transplant. C/w Lantus + NPH. Endo input appreciated.  #PPX: c/w SQH.   #GOC: Full code; prognosis guarded

## 2024-04-19 NOTE — SWALLOW FEES ASSESSMENT ADULT - NS SWALLOW FEES REC ASPIR MON
Monitor for s/s aspiration/laryngeal penetration. If noted:  D/C p.o. intake, provide non-oral nutrition/hydration/meds, and contact this service @ [phone]/change of breathing pattern/cough/gurgly voice/fever/pneumonia/throat clearing/upper respiratory infection

## 2024-04-19 NOTE — PROGRESS NOTE ADULT - ATTENDING COMMENTS
Pt is a 48 year old female with a medical history significant for DMT1, heterozygous Factor V Leiden deficiency, left peroneal DVT (s/p Eliquis x1 year, stopped 5/2023), pancreas congenital abnormality associated with recurrent pancreatitis and extensive surgical hx (s/p distal pancreatectomy, cholecystectomy, splenectomy and celina-en-y pancreaticojejunostomy (2014, Beena, [facility]), and s/p total pancreatectomy with islet cell autotransplant (Louise 2018) who was admitted to the MICU with shock, hyperammonemia hyperbilirubinemia, anemia, thrombocytopenia, and worsening Leukocytosis. Pt continues on broad spectrum antimicrobials and has a uptrending Leukocytosis. CT reveals colitis.    A/p  Colitis  Multiple medical problems  Worsening leukocytosis  Diarrhea, on lactulose  Check C. diff   Will follow with you    I spent 20 min in the E/M of this patient

## 2024-04-19 NOTE — SWALLOW FEES ASSESSMENT ADULT - SLP GENERAL OBSERVATIONS
Pt found in bed, awake and alert. +NGT, on room air. Requires frequent cues for re-orientation, follows simple 1-step commands ~50%. Vocal quality hoarse.

## 2024-04-19 NOTE — PROGRESS NOTE ADULT - ASSESSMENT
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 per LIMC), fistula, abscesses. Presented to [facility] 4/8 for several weeks of decreased PO intake, NBNB emesis, watery diarrhea, Somnolence, requiring intubation for airway protection. Lab revealed ammonia > 200, elevated bilirubin and INR initially c/f liver failure, c/c/b colitis possibly ischemic etiology. Patient transferred to [facility] MICU 4/13 for CRRT initiation for ammonia clearance.    =====Neuro=====  #Altered mental status  >Baseline AOx3  >CTH (4/13): No acute findings  >CTH (4/15): No acute findings   >s/p Precedex, off since 4/15  - off sedation, awake, follows command, AAO2-3  - Likely iso hyperammonemia vs. infection   - treatment for hyperammonemia as below    #Chronic pain  #anxiety/depression   >Was on alprazolam 0.75mg QD, Dilaudid and methadone 5mg TID at home  - c/w Methadone 5mg TID    #Downward nystagmus - RESOLVED  >Noted on exam 4/14-4/15 overnight  >CTH (4/15): No acute findings   >EEG (4/15 - 4/17): TME but no seizure activity  - Will ctm    =====Cardio=====  #History of L peroneal DVT in 2022  #History of RA thrombus in 2018  >TTE (4/13): No thrombus commented  - Eliquis stopped 5/2023 by outpatient hematology given consistently negative DVT studies  - On HSQ for DVT ppx    #RV dysfunction? - RESOLVED  >Enlarged RV and pulmonary hypertension noted on POCUS (4/16) that is not on TTE 4/13  >CTAPE (4/16): No PE  >DVT study (4/16): No DVT  >TTE (4/16): Normal LVSF EF 75%, Normal RVSF and size, TAPSE 1.9, PASP 25  - ctm w/ POCUS    =====Pulmonary=====  #Atelectasis vs. PNA  >CTC (4/13): RML consolidation noted, appears atelectasis > PNA on POCUS  >L sided consolidation noted on POCUS - possibly PNA  - persistently leukocytosis  - abx as below  - f/u ID recs    #Mechanical ventilation   - s/p Extubation 4/17    =====GI=====  #Alcoholic hepatitis/cirrhosis?  >Elevated bilirubin/coag, c/b hepatic encephalopathy  >s/p NAC on admission to outside hospital  >CTAP (4/13): Hepatomegaly and hepatic steatosis  >US abd (4/13): Hepatic steatosis, no hepatic vein thrombosis  >Acute hepatitis panel (4/13): Negative  >Autoimmune hepatitis panel (4/13): Negative  >MRCP (4/15): Enlarged, fatty liver. No biliary duct dilation, unlikely obstruction  >Gastrograffin study to r/o SBO (4/16): Passage of contrast seen on serial XR, unlikely SBO  >PETH (4/13): > 400  - Etiology likely alcohol related given PETH level, although patient and family denies alcohol use  - c/w Folid acid, MVI  - c/w thiamine 500mg Q8h x 3 days (4/18 - 4/20) given patient confabulating c/f wernicke vs. ICU delirium   - c/w ursodiol 500mg QD    #Hyperammonemia   >Baseline mentation AAO3  >s/p CRRT (off since 4/16)  - f/u plasma amino acid (4/16): in lab  - f/u urine orotic acid (4/16) - sent out to [facility]  - f/u metabolic genetic consult to r/o acquired urea cycle disorder  - c/w Rifaximin   - c/w Lactulose    #Colitis, ?Ischemic  >CTA/P (4/13): segmental areas of wall thickening throughout the colon and rectum with pneumatosis, mesenteric edema, and adjacent hyperdensities in the proximal transverse colon suspicious for bowel ischemia with intraluminal hemorrhage. Additional intraluminal hyperdensities within the distal ascending colon suspicious for hemorrhage.   >TTE (4/13): No thrombus commented  - Surgery, GI following, no plan for surgery/endoscopic eval for now  - On TF, trend lactate, monitor GIB    #Colonic mass?  >CTA/P (4/13): Nonspecific focal areas of narrowing in the distal descending colon and sigmoid colon, possibly colonic masses  >CTA/P (4/16): Improved colitis, previous ?colonic mass vs. narrowing still seen per discussion w/ radiology  - ctm, colonoscopy eventually    #Diet and ppx  - TF, c/w Pancreatic enzyme supplement while on TF  - rectal tube in place   - c/w PPI 40mg for GI ppx  - Pending bedside dysphagia / swallow eval    =====Renal=====  #CRRT - NOW OFF  >s/p CRRT (last 4/17) for ammonia clearance  - Strict I/O  - Diurese PRN    =====Heme=====   #Factor 5 Leiden  #History of L peroneal DVT in 2022  #History of RA thrombus in 2018  >TTE (4/13): No thrombus commented  >Eliquis stopped 5/2023 by outpatient hematology given consistently negative DVT studies  - SQH for DVT ppx    #Anemia  >s/p 2u PRBC (4/13, 4/16)  >Multifactorial in nature. No hx of bleeding but with coagulopathy as below.   >B12 (4/13): >2000, Folate: 8.6  >Iron studies (4/13): Ferritin 653, Iron 43, Unmeasurable TIBC/Iron%   >Hemolysis lab (4/14): Haptoglobin < 20;   - Possibly iso liver disease vs. acute infection/inflammation vs. ?hemolysis  - Trend CBC, coag, transfuse goal > 7    #thrombocytopenia  >s/p 1u Plt (4/13)   - Etiology not entirely clear, s/p splenectomy and hepatic steatosis w/o cirrhosis on imaging  - Likely iso acute illness  - Trend CBC, plt goal > 20    =====Endo=====  #DM1 vs. DM3c  >Per HIE record, patient prone to hypoglycemia  >History of DM1, but s/p total pancreatectomy and islet cell autotransplant in 2018 - currently DM3c?  >C-peptide (4/18): < 0.1  - c/w NPH q6h while on continuous TF  - Endocrine on board    #Hypothyroidism  - c/w synthyroid 100    =====ID=====  Infectious work up  >UA (4/13): 11WBC, small LE, negative Nitrite or bacteria  >Babesia (4/13): neg; HIV (4/13): neg; RVP, COVID (4/14): neg; MRSA (4/13): Negative  >CXR (4/13): RML consolidation  >CTC/A/P (4/13): RML consolidation, LLL atelectasis 2/2 likely ?mucus plug, ischemic colitis  >Bcx (4/13): NGTD  >ET tube cx (4/13): Normal resp luisa  >BAL (4/13): Pending  >GI PCR (4/15): Negative  - Rest of infectious w/u per ID    #Leukocytosis  #Septic shock   #Hx of VRE and Candida infection  >H/o of recurrent infxn from multiple abdominal wounds and surgeries. Hx of VRE and candida.   >Previously on levo at OSH. Off pressor on transfer  >s/p Caspofungin 50mg Q24 (stopped 4/16)  >s/p Daptomycin 500mg Q24 (stopped 4/16)  >s/p Doxycycline 100mg Q12 (stopped 4/18)  - Source of WBC Colitis vs. Pneumonia  - c/w Meropenem 1g q12 (4/10 - ), planned for 10 day course  - c/w Vancomycin q12 (4/17 - )  - Infectious w/u per ID   41yo pmh of HTN, DM, Factor V Leiden c/b L peroneal DVT (2022) and RA thrombus (2018) currently not on AC, gout, congenital pancreas divisum, recurrent pancreatitis s/p cholecystectomy, Splenectomy, Kevin-En-Y Pancreaticojejunostomy, total pancreatectomy s/p Islet Cell Autotransplant at [facility] 2018, complicated by multiple intraabdominal infection (VRE, fungal? per LIMC), fistula, abscesses. Presented to [facility] 4/8 for several weeks of decreased PO intake, NBNB emesis, watery diarrhea, Somnolence, requiring intubation for airway protection. Lab revealed ammonia > 200, elevated bilirubin and INR initially c/f liver failure, c/c/b colitis possibly ischemic etiology. Patient transferred to [facility] MICU 4/13 for CRRT initiation for ammonia clearance.    =====Neuro=====  #Altered mental status  >Baseline AOx3  >CTH (4/13): No acute findings  >CTH (4/15): No acute findings   >s/p Precedex, off since 4/15  - off sedation, awake, follows command, AAO2-3  - Likely iso hyperammonemia vs. infection   - treatment for hyperammonemia as below    #Chronic pain  #anxiety/depression   >Was on alprazolam 0.75mg QD, Dilaudid and methadone 5mg TID at home  - c/w Methadone 5mg TID    #Downward nystagmus - RESOLVED  >Noted on exam 4/14-4/15 overnight  >CTH (4/15): No acute findings   >EEG (4/15 - 4/17): TME but no seizure activity  - Will ctm    =====Cardio=====  #History of L peroneal DVT in 2022  #History of RA thrombus in 2018  >TTE (4/13): No thrombus commented  - Eliquis stopped 5/2023 by outpatient hematology given consistently negative DVT studies  - On HSQ for DVT ppx    #RV dysfunction? - RESOLVED  >Enlarged RV and pulmonary hypertension noted on POCUS (4/16) that is not on TTE 4/13  >CTAPE (4/16): No PE  >DVT study (4/16): No DVT  >TTE (4/16): Normal LVSF EF 75%, Normal RVSF and size, TAPSE 1.9, PASP 52  - Elevated PASP likely chronic + acutely exacerbated by liver failure  - ctm w/ POCUS    =====Pulmonary=====  #Atelectasis vs. PNA  >CTC (4/13): RML consolidation noted, appears atelectasis > PNA on POCUS  >L sided consolidation noted on POCUS - possibly PNA  - persistently leukocytosis  - abx as below  - f/u ID recs    #Mechanical ventilation   - s/p Extubation 4/17  - Currently on RA    =====GI=====  #Alcoholic hepatitis/cirrhosis?  >Elevated bilirubin/coag, c/b hepatic encephalopathy  >s/p NAC on admission to outside hospital  >CTAP (4/13): Hepatomegaly and hepatic steatosis  >US abd (4/13): Hepatic steatosis, no hepatic vein thrombosis  >Acute hepatitis panel (4/13): Negative  >Autoimmune hepatitis panel (4/13): Negative  >MRCP (4/15): Enlarged, fatty liver. No biliary duct dilation, unlikely obstruction  >Gastrograffin study to r/o SBO (4/16): Passage of contrast seen on serial XR, unlikely SBO  >PETH (4/13): > 400  - Etiology likely alcohol related given PETH level, although patient and family denies alcohol use  - c/w Folid acid, MVI  - c/w thiamine 500mg Q8h x 3 days (4/18 - 4/20) given patient confabulating c/f wernicke vs. ICU delirium   - Will d/c ursodiol 500mg QD as unlikely obstructive pathology    #Hyperammonemia   >Baseline mentation AAO3  >s/p CRRT (off since 4/16)  - f/u plasma amino acid (4/16): in lab  - f/u urine orotic acid (4/16) - sent out to [facility]  - f/u metabolic genetic consult to r/o acquired urea cycle disorder  - c/w Rifaximin   - c/w Lactulose  - c/w Trend Ammonia q6h    #Colitis, ?Ischemic  >CTA/P (4/13): segmental areas of wall thickening throughout the colon and rectum with pneumatosis, mesenteric edema, and adjacent hyperdensities in the proximal transverse colon suspicious for bowel ischemia with intraluminal hemorrhage. Additional intraluminal hyperdensities within the distal ascending colon suspicious for hemorrhage.   >TTE (4/13): No thrombus commented  - Surgery, GI following, no plan for surgery/endoscopic eval for now  - On TF, trend lactate, monitor GIB      #Colonic mass?  >CTA/P (4/13): Nonspecific focal areas of narrowing in the distal descending colon and sigmoid colon, possibly colonic masses  >CTA/P (4/16): Improved colitis, previous ?colonic mass vs. narrowing still seen per discussion w/ radiology  - ctm, colonoscopy eventually    #Diet and ppx  - TF, c/w Pancreatic enzyme supplement while on TF  - rectal tube in place   - c/w PPI 40mg for GI ppx  - Pending bedside dysphagia / swallow eval  - Will reach out to TPN team to start evaluation, in order to minimize enteric volume given colitis    =====Renal=====  #CRRT - NOW OFF  >s/p CRRT (last 4/17) for ammonia clearance  - Strict I/O  - Diurese PRN    =====Heme=====   #Factor 5 Leiden  #History of L peroneal DVT in 2022  #History of RA thrombus in 2018  >TTE (4/13): No thrombus commented  >Eliquis stopped 5/2023 by outpatient hematology given consistently negative DVT studies  - SQH for DVT ppx    #Anemia  >s/p 2u PRBC (4/13, 4/16)  >Multifactorial in nature. No hx of bleeding but with coagulopathy as below.   >B12 (4/13): >2000, Folate: 8.6  >Iron studies (4/13): Ferritin 653, Iron 43, Unmeasurable TIBC/Iron%   >Hemolysis lab (4/14): Haptoglobin < 20;   - Possibly iso liver disease vs. acute infection/inflammation vs. ?hemolysis  - Trend CBC, coag, transfuse goal > 7    #thrombocytopenia  >s/p 1u Plt (4/13)   - Etiology not entirely clear, s/p splenectomy and hepatic steatosis w/o cirrhosis on imaging  - Likely iso acute illness  - Trend CBC, plt goal > 20    =====Endo=====  #DM1 vs. DM3c  >Per HIE record, patient prone to hypoglycemia  >History of DM1, but s/p total pancreatectomy and islet cell autotransplant in 2018 - currently DM3c?  >C-peptide (4/18): < 0.1  - c/w NPH q6h while on continuous TF  - Endocrine on board    #Hypothyroidism  - c/w synthyroid 100    =====ID=====  Infectious work up  >UA (4/13): 11WBC, small LE, negative Nitrite or bacteria  >Babesia (4/13): neg; HIV (4/13): neg; RVP, COVID (4/14): neg; MRSA (4/13): Negative  >CXR (4/13): RML consolidation  >CTC/A/P (4/13): RML consolidation, LLL atelectasis 2/2 likely ?mucus plug, ischemic colitis  >Bcx (4/13): NGTD  >ET tube cx (4/13): Normal resp luisa  >BAL (4/13): Pending  >GI PCR (4/15): Negative  >Sputum culture (4/17): Carbapenem resistance Acineobacter  - Rest of infectious w/u per ID    #Leukocytosis  #Septic shock   #Hx of VRE and Candida infection  >H/o of recurrent infxn from multiple abdominal wounds and surgeries. Hx of VRE and candida.   >Previously on levo at OSH. Off pressor on transfer  >s/p Caspofungin 50mg Q24 (stopped 4/16)  >s/p Daptomycin 500mg Q24 (stopped 4/16)  >s/p Doxycycline 100mg Q12 (stopped 4/18)  - Source of WBC Colitis vs. Pneumonia  - c/w Meropenem 1g q12 (4/10 - )  - c/w Vancomycin q12 (4/17 - )  - Will discuss with ID regarding abx regimen for Carb resistance Acineobacter

## 2024-04-19 NOTE — PROGRESS NOTE ADULT - SUBJECTIVE AND OBJECTIVE BOX
Hepatology Progress Note    Interval Events:   -Still appears confused at bedside. NH3 stable   -Still having fevers with last temp 100 last night, C.diff pendingn    Allergies:  No Known Allergies      Hospital Medications:  chlorhexidine 4% Liquid 1 Application(s) Topical <User Schedule>  cholecalciferol 2000 Unit(s) Oral daily  folic acid 1 milliGRAM(s) Oral daily  heparin   Injectable 5000 Unit(s) SubCutaneous every 12 hours  insulin lispro (ADMELOG) corrective regimen sliding scale   SubCutaneous every 6 hours  insulin NPH human recombinant 4 Unit(s) SubCutaneous every 6 hours  lactulose Syrup 30 Gram(s) Oral every 8 hours  levothyroxine 100 MICROGram(s) Oral daily  meropenem  IVPB 1000 milliGRAM(s) IV Intermittent every 8 hours  methadone    Tablet 5 milliGRAM(s) Oral three times a day  multivitamin/minerals/iron Oral Solution (CENTRUM) 15 milliLiter(s) Oral daily  pantoprazole  Injectable 40 milliGRAM(s) IV Push daily  rifAXIMin 550 milliGRAM(s) Oral two times a day  thiamine IVPB 500 milliGRAM(s) IV Intermittent every 8 hours  vancomycin  IVPB 1000 milliGRAM(s) IV Intermittent every 12 hours  zinc sulfate 220 milliGRAM(s) Oral daily      ROS: 14 point ROS negative unless otherwise state in subjective    PHYSICAL EXAM:   Vital Signs:  Vital Signs Last 24 Hrs  T(C): 36.9 (19 Apr 2024 12:00), Max: 37.8 (18 Apr 2024 20:00)  T(F): 98.5 (19 Apr 2024 12:00), Max: 100 (18 Apr 2024 20:00)  HR: 106 (19 Apr 2024 13:00) (98 - 126)  BP: 110/56 (19 Apr 2024 13:00) (110/56 - 160/77)  BP(mean): 77 (19 Apr 2024 13:00) (77 - 108)  RR: 19 (19 Apr 2024 13:00) (16 - 33)  SpO2: 99% (19 Apr 2024 13:00) (94% - 100%)    Parameters below as of 19 Apr 2024 07:00  Patient On (Oxygen Delivery Method): room air    GENERAL:  No acute distress  HEENT:  + scleral icterus  CHEST: no resp distress  HEART:  RRR  ABDOMEN:  Soft, non-tender, non-distended, normoactive bowel sounds  NEURO:  Alert and oriented x 1, no asterixis, no tremor    LABS:                        7.5    30.20 )-----------( 101      ( 19 Apr 2024 00:56 )             22.7     Mean Cell Volume: 98.3 fl (04-19-24 @ 00:56)    04-19    144  |  109<H>  |  <4<L>  ----------------------------<  250<H>  3.8   |  23  |  0.41<L>    Ca    9.7      19 Apr 2024 00:56  Phos  3.5     04-19  Mg     1.5     04-19    TPro  5.0<L>  /  Alb  3.0<L>  /  TBili  4.4<H>  /  DBili  x   /  AST  55<H>  /  ALT  21  /  AlkPhos  133<H>  04-19    LIVER FUNCTIONS - ( 19 Apr 2024 00:56 )  Alb: 3.0 g/dL / Pro: 5.0 g/dL / ALK PHOS: 133 U/L / ALT: 21 U/L / AST: 55 U/L / GGT: x           PT/INR - ( 19 Apr 2024 00:56 )   PT: 15.8 sec;   INR: 1.52 ratio         PTT - ( 19 Apr 2024 00:56 )  PTT:46.3 sec  Urinalysis Basic - ( 19 Apr 2024 00:56 )    Color: x / Appearance: x / SG: x / pH: x  Gluc: 250 mg/dL / Ketone: x  / Bili: x / Urobili: x   Blood: x / Protein: x / Nitrite: x   Leuk Esterase: x / RBC: x / WBC x   Sq Epi: x / Non Sq Epi: x / Bacteria: x

## 2024-04-19 NOTE — PROGRESS NOTE ADULT - SUBJECTIVE AND OBJECTIVE BOX
Patient is a 48y old  Female who presents with a chief complaint of Liver Failure (18 Apr 2024 15:50)      24 hour events: Uptrending WBC to 30, Ammonia stable 69, Vanc trough 13.8, increased dosage to 1250 BID  Subjective: Abdominal pain    OBJECTIVE:  ICU Vital Signs Last 24 Hrs  T(C): 37.2 (19 Apr 2024 04:00), Max: 37.8 (18 Apr 2024 20:00)  T(F): 99 (19 Apr 2024 04:00), Max: 100 (18 Apr 2024 20:00)  HR: 126 (19 Apr 2024 07:00) (93 - 126)  BP: 117/57 (19 Apr 2024 07:00) (111/56 - 160/77)  BP(mean): 81 (19 Apr 2024 07:00) (77 - 108)  ABP: --  ABP(mean): --  RR: 21 (19 Apr 2024 07:00) (16 - 33)  SpO2: 98% (19 Apr 2024 07:00) (94% - 100%)    O2 Parameters below as of 19 Apr 2024 07:00  Patient On (Oxygen Delivery Method): room air              04-18 @ 07:01  -  04-19 @ 07:00  --------------------------------------------------------  IN: 1580 mL / OUT: 1685 mL / NET: -105 mL      CAPILLARY BLOOD GLUCOSE      POCT Blood Glucose.: 195 mg/dL (19 Apr 2024 05:21)      PHYSICAL EXAM:  GENERAL: AAOx3, NAD  Cardiovascular: RRR, S1 S2, no m/r/g. No extremities edema, no JVD  LUNGS: Unlabored respirations, CTABL no wheeze/rhonchi/crackles  ABDOMEN: Soft, NTND, no rebound or guarding, BS presents. No CVA tenderness.  EXTREMITIES: Warm extremities, no clubbing, cyanosis, or edema, pulses 2+ bilaterally  NEURO: CN 2-12 grossly intact, strength 5/5 throughout, sensation intact      LINES:    HOSPITAL MEDICATIONS:  MEDICATIONS  (STANDING):  albuterol/ipratropium for Nebulization 3 milliLiter(s) Nebulizer every 6 hours  chlorhexidine 4% Liquid 1 Application(s) Topical <User Schedule>  cholecalciferol 2000 Unit(s) Oral daily  folic acid 1 milliGRAM(s) Oral daily  heparin   Injectable 5000 Unit(s) SubCutaneous every 12 hours  insulin lispro (ADMELOG) corrective regimen sliding scale   SubCutaneous every 6 hours  insulin NPH human recombinant 3 Unit(s) SubCutaneous every 6 hours  lactulose Syrup 30 Gram(s) Oral every 6 hours  levothyroxine 100 MICROGram(s) Oral daily  meropenem  IVPB 1000 milliGRAM(s) IV Intermittent every 12 hours  methadone    Tablet 5 milliGRAM(s) Oral three times a day  multivitamin/minerals/iron Oral Solution (CENTRUM) 15 milliLiter(s) Oral daily  pantoprazole  Injectable 40 milliGRAM(s) IV Push daily  rifAXIMin 550 milliGRAM(s) Oral two times a day  thiamine IVPB 500 milliGRAM(s) IV Intermittent every 8 hours  ursodiol Tablet 500 milliGRAM(s) Oral <User Schedule>  vancomycin  IVPB      vancomycin  IVPB 1250 milliGRAM(s) IV Intermittent every 12 hours  zinc sulfate 220 milliGRAM(s) Oral daily    MEDICATIONS  (PRN):      LABS:                        7.5    30.20 )-----------( 101      ( 19 Apr 2024 00:56 )             22.7     Hgb Trend: 7.5<--, 8.0<--, 8.8<--, 9.2<--, 8.5<--  04-19    144  |  109<H>  |  <4<L>  ----------------------------<  250<H>  3.8   |  23  |  0.41<L>    Ca    9.7      19 Apr 2024 00:56  Phos  3.5     04-19  Mg     1.5     04-19    TPro  5.0<L>  /  Alb  3.0<L>  /  TBili  4.4<H>  /  DBili  x   /  AST  55<H>  /  ALT  21  /  AlkPhos  133<H>  04-19    Creatinine Trend: 0.41<--, 0.49<--, 0.49<--, 0.50<--, 0.47<--, 0.43<--  PT/INR - ( 19 Apr 2024 00:56 )   PT: 15.8 sec;   INR: 1.52 ratio         PTT - ( 19 Apr 2024 00:56 )  PTT:46.3 sec  Urinalysis Basic - ( 19 Apr 2024 00:56 )    Color: x / Appearance: x / SG: x / pH: x  Gluc: 250 mg/dL / Ketone: x  / Bili: x / Urobili: x   Blood: x / Protein: x / Nitrite: x   Leuk Esterase: x / RBC: x / WBC x   Sq Epi: x / Non Sq Epi: x / Bacteria: x      Arterial Blood Gas:  04-19 @ 00:52  7.47/34/102/25/96.9/1.1  ABG lactate: --  Arterial Blood Gas:  04-18 @ 00:17  7.47/33/152/24/97.8/0.5  ABG lactate: --  Arterial Blood Gas:  04-17 @ 17:15  7.50/34/100/26/97.1/3.2  ABG lactate: --  Arterial Blood Gas:  04-17 @ 13:46  7.44/39/52/26/82.3/2.2  ABG lactate: --    Venous Blood Gas:  04-17 @ 11:47  7.41/43/39/27/60.1  VBG Lactate: 2.6      EKG:    MICROBIOLOGY:     RADIOLOGY:  [ ] Reviewed and interpreted by me    EKG:  MICROBIOLOGY:     Radiology: ***    Bedside lung ultrasound: ***    Bedside ECHO: ***    EKG:    CENTRAL LINE: Y/N          DATE INSERTED:              REMOVE: Y/N    BERRY: Y/N                        DATE INSERTED:              REMOVE: Y/N    A-LINE: Y/N                       DATE INSERTED:              REMOVE: Y/N    GLOBAL ISSUE/BEST PRACTICE:  Analgesia:  Sedation:  HOB elevation: yes  Stress ulcer prophylaxis:  VTE prophylaxis:  Glycemic control:  Nutrition:    CODE STATUS: ***  [facility] discussion: Y     Patient is a 48y old  Female who presents with a chief complaint of Liver Failure (18 Apr 2024 15:50)      24 hour events: Uptrending WBC to 30, Ammonia stable 69, Vanc trough 13.8  Subjective: Abdominal pain    OBJECTIVE:  ICU Vital Signs Last 24 Hrs  T(C): 37.2 (19 Apr 2024 04:00), Max: 37.8 (18 Apr 2024 20:00)  T(F): 99 (19 Apr 2024 04:00), Max: 100 (18 Apr 2024 20:00)  HR: 126 (19 Apr 2024 07:00) (93 - 126)  BP: 117/57 (19 Apr 2024 07:00) (111/56 - 160/77)  BP(mean): 81 (19 Apr 2024 07:00) (77 - 108)  ABP: --  ABP(mean): --  RR: 21 (19 Apr 2024 07:00) (16 - 33)  SpO2: 98% (19 Apr 2024 07:00) (94% - 100%)    O2 Parameters below as of 19 Apr 2024 07:00  Patient On (Oxygen Delivery Method): room air              04-18 @ 07:01  -  04-19 @ 07:00  --------------------------------------------------------  IN: 1580 mL / OUT: 1685 mL / NET: -105 mL      CAPILLARY BLOOD GLUCOSE      POCT Blood Glucose.: 195 mg/dL (19 Apr 2024 05:21)      PHYSICAL EXAM:  GENERAL: AAOx1, confabulating  CV: RRR, S1 S2, no m/r/g. No extremities edema, no JVD  LUNGS: Unlabored respirations, CTABL  ABDOMEN: Soft, TTP throughout, ND  EXTREMITIES: Warm extremities, no clubbing, cyanosis, or edema, pulses 2+ bilaterally  NEURO: PERRLA, confabulating, asterixis present      LINES:    HOSPITAL MEDICATIONS:  MEDICATIONS  (STANDING):  albuterol/ipratropium for Nebulization 3 milliLiter(s) Nebulizer every 6 hours  chlorhexidine 4% Liquid 1 Application(s) Topical <User Schedule>  cholecalciferol 2000 Unit(s) Oral daily  folic acid 1 milliGRAM(s) Oral daily  heparin   Injectable 5000 Unit(s) SubCutaneous every 12 hours  insulin lispro (ADMELOG) corrective regimen sliding scale   SubCutaneous every 6 hours  insulin NPH human recombinant 3 Unit(s) SubCutaneous every 6 hours  lactulose Syrup 30 Gram(s) Oral every 6 hours  levothyroxine 100 MICROGram(s) Oral daily  meropenem  IVPB 1000 milliGRAM(s) IV Intermittent every 12 hours  methadone    Tablet 5 milliGRAM(s) Oral three times a day  multivitamin/minerals/iron Oral Solution (CENTRUM) 15 milliLiter(s) Oral daily  pantoprazole  Injectable 40 milliGRAM(s) IV Push daily  rifAXIMin 550 milliGRAM(s) Oral two times a day  thiamine IVPB 500 milliGRAM(s) IV Intermittent every 8 hours  ursodiol Tablet 500 milliGRAM(s) Oral <User Schedule>  vancomycin  IVPB      vancomycin  IVPB 1250 milliGRAM(s) IV Intermittent every 12 hours  zinc sulfate 220 milliGRAM(s) Oral daily    MEDICATIONS  (PRN):      LABS:                        7.5    30.20 )-----------( 101      ( 19 Apr 2024 00:56 )             22.7     Hgb Trend: 7.5<--, 8.0<--, 8.8<--, 9.2<--, 8.5<--  04-19    144  |  109<H>  |  <4<L>  ----------------------------<  250<H>  3.8   |  23  |  0.41<L>    Ca    9.7      19 Apr 2024 00:56  Phos  3.5     04-19  Mg     1.5     04-19    TPro  5.0<L>  /  Alb  3.0<L>  /  TBili  4.4<H>  /  DBili  x   /  AST  55<H>  /  ALT  21  /  AlkPhos  133<H>  04-19    Creatinine Trend: 0.41<--, 0.49<--, 0.49<--, 0.50<--, 0.47<--, 0.43<--  PT/INR - ( 19 Apr 2024 00:56 )   PT: 15.8 sec;   INR: 1.52 ratio         PTT - ( 19 Apr 2024 00:56 )  PTT:46.3 sec  Urinalysis Basic - ( 19 Apr 2024 00:56 )    Color: x / Appearance: x / SG: x / pH: x  Gluc: 250 mg/dL / Ketone: x  / Bili: x / Urobili: x   Blood: x / Protein: x / Nitrite: x   Leuk Esterase: x / RBC: x / WBC x   Sq Epi: x / Non Sq Epi: x / Bacteria: x      Arterial Blood Gas:  04-19 @ 00:52  7.47/34/102/25/96.9/1.1  ABG lactate: --  Arterial Blood Gas:  04-18 @ 00:17  7.47/33/152/24/97.8/0.5  ABG lactate: --  Arterial Blood Gas:  04-17 @ 17:15  7.50/34/100/26/97.1/3.2  ABG lactate: --  Arterial Blood Gas:  04-17 @ 13:46  7.44/39/52/26/82.3/2.2  ABG lactate: --    Venous Blood Gas:  04-17 @ 11:47  7.41/43/39/27/60.1  VBG Lactate: 2.6      EKG:    MICROBIOLOGY:     RADIOLOGY:  [ ] Reviewed and interpreted by me    EKG:  MICROBIOLOGY:     Radiology: ***    Bedside lung ultrasound: ***    Bedside ECHO: ***    EKG:    CENTRAL LINE: Y/N          DATE INSERTED:              REMOVE: Y/N    BERRY: Y/N                        DATE INSERTED:              REMOVE: Y/N    A-LINE: Y/N                       DATE INSERTED:              REMOVE: Y/N    GLOBAL ISSUE/BEST PRACTICE:  Analgesia:  Sedation:  HOB elevation: yes  Stress ulcer prophylaxis:  VTE prophylaxis:  Glycemic control:  Nutrition:    CODE STATUS: ***  [facility] discussion: Y

## 2024-04-19 NOTE — PROGRESS NOTE ADULT - NUTRITIONAL ASSESSMENT
This patient has been assessed with a concern for Malnutrition and has been determined to have a diagnosis/diagnoses of Severe protein-calorie malnutrition and Underweight (BMI < 19).    This patient is being managed with:   Diet NPO with Tube Feed-  Tube Feeding Modality: Nasogastric  Vital 1.5 [place] (VITAL1.5RTH)  Total Volume for 24 Hours (mL): 240  Continuous  Starting Tube Feed Rate {mL per Hour}: 10  Until Goal Tube Feed Rate (mL per Hour): 10  Tube Feed Duration (in Hours): 24  Tube Feed Start Time: 16:00  Entered: Apr 18 2024  3:40PM

## 2024-04-19 NOTE — SWALLOW FEES ASSESSMENT ADULT - PHARYNGEAL PHASE COMMENTS
pt with reduced tolerance for scope at this point during the exam, reduced visualization of laryngeal vestibule and trachea, unable to r/o laryngeal penetration/aspiration. however, patient with +throat clearing and cough post swallow indicative of reduced airway protection.

## 2024-04-19 NOTE — PHARMACOTHERAPY INTERVENTION NOTE - COMMENTS
MEDICATIONS  (STANDING):  cholecalciferol 2000 Unit(s) Oral daily  folic acid 1 milliGRAM(s) Oral daily  heparin   Injectable 5000 Unit(s) SubCutaneous every 12 hours  insulin lispro (ADMELOG) corrective regimen sliding scale   SubCutaneous every 6 hours  insulin NPH human recombinant 4 Unit(s) SubCutaneous every 6 hours  lactulose Syrup 30 Gram(s) Oral every 8 hours  levothyroxine 100 MICROGram(s) Oral daily  meropenem  IVPB 1000 milliGRAM(s) IV Intermittent every 8 hours  methadone    Tablet 5 milliGRAM(s) Oral three times a day  multivitamin/minerals/iron Oral Solution (CENTRUM) 15 milliLiter(s) Oral daily  pantoprazole  Injectable 40 milliGRAM(s) IV Push daily  rifAXIMin 550 milliGRAM(s) Oral two times a day  thiamine IVPB 500 milliGRAM(s) IV Intermittent every 8 hours  vancomycin  IVPB 1000 milliGRAM(s) IV Intermittent every 12 hours  zinc sulfate 220 milliGRAM(s) Oral daily    -If the team decided to use sodium benzoate instead of lactulose, inform pharmacy to enter the order and compound the product  -Patient is currently on 10mL/hr trigger feed which is given via 10 [place] NGT. Because the size of the NGT Creon can not be administered. Alternatively can use Viokace 10,440 units with sodium bicarbonate 325mg crush in fine power, mix in 5 mL warm sterile water, then instill in enteral tube. Then it should be flushed afterward. Viokace should be given q4h while on continuous tube feed.    -Vancomycin trough was 13.8 this morning (based on 1000mg IV q12h dosing)    Recommendation(s):  1) Will continue vancomycin 1g q12h dose (Calculated vancomycin AUC is approx. 530 based 13.8 trough; target 400 - 600)    Mis Nguyen, PharmD, BCCCP  Clinical Pharmacist, Critical Care  Available via Microsoft Teams       MEDICATIONS  (STANDING):  cholecalciferol 2000 Unit(s) Oral daily  folic acid 1 milliGRAM(s) Oral daily  heparin   Injectable 5000 Unit(s) SubCutaneous every 12 hours  insulin lispro (ADMELOG) corrective regimen sliding scale   SubCutaneous every 6 hours  insulin NPH human recombinant 4 Unit(s) SubCutaneous every 6 hours  lactulose Syrup 30 Gram(s) Oral every 8 hours  levothyroxine 100 MICROGram(s) Oral daily  meropenem  IVPB 1000 milliGRAM(s) IV Intermittent every 8 hours  methadone    Tablet 5 milliGRAM(s) Oral three times a day  multivitamin/minerals/iron Oral Solution (CENTRUM) 15 milliLiter(s) Oral daily  pantoprazole  Injectable 40 milliGRAM(s) IV Push daily  rifAXIMin 550 milliGRAM(s) Oral two times a day  thiamine IVPB 500 milliGRAM(s) IV Intermittent every 8 hours  vancomycin  IVPB 1000 milliGRAM(s) IV Intermittent every 12 hours  zinc sulfate 220 milliGRAM(s) Oral daily    -If the team decided to use sodium benzoate instead of lactulose, inform pharmacy to enter the order and compound the product  -Patient is currently on 10mL/hr tube feed which is given via 10 [place] NGT. Because the size of the NGT Creon can not be administered. Alternatively can use Viokace 10,440 units with sodium bicarbonate 325mg crush in fine power, mix in 5 mL warm sterile water, then instill in enteral tube. Then it should be flushed afterward. Viokace should be given q4h while on continuous tube feed.    -Vancomycin trough was 13.8 this morning (based on 1000mg IV q12h dosing)    Recommendation(s):  1) Will continue vancomycin 1g q12h dose (Calculated vancomycin AUC is approx. 530 based 13.8 trough; target 400 - 600)    Mis Nguyen, PharmD, BCCCP  Clinical Pharmacist, Critical Care  Available via Microsoft Teams

## 2024-04-19 NOTE — SWALLOW FEES ASSESSMENT ADULT - ADDITIONAL RECOMMENDATIONS
swallow goals: pt will tolerate recommended diet with no s/s of aspiration. swallow goals: pt will tolerate recommended diet with no s/s of aspiration.  consider speech-language evaluation to assess and treat cognitive deficits for improved functional communication. swallow goals: pt will tolerate recommended diet with no s/s of aspiration.  consider speech-language evaluation to assess and treat cognitive deficits for improved functional communication.  consider MBS when clinically indicated for objective assessment to advance diet.

## 2024-04-19 NOTE — SWALLOW FEES ASSESSMENT ADULT - ORAL PHASE
Uncontrolled bolus/spillover in morris-pharynx Uncontrolled bolus/spillover in morris-pharynx/Uncontrolled bolus/spillover in hypopharynx

## 2024-04-19 NOTE — PROGRESS NOTE ADULT - ATTENDING COMMENTS
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

## 2024-04-19 NOTE — PROGRESS NOTE ADULT - SUBJECTIVE AND OBJECTIVE BOX
SURGERY PROGRESS NOTE    Interval events  - mental status improving; listed for floor; off CRRT  - tbili and ammonia improving  - continues on lactulose and trickle feeds  - WBC 30 (uptrending)   - Reports continued abdominal discomfort  - has not been tested for c.diff        OBJECTIVE:   Vital Signs Last 24 Hrs  T(C): 36.8 (19 Apr 2024 08:00), Max: 37.8 (18 Apr 2024 20:00)  T(F): 98.3 (19 Apr 2024 08:00), Max: 100 (18 Apr 2024 20:00)  HR: 103 (19 Apr 2024 10:00) (93 - 126)  BP: 136/80 (19 Apr 2024 10:00) (111/56 - 160/77)  BP(mean): 101 (19 Apr 2024 10:00) (77 - 108)  RR: 25 (19 Apr 2024 10:00) (16 - 33)  SpO2: 97% (19 Apr 2024 10:00) (94% - 100%)    Parameters below as of 19 Apr 2024 07:00  Patient On (Oxygen Delivery Method): room air            I&O's Summary    18 Apr 2024 07:01  -  19 Apr 2024 07:00  --------------------------------------------------------  IN: 1580 mL / OUT: 1685 mL / NET: -105 mL    19 Apr 2024 07:01  -  19 Apr 2024 10:22  --------------------------------------------------------  IN: 50 mL / OUT: 0 mL / NET: 50 mL      I&O's Detail    18 Apr 2024 07:01  -  19 Apr 2024 07:00  --------------------------------------------------------  IN:    Enteral Tube Flush: 330 mL    IV PiggyBack: 1050 mL    Vital1.5: 200 mL  Total IN: 1580 mL    OUT:    Incontinent per Collection Bag (mL): 450 mL    Indwelling Catheter - Urethral (mL): 135 mL    Rectal Tube (mL): 1100 mL  Total OUT: 1685 mL    Total NET: -105 mL      19 Apr 2024 07:01  -  19 Apr 2024 10:22  --------------------------------------------------------  IN:    IV PiggyBack: 50 mL  Total IN: 50 mL    OUT:  Total OUT: 0 mL    Total NET: 50 mL          MEDICATIONS  (STANDING):  albuterol/ipratropium for Nebulization 3 milliLiter(s) Nebulizer every 6 hours  chlorhexidine 4% Liquid 1 Application(s) Topical <User Schedule>  cholecalciferol 2000 Unit(s) Oral daily  folic acid 1 milliGRAM(s) Oral daily  heparin   Injectable 5000 Unit(s) SubCutaneous every 12 hours  insulin lispro (ADMELOG) corrective regimen sliding scale   SubCutaneous every 6 hours  insulin NPH human recombinant 4 Unit(s) SubCutaneous every 6 hours  lactulose Syrup 30 Gram(s) Oral every 8 hours  levothyroxine 100 MICROGram(s) Oral daily  meropenem  IVPB 1000 milliGRAM(s) IV Intermittent every 8 hours  methadone    Tablet 5 milliGRAM(s) Oral three times a day  multivitamin/minerals/iron Oral Solution (CENTRUM) 15 milliLiter(s) Oral daily  pantoprazole  Injectable 40 milliGRAM(s) IV Push daily  rifAXIMin 550 milliGRAM(s) Oral two times a day  thiamine IVPB 500 milliGRAM(s) IV Intermittent every 8 hours  ursodiol Tablet 500 milliGRAM(s) Oral <User Schedule>  vancomycin  IVPB 1000 milliGRAM(s) IV Intermittent every 12 hours  zinc sulfate 220 milliGRAM(s) Oral daily    MEDICATIONS  (PRN):    PHYSICAL EXAM  General: No acute distress, resting comfortably in bed.  Abdomen: soft, nondistended, mild diffuse tenderness. rectal tube with green/brown output      LABS:                        7.5    30.20 )-----------( 101      ( 19 Apr 2024 00:56 )             22.7     04-19    144  |  109<H>  |  <4<L>  ----------------------------<  250<H>  3.8   |  23  |  0.41<L>    Ca    9.7      19 Apr 2024 00:56  Phos  3.5     04-19  Mg     1.5     04-19    TPro  5.0<L>  /  Alb  3.0<L>  /  TBili  4.4<H>  /  DBili  x   /  AST  55<H>  /  ALT  21  /  AlkPhos  133<H>  04-19    PT/INR - ( 19 Apr 2024 00:56 )   PT: 15.8 sec;   INR: 1.52 ratio         PTT - ( 19 Apr 2024 00:56 )  PTT:46.3 sec  Urinalysis Basic - ( 19 Apr 2024 00:56 )    Color: x / Appearance: x / SG: x / pH: x  Gluc: 250 mg/dL / Ketone: x  / Bili: x / Urobili: x   Blood: x / Protein: x / Nitrite: x   Leuk Esterase: x / RBC: x / WBC x   Sq Epi: x / Non Sq Epi: x / Bacteria: x        RADIOLOGY & ADDITIONAL STUDIES:  no new

## 2024-04-19 NOTE — SWALLOW FEES ASSESSMENT ADULT - DIAGNOSTIC IMPRESSIONS
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 Lab revealed ammonia > 200, elevated bilirubin and INR initially c/f liver failure, c/c/b colitis possibly ischemic etiology. Patient transferred to [facility] MICU 4/13 for CRRT initiation for ammonia clearance. Pt presents with an oropharyngeal dysphagia superimposed upon an altered mental status. Oral stage of swallow marked by prolonged and reduced mastication of chewable food, reduced bolus formation and control with all textures, premature spillover to the oropharynx on moderately thick liquid and to the pyriform sinus on mildly thick and thin liquid. Pharyngeal stage of swallow is characterized by delayed pharyngeal swallows, trace to mild post swallow residue in the pharynx, laryngeal penetration on puree and mildly thick liquid which is fully retrieved with repeat swallow and laryngeal penetration on easy to chew without retrieval. Reduced visualization of laryngeal vestibule after trials of thin liquid due to patient's reduced tolerance for scope. Although unable to visualize laryngeal penetration/aspiration, patient with overt s/s of impaired airway protection (throat clearing and coughing post swallow). Conservative diet recommended at this time. 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 Lab revealed ammonia > 200, elevated bilirubin and INR initially c/f liver failure, c/c/b colitis possibly ischemic etiology. Patient transferred to [facility] MICU 4/13 for CRRT initiation for ammonia clearance. Pt presents with an oropharyngeal dysphagia superimposed upon an altered mental status. Oral stage of swallow marked by prolonged and reduced mastication of chewable food, reduced bolus formation and control with all textures, premature spillover to the oropharynx on moderately thick liquid and to the pyriform sinus on mildly thick and thin liquid. Pharyngeal stage of swallow is characterized by delayed pharyngeal swallows, trace to mild post swallow residue in the pharynx, laryngeal penetration on puree and mildly thick liquid which is fully retrieved with spontaneous repeat swallow and laryngeal penetration on easy to chew without retrieval of material. Reduced visualization of laryngeal vestibule after trials of thin liquid due to patient's reduced tolerance for scope. Although unable to visualize laryngeal penetration/aspiration, patient with overt s/s of impaired airway protection (throat clearing and coughing post swallow). Conservative diet recommended at this time.

## 2024-04-19 NOTE — SWALLOW FEES ASSESSMENT ADULT - SLP PERTINENT HISTORY OF CURRENT PROBLEM
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 Presented to [facility] 4/8 for several weeks of decreased PO intake, NBNB emesis, watery diarrhea, Somnolence, requiring intubation for airway protection. Lab revealed ammonia > 200, elevated bilirubin and INR initially c/f liver failure, c/c/b colitis possibly ischemic etiology. Patient transferred to [facility] MICU 4/13 for CRRT initiation for ammonia clearance.

## 2024-04-19 NOTE — SWALLOW FEES ASSESSMENT ADULT - ROSENBEK'S PENETRATION ASPIRATION SCALE
(1) no aspiration, material does not enter airway (3) material remains above the vocal cords, visible residue remains (penetration) (2) material enters airway, remains above the vocal cords, no residue remains (penetration) material cleared with spontaneous repeat swallow/(2) material enters airway, remains above the vocal cords, no residue remains (penetration)

## 2024-04-19 NOTE — PROGRESS NOTE ADULT - ASSESSMENT
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 Hargrove), and now S/P total pancreatectomy with islet cell autotransplant at [facility] by Dr. Gil in 04/2018). 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 Per records, a Ct later in 2018 showed still fistulization but no abscess formation     Patient presented to [facility] ED on 4/8 by her spouse for AMS. Per , patient had been having nbnb vomiting and diarrhea for the last 4 weeks, extreme fatigue . She was unable to tolerate PO. She was also sleeping 18-20 hrs per day. On 4/8, he found her on the floor "completely out of it" and took her to [facility] for further evaluation.   Initial workup in ED demonstrated a Tbili 5.4, direct bili 4.4 and ammonia 196. Patient was somnolent and had a left eyeward gaze deviation. Submentally started developing agonal snoring respirations and had to be intubated for airway protection. Course complicated with septic shock and persistent hyperammonemia despite lactulose and rifaximin, CT show and started on meropenem, linezolid and anidulafungin at [facility]--> upon transfer here, off pressors, on alvaro/linezolid/caspofungin    CT Chest (4/13) Occlusion of the distal left lower lobar bronchus with complete left lower lobe atelectasis. Right middle lobe consolidation, possibly secondary to pneumonia or additional atelectasis.     CT A/P (4/13) Segmental areas of wall thickening throughout the colon and rectum with pneumatosis, mesenteric edema, and adjacent hyperdensities in the proximal transverse colon suspicious for bowel ischemia with intraluminal hemorrhage. Additional intraluminal hyperdensities within the distal ascending colon suspicious for hemorrhage. Nonspecific focal areas of narrowing in the distal descending colon and sigmoid colon, possibly colonic masses    UCx (4/13) No Growth  BCx (4/13) NGTD  Bronchoscopy Cx (4/13) Rare Yeast    CMV PCR (4/13) Negative  Adenovirus PCR (4/16) negative  Parvovirus PCR (4/13) Negative  EBV PCR (4/17) 61    Serum Cryptococcal Antigen (4/14) Negative  Serum Fungitell <31  Bronch Fungitell >500  Bronch and Serum Aspergillus Galactomannan Negative    Serum Histo Ag Negative  Urine Histo Ag Negative    Babesia PCR (4/13) Negative  Tick Diseases Panel Negative  Bartonella Serum PCR Negative  Leptospirosis Ab Negative    Lower suspicion infectious etiology directly contributing to hyperbilirubinemia; reasonable to complete meropenem course for the pneumatosis and possible pneumonia.    Low grade fevers 4/16 --> 4/17  Extubated on 4/17 with central line removal    #Fever, Leukocytosis, Positive Sputum Culture (CR Acinetobacter)  --If clinical status changes or CXR with significant infiltrates low threshold to switch Meropenem to Unasyn 3g IV Q6H over 4 hours (dosing for CrCl 30-50) AND Minocycline 200 mg IV BID to target the CR Acinetobacter. At the moment I favor that it may have represented colonization of the preceding ETT (now removed) as patient without any respiratory symptoms and on room air.  --Agree with sending a C diff toxin assay  --Can continue Vancomycin + Meropenem for now  --Continue to follow CBC with diff  --Continue to follow temperature curve  --Follow up on preliminary blood cultures    #Hyperammonemia, Bilirubin Elevation  Ammonia elevation now thought to be secondary to alcoholic hepatitis given positive PETH  --Follow up on Ureaplasma and Mycoplasma hominis PCR Sent out to [facility] (sent on 4/18)  --Follow up on Leptospira PCR    #Elevated Bronch Fungitell   Could be secondary to colonization of respiratory tract with Candida (growth of yeast on cultures)  --Follow up on Pneumocystis PCR    I will be away starting tomorrow. Dr Devora Jasmine will be covering tomorrow.     Bhavesh Pérez M.D.  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 Hargrove), and now S/P total pancreatectomy with islet cell autotransplant at [facility] by Dr. Gil in 04/2018). 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 Per records, a Ct later in 2018 showed still fistulization but no abscess formation     Patient presented to [facility] ED on 4/8 by her spouse for AMS. Per , patient had been having nbnb vomiting and diarrhea for the last 4 weeks, extreme fatigue . She was unable to tolerate PO. She was also sleeping 18-20 hrs per day. On 4/8, he found her on the floor "completely out of it" and took her to [facility] for further evaluation.   Initial workup in ED demonstrated a Tbili 5.4, direct bili 4.4 and ammonia 196. Patient was somnolent and had a left eyeward gaze deviation. Submentally started developing agonal snoring respirations and had to be intubated for airway protection. Course complicated with septic shock and persistent hyperammonemia despite lactulose and rifaximin, CT show and started on meropenem, linezolid and anidulafungin at [facility]--> upon transfer here, off pressors, on alvaro/linezolid/caspofungin    CT Chest (4/13) Occlusion of the distal left lower lobar bronchus with complete left lower lobe atelectasis. Right middle lobe consolidation, possibly secondary to pneumonia or additional atelectasis.     CT A/P (4/13) Segmental areas of wall thickening throughout the colon and rectum with pneumatosis, mesenteric edema, and adjacent hyperdensities in the proximal transverse colon suspicious for bowel ischemia with intraluminal hemorrhage. Additional intraluminal hyperdensities within the distal ascending colon suspicious for hemorrhage. Nonspecific focal areas of narrowing in the distal descending colon and sigmoid colon, possibly colonic masses    UCx (4/13) No Growth  BCx (4/13) NGTD  Bronchoscopy Cx (4/13) Rare Yeast    CMV PCR (4/13) Negative  Adenovirus PCR (4/16) negative  Parvovirus PCR (4/13) Negative  EBV PCR (4/17) 61    Serum Cryptococcal Antigen (4/14) Negative  Serum Fungitell <31  Bronch Fungitell >500  Bronch and Serum Aspergillus Galactomannan Negative    Serum Histo Ag Negative  Urine Histo Ag Negative    Babesia PCR (4/13) Negative  Tick Diseases Panel Negative  Bartonella Serum PCR Negative  Leptospirosis Ab Negative    Lower suspicion infectious etiology directly contributing to hyperbilirubinemia; reasonable to complete meropenem course for the pneumatosis and possible pneumonia.    Low grade fevers 4/16 --> 4/17  Extubated on 4/17 with central line removal    #Fever, Leukocytosis, Positive Sputum Culture (CR Acinetobacter)  --If clinical status changes or CXR with significant infiltrates low threshold to switch Meropenem to Unasyn 3g IV Q6H over 4 hours (dosing for CrCl 30-50) AND Minocycline 200 mg IV BID to target the CR Acinetobacter. At the moment I favor that it may have represented colonization of the preceding ETT (now removed) as patient without any respiratory symptoms and on room air. ?Leukocytosis due to alcoholic hepatitis?  --Agree with sending a C diff toxin assay  --Can continue Vancomycin + Meropenem for now  --Continue to follow CBC with diff  --Continue to follow temperature curve  --Follow up on preliminary blood cultures    #Hyperammonemia, Bilirubin Elevation  Ammonia elevation now thought to be secondary to alcoholic hepatitis given positive PETH  --Follow up on Ureaplasma and Mycoplasma hominis PCR Sent out to [place] [facility] (sent on 4/18)  --Follow up on Leptospira PCR    #Elevated Bronch Fungitell   Could be secondary to colonization of respiratory tract with Candida (growth of yeast on cultures)  --Follow up on Pneumocystis PCR    I will be away starting tomorrow. Dr Devora Jasmine will be covering tomorrow.     Bhavesh Pérez M.D.  [facility] Division of Infectious Disease  8AM-5PM Monday - Friday: Available on [employer] Teams  After Hours and Holidays (or if no response on [employer] Teams): Please contact the Infectious Diseases Office at [phone]     The above assessment and plan were discussed with MICU Resident

## 2024-04-19 NOTE — PROGRESS NOTE ADULT - ASSESSMENT
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 Hargrove), and now S/P total pancreatectomy  with islet cell autotransplant at [facility] by Dr. Gil in 04/2018), c/b recurrent abdominal wall collection requiring IR drainage, presented to OSH with acute change in mental status requiring intubation for airway protection, and transferred to [facility] for care escalation with labs notable for eleavted INR, T. Nilo and NH3 of unclear etiology although possibly 2/2 to cholestasis of sepsis     #Alc Hep/Cirrohsis   #Bicytopenia with Coagulopathy (improved)  #Pneumatosis of bowel concerning for ischemic colitis (improving)  #Acute encephalopathy with elevated hyperammonemia s/p CRRT for NH3 clearane    #Hepatomegaly   - DAWIT, Smooth and IgG within normal limts  - Presented with septic shock, likely RML pneumonia, RLL bronchial occlusion, and pneumatosis coli - likely ischemic colon, colonic blood suggested by CT, init. Hb drop with labs notable for elevated T. Nilo, INR and NH3. Labs also notable for macrocytic anemia - DD: folate deficiency due to malnutrition with ?SIBO and alcohol-related liver disease Imaging with U/S and MRCP with note of  hepatomegaly with non-cirrhotic morphology,MELD 33 (24 with initial creat. before CRRT). PETH positive (collateral from parents endorsing hx of heavy ETOH use) with etiology of the above likely 2/2 to alcohol-related liver disease with acute alc hep in the background or cirrhosis and cholestasis of sepsis   - Acute encephalopathy, hyperammonemia - DD: hepatic encephalopathy in setting of alcoholic liver disease that is slowly improving. s/p CRRT for NH3 clearance.  Ok to stop L-Carnitine and continue with Rifaximin 550 mg BID in addition to Lactulose for goal 5 BMs per day. Continue with high dose IV Thiamine and Vit Supplemtation as noted below     Recommendations:  - Zinc low continue with PO Zinc 50 mg QD  - Vit. D, continue with supplemtantion   -Serum Ceruloplasmin <10, follow up 24 hour urine copper collection   -Continue with Rifaximin 550 mg BID in addition to Lactulose for goal 5 BMs per day.  - If no surgical planning, may initiate trickle feed with elemental diet once cleared by surgery team,   - F/u infectious workup given persistent fevers given rising wbc count and ongoing fevers; Abx per ID  - IV Thiamine, Folate and MVI   - No plans for any colonoscopy at this time 9can re-consider once mentla status improved and no longer having fevers)    All recommendations are tentative until note is attested by attending.    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 Hargrove), and now S/P total pancreatectomy  with islet cell autotransplant at [facility] by Dr. Gil in 04/2018), c/b recurrent abdominal wall collection requiring IR drainage, presented to OSH with acute change in mental status requiring intubation for airway protection, and transferred to [facility] for care escalation with labs notable for eleavted INR, T. Nilo and NH3 of unclear etiology although possibly 2/2 to cholestasis of sepsis     #Alc Hep/Cirrohsis   #Bicytopenia with Coagulopathy (improved)  #Pneumatosis of bowel concerning for ischemic colitis (improving)  #Acute encephalopathy with elevated hyperammonemia s/p CRRT for NH3 clearane    #Hepatomegaly   - DAWIT, Smooth and IgG within normal limts  - Presented with septic shock, likely RML pneumonia, RLL bronchial occlusion, and pneumatosis coli - likely ischemic colon, colonic blood suggested by CT, init. Hb drop with labs notable for elevated T. Nilo, INR and NH3. Labs also notable for macrocytic anemia - DD: folate deficiency due to malnutrition with ?SIBO and alcohol-related liver disease Imaging with U/S and MRCP with note of  hepatomegaly with non-cirrhotic morphology,MELD 33 (24 with initial creat. before CRRT). PETH positive (collateral from parents endorsing hx of heavy ETOH use) with etiology of the above likely 2/2 to alcohol-related liver disease with acute alc hep in the background or cirrhosis and cholestasis of sepsis   - Acute encephalopathy, hyperammonemia - DD: hepatic encephalopathy in setting of alcoholic liver disease that is slowly improving. s/p CRRT for NH3 clearance.  Ok to stop L-Carnitine and continue with Rifaximin 550 mg BID in addition to Lactulose for goal 5 BMs per day. Continue with high dose IV Thiamine and Vit Supplemtation as noted below     Recommendations:  - Zinc low continue with PO Zinc 50 mg QD  - Vit. D, continue with supplementation   -Serum Ceruloplasmin <10, follow up 24 hour urine copper collection   -Continue with Rifaximin 550 mg BID in addition to Lactulose for goal 5 BMs per day.  - If no surgical planning, may initiate trickle feed with elemental diet once cleared by surgery team,   - F/u infectious workup given persistent fevers given rising wbc count and ongoing fevers; Abx per ID  - IV Thiamine, Folate and MVI   - No plans for any colonoscopy at this time 9can re-consider once mental status improved and no longer having fevers)    All recommendations are tentative until note is attested by attending.

## 2024-04-19 NOTE — PROGRESS NOTE ADULT - SUBJECTIVE AND OBJECTIVE BOX
Follow Up:      Interval History:    REVIEW OF SYSTEMS  [  ] ROS unobtainable because:    [  ] All other systems negative except as noted below    Constitutional:  [ ] fever [ ] chills  [ ] weight loss  [ ] weakness  Skin:  [ ] rash [ ] phlebitis	  Eyes: [ ] icterus [ ] pain  [ ] discharge	  ENMT: [ ] sore throat  [ ] thrush [ ] ulcers [ ] exudates  Respiratory: [ ] dyspnea [ ] hemoptysis [ ] cough [ ] sputum	  Cardiovascular:  [ ] chest pain [ ] palpitations [ ] edema	  Gastrointestinal:  [ ] nausea [ ] vomiting [ ] diarrhea [ ] constipation [ ] pain	  Genitourinary:  [ ] dysuria [ ] frequency [ ] hematuria [ ] discharge [ ] flank pain  [ ] incontinence  Musculoskeletal:  [ ] myalgias [ ] arthralgias [ ] arthritis  [ ] back pain  Neurological:  [ ] headache [ ] seizures  [ ] confusion/altered mental status    Allergies  No Known Allergies        ANTIMICROBIALS:  meropenem  IVPB 1000 every 8 hours  rifAXIMin 550 two times a day  vancomycin  IVPB 1000 every 12 hours      OTHER MEDS:  MEDICATIONS  (STANDING):  heparin   Injectable 5000 every 12 hours  insulin glargine Injectable (LANTUS) 8 at bedtime  insulin lispro (ADMELOG) corrective regimen sliding scale  three times a day before meals  insulin lispro (ADMELOG) corrective regimen sliding scale  at bedtime  insulin lispro Injectable (ADMELOG) 2 three times a day before meals  lactulose Syrup 30 every 8 hours  levothyroxine 100 daily  methadone    Tablet 5 three times a day  pantoprazole  Injectable 40 daily      Vital Signs Last 24 Hrs  T(C): 37.2 (19 Apr 2024 16:00), Max: 37.8 (18 Apr 2024 20:00)  T(F): 99 (19 Apr 2024 16:00), Max: 100 (18 Apr 2024 20:00)  HR: 106 (19 Apr 2024 18:00) (99 - 126)  BP: 115/56 (19 Apr 2024 18:00) (110/56 - 147/63)  BP(mean): 80 (19 Apr 2024 18:00) (77 - 101)  RR: 20 (19 Apr 2024 18:00) (16 - 29)  SpO2: 100% (19 Apr 2024 18:00) (94% - 100%)    Parameters below as of 19 Apr 2024 15:18  Patient On (Oxygen Delivery Method): room air        PHYSICAL EXAMINATION:  General: Alert and Awake, NAD  HEENT: PERRL, EOMI  Neck: Supple  Cardiac: RRR, No M/R/G  Resp: CTAB, No Wh/Rh/Ra  Abdomen: NBS, NT/ND, No HSM, No rigidity or guarding  MSK: No LE edema. No Calf tenderness  : No harrington  Skin: No rashes or lesions. Skin is warm and dry to the touch.   Neuro: Alert and Awake. CN 2-12 Grossly intact. Moves all four extremities spontaneously.  Psych: Calm, Pleasant, Cooperative                          7.5    30.20 )-----------( 101      ( 19 Apr 2024 00:56 )             22.7       04-19    144  |  109<H>  |  <4<L>  ----------------------------<  250<H>  3.8   |  23  |  0.41<L>    Ca    9.7      19 Apr 2024 00:56  Phos  3.5     04-19  Mg     1.5     04-19    TPro  5.0<L>  /  Alb  3.0<L>  /  TBili  4.4<H>  /  DBili  x   /  AST  55<H>  /  ALT  21  /  AlkPhos  133<H>  04-19      Urinalysis Basic - ( 19 Apr 2024 00:56 )    Color: x / Appearance: x / SG: x / pH: x  Gluc: 250 mg/dL / Ketone: x  / Bili: x / Urobili: x   Blood: x / Protein: x / Nitrite: x   Leuk Esterase: x / RBC: x / WBC x   Sq Epi: x / Non Sq Epi: x / Bacteria: x        MICROBIOLOGY:  Vancomycin Level, Trough: 13.8 ug/mL (04-19-24 @ 05:40)  v  ET Tube ET Tube  04-17-24   Numerous Acinetobacter baumannii/nosocom group (Carbapenem Resistant)  Normal Respiratory Linh present  --  Acinetobacter baumannii/nosocom group (Carbapenem Resistant)      .Stool Feces  04-14-24   No Protozoa seen by trichrome stain  No Helminths or Protozoa seen in formalin concentrate  performed by iodine stain  (routine O+P not evaluated for Microsporidia,  Cryptosporidia or Cyclospora)  One negative sample does not necessarily rule  out the presence of a parasitic infection.  Numerous White blood cells  Few Red blood cells  --  --      .Blood Blood  04-13-24   No Blood Parasites observed by giemsa stain  One negative set of blood smears does not rule out  the possibility of a parasitic infection.  A minimum of 3  specimens should be collected, at least 12-24 hours apart,  over a 36 hour time period.  ************************************************************  NEGATIVE for Plasmodium antigens. Microscopy is performed for  confirmation.  The Malaria Rapid antigen test does not detect the  presence of Babesia species. If Babesiosis is suspected  please order test Babesia PCR: Babesia microti PCR Bld  ************************************************************  --  --      .Bronchial Bronchial Lavage  04-13-24   Normal Respiratory Linh present  --    Rare polymorphonuclear leukocytes seen per low power field  No Squamous epithelial cells seen per low power field  No organisms seen per oil power field      Catheterized Catheterized  04-13-24   No growth  --  --      ET Tube ET Tube  04-13-24   Normal Respiratory Linh present  --    Moderate polymorphonuclear leukocytes seen per low power field  No organisms seen      .Blood Blood-Venous  04-13-24   No growth at 5 days  --  --      .Blood Blood-Peripheral  04-13-24   No growth at 5 days  --  --          Rapid RVP Result: NotDetec (04-14 @ 00:21)  CMVPCR Log: NotDetec [id number]/mL (04-13 @ 18:14)    Clostridium difficile GDH Toxins A&amp;B, EIA:   Negative (04-19-24 @ 14:02)  Clostridium difficile GDH Interpretation: Negative for toxigenic C. Difficile.  This specimen is negative for C.  Difficile glutamate dehydrogenase (GDH) antigen and negative for C.  Difficile Toxins A & B, by EIA.  GDH is a highly sensitive screening  marker for C. Difficile that is produced in large amounts by all C.  Difficile strains, both toxigenic and nontoxigenic.  This assay has not  been validated as a test of cure.  Repeat testing during the same episode  of diarrhea is of limited value and is discouraged.  The results of this  assay should always be interpreted in conjunction with patient's clinical  history. (04-19-24 @ 14:02)      RADIOLOGY:    <The imaging below has been reviewed and visualized by me independently. Findings as detailed in report below> Follow Up:  Leukocytosis    Interval History: WBC uptrend today. loose stools but on lactulose. Patient denies cough. on room air.     REVIEW OF SYSTEMS  [  ] ROS unobtainable because:    [ x ] All other systems negative except as noted below    Constitutional:  [ ] fever [ ] chills  [ ] weight loss  [ ] weakness  Skin:  [ ] rash [ ] phlebitis	  Eyes: [ ] icterus [ ] pain  [ ] discharge	  ENMT: [ ] sore throat  [ ] thrush [ ] ulcers [ ] exudates  Respiratory: [ ] dyspnea [ ] hemoptysis [ ] cough [ ] sputum	  Cardiovascular:  [ ] chest pain [ ] palpitations [ ] edema	  Gastrointestinal:  [ ] nausea [ ] vomiting [ ] diarrhea [ ] constipation [ ] pain	  Genitourinary:  [ ] dysuria [ ] frequency [ ] hematuria [ ] discharge [ ] flank pain  [ ] incontinence  Musculoskeletal:  [ ] myalgias [ ] arthralgias [ ] arthritis  [ ] back pain  Neurological:  [ ] headache [ ] seizures  [ ] confusion/altered mental status    Allergies  No Known Allergies        ANTIMICROBIALS:  meropenem  IVPB 1000 every 8 hours  rifAXIMin 550 two times a day  vancomycin  IVPB 1000 every 12 hours      OTHER MEDS:  MEDICATIONS  (STANDING):  heparin   Injectable 5000 every 12 hours  insulin glargine Injectable (LANTUS) 8 at bedtime  insulin lispro (ADMELOG) corrective regimen sliding scale  three times a day before meals  insulin lispro (ADMELOG) corrective regimen sliding scale  at bedtime  insulin lispro Injectable (ADMELOG) 2 three times a day before meals  lactulose Syrup 30 every 8 hours  levothyroxine 100 daily  methadone    Tablet 5 three times a day  pantoprazole  Injectable 40 daily      Vital Signs Last 24 Hrs  T(C): 37.2 (19 Apr 2024 16:00), Max: 37.8 (18 Apr 2024 20:00)  T(F): 99 (19 Apr 2024 16:00), Max: 100 (18 Apr 2024 20:00)  HR: 106 (19 Apr 2024 18:00) (99 - 126)  BP: 115/56 (19 Apr 2024 18:00) (110/56 - 147/63)  BP(mean): 80 (19 Apr 2024 18:00) (77 - 101)  RR: 20 (19 Apr 2024 18:00) (16 - 29)  SpO2: 100% (19 Apr 2024 18:00) (94% - 100%)    Parameters below as of 19 Apr 2024 15:18  Patient On (Oxygen Delivery Method): room air        PHYSICAL EXAMINATION:  General: Alert and Awake, NAD, +NGT  Cardiac: RRR, No M/R/G  Resp: CTAB, No Wh/Rh/Ra  Abdomen: NBS, NT/ND, No HSM, No rigidity or guarding  MSK: No LE edema. No Calf tenderness  Skin: No rashes or lesions. Skin is warm and dry to the touch.   Neuro: Alert and Awake. CN 2-12 Grossly intact. Moves all four extremities spontaneously.  Psych: Calm, Pleasant, Cooperative                          7.5    30.20 )-----------( 101      ( 19 Apr 2024 00:56 )             22.7       04-19    144  |  109<H>  |  <4<L>  ----------------------------<  250<H>  3.8   |  23  |  0.41<L>    Ca    9.7      19 Apr 2024 00:56  Phos  3.5     04-19  Mg     1.5     04-19    TPro  5.0<L>  /  Alb  3.0<L>  /  TBili  4.4<H>  /  DBili  x   /  AST  55<H>  /  ALT  21  /  AlkPhos  133<H>  04-19      Urinalysis Basic - ( 19 Apr 2024 00:56 )    Color: x / Appearance: x / SG: x / pH: x  Gluc: 250 mg/dL / Ketone: x  / Bili: x / Urobili: x   Blood: x / Protein: x / Nitrite: x   Leuk Esterase: x / RBC: x / WBC x   Sq Epi: x / Non Sq Epi: x / Bacteria: x        MICROBIOLOGY:  Vancomycin Level, Trough: 13.8 ug/mL (04-19-24 @ 05:40)  v  ET Tube ET Tube  04-17-24   Numerous Acinetobacter baumannii/nosocom group (Carbapenem Resistant)  Normal Respiratory Linh present  --  Acinetobacter baumannii/nosocom group (Carbapenem Resistant)      .Stool Feces  04-14-24   No Protozoa seen by trichrome stain  No Helminths or Protozoa seen in formalin concentrate  performed by iodine stain  (routine O+P not evaluated for Microsporidia,  Cryptosporidia or Cyclospora)  One negative sample does not necessarily rule  out the presence of a parasitic infection.  Numerous White blood cells  Few Red blood cells  --  --      .Blood Blood  04-13-24   No Blood Parasites observed by giemsa stain  One negative set of blood smears does not rule out  the possibility of a parasitic infection.  A minimum of 3  specimens should be collected, at least 12-24 hours apart,  over a 36 hour time period.  ************************************************************  NEGATIVE for Plasmodium antigens. Microscopy is performed for  confirmation.  The Malaria Rapid antigen test does not detect the  presence of Babesia species. If Babesiosis is suspected  please order test Babesia PCR: Babesia microti PCR Bld  ************************************************************  --  --      .Bronchial Bronchial Lavage  04-13-24   Normal Respiratory Linh present  --    Rare polymorphonuclear leukocytes seen per low power field  No Squamous epithelial cells seen per low power field  No organisms seen per oil power field      Catheterized Catheterized  04-13-24   No growth  --  --      ET Tube ET Tube  04-13-24   Normal Respiratory Linh present  --    Moderate polymorphonuclear leukocytes seen per low power field  No organisms seen      .Blood Blood-Venous  04-13-24   No growth at 5 days  --  --      .Blood Blood-Peripheral  04-13-24   No growth at 5 days  --  --          Rapid RVP Result: NotDetec (04-14 @ 00:21)  CMVPCR Log: NotDetec [id number]/mL (04-13 @ 18:14)    Clostridium difficile GDH Toxins A&amp;B, EIA:   Negative (04-19-24 @ 14:02)  Clostridium difficile GDH Interpretation: Negative for toxigenic C. Difficile.  This specimen is negative for C.  Difficile glutamate dehydrogenase (GDH) antigen and negative for C.  Difficile Toxins A & B, by EIA.  GDH is a highly sensitive screening  marker for C. Difficile that is produced in large amounts by all C.  Difficile strains, both toxigenic and nontoxigenic.  This assay has not  been validated as a test of cure.  Repeat testing during the same episode  of diarrhea is of limited value and is discouraged.  The results of this  assay should always be interpreted in conjunction with patient's clinical  history. (04-19-24 @ 14:02)      RADIOLOGY:    <The imaging below has been reviewed and visualized by me independently. Findings as detailed in report below>    < from: Xray Chest 1 View- PORTABLE-Urgent (Xray Chest 1 View- PORTABLE-Urgent .) (04.17.24 @ 21:59) >  IMPRESSION:  Enteric tube courses below diaphragm tip not visualized.  Retrocardiac opacity with air bronchograms may represent atelectasis or   pneumonia. Left costophrenic angle was not imaged.    < end of copied text >

## 2024-04-19 NOTE — CHART NOTE - NSCHARTNOTEFT_GEN_A_CORE
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 [facility]. Patient found to be hypoglycemic shortly after transfer, now resolved.    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 Patient was first diagnosed with Type 3c DM after her initial surgery in 2014 (s/p distal pancreatectomy, cholecystectomy, splenectomy and celina-en-y pancreaticojejuonostomy). Then she later underwent total pancreatectomy with islet cell autotransplant in 2018. After that, her insulin requirements decreased over the years but was never off insulin.    Endocrinologist: Dr. Dalton  Per recent outpatient note 3/26/2024:  Patient uses Omnipod Dash with Dexcom CGM   Basal rate:   MN 0.05U   6 AM 0.1U   9.30 AM 0.15U   Total basal 2.85U/24h   ICR 1:15   ICF 1:75   Reports of frequent hypoglycemia outpatient in March 2024.    Hypoglycemic shortly after transfer, resolved.    C-peptide <0.1 4/15 at 6pm when BG was 168, likely insulin deficient.  C-peptide repeated while off dextrose and tube feeds: 0.1 with , confirming insulin deficiency.    Patient is now on vital 1.5 continuous tube feeds 10cc/hr via NG tube, 24h/day.    PLAN:  - Agree with increased NPH 4 units q6 - hold if tube feeds are held   - continue Low dose admelog scale q6h while on 24h continuous tube feeds  - will monitor BG for uptrending BG and adjust as needed    #Hypothyroidism vs NTIS  TSH 4.43. FT4 0.6, TT3 47 on admission  Started on LT4 100mcg daily  - recheck FT4 in 4/22  - repeat TSH once clinically improved or in 6-8 weeks      POCT Blood Glucose:  256 mg/dL (04-19-24 @ 11:13)  195 mg/dL (04-19-24 @ 05:21)  231 mg/dL (04-19-24 @ 00:54)  204 mg/dL (04-18-24 @ 17:26)      eMAR:  insulin lispro (ADMELOG) corrective regimen sliding scale   2 Unit(s) SubCutaneous (04-18-24 @ 17:37)    insulin lispro (ADMELOG) corrective regimen sliding scale   3 Unit(s) SubCutaneous (04-19-24 @ 11:14)   1 Unit(s) SubCutaneous (04-19-24 @ 05:47)   2 Unit(s) SubCutaneous (04-19-24 @ 00:59)    insulin NPH human recombinant   3 Unit(s) SubCutaneous (04-19-24 @ 05:45)   3 Unit(s) SubCutaneous (04-19-24 @ 01:01)    insulin NPH human recombinant   4 Unit(s) SubCutaneous (04-19-24 @ 11:15)    levothyroxine   100 MICROGram(s) Oral (04-19-24 @ 05:10)    04-19    144  |  109<H>  |  <4<L>  ----------------------------<  250<H>  3.8   |  23  |  0.41<L>    Ca    9.7      19 Apr 2024 00:56  Phos  3.5     04-19  Mg     1.5     04-19    TPro  5.0<L>  /  Alb  3.0<L>  /  TBili  4.4<H>  /  DBili  x   /  AST  55<H>  /  ALT  21  /  AlkPhos  133<H>  04-19

## 2024-04-19 NOTE — CONSULT NOTE ADULT - SUBJECTIVE AND OBJECTIVE BOX
HPI:  42 year old female with hx of DMT1, Factor V leiden deficiency, gout and congenital abnormality of the pancreas associated with recurrent pancreatitis and extensive surgical hx (s/p distal pancreatectomy, cholecystectomy, splenectomy and celina-en-y pancreaticojejuonostomy (02/2014) at [facility] with Dr. Hargrove), and now S/P total pancreatectomy  with islet cell autotransplant at [facility] by Dr. Gil in 04/2018).    Patient's recovery was complicated by abdominal collections first on POD 6 which was managed with IR drain and IV antibiotic. Patient was discharged with PICC line for long term antifungal. Patient was readmitted on 05/8 again for abscess which was drained by IR. Patient was also started on therapeutic Lovenox for right atrial thrombus. Patient again developed abdominal pain with fever of 100.7 on 05/20 associated with thick yellow discharge from the ventral incisional and went to [place] which was nearby for care. CT scan at [facility] showed complex multiloculated postsurgical abscess in the omental fat of the superior epigastrium measuring 6.7 cm and enterocutaneous fistulous tract extending anteriorly from the abscess to the midline incision site and another tract going into the soft tissues of the left upper abdominal quadrant communicating with a subcutaneous 3.3cm abscess  Patient was started on IV zosyn, flagyl and micafungin while there. Patient transferred today to [facility] for continuity of care under Dr. Ansari    Patient presented to [facility] ED on 4/8 by her spouse for AMS. Per , patient had been having nbnb vomiting and diarrhea for the last 4 weeks. She was unable to tolerate PO. She was also sleeping 18-20 hrs per day. On 4/8, he found her on the floor "completely out of it" and took her to [facility] for further evalution.   Initial workup in ED demenstrated a Tbili 5.4, direct bili 4.4 and ammonia 196. Patient was somnelent and had a left eyeward gaze deviation. Subquesntly started developing agonal snoring respirations and had to be intubated for airway protection.     Patient was admitted on 4/13, started on CRRT for ammonia clearance, course with leukocytosis, fever, started on antibiotics, off CRRT since 4/16, extubated 4/17, seen today in MICU, complains of pain.     REVIEW OF SYSTEMS  Denies chest pain, SOB, N/V, F/C     VITALS  T(C): 36.8 (04-19-24 @ 08:00), Max: 37.8 (04-18-24 @ 20:00)  HR: 103 (04-19-24 @ 10:00) (93 - 126)  BP: 136/80 (04-19-24 @ 10:00) (111/56 - 160/77)  RR: 25 (04-19-24 @ 10:00) (16 - 33)  SpO2: 97% (04-19-24 @ 10:00) (94% - 100%)  Wt(kg): --    PAST MEDICAL & SURGICAL HISTORY  see HPI     FUNCTIONAL HISTORY  Lives with    Independent AMB and ADLs PTA     CURRENT FUNCTIONAL STATUS  SLP 4/18  oral and suspected pharyngeal dysphagia  AMS  NPO     PT  4/18  bed mobility mod assist x 2  transfers mod assist x 2, Rw  gait mod assist x 2, RW x 4 steps     OT 4/18  follows 50-75% commands   bed mobility mod assist x 2  transfers mod assist x 2, RW   dressing max assist     RECENT LABS/IMAGING  CBC Full  -  ( 19 Apr 2024 00:56 )  WBC Count : 30.20 K/uL  RBC Count : 2.31 M/uL  Hemoglobin : 7.5 g/dL  Hematocrit : 22.7 %  Platelet Count - Automated : 101 K/uL  Mean Cell Volume : 98.3 fl  Mean Cell Hemoglobin : 32.5 pg  Mean Cell Hemoglobin Concentration : 33.0 gm/dL  Auto Neutrophil # : 18.71 K/uL  Auto Lymphocyte # : 5.00 K/uL  Auto Monocyte # : 3.51 K/uL  Auto Eosinophil # : 0.04 K/uL  Auto Basophil # : 0.15 K/uL  Auto Neutrophil % : 62.0 %  Auto Lymphocyte % : 16.6 %  Auto Monocyte % : 11.6 %  Auto Eosinophil % : 0.1 %  Auto Basophil % : 0.5 %    04-19    144  |  109<H>  |  <4<L>  ----------------------------<  250<H>  3.8   |  23  |  0.41<L>    Ca    9.7      19 Apr 2024 00:56  Phos  3.5     04-19  Mg     1.5     04-19    TPro  5.0<L>  /  Alb  3.0<L>  /  TBili  4.4<H>  /  DBili  x   /  AST  55<H>  /  ALT  21  /  AlkPhos  133<H>  04-19    Urinalysis Basic - ( 19 Apr 2024 00:56 )    Color: x / Appearance: x / SG: x / pH: x  Gluc: 250 mg/dL / Ketone: x  / Bili: x / Urobili: x   Blood: x / Protein: x / Nitrite: x   Leuk Esterase: x / RBC: x / WBC x   Sq Epi: x / Non Sq Epi: x / Bacteria: x    < from: CT Head No Cont (04.15.24 @ 19:43) >    INTERPRETATION:  Clinical indication: Liver failure with hyperammonemia   with downward gaze, evaluate for increased ICP    5mm axial sections of the brain were obtained from base to vertex,   without the intravenous administration of contrast material. Coronal and   sagittal computer generated reconstructed views are available.    Comparison is made with prior CT of 4/13/2024 and demonstrates no   significant interval change.    The fourth, third and lateral ventricles are normal size and position.   There is no hemorrhage, mass or shift of the midline structures. There is   normal gray white matter differentiation. Bone window examination is   unremarkable.    IMPRESSION: Unremarkable noncontrast CT of the brain. No change since   4/13/2024.      < end of copied text >    < from: CT Abdomen and Pelvis w/ IV Cont (04.16.24 @ 13:15) >    IMPRESSION:  Mildly improved colitis with right-sided colitis persisting.    Patent mesenteric vasculature. Haziness of the fat planes surrounding the   celiac axis and SMA, etiology unclear and without change.      < end of copied text >      ALLERGIES  No Known Allergies      MEDICATIONS   albuterol/ipratropium for Nebulization 3 milliLiter(s) Nebulizer every 6 hours  chlorhexidine 4% Liquid 1 Application(s) Topical <User Schedule>  cholecalciferol 2000 Unit(s) Oral daily  folic acid 1 milliGRAM(s) Oral daily  heparin   Injectable 5000 Unit(s) SubCutaneous every 12 hours  insulin lispro (ADMELOG) corrective regimen sliding scale   SubCutaneous every 6 hours  insulin NPH human recombinant 4 Unit(s) SubCutaneous every 6 hours  lactulose Syrup 30 Gram(s) Oral every 8 hours  levothyroxine 100 MICROGram(s) Oral daily  meropenem  IVPB 1000 milliGRAM(s) IV Intermittent every 8 hours  methadone    Tablet 5 milliGRAM(s) Oral three times a day  multivitamin/minerals/iron Oral Solution (CENTRUM) 15 milliLiter(s) Oral daily  pantoprazole  Injectable 40 milliGRAM(s) IV Push daily  rifAXIMin 550 milliGRAM(s) Oral two times a day  thiamine IVPB 500 milliGRAM(s) IV Intermittent every 8 hours  ursodiol Tablet 500 milliGRAM(s) Oral <User Schedule>  vancomycin  IVPB 1000 milliGRAM(s) IV Intermittent every 12 hours  zinc sulfate 220 milliGRAM(s) Oral daily      ----------------------------------------------------------------------------------------  PHYSICAL EXAM  Constitutional - NAD, Comfortable, in bed   +NGT  Chest - Breathing comfortably  Cardiovascular - S1S2   Abdomen - Soft +Rectal tube   Extremities - No C/C/E, No calf tenderness   Neurologic Exam -    follows commands                 Cognitive - Awake, Alert, AAO to self, place: condo, not month, year, season      Communication - Fluent, No dysarthria        Motor - moves all ext      Sensory - Intact to LT     Psychiatric - Mood stable, Affect WNL  ----------------------------------------------------------------------------------------  ASSESSMENT/PLAN  48yFemale h/o DMtype 1, Factor V leiden, DVT, gout, congenital pancreatic abnormality s/p total pancreatectomy, islet cell autotransplant in 2018, with functional deficits due to encephalopathy due to hyperammonemia  CTH negative   hepatitis/cirrhosis, rifaximin, lactulose, thiamine  leukocytosis, colitis vs pneumonia, meropenem/vanco  Pain - Tylenol, chronic pain on methadone  DVT PPX - SCDs heparin   Rehab - Will continue to follow for ongoing rehab needs and recommendations.    Recommend ACUTE inpatient rehabilitation for the functional deficits consisting of 3 hours of therapy/day x 2-4 weeks depending on progress at rehabilitation facility, 24 hour RN/daily PMR physician for comorbid medical management. Patient will be able to tolerate 3 hours a day.

## 2024-04-19 NOTE — SWALLOW FEES ASSESSMENT ADULT - ORAL PHASE COMMENTS
prolonged bolus formation and AP transit and mastication, easily distracted requiring re-direction to task mild-moderate spillover to the pyriform sinus mild spillover to the valleculae reduced oral grading to utensil, required x3 attempts to completely strip material from spoon, increased AP transit time, reduced mastication reduced bolus formation/control, moderate spillover to the valleculae and over the superior laryngeal surface of the epiglottis mild spillover to the pyriform sinus

## 2024-04-20 NOTE — PROGRESS NOTE ADULT - ASSESSMENT
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 per LIMC), fistula, abscesses. Presented to [facility] 4/8 for several weeks of decreased PO intake, NBNB emesis, watery diarrhea, Somnolence, requiring intubation for airway protection. Lab revealed ammonia > 200, elevated bilirubin and INR initially c/f liver failure, c/c/b colitis possibly ischemic etiology. Patient transferred to [facility] MICU 4/13 for CRRT initiation for ammonia clearance.    =====Neuro=====  #Altered mental status  >Baseline AOx3  >CTH (4/13): No acute findings  >CTH (4/15): No acute findings   >s/p Precedex, off since 4/15  - off sedation, awake, follows command, AAO2-3  - Likely iso hyperammonemia vs. infection   - treatment for hyperammonemia as below    #Chronic pain  #anxiety/depression   >Was on alprazolam 0.75mg QD, Dilaudid and methadone 5mg TID at home  - c/w Methadone 5mg TID    #Downward nystagmus - RESOLVED  >Noted on exam 4/14-4/15 overnight  >CTH (4/15): No acute findings   >EEG (4/15 - 4/17): TME but no seizure activity  - Will ctm    =====Cardio=====  #History of L peroneal DVT in 2022  #History of RA thrombus in 2018  >TTE (4/13): No thrombus commented  - Eliquis stopped 5/2023 by outpatient hematology given consistently negative DVT studies  - On HSQ for DVT ppx    #RV dysfunction? - RESOLVED  >Enlarged RV and pulmonary hypertension noted on POCUS (4/16) that is not on TTE 4/13  >CTAPE (4/16): No PE  >DVT study (4/16): No DVT  >TTE (4/16): Normal LVSF EF 75%, Normal RVSF and size, TAPSE 1.9, PASP 52  - Elevated PASP likely chronic + acutely exacerbated by liver failure  - ctm w/ POCUS    =====Pulmonary=====  #Atelectasis vs. PNA  >CTC (4/13): RML consolidation noted, appears atelectasis > PNA on POCUS  >L sided consolidation noted on POCUS - possibly PNA  - persistently leukocytosis  - abx as below  - f/u ID recs    #Mechanical ventilation   - s/p Extubation 4/17  - Currently on RA    =====GI=====  #Alcoholic hepatitis/cirrhosis?  >Elevated bilirubin/coag, c/b hepatic encephalopathy  >s/p NAC on admission to outside hospital  >CTAP (4/13): Hepatomegaly and hepatic steatosis  >US abd (4/13): Hepatic steatosis, no hepatic vein thrombosis  >Acute hepatitis panel (4/13): Negative  >Autoimmune hepatitis panel (4/13): Negative  >MRCP (4/15): Enlarged, fatty liver. No biliary duct dilation, unlikely obstruction  >Gastrograffin study to r/o SBO (4/16): Passage of contrast seen on serial XR, unlikely SBO  >PETH (4/13): > 400  - Etiology likely alcohol related given PETH level, although patient and family denies alcohol use  - c/w Folid acid, MVI  - c/w thiamine 500mg Q8h x 3 days (4/18 - 4/20) given patient confabulating c/f wernicke vs. ICU delirium   - Will d/c ursodiol 500mg QD as unlikely obstructive pathology    #Hyperammonemia   >Baseline mentation AAO3  >s/p CRRT (off since 4/16)  - f/u plasma amino acid (4/16): in lab  - f/u urine orotic acid (4/16) - sent out to [facility]  - f/u metabolic genetic consult to r/o acquired urea cycle disorder  - c/w Rifaximin   - c/w Lactulose  - c/w Trend Ammonia q6h    #Colitis, ?Ischemic  >CTA/P (4/13): segmental areas of wall thickening throughout the colon and rectum with pneumatosis, mesenteric edema, and adjacent hyperdensities in the proximal transverse colon suspicious for bowel ischemia with intraluminal hemorrhage. Additional intraluminal hyperdensities within the distal ascending colon suspicious for hemorrhage.   >TTE (4/13): No thrombus commented  - Surgery, GI following, no plan for surgery/endoscopic eval for now  - On TF, trend lactate, monitor GIB      #Colonic mass?  >CTA/P (4/13): Nonspecific focal areas of narrowing in the distal descending colon and sigmoid colon, possibly colonic masses  >CTA/P (4/16): Improved colitis, previous ?colonic mass vs. narrowing still seen per discussion w/ radiology  - ctm, colonoscopy eventually    #Diet and ppx  - TF, c/w Pancreatic enzyme supplement while on TF  - rectal tube in place   - c/w PPI 40mg for GI ppx  - Pending bedside dysphagia / swallow eval  - Will reach out to TPN team to start evaluation, in order to minimize enteric volume given colitis    =====Renal=====  #CRRT - NOW OFF  >s/p CRRT (last 4/17) for ammonia clearance  - Strict I/O  - Diurese PRN    =====Heme=====   #Factor 5 Leiden  #History of L peroneal DVT in 2022  #History of RA thrombus in 2018  >TTE (4/13): No thrombus commented  >Eliquis stopped 5/2023 by outpatient hematology given consistently negative DVT studies  - SQH for DVT ppx    #Anemia  >s/p 2u PRBC (4/13, 4/16)  >Multifactorial in nature. No hx of bleeding but with coagulopathy as below.   >B12 (4/13): >2000, Folate: 8.6  >Iron studies (4/13): Ferritin 653, Iron 43, Unmeasurable TIBC/Iron%   >Hemolysis lab (4/14): Haptoglobin < 20;   - Possibly iso liver disease vs. acute infection/inflammation vs. ?hemolysis  - Trend CBC, coag, transfuse goal > 7    #thrombocytopenia  >s/p 1u Plt (4/13)   - Etiology not entirely clear, s/p splenectomy and hepatic steatosis w/o cirrhosis on imaging  - Likely iso acute illness  - Trend CBC, plt goal > 20    =====Endo=====  #DM1 vs. DM3c  >Per HIE record, patient prone to hypoglycemia  >History of DM1, but s/p total pancreatectomy and islet cell autotransplant in 2018 - currently DM3c?  >C-peptide (4/18): < 0.1  - c/w NPH q6h while on continuous TF  - Endocrine on board    #Hypothyroidism  - c/w synthyroid 100    =====ID=====  Infectious work up  >UA (4/13): 11WBC, small LE, negative Nitrite or bacteria  >Babesia (4/13): neg; HIV (4/13): neg; RVP, COVID (4/14): neg; MRSA (4/13): Negative  >CXR (4/13): RML consolidation  >CTC/A/P (4/13): RML consolidation, LLL atelectasis 2/2 likely ?mucus plug, ischemic colitis  >Bcx (4/13): NGTD  >ET tube cx (4/13): Normal resp luisa  >BAL (4/13): Pending  >GI PCR (4/15): Negative  >Sputum culture (4/17): Carbapenem resistance Acineobacter  - Rest of infectious w/u per ID    #Leukocytosis  #Septic shock   #Hx of VRE and Candida infection  >H/o of recurrent infxn from multiple abdominal wounds and surgeries. Hx of VRE and candida.   >Previously on levo at OSH. Off pressor on transfer  >s/p Caspofungin 50mg Q24 (stopped 4/16)  >s/p Daptomycin 500mg Q24 (stopped 4/16)  >s/p Doxycycline 100mg Q12 (stopped 4/18)  - Source of WBC Colitis vs. Pneumonia  - c/w Meropenem 1g q12 (4/10 - )  - c/w Vancomycin q12 (4/17 - )  - Will discuss with ID regarding abx regimen for Carb resistance Acineobacter     41yo pmh of HTN, DM, Factor V Leiden c/b L peroneal DVT (2022) and RA thrombus (2018) currently not on AC, gout, congenital pancreas divisum, recurrent pancreatitis s/p cholecystectomy, Splenectomy, Kevin-En-Y Pancreaticojejunostomy, total pancreatectomy s/p Islet Cell Autotransplant at [facility] 2018, complicated by multiple intraabdominal infection (VRE, fungal? per LIMC), fistula, abscesses. Presented to [facility] 4/8 for several weeks of decreased PO intake, NBNB emesis, watery diarrhea, Somnolence, requiring intubation for airway protection. Lab revealed ammonia > 200, elevated bilirubin and INR initially c/f liver failure, c/c/b colitis possibly ischemic etiology. Patient transferred to [facility] MICU 4/13 for CRRT initiation for ammonia clearance.    =====Neuro=====  #Altered mental status  >Baseline AOx3  >CTH (4/13): No acute findings  >CTH (4/15): No acute findings   >s/p Precedex, off since 4/15  - off sedation, awake, follows command, AAO2-3  - Likely iso hyperammonemia vs. infection   - treatment for hyperammonemia as below    #Chronic pain  #anxiety/depression   >Was on alprazolam 0.75mg QD, Dilaudid and methadone 5mg TID at home  - c/w Methadone 5mg TID    #Downward nystagmus - RESOLVED  >Noted on exam 4/14-4/15 overnight  >CTH (4/15): No acute findings   >EEG (4/15 - 4/17): TME but no seizure activity  - Will ctm    =====Cardio=====  #History of L peroneal DVT in 2022  #History of RA thrombus in 2018  >TTE (4/13): No thrombus commented  - Eliquis stopped 5/2023 by outpatient hematology given consistently negative DVT studies  - On HSQ for DVT ppx    #RV dysfunction? - RESOLVED  >Enlarged RV and pulmonary hypertension noted on POCUS (4/16) that is not on TTE 4/13  >CTAPE (4/16): No PE  >DVT study (4/16): No DVT  >TTE (4/16): Normal LVSF EF 75%, Normal RVSF and size, TAPSE 1.9, PASP 52  - Elevated PASP likely chronic + acutely exacerbated by liver failure  - ctm w/ POCUS    =====Pulmonary=====  #Atelectasis vs. PNA  >CTC (4/13): RML consolidation noted, appears atelectasis > PNA on POCUS  >L sided consolidation noted on POCUS - possibly PNA  - persistently leukocytosis  - abx as below  - f/u ID recs    #Mechanical ventilation   - s/p Extubation 4/17  - Currently on RA    =====GI=====  #Alcoholic hepatitis/cirrhosis?  >Elevated bilirubin/coag, c/b hepatic encephalopathy  >s/p NAC on admission to outside hospital  >CTAP (4/13): Hepatomegaly and hepatic steatosis  >US abd (4/13): Hepatic steatosis, no hepatic vein thrombosis  >Acute hepatitis panel (4/13): Negative  >Autoimmune hepatitis panel (4/13): Negative  >MRCP (4/15): Enlarged, fatty liver. No biliary duct dilation, unlikely obstruction  >Gastrograffin study to r/o SBO (4/16): Passage of contrast seen on serial XR, unlikely SBO  >PETH (4/13): > 400  - Etiology likely alcohol related given PETH level, although patient and family denies alcohol use  - c/w Folid acid, MVI  - c/w thiamine 500mg Q8h x 3 days (4/18 - 4/20) given patient confabulating c/f wernicke vs. ICU delirium   - Will d/c ursodiol 500mg QD as unlikely obstructive pathology    #Hyperammonemia   >Baseline mentation AAO3  >s/p CRRT (off since 4/16)  - f/u plasma amino acid (4/16): in lab  - f/u urine orotic acid (4/16) - sent out to [facility]  - f/u metabolic genetic consult to r/o acquired urea cycle disorder  - c/w Rifaximin   - c/w Lactulose  - c/w Trend Ammonia q6h    #Colitis, ?Ischemic  >CTA/P (4/13): segmental areas of wall thickening throughout the colon and rectum with pneumatosis, mesenteric edema, and adjacent hyperdensities in the proximal transverse colon suspicious for bowel ischemia with intraluminal hemorrhage. Additional intraluminal hyperdensities within the distal ascending colon suspicious for hemorrhage.   >TTE (4/13): No thrombus commented  - Surgery, GI following, no plan for surgery/endoscopic eval for now  - On TF, trend lactate, monitor GIB      #Colonic mass?  >CTA/P (4/13): Nonspecific focal areas of narrowing in the distal descending colon and sigmoid colon, possibly colonic masses  >CTA/P (4/16): Improved colitis, previous ?colonic mass vs. narrowing still seen per discussion w/ radiology  - ctm, colonoscopy eventually    #Diet and ppx  - TF, c/w Pancreatic enzyme supplement while on TF  - rectal tube in place   - c/w PPI 40mg for GI ppx  - Pending bedside dysphagia / swallow eval  - Will reach out to TPN team to start evaluation, in order to minimize enteric volume given colitis    =====Renal=====  #CRRT - NOW OFF  >s/p CRRT (last 4/17) for ammonia clearance  - Strict I/O  - Diurese PRN    =====Heme=====   #Factor 5 Leiden  #History of L peroneal DVT in 2022  #History of RA thrombus in 2018  >TTE (4/13): No thrombus commented  >Eliquis stopped 5/2023 by outpatient hematology given consistently negative DVT studies  - SQH for DVT ppx    #Anemia  >s/p 2u PRBC (4/13, 4/16)  >Multifactorial in nature. No hx of bleeding but with coagulopathy as below.   >B12 (4/13): >2000, Folate: 8.6  >Iron studies (4/13): Ferritin 653, Iron 43, Unmeasurable TIBC/Iron%   >Hemolysis lab (4/14): Haptoglobin < 20;   - Possibly iso liver disease vs. acute infection/inflammation vs. ?hemolysis  - Trend CBC, coag, transfuse goal > 7    #thrombocytopenia  >s/p 1u Plt (4/13)   - Etiology not entirely clear, s/p splenectomy and hepatic steatosis w/o cirrhosis on imaging  - Likely iso acute illness  - Trend CBC, plt goal > 20    =====Endo=====  #DM1 vs. DM3c  >Per HIE record, patient prone to hypoglycemia  >History of DM1, but s/p total pancreatectomy and islet cell autotransplant in 2018 - currently DM3c?  >C-peptide (4/18): < 0.1  - Endocrine on board    #Hypothyroidism  - c/w synthyroid 100    =====ID=====  Infectious work up  >UA (4/13): 11WBC, small LE, negative Nitrite or bacteria  >Babesia (4/13): neg; HIV (4/13): neg; RVP, COVID (4/14): neg; MRSA (4/13): Negative  >CXR (4/13): RML consolidation  >CTC/A/P (4/13): RML consolidation, LLL atelectasis 2/2 likely ?mucus plug, ischemic colitis  >Bcx (4/13): NGTD  >ET tube cx (4/13): Normal resp luisa  >BAL (4/13): Pending  >GI PCR (4/15): Negative  >Sputum culture (4/17): Carbapenem resistance Acineobacter  - Rest of infectious w/u per ID    #Leukocytosis  #Septic shock   #Hx of VRE and Candida infection  >H/o of recurrent infxn from multiple abdominal wounds and surgeries. Hx of VRE and candida.   >Previously on levo at OSH. Off pressor on transfer  >s/p Caspofungin 50mg Q24 (stopped 4/16)  >s/p Daptomycin 500mg Q24 (stopped 4/16)  >s/p Doxycycline 100mg Q12 (stopped 4/18)  - Source of WBC Colitis vs. Pneumonia  - c/w Meropenem 1g q12 (4/10 - )  - c/w Vancomycin q12 (4/17 - )  - Will discuss with ID regarding abx regimen for Carb resistance Acineobacter     47yo pmh of HTN, DM, Factor V Leiden c/b L peroneal DVT (2022) and RA thrombus (2018) currently not on AC, gout, congenital pancreas divisum, recurrent pancreatitis s/p cholecystectomy, Splenectomy, Kevin-En-Y Pancreaticojejunostomy, total pancreatectomy s/p Islet Cell Autotransplant at [facility] 2018, complicated by multiple intraabdominal infection (VRE, fungal? per LIMC), fistula, abscesses. Presented to [facility] 4/8 for several weeks of decreased PO intake, NBNB emesis, watery diarrhea, Somnolence, requiring intubation for airway protection. Lab revealed ammonia > 200, elevated bilirubin and INR initially c/f liver failure, c/c/b colitis possibly ischemic etiology. Patient transferred to [facility] MICU 4/13 for CRRT initiation for ammonia clearance.    =====Neuro=====  #Altered mental status  >Baseline AOx3  >CTH (4/13): No acute findings  >CTH (4/15): No acute findings   >s/p Precedex, off since 4/15  - off sedation, awake, follows command, AAO2-3  - Likely iso hyperammonemia vs. infection   - treatment for hyperammonemia as below    #Chronic pain  #anxiety/depression   >Was on alprazolam 0.75mg QD, Dilaudid and methadone 5mg TID at home  - c/w Methadone 5mg TID    #Downward nystagmus - RESOLVED  >Noted on exam 4/14-4/15 overnight  >CTH (4/15): No acute findings   >EEG (4/15 - 4/17): TME but no seizure activity  - Will ctm    =====Cardio=====  #History of L peroneal DVT in 2022  #History of RA thrombus in 2018  >TTE (4/13): No thrombus commented  - Eliquis stopped 5/2023 by outpatient hematology given consistently negative DVT studies  - On HSQ for DVT ppx    #RV dysfunction? - RESOLVED  >Enlarged RV and pulmonary hypertension noted on POCUS (4/16) that is not on TTE 4/13  >CTAPE (4/16): No PE  >DVT study (4/16): No DVT  >TTE (4/16): Normal LVSF EF 75%, Normal RVSF and size, TAPSE 1.9, PASP 52  - Elevated PASP likely chronic + acutely exacerbated by liver failure  - ctm w/ POCUS    =====Pulmonary=====  #Atelectasis vs. PNA  >CTC (4/13): RML consolidation noted, appears atelectasis > PNA on POCUS  >L sided consolidation noted on POCUS - possibly PNA  - persistently leukocytosis  - abx as below  - f/u ID recs    #Mechanical ventilation   - s/p Extubation 4/17  - Currently on RA    =====GI=====  #Alcoholic hepatitis/cirrhosis?  >Elevated bilirubin/coag, c/b hepatic encephalopathy  >s/p NAC on admission to outside hospital  >CTAP (4/13): Hepatomegaly and hepatic steatosis  >US abd (4/13): Hepatic steatosis, no hepatic vein thrombosis  >Acute hepatitis panel (4/13): Negative  >Autoimmune hepatitis panel (4/13): Negative  >MRCP (4/15): Enlarged, fatty liver. No biliary duct dilation, unlikely obstruction  >Gastrograffin study to r/o SBO (4/16): Passage of contrast seen on serial XR, unlikely SBO  >PETH (4/13): > 400  - Etiology likely alcohol related given PETH level, although patient and family denies alcohol use  - c/w Folid acid, MVI  - c/w thiamine 500mg Q8h x 3 days (4/18 - 4/20) given patient confabulating c/f wernicke vs. ICU delirium   - Will d/c ursodiol 500mg QD as unlikely obstructive pathology    #Hyperammonemia   >Baseline mentation AAO3  >s/p CRRT (off since 4/16)  - f/u plasma amino acid (4/16): in lab  - f/u urine orotic acid (4/16) - sent out to [facility]  - f/u metabolic genetic consult to r/o acquired urea cycle disorder  - c/w Rifaximin   - c/w Lactulose  - c/w Trend Ammonia q6h    #Colitis, ?Ischemic  >CTA/P (4/13): segmental areas of wall thickening throughout the colon and rectum with pneumatosis, mesenteric edema, and adjacent hyperdensities in the proximal transverse colon suspicious for bowel ischemia with intraluminal hemorrhage. Additional intraluminal hyperdensities within the distal ascending colon suspicious for hemorrhage.   >TTE (4/13): No thrombus commented  - Surgery, GI following, no plan for surgery/endoscopic eval for now  - On TF, trend lactate, monitor GIB      #Colonic mass?  >CTA/P (4/13): Nonspecific focal areas of narrowing in the distal descending colon and sigmoid colon, possibly colonic masses  >CTA/P (4/16): Improved colitis, previous ?colonic mass vs. narrowing still seen per discussion w/ radiology  - ctm, colonoscopy eventually    #Diet and ppx  - TF, c/w Pancreatic enzyme supplement while on TF  - rectal tube in place   - c/w PPI 40mg for GI ppx  - Pending bedside dysphagia / swallow eval  - Will reach out to TPN team to start evaluation, in order to minimize enteric volume given colitis    =====Renal=====  #CRRT - NOW OFF  >s/p CRRT (last 4/17) for ammonia clearance  - Strict I/O  - Diurese PRN    =====Heme=====   #Factor 5 Leiden  #History of L peroneal DVT in 2022  #History of RA thrombus in 2018  >TTE (4/13): No thrombus commented  >Eliquis stopped 5/2023 by outpatient hematology given consistently negative DVT studies  - SQH for DVT ppx    #Anemia  >s/p 2u PRBC (4/13, 4/16)  >Multifactorial in nature. No hx of bleeding but with coagulopathy as below.   >B12 (4/13): >2000, Folate: 8.6  >Iron studies (4/13): Ferritin 653, Iron 43, Unmeasurable TIBC/Iron%   >Hemolysis lab (4/14): Haptoglobin < 20;   - Possibly iso liver disease vs. acute infection/inflammation vs. ?hemolysis  - Trend CBC, coag, transfuse goal > 7    #thrombocytopenia  >s/p 1u Plt (4/13)   - Etiology not entirely clear, s/p splenectomy and hepatic steatosis w/o cirrhosis on imaging  - Likely iso acute illness  - Trend CBC, plt goal > 20    =====Endo=====  #DM1 vs. DM3c  >Per HIE record, patient prone to hypoglycemia  >History of DM1, but s/p total pancreatectomy and islet cell autotransplant in 2018 - currently DM3c?  >C-peptide (4/18): < 0.1  - Endocrine on board    #Hypothyroidism  - c/w synthyroid 100    =====ID=====  Infectious work up  >UA (4/13): 11WBC, small LE, negative Nitrite or bacteria  >Babesia (4/13): neg; HIV (4/13): neg; RVP, COVID (4/14): neg; MRSA (4/13): Negative  >CXR (4/13): RML consolidation  >CTC/A/P (4/13): RML consolidation, LLL atelectasis 2/2 likely ?mucus plug, ischemic colitis  >Bcx (4/13): NGTD  >ET tube cx (4/13): Normal resp luisa  >BAL (4/13): Pending  >GI PCR (4/15): Negative  >Sputum culture (4/17): Carbapenem resistance Acineobacter  - Rest of infectious w/u per ID    #Leukocytosis  #Septic shock   #Hx of VRE and Candida infection  >H/o of recurrent infxn from multiple abdominal wounds and surgeries. Hx of VRE and candida.   >Previously on levo at OSH. Off pressor on transfer  >s/p Caspofungin 50mg Q24 (stopped 4/16)  >s/p Daptomycin 500mg Q24 (stopped 4/16)  >s/p Doxycycline 100mg Q12 (stopped 4/18)  - Source of WBC Colitis vs. Pneumonia  - c/w Meropenem 1g q12 (4/10 - )  - c/w Vancomycin q12 (4/17 - )  - Will discuss with ID regarding abx regimen for Carb resistance Acineobacter     47yo pmh of HTN, DM, Factor V Leiden c/b L peroneal DVT (2022) and RA thrombus (2018) currently not on AC, gout, congenital pancreas divisum, recurrent pancreatitis s/p cholecystectomy, Splenectomy, Kevin-En-Y Pancreaticojejunostomy, total pancreatectomy s/p Islet Cell Autotransplant at [facility] 2018, complicated by multiple intraabdominal infection (VRE, fungal? per LIMC), fistula, abscesses. Presented to [facility] 4/8 for several weeks of decreased PO intake, NBNB emesis, watery diarrhea, Somnolence, requiring intubation for airway protection. Lab revealed ammonia > 200, elevated bilirubin and INR initially c/f liver failure, c/c/b colitis possibly ischemic etiology. Patient transferred to [facility] MICU 4/13 for CRRT initiation for ammonia clearance.    =====Neuro=====  #Altered mental status  >Baseline AOx3  >CTH (4/13): No acute findings  >CTH (4/15): No acute findings   >s/p Precedex, off since 4/15  - off sedation, awake, follows command, AAO2-3  - Likely iso hyperammonemia vs. infection   - treatment for hyperammonemia as below    #Chronic pain  #anxiety/depression   >Was on alprazolam 0.75mg QD, Dilaudid and methadone 5mg TID at home  - c/w Methadone 5mg TID    #Downward nystagmus - RESOLVED  >Noted on exam 4/14-4/15 overnight  >CTH (4/15): No acute findings   >EEG (4/15 - 4/17): TME but no seizure activity  - Will ctm    =====Cardio=====  #History of L peroneal DVT in 2022  #History of RA thrombus in 2018  >TTE (4/13): No thrombus commented  - Eliquis stopped 5/2023 by outpatient hematology given consistently negative DVT studies  - On HSQ for DVT ppx    #RV dysfunction? - RESOLVED  >Enlarged RV and pulmonary hypertension noted on POCUS (4/16) that is not on TTE 4/13  >CTAPE (4/16): No PE  >DVT study (4/16): No DVT  >TTE (4/16): Normal LVSF EF 75%, Normal RVSF and size, TAPSE 1.9, PASP 52  - Elevated PASP likely chronic + acutely exacerbated by liver failure  - ctm w/ POCUS    =====Pulmonary=====  #Atelectasis vs. PNA  >CTC (4/13): RML consolidation noted, appears atelectasis > PNA on POCUS  >L sided consolidation noted on POCUS - possibly PNA  - persistently leukocytosis  - abx as below  - f/u ID recs    #Mechanical ventilation   - s/p Extubation 4/17  - Currently on RA    =====GI=====  #Alcoholic hepatitis/cirrhosis?  >Elevated bilirubin/coag, c/b hepatic encephalopathy  >s/p NAC on admission to outside hospital  >CTAP (4/13): Hepatomegaly and hepatic steatosis  >US abd (4/13): Hepatic steatosis, no hepatic vein thrombosis  >Acute hepatitis panel (4/13): Negative  >Autoimmune hepatitis panel (4/13): Negative  >MRCP (4/15): Enlarged, fatty liver. No biliary duct dilation, unlikely obstruction  >Gastrograffin study to r/o SBO (4/16): Passage of contrast seen on serial XR, unlikely SBO  >PETH (4/13): > 400  - Etiology likely alcohol related given PETH level, although patient and family denies alcohol use  - c/w Folid acid, MVI  - c/w thiamine 500mg Q8h x 3 days (4/18 - 4/20) given patient confabulating c/f wernicke vs. ICU delirium   - Will d/c ursodiol 500mg QD as unlikely obstructive pathology    #Hyperammonemia   >Baseline mentation AAO3  >s/p CRRT (off since 4/16)  - f/u plasma amino acid (4/16): in lab  - f/u urine orotic acid (4/16) - sent out to [facility]  - f/u metabolic genetic consult to r/o acquired urea cycle disorder  - c/w Rifaximin   - c/w Lactulose  - c/w Trend Ammonia qd    #Colitis, ?Ischemic  >CTA/P (4/13): segmental areas of wall thickening throughout the colon and rectum with pneumatosis, mesenteric edema, and adjacent hyperdensities in the proximal transverse colon suspicious for bowel ischemia with intraluminal hemorrhage. Additional intraluminal hyperdensities within the distal ascending colon suspicious for hemorrhage.   >TTE (4/13): No thrombus commented  - Surgery, GI following, no plan for surgery/endoscopic eval for now  - On TF, trend lactate, monitor GIB      #Colonic mass?  >CTA/P (4/13): Nonspecific focal areas of narrowing in the distal descending colon and sigmoid colon, possibly colonic masses  >CTA/P (4/16): Improved colitis, previous ?colonic mass vs. narrowing still seen per discussion w/ radiology  - ctm, colonoscopy eventually    #Diet and ppx  - TF, c/w Pancreatic enzyme supplement while on TF  - rectal tube in place   - c/w PPI 40mg for GI ppx  - Pending bedside dysphagia / swallow eval  - Will reach out to TPN team to start evaluation, in order to minimize enteric volume given colitis    =====Renal=====  #CRRT - NOW OFF  >s/p CRRT (last 4/17) for ammonia clearance  - Strict I/O  - Diurese PRN    =====Heme=====   #Factor 5 Leiden  #History of L peroneal DVT in 2022  #History of RA thrombus in 2018  >TTE (4/13): No thrombus commented  >Eliquis stopped 5/2023 by outpatient hematology given consistently negative DVT studies  - SQH for DVT ppx    #Anemia  >s/p 2u PRBC (4/13, 4/16)  >Multifactorial in nature. No hx of bleeding but with coagulopathy as below.   >B12 (4/13): >2000, Folate: 8.6  >Iron studies (4/13): Ferritin 653, Iron 43, Unmeasurable TIBC/Iron%   >Hemolysis lab (4/14): Haptoglobin < 20;   - Possibly iso liver disease vs. acute infection/inflammation vs. ?hemolysis  - Trend CBC, coag, transfuse goal > 7    #thrombocytopenia  >s/p 1u Plt (4/13)   - Etiology not entirely clear, s/p splenectomy and hepatic steatosis w/o cirrhosis on imaging  - Likely iso acute illness  - Trend CBC, plt goal > 20    =====Endo=====  #DM1 vs. DM3c  >Per HIE record, patient prone to hypoglycemia  >History of DM1, but s/p total pancreatectomy and islet cell autotransplant in 2018 - currently DM3c?  >C-peptide (4/18): < 0.1  - Endocrine on board    #Hypothyroidism  - c/w synthyroid 100    =====ID=====  Infectious work up  >UA (4/13): 11WBC, small LE, negative Nitrite or bacteria  >Babesia (4/13): neg; HIV (4/13): neg; RVP, COVID (4/14): neg; MRSA (4/13): Negative  >CXR (4/13): RML consolidation  >CTC/A/P (4/13): RML consolidation, LLL atelectasis 2/2 likely ?mucus plug, ischemic colitis  >Bcx (4/13): NGTD  >ET tube cx (4/13): Normal resp luisa  >BAL (4/13): Pending  >GI PCR (4/15): Negative  >Sputum culture (4/17): Carbapenem resistance Acineobacter  - Rest of infectious w/u per ID    #Leukocytosis  #Septic shock   #Hx of VRE and Candida infection  >H/o of recurrent infxn from multiple abdominal wounds and surgeries. Hx of VRE and candida.   >Previously on levo at OSH. Off pressor on transfer  >s/p Caspofungin 50mg Q24 (stopped 4/16)  >s/p Daptomycin 500mg Q24 (stopped 4/16)  >s/p Doxycycline 100mg Q12 (stopped 4/18)  - Source of WBC Colitis vs. Pneumonia  - c/w Meropenem 1g q12 (4/10 - )  - c/w Vancomycin q12 (4/17 - )  - Will discuss with ID regarding abx regimen for Carb resistance Acineobacter

## 2024-04-20 NOTE — PROGRESS NOTE ADULT - ASSESSMENT
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 Hargrove), and now S/P total pancreatectomy with islet cell autotransplant at [facility] by Dr. Gil in 04/2018). 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 Per records, a Ct later in 2018 showed still fistulization but no abscess formation     Patient presented to [facility] ED on 4/8 by her spouse for AMS. Per , patient had been having nbnb vomiting and diarrhea for the last 4 weeks, extreme fatigue . She was unable to tolerate PO. She was also sleeping 18-20 hrs per day. On 4/8, he found her on the floor "completely out of it" and took her to [facility] for further evaluation.   Initial workup in ED demonstrated a Tbili 5.4, direct bili 4.4 and ammonia 196. Patient was somnolent and had a left eyeward gaze deviation. Submentally started developing agonal snoring respirations and had to be intubated for airway protection. Course complicated with septic shock and persistent hyperammonemia despite lactulose and rifaximin, CT show and started on meropenem, linezolid and anidulafungin at [facility]--> upon transfer here, off pressors, on alvaro/linezolid/caspofungin    CT Chest (4/13) Occlusion of the distal left lower lobar bronchus with complete left lower lobe atelectasis. Right middle lobe consolidation, possibly secondary to pneumonia or additional atelectasis.     CT A/P (4/13) Segmental areas of wall thickening throughout the colon and rectum with pneumatosis, mesenteric edema, and adjacent hyperdensities in the proximal transverse colon suspicious for bowel ischemia with intraluminal hemorrhage. Additional intraluminal hyperdensities within the distal ascending colon suspicious for hemorrhage. Nonspecific focal areas of narrowing in the distal descending colon and sigmoid colon, possibly colonic masses    UCx (4/13) No Growth  BCx (4/13) NGTD  Bronchoscopy Cx (4/13) Rare Yeast    CMV PCR (4/13) Negative  Adenovirus PCR (4/16) negative  Parvovirus PCR (4/13) Negative  EBV PCR (4/17) 61    Serum Cryptococcal Antigen (4/14) Negative  Serum Fungitell <31  Bronch Fungitell >500  Bronch and Serum Aspergillus Galactomannan Negative    Serum Histo Ag Negative  Urine Histo Ag Negative    Babesia PCR (4/13) Negative  Tick Diseases Panel Negative  Bartonella Serum PCR Negative  Leptospirosis Ab Negative    Lower suspicion infectious etiology directly contributing to hyperbilirubinemia; reasonable to complete meropenem course for the pneumatosis and possible pneumonia.    Low grade fevers 4/16 --> 4/17  Extubated on 4/17 with central line removal    #Fever, Leukocytosis,   Improved   #isolation of MDR , carbapenem resistant Acinetobacter baumanii on ETT.   CT chest showing clearing of prior infiltrates now on RA, no cough,    --If clinical status changes or CXR with significant infiltrates low threshold to switch Meropenem to Unasyn 3g IV Q6H over 4 hours (dosing for CrCl 30-50) AND Minocycline 200 mg IV BID to target the CR Acinetobacter. At the moment I favor that it may have represented colonization of the preceding ETT (now removed) as patient without any respiratory symptoms and on room air. ?Leukocytosis due to alcoholic hepatitis?  --Cdiff PCR neg  --Can continue Vancomycin + Meropenem for now  --Continue to follow CBC with diff  --Continue to follow temperature curve  --Follow up on preliminary blood cultures    #Hyperammonemia, Bilirubin Elevation  Ammonia elevation now thought to be secondary to alcoholic hepatitis given positive PETH  --Follow up on Ureaplasma and Mycoplasma hominis PCR Sent out to [place] (sent on 4/18)  --Follow up on Leptospira PCR    #Elevated Bronch Fungitell   Could be secondary to colonization of respiratory tract with Candida (growth of yeast on cultures)  -- Pneumocystis PCR negative on BAL      Thank you for involving us in the care of this patient  Transplant ID will continue to follow  Please call or page with additional questions  Pager; #[phone]  Teams: from 8 am to 5 pm  Devora Jasmine MD

## 2024-04-20 NOTE — PROGRESS NOTE ADULT - SUBJECTIVE AND OBJECTIVE BOX
Follow Up:  Leukocytosis    Interval History:     afebrile since 4/17-- 04/18  WBC down from 30 to 24, on RA, VSS,   Denies cough, although occasionally has upper airway secretions  rectal tube in place with lose stools, Cdiff PCR neg  Tbi 3.3  stable AST/ALT    Vital Signs Last 24 Hrs  T(C): 37.1 (20 Apr 2024 16:00), Max: 37.6 (19 Apr 2024 20:00)  T(F): 98.7 (20 Apr 2024 16:00), Max: 99.6 (19 Apr 2024 20:00)  HR: 97 (20 Apr 2024 16:00) (81 - 110)  BP: 131/63 (20 Apr 2024 16:00) (111/58 - 146/67)  BP(mean): 89 (20 Apr 2024 16:00) (80 - 100)  RR: 18 (20 Apr 2024 16:00) (14 - 29)  SpO2: 100% (20 Apr 2024 16:00) (95% - 100%)    Parameters below as of 20 Apr 2024 08:00  Patient On (Oxygen Delivery Method): room air    O2 Concentration (%): 21        PHYSICAL EXAMINATION:  General: Alert and Awake, NAD, +NGT  Cardiac: RRR, No M/R/G  Resp: CTAB, No Wh/Rh/Ra  Abdomen: NBS, NT/ND, No HSM, No rigidity or guarding  MSK: No LE edema. No Calf tenderness  Skin: No rashes or lesions. Skin is warm and dry to the touch.   Neuro: Alert and Awake. CN 2-12 Grossly intact. Moves all four extremities spontaneously.  Psych: Calm, Pleasant, Cooperative      ____________________________________________________  ROS  GENERAL: denies chills, , night sweats, weight loss.   PSYCH: denies depression, anxiety, suicidal ideation, hallucination, and delusions  SKIN: no rash or lesions; no color changes, no abnormal nevi,no  dryness, and nojaundice    EYES: denies visual changes, floaters, pain, inflammation, blurred vision, and discharge  ENT: denies tinnitus, vertigo, epistaxis, oral lesion, and decreased acuity  PULM: denies, hemoptysis, pleurisy  CVS: denies angina, palpitations,+ orthopnea, no syncope, or heart murmur  GI: denies constipation, diarrhea, melena, abdominal pain, nausea.   : denies dysuria, frequency, discharge, incontinence, stones or macroscopic hematuria  MS: no arthralgias, no erythema or swelling, no myalgias, noedema, or lower back pain.   CNS: denies numbness, dizziness, seizure, or tremor  ENDO: denies heat/cold intolerance, polyuria, polydipsia, malaise.    HEME: denies bruising, bleeding, lymphadenopathy, anemia, and calf pain    Allergies  No Known Allergies    __________________________________________________  MEDS:  MEDICATIONS  (STANDING):  heparin   Injectable 5000 every 12 hours  insulin glargine Injectable (LANTUS) 6 at bedtime  insulin lispro (ADMELOG) corrective regimen sliding scale  three times a day before meals  insulin lispro (ADMELOG) corrective regimen sliding scale  at bedtime  insulin lispro Injectable (ADMELOG) 2 three times a day before meals  lactulose Syrup 30 every 8 hours  levothyroxine 100 daily  methadone    Tablet 5 three times a day  pantoprazole  Injectable 40 daily    _________________________________________________  ANTIMICOBIALS  meropenem  IVPB 1000 every 8 hours  rifAXIMin 550 two times a day  vancomycin  IVPB 1000 every 12 hours      GENERAL LABS              7.5                  146  | 24   | <4           24.26 >-----------< 124     ------------------------< 143                   22.8                 3.5  | 112  | 0.52                                         Ca 9.7   Mg 1.7   Ph 3.2      Vancomycin Level, Trough: 13.8 (04-19 @ 05:40)    Urinalysis Basic - ( 20 Apr 2024 00:56 )    Color: x / Appearance: x / SG: x / pH: x  Gluc: 143 mg/dL / Ketone: x  / Bili: x / Urobili: x   Blood: x / Protein: x / Nitrite: x   Leuk Esterase: x / RBC: x / WBC x   Sq Epi: x / Non Sq Epi: x / Bacteria: x        _________________________________________________  MICROBIOLOGY  -----------    Culture - Blood (collected 18 Apr 2024 17:53)  Source: .Blood Blood-Venous  Preliminary Report (19 Apr 2024 23:02):    No growth at 24 hours    Culture - Blood (collected 18 Apr 2024 17:53)  Source: .Blood Blood-Venous  Preliminary Report (19 Apr 2024 23:02):    No growth at 24 hours    Culture - Sputum (collected 17 Apr 2024 12:11)  Source: ET Tube ET Tube  Gram Stain (17 Apr 2024 22:40):    Few polymorphonuclear leukocytes per low power field    Few Squamous epithelial cells per low power field    Moderate Gram positive cocci in pairs per oil power field    Few Gram Negative Coccobacilli per oil power field  Final Report (20 Apr 2024 14:42):    Numerous Acinetobacter baumannii/nosocom group (Carbapenem Resistant)    Normal Respiratory Linh present  Organism: Acinetobacter baumannii/nosocom group (Carbapenem Resistant) (20 Apr 2024 14:42)  Organism: Acinetobacter baumannii/nosocom group (Carbapenem Resistant) (20 Apr 2024 14:42)      Method Type: KB      -  Piperacillin/Tazobactam: R      -  Minocycline: S  Organism: Acinetobacter baumannii/nosocom group (Carbapenem Resistant) (20 Apr 2024 14:42)      Method Type: KLARISSA      -  Amikacin: R >32      -  Ampicillin/Sulbactam: I 16/8      -  Cefepime: I 16      -  Ceftazidime: R >16      -  Ciprofloxacin: R >2      -  Gentamicin: R >8      -  Imipenem: R >8      -  Levofloxacin: R >4      -  Meropenem: R >8      -  Tobramycin: S <=2      -  Trimethoprim/Sulfamethoxazole: R >2/38      -  Polymyxin B: I 0.25            Rapid RVP Result: NotDetec (04-14 @ 00:21)  CMVPCR Log: NotDetec [id number] (04-13 @ 18:14)    Clostridium difficile GDH Toxins A&amp;B, EIA:   Negative (04-19-24 @ 14:02)  Clostridium difficile GDH Interpretation: Negative for toxigenic C. Difficile.  This specimen is negative for C.  Difficile glutamate dehydrogenase (GDH) antigen and negative for C.  Difficile Toxins A & B, by EIA.  GDH is a highly sensitive screening  marker for C. Difficile that is produced in large amounts by all C.  Difficile strains, both toxigenic and nontoxigenic.  This assay has not  been validated as a test of cure.  Repeat testing during the same episode  of diarrhea is of limited value and is discouraged.  The results of this  assay should always be interpreted in conjunction with patient's clinical  history. (04-19-24 @ 14:02)    Legionella Antigen, Urine: Negative (04-13-24 @ 09:50)      Fungitell:   _______________________________________________  PERTINENT IMAGING

## 2024-04-20 NOTE — PROGRESS NOTE ADULT - ATTENDING COMMENTS
1. Neuro: Mental status change to  elevated ammonia level. Level now 60. Pt alert and oriented x3.  Continue lactulose and rifaximin. S/P RRT to lower ammonia level.   2. Increased ammonia thought to be form ETOH use and liver disease. Continue lactulose and rifaximin  3. S/P pancreatectomy , with Rouen Y procedure. Difficulty tolerating PO intake. To consider TPN. Encourage po intake. Pt passed FEES yesterday.  4. ID; Continue Vancomycin and meropenem for possible abd source. Pt + acinetobacter in sputum  but at this point respiratory status improved. ? colonizer. No new abx.  5. Factor V Leiden . AC has been on hold.  H.o Dvt. To discuss AC with HEME.  6. DVT prophylaxis ; sq heparin  7. GOC: full code

## 2024-04-20 NOTE — PROGRESS NOTE ADULT - SUBJECTIVE AND OBJECTIVE BOX
INTERVAL HPI/OVERNIGHT EVENTS:  no overnight events    SUBJECTIVE: Patient seen and examined at bedside.     ROS: All negative except as listed above.    VITAL SIGNS:  ICU Vital Signs Last 24 Hrs  T(C): 36.4 (20 Apr 2024 04:00), Max: 37.6 (19 Apr 2024 20:00)  T(F): 97.6 (20 Apr 2024 04:00), Max: 99.6 (19 Apr 2024 20:00)  HR: 84 (20 Apr 2024 06:00) (82 - 110)  BP: 116/63 (20 Apr 2024 06:00) (110/56 - 146/67)  BP(mean): 83 (20 Apr 2024 06:00) (77 - 101)  ABP: --  ABP(mean): --  RR: 22 (20 Apr 2024 06:00) (17 - 29)  SpO2: 100% (20 Apr 2024 06:00) (95% - 100%)    O2 Parameters below as of 19 Apr 2024 20:00  Patient On (Oxygen Delivery Method): room air            Plateau pressure:   P/F ratio:     04-19 @ 07:01  -  04-20 @ 07:00  --------------------------------------------------------  IN: 1880 mL / OUT: 1350 mL / NET: 530 mL      CAPILLARY BLOOD GLUCOSE      POCT Blood Glucose.: 135 mg/dL (19 Apr 2024 21:34)      ECG: reviewed.    PHYSICAL EXAM:    GENERAL: NAD, lying in bed comfortably  HEAD:  Atraumatic, normocephalic  EYES: EOMI, PERRLA, conjunctiva and sclera clear  NECK: Supple, trachea midline, no JVD  HEART: Regular rate and rhythm, no murmurs, rubs, or gallops  LUNGS: Unlabored respirations.  Clear to auscultation bilaterally, no crackles, wheezing, or rhonchi  ABDOMEN: Soft, nontender, nondistended, +BS  EXTREMITIES: 2+ peripheral pulses bilaterally, cap refill<2 secs. No clubbing, cyanosis, or edema  NERVOUS SYSTEM:  A&Ox3, following commands, moving all extremities, no focal deficits   SKIN: No rashes or lesions    MEDICATIONS:  MEDICATIONS  (STANDING):  chlorhexidine 4% Liquid 1 Application(s) Topical <User Schedule>  cholecalciferol 2000 Unit(s) Oral daily  folic acid 1 milliGRAM(s) Oral daily  heparin   Injectable 5000 Unit(s) SubCutaneous every 12 hours  insulin glargine Injectable (LANTUS) 8 Unit(s) SubCutaneous at bedtime  insulin lispro (ADMELOG) corrective regimen sliding scale   SubCutaneous three times a day before meals  insulin lispro (ADMELOG) corrective regimen sliding scale   SubCutaneous at bedtime  insulin lispro Injectable (ADMELOG) 2 Unit(s) SubCutaneous three times a day before meals  lactulose Syrup 30 Gram(s) Oral every 8 hours  levothyroxine 100 MICROGram(s) Oral daily  meropenem  IVPB 1000 milliGRAM(s) IV Intermittent every 8 hours  methadone    Tablet 5 milliGRAM(s) Oral three times a day  multivitamin/minerals/iron Oral Solution (CENTRUM) 15 milliLiter(s) Oral daily  pantoprazole  Injectable 40 milliGRAM(s) IV Push daily  rifAXIMin 550 milliGRAM(s) Oral two times a day  thiamine IVPB 500 milliGRAM(s) IV Intermittent every 8 hours  vancomycin  IVPB 1000 milliGRAM(s) IV Intermittent every 12 hours  zinc sulfate 220 milliGRAM(s) Oral daily    MEDICATIONS  (PRN):      ALLERGIES:  Allergies    No Known Allergies    Intolerances        LABS:                        7.5    24.26 )-----------( 124      ( 20 Apr 2024 00:56 )             22.8     04-20    146<H>  |  112<H>  |  <4<L>  ----------------------------<  143<H>  3.5   |  24  |  0.52    Ca    9.7      20 Apr 2024 00:56  Phos  3.2     04-20  Mg     1.7     04-20    TPro  5.1<L>  /  Alb  2.9<L>  /  TBili  3.3<H>  /  DBili  x   /  AST  49<H>  /  ALT  20  /  AlkPhos  133<H>  04-20    PT/INR - ( 20 Apr 2024 00:56 )   PT: 15.6 sec;   INR: 1.43 ratio         PTT - ( 20 Apr 2024 00:56 )  PTT:40.0 sec  Urinalysis Basic - ( 20 Apr 2024 00:56 )    Color: x / Appearance: x / SG: x / pH: x  Gluc: 143 mg/dL / Ketone: x  / Bili: x / Urobili: x   Blood: x / Protein: x / Nitrite: x   Leuk Esterase: x / RBC: x / WBC x   Sq Epi: x / Non Sq Epi: x / Bacteria: x      ABG:  pH, Arterial: 7.45 (04-20-24 @ 00:42)  pCO2, Arterial: 37 mmHg (04-20-24 @ 00:42)  pO2, Arterial: 86 mmHg (04-20-24 @ 00:42)      vBG:    Micro:    Culture - Blood (collected 04-18-24 @ 17:53)  Source: .Blood Blood-Venous  Preliminary Report (04-19-24 @ 23:02):    No growth at 24 hours    Culture - Blood (collected 04-18-24 @ 17:53)  Source: .Blood Blood-Venous  Preliminary Report (04-19-24 @ 23:02):    No growth at 24 hours    Culture - Blood (collected 04-13-24 @ 04:00)  Source: .Blood Blood-Venous  Final Report (04-18-24 @ 10:01):    No growth at 5 days    Culture - Blood (collected 04-13-24 @ 03:25)  Source: .Blood Blood-Peripheral  Final Report (04-18-24 @ 10:01):    No growth at 5 days        Culture - Sputum (collected 04-17-24 @ 12:11)  Source: ET Tube ET Tube  Gram Stain (04-17-24 @ 22:40):    Few polymorphonuclear leukocytes per low power field    Few Squamous epithelial cells per low power field    Moderate Gram positive cocci in pairs per oil power field    Few Gram Negative Coccobacilli per oil power field  Preliminary Report (04-19-24 @ 09:42):    Numerous Acinetobacter baumannii/nosocom group (Carbapenem Resistant)    Normal Respiratory Linh present  Organism: Acinetobacter baumannii/nosocom group (Carbapenem Resistant) (04-19-24 @ 09:39)  Organism: Acinetobacter baumannii/nosocom group (Carbapenem Resistant) (04-19-24 @ 09:39)      -  Piperacillin/Tazobactam: R      -  Minocycline: S      Method Type: KB  Organism: Acinetobacter baumannii/nosocom group (Carbapenem Resistant) (04-19-24 @ 09:39)      Method Type: KLARISSA      -  Amikacin: R >32      -  Ampicillin/Sulbactam: I 16/8      -  Cefepime: I 16      -  Ceftazidime: R >16      -  Ciprofloxacin: R >2      -  Gentamicin: R >8      -  Imipenem: R >8      -  Levofloxacin: R >4      -  Meropenem: R >8      -  Tobramycin: S <=2      -  Trimethoprim/Sulfamethoxazole: R >2/38    Culture - Sputum (collected 04-13-24 @ 04:32)  Source: ET Tube ET Tube  Gram Stain (04-13-24 @ 14:29):    Moderate polymorphonuclear leukocytes seen per low power field    No organisms seen  Final Report (04-14-24 @ 16:06):    Normal Respiratory Linh present        RADIOLOGY & ADDITIONAL TESTS: Reviewed. 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 Presented to [facility] 4/8 for several weeks of decreased PO intake, NBNB emesis, watery diarrhea, Somnolence, requiring intubation for airway protection. Lab revealed ammonia > 200, elevated bilirubin and INR initially c/f liver failure, c/c/b colitis possibly ischemic etiology. Patient transferred to [facility] MICU 4/13 for CRRT initiation for ammonia clearance.    INTERVAL HPI/OVERNIGHT EVENTS:  no overnight events    SUBJECTIVE: Patient seen and examined at bedside.     ROS: All negative except as listed above.    VITAL SIGNS:  ICU Vital Signs Last 24 Hrs  T(C): 36.4 (20 Apr 2024 04:00), Max: 37.6 (19 Apr 2024 20:00)  T(F): 97.6 (20 Apr 2024 04:00), Max: 99.6 (19 Apr 2024 20:00)  HR: 84 (20 Apr 2024 06:00) (82 - 110)  BP: 116/63 (20 Apr 2024 06:00) (110/56 - 146/67)  BP(mean): 83 (20 Apr 2024 06:00) (77 - 101)  ABP: --  ABP(mean): --  RR: 22 (20 Apr 2024 06:00) (17 - 29)  SpO2: 100% (20 Apr 2024 06:00) (95% - 100%)    O2 Parameters below as of 19 Apr 2024 20:00  Patient On (Oxygen Delivery Method): room air            Plateau pressure:   P/F ratio:     04-19 @ 07:01  -  04-20 @ 07:00  --------------------------------------------------------  IN: 1880 mL / OUT: 1350 mL / NET: 530 mL      CAPILLARY BLOOD GLUCOSE      POCT Blood Glucose.: 135 mg/dL (19 Apr 2024 21:34)      ECG: reviewed.    PHYSICAL EXAM:    GENERAL: NAD, lying in bed comfortably  HEAD:  Atraumatic, normocephalic  EYES: EOMI, PERRLA, conjunctiva and sclera clear  NECK: Supple, trachea midline, no JVD  HEART: Regular rate and rhythm, no murmurs, rubs, or gallops  LUNGS: Unlabored respirations.  Clear to auscultation bilaterally, no crackles, wheezing, or rhonchi  ABDOMEN: Soft, nontender, nondistended, +BS  EXTREMITIES: 2+ peripheral pulses bilaterally, cap refill<2 secs. No clubbing, cyanosis, or edema  NERVOUS SYSTEM:  A&Ox3, following commands, moving all extremities, no focal deficits   SKIN: No rashes or lesions    MEDICATIONS:  MEDICATIONS  (STANDING):  chlorhexidine 4% Liquid 1 Application(s) Topical <User Schedule>  cholecalciferol 2000 Unit(s) Oral daily  folic acid 1 milliGRAM(s) Oral daily  heparin   Injectable 5000 Unit(s) SubCutaneous every 12 hours  insulin glargine Injectable (LANTUS) 8 Unit(s) SubCutaneous at bedtime  insulin lispro (ADMELOG) corrective regimen sliding scale   SubCutaneous three times a day before meals  insulin lispro (ADMELOG) corrective regimen sliding scale   SubCutaneous at bedtime  insulin lispro Injectable (ADMELOG) 2 Unit(s) SubCutaneous three times a day before meals  lactulose Syrup 30 Gram(s) Oral every 8 hours  levothyroxine 100 MICROGram(s) Oral daily  meropenem  IVPB 1000 milliGRAM(s) IV Intermittent every 8 hours  methadone    Tablet 5 milliGRAM(s) Oral three times a day  multivitamin/minerals/iron Oral Solution (CENTRUM) 15 milliLiter(s) Oral daily  pantoprazole  Injectable 40 milliGRAM(s) IV Push daily  rifAXIMin 550 milliGRAM(s) Oral two times a day  thiamine IVPB 500 milliGRAM(s) IV Intermittent every 8 hours  vancomycin  IVPB 1000 milliGRAM(s) IV Intermittent every 12 hours  zinc sulfate 220 milliGRAM(s) Oral daily    MEDICATIONS  (PRN):      ALLERGIES:  Allergies    No Known Allergies    Intolerances        LABS:                        7.5    24.26 )-----------( 124      ( 20 Apr 2024 00:56 )             22.8     04-20    146<H>  |  112<H>  |  <4<L>  ----------------------------<  143<H>  3.5   |  24  |  0.52    Ca    9.7      20 Apr 2024 00:56  Phos  3.2     04-20  Mg     1.7     04-20    TPro  5.1<L>  /  Alb  2.9<L>  /  TBili  3.3<H>  /  DBili  x   /  AST  49<H>  /  ALT  20  /  AlkPhos  133<H>  04-20    PT/INR - ( 20 Apr 2024 00:56 )   PT: 15.6 sec;   INR: 1.43 ratio         PTT - ( 20 Apr 2024 00:56 )  PTT:40.0 sec  Urinalysis Basic - ( 20 Apr 2024 00:56 )    Color: x / Appearance: x / SG: x / pH: x  Gluc: 143 mg/dL / Ketone: x  / Bili: x / Urobili: x   Blood: x / Protein: x / Nitrite: x   Leuk Esterase: x / RBC: x / WBC x   Sq Epi: x / Non Sq Epi: x / Bacteria: x      ABG:  pH, Arterial: 7.45 (04-20-24 @ 00:42)  pCO2, Arterial: 37 mmHg (04-20-24 @ 00:42)  pO2, Arterial: 86 mmHg (04-20-24 @ 00:42)      vBG:    Micro:    Culture - Blood (collected 04-18-24 @ 17:53)  Source: .Blood Blood-Venous  Preliminary Report (04-19-24 @ 23:02):    No growth at 24 hours    Culture - Blood (collected 04-18-24 @ 17:53)  Source: .Blood Blood-Venous  Preliminary Report (04-19-24 @ 23:02):    No growth at 24 hours    Culture - Blood (collected 04-13-24 @ 04:00)  Source: .Blood Blood-Venous  Final Report (04-18-24 @ 10:01):    No growth at 5 days    Culture - Blood (collected 04-13-24 @ 03:25)  Source: .Blood Blood-Peripheral  Final Report (04-18-24 @ 10:01):    No growth at 5 days        Culture - Sputum (collected 04-17-24 @ 12:11)  Source: ET Tube ET Tube  Gram Stain (04-17-24 @ 22:40):    Few polymorphonuclear leukocytes per low power field    Few Squamous epithelial cells per low power field    Moderate Gram positive cocci in pairs per oil power field    Few Gram Negative Coccobacilli per oil power field  Preliminary Report (04-19-24 @ 09:42):    Numerous Acinetobacter baumannii/nosocom group (Carbapenem Resistant)    Normal Respiratory Linh present  Organism: Acinetobacter baumannii/nosocom group (Carbapenem Resistant) (04-19-24 @ 09:39)  Organism: Acinetobacter baumannii/nosocom group (Carbapenem Resistant) (04-19-24 @ 09:39)      -  Piperacillin/Tazobactam: R      -  Minocycline: S      Method Type:   Organism: Acinetobacter baumannii/nosocom group (Carbapenem Resistant) (04-19-24 @ 09:39)      Method Type: KLARISSA      -  Amikacin: R >32      -  Ampicillin/Sulbactam: I 16/8      -  Cefepime: I 16      -  Ceftazidime: R >16      -  Ciprofloxacin: R >2      -  Gentamicin: R >8      -  Imipenem: R >8      -  Levofloxacin: R >4      -  Meropenem: R >8      -  Tobramycin: S <=2      -  Trimethoprim/Sulfamethoxazole: R >2/38    Culture - Sputum (collected 04-13-24 @ 04:32)  Source: ET Tube ET Tube  Gram Stain (04-13-24 @ 14:29):    Moderate polymorphonuclear leukocytes seen per low power field    No organisms seen  Final Report (04-14-24 @ 16:06):    Normal Respiratory Linh present        RADIOLOGY & ADDITIONAL TESTS: Reviewed. 49yo pmh of HTN, DM, Factor V Leiden c/b L peroneal DVT (2022) and RA thrombus (2018) currently not on AC, gout, congenital pancreas divisum, recurrent pancreatitis s/p cholecystectomy, Splenectomy, Kevin-En-Y Pancreaticojejunostomy, total pancreatectomy s/p Islet Cell Autotransplant at [facility] 2018, complicated by multiple intraabdominal infection (VRE, fungal? per LIM), fistula, abscesses. Presented to [facility] 4/8 for several weeks of decreased PO intake, NBNB emesis, watery diarrhea, Somnolence, requiring intubation for airway protection. Lab revealed ammonia > 200, elevated bilirubin and INR initially c/f liver failure, c/c/b colitis possibly ischemic etiology. Patient transferred to [facility] MICU 4/13 for CRRT initiation for ammonia clearance.    INTERVAL HPI/OVERNIGHT EVENTS:  no overnight events    SUBJECTIVE: Patient seen and examined at bedside.     ROS: All negative except as listed above.    VITAL SIGNS:  ICU Vital Signs Last 24 Hrs  T(C): 36.4 (20 Apr 2024 04:00), Max: 37.6 (19 Apr 2024 20:00)  T(F): 97.6 (20 Apr 2024 04:00), Max: 99.6 (19 Apr 2024 20:00)  HR: 84 (20 Apr 2024 06:00) (82 - 110)  BP: 116/63 (20 Apr 2024 06:00) (110/56 - 146/67)  BP(mean): 83 (20 Apr 2024 06:00) (77 - 101)  ABP: --  ABP(mean): --  RR: 22 (20 Apr 2024 06:00) (17 - 29)  SpO2: 100% (20 Apr 2024 06:00) (95% - 100%)    O2 Parameters below as of 19 Apr 2024 20:00  Patient On (Oxygen Delivery Method): room air            Plateau pressure:   P/F ratio:     04-19 @ 07:01  -  04-20 @ 07:00  --------------------------------------------------------  IN: 1880 mL / OUT: 1350 mL / NET: 530 mL      CAPILLARY BLOOD GLUCOSE      POCT Blood Glucose.: 135 mg/dL (19 Apr 2024 21:34)      ECG: reviewed.    PHYSICAL EXAM:    GENERAL: NAD, lying in bed comfortably  HEAD:  Atraumatic, normocephalic  EYES: EOMI, PERRLA, conjunctiva and sclera clear  NECK: Supple, trachea midline, no JVD  HEART: Regular rate and rhythm, no murmurs, rubs, or gallops  LUNGS: Unlabored respirations.  Clear to auscultation bilaterally, no crackles, wheezing, or rhonchi  ABDOMEN: Soft, nontender, nondistended, +BS  EXTREMITIES: 2+ peripheral pulses bilaterally, cap refill<2 secs. No clubbing, cyanosis, or edema  NERVOUS SYSTEM:  A&Ox3, following commands, moving all extremities, no focal deficits   SKIN: No rashes or lesions    MEDICATIONS:  MEDICATIONS  (STANDING):  chlorhexidine 4% Liquid 1 Application(s) Topical <User Schedule>  cholecalciferol 2000 Unit(s) Oral daily  folic acid 1 milliGRAM(s) Oral daily  heparin   Injectable 5000 Unit(s) SubCutaneous every 12 hours  insulin glargine Injectable (LANTUS) 8 Unit(s) SubCutaneous at bedtime  insulin lispro (ADMELOG) corrective regimen sliding scale   SubCutaneous three times a day before meals  insulin lispro (ADMELOG) corrective regimen sliding scale   SubCutaneous at bedtime  insulin lispro Injectable (ADMELOG) 2 Unit(s) SubCutaneous three times a day before meals  lactulose Syrup 30 Gram(s) Oral every 8 hours  levothyroxine 100 MICROGram(s) Oral daily  meropenem  IVPB 1000 milliGRAM(s) IV Intermittent every 8 hours  methadone    Tablet 5 milliGRAM(s) Oral three times a day  multivitamin/minerals/iron Oral Solution (CENTRUM) 15 milliLiter(s) Oral daily  pantoprazole  Injectable 40 milliGRAM(s) IV Push daily  rifAXIMin 550 milliGRAM(s) Oral two times a day  thiamine IVPB 500 milliGRAM(s) IV Intermittent every 8 hours  vancomycin  IVPB 1000 milliGRAM(s) IV Intermittent every 12 hours  zinc sulfate 220 milliGRAM(s) Oral daily    MEDICATIONS  (PRN):      ALLERGIES:  Allergies    No Known Allergies    Intolerances        LABS:                        7.5    24.26 )-----------( 124      ( 20 Apr 2024 00:56 )             22.8     04-20    146<H>  |  112<H>  |  <4<L>  ----------------------------<  143<H>  3.5   |  24  |  0.52    Ca    9.7      20 Apr 2024 00:56  Phos  3.2     04-20  Mg     1.7     04-20    TPro  5.1<L>  /  Alb  2.9<L>  /  TBili  3.3<H>  /  DBili  x   /  AST  49<H>  /  ALT  20  /  AlkPhos  133<H>  04-20    PT/INR - ( 20 Apr 2024 00:56 )   PT: 15.6 sec;   INR: 1.43 ratio         PTT - ( 20 Apr 2024 00:56 )  PTT:40.0 sec  Urinalysis Basic - ( 20 Apr 2024 00:56 )    Color: x / Appearance: x / SG: x / pH: x  Gluc: 143 mg/dL / Ketone: x  / Bili: x / Urobili: x   Blood: x / Protein: x / Nitrite: x   Leuk Esterase: x / RBC: x / WBC x   Sq Epi: x / Non Sq Epi: x / Bacteria: x      ABG:  pH, Arterial: 7.45 (04-20-24 @ 00:42)  pCO2, Arterial: 37 mmHg (04-20-24 @ 00:42)  pO2, Arterial: 86 mmHg (04-20-24 @ 00:42)      vBG:    Micro:    Culture - Blood (collected 04-18-24 @ 17:53)  Source: .Blood Blood-Venous  Preliminary Report (04-19-24 @ 23:02):    No growth at 24 hours    Culture - Blood (collected 04-18-24 @ 17:53)  Source: .Blood Blood-Venous  Preliminary Report (04-19-24 @ 23:02):    No growth at 24 hours    Culture - Blood (collected 04-13-24 @ 04:00)  Source: .Blood Blood-Venous  Final Report (04-18-24 @ 10:01):    No growth at 5 days    Culture - Blood (collected 04-13-24 @ 03:25)  Source: .Blood Blood-Peripheral  Final Report (04-18-24 @ 10:01):    No growth at 5 days        Culture - Sputum (collected 04-17-24 @ 12:11)  Source: ET Tube ET Tube  Gram Stain (04-17-24 @ 22:40):    Few polymorphonuclear leukocytes per low power field    Few Squamous epithelial cells per low power field    Moderate Gram positive cocci in pairs per oil power field    Few Gram Negative Coccobacilli per oil power field  Preliminary Report (04-19-24 @ 09:42):    Numerous Acinetobacter baumannii/nosocom group (Carbapenem Resistant)    Normal Respiratory Linh present  Organism: Acinetobacter baumannii/nosocom group (Carbapenem Resistant) (04-19-24 @ 09:39)  Organism: Acinetobacter baumannii/nosocom group (Carbapenem Resistant) (04-19-24 @ 09:39)      -  Piperacillin/Tazobactam: R      -  Minocycline: S      Method Type:   Organism: Acinetobacter baumannii/nosocom group (Carbapenem Resistant) (04-19-24 @ 09:39)      Method Type: KLARISSA      -  Amikacin: R >32      -  Ampicillin/Sulbactam: I 16/8      -  Cefepime: I 16      -  Ceftazidime: R >16      -  Ciprofloxacin: R >2      -  Gentamicin: R >8      -  Imipenem: R >8      -  Levofloxacin: R >4      -  Meropenem: R >8      -  Tobramycin: S <=2      -  Trimethoprim/Sulfamethoxazole: R >2/38    Culture - Sputum (collected 04-13-24 @ 04:32)  Source: ET Tube ET Tube  Gram Stain (04-13-24 @ 14:29):    Moderate polymorphonuclear leukocytes seen per low power field    No organisms seen  Final Report (04-14-24 @ 16:06):    Normal Respiratory Linh present        RADIOLOGY & ADDITIONAL TESTS: Reviewed.

## 2024-04-20 NOTE — PROGRESS NOTE ADULT - NUTRITIONAL ASSESSMENT
This patient has been assessed with a concern for Malnutrition and has been determined to have a diagnosis/diagnoses of Severe protein-calorie malnutrition and Underweight (BMI < 19).    This patient is being managed with:   Diet Pureed-  Moderately Thick Liquids (MODTHICKLIQS)  Entered: Apr 19 2024  4:19PM

## 2024-04-20 NOTE — PROGRESS NOTE ADULT - SUBJECTIVE AND OBJECTIVE BOX
SURGERY PROGRESS NOTE    Interval events  - C. diff negative.  - patient remains in MICU 2/2 elevated ammonia.  - No acute events overnight.  - Vital signs stable, afebrile, on room air.         OBJECTIVE:   Vital Signs Last 24 Hrs  T(C): 36.4 (20 Apr 2024 04:00), Max: 37.6 (19 Apr 2024 20:00)  T(F): 97.6 (20 Apr 2024 04:00), Max: 99.6 (19 Apr 2024 20:00)  HR: 84 (20 Apr 2024 06:00) (82 - 110)  BP: 116/63 (20 Apr 2024 06:00) (110/56 - 146/67)  BP(mean): 83 (20 Apr 2024 06:00) (77 - 101)  RR: 22 (20 Apr 2024 06:00) (16 - 29)  SpO2: 100% (20 Apr 2024 06:00) (95% - 100%)    Parameters below as of 19 Apr 2024 20:00  Patient On (Oxygen Delivery Method): room air            I&O's Summary    19 Apr 2024 07:01  -  20 Apr 2024 07:00  --------------------------------------------------------  IN: 1880 mL / OUT: 1350 mL / NET: 530 mL      I&O's Detail    19 Apr 2024 07:01  -  20 Apr 2024 07:00  --------------------------------------------------------  IN:    Enteral Tube Flush: 100 mL    IV PiggyBack: 500 mL    IV PiggyBack: 850 mL    Oral Fluid: 330 mL    Vital1.5: 100 mL  Total IN: 1880 mL    OUT:    Incontinent per Collection Bag (mL): 500 mL    Rectal Tube (mL): 850 mL  Total OUT: 1350 mL    Total NET: 530 mL          MEDICATIONS  (STANDING):  chlorhexidine 4% Liquid 1 Application(s) Topical <User Schedule>  cholecalciferol 2000 Unit(s) Oral daily  folic acid 1 milliGRAM(s) Oral daily  heparin   Injectable 5000 Unit(s) SubCutaneous every 12 hours  insulin glargine Injectable (LANTUS) 8 Unit(s) SubCutaneous at bedtime  insulin lispro (ADMELOG) corrective regimen sliding scale   SubCutaneous three times a day before meals  insulin lispro (ADMELOG) corrective regimen sliding scale   SubCutaneous at bedtime  insulin lispro Injectable (ADMELOG) 2 Unit(s) SubCutaneous three times a day before meals  lactulose Syrup 30 Gram(s) Oral every 8 hours  levothyroxine 100 MICROGram(s) Oral daily  meropenem  IVPB 1000 milliGRAM(s) IV Intermittent every 8 hours  methadone    Tablet 5 milliGRAM(s) Oral three times a day  multivitamin/minerals/iron Oral Solution (CENTRUM) 15 milliLiter(s) Oral daily  pantoprazole  Injectable 40 milliGRAM(s) IV Push daily  rifAXIMin 550 milliGRAM(s) Oral two times a day  thiamine IVPB 500 milliGRAM(s) IV Intermittent every 8 hours  vancomycin  IVPB 1000 milliGRAM(s) IV Intermittent every 12 hours  zinc sulfate 220 milliGRAM(s) Oral daily    MEDICATIONS  (PRN):    PHYSICAL EXAM  General: No acute distress, resting comfortably in bed.  HEENT: Normocephalic atraumatic  Respiratory: Nonlabored respirations  Cardio: regular rate  Abdomen: soft, nondistended, nontender, rectal tube with green output  Extremities: warm and well perfused      LABS:                        7.5    24.26 )-----------( 124      ( 20 Apr 2024 00:56 )             22.8     04-20    146<H>  |  112<H>  |  <4<L>  ----------------------------<  143<H>  3.5   |  24  |  0.52    Ca    9.7      20 Apr 2024 00:56  Phos  3.2     04-20  Mg     1.7     04-20    TPro  5.1<L>  /  Alb  2.9<L>  /  TBili  3.3<H>  /  DBili  x   /  AST  49<H>  /  ALT  20  /  AlkPhos  133<H>  04-20    PT/INR - ( 20 Apr 2024 00:56 )   PT: 15.6 sec;   INR: 1.43 ratio         PTT - ( 20 Apr 2024 00:56 )  PTT:40.0 sec  Urinalysis Basic - ( 20 Apr 2024 00:56 )    Color: x / Appearance: x / SG: x / pH: x  Gluc: 143 mg/dL / Ketone: x  / Bili: x / Urobili: x   Blood: x / Protein: x / Nitrite: x   Leuk Esterase: x / RBC: x / WBC x   Sq Epi: x / Non Sq Epi: x / Bacteria: x        RADIOLOGY & ADDITIONAL STUDIES:  no new       SURGERY PROGRESS NOTE    Interval events  - C. diff negative.  - patient remains in MICU 2/2 elevated ammonia.  - tolerating puree diet.  - No acute events overnight.  - Vital signs stable, afebrile, on room air.         OBJECTIVE:   Vital Signs Last 24 Hrs  T(C): 36.4 (20 Apr 2024 04:00), Max: 37.6 (19 Apr 2024 20:00)  T(F): 97.6 (20 Apr 2024 04:00), Max: 99.6 (19 Apr 2024 20:00)  HR: 84 (20 Apr 2024 06:00) (82 - 110)  BP: 116/63 (20 Apr 2024 06:00) (110/56 - 146/67)  BP(mean): 83 (20 Apr 2024 06:00) (77 - 101)  RR: 22 (20 Apr 2024 06:00) (16 - 29)  SpO2: 100% (20 Apr 2024 06:00) (95% - 100%)    Parameters below as of 19 Apr 2024 20:00  Patient On (Oxygen Delivery Method): room air            I&O's Summary    19 Apr 2024 07:01  -  20 Apr 2024 07:00  --------------------------------------------------------  IN: 1880 mL / OUT: 1350 mL / NET: 530 mL      I&O's Detail    19 Apr 2024 07:01  -  20 Apr 2024 07:00  --------------------------------------------------------  IN:    Enteral Tube Flush: 100 mL    IV PiggyBack: 500 mL    IV PiggyBack: 850 mL    Oral Fluid: 330 mL    Vital1.5: 100 mL  Total IN: 1880 mL    OUT:    Incontinent per Collection Bag (mL): 500 mL    Rectal Tube (mL): 850 mL  Total OUT: 1350 mL    Total NET: 530 mL          MEDICATIONS  (STANDING):  chlorhexidine 4% Liquid 1 Application(s) Topical <User Schedule>  cholecalciferol 2000 Unit(s) Oral daily  folic acid 1 milliGRAM(s) Oral daily  heparin   Injectable 5000 Unit(s) SubCutaneous every 12 hours  insulin glargine Injectable (LANTUS) 8 Unit(s) SubCutaneous at bedtime  insulin lispro (ADMELOG) corrective regimen sliding scale   SubCutaneous three times a day before meals  insulin lispro (ADMELOG) corrective regimen sliding scale   SubCutaneous at bedtime  insulin lispro Injectable (ADMELOG) 2 Unit(s) SubCutaneous three times a day before meals  lactulose Syrup 30 Gram(s) Oral every 8 hours  levothyroxine 100 MICROGram(s) Oral daily  meropenem  IVPB 1000 milliGRAM(s) IV Intermittent every 8 hours  methadone    Tablet 5 milliGRAM(s) Oral three times a day  multivitamin/minerals/iron Oral Solution (CENTRUM) 15 milliLiter(s) Oral daily  pantoprazole  Injectable 40 milliGRAM(s) IV Push daily  rifAXIMin 550 milliGRAM(s) Oral two times a day  thiamine IVPB 500 milliGRAM(s) IV Intermittent every 8 hours  vancomycin  IVPB 1000 milliGRAM(s) IV Intermittent every 12 hours  zinc sulfate 220 milliGRAM(s) Oral daily    MEDICATIONS  (PRN):    PHYSICAL EXAM  General: No acute distress, resting comfortably in bed.  HEENT: Normocephalic atraumatic  Respiratory: Nonlabored respirations  Cardio: regular rate  Abdomen: soft, nondistended, nontender, rectal tube with green output  Extremities: warm and well perfused      LABS:                        7.5    24.26 )-----------( 124      ( 20 Apr 2024 00:56 )             22.8     04-20    146<H>  |  112<H>  |  <4<L>  ----------------------------<  143<H>  3.5   |  24  |  0.52    Ca    9.7      20 Apr 2024 00:56  Phos  3.2     04-20  Mg     1.7     04-20    TPro  5.1<L>  /  Alb  2.9<L>  /  TBili  3.3<H>  /  DBili  x   /  AST  49<H>  /  ALT  20  /  AlkPhos  133<H>  04-20    PT/INR - ( 20 Apr 2024 00:56 )   PT: 15.6 sec;   INR: 1.43 ratio         PTT - ( 20 Apr 2024 00:56 )  PTT:40.0 sec  Urinalysis Basic - ( 20 Apr 2024 00:56 )    Color: x / Appearance: x / SG: x / pH: x  Gluc: 143 mg/dL / Ketone: x  / Bili: x / Urobili: x   Blood: x / Protein: x / Nitrite: x   Leuk Esterase: x / RBC: x / WBC x   Sq Epi: x / Non Sq Epi: x / Bacteria: x        RADIOLOGY & ADDITIONAL STUDIES:  no new

## 2024-04-20 NOTE — PROGRESS NOTE ADULT - ASSESSMENT
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   Admitted to the MICU with shock, hyperammonemia, hyperbilirubinemia, anemia, thrombocytopenia, and worsening Leukocytosis on broad spectrum antimicrobials. After extubation, patient was complaining of abdominal pain which could be secondary to colitis. Labs yesterday with C. diff negative. Leukocytosis peaked at 30 yesterday, down to 24 today.     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   Admitted to the MICU with shock, hyperammonemia, hyperbilirubinemia, anemia, thrombocytopenia, and worsening Leukocytosis on broad spectrum antimicrobials. After extubation, patient was complaining of abdominal pain which could be secondary to colitis. Labs yesterday with C. diff negative. Leukocytosis peaked at 30 yesterday, down to 24 today.     Plan/Recs:  - Continue care per primary  - Surgery team to sign off, please contact with any questions, concerns.     ACS/Trauma Surgery  [id number]

## 2024-04-21 NOTE — PROGRESS NOTE ADULT - NUTRITIONAL ASSESSMENT
This patient has been assessed with a concern for Malnutrition and has been determined to have a diagnosis/diagnoses of Severe protein-calorie malnutrition and Underweight (BMI < 19).    This patient is being managed with:   Diet Pureed-  Moderately Thick Liquids (MODTHICKLIQS)  Entered: Apr 19 2024  4:19PM   This patient has been assessed with a concern for Malnutrition and has been determined to have a diagnosis/diagnoses of Severe protein-calorie malnutrition and Underweight (BMI < 19).    This patient is being managed with:   Diet Pureed-  Moderately Thick Liquids (MODTHICKLIQS)  Entered: Apr 19 2024  4:19PM

## 2024-04-21 NOTE — PROGRESS NOTE ADULT - PROBLEM SELECTOR PLAN 10
#Diet and ppx  - TF, c/w Pancreatic enzyme supplement while on TF  - rectal tube in place   - c/w PPI 40mg for GI ppx  - Will reach out to TPN team to start evaluation, in order to minimize enteric volume given colitis

## 2024-04-21 NOTE — PROGRESS NOTE ADULT - PROBLEM SELECTOR PLAN 3
#History of L peroneal DVT in 2022  #History of RA thrombus in 2018  >TTE (4/13): No thrombus commented  >Eliquis stopped 5/2023 by outpatient hematology given consistently negative DVT studies  - SQH for DVT ppx

## 2024-04-21 NOTE — PROGRESS NOTE ADULT - SUBJECTIVE AND OBJECTIVE BOX
*******************************  Tiffanie Kraft MD (PGY-1)  Internal Medicine  Contact via [employer] TEAMS  *******************************    CARA ZALDIVAR  48y  Female    Patient is a 48y old  Female who presents with a chief complaint of Liver Failure (18 Apr 2024 15:50)      Subjective:    Objective:  T(C): 37.1 (04-21-24 @ 05:55), Max: 37.3 (04-20-24 @ 20:00)  HR: 83 (04-21-24 @ 05:55) (83 - 101)  BP: 127/78 (04-21-24 @ 05:55) (109/58 - 135/75)  RR: 18 (04-21-24 @ 05:55) (14 - 22)  SpO2: 95% (04-21-24 @ 05:55) (95% - 100%)  I&O's Summary    20 Apr 2024 07:01  -  21 Apr 2024 07:00  --------------------------------------------------------  IN: 1535 mL / OUT: 1700 mL / NET: -165 mL        PHYSICAL EXAM:  GENERAL: NAD  HEAD:  Atraumatic, Normocephalic  EYES: EOMI, PERRLA, conjunctiva and sclera clear  ENMT: Moist mucous membranes  NECK: Supple, No JVD, trachea midline   NERVOUS SYSTEM:  Alert & Oriented X3, Good concentration; Motor Strength 5/5 B/L upper and lower extremities; DTRs 2+ intact and symmetric  CHEST/LUNG: Clear to auscultation bilaterally; No rales, rhonchi, wheezing, or rubs  HEART: Regular rate and rhythm; No murmurs, rubs, or gallops  ABDOMEN: Soft, Nontender, Nondistended; Bowel sounds present  EXTREMITIES:  2+ Peripheral Pulses, No clubbing, cyanosis, or edema  LYMPH: No lymphadenopathy noted  SKIN: No rashes or lesions    MEDICATIONS  (STANDING):  chlorhexidine 4% Liquid 1 Application(s) Topical <User Schedule>  cholecalciferol 2000 Unit(s) Oral daily  folic acid 1 milliGRAM(s) Oral daily  heparin   Injectable 5000 Unit(s) SubCutaneous every 12 hours  insulin glargine Injectable (LANTUS) 6 Unit(s) SubCutaneous at bedtime  insulin lispro (ADMELOG) corrective regimen sliding scale   SubCutaneous at bedtime  insulin lispro (ADMELOG) corrective regimen sliding scale   SubCutaneous three times a day before meals  insulin lispro Injectable (ADMELOG) 2 Unit(s) SubCutaneous three times a day before meals  lactulose Syrup 30 Gram(s) Oral every 8 hours  levothyroxine 100 MICROGram(s) Oral daily  meropenem  IVPB 1000 milliGRAM(s) IV Intermittent every 8 hours  methadone    Tablet 5 milliGRAM(s) Oral three times a day  multivitamin/minerals/iron Oral Solution (CENTRUM) 15 milliLiter(s) Oral daily  pantoprazole  Injectable 40 milliGRAM(s) IV Push daily  rifAXIMin 550 milliGRAM(s) Oral two times a day  thiamine IVPB 250 milliGRAM(s) IV Intermittent every 8 hours  thiamine IVPB 500 milliGRAM(s) IV Intermittent every 8 hours  vancomycin  IVPB 750 milliGRAM(s) IV Intermittent every 12 hours  zinc sulfate 220 milliGRAM(s) Oral daily    MEDICATIONS  (PRN):      LABS:        CAPILLARY BLOOD GLUCOSE      POCT Blood Glucose.: 135 mg/dL (21 Apr 2024 08:06)  POCT Blood Glucose.: 119 mg/dL (20 Apr 2024 21:42)  POCT Blood Glucose.: 104 mg/dL (20 Apr 2024 16:51)  POCT Blood Glucose.: 98 mg/dL (20 Apr 2024 11:44)      RADIOLOGY & ADDITIONAL TESTS:               CARA ZALDIVAR  48y  Female    Patient is a 48y old  Female who presents with a chief complaint of Liver Failure (18 Apr 2024 15:50)      Subjective:     Objective:  T(C): 37.1 (04-21-24 @ 05:55), Max: 37.3 (04-20-24 @ 20:00)  HR: 83 (04-21-24 @ 05:55) (83 - 101)  BP: 127/78 (04-21-24 @ 05:55) (109/58 - 135/75)  RR: 18 (04-21-24 @ 05:55) (14 - 22)  SpO2: 95% (04-21-24 @ 05:55) (95% - 100%)  I&O's Summary    20 Apr 2024 07:01  -  21 Apr 2024 07:00  --------------------------------------------------------  IN: 1535 mL / OUT: 1700 mL / NET: -165 mL    Physical Exam: Gen: no acute distress  HEENT: atraumatic, normocephalic, pupils equally round and reactive to light, extraocular muscles intact, no conjunctival injection  CV: regular rate and rhythm, normal S1/S2, no murmurs, rubs, or gallops  Resp: lungs clear to auscultation bilaterally, no rales, rhonchi, or wheezes  GI: soft, nontender, nondistended, BSx4  MSK: extremities atraumatic, no cyanosis or clubbing  Skin: warm, dry, no rashes or lesions  Neuro: no focal deficits, sensation grossly intact  Psych: alert and oriented x3, appropriate mood and affec    MEDICATIONS  (STANDING):  chlorhexidine 4% Liquid 1 Application(s) Topical <User Schedule>  cholecalciferol 2000 Unit(s) Oral daily  folic acid 1 milliGRAM(s) Oral daily  heparin   Injectable 5000 Unit(s) SubCutaneous every 12 hours  insulin glargine Injectable (LANTUS) 6 Unit(s) SubCutaneous at bedtime  insulin lispro (ADMELOG) corrective regimen sliding scale   SubCutaneous at bedtime  insulin lispro (ADMELOG) corrective regimen sliding scale   SubCutaneous three times a day before meals  insulin lispro Injectable (ADMELOG) 2 Unit(s) SubCutaneous three times a day before meals  lactulose Syrup 30 Gram(s) Oral every 8 hours  levothyroxine 100 MICROGram(s) Oral daily  meropenem  IVPB 1000 milliGRAM(s) IV Intermittent every 8 hours  methadone    Tablet 5 milliGRAM(s) Oral three times a day  multivitamin/minerals/iron Oral Solution (CENTRUM) 15 milliLiter(s) Oral daily  pantoprazole  Injectable 40 milliGRAM(s) IV Push daily  rifAXIMin 550 milliGRAM(s) Oral two times a day  thiamine IVPB 250 milliGRAM(s) IV Intermittent every 8 hours  thiamine IVPB 500 milliGRAM(s) IV Intermittent every 8 hours  vancomycin  IVPB 750 milliGRAM(s) IV Intermittent every 12 hours  zinc sulfate 220 milliGRAM(s) Oral daily    MEDICATIONS  (PRN):      LABS:        CAPILLARY BLOOD GLUCOSE      POCT Blood Glucose.: 135 mg/dL (21 Apr 2024 08:06)  POCT Blood Glucose.: 119 mg/dL (20 Apr 2024 21:42)  POCT Blood Glucose.: 104 mg/dL (20 Apr 2024 16:51)  POCT Blood Glucose.: 98 mg/dL (20 Apr 2024 11:44)      RADIOLOGY & ADDITIONAL TESTS:

## 2024-04-21 NOTE — PROGRESS NOTE ADULT - PROBLEM SELECTOR PLAN 1
#Alcoholic hepatitis/cirrhosis?  >Elevated bilirubin/coag, c/b hepatic encephalopathy; >PETH (4/13): > 400  >CTAP (4/13): Hepatomegaly and hepatic steatosis  >US abd (4/13): Hepatic steatosis, no hepatic vein thrombosis  >Acute hepatitis panel (4/13): Negative; Autoimmune hepatitis panel (4/13): Negative  >MRCP (4/15): Enlarged, fatty liver. No biliary duct dilation, unlikely obstruction  - c/w Folid acid, MVI; c/w thiamine 500mg Q8h x 3 days (4/18 - 4/20) given patient confabulating c/f wernicke vs. ICU delirium     #Hyperammonemia   >Baseline mentation AAO3  >s/p CRRT (off since 4/16)  - f/u plasma amino acid (4/16): in lab  - f/u urine orotic acid (4/16) - sent out to [facility]  - f/u metabolic genetic consult to r/o acquired urea cycle disorder  - c/w Rifaximin; Lactulose  - c/w Trend Ammonia qd

## 2024-04-21 NOTE — PROGRESS NOTE ADULT - PROBLEM SELECTOR PLAN 2
Infectious work up  >UA (4/13): 11WBC, small LE, negative Nitrite or bacteria  >Babesia (4/13): neg; HIV (4/13): neg; RVP, COVID (4/14): neg; MRSA (4/13): Negative  >CXR (4/13): RML consolidation; CTC/A/P (4/13): RML consolidation, LLL atelectasis 2/2 likely ?mucus plug, ischemic colitis  >Bcx (4/13): NGTD; ET tube cx (4/13): Normal resp luisa  >BAL (43): Pending; GI PCR (4/15): Negative  >Sputum culture (4/17): Carbapenem resistance Acineobacter    #Leukocytosis  #Septic shock   #Hx of VRE and Candida infection  >H/o of recurrent infxn from multiple abdominal wounds and surgeries. Hx of VRE and candida.   >s/p Caspofungin 50mg Q24 (stopped 4/16); Daptomycin 500mg Q24 (stopped 4/16); Doxycycline 100mg Q12 (stopped 4/18)  - Source of WBC Colitis vs. Pneumonia  - c/w Meropenem 1g q12 (4/10 - 4/21)  - c/w Vancomycin q12 (4/17 - 4/21)

## 2024-04-21 NOTE — PROGRESS NOTE ADULT - ATTENDING COMMENTS
42F PMHx T1DM, Factor V Leiden c/b provoked DVT and RA thrombus in the past, and pancreatic divisum c/b recurrent pancreatitis s/p total pancreatectomy s/p islet cell autotransplant, also had extensive abdominal surgical history (cholecystectomy, splenectomy, celina-en-y pancreaticojejunostomy) for unclear reason c/b multiple intraabdominal infection/abscess/fistula (including VRE, kavin) who initially presented to [facility] for emesis, diarrhea, and somnolence. Required intubation for airway protection, was found to be in acute liver failure and hepatic encephalopathy w/ ammonia >200. Was transferred to MICU here for CRRT and liver transplant evaluation. Was initially started on Bobby/Dapto/Doxy/Caspofungin for broad spectrum coverage, then deescalated to Vanc/Bobby. CRRT initiated, ammonia levels downtrended, stopped on 4/16, and TFs restarted w/ lactulose. Was eventually extubated 4/17. Acute liver failure and hepatic encephalopathy currently being treated as due to alcholic hepatitis due to PETH>400 and other workup being unrevealing. Now stable for downgrade to floors.     Patient seen and evaluated at bedside this morning. Mother at bedside. Patient alert to self, location and year. Denies any complaints at this time.     - C/w Lactulose 30 TID and Rifaximin 550mg BID. Monitor stool count. Mental status appears to be improving.   - F/u hepatology recs. Monitor LFTs   - Orotic acid and plasma amino acid levels sent out and pending  - Factor V Leiden. Hx of DVT. Per chart review Eliquis stopped 5/2023 by outpatient hematology. F/u heme recs  - T1DM; appreciate endo recs   - Carbapenem resistant Acinetobacter baumanii on ETT. ID following; rec monitoring off abx for now. Monitor fever curve, WBC trend.   - CT A/P (4/13): segmental areas of wall thickening throughout the colon and rectum with pneumatosis, mesenteric edema, and adjacent hyperdensities in the proximal transverse colon suspicious for bowel ischemia with intraluminal hemorrhage. Additional intraluminal hyperdensities within the distal ascending colon suspicious for hemorrhage. Surgery, GI following, no plan for surgery/endoscopic eval for now.

## 2024-04-21 NOTE — PROGRESS NOTE ADULT - ASSESSMENT
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 Hargrove), and now S/P total pancreatectomy with islet cell autotransplant at [facility] by Dr. Gil in 04/2018). 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 Per records, a Ct later in 2018 showed still fistulization but no abscess formation     Patient presented to [facility] ED on 4/8 by her spouse for AMS. Per , patient had been having nbnb vomiting and diarrhea for the last 4 weeks, extreme fatigue . She was unable to tolerate PO. She was also sleeping 18-20 hrs per day. On 4/8, he found her on the floor "completely out of it" and took her to [facility] for further evaluation.   Initial workup in ED demonstrated a Tbili 5.4, direct bili 4.4 and ammonia 196. Patient was somnolent and had a left eyeward gaze deviation. Submentally started developing agonal snoring respirations and had to be intubated for airway protection. Course complicated with septic shock and persistent hyperammonemia despite lactulose and rifaximin, CT show and started on meropenem, linezolid and anidulafungin at [facility]--> upon transfer here, off pressors, on alvaro/linezolid/caspofungin    CT Chest (4/13) Occlusion of the distal left lower lobar bronchus with complete left lower lobe atelectasis. Right middle lobe consolidation, possibly secondary to pneumonia or additional atelectasis.     CT A/P (4/13) Segmental areas of wall thickening throughout the colon and rectum with pneumatosis, mesenteric edema, and adjacent hyperdensities in the proximal transverse colon suspicious for bowel ischemia with intraluminal hemorrhage. Additional intraluminal hyperdensities within the distal ascending colon suspicious for hemorrhage. Nonspecific focal areas of narrowing in the distal descending colon and sigmoid colon, possibly colonic masses    UCx (4/13) No Growth  BCx (4/13) NGTD  Bronchoscopy Cx (4/13) Rare Yeast    CMV PCR (4/13) Negative  Adenovirus PCR (4/16) negative  Parvovirus PCR (4/13) Negative  EBV PCR (4/17) 61    Serum Cryptococcal Antigen (4/14) Negative  Serum Fungitell <31  Bronch Fungitell >500  Bronch and Serum Aspergillus Galactomannan Negative    Serum Histo Ag Negative  Urine Histo Ag Negative    Babesia PCR (4/13) Negative  Tick Diseases Panel Negative  Bartonella Serum PCR Negative  Leptospirosis Ab Negative    Lower suspicion infectious etiology directly contributing to hyperbilirubinemia; reasonable to complete meropenem course for the pneumatosis and possible pneumonia.    Low grade fevers 4/16 --> 4/17  Extubated on 4/17 with central line removal    #Fever, Leukocytosis,   Improved   #isolation of MDR , carbapenem resistant Acinetobacter baumanii on ETT.   CT chest showing clearing of prior infiltrates now on RA, no cough,    --If clinical status changes or CXR with significant infiltrates low threshold to switch Meropenem to Unasyn 3g IV Q6H over 4 hours (dosing for CrCl 30-50) AND Minocycline 200 mg IV BID to target the CR Acinetobacter. At the moment I favor that it may have represented colonization of the preceding ETT (now removed) as patient without any respiratory symptoms and on room air. ?Leukocytosis due to alcoholic hepatitis?  --Cdiff PCR neg  --Can continue Vancomycin + Meropenem for now  --Continue to follow CBC with diff  --Continue to follow temperature curve  --Follow up on preliminary blood cultures    #Hyperammonemia, Bilirubin Elevation  Ammonia elevation now thought to be secondary to alcoholic hepatitis given positive PETH  --Follow up on Ureaplasma and Mycoplasma hominis PCR Sent out to [place] (sent on 4/18)  --Follow up on Leptospira PCR    #Elevated Bronch Fungitell   Could be secondary to colonization of respiratory tract with Candida (growth of yeast on cultures)  -- Pneumocystis PCR negative on BAL      Thank you for involving us in the care of this patient  Transplant ID will continue to follow  Please call or page with additional questions  Pager; #[phone]  Teams: from 8 am to 5 pm  Devora Jasmine MD   42 year old female with hx of DMT1, Factor V leiden deficiency, gout and 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 Hargrove), and now S/P total pancreatectomy with islet cell autotransplant at [facility] by Dr. Gil in 04/2018). 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 Per records, a Ct later in 2018 showed still fistulization but no abscess formation     Patient presented to [facility] ED on 4/8 by her spouse for AMS. Per , patient had been having nbnb vomiting and diarrhea for the last 4 weeks, extreme fatigue . She was unable to tolerate PO. She was also sleeping 18-20 hrs per day. On 4/8, he found her on the floor "completely out of it" and took her to [facility] for further evaluation.   Initial workup in ED demonstrated a Tbili 5.4, direct bili 4.4 and ammonia 196. Patient was somnolent and had a left eyeward gaze deviation. Submentally started developing agonal snoring respirations and had to be intubated for airway protection. Course complicated with septic shock and persistent hyperammonemia despite lactulose and rifaximin, CT show and started on meropenem, linezolid and anidulafungin at [facility]--> upon transfer here, off pressors, on alvaro/linezolid/caspofungin    CT Chest (4/13) Occlusion of the distal left lower lobar bronchus with complete left lower lobe atelectasis. Right middle lobe consolidation, possibly secondary to pneumonia or additional atelectasis.     CT A/P (4/13) Segmental areas of wall thickening throughout the colon and rectum with pneumatosis, mesenteric edema, and adjacent hyperdensities in the proximal transverse colon suspicious for bowel ischemia with intraluminal hemorrhage. Additional intraluminal hyperdensities within the distal ascending colon suspicious for hemorrhage. Nonspecific focal areas of narrowing in the distal descending colon and sigmoid colon, possibly colonic masses    UCx (4/13) No Growth  BCx (4/13) NGTD  Bronchoscopy Cx (4/13) Rare Yeast    CMV PCR (4/13) Negative  Adenovirus PCR (4/16) negative  Parvovirus PCR (4/13) Negative  EBV PCR (4/17) 61    Serum Cryptococcal Antigen (4/14) Negative  Serum Fungitell <31  Bronch Fungitell >500  Bronch and Serum Aspergillus Galactomannan Negative    Serum Histo Ag Negative  Urine Histo Ag Negative    Babesia PCR (4/13) Negative  Tick Diseases Panel Negative  Bartonella Serum PCR Negative  Leptospirosis Ab Negative    Lower suspicion infectious etiology directly contributing to hyperbilirubinemia; reasonable to complete meropenem course for the pneumatosis and possible pneumonia.    Low grade fevers 4/16 --> 4/17  Extubated on 4/17 with central line removal    #Fever, Leukocytosis,   Improved down to 14    #isolation of MDR , carbapenem resistant Acinetobacter baumanii on ETT.   CT chest showing clearing of prior infiltrates now on RA, no cough,    --If clinical status changes or CXR with significant infiltrates low threshold to switch Meropenem to Unasyn 3g IV Q6H over 4 hours (dosing for CrCl 30-50) AND Minocycline 200 mg IV BID to target the CR Acinetobacter. At the moment I favor that it may have represented colonization of the preceding ETT (now removed) as patient without any respiratory symptoms and on room air. ?Leukocytosis due to alcoholic hepatitis?  --Cdiff PCR neg  --on meropenem since 4/13 and vancomycin since 4/17 . Has negative BC 4/18 and the presence of MDRo ACinetobacter with risk of further selection of growth, and given rapid decline in WBC, recommend early discontinuation of antibiotics- Could d/c at this time  --Continue to follow CBC with diff  --Continue to follow temperature curve  --Follow up on preliminary blood cultures    #Hyperammonemia, Bilirubin Elevation  Ammonia elevation now thought to be secondary to alcoholic hepatitis given positive PETH  --Follow up on Ureaplasma and Mycoplasma hominis PCR Sent out to [place] (sent on 4/18)  --Follow up on Leptospira PCR    #Elevated Bronch Fungitell   Could be secondary to colonization of respiratory tract with Candida (growth of yeast on cultures)  -- Pneumocystis PCR negative on BAL      Thank you for involving us in the care of this patient  Transplant ID will continue to follow  Please call or page with additional questions  Pager; #[phone]  Teams: from 8 am to 5 pm  Devora Benamu MD

## 2024-04-21 NOTE — PROGRESS NOTE ADULT - ASSESSMENT
47yo pmh of HTN, DM, Factor V Leiden c/b L peroneal DVT (2022) and RA thrombus (2018) currently not on AC, gout, congenital pancreas divisum, recurrent pancreatitis s/p cholecystectomy, Splenectomy, Kevin-En-Y Pancreaticojejunostomy, total pancreatectomy s/p Islet Cell Autotransplant at [facility] 2018, complicated by multiple intraabdominal infection (VRE, fungal? per [facility]), fistula, abscesses. Presented to [facility] 4/8 for several weeks of decreased PO intake, NBNB emesis, watery diarrhea, Somnolence, requiring intubation for airway protection. Lab revealed ammonia > 200, elevated bilirubin and INR initially c/f liver failure, c/c/b colitis possibly ischemic etiology. Patient transferred to [facility] MICU 4/13 for CRRT initiation for ammonia clearance.    =====Neuro=====  #Altered mental status  >Baseline AOx3  >CTH (4/13): No acute findings  >CTH (4/15): No acute findings   >s/p Precedex, off since 4/15  - off sedation, awake, follows command, AAO2-3  - Likely iso hyperammonemia vs. infection   - treatment for hyperammonemia as below    #Chronic pain  #anxiety/depression   >Was on alprazolam 0.75mg QD, Dilaudid and methadone 5mg TID at home  - c/w Methadone 5mg TID    #Downward nystagmus - RESOLVED  >Noted on exam 4/14-4/15 overnight  >CTH (4/15): No acute findings   >EEG (4/15 - 4/17): TME but no seizure activity  - Will ctm    =====Cardio=====  #History of L peroneal DVT in 2022  #History of RA thrombus in 2018  >TTE (4/13): No thrombus commented  - Eliquis stopped 5/2023 by outpatient hematology given consistently negative DVT studies  - On HSQ for DVT ppx    #RV dysfunction? - RESOLVED  >Enlarged RV and pulmonary hypertension noted on POCUS (4/16) that is not on TTE 4/13  >CTAPE (4/16): No PE  >DVT study (4/16): No DVT  >TTE (4/16): Normal LVSF EF 75%, Normal RVSF and size, TAPSE 1.9, PASP 52  - Elevated PASP likely chronic + acutely exacerbated by liver failure  - ctm w/ POCUS    =====Pulmonary=====  #Atelectasis vs. PNA  >CTC (4/13): RML consolidation noted, appears atelectasis > PNA on POCUS  >L sided consolidation noted on POCUS - possibly PNA  - persistently leukocytosis  - abx as below  - f/u ID recs    #Mechanical ventilation   - s/p Extubation 4/17  - Currently on RA    =====GI=====  #Alcoholic hepatitis/cirrhosis?  >Elevated bilirubin/coag, c/b hepatic encephalopathy  >s/p NAC on admission to outside hospital  >CTAP (4/13): Hepatomegaly and hepatic steatosis  >US abd (4/13): Hepatic steatosis, no hepatic vein thrombosis  >Acute hepatitis panel (4/13): Negative  >Autoimmune hepatitis panel (4/13): Negative  >MRCP (4/15): Enlarged, fatty liver. No biliary duct dilation, unlikely obstruction  >Gastrograffin study to r/o SBO (4/16): Passage of contrast seen on serial XR, unlikely SBO  >PETH (4/13): > 400  - Etiology likely alcohol related given PETH level, although patient and family denies alcohol use  - c/w Folid acid, MVI  - c/w thiamine 500mg Q8h x 3 days (4/18 - 4/20) given patient confabulating c/f wernicke vs. ICU delirium   - Will d/c ursodiol 500mg QD as unlikely obstructive pathology    #Hyperammonemia   >Baseline mentation AAO3  >s/p CRRT (off since 4/16)  - f/u plasma amino acid (4/16): in lab  - f/u urine orotic acid (4/16) - sent out to [facility]  - f/u metabolic genetic consult to r/o acquired urea cycle disorder  - c/w Rifaximin   - c/w Lactulose  - c/w Trend Ammonia qd    #Colitis, ?Ischemic  >CTA/P (4/13): segmental areas of wall thickening throughout the colon and rectum with pneumatosis, mesenteric edema, and adjacent hyperdensities in the proximal transverse colon suspicious for bowel ischemia with intraluminal hemorrhage. Additional intraluminal hyperdensities within the distal ascending colon suspicious for hemorrhage.   >TTE (4/13): No thrombus commented  - Surgery, GI following, no plan for surgery/endoscopic eval for now  - On TF, trend lactate, monitor GIB      #Colonic mass?  >CTA/P (4/13): Nonspecific focal areas of narrowing in the distal descending colon and sigmoid colon, possibly colonic masses  >CTA/P (4/16): Improved colitis, previous ?colonic mass vs. narrowing still seen per discussion w/ radiology  - ctm, colonoscopy eventually    #Diet and ppx  - TF, c/w Pancreatic enzyme supplement while on TF  - rectal tube in place   - c/w PPI 40mg for GI ppx  - Pending bedside dysphagia / swallow eval  - Will reach out to TPN team to start evaluation, in order to minimize enteric volume given colitis    =====Renal=====  #CRRT - NOW OFF  >s/p CRRT (last 4/17) for ammonia clearance  - Strict I/O  - Diurese PRN    =====Heme=====   #Factor 5 Leiden  #History of L peroneal DVT in 2022  #History of RA thrombus in 2018  >TTE (4/13): No thrombus commented  >Eliquis stopped 5/2023 by outpatient hematology given consistently negative DVT studies  - SQH for DVT ppx    #Anemia  >s/p 2u PRBC (4/13, 4/16)  >Multifactorial in nature. No hx of bleeding but with coagulopathy as below.   >B12 (4/13): >2000, Folate: 8.6  >Iron studies (4/13): Ferritin 653, Iron 43, Unmeasurable TIBC/Iron%   >Hemolysis lab (4/14): Haptoglobin < 20;   - Possibly iso liver disease vs. acute infection/inflammation vs. ?hemolysis  - Trend CBC, coag, transfuse goal > 7    #thrombocytopenia  >s/p 1u Plt (4/13)   - Etiology not entirely clear, s/p splenectomy and hepatic steatosis w/o cirrhosis on imaging  - Likely iso acute illness  - Trend CBC, plt goal > 20    =====Endo=====  #DM1 vs. DM3c  >Per HIE record, patient prone to hypoglycemia  >History of DM1, but s/p total pancreatectomy and islet cell autotransplant in 2018 - currently DM3c?  >C-peptide (4/18): < 0.1  - Endocrine on board    #Hypothyroidism  - c/w synthyroid 100    =====ID=====  Infectious work up  >UA (4/13): 11WBC, small LE, negative Nitrite or bacteria  >Babesia (4/13): neg; HIV (4/13): neg; RVP, COVID (4/14): neg; MRSA (4/13): Negative  >CXR (4/13): RML consolidation  >CTC/A/P (4/13): RML consolidation, LLL atelectasis 2/2 likely ?mucus plug, ischemic colitis  >Bcx (4/13): NGTD  >ET tube cx (4/13): Normal resp luisa  >BAL (4/13): Pending  >GI PCR (4/15): Negative  >Sputum culture (4/17): Carbapenem resistance Acineobacter  - Rest of infectious w/u per ID    #Leukocytosis  #Septic shock   #Hx of VRE and Candida infection  >H/o of recurrent infxn from multiple abdominal wounds and surgeries. Hx of VRE and candida.   >Previously on levo at OSH. Off pressor on transfer  >s/p Caspofungin 50mg Q24 (stopped 4/16)  >s/p Daptomycin 500mg Q24 (stopped 4/16)  >s/p Doxycycline 100mg Q12 (stopped 4/18)  - Source of WBC Colitis vs. Pneumonia  - c/w Meropenem 1g q12 (4/10 - )  - c/w Vancomycin q12 (4/17 - )  - Will discuss with ID regarding abx regimen for Carb resistance Acineobacter     36.5 49yo pmh of HTN, DM, Factor V Leiden c/b L peroneal DVT (2022) and RA thrombus (2018) currently not on AC, gout, congenital pancreas divisum, recurrent pancreatitis s/p cholecystectomy, Splenectomy, Kevin-En-Y Pancreaticojejunostomy, total pancreatectomy s/p Islet Cell Autotransplant at [facility] 2018, complicated by multiple intraabdominal infection (VRE, fungal? per [facility]), fistula, abscesses. Presented to [facility] 4/8 for several weeks of decreased PO intake, NBNB emesis, watery diarrhea, Somnolence, requiring intubation for airway protection. Lab revealed ammonia > 200, elevated bilirubin and INR initially c/f liver failure, c/c/b colitis possibly ischemic etiology. Patient transferred to [facility] MICU 4/13 for CRRT initiation for ammonia clearance.

## 2024-04-21 NOTE — CHART NOTE - NSCHARTNOTEFT_GEN_A_CORE
MICU COURSE:    42 year old female with hx of DM1, gout, Factor V Leiden c/b provoked DVT and RA thrombus in the past, and pancreatic divisum c/b recurrent pancreatitis s/p total pancreatectomy s/p islet cell autotransplant, also had extensive abdominal surgical history (cholecystectomy, splenectomy, kevin-en-y pancreaticojejuonostomy) for unclear reason c/b multiple intraabdominal infection/abscess/fistula (including VRE, candida) in the past. Patient initially presented to [facility] for few weeks of worsening decreased PO intake, NBNB emesis, watery diarrhea as well as profound somnolence. She was intubated in [facility] ED for airway protection and admitted to MICU there. She was found to have INR > 4, ammonia >200 and elevated LFTs concerning for liver failure/hepatic encephalopathy for which she was given vitamin K and started on lactulose. Her [facility] MICU course was c/b colitis possibly ischemic in origin and undifferentiated shock state possibly septic requiring pressor. Patient was unable to tolerate lactulose given colitis and [facility].    [facility], patient was intubated and sedated on Precedex and hemodynamically stable off pressor. Patient started CRRT for ammonia clearance on arrival and was kept NPO given concern for colitis. Hepatology, ID, Nephrology, Hematology, Endocrinology, Surgery were consulted to co-manage patient. MICU course complicated by persistent and up-trending leukocytosis and intermittent low grade fever likely due to pneumonia vs. colitis. Given her extensive past infection history, she was started on Meropenem, Daptomycin, Doxycycline, Caspofungin for coverage. Her antibiotic regimen was gradually narrowed to Vancomycin and Meropenem as microbiology data came back. Her mental status steadily improves w/ down-trending ammonia while on the CRRT. After discussion with surgery team, she was restarted on TF and lactulose has been off CRRT since 4/16. She remains hemodynamically stable and was extubated on 4/17. Extensive radiologic and laboratory work up were sent during her MICU stay but mostly unrevealing beside PETH > 400. Ultimately her liver failure was deemed most likely from alcoholic hepatitis/cirrhosis. On 4/18, patient was AAOx2-3 and confabulating, her thiamine dosage was increased to 500mg q8h. Per hepatology recommendations, patient to continue with 500 q8 and then taper to 250 q8.    Transferred to medicine floors for ongoing management.    Subjective:      Physical Exam:      Vital Signs Last 24 Hrs  T(C): 37.1 (21 Apr 2024 00:06), Max: 37.3 (20 Apr 2024 20:00)  T(F): 98.8 (21 Apr 2024 00:06), Max: 99.1 (20 Apr 2024 20:00)  HR: 89 (21 Apr 2024 00:06) (81 - 101)  BP: 114/67 (21 Apr 2024 00:06) (109/58 - 135/75)  BP(mean): 84 (20 Apr 2024 23:00) (77 - 102)  RR: 20 (21 Apr 2024 00:06) (14 - 24)  SpO2: 95% (21 Apr 2024 00:06) (95% - 100%)    Parameters below as of 21 Apr 2024 00:06  Patient On (Oxygen Delivery Method): room air      I&O's Summary    19 Apr 2024 07:01  -  20 Apr 2024 07:00  --------------------------------------------------------  IN: 1880 mL / OUT: 1350 mL / NET: 530 mL    20 Apr 2024 07:01  -  21 Apr 2024 04:10  --------------------------------------------------------  IN: 1335 mL / OUT: 1700 mL / NET: -365 mL          MEDICATIONS  (STANDING):  chlorhexidine 4% Liquid 1 Application(s) Topical <User Schedule>  cholecalciferol 2000 Unit(s) Oral daily  folic acid 1 milliGRAM(s) Oral daily  heparin   Injectable 5000 Unit(s) SubCutaneous every 12 hours  insulin glargine Injectable (LANTUS) 6 Unit(s) SubCutaneous at bedtime  insulin lispro (ADMELOG) corrective regimen sliding scale   SubCutaneous three times a day before meals  insulin lispro (ADMELOG) corrective regimen sliding scale   SubCutaneous at bedtime  insulin lispro Injectable (ADMELOG) 2 Unit(s) SubCutaneous three times a day before meals  lactulose Syrup 30 Gram(s) Oral every 8 hours  levothyroxine 100 MICROGram(s) Oral daily  meropenem  IVPB 1000 milliGRAM(s) IV Intermittent every 8 hours  methadone    Tablet 5 milliGRAM(s) Oral three times a day  multivitamin/minerals/iron Oral Solution (CENTRUM) 15 milliLiter(s) Oral daily  pantoprazole  Injectable 40 milliGRAM(s) IV Push daily  rifAXIMin 550 milliGRAM(s) Oral two times a day  thiamine IVPB 500 milliGRAM(s) IV Intermittent every 8 hours  thiamine IVPB 250 milliGRAM(s) IV Intermittent every 8 hours  vancomycin  IVPB 750 milliGRAM(s) IV Intermittent every 12 hours  zinc sulfate 220 milliGRAM(s) Oral daily    MEDICATIONS  (PRN):        LABS      ASSESSMENT:  47 yo F with history of HTN, DM, Factor V Leiden c/b L peroneal DVT (2022) and RA thrombus (2018) currently not on AC, gout, congenital pancreas divisum, recurrent pancreatitis s/p cholecystectomy, Splenectomy, Kevin-En-Y Pancreaticojejunostomy, total pancreatectomy s/p Islet Cell Autotransplant at [facility] 2018, complicated by multiple intraabdominal infection (VRE, fungal? per [facility]) initially presented to [facility] 4/8 for several weeks of GI symptoms with course c/b somnolence requiring intubation for airway protection. Lab revealed ammonia > 200, elevated bilirubin and INR initially c/f liver failure, c/c/b colitis possibly ischemic etiology. Patient transferred to [facility] MICU 4/13 for CRRT initiation for ammonia clearance now s/p CRRT and improvement in ammonia levels on maintenance lactulose and rifaximin.     PLAN:    #Alcoholic Hepatitis  #Hyperammonemia   - initially with transaminitis, coagulopathy, hyperbilirubinemia and evidence of hepatic encephalopathy iso ammonia > 200  - unclear etiology of liver dysfunction however thought to be iso alcohol use given PETH > 400 although patient and family deny history of alcohol use  - s/p NAC on admission to OSH  - imaging studies with hepatic steatosis but no hepatic vein thrombosis  - s/p CRRT (off since 4/16) with improvement in ammonia level and improvement in mental status  - pending specialized lab studies with plasma amino acid (4/16): in lab, urine orotic acid (4/16) - sent out to [facility], metabolic genetic consult to r/o acquired urea cycle disorder  - c/w lactulose 30 q8  - c/w rifaximin 550 BID for prophylaxis   - c/w folid acid, MVI  - c/w thiamine 500mg Q8h x 3 days (4/18 - 4/20) given patient confabulating; will taper to thiamine 250 mg q6 per hepatology recs     #Hx of VRE and Candida infection  - 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 History of VRE and Candida.   - s/p Caspofungin 50mg Q24 (stopped 4/16), Daptomycin 500mg Q24 (stopped 4/16), Doxycycline 100mg Q12 (stopped 4/18)  - c/w Meropenem 1g q12 (4/10 - )  - c/w Vancomycin q12 (4/17 - )  - bronchial culture 4/17 with carbapenem resistant Acinobacter however, for now potentially iso colonization of ETT  - per ID: if clinical status changes or CXR with significant infiltrates low threshold to switch Meropenem to Unasyn 3g IV Q6H over 4 hours (dosing for CrCl 30-50) AND Minocycline 200 mg IV BID to target the CR Acinetobacter.    #Colonic Mass  #Colitis, Ischemic  - CT A/P (4/13): segmental areas of wall thickening throughout the colon and rectum with pneumatosis, mesenteric edema, and adjacent hyperdensities in the proximal transverse colon suspicious for bowel ischemia with intraluminal hemorrhage. Additional intraluminal hyperdensities within the distal ascending colon suspicious for hemorrhage.   - Surgery, GI following, no plan for surgery/endoscopic eval for now  - On TF, trend lactate, monitor GIB    #Congenital Pancreas Divisum  #Total Pancreatectomy s/p Islet Cell Autotransplant  - c/w pancreatic enzyme supplement while on TF  - rectal tube in place   - c/w PPI 40mg for GI ppx  - will need TPN evaluation     #Factor 5 Leiden  #History of L peroneal DVT in 2022  #History of RA thrombus in 2018 (not on AC since 5/2023)  - TTE (4/13) - no thrombus noted  - c/w HSQ for DVT ppx    #DM1 vs. DM3c  - History of DM1, but s/p total pancreatectomy and islet cell autotransplant in 2018 - currently DM3c?  - c/w lantus 6 qhs and lispro 2 premeal with ISS  - f/u endocrinology recs    #Anemia  #Thrombocytopenia  - Likely multifactorial in nature. No hx of bleeding but with coagulopathy as below.  Possibly iso liver disease vs. acute infection/inflammation vs. ?hemolysis  - s/p 2u PRBC (4/13, 4/16)  - Trend CBC, coag, transfuse goal > 7 and platelets > 20    #Hypothyroidism  - c/w synthyroid 100    #Chronic pain  #Anxiety/depression   - Was on alprazolam 0.75mg QD, Dilaudid and methadone 5mg TID at home  - c/w Methadone 5mg TID  - monitor QTc while on methadone therapy MICU COURSE:    42 year old female with hx of DM1, gout, Factor V Leiden c/b provoked DVT and RA thrombus in the past, and pancreatic divisum c/b recurrent pancreatitis s/p total pancreatectomy s/p islet cell autotransplant, also had extensive abdominal surgical history (cholecystectomy, splenectomy, kevin-en-y pancreaticojejuonostomy) for unclear reason c/b multiple intraabdominal infection/abscess/fistula (including VRE, candida) in the past. Patient initially presented to [facility] for few weeks of worsening decreased PO intake, NBNB emesis, watery diarrhea as well as profound somnolence. She was intubated in [facility] ED for airway protection and admitted to MICU there. She was found to have INR > 4, ammonia >200 and elevated LFTs concerning for liver failure/hepatic encephalopathy for which she was given vitamin K and started on lactulose. Her [facility] MICU course was c/b colitis possibly ischemic in origin and undifferentiated shock state possibly septic requiring pressor. Patient was unable to tolerate lactulose given colitis and [facility]    [facility] patient was intubated and sedated on Precedex and hemodynamically stable off pressor. Patient started CRRT for ammonia clearance on arrival and was kept NPO given concern for colitis. Hepatology, ID, Nephrology, Hematology, Endocrinology, Surgery were consulted to co-manage patient. MICU course complicated by persistent and up-trending leukocytosis and intermittent low grade fever likely due to pneumonia vs. colitis. Given her extensive past infection history, she was started on Meropenem, Daptomycin, Doxycycline, Caspofungin for coverage. Her antibiotic regimen was gradually narrowed to Vancomycin and Meropenem as microbiology data came back. Her mental status steadily improves w/ down-trending ammonia while on the CRRT. After discussion with surgery team, she was restarted on TF and lactulose has been off CRRT since 4/16. She remains hemodynamically stable and was extubated on 4/17. Extensive radiologic and laboratory work up were sent during her MICU stay but mostly unrevealing beside PETH > 400. Ultimately her liver failure was deemed most likely from alcoholic hepatitis/cirrhosis. On 4/18, patient was AAOx2-3 and confabulating, her thiamine dosage was increased to 500mg q8h. Per hepatology recommendations, patient to continue with 500 q8 and then taper to 250 q8.    Transferred to medicine floors for ongoing management.    Subjective:  Patient laying in bed and feeling well. No complaints. Only would like to ambulate and feel stronger.    Physical Exam:  PHYSICAL EXAM:  GENERAL: NAD, Resting in bed, appears slightly older than stated age   Eyes: EOMI, PERRLA, conjunctiva and sclera clear  HENT:  Head atraumatic; Moist mucous membranes, normal oropharynx  CHEST/LUNG: Clear to auscultation bilaterally; No rales, rhonchi, wheezing, or rubs. Unlabored respirations on room air  HEART: Regular rate and rhythm; No murmurs, rubs, or gallops  ABDOMEN: Bowel sounds present; Soft, Nontender, Nondistended. no spider angiomata or caput medusae. rectal tube in place with liquid brown stool  EXTREMITIES:  2+ Peripheral Pulses, No edema  NERVOUS SYSTEM:  Alert & Oriented X3, non-focal and spontaneous movements of all extremities  Psych: Normal behavior, normal affect, normal speech      Vital Signs Last 24 Hrs  T(C): 37.1 (21 Apr 2024 00:06), Max: 37.3 (20 Apr 2024 20:00)  T(F): 98.8 (21 Apr 2024 00:06), Max: 99.1 (20 Apr 2024 20:00)  HR: 89 (21 Apr 2024 00:06) (81 - 101)  BP: 114/67 (21 Apr 2024 00:06) (109/58 - 135/75)  BP(mean): 84 (20 Apr 2024 23:00) (77 - 102)  RR: 20 (21 Apr 2024 00:06) (14 - 24)  SpO2: 95% (21 Apr 2024 00:06) (95% - 100%)    Parameters below as of 21 Apr 2024 00:06  Patient On (Oxygen Delivery Method): room air      I&O's Summary    19 Apr 2024 07:01  -  20 Apr 2024 07:00  --------------------------------------------------------  IN: 1880 mL / OUT: 1350 mL / NET: 530 mL    20 Apr 2024 07:01  -  21 Apr 2024 04:10  --------------------------------------------------------  IN: 1335 mL / OUT: 1700 mL / NET: -365 mL          MEDICATIONS  (STANDING):  chlorhexidine 4% Liquid 1 Application(s) Topical <User Schedule>  cholecalciferol 2000 Unit(s) Oral daily  folic acid 1 milliGRAM(s) Oral daily  heparin   Injectable 5000 Unit(s) SubCutaneous every 12 hours  insulin glargine Injectable (LANTUS) 6 Unit(s) SubCutaneous at bedtime  insulin lispro (ADMELOG) corrective regimen sliding scale   SubCutaneous three times a day before meals  insulin lispro (ADMELOG) corrective regimen sliding scale   SubCutaneous at bedtime  insulin lispro Injectable (ADMELOG) 2 Unit(s) SubCutaneous three times a day before meals  lactulose Syrup 30 Gram(s) Oral every 8 hours  levothyroxine 100 MICROGram(s) Oral daily  meropenem  IVPB 1000 milliGRAM(s) IV Intermittent every 8 hours  methadone    Tablet 5 milliGRAM(s) Oral three times a day  multivitamin/minerals/iron Oral Solution (CENTRUM) 15 milliLiter(s) Oral daily  pantoprazole  Injectable 40 milliGRAM(s) IV Push daily  rifAXIMin 550 milliGRAM(s) Oral two times a day  thiamine IVPB 500 milliGRAM(s) IV Intermittent every 8 hours  thiamine IVPB 250 milliGRAM(s) IV Intermittent every 8 hours  vancomycin  IVPB 750 milliGRAM(s) IV Intermittent every 12 hours  zinc sulfate 220 milliGRAM(s) Oral daily    MEDICATIONS  (PRN):        LABS      ASSESSMENT:  49 yo F with history of HTN, DM, Factor V Leiden c/b L peroneal DVT (2022) and RA thrombus (2018) currently not on AC, gout, congenital pancreas divisum, recurrent pancreatitis s/p cholecystectomy, Splenectomy, Kevin-En-Y Pancreaticojejunostomy, total pancreatectomy s/p Islet Cell Autotransplant at [facility] 2018, complicated by multiple intraabdominal infection (VRE, fungal? per [facility]) initially presented to [facility] 4/8 for several weeks of GI symptoms with course c/b somnolence requiring intubation for airway protection. Lab revealed ammonia > 200, elevated bilirubin and INR initially c/f liver failure, c/c/b colitis possibly ischemic etiology. Patient transferred to [facility] MICU 4/13 for CRRT initiation for ammonia clearance now s/p CRRT and improvement in ammonia levels on maintenance lactulose and rifaximin.     PLAN:    #Alcoholic Hepatitis  #Hyperammonemia   - initially with transaminitis, coagulopathy, hyperbilirubinemia and evidence of hepatic encephalopathy iso ammonia > 200  - unclear etiology of liver dysfunction however thought to be iso alcohol use given PETH > 400 although patient and family deny history of alcohol use  - s/p NAC on admission to OSH  - imaging studies with hepatic steatosis but no hepatic vein thrombosis  - s/p CRRT (off since 4/16) with improvement in ammonia level and improvement in mental status  - pending specialized lab studies with plasma amino acid (4/16): in lab, urine orotic acid (4/16) - sent out to [facility], metabolic genetic consult to r/o acquired urea cycle disorder  - c/w lactulose 30 q8  - c/w rifaximin 550 BID for prophylaxis   - c/w folid acid, MVI  - c/w thiamine 500mg Q8h x 3 days (4/18 - 4/20) given patient confabulating; will taper to thiamine 250 mg q6 per hepatology recs     #Hx of VRE and Candida infection  - 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 History of VRE and Candida.   - s/p Caspofungin 50mg Q24 (stopped 4/16), Daptomycin 500mg Q24 (stopped 4/16), Doxycycline 100mg Q12 (stopped 4/18)  - c/w Meropenem 1g q12 (4/10 - )  - c/w Vancomycin q12 (4/17 - )  - bronchial culture 4/17 with carbapenem resistant Acinobacter however, for now potentially iso colonization of ETT  - per ID: if clinical status changes or CXR with significant infiltrates low threshold to switch Meropenem to Unasyn 3g IV Q6H over 4 hours (dosing for CrCl 30-50) AND Minocycline 200 mg IV BID to target the CR Acinetobacter.    #Colonic Mass  #Colitis, Ischemic  - CT A/P (4/13): segmental areas of wall thickening throughout the colon and rectum with pneumatosis, mesenteric edema, and adjacent hyperdensities in the proximal transverse colon suspicious for bowel ischemia with intraluminal hemorrhage. Additional intraluminal hyperdensities within the distal ascending colon suspicious for hemorrhage.   - Surgery, GI following, no plan for surgery/endoscopic eval for now  - continue to monitor     #Congenital Pancreas Divisum  #Total Pancreatectomy s/p Islet Cell Autotransplant  - rectal tube in place   - c/w PPI 40mg for GI ppx  - c/w puree diet    #Factor 5 Leiden  #History of L peroneal DVT in 2022  #History of RA thrombus in 2018 (not on AC since 5/2023)  - TTE (4/13) - no thrombus noted  - c/w HSQ for DVT ppx    #DM1 vs. DM3c  - History of DM1, but s/p total pancreatectomy and islet cell autotransplant in 2018 - currently DM3c?  - c/w lantus 6 qhs and lispro 2 premeal with ISS  - f/u endocrinology recs    #Anemia  #Thrombocytopenia  - Likely multifactorial in nature. No hx of bleeding but with coagulopathy as below.  Possibly iso liver disease vs. acute infection/inflammation vs. ?hemolysis  - s/p 2u PRBC (4/13, 4/16)  - Trend CBC, coag, transfuse goal > 7 and platelets > 20    #Hypothyroidism  - c/w synthyroid 100    #Chronic pain  #Anxiety/depression   - Was on alprazolam 0.75mg QD, Dilaudid and methadone 5mg TID at home  - c/w Methadone 5mg TID  - monitor QTc while on methadone therapy    #Need for Prophylactic Measure  - Fluids: NA   - Electrolytes: Will replete to maintain K>4, Phos>3, and Mag>2  - Nutrition: puree   - Activity: with PT pending rehab placement   - DVT Prophylaxis: heparin subQ  - Stress Ulcer/GI Prophylaxis: pantoprazole   - Disposition: pending medical management MICU COURSE:    42 year old female with hx of DM1, gout, Factor V Leiden c/b provoked DVT and RA thrombus in the past, and pancreatic divisum c/b recurrent pancreatitis s/p total pancreatectomy s/p islet cell autotransplant, also had extensive abdominal surgical history (cholecystectomy, splenectomy, kevin-en-y pancreaticojejuonostomy) for unclear reason c/b multiple intraabdominal infection/abscess/fistula (including VRE, candida) in the past. Patient initially presented to [facility] for few weeks of worsening decreased PO intake, NBNB emesis, watery diarrhea as well as profound somnolence. She was intubated in [facility] ED for airway protection and admitted to MICU there. She was found to have INR > 4, ammonia >200 and elevated LFTs concerning for liver failure/hepatic encephalopathy for which she was given vitamin K and started on lactulose. Her [facility] MICU course was c/b colitis possibly ischemic in origin and undifferentiated shock state possibly septic requiring pressor. Patient was unable to tolerate lactulose given colitis and [facility]    [facility] patient was intubated and sedated on Precedex and hemodynamically stable off pressor. Patient started CRRT for ammonia clearance on arrival and was kept NPO given concern for colitis. Hepatology, ID, Nephrology, Hematology, Endocrinology, Surgery were consulted to co-manage patient. MICU course complicated by persistent and up-trending leukocytosis and intermittent low grade fever likely due to pneumonia vs. colitis. Given her extensive past infection history, she was started on Meropenem, Daptomycin, Doxycycline, Caspofungin for coverage. Her antibiotic regimen was gradually narrowed to Vancomycin and Meropenem as microbiology data came back. Her mental status steadily improves w/ down-trending ammonia while on the CRRT. After discussion with surgery team, she was restarted on TF and lactulose has been off CRRT since 4/16. She remains hemodynamically stable and was extubated on 4/17. Extensive radiologic and laboratory work up were sent during her MICU stay but mostly unrevealing beside PETH > 400. Ultimately her liver failure was deemed most likely from alcoholic hepatitis/cirrhosis. On 4/18, patient was AAOx2-3 and confabulating, her thiamine dosage was increased to 500mg q8h. Per hepatology recommendations, patient to continue with 500 q8 and then taper to 250 q8.    Transferred to medicine floors for ongoing management.    Subjective:  Patient laying in bed and feeling well. No complaints. Only would like to ambulate and feel stronger.    Physical Exam:  PHYSICAL EXAM:  GENERAL: NAD, Resting in bed, appears slightly older than stated age   Eyes: EOMI, PERRLA, conjunctiva and sclera clear  HENT:  Head atraumatic; Moist mucous membranes, normal oropharynx  CHEST/LUNG: Clear to auscultation bilaterally; No rales, rhonchi, wheezing, or rubs. Unlabored respirations on room air  HEART: Regular rate and rhythm; No murmurs, rubs, or gallops  ABDOMEN: Bowel sounds present; Soft, Nontender, Nondistended. no spider angiomata or caput medusae. rectal tube in place with liquid brown stool  EXTREMITIES:  2+ Peripheral Pulses, No edema  NERVOUS SYSTEM:  Alert & Oriented X2, non-focal and spontaneous movements of all extremities  Psych: Normal behavior, normal affect, normal speech      Vital Signs Last 24 Hrs  T(C): 37.1 (21 Apr 2024 00:06), Max: 37.3 (20 Apr 2024 20:00)  T(F): 98.8 (21 Apr 2024 00:06), Max: 99.1 (20 Apr 2024 20:00)  HR: 89 (21 Apr 2024 00:06) (81 - 101)  BP: 114/67 (21 Apr 2024 00:06) (109/58 - 135/75)  BP(mean): 84 (20 Apr 2024 23:00) (77 - 102)  RR: 20 (21 Apr 2024 00:06) (14 - 24)  SpO2: 95% (21 Apr 2024 00:06) (95% - 100%)    Parameters below as of 21 Apr 2024 00:06  Patient On (Oxygen Delivery Method): room air      I&O's Summary    19 Apr 2024 07:01  -  20 Apr 2024 07:00  --------------------------------------------------------  IN: 1880 mL / OUT: 1350 mL / NET: 530 mL    20 Apr 2024 07:01  -  21 Apr 2024 04:10  --------------------------------------------------------  IN: 1335 mL / OUT: 1700 mL / NET: -365 mL          MEDICATIONS  (STANDING):  chlorhexidine 4% Liquid 1 Application(s) Topical <User Schedule>  cholecalciferol 2000 Unit(s) Oral daily  folic acid 1 milliGRAM(s) Oral daily  heparin   Injectable 5000 Unit(s) SubCutaneous every 12 hours  insulin glargine Injectable (LANTUS) 6 Unit(s) SubCutaneous at bedtime  insulin lispro (ADMELOG) corrective regimen sliding scale   SubCutaneous three times a day before meals  insulin lispro (ADMELOG) corrective regimen sliding scale   SubCutaneous at bedtime  insulin lispro Injectable (ADMELOG) 2 Unit(s) SubCutaneous three times a day before meals  lactulose Syrup 30 Gram(s) Oral every 8 hours  levothyroxine 100 MICROGram(s) Oral daily  meropenem  IVPB 1000 milliGRAM(s) IV Intermittent every 8 hours  methadone    Tablet 5 milliGRAM(s) Oral three times a day  multivitamin/minerals/iron Oral Solution (CENTRUM) 15 milliLiter(s) Oral daily  pantoprazole  Injectable 40 milliGRAM(s) IV Push daily  rifAXIMin 550 milliGRAM(s) Oral two times a day  thiamine IVPB 500 milliGRAM(s) IV Intermittent every 8 hours  thiamine IVPB 250 milliGRAM(s) IV Intermittent every 8 hours  vancomycin  IVPB 750 milliGRAM(s) IV Intermittent every 12 hours  zinc sulfate 220 milliGRAM(s) Oral daily    MEDICATIONS  (PRN):        LABS      ASSESSMENT:  49 yo F with history of HTN, DM, Factor V Leiden c/b L peroneal DVT (2022) and RA thrombus (2018) currently not on AC, gout, congenital pancreas divisum, recurrent pancreatitis s/p cholecystectomy, Splenectomy, Kevin-En-Y Pancreaticojejunostomy, total pancreatectomy s/p Islet Cell Autotransplant at [facility] 2018, complicated by multiple intraabdominal infection (VRE, fungal? per LIM) initially presented to [facility] 4/8 for several weeks of GI symptoms with course c/b somnolence requiring intubation for airway protection. Lab revealed ammonia > 200, elevated bilirubin and INR initially c/f liver failure, c/c/b colitis possibly ischemic etiology. Patient transferred to [facility] MICU 4/13 for CRRT initiation for ammonia clearance now s/p CRRT and improvement in ammonia levels on maintenance lactulose and rifaximin.     PLAN:    #Alcoholic Hepatitis  #Hyperammonemia   - initially with transaminitis, coagulopathy, hyperbilirubinemia and evidence of hepatic encephalopathy iso ammonia > 200  - unclear etiology of liver dysfunction however thought to be iso alcohol use given PETH > 400 although patient and family deny history of alcohol use  - s/p NAC on admission to OSH  - imaging studies with hepatic steatosis but no hepatic vein thrombosis  - s/p CRRT (off since 4/16) with improvement in ammonia level and improvement in mental status  - pending specialized lab studies with plasma amino acid (4/16): in lab, urine orotic acid (4/16) - sent out to [facility], metabolic genetic consult to r/o acquired urea cycle disorder  - c/w lactulose 30 q8  - c/w rifaximin 550 BID for prophylaxis   - c/w folid acid, MVI  - c/w thiamine 500mg Q8h x 3 days (4/18 - 4/20) given patient confabulating; will taper to thiamine 250 mg q6 per hepatology recs     #Hx of VRE and Candida infection  - 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 History of VRE and Candida.   - s/p Caspofungin 50mg Q24 (stopped 4/16), Daptomycin 500mg Q24 (stopped 4/16), Doxycycline 100mg Q12 (stopped 4/18)  - c/w Meropenem 1g q12 (4/10 - )  - c/w Vancomycin q12 (4/17 - )  - bronchial culture 4/17 with carbapenem resistant Acinobacter however, for now potentially iso colonization of ETT  - per ID: if clinical status changes or CXR with significant infiltrates low threshold to switch Meropenem to Unasyn 3g IV Q6H over 4 hours (dosing for CrCl 30-50) AND Minocycline 200 mg IV BID to target the CR Acinetobacter.    #Colonic Mass  #Colitis, Ischemic  - CT A/P (4/13): segmental areas of wall thickening throughout the colon and rectum with pneumatosis, mesenteric edema, and adjacent hyperdensities in the proximal transverse colon suspicious for bowel ischemia with intraluminal hemorrhage. Additional intraluminal hyperdensities within the distal ascending colon suspicious for hemorrhage.   - Surgery, GI following, no plan for surgery/endoscopic eval for now  - continue to monitor     #Congenital Pancreas Divisum  #Total Pancreatectomy s/p Islet Cell Autotransplant  - rectal tube in place   - c/w PPI 40mg for GI ppx  - c/w puree diet    #Factor 5 Leiden  #History of L peroneal DVT in 2022  #History of RA thrombus in 2018 (not on AC since 5/2023)  - TTE (4/13) - no thrombus noted  - c/w HSQ for DVT ppx    #DM1 vs. DM3c  - History of DM1, but s/p total pancreatectomy and islet cell autotransplant in 2018 - currently DM3c?  - c/w lantus 6 qhs and lispro 2 premeal with ISS  - f/u endocrinology recs    #Anemia  #Thrombocytopenia  - Likely multifactorial in nature. No hx of bleeding but with coagulopathy as below.  Possibly iso liver disease vs. acute infection/inflammation vs. ?hemolysis  - s/p 2u PRBC (4/13, 4/16)  - Trend CBC, coag, transfuse goal > 7 and platelets > 20    #Hypothyroidism  - c/w synthyroid 100    #Chronic pain  #Anxiety/depression   - Was on alprazolam 0.75mg QD, Dilaudid and methadone 5mg TID at home  - c/w Methadone 5mg TID  - monitor QTc while on methadone therapy    #Need for Prophylactic Measure  - Fluids: NA   - Electrolytes: Will replete to maintain K>4, Phos>3, and Mag>2  - Nutrition: puree   - Activity: with PT pending rehab placement   - DVT Prophylaxis: heparin subQ  - Stress Ulcer/GI Prophylaxis: pantoprazole   - Disposition: pending medical management    Attending attestation:  I have personally seen and examined the patient.  I fully participated in the care of this patient.  I have made amendments to the documentation where necessary, and agree with the history, physical exam, and plan as documented by the Resident.    42 F PMHx DM1, gout, Factor V Leiden c/b provoked DVT and RA thrombus in the past, and pancreatic divisum c/b recurrent pancreatitis s/p total pancreatectomy s/p islet cell autotransplant, also had extensive abdominal surgical history (cholecystectomy, splenectomy, kevin-en-y pancreaticojejuonostomy) for unclear reason c/b multiple intraabdominal infection/abscess/fistula (including VRE, kavin) who initially presented to [facility] for emesis, diarrhea, and somnolence. Required intubation for airway protection, was found to be in acute liver failure and hepatic encephalopathy w/ ammonia >200. Was transferred to MICU here for CRRT and liver transplant evaluation. Was initially started on Bobby/Dapto/Doxy/Caspofungin for broad spectrum coverage, then deescalated to Vanc/Bobby. CRRT initiated, ammonia levels downtrended, stopped on 4/16, and TFs restarted w/ lactulose. Was eventually extubated 4/17. Acute liver failure and hepatic encephalopathy currently being treated as due to alcholic hepatitis due to PETH>400 and other workup being unrevealing. Now stable for downgrade to floors.     Currently denies any acute complaints. Not oriented to place or time, still seems slightly confused. Wants to be off of antibiotics.    Assessment/Plan:    42 F PMHx DM1, gout, Factor V Leiden c/b provoked DVT and RA thrombus in the past, and pancreatic divisum c/b recurrent pancreatitis s/p total pancreatectomy s/p islet cell autotransplant, also had extensive abdominal surgical history (cholecystectomy, splenectomy, kevin-en-y pancreaticojejuonostomy) for unclear reason c/b multiple intraabdominal infection/abscess/fistula (including VRE, kavin) who initially presented to [facility] for emesis, diarrhea, and somnolence, intubated there and then transferred to our MICU for CRRT and transplant evaluation after initial evaluation showed acute liver failure w/ INR>4 and ammonia >200. Course complicated by possible ischemic colitis, now off of CRRT, TF restarted, and further workup ongoing.        #Hyperammonemia    #Alcoholic hepatitis vs Urea Cycle disorder  #Transaminitis  #Leukocytosis  #Possible ischemic colitis  #Macrocytic anemia  #Thrombocytopenia  #T1DM    #Chronic pain    - Currently off of CRRT for 4 days, ammonia level downtrending, latest level 53  - C/w Lactulose 30 TID and Rifaximin 550mg BID  - Unclear how much EtOH patient consumes, she herself says she is a social drinker only, but apparently parents endorsed heavy use?  - Orotic acid and plasma amino acid levels sent out and pending  - LFTs and bilirubin downtrending, continue to monitor  - Concern of possible confabulation, c/w thiamine 500mg TID, taper to 250mg q6hrs  - Unclear cause of leukocytosis, bronchial culture growing CR-Acinetobacter but transplant ID believes this is colonization, so c/w Vanc/Bobby for now, but low threshold to switch Unasyn + Minocycline if change in clinical status.  - CT A/P findings listed above in resident note, along with focal areas of narrowing in the distal descending colon and sigmoid colon  - GI and surgery following, off of TFs, now on pureed diet  - Will likely need endoscopic eval later this hospitalization vs outpatient  - Macrocytic anemia and thrombocytopenia likely 2/2 liver disease, B12 and folate wnl  - C/w Lantus and lispro  - C/w methadone 5mg TID, check QTc    Rest of care per resident note MICU COURSE:    42 year old female with hx of DM1, gout, Factor V Leiden c/b provoked DVT and RA thrombus in the past, and pancreatic divisum c/b recurrent pancreatitis s/p total pancreatectomy s/p islet cell autotransplant, also had extensive abdominal surgical history (cholecystectomy, splenectomy, kevin-en-y pancreaticojejuonostomy) for unclear reason c/b multiple intraabdominal infection/abscess/fistula (including VRE, candida) in the past. Patient initially presented to [facility] for few weeks of worsening decreased PO intake, NBNB emesis, watery diarrhea as well as profound somnolence. She was intubated in [facility] ED for airway protection and admitted to MICU there. She was found to have INR > 4, ammonia >200 and elevated LFTs concerning for liver failure/hepatic encephalopathy for which she was given vitamin K and started on lactulose. Her [facility] MICU course was c/b colitis possibly ischemic in origin and undifferentiated shock state possibly septic requiring pressor. Patient was unable to tolerate lactulose given colitis and was [facility] and liver transplant evaluation.    [facility], patient was intubated and sedated on Precedex and hemodynamically stable off pressor. Patient started CRRT for ammonia clearance on arrival and was kept NPO given concern for colitis. Hepatology, ID, Nephrology, Hematology, Endocrinology, Surgery were consulted to co-manage patient. MICU course complicated by persistent and up-trending leukocytosis and intermittent low grade fever likely due to pneumonia vs. colitis. Given her extensive past infection history, she was started on Meropenem, Daptomycin, Doxycycline, Caspofungin for coverage. Her antibiotic regimen was gradually narrowed to Vancomycin and Meropenem as microbiology data came back. Her mental status steadily improves w/ down-trending ammonia while on the CRRT. After discussion with surgery team, she was restarted on TF and lactulose has been off CRRT since 4/16. She remains hemodynamically stable and was extubated on 4/17. Extensive radiologic and laboratory work up were sent during her MICU stay but mostly unrevealing beside PETH > 400. Ultimately her liver failure was deemed most likely from alcoholic hepatitis/cirrhosis. On 4/18, patient was AAOx2-3 and confabulating, her thiamine dosage was increased to 500mg q8h. Per hepatology recommendations, patient to continue with 500 q8 and then taper to 250 q8.    Transferred to medicine floors for ongoing management.    Subjective:  Patient laying in bed and feeling well. No complaints. Only would like to ambulate and feel stronger.    Physical Exam:  PHYSICAL EXAM:  GENERAL: NAD, Resting in bed, appears slightly older than stated age   Eyes: EOMI, PERRLA, conjunctiva and sclera clear  HENT:  Head atraumatic; Moist mucous membranes, normal oropharynx  CHEST/LUNG: Clear to auscultation bilaterally; No rales, rhonchi, wheezing, or rubs. Unlabored respirations on room air  HEART: Regular rate and rhythm; No murmurs, rubs, or gallops  ABDOMEN: Bowel sounds present; Soft, Nontender, Nondistended. no spider angiomata or caput medusae. rectal tube in place with liquid brown stool  EXTREMITIES:  2+ Peripheral Pulses, No edema  NERVOUS SYSTEM:  Alert & Oriented X2, non-focal and spontaneous movements of all extremities  Psych: Normal behavior, normal affect, normal speech      Vital Signs Last 24 Hrs  T(C): 37.1 (21 Apr 2024 00:06), Max: 37.3 (20 Apr 2024 20:00)  T(F): 98.8 (21 Apr 2024 00:06), Max: 99.1 (20 Apr 2024 20:00)  HR: 89 (21 Apr 2024 00:06) (81 - 101)  BP: 114/67 (21 Apr 2024 00:06) (109/58 - 135/75)  BP(mean): 84 (20 Apr 2024 23:00) (77 - 102)  RR: 20 (21 Apr 2024 00:06) (14 - 24)  SpO2: 95% (21 Apr 2024 00:06) (95% - 100%)    Parameters below as of 21 Apr 2024 00:06  Patient On (Oxygen Delivery Method): room air      I&O's Summary    19 Apr 2024 07:01  -  20 Apr 2024 07:00  --------------------------------------------------------  IN: 1880 mL / OUT: 1350 mL / NET: 530 mL    20 Apr 2024 07:01  -  21 Apr 2024 04:10  --------------------------------------------------------  IN: 1335 mL / OUT: 1700 mL / NET: -365 mL          MEDICATIONS  (STANDING):  chlorhexidine 4% Liquid 1 Application(s) Topical <User Schedule>  cholecalciferol 2000 Unit(s) Oral daily  folic acid 1 milliGRAM(s) Oral daily  heparin   Injectable 5000 Unit(s) SubCutaneous every 12 hours  insulin glargine Injectable (LANTUS) 6 Unit(s) SubCutaneous at bedtime  insulin lispro (ADMELOG) corrective regimen sliding scale   SubCutaneous three times a day before meals  insulin lispro (ADMELOG) corrective regimen sliding scale   SubCutaneous at bedtime  insulin lispro Injectable (ADMELOG) 2 Unit(s) SubCutaneous three times a day before meals  lactulose Syrup 30 Gram(s) Oral every 8 hours  levothyroxine 100 MICROGram(s) Oral daily  meropenem  IVPB 1000 milliGRAM(s) IV Intermittent every 8 hours  methadone    Tablet 5 milliGRAM(s) Oral three times a day  multivitamin/minerals/iron Oral Solution (CENTRUM) 15 milliLiter(s) Oral daily  pantoprazole  Injectable 40 milliGRAM(s) IV Push daily  rifAXIMin 550 milliGRAM(s) Oral two times a day  thiamine IVPB 500 milliGRAM(s) IV Intermittent every 8 hours  thiamine IVPB 250 milliGRAM(s) IV Intermittent every 8 hours  vancomycin  IVPB 750 milliGRAM(s) IV Intermittent every 12 hours  zinc sulfate 220 milliGRAM(s) Oral daily    MEDICATIONS  (PRN):        LABS      ASSESSMENT:  49 yo F with history of HTN, DM, Factor V Leiden c/b L peroneal DVT (2022) and RA thrombus (2018) currently not on AC, gout, congenital pancreas divisum, recurrent pancreatitis s/p cholecystectomy, Splenectomy, Kevin-En-Y Pancreaticojejunostomy, total pancreatectomy s/p Islet Cell Autotransplant at [facility] 2018, complicated by multiple intraabdominal infection (VRE, fungal? per LIM) initially presented to [facility] 4/8 for several weeks of GI symptoms with course c/b somnolence requiring intubation for airway protection. Lab revealed ammonia > 200, elevated bilirubin and INR initially c/f liver failure, c/c/b colitis possibly ischemic etiology. Patient transferred to [facility] MICU 4/13 for CRRT initiation for ammonia clearance now s/p CRRT and improvement in ammonia levels on maintenance lactulose and rifaximin.     PLAN:    #Alcoholic Hepatitis  #Hyperammonemia   - initially with transaminitis, coagulopathy, hyperbilirubinemia and evidence of hepatic encephalopathy iso ammonia > 200  - unclear etiology of liver dysfunction however thought to be iso alcohol use given PETH > 400 although patient and family deny history of alcohol use  - s/p NAC on admission to OSH  - imaging studies with hepatic steatosis but no hepatic vein thrombosis  - s/p CRRT (off since 4/16) with improvement in ammonia level and improvement in mental status  - pending specialized lab studies with plasma amino acid (4/16): in lab, urine orotic acid (4/16) - sent out to [facility], metabolic genetic consult to r/o acquired urea cycle disorder  - c/w lactulose 30 q8  - c/w rifaximin 550 BID for prophylaxis   - c/w folid acid, MVI  - c/w thiamine 500mg Q8h x 3 days (4/18 - 4/20) given patient confabulating; will taper to thiamine 250 mg q6 per hepatology recs     #Hx of VRE and Candida infection  - 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 History of VRE and Candida.   - s/p Caspofungin 50mg Q24 (stopped 4/16), Daptomycin 500mg Q24 (stopped 4/16), Doxycycline 100mg Q12 (stopped 4/18)  - c/w Meropenem 1g q12 (4/10 - )  - c/w Vancomycin q12 (4/17 - )  - bronchial culture 4/17 with carbapenem resistant Acinobacter however, for now potentially iso colonization of ETT  - per ID: if clinical status changes or CXR with significant infiltrates low threshold to switch Meropenem to Unasyn 3g IV Q6H over 4 hours (dosing for CrCl 30-50) AND Minocycline 200 mg IV BID to target the CR Acinetobacter.    #Colonic Mass  #Colitis, Ischemic  - CT A/P (4/13): segmental areas of wall thickening throughout the colon and rectum with pneumatosis, mesenteric edema, and adjacent hyperdensities in the proximal transverse colon suspicious for bowel ischemia with intraluminal hemorrhage. Additional intraluminal hyperdensities within the distal ascending colon suspicious for hemorrhage.   - Surgery, GI following, no plan for surgery/endoscopic eval for now  - continue to monitor     #Congenital Pancreas Divisum  #Total Pancreatectomy s/p Islet Cell Autotransplant  - rectal tube in place   - c/w PPI 40mg for GI ppx  - c/w puree diet    #Factor 5 Leiden  #History of L peroneal DVT in 2022  #History of RA thrombus in 2018 (not on AC since 5/2023)  - TTE (4/13) - no thrombus noted  - c/w HSQ for DVT ppx    #DM1 vs. DM3c  - History of DM1, but s/p total pancreatectomy and islet cell autotransplant in 2018 - currently DM3c?  - c/w lantus 6 qhs and lispro 2 premeal with ISS  - f/u endocrinology recs    #Anemia  #Thrombocytopenia  - Likely multifactorial in nature. No hx of bleeding but with coagulopathy as below.  Possibly iso liver disease vs. acute infection/inflammation vs. ?hemolysis  - s/p 2u PRBC (4/13, 4/16)  - Trend CBC, coag, transfuse goal > 7 and platelets > 20    #Hypothyroidism  - c/w synthyroid 100    #Chronic pain  #Anxiety/depression   - Was on alprazolam 0.75mg QD, Dilaudid and methadone 5mg TID at home  - c/w Methadone 5mg TID  - monitor QTc while on methadone therapy    #Need for Prophylactic Measure  - Fluids: NA   - Electrolytes: Will replete to maintain K>4, Phos>3, and Mag>2  - Nutrition: puree   - Activity: with PT pending rehab placement   - DVT Prophylaxis: heparin subQ  - Stress Ulcer/GI Prophylaxis: pantoprazole   - Disposition: pending medical management    Attending attestation:  I have personally seen and examined the patient.  I fully participated in the care of this patient.  I have made amendments to the documentation where necessary, and agree with the history, physical exam, and plan as documented by the Resident.    42 F PMHx DM1, gout, Factor V Leiden c/b provoked DVT and RA thrombus in the past, and pancreatic divisum c/b recurrent pancreatitis s/p total pancreatectomy s/p islet cell autotransplant, also had extensive abdominal surgical history (cholecystectomy, splenectomy, kevin-en-y pancreaticojejunostomy) for unclear reason c/b multiple intraabdominal infection/abscess/fistula (including VRE, kavin) who initially presented to [facility] for emesis, diarrhea, and somnolence. Required intubation for airway protection, was found to be in acute liver failure and hepatic encephalopathy w/ ammonia >200. Was transferred to MICU here for CRRT and liver transplant evaluation. Was initially started on Bobby/Dapto/Doxy/Caspofungin for broad spectrum coverage, then deescalated to Vanc/Bobby. CRRT initiated, ammonia levels downtrended, stopped on 4/16, and TFs restarted w/ lactulose. Was eventually extubated 4/17. Acute liver failure and hepatic encephalopathy currently being treated as due to alcholic hepatitis due to PETH>400 and other workup being unrevealing. Now stable for downgrade to floors.     Currently denies any acute complaints. Not oriented to place or time, still seems slightly confused. Wants to be off of antibiotics.    Assessment/Plan:    42 F PMHx DM1, gout, Factor V Leiden c/b provoked DVT and RA thrombus in the past, and pancreatic divisum c/b recurrent pancreatitis s/p total pancreatectomy s/p islet cell autotransplant, also had extensive abdominal surgical history (cholecystectomy, splenectomy, kevin-en-y pancreaticojejuonostomy) for unclear reason c/b multiple intraabdominal infection/abscess/fistula (including VRE, kavin) who initially presented to [facility] for emesis, diarrhea, and somnolence, intubated there and then transferred to our MICU for CRRT and transplant evaluation after initial evaluation showed acute liver failure w/ INR>4 and ammonia >200. Course complicated by possible ischemic colitis, now off of CRRT, TF restarted, and further workup ongoing.        #Hyperammonemia    #Alcoholic hepatitis vs Urea Cycle disorder  #Transaminitis  #Leukocytosis  #Possible ischemic colitis  #Macrocytic anemia  #Thrombocytopenia  #T1DM    #Chronic pain    - Currently off of CRRT for 4 days, ammonia level downtrending, latest level 53  - C/w Lactulose 30 TID and Rifaximin 550mg BID  - Unclear how much EtOH patient consumes, she herself says she is a social drinker only, but apparently parents endorsed heavy use?  - Orotic acid and plasma amino acid levels sent out and pending  - LFTs and bilirubin downtrending, continue to monitor  - Concern of possible confabulation, c/w thiamine 500mg TID, taper to 250mg q6hrs  - Unclear cause of leukocytosis, bronchial culture growing CR-Acinetobacter but transplant ID believes this is colonization, so c/w Vanc/Bobby for now, but low threshold to switch Unasyn + Minocycline if change in clinical status.  - CT A/P findings listed above in resident note, along with focal areas of narrowing in the distal descending colon and sigmoid colon  - GI and surgery following, off of TFs, now on pureed diet  - Will likely need endoscopic eval later this hospitalization vs outpatient  - Macrocytic anemia and thrombocytopenia likely 2/2 liver disease, B12 and folate wnl  - C/w Lantus and lispro  - C/w methadone 5mg TID, check QTc    Rest of care per resident note

## 2024-04-21 NOTE — CHART NOTE - NSCHARTNOTEFT_GEN_A_CORE
Endocrine following for diabetes 2/2 pancreatic insufficiency and hypothyroidism. Pt. relatively controlled on low dose basal bolus regimen. No clear pattern to hypoglycemia at lunch today but can lower breakfast Admelog to 1 unit for tomorrow and continue with 2 units at lunch and dinner for now. Monitor on Lantus 6 units qhs. Repeat Free T4 with am labs tomorrow.     Justice Looney D.O  [phone]

## 2024-04-21 NOTE — PROGRESS NOTE ADULT - SUBJECTIVE AND OBJECTIVE BOX
Follow Up:  Leukocytosis    Interval History:     afebrile since 4/17-- 04/18  WBC down from 30 to 2--> 14 today  on RA, VSS,   Denies cough, although occasionally has upper airway secretions  rectal tube in place with lose stools, Cdiff PCR neg  Tbi 3.3-3.6              ICU Vital Signs Last 24 Hrs  T(C): 37.1 (21 Apr 2024 05:55), Max: 37.3 (20 Apr 2024 20:00)  T(F): 98.8 (21 Apr 2024 05:55), Max: 99.1 (20 Apr 2024 20:00)  HR: 83 (21 Apr 2024 05:55) (83 - 101)  BP: 127/78 (21 Apr 2024 05:55) (109/58 - 135/75)  BP(mean): 84 (20 Apr 2024 23:00) (77 - 102)  ABP: --  ABP(mean): --  RR: 18 (21 Apr 2024 05:55) (14 - 22)  SpO2: 95% (21 Apr 2024 05:55) (95% - 100%)    O2 Parameters below as of 21 Apr 2024 09:59  Patient On (Oxygen Delivery Method): room air          PHYSICAL EXAMINATION:  General: Alert and Awake, NAD, +NGT  Cardiac: RRR, No M/R/G  Resp: CTAB, No Wh/Rh/Ra  Abdomen: NBS, NT/ND, No HSM, No rigidity or guarding  MSK: No LE edema. No Calf tenderness  Skin: No rashes or lesions. Skin is warm and dry to the touch.   Neuro: Alert and Awake. CN 2-12 Grossly intact. Moves all four extremities spontaneously.  Psych: Calm, Pleasant, Cooperative            ____________________________________________________  ROS  GENERAL: denies chills, , night sweats, weight loss.   PSYCH: denies depression, anxiety, suicidal ideation, hallucination, and delusions  SKIN: no rash or lesions; no color changes, no abnormal nevi,no  dryness, and nojaundice    EYES: denies visual changes, floaters, pain, inflammation, blurred vision, and discharge  ENT: denies tinnitus, vertigo, epistaxis, oral lesion, and decreased acuity  PULM: denies, hemoptysis, pleurisy  CVS: denies angina, palpitations,+ orthopnea, no syncope, or heart murmur  GI: denies constipation, diarrhea, melena, abdominal pain, nausea.   : denies dysuria, frequency, discharge, incontinence, stones or macroscopic hematuria  MS: no arthralgias, no erythema or swelling, no myalgias, noedema, or lower back pain.   CNS: denies numbness, dizziness, seizure, or tremor  ENDO: denies heat/cold intolerance, polyuria, polydipsia, malaise.    HEME: denies bruising, bleeding, lymphadenopathy, anemia, and calf pain    Allergies  No Known Allergies    __________________________________________________  MEDS:  MEDICATIONS  (STANDING):  heparin   Injectable 5000 every 12 hours  insulin glargine Injectable (LANTUS) 6 at bedtime  insulin lispro (ADMELOG) corrective regimen sliding scale  three times a day before meals  insulin lispro (ADMELOG) corrective regimen sliding scale  at bedtime  insulin lispro Injectable (ADMELOG) 2 three times a day before meals  lactulose Syrup 30 every 8 hours  levothyroxine 100 daily  methadone    Tablet 5 three times a day  pantoprazole  Injectable 40 daily    _________________________________________________  ANTIMICOBIALS  meropenem  IVPB 1000 every 8 hours  rifAXIMin 550 two times a day  vancomycin  IVPB 750 every 12 hours      GENERAL LABS              8.1                  144  | 24   | <4           14.11 >-----------< 135     ------------------------< 104                   26.0                 4.1  | 110  | 0.43                                         Ca 9.2   Mg 1.6   Ph 3.0      Vancomycin Level, Trough: 22.9 (04-20 @ 17:04)  Vancomycin Level, Trough: 13.8 (04-19 @ 05:40)    Urinalysis Basic - ( 21 Apr 2024 05:55 )    Color: x / Appearance: x / SG: x / pH: x  Gluc: 104 mg/dL / Ketone: x  / Bili: x / Urobili: x   Blood: x / Protein: x / Nitrite: x   Leuk Esterase: x / RBC: x / WBC x   Sq Epi: x / Non Sq Epi: x / Bacteria: x        _________________________________________________  MICROBIOLOGY  -----------    Culture - Blood (collected 18 Apr 2024 17:53)  Source: .Blood Blood-Venous  Preliminary Report (20 Apr 2024 23:05):    No growth at 48 Hours    Culture - Blood (collected 18 Apr 2024 17:53)  Source: .Blood Blood-Venous  Preliminary Report (20 Apr 2024 23:05):    No growth at 48 Hours    Culture - Sputum (collected 17 Apr 2024 12:11)  Source: ET Tube ET Tube  Gram Stain (17 Apr 2024 22:40):    Few polymorphonuclear leukocytes per low power field    Few Squamous epithelial cells per low power field    Moderate Gram positive cocci in pairs per oil power field    Few Gram Negative Coccobacilli per oil power field  Final Report (20 Apr 2024 14:42):    Numerous Acinetobacter baumannii/nosocom group (Carbapenem Resistant)    Normal Respiratory Linh present  Organism: Acinetobacter baumannii/nosocom group (Carbapenem Resistant) (20 Apr 2024 14:42)  Organism: Acinetobacter baumannii/nosocom group (Carbapenem Resistant) (20 Apr 2024 14:42)      Method Type: KB      -  Piperacillin/Tazobactam: R      -  Minocycline: S  Organism: Acinetobacter baumannii/nosocom group (Carbapenem Resistant) (20 Apr 2024 14:42)      Method Type: KLARISSA      -  Amikacin: R >32      -  Ampicillin/Sulbactam: I 16/8      -  Cefepime: I 16      -  Ceftazidime: R >16      -  Ciprofloxacin: R >2      -  Gentamicin: R >8      -  Imipenem: R >8      -  Levofloxacin: R >4      -  Meropenem: R >8      -  Tobramycin: S <=2      -  Trimethoprim/Sulfamethoxazole: R >2/38      -  Polymyxin B: I 0.25              Clostridium difficile GDH Toxins A&amp;B, EIA:   Negative (04-19-24 @ 14:02)  Clostridium difficile GDH Interpretation: Negative for toxigenic C. Difficile.  This specimen is negative for C.  Difficile glutamate dehydrogenase (GDH) antigen and negative for C.  Difficile Toxins A & B, by EIA.  GDH is a highly sensitive screening  marker for C. Difficile that is produced in large amounts by all C.  Difficile strains, both toxigenic and nontoxigenic.  This assay has not  been validated as a test of cure.  Repeat testing during the same episode  of diarrhea is of limited value and is discouraged.  The results of this  assay should always be interpreted in conjunction with patient's clinical  history. (04-19-24 @ 14:02)    Legionella Antigen, Urine: Negative (04-13-24 @ 09:50)      Fungitell:   _______________________________________________  PERTINENT IMAGING    Clostridium difficile GDH Toxins A&amp;B, EIA:   Negative (04-19-24 @ 14:02)  Clostridium difficile GDH Interpretation: Negative for toxigenic C. Difficile.  This specimen is negative for C.  Difficile glutamate dehydrogenase (GDH) antigen and negative for C.  Difficile Toxins A & B, by EIA.  GDH is a highly sensitive screening  marker for C. Difficile that is produced in large amounts by all C.  Difficile strains, both toxigenic and nontoxigenic.  This assay has not  been validated as a test of cure.  Repeat testing during the same episode  of diarrhea is of limited value and is discouraged.  The results of this  assay should always be interpreted in conjunction with patient's clinical  history. (04-19-24 @ 14:02)    Legionella Antigen, Urine: Negative (04-13-24 @ 09:50)      Fungitell:   _______________________________________________  PERTINENT IMAGING

## 2024-04-22 NOTE — PROGRESS NOTE ADULT - ASSESSMENT
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 Hargrove), and now S/P total pancreatectomy  with islet cell autotransplant at [facility] by Dr. Gil in 04/2018), c/b recurrent abdominal wall collection requiring IR drainage, presented to OSH with acute change in mental status requiring intubation for airway protection, and transferred to [facility] for care escalation with labs notable for eleavted INR, T. Nilo and NH3 of unclear etiology although possibly 2/2 to cholestasis of sepsis     #Alc Hep/Cirrohsis   #Bicytopenia with Coagulopathy (improved)  #Pneumatosis of bowel concerning for ischemic colitis (improving)  #Acute encephalopathy with elevated hyperammonemia s/p CRRT for NH3 clearane    #Hepatomegaly   - DAWIT, Smooth and IgG within normal limts, ceruloplasmin < 10.   - Presented with septic shock, likely RML pneumonia, RLL bronchial occlusion, and pneumatosis coli - likely ischemic colon, colonic blood suggested by CT, init. Hb drop with labs notable for elevated T. Nilo, INR and NH3. Labs also notable for macrocytic anemia - DD: folate deficiency due to malnutrition with ?SIBO and alcohol-related liver disease Imaging with U/S and MRCP with note of  hepatomegaly with non-cirrhotic morphology,MELD 33 (24 with initial creat. before CRRT). PETH positive (collateral from parents endorsing hx of heavy ETOH use) with etiology of the above likely 2/2 to alcohol-related liver disease with acute alc hep in the background or cirrhosis and cholestasis of sepsis   - Acute encephalopathy, hyperammonemia - DD: hepatic encephalopathy in setting of alcoholic liver disease that is slowly improving. s/p CRRT for NH3 clearance.  Ok to stop L-Carnitine and continue with Rifaximin 550 mg BID in addition to Lactulose for goal 5 BMs per day. Continue with high dose IV Thiamine and Vit Supplemtation as noted below     #Fevers   #isolation of MDR , carbapenem resistant Acinetobacter baumanii on ETT.   CT chest showing clearing of prior infiltrates now on RA, no cough,    -Cdiff PCR neg, blood cultures neg at this time.   - Previously on meropenem, vanc, zosyn and unasyn, now off abx, per ID recs.       #Right sided colitis  w/ right sided thickening seen on CT imaging.   - Patient has no abd pain but has diarrhea in the setting of lactulose use. C diff neg. GI PCR neg on 4/15  - Differentials include possible viral infection , malignancy, ischemia, etc. Improving at this time based on two abdominal imaging.     Recommendations:  - Zinc low continue with PO Zinc 50 mg QD  - Vit. D, continue with supplementation   - follow up 24 hour urine copper collection   - Please consider decreasing lactulose dosing, as she has > 10 loose stools per day, goal 5 BMs per day.   -Continue with Rifaximin 550 mg BID.   - On pureed diet.   - IV Thiamine, Folate and MVI   - No indication for emergent colonoscopy at this time, can consider o/p colonoscopy once the patient is medically optimized for the procedure.  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 Hargrove), and now S/P total pancreatectomy  with islet cell autotransplant at [facility] by Dr. Gil in 04/2018), c/b recurrent abdominal wall collection requiring IR drainage, presented to OSH with acute change in mental status requiring intubation for airway protection, and transferred to [facility] for care escalation with labs notable for eleavted INR, T. Nilo and NH3 of unclear etiology although possibly 2/2 to cholestasis of sepsis     #Alc Hep/Cirrohsis   #Bicytopenia with Coagulopathy (improved)  #Pneumatosis of bowel concerning for ischemic colitis (improving)  #Acute encephalopathy with elevated hyperammonemia s/p CRRT for NH3 clearane    #Hepatomegaly   - DAWIT, Smooth and IgG within normal limts, ceruloplasmin < 10.   - Presented with septic shock, likely RML pneumonia, RLL bronchial occlusion, and pneumatosis coli - likely ischemic colon, colonic blood suggested by CT, init. Hb drop with labs notable for elevated T. Nilo, INR and NH3. Labs also notable for macrocytic anemia - DD: folate deficiency due to malnutrition with ?SIBO and alcohol-related liver disease Imaging with U/S and MRCP with note of  hepatomegaly with non-cirrhotic morphology,MELD 33 (24 with initial creat. before CRRT). PETH positive (collateral from parents endorsing hx of heavy ETOH use) with etiology of the above likely 2/2 to alcohol-related liver disease with acute alc hep in the background or cirrhosis and cholestasis of sepsis   - Acute encephalopathy, hyperammonemia - DD: hepatic encephalopathy in setting of alcoholic liver disease that is resolved now. s/p CRRT for NH3 clearance.  Ok to stop L-Carnitine and continue with Rifaximin 550 mg BID in addition to Lactulose for goal 5 BMs per day. Continue with high dose IV Thiamine and Vit Supplemtation as noted below     #Fevers   #isolation of MDR , carbapenem resistant Acinetobacter baumanii on ETT.   CT chest showing clearing of prior infiltrates now on RA, no cough,    -Cdiff PCR neg, blood cultures neg at this time.   - Previously on meropenem, vanc, zosyn and unasyn, now off abx, per ID recs.     #Right sided colitis  w/ right sided thickening seen on CT imaging.   - Patient has no abd pain but has diarrhea in the setting of lactulose use. C diff neg. GI PCR neg on 4/15  - Differentials include possible viral infection , malignancy, ischemia, etc. Improving at this time based on two abdominal imaging.     Recommendations:  - Zinc low continue with PO Zinc 50 mg QD  - Vit. D, continue with supplementation   - follow up 24 hour urine copper collection   - Please consider decreasing lactulose dosing, as she has > 10 loose stools per day, goal 5 BMs per day.   -Continue with Rifaximin 550 mg BID.   - On pureed diet.   - IV Thiamine, Folate and MVI   - No indication for emergent colonoscopy at this time, can consider o/p colonoscopy once the patient is medically optimized for the procedure.   - CMP and CBC daily.   - Recommend outpatient follow up in hepatology clinic in 2 weeks after discharge. Will arrange for an appointment.     Discussed the case with Dr. Montoya.     Hepatology will sign off.     Thank you for the consult. Please call us back if you have any questions or concerns.     All recommendations are tentative until the note is attested by an attending.     Tati Dias, PGY-5  Gastroenterology/Hepatology Fellow  Available on Microsoft Teams  [phone] (Short Range Pager)  [phone] (Long Range Pager)    After 5pm, please contact the on-call GI fellow. [phone]

## 2024-04-22 NOTE — PROGRESS NOTE ADULT - SUBJECTIVE AND OBJECTIVE BOX
Gastroenterology/Hepatology Progress Note      Interval Events:     - Patient reported she had multiple loose stools, about 12 last 24 hours on the lactulose.   - Denied abd pain, nausea and vomiting.   - Tolerating PO diet well.     Allergies:  No Known Allergies      Hospital Medications:  chlorhexidine 4% Liquid 1 Application(s) Topical <User Schedule>  cholecalciferol 2000 Unit(s) Oral daily  dextrose 50% Injectable 12.5 Gram(s) IV Push once  dextrose 50% Injectable 25 Gram(s) IV Push once  dextrose 50% Injectable 25 Gram(s) IV Push once  dextrose Oral Gel 15 Gram(s) Oral once  folic acid 1 milliGRAM(s) Oral daily  glucagon  Injectable 1 milliGRAM(s) IntraMuscular once  heparin   Injectable 5000 Unit(s) SubCutaneous every 12 hours  insulin glargine Injectable (LANTUS) 3 Unit(s) SubCutaneous at bedtime  insulin lispro (ADMELOG) corrective regimen sliding scale   SubCutaneous at bedtime  insulin lispro (ADMELOG) corrective regimen sliding scale   SubCutaneous three times a day before meals  insulin lispro Injectable (ADMELOG) 2 Unit(s) SubCutaneous before dinner  insulin lispro Injectable (ADMELOG) 2 Unit(s) SubCutaneous before lunch  insulin lispro Injectable (ADMELOG) 1 Unit(s) SubCutaneous before breakfast  lactulose Syrup 20 Gram(s) Oral every 8 hours  levothyroxine 100 MICROGram(s) Oral daily  methadone    Tablet 5 milliGRAM(s) Oral three times a day  multivitamin/minerals/iron Oral Solution (CENTRUM) 15 milliLiter(s) Oral daily  pantoprazole  Injectable 40 milliGRAM(s) IV Push daily  rifAXIMin 550 milliGRAM(s) Oral two times a day  thiamine IVPB 250 milliGRAM(s) IV Intermittent every 8 hours  zinc sulfate 220 milliGRAM(s) Oral daily      ROS: 14 point ROS negative unless otherwise state in subjective    PHYSICAL EXAM:   Vital Signs:  Vital Signs Last 24 Hrs  T(C): 37 (22 Apr 2024 04:17), Max: 37.7 (21 Apr 2024 20:47)  T(F): 98.6 (22 Apr 2024 04:17), Max: 99.8 (21 Apr 2024 20:47)  HR: 87 (22 Apr 2024 04:17) (87 - 92)  BP: 133/80 (22 Apr 2024 04:17) (121/74 - 133/80)  BP(mean): --  RR: 18 (22 Apr 2024 04:17) (18 - 18)  SpO2: 93% (22 Apr 2024 04:17) (92% - 94%)    Parameters below as of 22 Apr 2024 04:17  Patient On (Oxygen Delivery Method): room air      Daily     Daily     GENERAL:  No acute distress, lying in bed.   HEENT:  NCAT, + scleral icterus  CHEST: no resp distress  HEART:  RRR  ABDOMEN:  Soft, non-tender, non-distended,   EXTREMITIES:  No LE edema  SKIN:  No rash/erythema/ecchymoses/petechiae/wounds/abscess/warm/dry  NEURO:  Alert and oriented x 3, no asterixis, no tremor    LABS:                        8.1    14.11 )-----------( 135      ( 21 Apr 2024 05:55 )             26.0       04-21    144  |  110<H>  |  <4<L>  ----------------------------<  104<H>  4.1   |  24  |  0.43<L>    Ca    9.2      21 Apr 2024 05:55  Phos  3.0     04-21  Mg     1.6     04-21    TPro  5.2<L>  /  Alb  2.8<L>  /  TBili  3.6<H>  /  DBili  x   /  AST  60<H>  /  ALT  18  /  AlkPhos  147<H>  04-21    LIVER FUNCTIONS - ( 21 Apr 2024 05:55 )  Alb: 2.8 g/dL / Pro: 5.2 g/dL / ALK PHOS: 147 U/L / ALT: 18 U/L / AST: 60 U/L / GGT: x           PT/INR - ( 21 Apr 2024 05:55 )   PT: 15.2 sec;   INR: 1.46 ratio         PTT - ( 21 Apr 2024 05:55 )  PTT:41.8 sec  Urinalysis Basic - ( 21 Apr 2024 05:55 )    Color: x / Appearance: x / SG: x / pH: x  Gluc: 104 mg/dL / Ketone: x  / Bili: x / Urobili: x   Blood: x / Protein: x / Nitrite: x   Leuk Esterase: x / RBC: x / WBC x   Sq Epi: x / Non Sq Epi: x / Bacteria: x            Imaging: See Report.

## 2024-04-22 NOTE — CHART NOTE - NSCHARTNOTEFT_GEN_A_CORE
Patient Demographic Information (PDI)       PDI	First Name	Last Name	Birth Date	Gender	Street Address	[facility]	Zip Code  A	Kari Acosta	1975	Female	[address]	[place]	[address]  B	Kari Acotsa	1975	Female	[address]	[place]	[address]  C	Kari Acosta	1975	Female	[address]	[place]	[address]    Prescription Information      PDI Filter:    PDI	Current Rx	Drug Type	Rx Written	Rx Dispensed	Drug	Quantity	Days Supply	Prescriber Name	Prescriber JOSH #	Payment Method	Dispenser  A	Y	O	03/29/2024	04/09/2024	methadone hcl 10 mg tablet	90	30	CleKalpesh zafar NP	[id number]	Medicare	[id number]  A	N	O	03/29/2024	04/04/2024	hydromorphone 4 mg tablet	30	5	CleKalpesh zafar NP	[id number]	Medicare	[id number]  A	N	B	03/12/2024	03/12/2024	alprazolam 0.5 mg tablet	30	30	Jorge L Brasher MD	[id number]	Medicare	[id number]  A	N	O	03/04/2024	03/10/2024	hydromorphone 4 mg tablet	150	30	CleKalpesh zafar NP	[id number]	Medicare	[id number]  A	N	O	03/04/2024	03/10/2024	methadone hcl 10 mg tablet	90	30	CleKalpesh zafar NP	[id number]	Medicare	[id number]  A	N	O	02/15/2024	02/15/2024	hydromorphone 4 mg tablet	150	25	CleKalpesh zafar NP	[id number]	Medicare	[id number]  A	N	O	01/16/2024	02/02/2024	methadone hcl 10 mg tablet	90	30	CleKalpesh zafar NP	[id number]	Medicare	[id number]  A	N	O	01/16/2024	01/20/2024	hydromorphone 4 mg tablet	150	25	CleKalpesh zafar NP	[id number]	Medicare	[id number]  A	N	O	12/21/2023	12/29/2023	hydromorphone 4 mg tablet	150	25	CleKalpesh zafar NP	[id number]	Medicare	[id number]  A	N	O	12/21/2023	12/29/2023	methadone hcl 10 mg tablet	90	30	ClementKalpesh NP	[id number]	Medicare	[id number]  A	N	O	11/22/2023	12/02/2023	hydromorphone 4 mg tablet	150	30	ClementKalpesh NP	[id number]	Medicare	[id number]  A	N	B	11/30/2023	11/30/2023	alprazolam 0.5 mg tablet	30	30	Jorge L Brasher MD	[id number]	Medicare	[id number]  A	N	O	11/22/2023	11/28/2023	methadone hcl 10 mg tablet	90	30	Clement, Kalpesh CORRIGAN NP	[id number]	Medicare	[id number]  A	N	O	10/25/2023	11/04/2023	hydromorphone 4 mg tablet	150	30	ClementKalpesh NP	[id number]	Medicare	[id number]  A	N	O	10/25/2023	10/26/2023	methadone hcl 10 mg tablet	90	30	ClementKalpesh NP	[id number]	Medicare	[id number]  A	N	O	10/02/2023	10/07/2023	hydromorphone 4 mg tablet	150	30	CleKalpesh zafar NP	[id number]	Medicare	[id number]  A	N	O	09/15/2023	09/27/2023	methadone hcl 10 mg tablet	90	30	ClementKalpesh NP	[id number]	Medicare	[id number]  A	N	O	09/15/2023	09/15/2023	hydromorphone 4 mg tablet	150	25	ClementKalpesh NP	[id number]	Medicare	[id number]  B	N	O	07/18/2023	07/23/2023	hydromorphone 4 mg tablet	150	25	CleKalpesh zafar NP	[id number]	Medicare	[id number]  B	N	O	06/19/2023	07/03/2023	methadone hcl 10 mg tablet	90	30	ClementKalpesh NP	[id number]	Medicare	[id number]  B	N	O	06/19/2023	06/26/2023	hydromorphone 4 mg tablet	120	30	Clement Kalpesh A NP	[id number]	Medicare	[id number]  B	N	O	05/22/2023	06/02/2023	methadone hcl 10 mg tablet	90	30	CleKalpesh zafar NP	[id number]	Medicare	[id number]  B	N	O	05/22/2023	05/29/2023	hydromorphone 4 mg tablet	117	29	Kalpesh Escobar NP	[id number]	Medicare	[id number]  B	N	O	04/24/2023	05/04/2023	methadone hcl 10 mg tablet	90	30	CleKalpesh zafar NP	[id number]	Medicare	[id number]  B	N	O	04/24/2023	05/01/2023	hydromorphone 4 mg tablet	120	30	Kalpesh Escobar NP	[id number]	Medicare	[id number]  B	N	B	04/28/2023	04/28/2023	alprazolam 0.5 mg tablet	30	30	Jorge L Brasher MD	[id number]	Medicare	[id number]  C	N	O	08/17/2023	08/29/2023	methadone hcl 10 mg tablet	90	30	Kalpesh Escobar NP	[id number]	Medicare	[id number]  C	N	O	08/17/2023	08/17/2023	hydromorphone 4 mg tablet	150	25	Kalpesh Escobar NP	[id number]	Medicare	[id number]  C	N	O	07/18/2023	07/31/2023	methadone hcl 10 mg tablet	90	30	Kalpesh Escobar NP	[id number]	Medicare	[id number]

## 2024-04-22 NOTE — PROGRESS NOTE ADULT - PROBLEM SELECTOR PLAN 1
#Alcoholic hepatitis/cirrhosis?  >Elevated bilirubin/coag, c/b hepatic encephalopathy; >PETH (4/13): > 400  >CTAP (4/13): Hepatomegaly and hepatic steatosis  >US abd (4/13): Hepatic steatosis, no hepatic vein thrombosis  >Acute hepatitis panel (4/13): Negative; Autoimmune hepatitis panel (4/13): Negative  >MRCP (4/15): Enlarged, fatty liver. No biliary duct dilation, unlikely obstruction  - c/w Folid acid, MVI; c/w thiamine 500mg Q8h x 3 days (4/18 - 4/20) given patient confabulating c/f wernicke vs. ICU delirium     #Hyperammonemia   >Baseline mentation AAO3  >s/p CRRT (off since 4/16)  - f/u plasma amino acid (4/16): in lab  - f/u urine orotic acid (4/16) - sent out to [facility]  - f/u metabolic genetic consult to r/o acquired urea cycle disorder  - c/w Rifaximin; Lactulose  - c/w Trend Ammonia qd

## 2024-04-22 NOTE — PROGRESS NOTE ADULT - NUTRITIONAL ASSESSMENT
This patient has been assessed with a concern for Malnutrition and has been determined to have a diagnosis/diagnoses of Severe protein-calorie malnutrition and Underweight (BMI < 19).    This patient is being managed with:   Diet Pureed-  Mildly Thick Liquids (MILDTHICKLIQS)  Entered: Apr 21 2024  9:27AM

## 2024-04-22 NOTE — PROGRESS NOTE ADULT - ASSESSMENT
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 Presented to [facility] 4/8 for several weeks of decreased PO intake, NBNB emesis, watery diarrhea, Somnolence, requiring intubation for airway protection. Lab revealed ammonia > 200, elevated bilirubin and INR initially c/f liver failure, c/c/b colitis possibly ischemic etiology. Patient transferred to [facility] 4/13 for CRRT initiation for ammonia clearance.

## 2024-04-22 NOTE — PROGRESS NOTE ADULT - SUBJECTIVE AND OBJECTIVE BOX
PROGRESS NOTE:   Authored by Dr. Negro Carroll MD ([id number]-1). Pager [facility] [phone] / [facility]     Patient is a 48y old  Female who presents with a chief complaint of Liver Failure (18 Apr 2024 15:50)      SUBJECTIVE / OVERNIGHT EVENTS:  No acute events overnight. 4 liquid BMs    ADDITIONAL REVIEW OF SYSTEMS:  Patient denies fevers, chills, chest pain, shortness of breath, nausea, abdominal pain, diarrhea, constipation, dysuria, leg swelling, headache, light headedness.    MEDICATIONS  (STANDING):  chlorhexidine 4% Liquid 1 Application(s) Topical <User Schedule>  cholecalciferol 2000 Unit(s) Oral daily  dextrose 50% Injectable 25 Gram(s) IV Push once  dextrose 50% Injectable 12.5 Gram(s) IV Push once  dextrose 50% Injectable 25 Gram(s) IV Push once  dextrose Oral Gel 15 Gram(s) Oral once  folic acid 1 milliGRAM(s) Oral daily  glucagon  Injectable 1 milliGRAM(s) IntraMuscular once  heparin   Injectable 5000 Unit(s) SubCutaneous every 12 hours  insulin glargine Injectable (LANTUS) 3 Unit(s) SubCutaneous at bedtime  insulin lispro (ADMELOG) corrective regimen sliding scale   SubCutaneous three times a day before meals  insulin lispro (ADMELOG) corrective regimen sliding scale   SubCutaneous at bedtime  insulin lispro Injectable (ADMELOG) 1 Unit(s) SubCutaneous before breakfast  insulin lispro Injectable (ADMELOG) 2 Unit(s) SubCutaneous before lunch  insulin lispro Injectable (ADMELOG) 2 Unit(s) SubCutaneous before dinner  lactulose Syrup 20 Gram(s) Oral every 8 hours  levothyroxine 100 MICROGram(s) Oral daily  methadone    Tablet 5 milliGRAM(s) Oral three times a day  multivitamin/minerals/iron Oral Solution (CENTRUM) 15 milliLiter(s) Oral daily  pantoprazole  Injectable 40 milliGRAM(s) IV Push daily  rifAXIMin 550 milliGRAM(s) Oral two times a day  thiamine IVPB 250 milliGRAM(s) IV Intermittent every 8 hours  zinc sulfate 220 milliGRAM(s) Oral daily    MEDICATIONS  (PRN):      CAPILLARY BLOOD GLUCOSE      POCT Blood Glucose.: 83 mg/dL (22 Apr 2024 11:45)  POCT Blood Glucose.: 82 mg/dL (22 Apr 2024 07:53)  POCT Blood Glucose.: 134 mg/dL (21 Apr 2024 21:22)  POCT Blood Glucose.: 134 mg/dL (21 Apr 2024 16:34)  POCT Blood Glucose.: 134 mg/dL (21 Apr 2024 12:45)    I&O's Summary    21 Apr 2024 07:01  -  22 Apr 2024 07:00  --------------------------------------------------------  IN: 120 mL / OUT: 0 mL / NET: 120 mL        PHYSICAL EXAM:  Vital Signs Last 24 Hrs  T(C): 37.2 (22 Apr 2024 11:00), Max: 37.7 (21 Apr 2024 20:47)  T(F): 98.9 (22 Apr 2024 11:00), Max: 99.8 (21 Apr 2024 20:47)  HR: 83 (22 Apr 2024 11:00) (83 - 92)  BP: 129/77 (22 Apr 2024 11:00) (121/74 - 133/80)  BP(mean): --  RR: 18 (22 Apr 2024 11:00) (18 - 18)  SpO2: 98% (22 Apr 2024 11:00) (92% - 98%)    Parameters below as of 22 Apr 2024 11:00  Patient On (Oxygen Delivery Method): room air    Physical Exam: Gen: no acute distress; jaundiced  HEENT: atraumatic, normocephalic, pupils equally round and reactive to light, extraocular muscles intact, scleral icterus  CV: regular rate and rhythm, normal S1/S2, no murmurs, rubs, or gallops  Resp: lungs clear to auscultation bilaterally, no rales, rhonchi, or wheezes  GI: soft, slightly tender, nondistended, BSx4  MSK: extremities atraumatic, no cyanosis or clubbing  Skin: warm, dry, no rashes or lesions  Neuro: no focal deficits, sensation grossly intact  Psych: alert and oriented x3, appropriate mood and affec    LABS:                        8.1    14.11 )-----------( 135      ( 21 Apr 2024 05:55 )             26.0     04-22    143  |  111<H>  |  <4<L>  ----------------------------<  69<L>  4.3   |  20<L>  |  0.42<L>    Ca    9.0      22 Apr 2024 11:05  Phos  2.8     04-22  Mg     1.4     04-22    TPro  5.5<L>  /  Alb  2.6<L>  /  TBili  3.3<H>  /  DBili  x   /  AST  65<H>  /  ALT  20  /  AlkPhos  154<H>  04-22    PT/INR - ( 21 Apr 2024 05:55 )   PT: 15.2 sec;   INR: 1.46 ratio         PTT - ( 21 Apr 2024 05:55 )  PTT:41.8 sec      Urinalysis Basic - ( 22 Apr 2024 11:05 )    Color: x / Appearance: x / SG: x / pH: x  Gluc: 69 mg/dL / Ketone: x  / Bili: x / Urobili: x   Blood: x / Protein: x / Nitrite: x   Leuk Esterase: x / RBC: x / WBC x   Sq Epi: x / Non Sq Epi: x / Bacteria: x          Tele Reviewed:    RADIOLOGY & ADDITIONAL TESTS:  Results Reviewed:   Imaging Personally Reviewed:  Electrocardiogram Personally Reviewed:

## 2024-04-22 NOTE — PROGRESS NOTE ADULT - ATTENDING COMMENTS
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 Required intubation for airway protection, was found to be in acute liver failure and hepatic encephalopathy w/ ammonia >200. Was transferred to MICU here for CRRT and liver transplant evaluation. Was initially started on Bobby/Dapto/Doxy/Caspofungin for broad spectrum coverage, then deescalated to Vanc/Bobby. CRRT initiated, ammonia levels downtrended, stopped on 4/16, and TFs restarted w/ lactulose. Was eventually extubated 4/17. Acute liver failure and hepatic encephalopathy currently being treated as due to alcholic hepatitis due to PETH>400 and other workup being unrevealing. Now stable for downgrade to floors.       - C/w Lactulose and Rifaximin. Decrease lactulose, several BM overnight. Monitor stool count. Mental status appears to be improving.   - F/u hepatology recs. Monitor LFTs   - Orotic acid and plasma amino acid levels sent out and pending  - Factor V Leiden. Hx of DVT. Per chart review Eliquis stopped 5/2023 by outpatient hematology. F/u heme recs  - T1DM; appreciate endo recs   - Carbapenem resistant Acinetobacter baumanii on ETT. ID following; rec monitoring off abx for now. Monitor fever curve, WBC trend.   - CT A/P (4/13): segmental areas of wall thickening throughout the colon and rectum with pneumatosis, mesenteric edema, and adjacent hyperdensities in the proximal transverse colon suspicious for bowel ischemia with intraluminal hemorrhage. Additional intraluminal hyperdensities within the distal ascending colon suspicious for hemorrhage. Surgery, GI following, no plan for surgery/endoscopic eval for now.

## 2024-04-22 NOTE — PROGRESS NOTE ADULT - ASSESSMENT
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 Hargrove), and now S/P total pancreatectomy with islet cell autotransplant at [facility] by Dr. Gil in 04/2018). 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 Per records, a Ct later in 2018 showed still fistulization but no abscess formation     Patient presented to [facility] ED on 4/8 by her spouse for AMS. Per , patient had been having nbnb vomiting and diarrhea for the last 4 weeks, extreme fatigue . She was unable to tolerate PO. She was also sleeping 18-20 hrs per day. On 4/8, he found her on the floor "completely out of it" and took her to [facility] for further evaluation.   Initial workup in ED demonstrated a Tbili 5.4, direct bili 4.4 and ammonia 196. Patient was somnolent and had a left eyeward gaze deviation. Submentally started developing agonal snoring respirations and had to be intubated for airway protection. Course complicated with septic shock and persistent hyperammonemia despite lactulose and rifaximin, CT show and started on meropenem, linezolid and anidulafungin at [facility]--> upon transfer here, off pressors, on alvaro/linezolid/caspofungin    CT Chest (4/13) Occlusion of the distal left lower lobar bronchus with complete left lower lobe atelectasis. Right middle lobe consolidation, possibly secondary to pneumonia or additional atelectasis.     CT A/P (4/13) Segmental areas of wall thickening throughout the colon and rectum with pneumatosis, mesenteric edema, and adjacent hyperdensities in the proximal transverse colon suspicious for bowel ischemia with intraluminal hemorrhage. Additional intraluminal hyperdensities within the distal ascending colon suspicious for hemorrhage. Nonspecific focal areas of narrowing in the distal descending colon and sigmoid colon, possibly colonic masses    UCx (4/13) No Growth  BCx (4/13) NGTD  Bronchoscopy Cx (4/13) Rare Yeast    CMV PCR (4/13) Negative  Adenovirus PCR (4/16) negative  Parvovirus PCR (4/13) Negative  EBV PCR (4/17) 61    Serum Cryptococcal Antigen (4/14) Negative  Serum Fungitell <31  Bronch Fungitell >500  Bronch and Serum Aspergillus Galactomannan Negative    Serum Histo Ag Negative  Urine Histo Ag Negative    Babesia PCR (4/13) Negative  Tick Diseases Panel Negative  Bartonella Serum PCR Negative  Leptospirosis Ab Negative    Lower suspicion infectious etiology directly contributing to hyperbilirubinemia; reasonable to complete meropenem course for the pneumatosis and possible pneumonia.    Low grade fevers 4/16 --> 4/17  Extubated on 4/17 with central line removal    #Fever, Leukocytosis,   #Positive Sputum Culture (carbapenem resistant Acinetobacter baumanii on ETT).   CT chest showing clearing of prior infiltrates now on RA, no cough,    C diff PCR Negative  --If clinical status changes would start Unasyn 3g IV Q6H over 4 hours (dosing for CrCl 30-50) AND Minocycline 200 mg IV BID to target the CR Acinetobacter. At the moment I favor that it may have represented colonization of the preceding ETT (now removed) as patient without any respiratory symptoms and on room air. ?Leukocytosis due to alcoholic hepatitis?  --Continue to follow CBC with diff  --Continue to follow temperature curve  --Follow up on preliminary blood cultures    #Hyperammonemia, Bilirubin Elevation  Ammonia elevation now thought to be secondary to alcoholic hepatitis given positive PETH  --Follow up on Ureaplasma and Mycoplasma hominis PCR Sent out to [place] (sent on 4/18)  --Follow up on Leptospira PCR    #Elevated Bronch Fungitell   Could be secondary to colonization of respiratory tract with Candida (growth of yeast on cultures)  Pneumocystis PCR negative on BAL    I will continue to follow. Please feel free to contact me with any further questions.    Bhavesh Pérez M.D.  [facility] Division of Infectious Disease  8AM-5PM Monday - Friday: Available on [employer] Teams  After Hours and Holidays (or if no response on [employer] Teams): Please contact the Infectious Diseases Office at [phone]

## 2024-04-22 NOTE — PROGRESS NOTE ADULT - ATTENDING COMMENTS
Agree with Dr. Jett's assessment and plan as outlined above. Reviewed all pertinent labs, and imaging studies. Modifications made as indicated above. Following this 48 F for type 3 C DM with undetectable c peptide. Fasting glucose 80s, decrease lantus to 3 untis QHS. Hold off on standing meal time insulin. Rest of the plan as above.

## 2024-04-22 NOTE — PROGRESS NOTE ADULT - ASSESSMENT
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 Transferred to [facility] for Liver Transplant Evaluation. Patient found to be hypoglycemic shortly after transfer, now resolved.    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 Patient was first diagnosed with Type 3c DM after her initial surgery in 2014 (s/p distal pancreatectomy, cholecystectomy, splenectomy and celina-en-y pancreaticojejuonostomy). Then she later underwent total pancreatectomy with islet cell autotransplant in 2018. After that, her insulin requirements decreased over the years but was never off insulin.    Endocrinologist: Dr. Dalton  Per recent outpatient note 3/26/2024:  Patient uses Omnipod Dash with Dexcom CGM   Basal rate:   MN 0.05U   6 AM 0.1U   9.30 AM 0.15U   Total basal 2.85U/24h   ICR 1:15   ICF 1:75   Reports of frequent hypoglycemia outpatient in March 2024.    C-peptide <0.1 4/15 at 6pm when BG was 168, likely insulin deficient.  C-peptide repeated while off dextrose and tube feeds: 0.1 with , confirming insulin deficiency.    On PO diet pureed    PLAN:  - Inpatient BG goal 100-180  - Decrease Lantus to 3 units qhs  - Will d/c premeal admelog given small intake and borderline BG  - continue Low dose admelog scale TIDAC, low dose scale qhs  - For discharge, patient prefers to resume insulin pump. Patient was instructed to bring in supplies to resume on discharge.    #Hypothyroidism vs NTIS  TSH 4.43. FT4 0.6, TT3 47 on admission (4/13)  Started on LT4 100mcg daily  Repeat TSH was wnl 3.67 (4/15)  - recheck FT4 and TSH with tomorrow's labs. May not require LT4.      Ed Jett MD  Endocrine Fellow  Can be reached via teams. For follow up questions, discharge recommendations, or new consults, please call answering service at [phone] (weekdays); [phone] (nights/weekends). 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 Transferred to [facility] for Liver Transplant Evaluation. Patient found to be hypoglycemic shortly after transfer, now resolved.    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 Patient was first diagnosed with Type 3c DM after her initial surgery in 2014 (s/p distal pancreatectomy, cholecystectomy, splenectomy and celina-en-y pancreaticojejuonostomy). Then she later underwent total pancreatectomy with islet cell autotransplant in 2018. After that, her insulin requirements decreased over the years but was never off insulin.    Endocrinologist: Dr. Dalton  Per recent outpatient note 3/26/2024:  Patient uses Omnipod Dash with Dexcom CGM   Basal rate:   MN 0.05U   6 AM 0.1U   9.30 AM 0.15U   Total basal 2.85U/24h   ICR 1:15   ICF 1:75   Reports of frequent hypoglycemia outpatient in March 2024.    C-peptide <0.1 4/15 at 6pm when BG was 168, likely insulin deficient.  C-peptide repeated while off dextrose and tube feeds: 0.1 with , confirming insulin deficiency.    On PO diet pureed    PLAN:  - Inpatient BG goal 100-180  - Decrease Lantus to 3 units qhs  - Will d/c premeal admelog given small intake and borderline BG  - continue Low dose admelog scale TIDAC, low dose scale qhs  - For discharge, patient prefers to resume insulin pump. Patient was instructed to bring in supplies to resume on discharge.    #Hypothyroidism vs NTIS  TSH 4.43. FT4 0.6, TT3 47 on admission (4/13)  Started on LT4 100mcg daily  Repeat TSH was wnl 3.67 (4/15)  - Will stop levothyroxine   - recheck FT4 and TSH in 1 week 4/29 if remains inpatient, otherwise check outpatient.      Ji Ae Maliha, MD  Endocrine Fellow  Can be reached via teams. For follow up questions, discharge recommendations, or new consults, please call answering service at [phone] (weekdays); [phone] (nights/weekends). 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 Transferred to [facility] for Liver Transplant Evaluation. Patient found to be hypoglycemic shortly after transfer, now resolved.    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 Patient was first diagnosed with Type 3c DM after her initial surgery in 2014 (s/p distal pancreatectomy, cholecystectomy, splenectomy and celina-en-y pancreaticojejuonostomy). Then she later underwent total pancreatectomy with islet cell autotransplant in 2018. After that, her insulin requirements decreased over the years but was never off insulin.    Endocrinologist: Dr. Dalton  Per recent outpatient note 3/26/2024:  Patient uses Omnipod Dash with Dexcom CGM   Basal rate:   MN 0.05U   6 AM 0.1U   9.30 AM 0.15U   Total basal 2.85U/24h   ICR 1:15   ICF 1:75   Reports of frequent hypoglycemia outpatient in March 2024.    C-peptide <0.1 4/15 at 6pm when BG was 168, likely insulin deficient.  C-peptide repeated while off dextrose and tube feeds: 0.1 with , confirming insulin deficiency.    On PO diet pureed    PLAN:  - Inpatient BG goal 100-180  - Decrease Lantus to 3 units qhs  - Will d/c premeal admelog given small intake and borderline BG  - continue Low dose admelog scale TIDAC, low dose scale qhs  - For discharge, patient prefers to resume insulin pump. Patient was instructed to bring in supplies to resume on discharge.    #Hypothyroidism vs NTIS  TSH 4.43. FT4 0.6, TT3 47 on admission (4/13)  Started on LT4 100mcg daily  Repeat TSH was wnl 3.67 (4/15)  - Will stop levothyroxine now  - Check TPO antibodies  - recheck FT4 and TSH in 1 week 4/29 if remains inpatient, otherwise check outpatient.      Ed Jett MD  Endocrine Fellow  Can be reached via teams. For follow up questions, discharge recommendations, or new consults, please call answering service at [phone] (weekdays); [phone] (nights/weekends).

## 2024-04-22 NOTE — PROGRESS NOTE ADULT - SUBJECTIVE AND OBJECTIVE BOX
Follow Up:      Interval History:    REVIEW OF SYSTEMS  [  ] ROS unobtainable because:    [  ] All other systems negative except as noted below    Constitutional:  [ ] fever [ ] chills  [ ] weight loss  [ ] weakness  Skin:  [ ] rash [ ] phlebitis	  Eyes: [ ] icterus [ ] pain  [ ] discharge	  ENMT: [ ] sore throat  [ ] thrush [ ] ulcers [ ] exudates  Respiratory: [ ] dyspnea [ ] hemoptysis [ ] cough [ ] sputum	  Cardiovascular:  [ ] chest pain [ ] palpitations [ ] edema	  Gastrointestinal:  [ ] nausea [ ] vomiting [ ] diarrhea [ ] constipation [ ] pain	  Genitourinary:  [ ] dysuria [ ] frequency [ ] hematuria [ ] discharge [ ] flank pain  [ ] incontinence  Musculoskeletal:  [ ] myalgias [ ] arthralgias [ ] arthritis  [ ] back pain  Neurological:  [ ] headache [ ] seizures  [ ] confusion/altered mental status    Allergies  No Known Allergies        ANTIMICROBIALS:  rifAXIMin 550 two times a day      OTHER MEDS:  MEDICATIONS  (STANDING):  dextrose 50% Injectable 12.5 once  dextrose 50% Injectable 25 once  dextrose 50% Injectable 25 once  dextrose Oral Gel 15 once  glucagon  Injectable 1 once  heparin   Injectable 5000 every 12 hours  insulin glargine Injectable (LANTUS) 3 at bedtime  insulin lispro (ADMELOG) corrective regimen sliding scale  three times a day before meals  insulin lispro (ADMELOG) corrective regimen sliding scale  at bedtime  lactulose Syrup 20 every 8 hours  levothyroxine 100 daily  methadone    Tablet 5 three times a day  pantoprazole  Injectable 40 daily      Vital Signs Last 24 Hrs  T(C): 37.2 (22 Apr 2024 11:00), Max: 37.7 (21 Apr 2024 20:47)  T(F): 98.9 (22 Apr 2024 11:00), Max: 99.8 (21 Apr 2024 20:47)  HR: 89 (22 Apr 2024 14:58) (83 - 91)  BP: 136/80 (22 Apr 2024 14:58) (121/74 - 136/80)  BP(mean): --  RR: 18 (22 Apr 2024 11:00) (18 - 18)  SpO2: 94% (22 Apr 2024 14:58) (93% - 98%)    Parameters below as of 22 Apr 2024 14:58  Patient On (Oxygen Delivery Method): room air        PHYSICAL EXAMINATION:  General: Alert and Awake, NAD  HEENT: PERRL, EOMI  Neck: Supple  Cardiac: RRR, No M/R/G  Resp: CTAB, No Wh/Rh/Ra  Abdomen: NBS, NT/ND, No HSM, No rigidity or guarding  MSK: No LE edema. No Calf tenderness  : No harrington  Skin: No rashes or lesions. Skin is warm and dry to the touch.   Neuro: Alert and Awake. CN 2-12 Grossly intact. Moves all four extremities spontaneously.  Psych: Calm, Pleasant, Cooperative                          8.1    14.11 )-----------( 135      ( 21 Apr 2024 05:55 )             26.0       04-22    143  |  110<H>  |  <4<L>  ----------------------------<  81  4.9   |  23  |  0.42<L>    Ca    9.3      22 Apr 2024 13:38  Phos  2.8     04-22  Mg     1.4     04-22    TPro  6.2  /  Alb  3.1<L>  /  TBili  3.7<H>  /  DBili  x   /  AST  76<H>  /  ALT  24  /  AlkPhos  172<H>  04-22      Urinalysis Basic - ( 22 Apr 2024 13:38 )    Color: x / Appearance: x / SG: x / pH: x  Gluc: 81 mg/dL / Ketone: x  / Bili: x / Urobili: x   Blood: x / Protein: x / Nitrite: x   Leuk Esterase: x / RBC: x / WBC x   Sq Epi: x / Non Sq Epi: x / Bacteria: x        MICROBIOLOGY:  v  .Blood Blood-Venous  04-18-24   No growth at 72 Hours  --  --      ET Tube ET Tube  04-17-24   Numerous Acinetobacter baumannii/nosocom group (Carbapenem Resistant)  Normal Respiratory Linh present  --  Acinetobacter baumannii/nosocom group (Carbapenem Resistant)      .Stool Feces  04-14-24   No Protozoa seen by trichrome stain  No Helminths or Protozoa seen in formalin concentrate  performed by iodine stain  (routine O+P not evaluated for Microsporidia,  Cryptosporidia or Cyclospora)  One negative sample does not necessarily rule  out the presence of a parasitic infection.  Numerous White blood cells  Few Red blood cells  --  --      .Blood Blood  04-13-24   No Blood Parasites observed by giemsa stain  One negative set of blood smears does not rule out  the possibility of a parasitic infection.  A minimum of 3  specimens should be collected, at least 12-24 hours apart,  over a 36 hour time period.  ************************************************************  NEGATIVE for Plasmodium antigens. Microscopy is performed for  confirmation.  The Malaria Rapid antigen test does not detect the  presence of Babesia species. If Babesiosis is suspected  please order test Babesia PCR: Babesia microti PCR Bld  ************************************************************  --  --      .Bronchial Bronchial Lavage  04-13-24   Normal Respiratory Linh present  --    Rare polymorphonuclear leukocytes seen per low power field  No Squamous epithelial cells seen per low power field  No organisms seen per oil power field      Catheterized Catheterized  04-13-24   No growth  --  --      ET Tube ET Tube  04-13-24   Normal Respiratory Linh present  --    Moderate polymorphonuclear leukocytes seen per low power field  No organisms seen      .Blood Blood-Venous  04-13-24   No growth at 5 days  --  --      .Blood Blood-Peripheral  04-13-24   No growth at 5 days  --  --            Clostridium difficile GDH Toxins A&amp;B, EIA:   Negative (04-19-24 @ 14:02)  Clostridium difficile GDH Interpretation: Negative for toxigenic C. Difficile.  This specimen is negative for C.  Difficile glutamate dehydrogenase (GDH) antigen and negative for C.  Difficile Toxins A & B, by EIA.  GDH is a highly sensitive screening  marker for C. Difficile that is produced in large amounts by all C.  Difficile strains, both toxigenic and nontoxigenic.  This assay has not  been validated as a test of cure.  Repeat testing during the same episode  of diarrhea is of limited value and is discouraged.  The results of this  assay should always be interpreted in conjunction with patient's clinical  history. (04-19-24 @ 14:02)      RADIOLOGY:    <The imaging below has been reviewed and visualized by me independently. Findings as detailed in report below> Follow Up:  Leukocytosis    Interval History: afebrile. no acute events.     REVIEW OF SYSTEMS  [  ] ROS unobtainable because:    [x ] All other systems negative except as noted below    Constitutional:  [ ] fever [ ] chills  [ ] weight loss  [ ] weakness  Skin:  [ ] rash [ ] phlebitis	  Eyes: [ ] icterus [ ] pain  [ ] discharge	  ENMT: [ ] sore throat  [ ] thrush [ ] ulcers [ ] exudates  Respiratory: [ ] dyspnea [ ] hemoptysis [ ] cough [ ] sputum	  Cardiovascular:  [ ] chest pain [ ] palpitations [ ] edema	  Gastrointestinal:  [ ] nausea [ ] vomiting [ ] diarrhea [ ] constipation [ ] pain	  Genitourinary:  [ ] dysuria [ ] frequency [ ] hematuria [ ] discharge [ ] flank pain  [ ] incontinence  Musculoskeletal:  [ ] myalgias [ ] arthralgias [ ] arthritis  [ ] back pain  Neurological:  [ ] headache [ ] seizures  [ ] confusion/altered mental status    Allergies  No Known Allergies        ANTIMICROBIALS:  rifAXIMin 550 two times a day      OTHER MEDS:  MEDICATIONS  (STANDING):  dextrose 50% Injectable 12.5 once  dextrose 50% Injectable 25 once  dextrose 50% Injectable 25 once  dextrose Oral Gel 15 once  glucagon  Injectable 1 once  heparin   Injectable 5000 every 12 hours  insulin glargine Injectable (LANTUS) 3 at bedtime  insulin lispro (ADMELOG) corrective regimen sliding scale  three times a day before meals  insulin lispro (ADMELOG) corrective regimen sliding scale  at bedtime  lactulose Syrup 20 every 8 hours  levothyroxine 100 daily  methadone    Tablet 5 three times a day  pantoprazole  Injectable 40 daily      Vital Signs Last 24 Hrs  T(C): 37.2 (22 Apr 2024 11:00), Max: 37.7 (21 Apr 2024 20:47)  T(F): 98.9 (22 Apr 2024 11:00), Max: 99.8 (21 Apr 2024 20:47)  HR: 89 (22 Apr 2024 14:58) (83 - 91)  BP: 136/80 (22 Apr 2024 14:58) (121/74 - 136/80)  BP(mean): --  RR: 18 (22 Apr 2024 11:00) (18 - 18)  SpO2: 94% (22 Apr 2024 14:58) (93% - 98%)    Parameters below as of 22 Apr 2024 14:58  Patient On (Oxygen Delivery Method): room air        PHYSICAL EXAMINATION:  General: Alert and Awake, NAD  Cardiac: RRR, No M/R/G  Resp: CTAB, No Wh/Rh/Ra  Abdomen: NBS, NT/ND, No HSM, No rigidity or guarding  MSK: No LE edema. No Calf tenderness  Skin: No rashes or lesions. Skin is warm and dry to the touch.   Neuro: Alert and Awake. CN 2-12 Grossly intact. Moves all four extremities spontaneously.  Psych: Calm, Pleasant, Cooperative                          8.1    14.11 )-----------( 135      ( 21 Apr 2024 05:55 )             26.0       04-22    143  |  110<H>  |  <4<L>  ----------------------------<  81  4.9   |  23  |  0.42<L>    Ca    9.3      22 Apr 2024 13:38  Phos  2.8     04-22  Mg     1.4     04-22    TPro  6.2  /  Alb  3.1<L>  /  TBili  3.7<H>  /  DBili  x   /  AST  76<H>  /  ALT  24  /  AlkPhos  172<H>  04-22      Urinalysis Basic - ( 22 Apr 2024 13:38 )    Color: x / Appearance: x / SG: x / pH: x  Gluc: 81 mg/dL / Ketone: x  / Bili: x / Urobili: x   Blood: x / Protein: x / Nitrite: x   Leuk Esterase: x / RBC: x / WBC x   Sq Epi: x / Non Sq Epi: x / Bacteria: x        MICROBIOLOGY:  v  .Blood Blood-Venous  04-18-24   No growth at 72 Hours  --  --      ET Tube ET Tube  04-17-24   Numerous Acinetobacter baumannii/nosocom group (Carbapenem Resistant)  Normal Respiratory Linh present  --  Acinetobacter baumannii/nosocom group (Carbapenem Resistant)      .Stool Feces  04-14-24   No Protozoa seen by trichrome stain  No Helminths or Protozoa seen in formalin concentrate  performed by iodine stain  (routine O+P not evaluated for Microsporidia,  Cryptosporidia or Cyclospora)  One negative sample does not necessarily rule  out the presence of a parasitic infection.  Numerous White blood cells  Few Red blood cells  --  --      .Blood Blood  04-13-24   No Blood Parasites observed by giemsa stain  One negative set of blood smears does not rule out  the possibility of a parasitic infection.  A minimum of 3  specimens should be collected, at least 12-24 hours apart,  over a 36 hour time period.  ************************************************************  NEGATIVE for Plasmodium antigens. Microscopy is performed for  confirmation.  The Malaria Rapid antigen test does not detect the  presence of Babesia species. If Babesiosis is suspected  please order test Babesia PCR: Babesia microti PCR Bld  ************************************************************  --  --      .Bronchial Bronchial Lavage  04-13-24   Normal Respiratory Linh present  --    Rare polymorphonuclear leukocytes seen per low power field  No Squamous epithelial cells seen per low power field  No organisms seen per oil power field      Catheterized Catheterized  04-13-24   No growth  --  --      ET Tube ET Tube  04-13-24   Normal Respiratory Linh present  --    Moderate polymorphonuclear leukocytes seen per low power field  No organisms seen      .Blood Blood-Venous  04-13-24   No growth at 5 days  --  --      .Blood Blood-Peripheral  04-13-24   No growth at 5 days  --  --            Clostridium difficile GDH Toxins A&amp;B, EIA:   Negative (04-19-24 @ 14:02)  Clostridium difficile GDH Interpretation: Negative for toxigenic C. Difficile.  This specimen is negative for C.  Difficile glutamate dehydrogenase (GDH) antigen and negative for C.  Difficile Toxins A & B, by EIA.  GDH is a highly sensitive screening  marker for C. Difficile that is produced in large amounts by all C.  Difficile strains, both toxigenic and nontoxigenic.  This assay has not  been validated as a test of cure.  Repeat testing during the same episode  of diarrhea is of limited value and is discouraged.  The results of this  assay should always be interpreted in conjunction with patient's clinical  history. (04-19-24 @ 14:02)      RADIOLOGY:    <The imaging below has been reviewed and visualized by me independently. Findings as detailed in report below>    < from: Xray Chest 1 View- PORTABLE-Urgent (Xray Chest 1 View- PORTABLE-Urgent .) (04.19.24 @ 15:31) >  IMPRESSION:  Unchanged left retrocardiac opacity with air bronchograms, may represent   atelectasis or pneumonia.  Small left-sided pleural effusion.    < end of copied text >

## 2024-04-22 NOTE — PROGRESS NOTE ADULT - SUBJECTIVE AND OBJECTIVE BOX
Admitted for: Liver failure without hepatic coma        Following for: Type 3c DM    Subjective:   Patient reports she has not been eating much because she is having diarrhea/loose BM from lactulose        MEDICATIONS  (STANDING):  chlorhexidine 4% Liquid 1 Application(s) Topical <User Schedule>  cholecalciferol 2000 Unit(s) Oral daily  dextrose 50% Injectable 25 Gram(s) IV Push once  dextrose 50% Injectable 12.5 Gram(s) IV Push once  dextrose 50% Injectable 25 Gram(s) IV Push once  dextrose Oral Gel 15 Gram(s) Oral once  folic acid 1 milliGRAM(s) Oral daily  glucagon  Injectable 1 milliGRAM(s) IntraMuscular once  heparin   Injectable 5000 Unit(s) SubCutaneous every 12 hours  insulin glargine Injectable (LANTUS) 3 Unit(s) SubCutaneous at bedtime  insulin lispro (ADMELOG) corrective regimen sliding scale   SubCutaneous at bedtime  insulin lispro (ADMELOG) corrective regimen sliding scale   SubCutaneous three times a day before meals  lactulose Syrup 20 Gram(s) Oral every 8 hours  levothyroxine 100 MICROGram(s) Oral daily  magnesium sulfate  IVPB 2 Gram(s) IV Intermittent once  methadone    Tablet 5 milliGRAM(s) Oral three times a day  multivitamin/minerals/iron Oral Solution (CENTRUM) 15 milliLiter(s) Oral daily  pantoprazole  Injectable 40 milliGRAM(s) IV Push daily  rifAXIMin 550 milliGRAM(s) Oral two times a day  thiamine IVPB 250 milliGRAM(s) IV Intermittent every 8 hours  zinc sulfate 220 milliGRAM(s) Oral daily    MEDICATIONS  (PRN):      Allergies    No Known Allergies    Intolerances          PHYSICAL EXAM:  VITALS: T(C): 37.2 (04-22-24 @ 11:00)  T(F): 98.9 (04-22-24 @ 11:00), Max: 99.8 (04-21-24 @ 20:47)  HR: 83 (04-22-24 @ 11:00) (83 - 92)  BP: 129/77 (04-22-24 @ 11:00) (121/74 - 133/80)  RR:  (18 - 18)  SpO2:  (92% - 98%)  Wt(kg): --  GENERAL: NAD  SKIN: jaundice  EYES: No proptosis, no lid lag, anicteric  RESPIRATORY: no respiratory distress  CARDIOVASCULAR: no edema  GI: non distended  EXT: GONZALEZ  PSYCH: Alert and oriented, reactive affect      A1C with Estimated Average Glucose Result: 5.2 % (04-13-24 @ 03:56)      POCT Blood Glucose.: 83 mg/dL (04-22-24 @ 11:45)  POCT Blood Glucose.: 82 mg/dL (04-22-24 @ 07:53)  POCT Blood Glucose.: 134 mg/dL (04-21-24 @ 21:22)  POCT Blood Glucose.: 134 mg/dL (04-21-24 @ 16:34)  POCT Blood Glucose.: 134 mg/dL (04-21-24 @ 12:45)  POCT Blood Glucose.: 58 mg/dL (04-21-24 @ 12:06)  POCT Blood Glucose.: 51 mg/dL (04-21-24 @ 12:05)  POCT Blood Glucose.: 135 mg/dL (04-21-24 @ 08:06)  POCT Blood Glucose.: 119 mg/dL (04-20-24 @ 21:42)  POCT Blood Glucose.: 104 mg/dL (04-20-24 @ 16:51)  POCT Blood Glucose.: 98 mg/dL (04-20-24 @ 11:44)  POCT Blood Glucose.: 96 mg/dL (04-20-24 @ 08:22)  POCT Blood Glucose.: 135 mg/dL (04-19-24 @ 21:34)  POCT Blood Glucose.: 189 mg/dL (04-19-24 @ 16:39)      04-22    143  |  111<H>  |  <4<L>  ----------------------------<  69<L>  4.3   |  20<L>  |  0.42<L>    eGFR: 121    Ca    9.0      04-22  Mg     1.4     04-22  Phos  2.8     04-22    TPro  5.5<L>  /  Alb  2.6<L>  /  TBili  3.3<H>  /  DBili  x   /  AST  65<H>  /  ALT  20  /  AlkPhos  154<H>  04-22      Thyroid Function Tests:  04-15 @ 13:04 TSH 3.67 FreeT4 -- T3 -- Anti TPO -- Anti Thyroglobulin Ab -- TSI --  04-13 @ 03:56 TSH 4.43 FreeT4 0.6 T3 47 Anti TPO -- Anti Thyroglobulin Ab -- TSI --

## 2024-04-23 NOTE — PROVIDER CONTACT NOTE (HYPOGLYCEMIA EVENT) - NS PROVIDER CONTACT NOTE-TREATMENT-HYPO
Glucose gel 1 tube/4 oz Fruit Juice...
Glucose gel 1 tube
Dextrose 50% 25 Grams IV Push/Other (Specify)
Applied

## 2024-04-23 NOTE — DISCHARGE NOTE PROVIDER - NSDCCPCAREPLAN_GEN_ALL_CORE_FT
PRINCIPAL DISCHARGE DIAGNOSIS  Diagnosis: Subacute liver failure  Assessment and Plan of Treatment: You initially were brought in for increased confusion, fatigue, nausea, and vomiting. Upon further investigation you were found to have dignificant damage to your liver which in turn caused toxins to build up in your blood, from lack of filtration by the liver, and ultimately cause you to have these symptoms. The hepatology team did extensive testing to elucidate the cause of your liver disease including tests for rare genetic and autoimmune causes which they will have to follow up with you on the results as these tests take considerable time to result. While in the ICU you received several courses of dialysis to filter out these toxins in your blood. For subsequent symptomatic treatment we provided you with a medication to have the toxic agents that are causing your confusion to be excreted in your bowel movements rather than continue to stay in your blood. We lowered the dose of this medication, Lactulose, to ensure you are not having too many bowel movements. We also provided another medication, Rifaximin, to further decrease levels of the toxins, mainly ammonia.      SECONDARY DISCHARGE DIAGNOSES  Diagnosis: PNA (pneumonia)  Assessment and Plan of Treatment: During your hospital stay you began to have some difficulty breathing which required oxygen support. On new imaging you were found to have a pneumonia which we offered antibiotics for.

## 2024-04-23 NOTE — PROGRESS NOTE ADULT - SUBJECTIVE AND OBJECTIVE BOX
PROGRESS NOTE:   Authored by Dr. Negro Carroll MD ([id number]-1). Pager [facility] [phone] / [facility]     Patient is a 48y old  Female who presents with a chief complaint of Liver Failure (18 Apr 2024 15:50)      SUBJECTIVE / OVERNIGHT EVENTS:  No acute events overnight. AO4; Pain tolerated; BM x1    ADDITIONAL REVIEW OF SYSTEMS:  Patient denies fevers, chills, chest pain, shortness of breath, nausea, abdominal pain, diarrhea, constipation, dysuria, leg swelling, headache, light headedness.    MEDICATIONS  (STANDING):  chlorhexidine 4% Liquid 1 Application(s) Topical <User Schedule>  cholecalciferol 2000 Unit(s) Oral daily  dextrose 50% Injectable 12.5 Gram(s) IV Push once  dextrose 50% Injectable 25 Gram(s) IV Push once  dextrose 50% Injectable 25 Gram(s) IV Push once  dextrose 50% Injectable 50 milliLiter(s) IV Push once  dextrose Oral Gel 15 Gram(s) Oral once  folic acid 1 milliGRAM(s) Oral daily  glucagon  Injectable 1 milliGRAM(s) IntraMuscular once  heparin   Injectable 5000 Unit(s) SubCutaneous every 12 hours  insulin glargine Injectable (LANTUS) 3 Unit(s) SubCutaneous at bedtime  insulin lispro (ADMELOG) corrective regimen sliding scale   SubCutaneous three times a day before meals  insulin lispro (ADMELOG) corrective regimen sliding scale   SubCutaneous at bedtime  lactulose Syrup 20 Gram(s) Oral every 8 hours  methadone    Tablet 5 milliGRAM(s) Oral three times a day  multivitamin/minerals/iron Oral Solution (CENTRUM) 15 milliLiter(s) Oral daily  pantoprazole  Injectable 40 milliGRAM(s) IV Push daily  rifAXIMin 550 milliGRAM(s) Oral two times a day  thiamine IVPB 250 milliGRAM(s) IV Intermittent every 8 hours  zinc sulfate 220 milliGRAM(s) Oral daily    MEDICATIONS  (PRN):      CAPILLARY BLOOD GLUCOSE      POCT Blood Glucose.: 122 mg/dL (23 Apr 2024 11:30)  POCT Blood Glucose.: 110 mg/dL (23 Apr 2024 09:08)  POCT Blood Glucose.: 80 mg/dL (23 Apr 2024 08:36)  POCT Blood Glucose.: 67 mg/dL (23 Apr 2024 08:16)  POCT Blood Glucose.: 52 mg/dL (23 Apr 2024 07:57)  POCT Blood Glucose.: 51 mg/dL (23 Apr 2024 07:55)  POCT Blood Glucose.: 118 mg/dL (22 Apr 2024 21:21)  POCT Blood Glucose.: 113 mg/dL (22 Apr 2024 17:46)  POCT Blood Glucose.: 70 mg/dL (22 Apr 2024 17:24)  POCT Blood Glucose.: 57 mg/dL (22 Apr 2024 16:58)  POCT Blood Glucose.: 68 mg/dL (22 Apr 2024 16:49)  POCT Blood Glucose.: 83 mg/dL (22 Apr 2024 11:45)    I&O's Summary    22 Apr 2024 07:01  -  23 Apr 2024 07:00  --------------------------------------------------------  IN: 175 mL / OUT: 0 mL / NET: 175 mL        PHYSICAL EXAM:  Vital Signs Last 24 Hrs  T(C): 37.2 (23 Apr 2024 11:18), Max: 37.3 (22 Apr 2024 21:12)  T(F): 98.9 (23 Apr 2024 11:18), Max: 99.1 (22 Apr 2024 21:12)  HR: 73 (23 Apr 2024 11:18) (73 - 93)  BP: 140/78 (23 Apr 2024 11:18) (123/73 - 140/78)  BP(mean): --  RR: 18 (23 Apr 2024 11:18) (18 - 19)  SpO2: 97% (23 Apr 2024 11:18) (94% - 98%)    Parameters below as of 23 Apr 2024 11:18  Patient On (Oxygen Delivery Method): room air    Physical Exam: Gen: no acute distress; jaundiced  HEENT: atraumatic, normocephalic, pupils equally round and reactive to light, extraocular muscles intact, scleral icterus  CV: regular rate and rhythm, normal S1/S2, no murmurs, rubs, or gallops  Resp: lungs clear to auscultation bilaterally, no rales, rhonchi, or wheezes  GI: soft, slightly tender, nondistended, BSx4  MSK: extremities atraumatic, no cyanosis or clubbing  Skin: warm, dry, no rashes or lesions  Neuro: no focal deficits, sensation grossly intact  Psych: alert and oriented x3, appropriate mood and affec    LABS:    04-22    143  |  110<H>  |  <4<L>  ----------------------------<  81  4.9   |  23  |  0.42<L>    Ca    9.3      22 Apr 2024 13:38  Phos  2.8     04-22  Mg     1.4     04-22    TPro  6.2  /  Alb  3.1<L>  /  TBili  3.7<H>  /  DBili  x   /  AST  76<H>  /  ALT  24  /  AlkPhos  172<H>  04-22          Urinalysis Basic - ( 22 Apr 2024 13:38 )    Color: x / Appearance: x / SG: x / pH: x  Gluc: 81 mg/dL / Ketone: x  / Bili: x / Urobili: x   Blood: x / Protein: x / Nitrite: x   Leuk Esterase: x / RBC: x / WBC x   Sq Epi: x / Non Sq Epi: x / Bacteria: x          Tele Reviewed:    RADIOLOGY & ADDITIONAL TESTS:  Results Reviewed:   Imaging Personally Reviewed:  Electrocardiogram Personally Reviewed:

## 2024-04-23 NOTE — PROGRESS NOTE ADULT - SUBJECTIVE AND OBJECTIVE BOX
Admitted for: Liver failure without hepatic coma        Following for: Type 3c DM    Subjective:   - Hypoglycemic to 50s this morning      MEDICATIONS  (STANDING):  chlorhexidine 4% Liquid 1 Application(s) Topical <User Schedule>  cholecalciferol 2000 Unit(s) Oral daily  dextrose 50% Injectable 25 Gram(s) IV Push once  dextrose 50% Injectable 12.5 Gram(s) IV Push once  dextrose 50% Injectable 25 Gram(s) IV Push once  dextrose Oral Gel 15 Gram(s) Oral once  folic acid 1 milliGRAM(s) Oral daily  glucagon  Injectable 1 milliGRAM(s) IntraMuscular once  heparin   Injectable 5000 Unit(s) SubCutaneous every 12 hours  insulin glargine Injectable (LANTUS) 2 Unit(s) SubCutaneous at bedtime  insulin lispro (ADMELOG) corrective regimen sliding scale   SubCutaneous at bedtime  insulin lispro (ADMELOG) corrective regimen sliding scale   SubCutaneous three times a day before meals  lactulose Syrup 20 Gram(s) Oral every 8 hours  methadone    Tablet 5 milliGRAM(s) Oral three times a day  multivitamin/minerals/iron Oral Solution (CENTRUM) 15 milliLiter(s) Oral daily  pantoprazole  Injectable 40 milliGRAM(s) IV Push daily  rifAXIMin 550 milliGRAM(s) Oral two times a day  thiamine IVPB 250 milliGRAM(s) IV Intermittent every 8 hours  zinc sulfate 220 milliGRAM(s) Oral daily    MEDICATIONS  (PRN):      Allergies    No Known Allergies    Intolerances          PHYSICAL EXAM:  VITALS: T(C): 37.2 (04-23-24 @ 11:18)  T(F): 98.9 (04-23-24 @ 11:18), Max: 99.1 (04-22-24 @ 21:12)  HR: 73 (04-23-24 @ 11:18) (73 - 93)  BP: 140/78 (04-23-24 @ 11:18) (123/73 - 140/78)  RR:  (18 - 19)  SpO2:  (94% - 98%)  Wt(kg): --  GENERAL: NAD  SKIN: jaundice  EYES: No proptosis, no lid lag, anicteric  RESPIRATORY: no respiratory distress  CARDIOVASCULAR: no edema  GI: non distended  EXT: GONZALEZ  PSYCH: Alert and oriented, reactive affect      A1C with Estimated Average Glucose Result: 5.2 % (04-13-24 @ 03:56)      POCT Blood Glucose.: 204 mg/dL (04-23-24 @ 16:39)  POCT Blood Glucose.: 122 mg/dL (04-23-24 @ 11:30)  POCT Blood Glucose.: 110 mg/dL (04-23-24 @ 09:08)  POCT Blood Glucose.: 80 mg/dL (04-23-24 @ 08:36)  POCT Blood Glucose.: 67 mg/dL (04-23-24 @ 08:16)  POCT Blood Glucose.: 52 mg/dL (04-23-24 @ 07:57)  POCT Blood Glucose.: 51 mg/dL (04-23-24 @ 07:55)  POCT Blood Glucose.: 118 mg/dL (04-22-24 @ 21:21)  POCT Blood Glucose.: 113 mg/dL (04-22-24 @ 17:46)  POCT Blood Glucose.: 70 mg/dL (04-22-24 @ 17:24)  POCT Blood Glucose.: 57 mg/dL (04-22-24 @ 16:58)  POCT Blood Glucose.: 68 mg/dL (04-22-24 @ 16:49)  POCT Blood Glucose.: 83 mg/dL (04-22-24 @ 11:45)  POCT Blood Glucose.: 82 mg/dL (04-22-24 @ 07:53)  POCT Blood Glucose.: 134 mg/dL (04-21-24 @ 21:22)  POCT Blood Glucose.: 134 mg/dL (04-21-24 @ 16:34)  POCT Blood Glucose.: 134 mg/dL (04-21-24 @ 12:45)  POCT Blood Glucose.: 58 mg/dL (04-21-24 @ 12:06)  POCT Blood Glucose.: 51 mg/dL (04-21-24 @ 12:05)  POCT Blood Glucose.: 135 mg/dL (04-21-24 @ 08:06)  POCT Blood Glucose.: 119 mg/dL (04-20-24 @ 21:42)      04-23    140  |  106  |  <4<L>  ----------------------------<  177<H>  4.3   |  25  |  0.41<L>    eGFR: 121    Ca    8.6      04-23  Mg     1.4     04-23  Phos  3.1     04-23    TPro  5.5<L>  /  Alb  2.8<L>  /  TBili  3.5<H>  /  DBili  x   /  AST  54<H>  /  ALT  17  /  AlkPhos  172<H>  04-23      Thyroid Function Tests:  04-23 @ 06:56 TSH -- FreeT4 -- T3 -- Anti TPO <10.0 Anti Thyroglobulin Ab -- TSI --  04-22 @ 11:05 TSH -- FreeT4 1.6 T3 -- Anti TPO -- Anti Thyroglobulin Ab -- TSI --

## 2024-04-23 NOTE — PROGRESS NOTE ADULT - ASSESSMENT
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 Transferred to [facility] for Liver Transplant Evaluation. Patient found to be hypoglycemic shortly after transfer, now resolved.    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 Patient was first diagnosed with Type 3c DM after her initial surgery in 2014 (s/p distal pancreatectomy, cholecystectomy, splenectomy and celina-en-y pancreaticojejuonostomy). Then she later underwent total pancreatectomy with islet cell autotransplant in 2018. After that, her insulin requirements decreased over the years but was never off insulin.    Endocrinologist: Dr. Dalton  Per recent outpatient note 3/26/2024:  Patient uses Omnipod Dash with Dexcom CGM   Basal rate:   MN 0.05U   6 AM 0.1U   9.30 AM 0.15U   Total basal 2.85U/24h   ICR 1:15   ICF 1:75   Reports of frequent hypoglycemia outpatient in March 2024.    C-peptide <0.1 4/15 at 6pm when BG was 168, likely insulin deficient.  C-peptide repeated while off dextrose and tube feeds: 0.1 with , confirming insulin deficiency.    On PO diet pureed    PLAN:  - Inpatient BG goal 100-180  - Decrease Lantus to 2 units qhs  - continue Low dose admelog scale TIDAC, low dose scale qhs  - For discharge, patient prefers to resume insulin pump. Patient was instructed to bring in supplies to resume on discharge and for adjustment of settings given her low insulin requirements.    #Hypothyroidism vs NTIS  TSH 4.43. FT4 0.6, TT3 47 on admission (4/13)  Started on LT4 100mcg daily  Repeat TSH was wnl 3.67 (4/15)  - stopped levothyroxine now  - f/u TPO antibodies  - recheck FT4 and TSH in 1 week 4/29 if remains inpatient, otherwise check outpatient.      Ji Ae Maliha, MD  Endocrine Fellow  Can be reached via teams. For follow up questions, discharge recommendations, or new consults, please call answering service at [phone] (weekdays); [phone] (nights/weekends). 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 Transferred to [facility] for Liver Transplant Evaluation. Patient found to be hypoglycemic shortly after transfer, now resolved.    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 Patient was first diagnosed with Type 3c DM after her initial surgery in 2014 (s/p distal pancreatectomy, cholecystectomy, splenectomy and celina-en-y pancreaticojejuonostomy). Then she later underwent total pancreatectomy with islet cell autotransplant in 2018. After that, her insulin requirements decreased over the years but was never off insulin.    Endocrinologist: Dr. Dalton  Per recent outpatient note 3/26/2024:  Patient uses Omnipod Dash with Dexcom CGM   Basal rate:   MN 0.05U   6 AM 0.1U   9.30 AM 0.15U   Total basal 2.85U/24h   ICR 1:15   ICF 1:75   Reports of frequent hypoglycemia outpatient in March 2024.    C-peptide <0.1 4/15 at 6pm when BG was 168, likely insulin deficient.  C-peptide repeated while off dextrose and tube feeds: 0.1 with , confirming insulin deficiency.    On PO diet pureed    PLAN:  - Inpatient BG goal 100-180  - Decrease Lantus to 2 units qhs  - continue Low dose admelog scale TIDAC, low dose scale qhs  - For discharge, patient prefers to resume insulin pump. Patient was instructed to bring in supplies to resume on discharge and for adjustment of settings given her low insulin requirements.    #Hypothyroidism vs NTIS  TSH 4.43. FT4 0.6, TT3 47 on admission (4/13)  Started on LT4 100mcg daily  Repeat TSH was wnl 3.67 (4/15)  - stopped levothyroxine   - f/u TPO antibodies  - recheck FT4 and TSH in 1 week 4/29 if remains inpatient, otherwise check outpatient.      Ed Jett MD  Endocrine Fellow  Can be reached via teams. For follow up questions, discharge recommendations, or new consults, please call answering service at [phone] (weekdays); [phone] (nights/weekends).

## 2024-04-23 NOTE — PROGRESS NOTE ADULT - ATTENDING COMMENTS
Agree with Dr. Jett's assessment and plan as outlined above. Reviewed all pertinent labs, and imaging studies. Modifications made as indicated above. Following this 48 F for type 3 C DM with undetectable c peptide. Fasting hypoglycemia, decrease lantus to 2 untis QHS. Hold off on standing meal time insulin. Rest of the plan as above.

## 2024-04-23 NOTE — PROVIDER CONTACT NOTE (HYPOGLYCEMIA EVENT) - NS PROVIDER CONTACT RECOMMEND-HYPO
d50 IV push given 
hypoglycemia protocol followed. Patient was given juice and oral gel dextrose.  blood glucose check until reached above 100. Patient encouraged to eat meals. Poor po intake

## 2024-04-23 NOTE — CONSULT NOTE ADULT - SUBJECTIVE AND OBJECTIVE BOX
NUTRITION SUPPORT / TPN CONSULT NOTE    HPI:  42 year old female with hx of DMT1, Factor V leiden deficiency, gout and congenital abnormality of the pancreas associated with recurrent pancreatitis and extensive surgical hx (s/p distal pancreatectomy, cholecystectomy, splenectomy and celina-en-y pancreaticojejuonostomy (02/2014) at [facility] with Dr. Hargrove), and now S/P total pancreatectomy  with islet cell autotransplant at [facility] by Dr. Gil in 04/2018).      Patient's recovery was complicated by abdominal collections first on POD 6 which was managed with IR drain and IV antibiotic. Patient was discharged with PICC line for long term antifungal. Patient was readmitted on 05/8 again for abscess which was drained by IR. Patient was also started on therapeutic Lovenox for right atrial thrombus. Patient again developed abdominal pain with fever of 100.7 on 05/20 associated with thick yellow discharge from the ventral incisional and went to [place] which was nearby for care. CT scan at [place] showed complex multiloculated postsurgical abscess in the omental fat of the superior epigastrium measuring 6.7 cm and enterocutaneous fistulous tract extending anteriorly from the abscess to the midline incision site and another tract going into the soft tissues of the left upper abdominal quadrant communicating with a subcutaneous 3.3cm abscess  Patient was started on IV zosyn, flagyl and micafungin while there. Patient transferred today to [facility] for continuity of care under Dr. Ansari    Patient presented to [facility] ED on 4/8 by her spouse for AMS. Per , patient had been having nbnb vomiting and diarrhea for the last 4 weeks. She was unable to tolerate PO. She was also sleeping 18-20 hrs per day. On 4/8, he found her on the floor "completely out of it" and took her to [facility] for further evalution.   Initial workup in ED demenstrated a Tbili 5.4, direct bili 4.4 and ammonia 196. Patient was somnelent and had a left eyeward gaze deviation. Subquesntly started developing agonal snoring respirations and had to be intubated for airway protection.   Patient was admitted on 4/13, started on CRRT for ammonia clearance, course with leukocytosis, fever, started on antibiotics, off CRRT since 4/16, extubated 4/17.        24 hour/overnight events:  Patient seen and examined at bedside, chart reviewed and events noted and I's and O's reviewed.  Patient denies chest pain, shortness of breath, nausea or vomiting, dizziness, chills at present.  Hypoglycemia noted, patient with poor appetite and limited intake given diarrhea and abdominal pain.    Patient's VSS and no other acute overnight events noted.   Patient denies any allergies to foods, was eating regular diet prior to admission and admits to weight loss but now sure how much.       MEDICATIONS  (STANDING):  chlorhexidine 4% Liquid 1 Application(s) Topical <User Schedule>  cholecalciferol 2000 Unit(s) Oral daily  dextrose 50% Injectable 25 Gram(s) IV Push once  dextrose 50% Injectable 12.5 Gram(s) IV Push once  dextrose 50% Injectable 25 Gram(s) IV Push once  dextrose Oral Gel 15 Gram(s) Oral once  dextrose Oral Gel 15 Gram(s) Oral once  folic acid 1 milliGRAM(s) Oral daily  glucagon  Injectable 1 milliGRAM(s) IntraMuscular once  heparin   Injectable 5000 Unit(s) SubCutaneous every 12 hours  insulin glargine Injectable (LANTUS) 3 Unit(s) SubCutaneous at bedtime  insulin lispro (ADMELOG) corrective regimen sliding scale   SubCutaneous at bedtime  insulin lispro (ADMELOG) corrective regimen sliding scale   SubCutaneous three times a day before meals  lactulose Syrup 20 Gram(s) Oral every 8 hours  methadone    Tablet 5 milliGRAM(s) Oral three times a day  multivitamin/minerals/iron Oral Solution (CENTRUM) 15 milliLiter(s) Oral daily  pantoprazole  Injectable 40 milliGRAM(s) IV Push daily  rifAXIMin 550 milliGRAM(s) Oral two times a day  thiamine IVPB 250 milliGRAM(s) IV Intermittent every 8 hours  zinc sulfate 220 milliGRAM(s) Oral daily    MEDICATIONS  (PRN):      PAST MEDICAL & SURGICAL HISTORY:      FAMILY HISTORY:      ALLERGIES  No Known Allergies      REVIEW OF SYSTEMS  --------------------------------------------------------------------------------    Skin: No rashes  Head/Eyes/Ears/Mouth: No headache; Normal hearing; Normal vision w/o blurriness; No sinus pain/discomfort, sore throat  Respiratory: No dyspnea, cough, wheezing, hemoptysis  CV: No chest pain, PND, orthopnea  GI: + abdominal pain, + diarrhea  : No increased frequency, dysuria, hematuria, nocturia  MSK: No joint pain/swelling; no back pain; no edema  Neuro: No dizziness/lightheadedness, weakness, seizures, numbness, tingling  Heme: No easy bruising or bleeding  Endo: No heat/cold intolerance  Psych: No significant nervousness, anxiety, stress, depression      VITALS  T(C): 37.2 (04-23-24 @ 11:18), Max: 37.3 (04-22-24 @ 21:12)  HR: 73 (04-23-24 @ 11:18) (73 - 93)  BP: 140/78 (04-23-24 @ 11:18) (123/73 - 140/78)  RR: 18 (04-23-24 @ 11:18) (18 - 19)  SpO2: 97% (04-23-24 @ 11:18) (94% - 98%)  I&O's Detail    22 Apr 2024 07:01  -  23 Apr 2024 07:00  --------------------------------------------------------  IN:    Oral Fluid: 175 mL  Total IN: 175 mL    OUT:  Total OUT: 0 mL    Total NET: 175 mL            LABS:    04-22    143  |  110<H>  |  <4<L>  ----------------------------<  81  4.9   |  23  |  0.42<L>    Ca    9.3      22 Apr 2024 13:38  Phos  2.8     04-22  Mg     1.4     04-22    TPro  6.2  /  Alb  3.1<L>  /  TBili  3.7<H>  /  DBili  x   /  AST  76<H>  /  ALT  24  /  AlkPhos  172<H>  04-22    Ionized Calcium Levels:     CAPILLARY BLOOD GLUCOSE      POCT Blood Glucose.: 122 mg/dL (23 Apr 2024 11:30)  CAPILLARY BLOOD GLUCOSE      POCT Blood Glucose.: 122 mg/dL (23 Apr 2024 11:30)  POCT Blood Glucose.: 110 mg/dL (23 Apr 2024 09:08)  POCT Blood Glucose.: 80 mg/dL (23 Apr 2024 08:36)  POCT Blood Glucose.: 67 mg/dL (23 Apr 2024 08:16)  POCT Blood Glucose.: 52 mg/dL (23 Apr 2024 07:57)  POCT Blood Glucose.: 51 mg/dL (23 Apr 2024 07:55)  POCT Blood Glucose.: 118 mg/dL (22 Apr 2024 21:21)  POCT Blood Glucose.: 113 mg/dL (22 Apr 2024 17:46)  POCT Blood Glucose.: 70 mg/dL (22 Apr 2024 17:24)  POCT Blood Glucose.: 57 mg/dL (22 Apr 2024 16:58)  POCT Blood Glucose.: 68 mg/dL (22 Apr 2024 16:49)        Triglycerides, Serum: 82 mg/dL (04-13-24 @ 03:56)      PHYSICAL EXAM  --------------------------------------------------------------------------------    Physical Exam:  Gen: NAD, well-appearing; laying in bed; jaundiced  HEENT: supple neck, no JVD  Chest: non labored breathing, equal chest expansion bilaterally; CTA b/l  CV: RRR, S1S2  Abd: +BS, soft, nontender/nondistended  : No suprapubic tenderness  Ext: Warm, FROM, no edema b/l LE  Neuro: No focal deficits  Psych: Normal affect and mood  Skin: Warm, without rashes

## 2024-04-23 NOTE — PROGRESS NOTE ADULT - PROBLEM SELECTOR PLAN 10
#Diet and ppx  - TF, c/w Pancreatic enzyme supplement while on TF  - rectal tube in place   - c/w PPI 40mg for GI ppx  - Will reach out to TPN team to start evaluation, in order to minimize enteric volume given colitis #Diet and ppx  - c/w PPI 40mg for GI ppx

## 2024-04-23 NOTE — PROGRESS NOTE ADULT - ATTENDING COMMENTS
42F PMHx T1DM, Factor V Leiden c/b provoked DVT and RA thrombus in the past, and pancreatic divisum c/b recurrent pancreatitis s/p total pancreatectomy s/p islet cell autotransplant, also had extensive abdominal surgical history (cholecystectomy, splenectomy, celina-en-y pancreaticojejunostomy) for unclear reason c/b multiple intraabdominal infection/abscess/fistula (including VRE, kavin) who initially presented to [facility] for emesis, diarrhea, and somnolence. Required intubation for airway protection, was found to be in acute liver failure and hepatic encephalopathy w/ ammonia >200. Was transferred to MICU here for CRRT and liver transplant evaluation. Was initially started on Bobby/Dapto/Doxy/Caspofungin for broad spectrum coverage, then deescalated to Vanc/Bobby. CRRT initiated, ammonia levels downtrended, stopped on 4/16, and TFs restarted w/ lactulose. Was eventually extubated 4/17. Acute liver failure and hepatic encephalopathy currently being treated as due to alcholic hepatitis       - C/w Lactulose and Rifaximin. c/w lactulose 20mg q8hr; Monitor stool count. goal 3-4B<s; Mental status appears to be improving.   - F/u hepatology recs. Monitor LFTs   - Orotic acid and plasma amino acid levels sent out and pending  - Factor V Leiden. Hx of DVT. Per chart review Eliquis stopped 5/2023 by outpatient hematology given consistently negative DVT studies  - Hold AC iso thrombocytopenia   - T1DM; - Inpatient BG goal 100-180  - Decrease Lantus to 3 units  appreciate endo recs; multiple episodes of symptomatic hypoglycemia likely from poor po intake- on puree diet- repeat s/s eval to see if we can upgrade her diet; - For discharge, patient prefers to resume insulin pump. Patient was instructed to bring in supplies to resume on discharge.  - Carbapenem resistant Acinetobacter baumanii on ETT. ID following; rec monitoring off abx for now. Monitor fever curve, WBC trend.   - Hypothyroidism vs NTIS; TSH 4.43. FT4 0.6, TT3 47 on admission (4/13)  Repeat TSH was wnl 3.67 (4/15); synthroid stopped  recheck FT4 and TSH in 1 week 4/29 if remains inpatient, otherwise check outpatient.; TPO ab pending   Right sided colitis  w/ right sided thickening seen on CT imaging.   - Patient has no abd pain but has diarrhea in the setting of lactulose use. C diff neg. GI PCR neg on 4/15  - Differentials include possible viral infection , malignancy, ischemia, etc. Improving at this time based on two abdominal imaging.   + sputum for CRE acinetobacter baumanii on ETT- CT chest with improving prior infiltrates on RA now; leukocytosis is improving, will obtain CBC today a  - opt hepatology, endo f/u     pending AR

## 2024-04-23 NOTE — PROGRESS NOTE ADULT - PROBLEM SELECTOR PLAN 1
#Alcoholic hepatitis/cirrhosis?  >Elevated bilirubin/coag, c/b hepatic encephalopathy; >PETH (4/13): > 400  >CTAP (4/13): Hepatomegaly and hepatic steatosis  >US abd (4/13): Hepatic steatosis, no hepatic vein thrombosis  >Acute hepatitis panel (4/13): Negative; Autoimmune hepatitis panel (4/13): Negative  >MRCP (4/15): Enlarged, fatty liver. No biliary duct dilation, unlikely obstruction  - c/w Folid acid, MVI; c/w thiamine 500mg Q8h x 3 days (4/18 - 4/20) given patient confabulating c/f wernicke vs. ICU delirium     #Hyperammonemia   >Baseline mentation AAO3  >s/p CRRT (off since 4/16)  - f/u plasma amino acid (4/16): in lab  - f/u urine orotic acid (4/16) - sent out to [facility]  - f/u metabolic genetic consult to r/o acquired urea cycle disorder  - c/w Rifaximin; Lactulose  - c/w Trend Ammonia qd

## 2024-04-23 NOTE — DISCHARGE NOTE PROVIDER - NSDCMRMEDTOKEN_GEN_ALL_CORE_FT
allopurinol 100 mg oral tablet: 2 tab(s) orally once a day  ALPRAZolam 0.25 mg oral tablet: 1 tab(s) orally 3 times a day as needed for  insomnia  colchicine 0.6 mg oral capsule: 1 cap(s) orally once a day  Creon 3000 units oral delayed release capsule: 1 cap(s) orally 3 times a day  Dilaudid 4 mg oral tablet: 1 tab(s) orally every 4 hours as needed for  severe pain  ergocalciferol 1.25 mg (50,000 intl units) oral tablet: 1 tab(s) orally once a day  furosemide 20 mg oral tablet: 1 tab(s) orally 2 times a day as needed for  gout pain  methadone 5 mg oral tablet: 1 tab(s) orally 3 times a day  NovoLOG 100 units/mL subcutaneous solution: subcutaneous Via pump, 30-40 units a day  omeprazole 40 mg oral delayed release capsule: 1 cap(s) orally 2 times a day

## 2024-04-23 NOTE — PROVIDER CONTACT NOTE (HYPOGLYCEMIA EVENT) - NS PROVIDER CONTACT ASSESS-HYPO
STRETCHING EXERCISES            STRENGTHENING EXERCISES                  If you have any difficulties reading this information, you may visit the online version using the following link: Knee Conditioning Program ([email or web address]/[email or web address]/[email or web address])    
Patient alert and oriented x3. NO signs of symptoms of hypoglycemia.

## 2024-04-23 NOTE — DISCHARGE NOTE PROVIDER - HOSPITAL COURSE
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 Hargrove), and now S/P total pancreatectomy  with islet cell autotransplant at [facility] by Dr. Gil in 04/2018).    Patient's recovery was complicated by abdominal collections first on POD 6 which was managed with IR drain and IV antibiotic. Patient was discharged with PICC line for long term antifungal. Patient was readmitted on 05/8 again for abscess which was drained by IR. Patient was also started on therapeutic Lovenox for right atrial thrombus. Patient again developed abdominal pain with fever of 100.7 on 05/20 associated with thick yellow discharge from the ventral incisional and went to [place] which was nearby for care. CT scan at [place] showed complex multiloculated postsurgical abscess in the omental fat of the superior epigastrium measuring 6.7 cm and enterocutaneous fistulous tract extending anteriorly from the abscess to the midline incision site and another tract going into the soft tissues of the left upper abdominal quadrant communicating with a subcutaneous 3.3cm abscess  Patient was started on IV zosyn, flagyl and micafungin while there. Patient transferred today to [facility] for continuity of care under Dr. Ansari    Patient presented to [facility] ED on 4/8 by her spouse for AMS. Per , patient had been having nbnb vomiting and diarrhea for the last 4 weeks. She was unable to tolerate PO. She was also sleeping 18-20 hrs per day. On 4/8, he found her on the floor "completely out of it" and took her to [facility] for further evalution.   Initial workup in ED demenstrated a Tbili 5.4, direct bili 4.4 and ammonia 196. Patient was somnelent and had a left eyeward gaze deviation. Subquesntly started developing agonal snoring respirations and had to be intubated for airway protection.     Hospital Course:  4/12: presented to MICU, AMS, agonal respiration, intubated for airway protection 4/8. on linezolid, meropenem. no tylenol use/substance use. acute liver failure, hepatic encephalopathy. on precedex 0.3, try weaning down precedex to monitor. has lactulose tid. trend ammonia, CT head, hold off eeg for now. hold methadone/dilaudid. resp TV 66/kg. got NAC on admission at [facility]. liver US check for hepatic clot, ct abdomen. type 1DM. start Tube feeds. 0.5cc/kg urine output-- diminished urine output, can check urine lytes. ordered CT abd/pelvis. on POCUs- large liver, consolidation on lung base?/B-lines cover for PNA   ON: DL brock placed -- too deep, withdrawn by 4cm.  4/14: h/o c/s, inc CRRT fluid removal, convert meds to IV; endocrine unsure oflantus? last endonote from 3/26 c/f stopping insulin pump i/s/o hypoglycemia 2/2 physical activity   4/15: touching base w/ ID regarding abx --> de-escalte caspo/dapto, touching base w/ GI regarding colonic massl, surgery recc gastrograffin study. endo c/s for ?type 1 DM  ON: CTH  Unremarkable noncontrast CT of the brain. No change since   4/16-, CTA RO PE, LE dopplers, Amoonia level q 12 hrs, call medical geneticist. TTE grossly wnl. CTA neg for PE, LE doppler neg for DVT  4/17: D10 d/c'd overnight, repleting lytes, SBT and plan for extubation to BIPAP today, de-escalate lactulose dose, d/c EEG, give Lasix 40 x1 prior to extubation, obtain sputum cx. d/c fent and start methadone. Add vanco, check repeat ferritin, repeat BCx tomorrow afternoon (4:30PM) after pulling lines today  O/N: AMS? ABG drawn placed back on bipap - ABG Pc02 within range bipap discontinued 2000 NG tube placed CxR confirmed restarted lactulose/rifaximin Ammonia stable 61   4/18: d/c juany, plan for reculture today, SLP eval --> failed, pending FEES tomorrow, started NPH 3u q6h per endo, ammonia increasing --> inc lactulose to q6h bed-boarded to medicine  4/19: acinetobacter+ in sputum, c/w alvaro and vanco, f/u repeat BCx, if neg can d/c vanco, TPN consult, checking repeat ammonia in 6h and consider sodium benzoate, decrease lactulose to q8h, vancomycin 1000mg q12h, otherwise bedboarded  ON: BCx 4/18 NG @24hr, trend Ammonia q6-->q8; ammonia 79 --> 83 --> 90, mental status stable, c. 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 Hargrove), and now S/P total pancreatectomy  with islet cell autotransplant at [facility] by Dr. Gil in 04/2018).    Patient's recovery was complicated by abdominal collections first on POD 6 which was managed with IR drain and IV antibiotic. Patient was discharged with PICC line for long term antifungal. Patient was readmitted on 05/8 again for abscess which was drained by IR. Patient was also started on therapeutic Lovenox for right atrial thrombus. Patient again developed abdominal pain with fever of 100.7 on 05/20 associated with thick yellow discharge from the ventral incisional and went to [place] which was nearby for care. CT scan at [place] showed complex multiloculated postsurgical abscess in the omental fat of the superior epigastrium measuring 6.7 cm and enterocutaneous fistulous tract extending anteriorly from the abscess to the midline incision site and another tract going into the soft tissues of the left upper abdominal quadrant communicating with a subcutaneous 3.3cm abscess  Patient was started on IV zosyn, flagyl and micafungin while there. Patient transferred today to [facility] for continuity of care under Dr. Ansari    Patient presented to [facility] ED on 4/8 by her spouse for AMS. Per , patient had been having nbnb vomiting and diarrhea for the last 4 weeks. She was unable to tolerate PO. She was also sleeping 18-20 hrs per day. On 4/8, he found her on the floor "completely out of it" and took her to [facility] for further evalution.   Initial workup in ED demenstrated a Tbili 5.4, direct bili 4.4 and ammonia 196. Patient was somnelent and had a left eyeward gaze deviation. Subquesntly started developing agonal snoring respirations and had to be intubated for airway protection.     MICU Course:  Upon arrival to [facility] MICU, patient was intubated and sedated on Precedex and hemodynamically stable off pressor. Patient started CRRT for ammonia clearance on arrival and was kept NPO given concern for colitis. Hepatology, ID, Nephrology, Hematology, Endocrinology, Surgery were consulted to co-manage patient. MICU course complicated by persistent and up-trending leukocytosis and intermittent low grade fever likely due to pneumonia vs. colitis. Given her extensive past infection history, she was started on Meropenem, Daptomycin, Doxycycline, Caspofungin for coverage. Her antibiotic regimen was gradually narrowed to Vancomycin and Meropenem as microbiology data came back. Her mental status steadily improves w/ down-trending ammonia while on the CRRT. After discussion with surgery team, she was restarted on TF and lactulose has been off CRRT since 4/16. She remains hemodynamically stable and was extubated on 4/17. Extensive radiologic and laboratory work up were sent during her MICU stay but mostly unrevealing beside PETH > 400. Ultimately her liver failure was deemed most likely from alcoholic hepatitis/cirrhosis. On 4/18, patient was AAOx2-3 and confabulating, her thiamine dosage was increased to 500mg q8h. Per hepatology recommendations, patient to continue with 500 q8 and then taper to 250 q8.    Hospital Course:  hep rec outpatient follow up for results on genetic and autoimmune w/u, lispro d/c'd, off antibiotics, PT rec acute rehab. Endo titrated insulin to manage erratic sugars. Course   inc O2 requirements and new consolidation on CXR c/w new PNA which she rec'd abx for    Important Medication Changes and Reason:    Active or Pending Issues Requiring Follow-up:    Advanced Directives:   [ ] Full code  [ ] DNR  [ ] Hospice    Discharge Diagnoses:  Liver Failure  PNA

## 2024-04-23 NOTE — CONSULT NOTE ADULT - ASSESSMENT
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 Hargrove), and now S/P total pancreatectomy  with islet cell autotransplant at [facility] by Dr. Gil in 04/2018).  Patient admitted with AMS, found to be in liver failure/hepatic encephalopathy and imaging shows colitis.  TPN consulted given severe protein calorie malnutrition and allowing possibility of bowel rest.  Patient with history of TPN use 2 years ago.      - Nutritional Assessment, patient with severe protein calorie malnutrition not fully meeting nutritional goals enterally due to colitis/decreased enteral intake and may benefit from TPN for nutritional support.  - TPN plan:   Patient declines TPN at present; will continue to follow  - TPN access:  Patient will need a dedicated central line if/once TPN planned to start   - Recommend checking baseline nutrition parameters:  TSH, Prealbumin, Lipids, HgA1c, iCal, Mag, Phos  - Electrolyte Imbalance risk:  check CMP, Mg, Phos and ionized Ca daily, to be supplemented peripherally per primary team.  - Recommend strict intake and output/weight checks three times a week  - Risk of glucose dysfunction with TPN:  HgA1c 5.2% ; would need to initiate fingersticks every 6 hours to monitor glucose trend once TPN starts  - Risk for Hypertriglyceridemia with TPN:  TG 82 mg/dL from 4/13; plan to monitor TG closely once/if TPN starts  - Global care per primary team    Available on TEAMS  TPN spectra [id number] ([phone] when dialing from outside line)  M-F 8A-2P, Weekends and holidays 8/9A-12/1P  Discussed with Dr. Rg Garces

## 2024-04-23 NOTE — PROGRESS NOTE ADULT - ASSESSMENT
49yo pmh of HTN, DM, Factor V Leiden c/b L peroneal DVT (2022) and RA thrombus (2018) currently not on AC, gout, congenital pancreas divisum, recurrent pancreatitis s/p cholecystectomy, Splenectomy, Kevin-En-Y Pancreaticojejunostomy, total pancreatectomy s/p Islet Cell Autotransplant at [facility] 2018, complicated by multiple intraabdominal infection (VRE, fungal? per [facility]), fistula, abscesses. Presented to [facility] 4/8 for several weeks of decreased PO intake, NBNB emesis, watery diarrhea, Somnolence, requiring intubation for airway protection. Lab revealed ammonia > 200, elevated bilirubin and INR initially c/f liver failure, c/c/b colitis possibly ischemic etiology. Patient transferred to [facility] MICU 4/13 for CRRT initiation for ammonia clearance.

## 2024-04-24 NOTE — SWALLOW VFSS/MBS ASSESSMENT ADULT - H & P REVIEW
CARA ZALDIVAR is a 43yo pmh of HTN, DM, Factor V Leiden c/b L peroneal DVT (2022) and RA thrombus (2018) currently not on AC, gout, congenital pancreas divisum, recurrent pancreatitis s/p cholecystectomy, Splenectomy, Kevin-En-Y Pancreaticojejunostomy, total pancreatectomy s/p Islet Cell Autotransplant at [facility] 2018, complicated by multiple intraabdominal infection (VRE, fungal? per [facility]), fistula, abscesses. Presented to [facility] 4/8 for several weeks of decreased PO intake, NBNB emesis, watery diarrhea, Somnolence, requiring intubation for airway protection. Lab revealed ammonia > 200, elevated bilirubin and INR initially c/f liver failure, c/c/b colitis possibly ischemic etiology. Patient transferred to [facility] MICU 4/13 for CRRT initiation for ammonia clearance./yes

## 2024-04-24 NOTE — PROGRESS NOTE ADULT - ASSESSMENT
49yo pmh of HTN, DM, Factor V Leiden c/b L peroneal DVT (2022) and RA thrombus (2018) currently not on AC, gout, congenital pancreas divisum, recurrent pancreatitis s/p cholecystectomy, Splenectomy, Kevin-En-Y Pancreaticojejunostomy, total pancreatectomy s/p Islet Cell Autotransplant at [facility] 2018, complicated by multiple intraabdominal infection (VRE, fungal? per [facility]), fistula, abscesses. Presented to [facility] 4/8 for several weeks of decreased PO intake, NBNB emesis, watery diarrhea, Somnolence, requiring intubation for airway protection. Lab revealed ammonia > 200, elevated bilirubin and INR initially c/f liver failure, c/c/b colitis possibly ischemic etiology. Patient transferred to [facility] MICU 4/13 for CRRT initiation for ammonia clearance.

## 2024-04-24 NOTE — SWALLOW VFSS/MBS ASSESSMENT ADULT - COMMENTS
Continued history:   GI: Concern for ischemic colitis; following bowel regimen; no suspected GIB. NGT placed 4/18    Speech & Swallow : pt known to this service from current admission with clinical swallow evaluation and FEEs- prior recommendations for puree and mildly thick liquids- please see reports for more detailed information. OBJECTIVE TESTING planned for this date 4/24 to determine candidacy to advance diet; noted pt with poor po intake per chart review. Patient endorsed reduced appetite 2/2 "all the stuff that happened with my abdomen" indicating that she prefers small more frequent meals/snacks. Continued history:   GI: Concern for ischemic colitis; following bowel regimen; no suspected GIB. NGT placed 4/18 4/23: For discharge, patient prefers to resume insulin pump.  Speech & Swallow : pt known to this service from current admission with clinical swallow evaluation and FEEs- prior recommendations for puree and mildly thick liquids- please see reports for more detailed information. OBJECTIVE TESTING planned for this date 4/24 to determine candidacy to advance diet; noted pt with poor po intake per chart review. Patient endorsed reduced appetite 2/2 "all the stuff that happened with my abdomen" indicating that she prefers small more frequent meals/snacks.

## 2024-04-24 NOTE — PROGRESS NOTE ADULT - SUBJECTIVE AND OBJECTIVE BOX
PROGRESS NOTE:   Authored by Dr. Negro Carroll MD (PGY-1). Pager [facility] [phone] / [facility]     Patient is a 48y old  Female who presents with a chief complaint of AMS (23 Apr 2024 12:20)      SUBJECTIVE / OVERNIGHT EVENTS:  No acute events overnight.     ADDITIONAL REVIEW OF SYSTEMS:  Patient denies fevers, chills, chest pain, shortness of breath, nausea, abdominal pain, diarrhea, constipation, dysuria, leg swelling, headache, light headedness.    MEDICATIONS  (STANDING):  chlorhexidine 4% Liquid 1 Application(s) Topical <User Schedule>  cholecalciferol 2000 Unit(s) Oral daily  dextrose 50% Injectable 12.5 Gram(s) IV Push once  dextrose 50% Injectable 25 Gram(s) IV Push once  dextrose 50% Injectable 25 Gram(s) IV Push once  dextrose Oral Gel 15 Gram(s) Oral once  folic acid 1 milliGRAM(s) Oral daily  glucagon  Injectable 1 milliGRAM(s) IntraMuscular once  heparin   Injectable 5000 Unit(s) SubCutaneous every 12 hours  insulin glargine Injectable (LANTUS) 2 Unit(s) SubCutaneous at bedtime  insulin lispro (ADMELOG) corrective regimen sliding scale   SubCutaneous at bedtime  insulin lispro (ADMELOG) corrective regimen sliding scale   SubCutaneous three times a day before meals  lactulose Syrup 20 Gram(s) Oral every 8 hours  methadone    Tablet 5 milliGRAM(s) Oral three times a day  multivitamin/minerals/iron Oral Solution (CENTRUM) 15 milliLiter(s) Oral daily  pantoprazole  Injectable 40 milliGRAM(s) IV Push daily  rifAXIMin 550 milliGRAM(s) Oral two times a day  zinc sulfate 220 milliGRAM(s) Oral daily    MEDICATIONS  (PRN):      CAPILLARY BLOOD GLUCOSE      POCT Blood Glucose.: 239 mg/dL (23 Apr 2024 21:39)  POCT Blood Glucose.: 204 mg/dL (23 Apr 2024 16:39)  POCT Blood Glucose.: 122 mg/dL (23 Apr 2024 11:30)  POCT Blood Glucose.: 110 mg/dL (23 Apr 2024 09:08)  POCT Blood Glucose.: 80 mg/dL (23 Apr 2024 08:36)  POCT Blood Glucose.: 67 mg/dL (23 Apr 2024 08:16)  POCT Blood Glucose.: 52 mg/dL (23 Apr 2024 07:57)  POCT Blood Glucose.: 51 mg/dL (23 Apr 2024 07:55)    I&O's Summary      PHYSICAL EXAM:  Vital Signs Last 24 Hrs  T(C): 37.2 (24 Apr 2024 05:11), Max: 37.2 (23 Apr 2024 11:18)  T(F): 99 (24 Apr 2024 05:11), Max: 99 (24 Apr 2024 05:11)  HR: 96 (24 Apr 2024 05:11) (73 - 96)  BP: 149/83 (24 Apr 2024 05:11) (140/78 - 149/83)  BP(mean): --  RR: 18 (24 Apr 2024 05:11) (18 - 18)  SpO2: 96% (24 Apr 2024 05:11) (96% - 97%)    Parameters below as of 24 Apr 2024 05:11  Patient On (Oxygen Delivery Method): room air        CONSTITUTIONAL: NAD, well-developed  RESPIRATORY: Normal respiratory effort; lungs are clear to auscultation bilaterally  CARDIOVASCULAR: Regular rate and rhythm, normal S1 and S2, no murmur/rub/gallop; No lower extremity edema; Peripheral pulses are 2+ bilaterally  ABDOMEN: Nontender to palpation, normoactive bowel sounds, no rebound/guarding; No hepatosplenomegaly  MUSCLOSKELETAL: no clubbing or cyanosis of digits; no joint swelling or tenderness to palpation  PSYCH: A+O to person, place, and time; affect appropriate    LABS:                        8.8    6.79  )-----------( 227      ( 23 Apr 2024 14:44 )             27.3     04-23    140  |  106  |  <4<L>  ----------------------------<  177<H>  4.3   |  25  |  0.41<L>    Ca    8.6      23 Apr 2024 14:44  Phos  3.1     04-23  Mg     1.4     04-23    TPro  5.5<L>  /  Alb  2.8<L>  /  TBili  3.5<H>  /  DBili  x   /  AST  54<H>  /  ALT  17  /  AlkPhos  172<H>  04-23          Urinalysis Basic - ( 23 Apr 2024 14:44 )    Color: x / Appearance: x / SG: x / pH: x  Gluc: 177 mg/dL / Ketone: x  / Bili: x / Urobili: x   Blood: x / Protein: x / Nitrite: x   Leuk Esterase: x / RBC: x / WBC x   Sq Epi: x / Non Sq Epi: x / Bacteria: x          Tele Reviewed:    RADIOLOGY & ADDITIONAL TESTS:  Results Reviewed:   Imaging Personally Reviewed:  Electrocardiogram Personally Reviewed:     PROGRESS NOTE:   Authored by Dr. Negro Carroll MD (PGY-1). Pager [facility] [phone] / [facility]     Patient is a 48y old  Female who presents with a chief complaint of AMS (23 Apr 2024 12:20)      SUBJECTIVE / OVERNIGHT EVENTS:  No acute events overnight. Pt fatigued in bed    ADDITIONAL REVIEW OF SYSTEMS:  Patient denies fevers, chills, chest pain, shortness of breath, nausea, abdominal pain, diarrhea, constipation, dysuria, leg swelling, headache, light headedness.    MEDICATIONS  (STANDING):  chlorhexidine 4% Liquid 1 Application(s) Topical <User Schedule>  cholecalciferol 2000 Unit(s) Oral daily  dextrose 50% Injectable 12.5 Gram(s) IV Push once  dextrose 50% Injectable 25 Gram(s) IV Push once  dextrose 50% Injectable 25 Gram(s) IV Push once  dextrose Oral Gel 15 Gram(s) Oral once  folic acid 1 milliGRAM(s) Oral daily  glucagon  Injectable 1 milliGRAM(s) IntraMuscular once  heparin   Injectable 5000 Unit(s) SubCutaneous every 12 hours  insulin glargine Injectable (LANTUS) 2 Unit(s) SubCutaneous at bedtime  insulin lispro (ADMELOG) corrective regimen sliding scale   SubCutaneous at bedtime  insulin lispro (ADMELOG) corrective regimen sliding scale   SubCutaneous three times a day before meals  lactulose Syrup 20 Gram(s) Oral every 8 hours  methadone    Tablet 5 milliGRAM(s) Oral three times a day  multivitamin/minerals/iron Oral Solution (CENTRUM) 15 milliLiter(s) Oral daily  pantoprazole  Injectable 40 milliGRAM(s) IV Push daily  rifAXIMin 550 milliGRAM(s) Oral two times a day  zinc sulfate 220 milliGRAM(s) Oral daily    MEDICATIONS  (PRN):      CAPILLARY BLOOD GLUCOSE      POCT Blood Glucose.: 239 mg/dL (23 Apr 2024 21:39)  POCT Blood Glucose.: 204 mg/dL (23 Apr 2024 16:39)  POCT Blood Glucose.: 122 mg/dL (23 Apr 2024 11:30)  POCT Blood Glucose.: 110 mg/dL (23 Apr 2024 09:08)  POCT Blood Glucose.: 80 mg/dL (23 Apr 2024 08:36)  POCT Blood Glucose.: 67 mg/dL (23 Apr 2024 08:16)  POCT Blood Glucose.: 52 mg/dL (23 Apr 2024 07:57)  POCT Blood Glucose.: 51 mg/dL (23 Apr 2024 07:55)    I&O's Summary      PHYSICAL EXAM:  Vital Signs Last 24 Hrs  T(C): 37.2 (24 Apr 2024 05:11), Max: 37.2 (23 Apr 2024 11:18)  T(F): 99 (24 Apr 2024 05:11), Max: 99 (24 Apr 2024 05:11)  HR: 96 (24 Apr 2024 05:11) (73 - 96)  BP: 149/83 (24 Apr 2024 05:11) (140/78 - 149/83)  BP(mean): --  RR: 18 (24 Apr 2024 05:11) (18 - 18)  SpO2: 96% (24 Apr 2024 05:11) (96% - 97%)    Parameters below as of 24 Apr 2024 05:11  Patient On (Oxygen Delivery Method): room air        CONSTITUTIONAL: NAD, well-developed  RESPIRATORY: Normal respiratory effort; lungs are clear to auscultation bilaterally  CARDIOVASCULAR: Regular rate and rhythm, normal S1 and S2, no murmur/rub/gallop; No lower extremity edema; Peripheral pulses are 2+ bilaterally  ABDOMEN: Nontender to palpation, normoactive bowel sounds, no rebound/guarding; No hepatosplenomegaly  MUSCLOSKELETAL: no clubbing or cyanosis of digits; no joint swelling or tenderness to palpation  PSYCH: A+O to person, place, and time; affect appropriate    LABS:                        8.8    6.79  )-----------( 227      ( 23 Apr 2024 14:44 )             27.3     04-23    140  |  106  |  <4<L>  ----------------------------<  177<H>  4.3   |  25  |  0.41<L>    Ca    8.6      23 Apr 2024 14:44  Phos  3.1     04-23  Mg     1.4     04-23    TPro  5.5<L>  /  Alb  2.8<L>  /  TBili  3.5<H>  /  DBili  x   /  AST  54<H>  /  ALT  17  /  AlkPhos  172<H>  04-23          Urinalysis Basic - ( 23 Apr 2024 14:44 )    Color: x / Appearance: x / SG: x / pH: x  Gluc: 177 mg/dL / Ketone: x  / Bili: x / Urobili: x   Blood: x / Protein: x / Nitrite: x   Leuk Esterase: x / RBC: x / WBC x   Sq Epi: x / Non Sq Epi: x / Bacteria: x          Tele Reviewed:    RADIOLOGY & ADDITIONAL TESTS:  Results Reviewed:   Imaging Personally Reviewed:  Electrocardiogram Personally Reviewed:

## 2024-04-24 NOTE — PROGRESS NOTE ADULT - ATTENDING COMMENTS
42F PMHx T1DM, Factor V Leiden c/b provoked DVT and RA thrombus in the past, and pancreatic divisum c/b recurrent pancreatitis s/p total pancreatectomy s/p islet cell autotransplant, also had extensive abdominal surgical history (cholecystectomy, splenectomy, celina-en-y pancreaticojejunostomy) for unclear reason c/b multiple intraabdominal infection/abscess/fistula (including VRE, kavin) who initially presented to [facility] for emesis, diarrhea, and somnolence. Required intubation for airway protection, was found to be in acute liver failure and hepatic encephalopathy w/ ammonia >200. Was transferred to MICU here for CRRT and liver transplant evaluation. Was initially started on Bobby/Dapto/Doxy/Caspofungin for broad spectrum coverage, then deescalated to Vanc/Bobby. CRRT initiated, ammonia levels downtrended, stopped on 4/16, and TFs restarted w/ lactulose. Was eventually extubated 4/17. Acute liver failure and hepatic encephalopathy currently being treated as due to alcholic hepatitis       - C/w Lactulose and Rifaximin. c/w lactulose 20mg q8hr; Monitor stool count. goal 3-4B<s; Mental status appears to be improving.   - Orotic acid and plasma amino acid levels sent out and pending  - Factor V Leiden. Hx of DVT. Per chart review Eliquis stopped 5/2023 by outpatient hematology given consistently negative DVT studies  - Hold AC iso thrombocytopenia   - T1DM; - Inpatient BG goal 100-180  - Decrease Lantus to 2 units  appreciate endo recs; multiple episodes of symptomatic hypoglycemia likely from poor po intake now improving  - diet changed to soft and bite sized, meds with puree   - Carbapenem resistant Acinetobacter baumanii on ETT. ID following; rec monitoring off abx for now. Monitor fever curve, WBC trend.   - Hypothyroidism vs NTIS; TSH 4.43. FT4 0.6, TT3 47 on admission (4/13)  Repeat TSH was wnl 3.67 (4/15); synthroid stopped  recheck FT4 and TSH in 1 week 4/29 if remains inpatient, otherwise check outpatient.; TPO ab pending   Right sided colitis  w/ right sided thickening seen on CT imaging.   - Patient has no abd pain but has diarrhea in the setting of lactulose use. C diff neg. GI PCR neg on 4/15  - Differentials include possible viral infection , malignancy, ischemia, etc. Improving at this time based on two abdominal imaging.   + sputum for CRE acinetobacter baumanii on ETT- CT chest with improving prior infiltrates on RA now; leukocytosis is improving, will obtain CBC today a  - opt hepatology, endo f/u     pending AR  med ready for dc

## 2024-04-24 NOTE — PROGRESS NOTE ADULT - SUBJECTIVE AND OBJECTIVE BOX
[facility] NUTRITION SUPPORT-- FOLLOW UP NOTE      24 hour events/subjective:  Patient seen and examined at bedside, chart reviewed and events noted and I's and O's reviewed.  Patient denies chest pain, shortness of breath, nausea or vomiting, dizziness, chills at present and continues to have multiple BMs.  Patient states she is eating better and requesting to hold off on TPN initiation. Patient's VSS and no other acute overnight events noted.         ROS:  Except as noted above, all other systems reviewed and are negative     Diet:      PAST HISTORY  --------------------------------------------------------------------------------  PAST MEDICAL & SURGICAL HISTORY:    No significant changes to PMH, PSH, FHx, SHx, unless otherwise noted    ALLERGIES & MEDICATIONS  --------------------------------------------------------------------------------  Allergies    No Known Allergies    Intolerances      Standing Inpatient Medications  chlorhexidine 4% Liquid 1 Application(s) Topical <User Schedule>  cholecalciferol 2000 Unit(s) Oral daily  dextrose 50% Injectable 25 Gram(s) IV Push once  dextrose 50% Injectable 12.5 Gram(s) IV Push once  dextrose 50% Injectable 25 Gram(s) IV Push once  dextrose Oral Gel 15 Gram(s) Oral once  folic acid 1 milliGRAM(s) Oral daily  glucagon  Injectable 1 milliGRAM(s) IntraMuscular once  heparin   Injectable 5000 Unit(s) SubCutaneous every 12 hours  insulin glargine Injectable (LANTUS) 2 Unit(s) SubCutaneous at bedtime  insulin lispro (ADMELOG) corrective regimen sliding scale   SubCutaneous three times a day before meals  insulin lispro (ADMELOG) corrective regimen sliding scale   SubCutaneous at bedtime  lactulose Syrup 20 Gram(s) Oral every 8 hours  magnesium sulfate  IVPB 2 Gram(s) IV Intermittent once  methadone    Tablet 5 milliGRAM(s) Oral three times a day  multivitamin/minerals/iron Oral Solution (CENTRUM) 15 milliLiter(s) Oral daily  pantoprazole  Injectable 40 milliGRAM(s) IV Push daily  rifAXIMin 550 milliGRAM(s) Oral two times a day  zinc sulfate 220 milliGRAM(s) Oral daily    PRN Inpatient Medications        VITALS/PHYSICAL EXAM  --------------------------------------------------------------------------------  T(C): 37.8 (04-24-24 @ 11:58), Max: 37.8 (04-24-24 @ 11:58)  HR: 110 (04-24-24 @ 11:58) (96 - 110)  BP: 120/73 (04-24-24 @ 11:58) (120/73 - 149/83)  RR: 18 (04-24-24 @ 11:58) (18 - 18)  SpO2: 97% (04-24-24 @ 11:58) (96% - 97%)  Wt(kg): --      I&O's Detail      Physical Exam:  Gen: NAD, laying in bed; jaundiced  HEENT: supple neck, no JVD  Chest: non labored breathing, equal chest expansion bilaterally  Abd: +BS, soft, nontender/nondistended  Ext: Warm, FROM, no edema b/l LE  Neuro: No focal deficits  Psych: Normal affect and mood  Skin: Warm, without rashes       LABS/STUDIES  --------------------------------------------------------------------------------              8.1    6.57  >-----------<  320      [04-24-24 @ 11:23]              26.1     140  |  105  |  <4  ----------------------------<  224      [04-24-24 @ 11:23]  4.3   |  25  |  0.41        Ca     8.7     [04-24-24 @ 11:23]      Mg     1.4     [04-24-24 @ 11:23]      Phos  3.0     [04-24-24 @ 11:23]    TPro  5.5  /  Alb  2.6  /  TBili  3.4  /  DBili  x   /  AST  41  /  ALT  17  /  AlkPhos  166  [04-24-24 @ 11:23]          Ca ionized  Creatinine Trend:  POC glucoseGlucose: 224 mg/dL (04-24-24 @ 11:23)  Glucose: 177 mg/dL (04-23-24 @ 14:44)  CAPILLARY BLOOD GLUCOSE      POCT Blood Glucose.: 220 mg/dL (24 Apr 2024 11:41)  POCT Blood Glucose.: 179 mg/dL (24 Apr 2024 08:10)  POCT Blood Glucose.: 239 mg/dL (23 Apr 2024 21:39)  POCT Blood Glucose.: 204 mg/dL (23 Apr 2024 16:39)    Prealbumin  Triglycerides

## 2024-04-24 NOTE — SWALLOW VFSS/MBS ASSESSMENT ADULT - PHARYNGEAL PHASE COMMENTS
Pharyngeal phase marked by consistent latent swallow response w/ thin liquid textures to oropharynx with spillover to hypopharynx with liquid consistencies with cup and straw modalitiy. Reduced hyo-laryngeal elevation and excursion during deglutition. Trace to absent residue present in valleculae and pyriform sinuses. Adequate opening of the UES.  Trace inconsistent penetration with full ejection occurred with straw and cup presentations during deglutition.

## 2024-04-24 NOTE — PROGRESS NOTE ADULT - ASSESSMENT
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 Transferred to [facility] for Liver Transplant Evaluation. Patient found to be hypoglycemic shortly after transfer, now resolved.    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 Patient was first diagnosed with Type 3c DM after her initial surgery in 2014 (s/p distal pancreatectomy, cholecystectomy, splenectomy and celina-en-y pancreaticojejuonostomy). Then she later underwent total pancreatectomy with islet cell autotransplant in 2018. After that, her insulin requirements decreased over the years but was never off insulin.    Endocrinologist: Dr. Dalton  Per recent outpatient note 3/26/2024: pump not available to review  Patient uses Omnipod Dash with Dexcom CGM   Basal rate:   MN 0.05U   6 AM 0.1U   9.30 AM 0.15U   Total basal 2.85U/24h   ICR 1:15   ICF 1:75   Reports of frequent hypoglycemia outpatient in March 2024.    C-peptide <0.1 4/15 at 6pm when BG was 168, likely insulin deficient.  C-peptide repeated while off dextrose and tube feeds: 0.1 with , confirming insulin deficiency.    On PO diet pureed    PLAN:  - Inpatient BG goal 100-180  - Decrease Lantus to 2 units qhs  - continue Low dose admelog scale TIDAC, low dose scale qhs  - For discharge, patient prefers to resume insulin pump. Patient was instructed to bring in supplies to resume on discharge and for adjustment of settings given her low insulin requirements.    #Hypothyroidism vs NTIS  TSH 4.43. FT4 0.6, TT3 47 on admission (4/13)  Started on LT4 100mcg daily  Repeat TSH was wnl 3.67 (4/15)  - stopped levothyroxine   - f/u TPO antibodies  - recheck FT4 and TSH in 1 week 4/29 if remains inpatient, otherwise check outpatient.    Contact via [employer] Teams during business hours  To reach covering provider access AMION via sunrise tools  For Urgent matters/after-hours/weekends/holidays please page endocrine fellow on call   For nonurgent matters please email [email or web address]    Please note that this patient may be followed by different provider tomorrow.  Notify endocrine 24 hours prior to discharge for final recommendations     .

## 2024-04-24 NOTE — SWALLOW VFSS/MBS ASSESSMENT ADULT - DIAGNOSTIC IMPRESSIONS
Ms. Acosta p/w PMHx of DMT1, Factor V leiden deficiency, gout and congenital abnormality of the pancreas associated with recurrent pancreatitis and extensive surgical hx, presented to [facility] 4/8 for several weeks of decreased PO intake, NBNB emesis, watery diarrhea, Somnolence, requiring intubation for airway protection. Seen for prior evaluations at bedside and objective testing. Currently p/w an oropharyngeal dysphagia with superimposed AMS marked by prolonged mastication and manipulation of more advanced solids however eventually efficient, intermittent tongue pumping type motion appearing to assist with facilitation of a-p transport, consistent latent swallow response w/ thin liquid textures to oropharynx with spillover to hypopharynx with cup and straw modality, trace to absent residue present in valleculae and pyriform sinuses, resulting in trace inconsistent penetration with full ejection during straw and cup presentations. No aspiration captured however remains at risk given AMS and deconditioned status.     Conservative textures encouraged this date with plan to advance solid spending improvement in AMS. Please adhere to recommended precautions during meals for safe swallowing.

## 2024-04-24 NOTE — SWALLOW VFSS/MBS ASSESSMENT ADULT - RECOMMENDED CONSISTENCY
Soft and bite sized solids   THIN LIQUIDS via cup or straw   PLEASE encourage single small sips and bites     MEDICATION with purees not with water/thin liquids     ENCOURAGE separation of mixed textures such as cereals and soups (liquid and solids mixed textures) during consumption

## 2024-04-24 NOTE — PROGRESS NOTE ADULT - ASSESSMENT
42 year old female with hx of DMT1, Factor V leiden deficiency, gout and congenital abnormality of the pancreas associated with recurrent pancreatitis and extensive surgical hx (s/p distal pancreatectomy, cholecystectomy, splenectomy and celina-en-y pancreaticojejuonostomy (02/2014) at [facility] with Dr. Hargrove), and now S/P total pancreatectomy  with islet cell autotransplant at [facility] by Dr. Gil in 04/2018).  Patient admitted with AMS, found to be in liver failure/hepatic encephalopathy and imaging shows colitis.  TPN consulted given severe protein calorie malnutrition and allowing possibility of bowel rest.  Patient with history of TPN use 2 years ago.      - Nutritional Assessment, patient with severe protein calorie malnutrition not fully meeting nutritional goals enterally due to colitis/decreased enteral intake and may benefit from TPN for nutritional support.  - TPN plan:   Patient declines TPN at present; will continue to follow  - TPN access:  Patient will need a dedicated central line if/once TPN planned to start   - Recommend checking baseline nutrition parameters:  TSH, Prealbumin, Lipids, HgA1c, iCal, Mag, Phos  - Electrolyte Imbalance risk:  check CMP, Mg, Phos and ionized Ca daily, to be supplemented peripherally per primary team.  - Recommend strict intake and output/weight checks three times a week  - Risk of glucose dysfunction with TPN:  HgA1c 5.2% ; would need to initiate fingersticks every 6 hours to monitor glucose trend once TPN starts  - Risk for Hypertriglyceridemia with TPN:  TG 82 mg/dL from 4/13; plan to monitor TG closely once/if TPN starts  - Global care per primary team    Available on TEAMS  TPN spectra [id number] ([phone] when dialing from outside line)  M-F 8A-2P, Weekends and holidays 8/9A-12/1P  Discussed with Dr. Rg Garces

## 2024-04-24 NOTE — PROGRESS NOTE ADULT - PROBLEM SELECTOR PLAN 1
#Alcoholic hepatitis/cirrhosis?  >Elevated bilirubin/coag, c/b hepatic encephalopathy; >PETH (4/13): > 400  >CTAP (4/13): Hepatomegaly and hepatic steatosis  >US abd (4/13): Hepatic steatosis, no hepatic vein thrombosis  >Acute hepatitis panel (4/13): Negative; Autoimmune hepatitis panel (4/13): Negative  >MRCP (4/15): Enlarged, fatty liver. No biliary duct dilation, unlikely obstruction  - c/w Folid acid, MVI; c/w thiamine 500mg Q8h x 3 days (4/18 - 4/20) given patient confabulating c/f wernicke vs. ICU delirium --> mental status improving    #Hyperammonemia   >Baseline mentation AAO3  >s/p CRRT (off since 4/16)  - f/u plasma amino acid (4/16): in lab  - f/u urine orotic acid (4/16) - sent out to [facility]  - f/u metabolic genetic consult to r/o acquired urea cycle disorder  - c/w Rifaximin; Lactulose  - c/w Trend Ammonia qd

## 2024-04-25 NOTE — PROGRESS NOTE ADULT - ASSESSMENT
42 year old female with hx of DMT1, Factor V leiden deficiency, gout and congenital abnormality of the pancreas associated with recurrent pancreatitis and extensive surgical hx (s/p distal pancreatectomy, cholecystectomy, splenectomy and celina-en-y pancreaticojejuonostomy (02/2014) at [facility] with Dr. Hargrove), and now S/P total pancreatectomy  with islet cell autotransplant at [facility] by Dr. Gil in 04/2018).  Patient admitted with AMS, found to be in liver failure/hepatic encephalopathy and imaging shows colitis.  TPN consulted given severe protein calorie malnutrition and allowing possibility of bowel rest.  Patient with history of TPN use 2 years ago.      - Nutritional Assessment, patient with severe protein calorie malnutrition not fully meeting nutritional goals enterally due to colitis/decreased enteral intake and may benefit from TPN for nutritional support.  - TPN plan: Patient declining TPN at present; will continue to follow  - TPN access:  Patient will need a dedicated central line if/once TPN planned to start   - Recommend checking baseline nutrition parameters:  TSH, Prealbumin, Lipids, HgA1c, iCal, Mag, Phos  - Electrolyte Imbalance risk:  check CMP, Mg, Phos and ionized Ca daily, to be supplemented peripherally per primary team.  - Recommend strict intake and output/weight checks three times a week  - Risk of glucose dysfunction with TPN:  HgA1c 5.2% ; would need to initiate fingersticks every 6 hours to monitor glucose trend once TPN starts  - Risk for Hypertriglyceridemia with TPN:  TG 82 mg/dL from 4/13; plan to monitor TG closely once/if TPN starts  - Further care per primary team    Available on TEAMS  TPN spectra [id number] ([phone] when dialing from outside line)  M-F 8A-2P, Weekends and holidays 8/9A-12/1P  Discussed with Dr. Caputo

## 2024-04-25 NOTE — PROGRESS NOTE ADULT - ATTENDING COMMENTS
42F PMHx T1DM, Factor V Leiden c/b provoked DVT and RA thrombus in the past, and pancreatic divisum c/b recurrent pancreatitis s/p total pancreatectomy s/p islet cell autotransplant, also had extensive abdominal surgical history (cholecystectomy, splenectomy, celina-en-y pancreaticojejunostomy) for unclear reason c/b multiple intraabdominal infection/abscess/fistula (including VRE, kavin) who initially presented to [facility] for emesis, diarrhea, and somnolence. Required intubation for airway protection, was found to be in acute liver failure and hepatic encephalopathy w/ ammonia >200. Was transferred to MICU here for CRRT and liver transplant evaluation. Was initially started on Bobby/Dapto/Doxy/Caspofungin for broad spectrum coverage, then deescalated to Vanc/Bobby. CRRT initiated, ammonia levels downtrended, stopped on 4/16, and TFs restarted w/ lactulose. Was eventually extubated 4/17. Acute liver failure and hepatic encephalopathy currently being treated as due to alcholic hepatitis     on room air saturating 97% denies sob    - C/w Lactulose and Rifaximin. c/w lactulose 20mg q8hr; Monitor stool count. goal 3-4B<s; Mental status at [facility]  - Orotic acid and plasma amino acid levels sent out and pending  - Factor V Leiden. Hx of DVT. Per chart review Eliquis stopped 5/2023 by outpatient hematology given consistently negative DVT studies  - T1DM; - Inpatient BG goal 100-180  - Lantus to 2 units  appreciate endo recs; multiple episodes of symptomatic hypoglycemia likely from poor po intake now improving  - diet changed to soft and bite sized, meds with puree   - Carbapenem resistant Acinetobacter baumanii on ETT. ID following; rec monitoring off abx for now. Monitor fever curve, WBC trend.   - Hypothyroidism vs NTIS; TSH 4.43. FT4 0.6, TT3 47 on admission (4/13)  Repeat TSH was wnl 3.67 (4/15); synthroid stopped  recheck FT4 and TSH in 1 week 4/29 if remains inpatient  Right sided colitis  w/ right sided thickening seen on CT imaging.   - Patient has no abd pain but has diarrhea in the setting of lactulose use. C diff neg. GI PCR neg on 4/15  - Differentials include possible viral infection , malignancy, ischemia, etc. Improving at this time based on two abdominal imaging.   + sputum for CRE acinetobacter baumanii on ETT- CT chest with improving prior infiltrates on RA now; leukocytosis is improving, will obtain CBC today a  - opt hepatology, endo f/u , opt hematology workup for macrocytic anemia, bandemia on diff but no foci of incection  - clarify with RN staff documenting patient on 02- needs documentation of o2 sat on room air and ambulating when asked patient why she was placed on NC she said she had no idea and feels fine    pending AR  med ready for dc .

## 2024-04-25 NOTE — PROGRESS NOTE ADULT - SUBJECTIVE AND OBJECTIVE BOX
Follow Up:      Interval History:    REVIEW OF SYSTEMS  [  ] ROS unobtainable because:    [  ] All other systems negative except as noted below    Constitutional:  [ ] fever [ ] chills  [ ] weight loss  [ ] weakness  Skin:  [ ] rash [ ] phlebitis	  Eyes: [ ] icterus [ ] pain  [ ] discharge	  ENMT: [ ] sore throat  [ ] thrush [ ] ulcers [ ] exudates  Respiratory: [ ] dyspnea [ ] hemoptysis [ ] cough [ ] sputum	  Cardiovascular:  [ ] chest pain [ ] palpitations [ ] edema	  Gastrointestinal:  [ ] nausea [ ] vomiting [ ] diarrhea [ ] constipation [ ] pain	  Genitourinary:  [ ] dysuria [ ] frequency [ ] hematuria [ ] discharge [ ] flank pain  [ ] incontinence  Musculoskeletal:  [ ] myalgias [ ] arthralgias [ ] arthritis  [ ] back pain  Neurological:  [ ] headache [ ] seizures  [ ] confusion/altered mental status    Allergies  No Known Allergies        ANTIMICROBIALS:  rifAXIMin 550 two times a day      OTHER MEDS:  MEDICATIONS  (STANDING):  dextrose 50% Injectable 12.5 once  dextrose 50% Injectable 25 once  dextrose 50% Injectable 25 once  dextrose Oral Gel 15 once  glucagon  Injectable 1 once  heparin   Injectable 5000 every 12 hours  insulin glargine Injectable (LANTUS) 2 at bedtime  insulin lispro (ADMELOG) corrective regimen sliding scale  at bedtime  insulin lispro (ADMELOG) corrective regimen sliding scale  three times a day before meals  lactulose Syrup 20 every 8 hours  methadone    Tablet 5 three times a day  pantoprazole  Injectable 40 daily      Vital Signs Last 24 Hrs  T(C): 36.9 (25 Apr 2024 11:53), Max: 37.1 (24 Apr 2024 21:30)  T(F): 98.5 (25 Apr 2024 11:53), Max: 98.8 (24 Apr 2024 21:30)  HR: 93 (25 Apr 2024 11:53) (83 - 100)  BP: 131/80 (25 Apr 2024 11:53) (103/61 - 131/80)  BP(mean): --  RR: 18 (25 Apr 2024 11:53) (17 - 18)  SpO2: 92% (25 Apr 2024 11:53) (92% - 96%)    Parameters below as of 25 Apr 2024 11:53  Patient On (Oxygen Delivery Method): nasal cannula        PHYSICAL EXAMINATION:  General: Alert and Awake, NAD  HEENT: PERRL, EOMI  Neck: Supple  Cardiac: RRR, No M/R/G  Resp: CTAB, No Wh/Rh/Ra  Abdomen: NBS, NT/ND, No HSM, No rigidity or guarding  MSK: No LE edema. No Calf tenderness  : No harrington  Skin: No rashes or lesions. Skin is warm and dry to the touch.   Neuro: Alert and Awake. CN 2-12 Grossly intact. Moves all four extremities spontaneously.  Psych: Calm, Pleasant, Cooperative                          8.6    8.21  )-----------( 353      ( 25 Apr 2024 06:47 )             26.4       04-25    137  |  104  |  <4<L>  ----------------------------<  243<H>  4.1   |  25  |  0.41<L>    Ca    8.8      25 Apr 2024 06:47  Phos  2.5     04-25  Mg     1.4     04-25    TPro  5.6<L>  /  Alb  2.7<L>  /  TBili  3.4<H>  /  DBili  x   /  AST  37  /  ALT  16  /  AlkPhos  168<H>  04-25      Urinalysis Basic - ( 25 Apr 2024 06:47 )    Color: x / Appearance: x / SG: x / pH: x  Gluc: 243 mg/dL / Ketone: x  / Bili: x / Urobili: x   Blood: x / Protein: x / Nitrite: x   Leuk Esterase: x / RBC: x / WBC x   Sq Epi: x / Non Sq Epi: x / Bacteria: x        MICROBIOLOGY:  v  .Blood Blood-Venous  04-18-24   No growth at 5 days  --  --      ET Tube ET Tube  04-17-24   Numerous Acinetobacter baumannii/nosocom group (Carbapenem Resistant)  Normal Respiratory Linh present  cefiderocol interpretations based on FDA breakpoints.  --  Acinetobacter baumannii/nosocom group (Carbapenem Resistant)      .Stool Feces  04-14-24   No Protozoa seen by trichrome stain  No Helminths or Protozoa seen in formalin concentrate  performed by iodine stain  (routine O+P not evaluated for Microsporidia,  Cryptosporidia or Cyclospora)  One negative sample does not necessarily rule  out the presence of a parasitic infection.  Numerous White blood cells  Few Red blood cells  --  --      .Blood Blood  04-13-24   No Blood Parasites observed by giemsa stain  One negative set of blood smears does not rule out  the possibility of a parasitic infection.  A minimum of 3  specimens should be collected, at least 12-24 hours apart,  over a 36 hour time period.  ************************************************************  NEGATIVE for Plasmodium antigens. Microscopy is performed for  confirmation.  The [place]aria Rapid antigen test does not detect the  presence of Babesia species. If Babesiosis is suspected  please order test Babesia PCR: Babesia microti PCR Bld  ************************************************************  --  --      .Bronchial Bronchial Lavage  04-13-24   Normal Respiratory Linh present  --    Rare polymorphonuclear leukocytes seen per low power field  No Squamous epithelial cells seen per low power field  No organisms seen per oil power field      Catheterized Catheterized  04-13-24   No growth  --  --      ET Tube ET Tube  04-13-24   Normal Respiratory Linh present  --    Moderate polymorphonuclear leukocytes seen per low power field  No organisms seen      .Blood Blood-Venous  04-13-24   No growth at 5 days  --  --      .Blood Blood-Peripheral  04-13-24   No growth at 5 days  --  --            Clostridium difficile GDH Toxins A&amp;B, EIA:   Negative (04-19-24 @ 14:02)  Clostridium difficile GDH Interpretation: Negative for toxigenic C. Difficile.  This specimen is negative for C.  Difficile glutamate dehydrogenase (GDH) antigen and negative for C.  Difficile Toxins A & B, by EIA.  GDH is a highly sensitive screening  marker for C. Difficile that is produced in large amounts by all C.  Difficile strains, both toxigenic and nontoxigenic.  This assay has not  been validated as a test of cure.  Repeat testing during the same episode  of diarrhea is of limited value and is discouraged.  The results of this  assay should always be interpreted in conjunction with patient's clinical  history. (04-19-24 @ 14:02)      RADIOLOGY:    <The imaging below has been reviewed and visualized by me independently. Findings as detailed in report below> Follow Up:  Leukocytosis    Interval History: afebrile. on room air. no acute events.     REVIEW OF SYSTEMS  [  ] ROS unobtainable because:    [ x ] All other systems negative except as noted below    Constitutional:  [ ] fever [ ] chills  [ ] weight loss  [ ] weakness  Skin:  [ ] rash [ ] phlebitis	  Eyes: [ ] icterus [ ] pain  [ ] discharge	  ENMT: [ ] sore throat  [ ] thrush [ ] ulcers [ ] exudates  Respiratory: [ ] dyspnea [ ] hemoptysis [ ] cough [ ] sputum	  Cardiovascular:  [ ] chest pain [ ] palpitations [ ] edema	  Gastrointestinal:  [ ] nausea [ ] vomiting [ ] diarrhea [ ] constipation [ ] pain	  Genitourinary:  [ ] dysuria [ ] frequency [ ] hematuria [ ] discharge [ ] flank pain  [ ] incontinence  Musculoskeletal:  [ ] myalgias [ ] arthralgias [ ] arthritis  [ ] back pain  Neurological:  [ ] headache [ ] seizures  [ ] confusion/altered mental status    Allergies  No Known Allergies        ANTIMICROBIALS:  rifAXIMin 550 two times a day      OTHER MEDS:  MEDICATIONS  (STANDING):  dextrose 50% Injectable 12.5 once  dextrose 50% Injectable 25 once  dextrose 50% Injectable 25 once  dextrose Oral Gel 15 once  glucagon  Injectable 1 once  heparin   Injectable 5000 every 12 hours  insulin glargine Injectable (LANTUS) 2 at bedtime  insulin lispro (ADMELOG) corrective regimen sliding scale  at bedtime  insulin lispro (ADMELOG) corrective regimen sliding scale  three times a day before meals  lactulose Syrup 20 every 8 hours  methadone    Tablet 5 three times a day  pantoprazole  Injectable 40 daily      Vital Signs Last 24 Hrs  T(C): 36.9 (25 Apr 2024 11:53), Max: 37.1 (24 Apr 2024 21:30)  T(F): 98.5 (25 Apr 2024 11:53), Max: 98.8 (24 Apr 2024 21:30)  HR: 93 (25 Apr 2024 11:53) (83 - 100)  BP: 131/80 (25 Apr 2024 11:53) (103/61 - 131/80)  BP(mean): --  RR: 18 (25 Apr 2024 11:53) (17 - 18)  SpO2: 92% (25 Apr 2024 11:53) (92% - 96%)    Parameters below as of 25 Apr 2024 11:53  Patient On (Oxygen Delivery Method): nasal cannula        PHYSICAL EXAMINATION:  General: Alert and Awake, NAD  Cardiac: RRR, No M/R/G  Resp: CTAB, No Wh/Rh/Ra  Abdomen: NBS, NT/ND, No HSM, No rigidity or guarding  MSK: No LE edema. No Calf tenderness  Skin: No rashes or lesions. Skin is warm and dry to the touch.   Neuro: Alert and Awake. CN 2-12 Grossly intact. Moves all four extremities spontaneously.  Psych: Calm, Pleasant, Cooperative                          8.6    8.21  )-----------( 353      ( 25 Apr 2024 06:47 )             26.4       04-25    137  |  104  |  <4<L>  ----------------------------<  243<H>  4.1   |  25  |  0.41<L>    Ca    8.8      25 Apr 2024 06:47  Phos  2.5     04-25  Mg     1.4     04-25    TPro  5.6<L>  /  Alb  2.7<L>  /  TBili  3.4<H>  /  DBili  x   /  AST  37  /  ALT  16  /  AlkPhos  168<H>  04-25      Urinalysis Basic - ( 25 Apr 2024 06:47 )    Color: x / Appearance: x / SG: x / pH: x  Gluc: 243 mg/dL / Ketone: x  / Bili: x / Urobili: x   Blood: x / Protein: x / Nitrite: x   Leuk Esterase: x / RBC: x / WBC x   Sq Epi: x / Non Sq Epi: x / Bacteria: x        MICROBIOLOGY:  v  .Blood Blood-Venous  04-18-24   No growth at 5 days  --  --      ET Tube ET Tube  04-17-24   Numerous Acinetobacter baumannii/nosocom group (Carbapenem Resistant)  Normal Respiratory Linh present  cefiderocol interpretations based on FDA breakpoints.  --  Acinetobacter baumannii/nosocom group (Carbapenem Resistant)      .Stool Feces  04-14-24   No Protozoa seen by trichrome stain  No Helminths or Protozoa seen in formalin concentrate  performed by iodine stain  (routine O+P not evaluated for Microsporidia,  Cryptosporidia or Cyclospora)  One negative sample does not necessarily rule  out the presence of a parasitic infection.  Numerous White blood cells  Few Red blood cells  --  --      .Blood Blood  04-13-24   No Blood Parasites observed by giemsa stain  One negative set of blood smears does not rule out  the possibility of a parasitic infection.  A minimum of 3  specimens should be collected, at least 12-24 hours apart,  over a 36 hour time period.  ************************************************************  NEGATIVE for Plasmodium antigens. Microscopy is performed for  confirmation.  The Malaria Rapid antigen test does not detect the  presence of Babesia species. If Babesiosis is suspected  please order test Babesia PCR: Babesia microti PCR Bld  ************************************************************  --  --      .Bronchial Bronchial Lavage  04-13-24   Normal Respiratory Linh present  --    Rare polymorphonuclear leukocytes seen per low power field  No Squamous epithelial cells seen per low power field  No organisms seen per oil power field      Catheterized Catheterized  04-13-24   No growth  --  --      ET Tube ET Tube  04-13-24   Normal Respiratory Linh present  --    Moderate polymorphonuclear leukocytes seen per low power field  No organisms seen      .Blood Blood-Venous  04-13-24   No growth at 5 days  --  --      .Blood Blood-Peripheral  04-13-24   No growth at 5 days  --  --            Clostridium difficile GDH Toxins A&amp;B, EIA:   Negative (04-19-24 @ 14:02)  Clostridium difficile GDH Interpretation: Negative for toxigenic C. Difficile.  This specimen is negative for C.  Difficile glutamate dehydrogenase (GDH) antigen and negative for C.  Difficile Toxins A & B, by EIA.  GDH is a highly sensitive screening  marker for C. Difficile that is produced in large amounts by all C.  Difficile strains, both toxigenic and nontoxigenic.  This assay has not  been validated as a test of cure.  Repeat testing during the same episode  of diarrhea is of limited value and is discouraged.  The results of this  assay should always be interpreted in conjunction with patient's clinical  history. (04-19-24 @ 14:02)      RADIOLOGY:    <The imaging below has been reviewed and visualized by me independently. Findings as detailed in report below>    < from: Xray Chest 1 View- PORTABLE-Urgent (Xray Chest 1 View- PORTABLE-Urgent .) (04.19.24 @ 15:31) >  IMPRESSION:  Unchanged left retrocardiac opacity with air bronchograms, may represent   atelectasis or pneumonia.  Small left-sided pleural effusion.    < end of copied text >

## 2024-04-25 NOTE — PROGRESS NOTE ADULT - SUBJECTIVE AND OBJECTIVE BOX
PROGRESS NOTE:   Authored by Dr. Negro Carroll MD ([id number]). Pager [facility] [phone] / [facility]     Patient is a 48y old  Female who presents with a chief complaint of Abd pain (24 Apr 2024 12:24)      SUBJECTIVE / OVERNIGHT EVENTS:  No acute events overnight. AO4, +BM    ADDITIONAL REVIEW OF SYSTEMS:  Patient denies fevers, chills, chest pain, shortness of breath, nausea, abdominal pain, diarrhea, constipation, dysuria, leg swelling, headache, light headedness.    MEDICATIONS  (STANDING):  chlorhexidine 4% Liquid 1 Application(s) Topical <User Schedule>  cholecalciferol 2000 Unit(s) Oral daily  dextrose 50% Injectable 25 Gram(s) IV Push once  dextrose 50% Injectable 12.5 Gram(s) IV Push once  dextrose 50% Injectable 25 Gram(s) IV Push once  dextrose Oral Gel 15 Gram(s) Oral once  folic acid 1 milliGRAM(s) Oral daily  glucagon  Injectable 1 milliGRAM(s) IntraMuscular once  heparin   Injectable 5000 Unit(s) SubCutaneous every 12 hours  insulin glargine Injectable (LANTUS) 2 Unit(s) SubCutaneous at bedtime  insulin lispro (ADMELOG) corrective regimen sliding scale   SubCutaneous three times a day before meals  insulin lispro (ADMELOG) corrective regimen sliding scale   SubCutaneous at bedtime  lactulose Syrup 20 Gram(s) Oral every 8 hours  magnesium sulfate  IVPB 2 Gram(s) IV Intermittent once  methadone    Tablet 5 milliGRAM(s) Oral three times a day  multivitamin/minerals/iron Oral Solution (CENTRUM) 15 milliLiter(s) Oral daily  pantoprazole  Injectable 40 milliGRAM(s) IV Push daily  rifAXIMin 550 milliGRAM(s) Oral two times a day  zinc sulfate 220 milliGRAM(s) Oral daily    MEDICATIONS  (PRN):      CAPILLARY BLOOD GLUCOSE      POCT Blood Glucose.: 261 mg/dL (25 Apr 2024 07:53)  POCT Blood Glucose.: 236 mg/dL (24 Apr 2024 21:36)  POCT Blood Glucose.: 205 mg/dL (24 Apr 2024 16:29)  POCT Blood Glucose.: 220 mg/dL (24 Apr 2024 11:41)    I&O's Summary    24 Apr 2024 07:01  -  25 Apr 2024 07:00  --------------------------------------------------------  IN: 160 mL / OUT: 0 mL / NET: 160 mL        PHYSICAL EXAM:  Vital Signs Last 24 Hrs  T(C): 36.7 (25 Apr 2024 04:39), Max: 37.8 (24 Apr 2024 11:58)  T(F): 98 (25 Apr 2024 04:39), Max: 100 (24 Apr 2024 11:58)  HR: 83 (25 Apr 2024 04:39) (83 - 110)  BP: 116/75 (25 Apr 2024 04:39) (103/61 - 120/73)  BP(mean): --  RR: 18 (25 Apr 2024 04:39) (17 - 18)  SpO2: 96% (25 Apr 2024 04:39) (92% - 97%)    Parameters below as of 25 Apr 2024 04:39  Patient On (Oxygen Delivery Method): nasal cannula  O2 Flow (L/min): 3    Physical Exam: Gen: no acute distress; jaundiced  HEENT: atraumatic, normocephalic, pupils equally round and reactive to light, extraocular muscles intact, scleral icterus  CV: regular rate and rhythm, normal S1/S2, no murmurs, rubs, or gallops  Resp: lungs clear to auscultation bilaterally, no rales, rhonchi, or wheezes  GI: soft, slightly tender, nondistended, BSx4  MSK: extremities atraumatic, no cyanosis or clubbing  Skin: warm, dry, no rashes or lesions  Neuro: no focal deficits, sensation grossly intact  Psych: alert and oriented x3, appropriate mood and affec    LABS:                        8.6    8.21  )-----------( 353      ( 25 Apr 2024 06:47 )             26.4     04-25    137  |  104  |  <4<L>  ----------------------------<  243<H>  4.1   |  25  |  0.41<L>    Ca    8.8      25 Apr 2024 06:47  Phos  2.5     04-25  Mg     1.4     04-25    TPro  5.6<L>  /  Alb  2.7<L>  /  TBili  3.4<H>  /  DBili  x   /  AST  37  /  ALT  16  /  AlkPhos  168<H>  04-25          Urinalysis Basic - ( 25 Apr 2024 06:47 )    Color: x / Appearance: x / SG: x / pH: x  Gluc: 243 mg/dL / Ketone: x  / Bili: x / Urobili: x   Blood: x / Protein: x / Nitrite: x   Leuk Esterase: x / RBC: x / WBC x   Sq Epi: x / Non Sq Epi: x / Bacteria: x          Tele Reviewed:    RADIOLOGY & ADDITIONAL TESTS:  Results Reviewed:   Imaging Personally Reviewed:  Electrocardiogram Personally Reviewed:

## 2024-04-25 NOTE — PROGRESS NOTE ADULT - SUBJECTIVE AND OBJECTIVE BOX
May 17, 2023    Elizabeth Flores  [address]  [place] [address]             [facility]  Family Medicine  [facility] [place] [address]  Phone: [phone]  Fax: [phone]   May 17, 2023     Patient: Elizabeth Flores   YOB: 2016   Date of Visit: 5/17/2023       To Whom it May Concern:    Elizabeth Flores was seen in my clinic on 5/17/2023. She may return to school on 05/19/2023 .    Please excuse her from any classes or work missed.    If you have any questions or concerns, please don't hesitate to call.    Sincerely,         KATHY Noel       seen earlier today     Chief Complaint: Diabetes Mellitus follow up    INTERVAL HX:  Patient states she is feeling better. Ate more of her breakfast today since her diet was upgraded to soft and bitesized.   BG variable with morning hypoglycemia on 4/23, and BG variable from 120s-220s.     Review of Systems:  General: As above  GI: No nausea, vomiting  Endocrine: no  S&Sx of hypoglycemia    Allergies    No Known Allergies    Intolerances      MEDICATIONS  (STANDING):  chlorhexidine 4% Liquid 1 Application(s) Topical <User Schedule>  cholecalciferol 2000 Unit(s) Oral daily  dextrose 50% Injectable 12.5 Gram(s) IV Push once  dextrose 50% Injectable 25 Gram(s) IV Push once  dextrose 50% Injectable 25 Gram(s) IV Push once  dextrose Oral Gel 15 Gram(s) Oral once  folic acid 1 milliGRAM(s) Oral daily  glucagon  Injectable 1 milliGRAM(s) IntraMuscular once  heparin   Injectable 5000 Unit(s) SubCutaneous every 12 hours  insulin glargine Injectable (LANTUS) 2 Unit(s) SubCutaneous at bedtime  insulin lispro (ADMELOG) corrective regimen sliding scale   SubCutaneous at bedtime  insulin lispro (ADMELOG) corrective regimen sliding scale   SubCutaneous three times a day before meals  lactulose Syrup 20 Gram(s) Oral every 8 hours  magnesium sulfate  IVPB 2 Gram(s) IV Intermittent once  methadone    Tablet 5 milliGRAM(s) Oral three times a day  multivitamin/minerals/iron Oral Solution (CENTRUM) 15 milliLiter(s) Oral daily  pantoprazole  Injectable 40 milliGRAM(s) IV Push daily  rifAXIMin 550 milliGRAM(s) Oral two times a day  zinc sulfate 220 milliGRAM(s) Oral daily        insulin glargine Injectable (LANTUS)   2 Unit(s) SubCutaneous (04-24-24 @ 21:37)    insulin lispro (ADMELOG) corrective regimen sliding scale   1 Unit(s) SubCutaneous (04-25-24 @ 11:59)   3 Unit(s) SubCutaneous (04-25-24 @ 08:15)   2 Unit(s) SubCutaneous (04-24-24 @ 16:35)        PHYSICAL EXAM:  VITALS: T(C): 36.9 (04-25-24 @ 11:53)  T(F): 98.5 (04-25-24 @ 11:53), Max: 98.8 (04-24-24 @ 21:30)  HR: 93 (04-25-24 @ 11:53) (83 - 100)  BP: 131/80 (04-25-24 @ 11:53) (103/61 - 131/80)  RR:  (17 - 18)  SpO2:  (92% - 96%)  Wt(kg): --  GENERAL: NAD  Respiratory: Respirations unlabored   Extremities: Warm, no edema  NEURO: Alert , appropriate     LABS:  POCT Blood Glucose.: 165 mg/dL (04-25-24 @ 11:49)  POCT Blood Glucose.: 261 mg/dL (04-25-24 @ 07:53)  POCT Blood Glucose.: 236 mg/dL (04-24-24 @ 21:36)  POCT Blood Glucose.: 205 mg/dL (04-24-24 @ 16:29)  POCT Blood Glucose.: 220 mg/dL (04-24-24 @ 11:41)  POCT Blood Glucose.: 179 mg/dL (04-24-24 @ 08:10)  POCT Blood Glucose.: 239 mg/dL (04-23-24 @ 21:39)  POCT Blood Glucose.: 204 mg/dL (04-23-24 @ 16:39)  POCT Blood Glucose.: 122 mg/dL (04-23-24 @ 11:30)  POCT Blood Glucose.: 110 mg/dL (04-23-24 @ 09:08)  POCT Blood Glucose.: 80 mg/dL (04-23-24 @ 08:36)  POCT Blood Glucose.: 67 mg/dL (04-23-24 @ 08:16)  POCT Blood Glucose.: 52 mg/dL (04-23-24 @ 07:57)  POCT Blood Glucose.: 51 mg/dL (04-23-24 @ 07:55)  POCT Blood Glucose.: 118 mg/dL (04-22-24 @ 21:21)  POCT Blood Glucose.: 113 mg/dL (04-22-24 @ 17:46)  POCT Blood Glucose.: 70 mg/dL (04-22-24 @ 17:24)  POCT Blood Glucose.: 57 mg/dL (04-22-24 @ 16:58)  POCT Blood Glucose.: 68 mg/dL (04-22-24 @ 16:49)                          8.6    8.21  )-----------( 353      ( 25 Apr 2024 06:47 )             26.4     04-25    137  |  104  |  <4<L>  ----------------------------<  243<H>  4.1   |  25  |  0.41<L>    Ca    8.8      25 Apr 2024 06:47  Phos  2.5     04-25  Mg     1.4     04-25    TPro  5.6<L>  /  Alb  2.7<L>  /  TBili  3.4<H>  /  DBili  x   /  AST  37  /  ALT  16  /  AlkPhos  168<H>  04-25    eGFR: 121 mL/min/1.73m2 (25 Apr 2024 06:47)    04-24 Chol 131 Direct LDL -- LDL calculated 103<H> HDL 7<L> Trig 109, 04-13 Chol 82 Direct LDL -- LDL calculated 57 HDL 8<L> Trig 82  Thyroid Function Tests:  04-23 @ 06:56 TSH -- FreeT4 -- T3 -- Anti TPO <10.0 Anti Thyroglobulin Ab -- TSI --  04-22 @ 11:05 TSH -- FreeT4 1.6 T3 -- Anti TPO -- Anti Thyroglobulin Ab -- TSI --      A1C with Estimated Average Glucose Result: 5.2 % (04-13-24 @ 03:56)    Estimated Average Glucose: 103 mg/dL (04-13-24 @ 03:56)    Fructosamine, Serum: 372 umol/L (04-13-24 @ 09:41)

## 2024-04-25 NOTE — PROGRESS NOTE ADULT - SUBJECTIVE AND OBJECTIVE BOX
states she needs to go to the bathroom  wants to go home     REVIEW OF SYSTEMS  Constitutional - No fever,  No fatigue  HEENT - No vertigo, No neck pain  Neurological - No headaches, No memory loss  Psychiatric - No depression, No anxiety    FUNCTIONAL PROGRESS  SLP 4/24  MBs  oropharyngeal dysphagia   soft, bite sized, thin liquids    PT 4/24  bed mobility min to mod assist  transfers min assist x 2 with RW  gait min assist x 2, RW x 10 feet   +O2 desaturation on RA    OT 4/24  bed mobility min assist  transfers min assist x 2, RW  toilet training max assist   follows simple commands    VITALS  T(C): 36.7 (04-25-24 @ 04:39), Max: 37.8 (04-24-24 @ 11:58)  HR: 83 (04-25-24 @ 04:39) (83 - 110)  BP: 116/75 (04-25-24 @ 04:39) (103/61 - 120/73)  RR: 18 (04-25-24 @ 04:39) (17 - 18)  SpO2: 96% (04-25-24 @ 04:39) (92% - 97%)  Wt(kg): --    MEDICATIONS   chlorhexidine 4% Liquid 1 Application(s) <User Schedule>  cholecalciferol 2000 Unit(s) daily  dextrose 50% Injectable 12.5 Gram(s) once  dextrose 50% Injectable 25 Gram(s) once  dextrose 50% Injectable 25 Gram(s) once  dextrose Oral Gel 15 Gram(s) once  folic acid 1 milliGRAM(s) daily  glucagon  Injectable 1 milliGRAM(s) once  heparin   Injectable 5000 Unit(s) every 12 hours  insulin glargine Injectable (LANTUS) 2 Unit(s) at bedtime  insulin lispro (ADMELOG) corrective regimen sliding scale   at bedtime  insulin lispro (ADMELOG) corrective regimen sliding scale   three times a day before meals  lactulose Syrup 20 Gram(s) every 8 hours  magnesium sulfate  IVPB 2 Gram(s) once  methadone    Tablet 5 milliGRAM(s) three times a day  multivitamin/minerals/iron Oral Solution (CENTRUM) 15 milliLiter(s) daily  pantoprazole  Injectable 40 milliGRAM(s) daily  rifAXIMin 550 milliGRAM(s) two times a day  zinc sulfate 220 milliGRAM(s) daily      RECENT LABS - Reviewed                        8.6    8.21  )-----------( 353      ( 25 Apr 2024 06:47 )             26.4     04-25    137  |  104  |  <4<L>  ----------------------------<  243<H>  4.1   |  25  |  0.41<L>    Ca    8.8      25 Apr 2024 06:47  Phos  2.5     04-25  Mg     1.4     04-25    TPro  5.6<L>  /  Alb  2.7<L>  /  TBili  3.4<H>  /  DBili  x   /  AST  37  /  ALT  16  /  AlkPhos  168<H>  04-25      Urinalysis Basic - ( 25 Apr 2024 06:47 )    Color: x / Appearance: x / SG: x / pH: x  Gluc: 243 mg/dL / Ketone: x  / Bili: x / Urobili: x   Blood: x / Protein: x / Nitrite: x   Leuk Esterase: x / RBC: x / WBC x   Sq Epi: x / Non Sq Epi: x / Bacteria: x    < from: CT Abdomen and Pelvis w/ IV Cont (04.16.24 @ 13:15) >    IMPRESSION:  Mildly improved colitis with right-sided colitis persisting.    Patent mesenteric vasculature. Haziness of the fat planes surrounding the   celiac axis and SMA, etiology unclear and without change.      < end of copied text >      ----------------------------------------------------------------------------------------  PHYSICAL EXAM  Constitutional - NAD, Comfortable, in bed   Chest - Breathing comfortably  Cardiovascular - S1S2   Abdomen - Soft    Extremities - No C/C/E, No calf tenderness   Neurologic Exam -    follows commands                 Cognitive - Awake, Alert, AAO to self, place: hospital, April      Communication - Fluent, No dysarthria        Motor - moves all ext      Sensory - Intact to LT     Psychiatric - Mood stable, Affect WNL  ----------------------------------------------------------------------------------------  ASSESSMENT/PLAN  48yFemale h/o DMtype 1, Factor V leiden, DVT, gout, congenital pancreatic abnormality s/p total pancreatectomy, islet cell autotransplant in 2018, with functional deficits due to encephalopathy due to hyperammonemia  CTH negative   hepatitis/cirrhosis, rifaximin, lactulose, mental status improving  off antibiotics  Pain - Tylenol, chronic pain on methadone  DVT PPX - SCDs heparin   Rehab - Will continue to follow for ongoing rehab needs and recommendations.    Recommend ACUTE inpatient rehabilitation for the functional deficits consisting of 3 hours of therapy/day x 2-4 weeks depending on progress at rehabilitation facility, 24 hour RN/daily PMR physician for comorbid medical management. Patient will be able to tolerate 3 hours a day.                35 minutes spent on total encounter  with chart review of PT/OT/SLP notes, exam, imaging, counseling and education on inpatient rehabilitation, coordination of care with rehab team and

## 2024-04-25 NOTE — CHART NOTE - NSCHARTNOTEFT_GEN_A_CORE
MD aware pt had mild desaturations to 89-92% on RA; initially provided low flow oxygen via NC for additional support; pt now saturating better on RA and using incentive spirometry to improve breathing i/s/o prolonged bed bound state.

## 2024-04-25 NOTE — PROGRESS NOTE ADULT - ASSESSMENT
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 Hargrove), and now S/P total pancreatectomy with islet cell autotransplant at [facility] by Dr. Gil in 04/2018). 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 Per records, a Ct later in 2018 showed still fistulization but no abscess formation     Patient presented to [facility] ED on 4/8 by her spouse for AMS. Per , patient had been having nbnb vomiting and diarrhea for the last 4 weeks, extreme fatigue . She was unable to tolerate PO. She was also sleeping 18-20 hrs per day. On 4/8, he found her on the floor "completely out of it" and took her to [facility] for further evaluation.   Initial workup in ED demonstrated a Tbili 5.4, direct bili 4.4 and ammonia 196. Patient was somnolent and had a left eyeward gaze deviation. Submentally started developing agonal snoring respirations and had to be intubated for airway protection. Course complicated with septic shock and persistent hyperammonemia despite lactulose and rifaximin, CT show and started on meropenem, linezolid and anidulafungin at [facility]--> upon transfer here, off pressors, on alvaro/linezolid/caspofungin    CT Chest (4/13) Occlusion of the distal left lower lobar bronchus with complete left lower lobe atelectasis. Right middle lobe consolidation, possibly secondary to pneumonia or additional atelectasis.     CT A/P (4/13) Segmental areas of wall thickening throughout the colon and rectum with pneumatosis, mesenteric edema, and adjacent hyperdensities in the proximal transverse colon suspicious for bowel ischemia with intraluminal hemorrhage. Additional intraluminal hyperdensities within the distal ascending colon suspicious for hemorrhage. Nonspecific focal areas of narrowing in the distal descending colon and sigmoid colon, possibly colonic masses    UCx (4/13) No Growth  BCx (4/13) NGTD  Bronchoscopy Cx (4/13) Rare Yeast    CMV PCR (4/13) Negative  Adenovirus PCR (4/16) negative  Parvovirus PCR (4/13) Negative  EBV PCR (4/17) 61    Serum Cryptococcal Antigen (4/14) Negative  Serum Fungitell <31  Bronch Fungitell >500  Bronch and Serum Aspergillus Galactomannan Negative    Serum Histo Ag Negative  Urine Histo Ag Negative    Babesia PCR (4/13) Negative  Tick Diseases Panel Negative  Bartonella Serum PCR Negative  Leptospirosis Ab Negative    Lower suspicion infectious etiology directly contributing to hyperbilirubinemia; reasonable to complete meropenem course for the pneumatosis and possible pneumonia.    Low grade fevers 4/16 --> 4/17  Extubated on 4/17 with central line removal    #Fever, Leukocytosis,   #Positive Sputum Culture (carbapenem resistant Acinetobacter baumanii on ETT).   CT chest showing clearing of prior infiltrates now on RA, no cough,    C diff PCR Negative  --Monitor off of antibiotics. If clinical status changes would start Unasyn 3g IV Q6H over 4 hours (dosing for CrCl 30-50) + Minocycline 200 mg IV BID to target the CR Acinetobacter.   --Continue to follow CBC with diff  --Continue to follow temperature curve  --Follow up on preliminary blood cultures    #Hyperammonemia, Bilirubin Elevation  Ammonia elevation now thought to be secondary to alcoholic hepatitis given positive PETH  Ureaplasma PCR & Mycoplasma hominis PCR (4/18) Negative   Leptospira PCR (4/13) Negative    #Elevated Bronch Fungitell   Could be secondary to colonization of respiratory tract with Candida (growth of yeast on cultures)  Pneumocystis PCR negative on BAL    Transplant ID will not follow patient regularly going forward. Please feel free to reach out with any further questions or concerns.    Bhavesh Pérez M.D.  [facility] Division of Infectious Disease  8AM-5PM Monday - Friday: Available on Microsoft Teams  After Hours and Holidays (or if no response on Microsoft Teams): Please contact the Infectious Diseases Office at [phone]

## 2024-04-25 NOTE — PROGRESS NOTE ADULT - SUBJECTIVE AND OBJECTIVE BOX
[facility] NUTRITION SUPPORT-- FOLLOW UP NOTE      24 hour events/subjective:    TPN consulted for Protein Calorie Malnutrition not fully meeting nutritional goals enterally due to colitis/decreased enteral intake and may benefit from TPN for nutritional support. However, patient was declining TPN and is now better tolerating a diet.      Diet:      PAST HISTORY  --------------------------------------------------------------------------------  No significant changes to PMH, PSH, FHx, SHx, unless otherwise noted    ALLERGIES & MEDICATIONS  --------------------------------------------------------------------------------  Allergies    No Known Allergies    Intolerances      Standing Inpatient Medications  chlorhexidine 4% Liquid 1 Application(s) Topical <User Schedule>  cholecalciferol 2000 Unit(s) Oral daily  dextrose 50% Injectable 12.5 Gram(s) IV Push once  dextrose 50% Injectable 25 Gram(s) IV Push once  dextrose 50% Injectable 25 Gram(s) IV Push once  dextrose Oral Gel 15 Gram(s) Oral once  folic acid 1 milliGRAM(s) Oral daily  glucagon  Injectable 1 milliGRAM(s) IntraMuscular once  heparin   Injectable 5000 Unit(s) SubCutaneous every 12 hours  insulin glargine Injectable (LANTUS) 2 Unit(s) SubCutaneous at bedtime  insulin lispro (ADMELOG) corrective regimen sliding scale   SubCutaneous at bedtime  insulin lispro (ADMELOG) corrective regimen sliding scale   SubCutaneous three times a day before meals  lactulose Syrup 20 Gram(s) Oral every 8 hours  magnesium sulfate  IVPB 2 Gram(s) IV Intermittent once  methadone    Tablet 5 milliGRAM(s) Oral three times a day  multivitamin/minerals/iron Oral Solution (CENTRUM) 15 milliLiter(s) Oral daily  pantoprazole  Injectable 40 milliGRAM(s) IV Push daily  rifAXIMin 550 milliGRAM(s) Oral two times a day  zinc sulfate 220 milliGRAM(s) Oral daily    PRN Inpatient Medications      REVIEW OF SYSTEMS  --------------------------------------------------------------------------------  Gen: as per HPI  Skin: No rashes  Head/Eyes/Ears/Mouth: No headache; No sore throat  Respiratory: No dyspnea, cough,   CV: No chest pain, PND, orthopnea  GI: as per HPI  : No increased frequency, dysuria, hematuria, nocturia  MSK: No joint pain/swelling; no back pain; no edema  Neuro: No dizziness/lightheadedness, weakness, seizures, numbness, tingling  Psych: No significant nervousness, anxiety, stress, depression    All other systems were reviewed and are negative, except as noted.        LABS/STUDIES  --------------------------------------------------------------------------------              8.6    8.21  >-----------<  353      [04-25-24 @ 06:47]              26.4     137  |  104  |  <4  ----------------------------<  243      [04-25-24 @ 06:47]  4.1   |  25  |  0.41        Ca     8.8     [04-25-24 @ 06:47]      Mg     1.4     [04-25-24 @ 06:47]      Phos  2.5     [04-25-24 @ 06:47]    TPro  5.6  /  Alb  2.7  /  TBili  3.4  /  DBili  x   /  AST  37  /  ALT  16  /  AlkPhos  168  [04-25-24 @ 06:47]              Glucose: 243 mg/dL (04-25-24 @ 06:47)    Prealbumin, Serum: 3 mg/dL (04-24-24 @ 11:23)    Triglycerides      CAPILLARY BLOOD GLUCOSE      POCT Blood Glucose.: 182 mg/dL (25 Apr 2024 16:39)  POCT Blood Glucose.: 165 mg/dL (25 Apr 2024 11:49)  POCT Blood Glucose.: 261 mg/dL (25 Apr 2024 07:53)  POCT Blood Glucose.: 236 mg/dL (24 Apr 2024 21:36)        VITALS/PHYSICAL EXAM  --------------------------------------------------------------------------------  T(C): 36.9 (04-25-24 @ 11:53), Max: 37.1 (04-24-24 @ 21:30)  HR: 93 (04-25-24 @ 11:53) (83 - 100)  BP: 131/80 (04-25-24 @ 11:53) (103/61 - 131/80)  RR: 18 (04-25-24 @ 11:53) (18 - 18)  SpO2: 92% (04-25-24 @ 11:53) (92% - 96%)  Wt(kg): --        04-24-24 @ 07:01  -  04-25-24 @ 07:00  --------------------------------------------------------  IN: 160 mL / OUT: 0 mL / NET: 160 mL      Physical Exam:    Gen: NAD, laying in bed; jaundiced  HEENT: supple neck, no JVD  Chest: non labored breathing, equal chest expansion bilaterally  Abd: +BS, soft, nontender/nondistended  Ext: Warm, FROM, no edema b/l LE  Neuro: No focal deficits  Psych: Normal affect and mood  Skin: Warm, without rashes     .  .  .

## 2024-04-25 NOTE — PROGRESS NOTE ADULT - PROBLEM SELECTOR PLAN 1
#Alcoholic hepatitis/cirrhosis?  >Elevated bilirubin/coag, c/b hepatic encephalopathy; >PETH (4/13): > 400  >CTAP (4/13): Hepatomegaly and hepatic steatosis  >US abd (4/13): Hepatic steatosis, no hepatic vein thrombosis  >Acute hepatitis panel (4/13): Negative; Autoimmune hepatitis panel (4/13): Negative  >MRCP (4/15): Enlarged, fatty liver. No biliary duct dilation, unlikely obstruction  - c/w Folid acid, MVI; c/w thiamine 500mg Q8h x 3 days (4/18 - 4/20) given patient confabulating c/f wernicke vs. ICU delirium --> mental status improving    #Hyperammonemia   >Baseline mentation AAO3  >s/p CRRT (off since 4/16)  - f/u plasma amino acid (4/16): in lab  - f/u urine orotic acid (4/16) - sent out to [facility]  - f/u metabolic genetic consult to r/o acquired urea cycle disorder  - c/w Rifaximin; Lactulose  - c/w Trend Ammonia qd

## 2024-04-25 NOTE — PROGRESS NOTE ADULT - PROBLEM SELECTOR PLAN 7
>CTA/P (4/13): Nonspecific focal areas of narrowing in the distal descending colon and sigmoid colon, possibly colonic masses  >CTA/P (4/16): Improved colitis, previous ?colonic mass vs. narrowing still seen per discussion w/ radiology  - ctm, colonoscopy eventually

## 2024-04-25 NOTE — PROGRESS NOTE ADULT - ASSESSMENT
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 Transferred to [facility] for Liver Transplant Evaluation. Patient found to be hypoglycemic shortly after transfer, now resolved.    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 Patient was first diagnosed with Type 3c DM after her initial surgery in 2014 (s/p distal pancreatectomy, cholecystectomy, splenectomy and celina-en-y pancreaticojejuonostomy). Then she later underwent total pancreatectomy with islet cell autotransplant in 2018. After that, her insulin requirements decreased over the years but was never off insulin.    Endocrinologist: Dr. Dalton  Per recent outpatient note 3/26/2024: pump not available to review  Patient uses Omnipod Dash with Dexcom CGM   Basal rate:   MN 0.05U   6 AM 0.1U   9.30 AM 0.15U   Total basal 2.85U/24h   ICR 1:15   ICF 1:75   Reports of frequent hypoglycemia outpatient in March 2024.    C-peptide <0.1 4/15 at 6pm when BG was 168, likely insulin deficient.  C-peptide repeated while off dextrose and tube feeds: 0.1 with , confirming insulin deficiency.    On PO diet pureed    PLAN:  - Inpatient BG goal 100-180  - Continue Lantus to 2 units qhs  - continue Low dose admelog scale TIDAC, low dose scale qhs  - For discharge, patient prefers to resume insulin pump. Patient was instructed to bring in supplies to resume on discharge and for adjustment of settings given her low insulin requirements.    #Hypothyroidism vs NTIS  TSH 4.43. FT4 0.6, TT3 47 on admission (4/13)  Started on LT4 100mcg daily  Repeat TSH was wnl 3.67 (4/15)  - stopped levothyroxine   - f/u TPO antibodies  - recheck FT4 and TSH in 1 week 4/29 if remains inpatient, otherwise check outpatient.    Contact via Microsoft Teams during business hours  To reach covering provider access AMION via sunrise tools  For Urgent matters/after-hours/weekends/holidays please page endocrine fellow on call   For nonurgent matters please email [email or web address]    Please note that this patient may be followed by different provider tomorrow.  Notify endocrine 24 hours prior to discharge for final recommendations     .

## 2024-04-25 NOTE — PROGRESS NOTE ADULT - NUTRITIONAL ASSESSMENT
This patient has been assessed with a concern for Malnutrition and has been determined to have a diagnosis/diagnoses of Severe protein-calorie malnutrition and Underweight (BMI < 19).    This patient is being managed with:   Diet Soft and Bite Sized-  Entered: Apr 24 2024 11:52AM

## 2024-04-25 NOTE — PROGRESS NOTE ADULT - PROBLEM SELECTOR PLAN 8
Likely 2/2 alc use  -ctm  -transfuse <7 Likely 2/2 alc use although possible element of MDS given bandemia  -ctm  -transfuse <7  -Heme/Onc f.u outpt

## 2024-04-26 NOTE — PROGRESS NOTE ADULT - PROBLEM SELECTOR PLAN 8
Likely 2/2 alc use although possible element of MDS given bandemia  -ctm  -transfuse <7  -Heme/Onc f.u outpt

## 2024-04-26 NOTE — PROGRESS NOTE ADULT - SUBJECTIVE AND OBJECTIVE BOX
PROGRESS NOTE:   Authored by Dr. Negro Carroll MD ([id number]-1). Pager [facility] [phone] / [facility]     Patient is a 48y old  Female who presents with a chief complaint of Abd pain (24 Apr 2024 12:24)      SUBJECTIVE / OVERNIGHT EVENTS:  No acute events overnight.     ADDITIONAL REVIEW OF SYSTEMS:  Patient denies fevers, chills, chest pain, shortness of breath, nausea, abdominal pain, diarrhea, constipation, dysuria, leg swelling, headache, light headedness.    MEDICATIONS  (STANDING):  chlorhexidine 4% Liquid 1 Application(s) Topical <User Schedule>  cholecalciferol 2000 Unit(s) Oral daily  dextrose 50% Injectable 25 Gram(s) IV Push once  dextrose 50% Injectable 12.5 Gram(s) IV Push once  dextrose 50% Injectable 25 Gram(s) IV Push once  dextrose Oral Gel 15 Gram(s) Oral once  folic acid 1 milliGRAM(s) Oral daily  glucagon  Injectable 1 milliGRAM(s) IntraMuscular once  heparin   Injectable 5000 Unit(s) SubCutaneous every 12 hours  insulin glargine Injectable (LANTUS) 2 Unit(s) SubCutaneous at bedtime  insulin lispro (ADMELOG) corrective regimen sliding scale   SubCutaneous three times a day before meals  insulin lispro (ADMELOG) corrective regimen sliding scale   SubCutaneous at bedtime  lactulose Syrup 20 Gram(s) Oral every 8 hours  methadone    Tablet 5 milliGRAM(s) Oral three times a day  multivitamin/minerals/iron Oral Solution (CENTRUM) 15 milliLiter(s) Oral daily  pantoprazole  Injectable 40 milliGRAM(s) IV Push daily  rifAXIMin 550 milliGRAM(s) Oral two times a day  zinc sulfate 220 milliGRAM(s) Oral daily    MEDICATIONS  (PRN):      CAPILLARY BLOOD GLUCOSE      POCT Blood Glucose.: 161 mg/dL (26 Apr 2024 12:02)  POCT Blood Glucose.: 166 mg/dL (26 Apr 2024 08:19)  POCT Blood Glucose.: 225 mg/dL (25 Apr 2024 21:55)  POCT Blood Glucose.: 182 mg/dL (25 Apr 2024 16:39)    I&O's Summary      PHYSICAL EXAM:  Vital Signs Last 24 Hrs  T(C): 36.9 (26 Apr 2024 11:00), Max: 37.2 (25 Apr 2024 21:58)  T(F): 98.4 (26 Apr 2024 11:00), Max: 99 (25 Apr 2024 21:58)  HR: 97 (26 Apr 2024 11:00) (89 - 98)  BP: 134/78 (26 Apr 2024 11:00) (114/73 - 134/78)  BP(mean): --  RR: 19 (26 Apr 2024 11:00) (19 - 20)  SpO2: 92% (26 Apr 2024 11:00) (87% - 93%)    Parameters below as of 26 Apr 2024 11:00  Patient On (Oxygen Delivery Method): room air    Physical Exam: Gen: no acute distress; jaundiced  HEENT: atraumatic, normocephalic, pupils equally round and reactive to light, extraocular muscles intact, scleral icterus  CV: regular rate and rhythm, normal S1/S2, no murmurs, rubs, or gallops  Resp: lungs clear to auscultation bilaterally, no rales, rhonchi, or wheezes  GI: soft, slightly tender, nondistended, BSx4  MSK: extremities atraumatic, no cyanosis or clubbing  Skin: warm, dry, no rashes or lesions  Neuro: no focal deficits, sensation grossly intact  Psych: alert and oriented x3, appropriate mood and affec    LABS:                        8.6    8.21  )-----------( 353      ( 25 Apr 2024 06:47 )             26.4     04-25    137  |  104  |  <4<L>  ----------------------------<  243<H>  4.1   |  25  |  0.41<L>    Ca    8.8      25 Apr 2024 06:47  Phos  2.5     04-25  Mg     1.4     04-25    TPro  5.6<L>  /  Alb  2.7<L>  /  TBili  3.4<H>  /  DBili  x   /  AST  37  /  ALT  16  /  AlkPhos  168<H>  04-25        Urinalysis Basic - ( 25 Apr 2024 06:47 )    Color: x / Appearance: x / SG: x / pH: x  Gluc: 243 mg/dL / Ketone: x  / Bili: x / Urobili: x   Blood: x / Protein: x / Nitrite: x   Leuk Esterase: x / RBC: x / WBC x   Sq Epi: x / Non Sq Epi: x / Bacteria: x          Tele Reviewed:    RADIOLOGY & ADDITIONAL TESTS:  Results Reviewed:   Imaging Personally Reviewed:  Electrocardiogram Personally Reviewed:

## 2024-04-26 NOTE — PROGRESS NOTE ADULT - NUTRITIONAL ASSESSMENT
This patient has been assessed with a concern for Malnutrition and has been determined to have a diagnosis/diagnoses of Severe protein-calorie malnutrition and Underweight (BMI < 19).    This patient is being managed with:   Diet Soft and Bite Sized-  Supplement Feeding Modality:  Oral  Ensure Enlive Cans or Servings Per Day:  1       Frequency:  Two Times a day  Entered: Apr 26 2024 11:30AM

## 2024-04-26 NOTE — PROGRESS NOTE ADULT - SUBJECTIVE AND OBJECTIVE BOX
[facility] NUTRITION SUPPORT / TPN -- FOLLOW UP NOTE  --------------------------------------------------------------------------------    24 hour events/subjective:   No acute overnight events.  Tolerating small amounts, denies N/V.  Feels hungry.      Diet:      PAST HISTORY  --------------------------------------------------------------------------------  No significant changes to PMH, PSH, FHx, SHx, unless otherwise noted    ALLERGIES & MEDICATIONS  --------------------------------------------------------------------------------  Allergies    No Known Allergies    Intolerances      Standing Inpatient Medications  chlorhexidine 4% Liquid 1 Application(s) Topical <User Schedule>  cholecalciferol 2000 Unit(s) Oral daily  dextrose 50% Injectable 12.5 Gram(s) IV Push once  dextrose 50% Injectable 25 Gram(s) IV Push once  dextrose 50% Injectable 25 Gram(s) IV Push once  dextrose Oral Gel 15 Gram(s) Oral once  folic acid 1 milliGRAM(s) Oral daily  glucagon  Injectable 1 milliGRAM(s) IntraMuscular once  heparin   Injectable 5000 Unit(s) SubCutaneous every 12 hours  insulin glargine Injectable (LANTUS) 2 Unit(s) SubCutaneous at bedtime  insulin lispro (ADMELOG) corrective regimen sliding scale   SubCutaneous at bedtime  insulin lispro (ADMELOG) corrective regimen sliding scale   SubCutaneous three times a day before meals  lactulose Syrup 20 Gram(s) Oral every 8 hours  methadone    Tablet 5 milliGRAM(s) Oral three times a day  multivitamin/minerals/iron Oral Solution (CENTRUM) 15 milliLiter(s) Oral daily  pantoprazole  Injectable 40 milliGRAM(s) IV Push daily  rifAXIMin 550 milliGRAM(s) Oral two times a day  zinc sulfate 220 milliGRAM(s) Oral daily    PRN Inpatient Medications      VITALS/PHYSICAL EXAM  --------------------------------------------------------------------------------  T(C): 36.9 (04-26-24 @ 11:00), Max: 37.2 (04-25-24 @ 21:58)  HR: 97 (04-26-24 @ 11:00) (89 - 98)  BP: 134/78 (04-26-24 @ 11:00) (114/73 - 134/78)  RR: 19 (04-26-24 @ 11:00) (19 - 20)  SpO2: 92% (04-26-24 @ 11:00) (87% - 93%)  Wt(kg): --                  Physical Exam:    Gen: NAD, laying in bed; jaundiced  HEENT: supple neck, no JVD  Chest: non labored breathing, equal chest expansion bilaterally  Abd: +BS, soft, nontender/nondistended  Ext: Warm, FROM, no edema b/l LE  Neuro: No focal deficits  Psych: Normal affect and mood  Skin: Warm, without rashes       LABS/STUDIES  --------------------------------------------------------------------------------              8.6    8.21  >-----------<  353      [04-25-24 @ 06:47]              26.4     137  |  104  |  <4  ----------------------------<  243      [04-25-24 @ 06:47]  4.1   |  25  |  0.41        Ca     8.8     [04-25-24 @ 06:47]      Mg     1.4     [04-25-24 @ 06:47]      Phos  2.5     [04-25-24 @ 06:47]    TPro  5.6  /  Alb  2.7  /  TBili  3.4  /  DBili  x   /  AST  37  /  ALT  16  /  AlkPhos  168  [04-25-24 @ 06:47]          Ca ionized  Creatinine Trend:  POC glucoseCAPILLARY BLOOD GLUCOSE      POCT Blood Glucose.: 161 mg/dL (26 Apr 2024 12:02)  POCT Blood Glucose.: 166 mg/dL (26 Apr 2024 08:19)  POCT Blood Glucose.: 225 mg/dL (25 Apr 2024 21:55)  POCT Blood Glucose.: 182 mg/dL (25 Apr 2024 16:39)    PrealbuminPrealbumin, Serum: 3 mg/dL (04-24-24 @ 11:23)    Triglycerides

## 2024-04-26 NOTE — PROGRESS NOTE ADULT - SUBJECTIVE AND OBJECTIVE BOX
Seen earlier today     Chief Complaint: Diabetes Mellitus follow up    INTERVAL HX: Tolerating soft diet, with improved oral intake, ate egg, cream of wheat, yogurt and pudding for breakfast today. Still does not have pump at bedside, asked patient to have her fiance to bring pump before discharge so the setting can be evaluated and adjusted if needed. As per pt she was having hypoglycemia while on pump between 10 pm-3pm.      Review of Systems:  General: As above  GI: No nausea, vomiting  Endocrine: no  S&Sx of hypoglycemia    Allergies    No Known Allergies    Intolerances      MEDICATIONS  (STANDING):  chlorhexidine 4% Liquid 1 Application(s) Topical <User Schedule>  cholecalciferol 2000 Unit(s) Oral daily  dextrose 50% Injectable 12.5 Gram(s) IV Push once  dextrose 50% Injectable 25 Gram(s) IV Push once  dextrose 50% Injectable 25 Gram(s) IV Push once  dextrose Oral Gel 15 Gram(s) Oral once  folic acid 1 milliGRAM(s) Oral daily  glucagon  Injectable 1 milliGRAM(s) IntraMuscular once  heparin   Injectable 5000 Unit(s) SubCutaneous every 12 hours  insulin glargine Injectable (LANTUS) 2 Unit(s) SubCutaneous at bedtime  insulin lispro (ADMELOG) corrective regimen sliding scale   SubCutaneous three times a day before meals  insulin lispro (ADMELOG) corrective regimen sliding scale   SubCutaneous at bedtime  lactulose Syrup 20 Gram(s) Oral every 8 hours  methadone    Tablet 5 milliGRAM(s) Oral three times a day  multivitamin/minerals/iron Oral Solution (CENTRUM) 15 milliLiter(s) Oral daily  pantoprazole  Injectable 40 milliGRAM(s) IV Push daily  rifAXIMin 550 milliGRAM(s) Oral two times a day  zinc sulfate 220 milliGRAM(s) Oral daily        insulin glargine Injectable (LANTUS)   2 Unit(s) SubCutaneous (04-25-24 @ 22:05)    insulin lispro (ADMELOG) corrective regimen sliding scale   1 Unit(s) SubCutaneous (04-26-24 @ 12:13)   1 Unit(s) SubCutaneous (04-26-24 @ 08:25)   1 Unit(s) SubCutaneous (04-25-24 @ 16:44)        PHYSICAL EXAM:  VITALS: T(C): 36.9 (04-26-24 @ 11:00)  T(F): 98.4 (04-26-24 @ 11:00), Max: 99 (04-25-24 @ 21:58)  HR: 97 (04-26-24 @ 11:00) (89 - 98)  BP: 134/78 (04-26-24 @ 11:00) (114/73 - 134/78)  RR:  (19 - 20)  SpO2:  (87% - 93%)  Wt(kg): --  GENERAL: Female sitting in chair, in NAD  Respiratory: Respirations unlabored   Extremities: Warm, no edema  NEURO: Alert , appropriate     LABS:  POCT Blood Glucose.: 161 mg/dL (04-26-24 @ 12:02)  POCT Blood Glucose.: 166 mg/dL (04-26-24 @ 08:19)  POCT Blood Glucose.: 225 mg/dL (04-25-24 @ 21:55)  POCT Blood Glucose.: 182 mg/dL (04-25-24 @ 16:39)  POCT Blood Glucose.: 165 mg/dL (04-25-24 @ 11:49)  POCT Blood Glucose.: 261 mg/dL (04-25-24 @ 07:53)  POCT Blood Glucose.: 236 mg/dL (04-24-24 @ 21:36)  POCT Blood Glucose.: 205 mg/dL (04-24-24 @ 16:29)  POCT Blood Glucose.: 220 mg/dL (04-24-24 @ 11:41)  POCT Blood Glucose.: 179 mg/dL (04-24-24 @ 08:10)  POCT Blood Glucose.: 239 mg/dL (04-23-24 @ 21:39)  POCT Blood Glucose.: 204 mg/dL (04-23-24 @ 16:39)                          8.6    8.21  )-----------( 353      ( 25 Apr 2024 06:47 )             26.4     04-25    137  |  104  |  <4<L>  ----------------------------<  243<H>  4.1   |  25  |  0.41<L>    Ca    8.8      25 Apr 2024 06:47  Phos  2.5     04-25  Mg     1.4     04-25    TPro  5.6<L>  /  Alb  2.7<L>  /  TBili  3.4<H>  /  DBili  x   /  AST  37  /  ALT  16  /  AlkPhos  168<H>  04-25 04-24 Chol 131 Direct LDL -- LDL calculated 103<H> HDL 7<L> Trig 109, 04-13 Chol 82 Direct LDL -- LDL calculated 57 HDL 8<L> Trig 82  Thyroid Function Tests:  04-23 @ 14:44 TSH -- FreeT4 -- T3 -- Anti TPO 60.7 Anti Thyroglobulin Ab -- TSI --  04-23 @ 06:56 TSH -- FreeT4 -- T3 -- Anti TPO <10.0 Anti Thyroglobulin Ab -- TSI --      A1C with Estimated Average Glucose Result: 5.2 % (04-13-24 @ 03:56)    Estimated Average Glucose: 103 mg/dL (04-13-24 @ 03:56)    Fructosamine, Serum: 372 umol/L (04-13-24 @ 09:41)

## 2024-04-26 NOTE — PROGRESS NOTE ADULT - ATTENDING COMMENTS
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 Required intubation for airway protection, was found to be in acute liver failure and hepatic encephalopathy w/ ammonia >200. Was transferred to MICU here for CRRT and liver transplant evaluation. Was initially started on Bobby/Dapto/Doxy/Caspofungin for broad spectrum coverage, then deescalated to Vanc/Bobby. CRRT initiated, ammonia levels downtrended, stopped on 4/16, and TFs restarted w/ lactulose. Was eventually extubated 4/17. Acute liver failure and hepatic encephalopathy currently being treated as due to alcholic hepatitis     on room air saturating 97% denies sob    - C/w Lactulose and Rifaximin. c/w lactulose 20mg q8hr; Monitor stool count. goal 3-4B<s; Mental status at [facility]  - Orotic acid and plasma amino acid levels sent out and pending  - Factor V Leiden. Hx of DVT. Per chart review Eliquis stopped 5/2023 by outpatient hematology given consistently negative DVT studies  - T1DM; - Inpatient BG goal 100-180  - Lantus to 2 units  appreciate endo recs; multiple episodes of symptomatic hypoglycemia likely from poor po intake now improving  - diet changed to soft and bite sized, meds with puree   - Carbapenem resistant Acinetobacter baumanii on ETT. ID following; rec monitoring off abx for now. Monitor fever curve, WBC trend.   - Hypothyroidism vs NTIS; TSH 4.43. FT4 0.6, TT3 47 on admission (4/13)  Repeat TSH was wnl 3.67 (4/15); synthroid stopped  recheck FT4 and TSH in 1 week 4/29 if remains inpatient  Right sided colitis  w/ right sided thickening seen on CT imaging.   - Patient has no abd pain but has diarrhea in the setting of lactulose use. C diff neg. GI PCR neg on 4/15  - Differentials include possible viral infection , malignancy, ischemia, etc. Improving at this time based on two abdominal imaging.   + sputum for CRE acinetobacter baumanii on ETT- CT chest with improving prior infiltrates on RA now; leukocytosis is improving, will obtain CBC today a  - opt hepatology, endo f/u , opt hematology workup for macrocytic anemia, bandemia on diff but no foci of incection  - intermittent hypoxia- on room air 87% at night- check CXR     pending AR

## 2024-04-26 NOTE — PROGRESS NOTE ADULT - ASSESSMENT
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 Hargrove), and now S/P total pancreatectomy  with islet cell autotransplant at [facility] by Dr. Gil in 04/2018).  Patient admitted with AMS, found to be in liver failure/hepatic encephalopathy and imaging shows colitis.  TPN consulted given severe protein calorie malnutrition and allowing possibility of bowel rest.  Patient with history of TPN use 2 years ago.      - Patient does not want to start TPN at present time and is concerned about risk of infection  - Pt would like to try Ensure shakes to supplement PO intake  - Recommend checking baseline nutrition parameters:  TSH, Prealbumin, Lipids, HgA1c, iCal, Mag, Phos  - Electrolyte Imbalance risk:  check CMP, Mg, Phos and ionized Ca daily, to be supplemented peripherally per primary team.  - Recommend strict intake and output/weight checks three times a week  - Further care per primary team    TPN spectra [id number]  M-F 8:30A-2:30P, Weekends and holidays 8A-12P  discussed with Dr. Garces

## 2024-04-26 NOTE — CHART NOTE - NSCHARTNOTEFT_GEN_A_CORE
Nutrition Follow Up Note  Patient seen for: Severe malnutrition Follow Up    Chart reviewed, events noted.    Source: [X] Patient       [x] Electronic Medical Record     [X] RN        [] Family at bedside       [] Other:    -If unable to interview patient: [] Trach/Vent/BiPAP  [] Disoriented/confused/inappropriate to interview    Diet Order:   Diet, Soft and Bite Sized:   Supplement Feeding Modality:  Oral  Ensure Enlive Cans or Servings Per Day:  1       Frequency:  Two Times a day (24 @ 11:30) [Active]      - Is current order appropriate/adequate? [] Yes  [X]  No: See recommendations below    - PO intake :   [] >75%  Adequate    [] 50-75%  Fair       [X] <50%  Poor    Nutrition-related concerns:  - Alcohol hepatitis with hyperammonemia; ordered for lactulose.    - Ischemic colitis; noted colonic mass. TPN team following; per chart, at this time pt is not interested in initiating TPN.   - Sepsis; previously ordered for abx. regimen.   - Per endo, Type 3c DM; S/p total pancreatectomy with islet cell autotransplant (extensive GI history)  RD will continue to monitor blood glucose levels prn.     Intake:  - Endorsed ongoing poor appetite/PO intake; consuming </=50% of meals in-house. Pt receptive to trialing Ensure Plus High Protein (Vanilla, Provides 20g PRO, 350 [place] per serving) 2x/day and HP Gelatein Plus (per serving provides 20 g PRO, 160 [place]) 1x/day to optimize PO intake. Obtained pt's food preferences; RD to honor as able.   - Ordered for multivitamin, folic acid, zinc supplementation; thiamine supplementation ordered ( - ).     GI:   - Reports recurrent nausea and diarrhea;  Last BM   Bowel Regimen? [X] Yes: Lactulose.   - Ordered for PPI.     Weights:   - Dosing wt in k.4 (); No updated daily wts available. RD unable to obtain updated wt secondary to pt sitting at bedside. RD will continue to monitor weight trends as available/able.   - IBW: 56.8 kg (based on ht of 65 in)  - BMI: kg/m^2 (based on dosing wt)    Nutritionally Pertinent MEDICATIONS  (STANDING):  cholecalciferol  dextrose 50% Injectable  dextrose 50% Injectable  dextrose 50% Injectable  dextrose Oral Gel  folic acid  glucagon  Injectable  insulin glargine Injectable (LANTUS)  insulin lispro (ADMELOG) corrective regimen sliding scale  insulin lispro (ADMELOG) corrective regimen sliding scale  lactulose Syrup  multivitamin/minerals/iron Oral Solution (CENTRUM)  pantoprazole  Injectable  rifAXIMin  zinc sulfate    Pertinent Labs:   A1C with Estimated Average Glucose Result: 5.2 % (24 @ 03:56)    Finger Sticks:  POCT Blood Glucose.: 161 mg/dL ( @ 12:02)  POCT Blood Glucose.: 166 mg/dL ( @ 08:19)  POCT Blood Glucose.: 225 mg/dL ( @ 21:55)  POCT Blood Glucose.: 182 mg/dL ( @ 16:39)      Skin per nursing documentation:   Edema per nursing documentation:    Estimated Needs: Based on   [] no change since previous assessment  [] recalculated:   Caloric Needs: XXXX - XXXX Kcal/day (kcal/kg)  Protein Needs: XX - XX g PRO/day (g PRO/kg)  Fluid Needs: XXXX - XXXX mL/day (mL/kg)  Defer fluid needs to team.    Previous Nutrition Diagnosis:   Nutrition Diagnosis is: [] ongoing  [] resolved [] not applicable     New Nutrition Diagnosis: [] Not applicable    Nutrition Care Plan:  [] In Progress  [] Achieved  [] Not applicable    Nutrition Interventions:     Education Provided:       [] Yes:  [] No:        Recommendations:         [X] Continue current diet order:            [X] Add oral nutrition supplement:     [X] Continue with multivitamin // Nephrovite once daily for micronutrient coverage, pending no medical contraindications      [X] Encourage small, frequent nutrient dense meals; obtain food preferences to optimize PO intake; Provide meal time assistance prn.      [X]      [X]     [X]    Monitoring and Evaluation:   Continue to monitor nutritional intake, tolerance to diet prescription, weights, labs, skin integrity      RD remains available upon request and will follow up per protocol Nutrition Follow Up Note  Patient seen for: Severe malnutrition Follow Up    Chart reviewed, events noted.    Source: [X] Patient       [x] Electronic Medical Record     [X] RN        [] Family at bedside       [] Other:    -If unable to interview patient: [] Trach/Vent/BiPAP  [] Disoriented/confused/inappropriate to interview    Diet Order:   Diet, Soft and Bite Sized:   Supplement Feeding Modality:  Oral  Ensure Enlive Cans or Servings Per Day:  1       Frequency:  Two Times a day (24 @ 11:30) [Active]      - Is current order appropriate/adequate? [] Yes  [X]  No: See recommendations below    - PO intake :   [] >75%  Adequate    [] 50-75%  Fair       [X] <50%  Poor    Nutrition-related concerns:  - Alcohol hepatitis with hyperammonemia; ordered for lactulose.    - Ischemic colitis; noted colonic mass. TPN team following; per chart, at this time pt is not interested in initiating TPN.   - Sepsis; previously ordered for abx. regimen.   - Per endo (), Type 3c DM; Hx of total pancreatectomy with islet cell autotransplant (extensive GI history). Elevated POCTs x 24 hrs: 161 - 225 mg/dL. Ordered for Lantus 2 units, SSI. RD will continue to monitor blood glucose levels prn.     Intake:  - S/p swallow evaluation (), speech language pathologist recommended "Soft and bite sized solid; thin liquids via cup or straws; encourage separation of mixed textures such as cereals and soups (liquid and solids mixed textures) during consumption." Per team, noted PO intake improved with diet upgrade from purees to soft and bite sized diet.   - However, pt reports ongoing poor appetite/PO intake; consuming </=50% of meals in-house. Pt receptive to trialing Ensure Plus High Protein (Vanilla, Provides 20g PRO, 350 [place] per serving) 2x/day and HP Gelatein Plus (per serving provides 20 g PRO, 160 [place]) 1x/day to optimize PO intake. Obtained pt's food preferences; RD to honor as able.   - Ordered for multivitamin, folic acid, zinc supplementation; thiamine supplementation ordered ( - ).     GI:   - Reports recurrent nausea and diarrhea - ? complete accuracy as no reports per team. Last BM   Bowel Regimen? [X] Yes: Lactulose.   - Ordered for PPI.     Weights:   - Dosing wt in k.4 (); No updated daily wts available. RD unable to obtain updated wt secondary to pt sitting at bedside. RD will continue to monitor weight trends as available/able.   - IBW: 56.8 kg (based on ht of 65 in)  - BMI: 22.9 kg/m^2 (based on recent [facility] wt of 45.8 kg from 3/12)    Nutritionally Pertinent MEDICATIONS  (STANDING):  cholecalciferol  dextrose 50% Injectable  dextrose 50% Injectable  dextrose 50% Injectable  dextrose Oral Gel  folic acid  glucagon  Injectable  insulin glargine Injectable (LANTUS)  insulin lispro (ADMELOG) corrective regimen sliding scale  insulin lispro (ADMELOG) corrective regimen sliding scale  lactulose Syrup  multivitamin/minerals/iron Oral Solution (CENTRUM)  pantoprazole  Injectable  rifAXIMin  zinc sulfate    Pertinent Labs:   A1C with Estimated Average Glucose Result: 5.2 % (24 @ 03:56)    Finger Sticks:  POCT Blood Glucose.: 161 mg/dL ( @ 12:02)  POCT Blood Glucose.: 166 mg/dL ( @ 08:19)  POCT Blood Glucose.: 225 mg/dL ( @ 21:55)  POCT Blood Glucose.: 182 mg/dL ( @ 16:39)    Skin per nursing documentation: Suspected deep tissue injury coccyx, sacrum, elvie. heel, left lateral malleolus, right elbow   Edema per nursing documentation: 3+ left hand; right hand; right arm; left arm    Based on recent wt of 45.8 kg (3/12, [facility])  Estimated Energy Needs: 1374 - 1603 kcal/day (30-35 kcals/kg)   Estimated Protein Needs: 68.7 - 91.6 g PRO/day (1.5-2.0 g PRO/kg)   Fluid needs deferred to provider.     Previous Nutrition Diagnosis: Severe acute on chronic malnutrition; Underweight; Increased protein-energy needs  Nutrition Diagnosis is: [X] ongoing  [] resolved [] not applicable     New Nutrition Diagnosis: [X] Not applicable    Nutrition Care Plan:  [X] In Progress; being addressed via PO intake, oral nutrition supplementation, micronutrient supplementation  [] Achieved  [] Not applicable    Nutrition Interventions:     Education Provided:       [X] Yes: Educated on the importance of consuming a diet adequate in protein rich food sources; encouraged prioritizing protein foods first at meal times; recommended small, frequent nutrient dense meals. Discussed the benefits of oral nutrition supplementation; recommended having small sips in between meals to optimize protein intake. Pt receptive to diet education and made aware RD remains available upon request.        Recommendations:         [X] Consider changing diet to low fiber (soft and bite sized diet); defer diet texture/consistency to speech language pathologist prn.             [X] Change oral nutrition supplement: Ensure Plus High Protein (Provides 20g PRO, 350 [place] per serving) 2x/day and HP Gelatein Plus (per serving provides 20 g PRO, 160 [place]) 1x/day to optimize protein/caloric intake.             [X] Continue to address pt's nutrition GOC regarding TPN support as medically appropriate/able.      [X] Continue with multivitamin, folic acid, vitamin D3, zinc sulfate once daily for micronutrient coverage/hx of EtOH use, pending no medical contraindications      [X] Encourage small, frequent nutrient dense meals; obtain food preferences to optimize PO intake; Provide meal time assistance prn.      Monitoring and Evaluation:   Continue to monitor nutritional intake, tolerance to diet prescription, weights, labs, skin integrity  Nancy Cooper RD Available via Teams     RD remains available upon request and will follow up per protocol Nutrition Follow Up Note  Patient seen for: Severe malnutrition Follow Up    Chart reviewed, events noted.    Source: [X] Patient       [x] Electronic Medical Record     [X] RN        [] Family at bedside       [] Other:    -If unable to interview patient: [] Trach/Vent/BiPAP  [] Disoriented/confused/inappropriate to interview    Diet Order:   Diet, Soft and Bite Sized:   Supplement Feeding Modality:  Oral  Ensure Enlive Cans or Servings Per Day:  1       Frequency:  Two Times a day (24 @ 11:30) [Active]      - Is current order appropriate/adequate? [] Yes  [X]  No: See recommendations below    - PO intake :   [] >75%  Adequate    [] 50-75%  Fair       [X] <50%  Poor    Nutrition-related concerns:  - Alcohol hepatitis with hyperammonemia; ordered for lactulose.    - Ischemic colitis; noted colonic mass. TPN team following; per chart, at this time pt is not interested in initiating TPN.   - Sepsis; previously ordered for abx. regimen.   - Per endo (), Type 3c DM; Hx of total pancreatectomy with islet cell autotransplant (extensive GI history). Elevated POCTs x 24 hrs: 161 - 225 mg/dL. Ordered for Lantus 2 units, SSI. RD will continue to monitor blood glucose levels prn.     Intake:  - S/p swallow evaluation (), speech language pathologist recommended "Soft and bite sized solid; thin liquids via cup or straws; encourage separation of mixed textures such as cereals and soups (liquid and solids mixed textures) during consumption." Per team, noted PO intake improved with diet upgrade from purees to soft and bite sized diet.   - However, pt reports ongoing poor appetite/PO intake; consuming </=50% of meals in-house. Pt receptive to trialing Ensure Plus High Protein (Vanilla, Provides 20g PRO, 350 [place] per serving) 2x/day and HP Gelatein Plus (per serving provides 20 g PRO, 160 [place]) 1x/day to optimize PO intake. Obtained pt's food preferences; RD to honor as able.   - Ordered for multivitamin, folic acid, zinc supplementation; thiamine supplementation ordered ( - ).     GI:   - Reports recurrent nausea and diarrhea - ? complete accuracy as no reports per team. Last BM   Bowel Regimen? [X] Yes: Lactulose.   - Ordered for PPI.     Weights:   - Dosing wt in k.4 (); No updated daily wts available. RD unable to obtain updated wt secondary to pt sitting at bedside. RD will continue to monitor weight trends as available/able.   - IBW: 56.8 kg (based on ht of 65 in)  - BMI: 22.9 kg/m^2 (based on recent [facility] wt of 45.8 kg from 3/12)    Nutritionally Pertinent MEDICATIONS  (STANDING):  cholecalciferol  dextrose 50% Injectable  dextrose 50% Injectable  dextrose 50% Injectable  dextrose Oral Gel  folic acid  glucagon  Injectable  insulin glargine Injectable (LANTUS)  insulin lispro (ADMELOG) corrective regimen sliding scale  insulin lispro (ADMELOG) corrective regimen sliding scale  lactulose Syrup  multivitamin/minerals/iron Oral Solution (CENTRUM)  pantoprazole  Injectable  rifAXIMin  zinc sulfate    Pertinent Labs:   A1C with Estimated Average Glucose Result: 5.2 % (24 @ 03:56)    Finger Sticks:  POCT Blood Glucose.: 161 mg/dL ( @ 12:02)  POCT Blood Glucose.: 166 mg/dL ( @ 08:19)  POCT Blood Glucose.: 225 mg/dL ( @ 21:55)  POCT Blood Glucose.: 182 mg/dL ( @ 16:39)    Skin per nursing documentation: Suspected deep tissue injury coccyx, sacrum, elvie. heel, left lateral malleolus, right elbow   Edema per nursing documentation: 3+ left hand; right hand; right arm; left arm    Based on recent wt of 45.8 kg (3/12, [facility])  Estimated Energy Needs: 1374 - 1603 kcal/day (30-35 kcals/kg)   Estimated Protein Needs: 68.7 - 91.6 g PRO/day (1.5-2.0 g PRO/kg)   Fluid needs deferred to provider.     Previous Nutrition Diagnosis: Severe acute on chronic malnutrition; Underweight; Increased protein-energy needs  Nutrition Diagnosis is: [X] ongoing  [] resolved [] not applicable     New Nutrition Diagnosis: [X] Not applicable    Nutrition Care Plan:  [X] In Progress; being addressed via PO intake, oral nutrition supplementation, micronutrient supplementation  [] Achieved  [] Not applicable    Nutrition Interventions:     Education Provided:       [X] Yes: Educated on the importance of consuming a diet adequate in protein rich food sources; encouraged prioritizing protein foods first at meal times; recommended small, frequent nutrient dense meals. Discussed the benefits of oral nutrition supplementation; recommended having small sips in between meals to optimize protein intake. Pt receptive to diet education and made aware RD remains available upon request.        Recommendations:         [X] Consider changing diet to low fiber (soft and bite sized diet); defer diet texture/consistency to speech language pathologist prn.             [X] Change oral nutrition supplement: Ensure Plus High Protein (Provides 20g PRO, 350 [place] per serving) 2x/day and HP Gelatein Plus (per serving provides 20 g PRO, 160 [place]) 1x/day to optimize protein/caloric intake.             [X] Continue to address pt's nutrition GOC regarding TPN support as medically appropriate/able.      [X] Continue with multivitamin & folic acid secondary to hx of EtOH use, vitamin D3 for micronutrient coverage, zinc sulfate for wound healing, pending no medical contraindications      [X] Encourage small, frequent nutrient dense meals; obtain food preferences to optimize PO intake; Provide meal time assistance prn.      Monitoring and Evaluation:   Continue to monitor nutritional intake, tolerance to diet prescription, weights, labs, skin integrity  Nancy Cooper RD Available via Teams     RD remains available upon request and will follow up per protocol

## 2024-04-26 NOTE — PROGRESS NOTE ADULT - ASSESSMENT
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 Transferred to [facility] for Liver Transplant Evaluation. Patient found to be hypoglycemic shortly after transfer, now resolved. Consulted for: Type 3c DM, hypoglycemia. BG Goal 100-180mg/dl   Last 24 hour BGs variable 100s-225, Last night  and Fasting , significantly decreased BG in am and had fasting hypoglycemia on 4/23 after Lantus 3 units at HS. Would move Lantus to am to prevent overnight and fasting hypoglycemia.      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 Patient was first diagnosed with Type 3c DM after her initial surgery in 2014 (s/p distal pancreatectomy, cholecystectomy, splenectomy and celina-en-y pancreaticojejuonostomy). Then she later underwent total pancreatectomy with islet cell autotransplant in 2018. After that, her insulin requirements decreased over the years but was never off insulin.    Endocrinologist: Dr. Dalton  Per recent outpatient note 3/26/2024: pump not available to review  Patient uses Omnipod Dash with Dexcom CGM G7  Basal rate:   MN 0.05U   6 AM 0.1U   9.30 AM 0.15U   Total basal 2.825U/24h   ICR 1:15   ICF 1:75   Reports of frequent hypoglycemia outpatient in March 2024.    C-peptide <0.1 4/15 at 6pm when BG was 168, insulin deficient.  C-peptide repeated while off dextrose and tube feeds: 0.1 with , confirming insulin deficiency.    PLAN:  - Inpatient BG goal 100-180  - Will move lantus 2 units to am to prevent overnight and fasting hypoglycemia ( give 1 unit tonight and start 2 units Lantus in am)   - continue Low dose admelog scale TIDAC, low dose scale qhs  - Please keep hypoglycemia protocol in place    - Discharge planning likely tomorrow to acute rehab  Needs to be on basal and bolus vs pump given insulin deficiency ( low c- peptide 0.1 with )  patient prefers to resume insulin pump. Patient was instructed to bring in supplies to resume on discharge, but patient still does not have pump at bedside. Unable to adjust setting at this time.     Would recommend basal and bolus insulin injection while at rehab and home for now. She should  follow up with her endocrinologist before start back on insulin pump for pump setting adjusted ( she was having hypoglycemia at home )   -Recommend Lantus 2 units in am   -Monitor BGS ACTID and at HS on low correction scale ( start premeal standing insulin if BGs > 180s )  -BGs should be monitored and insulin doses to be adjusted at rehab     #Hypothyroidism vs NTIS  TSH 4.43. FT4 0.6, TT3 47 on admission (4/13)  Started on LT4 100mcg daily  Repeat TSH was wnl 3.67 (4/15)  - stopped levothyroxine   - f/u TPO antibodies  - recheck FT4 and TSH in 1 week 4/29 if remains inpatient, otherwise check outpatient.    Contact via Microsoft Teams during business hours  To reach covering provider access ALEXA via sunrise tools  For Urgent matters/after-hours/weekends/holidays please page endocrine fellow on call   For nonurgent matters please email [email or web address]    Please note that this patient may be followed by different provider tomorrow.  Notify endocrine 24 hours prior to discharge for final recommendations     .

## 2024-04-26 NOTE — PROGRESS NOTE ADULT - PROBLEM SELECTOR PLAN 1
#Alcoholic hepatitis/cirrhosis?  >Elevated bilirubin/coag, c/b hepatic encephalopathy; >PETH (4/13): > 400  >CTAP (4/13): Hepatomegaly and hepatic steatosis  >US abd (4/13): Hepatic steatosis, no hepatic vein thrombosis  >Acute hepatitis panel (4/13): Negative; Autoimmune hepatitis panel (4/13): Negative  >MRCP (4/15): Enlarged, fatty liver. No biliary duct dilation, unlikely obstruction  - c/w Folid acid, MVI; c/w thiamine 500mg Q8h x 3 days (4/18 - 4/20) given patient confabulating c/f wernicke vs. ICU delirium --> mental status improving    #Hyperammonemia   >Baseline mentation AAO3  >s/p CRRT (off since 4/16)  - f/u plasma amino acid (4/16): in lab  - f/u urine orotic acid (4/16) - sent out to [facility]: *elevated  - f/u metabolic genetic consult to r/o acquired urea cycle disorder  - c/w Rifaximin; Lactulose  - c/w Trend Ammonia qd

## 2024-04-26 NOTE — PROGRESS NOTE ADULT - ASSESSMENT
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 Presented to [facility] 4/8 for several weeks of decreased PO intake, NBNB emesis, watery diarrhea, Somnolence, requiring intubation for airway protection. Lab revealed ammonia > 200, elevated bilirubin and INR initially c/f liver failure, c/c/b colitis possibly ischemic etiology. Patient transferred to [facility] MICU 4/13 for CRRT initiation for ammonia clearance.

## 2024-04-27 NOTE — PROGRESS NOTE ADULT - SUBJECTIVE AND OBJECTIVE BOX
PATIENT: CARA ZALDIVAR, MRN: [id number]    CHIEF COMPLAINT: Patient is a 48y old  Female who presents with a chief complaint of Abd pain (24 Apr 2024 12:24)      INTERVAL HISTORY/OVERNIGHT EVENTS: No overnight events. Patient with oxygen requirements, has been on nasal cannula.       MEDICATIONS:  MEDICATIONS  (STANDING):  chlorhexidine 4% Liquid 1 Application(s) Topical <User Schedule>  cholecalciferol 2000 Unit(s) Oral daily  dextrose 50% Injectable 25 Gram(s) IV Push once  dextrose 50% Injectable 12.5 Gram(s) IV Push once  dextrose 50% Injectable 25 Gram(s) IV Push once  dextrose Oral Gel 15 Gram(s) Oral once  folic acid 1 milliGRAM(s) Oral daily  glucagon  Injectable 1 milliGRAM(s) IntraMuscular once  heparin   Injectable 5000 Unit(s) SubCutaneous every 12 hours  insulin glargine Injectable (LANTUS) 2 Unit(s) SubCutaneous <User Schedule>  insulin lispro (ADMELOG) corrective regimen sliding scale   SubCutaneous three times a day before meals  insulin lispro (ADMELOG) corrective regimen sliding scale   SubCutaneous <User Schedule>  lactulose Syrup 20 Gram(s) Oral every 8 hours  methadone    Tablet 5 milliGRAM(s) Oral three times a day  multivitamin/minerals/iron Oral Solution (CENTRUM) 15 milliLiter(s) Oral daily  pantoprazole  Injectable 40 milliGRAM(s) IV Push daily  rifAXIMin 550 milliGRAM(s) Oral two times a day  zinc sulfate 220 milliGRAM(s) Oral daily    MEDICATIONS  (PRN):      ALLERGIES: Allergies    No Known Allergies    Intolerances        OBJECTIVE:  ICU Vital Signs Last 24 Hrs  T(C): 37.1 (27 Apr 2024 04:33), Max: 37.8 (26 Apr 2024 20:58)  T(F): 98.7 (27 Apr 2024 04:33), Max: 100 (26 Apr 2024 20:58)  HR: 100 (27 Apr 2024 04:33) (97 - 104)  BP: 133/76 (27 Apr 2024 04:33) (122/76 - 135/79)  BP(mean): --  ABP: --  ABP(mean): --  RR: 18 (27 Apr 2024 04:33) (18 - 19)  SpO2: 91% (27 Apr 2024 04:33) (91% - 93%)    O2 Parameters below as of 27 Apr 2024 04:33  Patient On (Oxygen Delivery Method): room air            CAPILLARY BLOOD GLUCOSE      POCT Blood Glucose.: 176 mg/dL (27 Apr 2024 03:46)  POCT Blood Glucose.: 76 mg/dL (27 Apr 2024 03:02)  POCT Blood Glucose.: 76 mg/dL (27 Apr 2024 02:32)  POCT Blood Glucose.: 69 mg/dL (27 Apr 2024 02:30)  POCT Blood Glucose.: 76 mg/dL (27 Apr 2024 01:56)  POCT Blood Glucose.: 130 mg/dL (26 Apr 2024 21:20)  POCT Blood Glucose.: 98 mg/dL (26 Apr 2024 16:50)  POCT Blood Glucose.: 161 mg/dL (26 Apr 2024 12:02)  POCT Blood Glucose.: 166 mg/dL (26 Apr 2024 08:19)    CAPILLARY BLOOD GLUCOSE      POCT Blood Glucose.: 176 mg/dL (27 Apr 2024 03:46)    I&O's Summary    Daily     Daily     PHYSICAL EXAMINATION:  Physical Exam: Gen: no acute distress; jaundiced  HEENT: atraumatic, normocephalic, pupils equally round and reactive to light, extraocular muscles intact, scleral icterus  CV: regular rate and rhythm, normal S1/S2, no murmurs, rubs, or gallops  Resp: lungs clear to auscultation bilaterally, no rales, rhonchi, or wheezes  GI: soft, slightly tender, nondistended, BSx4  MSK: extremities atraumatic, no cyanosis or clubbing  Skin: warm, dry, no rashes or lesions  Neuro: no focal deficits, sensation grossly intact  Psych: alert and oriented x3, appropriate mood and affec        LABS:

## 2024-04-27 NOTE — PROVIDER CONTACT NOTE (HYPOGLYCEMIA EVENT) - NS PROVIDER CONTACT BACKGROUND-HYPO
Age: 48y    Gender: Female    POCT Blood Glucose:  113 mg/dL (04-22-24 @ 17:46)  70 mg/dL (04-22-24 @ 17:24)  57 mg/dL (04-22-24 @ 16:58)  68 mg/dL (04-22-24 @ 16:49)  83 mg/dL (04-22-24 @ 11:45)  82 mg/dL (04-22-24 @ 07:53)  134 mg/dL (04-21-24 @ 21:22)      eMAR:  dextrose Oral Gel   15 Gram(s) Oral (04-22-24 @ 17:05)    insulin glargine Injectable (LANTUS)   6 Unit(s) SubCutaneous (04-21-24 @ 21:33)    insulin lispro Injectable (ADMELOG)   2 Unit(s) SubCutaneous (04-22-24 @ 11:55)    insulin lispro Injectable (ADMELOG)   1 Unit(s) SubCutaneous (04-22-24 @ 08:16)    levothyroxine   100 MICROGram(s) Oral (04-22-24 @ 05:31)    
Age: 48y    Gender: Female    POCT Blood Glucose:  134 mg/dL (04-21-24 @ 16:34)  134 mg/dL (04-21-24 @ 12:45)  58 mg/dL (04-21-24 @ 12:06)  51 mg/dL (04-21-24 @ 12:05)  135 mg/dL (04-21-24 @ 08:06)  119 mg/dL (04-20-24 @ 21:42)      eMAR:  dextrose 50% Injectable   50 milliLiter(s) IV Push (04-21-24 @ 12:24)    insulin glargine Injectable (LANTUS)   6 Unit(s) SubCutaneous (04-20-24 @ 21:44)    insulin lispro Injectable (ADMELOG)   2 Unit(s) SubCutaneous (04-21-24 @ 08:18)    levothyroxine   100 MICROGram(s) Oral (04-21-24 @ 05:11)    
Age: 48y    Gender: Female    POCT Blood Glucose:  176 mg/dL (04-27-24 @ 03:46)  76 mg/dL (04-27-24 @ 03:02)  76 mg/dL (04-27-24 @ 02:32)  69 mg/dL (04-27-24 @ 02:30)  76 mg/dL (04-27-24 @ 01:56)  130 mg/dL (04-26-24 @ 21:20)  98 mg/dL (04-26-24 @ 16:50)  161 mg/dL (04-26-24 @ 12:02)      eMAR:  dextrose Oral Gel   15 Gram(s) Oral (04-27-24 @ 02:40)    insulin glargine Injectable (LANTUS)   1 Unit(s) SubCutaneous (04-26-24 @ 21:42)    insulin lispro (ADMELOG) corrective regimen sliding scale   1 Unit(s) SubCutaneous (04-26-24 @ 12:13)   1 Unit(s) SubCutaneous (04-26-24 @ 08:25)    
Age: 48y    Gender: Female    POCT Blood Glucose:  110 mg/dL (04-23-24 @ 09:08)  80 mg/dL (04-23-24 @ 08:36)  67 mg/dL (04-23-24 @ 08:16)  52 mg/dL (04-23-24 @ 07:57)  51 mg/dL (04-23-24 @ 07:55)  118 mg/dL (04-22-24 @ 21:21)  113 mg/dL (04-22-24 @ 17:46)      eMAR:  dextrose Oral Gel   15 Gram(s) Oral (04-22-24 @ 17:05)    dextrose Oral Gel   15 Gram(s) Oral (04-23-24 @ 08:08)    insulin glargine Injectable (LANTUS)   3 Unit(s) SubCutaneous (04-22-24 @ 21:24)    
Calm/Appropriate

## 2024-04-27 NOTE — PROGRESS NOTE ADULT - ASSESSMENT
49yo pmh of HTN, DM, Factor V Leiden c/b L peroneal DVT (2022) and RA thrombus (2018) currently not on AC, gout, congenital pancreas divisum, recurrent pancreatitis s/p cholecystectomy, Splenectomy, Kevin-En-Y Pancreaticojejunostomy, total pancreatectomy s/p Islet Cell Autotransplant at [facility] 2018, complicated by multiple intraabdominal infection (VRE, fungal? per LIM), fistula, abscesses. Presented to [facility] 4/8 for several weeks of decreased PO intake, NBNB emesis, watery diarrhea, Somnolence, requiring intubation for airway protection requiring MICU stay. Now downgraded to the floors, course complicated by increasing O2 requirement.

## 2024-04-27 NOTE — PROGRESS NOTE ADULT - SUBJECTIVE AND OBJECTIVE BOX
[facility] NUTRITION SUPPORT / TPN -- FOLLOW UP NOTE  --------------------------------------------------------------------------------    24 hour events/subjective:  No acute overnight events.  Tolerating diet, denies N/V.  Says she drinks 4-5 Ensures at home and "loves" them.  Awaiting d/c to acute rehab.    Diet:      PAST HISTORY  --------------------------------------------------------------------------------  No significant changes to PMH, PSH, FHx, SHx, unless otherwise noted    ALLERGIES & MEDICATIONS  --------------------------------------------------------------------------------  Allergies    No Known Allergies    Intolerances      Standing Inpatient Medications  azithromycin  IVPB      cefTRIAXone   IVPB      chlorhexidine 4% Liquid 1 Application(s) Topical <User Schedule>  cholecalciferol 2000 Unit(s) Oral daily  dextrose 50% Injectable 12.5 Gram(s) IV Push once  dextrose 50% Injectable 25 Gram(s) IV Push once  dextrose 50% Injectable 25 Gram(s) IV Push once  dextrose Oral Gel 15 Gram(s) Oral once  folic acid 1 milliGRAM(s) Oral daily  glucagon  Injectable 1 milliGRAM(s) IntraMuscular once  heparin   Injectable 5000 Unit(s) SubCutaneous every 12 hours  insulin glargine Injectable (LANTUS) 2 Unit(s) SubCutaneous <User Schedule>  insulin lispro (ADMELOG) corrective regimen sliding scale   SubCutaneous three times a day before meals  insulin lispro (ADMELOG) corrective regimen sliding scale   SubCutaneous <User Schedule>  lactulose Syrup 20 Gram(s) Oral every 8 hours  methadone    Tablet 5 milliGRAM(s) Oral three times a day  multivitamin/minerals/iron Oral Solution (CENTRUM) 15 milliLiter(s) Oral daily  pantoprazole  Injectable 40 milliGRAM(s) IV Push daily  rifAXIMin 550 milliGRAM(s) Oral two times a day  zinc sulfate 220 milliGRAM(s) Oral daily    PRN Inpatient Medications      VITALS/PHYSICAL EXAM  --------------------------------------------------------------------------------  T(C): 37.1 (04-27-24 @ 04:33), Max: 37.8 (04-26-24 @ 20:58)  HR: 100 (04-27-24 @ 04:33) (97 - 104)  BP: 133/76 (04-27-24 @ 04:33) (122/76 - 135/79)  RR: 18 (04-27-24 @ 04:33) (18 - 19)  SpO2: 91% (04-27-24 @ 04:33) (91% - 93%)  Wt(kg): --               Physical Exam:    Gen: NAD, laying in bed; jaundiced  HEENT: supple neck, no JVD  Chest: non labored breathing, equal chest expansion bilaterally  Abd: +BS, soft, nontender/nondistended  Ext: Warm, FROM, no edema b/l LE  Neuro: No focal deficits  Psych: Normal affect and mood  Skin: Warm, without rashes         LABS/STUDIES  --------------------------------------------------------------------------------              10.0   13.24 >-----------<  401      [04-27-24 @ 09:21]              31.0                 Ca ionized  Creatinine Trend:  POC glucoseCAPILLARY BLOOD GLUCOSE      POCT Blood Glucose.: 116 mg/dL (27 Apr 2024 09:34)  POCT Blood Glucose.: 109 mg/dL (27 Apr 2024 07:48)  POCT Blood Glucose.: 176 mg/dL (27 Apr 2024 03:46)  POCT Blood Glucose.: 76 mg/dL (27 Apr 2024 03:02)  POCT Blood Glucose.: 76 mg/dL (27 Apr 2024 02:32)  POCT Blood Glucose.: 69 mg/dL (27 Apr 2024 02:30)  POCT Blood Glucose.: 76 mg/dL (27 Apr 2024 01:56)  POCT Blood Glucose.: 130 mg/dL (26 Apr 2024 21:20)  POCT Blood Glucose.: 98 mg/dL (26 Apr 2024 16:50)  POCT Blood Glucose.: 161 mg/dL (26 Apr 2024 12:02)    PrealbuminPrealbumin, Serum: 3 mg/dL (04-24-24 @ 11:23)    Triglycerides

## 2024-04-27 NOTE — PROGRESS NOTE ADULT - ATTENDING COMMENTS
42F PMHx T1DM, Factor V Leiden c/b provoked DVT and RA thrombus in the past, and pancreatic divisum c/b recurrent pancreatitis s/p total pancreatectomy s/p islet cell autotransplant, also had extensive abdominal surgical history (cholecystectomy, splenectomy, celina-en-y pancreaticojejunostomy) for unclear reason c/b multiple intraabdominal infection/abscess/fistula (including VRE, kavin) who initially presented to [facility] for emesis, diarrhea, and somnolence. Required intubation for airway protection, was found to be in acute liver failure and hepatic encephalopathy w/ ammonia >200. Was transferred to MICU here for CRRT and liver transplant evaluation. Was initially started on Bobby/Dapto/Doxy/Caspofungin for broad spectrum coverage, then deescalated to Vanc/Bobby. CRRT initiated, ammonia levels downtrended, stopped on 4/16, and TFs restarted w/ lactulose. Was eventually extubated 4/17. Acute liver failure and hepatic encephalopathy currently being treated as due to alcholic hepatitis       # AMS 2/2 to PSE  # alcoholic hepatitis   # acute hypoxemic respiratory failure  # factor V leiden  # type 1 DM    CXR with multifocal PNA      - acute hypoxemic respiratory failure 2/2 to multifocal bacterial CRE PNA on 2-3 L 02, tx ID following plan to tx CRE with IV Unasyn and minocycline 4/27-  - C/w Lactulose and Rifaximin. c/w lactulose 20mg q8hr; Monitor stool count. goal 3-4B<s; Mental status at baseline  - Orotic acid and plasma amino acid levels sent out and pending  - Factor V Leiden. Hx of DVT. Per chart review Eliquis stopped 5/2023 by outpatient hematology given consistently negative DVT studies  - T1DM; - Inpatient BG goal 100-180  - Lantus to 2 units appreciate endo recs; multiple episodes of symptomatic hypoglycemia likely from poor po intake now improving  - Differentials include possible viral infection , malignancy, ischemia, etc. Improving at this time based on two abdominal imaging.   - opt hepatology, endo f/u , opt hematology workup for macrocytic anemia, bandemia on diff but no foci of incection      eventual AR .

## 2024-04-27 NOTE — PROGRESS NOTE ADULT - SUBJECTIVE AND OBJECTIVE BOX
Follow Up:  leukocytosis    Interval History: Notes some worsening shortness of breath today.  Denies chills or fevers.  Notes significant liquid bowel movements over last few days    REVIEW OF SYSTEMS  [  ] ROS unobtainable because:    [ x ] All other systems negative except as noted below    Constitutional:  [ ] fever [ ] chills  [ ] weight loss  [ ] weakness  Skin:  [ ] rash [ ] phlebitis	  Eyes: [ ] icterus [ ] pain  [ ] discharge	  ENMT: [ ] sore throat  [ ] thrush [ ] ulcers [ ] exudates  Respiratory: [x ] dyspnea [ ] hemoptysis [ ] cough [ ] sputum	  Cardiovascular:  [ ] chest pain [ ] palpitations [ ] edema	  Gastrointestinal:  [ ] nausea [ ] vomiting [ x] diarrhea [ ] constipation [ ] pain	  Genitourinary:  [ ] dysuria [ ] frequency [ ] hematuria [ ] discharge [ ] flank pain  [ ] incontinence  Musculoskeletal:  [ ] myalgias [ ] arthralgias [ ] arthritis  [ ] back pain  Neurological:  [ ] headache [ ] seizures  [ ] confusion/altered mental status    Allergies  No Known Allergies        ANTIMICROBIALS:  ampicillin/sulbactam  IVPB 9 every 8 hours  minocycline IVPB    minocycline IVPB 200 every 12 hours  rifAXIMin 550 two times a day      OTHER MEDS:  MEDICATIONS  (STANDING):  dextrose 50% Injectable 25 once  dextrose 50% Injectable 12.5 once  dextrose 50% Injectable 25 once  dextrose Oral Gel 15 once  glucagon  Injectable 1 once  heparin   Injectable 5000 every 12 hours  insulin glargine Injectable (LANTUS) 2 <User Schedule>  insulin lispro (ADMELOG) corrective regimen sliding scale  <User Schedule>  insulin lispro (ADMELOG) corrective regimen sliding scale  three times a day before meals  lactulose Syrup 10 every 8 hours  methadone    Tablet 5 three times a day  pantoprazole  Injectable 40 daily      Vital Signs Last 24 Hrs  T(C): 37.3 (27 Apr 2024 11:27), Max: 37.8 (26 Apr 2024 20:58)  T(F): 99.2 (27 Apr 2024 11:27), Max: 100 (26 Apr 2024 20:58)  HR: 110 (27 Apr 2024 11:27) (100 - 110)  BP: 114/77 (27 Apr 2024 11:27) (114/77 - 135/79)  BP(mean): --  RR: 18 (27 Apr 2024 11:27) (18 - 18)  SpO2: 91% (27 Apr 2024 11:27) (91% - 93%)    Parameters below as of 27 Apr 2024 11:27  Patient On (Oxygen Delivery Method): nasal cannula  O2 Flow (L/min): 2      PHYSICAL EXAMINATION:  General: Alert and Awake, NAD, cachectic  Cardiac: RRR, No M/R/G  Resp: Rales at bilateral lung bases.  No wheezes or rhonchi  Abdomen: NBS, NT/ND, No HSM, No rigidity or guarding  MSK: No LE edema. No Calf tenderness  : No harrington  Skin: No rashes or lesions. Skin is warm and dry to the touch.   Neuro: Alert and Awake. CN 2-12 Grossly intact. Moves all four extremities spontaneously.  Psych: Calm, Pleasant, Cooperative                          10.0   13.24 )-----------( 401      ( 27 Apr 2024 09:21 )             31.0                   MICROBIOLOGY:  v  .Blood Blood-Venous  04-18-24   No growth at 5 days  --  --      ET Tube ET Tube  04-17-24   Numerous Acinetobacter baumannii/nosocom group (Carbapenem Resistant)  Normal Respiratory Linh present  cefiderocol interpretations based on FDA breakpoints.  --  Acinetobacter baumannii/nosocom group (Carbapenem Resistant)      .Stool Feces  04-14-24   No Protozoa seen by trichrome stain  No Helminths or Protozoa seen in formalin concentrate  performed by iodine stain  (routine O+P not evaluated for Microsporidia,  Cryptosporidia or Cyclospora)  One negative sample does not necessarily rule  out the presence of a parasitic infection.  Numerous White blood cells  Few Red blood cells  --  --      .Blood Blood  04-13-24   No Blood Parasites observed by giemsa stain  One negative set of blood smears does not rule out  the possibility of a parasitic infection.  A minimum of 3  specimens should be collected, at least 12-24 hours apart,  over a 36 hour time period.  ************************************************************  NEGATIVE for Plasmodium antigens. Microscopy is performed for  confirmation.  The Malaria Rapid antigen test does not detect the  presence of Babesia species. If Babesiosis is suspected  please order test Babesia PCR: Babesia microti PCR Bld  ************************************************************  --  --      .Bronchial Bronchial Lavage  04-13-24   Normal Respiratory Linh present  --    Rare polymorphonuclear leukocytes seen per low power field  No Squamous epithelial cells seen per low power field  No organisms seen per oil power field      Catheterized Catheterized  04-13-24   No growth  --  --      ET Tube ET Tube  04-13-24   Normal Respiratory Linh present  --    Moderate polymorphonuclear leukocytes seen per low power field  No organisms seen      .Blood Blood-Venous  04-13-24   No growth at 5 days  --  --      .Blood Blood-Peripheral  04-13-24   No growth at 5 days  --  --                RADIOLOGY:    <The imaging below has been reviewed and visualized by me independently. Findings as detailed in report below>    < from: Xray Chest 1 View- PORTABLE-Urgent (Xray Chest 1 View- PORTABLE-Urgent .) (04.26.24 @ 14:22) >  HEART: normal in size.  LUNGS: Bilateral effusions are seen, with dense left lower lobe   consolidation, new consolidation in the right middle lobe, and new   interstitial edema..  BONES: degenerative changes    < end of copied text >

## 2024-04-27 NOTE — PROGRESS NOTE ADULT - NUTRITIONAL ASSESSMENT
This patient has been assessed with a concern for Malnutrition and has been determined to have a diagnosis/diagnoses of Severe protein-calorie malnutrition and Underweight (BMI < 19).    This patient is being managed with:   Diet Soft and Bite Sized-  Fiber/Residue Restricted (LOWFIBER)  Prosource Gelatein Plus     Qty per Day:  1  Supplement Feeding Modality:  Oral  Ensure Plus High Protein Cans or Servings Per Day:  2       Frequency:  Daily  Entered: Apr 26 2024  3:34PM    Diet Soft and Bite Sized-  Supplement Feeding Modality:  Oral  Ensure Enlive Cans or Servings Per Day:  1       Frequency:  Two Times a day  Entered: Apr 26 2024 11:30AM    The following pending diet order is being considered for treatment of Severe protein-calorie malnutrition and Underweight (BMI < 19):null

## 2024-04-27 NOTE — PROGRESS NOTE ADULT - ASSESSMENT
42 year old female with hx of DMT1, Factor V leiden deficiency, gout and congenital abnormality of the pancreas associated with recurrent pancreatitis and extensive surgical hx (s/p distal pancreatectomy, cholecystectomy, splenectomy and celina-en-y pancreaticojejuonostomy (02/2014) at [facility] with Dr. Hargrove), and now S/P total pancreatectomy  with islet cell autotransplant at [facility] by Dr. Gil in 04/2018).  Patient admitted with AMS, found to be in liver failure/hepatic encephalopathy and imaging shows colitis.  TPN consulted given severe protein calorie malnutrition and allowing possibility of bowel rest.  Patient with history of TPN use 2 years ago.      - Patient does not want to start TPN at present time and is concerned about risk of infection  - continue PO diet  - Continue Ensure supplements  - Further care per primary team    TPN spectra [id number]  M-F 8:30A-2:30P, Weekends and holidays 8A-12P  discussed with Dr. Garces

## 2024-04-27 NOTE — PROGRESS NOTE ADULT - ASSESSMENT
42 year old female with hx of DMT1, Factor V leiden deficiency, gout and congenital abnormality of the pancreas associated with recurrent pancreatitis and extensive surgical hx (s/p distal pancreatectomy, cholecystectomy, splenectomy and celina-en-y pancreaticojejuonostomy (02/2014) at [facility] with Dr. Hargrove), and now S/P total pancreatectomy with islet cell autotransplant at [facility] by Dr. Gil in 04/2018). 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 Per records, a Ct later in 2018 showed still fistulization but no abscess formation     Patient presented to [facility] ED on 4/8 by her spouse for AMS. Per , patient had been having nbnb vomiting and diarrhea for the last 4 weeks, extreme fatigue . She was unable to tolerate PO. She was also sleeping 18-20 hrs per day. On 4/8, he found her on the floor "completely out of it" and took her to [facility] for further evaluation.   Initial workup in ED demonstrated a Tbili 5.4, direct bili 4.4 and ammonia 196. Patient was somnolent and had a left eyeward gaze deviation. Submentally started developing agonal snoring respirations and had to be intubated for airway protection. Course complicated with septic shock and persistent hyperammonemia despite lactulose and rifaximin, CT show and started on meropenem, linezolid and anidulafungin at [facility]--> upon transfer here, off pressors, on alvaro/linezolid/caspofungin    CT Chest (4/13) Occlusion of the distal left lower lobar bronchus with complete left lower lobe atelectasis. Right middle lobe consolidation, possibly secondary to pneumonia or additional atelectasis.     CT A/P (4/13) Segmental areas of wall thickening throughout the colon and rectum with pneumatosis, mesenteric edema, and adjacent hyperdensities in the proximal transverse colon suspicious for bowel ischemia with intraluminal hemorrhage. Additional intraluminal hyperdensities within the distal ascending colon suspicious for hemorrhage. Nonspecific focal areas of narrowing in the distal descending colon and sigmoid colon, possibly colonic masses    UCx (4/13) No Growth  BCx (4/13) NGTD  Bronchoscopy Cx (4/13) Rare Yeast    CMV PCR (4/13) Negative  Adenovirus PCR (4/16) negative  Parvovirus PCR (4/13) Negative  EBV PCR (4/17) 61    Serum Cryptococcal Antigen (4/14) Negative  Serum Fungitell <31  Bronch Fungitell >500  Bronch and Serum Aspergillus Galactomannan Negative    Serum Histo Ag Negative  Urine Histo Ag Negative    Babesia PCR (4/13) Negative  Tick Diseases Panel Negative  Bartonella Serum PCR Negative  Leptospirosis Ab Negative    Lower suspicion infectious etiology directly contributing to hyperbilirubinemia; reasonable to complete meropenem course for the pneumatosis and possible pneumonia.    Low grade fevers 4/16 --> 4/17  Extubated on 4/17 with central line removal    CXR (4/26) Bilateral effusions are seen, with dense left lower lobe consolidation, new consolidation in the right middle lobe, and new interstitial edema..    Had Acinetobacter on ET tube sputum culture prior to extubation.  Was initially clinically stable without leukocytosis without coverage for Acinetobacter after extubation.  Developed bandemia on 4/26.  Now with leukocytosis and worsening infiltrates on chest x-ray    While may have pneumonia from an organism besides Acinetobacter at this point would favor targeted coverage for Acinetobacter    #Leukocytosis, bandemia, abnormal chest x-ray, positive sputum culture (carbapenem resistant Acinetobacter baumannii)  --Recommend 2x blood cultures  --Recommend unasyn 9 grams IV Q8H over 4 hours infusion + minocycline 200 mg Q12H. If not tolerating p.o. or with vomiting can switch minocycline to IV  --Continue to follow CBC with diff  --Continue to follow temperature curve  --Follow up on preliminary blood cultures    #Diarrhea  C. difficile toxin assay 4/19 negative  GI PCR April 15 negative  --Continue to monitor, likely secondary to lactulose    #Hyperammonemia, Bilirubin Elevation  Ammonia elevation now thought to be secondary to alcoholic hepatitis given positive PETH  Ureaplasma PCR & Mycoplasma hominis PCR (4/18) Negative   Leptospira PCR (4/13) Negative    #Elevated Bronch Fungitell   Could be secondary to colonization of respiratory tract with Candida (growth of yeast on cultures)  Pneumocystis PCR negative on BAL    I will continue to follow. Please feel free to contact me with any further questions.    Bhavesh Pérez M.D.  [facility] Division of Infectious Disease  8AM-5PM Monday - Friday: Available on [employer] Teams  After Hours and Holidays (or if no response on Microsoft Teams): Please contact the Infectious Diseases Office at [phone]    The above assessment and plan were discussed with Dr Michelle (medicine resident)

## 2024-04-27 NOTE — PROGRESS NOTE ADULT - PROBLEM SELECTOR PLAN 1
#Alcoholic hepatitis/cirrhosis?  >Elevated bilirubin/coag, c/b hepatic encephalopathy; >PETH (4/13): > 400  >CTAP (4/13): Hepatomegaly and hepatic steatosis  >US abd (4/13): Hepatic steatosis, no hepatic vein thrombosis  >Acute hepatitis panel (4/13): Negative; Autoimmune hepatitis panel (4/13): Negative  >MRCP (4/15): Enlarged, fatty liver. No biliary duct dilation, unlikely obstruction  - c/w Folid acid, MVI; c/w thiamine 500mg Q8h x 3 days (4/18 - 4/20) given patient confabulating c/f wernicke vs. ICU delirium --> mental status improving    #Hyperammonemia   >Baseline mentation AAO3  >s/p CRRT (off since 4/16)  - f/u plasma amino acid (4/16): in lab  - f/u urine orotic acid (4/16) - sent out to [facility]: *elevated  - f/u metabolic genetic consult to r/o acquired urea cycle disorder  - c/w Rifaximin; Lactulose

## 2024-04-27 NOTE — PROGRESS NOTE ADULT - PROBLEM SELECTOR PLAN 3
Found to have increasing oxygen requirement  CXR with bilateral pleural effusions   - trial lasix 20mg

## 2024-04-28 NOTE — PROGRESS NOTE ADULT - SUBJECTIVE AND OBJECTIVE BOX
PROGRESS NOTE:   Authored by Dr. Negro Carroll MD (PGY-1). Pager [facility] [phone] / [facility]     Patient is a 48y old  Female who presents with a chief complaint of Abd pain (24 Apr 2024 12:24)      SUBJECTIVE / OVERNIGHT EVENTS:  No acute events overnight.     ADDITIONAL REVIEW OF SYSTEMS:  Patient denies fevers, chills, chest pain, shortness of breath, nausea, abdominal pain, diarrhea, constipation, dysuria, leg swelling, headache, light headedness.    MEDICATIONS  (STANDING):  ampicillin/sulbactam  IVPB 9 Gram(s) IV Intermittent every 8 hours  chlorhexidine 4% Liquid 1 Application(s) Topical <User Schedule>  cholecalciferol 2000 Unit(s) Oral daily  dextrose 50% Injectable 12.5 Gram(s) IV Push once  dextrose 50% Injectable 25 Gram(s) IV Push once  dextrose 50% Injectable 25 Gram(s) IV Push once  dextrose Oral Gel 15 Gram(s) Oral once  folic acid 1 milliGRAM(s) Oral daily  glucagon  Injectable 1 milliGRAM(s) IntraMuscular once  heparin   Injectable 5000 Unit(s) SubCutaneous every 12 hours  insulin glargine Injectable (LANTUS) 2 Unit(s) SubCutaneous <User Schedule>  insulin lispro (ADMELOG) corrective regimen sliding scale   SubCutaneous three times a day before meals  insulin lispro (ADMELOG) corrective regimen sliding scale   SubCutaneous <User Schedule>  lactulose Syrup 10 Gram(s) Oral every 8 hours  methadone    Tablet 5 milliGRAM(s) Oral three times a day  minocycline IVPB 200 milliGRAM(s) IV Intermittent every 12 hours  minocycline IVPB      multivitamin/minerals/iron Oral Solution (CENTRUM) 15 milliLiter(s) Oral daily  pantoprazole  Injectable 40 milliGRAM(s) IV Push daily  rifAXIMin 550 milliGRAM(s) Oral two times a day  zinc sulfate 220 milliGRAM(s) Oral daily    MEDICATIONS  (PRN):      CAPILLARY BLOOD GLUCOSE      POCT Blood Glucose.: 207 mg/dL (28 Apr 2024 02:27)  POCT Blood Glucose.: 188 mg/dL (27 Apr 2024 21:34)  POCT Blood Glucose.: 136 mg/dL (27 Apr 2024 16:33)  POCT Blood Glucose.: 157 mg/dL (27 Apr 2024 12:02)  POCT Blood Glucose.: 116 mg/dL (27 Apr 2024 09:34)    I&O's Summary    27 Apr 2024 07:01  -  28 Apr 2024 07:00  --------------------------------------------------------  IN: 150 mL / OUT: 0 mL / NET: 150 mL        PHYSICAL EXAM:  Vital Signs Last 24 Hrs  T(C): 37.1 (28 Apr 2024 04:14), Max: 37.3 (27 Apr 2024 11:27)  T(F): 98.8 (28 Apr 2024 04:14), Max: 99.2 (27 Apr 2024 11:27)  HR: 63 (28 Apr 2024 04:14) (63 - 110)  BP: 94/60 (28 Apr 2024 04:14) (94/60 - 114/77)  BP(mean): --  RR: 18 (28 Apr 2024 04:14) (18 - 18)  SpO2: 100% (28 Apr 2024 04:14) (91% - 100%)    Parameters below as of 28 Apr 2024 04:14  Patient On (Oxygen Delivery Method): nasal cannula    Physical Exam: Gen: no acute distress; jaundiced  HEENT: atraumatic, normocephalic, pupils equally round and reactive to light, extraocular muscles intact, scleral icterus  CV: regular rate and rhythm, normal S1/S2, no murmurs, rubs, or gallops  Resp: lungs clear to auscultation bilaterally, no rales, rhonchi, or wheezes  GI: soft, slightly tender, nondistended, BSx4  MSK: extremities atraumatic, no cyanosis or clubbing  Skin: warm, dry, no rashes or lesions  Neuro: no focal deficits, sensation grossly intact  Psych: alert and oriented x3, appropriate mood and affect    LABS:                        10.0   13.24 )-----------( 401      ( 27 Apr 2024 09:21 )             31.0                       Tele Reviewed:    RADIOLOGY & ADDITIONAL TESTS:  Results Reviewed:   Imaging Personally Reviewed:  Electrocardiogram Personally Reviewed:

## 2024-04-28 NOTE — PROGRESS NOTE ADULT - ASSESSMENT
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 Hargrove), and now S/P total pancreatectomy with islet cell autotransplant at [facility] by Dr. Gil in 04/2018). 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  Patient was readmitted eventually later on at 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 Per records, a Ct later in 2018 showed still fistulization but no abscess formation     Patient presented to [facility] ED on 4/8 by her spouse for AMS. Per , patient had been having nbnb vomiting and diarrhea for the last 4 weeks, extreme fatigue . She was unable to tolerate PO. She was also sleeping 18-20 hrs per day. On 4/8, he found her on the floor "completely out of it" and took her to [facility] for further evaluation.   Initial workup in ED demonstrated a Tbili 5.4, direct bili 4.4 and ammonia 196. Patient was somnolent and had a left eyeward gaze deviation. Submentally started developing agonal snoring respirations and had to be intubated for airway protection. Course complicated with septic shock and persistent hyperammonemia despite lactulose and rifaximin, CT show and started on meropenem, linezolid and anidulafungin at [facility]--> upon transfer here, off pressors, on alvaro/linezolid/caspofungin    CT Chest (4/13) Occlusion of the distal left lower lobar bronchus with complete left lower lobe atelectasis. Right middle lobe consolidation, possibly secondary to pneumonia or additional atelectasis.     CT A/P (4/13) Segmental areas of wall thickening throughout the colon and rectum with pneumatosis, mesenteric edema, and adjacent hyperdensities in the proximal transverse colon suspicious for bowel ischemia with intraluminal hemorrhage. Additional intraluminal hyperdensities within the distal ascending colon suspicious for hemorrhage. Nonspecific focal areas of narrowing in the distal descending colon and sigmoid colon, possibly colonic masses    UCx (4/13) No Growth  BCx (4/13) NGTD  Bronchoscopy Cx (4/13) Rare Yeast    CMV PCR (4/13) Negative  Adenovirus PCR (4/16) negative  Parvovirus PCR (4/13) Negative  EBV PCR (4/17) 61    Serum Cryptococcal Antigen (4/14) Negative  Serum Fungitell <31  Bronch Fungitell >500  Bronch and Serum Aspergillus Galactomannan Negative    Serum Histo Ag Negative  Urine Histo Ag Negative    Babesia PCR (4/13) Negative  Tick Diseases Panel Negative  Bartonella Serum PCR Negative  Leptospirosis Ab Negative    Lower suspicion infectious etiology directly contributing to hyperbilirubinemia; reasonable to complete meropenem course for the pneumatosis and possible pneumonia.    Low grade fevers 4/16 --> 4/17  Extubated on 4/17 with central line removal    CXR (4/26) Bilateral effusions are seen, with dense left lower lobe consolidation, new consolidation in the right middle lobe, and new interstitial edema..    Had Acinetobacter on ET tube sputum culture prior to extubation.  Was initially clinically stable without leukocytosis without coverage for Acinetobacter after extubation.  Developed bandemia on 4/26.  Now with leukocytosis and worsening infiltrates on chest x-ray    While may have pneumonia from an organism besides Acinetobacter at this point would favor targeted coverage for Acinetobacter    #Leukocytosis, bandemia, abnormal chest x-ray, positive sputum culture (carbapenem resistant Acinetobacter baumannii)  --Blood cultures not sent yesterday. Would send 2x blood cultures today  --Continue unasyn 9 grams IV Q8H over 4 hours infusion + minocycline 200 mg Q12H. If not tolerating p.o. or with vomiting can switch minocycline to IV  --Continue to follow CBC with diff  --Continue to follow temperature curve  --Follow up on preliminary blood cultures    #Diarrhea  C. difficile toxin assay 4/19 negative  GI PCR April 15 negative  --Continue to monitor, likely secondary to lactulose    #Hyperammonemia, Bilirubin Elevation  Ammonia elevation now thought to be secondary to alcoholic hepatitis given positive PETH  Ureaplasma PCR & Mycoplasma hominis PCR (4/18) Negative   Leptospira PCR (4/13) Negative    #Elevated Bronch Fungitell   Could be secondary to colonization of respiratory tract with Candida (growth of yeast on cultures)  Pneumocystis PCR negative on BAL    Dr. Francisco Gore will be covering the patient starting from tomorrow. Please reach out to her for further questions and follow up.     Bhavesh Pérez M.D.  [facility] Division of Infectious Disease  8AM-5PM Monday - Friday: Available on [employer] Teams  After Hours and Holidays (or if no response on [employer] Teams): Please contact the Infectious Diseases Office at [phone]

## 2024-04-28 NOTE — PROGRESS NOTE ADULT - SUBJECTIVE AND OBJECTIVE BOX
Follow Up:  leukocytosis    Interval History: Afebrile.  Dyspnea and cough improved.  Continued diarrhea.    REVIEW OF SYSTEMS  [  ] ROS unobtainable because:    [x  ] All other systems negative except as noted below    Constitutional:  [ ] fever [ ] chills  [ ] weight loss  [ ] weakness  Skin:  [ ] rash [ ] phlebitis	  Eyes: [ ] icterus [ ] pain  [ ] discharge	  ENMT: [ ] sore throat  [ ] thrush [ ] ulcers [ ] exudates  Respiratory: [ ] dyspnea [ ] hemoptysis [ ] cough [ ] sputum	  Cardiovascular:  [ ] chest pain [ ] palpitations [ ] edema	  Gastrointestinal:  [ ] nausea [ ] vomiting [x ] diarrhea [ ] constipation [ ] pain	  Genitourinary:  [ ] dysuria [ ] frequency [ ] hematuria [ ] discharge [ ] flank pain  [ ] incontinence  Musculoskeletal:  [ ] myalgias [ ] arthralgias [ ] arthritis  [ ] back pain  Neurological:  [ ] headache [ ] seizures  [ ] confusion/altered mental status    Allergies  No Known Allergies        ANTIMICROBIALS:  ampicillin/sulbactam  IVPB 9 every 8 hours  minocycline IVPB    minocycline IVPB 200 every 12 hours  rifAXIMin 550 two times a day      OTHER MEDS:  MEDICATIONS  (STANDING):  dextrose 50% Injectable 12.5 once  dextrose 50% Injectable 25 once  dextrose 50% Injectable 25 once  dextrose Oral Gel 15 once  glucagon  Injectable 1 once  heparin   Injectable 5000 every 12 hours  insulin glargine Injectable (LANTUS) 2 <User Schedule>  insulin lispro (ADMELOG) corrective regimen sliding scale  <User Schedule>  insulin lispro (ADMELOG) corrective regimen sliding scale  three times a day before meals  lactulose Syrup 10 every 8 hours  methadone    Tablet 5 three times a day  pantoprazole  Injectable 40 daily      Vital Signs Last 24 Hrs  T(C): 37.2 (28 Apr 2024 11:30), Max: 37.2 (28 Apr 2024 11:30)  T(F): 99 (28 Apr 2024 11:30), Max: 99 (28 Apr 2024 11:30)  HR: 100 (28 Apr 2024 13:28) (63 - 102)  BP: 112/65 (28 Apr 2024 13:28) (94/60 - 112/65)  BP(mean): --  RR: 18 (28 Apr 2024 13:28) (18 - 18)  SpO2: 96% (28 Apr 2024 13:28) (93% - 100%)    Parameters below as of 28 Apr 2024 13:28  Patient On (Oxygen Delivery Method): nasal cannula  O2 Flow (L/min): 2    PHYSICAL EXAMINATION:  General: Alert and Awake, NAD  HEENT: Normocephalic / Atraumatic  Resp: No accessory muscles of respiration utilized  Abdomen: Not distended.  MSK: No LE edema.   : No harrington  Skin: No rashes or lesions.    Neuro: Alert and Awake. CN 2-12 Grossly intact. Moves all four extremities spontaneously.  Psych: Calm, Pleasant, Cooperative                          8.3    10.14 )-----------( 347      ( 28 Apr 2024 10:06 )             26.2       04-28    141  |  101  |  <4<L>  ----------------------------<  229<H>  3.5   |  27  |  0.53    Ca    7.9<L>      28 Apr 2024 10:06  Phos  4.0     04-28  Mg     1.5     04-28    TPro  4.5<L>  /  Alb  2.2<L>  /  TBili  3.0<H>  /  DBili  x   /  AST  33  /  ALT  13  /  AlkPhos  151<H>  04-28      Urinalysis Basic - ( 28 Apr 2024 10:06 )    Color: x / Appearance: x / SG: x / pH: x  Gluc: 229 mg/dL / Ketone: x  / Bili: x / Urobili: x   Blood: x / Protein: x / Nitrite: x   Leuk Esterase: x / RBC: x / WBC x   Sq Epi: x / Non Sq Epi: x / Bacteria: x        MICROBIOLOGY:  v  .Blood Blood-Venous  04-18-24   No growth at 5 days  --  --      ET Tube ET Tube  04-17-24   Numerous Acinetobacter baumannii/nosocom group (Carbapenem Resistant)  Normal Respiratory Linh present  cefiderocol interpretations based on FDA breakpoints.  --  Acinetobacter baumannii/nosocom group (Carbapenem Resistant)      .Stool Feces  04-14-24   No Protozoa seen by trichrome stain  No Helminths or Protozoa seen in formalin concentrate  performed by iodine stain  (routine O+P not evaluated for Microsporidia,  Cryptosporidia or Cyclospora)  One negative sample does not necessarily rule  out the presence of a parasitic infection.  Numerous White blood cells  Few Red blood cells  --  --      .Blood Blood  04-13-24   No Blood Parasites observed by giemsa stain  One negative set of blood smears does not rule out  the possibility of a parasitic infection.  A minimum of 3  specimens should be collected, at least 12-24 hours apart,  over a 36 hour time period.  ************************************************************  NEGATIVE for Plasmodium antigens. Microscopy is performed for  confirmation.  The Malaria Rapid antigen test does not detect the  presence of Babesia species. If Babesiosis is suspected  please order test Babesia PCR: Babesia microti PCR Bld  ************************************************************  --  --      .Bronchial Bronchial Lavage  04-13-24   Normal Respiratory Linh present  --    Rare polymorphonuclear leukocytes seen per low power field  No Squamous epithelial cells seen per low power field  No organisms seen per oil power field      Catheterized Catheterized  04-13-24   No growth  --  --      ET Tube ET Tube  04-13-24   Normal Respiratory Linh present  --    Moderate polymorphonuclear leukocytes seen per low power field  No organisms seen      .Blood Blood-Venous  04-13-24   No growth at 5 days  --  --      .Blood Blood-Peripheral  04-13-24   No growth at 5 days  --  --                RADIOLOGY:    <The imaging below has been reviewed and visualized by me independently. Findings as detailed in report below>    < from: Xray Chest 1 View- PORTABLE-Urgent (Xray Chest 1 View- PORTABLE-Urgent .) (04.26.24 @ 14:22) >  HEART: normal in size.  LUNGS: Bilateral effusions are seen, with dense left lower lobe   consolidation, new consolidation in the right middle lobe, and new   interstitial edema..  BONES: degenerative changes    < end of copied text >   Calcipotriene Pregnancy And Lactation Text: This medication has not been proven safe during pregnancy. It is unknown if this medication is excreted in breast milk.

## 2024-04-28 NOTE — PROGRESS NOTE ADULT - ASSESSMENT
49yo pmh of HTN, DM, Factor V Leiden c/b L peroneal DVT (2022) and RA thrombus (2018) currently not on AC, gout, congenital pancreas divisum, recurrent pancreatitis s/p cholecystectomy, Splenectomy, Kevin-En-Y Pancreaticojejunostomy, total pancreatectomy s/p Islet Cell Autotransplant at [facility] 2018, complicated by multiple intraabdominal infection (VRE, fungal? per LIM), fistula, abscesses. Presented to [facility] 4/8 for several weeks of decreased PO intake, NBNB emesis, watery diarrhea, Somnolence, requiring intubation for airway protection requiring MICU stay. Now downgraded to the floors, course complicated by increasing O2 requirement.

## 2024-04-28 NOTE — PROGRESS NOTE ADULT - PROBLEM SELECTOR PLAN 3
Found to have increasing oxygen requirement  CXR with bilateral pleural effusions   - trial lasix 20mg  - On Minocycline and Ampicillin for CRE (Acinobactr)

## 2024-04-28 NOTE — PROGRESS NOTE ADULT - ATTENDING COMMENTS
42F PMHx T1DM, Factor V Leiden c/b provoked DVT and RA thrombus in the past, and pancreatic divisum c/b recurrent pancreatitis s/p total pancreatectomy s/p islet cell autotransplant, also had extensive abdominal surgical history (cholecystectomy, splenectomy, celina-en-y pancreaticojejunostomy) for unclear reason c/b multiple intraabdominal infection/abscess/fistula (including VRE, kavin) who initially presented to [facility] for emesis, diarrhea, and somnolence. Required intubation for airway protection, was found to be in acute liver failure and hepatic encephalopathy w/ ammonia >200. Was transferred to MICU here for CRRT and liver transplant evaluation. Was initially started on Bobby/Dapto/Doxy/Caspofungin for broad spectrum coverage, then deescalated to Vanc/Bobby. CRRT initiated, ammonia levels downtrended, stopped on 4/16, and TFs restarted w/ lactulose. Was eventually extubated 4/17. Acute liver failure and hepatic encephalopathy currently being treated as due to alcoholic hepatitis       # AMS 2/2 to PSE  # alcoholic hepatitis   # acute hypoxemic respiratory failure  # factor V leiden  # type 1 DM    CXR with multifocal PNA      - acute hypoxemic respiratory failure 2/2 to multifocal bacterial CRE PNA on 2-3 L 02, tx ID following plan to tx CRE with IV Unasyn and minocycline 4/27-  - C/w Lactulose and Rifaximin. c/w lactulose 20mg q8hr; Monitor stool count. goal 3-4B<s; Mental status at baseline  - Orotic acid and plasma amino acid levels sent out and pending  - Factor V Leiden. Hx of DVT. Per chart review Eliquis stopped 5/2023 by outpatient hematology given consistently negative DVT studies  - T1DM; - Inpatient BG goal 100-180  - Lantus to 2 units appreciate endo recs; multiple episodes of symptomatic hypoglycemia likely from poor po intake now improving  - Differentials include possible viral infection , malignancy, ischemia, etc. Improving at this time based on two abdominal imaging.   - opt hepatology, endo f/u , opt hematology workup for macrocytic anemia      eventual AR .

## 2024-04-28 NOTE — PROGRESS NOTE ADULT - NUTRITIONAL ASSESSMENT
This patient has been assessed with a concern for Malnutrition and has been determined to have a diagnosis/diagnoses of Severe protein-calorie malnutrition and Underweight (BMI < 19).    This patient is being managed with:   Diet Soft and Bite Sized-  Fiber/Residue Restricted (LOWFIBER)  Prosource Gelatein Plus     Qty per Day:  1  Supplement Feeding Modality:  Oral  Ensure Plus High Protein Cans or Servings Per Day:  2       Frequency:  Daily  Entered: Apr 26 2024  3:34PM

## 2024-04-28 NOTE — PROGRESS NOTE ADULT - PROBLEM SELECTOR PLAN 1
#Alcoholic hepatitis/cirrhosis?  >Elevated bilirubin/coag, c/b hepatic encephalopathy; >PETH (4/13): > 400  >CTAP (4/13): Hepatomegaly and hepatic steatosis  >US abd (4/13): Hepatic steatosis, no hepatic vein thrombosis  >Acute hepatitis panel (4/13): Negative; Autoimmune hepatitis panel (4/13): Negative  >MRCP (4/15): Enlarged, fatty liver. No biliary duct dilation, unlikely obstruction  - c/w Folid acid, MVI; c/w thiamine 500mg Q8h x 3 days (4/18 - 4/20) given patient confabulating c/f wernicke vs. ICU delirium --> mental status improving    #Hyperammonemia   >Baseline mentation AAO3  >s/p CRRT (off since 4/16)  - f/u plasma amino acid (4/16): in lab  - f/u urine orotic acid (4/16) - sent out to [facility]: *elevated  - f/u metabolic genetic consult to r/o acquired urea cycle disorder  - c/w Rifaximin; Lactulose

## 2024-04-29 NOTE — PROGRESS NOTE ADULT - SUBJECTIVE AND OBJECTIVE BOX
CARA ZALDIVAR 48y Female      Patient is a 48y old  Female who presents with a chief complaint of Abd pain (24 Apr 2024 12:24)        INTERVAL HPI/OVERNIGHT EVENTS: No acute events overnight. Patient was seen and evaluated at the bedside. The patient denies pain. Vitals stable. Patient denies fever/chills, chest pain, shortness of breath, abdominal pain, headaches, nausea/vomiting, and diarrhea/constipation.      PHYSICAL EXAM:  GENERAL: NAD  HEAD:  Normocephalic  EYES:  conjunctiva and sclera clear  ENMT: Moist mucous membranes  NECK: Supple  NERVOUS SYSTEM:  Alert, awake  CHEST/LUNG: Good air exchange bilaterally, no wheeze  HEART: Regular rate and rhythm        Vital Signs Last 24 Hrs  T(C): 37.2 (29 Apr 2024 04:36), Max: 37.3 (28 Apr 2024 21:09)  T(F): 99 (29 Apr 2024 04:36), Max: 99.1 (28 Apr 2024 21:09)  HR: 69 (29 Apr 2024 04:36) (69 - 109)  BP: 100/66 (29 Apr 2024 04:36) (100/62 - 112/65)  BP(mean): --  RR: 18 (29 Apr 2024 04:36) (18 - 18)  SpO2: 99% (29 Apr 2024 04:36) (93% - 99%)    Parameters below as of 29 Apr 2024 04:36  Patient On (Oxygen Delivery Method): nasal cannula  O2 Flow (L/min): 2        Consultant(s) Notes Reviewed:  [X] YES  [ ] NO  Care Discussed with Consultants/Other Providers [X] YES  [ ] NO  Imaging Personally Reviewed:  [X] YES  [ ] NO      LABS:                        8.3    10.14 )-----------( 347      ( 28 Apr 2024 10:06 )             26.2     04-28    141  |  101  |  <4<L>  ----------------------------<  229<H>  3.5   |  27  |  0.53    Ca    7.9<L>      28 Apr 2024 10:06  Phos  4.0     04-28  Mg     1.5     04-28    TPro  4.5<L>  /  Alb  2.2<L>  /  TBili  3.0<H>  /  DBili  x   /  AST  33  /  ALT  13  /  AlkPhos  151<H>  04-28      Urinalysis Basic - ( 28 Apr 2024 10:06 )    Color: x / Appearance: x / SG: x / pH: x  Gluc: 229 mg/dL / Ketone: x  / Bili: x / Urobili: x   Blood: x / Protein: x / Nitrite: x   Leuk Esterase: x / RBC: x / WBC x   Sq Epi: x / Non Sq Epi: x / Bacteria: x        CAPILLARY BLOOD GLUCOSE      POCT Blood Glucose.: 220 mg/dL (29 Apr 2024 02:06)  POCT Blood Glucose.: 234 mg/dL (28 Apr 2024 21:41)  POCT Blood Glucose.: 234 mg/dL (28 Apr 2024 16:58)  POCT Blood Glucose.: 231 mg/dL (28 Apr 2024 11:26)  POCT Blood Glucose.: 226 mg/dL (28 Apr 2024 08:05)

## 2024-04-29 NOTE — PROGRESS NOTE ADULT - ASSESSMENT
47yo pmh of HTN, DM, Factor V Leiden c/b L peroneal DVT (2022) and RA thrombus (2018) currently not on AC, gout, congenital pancreas divisum, recurrent pancreatitis s/p cholecystectomy, Splenectomy, Kevin-En-Y Pancreaticojejunostomy, total pancreatectomy s/p Islet Cell Autotransplant at [facility] 2018, complicated by multiple intraabdominal infection (VRE, fungal? per LIM), fistula, abscesses. Presented to [facility] 4/8 for several weeks of decreased PO intake, NBNB emesis, watery diarrhea, Somnolence, requiring intubation for airway protection requiring MICU stay. Now downgraded to the floors, course complicated by increasing O2 requirement.

## 2024-04-29 NOTE — PROGRESS NOTE ADULT - SUBJECTIVE AND OBJECTIVE BOX
[facility] NUTRITION SUPPORT-- FOLLOW UP NOTE      24 hour events/subjective:      TPN originally consulted for Protein Calorie Malnutrition in the setting of nutritional optimization for anticipated minimal PO intake. Pt now tolerating diet, drinking Ensure. Pt asymptomatic for N/V/abd pain.        PAST HISTORY  --------------------------------------------------------------------------------  No significant changes to PMH, PSH, FHx, SHx, unless otherwise noted    ALLERGIES & MEDICATIONS  --------------------------------------------------------------------------------  Allergies    No Known Allergies    Intolerances      Standing Inpatient Medications  ampicillin/sulbactam  IVPB 9 Gram(s) IV Intermittent every 8 hours  chlorhexidine 4% Liquid 1 Application(s) Topical <User Schedule>  cholecalciferol 2000 Unit(s) Oral daily  dextrose 50% Injectable 25 Gram(s) IV Push once  dextrose 50% Injectable 12.5 Gram(s) IV Push once  dextrose 50% Injectable 25 Gram(s) IV Push once  dextrose Oral Gel 15 Gram(s) Oral once  folic acid 1 milliGRAM(s) Oral daily  glucagon  Injectable 1 milliGRAM(s) IntraMuscular once  heparin   Injectable 5000 Unit(s) SubCutaneous every 12 hours  insulin glargine Injectable (LANTUS) 2 Unit(s) SubCutaneous <User Schedule>  insulin lispro (ADMELOG) corrective regimen sliding scale   SubCutaneous three times a day before meals  insulin lispro (ADMELOG) corrective regimen sliding scale   SubCutaneous <User Schedule>  lactulose Syrup 10 Gram(s) Oral every 8 hours  methadone    Tablet 5 milliGRAM(s) Oral three times a day  minocycline IVPB 200 milliGRAM(s) IV Intermittent every 12 hours  minocycline IVPB      multivitamin/minerals/iron Oral Solution (CENTRUM) 15 milliLiter(s) Oral daily  pantoprazole  Injectable 40 milliGRAM(s) IV Push daily  rifAXIMin 550 milliGRAM(s) Oral two times a day  zinc sulfate 220 milliGRAM(s) Oral daily    PRN Inpatient Medications      REVIEW OF SYSTEMS  --------------------------------------------------------------------------------  Gen: as per HPI  Skin: No rashes  Head/Eyes/Ears/Mouth: No headache; No sore throat  Respiratory: No dyspnea, cough,   CV: No chest pain, PND, orthopnea  GI: as per HPI  : No increased frequency, dysuria, hematuria, nocturia  MSK: No joint pain/swelling; no back pain; no edema  Neuro: No dizziness/lightheadedness, weakness, seizures, numbness, tingling  Psych: No significant nervousness, anxiety, stress, depression    All other systems were reviewed and are negative, except as noted.        LABS/STUDIES  --------------------------------------------------------------------------------              8.3    10.14 >-----------<  347      [04-28-24 @ 10:06]              26.2     141  |  101  |  <4  ----------------------------<  229      [04-28-24 @ 10:06]  3.5   |  27  |  0.53        Ca     7.9     [04-28-24 @ 10:06]      Mg     1.5     [04-28-24 @ 10:06]      Phos  4.0     [04-28-24 @ 10:06]    TPro  4.5  /  Alb  2.2  /  TBili  3.0  /  DBili  x   /  AST  33  /  ALT  13  /  AlkPhos  151  [04-28-24 @ 10:06]              Glucose: 229 mg/dL (04-28-24 @ 10:06)    Prealbumin, Serum: 3 mg/dL (04-24-24 @ 11:23)    Triglycerides      CAPILLARY BLOOD GLUCOSE      POCT Blood Glucose.: 209 mg/dL (29 Apr 2024 08:13)  POCT Blood Glucose.: 220 mg/dL (29 Apr 2024 02:06)  POCT Blood Glucose.: 234 mg/dL (28 Apr 2024 21:41)  POCT Blood Glucose.: 234 mg/dL (28 Apr 2024 16:58)  POCT Blood Glucose.: 231 mg/dL (28 Apr 2024 11:26)      VITALS/PHYSICAL EXAM  --------------------------------------------------------------------------------  T(C): 37.2 (04-29-24 @ 04:36), Max: 37.3 (04-28-24 @ 21:09)  HR: 69 (04-29-24 @ 04:36) (69 - 109)  BP: 100/66 (04-29-24 @ 04:36) (100/62 - 112/65)  RR: 18 (04-29-24 @ 04:36) (18 - 18)  SpO2: 99% (04-29-24 @ 04:36) (93% - 99%)  Wt(kg): --        04-28-24 @ 07:01  -  04-29-24 @ 07:00  --------------------------------------------------------  IN: 800 mL / OUT: 0 mL / NET: 800 mL    04-29-24 @ 07:01  -  04-29-24 @ 09:53  --------------------------------------------------------  IN: 240 mL / OUT: 0 mL / NET: 240 mL        Physical Exam:    Gen: NAD, laying in bed; jaundiced  HEENT: supple neck, no JVD  Chest: non labored breathing, equal chest expansion bilaterally  Abd: +BS, soft, nontender/nondistended  Ext: Warm, FROM, no edema b/l LE  Neuro: No focal deficits  Psych: Normal affect and mood  Skin: Warm, without rashes     .  .  .      .  .  .

## 2024-04-29 NOTE — PROGRESS NOTE ADULT - TIME BILLING
Reviewing documentation/hospital course/ labs/imaging, and interviewing consultants note. Personally interviewing and examining the patient. Coordination of care Reviewing documentation/hospital course/ labs/imaging, and reviewing consultants note. Personally interviewing and examining the patient. Coordination of care

## 2024-04-29 NOTE — PROGRESS NOTE ADULT - ASSESSMENT
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 Hargrove), and now S/P total pancreatectomy with islet cell autotransplant at [facility] by Dr. Gil in 04/2018). 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 Per records, a Ct later in 2018 showed still fistulization but no abscess formation     Patient presented to [facility] ED on 4/8 by her spouse for AMS. Per , patient had been having nbnb vomiting and diarrhea for the last 4 weeks, extreme fatigue . She was unable to tolerate PO. She was also sleeping 18-20 hrs per day. On 4/8, he found her on the floor "completely out of it" and took her to [facility] for further evaluation.   Initial workup in ED demonstrated a Tbili 5.4, direct bili 4.4 and ammonia 196. Patient was somnolent and had a left eyeward gaze deviation. Submentally started developing agonal snoring respirations and had to be intubated for airway protection. Course complicated with septic shock and persistent hyperammonemia despite lactulose and rifaximin, CT show and started on meropenem, linezolid and anidulafungin at [facility]--> upon transfer here, off pressors, on alvaro/linezolid/caspofungin    CT Chest (4/13) Occlusion of the distal left lower lobar bronchus with complete left lower lobe atelectasis. Right middle lobe consolidation, possibly secondary to pneumonia or additional atelectasis.     CT A/P (4/13) Segmental areas of wall thickening throughout the colon and rectum with pneumatosis, mesenteric edema, and adjacent hyperdensities in the proximal transverse colon suspicious for bowel ischemia with intraluminal hemorrhage. Additional intraluminal hyperdensities within the distal ascending colon suspicious for hemorrhage. Nonspecific focal areas of narrowing in the distal descending colon and sigmoid colon, possibly colonic masses    UCx (4/13) No Growth  BCx (4/13) NGTD  Bronchoscopy Cx (4/13) Rare Yeast    CMV PCR (4/13) Negative  Adenovirus PCR (4/16) negative  Parvovirus PCR (4/13) Negative  EBV PCR (4/17) 61    Serum Cryptococcal Antigen (4/14) Negative  Serum Fungitell <31  Bronch Fungitell >500  Bronch and Serum Aspergillus Galactomannan Negative    Serum Histo Ag Negative  Urine Histo Ag Negative    Babesia PCR (4/13) Negative  Tick Diseases Panel Negative  Bartonella Serum PCR Negative  Leptospirosis Ab Negative    Lower suspicion infectious etiology directly contributing to hyperbilirubinemia; reasonable to complete meropenem course for the pneumatosis and possible pneumonia.    Low grade fevers 4/16 --> 4/17  Extubated on 4/17 with central line removal    CXR (4/26) Bilateral effusions are seen, with dense left lower lobe consolidation, new consolidation in the right middle lobe, and new interstitial edema..    Had Acinetobacter on ET tube sputum culture prior to extubation.  Was initially clinically stable without leukocytosis without coverage for Acinetobacter after extubation.  Developed bandemia on 4/26.  Now with leukocytosis and worsening infiltrates on chest x-ray    While she may have pneumonia from an organism besides Acinetobacter at this point would favor targeted coverage for Acinetobacter    #Leukocytosis, bandemia, abnormal chest x-ray, positive sputum culture (carbapenem resistant Acinetobacter baumannii)  --Blood cultures not sent yesterday. Would send 2x blood cultures today  --Continue unasyn 9 grams IV Q8H over 4 hours infusion + minocycline 200 mg Q12H. If not tolerating p.o. or with vomiting can switch minocycline to IV  --Continue to follow CBC with diff  --Continue to follow temperature curve  --Follow up on preliminary blood cultures    #Diarrhea  C. difficile toxin assay 4/19 negative  GI PCR April 15 negative  --Continue to monitor, likely secondary to lactulose    #Hyperammonemia, Bilirubin Elevation  Ammonia elevation now thought to be secondary to alcoholic hepatitis given positive PETH  Ureaplasma PCR & Mycoplasma hominis PCR (4/18) Negative   Leptospira PCR (4/13) Negative    #Elevated Bronch Fungitell   Could be secondary to colonization of respiratory tract with Candida (growth of yeast on cultures)  Pneumocystis PCR negative on BAL    Francisco Gore MD  Can be called via Teams  After 5pm/weekends [phone]

## 2024-04-29 NOTE — PROGRESS NOTE ADULT - ASSESSMENT
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 Transferred to [facility] for Liver Transplant Evaluation. Patient found to be hypoglycemic shortly after transfer, now resolved. Consulted for: Type 3c DM, hypoglycemia. BG Goal 100-180mg/dl     Last 24 hour BGs variable in the 200s. Patient is not eating much if anything at all. Would recommend adding glucerna shakes to her meals.  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 Patient was first diagnosed with Type 3c DM after her initial surgery in 2014 (s/p distal pancreatectomy, cholecystectomy, splenectomy and celina-en-y pancreaticojejuonostomy). Then she later underwent total pancreatectomy with islet cell autotransplant in 2018. After that, her insulin requirements decreased over the years but was never off insulin.    Endocrinologist: Dr. Dalton  Per recent outpatient note 3/26/2024: pump not available to review  Patient uses Omnipod Dash with Dexcom CGM G7  Basal rate:   MN 0.05U   6 AM 0.1U   9.30 AM 0.15U   Total basal 2.825U/24h   ICR 1:15   ICF 1:75   Reports of frequent hypoglycemia outpatient in March 2024.    C-peptide <0.1 4/15 at 6pm when BG was 168, insulin deficient.  C-peptide repeated while off dextrose and tube feeds: 0.1 with , confirming insulin deficiency.    PLAN:  - Inpatient BG goal 100-180  - Continue  lantus 2 units to am to prevent overnight and fasting hypoglycemia   - continue Low dose admelog scale TIDAC, low dose scale qhs  - Please keep hypoglycemia protocol in place    - Discharge planning to acute rehab  Needs to be on basal and bolus vs pump given insulin deficiency ( low c- peptide 0.1 with )  patient prefers to resume insulin pump. Patient was instructed to bring in supplies to resume on discharge, but patient still does not have pump at bedside. Unable to adjust setting at this time.     Would recommend basal and bolus insulin injection while at rehab and home for now. She should  follow up with her endocrinologist before start back on insulin pump for pump setting adjusted ( she was having hypoglycemia at home )   -Recommend Lantus 2 units in am   -Monitor BGS ACTID and at HS on low correction scale ( start premeal standing insulin if BGs > 180s )  -BGs should be monitored and insulin doses to be adjusted at rehab     #Hypothyroidism vs NTIS  TSH 4.43. FT4 0.6, TT3 47 on admission (4/13)  Started on LT4 100mcg daily  Repeat TSH was wnl 3.67 (4/15)  - stopped levothyroxine   - f/u TPO antibodies  - recheck FT4 and TSH in 1 week 4/29 if remains inpatient, otherwise check outpatient.  -ordered on 4/29/2024    Contact via Microsoft Teams during business hours  To reach covering provider access AMION via sunrise tools  For Urgent matters/after-hours/weekends/holidays please page endocrine fellow on call   For nonurgent matters please email [email or web address]    Please note that this patient may be followed by different provider tomorrow.  Notify endocrine 24 hours prior to discharge for final recommendations     . 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 Transferred to [facility] for Liver Transplant Evaluation. Patient found to be hypoglycemic shortly after transfer, now resolved. Consulted for: Type 3c DM, hypoglycemia. BG Goal 100-180mg/dl     Last 24 hour BGs variable in the 200s. Patient is not eating much if anything at all. Would recommend adding glucerna shakes to her meals.  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 Patient was first diagnosed with Type 3c DM after her initial surgery in 2014 (s/p distal pancreatectomy, cholecystectomy, splenectomy and celina-en-y pancreaticojejuonostomy). Then she later underwent total pancreatectomy with islet cell autotransplant in 2018. After that, her insulin requirements decreased over the years but was never off insulin.    Endocrinologist: Dr. Dalton  Per recent outpatient note 3/26/2024: pump not available to review  Patient uses Omnipod Dash with Dexcom CGM G7  Basal rate:   MN 0.05U   6 AM 0.1U   9.30 AM 0.15U   Total basal 2.825U/24h   ICR 1:15   ICF 1:75   Reports of frequent hypoglycemia outpatient in March 2024.    C-peptide <0.1 4/15 at 6pm when BG was 168, insulin deficient.  C-peptide repeated while off dextrose and tube feeds: 0.1 with , confirming insulin deficiency.    PLAN:  - Inpatient BG goal 100-180  - Continue  lantus 2 units to am to prevent overnight and fasting hypoglycemia   - Will start  patient specific admelog scale TIDAC, and qhs  - Please keep hypoglycemia protocol in place    - Discharge planning to acute rehab  Needs to be on basal and bolus vs pump given insulin deficiency ( low c- peptide 0.1 with )  patient prefers to resume insulin pump. Patient was instructed to bring in supplies to resume on discharge, but patient still does not have pump at bedside. Unable to adjust setting at this time.     Would recommend basal and bolus insulin injection while at rehab and home for now. She should  follow up with her endocrinologist before start back on insulin pump for pump setting adjusted ( she was having hypoglycemia at home )   -Recommend Lantus 2 units in am   -Monitor BGS ACTID and at HS on low correction scale ( start premeal standing insulin if BGs > 180s )  -BGs should be monitored and insulin doses to be adjusted at rehab     #Hypothyroidism vs NTIS  TSH 4.43. FT4 0.6, TT3 47 on admission (4/13)  Started on LT4 100mcg daily  Repeat TSH was wnl 3.67 (4/15)  - stopped levothyroxine   - f/u TPO antibodies  - recheck FT4 and TSH in 1 week 4/29 if remains inpatient, otherwise check outpatient.  -ordered on 4/29/2024    Contact via Microsoft Teams during business hours  To reach covering provider access AMION via sunrise tools  For Urgent matters/after-hours/weekends/holidays please page endocrine fellow on call   For nonurgent matters please email [email or web address]    Please note that this patient may be followed by different provider tomorrow.  Notify endocrine 24 hours prior to discharge for final recommendations     . 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 Transferred to [facility] for Liver Transplant Evaluation. Patient found to be hypoglycemic shortly after transfer, now resolved. Consulted for: Type 3c DM, hypoglycemia. BG Goal 100-180mg/dl     Last 24 hour BGs variable in the 200s. Patient is not eating much if anything at all. Would recommend adding glucerna shakes to her meals.  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 Patient was first diagnosed with Type 3c DM after her initial surgery in 2014 (s/p distal pancreatectomy, cholecystectomy, splenectomy and celina-en-y pancreaticojejuonostomy). Then she later underwent total pancreatectomy with islet cell autotransplant in 2018. After that, her insulin requirements decreased over the years but was never off insulin.    Endocrinologist: Dr. Dalton  Per recent outpatient note 3/26/2024: pump not available to review  Patient uses Omnipod Dash with Dexcom CGM G7  Basal rate:   MN 0.05U   6 AM 0.1U   9.30 AM 0.15U   Total basal 2.825U/24h   ICR 1:15   ICF 1:75   Reports of frequent hypoglycemia outpatient in March 2024.    C-peptide <0.1 4/15 at 6pm when BG was 168, insulin deficient.  C-peptide repeated while off dextrose and tube feeds: 0.1 with , confirming insulin deficiency.    PLAN:  - Inpatient BG goal 100-180  - Continue  lantus 2 units to am to prevent overnight and fasting hypoglycemia   - Continue low dose  admelog correctional scale TIDAC, and qhs  - Please keep hypoglycemia protocol in place    - Discharge planning to acute rehab  Needs to be on basal and bolus vs pump given insulin deficiency ( low c- peptide 0.1 with )  patient prefers to resume insulin pump. Patient was instructed to bring in supplies to resume on discharge, but patient still does not have pump at bedside. Unable to adjust setting at this time.     Would recommend basal and bolus insulin injection while at rehab and home for now. She should  follow up with her endocrinologist before start back on insulin pump for pump setting adjusted ( she was having hypoglycemia at home )   -Recommend Lantus 2 units in am   -Monitor BGS ACTID and at HS on low correction scale ( start premeal standing insulin if BGs > 180s )  -BGs should be monitored and insulin doses to be adjusted at rehab     #Hypothyroidism vs NTIS  TSH 4.43. FT4 0.6, TT3 47 on admission (4/13)  Started on LT4 100mcg daily  Repeat TSH was wnl 3.67 (4/15)  - stopped levothyroxine   - f/u TPO antibodies  - recheck FT4 and TSH in 1 week 4/29 if remains inpatient, otherwise check outpatient.  -ordered on 4/29/2024    Contact via Microsoft Teams during business hours  To reach covering provider access AMION via sunrise tools  For Urgent matters/after-hours/weekends/holidays please page endocrine fellow on call   For nonurgent matters please email [email or web address]    Please note that this patient may be followed by different provider tomorrow.  Notify endocrine 24 hours prior to discharge for final recommendations     .

## 2024-04-29 NOTE — PROGRESS NOTE ADULT - SUBJECTIVE AND OBJECTIVE BOX
seen earlier today     Chief Complaint: Diabetes Mellitus follow up    INTERVAL HX:  Patient not feeling well and is not eating anything. states she has no appetite. BG over last 24hrs, were hyperglycemic in the 200s on low dose of insulin.     Review of Systems:  General: As above  GI: No nausea, vomiting  Endocrine: no  S&Sx of hypoglycemia    Allergies    No Known Allergies    Intolerances      MEDICATIONS  (STANDING):  ampicillin/sulbactam  IVPB 9 Gram(s) IV Intermittent every 8 hours  chlorhexidine 4% Liquid 1 Application(s) Topical <User Schedule>  cholecalciferol 2000 Unit(s) Oral daily  dextrose 50% Injectable 12.5 Gram(s) IV Push once  dextrose 50% Injectable 25 Gram(s) IV Push once  dextrose 50% Injectable 25 Gram(s) IV Push once  dextrose Oral Gel 15 Gram(s) Oral once  folic acid 1 milliGRAM(s) Oral daily  glucagon  Injectable 1 milliGRAM(s) IntraMuscular once  heparin   Injectable 5000 Unit(s) SubCutaneous every 12 hours  insulin glargine Injectable (LANTUS) 2 Unit(s) SubCutaneous <User Schedule>  insulin lispro (ADMELOG) corrective regimen sliding scale   SubCutaneous three times a day before meals  insulin lispro (ADMELOG) corrective regimen sliding scale   SubCutaneous <User Schedule>  lactulose Syrup 10 Gram(s) Oral every 8 hours  methadone    Tablet 5 milliGRAM(s) Oral three times a day  minocycline IVPB 200 milliGRAM(s) IV Intermittent every 12 hours  minocycline IVPB      multivitamin/minerals/iron Oral Solution (CENTRUM) 15 milliLiter(s) Oral daily  pantoprazole  Injectable 40 milliGRAM(s) IV Push daily  rifAXIMin 550 milliGRAM(s) Oral two times a day  zinc sulfate 220 milliGRAM(s) Oral daily        insulin glargine Injectable (LANTUS)   2 Unit(s) SubCutaneous (04-29-24 @ 08:46)    insulin lispro (ADMELOG) corrective regimen sliding scale   2 Unit(s) SubCutaneous (04-29-24 @ 13:08)   2 Unit(s) SubCutaneous (04-29-24 @ 08:46)   2 Unit(s) SubCutaneous (04-28-24 @ 17:27)        PHYSICAL EXAM:  VITALS: T(C): 36.9 (04-29-24 @ 12:52)  T(F): 98.4 (04-29-24 @ 12:52), Max: 99.1 (04-28-24 @ 21:09)  HR: 105 (04-29-24 @ 12:52) (69 - 109)  BP: 108/69 (04-29-24 @ 12:52) (100/66 - 112/65)  RR:  (18 - 18)  SpO2:  (95% - 99%)  Wt(kg): --  GENERAL: NAD  Respiratory: Respirations unlabored   Extremities: Warm, no edema  NEURO: Alert , appropriate     LABS:  POCT Blood Glucose.: 242 mg/dL (04-29-24 @ 12:49)  POCT Blood Glucose.: 209 mg/dL (04-29-24 @ 08:13)  POCT Blood Glucose.: 220 mg/dL (04-29-24 @ 02:06)  POCT Blood Glucose.: 234 mg/dL (04-28-24 @ 21:41)  POCT Blood Glucose.: 234 mg/dL (04-28-24 @ 16:58)  POCT Blood Glucose.: 231 mg/dL (04-28-24 @ 11:26)  POCT Blood Glucose.: 226 mg/dL (04-28-24 @ 08:05)  POCT Blood Glucose.: 207 mg/dL (04-28-24 @ 02:27)  POCT Blood Glucose.: 188 mg/dL (04-27-24 @ 21:34)  POCT Blood Glucose.: 136 mg/dL (04-27-24 @ 16:33)  POCT Blood Glucose.: 157 mg/dL (04-27-24 @ 12:02)  POCT Blood Glucose.: 116 mg/dL (04-27-24 @ 09:34)  POCT Blood Glucose.: 109 mg/dL (04-27-24 @ 07:48)  POCT Blood Glucose.: 176 mg/dL (04-27-24 @ 03:46)  POCT Blood Glucose.: 76 mg/dL (04-27-24 @ 03:02)  POCT Blood Glucose.: 76 mg/dL (04-27-24 @ 02:32)  POCT Blood Glucose.: 69 mg/dL (04-27-24 @ 02:30)  POCT Blood Glucose.: 76 mg/dL (04-27-24 @ 01:56)  POCT Blood Glucose.: 130 mg/dL (04-26-24 @ 21:20)  POCT Blood Glucose.: 98 mg/dL (04-26-24 @ 16:50)                          8.3    10.14 )-----------( 347      ( 28 Apr 2024 10:06 )             26.2     04-28    141  |  101  |  <4<L>  ----------------------------<  229<H>  3.5   |  27  |  0.53    Ca    7.9<L>      28 Apr 2024 10:06  Phos  4.0     04-28  Mg     1.5     04-28    TPro  4.5<L>  /  Alb  2.2<L>  /  TBili  3.0<H>  /  DBili  x   /  AST  33  /  ALT  13  /  AlkPhos  151<H>  04-28 04-24 Chol 131 Direct LDL -- LDL calculated 103<H> HDL 7<L> Trig 109, 04-13 Chol 82 Direct LDL -- LDL calculated 57 HDL 8<L> Trig 82  Thyroid Function Tests:  04-23 @ 14:44 TSH -- FreeT4 -- T3 -- Anti TPO 60.7 Anti Thyroglobulin Ab -- TSI --  04-23 @ 06:56 TSH -- FreeT4 -- T3 -- Anti TPO <10.0 Anti Thyroglobulin Ab -- TSI --      A1C with Estimated Average Glucose Result: 5.2 % (04-13-24 @ 03:56)    Estimated Average Glucose: 103 mg/dL (04-13-24 @ 03:56)    Fructosamine, Serum: 372 umol/L (04-13-24 @ 09:41)

## 2024-04-29 NOTE — PROGRESS NOTE ADULT - PROBLEM SELECTOR PLAN 2
Infectious work up  >UA (4/13): 11WBC, small LE, negative Nitrite or bacteria  >Babesia (4/13): neg; HIV (4/13): neg; RVP, COVID (4/14): neg; MRSA (4/13): Negative  >CXR (4/13): RML consolidation; CTC/A/P (4/13): RML consolidation, LLL atelectasis 2/2 likely ?mucus plug, ischemic colitis  >Bcx (4/13): NGTD; ET tube cx (4/13): Normal resp luisa  >BAL (43): Pending; GI PCR (4/15): Negative  >Sputum culture (4/17): Carbapenem resistance Acineobacter    #Leukocytosis  #Septic shock   #Hx of VRE and Candida infection  >H/o of recurrent infxn from multiple abdominal wounds and surgeries. Hx of VRE and candida.   >s/p Caspofungin 50mg Q24 (stopped 4/16); Daptomycin 500mg Q24 (stopped 4/16); Doxycycline 100mg Q12 (stopped 4/18)  - Source of WBC Colitis vs. Pneumonia  - c/w Meropenem 1g q12 (4/10 - 4/21)  - c/w Vancomycin q12 (4/17 - 4/21) Infectious work up  >UA (4/13): 11WBC, small LE, negative Nitrite or bacteria  >Babesia (4/13): neg; HIV (4/13): neg; RVP, COVID (4/14): neg; MRSA (4/13): Negative  >CXR (4/13): RML consolidation; CTC/A/P (4/13): RML consolidation, LLL atelectasis 2/2 likely ?mucus plug, ischemic colitis  >Bcx (4/13): NGTD; ET tube cx (4/13): Normal resp luisa  >BAL (43): Pending; GI PCR (4/15): Negative  >Sputum culture (4/17): Carbapenem resistance Acinetobacter    #Leukocytosis  #Septic shock   #Hx of VRE and Candida infection  >H/o of recurrent infxn from multiple abdominal wounds and surgeries. Hx of VRE and candida.   >s/p Caspofungin 50mg Q24 (stopped 4/16); Daptomycin 500mg Q24 (stopped 4/16); Doxycycline 100mg Q12 (stopped 4/18)  - Source of WBC Colitis vs. Pneumonia  - c/w Meropenem 1g q12 (4/10 - 4/21)  - c/w Vancomycin q12 (4/17 - 4/21)

## 2024-04-29 NOTE — PROGRESS NOTE ADULT - SUBJECTIVE AND OBJECTIVE BOX
INFECTIOUS DISEASES FOLLOW UP-- Alyssa Gore  [phone]    This is a follow up note for this  48yFemale with  Liver failure without hepatic coma        ROS:  CONSTITUTIONAL:  No fever, good appetite  CARDIOVASCULAR:  No chest pain or palpitations  RESPIRATORY:  No dyspnea  GASTROINTESTINAL:  No nausea, vomiting, diarrhea, or abdominal pain  GENITOURINARY:  No dysuria  NEUROLOGIC:  No headache,     Allergies    No Known Allergies    Intolerances        ANTIBIOTICS/RELEVANT:  antimicrobials  ampicillin/sulbactam  IVPB 9 Gram(s) IV Intermittent every 8 hours  minocycline IVPB      minocycline IVPB 200 milliGRAM(s) IV Intermittent every 12 hours  rifAXIMin 550 milliGRAM(s) Oral two times a day    immunologic:    OTHER:  chlorhexidine 4% Liquid 1 Application(s) Topical <User Schedule>  cholecalciferol 2000 Unit(s) Oral daily  dextrose 50% Injectable 25 Gram(s) IV Push once  dextrose 50% Injectable 12.5 Gram(s) IV Push once  dextrose 50% Injectable 25 Gram(s) IV Push once  dextrose Oral Gel 15 Gram(s) Oral once  folic acid 1 milliGRAM(s) Oral daily  glucagon  Injectable 1 milliGRAM(s) IntraMuscular once  heparin   Injectable 5000 Unit(s) SubCutaneous every 12 hours  insulin glargine Injectable (LANTUS) 2 Unit(s) SubCutaneous <User Schedule>  insulin lispro (ADMELOG) corrective regimen sliding scale   SubCutaneous three times a day before meals  insulin lispro (ADMELOG) corrective regimen sliding scale   SubCutaneous <User Schedule>  lactulose Syrup 10 Gram(s) Oral every 8 hours  methadone    Tablet 5 milliGRAM(s) Oral three times a day  multivitamin/minerals/iron Oral Solution (CENTRUM) 15 milliLiter(s) Oral daily  pantoprazole  Injectable 40 milliGRAM(s) IV Push daily  zinc sulfate 220 milliGRAM(s) Oral daily      Objective:  Vital Signs Last 24 Hrs  T(C): 36.9 (29 Apr 2024 12:52), Max: 37.3 (28 Apr 2024 21:09)  T(F): 98.4 (29 Apr 2024 12:52), Max: 99.1 (28 Apr 2024 21:09)  HR: 105 (29 Apr 2024 12:52) (69 - 109)  BP: 108/69 (29 Apr 2024 12:52) (100/66 - 108/69)  BP(mean): --  RR: 18 (29 Apr 2024 12:52) (18 - 18)  SpO2: 95% (29 Apr 2024 12:52) (95% - 99%)    Parameters below as of 29 Apr 2024 12:52  Patient On (Oxygen Delivery Method): nasal cannula  O2 Flow (L/min): 2      PHYSICAL EXAM:  Constitutional:no acute distress  Eyes:ANURAG, EOMI  Ear/Nose/Throat: no oral lesions, 	  Respiratory: clear BL  Cardiovascular: S1S2  Gastrointestinal:soft, (+) BS, no tenderness  Extremities:no e/e/c  No Lymphadenopathy  IV sites not inflammed.    LABS:                        8.3    10.14 )-----------( 347      ( 28 Apr 2024 10:06 )             26.2     04-28    141  |  101  |  <4<L>  ----------------------------<  229<H>  3.5   |  27  |  0.53    Ca    7.9<L>      28 Apr 2024 10:06  Phos  4.0     04-28  Mg     1.5     04-28    TPro  4.5<L>  /  Alb  2.2<L>  /  TBili  3.0<H>  /  DBili  x   /  AST  33  /  ALT  13  /  AlkPhos  151<H>  04-28      Urinalysis Basic - ( 28 Apr 2024 10:06 )    Color: x / Appearance: x / SG: x / pH: x  Gluc: 229 mg/dL / Ketone: x  / Bili: x / Urobili: x   Blood: x / Protein: x / Nitrite: x   Leuk Esterase: x / RBC: x / WBC x   Sq Epi: x / Non Sq Epi: x / Bacteria: x        MICROBIOLOGY:            RECENT CULTURES:  04-28 @ 11:27  .Blood Blood-Peripheral  --  --  --    No growth at 24 hours  --      RADIOLOGY & ADDITIONAL STUDIES:    < from: US Abdomen Limited (04.29.24 @ 12:09) >  Findings and  IMPRESSION: Bilateral pleural effusions. Trace free fluid right upper and   left upper quadrants. Small free fluid right lower and left lower   quadrants.    < end of copied text >   INFECTIOUS DISEASES FOLLOW UP-- Alyssa Gore  [phone]    This is a follow up note for this  48yFemale with  Liver failure without hepatic coma        ROS:  CONSTITUTIONAL:  No fever, good appetite  CARDIOVASCULAR:  No chest pain or palpitations  RESPIRATORY:  No dyspnea  GASTROINTESTINAL:  No nausea, vomiting, diarrhea, or abdominal pain  GENITOURINARY:  No dysuria  NEUROLOGIC:  No headache,     Allergies    No Known Allergies    Intolerances        ANTIBIOTICS/RELEVANT:  antimicrobials  ampicillin/sulbactam  IVPB 9 Gram(s) IV Intermittent every 8 hours  minocycline IVPB      minocycline IVPB 200 milliGRAM(s) IV Intermittent every 12 hours  rifAXIMin 550 milliGRAM(s) Oral two times a day    immunologic:    OTHER:  chlorhexidine 4% Liquid 1 Application(s) Topical <User Schedule>  cholecalciferol 2000 Unit(s) Oral daily  dextrose 50% Injectable 25 Gram(s) IV Push once  dextrose 50% Injectable 12.5 Gram(s) IV Push once  dextrose 50% Injectable 25 Gram(s) IV Push once  dextrose Oral Gel 15 Gram(s) Oral once  folic acid 1 milliGRAM(s) Oral daily  glucagon  Injectable 1 milliGRAM(s) IntraMuscular once  heparin   Injectable 5000 Unit(s) SubCutaneous every 12 hours  insulin glargine Injectable (LANTUS) 2 Unit(s) SubCutaneous <User Schedule>  insulin lispro (ADMELOG) corrective regimen sliding scale   SubCutaneous three times a day before meals  insulin lispro (ADMELOG) corrective regimen sliding scale   SubCutaneous <User Schedule>  lactulose Syrup 10 Gram(s) Oral every 8 hours  methadone    Tablet 5 milliGRAM(s) Oral three times a day  multivitamin/minerals/iron Oral Solution (CENTRUM) 15 milliLiter(s) Oral daily  pantoprazole  Injectable 40 milliGRAM(s) IV Push daily  zinc sulfate 220 milliGRAM(s) Oral daily      Objective:  Vital Signs Last 24 Hrs  T(C): 36.9 (29 Apr 2024 12:52), Max: 37.3 (28 Apr 2024 21:09)  T(F): 98.4 (29 Apr 2024 12:52), Max: 99.1 (28 Apr 2024 21:09)  HR: 105 (29 Apr 2024 12:52) (69 - 109)  BP: 108/69 (29 Apr 2024 12:52) (100/66 - 108/69)  BP(mean): --  RR: 18 (29 Apr 2024 12:52) (18 - 18)  SpO2: 95% (29 Apr 2024 12:52) (95% - 99%)    Parameters below as of 29 Apr 2024 12:52  Patient On (Oxygen Delivery Method): nasal cannula  O2 Flow (L/min): 2      PHYSICAL EXAM:  Constitutional:no acute distress, sleeping  Eyes:ANURAG, EOMI  Ear/Nose/Throat: no oral lesions, 	  Respiratory: clear BL  Cardiovascular: S1S2  Gastrointestinal:soft, (+) BS, no tenderness  Extremities:no e/e/c  No Lymphadenopathy  IV sites not inflammed.    LABS:                        8.3    10.14 )-----------( 347      ( 28 Apr 2024 10:06 )             26.2     04-28    141  |  101  |  <4<L>  ----------------------------<  229<H>  3.5   |  27  |  0.53    Ca    7.9<L>      28 Apr 2024 10:06  Phos  4.0     04-28  Mg     1.5     04-28    TPro  4.5<L>  /  Alb  2.2<L>  /  TBili  3.0<H>  /  DBili  x   /  AST  33  /  ALT  13  /  AlkPhos  151<H>  04-28      Urinalysis Basic - ( 28 Apr 2024 10:06 )    Color: x / Appearance: x / SG: x / pH: x  Gluc: 229 mg/dL / Ketone: x  / Bili: x / Urobili: x   Blood: x / Protein: x / Nitrite: x   Leuk Esterase: x / RBC: x / WBC x   Sq Epi: x / Non Sq Epi: x / Bacteria: x        MICROBIOLOGY:            RECENT CULTURES:  04-28 @ 11:27  .Blood Blood-Peripheral  --  --  --    No growth at 24 hours  --      RADIOLOGY & ADDITIONAL STUDIES:    < from: US Abdomen Limited (04.29.24 @ 12:09) >  Findings and  IMPRESSION: Bilateral pleural effusions. Trace free fluid right upper and   left upper quadrants. Small free fluid right lower and left lower   quadrants.    < end of copied text >

## 2024-04-29 NOTE — PROGRESS NOTE ADULT - ATTENDING COMMENTS
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  Acute liver failure and hepatic encephalopathy likely due to  alcoholic hepatitis and found to have pneumonia suspicious for aspiration PNA   Hepatology following  h/o factor V Leiden- AC was DC ed as an out patient because of repeated negative DVT ( reportedly)   Type 1 DM RISS  PT- JAMEL  d/w patient's family

## 2024-04-29 NOTE — PROGRESS NOTE ADULT - PROBLEM SELECTOR PLAN 1
#Alcoholic hepatitis/cirrhosis?  >Elevated bilirubin/coag, c/b hepatic encephalopathy; >PETH (4/13): > 400  >CTAP (4/13): Hepatomegaly and hepatic steatosis  >US abd (4/13): Hepatic steatosis, no hepatic vein thrombosis  >Acute hepatitis panel (4/13): Negative; Autoimmune hepatitis panel (4/13): Negative  >MRCP (4/15): Enlarged, fatty liver. No biliary duct dilation, unlikely obstruction  - c/w Folid acid, MVI; c/w thiamine 500mg Q8h x 3 days (4/18 - 4/20) given patient confabulating c/f wernicke vs. ICU delirium --> mental status improving    #Hyperammonemia   >Baseline mentation AAO3  >s/p CRRT (off since 4/16)  - f/u plasma amino acid (4/16): in lab  - f/u urine orotic acid (4/16) - sent out to [facility]: *elevated  - f/u metabolic genetic consult to r/o acquired urea cycle disorder  - c/w Rifaximin; Lactulose

## 2024-04-29 NOTE — PROGRESS NOTE ADULT - ASSESSMENT
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 Hargrove), and now S/P total pancreatectomy  with islet cell autotransplant at [facility] by Dr. Gil in 04/2018).  Patient admitted with AMS, found to be in liver failure/hepatic encephalopathy and imaging shows colitis.  TPN consulted given severe protein calorie malnutrition and allowing possibility of bowel rest.  Patient with history of TPN use 2 years ago.      - Nutritional Assessment, patient with severe protein calorie malnutrition not fully meeting nutritional goals enterally due to colitis/decreased enteral intake and may benefit from TPN for nutritional support.  - TPN plan: Patient declining TPN at present; will continue to follow  - TPN access:  Patient will need a dedicated central line if/once TPN planned to start   - Recommend checking baseline nutrition parameters:  TSH, Prealbumin, Lipids, HgA1c, iCal, Mag, Phos  - Electrolyte Imbalance risk:  check CMP, Mg, Phos and ionized Ca daily, to be supplemented peripherally per primary team.  - Recommend strict intake and output/weight checks three times a week  - Risk of glucose dysfunction with TPN:  HgA1c 5.2% ; would need to initiate fingersticks every 6 hours to monitor glucose trend once TPN starts  - Risk for Hypertriglyceridemia with TPN:  TG 82 mg/dL from 4/13; plan to monitor TG closely once/if TPN starts  - Further care per primary team    Available on TEAMS  TPN spectra [id number] ([phone] when dialing from outside line)  M-F 8A-2P, Weekends and holidays 8/9A-12/1P  Discussed with

## 2024-04-30 NOTE — PROGRESS NOTE ADULT - PROBLEM SELECTOR PROBLEM 6
Hypothyroidism
Hypothyroidism
Ischemic colitis
Colonic mass
Colonic mass
Ischemic colitis
Colonic mass
Colonic mass

## 2024-04-30 NOTE — PROGRESS NOTE ADULT - PROBLEM SELECTOR PROBLEM 11
Anxiety and depression
Anxiety and depression
Need for prophylactic measure
Anxiety and depression
Need for prophylactic measure
Anxiety and depression

## 2024-04-30 NOTE — PROGRESS NOTE ADULT - PROBLEM SELECTOR PLAN 1
#Alcoholic hepatitis/cirrhosis?  >Elevated bilirubin/coag, c/b hepatic encephalopathy; >PETH (4/13): > 400  >CTAP (4/13): Hepatomegaly and hepatic steatosis  >US abd (4/13): Hepatic steatosis, no hepatic vein thrombosis  >Acute hepatitis panel (4/13): Negative; Autoimmune hepatitis panel (4/13): Negative  >MRCP (4/15): Enlarged, fatty liver. No biliary duct dilation, unlikely obstruction  - c/w Folid acid, MVI; c/w thiamine 500mg Q8h x 3 days (4/18 - 4/20) given patient confabulating c/f wernicke vs. ICU delirium --> mental status improving    #Hyperammonemia   >Baseline mentation AAO3  >s/p CRRT (off since 4/16)  - f/u plasma amino acid (4/16): in lab  - f/u urine orotic acid (4/16) - sent out to [facility]: *elevated  - f/u metabolic genetic consult to r/o acquired urea cycle disorder  - c/w Rifaximin; Lactulose

## 2024-04-30 NOTE — PROGRESS NOTE ADULT - PROBLEM SELECTOR PLAN 4
#History of L peroneal DVT in 2022  #History of RA thrombus in 2018  >TTE (4/13): No thrombus commented  >Eliquis stopped 5/2023 by outpatient hematology given consistently negative DVT studies  - SQH for DVT ppx
#DM1 vs. DM3c  >Per HIE record, patient prone to hypoglycemia  >History of DM1, but s/p total pancreatectomy and islet cell autotransplant in 2018 - currently DM3c?  >C-peptide (4/18): < 0.1  - Endocrine on board
#History of L peroneal DVT in 2022  #History of RA thrombus in 2018  >TTE (4/13): No thrombus commented  >Eliquis stopped 5/2023 by outpatient hematology given consistently negative DVT studies  - SQH for DVT ppx
#DM1 vs. DM3c  >Per HIE record, patient prone to hypoglycemia  >History of DM1, but s/p total pancreatectomy and islet cell autotransplant in 2018 - currently DM3c?  >C-peptide (4/18): < 0.1  - Endocrine on board
#History of L peroneal DVT in 2022  #History of RA thrombus in 2018  >TTE (4/13): No thrombus commented  >Eliquis stopped 5/2023 by outpatient hematology given consistently negative DVT studies  - SQH for DVT ppx
#History of L peroneal DVT in 2022  #History of RA thrombus in 2018  >TTE (4/13): No thrombus commented  >Eliquis stopped 5/2023 by outpatient hematology given consistently negative DVT studies  - SQH for DVT ppx

## 2024-04-30 NOTE — PROGRESS NOTE ADULT - PROBLEM SELECTOR PROBLEM 10
Thrombocytopenia
Need for prophylactic measure
Anxiety and depression
Anxiety and depression
Thrombocytopenia
Need for prophylactic measure

## 2024-04-30 NOTE — PROGRESS NOTE ADULT - PROBLEM SELECTOR PROBLEM 9
Macrocytic anemia
Anxiety and depression
Macrocytic anemia
Anxiety and depression
Anxiety and depression
Thrombocytopenia
Anxiety and depression
Thrombocytopenia

## 2024-04-30 NOTE — ADVANCED PRACTICE NURSE CONSULT - ASSESSMENT
Midline Catheter Insertion Note    Catheter type: 4F  : Bard  Power Injectable: Yes  Ref#: [id number]  Procedure assisted by: Rosana Manuel    Time out was preformed, confirming the patient's first and last name, date of birth, procedure, and correct site prior to state of procedure.  Patient was placed with HOB 30 degrees. Patient placement site was prepped with chlorhexidine solution, then draped using maximum sterile barrier protection. Using the Bard Site Rite 8, the catheter was placed using the Modified Seldinger Technique. The area was injected with 2 ml of 1% lidocaine. Using the ultrasound, the catheter was introduced. Strict adherence to outline aseptic technique including handwashing, glove and gown, utilizing mask and cap, plus draping the patient with a sterile drape was observed. Upon completion of line placement, the insertion site was covered with a sterile occlusive CHG dressing. Pt tolerated procedure well.   All materials used for catheter insertion, including the intact guide wires, were accounted for at the end of the procedure.    Number of attempts: 1  Complications/Comments: no  Emergency Placement: no     Site: right basilic  Anatomical Site of insertion: right basilic  Catheter size/length: 4Fr, 20cm   US guided Bard SL Power MIDLINE placed  
Vascular Access Team    Evaluation for: MIDLINE placement at bedside   Indication for MIDLINE: IV contrast and antibiotics   Requested by name: JeffreymaSky felton      Allergy to CHG/Heparin.Lidocaine: no     Platelets(>20): 319  INR(<3): 1.46   eGFR(>40): 120  Pending blood cultures: N/A  IR or Nephrology or ID clearance needed: no     Anticoagulants: heparin 5000units Q12 hrs  UE DVT: no   Mastectomy: no   Fistula: no     Plan: Bedside midline order evaluated.     Please call VAT RN at [phone] with any questions.  
arrived on unit, patient was found lying in a low air loss pressure redistribution support surface style bed.  patient  is unable to turn independently and staff assistance x 2 was provided. Once turned able to view her skin, rectal tube present. indwelling urethral cathter present.    sacrum/ coccyx  non-blanchable deep red,   discoloration and hyperpigmentation with an area  partial - thickness  and tissue loss  consistent with deep tissue  evolution  size approximately  5 cm x 4 cm x 0.1 cm.  present  on admission.  right   heel  with non-blanchable deep red discoloration with a roofed blister size approximately  7cm x 4 cm x 0.1 cm, consistent with a  Deep tissue injury with evolution,  present on admission.   left  heel with non-blanchable deep red discoloration size approximately  3 cm x 2 cm x 0cm, consistent with a Deep tissue injury, present on admission.   lateral malleolus with non-blanchable deep red discoloration with a unnroofed blister size approximately  2 cm x 2 cm x 0.1 cm, consistent with a  Deep tissue injury with evolution,  present on admission.   right elbow non- blanchable deep red  discoloration and hyperpigmentation  noted to measure approximately 2 cm x 2 cm x 0cm.  This  is consistent with deep tissue injury that is  present on admission.  right thigh with roofed  blister size approximately 1 cm 1 cm x 0.1 cm   plan of care reviewed with covering staff Dariel Solano (RN)

## 2024-04-30 NOTE — PROGRESS NOTE ADULT - PROBLEM SELECTOR PROBLEM 8
Macrocytic anemia
Colonic mass
Thrombocytopenia
Colonic mass
Thrombocytopenia
Thrombocytopenia
Colonic mass
Macrocytic anemia
Thrombocytopenia
Colonic mass

## 2024-04-30 NOTE — PROGRESS NOTE ADULT - PROBLEM SELECTOR PROBLEM 5
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
Ischemic colitis
Ischemic colitis
Hypothyroidism
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
Hypothyroidism
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

## 2024-04-30 NOTE — PROGRESS NOTE ADULT - ASSESSMENT
49yo pmh of HTN, DM, Factor V Leiden c/b L peroneal DVT (2022) and RA thrombus (2018) currently not on AC, gout, congenital pancreas divisum, recurrent pancreatitis s/p cholecystectomy, Splenectomy, Kevin-En-Y Pancreaticojejunostomy, total pancreatectomy s/p Islet Cell Autotransplant at [facility] 2018, complicated by multiple intraabdominal infection (VRE, fungal? per LIM), fistula, abscesses. Presented to [facility] 4/8 for several weeks of decreased PO intake, NBNB emesis, watery diarrhea, Somnolence, requiring intubation for airway protection requiring MICU stay. Now downgraded to the floors, course complicated by increasing O2 requirement.

## 2024-04-30 NOTE — PROGRESS NOTE ADULT - TIME BILLING
Reviewing documentation/ labs/imaging,. Personally interviewing and examining the patient. Coordination of care

## 2024-04-30 NOTE — PROVIDER CONTACT NOTE (OTHER) - BACKGROUND
liver failure without hepatic coma
Dx. Liver failure without hepatin coma
adm- liver failure without hepatic coma

## 2024-04-30 NOTE — PROGRESS NOTE ADULT - ATTENDING COMMENTS
Patient seen and examined   labs, imaging and vitals are reviewed   c/o diffuse abdominal pain   afebrile   Tachycardic   o/e- Diffuse tenderness but no peritoneal sign   Abdominal US - mild ascites   WBC with 23 K Band 9.6  BMP - Hypernatremia 147 and K 3.1    43 Y/O/F of PMHx T1DM, Factor V Leiden c/b provoked DVT and RA thrombus in the past, and pancreatic divisum c/b recurrent pancreatitis s/p total pancreatectomy s/p islet cell autotransplant, also had extensive abdominal surgical history (cholecystectomy, splenectomy, celina-en-y pancreaticojejunostomy) for unclear reason c/b multiple intraabdominal infection/abscess/fistula (including VRE, candida)initially presented to [facility] for emesis, diarrhea, and somnolence, required  intubation and was found to be in acute liver failure and acute hepatic encephalopathy and  transferred to MICU and was treated broad spectrum Abx, CRRT initiated, ammonia levels downtrended.  Now with abdominal pain with leukocytosis and bandemia   CT Scan Abdomen urgent   Zosyn   ID follow up  Hypokalemia and Hyponatremia - monitor   monitor closely

## 2024-04-30 NOTE — AIRWAY PLACEMENT NOTE ADULT - AIRWAY COMMENTS:
Pt arrived intubated via EMS portable Vent
Cormick Lehane grade 1 view. ETT placed successfully w/o incident. confirmed via bilateral breath sounds, positive colormetric capnography change.

## 2024-04-30 NOTE — PROVIDER CONTACT NOTE (OTHER) - REASON
Currently requiring HF 15L to keep sats >90%
Midline removal
Patient refusing PM Lactulose and Methadone
Patient bladder scan of 244ml
Patient complaining of abdominal pain after lactulose. Bladder scan of 240ml.
Patient was complaining of a stomachache. RN to do bladder scan but patient had a bowel movement with very minimal urine production. bladder scan showed 192ml
Pt. o2 sat sating 88-89 on RA while on bed
Patient BP of 99/62

## 2024-04-30 NOTE — PROGRESS NOTE ADULT - NUTRITIONAL ASSESSMENT
This patient has been assessed with a concern for Malnutrition and has been determined to have a diagnosis/diagnoses of Severe protein-calorie malnutrition and Underweight (BMI < 19).    This patient is being managed with:   Diet Soft and Bite Sized-  Fiber/Residue Restricted (LOWFIBER)  Prosource Gelatein Plus     Qty per Day:  1  Supplement Feeding Modality:  Oral  Glucerna Shake Cans or Servings Per Day:  1       Frequency:  Two Times a day  Ensure Plus High Protein Cans or Servings Per Day:  2       Frequency:  Daily  Entered: Apr 29 2024  1:40PM

## 2024-04-30 NOTE — PROGRESS NOTE ADULT - SUBJECTIVE AND OBJECTIVE BOX
INFECTIOUS DISEASES FOLLOW UP-- Alyssa Gore  [phone]    This is a follow up note for this  48yFemale with  Liver failure without hepatic coma  patient with progressive abodminal pain and respiratory distress this afternoon   ultimatley coded        ROS:  CONSTITUTIONAL:  No fever, good appetite  CARDIOVASCULAR:  No chest pain or palpitations  RESPIRATORY:  No dyspnea  GASTROINTESTINAL:  No nausea, vomiting, diarrhea, or abdominal pain  GENITOURINARY:  No dysuria  NEUROLOGIC:  No headache,     Allergies    No Known Allergies    Intolerances        ANTIBIOTICS/RELEVANT:  antimicrobials  minocycline 200 milliGRAM(s) Oral every 12 hours  piperacillin/tazobactam IVPB.. 3.375 Gram(s) IV Intermittent every 8 hours  rifAXIMin 550 milliGRAM(s) Oral two times a day    immunologic:    OTHER:  chlorhexidine 0.12% Liquid 15 milliLiter(s) Oral Mucosa every 12 hours  chlorhexidine 4% Liquid 1 Application(s) Topical <User Schedule>  cholecalciferol 2000 Unit(s) Oral daily  dextrose 50% Injectable 25 Gram(s) IV Push once  dextrose 50% Injectable 12.5 Gram(s) IV Push once  dextrose 50% Injectable 25 Gram(s) IV Push once  dextrose Oral Gel 15 Gram(s) Oral once  fentaNYL    Injectable 50 MICROGram(s) IV Push once  fentaNYL   Infusion... 0.5 MICROgram(s)/kG/Hr IV Continuous <Continuous>  folic acid 1 milliGRAM(s) Oral daily  glucagon  Injectable 1 milliGRAM(s) IntraMuscular once  heparin   Injectable 5000 Unit(s) SubCutaneous every 12 hours  insulin glargine Injectable (LANTUS) 2 Unit(s) SubCutaneous <User Schedule>  insulin lispro (ADMELOG) corrective regimen sliding scale   SubCutaneous three times a day before meals  insulin lispro (ADMELOG) corrective regimen sliding scale   SubCutaneous <User Schedule>  lactulose Syrup 10 Gram(s) Oral every 6 hours  methadone    Tablet 5 milliGRAM(s) Oral three times a day  multivitamin/minerals/iron Oral Solution (CENTRUM) 15 milliLiter(s) Oral daily  propofol Infusion 30 MICROgram(s)/kG/Min IV Continuous <Continuous>  zinc sulfate 220 milliGRAM(s) Oral daily      Objective:  Vital Signs Last 24 Hrs  T(C): 36.8 (30 Apr 2024 11:45), Max: 36.8 (30 Apr 2024 11:45)  T(F): 98.2 (30 Apr 2024 11:45), Max: 98.2 (30 Apr 2024 11:45)  HR: 112 (30 Apr 2024 11:45) (109 - 112)  BP: 112/72 (30 Apr 2024 11:45) (112/72 - 118/73)  BP(mean): --  RR: 18 (30 Apr 2024 11:45) (18 - 18)  SpO2: 90% (30 Apr 2024 11:45) (90% - 92%)    Parameters below as of 30 Apr 2024 11:45  Patient On (Oxygen Delivery Method): nasal cannula        PHYSICAL EXAM:  Constitutional:intubated, moves non pruposefully  Eyes:ANURAG,   Ear/Nose/Throat: bloody secretions in eT tube	  Respiratory: coarse decreased at bases  Cardiovascular: S1S2  Gastrointestinal: distended marked compared to earlier in the day  Extremities:no e/e/c mottled distally  No Lymphadenopathy  IV sites not inflammed.    LABS:                        9.7    26.96 )-----------( 273      ( 30 Apr 2024 20:06 )             30.4     04-30    147<H>  |  102  |  5<L>  ----------------------------<  153<H>  3.1<L>   |  30  |  0.57    Ca    8.7      30 Apr 2024 11:09  Phos  4.1     04-30  Mg     1.8     04-30    TPro  5.7<L>  /  Alb  2.6<L>  /  TBili  4.1<H>  /  DBili  x   /  AST  53<H>  /  ALT  18  /  AlkPhos  192<H>  04-30      Urinalysis Basic - ( 30 Apr 2024 11:09 )    Color: x / Appearance: x / SG: x / pH: x  Gluc: 153 mg/dL / Ketone: x  / Bili: x / Urobili: x   Blood: x / Protein: x / Nitrite: x   Leuk Esterase: x / RBC: x / WBC x   Sq Epi: x / Non Sq Epi: x / Bacteria: x        MICROBIOLOGY:      Culture - Blood (04.28.24 @ 11:27)    Specimen Source: .Blood Blood-Peripheral   Culture Results:   No growth at 48 Hours    Culture - Sputum . (04.17.24 @ 12:11)    Gram Stain:   Few polymorphonuclear leukocytes per low power field  Few Squamous epithelial cells per low power field  Moderate Gram positive cocci in pairs per oil power field  Few Gram Negative Coccobacilli per oil power field   -  Minocycline: S   -  Cefiderocol: S   -  Amikacin: R >32   -  Ampicillin/Sulbactam: I 16/8   -  Cefepime: I 16   -  Ceftazidime: R >16   -  Ciprofloxacin: R >2   -  Gentamicin: R >8   -  Imipenem: R >8   -  Levofloxacin: R >4   -  Meropenem: R >8   -  Piperacillin/Tazobactam: R   -  Polymyxin B: I 0.25   -  Tigecycline: S <=2   -  Tobramycin: S <=2   -  Trimethoprim/Sulfamethoxazole: R >2/38   Specimen Source: ET Tube ET Tube   Culture Results:   Numerous Acinetobacter baumannii/nosocom group (Carbapenem Resistant)  Normal Respiratory Linh present  cefiderocol interpretations based on FDA breakpoints.   Organism Identification: Acinetobacter baumannii/nosocom group (Carbapenem Resistant)   Organism: Acinetobacter baumannii/nosocom group (Carbapenem Resistant)   Organism: Acinetobacter baumannii/nosocom group (Carbapenem Resistant)   Method Type: KLARISSA   Method Type:           RECENT CULTURES:  04-28 @ 11:27  .Blood Blood-Peripheral  --  --  --    No growth at 48 Hours  --      RADIOLOGY & ADDITIONAL STUDIES:    < from: CT Abdomen and Pelvis w/ IV Cont (04.30.24 @ 16:20) >    IMPRESSION:  *  Regions of decreased attenuation the liver which may be seen in the   setting of fatty infiltration or severe acute hepatitis.  *  Wall thickening of small bowel and colon, increased compared to prior   examination. Considerations include sequela portal hypertension, fluid   overload, and enterocolitis.  *  No evidence of portal or mesenteric venous thrombosis.  *  Moderate ascites, increased from prior.  *  Bibasilar groundglass opacity and moderate bilateral pleural   effusions, consistent with a pulmonary edema pattern.    < end of copied text >

## 2024-04-30 NOTE — PROGRESS NOTE ADULT - PROBLEM SELECTOR PLAN 3
Found to have increasing oxygen requirement  CXR with bilateral pleural effusions   - trial lasix 20mg  - On Minocycline and Ampicillin for CRE (Acinobactr) Found to have increasing oxygen requirement  CXR with bilateral pleural effusions   - trial lasix 20mg  - On Minocycline and Ampicillin for CRE (Acinobactr)  -patient now requiring nonrebreather to maintain O2 sats   -etiology of worsening hypoxia may be multifactorial, currently being treated for PNA, along with no abdominal pain patient demonstrating poor inspiratory effort on exam   -continue PNA treatement

## 2024-04-30 NOTE — PROVIDER CONTACT NOTE (OTHER) - DATE AND TIME:
27-Apr-2024 20:23
29-Apr-2024 20:06
30-Apr-2024 12:03
29-Apr-2024 22:03
22-Apr-2024 23:03
24-Apr-2024 12:15
30-Apr-2024 01:47
30-Apr-2024 06:48

## 2024-04-30 NOTE — PROGRESS NOTE ADULT - PROBLEM SELECTOR PROBLEM 4
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
Hypothyroidism
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
Factor V Leiden
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
Factor V Leiden

## 2024-04-30 NOTE — PROGRESS NOTE ADULT - SUBJECTIVE AND OBJECTIVE BOX
CARA ZALDIVAR 48y Female      Patient is a 48y old  Female who presents with a chief complaint of sepsis (29 Apr 2024 19:44)        INTERVAL HPI/OVERNIGHT EVENTS: No acute events overnight. Patient was seen and evaluated at the bedside. The patient denies pain. Vitals stable. Patient denies fever/chills, chest pain, shortness of breath, abdominal pain, headaches, nausea/vomiting, and diarrhea/constipation.      PHYSICAL EXAM:  GENERAL: NAD  HEAD:  Normocephalic  EYES:  conjunctiva and sclera clear  ENMT: Moist mucous membranes  NECK: Supple  NERVOUS SYSTEM:  Alert, awake  CHEST/LUNG: Good air exchange bilaterally, no wheeze  HEART: Regular rate and rhythm        Vital Signs Last 24 Hrs  T(C): 36.7 (30 Apr 2024 05:52), Max: 36.9 (29 Apr 2024 12:52)  T(F): 98.1 (30 Apr 2024 05:52), Max: 98.4 (29 Apr 2024 12:52)  HR: 110 (30 Apr 2024 05:52) (105 - 110)  BP: 118/73 (30 Apr 2024 05:52) (108/69 - 118/73)  BP(mean): --  RR: 18 (30 Apr 2024 05:52) (18 - 18)  SpO2: 92% (30 Apr 2024 05:52) (92% - 95%)    Parameters below as of 30 Apr 2024 05:52  Patient On (Oxygen Delivery Method): nasal cannula  O2 Flow (L/min): 3        Consultant(s) Notes Reviewed:  [X] YES  [ ] NO  Care Discussed with Consultants/Other Providers [X] YES  [ ] NO  Imaging Personally Reviewed:  [X] YES  [ ] NO      LABS:                        8.3    10.14 )-----------( 347      ( 28 Apr 2024 10:06 )             26.2     04-28    141  |  101  |  <4<L>  ----------------------------<  229<H>  3.5   |  27  |  0.53    Ca    7.9<L>      28 Apr 2024 10:06  Phos  4.0     04-28  Mg     1.5     04-28    TPro  4.5<L>  /  Alb  2.2<L>  /  TBili  3.0<H>  /  DBili  x   /  AST  33  /  ALT  13  /  AlkPhos  151<H>  04-28      Urinalysis Basic - ( 28 Apr 2024 10:06 )    Color: x / Appearance: x / SG: x / pH: x  Gluc: 229 mg/dL / Ketone: x  / Bili: x / Urobili: x   Blood: x / Protein: x / Nitrite: x   Leuk Esterase: x / RBC: x / WBC x   Sq Epi: x / Non Sq Epi: x / Bacteria: x        CAPILLARY BLOOD GLUCOSE      POCT Blood Glucose.: 159 mg/dL (30 Apr 2024 02:24)  POCT Blood Glucose.: 141 mg/dL (29 Apr 2024 22:17)  POCT Blood Glucose.: 176 mg/dL (29 Apr 2024 16:45)  POCT Blood Glucose.: 242 mg/dL (29 Apr 2024 12:49)  POCT Blood Glucose.: 209 mg/dL (29 Apr 2024 08:13)           CARA ZALDIVAR 48y Female      Patient is a 48y old  Female who presents with a chief complaint of sepsis (29 Apr 2024 19:44)        INTERVAL HPI/OVERNIGHT EVENTS: No acute events overnight. Patient was seen and evaluated at the bedside. The patient denies pain. Vitals stable. Patient denies fever/chills, chest pain, shortness of breath, abdominal pain, headaches, nausea/vomiting, and diarrhea/constipation.      PHYSICAL EXAM:  GENERAL: NAD  HEAD:  Normocephalic  EYES:  conjunctiva and sclera clear  ENMT: Moist mucous membranes  NECK: Supple  NERVOUS SYSTEM:  Alert, awake  CHEST/LUNG: Good air exchange bilaterally, no wheeze  HEART: Regular rate and rhythm        Vital Signs Last 24 Hrs  T(C): 36.7 (30 Apr 2024 05:52), Max: 36.9 (29 Apr 2024 12:52)  T(F): 98.1 (30 Apr 2024 05:52), Max: 98.4 (29 Apr 2024 12:52)  HR: 110 (30 Apr 2024 05:52) (105 - 110)  BP: 118/73 (30 Apr 2024 05:52) (108/69 - 118/73)  BP(mean): --  RR: 18 (30 Apr 2024 05:52) (18 - 18)  SpO2: 92% (30 Apr 2024 05:52) (92% - 95%)    Parameters below as of 30 Apr 2024 05:52  Patient On (Oxygen Delivery Method): nasal cannula  O2 Flow (L/min): 3        Consultant(s) Notes Reviewed:  [X] YES  [ ] NO  Care Discussed with Consultants/Other Providers [X] YES  [ ] NO  Imaging Personally Reviewed:  [X] YES  [ ] NO      LABS:                          10.3   23.86 )-----------( 319      ( 30 Apr 2024 11:09 )             31.8                             10.3   23.86 )-----------( 319      ( 30 Apr 2024 11:09 )             31.8       04-30    147<H>  |  102  |  5<L>  ----------------------------<  153<H>  3.1<L>   |  30  |  0.57    Ca    8.7      30 Apr 2024 11:09  Phos  4.1     04-30  Mg     1.8     04-30    TPro  5.7<L>  /  Alb  2.6<L>  /  TBili  4.1<H>  /  DBili  x   /  AST  53<H>  /  ALT  18  /  AlkPhos  192<H>  04-30                          8.3    10.14 )-----------( 347      ( 28 Apr 2024 10:06 )             26.2     04-28    141  |  101  |  <4<L>  ----------------------------<  229<H>  3.5   |  27  |  0.53    Ca    7.9<L>      28 Apr 2024 10:06  Phos  4.0     04-28  Mg     1.5     04-28    TPro  4.5<L>  /  Alb  2.2<L>  /  TBili  3.0<H>  /  DBili  x   /  AST  33  /  ALT  13  /  AlkPhos  151<H>  04-28      Urinalysis Basic - ( 28 Apr 2024 10:06 )    Color: x / Appearance: x / SG: x / pH: x  Gluc: 229 mg/dL / Ketone: x  / Bili: x / Urobili: x   Blood: x / Protein: x / Nitrite: x   Leuk Esterase: x / RBC: x / WBC x   Sq Epi: x / Non Sq Epi: x / Bacteria: x        CAPILLARY BLOOD GLUCOSE      POCT Blood Glucose.: 159 mg/dL (30 Apr 2024 02:24)  POCT Blood Glucose.: 141 mg/dL (29 Apr 2024 22:17)  POCT Blood Glucose.: 176 mg/dL (29 Apr 2024 16:45)  POCT Blood Glucose.: 242 mg/dL (29 Apr 2024 12:49)  POCT Blood Glucose.: 209 mg/dL (29 Apr 2024 08:13)           CARA ZALDIVAR 48y Female      Patient is a 48y old  Female who presents with a chief complaint of sepsis (29 Apr 2024 19:44)        INTERVAL HPI/OVERNIGHT EVENTS: No acute events overnight. Patient was seen and evaluated at the bedside. The patient endorses diffuse abdominal pain. Notes that the pain is radiating throughout her whole abdomen. Notes that yesterday when she had abdominal pain it was relieved with a bowel movement, however today morning she had a bowel movement and the pain persisted.       PHYSICAL EXAM:  GENERAL: appears in discomfort throughout exam  HEAD:  Normocephalic  EYES:  conjunctiva and sclera clear  ENMT: Moist mucous membranes  NECK: Supple  NERVOUS SYSTEM:  Alert, awake  CHEST/LUNG: Good air exchange bilaterally, no wheeze, on NC   HEART: Regular rate and rhythm  ABD: tense abdomen, tender to palpation throughout all 4 quadrants, bowel sounds present on auscultation   EXT: no b/l LE edema         Vital Signs Last 24 Hrs  T(C): 36.7 (30 Apr 2024 05:52), Max: 36.9 (29 Apr 2024 12:52)  T(F): 98.1 (30 Apr 2024 05:52), Max: 98.4 (29 Apr 2024 12:52)  HR: 110 (30 Apr 2024 05:52) (105 - 110)  BP: 118/73 (30 Apr 2024 05:52) (108/69 - 118/73)  BP(mean): --  RR: 18 (30 Apr 2024 05:52) (18 - 18)  SpO2: 92% (30 Apr 2024 05:52) (92% - 95%)    Parameters below as of 30 Apr 2024 05:52  Patient On (Oxygen Delivery Method): nasal cannula  O2 Flow (L/min): 3        Consultant(s) Notes Reviewed:  [X] YES  [ ] NO  Care Discussed with Consultants/Other Providers [X] YES  [ ] NO  Imaging Personally Reviewed:  [X] YES  [ ] NO      LABS:                          10.3   23.86 )-----------( 319      ( 30 Apr 2024 11:09 )             31.8                             10.3   23.86 )-----------( 319      ( 30 Apr 2024 11:09 )             31.8       04-30    147<H>  |  102  |  5<L>  ----------------------------<  153<H>  3.1<L>   |  30  |  0.57    Ca    8.7      30 Apr 2024 11:09  Phos  4.1     04-30  Mg     1.8     04-30    TPro  5.7<L>  /  Alb  2.6<L>  /  TBili  4.1<H>  /  DBili  x   /  AST  53<H>  /  ALT  18  /  AlkPhos  192<H>  04-30                          8.3    10.14 )-----------( 347      ( 28 Apr 2024 10:06 )             26.2     04-28    141  |  101  |  <4<L>  ----------------------------<  229<H>  3.5   |  27  |  0.53    Ca    7.9<L>      28 Apr 2024 10:06  Phos  4.0     04-28  Mg     1.5     04-28    TPro  4.5<L>  /  Alb  2.2<L>  /  TBili  3.0<H>  /  DBili  x   /  AST  33  /  ALT  13  /  AlkPhos  151<H>  04-28      Urinalysis Basic - ( 28 Apr 2024 10:06 )    Color: x / Appearance: x / SG: x / pH: x  Gluc: 229 mg/dL / Ketone: x  / Bili: x / Urobili: x   Blood: x / Protein: x / Nitrite: x   Leuk Esterase: x / RBC: x / WBC x   Sq Epi: x / Non Sq Epi: x / Bacteria: x        CAPILLARY BLOOD GLUCOSE      POCT Blood Glucose.: 159 mg/dL (30 Apr 2024 02:24)  POCT Blood Glucose.: 141 mg/dL (29 Apr 2024 22:17)  POCT Blood Glucose.: 176 mg/dL (29 Apr 2024 16:45)  POCT Blood Glucose.: 242 mg/dL (29 Apr 2024 12:49)  POCT Blood Glucose.: 209 mg/dL (29 Apr 2024 08:13)

## 2024-04-30 NOTE — PROGRESS NOTE ADULT - PROBLEM SELECTOR PLAN 9
>Was on alprazolam 0.75mg QD, Dilaudid and methadone 5mg TID at home  - c/w Methadone 5mg TI
Likely 2/2 alc use although possible element of MDS given bandemia  -ctm  -transfuse <7  -Heme/Onc f.u outpt
Likely 2/2 alc use although possible element of MDS given bandemia  -ctm  -transfuse <7  -Heme/Onc f.u outpt
likely 2/2 critical illness  -ctm
>Was on alprazolam 0.75mg QD, Dilaudid and methadone 5mg TID at home  - c/w Methadone 5mg TI
likely 2/2 critical illness  -ctm
Likely 2/2 alc use although possible element of MDS given bandemia  -ctm  -transfuse <7  -Heme/Onc f.u outpt
Likely 2/2 alc use although possible element of MDS given bandemia  -ctm  -transfuse <7  -Heme/Onc f.u outpt

## 2024-04-30 NOTE — PROVIDER CONTACT NOTE (OTHER) - ASSESSMENT
Patient was complaining of a stomachache. RN to do bladder scan but patient had a bowel movement with very minimal urine production. bladder scan showed 192ml. Patient then stated that her stomach does not hurt anymore but she thinks it is heartburn
Pt alert & oriented x 4. not in acute respiratory distress. Pt. o2 sat sating 88-89% on RA while bed laying. Pt. Denies chest pain, sob, palpitations. VSS. Patient work with PT and PT reported to Nurse that patient o2 sat sustaining 88-89% on RA while sitting in chair. Pt. back to bed. Supplemental o2 given. pt. now sating 95% on 2L nc. Pt. remains asymptomatic
Pt A&Ox2. No complaints or indicators of chest pain, palpitations, SOB, headache, or dizziness. Patient VSS. no acute events;
Pt A&Ox2-3. No complaints or indicators of chest pain, palpitations, SOB, headache, or dizziness. Patient Vitals otherwise stable. no acute events; NS on tele Monitor, on RA.
Pt A&Ox4. No complaints or indicators of chest pain, palpitations, SOB, headache, or dizziness. Patient VSS. Patient complaining of abdominal pain after lactulose. Bladder scan of 240ml. patient states she feels as if shes about to have a bowel movement
Pt A&Ox2. No complaints or indicators of chest pain, palpitations, SOB, headache, or dizziness. no complaints of abdominal pain.
AOx4, pt able to verbalize needs. NSR on tele. Pt denies and headache, dizziness, shortness of breath, headache and chest pain. Pt verbalizes no pain, no acute distress noted. Right brachial midline removed inadvertently, pt stated she scratched off the dressing. No active bleeding, redness, or swelling noted.

## 2024-04-30 NOTE — PHARMACOTHERAPY INTERVENTION NOTE - COMMENTS
Pharmacist lead consult: Contacted for approval of Cefiderocol, utilized in conjunction with minocycline, in the setting of a carbapenem-resistant Acinetobacter buamanii growing from 4/17 ET tube culture. The isolate is demonstrating intermediate resistance to ampicillin-sulbactam, but susceptibility to cefiderocol and minocycline. Transplant ID had seen the patient and recommended the above regimen. Cefiderocol 2g IV q8h, infused over 3 hours, approved for use.

## 2024-04-30 NOTE — PROVIDER CONTACT NOTE (OTHER) - NAME OF MD/NP/PA/DO NOTIFIED:
Jorge Dunlap
Annamarie Luna
Raghavendra Fontenot
Anitra Sanchez
Dr. Cochran
Jorge Dunlap
Germán Patel

## 2024-04-30 NOTE — CHART NOTE - NSCHARTNOTEFT_GEN_A_CORE
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 Patient initially presented to [facility] for few weeks of worsening decreased PO intake, NBNB emesis, watery diarrhea, and somnolence. She was intubated in [facility] for airway protection and admitted to MICU. Patient had INR > 4, ammonia >200 and elevated LFTs concerning for liver failure/hepatic encephalopathy, was given vitamin K and started on lactulose. Her [facility] MICU course was c/b colitis possibly ischemic in origin and undifferentiated shock state possibly septic requiring pressor. Patient was unable to tolerate lactulose given colitis and was transferred to [facility] MICU for CRRT and liver transplant evaluation. [facility] MICU course 4/13-4/21 involved CRRT for ammonia clearance, Patient was being treated for PNA vs possible colitis, was on vancomycin and meropenem. Patient's mental status improved with downtrending ammonia w CRRT, was started on TF and lactulose, was extubated on 4/17. Patient's liver failure was attributed to mostly alcoholic hepatitis/cirrhosis, pt had a PETH > 400. Patient was AOx2-3 and was transferred to medicine floors. Patient continued to 42 year old female with hx of DM1, gout, Factor V Leiden c/b provoked DVT and RA thrombus in the past, congenital pancreatic divisum c/b recurrent pancreatitis s/p total pancreatectomy s/p islet cell autotransplant, extensive abdominal surgical history, (cholecystectomy, splenectomy, kevin-en-y pancreaticojejuonostomy) c/b multiple intraabdominal infection/abscess/fistula (including VRE, candida) in the past. Patient initially presented to [facility] for few weeks of worsening decreased PO intake, NBNB emesis, watery diarrhea, and somnolence. She was intubated in [facility] for airway protection and admitted to MICU. Patient had INR > 4, ammonia >200 and elevated LFTs concerning for liver failure/hepatic encephalopathy, was given vitamin K and started on lactulose. Her [facility] MICU course was c/b colitis possibly ischemic in origin and undifferentiated shock state possibly septic requiring pressor. Patient was unable to tolerate lactulose given colitis and was transferred to [facility] MICU for CRRT and liver transplant evaluation. [facility] MICU course 4/13-4/21 involved CRRT for ammonia clearance, Patient was being treated for PNA vs possible colitis, was on vancomycin and meropenem. Patient's mental status improved with downtrending ammonia w CRRT, was started on TF and lactulose, was extubated on 4/17. Patient's liver failure was attributed to mostly alcoholic hepatitis/cirrhosis, pt had a PETH > 400. Patient was AOx2-3 and was transferred to medicine floors. Patient continued to              Attending Attestation:    47yo pmh of HTN, DM, Factor V Leiden c/b L peroneal DVT (2022) and RA thrombus (2018) currently not on AC, gout, congenital pancreas divisum, recurrent pancreatitis s/p cholecystectomy, Splenectomy, Kevin-En-Y Pancreaticojejunostomy, total pancreatectomy s/p Islet Cell Autotransplant at [facility] 2018, complicated by multiple intraabdominal infection (VRE, fungal? per [facility]), fistula, abscesses who initially presented to [facility] ED 4/8 with altered mental status in the setting of recent vomiting, fatigue, and diarrhea.   Her course was subsequently complicated by altered mental status necessitating intubation for airway protection and transfer to MICU.   Her course was complicated by septic shock and persistent hyperammonemia.  She was subsequently transferred to [facility] for further care.       She was ultimately transferred to the floors where she had been receiving care until she developed progressively worsening hypoxemic respiratory failure with associated altered mental status.  She underwent PEA arrest, presumed to be hypoxemic in origin, and received approximately 10 minutes of CPR and one round of epinephrine prior to achieving ROSC.  She was then transferred to MICU for further care    Neuro  #Encephalopathy  -patient reportedly w/worsening mental status prior to PEA arrest  -DDX remains broad and includes septic/metabolic encephalopathy given concern for sepsis and persistent hyperammonemia,   - obtaining CTH,   -will continue to rend NH3 levels, continuing rifaximin, increasing lactulose to q4 dosing to target at least 4 BMs daily  -propofol/fentanyl for sedation, daily sedation holds    Respiratory  #Acute hypoxemic respiratory failure  -suspect pulmonary edema given bilateral pleural effusions, bilateral diffuse b-lines on ultrasound, elevated BNP  -DDx also includes ARDS, PE given history of factor V leiden,   -patient now intubated w/improving FiO2 requirements favoring acutely reversible process such as pulmonary edema  -will diurese w/IV bumex  -obtaining CT angio to rule out PE  -antibiotics as below  - sending full RVP    Cardiovascular  #Shock  -likely vasoplegic and related to sedation as patient had normal blood pressure at the time of intubation  - EKG showing sinus tachycardia, troponin negative  - wean pressors as tolerated   -will repeat formal TTE  #Pulmonary HTN  -most recent TTE w/elevated PASP concerning for pulmonary HTN  -diuresing as above, will minimize PEEP    Renal  -diuresing as above to achieve net negative fluid balance  #Lactic acidosis  -lactate elevated, unclear if related to PEA arrest, repeating    GI  #Transaminitis  #Alcoholic hepatitis/alcoholic liver disease  #Hyperbilirubinemia  -patient w/fatty liver on imaging, no imaging evidence reporting cirrhosis at this point but previously seen by hepatology  -continue rifaximin/lactulose  #Ascites  -present on CT abdomen and bedside ultrasound, no adequate pocket for safe paracentesis at this point, on broad spectrum antibiotics empirically  - diuresing as above    Endo  -checking cortisol  -SSI    ID  -will send repeat blood/urine cultures as patient has new hypoxemic respiratory failure with elevated WBC, sending MRSA, fungitell, galactomannan  #Acetinobacter pneumonia  -patient w/carbapenem-resistant acenitobacter from sputum on 4/17  -will start cefiderocol per ID recommendations, adding flagyl for anaerobic coverage, continuing minocylcine for acenitobacter  -will consider repeat bronchoscopy w/BAL    Hem-Onc  #Leukocytosis  -evaluating for infectious causes as above, on broad spectrum antibiotics   #History of Factor V Leiden, RA thrombus 2018, L peroneal DVT 2022  -patient reportedly not on AC as prior DVT studies negative  -LE duplex negative 4/16/24  -prophylactic dose heparin for now pending further work up    CODE Status: FULL  DVT prophylaxis: heparin sub q    Zafar Solo PGY6  [id number] MICU ACCEPT NOTE    CHIEF COMPLAINT: Patient is a 48y old  Female who presents with a chief complaint of sepsis     HPI   42 year old female with hx of DM1, gout, Factor V Leiden c/b provoked DVT and RA thrombus in the past, congenital pancreatic divisum c/b recurrent pancreatitis s/p total pancreatectomy s/p islet cell autotransplant, extensive abdominal surgical history, (cholecystectomy, splenectomy, kevin-en-y pancreaticojejuonostomy) c/b multiple intraabdominal infection/abscess/fistula (including VRE, candida) in the past. Patient initially presented to [facility] for few weeks of worsening decreased PO intake, NBNB emesis, watery diarrhea, and somnolence. She was intubated in [facility] for airway protection and admitted to MICU. Patient had INR > 4, ammonia >200 and elevated LFTs concerning for liver failure/hepatic encephalopathy, was given vitamin K and started on lactulose. Her [facility] MICU course was c/b colitis possibly ischemic in origin and undifferentiated shock state possibly septic requiring pressor. Patient was unable to tolerate lactulose given colitis and was transferred to [facility] MICU for CRRT and liver transplant evaluation. [facility] MICU course 4/13-4/21 involved CRRT for ammonia clearance, Patient was being treated for PNA vs possible colitis, was on vancomycin and meropenem. Patient's mental status improved with downtrending ammonia w CRRT, was started on TF and lactulose, was extubated on 4/17. Patient's liver failure was attributed to mostly alcoholic hepatitis/cirrhosis, pt had a PETH > 400. Patient was AOx2-3 and was transferred to medicine floors. Patient continued to receive care for PNA, patient was AOx2, started complaining of abdominal pain and distension, started having worsening hypoxemic respiratory failure w/ associated altered mental status, patient then underwent PEA arrest, presumed to be hypoxemic in origin and received 10 minutes of CPR and one round of epinephrine, achieved ROSC, transferred to MICU for further management.       PAST MEDICAL & SURGICAL HISTORY:  Diabetes  HTN  Factor V Leiden   Congenital pancreatic divisum c/b recurrent pancreatitis   Total pancreatectomy   Islet cell autotransplant  Cholecystectomy  Splenectomy  Kevin-en-y pancreaticojejuonostomy)      FAMILY HISTORY:  Non-contributory    SOCIAL HISTORY:  Smoking: negative  Substance Use: none   EtOH Use: alcohol use- per patient socially, per parents, increased   Advance Directives: Full code     MEDICATIONS  (STANDING):  cefiderocol IVPB 2000 milliGRAM(s) IV Intermittent every 8 hours  chlorhexidine 0.12% Liquid 15 milliLiter(s) Oral Mucosa every 12 hours  chlorhexidine 4% Liquid 1 Application(s) Topical <User Schedule>  cholecalciferol 2000 Unit(s) Oral daily  dextrose 10% Bolus 125 milliLiter(s) IV Bolus once  dextrose 5%. 1000 milliLiter(s) (100 mL/Hr) IV Continuous <Continuous>  dextrose 5%. 1000 milliLiter(s) (50 mL/Hr) IV Continuous <Continuous>  dextrose 50% Injectable 25 Gram(s) IV Push once  dextrose 50% Injectable 12.5 Gram(s) IV Push once  fentaNYL    Injectable 50 MICROGram(s) IV Push once  fentaNYL   Infusion... 0.5 MICROgram(s)/kG/Hr (1.56 mL/Hr) IV Continuous <Continuous>  folic acid 1 milliGRAM(s) Oral daily  glucagon  Injectable 1 milliGRAM(s) IntraMuscular once  heparin   Injectable 5000 Unit(s) SubCutaneous every 12 hours  insulin glargine Injectable (LANTUS) 2 Unit(s) SubCutaneous <User Schedule>  insulin lispro (ADMELOG) corrective regimen sliding scale   SubCutaneous every 6 hours  lactulose Syrup 10 Gram(s) Oral every 4 hours  metroNIDAZOLE  IVPB 500 milliGRAM(s) IV Intermittent every 12 hours  minocycline 200 milliGRAM(s) Oral every 12 hours  multivitamin/minerals/iron Oral Solution (CENTRUM) 15 milliLiter(s) Oral daily  norepinephrine Infusion 0.05 MICROgram(s)/kG/Min (5.85 mL/Hr) IV Continuous <Continuous>  propofol Infusion 30 MICROgram(s)/kG/Min (11.2 mL/Hr) IV Continuous <Continuous>  rifAXIMin 550 milliGRAM(s) Oral two times a day  thiamine 100 milliGRAM(s) Oral daily  zinc sulfate 220 milliGRAM(s) Oral daily    MEDICATIONS  (PRN):  dextrose Oral Gel 15 Gram(s) Oral once PRN Blood Glucose LESS THAN 70 milliGRAM(s)/deciliter      Allergies    No Known Allergies    Intolerances        REVIEW OF SYSTEMS:  CONSTITUTIONAL: No weakness, fevers or chills  EYES/ENT: No visual changes;  No vertigo or throat pain   NECK: No pain or stiffness  RESPIRATORY: No cough, wheezing, hemoptysis; No shortness of breath  CARDIOVASCULAR: No chest pain or palpitations  GASTROINTESTINAL: No abdominal or epigastric pain. No nausea, vomiting, or hematemesis; No diarrhea or constipation. No melena or hematochezia.  GENITOURINARY: No dysuria, frequency or hematuria  NEUROLOGICAL: No numbness or weakness  SKIN: No itching, rashes  [ ] All other systems negative  [X] Unable to assess ROS because AMS  OBJECTIVE:  ICU Vital Signs Last 24 Hrs  T(C): 36.2 (30 Apr 2024 20:15), Max: 36.8 (30 Apr 2024 11:45)  T(F): 97.2 (30 Apr 2024 20:15), Max: 98.2 (30 Apr 2024 11:45)  HR: 106 (30 Apr 2024 21:30) (106 - 133)  BP: 92/46 (30 Apr 2024 21:30) (73/49 - 118/73)  BP(mean): 66 (30 Apr 2024 21:30) (57 - 77)  ABP: --  ABP(mean): --  RR: 19 (30 Apr 2024 21:30) (18 - 31)  SpO2: 100% (30 Apr 2024 21:30) (90% - 100%)    O2 Parameters below as of 30 Apr 2024 20:15  Patient On (Oxygen Delivery Method): ventilator    O2 Concentration (%): 100      Mode: AC/ CMV (Assist Control/ Continuous Mandatory Ventilation), RR (machine): 16, TV (machine): 400, FiO2: 100, PEEP: 8, ITime: 1, MAP: 15, PIP: 38    04-29 @ 07:01 - 04-30 @ 07:00  --------------------------------------------------------  IN: 480 mL / OUT: 0 mL / NET: 480 mL    04-30 @ 07:01 - 04-30 @ 23:06  --------------------------------------------------------  IN: 292.2 mL / OUT: 10 mL / NET: 282.2 mL      CAPILLARY BLOOD GLUCOSE      POCT Blood Glucose.: 136 mg/dL (30 Apr 2024 17:07)      PHYSICAL EXAM:  GENERAL: NAD, intubated and sedated   HEAD:  Atraumatic, normocephalic  EYES: Preferred upward gaze. PERRLA, conjunctiva and sclera clear  ENT: Moist mucous membranes  NECK: Supple, no JVD  HEART: S1, S2, regular rate and rhythm, no murmurs, rubs, or gallops  LUNGS: Unlabored respirations.  Clear to auscultation bilaterally, no crackles, wheezing, or rhonchi  ABDOMEN: Soft, moderately distended, +BS  EXTREMITIES: 2+ peripheral pulses bilaterally. No clubbing, cyanosis, or edema  NERVOUS SYSTEM:  Intubated and sedated    SKIN: No rashes or lesions  LINES:     LABS:                        9.7    26.96 )-----------( 273      ( 30 Apr 2024 20:06 )             30.4     Hgb Trend: 9.7<--, 10.3<--, 8.3<--, 10.0<--, 8.6<--  04-30    144  |  98  |  5<L>  ----------------------------<  276<H>  4.0   |  21<L>  |  0.62    Ca    8.3<L>      30 Apr 2024 20:06  Phos  5.0     04-30  Mg     1.7     04-30    TPro  5.6<L>  /  Alb  2.6<L>  /  TBili  3.5<H>  /  DBili  x   /  AST  116<H>  /  ALT  30  /  AlkPhos  218<H>  04-30    Creatinine Trend: 0.62<--, 0.57<--, 0.53<--, 0.41<--, 0.41<--, 0.41<--  PT/INR - ( 30 Apr 2024 22:23 )   PT: 27.7 sec;   INR: 2.72 ratio         PTT - ( 30 Apr 2024 22:23 )  PTT:61.2 sec  Urinalysis Basic - ( 30 Apr 2024 20:06 )    Color: x / Appearance: x / SG: x / pH: x  Gluc: 276 mg/dL / Ketone: x  / Bili: x / Urobili: x   Blood: x / Protein: x / Nitrite: x   Leuk Esterase: x / RBC: x / WBC x   Sq Epi: x / Non Sq Epi: x / Bacteria: x      Arterial Blood Gas:  04-30 @ 22:10  7.43/38/147/25/96.9/0.9  ABG lactate: --  Arterial Blood Gas:  04-30 @ 20:21  7.40/38/174/24/97.4/-1.1  ABG lactate: --    Venous Blood Gas:  04-30 @ 20:04  7.22/62/41/25/48.7  VBG Lactate: 10.5  Venous Blood Gas:  04-30 @ 10:40  7.42/52/54/34/80.0  VBG Lactate: 3.6      MICROBIOLOGY:     RADIOLOGY & ADDITIONAL TESTS:    ASSESSMENT    48y female with PMH DM1, gout, Factor V Leiden c/b provoked DVT and RA thrombus in the past, congenital pancreatic divisum c/b recurrent pancreatitis s/p total pancreatectomy s/p islet cell autotransplant, extensive abdominal surgical history, (cholecystectomy, splenectomy, kevin-en-y pancreaticojejuonostomy) c/b 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 Patient had PEA arrest, ROSC after 10 minutes and 1 round of epinephrine, transferred to the MICU for further management.     PLAN  ===Neurologic===  #Encephalopathy   - currently sedated on prop/fentantyl   - baseline AOx2-3 during hospitalization   - progressively AMS prior to PEA arrest   - metabolic vs septic encephalopathy, in setting of hyperammonia   - continue rifaximin, increase lactulose to q4 for hyperammonia  - CT head       ===Respiratory===  #Acute Hypoxemic Respiratory Failure   - patient currently intubated, will monitor FIO2 requirements   - pulmonary edema vs ARDS vs PE (factor V leiden)   - on CXR patient had bilateral pleural effusions   - POCUS: diffuse scattered B lines   - BNP: 2383   - obtain CTA, MRSA, full RVP   - diuresis with IV bumex   - PNA management with cifederocol, flagyl, minocycline per ID   - plan for bronchoscopy     ===Cardiovascular==  #Shock  - patient currently on 0.17 levo, most likely in setting of sedation   - will wean as tolerated   - EKG sinus tachycardia, negative troponin   - repeat TTE     #Pulmonary HTN   - on previous echo mild to moderate pulmonary HTN   - will continue diuresis     ===GI===  #Alcoholic hepatitis/Alcoholic liver disease  - mildly enlarged liver potentially in setting fatty infiltration/severe acute hepatitis  - elevated transaminase , ALT 30     #Ascites  - CT     #Hyperammonia  - will continue rifaximin  - increase lactulose to q4     #Diet  - Full diet    ===Renal/===  #Lactic acidosis   - s/p PEA arrest   - will continue to trend     #Electrolytes  - Maintain K>4, Phos>3, Mag>2, iCal>1    #Diuresis   - goal net negative     =====Endocrine=====  #DM2  - While NPO, FSBG and ROSI q6h    =====Infectious Disease=====  Patient is afebrile and is not currently being treated for any infection.    =====Heme/Onc=====  #DVT Ppx  - Lovenox 40mg SQ qd    =====Ethics=====  FULL CODE          42 year old female with hx of DM1, gout, Factor V Leiden c/b provoked DVT and RA thrombus in the past, congenital pancreatic divisum c/b recurrent pancreatitis s/p total pancreatectomy s/p islet cell autotransplant, extensive abdominal surgical history, (cholecystectomy, splenectomy, kevin-en-y pancreaticojejuonostomy) c/b multiple intraabdominal infection/abscess/fistula (including VRE, candida) in the past. Patient initially presented to [facility] for few weeks of worsening decreased PO intake, NBNB emesis, watery diarrhea, and somnolence. She was intubated in [facility] for airway protection and admitted to MICU. Patient had INR > 4, ammonia >200 and elevated LFTs concerning for liver failure/hepatic encephalopathy, was given vitamin K and started on lactulose. Her [facility] MICU course was c/b colitis possibly ischemic in origin and undifferentiated shock state possibly septic requiring pressor. Patient was unable to tolerate lactulose given colitis and was transferred to [facility] MICU for CRRT and liver transplant evaluation. [facility] MICU course 4/13-4/21 involved CRRT for ammonia clearance, Patient was being treated for PNA vs possible colitis, was on vancomycin and meropenem. Patient's mental status improved with downtrending ammonia w CRRT, was started on TF and lactulose, was extubated on 4/17. Patient's liver failure was attributed to mostly alcoholic hepatitis/cirrhosis, pt had a PETH > 400. Patient was AOx2-3 and was transferred to medicine floors. Patient continued to receive care for PNA, patient was AOx2, started complaining of abdominal pain and distension, started having worsening hypoxemic respiratory failure w/ associated altered mental status, patient               Attending Attestation:    47yo pmh of HTN, DM, Factor V Leiden c/b L peroneal DVT (2022) and RA thrombus (2018) currently not on AC, gout, congenital pancreas divisum, recurrent pancreatitis s/p cholecystectomy, Splenectomy, Kevin-En-Y Pancreaticojejunostomy, total pancreatectomy s/p Islet Cell Autotransplant at [facility] 2018, complicated by multiple intraabdominal infection (VRE, fungal? per [facility]), fistula, abscesses who initially presented to [facility] ED 4/8 with altered mental status in the setting of recent vomiting, fatigue, and diarrhea.   Her course was subsequently complicated by altered mental status necessitating intubation for airway protection and transfer to MICU.   Her course was complicated by septic shock and persistent hyperammonemia.  She was subsequently transferred to [facility] for further care.       She was ultimately transferred to the floors where she had been receiving care until she developed progressively worsening hypoxemic respiratory failure with associated altered mental status.  She underwent PEA arrest, presumed to be hypoxemic in origin, and received approximately 10 minutes of CPR and one round of epinephrine prior to achieving ROSC.  She was then transferred to MICU for further care    Neuro  #Encephalopathy  -patient reportedly w/worsening mental status prior to PEA arrest  -DDX remains broad and includes septic/metabolic encephalopathy given concern for sepsis and persistent hyperammonemia,   - obtaining CTH,   -will continue to rend NH3 levels, continuing rifaximin, increasing lactulose to q4 dosing to target at least 4 BMs daily  -propofol/fentanyl for sedation, daily sedation holds    Respiratory  #Acute hypoxemic respiratory failure  -suspect pulmonary edema given bilateral pleural effusions, bilateral diffuse b-lines on ultrasound, elevated BNP  -DDx also includes ARDS, PE given history of factor V leiden,   -patient now intubated w/improving FiO2 requirements favoring acutely reversible process such as pulmonary edema  -will diurese w/IV bumex  -obtaining CT angio to rule out PE  -antibiotics as below  - sending full RVP    Cardiovascular  #Shock  -likely vasoplegic and related to sedation as patient had normal blood pressure at the time of intubation  - EKG showing sinus tachycardia, troponin negative  - wean pressors as tolerated   -will repeat formal TTE  #Pulmonary HTN  -most recent TTE w/elevated PASP concerning for pulmonary HTN  -diuresing as above, will minimize PEEP    Renal  -diuresing as above to achieve net negative fluid balance  #Lactic acidosis  -lactate elevated, unclear if related to PEA arrest, repeating    GI  #Transaminitis  #Alcoholic hepatitis/alcoholic liver disease  #Hyperbilirubinemia  -patient w/fatty liver on imaging, no imaging evidence reporting cirrhosis at this point but previously seen by hepatology  -continue rifaximin/lactulose  #Ascites  -present on CT abdomen and bedside ultrasound, no adequate pocket for safe paracentesis at this point, on broad spectrum antibiotics empirically  - diuresing as above    Endo  -checking cortisol  -SSI    ID  -will send repeat blood/urine cultures as patient has new hypoxemic respiratory failure with elevated WBC, sending MRSA, fungitell, galactomannan  #Acetinobacter pneumonia  -patient w/carbapenem-resistant acenitobacter from sputum on 4/17  -will start cefiderocol per ID recommendations, adding flagyl for anaerobic coverage, continuing minocylcine for acenitobacter  -will consider repeat bronchoscopy w/BAL    Hem-Onc  #Leukocytosis  -evaluating for infectious causes as above, on broad spectrum antibiotics   #History of Factor V Leiden, RA thrombus 2018, L peroneal DVT 2022  -patient reportedly not on AC as prior DVT studies negative  -LE duplex negative 4/16/24  -prophylactic dose heparin for now pending further work up    CODE Status: FULL  DVT prophylaxis: heparin sub q    Zafar Solo PGY6  [id number] MICU ACCEPT NOTE    CHIEF COMPLAINT: Patient is a 48y old  Female who presents with a chief complaint of sepsis     HPI   42 year old female with hx of DM1, gout, Factor V Leiden c/b provoked DVT and RA thrombus in the past, congenital pancreatic divisum c/b recurrent pancreatitis s/p total pancreatectomy s/p islet cell autotransplant, extensive abdominal surgical history, (cholecystectomy, splenectomy, kevin-en-y pancreaticojejuonostomy) c/b multiple intraabdominal infection/abscess/fistula (including VRE, candida) in the past. [facility] Center for few weeks of worsening decreased PO intake, NBNB emesis, watery diarrhea, and somnolence. She was intubated in [facility] for airway protection and admitted to MICU. Patient had INR > 4, ammonia >200 and elevated LFTs concerning for liver failure/hepatic encephalopathy, was given vitamin K and started on lactulose. Her [facility] MICU course was c/b colitis possibly ischemic in origin and undifferentiated shock state possibly septic requiring pressor. Patient was unable to tolerate lactulose given colitis and was transferred to [facility] MICU for CRRT and liver transplant evaluation. [facility] MICU course 4/13-4/21 involved CRRT for ammonia clearance, Patient was being treated for PNA vs possible colitis, was on vancomycin and meropenem. Patient's mental status improved with downtrending ammonia w CRRT, was started on TF and lactulose, was extubated on 4/17. Patient's liver failure was attributed to mostly alcoholic hepatitis/cirrhosis, pt had a PETH > 400. Patient was AOx2-3 and was transferred to medicine floors. Patient continued to receive care for PNA, patient was AOx2, started complaining of abdominal pain and distension, started having worsening hypoxemic respiratory failure w/ associated altered mental status, patient then underwent PEA arrest, presumed to be hypoxemic in origin and received 10 minutes of CPR and one round of epinephrine, achieved ROSC, transferred to MICU for further management.       PAST MEDICAL & SURGICAL HISTORY:  Diabetes  HTN  Factor V Leiden   Congenital pancreatic divisum c/b recurrent pancreatitis   Total pancreatectomy   Islet cell autotransplant  Cholecystectomy  Splenectomy  Kevin-en-y pancreaticojejuonostomy)      FAMILY HISTORY:  Non-contributory    SOCIAL HISTORY:  Smoking: negative  Substance Use: none   EtOH Use: alcohol use- per patient socially, per parents, increased   Advance Directives: Full code     MEDICATIONS  (STANDING):  cefiderocol IVPB 2000 milliGRAM(s) IV Intermittent every 8 hours  chlorhexidine 0.12% Liquid 15 milliLiter(s) Oral Mucosa every 12 hours  chlorhexidine 4% Liquid 1 Application(s) Topical <User Schedule>  cholecalciferol 2000 Unit(s) Oral daily  dextrose 10% Bolus 125 milliLiter(s) IV Bolus once  dextrose 5%. 1000 milliLiter(s) (100 mL/Hr) IV Continuous <Continuous>  dextrose 5%. 1000 milliLiter(s) (50 mL/Hr) IV Continuous <Continuous>  dextrose 50% Injectable 25 Gram(s) IV Push once  dextrose 50% Injectable 12.5 Gram(s) IV Push once  fentaNYL    Injectable 50 MICROGram(s) IV Push once  fentaNYL   Infusion... 0.5 MICROgram(s)/kG/Hr (1.56 mL/Hr) IV Continuous <Continuous>  folic acid 1 milliGRAM(s) Oral daily  glucagon  Injectable 1 milliGRAM(s) IntraMuscular once  heparin   Injectable 5000 Unit(s) SubCutaneous every 12 hours  insulin glargine Injectable (LANTUS) 2 Unit(s) SubCutaneous <User Schedule>  insulin lispro (ADMELOG) corrective regimen sliding scale   SubCutaneous every 6 hours  lactulose Syrup 10 Gram(s) Oral every 4 hours  metroNIDAZOLE  IVPB 500 milliGRAM(s) IV Intermittent every 12 hours  minocycline 200 milliGRAM(s) Oral every 12 hours  multivitamin/minerals/iron Oral Solution (CENTRUM) 15 milliLiter(s) Oral daily  norepinephrine Infusion 0.05 MICROgram(s)/kG/Min (5.85 mL/Hr) IV Continuous <Continuous>  propofol Infusion 30 MICROgram(s)/kG/Min (11.2 mL/Hr) IV Continuous <Continuous>  rifAXIMin 550 milliGRAM(s) Oral two times a day  thiamine 100 milliGRAM(s) Oral daily  zinc sulfate 220 milliGRAM(s) Oral daily    MEDICATIONS  (PRN):  dextrose Oral Gel 15 Gram(s) Oral once PRN Blood Glucose LESS THAN 70 milliGRAM(s)/deciliter      Allergies    No Known Allergies    Intolerances        REVIEW OF SYSTEMS:  CONSTITUTIONAL: No weakness, fevers or chills  EYES/ENT: No visual changes;  No vertigo or throat pain   NECK: No pain or stiffness  RESPIRATORY: No cough, wheezing, hemoptysis; No shortness of breath  CARDIOVASCULAR: No chest pain or palpitations  GASTROINTESTINAL: No abdominal or epigastric pain. No nausea, vomiting, or hematemesis; No diarrhea or constipation. No melena or hematochezia.  GENITOURINARY: No dysuria, frequency or hematuria  NEUROLOGICAL: No numbness or weakness  SKIN: No itching, rashes  [ ] All other systems negative  [X] Unable to assess ROS because AMS  OBJECTIVE:  ICU Vital Signs Last 24 Hrs  T(C): 36.2 (30 Apr 2024 20:15), Max: 36.8 (30 Apr 2024 11:45)  T(F): 97.2 (30 Apr 2024 20:15), Max: 98.2 (30 Apr 2024 11:45)  HR: 106 (30 Apr 2024 21:30) (106 - 133)  BP: 92/46 (30 Apr 2024 21:30) (73/49 - 118/73)  BP(mean): 66 (30 Apr 2024 21:30) (57 - 77)  ABP: --  ABP(mean): --  RR: 19 (30 Apr 2024 21:30) (18 - 31)  SpO2: 100% (30 Apr 2024 21:30) (90% - 100%)    O2 Parameters below as of 30 Apr 2024 20:15  Patient On (Oxygen Delivery Method): ventilator    O2 Concentration (%): 100      Mode: AC/ CMV (Assist Control/ Continuous Mandatory Ventilation), RR (machine): 16, TV (machine): 400, FiO2: 100, PEEP: 8, ITime: 1, MAP: 15, PIP: 38    04-29 @ 07:01 - 04-30 @ 07:00  --------------------------------------------------------  IN: 480 mL / OUT: 0 mL / NET: 480 mL    04-30 @ 07:01 - 04-30 @ 23:06  --------------------------------------------------------  IN: 292.2 mL / OUT: 10 mL / NET: 282.2 mL      CAPILLARY BLOOD GLUCOSE      POCT Blood Glucose.: 136 mg/dL (30 Apr 2024 17:07)      PHYSICAL EXAM:  GENERAL: NAD, intubated and sedated   HEAD:  Atraumatic, normocephalic  EYES: Preferred upward gaze. PERRLA, conjunctiva and sclera clear  ENT: Moist mucous membranes  NECK: Supple, no JVD  HEART: S1, S2, regular rate and rhythm, no murmurs, rubs, or gallops  LUNGS: Unlabored respirations.  Clear to auscultation bilaterally, no crackles, wheezing, or rhonchi  ABDOMEN: Soft, moderately distended, +BS  EXTREMITIES: 2+ peripheral pulses bilaterally. No clubbing, cyanosis, or edema  NERVOUS SYSTEM:  Intubated and sedated    SKIN: No rashes or lesions  LINES:     LABS:                        9.7    26.96 )-----------( 273      ( 30 Apr 2024 20:06 )             30.4     Hgb Trend: 9.7<--, 10.3<--, 8.3<--, 10.0<--, 8.6<--  04-30    144  |  98  |  5<L>  ----------------------------<  276<H>  4.0   |  21<L>  |  0.62    Ca    8.3<L>      30 Apr 2024 20:06  Phos  5.0     04-30  Mg     1.7     04-30    TPro  5.6<L>  /  Alb  2.6<L>  /  TBili  3.5<H>  /  DBili  x   /  AST  116<H>  /  ALT  30  /  AlkPhos  218<H>  04-30    Creatinine Trend: 0.62<--, 0.57<--, 0.53<--, 0.41<--, 0.41<--, 0.41<--  PT/INR - ( 30 Apr 2024 22:23 )   PT: 27.7 sec;   INR: 2.72 ratio         PTT - ( 30 Apr 2024 22:23 )  PTT:61.2 sec  Urinalysis Basic - ( 30 Apr 2024 20:06 )    Color: x / Appearance: x / SG: x / pH: x  Gluc: 276 mg/dL / Ketone: x  / Bili: x / Urobili: x   Blood: x / Protein: x / Nitrite: x   Leuk Esterase: x / RBC: x / WBC x   Sq Epi: x / Non Sq Epi: x / Bacteria: x      Arterial Blood Gas:  04-30 @ 22:10  7.43/38/147/25/96.9/0.9  ABG lactate: --  Arterial Blood Gas:  04-30 @ 20:21  7.40/38/174/24/97.4/-1.1  ABG lactate: --    Venous Blood Gas:  04-30 @ 20:04  7.22/62/41/25/48.7  VBG Lactate: 10.5  Venous Blood Gas:  04-30 @ 10:40  7.42/52/54/34/80.0  VBG Lactate: 3.6      MICROBIOLOGY:     RADIOLOGY & ADDITIONAL TESTS:    ASSESSMENT    48y female with PMH DM1, gout, Factor V Leiden c/b provoked DVT and RA thrombus in the past, congenital pancreatic divisum c/b recurrent pancreatitis s/p total pancreatectomy s/p islet cell autotransplant, extensive abdominal surgical history, (cholecystectomy, splenectomy, kevin-en-y pancreaticojejuonostomy) c/b 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 Patient had PEA arrest, ROSC after 10 minutes and 1 round of epinephrine, transferred to the MICU for further management.     PLAN  ===Neurologic===  #Encephalopathy   - currently sedated on prop/fentantyl   - baseline AOx2-3 during hospitalization   - progressively AMS prior to PEA arrest   - metabolic vs septic encephalopathy, in setting of hyperammonia   - continue rifaximin, increase lactulose to q4 for hyperammonia  - CT head       ===Respiratory===  #Acute Hypoxemic Respiratory Failure   - patient currently intubated, will monitor FIO2 requirements   - pulmonary edema vs ARDS vs PE (factor V leiden)   - on CXR patient had bilateral pleural effusions   - POCUS: diffuse scattered B lines   - BNP: 2383   - obtain CTA, MRSA, full RVP   - diuresis with IV bumex   - PNA management with cifederocol, flagyl, minocycline per ID   - plan for bronchoscopy     ===Cardiovascular==  #Shock  - patient currently on 0.17 levo, most likely in setting of sedation   - will wean as tolerated   - EKG sinus tachycardia, negative troponin   - repeat TTE     #Pulmonary HTN   - on previous echo mild to moderate pulmonary HTN   - will continue diuresis     ===GI===  #Alcoholic hepatitis/Alcoholic liver disease  - mildly enlarged liver potentially in setting fatty infiltration/severe acute hepatitis  - elevated transaminase , ALT 30     #Ascites  - CT abdomen showing moderate ascites   - POCUS: no window for paracentesis  - patient on empiric antibiotics  - continue diuresis     #Hyperammonia  - will continue rifaximin  - increase lactulose to q4     ===Renal/===  #Lactic acidosis   - s/p PEA arrest   - will continue to trend     #Electrolytes  - Maintain K>4, Phos>3, Mag>2, iCal>1    #Diuresis   - goal net negative     ===Infectious Disease===  #Acetinobacter pneumonia  - new acute hypoxemic respiratory failure, leukocytosis   - blood cultures, urine culture, MRSA, fungitell, galactomannan   - previous sputum culture carbapenem-resistant acenitobacter   - bronchochoscopy done: BAL studies sent   - start cefiderocol per ID, adding flagyl for anaerobic coverage  - continuing minocylcine for acenitobacter    ===Endocrine===  #No active issues  - will place on ROSI   - obtain cortisol level     ===Heme/Onc===  #History of Factor V Leiden, RA thrombus 2018  - L peroneal DVT 2022  -patient not on AC as prior DVT studies negative  -LE duplex negative 4/16/24    #DVT PPx  - heparin sq     ===Ethics===  FULL CODE            ======================    Attending Attestation:    47yo pmh of HTN, DM, Factor V Leiden c/b L peroneal DVT (2022) and RA thrombus (2018) currently not on AC, gout, congenital pancreas divisum, recurrent pancreatitis s/p cholecystectomy, Splenectomy, Kevin-En-Y Pancreaticojejunostomy, total pancreatectomy s/p Islet Cell Autotransplant at [facility] 2018, complicated by multiple intraabdominal infection (VRE, fungal? per LIM), fistula, abscesses who initially presented to [facility] ED 4/8 with altered mental status in the setting of recent vomiting, fatigue, and diarrhea.   Her course was subsequently complicated by altered mental status necessitating intubation for airway protection and transfer to MICU.   Her course was complicated by septic shock and persistent hyperammonemia.  She was subsequently transferred to [facility] for further care.       She was ultimately transferred to the floors where she had been receiving care until she developed progressively worsening hypoxemic respiratory failure with associated altered mental status.  She underwent PEA arrest, presumed to be hypoxemic in origin, and received approximately 10 minutes of CPR and one round of epinephrine prior to achieving ROSC.  She was then transferred to MICU for further care    Neuro  #Encephalopathy  -patient reportedly w/worsening mental status prior to PEA arrest  -DDX remains broad and includes septic/metabolic encephalopathy given concern for sepsis and persistent hyperammonemia,   - obtaining CTH,   -will continue to rend NH3 levels, continuing rifaximin, increasing lactulose to q4 dosing to target at least 4 BMs daily  -propofol/fentanyl for sedation, daily sedation holds    Respiratory  #Acute hypoxemic respiratory failure  -suspect pulmonary edema given bilateral pleural effusions, bilateral diffuse b-lines on ultrasound, elevated BNP  -DDx also includes ARDS, PE given history of factor V leiden,   -patient now intubated w/improving FiO2 requirements favoring acutely reversible process such as pulmonary edema  -will diurese w/IV bumex  -obtaining CT angio to rule out PE  -antibiotics as below  - sending full RVP    Cardiovascular  #Shock  -likely vasoplegic and related to sedation as patient had normal blood pressure at the time of intubation  - EKG showing sinus tachycardia, troponin negative  - wean pressors as tolerated   -will repeat formal TTE  #Pulmonary HTN  -most recent TTE w/elevated PASP concerning for pulmonary HTN  -diuresing as above, will minimize PEEP    Renal  -diuresing as above to achieve net negative fluid balance  #Lactic acidosis  -lactate elevated, unclear if related to PEA arrest, repeating    GI  #Transaminitis  #Alcoholic hepatitis/alcoholic liver disease  #Hyperbilirubinemia  -patient w/fatty liver on imaging, no imaging evidence reporting cirrhosis at this point but previously seen by hepatology  -continue rifaximin/lactulose  #Ascites  -present on CT abdomen and bedside ultrasound, no adequate pocket for safe paracentesis at this point, on broad spectrum antibiotics empirically  - diuresing as above    Endo  -checking cortisol  -SSI    ID  -will send repeat blood/urine cultures as patient has new hypoxemic respiratory failure with elevated WBC, sending MRSA, fungitell, galactomannan  #Acetinobacter pneumonia  -patient w/carbapenem-resistant acenitobacter from sputum on 4/17  -will start cefiderocol per ID recommendations, adding flagyl for anaerobic coverage, continuing minocylcine for acenitobacter  -will consider repeat bronchoscopy w/BAL    Hem-Onc  #Leukocytosis  -evaluating for infectious causes as above, on broad spectrum antibiotics   #History of Factor V Leiden, RA thrombus 2018, L peroneal DVT 2022  -patient reportedly not on AC as prior DVT studies negative  -LE duplex negative 4/16/24  -prophylactic dose heparin for now pending further work up    CODE Status: FULL  DVT prophylaxis: heparin sub q    Zafar Solo PGY6  [id number]

## 2024-04-30 NOTE — PROGRESS NOTE ADULT - PROBLEM SELECTOR PLAN 2
Infectious work up  >UA (4/13): 11WBC, small LE, negative Nitrite or bacteria  >Babesia (4/13): neg; HIV (4/13): neg; RVP, COVID (4/14): neg; MRSA (4/13): Negative  >CXR (4/13): RML consolidation; CTC/A/P (4/13): RML consolidation, LLL atelectasis 2/2 likely ?mucus plug, ischemic colitis  >Bcx (4/13): NGTD; ET tube cx (4/13): Normal resp luisa  >BAL (43): Pending; GI PCR (4/15): Negative  >Sputum culture (4/17): Carbapenem resistance Acinetobacter    #Leukocytosis  #Septic shock   #Hx of VRE and Candida infection  >H/o of recurrent infxn from multiple abdominal wounds and surgeries. Hx of VRE and candida.   >s/p Caspofungin 50mg Q24 (stopped 4/16); Daptomycin 500mg Q24 (stopped 4/16); Doxycycline 100mg Q12 (stopped 4/18)  - Source of WBC Colitis vs. Pneumonia  - c/w Meropenem 1g q12 (4/10 - 4/21)  - c/w Vancomycin q12 (4/17 - 4/21) Infectious work up  >UA (4/13): 11WBC, small LE, negative Nitrite or bacteria  >Babesia (4/13): neg; HIV (4/13): neg; RVP, COVID (4/14): neg; MRSA (4/13): Negative  >CXR (4/13): RML consolidation; CTC/A/P (4/13): RML consolidation, LLL atelectasis 2/2 likely ?mucus plug, ischemic colitis  >Bcx (4/13): NGTD; ET tube cx (4/13): Normal resp luisa  >BAL (43): Pending; GI PCR (4/15): Negative  >Sputum culture (4/17): Carbapenem resistance Acinetobacter    #Leukocytosis  #Septic shock   #Hx of VRE and Candida infection  >H/o of recurrent infxn from multiple abdominal wounds and surgeries. Hx of VRE and candida.   >s/p Caspofungin 50mg Q24 (stopped 4/16); Daptomycin 500mg Q24 (stopped 4/16); Doxycycline 100mg Q12 (stopped 4/18)  - Source of WBC Colitis vs. Pneumonia  - c/w Meropenem 1g q12 (4/10 - 4/21)  - c/w Vancomycin q12 (4/17 - 4/21)    -on 4/30 patient with new leukocytosis to 23k, bandemia along with new diffuse abdominal pain   -start patient on Zosyn for possible SBP coverage   -f/u CT A/P

## 2024-04-30 NOTE — PROGRESS NOTE ADULT - SUBJECTIVE AND OBJECTIVE BOX
[facility] NUTRITION SUPPORT-- FOLLOW UP NOTE      24 hour events/subjective:      Diet:      PAST HISTORY  --------------------------------------------------------------------------------  No significant changes to PMH, PSH, FHx, SHx, unless otherwise noted    ALLERGIES & MEDICATIONS  --------------------------------------------------------------------------------  Allergies    No Known Allergies    Intolerances      Standing Inpatient Medications  ampicillin/sulbactam  IVPB 9 Gram(s) IV Intermittent every 8 hours  chlorhexidine 4% Liquid 1 Application(s) Topical <User Schedule>  cholecalciferol 2000 Unit(s) Oral daily  dextrose 50% Injectable 12.5 Gram(s) IV Push once  dextrose 50% Injectable 25 Gram(s) IV Push once  dextrose 50% Injectable 25 Gram(s) IV Push once  dextrose Oral Gel 15 Gram(s) Oral once  folic acid 1 milliGRAM(s) Oral daily  glucagon  Injectable 1 milliGRAM(s) IntraMuscular once  heparin   Injectable 5000 Unit(s) SubCutaneous every 12 hours  insulin glargine Injectable (LANTUS) 2 Unit(s) SubCutaneous <User Schedule>  insulin lispro (ADMELOG) corrective regimen sliding scale   SubCutaneous three times a day before meals  insulin lispro (ADMELOG) corrective regimen sliding scale   SubCutaneous <User Schedule>  lactulose Syrup 10 Gram(s) Oral every 8 hours  methadone    Tablet 5 milliGRAM(s) Oral three times a day  minocycline IVPB      minocycline IVPB 200 milliGRAM(s) IV Intermittent every 12 hours  multivitamin/minerals/iron Oral Solution (CENTRUM) 15 milliLiter(s) Oral daily  pantoprazole  Injectable 40 milliGRAM(s) IV Push daily  rifAXIMin 550 milliGRAM(s) Oral two times a day  zinc sulfate 220 milliGRAM(s) Oral daily    PRN Inpatient Medications      REVIEW OF SYSTEMS  --------------------------------------------------------------------------------  Gen: as per HPI  Skin: No rashes  Head/Eyes/Ears/Mouth: No headache; No sore throat  Respiratory: No dyspnea, cough,   CV: No chest pain, PND, orthopnea  GI: as per HPI  : No increased frequency, dysuria, hematuria, nocturia  MSK: No joint pain/swelling; no back pain; no edema  Neuro: No dizziness/lightheadedness, weakness, seizures, numbness, tingling  Psych: No significant nervousness, anxiety, stress, depression    All other systems were reviewed and are negative, except as noted.        LABS/STUDIES  --------------------------------------------------------------------------------              8.3    10.14 >-----------<  347      [04-28-24 @ 10:06]              26.2     141  |  101  |  <4  ----------------------------<  229      [04-28-24 @ 10:06]  3.5   |  27  |  0.53        Ca     7.9     [04-28-24 @ 10:06]      Mg     1.5     [04-28-24 @ 10:06]      Phos  4.0     [04-28-24 @ 10:06]    TPro  4.5  /  Alb  2.2  /  TBili  3.0  /  DBili  x   /  AST  33  /  ALT  13  /  AlkPhos  151  [04-28-24 @ 10:06]                Prealbumin, Serum: 3 mg/dL (04-24-24 @ 11:23)    Triglycerides      CAPILLARY BLOOD GLUCOSE      POCT Blood Glucose.: 146 mg/dL (30 Apr 2024 08:21)  POCT Blood Glucose.: 159 mg/dL (30 Apr 2024 02:24)  POCT Blood Glucose.: 141 mg/dL (29 Apr 2024 22:17)  POCT Blood Glucose.: 176 mg/dL (29 Apr 2024 16:45)  POCT Blood Glucose.: 242 mg/dL (29 Apr 2024 12:49)      VITALS/PHYSICAL EXAM  --------------------------------------------------------------------------------  T(C): 36.7 (04-30-24 @ 05:52), Max: 36.9 (04-29-24 @ 12:52)  HR: 110 (04-30-24 @ 05:52) (105 - 110)  BP: 118/73 (04-30-24 @ 05:52) (108/69 - 118/73)  RR: 18 (04-30-24 @ 05:52) (18 - 18)  SpO2: 92% (04-30-24 @ 05:52) (92% - 95%)  Wt(kg): --        04-29-24 @ 07:01  -  04-30-24 @ 07:00  --------------------------------------------------------  IN: 480 mL / OUT: 0 mL / NET: 480 mL      Physical Exam:    Gen: NAD, well-appearing  HEENT: PERRL, NCAT  Neck: trachea midline no noted JVD  Chest: non labored breathing equal chest expansion b/l  Abd: soft, nontender/nondistended  UE: WWP no c/c/e  LE: WWP no c/c/e  Neuro: AOx3  Psych: Normal affect and mood  Skin: Warm, without rashes  .  .  .   [facility] NUTRITION SUPPORT-- FOLLOW UP NOTE      24 hour events/subjective:      Diet:      PAST HISTORY  --------------------------------------------------------------------------------  No significant changes to PMH, PSH, FHx, SHx, unless otherwise noted    ALLERGIES & MEDICATIONS  --------------------------------------------------------------------------------  Allergies    No Known Allergies    Intolerances      Standing Inpatient Medications  ampicillin/sulbactam  IVPB 9 Gram(s) IV Intermittent every 8 hours  chlorhexidine 4% Liquid 1 Application(s) Topical <User Schedule>  cholecalciferol 2000 Unit(s) Oral daily  dextrose 50% Injectable 12.5 Gram(s) IV Push once  dextrose 50% Injectable 25 Gram(s) IV Push once  dextrose 50% Injectable 25 Gram(s) IV Push once  dextrose Oral Gel 15 Gram(s) Oral once  folic acid 1 milliGRAM(s) Oral daily  glucagon  Injectable 1 milliGRAM(s) IntraMuscular once  heparin   Injectable 5000 Unit(s) SubCutaneous every 12 hours  insulin glargine Injectable (LANTUS) 2 Unit(s) SubCutaneous <User Schedule>  insulin lispro (ADMELOG) corrective regimen sliding scale   SubCutaneous three times a day before meals  insulin lispro (ADMELOG) corrective regimen sliding scale   SubCutaneous <User Schedule>  lactulose Syrup 10 Gram(s) Oral every 8 hours  methadone    Tablet 5 milliGRAM(s) Oral three times a day  minocycline IVPB      minocycline IVPB 200 milliGRAM(s) IV Intermittent every 12 hours  multivitamin/minerals/iron Oral Solution (CENTRUM) 15 milliLiter(s) Oral daily  pantoprazole  Injectable 40 milliGRAM(s) IV Push daily  rifAXIMin 550 milliGRAM(s) Oral two times a day  zinc sulfate 220 milliGRAM(s) Oral daily    PRN Inpatient Medications      REVIEW OF SYSTEMS  --------------------------------------------------------------------------------  Gen: as per HPI  Skin: No rashes  Head/Eyes/Ears/Mouth: No headache; No sore throat  Respiratory: No dyspnea, cough,   CV: No chest pain, PND, orthopnea  GI: as per HPI  : No increased frequency, dysuria, hematuria, nocturia  MSK: No joint pain/swelling; no back pain; no edema  Neuro: No dizziness/lightheadedness, weakness, seizures, numbness, tingling  Psych: No significant nervousness, anxiety, stress, depression    All other systems were reviewed and are negative, except as noted.        LABS/STUDIES  --------------------------------------------------------------------------------              8.3    10.14 >-----------<  347      [04-28-24 @ 10:06]              26.2     141  |  101  |  <4  ----------------------------<  229      [04-28-24 @ 10:06]  3.5   |  27  |  0.53        Ca     7.9     [04-28-24 @ 10:06]      Mg     1.5     [04-28-24 @ 10:06]      Phos  4.0     [04-28-24 @ 10:06]    TPro  4.5  /  Alb  2.2  /  TBili  3.0  /  DBili  x   /  AST  33  /  ALT  13  /  AlkPhos  151  [04-28-24 @ 10:06]                Prealbumin, Serum: 3 mg/dL (04-24-24 @ 11:23)    Triglycerides      CAPILLARY BLOOD GLUCOSE      POCT Blood Glucose.: 146 mg/dL (30 Apr 2024 08:21)  POCT Blood Glucose.: 159 mg/dL (30 Apr 2024 02:24)  POCT Blood Glucose.: 141 mg/dL (29 Apr 2024 22:17)  POCT Blood Glucose.: 176 mg/dL (29 Apr 2024 16:45)  POCT Blood Glucose.: 242 mg/dL (29 Apr 2024 12:49)      VITALS/PHYSICAL EXAM  --------------------------------------------------------------------------------  T(C): 36.7 (04-30-24 @ 05:52), Max: 36.9 (04-29-24 @ 12:52)  HR: 110 (04-30-24 @ 05:52) (105 - 110)  BP: 118/73 (04-30-24 @ 05:52) (108/69 - 118/73)  RR: 18 (04-30-24 @ 05:52) (18 - 18)  SpO2: 92% (04-30-24 @ 05:52) (92% - 95%)  Wt(kg): --        04-29-24 @ 07:01  -  04-30-24 @ 07:00  --------------------------------------------------------  IN: 480 mL / OUT: 0 mL / NET: 480 mL        Physical Exam:    Gen: NAD, laying in bed; jaundiced  HEENT: supple neck, no JVD  Chest: non labored breathing, equal chest expansion bilaterally  Abd: +BS, soft, nontender/nondistended  Ext: Warm, FROM, no edema b/l LE  Neuro: No focal deficits  Psych: Normal affect and mood  Skin: Warm, without rashes     .  .  .      .  .

## 2024-04-30 NOTE — PROGRESS NOTE ADULT - PROBLEM SELECTOR PLAN 6
>CTA/P (4/13): Nonspecific focal areas of narrowing in the distal descending colon and sigmoid colon, possibly colonic masses  >CTA/P (4/16): Improved colitis, previous ?colonic mass vs. narrowing still seen per discussion w/ radiology  - ctm, colonoscopy eventually
>CTA/P (4/13): Nonspecific focal areas of narrowing in the distal descending colon and sigmoid colon, possibly colonic masses  >CTA/P (4/16): Improved colitis, previous ?colonic mass vs. narrowing still seen per discussion w/ radiology  - ctm, colonoscopy eventually
>CTA/P (4/13): segmental areas of wall thickening throughout the colon and rectum with pneumatosis, mesenteric edema, and adjacent hyperdensities in the proximal transverse colon suspicious for bowel ischemia with intraluminal hemorrhage. Additional intraluminal hyperdensities within the distal ascending colon suspicious for hemorrhage.   >TTE (4/13): No thrombus commented  - Surgery, GI following, no plan for surgery/endoscopic eval for now  - ctm, colonoscopy eventually
- c/w synthyroid 100
>CTA/P (4/13): Nonspecific focal areas of narrowing in the distal descending colon and sigmoid colon, possibly colonic masses  >CTA/P (4/16): Improved colitis, previous ?colonic mass vs. narrowing still seen per discussion w/ radiology  - ctm, colonoscopy eventually
>CTA/P (4/13): segmental areas of wall thickening throughout the colon and rectum with pneumatosis, mesenteric edema, and adjacent hyperdensities in the proximal transverse colon suspicious for bowel ischemia with intraluminal hemorrhage. Additional intraluminal hyperdensities within the distal ascending colon suspicious for hemorrhage.   >TTE (4/13): No thrombus commented  - Surgery, GI following, no plan for surgery/endoscopic eval for now  - ctm, colonoscopy eventually
- c/w synthyroid 100
>CTA/P (4/13): segmental areas of wall thickening throughout the colon and rectum with pneumatosis, mesenteric edema, and adjacent hyperdensities in the proximal transverse colon suspicious for bowel ischemia with intraluminal hemorrhage. Additional intraluminal hyperdensities within the distal ascending colon suspicious for hemorrhage.   >TTE (4/13): No thrombus commented  - Surgery, GI following, no plan for surgery/endoscopic eval for now  - ctm, colonoscopy eventually
>CTA/P (4/13): Nonspecific focal areas of narrowing in the distal descending colon and sigmoid colon, possibly colonic masses  >CTA/P (4/16): Improved colitis, previous ?colonic mass vs. narrowing still seen per discussion w/ radiology  - ctm, colonoscopy eventually
>CTA/P (4/13): segmental areas of wall thickening throughout the colon and rectum with pneumatosis, mesenteric edema, and adjacent hyperdensities in the proximal transverse colon suspicious for bowel ischemia with intraluminal hemorrhage. Additional intraluminal hyperdensities within the distal ascending colon suspicious for hemorrhage.   >TTE (4/13): No thrombus commented  - Surgery, GI following, no plan for surgery/endoscopic eval for now  - ctm, colonoscopy eventually

## 2024-04-30 NOTE — PROVIDER CONTACT NOTE (OTHER) - SITUATION
Patient bladder scan of 244ml
Right brachial midline removed by patient
Patient complaining of abdominal pain after lactulose. Bladder scan of 240ml.
Pt. o2 sat sating 88-89 on RA
bladder scan showed 192ml
Patient BP of 99/62
Patient refusing PM Lactulose and Methadone

## 2024-04-30 NOTE — PROGRESS NOTE ADULT - PROBLEM SELECTOR PROBLEM 7
Hypothyroidism
Hypothyroidism
Macrocytic anemia
Colonic mass
Colonic mass
Macrocytic anemia
Hypothyroidism
Macrocytic anemia
Macrocytic anemia
Hypothyroidism

## 2024-04-30 NOTE — PROGRESS NOTE ADULT - PROBLEM SELECTOR PLAN 5
#DM1 vs. DM3c  >Per HIE record, patient prone to hypoglycemia  >History of DM1, but s/p total pancreatectomy and islet cell autotransplant in 2018 - currently DM3c?  >C-peptide (4/18): < 0.1  - Endocrine on board
>CTA/P (4/13): segmental areas of wall thickening throughout the colon and rectum with pneumatosis, mesenteric edema, and adjacent hyperdensities in the proximal transverse colon suspicious for bowel ischemia with intraluminal hemorrhage. Additional intraluminal hyperdensities within the distal ascending colon suspicious for hemorrhage.   >TTE (4/13): No thrombus commented  - Surgery, GI following, no plan for surgery/endoscopic eval for now  - ctm, colonoscopy eventually
>CTA/P (4/13): segmental areas of wall thickening throughout the colon and rectum with pneumatosis, mesenteric edema, and adjacent hyperdensities in the proximal transverse colon suspicious for bowel ischemia with intraluminal hemorrhage. Additional intraluminal hyperdensities within the distal ascending colon suspicious for hemorrhage.   >TTE (4/13): No thrombus commented  - Surgery, GI following, no plan for surgery/endoscopic eval for now  - ctm, colonoscopy eventually
- c/w synthyroid 100

## 2024-04-30 NOTE — RAPID RESPONSE TEAM SUMMARY - NSSITUATIONBACKGROUNDRRT_GEN_ALL_CORE
49yo pmh of HTN, DM, Factor V Leiden c/b L peroneal DVT (2022) and RA thrombus (2018) currently not on AC, gout, congenital pancreas divisum, recurrent pancreatitis s/p cholecystectomy, Splenectomy, Kevin-En-Y Pancreaticojejunostomy, total pancreatectomy s/p Islet Cell Autotransplant at [facility] 2018, complicated by multiple intraabdominal infection (VRE, fungal? per [facility]), fistula, abscesses. Presented to [facility] 4/8 for several weeks of decreased PO intake, NBNB emesis, watery diarrhea, Somnolence, requiring intubation for airway protection requiring MICU stay. Now downgraded to the floors, course complicated by increasing O2 requirement. CODE BLUE called.    Initial rhythm asystole then PEA. Tele strip reviewed showing desat prior to code blue--no arrhythmias noted. Pt coded for ~8 minutes, received epi x1. ROSC obtained shortly after pt was intubated. Suspect hypoxic arrest given history of desat and CXR worsening pleffs and concern for aspiration vs ARDS picture. Labs sent per MICU request. Started on propofol gtt. Levo at bedside, bp not yet requiring. Pt transferred to MICU.

## 2024-04-30 NOTE — PROVIDER CONTACT NOTE (OTHER) - ACTION/TREATMENT ORDERED:
provider notified, no other interventions ordered at this time
provider made aware. will keep on monitoring for now. Pt. remains asymptomatic
provider notified, no interventions ordered at this time
provider notified, will put in an order for calcium carbonate
provider notified, no interventions ordered at this time
provider notified, no other interventions ordered at this time
No new orders. Right 22 cephalic IV placed, pt educated to call RN if IV causes concern.

## 2024-04-30 NOTE — PROGRESS NOTE ADULT - ASSESSMENT
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 Hargrove), and now S/P total pancreatectomy with islet cell autotransplant at [facility] by Dr. Gil in 04/2018). 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 Per records, a Ct later in 2018 showed still fistulization but no abscess formation     Patient presented to [facility] ED on 4/8 by her spouse for AMS. Per , patient had been having nbnb vomiting and diarrhea for the last 4 weeks, extreme fatigue . She was unable to tolerate PO. She was also sleeping 18-20 hrs per day. On 4/8, he found her on the floor "completely out of it" and took her to [facility] for further evaluation.   Initial workup in ED demonstrated a Tbili 5.4, direct bili 4.4 and ammonia 196. Patient was somnolent and had a left eyeward gaze deviation. Submentally started developing agonal snoring respirations and had to be intubated for airway protection. Course complicated with septic shock and persistent hyperammonemia despite lactulose and rifaximin, CT show and started on meropenem, linezolid and anidulafungin at [facility]--> upon transfer here, off pressors, on alvaro/linezolid/caspofungin    CT Chest (4/13) Occlusion of the distal left lower lobar bronchus with complete left lower lobe atelectasis. Right middle lobe consolidation, possibly secondary to pneumonia or additional atelectasis.     CT A/P (4/13) Segmental areas of wall thickening throughout the colon and rectum with pneumatosis, mesenteric edema, and adjacent hyperdensities in the proximal transverse colon suspicious for bowel ischemia with intraluminal hemorrhage. Additional intraluminal hyperdensities within the distal ascending colon suspicious for hemorrhage. Nonspecific focal areas of narrowing in the distal descending colon and sigmoid colon, possibly colonic masses    UCx (4/13) No Growth  BCx (4/13) NGTD  Bronchoscopy Cx (4/13) Rare Yeast    CMV PCR (4/13) Negative  Adenovirus PCR (4/16) negative  Parvovirus PCR (4/13) Negative  EBV PCR (4/17) 61    Serum Cryptococcal Antigen (4/14) Negative  Serum Fungitell <31  Bronch Fungitell >500  Bronch and Serum Aspergillus Galactomannan Negative    Serum Histo Ag Negative  Urine Histo Ag Negative    Babesia PCR (4/13) Negative  Tick Diseases Panel Negative  Bartonella Serum PCR Negative  Leptospirosis Ab Negative    Lower suspicion infectious etiology directly contributing to hyperbilirubinemia; reasonable to complete meropenem course for the pneumatosis and possible pneumonia.    Low grade fevers 4/16 --> 4/17  Extubated on 4/17 with central line removal    CXR (4/26) Bilateral effusions are seen, with dense left lower lobe consolidation, new consolidation in the right middle lobe, and new interstitial edema..    Had Acinetobacter on ET tube sputum culture prior to extubation.  Was initially clinically stable without leukocytosis without coverage for Acinetobacter after extubation.  Developed bandemia on 4/26.  Now with leukocytosis and worsening infiltrates on chest x-ray    While she may have pneumonia from an organism besides Acinetobacter at this point would favor targeted coverage for Acinetobacter    #Leukocytosis, bandemia, abnormal chest x-ray, positive sputum culture (carbapenem resistant Acinetobacter baumannii)  --Blood cultures sent 4/28 and NGTD  --+ minocycline 200 mg Q12H to continue but will discontinue the Zosynsyn and start Cefiderocol based upon patient's recent colonization with a resistant acinetobacter  --can add anaerobic coverage with flagyl 500mg q 12hr  --maintain contact isolation     #Diarrhea, abdominal pain increasing abdominal girth-ascites ? perforation? ischemic bowel  C. difficile toxin assay 4/19 negative- would send repeat specimen  -no evidence of abdominal perforation on recent CT scan earlier today, may require paracentesis- send for cultures and cell counts    #Hyperammonemia, Bilirubin Elevation  Ammonia elevation now thought to be secondary to alcoholic hepatitis given positive PETH  Ureaplasma PCR & Mycoplasma hominis PCR (4/18) Negative   Leptospira PCR (4/13) Negative    Agree with plans for a diagnostic bronchoscopy once she is stabilized in the MICU  send studeis for bacterial , fungal, AFB stains and cultures  fungitell, PJP PCR, legionella testing    Discussed with MICU team    Francisco Gore MD  Can be called via Teams  After 5pm/weekends [phone]

## 2024-05-01 ENCOUNTER — RESULT REVIEW (OUTPATIENT)
Age: 49
End: 2024-05-01

## 2024-05-01 NOTE — PROGRESS NOTE ADULT - PROBLEM SELECTOR PLAN 1
Patient with ANKIT likely with ATN post cardiac arrest on 4/30 and pre-renal constriction from contrast CT scan on 4/30. Scr was 0.41 on 4/25, increased to 0.96 today. Patient was on CRRT for hyperammonemia 4/13-4/15. Patient also oliguric with UOP of 160. SBP noted to low to 70s on 4/30. UA on 4/30 without proteinuria, high specific gravity. No hydronephrosis seen on imaging. Dialysis consent is in patient's physical chart from 4/13.     No emergent need to start dialysis treatment as this time. Has no access and currently concern for infection given high WBC. Would continue with the bumex infusion and treat the infection. Will likely need CVVHDF initiation in near future if patient remains oliguric. Monitor BMP and I/o.    Replete K for goal of 4 Patient with ANKIT likely with ATN post cardiac arrest on 4/30 and pre-renal constriction from contrast CT scan on 4/30. Scr was 0.41 on 4/25, increased to 0.96 today. Patient was on CRRT for hyperammonemia 4/13-4/15. Patient also oliguric with UOP of 160. SBP noted to low to 70s on 4/30. UA on 4/30 without proteinuria, high specific gravity. No hydronephrosis seen on imaging. Dialysis consent is in patient's physical chart from 4/13. 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     Pt has no access and currently concern for infection given high WBC. Would continue with the bumex infusion and treat the infection. May need to start CVVHDF for the ammonia clearance. Monitor BMP and I/o.    Replete K for goal of 4 Patient with ANKIT likely with ATN post cardiac arrest on 4/30 and pre-renal constriction from contrast CT scan on 4/30. Scr was 0.41 on 4/25, increased to 0.96 today. Patient was on CRRT for hyperammonemia 4/13-4/15. Patient also oliguric with UOP of 160. SBP noted to low to 70s on 4/30. UA on 4/30 without proteinuria, high specific gravity. No hydronephrosis seen on imaging. Dialysis consent is in patient's physical chart from 4/13. 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     Pt has no access and currently concern for infection given high WBC. Will start CVVHDF for the ammonia clearance, please place dialysis access. Monitor BMP and I/o.    Replete K for goal of 4 Patient with ANKIT likely with ATN post cardiac arrest on 4/30 and pre-renal constriction from contrast CT scan on 4/30. Scr was 0.41 on 4/25, increased to 0.96 today. Patient was on CRRT for hyperammonemia 4/13-4/15. Patient also oliguric with UOP of 160. SBP noted to low to 70s on 4/30. UA on 4/30 without proteinuria, high specific gravity. No hydronephrosis seen on imaging. Dialysis consent is in patient's physical chart from 4/13. 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     Pt has no access and currently concern for infection given high WBC. Will hold off on CVVHDF for now after discussion with MICU. Monitor BMP and I/o.    Replete K for goal of 4

## 2024-05-01 NOTE — PROCEDURE NOTE - NSBRONCHPROCDETAILS_GEN_A_CORE_FT
BRonchoscope advacned thru ETT; tip of ETT noted to end just proximal to trachea and was retracted ~2cm using bronchoscopic guidance. Entire tracheobronchial tree examined to subsegmental level. Therapeutic aspirations performed and BALs done in RML and LLL. Bronchoscope then retracted.
Bronchoscopy Procedure Note:  Indication: acute hypoxemic respiratory failure     Operators: Edil    Pre-op Dx: acute hypoxemic respiratory failure     History: 42 year old female with hx of DM1, gout, Factor V Leiden c/b provoked DVT and RA thrombus in the past, congenital pancreatic divisum c/b recurrent pancreatitis s/p total pancreatectomy s/p islet cell autotransplant, extensive abdominal surgical history, (cholecystectomy, splenectomy, celina-en-y pancreaticojejuonostomy) c/b multiple intraabdominal infection/abscess/fistula (including VRE, candida) in the past, intubated for acute hypoxemic respiratory failure     Preop medications:  propofol,. fentanyl      Findings:  Bronchoscope inserted through ETT. ETT noted to be in good position. Airways inspected down to level of subsegmental bronchi bilaterally.  Airway mucosa grossly normal without any obvious endobronchial lesions.  Minimal secretions were present bilaterally.  BAL performed in right middle and right lower lobe.  Therapeutic aspiration of secretions performed prior to withdrawal of bronchoscope from ET tube.       Specimens: BAL for culture

## 2024-05-01 NOTE — PROGRESS NOTE ADULT - ATTENDING COMMENTS
Pt seen by me5/2 morning in MICU  PEA cardiac arrest 4/30/2024  Reconsulted yesterday    mother and father at bedside  #ANKIT- likley ATN; cr uptrending, decreasing UO past few hours, oliguric  #Ammonia uptrending   #plan for CRRT today given above indications  #anemia-monitor hb;  #hypotension- on pressors per micu  d/w MICU/parents in detail

## 2024-05-01 NOTE — PROGRESS NOTE ADULT - ASSESSMENT
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 [facility]. Patient found to be hypoglycemic shortly after transfer, now resolved. Consulted for: Type 3c DM, hypoglycemia. Had RRT yesterdayworsening hypoxemic respiratory failure w/ associated altered mental status, patient then underwent PEA arrest, presumed to be hypoxemic> back in MICU. Pt NPO and remains in critical condition requiring intubation/pressors/CVVH/Bumex drip/sedation with BG at goal while on low lantus dose. Noted order to start TFs/ Need to assess BG once pt is on TF to assess for further insulin needs. If BG difficult to manage while on present insulin doses and pt remains hyperglycemic would recommend insulin drip to assess insulin requirements to keep BG Goal 100-180mg/dl     #Type 3c DM  Patient has hx of congenital pancreas divisum complicated by recurrent pancreatitis. Patient was first diagnosed with Type 3c DM after her initial surgery in 2014 (s/p distal pancreatectomy, cholecystectomy, splenectomy and celina-en-y pancreaticojejuonostomy). Then she later underwent total pancreatectomy with islet cell autotransplant in 2018. After that, her insulin requirements decreased over the years but was never off insulin.    Per recent outpatient note 3/26/2024: pump not available to review  Patient uses Omnipod Dash with Dexcom CGM G7  Basal rate:   MN 0.05U   6 AM 0.1U   9.30 AM 0.15U   Total basal 2.825U/24h   ICR 1:15   ICF 1:75   Reports of frequent hypoglycemia outpatient in March 2024.    C-peptide <0.1 4/15 at 6pm when BG was 168, insulin deficient.  C-peptide repeated while off dextrose and tube feeds: 0.1 with , confirming insulin deficiency.                  .

## 2024-05-01 NOTE — PROGRESS NOTE ADULT - ASSESSMENT
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 Hargrove), and now S/P total pancreatectomy with islet cell autotransplant at [facility] by Dr. Gil in 04/2018). 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 Per records, a Ct later in 2018 showed still fistulization but no abscess formation     Patient presented to [facility] ED on 4/8 by her spouse for AMS. Per , patient had been having nbnb vomiting and diarrhea for the last 4 weeks, extreme fatigue . She was unable to tolerate PO. She was also sleeping 18-20 hrs per day. On 4/8, he found her on the floor "completely out of it" and took her to [facility] for further evaluation.   Initial workup in ED demonstrated a Tbili 5.4, direct bili 4.4 and ammonia 196. Patient was somnolent and had a left eyeward gaze deviation. Submentally started developing agonal snoring respirations and had to be intubated for airway protection. Course complicated with septic shock and persistent hyperammonemia despite lactulose and rifaximin, CT show and started on meropenem, linezolid and anidulafungin at [facility]--> upon transfer here, off pressors, on alvaro/linezolid/caspofungin    CT Chest (4/13) Occlusion of the distal left lower lobar bronchus with complete left lower lobe atelectasis. Right middle lobe consolidation, possibly secondary to pneumonia or additional atelectasis.     CT A/P (4/13) Segmental areas of wall thickening throughout the colon and rectum with pneumatosis, mesenteric edema, and adjacent hyperdensities in the proximal transverse colon suspicious for bowel ischemia with intraluminal hemorrhage. Additional intraluminal hyperdensities within the distal ascending colon suspicious for hemorrhage. Nonspecific focal areas of narrowing in the distal descending colon and sigmoid colon, possibly colonic masses    CXR (4/26) Bilateral effusions are seen, with dense left lower lobe consolidation, new consolidation in the right middle lobe, and new interstitial edema..    Had Acinetobacter on ET tube sputum culture prior to extubation.  Was initially clinically stable without leukocytosis without coverage for Acinetobacter after extubation.  Developed bandemia on 4/26.  Now with leukocytosis and worsening infiltrates on chest x-ray with varying reports of consolidations vs effusions  Developed RRT on 4/30 required intubation and transfer to MICU  Blood cultures and Bronch/BAL performed with studies pendin        #Leukocytosis, bandemia, abnormal chest x-ray, positive sputum culture (carbapenem resistant Acinetobacter baumannii)  --Blood cultures sent 4/28 and No growth, repeat blood culture sent 4/30 pending  --+ minocycline 200 mg Q12H to continue  and started Cefiderocol based upon patient's recent colonization with a resistant acinetobacter  --can add anaerobic coverage with flagyl 500mg q 12hr  --maintain contact isolation   --yeast on Bronch/Bal would add Caspofungin pending organism ID    #Diarrhea, abdominal pain increasing abdominal girth-ascites ? perforation? ischemic bowel  C. difficile toxin assay 4/19 negative- would send repeat specimen  -no evidence of abdominal perforation on recent CT scan earlier today, may require paracentesis- send for cultures and cell counts    #Hyperammonemia, Bilirubin Elevation  Ammonia elevation now thought to be secondary to alcoholic hepatitis given positive PETH  Ureaplasma PCR & Mycoplasma hominis PCR (4/18) Negative   Leptospira PCR (4/13) Negative    Agree with plans for a diagnostic bronchoscopy once she is stabilized in the MICU  send studeis for bacterial , fungal, AFB stains and cultures  fungitell, PJP PCR, legionella testing    Discussed with MICU team    Francisco Gore MD  Can be called via Teams  After 5pm/weekends [phone]

## 2024-05-01 NOTE — PROGRESS NOTE ADULT - SUBJECTIVE AND OBJECTIVE BOX
INTERVAL HPI/OVERNIGHT EVENTS:    SUBJECTIVE: Patient seen and examined at bedside.     ROS: All negative except as listed above.    VITAL SIGNS:  ICU Vital Signs Last 24 Hrs  T(C): 37 (01 May 2024 04:00), Max: 37 (01 May 2024 04:00)  T(F): 98.6 (01 May 2024 04:00), Max: 98.6 (01 May 2024 04:00)  HR: 92 (01 May 2024 06:30) (92 - 133)  BP: 120/58 (01 May 2024 06:30) (73/49 - 144/71)  BP(mean): 84 (01 May 2024 06:30) (57 - 95)  ABP: --  ABP(mean): --  RR: 16 (01 May 2024 06:30) (16 - 31)  SpO2: 96% (01 May 2024 06:30) (90% - 100%)    O2 Parameters below as of 01 May 2024 04:00  Patient On (Oxygen Delivery Method): ventilator    O2 Concentration (%): 60      Mode: AC/ CMV (Assist Control/ Continuous Mandatory Ventilation), RR (machine): 16, TV (machine): 400, FiO2: 40, PEEP: 5, ITime: 1, MAP: 10, PIP: 29  Plateau pressure:   P/F ratio:     04-30 @ 07:01  -  05-01 @ 07:00  --------------------------------------------------------  IN: 1321.8 mL / OUT: 155 mL / NET: 1166.8 mL      CAPILLARY BLOOD GLUCOSE      POCT Blood Glucose.: 138 mg/dL (01 May 2024 05:27)      ECG: reviewed.    PHYSICAL EXAM:    GENERAL: NAD, intubated and sedated   HEAD:  Atraumatic, normocephalic  EYES: Preferred upward gaze. PERRLA, conjunctiva and sclera clear  ENT: Moist mucous membranes  NECK: Supple, no JVD  HEART: S1, S2, regular rate and rhythm, no murmurs, rubs, or gallops  LUNGS: Unlabored respirations.  Clear to auscultation bilaterally, no crackles, wheezing, or rhonchi  ABDOMEN: Soft, moderately distended, +BS  EXTREMITIES: 2+ peripheral pulses bilaterally. No clubbing, cyanosis, or edema  NERVOUS SYSTEM:  Intubated and sedated    SKIN: No rashes or lesions    MEDICATIONS:  MEDICATIONS  (STANDING):  buMETAnide Infusion 2 mG/Hr (10 mL/Hr) IV Continuous <Continuous>  buMETAnide IVPB 4 milliGRAM(s) IV Intermittent once  cefiderocol IVPB 2000 milliGRAM(s) IV Intermittent every 8 hours  chlorhexidine 0.12% Liquid 15 milliLiter(s) Oral Mucosa every 12 hours  chlorhexidine 4% Liquid 1 Application(s) Topical <User Schedule>  cholecalciferol 2000 Unit(s) Oral daily  dextrose 10% Bolus 125 milliLiter(s) IV Bolus once  dextrose 5%. 1000 milliLiter(s) (100 mL/Hr) IV Continuous <Continuous>  dextrose 5%. 1000 milliLiter(s) (50 mL/Hr) IV Continuous <Continuous>  dextrose 50% Injectable 25 Gram(s) IV Push once  dextrose 50% Injectable 12.5 Gram(s) IV Push once  fentaNYL    Injectable 50 MICROGram(s) IV Push once  fentaNYL   Infusion... 0.5 MICROgram(s)/kG/Hr (1.56 mL/Hr) IV Continuous <Continuous>  folic acid 1 milliGRAM(s) Oral daily  glucagon  Injectable 1 milliGRAM(s) IntraMuscular once  heparin   Injectable 5000 Unit(s) SubCutaneous every 12 hours  insulin glargine Injectable (LANTUS) 2 Unit(s) SubCutaneous <User Schedule>  insulin lispro (ADMELOG) corrective regimen sliding scale   SubCutaneous every 6 hours  lactulose Syrup 10 Gram(s) Oral every 4 hours  linezolid  IVPB      linezolid  IVPB 600 milliGRAM(s) IV Intermittent every 12 hours  metroNIDAZOLE  IVPB 500 milliGRAM(s) IV Intermittent every 12 hours  minocycline 200 milliGRAM(s) Oral every 12 hours  multivitamin/minerals/iron Oral Solution (CENTRUM) 15 milliLiter(s) Oral daily  norepinephrine Infusion 0.05 MICROgram(s)/kG/Min (5.85 mL/Hr) IV Continuous <Continuous>  propofol Infusion 30 MICROgram(s)/kG/Min (11.2 mL/Hr) IV Continuous <Continuous>  rifAXIMin 550 milliGRAM(s) Oral two times a day  thiamine 100 milliGRAM(s) Oral daily  zinc sulfate 220 milliGRAM(s) Oral daily    MEDICATIONS  (PRN):  dextrose Oral Gel 15 Gram(s) Oral once PRN Blood Glucose LESS THAN 70 milliGRAM(s)/deciliter      ALLERGIES:  Allergies    No Known Allergies    Intolerances        LABS:                        9.4    34.03 )-----------( 186      ( 01 May 2024 01:30 )             29.9     05-01    144  |  104  |  6<L>  ----------------------------<  144<H>  4.1   |  19<L>  |  0.75    Ca    8.0<L>      01 May 2024 01:30  Phos  4.6     05-01  Mg     1.6     05-01    TPro  4.8<L>  /  Alb  2.1<L>  /  TBili  3.6<H>  /  DBili  x   /  AST  121<H>  /  ALT  20  /  AlkPhos  182<H>  05-01    PT/INR - ( 30 Apr 2024 22:23 )   PT: 27.7 sec;   INR: 2.72 ratio         PTT - ( 30 Apr 2024 22:23 )  PTT:61.2 sec  Urinalysis Basic - ( 01 May 2024 03:34 )    Color: Dark Yellow / Appearance: Clear / SG: >1.030 / pH: x  Gluc: x / Ketone: Negative mg/dL  / Bili: Small / Urobili: 0.2 mg/dL   Blood: x / Protein: Trace mg/dL / Nitrite: Negative   Leuk Esterase: Negative / RBC: x / WBC x   Sq Epi: x / Non Sq Epi: x / Bacteria: x      ABG:  pH, Arterial: 7.46 (05-01-24 @ 06:52)  pCO2, Arterial: 40 mmHg (05-01-24 @ 06:52)  pO2, Arterial: 93 mmHg (05-01-24 @ 06:52)  pH, Arterial: 7.43 (04-30-24 @ 22:10)  pCO2, Arterial: 38 mmHg (04-30-24 @ 22:10)  pO2, Arterial: 147 mmHg (04-30-24 @ 22:10)  pH, Arterial: 7.40 (04-30-24 @ 20:21)  pCO2, Arterial: 38 mmHg (04-30-24 @ 20:21)  pO2, Arterial: 174 mmHg (04-30-24 @ 20:21)      vBG:  pH, Venous: 7.22 (04-30-24 @ 20:04)  pCO2, Venous: 62 mmHg (04-30-24 @ 20:04)  pO2, Venous: 41 mmHg (04-30-24 @ 20:04)  HCO3, Venous: 25 mmol/L (04-30-24 @ 20:04)  pH, Venous: 7.42 (04-30-24 @ 10:40)  pCO2, Venous: 52 mmHg (04-30-24 @ 10:40)  pO2, Venous: 54 mmHg (04-30-24 @ 10:40)  HCO3, Venous: 34 mmol/L (04-30-24 @ 10:40)    Micro:    Culture - Blood (collected 04-28-24 @ 11:27)  Source: .Blood Blood-Peripheral  Preliminary Report (04-30-24 @ 17:02):    No growth at 48 Hours    Culture - Blood (collected 04-18-24 @ 17:53)  Source: .Blood Blood-Venous  Final Report (04-23-24 @ 23:00):    No growth at 5 days    Culture - Blood (collected 04-18-24 @ 17:53)  Source: .Blood Blood-Venous  Final Report (04-23-24 @ 23:00):    No growth at 5 days    Culture - Blood (collected 04-13-24 @ 04:00)  Source: .Blood Blood-Venous  Final Report (04-18-24 @ 10:01):    No growth at 5 days    Culture - Blood (collected 04-13-24 @ 03:25)  Source: .Blood Blood-Peripheral  Final Report (04-18-24 @ 10:01):    No growth at 5 days        Culture - Sputum (collected 04-17-24 @ 12:11)  Source: ET Tube ET Tube  Gram Stain (04-17-24 @ 22:40):    Few polymorphonuclear leukocytes per low power field    Few Squamous epithelial cells per low power field    Moderate Gram positive cocci in pairs per oil power field    Few Gram Negative Coccobacilli per oil power field  Final Report (04-23-24 @ 07:24):    Numerous Acinetobacter baumannii/nosocom group (Carbapenem Resistant)    Normal Respiratory Linh present    cefiderocol interpretations based on FDA breakpoints.  Organism: Acinetobacter baumannii/nosocom group (Carbapenem Resistant) (04-23-24 @ 07:24)      Method Type: KB      -  Piperacillin/Tazobactam: R      -  Minocycline: S      -  Cefiderocol: S  Organism: Acinetobacter baumannii/nosocom group (Carbapenem Resistant) (04-23-24 @ 07:24)      Method Type: KLARISSA      -  Amikacin: R >32      -  Ampicillin/Sulbactam: I 16/8      -  Cefepime: I 16      -  Ceftazidime: R >16      -  Ciprofloxacin: R >2      -  Gentamicin: R >8      -  Imipenem: R >8      -  Levofloxacin: R >4      -  Meropenem: R >8      -  Tobramycin: S <=2      -  Trimethoprim/Sulfamethoxazole: R >2/38      -  Polymyxin B: I 0.25      -  Tigecycline: S <=2  Organism: Acinetobacter baumannii/nosocom group (Carbapenem Resistant) (04-20-24 @ 14:42)    Culture - Sputum (collected 04-13-24 @ 04:32)  Source: ET Tube ET Tube  Gram Stain (04-13-24 @ 14:29):    Moderate polymorphonuclear leukocytes seen per low power field    No organisms seen  Final Report (04-14-24 @ 16:06):    Normal Respiratory Linh present        RADIOLOGY & ADDITIONAL TESTS: Reviewed.

## 2024-05-01 NOTE — PROGRESS NOTE ADULT - ASSESSMENT
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 Patient had PEA arrest, ROSC after 10 minutes and 1 round of epinephrine, transferred to the MICU for further management.     PLAN  ===Neurologic===  #Encephalopathy   - currently sedated on prop/fentantyl   - baseline AOx2-3 during hospitalization   - progressively AMS prior to PEA arrest   - metabolic vs septic encephalopathy, in setting of hyperammonia   - continue rifaximin, increase lactulose to q4 for hyperammonia  - CT head       ===Respiratory===  #Acute Hypoxemic Respiratory Failure   - patient currently intubated, will monitor FIO2 requirements   - pulmonary edema vs ARDS vs PE (factor V leiden)   - on CXR patient had bilateral pleural effusions   - POCUS: diffuse scattered B lines   - BNP: 2383   - obtain CTA, MRSA, full RVP   - diuresis with IV bumex   - PNA management with cifederocol, flagyl, minocycline per ID   - plan for bronchoscopy     ===Cardiovascular==  #Shock  - patient currently on 0.17 levo, most likely in setting of sedation   - will wean as tolerated   - EKG sinus tachycardia, negative troponin   - repeat TTE     #Pulmonary HTN   - on previous echo mild to moderate pulmonary HTN   - will continue diuresis     ===GI===  #Alcoholic hepatitis/Alcoholic liver disease  - mildly enlarged liver potentially in setting fatty infiltration/severe acute hepatitis  - elevated transaminase , ALT 30     #Ascites  - CT abdomen showing moderate ascites   - POCUS: no window for paracentesis  - patient on empiric antibiotics  - continue diuresis     #Hyperammonia  - will continue rifaximin  - increase lactulose to q4     ===Renal/===  #Lactic acidosis   - s/p PEA arrest   - will continue to trend     #Electrolytes  - Maintain K>4, Phos>3, Mag>2, iCal>1    #Diuresis   - goal net negative     ===Infectious Disease===  #Acetinobacter pneumonia  - new acute hypoxemic respiratory failure, leukocytosis   - blood cultures, urine culture, MRSA, fungitell, galactomannan   - previous sputum culture carbapenem-resistant acenitobacter   - bronchochoscopy done: BAL studies sent   - start cefiderocol per ID, adding flagyl for anaerobic coverage  - continuing minocylcine for acenitobacter    ===Endocrine===  #No active issues  - will place on ROSI   - obtain cortisol level     ===Heme/Onc===  #History of Factor V Leiden, RA thrombus 2018  - L peroneal DVT 2022  -patient not on AC as prior DVT studies negative  -LE duplex negative 4/16/24    #DVT PPx  - heparin sq     ===Ethics===  FULL CODE   48y female with PMH DM1, gout, Factor V Leiden c/b provoked DVT and RA thrombus in the past, congenital pancreatic divisum c/b recurrent pancreatitis s/p total pancreatectomy s/p islet cell autotransplant, extensive abdominal surgical history, (cholecystectomy, splenectomy, celina-en-y pancreaticojejuonostomy) c/b 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 Patient had PEA arrest, ROSC after 10 minutes and 1 round of epinephrine, transferred to the MICU for further management.     PLAN  Neuro  #Encephalopathy  -patient reportedly w/worsening mental status prior to PEA arrest  -DDX remains broad and includes septic/metabolic encephalopathy given concern for sepsis and persistent hyperammonemia,   - obtaining CTH,   -will continue to rend NH3 levels, continuing rifaximin, increasing lactulose to q4 dosing to target at least 4 BMs daily  -propofol/fentanyl for sedation, daily sedation holds    Respiratory  #Acute hypoxemic respiratory failure  -suspect pulmonary edema given bilateral pleural effusions, bilateral diffuse b-lines on ultrasound, elevated BNP  -DDx also includes ARDS, PE given history of factor V leiden,   -patient now intubated w/improving FiO2 requirements favoring acutely reversible process such as pulmonary edema  -will diurese w/IV bumex  -obtaining CT angio to rule out PE  -antibiotics as below  - sending full RVP    Cardiovascular  #Shock  -likely vasoplegic and related to sedation as patient had normal blood pressure at the time of intubation  - EKG showing sinus tachycardia, troponin negative  - wean pressors as tolerated   -will repeat formal TTE  #Pulmonary HTN  -most recent TTE w/elevated PASP concerning for pulmonary HTN  -diuresing as above, will minimize PEEP    Renal  -diuresing as above to achieve net negative fluid balance  #Lactic acidosis  -lactate elevated, unclear if related to PEA arrest, repeating    GI  #Transaminitis  #Alcoholic hepatitis/alcoholic liver disease  #Hyperbilirubinemia  -patient w/fatty liver on imaging, no imaging evidence reporting cirrhosis at this point but previously seen by hepatology  -continue rifaximin/lactulose  #Ascites  -present on CT abdomen and bedside ultrasound, no adequate pocket for safe paracentesis at this point, on broad spectrum antibiotics empirically  - diuresing as above    Endo  -checking cortisol  -SSI    ID  -will send repeat blood/urine cultures as patient has new hypoxemic respiratory failure with elevated WBC, sending MRSA, fungitell, galactomannan  #Acetinobacter pneumonia  -patient w/carbapenem-resistant acenitobacter from sputum on 4/17  -will start cefiderocol per ID recommendations, adding flagyl for anaerobic coverage, continuing minocylcine for acenitobacter  -will consider repeat bronchoscopy w/BAL    Hem-Onc  #Leukocytosis  -evaluating for infectious causes as above, on broad spectrum antibiotics   #History of Factor V Leiden, RA thrombus 2018, L peroneal DVT 2022  -patient reportedly not on AC as prior DVT studies negative  -LE duplex negative 4/16/24  -prophylactic dose heparin for now pending further work up    CODE Status: FULL  DVT prophylaxis: heparin sub q

## 2024-05-01 NOTE — PROGRESS NOTE ADULT - SUBJECTIVE AND OBJECTIVE BOX
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 Patient was seen by the nephrology service on admission 4/13 and started on CVVHDF 4/13-4/15 for ammonia clearance while undergoing liver transplant evaluation. Patient was also being treated for PNA and possible colitis, mental status improved so moved to the floors. Patient then noted to have AMS, hypoxemic respiratory failure and then experienced a PEA arrest so intubated and sedated and brought to MICU     24 hour events/subjective:  Nephrology reconsulted for oliguria and worsening creatinine. Patient seen at bedside today with  present. Unable to do ROS.     PAST HISTORY  --------------------------------------------------------------------------------  No significant changes to PMH, PSH, FHx, SHx, unless otherwise noted    ALLERGIES & MEDICATIONS  --------------------------------------------------------------------------------  Allergies    No Known Allergies    Intolerances      Standing Inpatient Medications  buMETAnide Infusion 4 mG/Hr IV Continuous <Continuous>  buMETAnide Injectable 2 milliGRAM(s) IV Push once  cefiderocol IVPB 2000 milliGRAM(s) IV Intermittent every 8 hours  chlorhexidine 0.12% Liquid 15 milliLiter(s) Oral Mucosa every 12 hours  chlorhexidine 4% Liquid 1 Application(s) Topical <User Schedule>  cholecalciferol 2000 Unit(s) Oral daily  dextrose 10% Bolus 125 milliLiter(s) IV Bolus once  dextrose 5%. 1000 milliLiter(s) IV Continuous <Continuous>  dextrose 5%. 1000 milliLiter(s) IV Continuous <Continuous>  dextrose 50% Injectable 25 Gram(s) IV Push once  dextrose 50% Injectable 12.5 Gram(s) IV Push once  fentaNYL    Injectable 50 MICROGram(s) IV Push once  fentaNYL   Infusion... 0.5 MICROgram(s)/kG/Hr IV Continuous <Continuous>  folic acid 1 milliGRAM(s) Oral daily  glucagon  Injectable 1 milliGRAM(s) IntraMuscular once  heparin   Injectable 5000 Unit(s) SubCutaneous every 12 hours  insulin glargine Injectable (LANTUS) 2 Unit(s) SubCutaneous <User Schedule>  insulin lispro (ADMELOG) corrective regimen sliding scale   SubCutaneous every 6 hours  lactulose Syrup 10 Gram(s) Oral every 4 hours  linezolid  IVPB      linezolid  IVPB 600 milliGRAM(s) IV Intermittent every 12 hours  metolazone 10 milliGRAM(s) Oral every 24 hours  metroNIDAZOLE  IVPB 500 milliGRAM(s) IV Intermittent every 12 hours  minocycline 200 milliGRAM(s) Oral every 12 hours  multivitamin/minerals/iron Oral Solution (CENTRUM) 15 milliLiter(s) Oral daily  norepinephrine Infusion 0.05 MICROgram(s)/kG/Min IV Continuous <Continuous>  potassium chloride  10 mEq/100 mL IVPB 10 milliEquivalent(s) IV Intermittent every 1 hour  propofol Infusion 30 MICROgram(s)/kG/Min IV Continuous <Continuous>  rifAXIMin 550 milliGRAM(s) Oral two times a day  thiamine 100 milliGRAM(s) Oral daily  zinc sulfate 220 milliGRAM(s) Oral daily    PRN Inpatient Medications  dextrose Oral Gel 15 Gram(s) Oral once PRN      REVIEW OF SYSTEMS  --------------------------------------------------------------------------------  per above    VITALS/PHYSICAL EXAM  --------------------------------------------------------------------------------  T(C): 37.2 (05-01-24 @ 12:00), Max: 37.2 (05-01-24 @ 12:00)  HR: 94 (05-01-24 @ 13:45) (92 - 133)  BP: 119/58 (05-01-24 @ 13:45) (73/49 - 144/71)  RR: 16 (05-01-24 @ 13:45) (16 - 31)  SpO2: 97% (05-01-24 @ 13:45) (93% - 100%)  Wt(kg): --        04-30-24 @ 07:01  -  05-01-24 @ 07:00  --------------------------------------------------------  IN: 1463.8 mL / OUT: 160 mL / NET: 1303.8 mL    05-01-24 @ 07:01  -  05-01-24 @ 14:06  --------------------------------------------------------  IN: 386.9 mL / OUT: 50 mL / NET: 336.9 mL      Physical Exam:  Gen: ill-appearing  HEENT: +ET tube, OG tube, +Scleral icterus  Pulm: Mechanical breath sounds BL  CV: S1S2  Abd: Soft, +BS,  Ext: No LE edema  Neuro: Sedated  Skin: Warm and dry, jaundice  Vascular access: none      LABS/STUDIES  --------------------------------------------------------------------------------              7.8    32.14 >-----------<  174      [05-01-24 @ 12:17]              24.6     145  |  102  |  6   ----------------------------<  185      [05-01-24 @ 12:17]  2.8   |  24  |  0.96        Ca     9.0     [05-01-24 @ 12:17]      Mg     1.9     [05-01-24 @ 12:17]      Phos  3.8     [05-01-24 @ 12:17]    TPro  5.1  /  Alb  2.7  /  TBili  3.2  /  DBili  x   /  AST  75  /  ALT  17  /  AlkPhos  156  [05-01-24 @ 12:17]    PT/INR: PT 28.4 , INR 2.66       [05-01-24 @ 12:17]  PTT: 61.2       [04-30-24 @ 22:23]    CK 20      [05-01-24 @ 12:17]    Creatinine Trend:  SCr 0.96 [05-01 @ 12:17]  SCr 0.75 [05-01 @ 01:30]  SCr 0.62 [04-30 @ 20:06]  SCr 0.57 [04-30 @ 11:09]  SCr 0.53 [04-28 @ 10:06]    Urinalysis - [05-01-24 @ 12:17]      Color  / Appearance  / SG  / pH       Gluc 185 / Ketone   / Bili  / Urobili        Blood  / Protein  / Leuk Est  / Nitrite       RBC  / WBC  / Hyaline  / Gran  / Sq Epi  / Non Sq Epi  / Bacteria       TSH 2.90      [04-30-24 @ 11:09]       [facility] Division of Kidney Diseases & Hypertension  FOLLOW UP NOTE  [phone]--------------------------------------------------------------------------------    Chief Complaint: ANKIT    42 y.o F w/ PMhx of DM1, gout, Factor C leiden, congenital pancreatic divisum s/p total pancreatectomy s/p islet cell autotransplant, extensive abdominal surgical history, (cholecystectomy, splenectomy, celina-en-y pancreaticojejuonostomy) c/b multiple intraabdominal infection/abscess/fistula (including VRE, candida) in the past presented as transfer from [facility] for poor po intake, liver failure/hepatic encephalopathy. Patient was seen by the nephrology service on admission 4/13 and started on CVVHDF 4/13-4/15 for ammonia clearance while undergoing liver transplant evaluation. Patient was also being treated for PNA and possible colitis, mental status improved so moved to the floors. Patient then noted to have AMS, hypoxemic respiratory failure and then experienced a PEA arrest so intubated and sedated and brought to MICU     24 hour events/subjective:  Nephrology reconsulted for oliguria and worsening creatinine. Patient seen at bedside today with  present. Unable to do ROS.     PAST HISTORY  --------------------------------------------------------------------------------  No significant changes to PMH, PSH, FHx, SHx, unless otherwise noted    ALLERGIES & MEDICATIONS  --------------------------------------------------------------------------------  Allergies    No Known Allergies    Intolerances      Standing Inpatient Medications  buMETAnide Infusion 4 mG/Hr IV Continuous <Continuous>  buMETAnide Injectable 2 milliGRAM(s) IV Push once  cefiderocol IVPB 2000 milliGRAM(s) IV Intermittent every 8 hours  chlorhexidine 0.12% Liquid 15 milliLiter(s) Oral Mucosa every 12 hours  chlorhexidine 4% Liquid 1 Application(s) Topical <User Schedule>  cholecalciferol 2000 Unit(s) Oral daily  dextrose 10% Bolus 125 milliLiter(s) IV Bolus once  dextrose 5%. 1000 milliLiter(s) IV Continuous <Continuous>  dextrose 5%. 1000 milliLiter(s) IV Continuous <Continuous>  dextrose 50% Injectable 25 Gram(s) IV Push once  dextrose 50% Injectable 12.5 Gram(s) IV Push once  fentaNYL    Injectable 50 MICROGram(s) IV Push once  fentaNYL   Infusion... 0.5 MICROgram(s)/kG/Hr IV Continuous <Continuous>  folic acid 1 milliGRAM(s) Oral daily  glucagon  Injectable 1 milliGRAM(s) IntraMuscular once  heparin   Injectable 5000 Unit(s) SubCutaneous every 12 hours  insulin glargine Injectable (LANTUS) 2 Unit(s) SubCutaneous <User Schedule>  insulin lispro (ADMELOG) corrective regimen sliding scale   SubCutaneous every 6 hours  lactulose Syrup 10 Gram(s) Oral every 4 hours  linezolid  IVPB      linezolid  IVPB 600 milliGRAM(s) IV Intermittent every 12 hours  metolazone 10 milliGRAM(s) Oral every 24 hours  metroNIDAZOLE  IVPB 500 milliGRAM(s) IV Intermittent every 12 hours  minocycline 200 milliGRAM(s) Oral every 12 hours  multivitamin/minerals/iron Oral Solution (CENTRUM) 15 milliLiter(s) Oral daily  norepinephrine Infusion 0.05 MICROgram(s)/kG/Min IV Continuous <Continuous>  potassium chloride  10 mEq/100 mL IVPB 10 milliEquivalent(s) IV Intermittent every 1 hour  propofol Infusion 30 MICROgram(s)/kG/Min IV Continuous <Continuous>  rifAXIMin 550 milliGRAM(s) Oral two times a day  thiamine 100 milliGRAM(s) Oral daily  zinc sulfate 220 milliGRAM(s) Oral daily    PRN Inpatient Medications  dextrose Oral Gel 15 Gram(s) Oral once PRN      REVIEW OF SYSTEMS  --------------------------------------------------------------------------------  per above    VITALS/PHYSICAL EXAM  --------------------------------------------------------------------------------  T(C): 37.2 (05-01-24 @ 12:00), Max: 37.2 (05-01-24 @ 12:00)  HR: 94 (05-01-24 @ 13:45) (92 - 133)  BP: 119/58 (05-01-24 @ 13:45) (73/49 - 144/71)  RR: 16 (05-01-24 @ 13:45) (16 - 31)  SpO2: 97% (05-01-24 @ 13:45) (93% - 100%)  Wt(kg): --        04-30-24 @ 07:01  -  05-01-24 @ 07:00  --------------------------------------------------------  IN: 1463.8 mL / OUT: 160 mL / NET: 1303.8 mL    05-01-24 @ 07:01  -  05-01-24 @ 14:06  --------------------------------------------------------  IN: 386.9 mL / OUT: 50 mL / NET: 336.9 mL      Physical Exam:  Gen: ill-appearing  HEENT: +ET tube, OG tube, +Scleral icterus  Pulm: Mechanical breath sounds BL  CV: S1S2  Abd: Soft, +BS,  Ext: No LE edema  Neuro: Sedated  Skin: Warm and dry, jaundice  Vascular access: none      LABS/STUDIES  --------------------------------------------------------------------------------              7.8    32.14 >-----------<  174      [05-01-24 @ 12:17]              24.6     145  |  102  |  6   ----------------------------<  185      [05-01-24 @ 12:17]  2.8   |  24  |  0.96        Ca     9.0     [05-01-24 @ 12:17]      Mg     1.9     [05-01-24 @ 12:17]      Phos  3.8     [05-01-24 @ 12:17]    TPro  5.1  /  Alb  2.7  /  TBili  3.2  /  DBili  x   /  AST  75  /  ALT  17  /  AlkPhos  156  [05-01-24 @ 12:17]    PT/INR: PT 28.4 , INR 2.66       [05-01-24 @ 12:17]  PTT: 61.2       [04-30-24 @ 22:23]    CK 20      [05-01-24 @ 12:17]    Creatinine Trend:  SCr 0.96 [05-01 @ 12:17]  SCr 0.75 [05-01 @ 01:30]  SCr 0.62 [04-30 @ 20:06]  SCr 0.57 [04-30 @ 11:09]  SCr 0.53 [04-28 @ 10:06]    Urinalysis - [05-01-24 @ 12:17]      Color  / Appearance  / SG  / pH       Gluc 185 / Ketone   / Bili  / Urobili        Blood  / Protein  / Leuk Est  / Nitrite       RBC  / WBC  / Hyaline  / Gran  / Sq Epi  / Non Sq Epi  / Bacteria       TSH 2.90      [04-30-24 @ 11:09]

## 2024-05-01 NOTE — CHART NOTE - NSCHARTNOTEFT_GEN_A_CORE
NUTRITION FOLLOW UP NOTE    PATIENT SEEN FOR: consult assessment, education.    SOURCE: [] Patient  [x] Current Medical Record  [x] RN  [] Family/support person at bedside  [x] Patient unavailable/inappropriate  [x] Other: Interdisciplinary Rounds     CHART REVIEWED/EVENTS NOTED.  [] No changes to nutrition care plan to note  [x] Nutrition status updates:  - Intubated 4/30. On propofol at 11.2 mL/hr (296 kcals/day)  - Shock. 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)    DIET ORDER:   Diet, NPO with Tube Feed:   Tube Feeding Modality: Orogastric  Jevity 1.2 [place] (JEVITY1.2RTH)  Total Volume for 24 Hours (mL): 360  Continuous  Starting Tube Feed Rate {mL per Hour}: 10  Increase Tube Feed Rate by (mL): 20     Every 4 hours  Until Goal Tube Feed Rate (mL per Hour): 20  Tube Feed Duration (in Hours): 18  Tube Feed Start Time: 11:00  Free Water Flush   Frequency: Every 6 Hours   Total Daily Volume of Flush (mL): 600 (05-01-24)    CURRENT DIET ORDER IS:  [] Appropriate:  [x] Inadequate: See recommendations below   [] Other:    NUTRITION INTAKE/PROVISION:  [] PO:  [x] EN: Ordered for trickle feeds of Jevity 1.2; yet to start. Noted pt was on PO diet with poor intake per previous RD note  [] PN:    ANTHROPOMETRICS:  Drug Dosing Weight  Height (cm): 165.1 (13 Apr 2024 02:45)  Weight (kg): 62.4 (13 Apr 2024 02:45)  BMI (kg/m2): 22.9 (13 Apr 2024 02:45)  BSA (m2): 1.69 (13 Apr 2024 02:45)  Daily wt: None to address     NUTRITIONALLY PERTINENT MEDICATIONS:  MEDICATIONS  (STANDING):  buMETAnide Infusion 4 mG/Hr (20 mL/Hr) IV Continuous <Continuous>  buMETAnide Injectable 2 milliGRAM(s) IV Push once  cefiderocol IVPB 2000 milliGRAM(s) IV Intermittent every 8 hours  cholecalciferol 2000 Unit(s) Oral daily  dextrose 10% Bolus 125 milliLiter(s) IV Bolus once  dextrose 5%. 1000 milliLiter(s) (50 mL/Hr) IV Continuous <Continuous>  dextrose 5%. 1000 milliLiter(s) (100 mL/Hr) IV Continuous <Continuous>  dextrose 50% Injectable 25 Gram(s) IV Push once  dextrose 50% Injectable 12.5 Gram(s) IV Push once  fentaNYL    Injectable 50 MICROGram(s) IV Push once  fentaNYL   Infusion... 0.5 MICROgram(s)/kG/Hr (1.56 mL/Hr) IV Continuous <Continuous>  folic acid 1 milliGRAM(s) Oral daily  glucagon  Injectable 1 milliGRAM(s) IntraMuscular once  heparin   Injectable 5000 Unit(s) SubCutaneous every 12 hours  insulin glargine Injectable (LANTUS) 2 Unit(s) SubCutaneous <User Schedule>  insulin lispro (ADMELOG) corrective regimen sliding scale   SubCutaneous every 6 hours  lactulose Syrup 10 Gram(s) Oral every 4 hours  linezolid  IVPB 600 milliGRAM(s) IV Intermittent every 12 hours  linezolid  IVPB      metolazone 10 milliGRAM(s) Oral every 24 hours  metroNIDAZOLE  IVPB 500 milliGRAM(s) IV Intermittent every 12 hours  minocycline 200 milliGRAM(s) Oral every 12 hours  multivitamin/minerals/iron Oral Solution (CENTRUM) 15 milliLiter(s) Oral daily  norepinephrine Infusion 0.05 MICROgram(s)/kG/Min (5.85 mL/Hr) IV Continuous <Continuous>  potassium chloride  10 mEq/100 mL IVPB 10 milliEquivalent(s) IV Intermittent every 1 hour  propofol Infusion 30 MICROgram(s)/kG/Min (11.2 mL/Hr) IV Continuous <Continuous>  rifAXIMin 550 milliGRAM(s) Oral two times a day  thiamine 100 milliGRAM(s) Oral daily  zinc sulfate 220 milliGRAM(s) Oral daily    MEDICATIONS  (PRN):  dextrose Oral Gel 15 Gram(s) Oral once PRN Blood Glucose LESS THAN 70 milliGRAM(s)/deciliter    [x] Relevant notes on medications: Micronutrients: multivitamin, folic acid, KCl, thiamine, zinc sulfate. Ordered for Lantus and sliding scale of insulin (DM), being diuresed     NUTRITIONALLY PERTINENT LABS:  05-01 Na145 mmol/L Glu 185 mg/dL<H> K+ 2.8 mmol/L<LL> Cr  0.96 mg/dL BUN 6 mg/dL<L> 05-01 Phos 3.8 mg/dL 05-01 Alb 2.7 g/dL<L> 04-24 PAB 3 mg/dL<L> 04-24 Chol 131 mg/dL  HDL 7 mg/dL<L> Trig 109 mg/dL05-01 ALT 17 U/L AST 75 U/L<H> Alkaline Phosphatase 156 U/L<H>  04-13-24 @ 03:56 a1c 5.2    A1C with Estimated Average Glucose Result: 5.2 % (04-13-24 @ 03:56)    Finger Sticks:  POCT Blood Glucose.: 248 mg/dL (05-01 @ 09:03)  POCT Blood Glucose.: 138 mg/dL (05-01 @ 05:27)  POCT Blood Glucose.: 158 mg/dL (05-01 @ 00:49)  POCT Blood Glucose.: 136 mg/dL (04-30 @ 17:07)    [x] Relevant notes on labs: Low K (ordered for potassium chloride), elevated BG    EDEMA:  [x] Reviewed    GI/ I&O:  [x] Reviewed  [] Other:    SKIN:   [] No pressure injuries documented, per nursing flowsheet  [x] Pressure injury previously noted; per wound care 4/15: bilateral sacrum/coccyx deep tissue injury with evolution, right heel deep tissue injury with evolution, left heel deep tissue injury,    lateral malleolus deep tissue injury evolution, right elbow deep tissue injury  [] Change in pressure injury documentation:  [] Other:    ESTIMATED NEEDS:  Based on recent wt of 45.8 kg (3/12, [facility])  Estimated Energy Needs: 1374 - 1603 kcal/day (30-35 kcals/kg)   Estimated Protein Needs: 68.7 - 91.6 g PRO/day (1.5-2.0 g PRO/kg)   Fluid needs deferred to provider.     NUTRITION DIAGNOSIS:  [x] Prior Dx: 1) Severe acute on chronic malnutrition + Underweight 2) Increased protein-energy needs  [] New Dx:    NUTRITION CARE PLAN:  [] Ongoing - Continue current nutrition intervention(s)  [x] Ongoing - Recommend modified nutrition intervention(s)  [] Achieved - Continue current nutrition intervention(s)  [] Current medical condition precludes nutrition intervention at this time.    EDUCATION:  [] Yes:  [x] Not appropriate/warranted    Recommendations:   1)     MONITORING AND EVALUATION:   RD remains available upon request and will follow up per protocol.    Shannon Izaguirre, MS, RD, CDN, CNSC, CDCES TEAMS   Available on MS TEAMS NUTRITION FOLLOW UP NOTE    PATIENT SEEN FOR: consult assessment, education.    SOURCE: [] Patient  [x] Current Medical Record  [x] RN  [] Family/support person at bedside  [x] Patient unavailable/inappropriate  [x] Other: Interdisciplinary Rounds     CHART REVIEWED/EVENTS NOTED.  [] No changes to nutrition care plan to note  [x] Nutrition status updates:  - Intubated 4/30. On propofol at 11.2 mL/hr (296 kcals/day)  - Shock. 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)  - Hx of refusing TPN    DIET ORDER:   Diet, NPO with Tube Feed:   Tube Feeding Modality: Orogastric  Jevity 1.2 [place] (JEVITY1.2RTH)  Total Volume for 24 Hours (mL): 360  Continuous  Starting Tube Feed Rate {mL per Hour}: 10  Increase Tube Feed Rate by (mL): 20     Every 4 hours  Until Goal Tube Feed Rate (mL per Hour): 20  Tube Feed Duration (in Hours): 18  Tube Feed Start Time: 11:00  Free Water Flush   Frequency: Every 6 Hours   Total Daily Volume of Flush (mL): 600 (05-01-24)    CURRENT DIET ORDER IS:  [] Appropriate:  [x] Inadequate: See recommendations below   [] Other:    NUTRITION INTAKE/PROVISION:  [] PO:  [x] EN: Ordered for trickle feeds of Jevity 1.2; yet to start. Noted pt was on PO diet with poor intake per previous RD note  [] PN:    ANTHROPOMETRICS:  Drug Dosing Weight  Height (cm): 165.1 (13 Apr 2024 02:45)  Weight (kg): 62.4 (13 Apr 2024 02:45)  BMI (kg/m2): 22.9 (13 Apr 2024 02:45)  BSA (m2): 1.69 (13 Apr 2024 02:45)  Daily wt: None to address   Wt Hx per HIE (lbs): 112 (5/11/23), 108 (8/12/23), 109 (10/13/23), 102 (2/22/24), 101 (3/12/24), 110 (4/8/24).  RD attempted to obtain bed scale wt but room closed with MDs working on pt.     NUTRITIONALLY PERTINENT MEDICATIONS:  MEDICATIONS  (STANDING):  buMETAnide Infusion 4 mG/Hr (20 mL/Hr) IV Continuous <Continuous>  buMETAnide Injectable 2 milliGRAM(s) IV Push once  cefiderocol IVPB 2000 milliGRAM(s) IV Intermittent every 8 hours  cholecalciferol 2000 Unit(s) Oral daily  dextrose 10% Bolus 125 milliLiter(s) IV Bolus once  dextrose 5%. 1000 milliLiter(s) (50 mL/Hr) IV Continuous <Continuous>  dextrose 5%. 1000 milliLiter(s) (100 mL/Hr) IV Continuous <Continuous>  dextrose 50% Injectable 25 Gram(s) IV Push once  dextrose 50% Injectable 12.5 Gram(s) IV Push once  fentaNYL    Injectable 50 MICROGram(s) IV Push once  fentaNYL   Infusion... 0.5 MICROgram(s)/kG/Hr (1.56 mL/Hr) IV Continuous <Continuous>  folic acid 1 milliGRAM(s) Oral daily  glucagon  Injectable 1 milliGRAM(s) IntraMuscular once  heparin   Injectable 5000 Unit(s) SubCutaneous every 12 hours  insulin glargine Injectable (LANTUS) 2 Unit(s) SubCutaneous <User Schedule>  insulin lispro (ADMELOG) corrective regimen sliding scale   SubCutaneous every 6 hours  lactulose Syrup 10 Gram(s) Oral every 4 hours  linezolid  IVPB 600 milliGRAM(s) IV Intermittent every 12 hours  linezolid  IVPB      metolazone 10 milliGRAM(s) Oral every 24 hours  metroNIDAZOLE  IVPB 500 milliGRAM(s) IV Intermittent every 12 hours  minocycline 200 milliGRAM(s) Oral every 12 hours  multivitamin/minerals/iron Oral Solution (CENTRUM) 15 milliLiter(s) Oral daily  norepinephrine Infusion 0.05 MICROgram(s)/kG/Min (5.85 mL/Hr) IV Continuous <Continuous>  potassium chloride  10 mEq/100 mL IVPB 10 milliEquivalent(s) IV Intermittent every 1 hour  propofol Infusion 30 MICROgram(s)/kG/Min (11.2 mL/Hr) IV Continuous <Continuous>  rifAXIMin 550 milliGRAM(s) Oral two times a day  thiamine 100 milliGRAM(s) Oral daily  zinc sulfate 220 milliGRAM(s) Oral daily    MEDICATIONS  (PRN):  dextrose Oral Gel 15 Gram(s) Oral once PRN Blood Glucose LESS THAN 70 milliGRAM(s)/deciliter    [x] Relevant notes on medications: Micronutrients: multivitamin, folic acid, KCl, thiamine, zinc sulfate. Ordered for Lantus and sliding scale of insulin (DM), being diuresed     NUTRITIONALLY PERTINENT LABS:  05-01 Na145 mmol/L Glu 185 mg/dL<H> K+ 2.8 mmol/L<LL> Cr  0.96 mg/dL BUN 6 mg/dL<L> 05-01 Phos 3.8 mg/dL 05-01 Alb 2.7 g/dL<L> 04-24 PAB 3 mg/dL<L> 04-24 Chol 131 mg/dL  HDL 7 mg/dL<L> Trig 109 mg/dL05-01 ALT 17 U/L AST 75 U/L<H> Alkaline Phosphatase 156 U/L<H>  04-13-24 @ 03:56 a1c 5.2    A1C with Estimated Average Glucose Result: 5.2 % (04-13-24 @ 03:56)    Finger Sticks:  POCT Blood Glucose.: 248 mg/dL (05-01 @ 09:03)  POCT Blood Glucose.: 138 mg/dL (05-01 @ 05:27)  POCT Blood Glucose.: 158 mg/dL (05-01 @ 00:49)  POCT Blood Glucose.: 136 mg/dL (04-30 @ 17:07)    [x] Relevant notes on labs: Low K (ordered for potassium chloride), elevated BG    EDEMA:  [x] Reviewed    GI/ I&O:  [x] Reviewed  [] Other:    SKIN:   [] No pressure injuries documented, per nursing flowsheet  [x] Pressure injury previously noted; per wound care 4/15: bilateral sacrum/coccyx deep tissue injury with evolution, right heel deep tissue injury with evolution, left heel deep tissue injury,    lateral malleolus deep tissue injury evolution, right elbow deep tissue injury  [] Change in pressure injury documentation:  [] Other:    ESTIMATED NEEDS:  Based on recent wt of 45.8 kg (3/12, [facility])  Estimated Energy Needs: 1374 - 1603 kcal/day (30-35 kcals/kg)   Estimated Protein Needs: 68.7 - 91.6 g PRO/day (1.5-2.0 g PRO/kg)   Fluid needs deferred to provider.   [facility] Equation: 1229 kcals     NUTRITION DIAGNOSIS:  [x] Prior Dx: 1) Severe acute on chronic malnutrition + Underweight 2) Increased protein-energy needs  [] New Dx:    NUTRITION CARE PLAN:  [] Ongoing - Continue current nutrition intervention(s)  [x] Ongoing - Recommend modified nutrition intervention(s)  [] Achieved - Continue current nutrition intervention(s)  [] Current medical condition precludes nutrition intervention at this time.    EDUCATION:  [] Yes:  [x] Not appropriate/warranted    Recommendations:   1) Vital AF at 10 mL/hr increasing only as tolerated and electrolytes WNL to goal rate 40 mL/hr x24 hours to provide total volume 960 mL, 1152 kcals (Total 1448 kcals with propofol providing 296 kcals/day), 72 gm protein, and 779 mL free water. Meets 32 kcals/kG with propofol and 1.6 gm protein/kG based on wt 45.8 kg (3/12, [facility])  -> Check Lipase levels as pt may need pancreatic enzymes  -> 24 hour feeds for refeeding risk + better tolerance  2) As medically feasible, continue to provide multivitamin, folic acid, thiamine, zinc sulfate (check copper levels)  -> Trend K, Phos, and Mg and replete as able/needed     MONITORING AND EVALUATION:   RD remains available upon request and will follow up per protocol.    Shannon Izaguirre, MS, RD, CDN, CNSC, CDCES TEAMS   Available on MS TEAMS NUTRITION FOLLOW UP NOTE    PATIENT SEEN FOR: consult assessment, education.    SOURCE: [] Patient  [x] Current Medical Record  [x] RN  [] Family/support person at bedside  [x] Patient unavailable/inappropriate  [x] Other: Interdisciplinary Rounds     CHART REVIEWED/EVENTS NOTED.  [] No changes to nutrition care plan to note  [x] Nutrition status updates:  - Intubated 4/30. On propofol at 11.2 mL/hr (296 kcals/day)  - Shock. 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)  - Hx of refusing TPN    DIET ORDER:   Diet, NPO with Tube Feed:   Tube Feeding Modality: Orogastric  Jevity 1.2 [place] (JEVITY1.2RTH)  Total Volume for 24 Hours (mL): 360  Continuous  Starting Tube Feed Rate {mL per Hour}: 10  Increase Tube Feed Rate by (mL): 20     Every 4 hours  Until Goal Tube Feed Rate (mL per Hour): 20  Tube Feed Duration (in Hours): 18  Tube Feed Start Time: 11:00  Free Water Flush   Frequency: Every 6 Hours   Total Daily Volume of Flush (mL): 600 (05-01-24)    CURRENT DIET ORDER IS:  [] Appropriate:  [x] Inadequate: See recommendations below   [] Other:    NUTRITION INTAKE/PROVISION:  [] PO:  [x] EN: Ordered for trickle feeds of Jevity 1.2; yet to start. Noted pt was on PO diet with poor intake per previous RD note  [] PN:    ANTHROPOMETRICS:  Drug Dosing Weight  Height (cm): 165.1 (13 Apr 2024 02:45)  Weight (kg): 62.4 (13 Apr 2024 02:45)  BMI (kg/m2): 22.9 (13 Apr 2024 02:45)  BSA (m2): 1.69 (13 Apr 2024 02:45)  Daily wt: None to address. RD obtained wt: 56.2 kg (5/1)  Wt Hx per HIE (lbs): 112 (5/11/23), 108 (8/12/23), 109 (10/13/23), 102 (2/22/24), 101 (3/12/24), 110 (4/8/24).  RD attempted to obtain bed scale wt but room closed with MDs working on pt.     NUTRITIONALLY PERTINENT MEDICATIONS:  MEDICATIONS  (STANDING):  buMETAnide Infusion 4 mG/Hr (20 mL/Hr) IV Continuous <Continuous>  buMETAnide Injectable 2 milliGRAM(s) IV Push once  cefiderocol IVPB 2000 milliGRAM(s) IV Intermittent every 8 hours  cholecalciferol 2000 Unit(s) Oral daily  dextrose 10% Bolus 125 milliLiter(s) IV Bolus once  dextrose 5%. 1000 milliLiter(s) (50 mL/Hr) IV Continuous <Continuous>  dextrose 5%. 1000 milliLiter(s) (100 mL/Hr) IV Continuous <Continuous>  dextrose 50% Injectable 25 Gram(s) IV Push once  dextrose 50% Injectable 12.5 Gram(s) IV Push once  fentaNYL    Injectable 50 MICROGram(s) IV Push once  fentaNYL   Infusion... 0.5 MICROgram(s)/kG/Hr (1.56 mL/Hr) IV Continuous <Continuous>  folic acid 1 milliGRAM(s) Oral daily  glucagon  Injectable 1 milliGRAM(s) IntraMuscular once  heparin   Injectable 5000 Unit(s) SubCutaneous every 12 hours  insulin glargine Injectable (LANTUS) 2 Unit(s) SubCutaneous <User Schedule>  insulin lispro (ADMELOG) corrective regimen sliding scale   SubCutaneous every 6 hours  lactulose Syrup 10 Gram(s) Oral every 4 hours  linezolid  IVPB 600 milliGRAM(s) IV Intermittent every 12 hours  linezolid  IVPB      metolazone 10 milliGRAM(s) Oral every 24 hours  metroNIDAZOLE  IVPB 500 milliGRAM(s) IV Intermittent every 12 hours  minocycline 200 milliGRAM(s) Oral every 12 hours  multivitamin/minerals/iron Oral Solution (CENTRUM) 15 milliLiter(s) Oral daily  norepinephrine Infusion 0.05 MICROgram(s)/kG/Min (5.85 mL/Hr) IV Continuous <Continuous>  potassium chloride  10 mEq/100 mL IVPB 10 milliEquivalent(s) IV Intermittent every 1 hour  propofol Infusion 30 MICROgram(s)/kG/Min (11.2 mL/Hr) IV Continuous <Continuous>  rifAXIMin 550 milliGRAM(s) Oral two times a day  thiamine 100 milliGRAM(s) Oral daily  zinc sulfate 220 milliGRAM(s) Oral daily    MEDICATIONS  (PRN):  dextrose Oral Gel 15 Gram(s) Oral once PRN Blood Glucose LESS THAN 70 milliGRAM(s)/deciliter    [x] Relevant notes on medications: Micronutrients: multivitamin, folic acid, KCl, thiamine, zinc sulfate. Ordered for Lantus and sliding scale of insulin (DM), being diuresed     NUTRITIONALLY PERTINENT LABS:  05-01 Na145 mmol/L Glu 185 mg/dL<H> K+ 2.8 mmol/L<LL> Cr  0.96 mg/dL BUN 6 mg/dL<L> 05-01 Phos 3.8 mg/dL 05-01 Alb 2.7 g/dL<L> 04-24 PAB 3 mg/dL<L> 04-24 Chol 131 mg/dL  HDL 7 mg/dL<L> Trig 109 mg/dL05-01 ALT 17 U/L AST 75 U/L<H> Alkaline Phosphatase 156 U/L<H>  04-13-24 @ 03:56 a1c 5.2    A1C with Estimated Average Glucose Result: 5.2 % (04-13-24 @ 03:56)    Finger Sticks:  POCT Blood Glucose.: 248 mg/dL (05-01 @ 09:03)  POCT Blood Glucose.: 138 mg/dL (05-01 @ 05:27)  POCT Blood Glucose.: 158 mg/dL (05-01 @ 00:49)  POCT Blood Glucose.: 136 mg/dL (04-30 @ 17:07)    [x] Relevant notes on labs: Low K (ordered for potassium chloride), elevated BG    EDEMA:  [x] Reviewed    GI/ I&O:  [x] Reviewed  [] Other:    SKIN:   [] No pressure injuries documented, per nursing flowsheet  [x] Pressure injury previously noted; per wound care 4/15: bilateral sacrum/coccyx deep tissue injury with evolution, right heel deep tissue injury with evolution, left heel deep tissue injury,    lateral malleolus deep tissue injury evolution, right elbow deep tissue injury  [] Change in pressure injury documentation:  [] Other:    ESTIMATED NEEDS:  Based on recent wt of 45.8 kg (3/12, [facility])  Estimated Energy Needs: 1374 - 1603 kcal/day (30-35 kcals/kg)   Estimated Protein Needs: 68.7 - 91.6 g PRO/day (1.5-2.0 g PRO/kg)   Fluid needs deferred to provider.   [place] [facility] Equation: 1229 kcals     NUTRITION DIAGNOSIS:  [x] Prior Dx: 1) Severe acute on chronic malnutrition + Underweight 2) Increased protein-energy needs  [] New Dx:    NUTRITION CARE PLAN:  [] Ongoing - Continue current nutrition intervention(s)  [x] Ongoing - Recommend modified nutrition intervention(s)  [] Achieved - Continue current nutrition intervention(s)  [] Current medical condition precludes nutrition intervention at this time.    EDUCATION:  [] Yes:  [x] Not appropriate/warranted    Recommendations:   1) Vital 1.5 at 10 mL/hr increasing only as tolerated and electrolytes WNL to goal rate 30 mL/hr x24 hours to provide total volume 720 mL, 1080 kcals (Total 1376 kcals with propofol providing 296 kcals/day), 49 gm protein, and 550 mL free water. Meets 30 kcals/kG with propofol and 1.1 gm protein/kG based on wt 45.8 kg (3/12, [facility])  -> Check Lipase levels as pt may need pancreatic enzymes  -> 24 hour feeds for refeeding risk + better tolerance  -> Noted pt not meeting estimated protein needs, however, needs to be on lower protein diet per genetics   2) As medically feasible, continue to provide multivitamin, folic acid, thiamine, zinc sulfate (check copper levels)  -> Trend K, Phos, and Mg and replete as able/needed     MONITORING AND EVALUATION:   RD remains available upon request and will follow up per protocol.    Shannon Izaguirre, MS, RD, CDN, CNSC, CDCES TEAMS   Available on MS TEAMS

## 2024-05-01 NOTE — PROGRESS NOTE ADULT - NUTRITIONAL ASSESSMENT
This patient has been assessed with a concern for Malnutrition and has been determined to have a diagnosis/diagnoses of Severe protein-calorie malnutrition and Underweight (BMI < 19).

## 2024-05-01 NOTE — CHART NOTE - NSCHARTNOTEFT_GEN_A_CORE
: Edil    INDICATION: Hypoxemic respiratory failure, shock    PROCEDURE:  [x ] LIMITED ECHO  [x ] LIMITED CHEST  [ ] LIMITED RETROPERITONEAL  [ ] LIMITED ABDOMINAL  [ ] LIMITED DVT  [ ] NEEDLE GUIDANCE VASCULAR  [ ] NEEDLE GUIDANCE THORACENTESIS  [ ] NEEDLE GUIDANCE PARACENTESIS  [ ] NEEDLE GUIDANCE PERICARDIOCENTESIS  [ ] OTHER    FINDINGS:  Lung ultrasound reveals bilateral B-line predominant pattern.  Bilateral, simple appearing pleural effusions present, at least moderate in size with bilateral basilar consolidations.  Cardiac exam reveals grossly normal LV systolic function.  Small pericardial effusion present  RV does not appear enlarged compared with LV.  TAPSE ~2.1 cm. LVOT VTI ~16 cm.  IVC indeterminate.      INTERPRETATION:  Bilateral b-lines with pleural effusions suggest volume overload.  VTI slightly decreased but etiology of shock state more likely to be vasoplegic with some possible cardiogenic component.      Images stored on [employer].    Zafar Solo [id number]  [id number]

## 2024-05-01 NOTE — PROGRESS NOTE ADULT - NUTRITIONAL ASSESSMENT
Diet, NPO with Tube Feed:   Tube Feeding Modality: Orogastric  Suplena with Carb Steady (SUPLENA)  Total Volume for 24 Hours (mL): 594  Continuous  Starting Tube Feed Rate {mL per Hour}: 10  Increase Tube Feed Rate by (mL): 10     Every 4 hours  Until Goal Tube Feed Rate (mL per Hour): 33  Tube Feed Duration (in Hours): 18  Tube Feed Start Time: 11:00 (05-01-24 @ 16:10) [Active]

## 2024-05-01 NOTE — PROGRESS NOTE ADULT - SUBJECTIVE AND OBJECTIVE BOX
[facility] NUTRITION SUPPORT-- FOLLOW UP NOTE      24 hour events/subjective:      Diet:      PAST HISTORY  --------------------------------------------------------------------------------  No significant changes to PMH, PSH, FHx, SHx, unless otherwise noted    ALLERGIES & MEDICATIONS  --------------------------------------------------------------------------------  Allergies    No Known Allergies    Intolerances      Standing Inpatient Medications  albumin human 25% IVPB 100 milliLiter(s) IV Intermittent once  buMETAnide Infusion 2 mG/Hr IV Continuous <Continuous>  cefiderocol IVPB 2000 milliGRAM(s) IV Intermittent every 8 hours  chlorhexidine 0.12% Liquid 15 milliLiter(s) Oral Mucosa every 12 hours  chlorhexidine 4% Liquid 1 Application(s) Topical <User Schedule>  cholecalciferol 2000 Unit(s) Oral daily  dextrose 10% Bolus 125 milliLiter(s) IV Bolus once  dextrose 5%. 1000 milliLiter(s) IV Continuous <Continuous>  dextrose 5%. 1000 milliLiter(s) IV Continuous <Continuous>  dextrose 50% Injectable 25 Gram(s) IV Push once  dextrose 50% Injectable 12.5 Gram(s) IV Push once  fentaNYL    Injectable 50 MICROGram(s) IV Push once  fentaNYL   Infusion... 0.5 MICROgram(s)/kG/Hr IV Continuous <Continuous>  folic acid 1 milliGRAM(s) Oral daily  glucagon  Injectable 1 milliGRAM(s) IntraMuscular once  heparin   Injectable 5000 Unit(s) SubCutaneous every 12 hours  insulin glargine Injectable (LANTUS) 2 Unit(s) SubCutaneous <User Schedule>  insulin lispro (ADMELOG) corrective regimen sliding scale   SubCutaneous every 6 hours  lactulose Syrup 10 Gram(s) Oral every 4 hours  linezolid  IVPB      linezolid  IVPB 600 milliGRAM(s) IV Intermittent every 12 hours  metroNIDAZOLE  IVPB 500 milliGRAM(s) IV Intermittent every 12 hours  minocycline 200 milliGRAM(s) Oral every 12 hours  multivitamin/minerals/iron Oral Solution (CENTRUM) 15 milliLiter(s) Oral daily  norepinephrine Infusion 0.05 MICROgram(s)/kG/Min IV Continuous <Continuous>  propofol Infusion 30 MICROgram(s)/kG/Min IV Continuous <Continuous>  rifAXIMin 550 milliGRAM(s) Oral two times a day  thiamine 100 milliGRAM(s) Oral daily  zinc sulfate 220 milliGRAM(s) Oral daily    PRN Inpatient Medications  dextrose Oral Gel 15 Gram(s) Oral once PRN      REVIEW OF SYSTEMS  --------------------------------------------------------------------------------  Gen: as per HPI  Skin: No rashes  Head/Eyes/Ears/Mouth: No headache; No sore throat  Respiratory: No dyspnea, cough,   CV: No chest pain, PND, orthopnea  GI: as per HPI  : No increased frequency, dysuria, hematuria, nocturia  MSK: No joint pain/swelling; no back pain; no edema  Neuro: No dizziness/lightheadedness, weakness, seizures, numbness, tingling  Psych: No significant nervousness, anxiety, stress, depression    All other systems were reviewed and are negative, except as noted.        LABS/STUDIES  --------------------------------------------------------------------------------              9.4    34.03 >-----------<  186      [05-01-24 @ 01:30]              29.9     144  |  104  |  6   ----------------------------<  144      [05-01-24 @ 01:30]  4.1   |  19  |  0.75        Ca     8.0     [05-01-24 @ 01:30]      Mg     1.6     [05-01-24 @ 01:30]      Phos  4.6     [05-01-24 @ 01:30]    TPro  4.8  /  Alb  2.1  /  TBili  3.6  /  DBili  x   /  AST  121  /  ALT  20  /  AlkPhos  182  [05-01-24 @ 01:30]    PT/INR: PT 27.7 , INR 2.72       [04-30-24 @ 22:23]  PTT: 61.2       [04-30-24 @ 22:23]    CK 72      [04-30-24 @ 20:06]    Blood Gas Arterial - Calcium, Ionized: 1.01 mmol/L (05-01-24 @ 06:52)  Blood Gas Arterial - Calcium, Ionized: 1.18 mmol/L (04-30-24 @ 22:10)  Blood Gas Calcium, Ionized - Venous: 1.07 mmol/L (04-30-24 @ 20:04)  Blood Gas Calcium, Ionized - Venous: 1.14 mmol/L (04-30-24 @ 10:40)      Glucose: 144 mg/dL (05-01-24 @ 01:30)  Glucose: 276 mg/dL (04-30-24 @ 20:06)  Glucose: 153 mg/dL (04-30-24 @ 11:09)    Prealbumin, Serum: 3 mg/dL (04-24-24 @ 11:23)    Triglycerides      CAPILLARY BLOOD GLUCOSE      POCT Blood Glucose.: 248 mg/dL (01 May 2024 09:03)  POCT Blood Glucose.: 138 mg/dL (01 May 2024 05:27)  POCT Blood Glucose.: 158 mg/dL (01 May 2024 00:49)  POCT Blood Glucose.: 136 mg/dL (30 Apr 2024 17:07)  POCT Blood Glucose.: 136 mg/dL (30 Apr 2024 11:54)      VITALS/PHYSICAL EXAM  --------------------------------------------------------------------------------  T(C): 37.1 (05-01-24 @ 08:00), Max: 37.1 (05-01-24 @ 08:00)  HR: 114 (05-01-24 @ 09:15) (92 - 133)  BP: 110/53 (05-01-24 @ 09:15) (73/49 - 144/71)  RR: 19 (05-01-24 @ 09:15) (16 - 31)  SpO2: 98% (05-01-24 @ 09:15) (90% - 100%)  Wt(kg): --        04-30-24 @ 07:01  -  05-01-24 @ 07:00  --------------------------------------------------------  IN: 1463.8 mL / OUT: 160 mL / NET: 1303.8 mL    05-01-24 @ 07:01  -  05-01-24 @ 09:28  --------------------------------------------------------  IN: 155.9 mL / OUT: 10 mL / NET: 145.9 mL      Physical Exam:      .  .  .   [facility] NUTRITION SUPPORT-- FOLLOW UP NOTE      24 hour events/subjective:    TPN originally consulted for Protein Calorie Malnutrition in the setting of nutritional optimization for anticipated prolonged NPO, however pt was refusing TPN at that time, and had been tolerating.  Yesterday, patient continued to receive care for PNA, patient was AOx2, started complaining of abdominal pain and distension, started having worsening hypoxemic respiratory failure w/ associated altered mental status, patient then underwent PEA arrest, presumed to be hypoxemic in origin and received 10 minutes of CPR and one round of epinephrine, achieved ROSC, transferred to MICU for further management. She was subsequently started on Tube feeds.      Diet:      PAST HISTORY  --------------------------------------------------------------------------------  No significant changes to PMH, PSH, FHx, SHx, unless otherwise noted    ALLERGIES & MEDICATIONS  --------------------------------------------------------------------------------  Allergies    No Known Allergies    Intolerances      Standing Inpatient Medications  albumin human 25% IVPB 100 milliLiter(s) IV Intermittent once  buMETAnide Infusion 2 mG/Hr IV Continuous <Continuous>  cefiderocol IVPB 2000 milliGRAM(s) IV Intermittent every 8 hours  chlorhexidine 0.12% Liquid 15 milliLiter(s) Oral Mucosa every 12 hours  chlorhexidine 4% Liquid 1 Application(s) Topical <User Schedule>  cholecalciferol 2000 Unit(s) Oral daily  dextrose 10% Bolus 125 milliLiter(s) IV Bolus once  dextrose 5%. 1000 milliLiter(s) IV Continuous <Continuous>  dextrose 5%. 1000 milliLiter(s) IV Continuous <Continuous>  dextrose 50% Injectable 25 Gram(s) IV Push once  dextrose 50% Injectable 12.5 Gram(s) IV Push once  fentaNYL    Injectable 50 MICROGram(s) IV Push once  fentaNYL   Infusion... 0.5 MICROgram(s)/kG/Hr IV Continuous <Continuous>  folic acid 1 milliGRAM(s) Oral daily  glucagon  Injectable 1 milliGRAM(s) IntraMuscular once  heparin   Injectable 5000 Unit(s) SubCutaneous every 12 hours  insulin glargine Injectable (LANTUS) 2 Unit(s) SubCutaneous <User Schedule>  insulin lispro (ADMELOG) corrective regimen sliding scale   SubCutaneous every 6 hours  lactulose Syrup 10 Gram(s) Oral every 4 hours  linezolid  IVPB      linezolid  IVPB 600 milliGRAM(s) IV Intermittent every 12 hours  metroNIDAZOLE  IVPB 500 milliGRAM(s) IV Intermittent every 12 hours  minocycline 200 milliGRAM(s) Oral every 12 hours  multivitamin/minerals/iron Oral Solution (CENTRUM) 15 milliLiter(s) Oral daily  norepinephrine Infusion 0.05 MICROgram(s)/kG/Min IV Continuous <Continuous>  propofol Infusion 30 MICROgram(s)/kG/Min IV Continuous <Continuous>  rifAXIMin 550 milliGRAM(s) Oral two times a day  thiamine 100 milliGRAM(s) Oral daily  zinc sulfate 220 milliGRAM(s) Oral daily    PRN Inpatient Medications  dextrose Oral Gel 15 Gram(s) Oral once PRN      REVIEW OF SYSTEMS  --------------------------------------------------------------------------------  Gen: as per HPI  Skin: No rashes  Head/Eyes/Ears/Mouth: No headache; No sore throat  Respiratory: No dyspnea, cough,   CV: No chest pain, PND, orthopnea  GI: as per HPI  : No increased frequency, dysuria, hematuria, nocturia  MSK: No joint pain/swelling; no back pain; no edema  Neuro: No dizziness/lightheadedness, weakness, seizures, numbness, tingling  Psych: No significant nervousness, anxiety, stress, depression    All other systems were reviewed and are negative, except as noted.        LABS/STUDIES  --------------------------------------------------------------------------------              9.4    34.03 >-----------<  186      [05-01-24 @ 01:30]              29.9     144  |  104  |  6   ----------------------------<  144      [05-01-24 @ 01:30]  4.1   |  19  |  0.75        Ca     8.0     [05-01-24 @ 01:30]      Mg     1.6     [05-01-24 @ 01:30]      Phos  4.6     [05-01-24 @ 01:30]    TPro  4.8  /  Alb  2.1  /  TBili  3.6  /  DBili  x   /  AST  121  /  ALT  20  /  AlkPhos  182  [05-01-24 @ 01:30]    PT/INR: PT 27.7 , INR 2.72       [04-30-24 @ 22:23]  PTT: 61.2       [04-30-24 @ 22:23]    CK 72      [04-30-24 @ 20:06]    Blood Gas Arterial - Calcium, Ionized: 1.01 mmol/L (05-01-24 @ 06:52)  Blood Gas Arterial - Calcium, Ionized: 1.18 mmol/L (04-30-24 @ 22:10)  Blood Gas Calcium, Ionized - Venous: 1.07 mmol/L (04-30-24 @ 20:04)  Blood Gas Calcium, Ionized - Venous: 1.14 mmol/L (04-30-24 @ 10:40)      Glucose: 144 mg/dL (05-01-24 @ 01:30)  Glucose: 276 mg/dL (04-30-24 @ 20:06)  Glucose: 153 mg/dL (04-30-24 @ 11:09)    Prealbumin, Serum: 3 mg/dL (04-24-24 @ 11:23)    Triglycerides      CAPILLARY BLOOD GLUCOSE      POCT Blood Glucose.: 248 mg/dL (01 May 2024 09:03)  POCT Blood Glucose.: 138 mg/dL (01 May 2024 05:27)  POCT Blood Glucose.: 158 mg/dL (01 May 2024 00:49)  POCT Blood Glucose.: 136 mg/dL (30 Apr 2024 17:07)  POCT Blood Glucose.: 136 mg/dL (30 Apr 2024 11:54)      VITALS/PHYSICAL EXAM  --------------------------------------------------------------------------------  T(C): 37.1 (05-01-24 @ 08:00), Max: 37.1 (05-01-24 @ 08:00)  HR: 114 (05-01-24 @ 09:15) (92 - 133)  BP: 110/53 (05-01-24 @ 09:15) (73/49 - 144/71)  RR: 19 (05-01-24 @ 09:15) (16 - 31)  SpO2: 98% (05-01-24 @ 09:15) (90% - 100%)  Wt(kg): --        04-30-24 @ 07:01  -  05-01-24 @ 07:00  --------------------------------------------------------  IN: 1463.8 mL / OUT: 160 mL / NET: 1303.8 mL    05-01-24 @ 07:01  -  05-01-24 @ 09:28  --------------------------------------------------------  IN: 155.9 mL / OUT: 10 mL / NET: 145.9 mL          Physical Exam:    Gen: sedated/intubated  neck: no JVD  Chest: non labored breathing, equal chest expansion bilaterally  Abd: +BS, soft, nontender/nondistended  Ext: Warm, FROM, no edema b/l LE  Neuro: No focal deficits  Psych: Normal affect and mood  Skin: Warm, without rashes     .  .  .

## 2024-05-01 NOTE — PROGRESS NOTE ADULT - SUBJECTIVE AND OBJECTIVE BOX
DIABETES FOLLOW UP NOTE: Saw pt earlier today    Chief Complaint: Endocrine consult requested for management of T2DM    INTERVAL HX: Saw pt in MICU, noted events in the last 24 hours. Pt is NPO/intubated/ sedated/ on pressors/ CVVH/bumex. BGs at goal requiring low dose lantus insulin only. No hypoglycemia. Noted pt received Regular insulin 5 units with D50 this am for hyperkalemia as per ABG result with rebound hypokalemia this pm.        Review of Systems:  General: As above  Unable    Allergies    No Known Allergies    Intolerances      MEDICATIONS:  cefiderocol IVPB 2000 milliGRAM(s) IV Intermittent every 8 hours  cholecalciferol 2000 Unit(s) Oral daily  CRRT Treatment    <Continuous>  insulin glargine Injectable (LANTUS) 2 Unit(s) SubCutaneous <User Schedule>  insulin lispro (ADMELOG) corrective regimen sliding scale   SubCutaneous every 6 hours  lactulose Syrup 10 Gram(s) Oral every 4 hours     linezolid  IVPB 600 milliGRAM(s) IV Intermittent every 12 hours  metroNIDAZOLE  IVPB 500 milliGRAM(s) IV Intermittent every 12 hours  norepinephrine Infusion 0.05 MICROgram(s)/kG/Min (5.85 mL/Hr) IV Continuous <Continuous>      PHYSICAL EXAM:  VITALS: T(C): 36.9 (05-01-24 @ 16:00)  T(F): 98.4 (05-01-24 @ 16:00), Max: 99 (05-01-24 @ 12:00)  HR: 98 (05-01-24 @ 17:15) (92 - 133)  BP: 111/55 (05-01-24 @ 17:00) (73/49 - 144/71)  RR:  (16 - 31)  SpO2:  (93% - 100%)  Wt(kg): --  GENERAL: Female laying in bed in NAD.  at bedside  HEENT: Intubated  Abdomen: Soft, nontender, non distended  Extremities: Warm, no edema in all 4 exts  NEURO: Pt is sedated    LABS:  POCT Blood Glucose.: 248 mg/dL (05-01-24 @ 09:03)  POCT Blood Glucose.: 138 mg/dL (05-01-24 @ 05:27)  POCT Blood Glucose.: 158 mg/dL (05-01-24 @ 00:49)  POCT Blood Glucose.: 136 mg/dL (04-30-24 @ 17:07)  POCT Blood Glucose.: 136 mg/dL (04-30-24 @ 11:54)  POCT Blood Glucose.: 146 mg/dL (04-30-24 @ 08:21)  POCT Blood Glucose.: 159 mg/dL (04-30-24 @ 02:24)  POCT Blood Glucose.: 141 mg/dL (04-29-24 @ 22:17)  POCT Blood Glucose.: 176 mg/dL (04-29-24 @ 16:45)  POCT Blood Glucose.: 242 mg/dL (04-29-24 @ 12:49)  POCT Blood Glucose.: 209 mg/dL (04-29-24 @ 08:13)  POCT Blood Glucose.: 220 mg/dL (04-29-24 @ 02:06)  POCT Blood Glucose.: 234 mg/dL (04-28-24 @ 21:41)                            7.8    32.14 )-----------( 174      ( 01 May 2024 12:17 )             24.6       05-01    145  |  102  |  6<L>  ----------------------------<  185<H>  2.8<LL>   |  24  |  0.96    eGFR: 73    Ca    9.0      05-01  Mg     1.9     05-01  Phos  3.8     05-01    TPro  5.1<L>  /  Alb  2.7<L>  /  TBili  3.2<H>  /  DBili  x   /  AST  75<H>  /  ALT  17  /  AlkPhos  156<H>  05-01      Thyroid Function Tests:  04-30 @ 11:09 TSH 2.90 FreeT4 1.1 T3 -- Anti TPO -- Anti Thyroglobulin Ab -- TSI --  04-23 @ 14:44 TSH -- FreeT4 -- T3 -- Anti TPO 60.7 Anti Thyroglobulin Ab -- TSI --      A1C with Estimated Average Glucose Result: 5.2 % (04-13-24 @ 03:56)      Estimated Average Glucose: 103 mg/dL (04-13-24 @ 03:56)        04-24 Chol 131 Direct LDL -- LDL calculated 103<H> HDL 7<L> Trig 109, 04-13 Chol 82 Direct LDL -- LDL calculated 57 HDL 8<L> Trig 82

## 2024-05-01 NOTE — PROGRESS NOTE ADULT - SUBJECTIVE AND OBJECTIVE BOX
INFECTIOUS DISEASES FOLLOW UP-- Alyssa Gore  [phone]    This is a follow up note for this  48yFemale with  Liver failure without hepatic coma  s/p RRT and transfer to MICU on 4/30  today opens eyes to name remains intubated  decreasing pressor requirements      ROS:  CONSTITUTIONAL: intubated, frail, opens eyes    Allergies    No Known Allergies    Intolerances        ANTIBIOTICS/RELEVANT:  antimicrobials  cefiderocol IVPB 2000 milliGRAM(s) IV Intermittent every 8 hours  linezolid  IVPB      linezolid  IVPB 600 milliGRAM(s) IV Intermittent every 12 hours  metroNIDAZOLE  IVPB 500 milliGRAM(s) IV Intermittent every 12 hours  minocycline 200 milliGRAM(s) Oral every 12 hours  rifAXIMin 550 milliGRAM(s) Oral two times a day    immunologic:    OTHER:  buMETAnide Infusion 4 mG/Hr IV Continuous <Continuous>  buMETAnide Injectable 2 milliGRAM(s) IV Push once  chlorhexidine 0.12% Liquid 15 milliLiter(s) Oral Mucosa every 12 hours  chlorhexidine 4% Liquid 1 Application(s) Topical <User Schedule>  cholecalciferol 2000 Unit(s) Oral daily  dextrose 10% Bolus 125 milliLiter(s) IV Bolus once  dextrose 5%. 1000 milliLiter(s) IV Continuous <Continuous>  dextrose 5%. 1000 milliLiter(s) IV Continuous <Continuous>  dextrose 50% Injectable 25 Gram(s) IV Push once  dextrose 50% Injectable 12.5 Gram(s) IV Push once  dextrose Oral Gel 15 Gram(s) Oral once PRN  fentaNYL    Injectable 50 MICROGram(s) IV Push once  fentaNYL   Infusion... 0.5 MICROgram(s)/kG/Hr IV Continuous <Continuous>  folic acid 1 milliGRAM(s) Oral daily  glucagon  Injectable 1 milliGRAM(s) IntraMuscular once  heparin   Injectable 5000 Unit(s) SubCutaneous every 12 hours  insulin glargine Injectable (LANTUS) 2 Unit(s) SubCutaneous <User Schedule>  insulin lispro (ADMELOG) corrective regimen sliding scale   SubCutaneous every 6 hours  lactulose Syrup 10 Gram(s) Oral every 4 hours  metolazone 10 milliGRAM(s) Oral every 24 hours  multivitamin/minerals/iron Oral Solution (CENTRUM) 15 milliLiter(s) Oral daily  norepinephrine Infusion 0.05 MICROgram(s)/kG/Min IV Continuous <Continuous>  potassium chloride  10 mEq/50 mL IVPB 10 milliEquivalent(s) IV Intermittent every 1 hour  propofol Infusion 30 MICROgram(s)/kG/Min IV Continuous <Continuous>  thiamine 100 milliGRAM(s) Oral daily  zinc sulfate 220 milliGRAM(s) Oral daily      Objective:  Vital Signs Last 24 Hrs  T(C): 36.9 (01 May 2024 16:00), Max: 37.2 (01 May 2024 12:00)  T(F): 98.4 (01 May 2024 16:00), Max: 99 (01 May 2024 12:00)  HR: 101 (01 May 2024 18:45) (92 - 133)  BP: 104/56 (01 May 2024 18:45) (73/49 - 144/71)  BP(mean): 76 (01 May 2024 18:45) (57 - 95)  RR: 16 (01 May 2024 18:45) (16 - 31)  SpO2: 100% (01 May 2024 18:45) (93% - 100%)    Parameters below as of 01 May 2024 08:00  Patient On (Oxygen Delivery Method): ventilator    O2 Concentration (%): 40    PHYSICAL EXAM:  Constitutional: sarcopenic  Eyes:ANURAG, EOMI  Ear/Nose/Throat: no oral lesions, thin blood tinged secretions	  Respiratory: diminished BS  Cardiovascular: S1S2  Gastrointestinal: less distended but remains firm  Extremities:no e/e/c  No Lymphadenopathy  IV sites not inflammed.    LABS:                        7.8    32.14 )-----------( 174      ( 01 May 2024 12:17 )             24.6     05-01    145  |  103  |  6<L>  ----------------------------<  140<H>  3.1<L>   |  23  |  0.96    Ca    9.6      01 May 2024 17:18  Phos  3.9     05-01  Mg     1.7     05-01    TPro  4.9<L>  /  Alb  2.6<L>  /  TBili  3.7<H>  /  DBili  x   /  AST  76<H>  /  ALT  18  /  AlkPhos  162<H>  05-01    PT/INR - ( 01 May 2024 12:17 )   PT: 28.4 sec;   INR: 2.66 ratio         PTT - ( 30 Apr 2024 22:23 )  PTT:61.2 sec  Urinalysis Basic - ( 01 May 2024 17:18 )    Color: x / Appearance: x / SG: x / pH: x  Gluc: 140 mg/dL / Ketone: x  / Bili: x / Urobili: x   Blood: x / Protein: x / Nitrite: x   Leuk Esterase: x / RBC: x / WBC x   Sq Epi: x / Non Sq Epi: x / Bacteria: x        MICROBIOLOGY:      Culture - Quantitative BAL Immunocompromised (05.01.24 @ 01:45)    Gram Stain:   polymorphonuclear leukocytes seen  Yeast like cells seen  by cytocentrifuge   Specimen Source: .Bronchial BAL          RECENT CULTURES:  05-01 @ 01:45  .Bronchial BAL  --  --  --  --  --  04-28 @ 11:27  .Blood Blood-Peripheral  --  --  --    No growth at 72 Hours  --      RADIOLOGY & ADDITIONAL STUDIES:    < from: CT Head No Cont (04.30.24 @ 22:52) >  IMPRESSION:  Minimal chronic microvascular changes without evidence of an   acute transcortical infarction or hemorrhage.    < end of copied text >  < from: CT Angio Chest PE Protocol w/ IV Cont (04.30.24 @ 22:51) >  IMPRESSION:.    No pulmonary embolism.    Pulmonary edema with small/moderate bilateral pleural effusions.    There is a very small portion of the splenic flexure which is imaged.   There ismottled gas associated with this portion of the colon; is   unclear if this is intraluminal or if this represents pneumatosis.   Recommend dedicated CT abdomen and pelvis to further assess and   correlation with serum lactate.    < end of copied text >  < from: CT Abdomen and Pelvis No Cont (05.01.24 @ 11:11) >    IMPRESSION:  No pneumatosis.  Bilateral persistent nephrogram may reflect acute tubular necrosis.  Enlarged low-attenuationliver, concern for steatohepatitis.  Anasarca.    < end of copied text >

## 2024-05-01 NOTE — PROGRESS NOTE ADULT - PROBLEM SELECTOR PLAN 2
Patient with lactic acidosis likely due to post-cardiac arrest on 4/30 and underlying infection. Lactate noted to be 10.5 on 4/30 post code, now decreased to 5.4 today. VBG pH of 7.47. No emergent need to start CVVHDF at this time as above.     If you have any questions, please feel free to contact me  Pan Walker  Nephrology Fellow  [phone]; Prefer [employer] TEAMS  (After 5pm or on weekends please page the on-call fellow). Patient with lactic acidosis likely due to post-cardiac arrest on 4/30 and underlying infection. Lactate noted to be 10.5 on 4/30 post code, now decreased to 5.4 today. VBG pH of 7.47. Monitor serum bicarb and VBG    If you have any questions, please feel free to contact me  Pan Walker  Nephrology Fellow  [phone]; Prefer [employer] TEAMS  (After 5pm or on weekends please page the on-call fellow). Patient with lactic acidosis likely due to post-cardiac arrest on 4/30 and underlying infection. Lactate noted to be 10.5 on 4/30 post code, now decreased to 5.4 today. VBG pH of 7.47. CVVHDF as above. Monitor serum bicarb and VBG    If you have any questions, please feel free to contact me  Pan Walker  Nephrology Fellow  [phone]; Prefer [employer] TEAMS  (After 5pm or on weekends please page the on-call fellow). Patient with lactic acidosis likely due to post-cardiac arrest on 4/30 and underlying infection. Lactate noted to be 10.5 on 4/30 post code, now decreased to 5.4 today. VBG pH of 7.47. Monitor serum bicarb and VBG    If you have any questions, please feel free to contact me  Pan Walker  Nephrology Fellow  [phone]; Prefer [employer] TEAMS  (After 5pm or on weekends please page the on-call fellow).

## 2024-05-01 NOTE — PROGRESS NOTE ADULT - PROBLEM SELECTOR PLAN 1
-Test BG q6h while NPO/TFs. If need to start insulin drip please test BG q1h  - Continue  lantus 2 units in am for now.  - Continue low dose  admelog correctional scale q6h TIDAC  -If BG difficult to manage while on TFs please start low dose insulin drip at 0.5 units/hr and adjust to BG goal 100 to 180s.   -Will follow   - Discharge planning to acute rehab  Will be determined according to BG/insulin needs/PO intake at time of discharge.  Needs to be on basal and bolus vs pump given insulin deficiency ( low c- peptide 0.1 with )  Would consider insulin pump if pt is able to manage pump independently while in rehab. Pt remains critically ill at this time to address discharge insulin needs  -BGs should be monitored and insulin doses to be adjusted at rehab   Endocrinologist: Dr. Dalton

## 2024-05-01 NOTE — PROGRESS NOTE ADULT - ATTENDING COMMENTS
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 Transferred to medicine floors but had worsening AHRF and then yesterday developed PEA arrest due to hypoxemia. CT pattern and exam c/w pulm edema and volume overload.      - on sedation for vent synchrony and analgosedation  - decrease for sedation vacation as tolerated to assess mental status for anoxic damage  - cont HE tx   - cont vent support, resp status improved w/ PPV, now on 40% and peep 5  - s/p bronch - f/u cx  - cont diuresis for volume removal, on bumex gtt  - f/u official echo  - maintain map>65, on levophed currently  - f/u Renal reccs- ANKIT due to ATN-  may need crrt if diuretics not effective  - cont monitoring lytes  - f/u ID reccs- on tx for  acinetobacter w/ broad spectrum abx  - had repeat CT a/p that does not show pneumatosis  - hx factor v leiden but no active thrombus, cont dvt ppx    Prognosis guarded. D/w  that her condition is very critical. he is aware and notes that his wife would not want to be vent dependent for a prolonged period of time. Will need to assess her improvement over the next few days

## 2024-05-01 NOTE — PROGRESS NOTE ADULT - ASSESSMENT
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 Was on CRRT 4/13-4/15. Now noted to have a rising serum creatinine and oliguria.

## 2024-05-01 NOTE — PROGRESS NOTE ADULT - ASSESSMENT
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 Hargrove), and now S/P total pancreatectomy  with islet cell autotransplant at [facility] by Dr. Gil in 04/2018).  Patient admitted with AMS, found to be in liver failure/hepatic encephalopathy and imaging shows colitis.  TPN consulted given severe protein calorie malnutrition and allowing possibility of bowel rest.  Patient with history of TPN use 2 years ago.      - Nutritional Assessment, patient with severe protein calorie malnutrition not fully meeting nutritional goals enterally due to colitis/decreased enteral intake and may benefit from TPN for nutritional support.  - TPN plan: Patient initially declined TPN when originally consulted, will continue to follow as pt now on TF in the MICU. Observe off of TPN for now, and  monitor if pt able to tolerate TFs at goal.  - TPN access:  Patient would need a dedicated central line if deemed TPN appropriate.   - Electrolyte Imbalance risk:  check CMP, Mg, Phos and ionized Ca daily, to be supplemented peripherally per primary team.  - Recommend strict intake and output/weight checks three times a week  - Further care per [facility] team    Available on TEAMS  TPN spectra [id number] ([phone] when dialing from outside line)  M-F 8A-2P, Weekends and holidays 8/9A-12/1P

## 2024-05-01 NOTE — PROGRESS NOTE ADULT - PROBLEM SELECTOR PLAN 2
TSH 4.43. FT4 0.6, TT3 47 on admission (4/13)  Thyroid Function Tests:  04-30 @ 11:09 TSH 2.90 FreeT4 1.1 T3 -- Anti TPO -- Anti Thyroglobulin Ab -- TSI --  04-23 @ 14:44 TSH -- FreeT4 -- T3 -- Anti TPO 60.7 Anti Thyroglobulin Ab -- TSI --  Started on LT4 100mcg daily  Repeat TSH was wnl 3.67 (4/15) stopped levothyroxine TSH 4.43. FT4 0.6, TT3 47 on admission (4/13)  Thyroid Function Tests:  04-30 @ 11:09 TSH 2.90 FreeT4 1.1 T3 -- Anti TPO -- Anti Thyroglobulin Ab -- TSI --  04-23 @ 14:44 TSH -- FreeT4 -- T3 -- Anti TPO 60.7 Anti Thyroglobulin Ab -- TSI --  Started on LT4 100mcg daily  Repeat TSH was wnl 3.67 (4/15) stopped levothyroxine  Reviewed 4/30 TFT with Dr Arora. TSH/Free T4 wnl. Positive anti TPO. Recommending to hold LT4 for now and repeat levels as out pt.

## 2024-05-01 NOTE — CHART NOTE - NSCHARTNOTEFT_GEN_A_CORE
: Vikash Ricci    INDICATION: POCUS    PROCEDURE:  [X] LIMITED ECHO  [X] LIMITED CHEST  [ ] LIMITED RETROPERITONEAL  [ ] LIMITED ABDOMINAL  [ ] LIMITED DVT  [ ] NEEDLE GUIDANCE VASCULAR  [ ] NEEDLE GUIDANCE THORACENTESIS  [ ] NEEDLE GUIDANCE PARACENTESIS  [ ] NEEDLE GUIDANCE PERICARDIOCENTESIS  [ ] OTHER    FINDINGS:  Grossly normal LV/RV size and systolic fx w/ no gross wall motion abnormalities. Small pericardial effusion w/ ? R atrial scalloping. No mitral regurg. Calcified aortic valve. VTI 25.5, IVC small and collapsible. B lines anteriorly b/l. Pressure half time 149ms.     INTERPRETATION:  No gross cardiac limitations, intravascularly depleted, however w/ pulm edema.

## 2024-05-02 NOTE — CONSULT NOTE ADULT - ASSESSMENT
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 Hargrove), and now s/p total pancreatectomy  with islet cell autotransplant at [facility] by Dr. Gil in 04/2018) presented with hepatic encephalopathy. Course c/b AHRF, septic shock 2/2 PNA requiring intubation, initial MICU course from 4/13-4/21 involved CRRT and management for PNA, transferred to medicine floors, subsequently had AMS and progressively worsening acute hypoxic respiratory failure. Patient had PEA arrest, ROSC after 10 minutes and 1 round of epinephrine, transferred to the MICU for further management. Hematology consulted for Andressa rods reported on peripheral smear 4/25/24.    # Reported Andressa Rods  - CBC with differential 4/25/24 initially reported Andressa rods but no blasts. WBC count was normal at 8 K/uL.   - Discussed with lab, case was reviewed, and it was a reporting error by the machine reading the diff.  - Peripheral smear reviewed 5/2/24: WBCs are markedly increased in number with a left shift. Neutrophils appear abnormal with toxic granulation. No atypical lymphocytes noted. Monocytes are morphologically normal. No blast-like cells and no Andressa rods.    # Coagulopathy  - Patient having bleeding from multiple sites including hematochezia and oral bleeding  - Likely due to poor hepatic synthetic function after her PEA arrest  - Patient is receiving and can continue to get Kcentra or FFP to correct coagulopathy  - Goal fibrinogen > 100 minimum or > 150 if bleeding. In the EMR, each "1 unit" of cryoprecipitate is 5 pooled units; each pooled unit raises fibrinogen by about 7 - 9. If fibrinogen above goal then give FFP to address bleeding.     ***Note is incomplete. Will discuss plan with attending.  Note is not finalized until signed by attending.     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 Hargrove), and now s/p total pancreatectomy  with islet cell autotransplant at [facility] by Dr. Gil in 04/2018) presented with hepatic encephalopathy. Course c/b AHRF, septic shock 2/2 PNA requiring intubation, initial MICU course from 4/13-4/21 involved CRRT and management for PNA, transferred to medicine floors, subsequently had AMS and progressively worsening acute hypoxic respiratory failure. Patient had PEA arrest, ROSC after 10 minutes and 1 round of epinephrine, transferred to the MICU for further management. Hematology consulted for Andressa rods reported on peripheral smear 4/25/24.    # Reported Andressa Rods  - CBC with differential 4/25/24 initially reported Andressa rods but no blasts. WBC count was normal at 8 K/uL.   - Discussed with lab, case was reviewed, and it was a reporting error by the machine reading the diff.  - Peripheral smear reviewed 5/2/24: WBCs are markedly increased in number with a left shift. Neutrophils appear abnormal with toxic granulation. No atypical lymphocytes noted. Monocytes are morphologically normal. No blast-like cells and no Andressa rods.    # Coagulopathy  - Patient having bleeding from multiple sites including hematochezia and oral bleeding  - Likely due to poor hepatic synthetic function after her PEA arrest  - Patient is receiving and can continue to get Kcentra or FFP to correct coagulopathy  - Goal fibrinogen > 100 minimum or > 150 if bleeding. In the EMR, each "1 unit" of cryoprecipitate is 5 pooled units; each pooled unit raises fibrinogen by about 7 - 9. If fibrinogen above goal then give FFP to address bleeding.     Note is not finalized until signed by attending.     Juan Carlos Manzo MD  Hematology/Oncology Fellow PGY-5  Pager: [facility] [phone] / [place] [phone]  After 5pm and on weekends please page on-call fellow

## 2024-05-02 NOTE — CONSULT NOTE ADULT - SUBJECTIVE AND OBJECTIVE BOX
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 Hargrove), and now S/P total pancreatectomy  with islet cell autotransplant at [facility] by Dr. Gil in 04/2018).    Patient's recovery was complicated by abdominal collections first on POD 6 which was managed with IR drain and IV antibiotic. Patient was discharged with PICC line for long term antifungal. Patient was readmitted on 05/8 again for abscess which was drained by IR. Patient was also started on therapeutic Lovenox for right atrial thrombus. Patient again developed abdominal pain with fever of 100.7 on 05/20 associated with thick yellow discharge from the ventral incisional and went to [place] which was nearby for care. CT scan at [place] showed complex multiloculated postsurgical abscess in the omental fat of the superior epigastrium measuring 6.7 cm and enterocutaneous fistulous tract extending anteriorly from the abscess to the midline incision site and another tract going into the soft tissues of the left upper abdominal quadrant communicating with a subcutaneous 3.3cm abscess  Patient was started on IV zosyn, flagyl and micafungin while there. Patient transferred today to [facility] for continuity of care under Dr. Ansari    Patient presented to [facility] ED on 4/8 by her spouse for AMS. Per , patient had been having nbnb vomiting and diarrhea for the last 4 weeks. She was unable to tolerate PO. She was also sleeping 18-20 hrs per day. On 4/8, he found her on the floor "completely out of it" and took her to [facility] for further evalution.   Initial workup in ED demenstrated a Tbili 5.4, direct bili 4.4 and ammonia 196. Patient was somnelent and had a left eyeward gaze deviation. Subquesntly started developing agonal snoring respirations and had to be intubated for airway protection.      (13 Apr 2024 03:31)      REVIEW OF SYSTEMS:  Unable to obtain ROS due to patient's current mental status.     PAST MEDICAL & SURGICAL HISTORY:      FAMILY HISTORY:  No known family history of leukemia.    SOCIAL HISTORY:   Unable to obtain due to patient's current mental status.     Allergies  No Known Allergies      MEDICATIONS  (STANDING):  albuterol/ipratropium for Nebulization 3 milliLiter(s) Nebulizer every 6 hours  arginine IVPB. 2 Gram(s) IV Intermittent every 4 hours  buMETAnide Infusion 4 mG/Hr (20 mL/Hr) IV Continuous <Continuous>  caspofungin IVPB      cefiderocol IVPB 2000 milliGRAM(s) IV Intermittent every 8 hours  chlorhexidine 0.12% Liquid 15 milliLiter(s) Oral Mucosa every 12 hours  chlorhexidine 4% Liquid 1 Application(s) Topical <User Schedule>  cholecalciferol 2000 Unit(s) Oral daily  CRRT Treatment    <Continuous>  dextrose 10% Bolus 125 milliLiter(s) IV Bolus once  dextrose 5%. 1000 milliLiter(s) (100 mL/Hr) IV Continuous <Continuous>  dextrose 5%. 1000 milliLiter(s) (50 mL/Hr) IV Continuous <Continuous>  dextrose 50% Injectable 25 Gram(s) IV Push once  dextrose 50% Injectable 12.5 Gram(s) IV Push once  folic acid 1 milliGRAM(s) Oral daily  glucagon  Injectable 1 milliGRAM(s) IntraMuscular once  insulin glargine Injectable (LANTUS) 2 Unit(s) SubCutaneous <User Schedule>  insulin lispro (ADMELOG) corrective regimen sliding scale   SubCutaneous every 6 hours  lidocaine   4% Patch 1 Patch Transdermal daily  linezolid  IVPB 600 milliGRAM(s) IV Intermittent every 12 hours  linezolid  IVPB      metroNIDAZOLE  IVPB 500 milliGRAM(s) IV Intermittent every 12 hours  minocycline 200 milliGRAM(s) Oral every 12 hours  multivitamin/minerals/iron Oral Solution (CENTRUM) 15 milliLiter(s) Oral daily  norepinephrine Infusion 0.05 MICROgram(s)/kG/Min (5.85 mL/Hr) IV Continuous <Continuous>  pantoprazole  Injectable 40 milliGRAM(s) IV Push every 12 hours  PrismaSATE Dialysate BGK 4 / 2.5 5000 milliLiter(s) (1000 mL/Hr) CRRT <Continuous>  PrismaSOL Filtration BGK 4 / 2.5 5000 milliLiter(s) (800 mL/Hr) CRRT <Continuous>  PrismaSOL Filtration BGK 4 / 2.5 5000 milliLiter(s) (200 mL/Hr) CRRT <Continuous>  propofol Infusion 30 MICROgram(s)/kG/Min (11.2 mL/Hr) IV Continuous <Continuous>  sodium chloride 3%  Inhalation 4 milliLiter(s) Inhalation every 12 hours    MEDICATIONS  (PRN):  dextrose Oral Gel 15 Gram(s) Oral once PRN Blood Glucose LESS THAN 70 milliGRAM(s)/deciliter  fentaNYL    Injectable 25 MICROGram(s) IV Push every 2 hours PRN Moderate Pain (4 - 6)      OBJECTIVE       T(F): 97.2 (05-02-24 @ 14:00), Max: 104 (05-02-24 @ 12:00)  HR: 103 (05-02-24 @ 15:00)  BP: 111/55 (05-02-24 @ 15:00)  RR: 16 (05-02-24 @ 15:00)  SpO2: 100% (05-02-24 @ 15:00)  Wt(kg): --    PHYSICAL EXAM   GENERAL: Inubated  HEAD:  Atraumatic, Normocephalic  EYES: EOMI, PERRLA, conjunctiva and sclera clear  CHEST/LUNG: Clear to auscultation bilaterally; No wheeze  HEART: Regular rate and rhythm; No murmurs, rubs, or gallops  ABDOMEN: Soft, Nontender, Nondistended; Bowel sounds present  EXTREMITIES:  2+ Peripheral Pulses, No clubbing, cyanosis, or edema  NEUROLOGY: Sedated  SKIN: No rashes or lesions                          7.3    32.85 )-----------( 86       ( 02 May 2024 11:26 )             22.3       05-02    145  |  104  |  7   ----------------------------<  188<H>  3.3<L>   |  20<L>  |  1.21    Ca    8.8      02 May 2024 11:26  Phos  4.7     05-02  Mg     2.1     05-02    TPro  4.7<L>  /  Alb  2.4<L>  /  TBili  3.1<H>  /  DBili  x   /  AST  58<H>  /  ALT  <5<L>  /  AlkPhos  135<H>  05-02      Magnesium: 2.1 mg/dL (05-02 @ 11:26)  Phosphorus: 4.7 mg/dL (05-02 @ 11:26)  Magnesium: 2.2 mg/dL (05-02 @ 01:16)  Phosphorus: 4.3 mg/dL (05-02 @ 01:16)  Magnesium: 2.2 mg/dL (05-02 @ 00:20)  Phosphorus: 4.1 mg/dL (05-02 @ 00:20)  Phosphorus: 3.9 mg/dL (05-01 @ 17:18)  Magnesium: 1.7 mg/dL (05-01 @ 17:18)

## 2024-05-02 NOTE — PROGRESS NOTE ADULT - SUBJECTIVE AND OBJECTIVE BOX
INFECTIOUS DISEASES FOLLOW UP-- Alyssa Gore  [phone]    This is a follow up note for this  48yFemale with  Liver failure without hepatic coma    less responsive today, but few ET tube secretions and abdomen less distended    ROS:  CONSTITUTIONAL:  intubated, does not open eyes to name, or exam  Allergies    No Known Allergies    Intolerances        ANTIBIOTICS/RELEVANT:  antimicrobials  caspofungin IVPB      cefiderocol IVPB 2000 milliGRAM(s) IV Intermittent every 8 hours  linezolid  IVPB 600 milliGRAM(s) IV Intermittent every 12 hours  linezolid  IVPB      metroNIDAZOLE  IVPB 500 milliGRAM(s) IV Intermittent every 12 hours  minocycline 200 milliGRAM(s) Oral every 12 hours    immunologic:    OTHER:  albuterol/ipratropium for Nebulization 3 milliLiter(s) Nebulizer every 6 hours  arginine IVPB. 2 Gram(s) IV Intermittent every 4 hours  buMETAnide Infusion 4 mG/Hr IV Continuous <Continuous>  chlorhexidine 0.12% Liquid 15 milliLiter(s) Oral Mucosa every 12 hours  chlorhexidine 4% Liquid 1 Application(s) Topical <User Schedule>  cholecalciferol 2000 Unit(s) Oral daily  CRRT Treatment    <Continuous>  fentaNYL    Injectable 25 MICROGram(s) IV Push every 2 hours PRN  folic acid 1 milliGRAM(s) Oral daily  insulin glargine Injectable (LANTUS) 2 Unit(s) SubCutaneous <User Schedule>  insulin lispro (ADMELOG) corrective regimen sliding scale   SubCutaneous Before meals and at bedtime  lidocaine   4% Patch 1 Patch Transdermal daily  multivitamin/minerals/iron Oral Solution (CENTRUM) 15 milliLiter(s) Oral daily  norepinephrine Infusion 0.05 MICROgram(s)/kG/Min IV Continuous <Continuous>  pantoprazole  Injectable 40 milliGRAM(s) IV Push every 12 hours  PrismaSATE Dialysate BGK 4 / 2.5 5000 milliLiter(s) CRRT <Continuous>  PrismaSOL Filtration BGK 4 / 2.5 5000 milliLiter(s) CRRT <Continuous>  PrismaSOL Filtration BGK 4 / 2.5 5000 milliLiter(s) CRRT <Continuous>  propofol Infusion 30 MICROgram(s)/kG/Min IV Continuous <Continuous>  sodium chloride 3%  Inhalation 4 milliLiter(s) Inhalation every 12 hours      Objective:  Vital Signs Last 24 Hrs  T(C): 36.1 (02 May 2024 19:00), Max: 40 (02 May 2024 12:00)  T(F): 97 (02 May 2024 19:00), Max: 104 (02 May 2024 12:00)  HR: 107 (02 May 2024 19:45) (95 - 130)  BP: 85/50 (02 May 2024 19:45) (73/43 - 140/74)  BP(mean): 63 (02 May 2024 19:45) (53 - 95)  RR: 17 (02 May 2024 19:45) (16 - 31)  SpO2: 100% (02 May 2024 19:45) (90% - 100%)    Parameters below as of 02 May 2024 19:00  Patient On (Oxygen Delivery Method): ventilator    O2 Concentration (%): 40    PHYSICAL EXAM:  Constitutional:no acute distress  Eyes:ANURAG, EOMI  Ear/Nose/Throat: no oral lesions, 	  Respiratory: clear BL  Cardiovascular: S1S2  Gastrointestinal:soft, (+) BS, no tenderness  Extremities:no e/e/c  No Lymphadenopathy  IV sites not inflammed.    LABS:                        7.9    24.38 )-----------( 36       ( 02 May 2024 18:27 )             23.9     05-02    142  |  103  |  6<L>  ----------------------------<  215<H>  3.0<L>   |  21<L>  |  1.08    Ca    8.3<L>      02 May 2024 18:27  Phos  3.5     05-02  Mg     2.0     05-02    TPro  4.4<L>  /  Alb  2.3<L>  /  TBili  2.9<H>  /  DBili  x   /  AST  56<H>  /  ALT  <5<L>  /  AlkPhos  126<H>  05-02    PT/INR - ( 02 May 2024 18:27 )   PT: 26.2 sec;   INR: 2.56 ratio         PTT - ( 02 May 2024 18:27 )  PTT:58.8 sec  Urinalysis Basic - ( 02 May 2024 18:27 )    Color: x / Appearance: x / SG: x / pH: x  Gluc: 215 mg/dL / Ketone: x  / Bili: x / Urobili: x   Blood: x / Protein: x / Nitrite: x   Leuk Esterase: x / RBC: x / WBC x   Sq Epi: x / Non Sq Epi: x / Bacteria: x        MICROBIOLOGY:            RECENT CULTURES:  05-01 @ 01:45  .Bronchial BAL  --  --  --    Testing in progress  --  04-30 @ 20:20  .Blood Blood-Peripheral  --  --  --    No growth at 24 hours  --  04-28 @ 11:27  .Blood Blood-Peripheral  --  --  --    No growth at 4 days  --      RADIOLOGY & ADDITIONAL STUDIES:    < from: CT Abdomen and Pelvis No Cont (05.01.24 @ 11:11) >    IMPRESSION:  No pneumatosis.  Bilateral persistent nephrogram may reflect acute tubular necrosis.  Enlarged low-attenuationliver, concern for steatohepatitis.  Anasarca.      < end of copied text >   INFECTIOUS DISEASES FOLLOW UP-- Alyssa Gore  [phone]    This is a follow up note for this  48yFemale with  Liver failure without hepatic coma    less responsive today, but few ET tube secretions and abdomen less distended    ROS:  CONSTITUTIONAL:  intubated, does not open eyes to name, or exam  Allergies    No Known Allergies    Intolerances        ANTIBIOTICS/RELEVANT:  antimicrobials  caspofungin IVPB      cefiderocol IVPB 2000 milliGRAM(s) IV Intermittent every 8 hours  linezolid  IVPB 600 milliGRAM(s) IV Intermittent every 12 hours  linezolid  IVPB      metroNIDAZOLE  IVPB 500 milliGRAM(s) IV Intermittent every 12 hours  minocycline 200 milliGRAM(s) Oral every 12 hours    immunologic:    OTHER:  albuterol/ipratropium for Nebulization 3 milliLiter(s) Nebulizer every 6 hours  arginine IVPB. 2 Gram(s) IV Intermittent every 4 hours  buMETAnide Infusion 4 mG/Hr IV Continuous <Continuous>  chlorhexidine 0.12% Liquid 15 milliLiter(s) Oral Mucosa every 12 hours  chlorhexidine 4% Liquid 1 Application(s) Topical <User Schedule>  cholecalciferol 2000 Unit(s) Oral daily  CRRT Treatment    <Continuous>  fentaNYL    Injectable 25 MICROGram(s) IV Push every 2 hours PRN  folic acid 1 milliGRAM(s) Oral daily  insulin glargine Injectable (LANTUS) 2 Unit(s) SubCutaneous <User Schedule>  insulin lispro (ADMELOG) corrective regimen sliding scale   SubCutaneous Before meals and at bedtime  lidocaine   4% Patch 1 Patch Transdermal daily  multivitamin/minerals/iron Oral Solution (CENTRUM) 15 milliLiter(s) Oral daily  norepinephrine Infusion 0.05 MICROgram(s)/kG/Min IV Continuous <Continuous>  pantoprazole  Injectable 40 milliGRAM(s) IV Push every 12 hours  PrismaSATE Dialysate BGK 4 / 2.5 5000 milliLiter(s) CRRT <Continuous>  PrismaSOL Filtration BGK 4 / 2.5 5000 milliLiter(s) CRRT <Continuous>  PrismaSOL Filtration BGK 4 / 2.5 5000 milliLiter(s) CRRT <Continuous>  propofol Infusion 30 MICROgram(s)/kG/Min IV Continuous <Continuous>  sodium chloride 3%  Inhalation 4 milliLiter(s) Inhalation every 12 hours      Objective:  Vital Signs Last 24 Hrs  T(C): 36.1 (02 May 2024 19:00), Max: 40 (02 May 2024 12:00)  T(F): 97 (02 May 2024 19:00), Max: 104 (02 May 2024 12:00)  HR: 107 (02 May 2024 19:45) (95 - 130)  BP: 85/50 (02 May 2024 19:45) (73/43 - 140/74)  BP(mean): 63 (02 May 2024 19:45) (53 - 95)  RR: 17 (02 May 2024 19:45) (16 - 31)  SpO2: 100% (02 May 2024 19:45) (90% - 100%)    Parameters below as of 02 May 2024 19:00  Patient On (Oxygen Delivery Method): ventilator    O2 Concentration (%): 40    PHYSICAL EXAM:  Constitutional: intubated, non responsive  Eyes:ANURAG,   Ear/Nose/Throat: no oral lesions, thin bloody secretions  HDcatheter and CVC sites CDI	  Respiratory: diminished bilateral  Cardiovascular: S1S2  Gastrointestinal:soft, less distended  Extremities:no e/e/c  No Lymphadenopathy  IV sites not inflammed.    LABS:                        7.9    24.38 )-----------( 36       ( 02 May 2024 18:27 )             23.9     05-02    142  |  103  |  6<L>  ----------------------------<  215<H>  3.0<L>   |  21<L>  |  1.08    Ca    8.3<L>      02 May 2024 18:27  Phos  3.5     05-02  Mg     2.0     05-02    TPro  4.4<L>  /  Alb  2.3<L>  /  TBili  2.9<H>  /  DBili  x   /  AST  56<H>  /  ALT  <5<L>  /  AlkPhos  126<H>  05-02    PT/INR - ( 02 May 2024 18:27 )   PT: 26.2 sec;   INR: 2.56 ratio         PTT - ( 02 May 2024 18:27 )  PTT:58.8 sec  Urinalysis Basic - ( 02 May 2024 18:27 )    Color: x / Appearance: x / SG: x / pH: x  Gluc: 215 mg/dL / Ketone: x  / Bili: x / Urobili: x   Blood: x / Protein: x / Nitrite: x   Leuk Esterase: x / RBC: x / WBC x   Sq Epi: x / Non Sq Epi: x / Bacteria: x        MICROBIOLOGY:            RECENT CULTURES:  05-01 @ 01:45  .Bronchial BAL  --  --  --    Testing in progress  --  04-30 @ 20:20  .Blood Blood-Peripheral  --  --  --    No growth at 24 hours  --  04-28 @ 11:27  .Blood Blood-Peripheral  --  --  --    No growth at 4 days  --      RADIOLOGY & ADDITIONAL STUDIES:    < from: CT Abdomen and Pelvis No Cont (05.01.24 @ 11:11) >    IMPRESSION:  No pneumatosis.  Bilateral persistent nephrogram may reflect acute tubular necrosis.  Enlarged low-attenuationliver, concern for steatohepatitis.  Anasarca.      < end of copied text >

## 2024-05-02 NOTE — PROGRESS NOTE ADULT - SUBJECTIVE AND OBJECTIVE BOX
[facility] NUTRITION SUPPORT-- FOLLOW UP NOTE      24 hour events/subjective:  Patient seen and examined at bedside, chart reviewed and events noted and I's and O's reviewed.  Patient remains in MICU, intubated, tube feeds on hold given melena.  Patient previously declined TPN, CRRT resumed today.        ROS:  Except as noted above, all other systems reviewed and are negative     Diet:  Diet, NPO (05-02-24 @ 10:32)      PAST HISTORY  --------------------------------------------------------------------------------  PAST MEDICAL & SURGICAL HISTORY:    No significant changes to PMH, PSH, FHx, SHx, unless otherwise noted    ALLERGIES & MEDICATIONS  --------------------------------------------------------------------------------  Allergies    No Known Allergies    Intolerances      Standing Inpatient Medications  albuterol/ipratropium for Nebulization 3 milliLiter(s) Nebulizer every 6 hours  arginine IVPB. 2 Gram(s) IV Intermittent every 4 hours  buMETAnide Infusion 4 mG/Hr IV Continuous <Continuous>  caspofungin IVPB      cefiderocol IVPB 2000 milliGRAM(s) IV Intermittent every 8 hours  chlorhexidine 0.12% Liquid 15 milliLiter(s) Oral Mucosa every 12 hours  chlorhexidine 4% Liquid 1 Application(s) Topical <User Schedule>  cholecalciferol 2000 Unit(s) Oral daily  CRRT Treatment    <Continuous>  dextrose 10% Bolus 125 milliLiter(s) IV Bolus once  dextrose 5%. 1000 milliLiter(s) IV Continuous <Continuous>  dextrose 5%. 1000 milliLiter(s) IV Continuous <Continuous>  dextrose 50% Injectable 25 Gram(s) IV Push once  dextrose 50% Injectable 12.5 Gram(s) IV Push once  folic acid 1 milliGRAM(s) Oral daily  glucagon  Injectable 1 milliGRAM(s) IntraMuscular once  insulin glargine Injectable (LANTUS) 2 Unit(s) SubCutaneous <User Schedule>  insulin lispro (ADMELOG) corrective regimen sliding scale   SubCutaneous every 6 hours  lidocaine   4% Patch 1 Patch Transdermal daily  linezolid  IVPB 600 milliGRAM(s) IV Intermittent every 12 hours  linezolid  IVPB      metroNIDAZOLE  IVPB 500 milliGRAM(s) IV Intermittent every 12 hours  minocycline 200 milliGRAM(s) Oral every 12 hours  multivitamin/minerals/iron Oral Solution (CENTRUM) 15 milliLiter(s) Oral daily  norepinephrine Infusion 0.05 MICROgram(s)/kG/Min IV Continuous <Continuous>  pantoprazole  Injectable 40 milliGRAM(s) IV Push every 12 hours  PrismaSATE Dialysate BGK 4 / 2.5 5000 milliLiter(s) CRRT <Continuous>  PrismaSOL Filtration BGK 4 / 2.5 5000 milliLiter(s) CRRT <Continuous>  PrismaSOL Filtration BGK 4 / 2.5 5000 milliLiter(s) CRRT <Continuous>  propofol Infusion 30 MICROgram(s)/kG/Min IV Continuous <Continuous>  sodium chloride 3%  Inhalation 4 milliLiter(s) Inhalation every 12 hours    PRN Inpatient Medications  dextrose Oral Gel 15 Gram(s) Oral once PRN  fentaNYL    Injectable 25 MICROGram(s) IV Push every 2 hours PRN        VITALS/PHYSICAL EXAM  --------------------------------------------------------------------------------  T(C): 35.6 (05-02-24 @ 12:00), Max: 40 (05-02-24 @ 12:00)  HR: 103 (05-02-24 @ 12:00) (92 - 130)  BP: 103/51 (05-02-24 @ 12:00) (73/43 - 140/74)  RR: 16 (05-02-24 @ 12:00) (16 - 23)  SpO2: 100% (05-02-24 @ 12:00) (90% - 100%)  Wt(kg): --      05-01-24 @ 07:01  -  05-02-24 @ 07:00  --------------------------------------------------------  IN: 3474.1 mL / OUT: 1815 mL / NET: 1659.1 mL    05-02-24 @ 07:01  -  05-02-24 @ 12:53  --------------------------------------------------------  IN: 673.3 mL / OUT: 150 mL / NET: 523.3 mL      I&O's Detail    01 May 2024 07:01  -  02 May 2024 07:00  --------------------------------------------------------  IN:    Bumetanide: 240 mL    FentaNYL: 1.5 mL    IV PiggyBack: 549 mL    IV PiggyBack: 1199.8 mL    Norepinephrine: 515 mL    Oral Fluid: 30 mL    Plasma: 300 mL    PRBCs (Packed Red Blood Cells): 300 mL    Propofol: 268.8 mL    Suplena with Carb Steady: 70 mL  Total IN: 3474.1 mL    OUT:    Indwelling Catheter - Urethral (mL): 1815 mL  Total OUT: 1815 mL    Total NET: 1659.1 mL      02 May 2024 07:01  -  02 May 2024 12:53  --------------------------------------------------------  IN:    Bumetanide: 100 mL    Cryoprecipitate: 75 mL    IV PiggyBack: 250 mL    IV PiggyBack: 99.9 mL    Norepinephrine: 92.4 mL    Propofol: 56 mL  Total IN: 673.3 mL    OUT:    FentaNYL: 0 mL    Indwelling Catheter - Urethral (mL): 150 mL  Total OUT: 150 mL    Total NET: 523.3 mL          Physical Exam:  Gen: sedated/intubated  neck: no JVD  Chest: non labored breathing, equal chest expansion bilaterally  Abd: +BS, soft, nontender/nondistended  Ext: Warm, FROM, no edema b/l LE  Neuro: No focal deficits  Psych: Normal affect and mood  Skin: Warm, without rashes         LABS/STUDIES  --------------------------------------------------------------------------------              7.3    32.85 >-----------<  x        [05-02-24 @ 11:26]              22.3     145  |  104  |  7   ----------------------------<  188      [05-02-24 @ 11:26]  3.3   |  20  |  1.21        Ca     8.8     [05-02-24 @ 11:26]      Mg     2.1     [05-02-24 @ 11:26]      Phos  4.7     [05-02-24 @ 11:26]    TPro  4.7  /  Alb  2.4  /  TBili  3.1  /  DBili  x   /  AST  58  /  ALT  <5  /  AlkPhos  135  [05-02-24 @ 11:26]    PT/INR: PT 23.2 , INR 2.26       [05-02-24 @ 11:26]  PTT: 57.0       [05-02-24 @ 11:26]    CK 21      [05-02-24 @ 01:16]    Ca ionizedBlood Gas Arterial - Calcium, Ionized: 1.21 mmol/L (05-02-24 @ 11:17)  Blood Gas Arterial - Calcium, Ionized: 1.27 mmol/L (05-02-24 @ 04:47)  Blood Gas Calcium, Ionized - Venous: 0.74 mmol/L (05-02-24 @ 03:45)  Blood Gas Calcium, Ionized - Venous: 1.26 mmol/L (05-01-24 @ 11:55)  Blood Gas Calcium, Ionized - Venous: 1.07 mmol/L (04-30-24 @ 20:04)    Creatinine Trend:  POC glucoseGlucose: 188 mg/dL (05-02-24 @ 11:26)  Glucose: 172 mg/dL (05-02-24 @ 01:16)  Glucose: 174 mg/dL (05-02-24 @ 00:20)  Glucose: 140 mg/dL (05-01-24 @ 17:18)  CAPILLARY BLOOD GLUCOSE      POCT Blood Glucose.: 200 mg/dL (02 May 2024 11:07)  POCT Blood Glucose.: 192 mg/dL (02 May 2024 09:30)    PrealbuminPrealbumin, Serum: 3 mg/dL (04-24-24 @ 11:23)    Triglycerides

## 2024-05-02 NOTE — PROGRESS NOTE ADULT - PROBLEM SELECTOR PLAN 1
-Test BG q6h while NPO/TFs. If need to start insulin drip please test BG q1h  - Continue  lantus 2 units in am for now.  - Continue low dose  admelog correctional scale q6h TIDAC  -If BG difficult to manage while on TFs please start low dose insulin drip at 0.5 units/hr and adjust to BG goal 100 to 180s.   - when diet restarted consider adding back home creon to improve digestion  - Discharge planning to acute rehab  Will be determined according to BG/insulin needs/PO intake at time of discharge.  Needs to be on basal and bolus vs pump given insulin deficiency ( low c- peptide 0.1 with )  Would consider insulin pump if pt is able to manage pump independently while in rehab. Pt remains critically ill at this time to address discharge insulin needs  -BGs should be monitored and insulin doses to be adjusted at rehab   Endocrinologist: Dr. Dalton

## 2024-05-02 NOTE — PROGRESS NOTE ADULT - SUBJECTIVE AND OBJECTIVE BOX
Interval Events:   Patient remains critically ill in the ICU.  Multiple bouts of hematochezia reported overnight.    ROS:   Unable to fully obtain ROS.    Hospital Medications:  albuterol/ipratropium for Nebulization 3 milliLiter(s) Nebulizer every 6 hours  buMETAnide Infusion 4 mG/Hr (20 mL/Hr) IV Continuous <Continuous>  buMETAnide Injectable 2 milliGRAM(s) IV Push once  caspofungin IVPB      cefiderocol IVPB 2000 milliGRAM(s) IV Intermittent every 8 hours  chlorhexidine 0.12% Liquid 15 milliLiter(s) Oral Mucosa every 12 hours  chlorhexidine 4% Liquid 1 Application(s) Topical <User Schedule>  cholecalciferol 2000 Unit(s) Oral daily  dextrose 10% Bolus 125 milliLiter(s) IV Bolus once  dextrose 5%. 1000 milliLiter(s) (100 mL/Hr) IV Continuous <Continuous>  dextrose 5%. 1000 milliLiter(s) (50 mL/Hr) IV Continuous <Continuous>  dextrose 50% Injectable 25 Gram(s) IV Push once  dextrose 50% Injectable 12.5 Gram(s) IV Push once  dextrose Oral Gel 15 Gram(s) Oral once PRN  fentaNYL    Injectable 50 MICROGram(s) IV Push once  fentaNYL    Injectable 25 MICROGram(s) IV Push every 2 hours PRN  fentaNYL   Infusion... 0.5 MICROgram(s)/kG/Hr (1.56 mL/Hr) IV Continuous <Continuous>  folic acid 1 milliGRAM(s) Oral daily  glucagon  Injectable 1 milliGRAM(s) IntraMuscular once  insulin glargine Injectable (LANTUS) 2 Unit(s) SubCutaneous <User Schedule>  insulin lispro (ADMELOG) corrective regimen sliding scale   SubCutaneous every 6 hours  lidocaine   4% Patch 1 Patch Transdermal daily  linezolid  IVPB 600 milliGRAM(s) IV Intermittent every 12 hours  linezolid  IVPB      metroNIDAZOLE  IVPB 500 milliGRAM(s) IV Intermittent every 12 hours  minocycline 200 milliGRAM(s) Oral every 12 hours  multivitamin/minerals/iron Oral Solution (CENTRUM) 15 milliLiter(s) Oral daily  norepinephrine Infusion 0.05 MICROgram(s)/kG/Min (5.85 mL/Hr) IV Continuous <Continuous>  pantoprazole  Injectable 40 milliGRAM(s) IV Push every 12 hours  propofol Infusion 30 MICROgram(s)/kG/Min (11.2 mL/Hr) IV Continuous <Continuous>  sodium chloride 3%  Inhalation 4 milliLiter(s) Inhalation every 12 hours  thiamine 100 milliGRAM(s) Oral daily  zinc sulfate 220 milliGRAM(s) Oral daily    MAR over past 24 hours:    buMETAnide Infusion   20 mL/Hr IV Continuous (05-01-24 @ 13:16)   20 mL/Hr IV Continuous (05-01-24 @ 12:02)    buMETAnide Injectable   2 milliGRAM(s) IV Push (05-01-24 @ 13:15)    calcium gluconate IVPB   200 mL/Hr IV Intermittent (05-01-24 @ 13:16)    caspofungin IVPB   260 mL/Hr IV Intermittent (05-02-24 @ 07:35)    cefiderocol IVPB   33.33 mL/Hr IV Intermittent (05-02-24 @ 09:34)   33.33 mL/Hr IV Intermittent (05-02-24 @ 01:57)   33.33 mL/Hr IV Intermittent (05-01-24 @ 20:04)   33.33 mL/Hr IV Intermittent (05-01-24 @ 10:44)    chlorhexidine 0.12% Liquid   15 milliLiter(s) Oral Mucosa (05-02-24 @ 05:24)   15 milliLiter(s) Oral Mucosa (05-01-24 @ 17:30)    chlorhexidine 4% Liquid   1 Application(s) Topical (05-02-24 @ 05:24)    fentaNYL    Injectable   25 MICROGram(s) IV Push (05-02-24 @ 02:54)    heparin   Injectable   5000 Unit(s) SubCutaneous (05-01-24 @ 17:28)    insulin glargine Injectable (LANTUS)   2 Unit(s) SubCutaneous (05-02-24 @ 09:34)   2 Unit(s) SubCutaneous (05-01-24 @ 10:29)    insulin lispro (ADMELOG) corrective regimen sliding scale   1 Unit(s) SubCutaneous (05-02-24 @ 01:29)   1 Unit(s) SubCutaneous (05-01-24 @ 12:13)    lactulose Syrup   10 Gram(s) Oral (05-02-24 @ 01:29)   10 Gram(s) Oral (05-01-24 @ 21:13)   10 Gram(s) Oral (05-01-24 @ 17:29)   10 Gram(s) Oral (05-01-24 @ 14:54)    linezolid  IVPB   300 mL/Hr IV Intermittent (05-02-24 @ 06:18)   300 mL/Hr IV Intermittent (05-01-24 @ 17:28)    magnesium sulfate  IVPB   25 mL/Hr IV Intermittent (05-01-24 @ 18:35)    metolazone   10 milliGRAM(s) Oral (05-02-24 @ 03:46)    metolazone   10 milliGRAM(s) Oral (05-01-24 @ 14:54)    metroNIDAZOLE  IVPB   100 mL/Hr IV Intermittent (05-02-24 @ 05:23)   100 mL/Hr IV Intermittent (05-01-24 @ 17:28)    minocycline   200 milliGRAM(s) Oral (05-01-24 @ 17:28)    pantoprazole  Injectable   40 milliGRAM(s) IV Push (05-02-24 @ 05:24)    potassium chloride   Solution   40 milliEquivalent(s) Oral (05-02-24 @ 01:29)    potassium chloride  10 mEq/100 mL IVPB   100 mL/Hr IV Intermittent (05-01-24 @ 14:54)   100 mL/Hr IV Intermittent (05-01-24 @ 13:53)   100 mL/Hr IV Intermittent (05-01-24 @ 13:13)    potassium chloride  10 mEq/50 mL IVPB   50 mL/Hr IV Intermittent (05-01-24 @ 20:05)   50 mL/Hr IV Intermittent (05-01-24 @ 19:05)   50 mL/Hr IV Intermittent (05-01-24 @ 18:35)   50 mL/Hr IV Intermittent (05-01-24 @ 18:00)    rifAXIMin   550 milliGRAM(s) Oral (05-01-24 @ 17:28)      Home Medications:  Last Order Reconciliation Date: 04-19-24 @ 16:54 (Admission Reconciliation)  allopurinol 100 mg oral tablet: 2 tab(s) orally once a day  ALPRAZolam 0.25 mg oral tablet: 1 tab(s) orally 3 times a day as needed for  insomnia  colchicine 0.6 mg oral capsule: 1 cap(s) orally once a day  Creon 3000 units oral delayed release capsule: 1 cap(s) orally 3 times a day  Dilaudid 4 mg oral tablet: 1 tab(s) orally every 4 hours as needed for  severe pain  ergocalciferol 1.25 mg (50,000 intl units) oral tablet: 1 tab(s) orally once a day  furosemide 20 mg oral tablet: 1 tab(s) orally 2 times a day as needed for  gout pain  methadone 5 mg oral tablet: 1 tab(s) orally 3 times a day  NovoLOG 100 units/mL subcutaneous solution: subcutaneous Via pump, 30-40 units a day  omeprazole 40 mg oral delayed release capsule: 1 cap(s) orally 2 times a day    PHYSICAL EXAM:   Vital Signs last 24 hours:  T(F): 97 (05-02-24 @ 08:00), Max: 99 (05-01-24 @ 12:00)  HR: 101 (05-02-24 @ 09:45) (92 - 130)  BP: 119/53 (05-02-24 @ 09:45) (73/43 - 140/74)  BP(mean): 76 (05-02-24 @ 09:45) (53 - 95)  ABP: --  ABP(mean): --  RR: 16 (05-02-24 @ 09:45) (16 - 23)  SpO2: 100% (05-02-24 @ 09:45) (90% - 100%)  Device: Avea, Mode: AC/ CMV (Assist Control/ Continuous Mandatory Ventilation), PIP: 30  I&Os:    05-01-24 @ 07:01  -  05-02-24 @ 07:00  --------------------------------------------------------  IN:    Bumetanide: 240 mL    FentaNYL: 1.5 mL    IV PiggyBack: 549 mL    IV PiggyBack: 1199.8 mL    Norepinephrine: 515 mL    Oral Fluid: 30 mL    Plasma: 300 mL    PRBCs (Packed Red Blood Cells): 300 mL    Propofol: 268.8 mL    Suplena with Carb Steady: 70 mL  Total IN: 3474.1 mL    OUT:    Indwelling Catheter - Urethral (mL): 1815 mL  Total OUT: 1815 mL    Total NET: 1659.1 mL      05-02-24 @ 07:01  -  05-02-24 @ 09:58  --------------------------------------------------------  IN:    Bumetanide: 40 mL    Cryoprecipitate: 75 mL    IV PiggyBack: 250 mL    IV PiggyBack: 33.3 mL    Norepinephrine: 48 mL    Propofol: 22.4 mL  Total IN: 468.7 mL    OUT:    FentaNYL: 0 mL    Indwelling Catheter - Urethral (mL): 55 mL  Total OUT: 55 mL    Total NET: 413.7 mL        BMI (kg/m2): 22.9 (04-13-24 @ 02:45)  GENERAL: critically ill  NEURO: not fully alert/oriented  HEENT: NCAT, no conjunctival pallor appreciated  CHEST: intubated, mechanical breath sounds  CARDIAC: regular rate, +S1/S2  ABDOMEN: soft, nontender, no rebound or guarding  EXTREMITIES: warm, well perfused  SKIN: no lesions noted    DIAGNOSTICS:  WBC      Hg       PLT      Na       K        CO2     BUN      Cr       ALT      AST      TB       ALP  36.23    9.2      101      ------   ------   ------   ------   ------   ------   ------   ------   ------   05-02-24 @ 06:29  35.73    9.7      106      ------   ------   ------   ------   ------   ------   ------   ------   ------   05-02-24 @ 04:55  32.18    7.1      130      144      3.7      22       7        1.05     9        20       3.2      157      05-02-24 @ 01:16  ------   ------   ------   143      3.5      21       6        1.01     9        30       3.2      151      05-02-24 @ 00:20  ------   ------   ------   145      3.1      23       6        0.96     18       76       3.7      162      05-01-24 @ 17:18    PT             INR            MELDwith  MELDw/o  27.5           2.57           --             --             05-02-24 @ 04:55  29.7           2.78           --             --             05-02-24 @ 01:16  28.4           2.66           --             --             05-01-24 @ 12:17  27.7           2.72           --             --             04-30-24 @ 22:23  15.2           1.46           29             15             04-21-24 @ 05:55

## 2024-05-02 NOTE — PROGRESS NOTE ADULT - ASSESSMENT
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 Hargrove), and now S/P total pancreatectomy  with islet cell autotransplant at [facility] by Dr. Gil in 04/2018).  Patient admitted with AMS, found to be in liver failure/hepatic encephalopathy and imaging shows colitis.  TPN consulted given severe protein calorie malnutrition and allowing possibility of bowel rest.  Patient with history of TPN use 2 years ago.      - Nutritional Assessment, patient with severe protein calorie malnutrition not fully meeting nutritional goals enterally due to colitis/decreased enteral intake and may benefit from TPN for nutritional support.  - TPN plan: Patient initially declined TPN when originally consulted, TF on hold due to melena; discussed with MICU team who request to continue to observe off of TPN for now; patient on Propofol getting about 30 G of lipids for calories   - TPN access:  Patient would need a dedicated central line if deemed TPN appropriate.   - Electrolyte Imbalance risk:  check CMP, Mg, Phos and ionized Ca daily, to be supplemented peripherally per primary team.  - Recommend strict intake and output/weight checks three times a week  - Further care per MICU/Medicine team    Available on TEAMS  TPN spectra [id number] ([phone] when dialing from outside line)  M-F 8A-2P, Weekends and holidays 8/9A-12/1P  Discussed with Dr. Rg Garces

## 2024-05-02 NOTE — PROGRESS NOTE ADULT - ASSESSMENT
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 [facility]. Patient found to be hypoglycemic shortly after transfer, now resolved. 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 Consulted for: Type 3c DM, hypoglycemia.    s/p RRT worsening hypoxemic respiratory failure w/ associated altered mental status, patient then underwent PEA arrest, presumed to be hypoxemic> back in MICU. Pt NPO and remains in critical condition requiring intubation/pressors/CVVH/Bumex drip/sedation with BG at goal while on low lantus dose.   If BG difficult to manage while on present insulin doses and pt remains hyperglycemic would recommend insulin drip to assess insulin requirements to keep BG Goal 100-180mg/dl       #Type 3c DM  Patient has hx of congenital pancreas divisum complicated by recurrent pancreatitis. Patient was first diagnosed with Type 3c DM after her initial surgery in 2014 (s/p distal pancreatectomy, cholecystectomy, splenectomy and celina-en-y pancreaticojejuonostomy). Then she later underwent total pancreatectomy with islet cell autotransplant in 2018. After that, her insulin requirements decreased over the years but was never off insulin.    Per recent outpatient note 3/26/2024: pump not available to review  Patient uses Omnipod Dash with Dexcom CGM G7  Basal rate:   MN 0.05U   6 AM 0.1U   9.30 AM 0.15U   Total basal 2.825U/24h   ICR 1:15   ICF 1:75   Reports of frequent hypoglycemia outpatient in March 2024.    C-peptide <0.1 4/15 at 6pm when BG was 168, insulin deficient.  C-peptide repeated while off dextrose and tube feeds: 0.1 with , confirming insulin deficiency.                  .

## 2024-05-02 NOTE — PROGRESS NOTE ADULT - PROBLEM SELECTOR PLAN 2
TSH 4.43. FT4 0.6, TT3 47 on admission (4/13)  Thyroid Function Tests:  04-30 @ 11:09 TSH 2.90 FreeT4 1.1 T3 -- Anti TPO -- Anti Thyroglobulin Ab -- TSI --  04-23 @ 14:44 TSH -- FreeT4 -- T3 -- Anti TPO 60.7 Anti Thyroglobulin Ab -- TSI --  Started on LT4 100mcg daily  Repeat TSH was wnl 3.67 (4/15) stopped levothyroxine  Reviewed 4/30 TFT with Dr Arora. TSH/Free T4 wnl. Positive anti TPO. Recommending to hold LT4 for now and repeat levels as out pt.      discussed with patient and team & RN  Contact via [employer] Teams during business hours  To reach covering provider access AMION via sunrise tools  For Urgent matters/after-hours/weekends/holidays please page endocrine fellow on call   For nonurgent matters please email [email or web address]    Please note that this patient may be followed by different provider tomorrow.  Notify endocrine 24 hours prior to discharge for final recommendations

## 2024-05-02 NOTE — PROCEDURE NOTE - ADDITIONAL PROCEDURE DETAILS
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 Patient had PEA arrest, ROSC after 10 minutes and 1 round of epinephrine, transferred to the MICU for further management.     Now with worsening ANKTI and NH3 levels requiring CRRT. Rt IJ Double Lumen Dialysis Catheter placed for CRRT
f/up postop cxr and once confirmed, remove R IJ CVC
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 Transferred to medicine floors but had worsening AHRF and then yesterday developed PEA arrest due to hypoxemia. CT pattern and exam c/w pulm edema and volume overload.    Pt now c/b new dx of urea cycle disorder. Left Subclavian TLC placed for vasopressor, Arginine IV therapies

## 2024-05-02 NOTE — PROCEDURE NOTE - NSINFORMCONSENT_GEN_A_CORE
Health Maintenance Due   Topic Date Due   • Hepatitis B Vaccine (1 of 3 - 3-dose series) Never done   • COVID-19 Vaccine (3 - Pfizer series) 04/07/2021   • Influenza Vaccine (1) 09/01/2023       Patient is due for topics as listed above but is not proceeding with Immunization(s) COVID-19, Hep B and Influenza at this time. Education provided for Immunization(s) COVID-19, Hep B and Influenza.    
Benefits, risks, and possible complications of procedure explained to patient/caregiver who verbalized understanding and gave written consent.
Benefits, risks, and possible complications of procedure explained to patient/caregiver who verbalized understanding and gave written consent.
This was an emergent procedure.
Benefits, risks, and possible complications of procedure explained to patient/caregiver who verbalized understanding and gave verbal consent.
Father/Benefits, risks, and possible complications of procedure explained to patient/caregiver who verbalized understanding and gave written consent.

## 2024-05-02 NOTE — CONSULT NOTE ADULT - SUBJECTIVE AND OBJECTIVE BOX
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       Patient presented to medical attention at an outside hospital on 4/8 with decreased PO intake, emesis, diarrhea for the last 4 weeks and altered mental status. During this time she was sleeping 18-20 hours per day. Emergency medical services sought on 4/8 after partner found patient on floor with altered mental status. She was intubated for airway protection and ammonia was found to be over 200 and was not responding to lactulose for which she was started on CRRT. While on CRRT ammonia stabilized.       Patient was extubated on 4/17/24 and placed on biPAP. On 4/18/24 NGT was placed and BiPAP was weaned to NC.  On 4/21/24 Patient was transferred to medicine floors and on patient had AMS and worsening hypoxic respiratory failure. Patient had PEA arrest, ROSC after 10 minutes and 1 round of epinephrine patient was transferred to the MICU on 4/30/24. Patient now remains intubated in MICU.    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       Patient presented to medical attention at an outside hospital on 4/8 with decreased PO intake, emesis, diarrhea for the last 4 weeks and altered mental status. During this time she was sleeping 18-20 hours per day. Emergency medical services sought on 4/8 after partner found patient on floor with altered mental status. She was intubated for airway protection and ammonia was found to be over 200 and was not responding to lactulose for which she was started on CRRT. While on CRRT ammonia stabilized.       Patient was extubated on 4/17/24 and placed on biPAP. On 4/18/24 NGT was placed and BiPAP was weaned to NC.  On 4/21/24 Patient was transferred to medicine floors and on patient had AMS and worsening hypoxic respiratory failure. Patient had PEA arrest, ROSC after 10 minutes and 1 round of epinephrine patient was transferred to the MICU on 4/30/24. Patient now remains intubated in MICU.      The medical genetic/metabolism team was consulted and initiated a work-up for OTC deficiency. Plasma amino acids resulted in abnl for UCD and oroic acids were significantly elevated consistent with OTC deficiency.      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 Patient was later diagnosed with DM and was started on insulin pump. Shortly before admission she had established with a [facility] endocrinologist and reported flucuating BG levels with increased physical activity.      Additional medical history includes gout, factor V Leiden deficiency, on eloquis and vitamin D deficency.       History obtained from EMR.           Past Medical History: HTN, DM, factor 5 Leiden deficiency, gout, pancreatic divisum s/p extensive abdominal surgery (>20x).       Past Surgical History: 20+ abdominal surgical procedures including whipple procedure, total pancreatectomy and autologous islet cell transplant       Home Medications:        Diet: tube feeds       Developmental History: not available      Therapies: n/a           Family History:   Not available        Review of Systems: Except as stated in the history of present illness, review of systems for GENERAL, EYES, EARS, NOSE/MOUTH/THROAT, RESPIRATORY, CARDIOVASCULAR, HEMATOLOGY/LYMPHATIC, GASTROINTESTINAL, MUSCULOSKELETAL, RENAL/URINARY, GENITAL/SEXUAL, NEUROLOGICAL, PSYCHOLOGICAL, ENDOCRINE, INTEGUMENT, ALLERGY/IMMUNOLOGY are negative.         Physical Exam:   Weight: 62.4 kg   Height: 165.1 cm           Physical exam not completed           GENETIC TESTING PERFORMED IN THE PAST: No known genetic testing   The medical genetic/metabolism team was consulted and initiated a work-up for OTC deficiency. Plasma amino acids resulted in abnl for UCD and oroic acids were significantly elevated consistent with OTC deficiency.      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 Patient was later diagnosed with DM and was started on insulin pump. Shortly before admission she had established with a [facility] endocrinologist and reported flucuating BG levels with increased physical activity.      Additional medical history includes gout, factor V Leiden deficiency, on eloquis and vitamin D deficency.       History obtained from EMR.           Past Medical History: HTN, DM, factor 5 Leiden deficiency, gout, pancreatic divisum s/p extensive abdominal surgery (>20x).       Past Surgical History: 20+ abdominal surgical procedures including whipple procedure, total pancreatectomy and autologous islet cell transplant       Home Medications:        Diet: tube feeds       Developmental History: not available      Therapies: n/a           Family History:   Not available        Review of Systems: Except as stated in the history of present illness, review of systems for GENERAL, EYES, EARS, NOSE/MOUTH/THROAT, RESPIRATORY, CARDIOVASCULAR, HEMATOLOGY/LYMPHATIC, GASTROINTESTINAL, MUSCULOSKELETAL, RENAL/URINARY, GENITAL/SEXUAL, NEUROLOGICAL, PSYCHOLOGICAL, ENDOCRINE, INTEGUMENT, ALLERGY/IMMUNOLOGY are negative.         Physical Exam:   Weight: 62.4 kg   Height: 165.1 cm           Physical exam not completed           GENETIC TESTING PERFORMED IN THE PAST: No known genetic testing

## 2024-05-02 NOTE — PROCEDURE NOTE - NSPOSTPRCRAD_GEN_A_CORE
Guidewire ascertained in the RIJ via pocus, catheter filtered over guidewire, guidewire removed in entirety. Lung sliding ascertained via pocus./central line located in the
Guidewire ascertained in Left Subclavian Vein via POCUS,  Catheter filtered over guidewire, Guidewire removed in entirety, Lung sliding ascertained via POCUS/central line located in the

## 2024-05-02 NOTE — PROGRESS NOTE ADULT - ASSESSMENT
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 Hargrove), and now S/P total pancreatectomy with islet cell autotransplant at [facility] by Dr. Gil in 04/2018). 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  Patient was readmitted eventually later on at 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 Per records, a Ct later in 2018 showed still fistulization but no abscess formation     Patient presented to [facility] ED on 4/8 by her spouse for AMS. Per , patient had been having nbnb vomiting and diarrhea for the last 4 weeks, extreme fatigue . She was unable to tolerate PO. She was also sleeping 18-20 hrs per day. On 4/8, he found her on the floor "completely out of it" and took her to [facility] for further evaluation.   Initial workup in ED demonstrated a Tbili 5.4, direct bili 4.4 and ammonia 196. Patient was somnolent and had a left eyeward gaze deviation. Submentally started developing agonal snoring respirations and had to be intubated for airway protection. Course complicated with septic shock and persistent hyperammonemia despite lactulose and rifaximin, CT show and started on meropenem, linezolid and anidulafungin at [facility]--> upon transfer here, off pressors, on alvaro/linezolid/caspofungin    CT Chest (4/13) Occlusion of the distal left lower lobar bronchus with complete left lower lobe atelectasis. Right middle lobe consolidation, possibly secondary to pneumonia or additional atelectasis.     CT A/P (4/13) Segmental areas of wall thickening throughout the colon and rectum with pneumatosis, mesenteric edema, and adjacent hyperdensities in the proximal transverse colon suspicious for bowel ischemia with intraluminal hemorrhage. Additional intraluminal hyperdensities within the distal ascending colon suspicious for hemorrhage. Nonspecific focal areas of narrowing in the distal descending colon and sigmoid colon, possibly colonic masses    CXR (4/26) Bilateral effusions are seen, with dense left lower lobe consolidation, new consolidation in the right middle lobe, and new interstitial edema..    Had Acinetobacter on ET tube sputum culture prior to extubation.  Was initially clinically stable without leukocytosis without coverage for Acinetobacter after extubation.  Developed bandemia on 4/26.  Now with leukocytosis and worsening infiltrates on chest x-ray with varying reports of consolidations vs effusions  Developed RRT on 4/30 required intubation and transfer to MICU  Blood cultures and Bronch/BAL performed with studies pendin        #Leukocytosis, bandemia, abnormal chest x-ray, positive sputum culture (carbapenem resistant Acinetobacter baumannii)  --Blood cultures sent 4/28 and No growth, repeat blood culture sent 4/30 pending  --+ minocycline 200 mg Q12H to continue  and started Cefiderocol based upon patient's recent colonization with a resistant acinetobacter  --can add anaerobic coverage with flagyl 500mg q 12hr  --maintain contact isolation   --yeast on Bronch/Bal would add Caspofungin pending organism ID    #Diarrhea, abdominal pain increasing abdominal girth-ascites ? perforation? ischemic bowel  C. difficile toxin assay 4/19 negative- would send repeat specimen  -no evidence of abdominal perforation on recent CT scan earlier today, may require paracentesis- send for cultures and cell counts    #Hyperammonemia, Bilirubin Elevation  Ammonia elevation now thought to be secondary to alcoholic hepatitis given positive PETH  Ureaplasma PCR & Mycoplasma hominis PCR (4/18) Negative   Leptospira PCR (4/13) Negative    Agree with plans for a diagnostic bronchoscopy once she is stabilized in the MICU  send studeis for bacterial , fungal, AFB stains and cultures  fungitell, PJP PCR, legionella testing    Discussed with MICU team    Francisco Gore MD  Can be called via Teams  After 5pm/weekends [phone]     42 year old female with hx of DMT1, Factor V leiden deficiency, gout and 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 Hargrove), and now S/P total pancreatectomy with islet cell autotransplant at [facility] by Dr. Gil in 04/2018). 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  Patient was readmitted eventually later on at 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 Per records, a Ct later in 2018 showed still fistulization but no abscess formation     Patient presented to [facility] ED on 4/8 by her spouse for AMS. Per , patient had been having nbnb vomiting and diarrhea for the last 4 weeks, extreme fatigue . She was unable to tolerate PO. She was also sleeping 18-20 hrs per day. On 4/8, he found her on the floor "completely out of it" and took her to [facility] for further evaluation.   Initial workup in ED demonstrated a Tbili 5.4, direct bili 4.4 and ammonia 196. Patient was somnolent and had a left eyeward gaze deviation. Submentally started developing agonal snoring respirations and had to be intubated for airway protection. Course complicated with septic shock and persistent hyperammonemia despite lactulose and rifaximin, CT show and started on meropenem, linezolid and anidulafungin at [facility]--> upon transfer here, off pressors, on alvaro/linezolid/caspofungin    CT Chest (4/13) Occlusion of the distal left lower lobar bronchus with complete left lower lobe atelectasis. Right middle lobe consolidation, possibly secondary to pneumonia or additional atelectasis.     CT A/P (4/13) Segmental areas of wall thickening throughout the colon and rectum with pneumatosis, mesenteric edema, and adjacent hyperdensities in the proximal transverse colon suspicious for bowel ischemia with intraluminal hemorrhage. Additional intraluminal hyperdensities within the distal ascending colon suspicious for hemorrhage. Nonspecific focal areas of narrowing in the distal descending colon and sigmoid colon, possibly colonic masses    CXR (4/26) Bilateral effusions are seen, with dense left lower lobe consolidation, new consolidation in the right middle lobe, and new interstitial edema..    Had Acinetobacter on ET tube sputum culture prior to extubation.  Was initially clinically stable without leukocytosis without coverage for Acinetobacter after extubation.  Developed bandemia on 4/26.  Now with leukocytosis and worsening infiltrates on chest x-ray with varying reports of consolidations vs effusions  Developed RRT on 4/30 required intubation and transfer to MICU  Blood cultures and Bronch/BAL performed with studies not showing new infection        #Leukocytosis, bandemia, abnormal chest x-ray, positive sputum culture (carbapenem resistant Acinetobacter baumannii)  --Blood cultures sent 4/28 and No growth, repeat blood culture sent 4/30 pending  --+ minocycline 200 mg Q12H to continue  and started Cefiderocol based upon patient's recent colonization with a resistant acinetobacter  -- anaerobic coverage with flagyl 500mg q 12hr  --maintain contact isolation   --yeast on Bronch/Bal would add Caspofungin pending organism ID    #Diarrhea, abdominal pain increasing abdominal girth-ascites ? perforation? ischemic bowel  C. difficile toxin assay 4/19 negative- would send repeat specimen  -no evidence of abdominal perforation on recent CT scan     #Hyperammonemia, Bilirubin Elevation  Ammonia elevation now thought to be secondary to alcoholic hepatitis given positive PETH  Ureaplasma PCR & Mycoplasma hominis PCR (4/18) Negative   Leptospira PCR (4/13) Negative        Discussed with MICU team    Francisco Gore MD  Can be called via Teams  After 5pm/weekends [phone]

## 2024-05-02 NOTE — PROGRESS NOTE ADULT - ASSESSMENT
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 Hargrove), and now S/P total pancreatectomy  with islet cell autotransplant at [facility] by Dr. Gil in 04/2018), c/b recurrent abdominal wall collection requiring IR drainage, presented to OSH with acute change in mental status requiring intubation for airway protection, and transferred to [facility] for care escalation with labs notable for eleavted INR, T. Nilo and NH3 of unclear etiology although possibly 2/2 to cholestasis of sepsis     #Alcohol associated hepatitis   #Bicytopenia with Coagulopathy (improved)  #Acute encephalopathy with elevated hyperammonemia s/p CRRT for NH3 clearane    #Hepatomegaly   - DAWIT, Smooth and IgG within normal limts, ceruloplasmin < 10.   - Presented with septic shock, likely RML pneumonia, RLL bronchial occlusion, and pneumatosis coli - likely ischemic colon, colonic blood suggested by CT, init. Hb drop with labs notable for elevated T. Nilo, INR and NH3. Labs also notable for macrocytic anemia - DD: folate deficiency due to malnutrition with ?SIBO and alcohol-related liver disease Imaging with U/S and MRCP with note of  hepatomegaly with non-cirrhotic morphology,MELD 33 (24 with initial creat. before CRRT). PETH positive (collateral from parents endorsing hx of heavy ETOH use) with etiology of the above likely 2/2 to alcohol-related liver disease with acute alc hep in the background or cirrhosis and cholestasis of sepsis   - Acute encephalopathy, hyperammonemia - DD: hepatic encephalopathy in setting of alcoholic liver disease that is resolved now. s/p CRRT for NH3 clearance.  Ok to stop L-Carnitine and continue with Rifaximin 550 mg BID in addition to Lactulose for goal 5 BMs per day. Continue with high dose IV Thiamine and Vit Supplemtation as noted below     #Fevers   #isolation of MDR , carbapenem resistant Acinetobacter baumanii on ETT.   CT chest showing clearing of prior infiltrates now on RA, no cough,    -Cdiff PCR neg, blood cultures neg at this time.   - Previously on meropenem, vanc, zosyn and unasyn, now off abx, per ID recs.     #Hematochezia [overnight 5/1/2024]  #Right sided colitis  w/ right sided thickening seen on CT imaging  #Pneumatosis of bowel concerning for ischemic colitis (improving)  - Patient has no abd pain but has diarrhea in the setting of lactulose use. C diff neg. GI PCR neg on 4/15    # PEA cardiac arrest 4/30/2024    # DIC    Recommendations:  -trend clinical symptoms, exam findings, vital signs, CBC, CMP, INR  -maintain active type and screen  -transfusion goal to maintain hemoglobin >/= 7.0 and platelets >/= 50  -avoid NSAIDs  -rifaximin 550mg BID and lactulose which should be uptitrated to target 3-5 daily BMs; target daily 500-750cc stool output, however holding for now given hematochezia  -hematochezia highly suspicious for ischemic colitis given time course one night after PEA cardiac arrest in the setting of DIC and abnormal prior CT A/P [pneumatosis and right colon thickening/colitis]  -Hg overcorrected following pRBC transfusion; if brisk hematochezia worsens/persists, consider CTA +/- IR consultation if positive  -treat supportively for now, no immediate plans for colonoscopy  -discussed with primary team and family at bedside    Note incomplete until finalized by attending signature/attestation.    Greg Mcfadden  GI/Hepatology Fellow    MONDAY-FRIDAY 8AM-5PM:  Pager# [phone] ([facility]) or [phone] ([facility])    NON-URGENT CONSULTS:  Please email [email or web address]  AT NIGHT AND ON WEEKENDS:  Contact on-call GI fellow via answering service ([phone]) from 5pm-8am and on weekends/holidays

## 2024-05-02 NOTE — PROGRESS NOTE ADULT - NUTRITIONAL ASSESSMENT
This patient has been assessed with a concern for Malnutrition and has been determined to have a diagnosis/diagnoses of Severe protein-calorie malnutrition and Underweight (BMI < 19).    This patient is being managed with:   Diet NPO with Tube Feed-  Tube Feeding Modality: Orogastric  Suplena with Carb Steady (SUPLENA)  Total Volume for 24 Hours (mL): 594  Continuous  Starting Tube Feed Rate {mL per Hour}: 10  Increase Tube Feed Rate by (mL): 10     Every 4 hours  Until Goal Tube Feed Rate (mL per Hour): 33  Tube Feed Duration (in Hours): 18  Tube Feed Start Time: 11:00  Entered: May  1 2024  4:09PM

## 2024-05-02 NOTE — PROGRESS NOTE ADULT - SUBJECTIVE AND OBJECTIVE BOX
seen earlier today     Chief Complaint: Diabetes Mellitus follow up    INTERVAL HX: parents at bedside expressed conern if patient is fed if she will need creon restarted as she was on it at home, bg stable no hypoglycemia, noted genetics consult ; tf on hold 2/2 gib       Review of Systems:  General: As above  GI: No nausea, vomiting  Endocrine: no  S&Sx of hypoglycemia    Allergies    No Known Allergies    Intolerances      MEDICATIONS  (STANDING):  albuterol/ipratropium for Nebulization 3 milliLiter(s) Nebulizer every 6 hours  arginine IVPB. 2 Gram(s) IV Intermittent every 4 hours  buMETAnide Infusion 4 mG/Hr (20 mL/Hr) IV Continuous <Continuous>  caspofungin IVPB      cefiderocol IVPB 2000 milliGRAM(s) IV Intermittent every 8 hours  chlorhexidine 0.12% Liquid 15 milliLiter(s) Oral Mucosa every 12 hours  chlorhexidine 4% Liquid 1 Application(s) Topical <User Schedule>  cholecalciferol 2000 Unit(s) Oral daily  CRRT Treatment    <Continuous>  dextrose 10% Bolus 125 milliLiter(s) IV Bolus once  dextrose 5%. 1000 milliLiter(s) (50 mL/Hr) IV Continuous <Continuous>  dextrose 5%. 1000 milliLiter(s) (100 mL/Hr) IV Continuous <Continuous>  dextrose 50% Injectable 25 Gram(s) IV Push once  dextrose 50% Injectable 12.5 Gram(s) IV Push once  folic acid 1 milliGRAM(s) Oral daily  glucagon  Injectable 1 milliGRAM(s) IntraMuscular once  insulin glargine Injectable (LANTUS) 2 Unit(s) SubCutaneous <User Schedule>  insulin lispro (ADMELOG) corrective regimen sliding scale   SubCutaneous every 6 hours  lidocaine   4% Patch 1 Patch Transdermal daily  linezolid  IVPB      linezolid  IVPB 600 milliGRAM(s) IV Intermittent every 12 hours  metroNIDAZOLE  IVPB 500 milliGRAM(s) IV Intermittent every 12 hours  minocycline 200 milliGRAM(s) Oral every 12 hours  multivitamin/minerals/iron Oral Solution (CENTRUM) 15 milliLiter(s) Oral daily  norepinephrine Infusion 0.05 MICROgram(s)/kG/Min (5.85 mL/Hr) IV Continuous <Continuous>  pantoprazole  Injectable 40 milliGRAM(s) IV Push every 12 hours  potassium chloride  10 mEq/100 mL IVPB 10 milliEquivalent(s) IV Intermittent every 1 hour  PrismaSATE Dialysate BGK 4 / 2.5 5000 milliLiter(s) (1000 mL/Hr) CRRT <Continuous>  PrismaSOL Filtration BGK 4 / 2.5 5000 milliLiter(s) (800 mL/Hr) CRRT <Continuous>  PrismaSOL Filtration BGK 4 / 2.5 5000 milliLiter(s) (200 mL/Hr) CRRT <Continuous>  propofol Infusion 30 MICROgram(s)/kG/Min (11.2 mL/Hr) IV Continuous <Continuous>  sodium chloride 3%  Inhalation 4 milliLiter(s) Inhalation every 12 hours        insulin glargine Injectable (LANTUS)   2 Unit(s) SubCutaneous (05-02-24 @ 09:34)    insulin lispro (ADMELOG) corrective regimen sliding scale   1 Unit(s) SubCutaneous (05-02-24 @ 11:32)   1 Unit(s) SubCutaneous (05-02-24 @ 01:29)        PHYSICAL EXAM:  VITALS: T(C): 35.6 (05-02-24 @ 12:00)  T(F): 104 (05-02-24 @ 12:00), Max: 104 (05-02-24 @ 12:00)  HR: 112 (05-02-24 @ 13:15) (92 - 130)  BP: 98/53 (05-02-24 @ 13:15) (73/43 - 140/74)  RR:  (16 - 23)  SpO2:  (90% - 100%)  Wt(kg): --  GENERAL: NAD  Respiratory: Respirations unlabored  intubated  Extremities: Warm, no edema  NEURO: sedated    LABS:  POCT Blood Glucose.: 200 mg/dL (05-02-24 @ 11:07)  POCT Blood Glucose.: 192 mg/dL (05-02-24 @ 09:30)  POCT Blood Glucose.: 248 mg/dL (05-01-24 @ 09:03)  POCT Blood Glucose.: 138 mg/dL (05-01-24 @ 05:27)  POCT Blood Glucose.: 158 mg/dL (05-01-24 @ 00:49)  POCT Blood Glucose.: 136 mg/dL (04-30-24 @ 17:07)  POCT Blood Glucose.: 136 mg/dL (04-30-24 @ 11:54)  POCT Blood Glucose.: 146 mg/dL (04-30-24 @ 08:21)  POCT Blood Glucose.: 159 mg/dL (04-30-24 @ 02:24)  POCT Blood Glucose.: 141 mg/dL (04-29-24 @ 22:17)  POCT Blood Glucose.: 176 mg/dL (04-29-24 @ 16:45)                          7.3    32.85 )-----------( 86       ( 02 May 2024 11:26 )             22.3     05-02    145  |  104  |  7   ----------------------------<  188<H>  3.3<L>   |  20<L>  |  1.21    Ca    8.8      02 May 2024 11:26  Phos  4.7     05-02  Mg     2.1     05-02    TPro  4.7<L>  /  Alb  2.4<L>  /  TBili  3.1<H>  /  DBili  x   /  AST  58<H>  /  ALT  <5<L>  /  AlkPhos  135<H>  05-02    eGFR: 55 mL/min/1.73m2 (02 May 2024 11:26)  eGFR: 66 mL/min/1.73m2 (02 May 2024 01:16)  eGFR: 69 mL/min/1.73m2 (02 May 2024 00:20)  eGFR: 73 mL/min/1.73m2 (01 May 2024 17:18)    04-24 Chol 131 Direct LDL -- LDL calculated 103<H> HDL 7<L> Trig 109, 04-13 Chol 82 Direct LDL -- LDL calculated 57 HDL 8<L> Trig 82  Thyroid Function Tests:  04-30 @ 11:09 TSH 2.90 FreeT4 1.1 T3 -- Anti TPO -- Anti Thyroglobulin Ab -- TSI --  04-23 @ 14:44 TSH -- FreeT4 -- T3 -- Anti TPO 60.7 Anti Thyroglobulin Ab -- TSI --      A1C with Estimated Average Glucose Result: 5.2 % (04-13-24 @ 03:56)    Estimated Average Glucose: 103 mg/dL (04-13-24 @ 03:56)    Fructosamine, Serum: 372 umol/L (04-13-24 @ 09:41)      Diet, NPO (05-02-24 @ 10:32) [Active]

## 2024-05-02 NOTE — CONSULT NOTE ADULT - TIME BILLING
face-to-face encounter, review of extensive medical records in this and prior charts, laboratory findings, radiographic and microbiology results; documentation as noted above and discussion of diagnostic impressions and plan with the patient and team
coordinating care
care coordition, communicatin, counseling, documentation

## 2024-05-02 NOTE — PROGRESS NOTE ADULT - SUBJECTIVE AND OBJECTIVE BOX
INTERVAL HPI/OVERNIGHT EVENTS:    SUBJECTIVE: Patient seen and examined at bedside.     ROS: All negative except as listed above.    VITAL SIGNS:  ICU Vital Signs Last 24 Hrs  T(C): 36.7 (02 May 2024 04:00), Max: 37.2 (01 May 2024 12:00)  T(F): 98.1 (02 May 2024 04:00), Max: 99 (01 May 2024 12:00)  HR: 105 (02 May 2024 06:45) (92 - 130)  BP: 119/71 (02 May 2024 06:45) (73/43 - 127/46)  BP(mean): 89 (02 May 2024 06:45) (53 - 91)  ABP: --  ABP(mean): --  RR: 16 (02 May 2024 06:45) (16 - 23)  SpO2: 100% (02 May 2024 06:45) (90% - 100%)    O2 Parameters below as of 01 May 2024 20:00  Patient On (Oxygen Delivery Method): ventilator    O2 Concentration (%): 40      Mode: AC/ CMV (Assist Control/ Continuous Mandatory Ventilation), RR (machine): 16, TV (machine): 400, FiO2: 40, PEEP: 5, ITime: 1, MAP: 10, PIP: 29  Plateau pressure:   P/F ratio:     05-01 @ 07:01  -  05-02 @ 07:00  --------------------------------------------------------  IN: 3174.1 mL / OUT: 1815 mL / NET: 1359.1 mL      CAPILLARY BLOOD GLUCOSE      POCT Blood Glucose.: 248 mg/dL (01 May 2024 09:03)      ECG: reviewed.    PHYSICAL EXAM:    GENERAL: NAD, intubated and sedated   HEAD:  Atraumatic, normocephalic  EYES: Preferred upward gaze. PERRLA, conjunctiva and sclera clear  ENT: Moist mucous membranes  NECK: Supple, no JVD  HEART: S1, S2, regular rate and rhythm, no murmurs, rubs, or gallops  LUNGS: Unlabored respirations.  Clear to auscultation bilaterally, no crackles, wheezing, or rhonchi  ABDOMEN: Soft, moderately distended, +BS  EXTREMITIES: 2+ peripheral pulses bilaterally. No clubbing, cyanosis, or edema  NERVOUS SYSTEM:  Intubated and sedated    SKIN: No rashes or lesions    MEDICATIONS:  MEDICATIONS  (STANDING):  buMETAnide Infusion 4 mG/Hr (20 mL/Hr) IV Continuous <Continuous>  buMETAnide Injectable 2 milliGRAM(s) IV Push once  caspofungin IVPB      cefiderocol IVPB 2000 milliGRAM(s) IV Intermittent every 8 hours  chlorhexidine 0.12% Liquid 15 milliLiter(s) Oral Mucosa every 12 hours  chlorhexidine 4% Liquid 1 Application(s) Topical <User Schedule>  cholecalciferol 2000 Unit(s) Oral daily  dextrose 10% Bolus 125 milliLiter(s) IV Bolus once  dextrose 5%. 1000 milliLiter(s) (100 mL/Hr) IV Continuous <Continuous>  dextrose 5%. 1000 milliLiter(s) (50 mL/Hr) IV Continuous <Continuous>  dextrose 50% Injectable 25 Gram(s) IV Push once  dextrose 50% Injectable 12.5 Gram(s) IV Push once  fentaNYL    Injectable 50 MICROGram(s) IV Push once  fentaNYL   Infusion... 0.5 MICROgram(s)/kG/Hr (1.56 mL/Hr) IV Continuous <Continuous>  folic acid 1 milliGRAM(s) Oral daily  glucagon  Injectable 1 milliGRAM(s) IntraMuscular once  insulin glargine Injectable (LANTUS) 2 Unit(s) SubCutaneous <User Schedule>  insulin lispro (ADMELOG) corrective regimen sliding scale   SubCutaneous every 6 hours  lactulose Syrup 10 Gram(s) Oral every 4 hours  lidocaine   4% Patch 1 Patch Transdermal daily  linezolid  IVPB 600 milliGRAM(s) IV Intermittent every 12 hours  linezolid  IVPB      metolazone 10 milliGRAM(s) Oral once  metolazone 10 milliGRAM(s) Oral every 24 hours  metroNIDAZOLE  IVPB 500 milliGRAM(s) IV Intermittent every 12 hours  minocycline 200 milliGRAM(s) Oral every 12 hours  multivitamin/minerals/iron Oral Solution (CENTRUM) 15 milliLiter(s) Oral daily  norepinephrine Infusion 0.05 MICROgram(s)/kG/Min (5.85 mL/Hr) IV Continuous <Continuous>  pantoprazole  Injectable 40 milliGRAM(s) IV Push every 12 hours  propofol Infusion 30 MICROgram(s)/kG/Min (11.2 mL/Hr) IV Continuous <Continuous>  rifAXIMin 550 milliGRAM(s) Oral two times a day  thiamine 100 milliGRAM(s) Oral daily  zinc sulfate 220 milliGRAM(s) Oral daily    MEDICATIONS  (PRN):  dextrose Oral Gel 15 Gram(s) Oral once PRN Blood Glucose LESS THAN 70 milliGRAM(s)/deciliter  fentaNYL    Injectable 25 MICROGram(s) IV Push every 2 hours PRN Moderate Pain (4 - 6)      ALLERGIES:  Allergies    No Known Allergies    Intolerances        LABS:                        9.2    36.23 )-----------( 101      ( 02 May 2024 06:29 )             26.6     05-02    144  |  102  |  7   ----------------------------<  172<H>  3.7   |  22  |  1.05    Ca    9.0      02 May 2024 01:16  Phos  4.3     05-02  Mg     2.2     05-02    TPro  4.5<L>  /  Alb  2.3<L>  /  TBili  3.2<H>  /  DBili  x   /  AST  20  /  ALT  9<L>  /  AlkPhos  157<H>  05-02    PT/INR - ( 02 May 2024 04:55 )   PT: 27.5 sec;   INR: 2.57 ratio         PTT - ( 02 May 2024 04:55 )  PTT:82.9 sec  Urinalysis Basic - ( 02 May 2024 01:16 )    Color: x / Appearance: x / SG: x / pH: x  Gluc: 172 mg/dL / Ketone: x  / Bili: x / Urobili: x   Blood: x / Protein: x / Nitrite: x   Leuk Esterase: x / RBC: x / WBC x   Sq Epi: x / Non Sq Epi: x / Bacteria: x      ABG:  pH, Arterial: 7.43 (05-02-24 @ 04:47)  pCO2, Arterial: 35 mmHg (05-02-24 @ 04:47)  pO2, Arterial: 120 mmHg (05-02-24 @ 04:47)  pH, Arterial: 7.47 (05-02-24 @ 01:13)  pCO2, Arterial: 35 mmHg (05-02-24 @ 01:13)  pO2, Arterial: 61 mmHg (05-02-24 @ 01:13)  pH, Arterial: 7.52 (05-01-24 @ 17:00)  pCO2, Arterial: 34 mmHg (05-01-24 @ 17:00)  pO2, Arterial: 95 mmHg (05-01-24 @ 17:00)      vBG:  pH, Venous: 6.90 (05-02-24 @ 03:45)  pCO2, Venous: 83 mmHg (05-02-24 @ 03:45)  pO2, Venous: 49 mmHg (05-02-24 @ 03:45)  HCO3, Venous: 16 mmol/L (05-02-24 @ 03:45)  pH, Venous: 7.47 (05-01-24 @ 11:55)  pCO2, Venous: 39 mmHg (05-01-24 @ 11:55)  pO2, Venous: 63 mmHg (05-01-24 @ 11:55)  HCO3, Venous: 28 mmol/L (05-01-24 @ 11:55)    Micro:    Culture - Blood (collected 04-30-24 @ 20:20)  Source: .Blood Blood-Peripheral  Preliminary Report (05-02-24 @ 01:03):    No growth at 24 hours    Culture - Blood (collected 04-28-24 @ 11:27)  Source: .Blood Blood-Peripheral  Preliminary Report (05-01-24 @ 17:01):    No growth at 72 Hours    Culture - Blood (collected 04-18-24 @ 17:53)  Source: .Blood Blood-Venous  Final Report (04-23-24 @ 23:00):    No growth at 5 days    Culture - Blood (collected 04-18-24 @ 17:53)  Source: .Blood Blood-Venous  Final Report (04-23-24 @ 23:00):    No growth at 5 days    Culture - Blood (collected 04-13-24 @ 04:00)  Source: .Blood Blood-Venous  Final Report (04-18-24 @ 10:01):    No growth at 5 days    Culture - Blood (collected 04-13-24 @ 03:25)  Source: .Blood Blood-Peripheral  Final Report (04-18-24 @ 10:01):    No growth at 5 days        Culture - Sputum (collected 04-17-24 @ 12:11)  Source: ET Tube ET Tube  Gram Stain (04-17-24 @ 22:40):    Few polymorphonuclear leukocytes per low power field    Few Squamous epithelial cells per low power field    Moderate Gram positive cocci in pairs per oil power field    Few Gram Negative Coccobacilli per oil power field  Final Report (04-23-24 @ 07:24):    Numerous Acinetobacter baumannii/nosocom group (Carbapenem Resistant)    Normal Respiratory Linh present    cefiderocol interpretations based on FDA breakpoints.  Organism: Acinetobacter baumannii/nosocom group (Carbapenem Resistant) (04-23-24 @ 07:24)      Method Type: KB      -  Piperacillin/Tazobactam: R      -  Minocycline: S      -  Cefiderocol: S  Organism: Acinetobacter baumannii/nosocom group (Carbapenem Resistant) (04-23-24 @ 07:24)      Method Type: KLARISSA      -  Amikacin: R >32      -  Ampicillin/Sulbactam: I 16/8      -  Cefepime: I 16      -  Ceftazidime: R >16      -  Ciprofloxacin: R >2      -  Gentamicin: R >8      -  Imipenem: R >8      -  Levofloxacin: R >4      -  Meropenem: R >8      -  Tobramycin: S <=2      -  Trimethoprim/Sulfamethoxazole: R >2/38      -  Polymyxin B: I 0.25      -  Tigecycline: S <=2  Organism: Acinetobacter baumannii/nosocom group (Carbapenem Resistant) (04-20-24 @ 14:42)    Culture - Sputum (collected 04-13-24 @ 04:32)  Source: ET Tube ET Tube  Gram Stain (04-13-24 @ 14:29):    Moderate polymorphonuclear leukocytes seen per low power field    No organisms seen  Final Report (04-14-24 @ 16:06):    Normal Respiratory Linh present        RADIOLOGY & ADDITIONAL TESTS: Reviewed.

## 2024-05-02 NOTE — PROGRESS NOTE ADULT - ATTENDING COMMENTS
Agree with above except as noted. 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 Transferred to medicine floors but had worsening AHRF and then yesterday developed PEA arrest due to hypoxemia. CT pattern and exam c/w pulm edema and volume overload.    Pt now w/ new dx of urea cycle disorder.  Overnight developed LGIB and has concern for DIC (both on lab and clinically)    - on sedation for vent synchrony and analgosedation  - decrease for sedation vacation as tolerated to assess mental status for anoxic damage  - cont vent support, resp status stabilized w/ PPV, now on 40% and peep 5  - s/p bronch - f/u cx  - maintain map>65, on levophed currently  - f/u Renal reccs- ANKIT due to ATN  - plan for CRRT for poor UO and for hyperammonemia clearance  - cont monitoring lytes  - f/u ID reccs- on tx for  acinetobacter w/ broad spectrum abx  - had repeat CT a/p that does not show pneumatosis  - hx factor v leiden but no active thrombus, cont dvt ppx  - new dx of ornithine deficiency- arginine tx and protein supplementation as per Genetics  - mohini rods noted on autosmear but may have been an error- d/w Heme for manual to confirm  - likely DIC given oozing from sites and quick clotting in blood vials- cont to trend coags  - tx prbc, ffp and cryo  - GI bleeding overnight, stopped now, f/u GI reccs    Prognosis guarded. D/w  yesterday that her condition is very critical. he is aware and notes that his wife would not want to be vent dependent for a prolonged period of time. Will need to assess her improvement over the next few days .

## 2024-05-02 NOTE — CONSULT NOTE ADULT - ASSESSMENT
Kari Acosta is a 48 year old woman with complex medical history including multiple intrabdominal surgeries presenting with  hyperammonemia. Her metabolic work-up returned and is consisent with Ornithine Transcarbamylase (OTC) deficiency. 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 It is unclear why Kari has not had a previous episode of hyperammonemia during fasting around abdominal surgerys (though possibly related to the anabolic effects of insulin), but there is a clear prodromal period over the past 6 months when Kari has had decreased po intake and increased lethargy likely due to rising levels of ammonia. OTC is primarily treated with protein restriction (~RDA), nitrogen scavengers to increase urinary nitrogen excretion, and citrulline, an anapleurotic supplement needed for continued use of the urea cycle. During acute illness, patients with OTC require promotion of anabolism through high concentration of glucose, use of insulin (including insulin gtt if necessary), nitrogen removal via IV scavengers (ammonul) or RRT, and use of po citrulline or IV arginine to repopulate the components of the urea cycle.     Ms Acosta's ammonias have consistently been in the 120s-130s overnight. She remains on D5 and nitrogen scavengers have not been started. She has also been protein restricted for more than 48 hours, leading to likely muscle breakdown to replenish her body's nitrogen pool.     Genetic testing via [facility] Ornithine Transcarbamylase (OTC) Deficiency Test is recommended to confirm diagnosis.      Finding an answer for this patient could potentially impact management, shorten the length of the hospital stay, and prevent future hospital admissions over time.       The above plan was discussed with the patient’s partner, who were engaged in the conversation and asked appropriate questions that demonstrated a good understanding of the information discussed. Benefits and limitations of genetic testing, including turn-around-time, the possibility of variants of uncertain significance, and ACMG secondary findings were reviewed. The parent(s) agreed to proceed with our recommendations as detailed above.        Plan:   Hyperammonemia due to OTC deficiency  - Goal GIR 6-8 either through peripheral D10 or starting TPN  - Protein at RDA of 0.8mg/kg/d - likely through TPN due to GI bleed/pressor reqt  - Nitrogen removal via CRRT - if RRT is not indicated, recommend starting IV ammonul at 55ml/m^2 given over 24h (ok to skip loading dose)  - Anapleurotic agent: IV Arginine at 200mg/kg/d, given over 24 hour (ok to skip loading dose, be aware that argine is a vasodilator and may cause hypotension), if taking po, citrulline 3g/m^2/d divided TID  - Ammonia q6h until downtrend is established, then can space to q8>q12    OTC  -[facility] Ornithine Transcarbamylase (OTC) Deficiency Test     Results will be communicated to the primary care team and the family once available. Outpatient follow up will be determined accordingly.        Medical Genetics telemedicine consultation was provided by Columba Spencer MD, [facility].    On site team: [facility] [place]; [facility] [place]; [facility] [place]; [facility]EL Rinaldi

## 2024-05-02 NOTE — CHART NOTE - NSCHARTNOTEFT_GEN_A_CORE
Pt seen by me 5/2 morning in MICU  PEA cardiac arrest 4/30/2024  Reconsulted yesterday    mother and father at bedside  #ANKIT- likley ATN; cr uptrending, decreasing UO past few hours, oliguric  #Ammonia uptrending   #plan for CRRT today given above indications  #anemia-monitor hb;  #hypotension- on pressors per micu  d/w MICU/parents in detail

## 2024-05-02 NOTE — PROGRESS NOTE ADULT - ASSESSMENT
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 Patient had PEA arrest, ROSC after 10 minutes and 1 round of epinephrine, transferred to the MICU for further management.     PLAN  Neuro  #Encephalopathy  -patient reportedly w/worsening mental status prior to PEA arrest  -DDX remains broad and includes septic/metabolic encephalopathy given concern for sepsis and persistent hyperammonemia,   - obtaining CTH,   -will continue to rend NH3 levels, continuing rifaximin, increasing lactulose to q4 dosing to target at least 4 BMs daily  -propofol/fentanyl for sedation, daily sedation holds    Respiratory  #Acute hypoxemic respiratory failure  -suspect pulmonary edema given bilateral pleural effusions, bilateral diffuse b-lines on ultrasound, elevated BNP  -DDx also includes ARDS, PE given history of factor V leiden,   -patient now intubated w/improving FiO2 requirements favoring acutely reversible process such as pulmonary edema  -will diurese w/IV bumex  -obtaining CT angio to rule out PE  -antibiotics as below  - sending full RVP    Cardiovascular  #Shock  -likely vasoplegic and related to sedation as patient had normal blood pressure at the time of intubation  - EKG showing sinus tachycardia, troponin negative  - wean pressors as tolerated   -will repeat formal TTE  #Pulmonary HTN  -most recent TTE w/elevated PASP concerning for pulmonary HTN  -diuresing as above, will minimize PEEP    Renal  -diuresing as above to achieve net negative fluid balance  #Lactic acidosis  -lactate elevated, unclear if related to PEA arrest, repeating    GI  #Transaminitis  #Alcoholic hepatitis/alcoholic liver disease  #Hyperbilirubinemia  -patient w/fatty liver on imaging, no imaging evidence reporting cirrhosis at this point but previously seen by hepatology  -continue rifaximin/lactulose  #Ascites  -present on CT abdomen and bedside ultrasound, no adequate pocket for safe paracentesis at this point, on broad spectrum antibiotics empirically  - diuresing as above    Endo  -checking cortisol  -SSI    ID  -will send repeat blood/urine cultures as patient has new hypoxemic respiratory failure with elevated WBC, sending MRSA, fungitell, galactomannan  #Acetinobacter pneumonia  -patient w/carbapenem-resistant acenitobacter from sputum on 4/17  -will start cefiderocol per ID recommendations, adding flagyl for anaerobic coverage, continuing minocylcine for acenitobacter  -will consider repeat bronchoscopy w/BAL    Hem-Onc  #Leukocytosis  -evaluating for infectious causes as above, on broad spectrum antibiotics   #History of Factor V Leiden, RA thrombus 2018, L peroneal DVT 2022  -patient reportedly not on AC as prior DVT studies negative  -LE duplex negative 4/16/24  -prophylactic dose heparin for now pending further work up    CODE Status: FULL  DVT prophylaxis: heparin sub q   48y female with PMH DM1, gout, Factor V Leiden c/b provoked DVT and RA thrombus in the past, congenital pancreatic divisum c/b recurrent pancreatitis s/p total pancreatectomy s/p islet cell autotransplant, extensive abdominal surgical history, (cholecystectomy, splenectomy, celina-en-y pancreaticojejuonostomy) c/b 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 Patient had PEA arrest, ROSC after 10 minutes and 1 round of epinephrine, transferred to the MICU for further management.     PLAN  Neuro  #Encephalopathy iso Hyperammonemia   -patient reportedly w/worsening mental status prior to PEA arrest  -DDX remains broad and includes septic/metabolic encephalopathy given concern for sepsis and persistent hyperammonemia,   - obtaining CTH,   -will continue to rend NH3 levels, continuing rifaximin, increasing lactulose to q4 dosing to target at least 4 BMs daily  -propofol/fentanyl for sedation, daily sedation holds    Respiratory  #Acute hypoxemic respiratory failure  -suspect pulmonary edema given bilateral pleural effusions, bilateral diffuse b-lines on ultrasound, elevated BNP  -DDx also includes ARDS, PE given history of factor V leiden,   -patient now intubated w/improving FiO2 requirements favoring acutely reversible process such as pulmonary edema  -will diurese w/IV bumex and Metolazone   - Chest CT negative for PE   -antibiotics as below  - RVP negative    Cardiovascular  #Shock  -likely vasoplegic and related to sedation as patient had normal blood pressure at the time of intubation  - EKG showing sinus tachycardia, troponin negative  - wean pressors as tolerated   - TTE showing normal LV function    #Pulmonary HTN  -most recent TTE w/elevated PASP concerning for pulmonary HTN  - PA systolic pressure 38mmhg  -diuresing as above, will minimize PEEP    Renal  -diuresing as above to achieve net negative fluid balance  - Bumex Drip and Metolazone;   #Lactic acidosis  -lactate elevated, unclear if related to PEA arrest, repeating    GI  #Transaminitis  #Alcoholic hepatitis/alcoholic liver disease  #Hyperbilirubinemia  #Ornithine Transcarbamylase Deficiency   -patient w/fatty liver on imaging, no imaging evidence reporting cirrhosis at this point but previously seen by hepatology  -continue rifaximin/lactulose  -Low-protein tube feed diet per Genetics team    #Lower GI Bleed  - iso DIC   - GI consulted; s/p cryo and plasma   - no bloody output in upper GI residuals    #Ascites  -present on CT abdomen and bedside ultrasound, no adequate pocket for safe paracentesis at this point, on broad spectrum antibiotics empirically  - diuresing as above    Endo  - AM cortisol WNL  - Lantus 2U AM; ROSI     ID  -will send repeat blood/urine cultures as patient has new hypoxemic respiratory failure with elevated WBC, sending MRSA, fungitell, galactomannan  #Acetinobacter pneumonia  -patient w/carbapenem-resistant acenitobacter from sputum on 4/17  -will start cefiderocol per ID recommendations, adding flagyl for anaerobic coverage, continuing minocylcine for acenitobacter  -Caspofungin until BAL is back     Hem-Onc  #Leukocytosis  -evaluating for infectious causes as above, on broad spectrum antibiotics and Antifungal  #History of Factor V Leiden, RA thrombus 2018, L peroneal DVT 2022  -patient reportedly not on AC as prior DVT studies negative  -LE duplex negative 4/16/24    #DIC   - Blood draws coagulating iso Fibrinogen low to 100s, Elevating D-dimer   - s/p 1U Plasma and Cryo  - Hold Heparin Drip       CODE Status: FULL  DVT prophylaxis: Hold iso DIC  48y female with PMH DM1, gout, Factor V Leiden c/b provoked DVT and RA thrombus in the past, congenital pancreatic divisum c/b recurrent pancreatitis s/p total pancreatectomy s/p islet cell autotransplant, extensive abdominal surgical history, (cholecystectomy, splenectomy, celina-en-y pancreaticojejuonostomy) c/b 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 Patient had PEA arrest, ROSC after 10 minutes and 1 round of epinephrine, transferred to the MICU for further management.     PLAN  Neuro  #Encephalopathy iso Hyperammonemia   -patient reportedly w/worsening mental status prior to PEA arrest  -DDX remains broad and includes septic/metabolic encephalopathy given concern for sepsis and persistent hyperammonemia,   - Trend Ammonia Q6, on CRRT, holding Lactulose and Xifaxan iso Lower GI bleed   -propofol for sedation, daily sedation holds    Respiratory  #Acute hypoxemic respiratory failure  -suspect pulmonary edema given bilateral pleural effusions, bilateral diffuse b-lines on ultrasound, elevated BNP  -patient now intubated w/improving FiO2 requirements favoring acutely reversible process such as pulmonary edema  -CRRT; urine output decreasing while on Bumex gtt and Metolazone   - Chest CT negative for PE   - antibiotics as below    Cardiovascular  #Shock  - likely vasoplegic and related to sedation as patient had normal blood pressure at the time of intubation  - EKG showing sinus tachycardia, troponin negative  - wean pressors as tolerated   - TTE showing normal LV function w/ elevated PA pressure     #Pulmonary HTN  -most recent TTE w/elevated PASP concerning for pulmonary HTN  - PA systolic pressure 38mmhg  -diuresing as above, will minimize PEEP    Renal  #ANKIT  - Creatinine Tripled from baseline   - CRRT, s/p Bumex Drip and Metolazone w/ reducing urine output     #Lactic acidosis  -lactate elevated but downtrending     GI  #Transaminitis  #Alcoholic hepatitis/alcoholic liver disease  #Hyperbilirubinemia  -patient w/fatty liver on imaging, no imaging evidence reporting cirrhosis at this point but previously seen by hepatology  -Hold rifaximin/lactulose  -CRRT for Ammonia     #Ornithine Transcarbamylase Deficiency   - Low-protein tube feed diet per Genetics team; holding feeds due to lower GI bleed   - Arginine 200mg/kg/day via central line   - Genetics team recommending TPN; not feasible at this time given infection risk    #Lower GI Bleed  - iso DIC; possibly 2/2 ischemia 2/2 PEA arrest    - GI consulted; s/p cryo and plasma   - no bloody output in upper GI residuals  - Keep Fibrinogen >150     #Ascites  -present on CT abdomen and bedside ultrasound, no adequate pocket for safe paracentesis at this point, on broad spectrum antibiotics empirically  - diuresing as above    Endo  - AM cortisol WNL  - Lantus 2U AM; ROSI     ID  -will send repeat blood/urine cultures as patient has new hypoxemic respiratory failure with elevated WBC,  fungitell, galactomannan  -Caspofungin until BAL returns      #Acetinobacter pneumonia  -patient w/carbapenem-resistant acenitobacter from sputum on 4/17  -will start cefiderocol per ID recommendations, adding flagyl for anaerobic coverage, continuing minocylcine for Acinetobacter    Hem-Onc  #Leukocytosis  -evaluating for infectious causes as above, on broad spectrum antibiotics and Antifungal  #History of Factor V Leiden, RA thrombus 2018, L peroneal DVT 2022  -LE duplex negative 4/16/24    #DIC   - Blood draws coagulating iso Fibrinogen low to 100s, Elevating D-dimer   - s/p 1U PRBC, 2U Plasma and 1U Cryo  - Hold Heparin Drip   - Heme consulted for presence of Andressa rods on Smear; confirmed it is artifact.   - Keep Fibrinogen >150      CODE Status: FULL  DVT prophylaxis: Hold iso DIC

## 2024-05-02 NOTE — CHART NOTE - NSCHARTNOTEFT_GEN_A_CORE
: Aníbal Ross    INDICATION: POCUS    PROCEDURE:  [x] LIMITED ECHO  [x] LIMITED CHEST  [ ] LIMITED RETROPERITONEAL  [ ] LIMITED ABDOMINAL  [ ] LIMITED DVT  [ ] NEEDLE GUIDANCE VASCULAR  [ ] NEEDLE GUIDANCE THORACENTESIS  [ ] NEEDLE GUIDANCE PARACENTESIS  [ ] NEEDLE GUIDANCE PERICARDIOCENTESIS  [ ] OTHER    FINDINGS:  Scattered B lines R>L, atelectatic lung at R base, Small pleff at L base. Grossly normal systolic function, equally concentric wall motion.     INTERPRETATION:  Exam c/w pulm edema and no gross cardiac limitations.

## 2024-05-02 NOTE — CHART NOTE - NSCHARTNOTEFT_GEN_A_CORE
Interim Nutrition Note    Chart reviewed and events noted. See full assessment from 5/1. RD spoke with resident (Galina), Genetics MD (Nick), and Genetics RD (Weil). Given pt's Ornithine transcarbamylase (OTC) deficiency pt needs a lower protein diet (0.5-0.8 gm/kg) however, if pt on continuos renal replacement can increase protein (1.0 gm/kg). Pt with lower GI bleed, feeds currently on hold. Per Genetics, pt requires a delicate balance of protein as too much can increase ammonia however, too little can cause a bad catabolic cycle. Ammonia currently trending up, plan to start continuos renal replacement. Current plan is to supplement Arginine for pt and if able to feed via GI tract will start on Vital 1.5 while pt on continuos renal replacement. Pt with Hx of refusing TPN during this admission (however, indicated per genetics).     Pt made aware RD to remain available.   Shannon Izaguirre, MS, RD, CDN, CNSC, [facility] TEAMS

## 2024-05-03 NOTE — PHARMACOTHERAPY INTERVENTION NOTE - COMMENTS
JILLIAN Acosta is a 48y F w complicated surgical history including distal pancreatectomy, cholecystectomy, splenectomy and celina-en-y pancreaticojejuonostomy (02/2014) and S/P total pancreatectomy with islet cell autotransplant  in 04/2018 c/b abdominal infections.  Now prolonged hospitalization s/p MICU stay and RRT called on 4/30.     Had Acinetobacter on ET tube sputum culture prior to extubation on 4/17 so empirically started on antibiotics (high dose Unasyn and minocycline) for Acinetobacter on 4/27. Had RRT called on 4/30 required intubation and transfer to MICU and antibiotics broadened.  Blood cultures and Bronch/BAL have been largely unrevealing (only Candida on BAL which would not require treatment on its own).    Communicated with Dr. Gore/ID - plan to complete 5 days of empiric cefiderocol on (4/30 - 5/5) along with metronidazole.  D/c minocycline and caspofungin and likely d/c linezolid as well.    Thank you,  Lela Jurado, PharmD, BCIDP  Clinical Pharmacist, Infectious Diseases  Available via Teams   Cell: [phone]

## 2024-05-03 NOTE — CONSULT NOTE ADULT - PROVIDER SPECIALTY LIST ADULT
Podiatry
Rehab Medicine
Colorectal Surgery
Surgery
Heme/Onc
Hepatology
Nutrition Support
Heme/Onc
Genetics/Metabolism
Transplant ID
Nephrology
Endocrinology

## 2024-05-03 NOTE — CONSULT NOTE ADULT - ATTENDING SUPERVISION STATEMENT
Referred by: Kaushik Rodriguez MD; Medical Diagnosis (from order):    Diagnosis Information      Diagnosis    719.41 (ICD-9-CM) - M25.511 (ICD-10-CM) - Right shoulder pain, unspecified chronicity                Physical Therapy -  Daily Treatment Note    Visit:  7     SUBJECTIVE                                                                                                             Pt reports he feels his pain has been improving. He feels that he is 60-70% better, where 100% would be pain free.           Functional Change: Pt reports that he is sleeping better    Pain / Symptoms:  Pain rating (out of 10): Current: 3     OBJECTIVE                                                                                                                          TREATMENT                                                                                                                  Therapeutic Exercise:  Pulley's for scaption ROM  Standing wall slides for flexion ROM RUE  Reach and roll x 15, pain with extension despite cues to avoid  SL RUE ER- 2 x 10  No wt; pain is provoked with adding 2 #   Standing bent row, no wt x 15  Ball on wall- 2 x 30 s in 120 of flexion  Scapular squeezes, 10 s hold, 1 x 15      Manual Therapy:  Posterior mob, gr III-IV  Inferior mob, gr III-IV  Flexion gr II - III  Abduction gr II-III    Skilled input: verbal instruction/cues, posture correction and tactile instruction/cues    Writer verbally educated and received verbal consent for hand placement, positioning of patient, and techniques to be performed today from patient for clothing adjustments for techniques, therapist position for techniques and hand placement and palpation for techniques as described above and how they are pertinent to the patient's plan of care.    Home Exercise Program: (*above indicates provided as part of home exercise program)  A Day  Seated Scapular Retraction - to fatigue - 10 hold - 3x daily - 7x weekly   Seated 
Shoulder Flexion Towel Slide at Table Top - 10 reps - 3 sets - 2x daily - 7x weekly  Seated Shoulder Abduction Towel Slide at Table Top - 10 reps - 3 sets - 2x daily - 7x weekly    B Day  Seated Scapular Retraction - to fatigue - 10 hold - 3x daily - 7x weekly   Standing Bent Over Single Arm Scapular Row with Table Support - to fatigue - 3 sets - 2x daily - 7x weekly  Clockwise/counterclockwise ball on wall- 2x daily- 45s/30s- 3 sets- 7x weekly  Pain free scaption    Horizontal abduction against green band (Held)        ASSESSMENT                                                                                                                 Patient's symptoms were reported as increased toward the end of session. Overall, pt's pain appears to be well managed during the week with HEP, however as we attempt to progress in session pt reports increasing pain. We will continue to work on functional strength and ROM.    Pain/symptoms after session: 5 and 6   PLAN                                                                                                                             Suggestions for next session as indicated: Progress per plan of care; work on scap stability, functional strength as tolerated.        Procedures and total treatment time documented Time Entry flowsheet.    
Resident
Fellow
Resident
Fellow
Fellow

## 2024-05-03 NOTE — PROGRESS NOTE ADULT - NUTRITIONAL ASSESSMENT
This patient has been assessed with a concern for Malnutrition and has been determined to have a diagnosis/diagnoses of Severe protein-calorie malnutrition and Underweight (BMI < 19).    This patient is being managed with:   Diet NPO-  Entered: May  2 2024 10:32AM

## 2024-05-03 NOTE — PROGRESS NOTE ADULT - PROBLEM SELECTOR PLAN 2
Patient with lactic acidosis likely due to post-cardiac arrest on 4/30 and underlying infection. Lactate noted to be 10.5 on 4/30 post code, and remains elevated to 5.7 today. VBG pH of 7.39. Monitor serum bicarb and VBG    If you have any questions, please feel free to contact me  Pan Walker  Nephrology Fellow  [phone]; Prefer [employer] TEAMS  (After 5pm or on weekends please page the on-call fellow).

## 2024-05-03 NOTE — PROGRESS NOTE ADULT - SUBJECTIVE AND OBJECTIVE BOX
INTERVAL HPI/OVERNIGHT EVENTS:    SUBJECTIVE: Patient seen and examined at bedside.     ROS: All negative except as listed above.    VITAL SIGNS:  ICU Vital Signs Last 24 Hrs  T(C): 36.6 (03 May 2024 04:00), Max: 40 (02 May 2024 12:00)  T(F): 97.9 (03 May 2024 04:00), Max: 104 (02 May 2024 12:00)  HR: 109 (03 May 2024 06:30) (80 - 114)  BP: 111/56 (03 May 2024 06:30) (76/42 - 160/72)  BP(mean): 79 (03 May 2024 06:30) (54 - 103)  ABP: --  ABP(mean): --  RR: 16 (03 May 2024 06:30) (16 - 31)  SpO2: 100% (03 May 2024 06:30) (98% - 100%)    O2 Parameters below as of 03 May 2024 05:59  Patient On (Oxygen Delivery Method): ventilator          Mode: AC/ CMV (Assist Control/ Continuous Mandatory Ventilation), RR (machine): 16, TV (machine): 400, FiO2: 40, PEEP: 5, ITime: 1, MAP: 9, PIP: 25  Plateau pressure:   P/F ratio:     05-02 @ 07:01  -  05-03 @ 07:00  --------------------------------------------------------  IN: 4689.1 mL / OUT: 2491 mL / NET: 2198.1 mL      CAPILLARY BLOOD GLUCOSE      POCT Blood Glucose.: 138 mg/dL (02 May 2024 22:30)      ECG: reviewed.    PHYSICAL EXAM:    GENERAL: NAD, intubated and sedated   HEAD:  Atraumatic, normocephalic  EYES: Preferred upward gaze. PERRLA, conjunctiva and sclera clear  ENT: Moist mucous membranes  NECK: Supple, no JVD  HEART: S1, S2, regular rate and rhythm, no murmurs, rubs, or gallops  LUNGS: Unlabored respirations.  Clear to auscultation bilaterally, no crackles, wheezing, or rhonchi  ABDOMEN: Soft, moderately distended, +BS  EXTREMITIES: 2+ peripheral pulses bilaterally. No clubbing, cyanosis, or edema  NERVOUS SYSTEM:  Intubated and sedated    SKIN: No rashes or lesions    MEDICATIONS:  MEDICATIONS  (STANDING):  albuterol/ipratropium for Nebulization 3 milliLiter(s) Nebulizer every 6 hours  arginine IVPB. 2 Gram(s) IV Intermittent every 4 hours  caspofungin IVPB 50 milliGRAM(s) IV Intermittent every 24 hours  caspofungin IVPB      cefiderocol IVPB 2000 milliGRAM(s) IV Intermittent every 8 hours  chlorhexidine 0.12% Liquid 15 milliLiter(s) Oral Mucosa every 12 hours  chlorhexidine 4% Liquid 1 Application(s) Topical <User Schedule>  CRRT Treatment    <Continuous>  insulin glargine Injectable (LANTUS) 2 Unit(s) SubCutaneous <User Schedule>  insulin lispro (ADMELOG) corrective regimen sliding scale   SubCutaneous Before meals and at bedtime  lidocaine   4% Patch 1 Patch Transdermal daily  linezolid  IVPB 600 milliGRAM(s) IV Intermittent every 12 hours  linezolid  IVPB      metroNIDAZOLE  IVPB 500 milliGRAM(s) IV Intermittent every 12 hours  minocycline IVPB 200 milliGRAM(s) IV Intermittent every 12 hours  norepinephrine Infusion 0.05 MICROgram(s)/kG/Min (5.85 mL/Hr) IV Continuous <Continuous>  pantoprazole  Injectable 40 milliGRAM(s) IV Push every 12 hours  PrismaSATE Dialysate BGK 4 / 2.5 5000 milliLiter(s) (1000 mL/Hr) CRRT <Continuous>  PrismaSOL Filtration BGK 4 / 2.5 5000 milliLiter(s) (800 mL/Hr) CRRT <Continuous>  PrismaSOL Filtration BGK 4 / 2.5 5000 milliLiter(s) (200 mL/Hr) CRRT <Continuous>  propofol Infusion 30 MICROgram(s)/kG/Min (11.2 mL/Hr) IV Continuous <Continuous>  sodium chloride 3%  Inhalation 4 milliLiter(s) Inhalation every 12 hours    MEDICATIONS  (PRN):  fentaNYL    Injectable 25 MICROGram(s) IV Push every 2 hours PRN Moderate Pain (4 - 6)      ALLERGIES:  Allergies    No Known Allergies    Intolerances        LABS:                        7.9    26.95 )-----------( 69       ( 03 May 2024 05:45 )             23.1     05-03    142  |  104  |  5<L>  ----------------------------<  90  3.8   |  19<L>  |  0.93    Ca    8.4      03 May 2024 05:45  Phos  2.9     05-03  Mg     2.2     05-03    TPro  5.3<L>  /  Alb  2.9<L>  /  TBili  3.7<H>  /  DBili  x   /  AST  63<H>  /  ALT  6<L>  /  AlkPhos  140<H>  05-03    PT/INR - ( 03 May 2024 05:45 )   PT: 22.8 sec;   INR: 2.22 ratio         PTT - ( 03 May 2024 05:45 )  PTT:45.3 sec  Urinalysis Basic - ( 03 May 2024 05:45 )    Color: x / Appearance: x / SG: x / pH: x  Gluc: 90 mg/dL / Ketone: x  / Bili: x / Urobili: x   Blood: x / Protein: x / Nitrite: x   Leuk Esterase: x / RBC: x / WBC x   Sq Epi: x / Non Sq Epi: x / Bacteria: x      ABG:  pH, Arterial: 7.42 (05-02-24 @ 11:17)  pCO2, Arterial: 34 mmHg (05-02-24 @ 11:17)  pO2, Arterial: 144 mmHg (05-02-24 @ 11:17)      vBG:  pH, Venous: 7.39 (05-03-24 @ 00:18)  pCO2, Venous: 39 mmHg (05-03-24 @ 00:18)  pO2, Venous: 87 mmHg (05-03-24 @ 00:18)  HCO3, Venous: 24 mmol/L (05-03-24 @ 00:18)  pH, Venous: 7.43 (05-02-24 @ 18:18)  pCO2, Venous: 34 mmHg (05-02-24 @ 18:18)  pO2, Venous: 63 mmHg (05-02-24 @ 18:18)  HCO3, Venous: 23 mmol/L (05-02-24 @ 18:18)  pH, Venous: 7.40 (05-02-24 @ 15:42)  pCO2, Venous: 40 mmHg (05-02-24 @ 15:42)  pO2, Venous: 53 mmHg (05-02-24 @ 15:42)  HCO3, Venous: 25 mmol/L (05-02-24 @ 15:42)  pH, Venous: 7.39 (05-02-24 @ 13:40)  pCO2, Venous: 39 mmHg (05-02-24 @ 13:40)  pO2, Venous: 55 mmHg (05-02-24 @ 13:40)  HCO3, Venous: 24 mmol/L (05-02-24 @ 13:40)    Micro:    Culture - Blood (collected 05-01-24 @ 17:00)  Source: .Blood Blood-Peripheral  Preliminary Report (05-02-24 @ 22:02):    No growth at 24 hours    Culture - Blood (collected 04-30-24 @ 20:20)  Source: .Blood Blood-Peripheral  Preliminary Report (05-03-24 @ 01:02):    No growth at 48 Hours    Culture - Blood (collected 04-28-24 @ 11:27)  Source: .Blood Blood-Peripheral  Preliminary Report (05-02-24 @ 17:01):    No growth at 4 days    Culture - Blood (collected 04-18-24 @ 17:53)  Source: .Blood Blood-Venous  Final Report (04-23-24 @ 23:00):    No growth at 5 days    Culture - Blood (collected 04-18-24 @ 17:53)  Source: .Blood Blood-Venous  Final Report (04-23-24 @ 23:00):    No growth at 5 days    Culture - Blood (collected 04-13-24 @ 04:00)  Source: .Blood Blood-Venous  Final Report (04-18-24 @ 10:01):    No growth at 5 days    Culture - Blood (collected 04-13-24 @ 03:25)  Source: .Blood Blood-Peripheral  Final Report (04-18-24 @ 10:01):    No growth at 5 days        Culture - Sputum (collected 04-17-24 @ 12:11)  Source: ET Tube ET Tube  Gram Stain (04-17-24 @ 22:40):    Few polymorphonuclear leukocytes per low power field    Few Squamous epithelial cells per low power field    Moderate Gram positive cocci in pairs per oil power field    Few Gram Negative Coccobacilli per oil power field  Final Report (04-23-24 @ 07:24):    Numerous Acinetobacter baumannii/nosocom group (Carbapenem Resistant)    Normal Respiratory Linh present    cefiderocol interpretations based on FDA breakpoints.  Organism: Acinetobacter baumannii/nosocom group (Carbapenem Resistant) (04-23-24 @ 07:24)      -  Minocycline: S      -  Cefiderocol: S      -  Piperacillin/Tazobactam: R      Method Type: KB  Organism: Acinetobacter baumannii/nosocom group (Carbapenem Resistant) (04-23-24 @ 07:24)      -  Levofloxacin: R >4      -  Tobramycin: S <=2      -  Gentamicin: R >8      -  Cefepime: I 16      -  Ciprofloxacin: R >2      -  Imipenem: R >8      Method Type: KLARISSA      -  Meropenem: R >8      -  Ampicillin/Sulbactam: I 16/8      -  Ceftazidime: R >16      -  Amikacin: R >32      -  Polymyxin B: I 0.25      -  Trimethoprim/Sulfamethoxazole: R >2/38      -  Tigecycline: S <=2  Organism: Acinetobacter baumannii/nosocom group (Carbapenem Resistant) (04-20-24 @ 14:42)    Culture - Sputum (collected 04-13-24 @ 04:32)  Source: ET Tube ET Tube  Gram Stain (04-13-24 @ 14:29):    Moderate polymorphonuclear leukocytes seen per low power field    No organisms seen  Final Report (04-14-24 @ 16:06):    Normal Respiratory Linh present        RADIOLOGY & ADDITIONAL TESTS: Reviewed.

## 2024-05-03 NOTE — PROGRESS NOTE ADULT - SUBJECTIVE AND OBJECTIVE BOX
DIABETES FOLLOW UP NOTE: Saw pt earlier today    Chief Complaint: Endocrine consult requested for management of T2DM    INTERVAL HX: Saw pt in MICU, noted events in the last 24 hours. Pt is now NPO and remains intubated/ sedated/ on pressors/ CVVH. BGs at goal but tightly controlled per values in BMP. No POC bg done today. Remains on low dose lantus insulin only. No hypoglycemia.         Review of Systems:  General: As above  Unable      Allergies    No Known Allergies    Intolerances      MEDICATIONS:  cefiderocol IVPB 2000 milliGRAM(s) IV Intermittent every 8 hours  insulin glargine Injectable (LANTUS) 2 Unit(s) SubCutaneous <User Schedule>  insulin lispro (ADMELOG) corrective regimen sliding scale   SubCutaneous every 6 hours  linezolid  IVPB 600 milliGRAM(s) IV Intermittent every 12 hours      metroNIDAZOLE  IVPB 500 milliGRAM(s) IV Intermittent every 12 hours  norepinephrine Infusion 0.05 MICROgram(s)/kG/Min (5.85 mL/Hr) IV Continuous <Continuous>  vasopressin Infusion 0.04 Unit(s)/Min (6 mL/Hr) IV Continuous <Continuous>      PHYSICAL EXAM:  VITALS: T(C): 36.1 (05-03-24 @ 19:00)  T(F): 97 (05-03-24 @ 19:00), Max: 98.1 (05-03-24 @ 00:00)  HR: 85 (05-03-24 @ 19:30) (80 - 118)  BP: 126/55 (05-03-24 @ 19:30) (76/42 - 172/75)  RR:  (16 - 40)  SpO2:  (96% - 100%)  Wt(kg): --  GENERAL: Female laying in bed in NAD.   HEENT: Intubated  Abdomen: Soft, nontender, non distended  Extremities: Warm, edema noted in hands and feet   NEURO: Pt is sedated      LABS:    BG PER BMP  5/3 : 90>107>85  POCT Blood Glucose.: 138 mg/dL (05-02-24 @ 22:30)  POCT Blood Glucose.: 140 mg/dL (05-02-24 @ 17:06)  POCT Blood Glucose.: 200 mg/dL (05-02-24 @ 11:07)  POCT Blood Glucose.: 192 mg/dL (05-02-24 @ 09:30)  POCT Blood Glucose.: 248 mg/dL (05-01-24 @ 09:03)  POCT Blood Glucose.: 138 mg/dL (05-01-24 @ 05:27)  POCT Blood Glucose.: 158 mg/dL (05-01-24 @ 00:49)                            7.9    27.78 )-----------( 38       ( 03 May 2024 17:55 )             23.0       05-03    141  |  103  |  6<L>  ----------------------------<  85  3.5   |  17<L>  |  0.99    eGFR: 70    Ca    7.8<L>      05-03  Mg     2.1     05-03  Phos  3.7     05-03    TPro  4.8<L>  /  Alb  2.8<L>  /  TBili  3.6<H>  /  DBili  x   /  AST  66<H>  /  ALT  18  /  AlkPhos  143<H>  05-03      Thyroid Function Tests:  04-30 @ 11:09 TSH 2.90 FreeT4 1.1 T3 -- Anti TPO -- Anti Thyroglobulin Ab -- TSI --  04-23 @ 14:44 TSH -- FreeT4 -- T3 -- Anti TPO 60.7 Anti Thyroglobulin Ab -- TSI --      A1C with Estimated Average Glucose Result: 5.2 % (04-13-24 @ 03:56)      Estimated Average Glucose: 103 mg/dL (04-13-24 @ 03:56)        05-03 Chol 105 Direct LDL -- LDL calculated 48 HDL 10<L> Trig 307<H>, 04-24 Chol 131 Direct LDL -- LDL calculated 103<H> HDL 7<L> Trig 109, 04-13 Chol 82 Direct LDL -- LDL calculated 57 HDL 8<L> Trig 82

## 2024-05-03 NOTE — PROGRESS NOTE ADULT - ATTENDING COMMENTS
[facility] cardiac arrest 4/30/2024  Reconsulted        #ANKIT- roque ATN; cr uptrending, oliguric  started CRRT  plan to continue CRRT  #hyperammonia- monitor ammonia levels;   #anemia-monitor hb; GI follow up  #hypotension- on pressors per micu  d/w MICU in detail.

## 2024-05-03 NOTE — PROGRESS NOTE ADULT - SUBJECTIVE AND OBJECTIVE BOX
INFECTIOUS DISEASES FOLLOW UP-- Alyssa Gore  [phone]    This is a follow up note for this  48yFemale with  Liver failure without hepatic coma  rectal bleeding this AM  undergoing colonoscopy- circumferential rectal ulcer noted        ROS:  CONSTITUTIONAL:  Non interactive      Allergies    No Known Allergies    Intolerances        ANTIBIOTICS/RELEVANT:  antimicrobials  caspofungin IVPB 50 milliGRAM(s) IV Intermittent every 24 hours  caspofungin IVPB      cefiderocol IVPB 2000 milliGRAM(s) IV Intermittent every 8 hours  linezolid  IVPB 600 milliGRAM(s) IV Intermittent every 12 hours  linezolid  IVPB      metroNIDAZOLE  IVPB 500 milliGRAM(s) IV Intermittent every 12 hours  minocycline IVPB 200 milliGRAM(s) IV Intermittent every 12 hours    immunologic:    OTHER:  albuterol/ipratropium for Nebulization 3 milliLiter(s) Nebulizer every 6 hours  arginine IVPB. 2 Gram(s) IV Intermittent every 4 hours  chlorhexidine 0.12% Liquid 15 milliLiter(s) Oral Mucosa every 12 hours  chlorhexidine 4% Liquid 1 Application(s) Topical <User Schedule>  CRRT Treatment    <Continuous>  fentaNYL    Injectable 25 MICROGram(s) IV Push every 2 hours PRN  fentaNYL    Injectable 50 MICROGram(s) IV Push every 30 minutes PRN  insulin glargine Injectable (LANTUS) 2 Unit(s) SubCutaneous <User Schedule>  insulin lispro (ADMELOG) corrective regimen sliding scale   SubCutaneous every 6 hours  ketamine Injectable 100 milliGRAM(s) IV Push every 30 minutes PRN  lidocaine   4% Patch 1 Patch Transdermal daily  norepinephrine Infusion 0.05 MICROgram(s)/kG/Min IV Continuous <Continuous>  pantoprazole  Injectable 40 milliGRAM(s) IV Push every 12 hours  Phoxillum Filtration BK 4 / 2.5 5000 milliLiter(s) CRRT <Continuous>  PrismaSOL Filtration BGK 4 / 2.5 5000 milliLiter(s) CRRT <Continuous>  PrismaSOL Filtration BGK 4 / 2.5 5000 milliLiter(s) CRRT <Continuous>  propofol Infusion 30 MICROgram(s)/kG/Min IV Continuous <Continuous>  sodium chloride 3%  Inhalation 4 milliLiter(s) Inhalation every 12 hours  vasopressin Infusion 0.04 Unit(s)/Min IV Continuous <Continuous>      Objective:  Vital Signs Last 24 Hrs  T(C): 36.1 (03 May 2024 12:00), Max: 36.7 (03 May 2024 00:00)  T(F): 97 (03 May 2024 12:00), Max: 98.1 (03 May 2024 00:00)  HR: 98 (03 May 2024 13:45) (80 - 118)  BP: 117/58 (03 May 2024 13:45) (76/42 - 160/72)  BP(mean): 83 (03 May 2024 13:45) (54 - 103)  RR: 24 (03 May 2024 13:45) (16 - 32)  SpO2: 100% (03 May 2024 13:45) (98% - 100%)    Parameters below as of 03 May 2024 11:47  Patient On (Oxygen Delivery Method): ventilator        PHYSICAL EXAM:  Constitutional:no acute distress  Eyes:ANURAG, EOMI  Ear/Nose/Throat: no oral lesions, 	  Respiratory: clear BL  Cardiovascular: S1S2  Gastrointestinal:soft, (+) BS, no tenderness  Extremities:no e/e/c  No Lymphadenopathy  IV sites not inflammed.    LABS:                        7.8    29.50 )-----------( 55       ( 03 May 2024 11:03 )             23.5     05-03    141  |  102  |  6<L>  ----------------------------<  107<H>  3.9   |  15<L>  |  1.07    Ca    8.4      03 May 2024 11:03  Phos  3.7     05-03  Mg     2.3     05-03    TPro  5.1<L>  /  Alb  2.8<L>  /  TBili  3.8<H>  /  DBili  x   /  AST  67<H>  /  ALT  <5<L>  /  AlkPhos  149<H>  05-03    PT/INR - ( 03 May 2024 11:03 )   PT: 24.0 sec;   INR: 2.23 ratio         PTT - ( 03 May 2024 11:03 )  PTT:46.6 sec  Urinalysis Basic - ( 03 May 2024 11:03 )    Color: x / Appearance: x / SG: x / pH: x  Gluc: 107 mg/dL / Ketone: x  / Bili: x / Urobili: x   Blood: x / Protein: x / Nitrite: x   Leuk Esterase: x / RBC: x / WBC x   Sq Epi: x / Non Sq Epi: x / Bacteria: x        MICROBIOLOGY:            RECENT CULTURES:  05-01 @ 17:00  .Blood Blood-Peripheral  --  --  --    No growth at 24 hours  --  05-01 @ 01:45  .Bronchial BAL  --  --  --    Testing in progress  --  04-30 @ 20:20  .Blood Blood-Peripheral  --  --  --    No growth at 48 Hours  --  04-28 @ 11:27  .Blood Blood-Peripheral  --  --  --    No growth at 4 days  --      RADIOLOGY & ADDITIONAL STUDIES:    < from: Xray Chest 1 View- PORTABLE-Urgent (Xray Chest 1 View- PORTABLE-Urgent .) (05.02.24 @ 17:05) >  IMPRESSION:  Interval placement of left subclavian catheter with tip in SVC.  Bibasilar hazy airspace opacities likely pulmonary edema.  Small left effusion.    < end of copied text >   INFECTIOUS DISEASES FOLLOW UP-- Alyssa Gore  [phone]    This is a follow up note for this  48yFemale with  Liver failure without hepatic coma  rectal bleeding this AM  undergoing colonoscopy- circumferential rectal ulcer noted        ROS:  CONSTITUTIONAL:  Non interactive      Allergies    No Known Allergies    Intolerances        ANTIBIOTICS/RELEVANT:  antimicrobials  caspofungin IVPB 50 milliGRAM(s) IV Intermittent every 24 hours  caspofungin IVPB      cefiderocol IVPB 2000 milliGRAM(s) IV Intermittent every 8 hours  linezolid  IVPB 600 milliGRAM(s) IV Intermittent every 12 hours  linezolid  IVPB      metroNIDAZOLE  IVPB 500 milliGRAM(s) IV Intermittent every 12 hours  minocycline IVPB 200 milliGRAM(s) IV Intermittent every 12 hours    immunologic:    OTHER:  albuterol/ipratropium for Nebulization 3 milliLiter(s) Nebulizer every 6 hours  arginine IVPB. 2 Gram(s) IV Intermittent every 4 hours  chlorhexidine 0.12% Liquid 15 milliLiter(s) Oral Mucosa every 12 hours  chlorhexidine 4% Liquid 1 Application(s) Topical <User Schedule>  CRRT Treatment    <Continuous>  fentaNYL    Injectable 25 MICROGram(s) IV Push every 2 hours PRN  fentaNYL    Injectable 50 MICROGram(s) IV Push every 30 minutes PRN  insulin glargine Injectable (LANTUS) 2 Unit(s) SubCutaneous <User Schedule>  insulin lispro (ADMELOG) corrective regimen sliding scale   SubCutaneous every 6 hours  ketamine Injectable 100 milliGRAM(s) IV Push every 30 minutes PRN  lidocaine   4% Patch 1 Patch Transdermal daily  norepinephrine Infusion 0.05 MICROgram(s)/kG/Min IV Continuous <Continuous>  pantoprazole  Injectable 40 milliGRAM(s) IV Push every 12 hours  Phoxillum Filtration BK 4 / 2.5 5000 milliLiter(s) CRRT <Continuous>  PrismaSOL Filtration BGK 4 / 2.5 5000 milliLiter(s) CRRT <Continuous>  PrismaSOL Filtration BGK 4 / 2.5 5000 milliLiter(s) CRRT <Continuous>  propofol Infusion 30 MICROgram(s)/kG/Min IV Continuous <Continuous>  sodium chloride 3%  Inhalation 4 milliLiter(s) Inhalation every 12 hours  vasopressin Infusion 0.04 Unit(s)/Min IV Continuous <Continuous>      Objective:  Vital Signs Last 24 Hrs  T(C): 36.1 (03 May 2024 12:00), Max: 36.7 (03 May 2024 00:00)  T(F): 97 (03 May 2024 12:00), Max: 98.1 (03 May 2024 00:00)  HR: 98 (03 May 2024 13:45) (80 - 118)  BP: 117/58 (03 May 2024 13:45) (76/42 - 160/72)  BP(mean): 83 (03 May 2024 13:45) (54 - 103)  RR: 24 (03 May 2024 13:45) (16 - 32)  SpO2: 100% (03 May 2024 13:45) (98% - 100%)    Parameters below as of 03 May 2024 11:47  Patient On (Oxygen Delivery Method): ventilator        PHYSICAL EXAM:  Constitutional:non responsive  Eyes:ANURAG, EOMI  Ear/Nose/Throat: no, bloody oral secretions	  Respiratory: clear BL  Cardiovascular: S1S2  Gastrointestinal: bloody stool  Extremities:no e/e/c  No Lymphadenopathy  IV sites not inflammed.    LABS:                        7.8    29.50 )-----------( 55       ( 03 May 2024 11:03 )             23.5     05-03    141  |  102  |  6<L>  ----------------------------<  107<H>  3.9   |  15<L>  |  1.07    Ca    8.4      03 May 2024 11:03  Phos  3.7     05-03  Mg     2.3     05-03    TPro  5.1<L>  /  Alb  2.8<L>  /  TBili  3.8<H>  /  DBili  x   /  AST  67<H>  /  ALT  <5<L>  /  AlkPhos  149<H>  05-03    PT/INR - ( 03 May 2024 11:03 )   PT: 24.0 sec;   INR: 2.23 ratio         PTT - ( 03 May 2024 11:03 )  PTT:46.6 sec  Urinalysis Basic - ( 03 May 2024 11:03 )    Color: x / Appearance: x / SG: x / pH: x  Gluc: 107 mg/dL / Ketone: x  / Bili: x / Urobili: x   Blood: x / Protein: x / Nitrite: x   Leuk Esterase: x / RBC: x / WBC x   Sq Epi: x / Non Sq Epi: x / Bacteria: x        MICROBIOLOGY:            RECENT CULTURES:  05-01 @ 17:00  .Blood Blood-Peripheral  --  --  --    No growth at 24 hours  --  05-01 @ 01:45  .Bronchial BAL  --  --  --    Testing in progress  --  04-30 @ 20:20  .Blood Blood-Peripheral  --  --  --    No growth at 48 Hours  --  04-28 @ 11:27  .Blood Blood-Peripheral  --  --  --    No growth at 4 days  --      RADIOLOGY & ADDITIONAL STUDIES:    < from: Xray Chest 1 View- PORTABLE-Urgent (Xray Chest 1 View- PORTABLE-Urgent .) (05.02.24 @ 17:05) >  IMPRESSION:  Interval placement of left subclavian catheter with tip in SVC.  Bibasilar hazy airspace opacities likely pulmonary edema.  Small left effusion.    < end of copied text >

## 2024-05-03 NOTE — PRE PROCEDURE NOTE - PRE PROCEDURE EVALUATION
Pre-Endoscopy Evaluation    Attending Physician:  Dr. Mata    Procedure: Flex sig/colonoscopy    Indication for Procedure & Pertinent History: hematochezia    PAST MEDICAL & SURGICAL HISTORY:      Allergies    No Known Allergies    Intolerances        Medications: MEDICATIONS  (STANDING):  albuterol/ipratropium for Nebulization 3 milliLiter(s) Nebulizer every 6 hours  arginine IVPB. 2 Gram(s) IV Intermittent every 4 hours  caspofungin IVPB      caspofungin IVPB 50 milliGRAM(s) IV Intermittent every 24 hours  cefiderocol IVPB 2000 milliGRAM(s) IV Intermittent every 8 hours  chlorhexidine 0.12% Liquid 15 milliLiter(s) Oral Mucosa every 12 hours  chlorhexidine 4% Liquid 1 Application(s) Topical <User Schedule>  CRRT Treatment    <Continuous>  insulin glargine Injectable (LANTUS) 2 Unit(s) SubCutaneous <User Schedule>  insulin lispro (ADMELOG) corrective regimen sliding scale   SubCutaneous every 6 hours  lidocaine   4% Patch 1 Patch Transdermal daily  linezolid  IVPB 600 milliGRAM(s) IV Intermittent every 12 hours  linezolid  IVPB      metroNIDAZOLE  IVPB 500 milliGRAM(s) IV Intermittent every 12 hours  minocycline IVPB 200 milliGRAM(s) IV Intermittent every 12 hours  norepinephrine Infusion 0.05 MICROgram(s)/kG/Min (5.85 mL/Hr) IV Continuous <Continuous>  pantoprazole  Injectable 40 milliGRAM(s) IV Push every 12 hours  Phoxillum Filtration BK 4 / 2.5 5000 milliLiter(s) (1000 mL/Hr) CRRT <Continuous>  PrismaSOL Filtration BGK 4 / 2.5 5000 milliLiter(s) (800 mL/Hr) CRRT <Continuous>  PrismaSOL Filtration BGK 4 / 2.5 5000 milliLiter(s) (200 mL/Hr) CRRT <Continuous>  propofol Infusion 30 MICROgram(s)/kG/Min (11.2 mL/Hr) IV Continuous <Continuous>  sodium chloride 3%  Inhalation 4 milliLiter(s) Inhalation every 12 hours  vasopressin Infusion 0.04 Unit(s)/Min (6 mL/Hr) IV Continuous <Continuous>    MEDICATIONS  (PRN):  fentaNYL    Injectable 50 MICROGram(s) IV Push every 30 minutes PRN For agitation during Scope  fentaNYL    Injectable 25 MICROGram(s) IV Push every 2 hours PRN Moderate Pain (4 - 6)  ketamine Injectable 100 milliGRAM(s) IV Push every 30 minutes PRN Pain or Agitation      Pertinent lab data:                        7.8    29.50 )-----------( 55       ( 03 May 2024 11:03 )             23.5     05-03    141  |  102  |  6<L>  ----------------------------<  107<H>  3.9   |  15<L>  |  1.07    Ca    8.4      03 May 2024 11:03  Phos  3.7     05-03  Mg     2.3     05-03    TPro  5.1<L>  /  Alb  2.8<L>  /  TBili  3.8<H>  /  DBili  x   /  AST  67<H>  /  ALT  <5<L>  /  AlkPhos  149<H>  05-03    PT/INR - ( 03 May 2024 11:03 )   PT: 24.0 sec;   INR: 2.23 ratio         PTT - ( 03 May 2024 11:03 )  PTT:46.6 sec  CARDIAC MARKERS ( 02 May 2024 01:16 )  x     / x     / 21 U/L / x     / x      CARDIAC MARKERS ( 02 May 2024 00:20 )  x     / x     / 20 U/L / x     / x                Physical Examination:  Daily     Daily   Vital Signs Last 24 Hrs  T(C): 36.1 (03 May 2024 12:00), Max: 36.7 (03 May 2024 00:00)  T(F): 97 (03 May 2024 12:00), Max: 98.1 (03 May 2024 00:00)  HR: 98 (03 May 2024 13:45) (80 - 118)  BP: 117/58 (03 May 2024 13:45) (76/42 - 160/72)  BP(mean): 83 (03 May 2024 13:45) (54 - 103)  RR: 24 (03 May 2024 13:45) (16 - 32)  SpO2: 100% (03 May 2024 13:45) (98% - 100%)    Parameters below as of 03 May 2024 11:47  Patient On (Oxygen Delivery Method): ventilator      GENERAL: intubated in ICU  NEURO: not alert  HEENT: NCAT, no conjunctival pallor appreciated  CHEST: intubated, on mechanical ventilation  CARDIAC: regular rate, +S1/S2  ABDOMEN: soft, nontender, no rebound or guarding  EXTREMITIES: warm, well perfused  SKIN: no lesions noted    Comments:    ASA Class: I []  II []  III []  IV []    The patient is a suitable candidate for the planned procedure unless box checked [ ]  No, explain:    Note incomplete until finalized by attending signature/attestation.    Greg Mcfadden  GI/Hepatology Fellow    MONDAY-FRIDAY 8AM-5PM:  Pager# [phone] ([facility]) or [phone] ([facility])    NON-URGENT CONSULTS:  Please email [email or web address]  AT NIGHT AND ON WEEKENDS:  Contact on-call GI fellow via answering service ([phone]) from 5pm-8am and on weekends/holidays

## 2024-05-03 NOTE — CONSULT NOTE ADULT - CONSULT REQUESTED BY NAME
Dr Odalis Vargas
ED
Jeb Solano
Jeb Solano
MICU
Dr. Nolasco
wound team
Sohail Nolasco
Team
Dr. Andrey Barbosa

## 2024-05-03 NOTE — PROGRESS NOTE ADULT - PROBLEM SELECTOR PLAN 1
Patient with ANKIT likely with ATN post cardiac arrest on 4/30 and pre-renal constriction from contrast CT scan on 4/30. Scr was 0.41 on 4/25, increased to 0.96 today. Patient was on CRRT for hyperammonemia 4/13-4/15. Patient also oliguric with UOP of 160. SBP noted to low to 70s on 4/30. UA on 4/30 without proteinuria, high specific gravity. No hydronephrosis seen on imaging. Dialysis consent is in patient's physical chart from 4/13. 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     Started on CVVHDF on 5/4 for hyperammonemia and worsening oliguria. Patient remain on pressor and still with UOP so will continue with CVVHDF at this time after radiology studies.

## 2024-05-03 NOTE — CONSULT NOTE ADULT - ATTENDING COMMENTS
bleeding from rectum, + ulceration on scope by GI likely from rectal tube  Plt 13  INR 2.2    rectal packing placed  no surgical intervention indicated in the setting of significant liver failure and thrombocytopenia with elevated INR  cont transfusing products to correct coagulopathy  cont rectal packing with thrombin/gelfoam/surgicel
Pt seen and examined with ACS team, agree with above. Discussed in detail with MICU team and GI fellow. History obtained from patient's . Pt unable to participate in history, ROS or physical exam.     Per pt's , she has h/o chronic pain which she treats with advil and tylenol, trying to minimize narcotics as they make her sleepy. For many weeks, she has had peripheral edema, weakness, and poor appetite. She attributed the edema to a gout flair, which she has had before (the edema was not different than her usual episodes). She also had vomiting of yellow fluid after meals and diarrhea for about a week, one week ago. Last weekend, in the morning, she woke up with very dilated pupils and tremors, and she was not making eye contact or speaking, although she was able to sit up by herself. Her  notes that by the time EMS brought her to the hospital, she was completely unconscious.     Of note, within the past 6 months, she had a full physical exam, including assessment of her liver function, and was given a clean bill of health. She had BRBPR months ago and had a colonoscopy which was normal.     At OSH, pt was intubated and went into septic shock. She was started on antibiotics. A CT a/p was non-contributory. It was noted that her ammonia levels were very high, so she was transferred to [facility] for further evaluation for possible acute liver failure.     Pt is sedated in the MICU, not responsive. On Precedex and low-dose levophed. When I first entered the room, pt was having a bronchoscopy which of note did not show hemorrhagic changes or bleeding into the airways, even with suctioning, while I was present. Abd is slightly distended but soft. Well-healed midline and LUQ incisions, several drain sites also well-healed. + anasarca. + scleral icterus. Not moving extremities, unable to assess psych status due to sedation/ AMS. James with dark yellow urine. Rectal tube with scant yellow-brown output, no blood.     Labs notable for hyperbilirubinemia, thrombocytopenia, abnormal coagulation panel, acute anemia of unclear cause, hyperammonemia, and mild lactic acidosis.     CT scan images personally reviewed. Surgically altered anatomy, including splenectomy, GJ anastomosis and total pancreatectomy noted. Mild ascites, no free air. Some small bowel thickening, nonspecific. The colon is abnormal - there are areas with apparent thickening and possible stricture/ mass, as well as areas that are more dilated with possible pneumatosis.     I had a detailed discussion with patient's . I explained that the CT is suggestive that parts of Ms Acosta's colon might be sick, have lost blood circulation, or be dead. I explained that dead colon can rupture, which can lead to severe infection and death. I explained that given her chronic abdominal pain, altered mental status and sedation, physical examination is not going to be very helpful. I explained that typically I would recommend inserting a camera into the abdomen in the OR to look at the colon and assess whether it is sick or needs to be removed. However, given her extensive surgical history, I would have to perform a full exploratory surgery, which would be extremely risky given her acute illness, coagulopathy, likely severe adhesions with risk of injury to the bowel during KANA, etc.     I explained that there is no accurate way without surgery to determine the status of the bowel, but that currently I believe that the risk of exploratory surgery is greater than the risk of non-operative management. I explained that if her condition changes, i.e. she starts to get more sick, we can reconsider the risks and benefits. Her  expressed his understanding of this difficult situation and agreed to this plan.   - Hyperammonemia and hyperbilirubinemia, thrombocytosis all may be related to sepsis/ septic shock  - Consider assessment of intravascular volume status and obtaining/maintaining euvolemia preferentially over pressor use to improve bowel perfusion  - Consider TTE  - Full infectious workup pending. Pt is asplenic so broad antibiotic coverage should cover encapsulated organisms.   - Greatly appreciate MICU care and discussion. Will follow closely. Please call with any changes in patient status.   - Will discuss possibility of colonoscopy with GI - if patient has ischemic mucosa, this would increase our suspicion and could justify going to OR. However, this procedure also carries significant risks. Will discuss.
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 Gil in 04/2018) presented with hepatic encephalopathy.  # Reported Andressa Rods  - CBC with differential 4/25/24 initially reported Andressa rods but no blasts. WBC count was normal at 8 K/uL.   - Discussed with lab, case was reviewed, and it was a reporting error by the machine reading the diff.  - Peripheral smear reviewed 5/2/24: WBCs are markedly increased in number with a left shift. Neutrophils appear abnormal with toxic granulation. No atypical lymphocytes noted. Monocytes are morphologically normal. No blast-like cells and no Andressa rods.    # Coagulopathy  - Patient having bleeding from multiple sites including hematochezia and oral bleeding  - Likely due to poor hepatic synthetic function after her PEA arrest  - Patient is receiving and can continue to get Kcentra or FFP to correct coagulopathy  - Goal fibrinogen > 100 minimum or > 150 if bleeding. In the EMR, each "1 unit" of cryoprecipitate is 5 pooled units; each pooled unit raises fibrinogen by about 7 - 9. If fibrinogen above goal then give FFP to address bleeding.
Hyperammonemia: Possibly in the setting of infection/Liver dysfunction/Surgery  Unable to get lactulose due to colitis    Maintain on CRRT  Trend ammonia levels  Dose meds for a clearance close to 30 cc/kg /hr while on CRRT ( Meropenem can be dosed Q8)    Rest as per Dr. Shaji Llanos MD  O: [phone]  Contact me on teams
Agree with Dr. Jett's assessment and plan as outlined above. Reviewed all pertinent labs, and imaging studies. Modifications made as indicated above. 42 F with Factor V leiden deficiency, gout, congenital abnormality of the pancreas complicated by recurrent pancreatitis now s/p distal pancreatectomy, Type 3c DM now s/p total pancreatectomy with islet cell autotransplant in 2018 here for AMS, vomiting and diarrhea, found to have hepatic encephalopathy, here for liver transplant evaluation. Endocrinology consulted for diabetes management. Patient diagnosed with type 3c DM in 2014 after her initial pancreatectomy and has been on insulin every since. She has noted to have decreasing insulin requirement over the years, but never off insulin. No outpatient c peptide in the system. On omnipod DASH with DEXCOM outpatient with total basal unit of 2.85. Would likely to assess for patient's pancreatic reserve with c peptide, but patient currently on D20, which would skew the result. For now continue with low dose lantus assuming patient is insulin deficient. Check am cortisol and ACTH for adrenal insufficiency. Rest of the plan as above. Complex case, high risk patient, high-level decision-making, requiring ICU level of care.

## 2024-05-03 NOTE — PROGRESS NOTE ADULT - ASSESSMENT
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 Hargrove), and now S/P total pancreatectomy  with islet cell autotransplant at [facility] by Dr. Gil in 04/2018).  Patient admitted with AMS, found to be in liver failure/hepatic encephalopathy and imaging shows colitis.  TPN consulted given severe protein calorie malnutrition and allowing possibility of bowel rest.  Patient with history of TPN use 2 years ago.      - Nutritional Assessment, patient with severe protein calorie malnutrition not fully meeting nutritional goals enterally due to colitis/decreased enteral intake and may benefit from TPN for nutritional support.  - TPN plan: Patient initially declined TPN when originally consulted, TF on hold due to melena; continue to observe off of TPN for now; patient on Propofol getting about 19 G of lipids   - TPN access:  Patient would need a dedicated central line if deemed TPN appropriate.   - Electrolyte Imbalance risk:  check CMP, Mg, Phos and ionized Ca daily, to be supplemented peripherally per primary team.  - Recommend strict intake and output/weight checks three times a week  - Further care per MICU/Medicine team    Available on TEAMS  TPN spectra [id number] ([phone] when dialing from outside line)  M-F 8A-2P, Weekends and holidays 8/9A-12/1P  Discussed with Dr. Rg Garces

## 2024-05-03 NOTE — PROGRESS NOTE ADULT - PROBLEM SELECTOR PLAN 1
-Test BG q6h while NPO/TFs. If need to start insulin drip please test BG q1h  - Continue  lantus 2 units in am for now. If BG remain <100s split insulin to 1 unit BID 6am and 6pm   - Continue low dose  admelog correctional scale q6h TIDAC  -Will follow   - Discharge planning to acute rehab  Will be determined according to BG/insulin needs/PO intake at time of discharge.  Needs to be on basal and bolus vs pump given insulin deficiency ( low c- peptide 0.1 with )  Would consider insulin pump if pt is able to manage pump independently while in rehab. Pt remains critically ill at this time to address discharge insulin needs  -BGs should be monitored and insulin doses to be adjusted at rehab   Endocrinologist: Dr. Dalton

## 2024-05-03 NOTE — CONSULT NOTE ADULT - SUBJECTIVE AND OBJECTIVE BOX
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 Patient had PEA arrest, ROSC after 10 minutes and 1 round of epinephrine, transferred to the MICU for further management.     Colorectal surgery consulted for acute rectal bleeding s/p bedside flex sig by GI team which revealed circumferential distal rectal ulcer per MICU team, unable to locate procedure report at this time. Patient currently with acute coagulopathy possible DIC with fibrinogen to low 100s. Requiring levophed.       PAST MEDICAL HISTORY:  PAST MEDICAL & SURGICAL HISTORY:      ALLERGIES:  Allergies    No Known Allergies    Intolerances        SOCIAL HISTORY: Negative for tobacco, etoh, or drug use      PHYSICAL EXAM:  General: intubated, sedated  Pulmonary: mechanically ventilated  Cardiovascular: NSR, no murmurs  Abdominal: soft, ND/NT, no organomegaly, no guarding or rebound tenderness  Extremities: WWP  Neuro: sedated      VITAL SIGNS:  Vital Signs Last 24 Hrs  T(C): 36.1 (03 May 2024 19:00), Max: 36.7 (03 May 2024 00:00)  T(F): 97 (03 May 2024 19:00), Max: 98.1 (03 May 2024 00:00)  HR: 87 (03 May 2024 20:45) (80 - 118)  BP: 117/56 (03 May 2024 20:45) (76/42 - 172/75)  BP(mean): 81 (03 May 2024 20:45) (54 - 110)  RR: 18 (03 May 2024 20:45) (16 - 40)  SpO2: 100% (03 May 2024 20:45) (96% - 100%)    Parameters below as of 03 May 2024 19:00  Patient On (Oxygen Delivery Method): ventilator    O2 Concentration (%): 40    I&O's Summary    02 May 2024 07:01  -  03 May 2024 07:00  --------------------------------------------------------  IN: 4689.1 mL / OUT: 2491 mL / NET: 2198.1 mL    03 May 2024 07:01  -  03 May 2024 21:06  --------------------------------------------------------  IN: 1711.9 mL / OUT: 615 mL / NET: 1096.9 mL        LABS:                        7.9    27.78 )-----------( 38       ( 03 May 2024 17:55 )             23.0     05-03    141  |  103  |  6<L>  ----------------------------<  85  3.5   |  17<L>  |  0.99    Ca    7.8<L>      03 May 2024 17:54  Phos  3.7     05-03  Mg     2.1     05-03    TPro  4.8<L>  /  Alb  2.8<L>  /  TBili  3.6<H>  /  DBili  x   /  AST  66<H>  /  ALT  18  /  AlkPhos  143<H>  05-03    PT/INR - ( 03 May 2024 17:54 )   PT: 21.9 sec;   INR: 2.03 ratio         PTT - ( 03 May 2024 17:54 )  PTT:42.2 sec  Urinalysis Basic - ( 03 May 2024 17:54 )    Color: x / Appearance: x / SG: x / pH: x  Gluc: 85 mg/dL / Ketone: x  / Bili: x / Urobili: x   Blood: x / Protein: x / Nitrite: x   Leuk Esterase: x / RBC: x / WBC x   Sq Epi: x / Non Sq Epi: x / Bacteria: x      CAPILLARY BLOOD GLUCOSE      POCT Blood Glucose.: 138 mg/dL (02 May 2024 22:30)    LIVER FUNCTIONS - ( 03 May 2024 17:54 )  Alb: 2.8 g/dL / Pro: 4.8 g/dL / ALK PHOS: 143 U/L / ALT: 18 U/L / AST: 66 U/L / GGT: x             CULTURES:      RADIOLOGY & ADDITIONAL STUDIES:

## 2024-05-03 NOTE — PROGRESS NOTE ADULT - NSPROGADDITIONALINFOA_GEN_ALL_CORE
-Plan discussed with pt/team.  Contact info: [phone] (24/7). pager [phone]  Amion on [employer]  Teams on M-T-W-F. Unavailable Thu/Weekends/Holidays  Assessed pt/labs/meds and discussed plan of care with primary team  Adjusting insulin  Discharge plan  Follow up care
-Plan discussed with pt/team.  Contact info: [phone] (24/7). pager [phone]  Amion on [employer]  Teams on M-T-W-F. Unavailable Thu/Weekends/Holidays  Assessed pt/labs/meds and discussed plan of care with primary team  Adjusting insulin  Discharge plan  Follow up care

## 2024-05-03 NOTE — PROGRESS NOTE ADULT - ASSESSMENT
42 y.o F w/ PMhx of DM1, gout, Factor C leiden, congenital pancreatic divisum s/p total pancreatectomy s/p slet cell autotransplant, extensive abdominal surgical history, (cholecystectomy, splenectomy, celina-en-y pancreaticojejuonostomy) c/b multiple intraabdominal infection/abscess/fistula (including VRE, candida) in the past presented as transfer from [facility] for poor po intake, liver failure/hepatic encephalopathy. Was on CRRT 4/13-4/15. Now noted to have a rising serum creatinine and oliguria.

## 2024-05-03 NOTE — PROGRESS NOTE ADULT - SUBJECTIVE AND OBJECTIVE BOX
[facility] Division of Kidney Diseases & Hypertension  FOLLOW UP NOTE  [phone]--------------------------------------------------------------------------------     Chief Complaint: ANKIT    24 hour events/subjective:  Patient seen this am today. Off CVVHDF this am after she was hypotensive. Planned to go for a CTA to evaluate for internal bleed.  Received 3 uPRBC and plasma overnight    PAST HISTORY  --------------------------------------------------------------------------------  No significant changes to PMH, PSH, FHx, SHx, unless otherwise noted    ALLERGIES & MEDICATIONS  --------------------------------------------------------------------------------  Allergies    No Known Allergies    Intolerances      Standing Inpatient Medications  albuterol/ipratropium for Nebulization 3 milliLiter(s) Nebulizer every 6 hours  arginine IVPB. 2 Gram(s) IV Intermittent every 4 hours  caspofungin IVPB 50 milliGRAM(s) IV Intermittent every 24 hours  caspofungin IVPB      cefiderocol IVPB 2000 milliGRAM(s) IV Intermittent every 8 hours  chlorhexidine 0.12% Liquid 15 milliLiter(s) Oral Mucosa every 12 hours  chlorhexidine 4% Liquid 1 Application(s) Topical <User Schedule>  CRRT Treatment    <Continuous>  insulin glargine Injectable (LANTUS) 2 Unit(s) SubCutaneous <User Schedule>  insulin lispro (ADMELOG) corrective regimen sliding scale   SubCutaneous every 6 hours  lidocaine   4% Patch 1 Patch Transdermal daily  linezolid  IVPB 600 milliGRAM(s) IV Intermittent every 12 hours  linezolid  IVPB      metroNIDAZOLE  IVPB 500 milliGRAM(s) IV Intermittent every 12 hours  minocycline IVPB 200 milliGRAM(s) IV Intermittent every 12 hours  norepinephrine Infusion 0.05 MICROgram(s)/kG/Min IV Continuous <Continuous>  pantoprazole  Injectable 40 milliGRAM(s) IV Push every 12 hours  PrismaSATE Dialysate BGK 4 / 2.5 5000 milliLiter(s) CRRT <Continuous>  PrismaSOL Filtration BGK 4 / 2.5 5000 milliLiter(s) CRRT <Continuous>  PrismaSOL Filtration BGK 4 / 2.5 5000 milliLiter(s) CRRT <Continuous>  propofol Infusion 30 MICROgram(s)/kG/Min IV Continuous <Continuous>  sodium chloride 3%  Inhalation 4 milliLiter(s) Inhalation every 12 hours    PRN Inpatient Medications  fentaNYL    Injectable 25 MICROGram(s) IV Push every 2 hours PRN      REVIEW OF SYSTEMS  --------------------------------------------------------------------------------  unable to be obtained     VITALS/PHYSICAL EXAM  --------------------------------------------------------------------------------  T(C): 36.3 (05-03-24 @ 07:00), Max: 36.7 (05-03-24 @ 00:00)  HR: 105 (05-03-24 @ 11:47) (80 - 118)  BP: 93/60 (05-03-24 @ 11:15) (76/42 - 160/72)  RR: 30 (05-03-24 @ 11:15) (16 - 31)  SpO2: 100% (05-03-24 @ 11:47) (98% - 100%)  Wt(kg): --        05-02-24 @ 07:01  -  05-03-24 @ 07:00  --------------------------------------------------------  IN: 4689.1 mL / OUT: 2491 mL / NET: 2198.1 mL    05-03-24 @ 07:01  -  05-03-24 @ 12:05  --------------------------------------------------------  IN: 227.2 mL / OUT: 30 mL / NET: 197.2 mL      Physical Exam:  Gen: ill-appearing  HEENT: +ET tube, OG tube, +Scleral icterus  Pulm: Mechanical breath sounds BL  CV: S1S2  Abd: Soft, +BS,  Ext: No LE edema  Neuro: Sedated  Skin: Warm and dry, jaundice  Vascular access: right nontunneled HD catheter      LABS/STUDIES  --------------------------------------------------------------------------------              7.8    29.50 >-----------<  55       [05-03-24 @ 11:03]              23.5     142  |  104  |  5   ----------------------------<  90      [05-03-24 @ 05:45]  3.8   |  19  |  0.93        Ca     8.4     [05-03-24 @ 05:45]      Mg     2.2     [05-03-24 @ 05:45]      Phos  2.9     [05-03-24 @ 05:45]    TPro  5.3  /  Alb  2.9  /  TBili  3.7  /  DBili  x   /  AST  63  /  ALT  6   /  AlkPhos  140  [05-03-24 @ 05:45]    PT/INR: PT 24.0 , INR 2.23       [05-03-24 @ 11:03]  PTT: 46.6       [05-03-24 @ 11:03]    CK 21      [05-02-24 @ 01:16]    Creatinine Trend:  SCr 0.93 [05-03 @ 05:45]  SCr 0.97 [05-03 @ 00:29]  SCr 1.08 [05-02 @ 18:27]  SCr 1.21 [05-02 @ 11:26]  SCr 1.05 [05-02 @ 01:16]    Urinalysis - [05-03-24 @ 05:45]      Color  / Appearance  / SG  / pH       Gluc 90 / Ketone   / Bili  / Urobili        Blood  / Protein  / Leuk Est  / Nitrite       RBC  / WBC  / Hyaline  / Gran  / Sq Epi  / Non Sq Epi  / Bacteria       TSH 2.90      [04-30-24 @ 11:09]

## 2024-05-03 NOTE — PROGRESS NOTE ADULT - ASSESSMENT
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 [place] by Dr. Gil in 04/2018). 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 Per records, a Ct later in 2018 showed still fistulization but no abscess formation     Patient presented to [facility] ED on 4/8 by her spouse for AMS. Per , patient had been having nbnb vomiting and diarrhea for the last 4 weeks, extreme fatigue . She was unable to tolerate PO. She was also sleeping 18-20 hrs per day. On 4/8, he found her on the floor "completely out of it" and took her to [facility] for further evaluation.   Initial workup in ED demonstrated a Tbili 5.4, direct bili 4.4 and ammonia 196. Patient was somnolent and had a left eyeward gaze deviation. Submentally started developing agonal snoring respirations and had to be intubated for airway protection. Course complicated with septic shock and persistent hyperammonemia despite lactulose and rifaximin, CT show and started on meropenem, linezolid and anidulafungin at [facility]--> upon transfer here, off pressors, on alvaro/linezolid/caspofungin    CT Chest (4/13) Occlusion of the distal left lower lobar bronchus with complete left lower lobe atelectasis. Right middle lobe consolidation, possibly secondary to pneumonia or additional atelectasis.     CT A/P (4/13) Segmental areas of wall thickening throughout the colon and rectum with pneumatosis, mesenteric edema, and adjacent hyperdensities in the proximal transverse colon suspicious for bowel ischemia with intraluminal hemorrhage. Additional intraluminal hyperdensities within the distal ascending colon suspicious for hemorrhage. Nonspecific focal areas of narrowing in the distal descending colon and sigmoid colon, possibly colonic masses    CXR (4/26) Bilateral effusions are seen, with dense left lower lobe consolidation, new consolidation in the right middle lobe, and new interstitial edema..    Had Acinetobacter on ET tube sputum culture prior to extubation.  Was initially clinically stable without leukocytosis without coverage for Acinetobacter after extubation.  Developed bandemia on 4/26.  Now with leukocytosis and worsening infiltrates on chest x-ray with varying reports of consolidations vs effusions  Developed RRT on 4/30 required intubation and transfer to MICU  Blood cultures and Bronch/BAL performed with studies not showing new infection        #Leukocytosis, bandemia, abnormal chest x-ray, positive sputum culture (carbapenem resistant Acinetobacter baumannii)  --Blood cultures sent 4/28 and No growth, repeat blood culture sent 4/30 pending  --+ minocycline 200 mg Q12H to continue  and started Cefiderocol based upon patient's recent colonization with a resistant acinetobacter  -- anaerobic coverage with flagyl 500mg q 12hr  --maintain contact isolation   --yeast on Bronch/Bal would add Caspofungin pending organism ID    #Diarrhea, abdominal pain increasing abdominal girth-ascites ? perforation? ischemic bowel  C. 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 Hargrove), and now S/P total pancreatectomy with islet cell autotransplant at [facility] by Dr. Gil in 04/2018). 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 Per records, a Ct later in 2018 showed still fistulization but no abscess formation     Patient presented to [facility] ED on 4/8 by her spouse for AMS. Per , patient had been having nbnb vomiting and diarrhea for the last 4 weeks, extreme fatigue . She was unable to tolerate PO. She was also sleeping 18-20 hrs per day. On 4/8, he found her on the floor "completely out of it" and took her to [facility] for further evaluation.   Initial workup in ED demonstrated a Tbili 5.4, direct bili 4.4 and ammonia 196. Patient was somnolent and had a left eyeward gaze deviation. Submentally started developing agonal snoring respirations and had to be intubated for airway protection. Course complicated with septic shock and persistent hyperammonemia despite lactulose and rifaximin, CT show and started on meropenem, linezolid and anidulafungin at [facility]--> upon transfer here, off pressors, on alvaro/linezolid/caspofungin    CT Chest (4/13) Occlusion of the distal left lower lobar bronchus with complete left lower lobe atelectasis. Right middle lobe consolidation, possibly secondary to pneumonia or additional atelectasis.     CT A/P (4/13) Segmental areas of wall thickening throughout the colon and rectum with pneumatosis, mesenteric edema, and adjacent hyperdensities in the proximal transverse colon suspicious for bowel ischemia with intraluminal hemorrhage. Additional intraluminal hyperdensities within the distal ascending colon suspicious for hemorrhage. Nonspecific focal areas of narrowing in the distal descending colon and sigmoid colon, possibly colonic masses    CXR (4/26) Bilateral effusions are seen, with dense left lower lobe consolidation, new consolidation in the right middle lobe, and new interstitial edema..    Had Acinetobacter on ET tube sputum culture prior to extubation.  Was initially clinically stable without leukocytosis without coverage for Acinetobacter after extubation.  Developed bandemia on 4/26.  Now with leukocytosis and worsening infiltrates on chest x-ray with varying reports of consolidations vs effusions  Developed RRT on 4/30 required intubation and transfer to MICU  Blood cultures and Bronch/BAL performed with studies not showing new infection        #Leukocytosis, bandemia, abnormal chest x-ray, positive sputum culture (carbapenem resistant Acinetobacter baumannii)  --Blood cultures sent 4/28 and No growth, repeat blood culture sent 4/30 pending  --+ minocycline 200 mg Q12H to continue  and started Cefiderocol based upon patient's recent colonization with a resistant acinetobacter  -- anaerobic coverage with flagyl 500mg q 12hr  --maintain contact isolation   --yeast on Bronch/Bal   --no new positive cultures  could complete five days of anti infectives and then discontinue    #Diarrhea, abdominal pain increasing abdominal girth-ascites ? perforation? ischemic bowel  C. difficile toxin assay 4/19 negative- would send repeat specimen  -no evidence of abdominal perforation on recent CT scan     #Hyperammonemia, Bilirubin Elevation  Ammonia elevation now thought to be secondary to alcoholic hepatitis given positive PETH  Ureaplasma PCR & Mycoplasma hominis PCR (4/18) Negative   Leptospira PCR (4/13) Negative    would complete five days of empiric anti infectives and then discontinue    Discussed with MICU team    Francisco Gore MD  Can be called via Teams  After 5pm/weekends [phone]

## 2024-05-03 NOTE — CONSULT NOTE ADULT - CONSULT REASON
Andressa rods reported on peripheral smear
Hypoglycemia
Anemia
Concern for custodial
Malnutrition
rule out bowel ischemia
Rehabilitation consult
wound evaluation
Septic shock
rectal bleed
Hyperammonemia, fluid overload
Hyperammonemia due to OTC deficiency

## 2024-05-03 NOTE — CHART NOTE - NSCHARTNOTEFT_GEN_A_CORE
: Dejuan Hilario     INDICATION: critical illness, hypoxemic respiratory failure     PROCEDURE:  [ x ] LIMITED ECHO  [ x ] LIMITED CHEST  [ ] LIMITED RETROPERITONEAL  [ x ] LIMITED ABDOMINAL  [ ] LIMITED DVT  [ ] NEEDLE GUIDANCE VASCULAR  [ ] NEEDLE GUIDANCE THORACENTESIS  [ ] NEEDLE GUIDANCE PARACENTESIS  [ ] NEEDLE GUIDANCE PERICARDIOCENTESIS  [ ] OTHER    FINDINGS:  B line predominant in bilateral lung fields. Anechoic spaces in bilateral lung bases.   ECHO with normal LV systolic function.   LVOT VTI 18. IVC 1.3 cm.  Anechoic spaces noted on LLQ.     INTERPRETATION:  Abnormal aeration pattern in bilateral lung fields. Small simple bilateral pleural effusions   Normal LV systolic function.  Small abdominal ascites.       Performed with Dr. Whitehead at bedside.

## 2024-05-03 NOTE — PROGRESS NOTE ADULT - ASSESSMENT
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 [facility]. Patient found to be hypoglycemic shortly after transfer, now resolved. Consulted for: Type 3c DM, hypoglycemia. Had RRT yesterdayworsening hypoxemic respiratory failure w/ associated altered mental status, patient then underwent PEA arrest, presumed to be hypoxemic> back in MICU. Pt NPO and remains in critical condition requiring intubation/pressors/CVVH/sedation/ NPO with BG tightly controlled in very low dose of basal insulin. Due to pt's h/o pancreatectomy she can't be off basal insulin since she would develop DKA. If BG remain < 100s would consider Lantus 1 unit bit instead of daily to keep BG Goal 100-180mg/dl     #Type 3c DM  Patient has hx of congenital pancreas divisum complicated by recurrent pancreatitis. Patient was first diagnosed with Type 3c DM after her initial surgery in 2014 (s/p distal pancreatectomy, cholecystectomy, splenectomy and celina-en-y pancreaticojejuonostomy). Then she later underwent total pancreatectomy with islet cell autotransplant in 2018. After that, her insulin requirements decreased over the years but was never off insulin.    Per recent outpatient note 3/26/2024: pump not available to review  Patient uses Omnipod Dash with Dexcom CGM G7  Basal rate:   MN 0.05U   6 AM 0.1U   9.30 AM 0.15U   Total basal 2.825U/24h   ICR 1:15   ICF 1:75   Reports of frequent hypoglycemia outpatient in March 2024.    C-peptide <0.1 4/15 at 6pm when BG was 168, insulin deficient.  C-peptide repeated while off dextrose and tube feeds: 0.1 with , confirming insulin deficiency.                  .

## 2024-05-03 NOTE — CONSULT NOTE ADULT - ASSESSMENT
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 Patient had PEA arrest, ROSC after 10 minutes and 1 round of epinephrine, transferred to the MICU for further management. Now with acute LGIB. S/p GI flex sig with circumferential distal rectal ulcer, possibly from previous rectal tube. Requiring multiple pRBC, Cryo, plt, and fibrinogen with concern for DIC. GALE with fresh blood in rectal vault.    Plan:  - rectal tampon placed  - rec aggressive correction of coagulopathy, can utilize TEG for product selection  - colorectal surgery will follow    discussed with CRS fellow  Trauma/ACS #[id number]

## 2024-05-03 NOTE — PROGRESS NOTE ADULT - SUBJECTIVE AND OBJECTIVE BOX
[facility] NUTRITION SUPPORT-- FOLLOW UP NOTE      24 hour events/subjective:  Patient seen and examined at bedside, chart reviewed and events noted and I's and O's reviewed.  Patient remains intubated/sedated, being ruled out for C diff and starting on CRRT.  Patient remains NPO given GIB and hepatic coma.     Diet:  Diet, NPO (05-02-24 @ 10:32)      PAST HISTORY  --------------------------------------------------------------------------------  PAST MEDICAL & SURGICAL HISTORY:    No significant changes to PMH, PSH, FHx, SHx, unless otherwise noted    ALLERGIES & MEDICATIONS  --------------------------------------------------------------------------------  Allergies    No Known Allergies    Intolerances      Standing Inpatient Medications  albuterol/ipratropium for Nebulization 3 milliLiter(s) Nebulizer every 6 hours  arginine IVPB. 2 Gram(s) IV Intermittent every 4 hours  caspofungin IVPB 50 milliGRAM(s) IV Intermittent every 24 hours  caspofungin IVPB      cefiderocol IVPB 2000 milliGRAM(s) IV Intermittent every 8 hours  chlorhexidine 0.12% Liquid 15 milliLiter(s) Oral Mucosa every 12 hours  chlorhexidine 4% Liquid 1 Application(s) Topical <User Schedule>  CRRT Treatment    <Continuous>  insulin glargine Injectable (LANTUS) 2 Unit(s) SubCutaneous <User Schedule>  insulin lispro (ADMELOG) corrective regimen sliding scale   SubCutaneous every 6 hours  lidocaine   4% Patch 1 Patch Transdermal daily  linezolid  IVPB 600 milliGRAM(s) IV Intermittent every 12 hours  linezolid  IVPB      metroNIDAZOLE  IVPB 500 milliGRAM(s) IV Intermittent every 12 hours  minocycline IVPB 200 milliGRAM(s) IV Intermittent every 12 hours  norepinephrine Infusion 0.05 MICROgram(s)/kG/Min IV Continuous <Continuous>  pantoprazole  Injectable 40 milliGRAM(s) IV Push every 12 hours  Phoxillum Filtration BK 4 / 2.5 5000 milliLiter(s) CRRT <Continuous>  PrismaSOL Filtration BGK 4 / 2.5 5000 milliLiter(s) CRRT <Continuous>  PrismaSOL Filtration BGK 4 / 2.5 5000 milliLiter(s) CRRT <Continuous>  propofol Infusion 30 MICROgram(s)/kG/Min IV Continuous <Continuous>  sodium chloride 3%  Inhalation 4 milliLiter(s) Inhalation every 12 hours    PRN Inpatient Medications  fentaNYL    Injectable 25 MICROGram(s) IV Push every 2 hours PRN        VITALS/PHYSICAL EXAM  --------------------------------------------------------------------------------  T(C): 36.1 (05-03-24 @ 12:00), Max: 36.7 (05-03-24 @ 00:00)  HR: 97 (05-03-24 @ 12:45) (80 - 118)  BP: 142/60 (05-03-24 @ 12:45) (76/42 - 160/72)  RR: 20 (05-03-24 @ 12:45) (16 - 32)  SpO2: 100% (05-03-24 @ 12:45) (98% - 100%)  Wt(kg): --      05-02-24 @ 07:01  -  05-03-24 @ 07:00  --------------------------------------------------------  IN: 4689.1 mL / OUT: 2491 mL / NET: 2198.1 mL    05-03-24 @ 07:01  -  05-03-24 @ 12:53  --------------------------------------------------------  IN: 307.8 mL / OUT: 40 mL / NET: 267.8 mL      I&O's Detail    02 May 2024 07:01  -  03 May 2024 07:00  --------------------------------------------------------  IN:    Bumetanide: 380 mL    Cryoprecipitate: 75 mL    IV PiggyBack: 1300 mL    IV PiggyBack: 70 mL    IV PiggyBack: 466.3 mL    Norepinephrine: 415.1 mL    Plasma: 900 mL    Platelets - Single Donor: 225 mL    PRBCs (Packed Red Blood Cells): 600 mL    Propofol: 257.7 mL  Total IN: 4689.1 mL    OUT:    FentaNYL: 0 mL    Indwelling Catheter - Urethral (mL): 540 mL    Other (mL): 1951 mL  Total OUT: 2491 mL    Total NET: 2198.1 mL      03 May 2024 07:01  -  03 May 2024 12:53  --------------------------------------------------------  IN:    IV PiggyBack: 20 mL    IV PiggyBack: 66.6 mL    Norepinephrine: 183.7 mL    Propofol: 37.5 mL  Total IN: 307.8 mL    OUT:    Indwelling Catheter - Urethral (mL): 40 mL  Total OUT: 40 mL    Total NET: 267.8 mL          Physical Exam:  Gen: sedated/intubated  neck: no JVD  Chest: non labored breathing, equal chest expansion bilaterally  Abd: distended  Ext: Warm, FROM, no edema b/l LE  Neuro: No focal deficits  Psych: Normal affect and mood  Skin: Warm, without rashes       LABS/STUDIES  --------------------------------------------------------------------------------              7.8    29.50 >-----------<  55       [05-03-24 @ 11:03]              23.5     141  |  102  |  6   ----------------------------<  107      [05-03-24 @ 11:03]  3.9   |  15  |  1.07        Ca     8.4     [05-03-24 @ 11:03]      Mg     2.3     [05-03-24 @ 11:03]      Phos  3.7     [05-03-24 @ 11:03]    TPro  5.1  /  Alb  2.8  /  TBili  3.8  /  DBili  x   /  AST  67  /  ALT  <5  /  AlkPhos  149  [05-03-24 @ 11:03]    PT/INR: PT 24.0 , INR 2.23       [05-03-24 @ 11:03]  PTT: 46.6       [05-03-24 @ 11:03]    CK 21      [05-02-24 @ 01:16]    Ca ionizedBlood Gas Arterial - Calcium, Ionized: 1.21 mmol/L (05-02-24 @ 11:17)  Blood Gas Arterial - Calcium, Ionized: 1.27 mmol/L (05-02-24 @ 04:47)  Blood Gas Calcium, Ionized - Venous: 1.14 mmol/L (05-03-24 @ 00:18)  Blood Gas Calcium, Ionized - Venous: 1.11 mmol/L (05-02-24 @ 18:18)  Blood Gas Calcium, Ionized - Venous: 1.21 mmol/L (05-02-24 @ 15:42)  Blood Gas Calcium, Ionized - Venous: 1.17 mmol/L (05-02-24 @ 13:40)  Blood Gas Calcium, Ionized - Venous: 0.74 mmol/L (05-02-24 @ 03:45)    Creatinine Trend:  POC glucoseGlucose: 107 mg/dL (05-03-24 @ 11:03)  Glucose: 90 mg/dL (05-03-24 @ 05:45)  Glucose: 118 mg/dL (05-03-24 @ 00:29)  Glucose: 215 mg/dL (05-02-24 @ 18:27)  CAPILLARY BLOOD GLUCOSE      POCT Blood Glucose.: 138 mg/dL (02 May 2024 22:30)  POCT Blood Glucose.: 140 mg/dL (02 May 2024 17:06)    PrealbuminPrealbumin, Serum: 3 mg/dL (04-24-24 @ 11:23)    Triglycerides

## 2024-05-03 NOTE — PHARMACOTHERAPY INTERVENTION NOTE - NSPHARMCOMMASP
ASP - Dose optimization/Non-Renal dose adjustment
ASP - Therapy recommended/ Alternative therapy
ASP - DC Therapy - no infection

## 2024-05-03 NOTE — CONSULT NOTE ADULT - CONSULT REQUESTED DATE/TIME
15-Apr-2024 12:29
13-Apr-2024 17:48
15-Apr-2024
19-Apr-2024 10:22
23-Apr-2024
13-Apr-2024 12:54
02-May-2024 15:15
03-May-2024 21:06
13-Apr-2024 13:26
13-Apr-2024 19:03
15-Apr-2024 17:32
02-May-2024 11:03

## 2024-05-03 NOTE — PROGRESS NOTE ADULT - ATTENDING COMMENTS
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 Transferred to medicine floors but had worsening AHRF and then developed PEA arrest due to hypoxemia. CT pattern and exam c/w pulm edema and volume overload.      Pt now w/ new dx of urea cycle disorder. Overnight developed LGIB w/ worsening shock and DIC (both on lab and clinically). Now s/p colonoscopy showing circumferential rectal ulcer    - on sedation for vent synchrony and analgosedation  - decrease for sedation vacation as tolerated to assess mental status for anoxic damage  - cont vent support, resp status stabilized w/ PPV, now on 40% and peep 5  - s/p bronch - f/u cx  - maintain map>65, on levophed currently and starting vasopressin  - f/u Renal reccs- ANKIT due to ATN  - restart CRRT today for poor UO and for hyperammonemia clearance as shock has improved  - cont monitoring lytes  - f/u ID reccs- on tx for  acinetobacter w/ broad spectrum abx  - had repeat CT a/p that does not show pneumatosis  - hx factor v leiden but no active thrombus, cont dvt ppx  - new dx of ornithine deficiency- arginine tx and protein supplementation as per Genetics  -  DIC given oozing from sites and quick clotting in blood vials- cont to trend coags  - tx prbc, ffp and cryo based on levels  - LGIB recurred o/n and today- s/p colonoscopy w/ rectal ulcer noted but not actively bleeding-> if rebleeds they recommend IR     Prognosis guarded. D/w  yesterday that her condition is very critical. he is aware and notes that his wife would not want to be vent dependent for a prolonged period of time. Will need to assess her improvement over the next few days .

## 2024-05-03 NOTE — PROGRESS NOTE ADULT - PROBLEM SELECTOR PLAN 2
TSH 4.43. FT4 0.6, TT3 47 on admission (4/13)  Thyroid Function Tests:  04-30 @ 11:09 TSH 2.90 FreeT4 1.1 T3 -- Anti TPO -- Anti Thyroglobulin Ab -- TSI --  04-23 @ 14:44 TSH -- FreeT4 -- T3 -- Anti TPO 60.7 Anti Thyroglobulin Ab -- TSI --  Started on LT4 100mcg daily  Repeat TSH was wnl 3.67 (4/15) stopped levothyroxine  Reviewed 4/30 TFT with Dr Arora. TSH/Free T4 wnl. Positive anti TPO. Recommending to hold LT4 for now and repeat levels as out pt.

## 2024-05-03 NOTE — PROGRESS NOTE ADULT - ASSESSMENT
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 Patient had PEA arrest, ROSC after 10 minutes and 1 round of epinephrine, transferred to the MICU for further management.     PLAN  Neuro  #Encephalopathy iso Hyperammonemia   -patient reportedly w/worsening mental status prior to PEA arrest  -DDX remains broad and includes septic/metabolic encephalopathy given concern for sepsis and persistent hyperammonemia,   - Trend Ammonia Q6, on CRRT, holding Lactulose and Xifaxan iso Lower GI bleed   -propofol for sedation, daily sedation holds    Respiratory  #Acute hypoxemic respiratory failure  -suspect pulmonary edema given bilateral pleural effusions, bilateral diffuse b-lines on ultrasound, elevated BNP  -patient now intubated w/improving FiO2 requirements favoring acutely reversible process such as pulmonary edema  -CRRT; urine output decreasing while on Bumex gtt and Metolazone   - Chest CT negative for PE   - antibiotics as below    Cardiovascular  #Shock  - likely vasoplegic and related to sedation as patient had normal blood pressure at the time of intubation  - EKG showing sinus tachycardia, troponin negative  - wean pressors as tolerated   - Currently on Arginine supplementation likely worsening hypotension  - TTE showing normal LV function w/ elevated PA pressure     #Pulmonary HTN  -most recent TTE w/elevated PASP concerning for pulmonary HTN  - PA systolic pressure 38mmhg  -diuresing as above, will minimize PEEP    Renal  #ANKIT  - Creatinine Tripled from baseline   - CRRT, s/p Bumex Drip and Metolazone w/ reducing urine output     #Lactic acidosis  -lactate elevated but downtrending     GI  #Transaminitis  #Alcoholic hepatitis/alcoholic liver disease  #Hyperbilirubinemia  -patient w/fatty liver on imaging, no imaging evidence reporting cirrhosis at this point but previously seen by hepatology  -Hold rifaximin/lactulose  -CRRT for Ammonia     #Ornithine Transcarbamylase Deficiency   - Low-protein tube feed diet per Genetics team; holding feeds due to lower GI bleed   - Arginine 200mg/kg/day via central line   - Genetics team recommending TPN; not feasible at this time given infection risk    #Lower GI Bleed  - iso DIC; possibly 2/2 ischemia 2/2 PEA arrest    - GI consulted; s/p cryo and plasma   - no bloody output in upper GI residuals  - Keep Fibrinogen >150     #Ascites  -present on CT abdomen and bedside ultrasound, no adequate pocket for safe paracentesis at this point, on broad spectrum antibiotics empirically  - diuresing as above    Endo  - AM cortisol WNL  - Lantus 2U AM; ROSI     ID  -will send repeat blood/urine cultures as patient has new hypoxemic respiratory failure with elevated WBC,  fungitell, galactomannan  -Caspofungin until BAL returns      #Acetinobacter pneumonia  -patient w/carbapenem-resistant acenitobacter from sputum on 4/17  -will start cefiderocol per ID recommendations, adding flagyl for anaerobic coverage, continuing minocylcine for Acinetobacter    Hem-Onc  #Leukocytosis  -evaluating for infectious causes as above, on broad spectrum antibiotics and Antifungal  #History of Factor V Leiden, RA thrombus 2018, L peroneal DVT 2022  -LE duplex negative 4/16/24    #DIC   - Blood draws coagulating iso Fibrinogen low to 100s, Elevating D-dimer   - s/p 1U PRBC, 2U Plasma and 1U Cryo  - Hold Heparin Drip   - Heme consulted for presence of Andressa rods on Smear; confirmed it is artifact.   - Keep Fibrinogen >150      CODE Status: FULL  DVT prophylaxis: Hold iso DIC  48y female with PMH DM1, gout, Factor V Leiden c/b provoked DVT and RA thrombus in the past, congenital pancreatic divisum c/b recurrent pancreatitis s/p total pancreatectomy s/p islet cell autotransplant, extensive abdominal surgical history, (cholecystectomy, splenectomy, celina-en-y pancreaticojejuonostomy) c/b 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 Patient had PEA arrest, ROSC after 10 minutes and 1 round of epinephrine, transferred to the MICU for further management.     PLAN  Neuro  #Encephalopathy iso Hyperammonemia   -patient reportedly w/worsening mental status prior to PEA arrest  -DDX remains broad and includes septic/metabolic encephalopathy given concern for sepsis and persistent hyperammonemia,   - Trend Ammonia Q6, on CRRT, holding Lactulose and Xifaxan iso Lower GI bleed   -propofol for sedation, daily sedation holds    Respiratory  #Acute hypoxemic respiratory failure  - suspect pulmonary edema given bilateral pleural effusions, bilateral diffuse b-lines on ultrasound, elevated BNP  - patient now intubated w/improving FiO2 requirements favoring acutely reversible process such as pulmonary edema  - CRRT; urine output decreasing while on Bumex gtt and Metolazone   - Chest CT negative for PE   - antibiotics as below    Cardiovascular  #Shock  - likely Hemorrhagic and vasoplegic  - EKG showing sinus tachycardia, troponin negative  - wean pressors as tolerated   - Currently on IV Arginine supplementation likely worsening hypotension  - TTE showing normal LV function w/ elevated PA pressure     #Pulmonary HTN  -most recent TTE w/elevated PASP concerning for pulmonary HTN  - PA systolic pressure 38mmhg  -diuresing as above, will minimize PEEP    Renal  #ANKIT  - Creatinine Tripled from baseline   - CRRT, s/p Bumex Drip and Metolazone w/ reducing urine output     #Lactic acidosis  -lactate elevated but downtrending     GI  #Transaminitis  #Alcoholic hepatitis/alcoholic liver disease  #Hyperbilirubinemia  -patient w/fatty liver on imaging, no imaging evidence reporting cirrhosis at this point but previously seen by hepatology  -Hold rifaximin/lactulose  -CRRT for Ammonia     #Ornithine Transcarbamylase Deficiency   - Low-protein tube feed diet per Genetics team; holding feeds due to lower GI bleed   - Arginine 200mg/kg/day via central line   - Genetics team recommending TPN; not feasible at this time given infection risk    #Lower GI Bleed  - iso DIC; possibly 2/2 ischemia 2/2 PEA arrest    - GI consulted; s/p cryo, plasma, multiple units of PRBCs   - Persistent bright red blood per rectum  - GI planning for bedside scope 5/3  - Keep Fibrinogen >150     #Ascites  -present on CT abdomen and bedside ultrasound, no adequate pocket for safe paracentesis at this point, on broad spectrum antibiotics empirically  - diuresing as above    Endo  - AM cortisol WNL  - Lantus 2U AM; ROSI     ID  -will send repeat blood/urine cultures as patient has new hypoxemic respiratory failure with elevated WBC,  fungitell, galactomannan  -1/2 BAL sample growing yeast  -Caspofungin until BAL returns      #Acetinobacter pneumonia  -patient w/carbapenem-resistant acenitobacter from sputum on 4/17  -will start cefiderocol per ID recommendations, adding flagyl for anaerobic coverage, continuing minocylcine for Acinetobacter    Hem-Onc  #Leukocytosis  -evaluating for infectious causes as above, on broad spectrum antibiotics and Antifungal  #History of Factor V Leiden, RA thrombus 2018, L peroneal DVT 2022  -LE duplex negative 4/16/24    #DIC   - Blood draws coagulating iso Fibrinogen low to 100s, Elevating D-dimer   - s/p multiple units PRBC, Plasma and Cryo  - Hold Heparin Drip   - Heme consulted for presence of Andressa rods on Smear; confirmed it is artifact.   - Fibrinogen Q6, TEG Q12   - Keep Fibrinogen >150      CODE Status: FULL  DVT prophylaxis: Hold iso DIC

## 2024-05-04 NOTE — GOALS OF CARE CONVERSATION - ADVANCED CARE PLANNING - CONVERSATION DETAILS
Meeting held in MICU with pt's , Abel, her parents, Abel's parents, and the pt's brother. Explained cardiac arrest down time at least 10 minutes, renal failure, poor mental state, ventilator use, rectal bleeding, anemia, thrombocytopenia, shock/ pressor use, and now new diagnosis of genetic syndrome as likely cause of ammonia elevations. Abel states he and the patient had a conversation last Monday that this quality of life on a ventilator would be insufferable, on dialysis etc, and that he feels she is suffering, with tearing in her eyes any time she is moved or turned/suctioned. He has not noticed and mental status or alertness himself either, and agrees that the quality of life she currently has should not continue. He wants to withdraw all care and focus on symptom directed philosophy, and allow a natural passing if that were to occur. They are [employer]. She was a proud, active woman. Rest of family in agreement.

## 2024-05-04 NOTE — PROGRESS NOTE ADULT - SUBJECTIVE AND OBJECTIVE BOX
[facility] NUTRITION SUPPORT-- FOLLOW UP NOTE      24 hour events/subjective:    TPN originally consulted for Protein Calorie Malnutrition in the setting of nutritional optimization for anticipated prolonged NPO, pt however deemed high infection risk, pt being observed for GI bleed, TFs also on HOLD.    Diet:  Diet, NPO (05-02-24 @ 10:32)      PAST HISTORY  --------------------------------------------------------------------------------  No significant changes to PMH, PSH, FHx, SHx, unless otherwise noted    ALLERGIES & MEDICATIONS  --------------------------------------------------------------------------------  Allergies    No Known Allergies    Intolerances      Standing Inpatient Medications  albuterol/ipratropium for Nebulization 3 milliLiter(s) Nebulizer every 6 hours  arginine IVPB. 2 Gram(s) IV Intermittent every 4 hours  cefiderocol IVPB 2000 milliGRAM(s) IV Intermittent every 8 hours  chlorhexidine 0.12% Liquid 15 milliLiter(s) Oral Mucosa every 12 hours  chlorhexidine 4% Liquid 1 Application(s) Topical <User Schedule>  CRRT Treatment    <Continuous>  dexMEDEtomidine Infusion 0.5 MICROgram(s)/kG/Hr IV Continuous <Continuous>  insulin glargine Injectable (LANTUS) 1 Unit(s) SubCutaneous at bedtime  insulin glargine Injectable (LANTUS) 1 Unit(s) SubCutaneous every morning  insulin lispro (ADMELOG) corrective regimen sliding scale   SubCutaneous every 6 hours  lidocaine   4% Patch 1 Patch Transdermal daily  linezolid  IVPB 600 milliGRAM(s) IV Intermittent every 12 hours  linezolid  IVPB      metroNIDAZOLE  IVPB 500 milliGRAM(s) IV Intermittent every 12 hours  norepinephrine Infusion 0.05 MICROgram(s)/kG/Min IV Continuous <Continuous>  pantoprazole  Injectable 40 milliGRAM(s) IV Push every 12 hours  Phoxillum Filtration BK 4 / 2.5 5000 milliLiter(s) CRRT <Continuous>  Phoxillum Filtration BK 4 / 2.5 5000 milliLiter(s) CRRT <Continuous>  PrismaSOL Filtration BGK 4 / 2.5 5000 milliLiter(s) CRRT <Continuous>  propofol Infusion 30 MICROgram(s)/kG/Min IV Continuous <Continuous>  sodium chloride 3%  Inhalation 4 milliLiter(s) Inhalation every 12 hours  vasopressin Infusion 0.04 Unit(s)/Min IV Continuous <Continuous>    PRN Inpatient Medications  fentaNYL    Injectable 25 MICROGram(s) IV Push every 2 hours PRN  fentaNYL    Injectable 50 MICROGram(s) IV Push every 30 minutes PRN  fentaNYL    Injectable 25 MICROGram(s) IV Push every 3 hours PRN      REVIEW OF SYSTEMS  --------------------------------------------------------------------------------  Gen: as per HPI  Skin: No rashes  Head/Eyes/Ears/Mouth: No headache; No sore throat  Respiratory: No dyspnea, cough,   CV: No chest pain, PND, orthopnea  GI: as per HPI  : No increased frequency, dysuria, hematuria, nocturia  MSK: No joint pain/swelling; no back pain; no edema  Neuro: No dizziness/lightheadedness, weakness, seizures, numbness, tingling  Psych: No significant nervousness, anxiety, stress, depression    All other systems were reviewed and are negative, except as noted.        LABS/STUDIES  --------------------------------------------------------------------------------              7.6    25.93 >-----------<  13       [05-04-24 @ 06:27]              22.6     142  |  108  |  5   ----------------------------<  95      [05-04-24 @ 06:27]  4.0   |  18  |  0.82        Ca     7.7     [05-04-24 @ 06:27]      Mg     2.2     [05-04-24 @ 06:27]      Phos  2.8     [05-04-24 @ 06:27]    TPro  4.6  /  Alb  2.6  /  TBili  3.6  /  DBili  x   /  AST  74  /  ALT  17  /  AlkPhos  153  [05-04-24 @ 06:27]    PT/INR: PT 22.7 , INR 2.21       [05-04-24 @ 06:27]  PTT: 53.7       [05-04-24 @ 06:27]      Blood Gas Calcium, Ionized - Venous: 1.13 mmol/L (05-04-24 @ 06:22)  Blood Gas Calcium, Ionized - Venous: 1.14 mmol/L (05-04-24 @ 00:31)  Blood Gas Calcium, Ionized - Venous: 1.14 mmol/L (05-03-24 @ 00:18)  Blood Gas Calcium, Ionized - Venous: 1.11 mmol/L (05-02-24 @ 18:18)  Blood Gas Calcium, Ionized - Venous: 1.21 mmol/L (05-02-24 @ 15:42)  Blood Gas Calcium, Ionized - Venous: 1.17 mmol/L (05-02-24 @ 13:40)      Glucose: 95 mg/dL (05-04-24 @ 06:27)  Glucose: 71 mg/dL (05-04-24 @ 00:42)  Glucose: 85 mg/dL (05-03-24 @ 17:54)    Prealbumin, Serum: 3 mg/dL (04-24-24 @ 11:23)    Triglycerides      CAPILLARY BLOOD GLUCOSE          VITALS/PHYSICAL EXAM  --------------------------------------------------------------------------------  T(C): 36.2 (05-04-24 @ 12:00), Max: 37.4 (05-04-24 @ 04:00)  HR: 94 (05-04-24 @ 12:00) (80 - 113)  BP: 117/59 (05-04-24 @ 12:00) (81/46 - 172/75)  RR: 17 (05-04-24 @ 12:00) (16 - 40)  SpO2: 100% (05-04-24 @ 12:00) (96% - 100%)  Wt(kg): --        05-03-24 @ 07:01  -  05-04-24 @ 07:00  --------------------------------------------------------  IN: 2560.6 mL / OUT: 2616 mL / NET: -55.4 mL    05-04-24 @ 07:01  -  05-04-24 @ 12:44  --------------------------------------------------------  IN: 646.8 mL / OUT: 521 mL / NET: 125.8 mL      Physical Exam:    Gen: laying in bed receiving PRBCs  HEENT: PERRL, NCAT  Neck: trachea midline no noted JVD  Chest: non labored breathing equal chest expansion b/l  Abd: soft, nontender/nondistended  UE: WWP no c/c/e  LE: WWP no c/c/e  Neuro: AOx3  Psych: Normal affect and mood  Skin: Warm, without rashes  .  .  .

## 2024-05-04 NOTE — CHART NOTE - NSCHARTNOTEFT_GEN_A_CORE
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 Transferred to [facility]-MICU for Liver Transplant Evaluation. Patient found to be hypoglycemic shortly after transfer, now resolved. Consulted for: Type 3c DM, hypoglycemia. Had RRT yesterdayworsening hypoxemic respiratory failure w/ associated altered mental status, patient then underwent PEA arrest, presumed to be hypoxemic> back in MICU. Pt NPO and remains in critical condition requiring intubation/pressors/CVVH/sedation/ NPO with BG tightly controlled in very low dose of basal insulin. Due to pt's h/o pancreatectomy she can't be off basal insulin since she would develop DKA.     POCT Blood Glucose:      eMAR:  dextrose 50% Injectable   50 milliLiter(s) IV Push (05-04-24 @ 13:12)    insulin glargine Injectable (LANTUS)   1 Unit(s) SubCutaneous (05-04-24 @ 09:20)    vasopressin Infusion   6 mL/Hr IV Continuous (05-04-24 @ 05:18)        BGs and insulin regimen reviewed. Noted near hypoglycemia overnight ( BG 71 ), BGs mostly < 100. currently on Lantus 1 unit BID.   Would decrease Lantus 1 units daily only instead of BID to prevent hypoglycemia and to keep BG Goal 100-180mg/dl   Spoke with team       Contact via [employer] Teams during business hours  To reach covering provider access [employer]LE[employer] via sun[employer] tools  For Urgent matters/after-hours/weekends/holidays please page endocrine fellow on call   For nonurgent matters please email [email or web address]    Please note that this patient may be followed by different provider tomorrow.  Notify endocrine 24 hours prior to discharge for final recommendations

## 2024-05-04 NOTE — PROGRESS NOTE ADULT - PROBLEM SELECTOR PROBLEM 1
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
Hypoglycemia
Hypoglycemia
Alcoholic hepatitis
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
ANKIT (acute kidney injury)
Hyperammonemia
Hypoglycemia
Hyperammonemia
Hypoglycemia
Hypoglycemia
ANKIT (acute kidney injury)
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
ANKIT (acute kidney injury)
Alcoholic hepatitis

## 2024-05-04 NOTE — PROGRESS NOTE ADULT - ATTENDING SUPERVISION STATEMENT
Fellow
Fellow
Resident
Fellow
Resident
Resident
Fellow
Resident
Fellow
Resident
Fellow
Resident
Fellow
Resident
Fellow
Resident

## 2024-05-04 NOTE — PROGRESS NOTE ADULT - ATTENDING COMMENTS
ANKIT, Hyperammonemia  on CVVHDF for both   poor prognosis   long discussion with family     carmine barfield  nephrology attending   please contact me on TEAMS   Office- [phone]

## 2024-05-04 NOTE — PROGRESS NOTE ADULT - ATTENDING COMMENTS
48 year old female with a history DM, FVL c/b DVT, RA thrombus, pancreatic divisum c/b recurrent pancreatitis s/p total pancreatectomy, s/p islet cell autotransplant, extensive abdominal surgical history (cholecystectomy, splenectomy, celina-en-y pancreaticojejuonostomy) c/b multiple intraabdominal infections/abscesses, fistulas who initially admitted 4/13/24 in the setting of severe hyperammonemia (found to have ornatine transcarbomylase deficiency), acute hypoxic respiratory failure (pneumonia), renal failure ultimately requiring CRRT with hospital course c/b hypoxic PEA arrest (10 minutes ROSC) MICU course c/b acute blood loss anemia, multiorgan failure, DIC    Lines:   RIJ Shiley 5/2/24  TLC Subclavian 5/2/24  James 5/1/24    N: Toxic Metabolic encephalopathy in the setting of hyperammonemia  ?Anoxic injury   - Depending on GOC, may benefit from repeat head imaging eventually if stable for transfer  - NSE  - Off Propofol without purposeful movement, can continue as needed for anxiety, pain  - Delirium precautions  R: Acute hypoxic respiratory failure in the setting of pneumonia, edema  - Optimize volume removal with dialysis   - Pneumonia management as below  - Lung protective ventilation  - Trend BG  CV: Hemorrhagic V Septic shock  pHTN in the setting of acute respiratory failure  - Vasopressor support, MAP >65; on Levo vaso  R: Acute renal failure, suspect ATN iso sepsis, cardiac arrest  Lactic acidosis  - Trend lactate q 4-6   - Strict I/O, trend cr, urine output, lytes  - Continue with CRRT for now  GI: Ornithine Transcarbamylase Deficiency  Circumfirential rectal ulceration with severe bleeding s/p colonoscopy   - Colorectal following, will call back this AM with recurrent bleeding  - Low protein diet  - Arginine supplementation  - Genetics recommending TPN; holding in the setting of high infection risk   - NPO for now in the setting of GIB  - Trending coags, TEGS, CBC q 6; replete as appropriate  ID: Acinetobacter pneumonia  Candida BAL  ?intraabdominal infection v. translocation  - Continue Cefderocol+ Acinetobacter, 5 day course for pneumonia  - ID following  - Off antifungals, off minocycline  Heme: DIC  Acute blood loss  Severe leukocytosis: reactive, infectious  - Trend Hgb (>7), Plt (goal >50 with acute bleed)  - TEG q 6  - Coags q 6, fib >150   Endo:   - DM: No Hypoglycemia, goal <210    Full Code for now, prognosis poor, ongoing goals of care  HCP:   DVT: SCD 48 year old female with a history DM, FVL c/b DVT, RA thrombus, pancreatic divisum c/b recurrent pancreatitis s/p total pancreatectomy, s/p islet cell autotransplant, extensive abdominal surgical history (cholecystectomy, splenectomy, celina-en-y pancreaticojejuonostomy) c/b multiple intraabdominal infections/abscesses, fistulas who initially admitted 4/13/24 in the setting of severe hyperammonemia (found to have ornatine transcarbomylase deficiency), acute hypoxic respiratory failure (pneumonia), renal failure ultimately requiring CRRT with hospital course c/b hypoxic PEA arrest (10 minutes ROSC) MICU course c/b acute blood loss anemia, multiorgan failure, DIC    Lines:   RIJ Shiley 5/2/24  TLC Subclavian 5/2/24  James 5/1/24    N: Toxic Metabolic encephalopathy in the setting of hyperammonemia  ?Anoxic injury   - Depending on GOC, may benefit from repeat head imaging eventually if stable for transfer  - NSE  - Off Propofol without purposeful movement, can continue as needed for anxiety, pain  - Delirium precautions  R: Acute hypoxic respiratory failure in the setting of pneumonia, edema  - Optimize volume removal with dialysis   - Pneumonia management as below  - Lung protective ventilation  - Trend BG  CV: Hemorrhagic shock, Septic shock  pHTN in the setting of acute respiratory failure  - Vasopressor support, MAP >65; on Levo vaso; increasing support and rebleeding  - Trend Hgb closely, Coags, replete volume as able with product  - Source control as below  R: Acute renal failure, suspect ATN iso sepsis, cardiac arrest  Lactic acidosis  - Trend lactate q 4-6   - Strict I/O, trend cr, urine output, lytes  - Continue with CRRT for now  GI: Ornithine Transcarbamylase Deficiency with severe hyperammonemia  Circumfirential rectal ulceration with severe bleeding s/p colonoscopy   - Colorectal following, will call back this AM with recurrent bleeding  - Low protein diet  - Arginine supplementation  - Genetics recommending TPN; holding in the setting of high infection risk at this juncture   - NPO for now in the setting of GIB  - Trending coags, TEGS, CBC q 6; replete as appropriate  ID: Acinetobacter pneumonia  Candida BAL  ?intraabdominal infection v. translocation  - Continue Cefderocol+ Acinetobacter, 5 day course for pneumonia  - ID following  - Off antifungals, off minocycline  Heme: DIC  Acute blood loss  Severe leukocytosis: reactive, infectious  - Trend Hgb (>7), Plt (goal >50 with acute bleed)  - TEG q 6  - Coags q 6, fib >150   Endo:   - DM: No Hypoglycemia, goal <210    Full Code for now, prognosis poor, ongoing goals of care; family at bedside tearful. Waiting for son but considering withdrawal of care  HCP:   DVT: SCD

## 2024-05-04 NOTE — PROGRESS NOTE ADULT - REASON FOR ADMISSION
AMS
Abd pain
anemia
respiratory failure
sepsis
Abd pain
respiratory failure
AMS
Liver Failure

## 2024-05-04 NOTE — PROGRESS NOTE ADULT - SUBJECTIVE AND OBJECTIVE BOX
INFECTIOUS DISEASES FOLLOW UP-- Alyssa Gore  [phone]    This is a follow up note for this  48yFemale with  Liver failure without hepatic coma    family at bedside  remains wiht rectal bleeding  possible plans to withdraw care    ROS:  CONSTITUTIONAL:  Non responsive    Allergies    No Known Allergies    Intolerances        ANTIBIOTICS/RELEVANT:  antimicrobials  cefiderocol IVPB 2000 milliGRAM(s) IV Intermittent every 8 hours  linezolid  IVPB 600 milliGRAM(s) IV Intermittent every 12 hours  linezolid  IVPB      metroNIDAZOLE  IVPB 500 milliGRAM(s) IV Intermittent every 12 hours    immunologic:    OTHER:  albuterol/ipratropium for Nebulization 3 milliLiter(s) Nebulizer every 6 hours  arginine IVPB. 2 Gram(s) IV Intermittent every 4 hours  chlorhexidine 0.12% Liquid 15 milliLiter(s) Oral Mucosa every 12 hours  chlorhexidine 4% Liquid 1 Application(s) Topical <User Schedule>  CRRT Treatment    <Continuous>  dexMEDEtomidine Infusion 0.5 MICROgram(s)/kG/Hr IV Continuous <Continuous>  fentaNYL    Injectable 50 MICROGram(s) IV Push every 30 minutes PRN  fentaNYL    Injectable 25 MICROGram(s) IV Push every 3 hours PRN  fentaNYL    Injectable 25 MICROGram(s) IV Push every 2 hours PRN  insulin glargine Injectable (LANTUS) 1 Unit(s) SubCutaneous every morning  insulin lispro (ADMELOG) corrective regimen sliding scale   SubCutaneous every 6 hours  lidocaine   4% Patch 1 Patch Transdermal daily  norepinephrine Infusion 0.05 MICROgram(s)/kG/Min IV Continuous <Continuous>  pantoprazole  Injectable 40 milliGRAM(s) IV Push every 12 hours  Phoxillum Filtration BK 4 / 2.5 5000 milliLiter(s) CRRT <Continuous>  Phoxillum Filtration BK 4 / 2.5 5000 milliLiter(s) CRRT <Continuous>  PrismaSOL Filtration BGK 4 / 2.5 5000 milliLiter(s) CRRT <Continuous>  propofol Infusion 30 MICROgram(s)/kG/Min IV Continuous <Continuous>  sodium chloride 3%  Inhalation 4 milliLiter(s) Inhalation every 12 hours  vasopressin Infusion 0.04 Unit(s)/Min IV Continuous <Continuous>      Objective:  Vital Signs Last 24 Hrs  T(C): 36.2 (04 May 2024 12:00), Max: 37.4 (04 May 2024 04:00)  T(F): 97.2 (04 May 2024 12:00), Max: 99.3 (04 May 2024 04:00)  HR: 96 (04 May 2024 14:00) (80 - 103)  BP: 133/65 (04 May 2024 14:00) (81/46 - 172/75)  BP(mean): 91 (04 May 2024 14:00) (58 - 110)  RR: 30 (04 May 2024 14:00) (16 - 39)  SpO2: 100% (04 May 2024 14:00) (96% - 100%)    Parameters below as of 04 May 2024 11:31  Patient On (Oxygen Delivery Method): ventilator        PHYSICAL EXAM:  Constitutional:non responsive  no clinical improvement    LABS:                        10.5   28.72 )-----------( 123      ( 04 May 2024 12:56 )             31.5     05-04    137  |  104  |  5<L>  ----------------------------<  49<LL>  3.9   |  16<L>  |  0.76    Ca    8.1<L>      04 May 2024 12:55  Phos  3.0     05-04  Mg     2.2     05-04    TPro  5.4<L>  /  Alb  2.9<L>  /  TBili  4.1<H>  /  DBili  x   /  AST  79<H>  /  ALT  20  /  AlkPhos  173<H>  05-04    PT/INR - ( 04 May 2024 12:56 )   PT: 19.0 sec;   INR: 1.84 ratio         PTT - ( 04 May 2024 12:56 )  PTT:38.9 sec  Urinalysis Basic - ( 04 May 2024 12:55 )    Color: x / Appearance: x / SG: x / pH: x  Gluc: 49 mg/dL / Ketone: x  / Bili: x / Urobili: x   Blood: x / Protein: x / Nitrite: x   Leuk Esterase: x / RBC: x / WBC x   Sq Epi: x / Non Sq Epi: x / Bacteria: x        MICROBIOLOGY:            RECENT CULTURES:  05-01 @ 17:00  .Blood Blood-Peripheral  --  --  --    No growth at 48 Hours  --  05-01 @ 01:45  .Bronchial BAL  Candida albicans  Candida albicans  YSTMIC    Testing in progress  --  04-30 @ 20:20  .Blood Blood-Peripheral  --  --  --    No growth at 72 Hours  --  04-28 @ 11:27  .Blood Blood-Peripheral  --  --  --    No growth at 5 days  --      RADIOLOGY & ADDITIONAL STUDIES:    < from: Xray Chest 1 View- PORTABLE-Urgent (Xray Chest 1 View- PORTABLE-Urgent .) (05.02.24 @ 17:05) >    IMPRESSION:  Interval placement of left subclavian catheter with tip in SVC.  Bibasilar hazy airspace opacities likely pulmonary edema.  Small left effusion.    < end of copied text >

## 2024-05-04 NOTE — PROGRESS NOTE ADULT - ASSESSMENT
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 Hargrove), and now S/P total pancreatectomy with islet cell autotransplant at [facility] by Dr. Gil in 04/2018). 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 Per records, a Ct later in 2018 showed still fistulization but no abscess formation     Patient presented to [facility] ED on 4/8 by her spouse for AMS. Per , patient had been having nbnb vomiting and diarrhea for the last 4 weeks, extreme fatigue . She was unable to tolerate PO. She was also sleeping 18-20 hrs per day. On 4/8, he found her on the floor "completely out of it" and took her to [facility] for further evaluation.   Initial workup in ED demonstrated a Tbili 5.4, direct bili 4.4 and ammonia 196. Patient was somnolent and had a left eyeward gaze deviation. Submentally started developing agonal snoring respirations and had to be intubated for airway protection. Course complicated with septic shock and persistent hyperammonemia despite lactulose and rifaximin, CT show and started on meropenem, linezolid and anidulafungin at [facility]--> upon transfer here, off pressors, on alvaro/linezolid/caspofungin    CT Chest (4/13) Occlusion of the distal left lower lobar bronchus with complete left lower lobe atelectasis. Right middle lobe consolidation, possibly secondary to pneumonia or additional atelectasis.     CT A/P (4/13) Segmental areas of wall thickening throughout the colon and rectum with pneumatosis, mesenteric edema, and adjacent hyperdensities in the proximal transverse colon suspicious for bowel ischemia with intraluminal hemorrhage. Additional intraluminal hyperdensities within the distal ascending colon suspicious for hemorrhage. Nonspecific focal areas of narrowing in the distal descending colon and sigmoid colon, possibly colonic masses    CXR (4/26) Bilateral effusions are seen, with dense left lower lobe consolidation, new consolidation in the right middle lobe, and new interstitial edema..    Had Acinetobacter on ET tube sputum culture prior to extubation.  Was initially clinically stable without leukocytosis without coverage for Acinetobacter after extubation.  Developed bandemia on 4/26.  Now with leukocytosis and worsening infiltrates on chest x-ray with varying reports of consolidations vs effusions  Developed RRT on 4/30 required intubation and transfer to MICU  Blood cultures and Bronch/BAL performed with studies not showing new infection        #Leukocytosis, bandemia, abnormal chest x-ray, positive sputum culture (carbapenem resistant Acinetobacter baumannii)  --Blood cultures sent 4/28 and No growth, repeat blood culture sent 4/30 pending  --+ minocycline 200 mg Q12H to continue  and started Cefiderocol based upon patient's recent colonization with a resistant acinetobacter  -- anaerobic coverage with flagyl 500mg q 12hr  --maintain contact isolation   --yeast on Bronch/Bal   --no new positive cultures  could complete five days of anti infectives and then discontinue    #Diarrhea, abdominal pain increasing abdominal girth-ascites ? perforation? ischemic bowel  C. difficile toxin assay 4/19 negative- would send repeat specimen  -no evidence of abdominal perforation on recent CT scan     #Hyperammonemia, Bilirubin Elevation  Ammonia elevation now thought to be secondary to alcoholic hepatitis given positive PETH  Ureaplasma PCR & Mycoplasma hominis PCR (4/18) Negative   Leptospira PCR (4/13) Negative    NO clinical improvement in mental status and ongoing GI bleeding  family wishes to withdraw care    Will sign off- call if condition/plans change    Discussed with MICU team    Francisco Gore MD  Can be called via Teams  After 5pm/weekends [phone]

## 2024-05-04 NOTE — PROGRESS NOTE ADULT - ASSESSMENT
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 Patient had PEA arrest, ROSC after 10 minutes and 1 round of epinephrine, transferred to the MICU for further management.     PLAN  Neuro  #Encephalopathy iso Hyperammonemia   -patient reportedly w/worsening mental status prior to PEA arrest  -DDX remains broad and includes septic/metabolic encephalopathy given concern for sepsis and persistent hyperammonemia,   - Trend Ammonia Q6, on CRRT, holding Lactulose and Xifaxan iso Lower GI bleed   -propofol for sedation, daily sedation holds    Respiratory  #Acute hypoxemic respiratory failure  - suspect pulmonary edema given bilateral pleural effusions, bilateral diffuse b-lines on ultrasound, elevated BNP  - patient now intubated w/improving FiO2 requirements favoring acutely reversible process such as pulmonary edema  - CRRT; urine output decreasing while on Bumex gtt and Metolazone   - Chest CT negative for PE   - antibiotics as below    Cardiovascular  #Shock  - likely Hemorrhagic and vasoplegic  - EKG showing sinus tachycardia, troponin negative  - wean pressors as tolerated   - Currently on IV Arginine supplementation likely worsening hypotension  - TTE showing normal LV function w/ elevated PA pressure     #Pulmonary HTN  -most recent TTE w/elevated PASP concerning for pulmonary HTN  - PA systolic pressure 38mmhg  -diuresing as above, will minimize PEEP    Renal  #ANKIT  - Creatinine Tripled from baseline   - CRRT, s/p Bumex Drip and Metolazone w/ reducing urine output     #Lactic acidosis  -lactate elevated but downtrending     GI  #Transaminitis  #Alcoholic hepatitis/alcoholic liver disease  #Hyperbilirubinemia  -patient w/fatty liver on imaging, no imaging evidence reporting cirrhosis at this point but previously seen by hepatology  -Hold rifaximin/lactulose  -CRRT for Ammonia     #Ornithine Transcarbamylase Deficiency   - Low-protein tube feed diet per Genetics team; holding feeds due to lower GI bleed   - Arginine 200mg/kg/day via central line   - Genetics team recommending TPN; not feasible at this time given infection risk    #Lower GI Bleed  - iso DIC; possibly 2/2 ischemia 2/2 PEA arrest    - GI consulted; s/p cryo, plasma, multiple units of PRBCs   - Persistent bright red blood per rectum  - S/p GI Scope 5/3 showing circumferential rectal bleed likely 2/2 rectal tube   - s/p Rectal Tampon placement by Colorectal  - Keep Fibrinogen >150     #Ascites  -present on CT abdomen and bedside ultrasound, no adequate pocket for safe paracentesis at this point, on broad spectrum antibiotics empirically  - diuresing as above    Endo  - AM cortisol WNL  - Lantus 2U AM; ROSI     ID  -will send repeat blood/urine cultures as patient has new hypoxemic respiratory failure with elevated WBC,  fungitell, galactomannan  -1/2 BAL sample growing yeast  -Caspofungin until BAL returns      #Acetinobacter pneumonia  -patient w/carbapenem-resistant acenitobacter from sputum on 4/17  -will start cefiderocol per ID recommendations, adding flagyl for anaerobic coverage, continuing minocylcine for Acinetobacter    Hem-Onc  #Leukocytosis  -evaluating for infectious causes as above, on broad spectrum antibiotics and Antifungal  #History of Factor V Leiden, RA thrombus 2018, L peroneal DVT 2022  -LE duplex negative 4/16/24    #DIC   - Blood draws coagulating iso Fibrinogen low to 100s, Elevating D-dimer   - s/p multiple units PRBC, Plasma and Cryo  - Hold Heparin Drip   - Heme consulted for presence of Andressa rods on Smear; confirmed it is artifact.   - Fibrinogen Q6, TEG Q12   - Keep Fibrinogen >150      CODE Status: FULL  DVT prophylaxis: Hold iso DIC  48y female with PMH DM1, gout, Factor V Leiden c/b provoked DVT and RA thrombus in the past, congenital pancreatic divisum c/b recurrent pancreatitis s/p total pancreatectomy s/p islet cell autotransplant, extensive abdominal surgical history, (cholecystectomy, splenectomy, celina-en-y pancreaticojejuonostomy) c/b 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 Patient had PEA arrest, ROSC after 10 minutes and 1 round of epinephrine, transferred to the MICU for further management.     PLAN  Neuro  #Encephalopathy iso Hyperammonemia   -patient reportedly w/worsening mental status prior to PEA arrest  -DDX remains broad and includes septic/metabolic encephalopathy given concern for sepsis and persistent hyperammonemia,   - Trend Ammonia Q6, on CRRT, holding Lactulose and Xifaxan iso Lower GI bleed   -propofol for sedation, daily sedation holds  - Plan for repeat CT head assess for Anoxic brain injury    Respiratory  #Acute hypoxemic respiratory failure  - suspect pulmonary edema given bilateral pleural effusions, bilateral diffuse b-lines on ultrasound, elevated BNP  - patient now intubated w/improving FiO2 requirements favoring acutely reversible process such as pulmonary edema  - CRRT  - Chest CT negative for PE   - antibiotics as below    Cardiovascular  #Shock  - likely Hemorrhagic and vasoplegic  - EKG showing sinus tachycardia, troponin negative  - wean pressors as tolerated   - Currently on IV Arginine supplementation likely worsening hypotension  - TTE showing normal LV function w/ elevated PA pressure     #Pulmonary HTN  -most recent TTE w/elevated PASP concerning for pulmonary HTN  - PA systolic pressure 38mmhg  -diuresing as above, will minimize PEEP    Renal  #ANKIT  - Creatinine Tripled from baseline   - CRRT, s/p Bumex Drip and Metolazone w/ reducing urine output     #Lactic acidosis  -lactate elevated but downtrending     GI  #Transaminitis  #Alcoholic hepatitis/alcoholic liver disease  #Hyperbilirubinemia  -patient w/fatty liver on imaging, no imaging evidence reporting cirrhosis at this point but previously seen by hepatology  -Hold rifaximin/lactulose  -CRRT for Ammonia     #Ornithine Transcarbamylase Deficiency   - Low-protein tube feed diet per Genetics team; holding feeds due to lower GI bleed   - Arginine 200mg/kg/day via central line   - Genetics team recommending TPN; not feasible at this time given infection risk    #Lower GI Bleed  - iso DIC; possibly 2/2 ischemia 2/2 PEA arrest    - GI consulted; s/p cryo, plasma, multiple units of PRBCs   - Persistent bright red blood per rectum  - S/p GI Scope 5/3 showing circumferential rectal bleed likely 2/2 rectal tube   - s/p Rectal Tampon placement by Colorectal  - Keep Fibrinogen >150     #Ascites  -present on CT abdomen and bedside ultrasound, no adequate pocket for safe paracentesis at this point, on broad spectrum antibiotics empirically  - diuresing as above    Endo  - AM cortisol WNL  - Lantus 2U AM; ROSI     ID  -will send repeat blood/urine cultures as patient has new hypoxemic respiratory failure with elevated WBC,  fungitell, galactomannan  -1/2 BAL sample growing yeast  -Caspofungin until BAL returns      #Acetinobacter pneumonia  -patient w/carbapenem-resistant acenitobacter from sputum on 4/17  -will start cefiderocol per ID recommendations, adding flagyl for anaerobic coverage, continuing minocylcine for Acinetobacter    Hem-Onc  #Leukocytosis  -evaluating for infectious causes as above, on broad spectrum antibiotics and Antifungal  #History of Factor V Leiden, RA thrombus 2018, L peroneal DVT 2022  -LE duplex negative 4/16/24    #DIC   - Blood draws coagulating iso Fibrinogen low to 100s, Elevating D-dimer   - s/p multiple units PRBC, Plasma and Cryo  - Hold Heparin Drip   - Heme consulted for presence of Andressa rods on Smear; confirmed it is artifact.   - CBC and Fibrinogen Q6, TEG Q12   - Keep Fibrinogen >150      CODE Status: FULL  DVT prophylaxis: Hold iso DIC

## 2024-05-04 NOTE — AIRWAY REMOVAL NOTE  ADULT & PEDS - ARTIFICAL AIRWAY REMOVAL COMMENTS
Written order for extubation verified. The patient was identified by full name and birth date compared to the identification band. Present during the procedure was Pan Yanez.
Written order for extubation verified. The patient was identified by full name and birth date compared to the identification band. Present during the procedure was ABDI Solano.

## 2024-05-04 NOTE — PROGRESS NOTE ADULT - ASSESSMENT
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 Hargrove), and now S/P total pancreatectomy  with islet cell autotransplant at [facility] by Dr. Gil in 04/2018).  Patient admitted with AMS, found to be in liver failure/hepatic encephalopathy and imaging shows colitis.  TPN consulted given severe protein calorie malnutrition and allowing possibility of bowel rest.  Patient with history of TPN use 2 years ago.      - Nutritional Assessment, patient with severe protein calorie malnutrition not fully meeting nutritional goals enterally due to colitis/decreased enteral intake and may benefit from TPN for nutritional support.  - TPN plan: Patient initially declined TPN when originally consulted, TF on hold due to melena; continue to observe off of TPN for now; Propofol dc'd.  - TPN access: Patient would need a dedicated central line if deemed TPN appropriate. However, pt deemed high infection risk.  - GIB: PRBCS PRN f/u GI recs  - Electrolyte Imbalance risk:  check CMP, Mg, Phos and ionized Ca daily, to be supplemented peripherally per primary team.  - Recommend strict intake and output/weight checks three times a week  - Further care per MICU/Medicine team    Available on TEAMS  TPN spectra [id number] ([phone] when dialing from outside line)  M-F 8A-2P, Weekends and holidays 8/9A-12/1P  Discussed with Dr. Rg Garces

## 2024-05-04 NOTE — PROGRESS NOTE ADULT - SUBJECTIVE AND OBJECTIVE BOX
INTERVAL HPI/OVERNIGHT EVENTS:    SUBJECTIVE: Patient seen and examined at bedside.   Rectal Tampon placed overnight. Stable H&H    ROS: All negative except as listed above.    VITAL SIGNS:  ICU Vital Signs Last 24 Hrs  T(C): 37.4 (04 May 2024 04:00), Max: 37.4 (04 May 2024 04:00)  T(F): 99.3 (04 May 2024 04:00), Max: 99.3 (04 May 2024 04:00)  HR: 94 (04 May 2024 06:45) (81 - 118)  BP: 96/54 (04 May 2024 06:45) (80/45 - 172/75)  BP(mean): 72 (04 May 2024 06:45) (57 - 110)  ABP: --  ABP(mean): --  RR: 20 (04 May 2024 06:45) (16 - 40)  SpO2: 100% (04 May 2024 06:45) (96% - 100%)    O2 Parameters below as of 03 May 2024 19:00  Patient On (Oxygen Delivery Method): ventilator    O2 Concentration (%): 40      Mode: AC/ CMV (Assist Control/ Continuous Mandatory Ventilation), RR (machine): 16, TV (machine): 400, FiO2: 40, PEEP: 5, ITime: 1, MAP: 9, PIP: 26  Plateau pressure:   P/F ratio:     05-03 @ 07:01  -  05-04 @ 07:00  --------------------------------------------------------  IN: 2560.6 mL / OUT: 2400 mL / NET: 160.6 mL      CAPILLARY BLOOD GLUCOSE      POCT Blood Glucose.: 138 mg/dL (02 May 2024 22:30)      ECG: reviewed.    PHYSICAL EXAM:    GENERAL: NAD, intubated and sedated   HEAD:  Atraumatic, normocephalic  EYES: Preferred upward gaze. PERRLA, conjunctiva and sclera clear  ENT: Moist mucous membranes  NECK: Supple, no JVD  HEART: S1, S2, regular rate and rhythm, no murmurs, rubs, or gallops  LUNGS: Unlabored respirations.  Clear to auscultation bilaterally, no crackles, wheezing, or rhonchi  ABDOMEN: Soft, moderately distended, +BS  EXTREMITIES: 2+ peripheral pulses bilaterally. No clubbing, cyanosis, or edema  NERVOUS SYSTEM:  Intubated and sedated    SKIN: No rashes or lesions    MEDICATIONS:  MEDICATIONS  (STANDING):  albuterol/ipratropium for Nebulization 3 milliLiter(s) Nebulizer every 6 hours  arginine IVPB. 2 Gram(s) IV Intermittent every 4 hours  cefiderocol IVPB 2000 milliGRAM(s) IV Intermittent every 8 hours  chlorhexidine 0.12% Liquid 15 milliLiter(s) Oral Mucosa every 12 hours  chlorhexidine 4% Liquid 1 Application(s) Topical <User Schedule>  CRRT Treatment    <Continuous>  dexMEDEtomidine Infusion 0.5 MICROgram(s)/kG/Hr (7.8 mL/Hr) IV Continuous <Continuous>  insulin glargine Injectable (LANTUS) 1 Unit(s) SubCutaneous at bedtime  insulin glargine Injectable (LANTUS) 1 Unit(s) SubCutaneous every morning  insulin lispro (ADMELOG) corrective regimen sliding scale   SubCutaneous every 6 hours  lidocaine   4% Patch 1 Patch Transdermal daily  linezolid  IVPB 600 milliGRAM(s) IV Intermittent every 12 hours  linezolid  IVPB      metroNIDAZOLE  IVPB 500 milliGRAM(s) IV Intermittent every 12 hours  norepinephrine Infusion 0.05 MICROgram(s)/kG/Min (5.85 mL/Hr) IV Continuous <Continuous>  pantoprazole  Injectable 40 milliGRAM(s) IV Push every 12 hours  Phoxillum Filtration BK 4 / 2.5 5000 milliLiter(s) (1000 mL/Hr) CRRT <Continuous>  PrismaSOL Filtration BGK 4 / 2.5 5000 milliLiter(s) (800 mL/Hr) CRRT <Continuous>  PrismaSOL Filtration BGK 4 / 2.5 5000 milliLiter(s) (200 mL/Hr) CRRT <Continuous>  propofol Infusion 30 MICROgram(s)/kG/Min (11.2 mL/Hr) IV Continuous <Continuous>  sodium chloride 3%  Inhalation 4 milliLiter(s) Inhalation every 12 hours  vasopressin Infusion 0.04 Unit(s)/Min (6 mL/Hr) IV Continuous <Continuous>    MEDICATIONS  (PRN):  fentaNYL    Injectable 25 MICROGram(s) IV Push every 2 hours PRN Moderate Pain (4 - 6)  fentaNYL    Injectable 50 MICROGram(s) IV Push every 30 minutes PRN For agitation during Scope  fentaNYL    Injectable 25 MICROGram(s) IV Push every 3 hours PRN Mild Pain (1 - 3)      ALLERGIES:  Allergies    No Known Allergies    Intolerances        LABS:                        7.6    25.93 )-----------( 13       ( 04 May 2024 06:27 )             22.6     05-04    142  |  108  |  5<L>  ----------------------------<  95  4.0   |  18<L>  |  0.82    Ca    7.7<L>      04 May 2024 06:27  Phos  2.8     05-04  Mg     2.2     05-04    TPro  4.6<L>  /  Alb  2.6<L>  /  TBili  3.6<H>  /  DBili  x   /  AST  74<H>  /  ALT  17  /  AlkPhos  153<H>  05-04    PT/INR - ( 04 May 2024 00:42 )   PT: 23.2 sec;   INR: 2.16 ratio         PTT - ( 04 May 2024 00:42 )  PTT:51.5 sec  Urinalysis Basic - ( 04 May 2024 06:27 )    Color: x / Appearance: x / SG: x / pH: x  Gluc: 95 mg/dL / Ketone: x  / Bili: x / Urobili: x   Blood: x / Protein: x / Nitrite: x   Leuk Esterase: x / RBC: x / WBC x   Sq Epi: x / Non Sq Epi: x / Bacteria: x      ABG:      vBG:  pH, Venous: 7.36 (05-04-24 @ 06:22)  pCO2, Venous: 37 mmHg (05-04-24 @ 06:22)  pO2, Venous: 65 mmHg (05-04-24 @ 06:22)  HCO3, Venous: 21 mmol/L (05-04-24 @ 06:22)  pCO2, Venous: 36 mmHg (05-04-24 @ 00:31)  pO2, Venous: 70 mmHg (05-04-24 @ 00:31)  HCO3, Venous: 20 mmol/L (05-04-24 @ 00:31)  pH, Venous: 7.35 (05-04-24 @ 00:31)    Micro:    Culture - Blood (collected 05-01-24 @ 17:00)  Source: .Blood Blood-Peripheral  Preliminary Report (05-03-24 @ 22:02):    No growth at 48 Hours    Culture - Blood (collected 04-30-24 @ 20:20)  Source: .Blood Blood-Peripheral  Preliminary Report (05-04-24 @ 01:02):    No growth at 72 Hours    Culture - Blood (collected 04-28-24 @ 11:27)  Source: .Blood Blood-Peripheral  Final Report (05-03-24 @ 17:00):    No growth at 5 days    Culture - Blood (collected 04-18-24 @ 17:53)  Source: .Blood Blood-Venous  Final Report (04-23-24 @ 23:00):    No growth at 5 days    Culture - Blood (collected 04-18-24 @ 17:53)  Source: .Blood Blood-Venous  Final Report (04-23-24 @ 23:00):    No growth at 5 days    Culture - Blood (collected 04-13-24 @ 04:00)  Source: .Blood Blood-Venous  Final Report (04-18-24 @ 10:01):    No growth at 5 days    Culture - Blood (collected 04-13-24 @ 03:25)  Source: .Blood Blood-Peripheral  Final Report (04-18-24 @ 10:01):    No growth at 5 days        Culture - Sputum (collected 04-17-24 @ 12:11)  Source: ET Tube ET Tube  Gram Stain (04-17-24 @ 22:40):    Few polymorphonuclear leukocytes per low power field    Few Squamous epithelial cells per low power field    Moderate Gram positive cocci in pairs per oil power field    Few Gram Negative Coccobacilli per oil power field  Final Report (04-23-24 @ 07:24):    Numerous Acinetobacter baumannii/nosocom group (Carbapenem Resistant)    Normal Respiratory Linh present    cefiderocol interpretations based on FDA breakpoints.  Organism: Acinetobacter baumannii/nosocom group (Carbapenem Resistant) (04-23-24 @ 07:24)      -  Minocycline: S      -  Cefiderocol: S      -  Piperacillin/Tazobactam: R      Method Type: KB  Organism: Acinetobacter baumannii/nosocom group (Carbapenem Resistant) (04-23-24 @ 07:24)      -  Levofloxacin: R >4      -  Tobramycin: S <=2      -  Gentamicin: R >8      -  Cefepime: I 16      -  Ciprofloxacin: R >2      -  Imipenem: R >8      Method Type: KLARISSA      -  Meropenem: R >8      -  Ampicillin/Sulbactam: I 16/8      -  Ceftazidime: R >16      -  Amikacin: R >32      -  Polymyxin B: I 0.25      -  Trimethoprim/Sulfamethoxazole: R >2/38      -  Tigecycline: S <=2  Organism: Acinetobacter baumannii/nosocom group (Carbapenem Resistant) (04-20-24 @ 14:42)    Culture - Sputum (collected 04-13-24 @ 04:32)  Source: ET Tube ET Tube  Gram Stain (04-13-24 @ 14:29):    Moderate polymorphonuclear leukocytes seen per low power field    No organisms seen  Final Report (04-14-24 @ 16:06):    Normal Respiratory Linh present        RADIOLOGY & ADDITIONAL TESTS: Reviewed.

## 2024-05-04 NOTE — PROGRESS NOTE ADULT - ASSESSMENT
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 Was on CRRT 4/13-4/15. Now noted to have a rising serum creatinine and oliguria.

## 2024-05-04 NOTE — PROGRESS NOTE ADULT - PROBLEM SELECTOR PROBLEM 2
Hypothyroidism
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
Fluid overload
Hypothyroidism
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
Lactic acidosis
Sepsis
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
Fluid overload
Lactic acidosis
Lactic acidosis
Sepsis
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
Hypothyroidism
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
Sepsis

## 2024-05-04 NOTE — PROGRESS NOTE ADULT - PROBLEM SELECTOR PLAN 2
Patient with lactic acidosis likely due to post-cardiac arrest on 4/30 and underlying infection. Lactate noted to be 10.5 on 4/30 post code, and remains elevated to 5.2 today. ABG with pH of 7.38. Monitor serum bicarb and VBG.    Pt. prognosis is poor. Goals of care discussions ongoing with family.  If you have any questions, please feel free to contact me.  Karlo Mo MD  Nephrology Fellow  [id number] / [employer] Teams (Preferred)  (After 4pm or on weekends, please call the on-call Fellow)

## 2024-05-04 NOTE — PROGRESS NOTE ADULT - SUBJECTIVE AND OBJECTIVE BOX
[facility] Division of Kidney Diseases & Hypertension  FOLLOW UP NOTE  [phone]--------------------------------------------------------------------------------  Chief Complaint:Liver failure without hepatic coma        24 hour events/subjective:        PAST HISTORY  --------------------------------------------------------------------------------  No significant changes to PMH, PSH, FHx, SHx, unless otherwise noted    ALLERGIES & MEDICATIONS  --------------------------------------------------------------------------------  Allergies    No Known Allergies    Intolerances      Standing Inpatient Medications  albuterol/ipratropium for Nebulization 3 milliLiter(s) Nebulizer every 6 hours  arginine IVPB. 2 Gram(s) IV Intermittent every 4 hours  cefiderocol IVPB 2000 milliGRAM(s) IV Intermittent every 8 hours  chlorhexidine 0.12% Liquid 15 milliLiter(s) Oral Mucosa every 12 hours  chlorhexidine 4% Liquid 1 Application(s) Topical <User Schedule>  CRRT Treatment    <Continuous>  dexMEDEtomidine Infusion 0.5 MICROgram(s)/kG/Hr IV Continuous <Continuous>  insulin glargine Injectable (LANTUS) 1 Unit(s) SubCutaneous at bedtime  insulin glargine Injectable (LANTUS) 1 Unit(s) SubCutaneous every morning  insulin lispro (ADMELOG) corrective regimen sliding scale   SubCutaneous every 6 hours  lidocaine   4% Patch 1 Patch Transdermal daily  linezolid  IVPB 600 milliGRAM(s) IV Intermittent every 12 hours  linezolid  IVPB      metroNIDAZOLE  IVPB 500 milliGRAM(s) IV Intermittent every 12 hours  norepinephrine Infusion 0.05 MICROgram(s)/kG/Min IV Continuous <Continuous>  pantoprazole  Injectable 40 milliGRAM(s) IV Push every 12 hours  Phoxillum Filtration BK 4 / 2.5 5000 milliLiter(s) CRRT <Continuous>  Phoxillum Filtration BK 4 / 2.5 5000 milliLiter(s) CRRT <Continuous>  PrismaSOL Filtration BGK 4 / 2.5 5000 milliLiter(s) CRRT <Continuous>  propofol Infusion 30 MICROgram(s)/kG/Min IV Continuous <Continuous>  sodium chloride 3%  Inhalation 4 milliLiter(s) Inhalation every 12 hours  vasopressin Infusion 0.04 Unit(s)/Min IV Continuous <Continuous>    PRN Inpatient Medications  fentaNYL    Injectable 50 MICROGram(s) IV Push every 30 minutes PRN  fentaNYL    Injectable 25 MICROGram(s) IV Push every 3 hours PRN  fentaNYL    Injectable 25 MICROGram(s) IV Push every 2 hours PRN    REVIEW OF SYSTEMS  --------------------------------------------------------------------------------  All other systems were reviewed and are negative, except as noted.    VITALS/PHYSICAL EXAM  --------------------------------------------------------------------------------  T(C): 36.2 (05-04-24 @ 12:00), Max: 37.4 (05-04-24 @ 04:00)  HR: 94 (05-04-24 @ 12:00) (80 - 113)  BP: 117/59 (05-04-24 @ 12:00) (81/46 - 172/75)  RR: 17 (05-04-24 @ 12:00) (16 - 40)  SpO2: 100% (05-04-24 @ 12:00) (96% - 100%)  Wt(kg): --    05-03-24 @ 07:01  -  05-04-24 @ 07:00  --------------------------------------------------------  IN: 2560.6 mL / OUT: 2616 mL / NET: -55.4 mL    05-04-24 @ 07:01  -  05-04-24 @ 13:01  --------------------------------------------------------  IN: 646.8 mL / OUT: 521 mL / NET: 125.8 mL    Physical Exam:  Gen: NAD, well-appearing  HEENT: PERRL, supple neck, clear oropharynx  Pulm: CTA B/L  CV: RRR, S1S2;  Back: No spinal or CVA tenderness  Abd: +BS, soft, nontender/nondistended  : No suprapubic tenderness  Extremities: no bilateral LE edema noted  Neuro: No focal deficits, intact gait  Skin: Warm, without rashes  Vascular access:    LABS/STUDIES  --------------------------------------------------------------------------------              7.6    25.93 >-----------<  13       [05-04-24 @ 06:27]              22.6     142  |  108  |  5   ----------------------------<  95      [05-04-24 @ 06:27]  4.0   |  18  |  0.82        Ca     7.7     [05-04-24 @ 06:27]      Mg     2.2     [05-04-24 @ 06:27]      Phos  2.8     [05-04-24 @ 06:27]    TPro  4.6  /  Alb  2.6  /  TBili  3.6  /  DBili  x   /  AST  74  /  ALT  17  /  AlkPhos  153  [05-04-24 @ 06:27]    PT/INR: PT 22.7 , INR 2.21       [05-04-24 @ 06:27]  PTT: 53.7       [05-04-24 @ 06:27]    Creatinine Trend:  SCr 0.82 [05-04 @ 06:27]  SCr 0.91 [05-04 @ 00:42]  SCr 0.99 [05-03 @ 17:54]  SCr 1.07 [05-03 @ 11:03]  SCr 0.93 [05-03 @ 05:45]    Urinalysis - [05-04-24 @ 06:27]      Color  / Appearance  / SG  / pH       Gluc 95 / Ketone   / Bili  / Urobili        Blood  / Protein  / Leuk Est  / Nitrite       RBC  / WBC  / Hyaline  / Gran  / Sq Epi  / Non Sq Epi  / Bacteria     TSH 2.90      [04-30-24 @ 11:09]  Lipid: chol 105, , HDL 10, LDL --      [05-03-24 @ 11:03] [facility] Division of Kidney Diseases & Hypertension  FOLLOW UP NOTE  [phone]--------------------------------------------------------------------------------    Chief Complaint: ANKIT    24 hour events/subjective: Pt. seen and examined at bedside this morning in the MICU with family at bedside. Pt. is tolerating CRRT however had a large bloody bowel movement this morning. Changed to keep net even. Unable to obtain ROS due to clinical status.    PAST HISTORY  --------------------------------------------------------------------------------  No significant changes to PMH, PSH, FHx, SHx, unless otherwise noted    ALLERGIES & MEDICATIONS  --------------------------------------------------------------------------------  Allergies  No Known Allergies    Intolerances    Standing Inpatient Medications  albuterol/ipratropium for Nebulization 3 milliLiter(s) Nebulizer every 6 hours  arginine IVPB. 2 Gram(s) IV Intermittent every 4 hours  cefiderocol IVPB 2000 milliGRAM(s) IV Intermittent every 8 hours  chlorhexidine 0.12% Liquid 15 milliLiter(s) Oral Mucosa every 12 hours  chlorhexidine 4% Liquid 1 Application(s) Topical <User Schedule>  CRRT Treatment    <Continuous>  dexMEDEtomidine Infusion 0.5 MICROgram(s)/kG/Hr IV Continuous <Continuous>  insulin glargine Injectable (LANTUS) 1 Unit(s) SubCutaneous at bedtime  insulin glargine Injectable (LANTUS) 1 Unit(s) SubCutaneous every morning  insulin lispro (ADMELOG) corrective regimen sliding scale   SubCutaneous every 6 hours  lidocaine   4% Patch 1 Patch Transdermal daily  linezolid  IVPB 600 milliGRAM(s) IV Intermittent every 12 hours  linezolid  IVPB      metroNIDAZOLE  IVPB 500 milliGRAM(s) IV Intermittent every 12 hours  norepinephrine Infusion 0.05 MICROgram(s)/kG/Min IV Continuous <Continuous>  pantoprazole  Injectable 40 milliGRAM(s) IV Push every 12 hours  Phoxillum Filtration BK 4 / 2.5 5000 milliLiter(s) CRRT <Continuous>  Phoxillum Filtration BK 4 / 2.5 5000 milliLiter(s) CRRT <Continuous>  PrismaSOL Filtration BGK 4 / 2.5 5000 milliLiter(s) CRRT <Continuous>  propofol Infusion 30 MICROgram(s)/kG/Min IV Continuous <Continuous>  sodium chloride 3%  Inhalation 4 milliLiter(s) Inhalation every 12 hours  vasopressin Infusion 0.04 Unit(s)/Min IV Continuous <Continuous>    PRN Inpatient Medications  fentaNYL    Injectable 50 MICROGram(s) IV Push every 30 minutes PRN  fentaNYL    Injectable 25 MICROGram(s) IV Push every 3 hours PRN  fentaNYL    Injectable 25 MICROGram(s) IV Push every 2 hours PRN    REVIEW OF SYSTEMS  --------------------------------------------------------------------------------  All other systems were reviewed and are negative, except as noted.    VITALS/PHYSICAL EXAM  --------------------------------------------------------------------------------  T(C): 36.2 (05-04-24 @ 12:00), Max: 37.4 (05-04-24 @ 04:00)  HR: 94 (05-04-24 @ 12:00) (80 - 113)  BP: 117/59 (05-04-24 @ 12:00) (81/46 - 172/75)  RR: 17 (05-04-24 @ 12:00) (16 - 40)  SpO2: 100% (05-04-24 @ 12:00) (96% - 100%)  Wt(kg): --    05-03-24 @ 07:01  -  05-04-24 @ 07:00  --------------------------------------------------------  IN: 2560.6 mL / OUT: 2616 mL / NET: -55.4 mL    05-04-24 @ 07:01  -  05-04-24 @ 13:01  --------------------------------------------------------  IN: 646.8 mL / OUT: 521 mL / NET: 125.8 mL    Physical Exam:  Gen: ill-appearing  HEENT: +ET tube, OG tube, +Scleral icterus  Pulm: Mechanical breath sounds BL  CV: S1S2  Abd: Soft, +BS  Ext: Trace B/L UE and LE edema  Neuro: Sedated  Skin: Warm and dry, jaundice  Vascular access: R-IJ non-tunneled dialysis catheter    LABS/STUDIES  --------------------------------------------------------------------------------              7.6    25.93 >-----------<  13       [05-04-24 @ 06:27]              22.6     142  |  108  |  5   ----------------------------<  95      [05-04-24 @ 06:27]  4.0   |  18  |  0.82        Ca     7.7     [05-04-24 @ 06:27]      Mg     2.2     [05-04-24 @ 06:27]      Phos  2.8     [05-04-24 @ 06:27]    TPro  4.6  /  Alb  2.6  /  TBili  3.6  /  DBili  x   /  AST  74  /  ALT  17  /  AlkPhos  153  [05-04-24 @ 06:27]    PT/INR: PT 22.7 , INR 2.21       [05-04-24 @ 06:27]  PTT: 53.7       [05-04-24 @ 06:27]    Creatinine Trend:  SCr 0.82 [05-04 @ 06:27]  SCr 0.91 [05-04 @ 00:42]  SCr 0.99 [05-03 @ 17:54]  SCr 1.07 [05-03 @ 11:03]  SCr 0.93 [05-03 @ 05:45]    Urinalysis - [05-04-24 @ 06:27]      Color  / Appearance  / SG  / pH       Gluc 95 / Ketone   / Bili  / Urobili        Blood  / Protein  / Leuk Est  / Nitrite       RBC  / WBC  / Hyaline  / Gran  / Sq Epi  / Non Sq Epi  / Bacteria     TSH 2.90      [04-30-24 @ 11:09]  Lipid: chol 105, , HDL 10, LDL --      [05-03-24 @ 11:03]

## 2024-05-05 NOTE — DISCHARGE NOTE FOR THE EXPIRED PATIENT - SECONDARY DIAGNOSIS.
Hemorrhagic shock Diffuse or disseminated intravascular coagulation (DIC syndrome) Cardiopulmonary arrest

## 2024-05-05 NOTE — DISCHARGE NOTE FOR THE EXPIRED PATIENT - HOSPITAL COURSE
49yo pmh of HTN, DM, Factor V Leiden c/b L peroneal DVT (2022) and RA thrombus (2018) currently not on AC, gout, congenital pancreas divisum, recurrent pancreatitis s/p cholecystectomy, Splenectomy, Kevin-En-Y Pancreaticojejunostomy, total pancreatectomy s/p Islet Cell Autotransplant at [facility] 2018, complicated by multiple intraabdominal infection (VRE, fungal? per LIMC), fistula, abscesses. Presented to [facility] 4/8 for several weeks of decreased PO intake, NBNB emesis, watery diarrhea, Somnolence, requiring intubation for airway protection. Lab revealed ammonia > 200, elevated bilirubin and INR initially c/f liver failure, c/c/b colitis possibly ischemic etiology. Patient transferred to [facility] MICU 4/13 for CRRT initiation for ammonia clearance. 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 Mental status improved w/ CRRT ammonia clearance and downgrade to medicine floor. Patient was developing more pulmonary congestion and altered mental status on the medical floor. On 5/1 patient had a PEA arrest and was admitted to micu after ROSC. The patient was intubated for AHRF, escalated abx to Cefidercol + Flagyl + Minocycline + Linezolid. 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 Patient was kept NPO and started on Arginine. CRRT was restarted for clearance of ammonia iso acute kidney injury. GI performed colonoscopy and found a circumferential distal rectal ulcer. Colorectal surgery inserted a rectal tampon for tamponade, but the patient continued to have large bleed requiring multiple units of blood, platelets, FFP, and Cryoprecipitate. On 5/4 after multiple ongoing goals of care discussions, decision was made by patient's HCP and  Brad Johnson to make patient DNR/DNI, transition to comfort care and undergo palliative extubation.   The patient passed on 5/5/2024 at 7:43 AM.

## 2024-05-05 NOTE — PROGRESS NOTE ADULT - PROVIDER SPECIALTY LIST ADULT
Hepatology
Internal Medicine
MICU
Nephrology-Cardiorenal
Nutrition Support
Surgery
Surgery
Transplant ID
Trauma Surgery
Heme/Onc
Hepatology
Internal Medicine
MICU
Nutrition Support
Rehab Medicine
Surgery
Transplant ID
Endocrinology
Endocrinology
Hepatology
MICU
Nephrology
Nephrology
Nutrition Support
Surgery
Surgery
Transplant ID
Trauma Surgery
Endocrinology
Endocrinology
Heme/Onc
MICU
Transplant ID
Endocrinology
Internal Medicine
Internal Medicine
Nephrology
Endocrinology
Internal Medicine
Endocrinology
Endocrinology
Nephrology
Endocrinology
Internal Medicine

## 2024-05-05 NOTE — CHART NOTE - NSCHARTNOTEFT_GEN_A_CORE
DEATH NOTE    Called to bedside to evaluate the patient for asytole.     On physical exam, patient did not respond to verbal or noxious stimuli.  No spontaneous respirations.  Absent heart and breath sounds.  Absent radial and carotid pulses.   Pupils are fixed and dilated, no corneal reflex.  EKG rhythm strip shows asystole. Patient pronounced dead at 7:43AM by Dr. Singleton.  Attending notified.     Darío Matamoros   Internal Medicine PGY-1

## 2024-05-05 NOTE — DISCHARGE NOTE FOR THE EXPIRED PATIENT - NS PRELIMINARY CAUSE OF DEATH_RESTRICTED
Impression: Other secondary cataract, right eye: H26.491. Plan: Advised patient that they have a film on the lens implant. It may be removed using a laser procedure, called the YAG laser. It is quick and painless, with few side effects. Possible side effects include the appearance of new floaters. There is also a small risk, on the order of 1/3000, of developing a retinal tear following the laser. Symptoms of this may be the development of many new floaters, flashing lights, or a curtain. These symptoms may signify a retina issue and should be investigated promptly. Multi-organ Dysfunction Syndrome

## 2024-05-05 NOTE — PROGRESS NOTE ADULT - ASSESSMENT
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 Patient had PEA arrest, ROSC after 10 minutes and 1 round of epinephrine, transferred to the MICU for further management.     PLAN  Neuro  #Encephalopathy iso Hyperammonemia   -patient reportedly w/worsening mental status prior to PEA arrest  -DDX remains broad and includes septic/metabolic encephalopathy given concern for sepsis and persistent hyperammonemia,   - Trend Ammonia Q6, on CRRT, holding Lactulose and Xifaxan iso Lower GI bleed   -propofol for sedation, daily sedation holds  - Plan for repeat CT head assess for Anoxic brain injury    Respiratory  #Acute hypoxemic respiratory failure  - suspect pulmonary edema given bilateral pleural effusions, bilateral diffuse b-lines on ultrasound, elevated BNP  - patient now intubated w/improving FiO2 requirements favoring acutely reversible process such as pulmonary edema  - CRRT  - Chest CT negative for PE   - antibiotics as below    Cardiovascular  #Shock  - likely Hemorrhagic and vasoplegic  - EKG showing sinus tachycardia, troponin negative  - wean pressors as tolerated   - Currently on IV Arginine supplementation likely worsening hypotension  - TTE showing normal LV function w/ elevated PA pressure     #Pulmonary HTN  -most recent TTE w/elevated PASP concerning for pulmonary HTN  - PA systolic pressure 38mmhg  -diuresing as above, will minimize PEEP    Renal  #ANKIT  - Creatinine Tripled from baseline   - CRRT, s/p Bumex Drip and Metolazone w/ reducing urine output     #Lactic acidosis  -lactate elevated but downtrending     GI  #Transaminitis  #Alcoholic hepatitis/alcoholic liver disease  #Hyperbilirubinemia  -patient w/fatty liver on imaging, no imaging evidence reporting cirrhosis at this point but previously seen by hepatology  -Hold rifaximin/lactulose  -CRRT for Ammonia     #Ornithine Transcarbamylase Deficiency   - Low-protein tube feed diet per Genetics team; holding feeds due to lower GI bleed   - Arginine 200mg/kg/day via central line   - Genetics team recommending TPN; not feasible at this time given infection risk    #Lower GI Bleed  - iso DIC; possibly 2/2 ischemia 2/2 PEA arrest    - GI consulted; s/p cryo, plasma, multiple units of PRBCs   - Persistent bright red blood per rectum  - S/p GI Scope 5/3 showing circumferential rectal bleed likely 2/2 rectal tube   - s/p Rectal Tampon placement by Colorectal  - Keep Fibrinogen >150     #Ascites  -present on CT abdomen and bedside ultrasound, no adequate pocket for safe paracentesis at this point, on broad spectrum antibiotics empirically  - diuresing as above    Endo  - AM cortisol WNL  - Lantus 2U AM; ROSI     ID  -will send repeat blood/urine cultures as patient has new hypoxemic respiratory failure with elevated WBC,  fungitell, galactomannan  -1/2 BAL sample growing yeast  -Caspofungin until BAL returns      #Acetinobacter pneumonia  -patient w/carbapenem-resistant acenitobacter from sputum on 4/17  -will start cefiderocol per ID recommendations, adding flagyl for anaerobic coverage, continuing minocylcine for Acinetobacter    Hem-Onc  #Leukocytosis  -evaluating for infectious causes as above, on broad spectrum antibiotics and Antifungal  #History of Factor V Leiden, RA thrombus 2018, L peroneal DVT 2022  -LE duplex negative 4/16/24    #DIC   - Blood draws coagulating iso Fibrinogen low to 100s, Elevating D-dimer   - s/p multiple units PRBC, Plasma and Cryo  - Hold Heparin Drip   - Heme consulted for presence of Andressa rods on Smear; confirmed it is artifact.   - CBC and Fibrinogen Q6, TEG Q12   - Keep Fibrinogen >150      CODE Status: FULL  DVT prophylaxis: Hold iso DIC

## 2024-05-06 LAB
CULTURE RESULTS: SIGNIFICANT CHANGE UP
CULTURE RESULTS: SIGNIFICANT CHANGE UP
GALACTOMANNAN AG SERPL-ACNC: 0.07 INDEX — SIGNIFICANT CHANGE UP (ref 0–0.49)
GALACTOMANNAN AG SERPL-ACNC: 0.08 INDEX — SIGNIFICANT CHANGE UP (ref 0–0.49)
GALACTOMANNAN AG SERPL-ACNC: 0.12 INDEX — SIGNIFICANT CHANGE UP (ref 0–0.49)
SPECIMEN SOURCE: SIGNIFICANT CHANGE UP
SPECIMEN SOURCE: SIGNIFICANT CHANGE UP

## 2024-05-07 LAB — D-LACTATE SERPL-SCNC: 0.12 MMOL/L — SIGNIFICANT CHANGE UP (ref 0–0.25)

## 2024-05-08 LAB
CULTURE RESULTS: ABNORMAL
SPECIMEN SOURCE: SIGNIFICANT CHANGE UP

## 2024-05-09 LAB — NSE FLD-MCNC: 26.5 NG/ML — HIGH (ref 0–17.6)

## 2024-05-10 NOTE — CHART NOTE - NSCHARTNOTEFT_GEN_A_CORE
Chart reviewed. Endocrine following for diabetes 2/2 pancreatic deficiency. Fasting glucose slightly below goal. Team already decreased Lantus for tonight to 6 units. Agree with adjustment. Monitor on Admelog 2 units qac. Will adjust further as needed.      Justice Looney D.O  [phone] 

## 2024-05-15 NOTE — CHART NOTE - NSCHARTNOTEFT_GEN_A_CORE
spoke to  of the patient and informed him that the genetic testing was negative. [facility] ornithine transcarbamylase deficiency test was negative.  reports that this brought him some closure. Our number was provided if he had any further questions or concerns.

## 2024-05-16 LAB
MISCELLANEOUS TEST NAME: SIGNIFICANT CHANGE UP
MISCELLANEOUS, NORMALS: SIGNIFICANT CHANGE UP
MISCELLANEOUS, RESULT: SIGNIFICANT CHANGE UP

## 2024-05-29 LAB
CULTURE RESULTS: ABNORMAL
CULTURE RESULTS: SIGNIFICANT CHANGE UP
SPECIMEN SOURCE: SIGNIFICANT CHANGE UP
SPECIMEN SOURCE: SIGNIFICANT CHANGE UP

## 2024-06-25 ENCOUNTER — APPOINTMENT (OUTPATIENT)
Dept: ENDOCRINOLOGY | Facility: CLINIC | Age: 49
End: 2024-06-25

## 2024-10-24 ENCOUNTER — APPOINTMENT (OUTPATIENT)
Dept: ENDOCRINOLOGY | Facility: CLINIC | Age: 49
End: 2024-10-24

## 2025-02-05 NOTE — PROGRESS NOTE ADULT - PROBLEM SELECTOR PLAN 11
[facility]     [facility]   2/5/25   >Was on alprazolam 0.75mg QD, Dilaudid and methadone 5mg TID at home  - c/w Methadone 5mg TI >Was on alprazolam 0.75mg QD, Dilaudid and methadone 5mg TID at home  - c/w Methadone 5mg TID

## (undated) DEVICE — CATH IV SAFE BC 20G X 1.16" (PINK)

## (undated) DEVICE — SOL INJ NS 0.9% 500ML 2 PORT

## (undated) DEVICE — SENSOR O2 FINGER ADULT

## (undated) DEVICE — FOLEY HOLDER STATLOCK 2 WAY ADULT

## (undated) DEVICE — SYR ALLIANCE II INFLATION 60ML

## (undated) DEVICE — PACK IV START WITH CHG

## (undated) DEVICE — BITE BLOCK ADULT 20 X 27MM (GREEN)

## (undated) DEVICE — TUBING SUCTION CONN 6FT STERILE

## (undated) DEVICE — TUBING IV SET GRAVITY 3Y 100" MACRO

## (undated) DEVICE — TUBING SUCTION 20FT

## (undated) DEVICE — CATH IV SAFE BC 22G X 1" (BLUE)

## (undated) DEVICE — SUCTION YANKAUER NO CONTROL VENT

## (undated) DEVICE — BALLOON US ENDO